# Patient Record
Sex: FEMALE | Race: WHITE | NOT HISPANIC OR LATINO | Employment: OTHER | URBAN - METROPOLITAN AREA
[De-identification: names, ages, dates, MRNs, and addresses within clinical notes are randomized per-mention and may not be internally consistent; named-entity substitution may affect disease eponyms.]

---

## 2017-02-27 ENCOUNTER — TELEPHONE (OUTPATIENT)
Dept: FAMILY MEDICINE | Facility: CLINIC | Age: 52
End: 2017-02-27

## 2017-02-27 PROBLEM — F33.0 MAJOR DEPRESSIVE DISORDER, RECURRENT EPISODE, MILD (H): Status: ACTIVE | Noted: 2017-02-27

## 2017-02-27 NOTE — TELEPHONE ENCOUNTER
Panel Management Review      Patient has the following on her problem list:     Depression / Dysthymia review  PHQ-9 SCORE 1/12/2016 3/15/2016 8/17/2016   Total Score - - -   Total Score 18 9 15      Patient is due for:  DAP      Composite cancer screening  Chart review shows that this patient is due/due soon for the following None  Summary:    Patient is due/failing the following:   DAP    Action needed:   DAP    Type of outreach:    Mailed DAP    Questions for provider review:    None                                                                                                                                    Abigail Valentin MA       Chart routed to Care Team .

## 2017-03-09 NOTE — PROGRESS NOTES
SUBJECTIVE:                                                    Inga Ledesma is a 51 year old female who presents to clinic today for the following health issues:      FMLA FORMS-  Copd flare ups and chf.    Copd has bothered her off and on.  chf : hfpef. Denies profound edema or pnd/orthopnea. No profound RAMIREZ presently. No chest pain/palps.     Problem list and histories reviewed & adjusted, as indicated.  Additional history: as documented    Labs reviewed in EPIC  Patient Active Problem List   Diagnosis     RLS (restless legs syndrome)     GERD (gastroesophageal reflux disease)     Iron deficiency anemia     Hyperlipidemia with target LDL less than 160     Adult BMI 30+     Sacroiliitis (H)     Tobacco abuse     Vitamin D deficiency     Menorrhagia     Advanced directives, counseling/discussion     Alcohol abuse, in remission     Vitamin D deficiency     Edema of both legs     Hypertension goal BP (blood pressure) < 140/90     Chronic bronchitis, unspecified chronic bronchitis type (H)     Health Care Home     Alcohol dependence with withdrawal with complication (H)     Major depressive disorder, recurrent episode, mild (H)     (HFpEF) heart failure with preserved ejection fraction (H)     Social History   Substance Use Topics     Smoking status: Light Tobacco Smoker     Packs/day: 0.50     Years: 34.00     Types: Cigarettes, Dip, chew, snus or snuff     Start date: 11/1/1979     Last attempt to quit: 10/3/2012     Smokeless tobacco: Never Used      Comment: 5 cigarettes daily     Alcohol use No      Comment: quit 5-16     Currently sober.   Reviewed and updated as needed this visit by clinical staff  Meds       Reviewed and updated as needed this visit by Provider           All other systems negative except as outline above.  OBJECTIVE:  Eye exam - right eye normal lid, conjunctiva, cornea, pupil and fundus, left eye normal lid, conjunctiva, cornea, pupil and fundus.  Thyroid not palpable, not enlarged, no  nodules detected.  CHEST:chest clear to IPPA, no tachypnea, retractions or cyanosis and S1, S2 normal, no murmur, no gallop, rate regular.  Her disks are flat. Pupils equal, round, reactive to light. Extraocular movements full. Visual fields full. Face moves symmetrically. Tongue midline. Hearing mildly decreased to finger-rubbing at approximately 6-8 inches. Neck without bruits. CV: S1, S2. Motor strength 5/5. Reflexes were 2/4. Toe signs were downgoing. Normal position sense. Good finger-nose-finger and fine finger movement. Gait: she herrera from a chair without difficulty and has a mildly broad-based gait.  No edema          Inga was seen today for forms.    Diagnoses and all orders for this visit:    RLS (restless legs syndrome)  -     gabapentin (NEURONTIN) 300 MG capsule; 1cap am 1 cap pm and 2 at bedtime    Chronic bronchitis, unspecified chronic bronchitis type (H)    (HFpEF) heart failure with preserved ejection fraction (H)    Major depressive disorder, recurrent episode, mild (H)  -     gabapentin (NEURONTIN) 300 MG capsule; 1cap am 1 cap pm and 2 at bedtime    Hypertension goal BP (blood pressure) < 140/90    Seizure due to alcohol withdrawal, uncomplicated (H)      Letter completed  work on lifestyle modification  Patient to f/u prn

## 2017-03-10 ENCOUNTER — OFFICE VISIT (OUTPATIENT)
Dept: FAMILY MEDICINE | Facility: CLINIC | Age: 52
End: 2017-03-10
Payer: COMMERCIAL

## 2017-03-10 VITALS — HEART RATE: 90 BPM | SYSTOLIC BLOOD PRESSURE: 118 MMHG | DIASTOLIC BLOOD PRESSURE: 80 MMHG

## 2017-03-10 DIAGNOSIS — I10 HYPERTENSION GOAL BP (BLOOD PRESSURE) < 140/90: ICD-10-CM

## 2017-03-10 DIAGNOSIS — J42 CHRONIC BRONCHITIS, UNSPECIFIED CHRONIC BRONCHITIS TYPE (H): ICD-10-CM

## 2017-03-10 DIAGNOSIS — R56.9 SEIZURE DUE TO ALCOHOL WITHDRAWAL, UNCOMPLICATED (H): ICD-10-CM

## 2017-03-10 DIAGNOSIS — G25.81 RLS (RESTLESS LEGS SYNDROME): Primary | ICD-10-CM

## 2017-03-10 DIAGNOSIS — I50.30 (HFPEF) HEART FAILURE WITH PRESERVED EJECTION FRACTION (H): ICD-10-CM

## 2017-03-10 DIAGNOSIS — F10.930 SEIZURE DUE TO ALCOHOL WITHDRAWAL, UNCOMPLICATED (H): ICD-10-CM

## 2017-03-10 DIAGNOSIS — F33.0 MAJOR DEPRESSIVE DISORDER, RECURRENT EPISODE, MILD (H): ICD-10-CM

## 2017-03-10 PROCEDURE — 99214 OFFICE O/P EST MOD 30 MIN: CPT | Performed by: PHYSICIAN ASSISTANT

## 2017-03-10 RX ORDER — GABAPENTIN 300 MG/1
CAPSULE ORAL
Qty: 270 CAPSULE | Refills: 3 | Status: SHIPPED | OUTPATIENT
Start: 2017-03-10 | End: 2017-06-20

## 2017-03-10 ASSESSMENT — ANXIETY QUESTIONNAIRES
2. NOT BEING ABLE TO STOP OR CONTROL WORRYING: MORE THAN HALF THE DAYS
IF YOU CHECKED OFF ANY PROBLEMS ON THIS QUESTIONNAIRE, HOW DIFFICULT HAVE THESE PROBLEMS MADE IT FOR YOU TO DO YOUR WORK, TAKE CARE OF THINGS AT HOME, OR GET ALONG WITH OTHER PEOPLE: VERY DIFFICULT
7. FEELING AFRAID AS IF SOMETHING AWFUL MIGHT HAPPEN: NEARLY EVERY DAY
GAD7 TOTAL SCORE: 17
6. BECOMING EASILY ANNOYED OR IRRITABLE: NEARLY EVERY DAY
1. FEELING NERVOUS, ANXIOUS, OR ON EDGE: NEARLY EVERY DAY
3. WORRYING TOO MUCH ABOUT DIFFERENT THINGS: MORE THAN HALF THE DAYS
5. BEING SO RESTLESS THAT IT IS HARD TO SIT STILL: MORE THAN HALF THE DAYS

## 2017-03-10 ASSESSMENT — PATIENT HEALTH QUESTIONNAIRE - PHQ9: 5. POOR APPETITE OR OVEREATING: MORE THAN HALF THE DAYS

## 2017-03-10 NOTE — LETTER
[facility]  [address] [facility] [address] [place] [address]  Phone: [phone]    March 10, 2017        Inga Ledesma  [address] [place] [address]          To whom it may concern:    RE: Inga Ledesma    Patient was seen and treated today at our clinic.  Inga had a one time only seizure in March of 2016 due to acute alcohol withdrawal. She has been seizure free since that time with out the use of anti-seizure medication. She has also been sober since May of 2016. At this time I am clearing her to drive, but am requesting an annual assessment for the next 4 years.     Please contact me for questions or concerns.      Sincerely,        Min Toledo PA-C

## 2017-03-10 NOTE — MR AVS SNAPSHOT
After Visit Summary   3/10/2017    Inga Ledesma    MRN: [id number]           Patient Information     Date Of Birth          1965        Visit Information        Provider Department      3/10/2017 2:40 PM Min Toledo PA-C [facility]        Today's Diagnoses     RLS (restless legs syndrome)    -  1    Chronic bronchitis, unspecified chronic bronchitis type (H)        (HFpEF) heart failure with preserved ejection fraction (H)        Major depressive disorder, recurrent episode, mild (H)        Hypertension goal BP (blood pressure) < 140/90        Seizure due to alcohol withdrawal, uncomplicated (H)           Follow-ups after your visit        Who to contact     Normal or non-critical lab and imaging results will be communicated to you by LawDeckhart, letter or phone within 4 business days after the clinic has received the results. If you do not hear from us within 7 days, please contact the clinic through LawDeckhart or phone. If you have a critical or abnormal lab result, we will notify you by phone as soon as possible.  Submit refill requests through TrendMD or call your pharmacy and they will forward the refill request to us. Please allow 3 business days for your refill to be completed.          If you need to speak with a  for additional information , please call: [phone]             Additional Information About Your Visit        LawDeckharCollabNet Information     TrendMD gives you secure access to your electronic health record. If you see a primary care provider, you can also send messages to your care team and make appointments. If you have questions, please call your primary care clinic.  If you do not have a primary care provider, please call [phone] and they will assist you.        Care EveryWhere ID     This is your Care EveryWhere ID. This could be used by other organizations to access your [place] medical records  [id number]        Your Vitals Were      Pulse Last Period                90 05/23/2010           Blood Pressure from Last 3 Encounters:   03/10/17 118/80   10/06/16 (!) 197/124   09/09/16 136/84    Weight from Last 3 Encounters:   10/06/16 176 lb 9.6 oz (80.1 kg)   09/07/16 174 lb (78.9 kg)   08/17/16 175 lb (79.4 kg)              Today, you had the following     No orders found for display         Today's Medication Changes          These changes are accurate as of: 3/10/17  3:35 PM.  If you have any questions, ask your nurse or doctor.               Stop taking these medicines if you haven't already. Please contact your care team if you have questions.     azithromycin 250 MG tablet   Commonly known as:  ZITHROMAX           predniSONE 20 MG tablet   Commonly known as:  DELTASONE                Where to get your medicines      These medications were sent to [facility] [address] - [address] [facility]  [address] [facility] [place] [address]     Phone:  [phone]     gabapentin 300 MG capsule                Primary Care Provider Office Phone # Fax #    Nacho Guzman [phone]       [facility] [address] [facility] [address]        Goals        General    I will use the walker at all times or a cane in tight spots to prevent further falls. (pt-stated)     Notes - Note created  12/9/2015  8:48 AM by Sonny Jacobs RN    As of today's date 12/9/2015 goal is met at 0 - 25%.   Goal Status:  Active          Thank you!     Thank you for choosing [facility]  for your care. Our goal is always to provide you with excellent care. Hearing back from our patients is one way we can continue to improve our services. Please take a few minutes to complete the written survey that you may receive in the mail after your visit with us. Thank you!             Your Updated Medication List - Protect others around you: Learn how to safely use, store and throw away your medicines at  www.disposemymeds.org.          This list is accurate as of: 3/10/17  3:35 PM.  Always use your most recent med list.                   Brand Name Dispense Instructions for use    albuterol 108 (90 BASE) MCG/ACT Inhaler    VENTOLIN HFA    3 Inhaler    Inhale  into the lungs. INHALE 2 PUFFS FOUR TIMES DAILY AS NEEDED       AMLODIPINE BESYLATE PO      Take 10 mg by mouth daily       aspirin 81 MG EC tablet     100 tablet    Take 1 tablet (81 mg) by mouth daily       calcium carbonate 500 MG tablet    OS-JENNIFER 500 mg [place]. Ca     Take 500 mg by mouth 2 times daily       cetirizine 10 MG tablet    zyrTEC    90 tablet    Take 1 tablet (10 mg) by mouth every evening       FLUoxetine 40 MG capsule    PROzac    90 capsule    Take 1 capsule (40 mg) by mouth daily       fluticasone 50 MCG/ACT spray    FLONASE    3 g    Spray 1-2 sprays into both nostrils daily       fluticasone-salmeterol 500-50 MCG/DOSE diskus inhaler    ADVAIR DISKUS    3 Inhaler    Inhale 1 puff into the lungs every 12 hours       furosemide 20 MG tablet    LASIX    90 tablet    Take 1 tablet (20 mg) by mouth daily       gabapentin 300 MG capsule    NEURONTIN    270 capsule    1cap am 1 cap pm and 2 at bedtime       HYDROcodone-acetaminophen 5-325 MG per tablet    NORCO    90 tablet    1 tablet at bedtime as needed for cough and sleep.       ipratropium - albuterol 0.5 mg/2.5 mg/3 mL 0.5-2.5 (3) MG/3ML neb solution    DUONEB    60 vial    Take 1 vial (3 mLs) by nebulization every 6 hours as needed for shortness of breath / dyspnea       LABETALOL HCL PO      Take 100 mg by mouth 2 times daily       losartan 100 MG tablet    COZAAR    90 tablet    Take 1 tablet (100 mg) by mouth daily       methocarbamol 750 MG tablet    ROBAXIN    180 tablet    1 tablet 3 times a day for muscle relaxation       montelukast 10 MG tablet    SINGULAIR    90 tablet    Take 1 tablet (10 mg) by mouth At Bedtime       naltrexone 50 MG tablet    DEPADE;REVIA     Take 50 mg by mouth  daily       nicotine 10 MG Inhaler    NICOTROL    180 Box    Use 1-2 daily as needed for smoking cessation       nystatin cream    MYCOSTATIN    30 g    Apply topically 3 times daily       omeprazole 20 MG CR capsule    priLOSEC    180 capsule    Take 1 capsule (20 mg) by mouth 2 times daily       order for DME     1 each    Equipment being ordered: Nebulizer       potassium chloride SA 10 MEQ CR tablet    K-DUR/KLOR-CON M    180 tablet    Take 1 tablet (10 mEq) by mouth 2 times daily       rOPINIRole 1 MG tablet    REQUIP    270 tablet    Take 1 tablet (1 mg) by mouth 5 times daily       tiotropium 18 MCG capsule    SPIRIVA HANDIHALER    90 capsule    Inhale contents of one capsule daily.       triamcinolone 0.1 % ointment    KENALOG    30 g    Apply sparingly to affected area three times daily.       venlafaxine 150 MG 24 hr capsule    EFFEXOR-XR    60 capsule    Take 2 capsules daily.

## 2017-03-11 ASSESSMENT — ANXIETY QUESTIONNAIRES: GAD7 TOTAL SCORE: 17

## 2017-03-11 ASSESSMENT — PATIENT HEALTH QUESTIONNAIRE - PHQ9: SUM OF ALL RESPONSES TO PHQ QUESTIONS 1-9: 17

## 2017-04-28 ENCOUNTER — OFFICE VISIT (OUTPATIENT)
Dept: FAMILY MEDICINE | Facility: CLINIC | Age: 52
End: 2017-04-28
Payer: COMMERCIAL

## 2017-04-28 ENCOUNTER — RADIANT APPOINTMENT (OUTPATIENT)
Dept: GENERAL RADIOLOGY | Facility: CLINIC | Age: 52
End: 2017-04-28
Attending: PHYSICIAN ASSISTANT
Payer: COMMERCIAL

## 2017-04-28 ENCOUNTER — TELEPHONE (OUTPATIENT)
Dept: FAMILY MEDICINE | Facility: CLINIC | Age: 52
End: 2017-04-28

## 2017-04-28 VITALS
BODY MASS INDEX: 29.26 KG/M2 | DIASTOLIC BLOOD PRESSURE: 80 MMHG | HEIGHT: 62 IN | WEIGHT: 159 LBS | OXYGEN SATURATION: 96 % | SYSTOLIC BLOOD PRESSURE: 112 MMHG | HEART RATE: 115 BPM | RESPIRATION RATE: 20 BRPM

## 2017-04-28 DIAGNOSIS — J42 CHRONIC BRONCHITIS, UNSPECIFIED CHRONIC BRONCHITIS TYPE (H): Primary | ICD-10-CM

## 2017-04-28 DIAGNOSIS — F10.930 SEIZURE DUE TO ALCOHOL WITHDRAWAL, UNCOMPLICATED (H): ICD-10-CM

## 2017-04-28 DIAGNOSIS — R19.7 DIARRHEA, UNSPECIFIED TYPE: ICD-10-CM

## 2017-04-28 DIAGNOSIS — E87.6 HYPOKALEMIA: ICD-10-CM

## 2017-04-28 DIAGNOSIS — R56.9 SEIZURE DUE TO ALCOHOL WITHDRAWAL, UNCOMPLICATED (H): ICD-10-CM

## 2017-04-28 DIAGNOSIS — R06.09 DYSPNEA ON EXERTION: ICD-10-CM

## 2017-04-28 DIAGNOSIS — I50.30 (HFPEF) HEART FAILURE WITH PRESERVED EJECTION FRACTION (H): ICD-10-CM

## 2017-04-28 LAB
ALBUMIN SERPL-MCNC: 3.9 G/DL (ref 3.4–5)
ALP SERPL-CCNC: 155 U/L (ref 40–150)
ALT SERPL W P-5'-P-CCNC: 44 U/L (ref 0–50)
ANION GAP SERPL CALCULATED.3IONS-SCNC: 15 MMOL/L (ref 3–14)
AST SERPL W P-5'-P-CCNC: 65 U/L (ref 0–45)
BILIRUB SERPL-MCNC: 1.5 MG/DL (ref 0.2–1.3)
BUN SERPL-MCNC: 8 MG/DL (ref 7–30)
C DIFF TOX B STL QL: NORMAL
CALCIUM SERPL-MCNC: 8.4 MG/DL (ref 8.5–10.1)
CHLORIDE SERPL-SCNC: 89 MMOL/L (ref 94–109)
CO2 SERPL-SCNC: 27 MMOL/L (ref 20–32)
CREAT SERPL-MCNC: 1.15 MG/DL (ref 0.52–1.04)
D DIMER PPP FEU-MCNC: NORMAL UG/ML FEU (ref 0–0.5)
ERYTHROCYTE [DISTWIDTH] IN BLOOD BY AUTOMATED COUNT: 13.3 % (ref 10–15)
FEF 25/75: NORMAL
FEV-1: NORMAL
FEV1/FVC: NORMAL
FVC: NORMAL
GFR SERPL CREATININE-BSD FRML MDRD: 50 ML/MIN/1.7M2
GLUCOSE SERPL-MCNC: 117 MG/DL (ref 70–99)
HCT VFR BLD AUTO: 41.7 % (ref 35–47)
HGB BLD-MCNC: 15 G/DL (ref 11.7–15.7)
MCH RBC QN AUTO: 32.9 PG (ref 26.5–33)
MCHC RBC AUTO-ENTMCNC: 36 G/DL (ref 31.5–36.5)
MCV RBC AUTO: 91 FL (ref 78–100)
NT-PROBNP SERPL-MCNC: 842 PG/ML (ref 0–125)
PLATELET # BLD AUTO: 238 10E9/L (ref 150–450)
POTASSIUM SERPL-SCNC: 2.7 MMOL/L (ref 3.4–5.3)
PROT SERPL-MCNC: 7.8 G/DL (ref 6.8–8.8)
RBC # BLD AUTO: 4.56 10E12/L (ref 3.8–5.2)
SODIUM SERPL-SCNC: 131 MMOL/L (ref 133–144)
SPECIMEN SOURCE: NORMAL
WBC # BLD AUTO: 13 10E9/L (ref 4–11)

## 2017-04-28 PROCEDURE — 85027 COMPLETE CBC AUTOMATED: CPT | Performed by: PHYSICIAN ASSISTANT

## 2017-04-28 PROCEDURE — 80053 COMPREHEN METABOLIC PANEL: CPT | Performed by: PHYSICIAN ASSISTANT

## 2017-04-28 PROCEDURE — 99214 OFFICE O/P EST MOD 30 MIN: CPT | Mod: 25 | Performed by: PHYSICIAN ASSISTANT

## 2017-04-28 PROCEDURE — 71020 XR CHEST 2 VW: CPT

## 2017-04-28 PROCEDURE — 83880 ASSAY OF NATRIURETIC PEPTIDE: CPT | Performed by: PHYSICIAN ASSISTANT

## 2017-04-28 PROCEDURE — 87493 C DIFF AMPLIFIED PROBE: CPT | Performed by: PHYSICIAN ASSISTANT

## 2017-04-28 PROCEDURE — 94010 BREATHING CAPACITY TEST: CPT | Performed by: PHYSICIAN ASSISTANT

## 2017-04-28 PROCEDURE — 36415 COLL VENOUS BLD VENIPUNCTURE: CPT | Performed by: PHYSICIAN ASSISTANT

## 2017-04-28 PROCEDURE — 85379 FIBRIN DEGRADATION QUANT: CPT | Performed by: PHYSICIAN ASSISTANT

## 2017-04-28 RX ORDER — ALBUTEROL SULFATE 90 UG/1
AEROSOL, METERED RESPIRATORY (INHALATION)
Qty: 3 INHALER | Refills: 5 | Status: SHIPPED | OUTPATIENT
Start: 2017-04-28 | End: 2017-06-20

## 2017-04-28 RX ORDER — IPRATROPIUM BROMIDE AND ALBUTEROL SULFATE 2.5; .5 MG/3ML; MG/3ML
3 SOLUTION RESPIRATORY (INHALATION) EVERY 6 HOURS PRN
Qty: 60 VIAL | Refills: 12 | Status: SHIPPED | OUTPATIENT
Start: 2017-04-28 | End: 2017-10-06

## 2017-04-28 NOTE — MR AVS SNAPSHOT
After Visit Summary   4/28/2017    Inga Ledesma    MRN: [id number]           Patient Information     Date Of Birth          1965        Visit Information        Provider Department      4/28/2017 1:20 PM Min Toledo PA-C [facility]        Today's Diagnoses     Chronic bronchitis, unspecified chronic bronchitis type (H)    -  1    Seizure due to alcohol withdrawal, uncomplicated (H)        Diarrhea, unspecified type        Alcoholism /alcohol abuse (H)        Dyspnea on exertion        (HFpEF) heart failure with preserved ejection fraction (H)           Follow-ups after your visit        Future tests that were ordered for you today     Open Future Orders        Priority Expected Expires Ordered    Echocardiogram Complete Routine  4/28/2018 4/28/2017            Who to contact     Normal or non-critical lab and imaging results will be communicated to you by PhaseRxt, letter or phone within 4 business days after the clinic has received the results. If you do not hear from us within 7 days, please contact the clinic through PhaseRxt or phone. If you have a critical or abnormal lab result, we will notify you by phone as soon as possible.  Submit refill requests through Buysight or call your pharmacy and they will forward the refill request to us. Please allow 3 business days for your refill to be completed.          If you need to speak with a  for additional information , please call: [phone]             Additional Information About Your Visit        Buysight Information     Buysight gives you secure access to your electronic health record. If you see a primary care provider, you can also send messages to your care team and make appointments. If you have questions, please call your primary care clinic.  If you do not have a primary care provider, please call [phone] and they will assist you.        Care EveryWhere ID     This is your Care EveryWhere ID.  "This could be used by other organizations to access your [place] medical records  [id number]        Your Vitals Were     Pulse Respirations Height Last Period Pulse Oximetry BMI (Body Mass Index)    115 20 5' 1.5\" (1.562 m) 05/23/2010 96% 29.56 kg/m2       Blood Pressure from Last 3 Encounters:   04/28/17 112/80   03/10/17 118/80   10/06/16 (!) 197/124    Weight from Last 3 Encounters:   04/28/17 159 lb (72.1 kg)   10/06/16 176 lb 9.6 oz (80.1 kg)   09/07/16 174 lb (78.9 kg)              We Performed the Following     BNP-N terminal pro     CBC with platelets     Clostridium difficile Toxin B PCR     Comprehensive metabolic panel (BMP + Alb, Alk Phos, ALT, AST, Total. Bili, TP)     COPD ACTION PLAN     D dimer quantitative     XR Chest 2 Views          Where to get your medicines      These medications were sent to [facility] [address] - [address] [facility]  [address] [facility] [place] [address]     Phone:  [phone]     albuterol 108 (90 BASE) MCG/ACT Inhaler    ipratropium - albuterol 0.5 mg/2.5 mg/3 mL 0.5-2.5 (3) MG/3ML neb solution          Primary Care Provider Office Phone # Fax #    Nacho Guzman [phone]       [facility] [address] [facility] [address]        Goals        General    I will use the walker at all times or a cane in tight spots to prevent further falls. (pt-stated)     Notes - Note created  12/9/2015  8:48 AM by Sonny Jacobs RN    As of today's date 12/9/2015 goal is met at 0 - 25%.   Goal Status:  Active          Thank you!     Thank you for choosing [facility]  for your care. Our goal is always to provide you with excellent care. Hearing back from our patients is one way we can continue to improve our services. Please take a few minutes to complete the written survey that you may receive in the mail after your visit with us. Thank you!             Your Updated Medication List - Protect others " around you: Learn how to safely use, store and throw away your medicines at www.disposemymeds.org.          This list is accurate as of: 4/28/17  2:28 PM.  Always use your most recent med list.                   Brand Name Dispense Instructions for use    albuterol 108 (90 BASE) MCG/ACT Inhaler    VENTOLIN HFA    3 Inhaler    Inhale  into the lungs. INHALE 2 PUFFS FOUR TIMES DAILY AS NEEDED       AMLODIPINE BESYLATE PO      Take 10 mg by mouth daily       aspirin 81 MG EC tablet     100 tablet    Take 1 tablet (81 mg) by mouth daily       calcium carbonate 500 MG tablet    OS-JENNIFER 500 mg [place]. Ca     Take 500 mg by mouth 2 times daily       cetirizine 10 MG tablet    zyrTEC    90 tablet    Take 1 tablet (10 mg) by mouth every evening       FLUoxetine 40 MG capsule    PROzac    90 capsule    Take 1 capsule (40 mg) by mouth daily       fluticasone 50 MCG/ACT spray    FLONASE    3 g    Spray 1-2 sprays into both nostrils daily       fluticasone-salmeterol 500-50 MCG/DOSE diskus inhaler    ADVAIR DISKUS    3 Inhaler    Inhale 1 puff into the lungs every 12 hours       furosemide 20 MG tablet    LASIX    90 tablet    Take 1 tablet (20 mg) by mouth daily       gabapentin 300 MG capsule    NEURONTIN    270 capsule    1cap am 1 cap pm and 2 at bedtime       HYDROcodone-acetaminophen 5-325 MG per tablet    NORCO    90 tablet    1 tablet at bedtime as needed for cough and sleep.       ipratropium - albuterol 0.5 mg/2.5 mg/3 mL 0.5-2.5 (3) MG/3ML neb solution    DUONEB    60 vial    Take 1 vial (3 mLs) by nebulization every 6 hours as needed for shortness of breath / dyspnea       LABETALOL HCL PO      Take 100 mg by mouth 2 times daily       losartan 100 MG tablet    COZAAR    90 tablet    Take 1 tablet (100 mg) by mouth daily       methocarbamol 750 MG tablet    ROBAXIN    180 tablet    1 tablet 3 times a day for muscle relaxation       montelukast 10 MG tablet    SINGULAIR    90 tablet    Take 1 tablet (10 mg) by mouth At  Bedtime       naltrexone 50 MG tablet    DEPADE;REVIA     Take 50 mg by mouth daily       nicotine 10 MG Inhaler    NICOTROL    180 Box    Use 1-2 daily as needed for smoking cessation       nystatin cream    MYCOSTATIN    30 g    Apply topically 3 times daily       omeprazole 20 MG CR capsule    priLOSEC    180 capsule    Take 1 capsule (20 mg) by mouth 2 times daily       order for DME     1 each    Equipment being ordered: Nebulizer       potassium chloride SA 10 MEQ CR tablet    K-DUR/KLOR-CON M    180 tablet    Take 1 tablet (10 mEq) by mouth 2 times daily       rOPINIRole 1 MG tablet    REQUIP    270 tablet    Take 1 tablet (1 mg) by mouth 5 times daily       tiotropium 18 MCG capsule    SPIRIVA HANDIHALER    90 capsule    Inhale contents of one capsule daily.       triamcinolone 0.1 % ointment    KENALOG    30 g    Apply sparingly to affected area three times daily.       venlafaxine 150 MG 24 hr capsule    EFFEXOR-XR    60 capsule    Take 2 capsules daily.

## 2017-04-28 NOTE — LETTER
My COPD Action Plan   Name: Inga Ledesma    YOB: 1965   Date: 4/28/2017    My doctor: Min Toledo PA-C   My clinic: [address]  [place] [id number] [phone]  My Controller Medicine: Tiotropium (Spiriva)    Dose:      My Rescue Medicine: Albuterol nebulizer solution    Dose:      My Flare Up Medicine: Prednisone   Dose:   My COPD Severity: { :632180}     Use of Oxygen: Oxygen Not Prescribed         GREEN ZONE       Doing well today      Usual level of activity and exercise    Usual amount of cough and mucus    No shortness of breath    Usual level of health (thinking clearly, sleeping well, feel like eating) Actions:      Take daily medicines    Use oxygen as prescribed    Follow regular exercise and diet plan    Avoid cigarette smoke and other irritants that harm the lungs           YELLOW ZONE          Having a bad day or flare up      Short of breath more than usual    A lot more sputum (mucus) than usual    Sputum looks yellow, green, tan, brown or bloody    More coughing or wheezing    Fever or chills    Less energy; trouble completing activities    Trouble thinking or focusing    Using quick relief inhaler or nebulizer more often    Poor sleep; symptoms wake me up    Do not feel like eating Actions:      Get plenty of rest    Take daily medicines    Use quick relief inhaler every 24 hours    If you use oxygen, call you doctor to see if you should adjust your oxygen    Do breathing exercises or other things to help you relax    Let a loved one, friend or neighbor know you are feeling worse    Call your care team if you have 2 or more symptoms.  Start taking steroids or antibiotics if directed by your care team           RED ZONE       Need medical care now      Severe shortness of breath (feel you can't breathe)    Fever, chills    Not enough breath to do any activity    Trouble coughing up mucus, walking or talking    Blood in  mucus    Frequent coughing   Rescue medicines are not working    Not able to sleep because of breathing    Feel confused or drowsy    Chest pain    Actions:      Call your health care team.  If you cannot reach your care team, call 911 or go to the emergency room.        Electronically signed by: Dodie Maurcie, April 28, 2017  Annual Reminders:  Meet with Care Team, Flu Shot every Fall and Pneumonia Shot at least once  Pharmacy:    [place], [place] [facility] [address] [facility] [place], [place] - [address]  [facility] [place] [facility] [address] [facility]  WRITTEN PRESCRIPTION REQUESTED

## 2017-04-28 NOTE — PROGRESS NOTES
SUBJECTIVE:                                                    Inga Ledesma is a 51 year old female who presents to clinic today for the following health issues:      COPD Follow-Up    Symptoms are currently: patient is wanting paperwork to return to work. States stable    Current fatigue or dyspnea with ambulation: stable     Shortness of breath: stable    Increased or change in Cough/Sputum: No    Fever(s): No    Any ER/UC or hospital admissions since your last visit? No     History   Smoking Status     Light Tobacco Smoker     Packs/day: 0.50     Years: 34.00     Types: Cigarettes, Dip, chew, snus or snuff     Start date: 11/1/1979     Last attempt to quit: 10/3/2012   Smokeless Tobacco     Never Used     Comment: 5 cigarettes daily     No results found for: FEV1, [id number]       Amount of exercise or physical activity: None    Problems taking medications regularly: No    Medication side effects: none    Diet: regular (no restrictions)      Noting sob related to copd. No chest pain. No leg swelling.   Some congestion and coughing.  Recheck of alcoholism and prior sz due to withdrawal. Drank for a week two weeks ago. Hasn't drank since. No seizures. Blood pressure elevated, may be related.  Some diarrhea . Green in color. Was on antibiotics last mos.     Problem list and histories reviewed & adjusted, as indicated.  Additional history: as documented        Reviewed and updated as needed this visit by clinical staff  Tobacco  Allergies  Meds  Problems  Med Hx  Surg Hx  Fam Hx  Soc Hx        Reviewed and updated as needed this visit by Provider  Tobacco  Allergies  Meds  Problems  Med Hx  Surg Hx  Fam Hx  Soc Hx          All other systems negative except as outline above  OBJECTIVE:  Eye exam - right eye normal lid, conjunctiva, cornea, pupil and fundus, left eye normal lid, conjunctiva, cornea, pupil and fundus.  ENT exam reveals - bilateral TM fluid noted, neck without nodes, throat normal  without erythema or exudate, sinuses nontender, post nasal drip noted and nasal mucosa congested.  CHEST:chest clear to IPPA, no tachypnea, retractions or cyanosis and S1, S2 normal, no murmur, no gallop, rate regular.  The abdomen is soft without tenderness, guarding, mass, rebound or organomegaly. Bowel sounds are normal. No CVA tenderness or inguinal adenopathy noted.  No edema    Inga was seen today for copd.    Diagnoses and all orders for this visit:    Chronic bronchitis, unspecified chronic bronchitis type (H)  -     ipratropium - albuterol 0.5 mg/2.5 mg/3 mL (DUONEB) 0.5-2.5 (3) MG/3ML neb solution; Take 1 vial (3 mLs) by nebulization every 6 hours as needed for shortness of breath / dyspnea  -     albuterol (VENTOLIN HFA) 108 (90 BASE) MCG/ACT Inhaler; Inhale  into the lungs. INHALE 2 PUFFS FOUR TIMES DAILY AS NEEDED  -     COPD ACTION PLAN    Seizure due to alcohol withdrawal, uncomplicated (H)    Diarrhea, unspecified type  -     Clostridium difficile Toxin B PCR; Future  -     Clostridium difficile Toxin B PCR    Alcoholism /alcohol abuse (H)  -     Comprehensive metabolic panel (BMP + Alb, Alk Phos, ALT, AST, Total. Bili, TP)  -     CBC with platelets    Dyspnea on exertion  -     CBC with platelets  -     XR Chest 2 Views  -     BNP-N terminal pro  -     D dimer quantitative    (HFpEF) heart failure with preserved ejection fraction (H)  -     Echocardiogram Complete; Future      Restart spiriva. Recheck prn.  Advised supportive and symptomatic treatment.  Follow up with Provider - if condition persists or worsens.

## 2017-04-29 RX ORDER — POTASSIUM CHLORIDE 1500 MG/1
20 TABLET, EXTENDED RELEASE ORAL 2 TIMES DAILY
Qty: 180 TABLET | Refills: 3 | Status: ON HOLD | OUTPATIENT
Start: 2017-04-29 | End: 2017-11-01

## 2017-05-01 ENCOUNTER — TELEPHONE (OUTPATIENT)
Dept: FAMILY MEDICINE | Facility: CLINIC | Age: 52
End: 2017-05-01

## 2017-05-01 NOTE — TELEPHONE ENCOUNTER
Patient states that Min Toledo did not put a return date on her work ability form. Please call.    Thank you.

## 2017-05-03 ENCOUNTER — TELEPHONE (OUTPATIENT)
Dept: FAMILY MEDICINE | Facility: CLINIC | Age: 52
End: 2017-05-03

## 2017-05-03 NOTE — TELEPHONE ENCOUNTER
Disability forms from insurance received, patient hasn't returned to work yet-she was admitted to [facility] 05/03/17 due to alcoholism. Form hasn't been signed by patient in order to release information and has been mailed to her home with a request to complete and mail to clinic for completion.

## 2017-05-03 NOTE — LETTER
[facility] [place]  [address] [place] [address]  [phone]                                                                                                           Inga Ledesma  [address]  [place] [address]    May 3, 2017          Dear Inga Ledesma,        We have received disability forms from [facility],please complete the employee portion and return to Min Toledo so that he may finish them.    Thank you          Sincerely,    Louise STUART     [facility]

## 2017-05-09 ENCOUNTER — OFFICE VISIT (OUTPATIENT)
Dept: FAMILY MEDICINE | Facility: CLINIC | Age: 52
End: 2017-05-09
Payer: COMMERCIAL

## 2017-05-09 VITALS
RESPIRATION RATE: 18 BRPM | SYSTOLIC BLOOD PRESSURE: 128 MMHG | OXYGEN SATURATION: 96 % | HEART RATE: 106 BPM | WEIGHT: 167 LBS | DIASTOLIC BLOOD PRESSURE: 82 MMHG | BODY MASS INDEX: 31.04 KG/M2

## 2017-05-09 DIAGNOSIS — K70.10 ALCOHOLIC HEPATITIS WITHOUT ASCITES (H): ICD-10-CM

## 2017-05-09 DIAGNOSIS — E87.6 HYPOKALEMIA: Primary | ICD-10-CM

## 2017-05-09 DIAGNOSIS — K85.20 ALCOHOL-INDUCED ACUTE PANCREATITIS, UNSPECIFIED COMPLICATION STATUS: ICD-10-CM

## 2017-05-09 LAB
ALBUMIN SERPL-MCNC: 3.4 G/DL (ref 3.4–5)
ALP SERPL-CCNC: 150 U/L (ref 40–150)
ALT SERPL W P-5'-P-CCNC: 45 U/L (ref 0–50)
AMYLASE SERPL-CCNC: 91 U/L (ref 30–110)
ANION GAP SERPL CALCULATED.3IONS-SCNC: 10 MMOL/L (ref 3–14)
AST SERPL W P-5'-P-CCNC: 51 U/L (ref 0–45)
BILIRUB SERPL-MCNC: 0.3 MG/DL (ref 0.2–1.3)
BUN SERPL-MCNC: 14 MG/DL (ref 7–30)
CALCIUM SERPL-MCNC: 9.9 MG/DL (ref 8.5–10.1)
CHLORIDE SERPL-SCNC: 94 MMOL/L (ref 94–109)
CO2 SERPL-SCNC: 29 MMOL/L (ref 20–32)
CREAT SERPL-MCNC: 0.9 MG/DL (ref 0.52–1.04)
GFR SERPL CREATININE-BSD FRML MDRD: 66 ML/MIN/1.7M2
GLUCOSE SERPL-MCNC: 114 MG/DL (ref 70–99)
LIPASE SERPL-CCNC: 853 U/L (ref 73–393)
POTASSIUM SERPL-SCNC: 3.8 MMOL/L (ref 3.4–5.3)
PROT SERPL-MCNC: 7.5 G/DL (ref 6.8–8.8)
SODIUM SERPL-SCNC: 133 MMOL/L (ref 133–144)

## 2017-05-09 PROCEDURE — 99214 OFFICE O/P EST MOD 30 MIN: CPT | Performed by: PHYSICIAN ASSISTANT

## 2017-05-09 PROCEDURE — 36415 COLL VENOUS BLD VENIPUNCTURE: CPT | Performed by: PHYSICIAN ASSISTANT

## 2017-05-09 PROCEDURE — 82150 ASSAY OF AMYLASE: CPT | Performed by: PHYSICIAN ASSISTANT

## 2017-05-09 PROCEDURE — 83690 ASSAY OF LIPASE: CPT | Performed by: PHYSICIAN ASSISTANT

## 2017-05-09 PROCEDURE — 80053 COMPREHEN METABOLIC PANEL: CPT | Performed by: PHYSICIAN ASSISTANT

## 2017-05-09 NOTE — PROGRESS NOTES
SUBJECTIVE:                                                    Inga Ledesma is a 51 year old female who presents to clinic today for the following health issues:      ED/UC Followup:    Facility:  [facility]  Date of visit: 05/03/17  Reason for visit: alcohol withdrawal  Current Status: needing short term disability forms filled out today       No chest pain/sob. Mild constipation. abd pain otherwise has improved. No fevers. Sober currently x 9 days.    Problem list and histories reviewed & adjusted, as indicated.  Additional history: as documented        Reviewed and updated as needed this visit by clinical staff       Reviewed and updated as needed this visit by Provider         Patient Active Problem List   Diagnosis     RLS (restless legs syndrome)     GERD (gastroesophageal reflux disease)     Iron deficiency anemia     Hyperlipidemia with target LDL less than 160     Adult BMI 30+     Sacroiliitis (H)     Tobacco abuse     Vitamin D deficiency     Menorrhagia     Advanced directives, counseling/discussion     Vitamin D deficiency     Edema of both legs     Hypertension goal BP (blood pressure) < 140/90     Chronic bronchitis, unspecified chronic bronchitis type (H)     Health Care Home     Alcohol dependence with withdrawal with complication (H)     Major depressive disorder, recurrent episode, mild (H)     (HFpEF) heart failure with preserved ejection fraction (H)     Seizure due to alcohol withdrawal, uncomplicated (H)     Social History   Substance Use Topics     Smoking status: Light Tobacco Smoker     Packs/day: 0.50     Years: 34.00     Types: Cigarettes, Dip, chew, snus or snuff     Start date: 11/1/1979     Last attempt to quit: 10/3/2012     Smokeless tobacco: Never Used      Comment: 5 cigarettes daily     Alcohol use No      Comment: quit 5-16     All other systems negative except as outline above  OBJECTIVE:  Eye exam - right eye normal lid, conjunctiva, cornea, pupil and fundus, left eye normal  lid, conjunctiva, cornea, pupil and fundus.  Thyroid not palpable, not enlarged, no nodules detected.  CHEST:chest clear to IPPA, no tachypnea, retractions or cyanosis and S1, S2 normal, no murmur, no gallop, rate regular.  ABDOMEN: mild tenderness in the epigastric area, without rebound, guarding, mass or organomegaly. Abdomen is soft and bowel sounds are normal.    Inga was seen today for hospital f/u.    Diagnoses and all orders for this visit:    Hypokalemia  -     Comprehensive metabolic panel    Alcoholic hepatitis without ascites  -     Comprehensive metabolic panel    Alcohol-induced acute pancreatitis, unspecified complication status  -     Comprehensive metabolic panel  -     Lipase  -     Amylase      work on lifestyle modification

## 2017-05-09 NOTE — MR AVS SNAPSHOT
After Visit Summary   5/9/2017    Inga Ledesma    MRN: [id number]           Patient Information     Date Of Birth          1965        Visit Information        Provider Department      5/9/2017 4:00 PM Min Toledo [facility]-C [facility]        Today's Diagnoses     Hypokalemia    -  1    Alcoholic hepatitis without ascites        Alcohol-induced acute pancreatitis, unspecified complication status           Follow-ups after your visit        Who to contact     Normal or non-critical lab and imaging results will be communicated to you by [employer], letter or phone within 4 business days after the clinic has received the results. If you do not hear from us within 7 days, please contact the clinic through [employer] or phone. If you have a critical or abnormal lab result, we will notify you by phone as soon as possible.  Submit refill requests through [employer] or call your pharmacy and they will forward the refill request to us. Please allow 3 business days for your refill to be completed.          If you need to speak with a  for additional information , please call: [phone]             Additional Information About Your Visit        [employer] Information     [employer] gives you secure access to your electronic health record. If you see a primary care provider, you can also send messages to your care team and make appointments. If you have questions, please call your primary care clinic.  If you do not have a primary care provider, please call [phone] and they will assist you.        Care EveryWhere ID     This is your Care EveryWhere ID. This could be used by other organizations to access your [facility] medical records  [id number]        Your Vitals Were     Pulse Respirations Last Period Pulse Oximetry BMI (Body Mass Index)       106 18 05/23/2010 96% 31.04 kg/m2        Blood Pressure from Last 3 Encounters:   05/09/17 128/82   04/28/17 112/80   03/10/17 118/80     Weight from Last 3 Encounters:   05/09/17 167 lb (75.8 kg)   04/28/17 159 lb (72.1 kg)   10/06/16 176 lb 9.6 oz (80.1 kg)              We Performed the Following     Amylase     Comprehensive metabolic panel     Lipase        Primary Care Provider Office Phone # Fax #    Min Toledo PA-C [phone]       [facility] [address] [facility] [address]        Goals        General    I will use the walker at all times or a cane in tight spots to prevent further falls. (pt-stated)     Notes - Note created  12/9/2015  8:48 AM by Sonny Jacobs RN    As of today's date 12/9/2015 goal is met at 0 - 25%.   Goal Status:  Active          Thank you!     Thank you for choosing [facility]  for your care. Our goal is always to provide you with excellent care. Hearing back from our patients is one way we can continue to improve our services. Please take a few minutes to complete the written survey that you may receive in the mail after your visit with us. Thank you!             Your Updated Medication List - Protect others around you: Learn how to safely use, store and throw away your medicines at www.disposemymeds.org.          This list is accurate as of: 5/9/17  4:43 PM.  Always use your most recent med list.                   Brand Name Dispense Instructions for use    albuterol 108 (90 BASE) MCG/ACT Inhaler    VENTOLIN HFA    3 Inhaler    Inhale  into the lungs. INHALE 2 PUFFS FOUR TIMES DAILY AS NEEDED       AMLODIPINE BESYLATE PO      Take 10 mg by mouth daily       aspirin 81 MG EC tablet     100 tablet    Take 1 tablet (81 mg) by mouth daily       calcium carbonate 500 MG tablet    OS-JENNIFER 500 mg [place]. Ca     Take 500 mg by mouth 2 times daily       cetirizine 10 MG tablet    zyrTEC    90 tablet    Take 1 tablet (10 mg) by mouth every evening       FLUoxetine 40 MG capsule    PROzac    90 capsule    Take 1 capsule (40 mg) by mouth daily       fluticasone 50 MCG/ACT spray     FLONASE    3 g    Spray 1-2 sprays into both nostrils daily       fluticasone-salmeterol 500-50 MCG/DOSE diskus inhaler    ADVAIR DISKUS    3 Inhaler    Inhale 1 puff into the lungs every 12 hours       furosemide 20 MG tablet    LASIX    90 tablet    Take 1 tablet (20 mg) by mouth daily       gabapentin 300 MG capsule    NEURONTIN    270 capsule    1cap am 1 cap pm and 2 at bedtime       HYDROcodone-acetaminophen 5-325 MG per tablet    NORCO    90 tablet    1 tablet at bedtime as needed for cough and sleep.       ipratropium - albuterol 0.5 mg/2.5 mg/3 mL 0.5-2.5 (3) MG/3ML neb solution    DUONEB    60 vial    Take 1 vial (3 mLs) by nebulization every 6 hours as needed for shortness of breath / dyspnea       LABETALOL HCL PO      Take 100 mg by mouth 2 times daily       losartan 100 MG tablet    COZAAR    90 tablet    Take 1 tablet (100 mg) by mouth daily       methocarbamol 750 MG tablet    ROBAXIN    180 tablet    1 tablet 3 times a day for muscle relaxation       montelukast 10 MG tablet    SINGULAIR    90 tablet    Take 1 tablet (10 mg) by mouth At Bedtime       naltrexone 50 MG tablet    DEPADE;REVIA     Take 50 mg by mouth daily       nicotine 10 MG Inhaler    NICOTROL    180 Box    Use 1-2 daily as needed for smoking cessation       nystatin cream    MYCOSTATIN    30 g    Apply topically 3 times daily       omeprazole 20 MG CR capsule    priLOSEC    180 capsule    Take 1 capsule (20 mg) by mouth 2 times daily       order for DME     1 each    Equipment being ordered: Nebulizer       * potassium chloride SA 10 MEQ CR tablet    K-DUR/KLOR-CON M    180 tablet    Take 1 tablet (10 mEq) by mouth 2 times daily       * potassium chloride SA 20 MEQ CR tablet    potassium chloride    180 tablet    Take 1 tablet (20 mEq) by mouth 2 times daily       rOPINIRole 1 MG tablet    REQUIP    270 tablet    Take 1 tablet (1 mg) by mouth 5 times daily       tiotropium 18 MCG capsule    SPIRIVA HANDIHALER    90 capsule    Inhale  contents of one capsule daily.       triamcinolone 0.1 % ointment    KENALOG    30 g    Apply sparingly to affected area three times daily.       venlafaxine 150 MG 24 hr capsule    EFFEXOR-XR    60 capsule    Take 2 capsules daily.       * Notice:  This list has 2 medication(s) that are the same as other medications prescribed for you. Read the directions carefully, and ask your doctor or other care provider to review them with you.

## 2017-05-17 ENCOUNTER — CARE COORDINATION (OUTPATIENT)
Dept: CARE COORDINATION | Facility: CLINIC | Age: 52
End: 2017-05-17

## 2017-05-17 NOTE — LETTER
"    Medical Emergency:  911  [facility]:  Primary Care: [phone]      Specialty Care: [phone]  Medical/Specialty Appointment Line: [phone] ([phone])  24 hour Nurse Line:    [phone]  Crisis line: [phone]  Care Coordination:                    Feel free to contact your Care Coordinator team for further assistance. Below you will find resource numbers that you might find helpful.     {Transportation:264907::\"TRANSPORTATION:\"}  {[place] Product Transportaion Services:543108::\"[place] Product Transportaion Services:\"}  {Home Medical Suppliers:869248::\"HOME MEDICAL SUPPLY:\"}  {Community Resource lines:194409::\"COMMUNITY RESOURCE LINE:\"}  {County Phone Numbers:398659::\"COUNTY PHONE NUMBER(S):\"}  {Chemical Dependency programs:767193::\"CHEMICAL DEPENDENCY PROGRAMS:\"}  {Private Pay Home Care:231865::\"PRIVATE PAY HOME CARE:\"}  {HOME CARE ([facility]):226207::\"HOME CARE ([facility])\"}  {HOSPICE SERVICES ([facility]):736008::\"HOSPICE SERVICES ([facility]):\"}  {REHAB FACILITIES ([facility]):598739}  {Mental Health/Counselin::\"MENTAL HEALTH/COUNSELING:\"}  {NEUROPSYCHOLOGICAL TESTIN::\"NEUROPSYCHOLOGICAL TESTING:\"}  {Low to No cost Clinics:170175::\"LOW TO NO COST CLINICS:\"}  {LONG-TERM Care Consultations:941417::\"LONG-TERM CARE CONSULTATIONS:\"}  {No Insurance Resources:166900::\"INSURANCE RESOURCES:\"}  {Prescription Assistance Programs:315896::\"PRESCRIPTION ASSISTANCE PROGRAMS:\"}  {HOUSING PLACEMENT ASSISSTANCE:584592::\"Housing Placement Assistance:\"}  {Housing Needs:663613::\"Resources for housing:\"}  {Food Shelves:774631::\"FOOD SHELVES:\"}  {Free Meals:722929::\"FREE MEALS:  Local churches, Call to check on dates and times.\"}  {Food/Meal Delivery Services:064893}  {Resources for new mothers:616036::\"RESOURCES FOR NEW MOTHERS:\"}  {PARENTING SUPPORT:359947::\"PARENTING SUPPORT:\"}  {Grandparents Raising Grandchildren:898095::\"Grandparents Raising Grandchildren:\"}  {Adult  Autism " "Screening Agencies:329982::\"Adult Autism/Autism Spectrum Screening or other DD  Agencies:\"}  {EMPLOYMENT RESOURCES:442203::\"EMPLOYMENT RESOURCES:\"}  {Domestic Abuse Resources:344416::\"Domestic Abuse Resources:\"}  {FORECLOSURE HELP:746904::\"FORECLOSURE HELP:\"}    "

## 2017-05-17 NOTE — LETTER
[facility]  [address] [place] [address]  [phone]      May 17, 2017      Inga Ledesma  [address] [place] [place] [address]        Dear Inga,    I am a SW Care Coordinator, working with the care team at your clinic including your primary care provider,Min Toledo.  I have been trying to reach you to introduce you to [facility] Care Coordination Program. The Care Coordinator is a nurse or  who understands the health care system. The goal of Care Coordination is to help you manage your health and improve access to the [facility] system in the most efficient manner.      The registered nurse (RN) assists you in meeting your health care goals by providing education, coordinating services, and strengthening the communication among your providers. The  (SW) is available to assist in financial, behavioral, psychosocial, and chemical dependency and counseling/psychiatric resources.     Please feel free to contact me at [phone]. I look forward to your call and partnering with you to achieve your optimal state of wellness.  We at [facility] are focused on providing you with the highest-quality healthcare experience possible and that all starts with you.       Sincerely,         EVERETTE Vargas  Care Coordination  [facility] [place]  [phone]  [email or web address]

## 2017-05-17 NOTE — PROGRESS NOTES
Clinic Care Coordination Contact  [facility]/Voicemail    Referral Source: Non-[facility] Service  Clinical Data: Care Coordinator Outreach. Request by Clinical Care Product Navigator to contact Patient to assess current treatment resources, if presently in treatment. Outreach attempted x 1.  Left message on voicemail with call back information and requested return call.  Plan: Care Coordinator will mail out care coordination introduction letter with care coordinator contact information and explanation of care coordination services. Care Coordinator will try to reach patient again in 3-5 business days.    EVERETTE Vargas  Care Coordination  [facility] [place]  [phone]  [email or web address]

## 2017-05-30 ENCOUNTER — OFFICE VISIT (OUTPATIENT)
Dept: FAMILY MEDICINE | Facility: CLINIC | Age: 52
End: 2017-05-30
Payer: COMMERCIAL

## 2017-05-30 VITALS
OXYGEN SATURATION: 98 % | SYSTOLIC BLOOD PRESSURE: 138 MMHG | BODY MASS INDEX: 29.37 KG/M2 | TEMPERATURE: 98.3 F | HEART RATE: 64 BPM | WEIGHT: 158 LBS | DIASTOLIC BLOOD PRESSURE: 98 MMHG

## 2017-05-30 DIAGNOSIS — I10 HYPERTENSION GOAL BP (BLOOD PRESSURE) < 140/90: ICD-10-CM

## 2017-05-30 DIAGNOSIS — F33.0 MAJOR DEPRESSIVE DISORDER, RECURRENT EPISODE, MILD (H): ICD-10-CM

## 2017-05-30 DIAGNOSIS — F10.239 ALCOHOL DEPENDENCE WITH WITHDRAWAL WITH COMPLICATION (H): Primary | ICD-10-CM

## 2017-05-30 DIAGNOSIS — F51.04 PSYCHOPHYSIOLOGICAL INSOMNIA: ICD-10-CM

## 2017-05-30 PROBLEM — R56.9 SEIZURE DUE TO ALCOHOL WITHDRAWAL, UNCOMPLICATED (H): Status: RESOLVED | Noted: 2017-03-10 | Resolved: 2017-05-30

## 2017-05-30 PROBLEM — F10.930 SEIZURE DUE TO ALCOHOL WITHDRAWAL, UNCOMPLICATED (H): Status: RESOLVED | Noted: 2017-03-10 | Resolved: 2017-05-30

## 2017-05-30 PROCEDURE — 99214 OFFICE O/P EST MOD 30 MIN: CPT | Performed by: PHYSICIAN ASSISTANT

## 2017-05-30 RX ORDER — LORAZEPAM 0.5 MG/1
.5-1 TABLET ORAL EVERY 8 HOURS PRN
Qty: 20 TABLET | Refills: 0 | Status: SHIPPED | OUTPATIENT
Start: 2017-05-30 | End: 2017-06-20

## 2017-05-30 NOTE — MR AVS SNAPSHOT
After Visit Summary   5/30/2017    Igna Ledesma    MRN: [id number]           Patient Information     Date Of Birth          1965        Visit Information        Provider Department      5/30/2017 9:00 AM Min Toledo PA-C [facility]        Today's Diagnoses     Alcohol dependence with withdrawal with complication (H)    -  1    Hypertension goal BP (blood pressure) < 140/90        Major depressive disorder, recurrent episode, mild (H)        Psychophysiological insomnia           Follow-ups after your visit        Additional Services     CARE COORDINATION REFERRAL       Services are provided by a Care Coordinator for people with complex needs such as: medical, social, or financial troubles.  The Care Coordinator works with the patient and their Primary Care Provider to determine health goals, obtain resources, achieve outcomes, and develop care plans that help coordinate the patient's care.     Reason for Referral: Chemical Dependence: Patient interested in treatment options and Patient needing continued support    Provide additional details for Care Coordination to best meet the patient's current needs: treatment    Clinical Staff have discussed the Care Coordination Referral with the patient and/or caregiver: yes            MENTAL HEALTH REFERRAL       Your provider has referred you to: [facility]: [facility] - [facility]ing (Individual/Couples/Family) - [facility] [phone]   [email or web address]   *Patient will be contacted by [facility] scheduling partner[facility] to schedule an appointment.  Patients may also call [facility] to schedule.    All scheduling is subject to the client's specific insurance plan & benefits, provider/location availability, and provider clinical specialities.  Please arrive 15 minutes early for your first appointment and bring your completed  paperwork.    Please be aware that coverage of these services is subject to the terms and limitations of your health insurance plan.  Call member services at your health plan with any benefit or coverage questions.                  Who to contact     Normal or non-critical lab and imaging results will be communicated to you by [employer], letter or phone within 4 business days after the clinic has received the results. If you do not hear from us within 7 days, please contact the clinic through [employer] or phone. If you have a critical or abnormal lab result, we will notify you by phone as soon as possible.  Submit refill requests through [employer] or call your pharmacy and they will forward the refill request to us. Please allow 3 business days for your refill to be completed.          If you need to speak with a  for additional information , please call: [phone]             Additional Information About Your Visit        [employer] Information     [employer] gives you secure access to your electronic health record. If you see a primary care provider, you can also send messages to your care team and make appointments. If you have questions, please call your primary care clinic.  If you do not have a primary care provider, please call [phone] and they will assist you.        Care EveryWhere ID     This is your Care EveryWhere ID. This could be used by other organizations to access your [facility] medical records  [id number]        Your Vitals Were     Pulse Temperature Last Period Pulse Oximetry BMI (Body Mass Index)       64 98.3  F (36.8  C) (Tympanic) 05/23/2010 98% 29.37 kg/m2        Blood Pressure from Last 3 Encounters:   05/30/17 (!) 138/98   05/09/17 128/82   04/28/17 112/80    Weight from Last 3 Encounters:   05/30/17 158 lb (71.7 kg)   05/09/17 167 lb (75.8 kg)   04/28/17 159 lb (72.1 kg)              We Performed the Following     CARE COORDINATION REFERRAL     MENTAL HEALTH REFERRAL           Today's Medication Changes          These changes are accurate as of: 5/30/17  9:20 AM.  If you have any questions, ask your nurse or doctor.               Start taking these medicines.        Dose/Directions    LORazepam 0.5 MG tablet   Commonly known as:  ATIVAN   Used for:  Psychophysiological insomnia, Alcohol dependence with withdrawal with complication (H)   Started by:  Min Toledo PA-C        Dose:  0.5-1 mg   Take 1-2 tablets (0.5-1 mg) by mouth every 8 hours as needed for anxiety   Quantity:  20 tablet   Refills:  0            Where to get your medicines      Some of these will need a paper prescription and others can be bought over the counter.  Ask your nurse if you have questions.     Bring a paper prescription for each of these medications     LORazepam 0.5 MG tablet                Primary Care Provider Office Phone # Fax #    Min Toledo PA-C [phone]       [facility] [address] [facility] [address]        Goals        General    I will use the walker at all times or a cane in tight spots to prevent further falls. (pt-stated)     Notes - Note created  12/9/2015  8:48 AM by Sonny Jacobs RN    As of today's date 12/9/2015 goal is met at 0 - 25%.   Goal Status:  Active          Thank you!     Thank you for choosing [facility]  for your care. Our goal is always to provide you with excellent care. Hearing back from our patients is one way we can continue to improve our services. Please take a few minutes to complete the written survey that you may receive in the mail after your visit with us. Thank you!             Your Updated Medication List - Protect others around you: Learn how to safely use, store and throw away your medicines at www.disposemymeds.org.          This list is accurate as of: 5/30/17  9:20 AM.  Always use your most recent med list.                   Brand Name Dispense Instructions for use    albuterol 108 (90 BASE) MCG/ACT  Inhaler    VENTOLIN HFA    3 Inhaler    Inhale  into the lungs. INHALE 2 PUFFS FOUR TIMES DAILY AS NEEDED       AMLODIPINE BESYLATE PO      Take 10 mg by mouth daily       aspirin 81 MG EC tablet     100 tablet    Take 1 tablet (81 mg) by mouth daily       calcium carbonate 500 MG tablet    OS-JENNIFER 500 mg [place]. Ca     Take 500 mg by mouth 2 times daily       cetirizine 10 MG tablet    zyrTEC    90 tablet    Take 1 tablet (10 mg) by mouth every evening       FLUoxetine 40 MG capsule    PROzac    90 capsule    Take 1 capsule (40 mg) by mouth daily       fluticasone 50 MCG/ACT spray    FLONASE    3 g    Spray 1-2 sprays into both nostrils daily       fluticasone-salmeterol 500-50 MCG/DOSE diskus inhaler    ADVAIR DISKUS    3 Inhaler    Inhale 1 puff into the lungs every 12 hours       furosemide 20 MG tablet    LASIX    90 tablet    Take 1 tablet (20 mg) by mouth daily       gabapentin 300 MG capsule    NEURONTIN    270 capsule    1cap am 1 cap pm and 2 at bedtime       HYDROcodone-acetaminophen 5-325 MG per tablet    NORCO    90 tablet    1 tablet at bedtime as needed for cough and sleep.       ipratropium - albuterol 0.5 mg/2.5 mg/3 mL 0.5-2.5 (3) MG/3ML neb solution    DUONEB    60 vial    Take 1 vial (3 mLs) by nebulization every 6 hours as needed for shortness of breath / dyspnea       LABETALOL HCL PO      Take 100 mg by mouth 2 times daily       LORazepam 0.5 MG tablet    ATIVAN    20 tablet    Take 1-2 tablets (0.5-1 mg) by mouth every 8 hours as needed for anxiety       losartan 100 MG tablet    COZAAR    90 tablet    Take 1 tablet (100 mg) by mouth daily       montelukast 10 MG tablet    SINGULAIR    90 tablet    Take 1 tablet (10 mg) by mouth At Bedtime       naltrexone 50 MG tablet    DEPADE;REVIA     Take 50 mg by mouth daily       nicotine 10 MG Inhaler    NICOTROL    180 Box    Use 1-2 daily as needed for smoking cessation       nystatin cream    MYCOSTATIN    30 g    Apply topically 3 times daily        omeprazole 20 MG CR capsule    priLOSEC    180 capsule    Take 1 capsule (20 mg) by mouth 2 times daily       order for DME     1 each    Equipment being ordered: Nebulizer       potassium chloride SA 20 MEQ CR tablet    potassium chloride    180 tablet    Take 1 tablet (20 mEq) by mouth 2 times daily       rOPINIRole 1 MG tablet    REQUIP    270 tablet    Take 1 tablet (1 mg) by mouth 5 times daily       tiotropium 18 MCG capsule    SPIRIVA HANDIHALER    90 capsule    Inhale contents of one capsule daily.       triamcinolone 0.1 % ointment    KENALOG    30 g    Apply sparingly to affected area three times daily.

## 2017-05-30 NOTE — LETTER
[facility] [place]  [address] [place] [address]  Phone: [phone]    May 30, 2017        Inga Ledesma  [address] [place] [address]          To whom it may concern:    RE: Inga Ledesma    Patient was seen and treated today at our clinic. Please excuse her from work on 5/22/17 - 5/26/17 due to a flare up of an underlying medical condition.     Please contact me for questions or concerns.      Sincerely,        Min Toledo PA-C

## 2017-05-30 NOTE — NURSING NOTE
"Chief Complaint   Patient presents with     Forms       Initial BP (!) 194/124  Pulse 64  Temp 98.3  F (36.8  C) (Tympanic)  Wt 158 lb (71.7 kg)  LMP 05/23/2010  SpO2 98%  BMI 29.37 kg/m2 Estimated body mass index is 29.37 kg/(m^2) as calculated from the following:    Height as of 4/28/17: 5' 1.5\" (1.562 m).    Weight as of this encounter: 158 lb (71.7 kg).  Medication Reconciliation: complete       Marcy No CMA      "

## 2017-05-30 NOTE — LETTER
[facility] [place]  [address] [place] [address]  Phone: [phone]    May 30, 2017        Inga Ledesma  [address]  [place] [address]          To whom it may concern:    RE: Inga Ledesma    Patient was seen and treated today at our clinic. She may return to work on 5/31/17.    Please contact me for questions or concerns.      Sincerely,        Min Toledo PA-C

## 2017-05-30 NOTE — PROGRESS NOTES
SUBJECTIVE:                                                    Inga Ledesma is a 51 year old female who presents to clinic today for the following health issues:      Patient is here today to review [place] paper work to return to work.     Relapsed last weekend again. Found a bottle while cleaning last week, also drinking mouthwash. Discussed insomnia and use of prozac. Hasn't taken effexor for a while. i do not want her restarted on this , especially due to her being on prozac.   Some diarrhea. No chest pain/sob/palps. No edema.    Marcy No CMA          Problem list and histories reviewed & adjusted, as indicated.  Additional history: as documented    Patient Active Problem List   Diagnosis     RLS (restless legs syndrome)     GERD (gastroesophageal reflux disease)     Iron deficiency anemia     Hyperlipidemia with target LDL less than 160     Adult BMI 30+     Sacroiliitis (H)     Tobacco abuse     Vitamin D deficiency     Menorrhagia     Advanced directives, counseling/discussion     Vitamin D deficiency     Edema of both legs     Hypertension goal BP (blood pressure) < 140/90     Chronic bronchitis, unspecified chronic bronchitis type (H)     Health Care Home     Alcohol dependence with withdrawal with complication (H)     Major depressive disorder, recurrent episode, mild (H)     (HFpEF) heart failure with preserved ejection fraction (H)     Psychophysiological insomnia     Past Surgical History:   Procedure Laterality Date     ABDOMEN SURGERY       COLONOSCOPY       EYE SURGERY       HERNIA REPAIR, INGUINAL RT/LT  2007     HYSTERECTOMY TOTAL ABDOMINAL  7/28/10    Bilateral salpingectomy.  ovaries conserved.     LASER TX, CERVICAL  1987     LYMPH NODE BIOPSY  2007    inguinal     LYSIS OF LABIAL LESION(S)  1985, 1987       Social History   Substance Use Topics     Smoking status: Light Tobacco Smoker     Packs/day: 0.50     Years: 34.00     Types: Cigarettes, Dip, chew, snus or snuff     Start date:  11/1/1979     Last attempt to quit: 10/3/2012     Smokeless tobacco: Never Used      Comment: 5 cigarettes daily     Alcohol use No      Comment: quit 5-16     Family History   Problem Relation Age of Onset     Breast Cancer Paternal Aunt      CEREBROVASCULAR DISEASE Father      Hypertension Father      Other Cancer Other      Depression/Anxiety Mother          BP Readings from Last 3 Encounters:   05/30/17 (!) 138/98   05/09/17 128/82   04/28/17 112/80    Wt Readings from Last 3 Encounters:   05/30/17 158 lb (71.7 kg)   05/09/17 167 lb (75.8 kg)   04/28/17 159 lb (72.1 kg)                    Reviewed and updated as needed this visit by clinical staff  Tobacco  Allergies  Meds       Reviewed and updated as needed this visit by Provider         All other systems negative except as outline above  OBJECTIVE:    Eye exam - right eye normal lid, conjunctiva, cornea, pupil and fundus, left eye normal lid, conjunctiva, cornea, pupil and fundus. Mild scleral icterus.  Thyroid not palpable, not enlarged, no nodules detected.  CHEST:chest clear to IPPA, no tachypnea, retractions or cyanosis and S1, S2 normal, no murmur, no gallop, rate regular.  ABDOMEN: mild tenderness in the RUQ area, without rebound, guarding, mass. Mild hepatomegaly. Abdomen is soft and bowel sounds are normal.    Inga was seen today for forms.    Diagnoses and all orders for this visit:    Alcohol dependence with withdrawal with complication (H)  -     CARE COORDINATION REFERRAL  -     LORazepam (ATIVAN) 0.5 MG tablet; Take 1-2 tablets (0.5-1 mg) by mouth every 8 hours as needed for anxiety    Hypertension goal BP (blood pressure) < 140/90    Major depressive disorder, recurrent episode, mild (H)  -     MENTAL HEALTH REFERRAL    Psychophysiological insomnia  -     LORazepam (ATIVAN) 0.5 MG tablet; Take 1-2 tablets (0.5-1 mg) by mouth every 8 hours as needed for anxiety  -     MENTAL HEALTH REFERRAL      work on lifestyle modification  Advised to  not consume alcohol or mix ativan with alcohol.   Recheck in 1 week. Blood pressure elevated due to alcohol use. When she's sober it runs within normal range. Patient refuses lab work to assess liver and pancreatic function today.

## 2017-05-31 NOTE — PROGRESS NOTES
Clinic Care Coordination Contact  [facility]/Voicemail    Referral Source: PCP  Clinical Data: Care Coordinator Outreach  Outreach attempted x 2.  Left message on voicemail with call back information and requested return call.  Plan: Care Coordinator mailed out care coordination introduction letter with resources from [employer] for CD. Care Coordinator will try to reach patient again in 3-5 business days.    EVERETTE Vargas  Care Coordination  [place]  [phone]  [email or web address]

## 2017-06-09 ENCOUNTER — OFFICE VISIT (OUTPATIENT)
Dept: FAMILY MEDICINE | Facility: CLINIC | Age: 52
End: 2017-06-09
Payer: COMMERCIAL

## 2017-06-09 VITALS
WEIGHT: 165 LBS | RESPIRATION RATE: 18 BRPM | BODY MASS INDEX: 30.67 KG/M2 | OXYGEN SATURATION: 96 % | SYSTOLIC BLOOD PRESSURE: 139 MMHG | DIASTOLIC BLOOD PRESSURE: 82 MMHG | HEART RATE: 93 BPM

## 2017-06-09 DIAGNOSIS — L20.89 OTHER ATOPIC DERMATITIS: ICD-10-CM

## 2017-06-09 DIAGNOSIS — K70.10 ALCOHOLIC HEPATITIS WITHOUT ASCITES (H): ICD-10-CM

## 2017-06-09 DIAGNOSIS — F10.239 ALCOHOL DEPENDENCE WITH WITHDRAWAL WITH COMPLICATION (H): Primary | ICD-10-CM

## 2017-06-09 DIAGNOSIS — F33.0 MAJOR DEPRESSIVE DISORDER, RECURRENT EPISODE, MILD (H): ICD-10-CM

## 2017-06-09 LAB
ALBUMIN SERPL-MCNC: 3.6 G/DL (ref 3.4–5)
ALP SERPL-CCNC: 122 U/L (ref 40–150)
ALT SERPL W P-5'-P-CCNC: 56 U/L (ref 0–50)
ANION GAP SERPL CALCULATED.3IONS-SCNC: 10 MMOL/L (ref 3–14)
AST SERPL W P-5'-P-CCNC: 47 U/L (ref 0–45)
BILIRUB SERPL-MCNC: 0.2 MG/DL (ref 0.2–1.3)
BUN SERPL-MCNC: 12 MG/DL (ref 7–30)
CALCIUM SERPL-MCNC: 9.2 MG/DL (ref 8.5–10.1)
CHLORIDE SERPL-SCNC: 95 MMOL/L (ref 94–109)
CO2 SERPL-SCNC: 29 MMOL/L (ref 20–32)
CREAT SERPL-MCNC: 0.58 MG/DL (ref 0.52–1.04)
GFR SERPL CREATININE-BSD FRML MDRD: ABNORMAL ML/MIN/1.7M2
GLUCOSE SERPL-MCNC: 110 MG/DL (ref 70–99)
LIPASE SERPL-CCNC: 851 U/L (ref 73–393)
POTASSIUM SERPL-SCNC: 3.3 MMOL/L (ref 3.4–5.3)
PROT SERPL-MCNC: 7.6 G/DL (ref 6.8–8.8)
SODIUM SERPL-SCNC: 134 MMOL/L (ref 133–144)

## 2017-06-09 PROCEDURE — 80053 COMPREHEN METABOLIC PANEL: CPT | Performed by: PHYSICIAN ASSISTANT

## 2017-06-09 PROCEDURE — 36415 COLL VENOUS BLD VENIPUNCTURE: CPT | Performed by: PHYSICIAN ASSISTANT

## 2017-06-09 PROCEDURE — 99214 OFFICE O/P EST MOD 30 MIN: CPT | Performed by: PHYSICIAN ASSISTANT

## 2017-06-09 PROCEDURE — 83690 ASSAY OF LIPASE: CPT | Performed by: PHYSICIAN ASSISTANT

## 2017-06-09 RX ORDER — TRIAMCINOLONE ACETONIDE 1 MG/G
CREAM TOPICAL
Qty: 80 G | Refills: 0 | Status: SHIPPED | OUTPATIENT
Start: 2017-06-09 | End: 2017-08-22

## 2017-06-09 RX ORDER — FLUOXETINE 40 MG/1
80 CAPSULE ORAL DAILY
Qty: 90 CAPSULE | Refills: 3 | Status: SHIPPED | OUTPATIENT
Start: 2017-06-09 | End: 2017-06-20

## 2017-06-09 NOTE — MR AVS SNAPSHOT
After Visit Summary   6/9/2017    Inga Ledesma    MRN: [id number]           Patient Information     Date Of Birth          1965        Visit Information        Provider Department      6/9/2017 4:00 PM Min Toledo PA-C [facility]        Ryan's Diagnoses     Alcohol dependence with withdrawal with complication (H)    -  1    Alcoholic hepatitis without ascites        Other atopic dermatitis        Major depressive disorder, recurrent episode, mild (H)           Follow-ups after your visit        Who to contact     Normal or non-critical lab and imaging results will be communicated to you by [employer], letter or phone within 4 business days after the clinic has received the results. If you do not hear from us within 7 days, please contact the clinic through [employer] or phone. If you have a critical or abnormal lab result, we will notify you by phone as soon as possible.  Submit refill requests through [employer] or call your pharmacy and they will forward the refill request to us. Please allow 3 business days for your refill to be completed.          If you need to speak with a  for additional information , please call: [phone]             Additional Information About Your Visit        MyChart Information     [employer] gives you secure access to your electronic health record. If you see a primary care provider, you can also send messages to your care team and make appointments. If you have questions, please call your primary care clinic.  If you do not have a primary care provider, please call [phone] and they will assist you.        Care EveryWhere ID     This is your Care EveryWhere ID. This could be used by other organizations to access your [place] medical records  [id number]        Your Vitals Were     Pulse Respirations Last Period Pulse Oximetry BMI (Body Mass Index)       93 18 05/23/2010 96% 30.67 kg/m2        Blood Pressure from Last 3  Encounters:   06/09/17 139/82   05/30/17 (!) 138/98   05/09/17 128/82    Weight from Last 3 Encounters:   06/09/17 165 lb (74.8 kg)   05/30/17 158 lb (71.7 kg)   05/09/17 167 lb (75.8 kg)              We Performed the Following     Comprehensive metabolic panel     Lipase          Today's Medication Changes          These changes are accurate as of: 6/9/17  4:43 PM.  If you have any questions, ask your nurse or doctor.               Start taking these medicines.        Dose/Directions    triamcinolone 0.1 % cream   Commonly known as:  KENALOG   Used for:  Other atopic dermatitis   Started by:  Min Toledo PA-C        Apply sparingly to affected area three times daily as needed   Quantity:  80 g   Refills:  0         These medicines have changed or have updated prescriptions.        Dose/Directions    FLUoxetine 40 MG capsule   Commonly known as:  PROzac   This may have changed:  how much to take   Used for:  Major depressive disorder, recurrent episode, mild (H)   Changed by:  Min Toledo PA-C        Dose:  80 mg   Take 2 capsules (80 mg) by mouth daily   Quantity:  90 capsule   Refills:  3         Stop taking these medicines if you haven't already. Please contact your care team if you have questions.     naltrexone 50 MG tablet   Commonly known as:  DEPADE;REVIA   Stopped by:  Min Toledo PA-C                Where to get your medicines      These medications were sent to [facility] [address] [address] [address] [address]     Phone:  [phone]     FLUoxetine 40 MG capsule    triamcinolone 0.1 % cream                Primary Care Provider Office Phone # Fax #    Min Toledo PA-C [phone]       [address]  [place] [address]        Goals        General    I will use the walker at all times or a cane in tight spots to prevent further falls. (pt-stated)     Notes - Note created  12/9/2015  8:48  AM by Sonny Jacobs RN    As of today's date 12/9/2015 goal is met at 0 - 25%.   Goal Status:  Active          Thank you!     Thank you for choosing [facility]  for your care. Our goal is always to provide you with excellent care. Hearing back from our patients is one way we can continue to improve our services. Please take a few minutes to complete the written survey that you may receive in the mail after your visit with us. Thank you!             Your Updated Medication List - Protect others around you: Learn how to safely use, store and throw away your medicines at www.disposemymeds.org.          This list is accurate as of: 6/9/17  4:43 PM.  Always use your most recent med list.                   Brand Name Dispense Instructions for use    albuterol 108 (90 BASE) MCG/ACT Inhaler    VENTOLIN HFA    3 Inhaler    Inhale  into the lungs. INHALE 2 PUFFS FOUR TIMES DAILY AS NEEDED       AMLODIPINE BESYLATE PO      Take 10 mg by mouth daily       aspirin 81 MG EC tablet     100 tablet    Take 1 tablet (81 mg) by mouth daily       calcium carbonate 1250 MG tablet    OS-JENNIFER 500 mg [place]. Ca     Take 500 mg by mouth 2 times daily       cetirizine 10 MG tablet    zyrTEC    90 tablet    Take 1 tablet (10 mg) by mouth every evening       FLUoxetine 40 MG capsule    PROzac    90 capsule    Take 2 capsules (80 mg) by mouth daily       fluticasone 50 MCG/ACT spray    FLONASE    3 g    Spray 1-2 sprays into both nostrils daily       fluticasone-salmeterol 500-50 MCG/DOSE diskus inhaler    ADVAIR DISKUS    3 Inhaler    Inhale 1 puff into the lungs every 12 hours       furosemide 20 MG tablet    LASIX    90 tablet    Take 1 tablet (20 mg) by mouth daily       gabapentin 300 MG capsule    NEURONTIN    270 capsule    1cap am 1 cap pm and 2 at bedtime       HYDROcodone-acetaminophen 5-325 MG per tablet    NORCO    90 tablet    1 tablet at bedtime as needed for cough and sleep.       ipratropium - albuterol 0.5 mg/2.5  mg/3 mL 0.5-2.5 (3) MG/3ML neb solution    DUONEB    60 vial    Take 1 vial (3 mLs) by nebulization every 6 hours as needed for shortness of breath / dyspnea       LABETALOL HCL PO      Take 100 mg by mouth 2 times daily       LORazepam 0.5 MG tablet    ATIVAN    20 tablet    Take 1-2 tablets (0.5-1 mg) by mouth every 8 hours as needed for anxiety       losartan 100 MG tablet    COZAAR    90 tablet    Take 1 tablet (100 mg) by mouth daily       montelukast 10 MG tablet    SINGULAIR    90 tablet    Take 1 tablet (10 mg) by mouth At Bedtime       nicotine 10 MG Inhaler    NICOTROL    180 Box    Use 1-2 daily as needed for smoking cessation       nystatin cream    MYCOSTATIN    30 g    Apply topically 3 times daily       omeprazole 20 MG CR capsule    priLOSEC    180 capsule    Take 1 capsule (20 mg) by mouth 2 times daily       order for DME     1 each    Equipment being ordered: Nebulizer       potassium chloride SA 20 MEQ CR tablet    potassium chloride    180 tablet    Take 1 tablet (20 mEq) by mouth 2 times daily       rOPINIRole 1 MG tablet    REQUIP    270 tablet    Take 1 tablet (1 mg) by mouth 5 times daily       tiotropium 18 MCG capsule    SPIRIVA HANDIHALER    90 capsule    Inhale contents of one capsule daily.       triamcinolone 0.1 % cream    KENALOG    80 g    Apply sparingly to affected area three times daily as needed

## 2017-06-09 NOTE — PROGRESS NOTES
SUBJECTIVE:                                                    Inga Ledesma is a 51 year old female who presents to clinic today for the following health issues:      Alcohol problem-discuss medication change      Sober now x 10 days. Overall feeling better. Going to [employer] and counseling.   Problem list and histories reviewed & adjusted, as indicated.  Additional history: as documented        Reviewed and updated as needed this visit by clinical staff  Tobacco  Allergies  Meds       Reviewed and updated as needed this visit by Provider         All other systems negative except as outline above  OBJECTIVE:    Eye exam - right eye normal lid, conjunctiva, cornea, pupil and fundus, left eye normal lid, conjunctiva, cornea, pupil and fundus.  Thyroid not palpable, not enlarged, no nodules detected.  CHEST:chest clear to IPPA, no tachypnea, retractions or cyanosis and S1, S2 normal, no murmur, no gallop, rate regular.  The abdomen is soft without tenderness, guarding, mass, rebound or organomegaly. Bowel sounds are normal. No CVA tenderness or inguinal adenopathy noted.    Inga was seen today for alcohol problem.    Diagnoses and all orders for this visit:    Alcohol dependence with withdrawal with complication (H)  -     Comprehensive metabolic panel  -     Lipase    Alcoholic hepatitis without ascites  -     Comprehensive metabolic panel  -     Lipase      work on lifestyle modification  Recheck in 1 mos.

## 2017-06-20 ENCOUNTER — TELEPHONE (OUTPATIENT)
Dept: FAMILY MEDICINE | Facility: CLINIC | Age: 52
End: 2017-06-20

## 2017-06-20 ENCOUNTER — OFFICE VISIT (OUTPATIENT)
Dept: FAMILY MEDICINE | Facility: CLINIC | Age: 52
End: 2017-06-20
Payer: COMMERCIAL

## 2017-06-20 VITALS
OXYGEN SATURATION: 94 % | HEART RATE: 83 BPM | WEIGHT: 160 LBS | RESPIRATION RATE: 18 BRPM | TEMPERATURE: 98.3 F | BODY MASS INDEX: 29.74 KG/M2 | SYSTOLIC BLOOD PRESSURE: 128 MMHG | DIASTOLIC BLOOD PRESSURE: 79 MMHG

## 2017-06-20 DIAGNOSIS — J42 CHRONIC BRONCHITIS, UNSPECIFIED CHRONIC BRONCHITIS TYPE (H): ICD-10-CM

## 2017-06-20 DIAGNOSIS — I10 HYPERTENSION GOAL BP (BLOOD PRESSURE) < 140/90: ICD-10-CM

## 2017-06-20 DIAGNOSIS — L20.9 ATOPIC DERMATITIS, UNSPECIFIED TYPE: ICD-10-CM

## 2017-06-20 DIAGNOSIS — G25.81 RLS (RESTLESS LEGS SYNDROME): ICD-10-CM

## 2017-06-20 DIAGNOSIS — F10.239 ALCOHOL DEPENDENCE WITH WITHDRAWAL WITH COMPLICATION (H): ICD-10-CM

## 2017-06-20 DIAGNOSIS — I10 ESSENTIAL HYPERTENSION WITH GOAL BLOOD PRESSURE LESS THAN 140/90: ICD-10-CM

## 2017-06-20 DIAGNOSIS — K21.9 GASTROESOPHAGEAL REFLUX DISEASE WITHOUT ESOPHAGITIS: ICD-10-CM

## 2017-06-20 DIAGNOSIS — R10.13 ABDOMINAL PAIN, EPIGASTRIC: Primary | ICD-10-CM

## 2017-06-20 DIAGNOSIS — K86.0 CHRONIC ALCOHOLIC PANCREATITIS (H): ICD-10-CM

## 2017-06-20 DIAGNOSIS — J31.0 CHRONIC RHINITIS: ICD-10-CM

## 2017-06-20 DIAGNOSIS — R07.89 CHEST WALL PAIN: ICD-10-CM

## 2017-06-20 DIAGNOSIS — F51.04 PSYCHOPHYSIOLOGICAL INSOMNIA: ICD-10-CM

## 2017-06-20 DIAGNOSIS — F33.0 MAJOR DEPRESSIVE DISORDER, RECURRENT EPISODE, MILD (H): ICD-10-CM

## 2017-06-20 DIAGNOSIS — J44.1 COPD EXACERBATION (H): ICD-10-CM

## 2017-06-20 PROCEDURE — 80053 COMPREHEN METABOLIC PANEL: CPT | Performed by: PHYSICIAN ASSISTANT

## 2017-06-20 PROCEDURE — 83690 ASSAY OF LIPASE: CPT | Performed by: PHYSICIAN ASSISTANT

## 2017-06-20 PROCEDURE — 99214 OFFICE O/P EST MOD 30 MIN: CPT | Performed by: PHYSICIAN ASSISTANT

## 2017-06-20 PROCEDURE — 36415 COLL VENOUS BLD VENIPUNCTURE: CPT | Performed by: PHYSICIAN ASSISTANT

## 2017-06-20 PROCEDURE — 82150 ASSAY OF AMYLASE: CPT | Performed by: PHYSICIAN ASSISTANT

## 2017-06-20 RX ORDER — FLUOXETINE 40 MG/1
80 CAPSULE ORAL DAILY
Qty: 90 CAPSULE | Refills: 3 | Status: ON HOLD | OUTPATIENT
Start: 2017-06-20 | End: 2017-10-08

## 2017-06-20 RX ORDER — LOSARTAN POTASSIUM 100 MG/1
100 TABLET ORAL DAILY
Qty: 90 TABLET | Refills: 3 | Status: SHIPPED | OUTPATIENT
Start: 2017-06-20 | End: 2017-09-25

## 2017-06-20 RX ORDER — ALBUTEROL SULFATE 90 UG/1
AEROSOL, METERED RESPIRATORY (INHALATION)
Qty: 3 INHALER | Refills: 5 | Status: ON HOLD | OUTPATIENT
Start: 2017-06-20 | End: 2017-11-01

## 2017-06-20 RX ORDER — FLUTICASONE PROPIONATE 50 MCG
1-2 SPRAY, SUSPENSION (ML) NASAL DAILY
Qty: 3 G | Refills: 3 | Status: ON HOLD | OUTPATIENT
Start: 2017-06-20 | End: 2017-11-01

## 2017-06-20 RX ORDER — FUROSEMIDE 20 MG
20 TABLET ORAL DAILY
Qty: 90 TABLET | Refills: 3 | Status: SHIPPED | OUTPATIENT
Start: 2017-06-20 | End: 2017-07-25

## 2017-06-20 RX ORDER — LORAZEPAM 0.5 MG/1
.5-1 TABLET ORAL EVERY 8 HOURS PRN
Qty: 20 TABLET | Refills: 0 | Status: SHIPPED | OUTPATIENT
Start: 2017-06-20 | End: 2017-07-25

## 2017-06-20 RX ORDER — ROPINIROLE 1 MG/1
1 TABLET, FILM COATED ORAL
Qty: 270 TABLET | Refills: 6 | Status: ON HOLD | OUTPATIENT
Start: 2017-06-20 | End: 2017-10-08

## 2017-06-20 RX ORDER — TIOTROPIUM BROMIDE 18 UG/1
CAPSULE ORAL; RESPIRATORY (INHALATION)
Qty: 90 CAPSULE | Refills: 3 | Status: ON HOLD | OUTPATIENT
Start: 2017-06-20 | End: 2017-11-01

## 2017-06-20 RX ORDER — GABAPENTIN 300 MG/1
CAPSULE ORAL
Qty: 270 CAPSULE | Refills: 3 | Status: SHIPPED | OUTPATIENT
Start: 2017-06-20 | End: 2017-10-06

## 2017-06-20 NOTE — PROGRESS NOTES
SUBJECTIVE:                                                    Inga Ledesma is a 51 year old female who presents to clinic today for the following health issues:      Alcohol Problem-refill medication today    Am diarrhea. Some constipation.  Some abd pain   No fever. No jaundice.   Sober x 21 days.   History of pancreatitis  Mild weight gain. No profound edema. No ascites.   Fatigue, insomnia also an issue.   Problem list and histories reviewed & adjusted, as indicated.  Additional history: as documented        Reviewed and updated as needed this visit by clinical staff  Tobacco  Allergies  Meds  Med Hx  Surg Hx  Fam Hx  Soc Hx      Reviewed and updated as needed this visit by Provider  Tobacco  Med Hx  Surg Hx  Fam Hx  Soc Hx        All other systems negative except as outline above  OBJECTIVE:    Eye exam - right eye normal lid, conjunctiva, cornea, pupil and fundus, left eye normal lid, conjunctiva, cornea, pupil and fundus.  Thyroid not palpable, not enlarged, no nodules detected.  CHEST:chest clear to IPPA, no tachypnea, retractions or cyanosis and S1, S2 normal, no murmur, no gallop, rate regular.  The abdomen is soft without tenderness, guarding, mass, rebound or organomegaly. Bowel sounds are normal. No CVA tenderness or inguinal adenopathy noted.    Trace of edema  Patchy maculopapular rash on left shin. Did not respond to triamcinolone cream.     Inga was seen today for alcohol problem.    Diagnoses and all orders for this visit:    Abdominal pain, epigastric  -     Comprehensive metabolic panel  -     Lipase  -     Amylase  -     GASTROENTEROLOGY ADULT REF CONSULT ONLY    Major depressive disorder, recurrent episode, mild (H)  -     FLUoxetine (PROZAC) 40 MG capsule; Take 2 capsules (80 mg) by mouth daily  -     gabapentin (NEURONTIN) 300 MG capsule; 1cap am 1 cap pm and 2 at bedtime  -     fluticasone (FLONASE) 50 MCG/ACT spray; Spray 1-2 sprays into both nostrils  daily    Psychophysiological insomnia  -     LORazepam (ATIVAN) 0.5 MG tablet; Take 1-2 tablets (0.5-1 mg) by mouth every 8 hours as needed for anxiety    Alcohol dependence with withdrawal with complication (H)  -     LORazepam (ATIVAN) 0.5 MG tablet; Take 1-2 tablets (0.5-1 mg) by mouth every 8 hours as needed for anxiety    Chronic bronchitis, unspecified chronic bronchitis type (H)  -     albuterol (VENTOLIN HFA) 108 (90 BASE) MCG/ACT Inhaler; Inhale  into the lungs. INHALE 2 PUFFS FOUR TIMES DAILY AS NEEDED  -     fluticasone-salmeterol (ADVAIR DISKUS) 500-50 MCG/DOSE diskus inhaler; Inhale 1 puff into the lungs every 12 hours  -     tiotropium (SPIRIVA HANDIHALER) 18 MCG capsule; Inhale contents of one capsule daily.    RLS (restless legs syndrome)  -     gabapentin (NEURONTIN) 300 MG capsule; 1cap am 1 cap pm and 2 at bedtime  -     rOPINIRole (REQUIP) 1 MG tablet; Take 1 tablet (1 mg) by mouth 5 times daily    COPD exacerbation (H)    Chest wall pain    Gastroesophageal reflux disease without esophagitis  -     omeprazole (PRILOSEC) 20 MG CR capsule; Take 1 capsule (20 mg) by mouth 2 times daily    Essential hypertension with goal blood pressure less than 140/90  -     furosemide (LASIX) 20 MG tablet; Take 1 tablet (20 mg) by mouth daily    Hypertension goal BP (blood pressure) < 140/90  -     losartan (COZAAR) 100 MG tablet; Take 1 tablet (100 mg) by mouth daily    Chronic rhinitis    Chronic alcoholic pancreatitis (H)  -     Comprehensive metabolic panel  -     Lipase  -     Amylase  -     GASTROENTEROLOGY ADULT REF CONSULT ONLY    Atopic dermatitis, unspecified type  -     DERMATOLOGY REFERRAL      work on lifestyle modification

## 2017-06-20 NOTE — TELEPHONE ENCOUNTER
Prior Authorization    Drug: Omeprazole 20mg (ndc: [address]-10)  Ins: [employer]  Id#: [id number]  Phone#: [phone]  Group: N/A    [employer] [address]  [facility]  Phone :[phone]  Fax: [phone]

## 2017-06-20 NOTE — MR AVS SNAPSHOT
After Visit Summary   6/20/2017    Inga Ledesma    MRN: [id number]           Patient Information     Date Of Birth          1965        Visit Information        Provider Department      6/20/2017 4:00 PM Min Toledo PA-C [facility]        Today's Diagnoses     Abdominal pain, epigastric    -  1    Major depressive disorder, recurrent episode, mild (H)        Psychophysiological insomnia        Alcohol dependence with withdrawal with complication (H)        Chronic bronchitis, unspecified chronic bronchitis type (H)        RLS (restless legs syndrome)        COPD exacerbation (H)        Chest wall pain        Gastroesophageal reflux disease without esophagitis        Essential hypertension with goal blood pressure less than 140/90        Hypertension goal BP (blood pressure) < 140/90        Chronic rhinitis        Chronic alcoholic pancreatitis (H)        Atopic dermatitis, unspecified type           Follow-ups after your visit        Additional Services     DERMATOLOGY REFERRAL       Your provider has referred you to: [facility] [phone]   [email or web address]    Please be aware that coverage of these services is subject to the terms and limitations of your health insurance plan.  Call member services at your health plan with any benefit or coverage questions.      Please bring the following with you to your appointment:    (1) Any X-Rays, CTs or MRIs which have been performed.  Contact the facility where they were done to arrange for  prior to your scheduled appointment.    (2) List of current medications  (3) This referral request   (4) Any documents/labs given to you for this referral            GASTROENTEROLOGY ADULT REF CONSULT ONLY       Preferred Location: [facility] [phone]      Please be aware that coverage of these services is subject to the terms and limitations of your health insurance plan.  Call  member services at your health plan with any benefit or coverage questions.  Any procedures must be performed at a [facility] facility OR coordinated by your clinic's referral office.    Please bring the following with you to your appointment:    (1) Any X-Rays, CTs or MRIs which have been performed.  Contact the facility where they were done to arrange for  prior to your scheduled appointment.    (2) List of current medications   (3) This referral request   (4) Any documents/labs given to you for this referral                  Who to contact     Normal or non-critical lab and imaging results will be communicated to you by [employer], letter or phone within 4 business days after the clinic has received the results. If you do not hear from us within 7 days, please contact the clinic through [employer] or phone. If you have a critical or abnormal lab result, we will notify you by phone as soon as possible.  Submit refill requests through [employer] or call your pharmacy and they will forward the refill request to us. Please allow 3 business days for your refill to be completed.          If you need to speak with a  for additional information , please call: [phone]             Additional Information About Your Visit        [employer] Information     [employer] gives you secure access to your electronic health record. If you see a primary care provider, you can also send messages to your care team and make appointments. If you have questions, please call your primary care clinic.  If you do not have a primary care provider, please call [phone] and they will assist you.        Care EveryWhere ID     This is your Care EveryWhere ID. This could be used by other organizations to access your [facility] medical records  [id number]        Your Vitals Were     Pulse Temperature Respirations Last Period Pulse Oximetry BMI (Body Mass Index)    83 98.3  F (36.8  C) (Tympanic) 18 05/23/2010 94% 29.74 kg/m2        Blood Pressure from Last 3 Encounters:   06/20/17 128/79   06/09/17 139/82   05/30/17 (!) 138/98    Weight from Last 3 Encounters:   06/20/17 160 lb (72.6 kg)   06/09/17 165 lb (74.8 kg)   05/30/17 158 lb (71.7 kg)              We Performed the Following     Amylase     Comprehensive metabolic panel     DERMATOLOGY REFERRAL     GASTROENTEROLOGY ADULT REF CONSULT ONLY     Lipase          Where to get your medicines      These medications were sent to [facility]  [facility] - [address] [facility]  [address] [facility] [place] [address]     Phone:  [phone]     albuterol 108 (90 BASE) MCG/ACT Inhaler    FLUoxetine 40 MG capsule    fluticasone 50 MCG/ACT spray    fluticasone-salmeterol 500-50 MCG/DOSE diskus inhaler    furosemide 20 MG tablet    gabapentin 300 MG capsule    losartan 100 MG tablet    omeprazole 20 MG CR capsule    rOPINIRole 1 MG tablet    tiotropium 18 MCG capsule         Some of these will need a paper prescription and others can be bought over the counter.  Ask your nurse if you have questions.     Bring a paper prescription for each of these medications     LORazepam 0.5 MG tablet          Primary Care Provider Office Phone # Fax #    Min Toledo PA-C [phone]       [facility] [address]  [place] [address]        Goals        General    I will use the walker at all times or a cane in tight spots to prevent further falls. (pt-stated)     Notes - Note created  12/9/2015  8:48 AM by Sonny Jacobs RN    As of today's date 12/9/2015 goal is met at 0 - 25%.   Goal Status:  Active          Thank you!     Thank you for choosing [facility]  for your care. Our goal is always to provide you with excellent care. Hearing back from our patients is one way we can continue to improve our services. Please take a few minutes to complete the written survey that you may receive in the mail after your visit with us. Thank you!             Your  Updated Medication List - Protect others around you: Learn how to safely use, store and throw away your medicines at www.disposemymeds.org.          This list is accurate as of: 6/20/17  4:38 PM.  Always use your most recent med list.                   Brand Name Dispense Instructions for use    albuterol 108 (90 BASE) MCG/ACT Inhaler    VENTOLIN HFA    3 Inhaler    Inhale  into the lungs. INHALE 2 PUFFS FOUR TIMES DAILY AS NEEDED       AMLODIPINE BESYLATE PO      Take 10 mg by mouth daily       aspirin 81 MG EC tablet     100 tablet    Take 1 tablet (81 mg) by mouth daily       calcium carbonate 1250 MG tablet    OS-JENNIFER 500 mg [place]. Ca     Take 500 mg by mouth 2 times daily       cetirizine 10 MG tablet    zyrTEC    90 tablet    Take 1 tablet (10 mg) by mouth every evening       FLUoxetine 40 MG capsule    PROzac    90 capsule    Take 2 capsules (80 mg) by mouth daily       fluticasone 50 MCG/ACT spray    FLONASE    3 g    Spray 1-2 sprays into both nostrils daily       fluticasone-salmeterol 500-50 MCG/DOSE diskus inhaler    ADVAIR DISKUS    3 Inhaler    Inhale 1 puff into the lungs every 12 hours       furosemide 20 MG tablet    LASIX    90 tablet    Take 1 tablet (20 mg) by mouth daily       gabapentin 300 MG capsule    NEURONTIN    270 capsule    1cap am 1 cap pm and 2 at bedtime       HYDROcodone-acetaminophen 5-325 MG per tablet    NORCO    90 tablet    1 tablet at bedtime as needed for cough and sleep.       ipratropium - albuterol 0.5 mg/2.5 mg/3 mL 0.5-2.5 (3) MG/3ML neb solution    DUONEB    60 vial    Take 1 vial (3 mLs) by nebulization every 6 hours as needed for shortness of breath / dyspnea       LABETALOL HCL PO      Take 100 mg by mouth 2 times daily       LORazepam 0.5 MG tablet    ATIVAN    20 tablet    Take 1-2 tablets (0.5-1 mg) by mouth every 8 hours as needed for anxiety       losartan 100 MG tablet    COZAAR    90 tablet    Take 1 tablet (100 mg) by mouth daily       montelukast 10 MG tablet     SINGULAIR    90 tablet    Take 1 tablet (10 mg) by mouth At Bedtime       nicotine 10 MG Inhaler    NICOTROL    180 Box    Use 1-2 daily as needed for smoking cessation       nystatin cream    MYCOSTATIN    30 g    Apply topically 3 times daily       omeprazole 20 MG CR capsule    priLOSEC    180 capsule    Take 1 capsule (20 mg) by mouth 2 times daily       order for DME     1 each    Equipment being ordered: Nebulizer       potassium chloride SA 20 MEQ CR tablet    potassium chloride    180 tablet    Take 1 tablet (20 mEq) by mouth 2 times daily       rOPINIRole 1 MG tablet    REQUIP    270 tablet    Take 1 tablet (1 mg) by mouth 5 times daily       tiotropium 18 MCG capsule    SPIRIVA HANDIHALER    90 capsule    Inhale contents of one capsule daily.       triamcinolone 0.1 % cream    KENALOG    80 g    Apply sparingly to affected area three times daily as needed

## 2017-06-21 LAB
ALBUMIN SERPL-MCNC: 3.7 G/DL (ref 3.4–5)
ALP SERPL-CCNC: 135 U/L (ref 40–150)
ALT SERPL W P-5'-P-CCNC: 28 U/L (ref 0–50)
AMYLASE SERPL-CCNC: 61 U/L (ref 30–110)
ANION GAP SERPL CALCULATED.3IONS-SCNC: 13 MMOL/L (ref 3–14)
AST SERPL W P-5'-P-CCNC: 22 U/L (ref 0–45)
BILIRUB SERPL-MCNC: 0.3 MG/DL (ref 0.2–1.3)
BUN SERPL-MCNC: 16 MG/DL (ref 7–30)
CALCIUM SERPL-MCNC: 9.1 MG/DL (ref 8.5–10.1)
CHLORIDE SERPL-SCNC: 96 MMOL/L (ref 94–109)
CO2 SERPL-SCNC: 26 MMOL/L (ref 20–32)
CREAT SERPL-MCNC: 0.64 MG/DL (ref 0.52–1.04)
GFR SERPL CREATININE-BSD FRML MDRD: NORMAL ML/MIN/1.7M2
GLUCOSE SERPL-MCNC: 96 MG/DL (ref 70–99)
LIPASE SERPL-CCNC: 486 U/L (ref 73–393)
POTASSIUM SERPL-SCNC: 3.8 MMOL/L (ref 3.4–5.3)
PROT SERPL-MCNC: 7.6 G/DL (ref 6.8–8.8)
SODIUM SERPL-SCNC: 135 MMOL/L (ref 133–144)

## 2017-06-27 NOTE — TELEPHONE ENCOUNTER
PA for Omeprazole 20 mg approved from 06/27/17 to 06/27/18. Pharmacy informed,approval sent to scanning.

## 2017-07-01 ENCOUNTER — TRANSFERRED RECORDS (OUTPATIENT)
Dept: HEALTH INFORMATION MANAGEMENT | Facility: CLINIC | Age: 52
End: 2017-07-01

## 2017-07-05 ENCOUNTER — RADIANT APPOINTMENT (OUTPATIENT)
Dept: GENERAL RADIOLOGY | Facility: CLINIC | Age: 52
End: 2017-07-05
Attending: FAMILY MEDICINE
Payer: COMMERCIAL

## 2017-07-05 ENCOUNTER — TELEPHONE (OUTPATIENT)
Dept: CARE COORDINATION | Facility: CLINIC | Age: 52
End: 2017-07-05

## 2017-07-05 ENCOUNTER — OFFICE VISIT (OUTPATIENT)
Dept: FAMILY MEDICINE | Facility: CLINIC | Age: 52
End: 2017-07-05
Payer: COMMERCIAL

## 2017-07-05 ENCOUNTER — CARE COORDINATION (OUTPATIENT)
Dept: CARE COORDINATION | Facility: CLINIC | Age: 52
End: 2017-07-05

## 2017-07-05 VITALS
WEIGHT: 160 LBS | BODY MASS INDEX: 29.44 KG/M2 | TEMPERATURE: 99.1 F | OXYGEN SATURATION: 96 % | DIASTOLIC BLOOD PRESSURE: 78 MMHG | HEART RATE: 100 BPM | HEIGHT: 62 IN | SYSTOLIC BLOOD PRESSURE: 136 MMHG

## 2017-07-05 DIAGNOSIS — R07.81 RIB PAIN ON RIGHT SIDE: Primary | ICD-10-CM

## 2017-07-05 DIAGNOSIS — M54.6 LEFT-SIDED THORACIC BACK PAIN, UNSPECIFIED CHRONICITY: ICD-10-CM

## 2017-07-05 DIAGNOSIS — S22.31XA CLOSED FRACTURE OF ONE RIB OF RIGHT SIDE, INITIAL ENCOUNTER: ICD-10-CM

## 2017-07-05 DIAGNOSIS — F10.10 ALCOHOL ABUSE: Primary | ICD-10-CM
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
## 2017-07-05 PROCEDURE — 71101 X-RAY EXAM UNILAT RIBS/CHEST: CPT | Mod: RT

## 2017-07-05 PROCEDURE — 72070 X-RAY EXAM THORAC SPINE 2VWS: CPT

## 2017-07-05 PROCEDURE — 99214 OFFICE O/P EST MOD 30 MIN: CPT | Performed by: FAMILY MEDICINE

## 2017-07-05 RX ORDER — HYDROCODONE BITARTRATE AND ACETAMINOPHEN 5; 325 MG/1; MG/1
1 TABLET ORAL EVERY 8 HOURS PRN
Qty: 30 TABLET | Refills: 0 | Status: SHIPPED | OUTPATIENT
Start: 2017-07-05 | End: 2017-07-25

## 2017-07-05 NOTE — TELEPHONE ENCOUNTER
FRANCIS d from [facility] on 7/3/2017 to home  Primary Problem: Alcohol abuse  LOS: 1.8

## 2017-07-05 NOTE — PATIENT INSTRUCTIONS
Rib Fracture (Broken Rib)  Your ribs are curved bones in your chest. They help protect your lungs and expand and contract when you breathe. Children's ribs bend easily and can often withstand a blow or fall. But adult ribs are more likely to break (fracture) under stress. Even coughing or a hard sneeze can fracture a rib.    When to go to the Emergency Room (ER)  Although they can be painful, most rib fractures aren't serious. But they often make it hard to cough or breathe deeply. Get medical care right away if you have:    Trouble breathing.    Nausea, vomiting, or stomach pain with a sore or bruised rib.    Pain that worsens over time.    An injury to the chest or stomach.  What to expect in the ER  Here is what will happen in the ER:     A healthcare provider will ask about your injury and examine you carefully.    An X-ray of your chest will likely be taken to show any major damage to ribs and lungs. However, ribs can undergo small breaks that do not show up on X-rays, even though they still hurt.    You may be given medicine to ease your discomfort.    Rarely, rib fractures can cause a lung to collapse or lead to bleeding in the chest. In these cases, a tube will be inserted into the chest to reinflate the lung or drain the blood.  Follow-up  You are likely to heal in 6 to 8 weeks. Most rib fractures heal on their own with no lasting effects. Call your healthcare provider right away if you notice any of these symptoms:    Increased chest pain    Shortness of breath    Fever    Coughing up blood  Date Last Reviewed: 9/26/2015 2000-2017 [employer]. [address] [place] [address], [place] [address]. All rights reserved. This information is not intended as a substitute for professional medical care. Always follow your healthcare professional's instructions.

## 2017-07-05 NOTE — LETTER
July 5, 2017      Inga Ledesma  [address] [id number]              TO WHOM IT MAY CONCERN      Patient was seen in the clinic today.   Please excuse from work due to illness for the rest of the week.    Return to work: 7/10/2017    If you have any additional questions or concerns, please do not hesitate to contact me.       Sincerely,        Sherrill Barragan M.D    [facility]

## 2017-07-05 NOTE — MR AVS SNAPSHOT
After Visit Summary   7/5/2017    Inga Ledesma    MRN: [id number]           Patient Information     Date Of Birth          1965        Visit Information        Provider Department      7/5/2017 3:00 PM Sherrill Barragan MD [facility]        Today's Diagnoses     Rib pain on right side    -  1    Left-sided thoracic back pain, unspecified chronicity        Fall, initial encounter        Closed fracture of one rib of right side, initial encounter          Care Instructions      Rib Fracture (Broken Rib)  Your ribs are curved bones in your chest. They help protect your lungs and expand and contract when you breathe. Children's ribs bend easily and can often withstand a blow or fall. But adult ribs are more likely to break (fracture) under stress. Even coughing or a hard sneeze can fracture a rib.    When to go to the Emergency Room (ER)  Although they can be painful, most rib fractures aren't serious. But they often make it hard to cough or breathe deeply. Get medical care right away if you have:    Trouble breathing.    Nausea, vomiting, or stomach pain with a sore or bruised rib.    Pain that worsens over time.    An injury to the chest or stomach.  What to expect in the ER  Here is what will happen in the ER:     A healthcare provider will ask about your injury and examine you carefully.    An X-ray of your chest will likely be taken to show any major damage to ribs and lungs. However, ribs can undergo small breaks that do not show up on X-rays, even though they still hurt.    You may be given medicine to ease your discomfort.    Rarely, rib fractures can cause a lung to collapse or lead to bleeding in the chest. In these cases, a tube will be inserted into the chest to reinflate the lung or drain the blood.  Follow-up  You are likely to heal in 6 to 8 weeks. Most rib fractures heal on their own with no lasting effects. Call your healthcare provider right away if you notice any  "of these symptoms:    Increased chest pain    Shortness of breath    Fever    Coughing up blood  Date Last Reviewed: 9/26/2015 2000-2017 [employer]. [address] [place] [address], [place] [address]. All rights reserved. This information is not intended as a substitute for professional medical care. Always follow your healthcare professional's instructions.                Follow-ups after your visit        Follow-up notes from your care team     Return in about 2 weeks (around 7/19/2017), or if symptoms worsen or fail to improve.      Who to contact     Normal or non-critical lab and imaging results will be communicated to you by [employer], letter or phone within 4 business days after the clinic has received the results. If you do not hear from us within 7 days, please contact the clinic through [employer] or phone. If you have a critical or abnormal lab result, we will notify you by phone as soon as possible.  Submit refill requests through [facility] or call your pharmacy and they will forward the refill request to us. Please allow 3 business days for your refill to be completed.          If you need to speak with a  for additional information , please call: [phone]             Additional Information About Your Visit        [facility] Information     [facility] gives you secure access to your electronic health record. If you see a primary care provider, you can also send messages to your care team and make appointments. If you have questions, please call your primary care clinic.  If you do not have a primary care provider, please call [phone] and they will assist you.        Care EveryWhere ID     This is your Care EveryWhere ID. This could be used by other organizations to access your [facility] medical records  [id number]        Your Vitals Were     Pulse Temperature Height Last Period Pulse Oximetry Breastfeeding?    100 99.1  F (37.3  C) (Tympanic) 5' 1.5\" (1.562 m) 05/23/2010 96% No    " BMI (Body Mass Index)                   29.74 kg/m2            Blood Pressure from Last 3 Encounters:   07/05/17 136/78   06/20/17 128/79   06/09/17 139/82    Weight from Last 3 Encounters:   07/05/17 160 lb (72.6 kg)   06/20/17 160 lb (72.6 kg)   06/09/17 165 lb (74.8 kg)              We Performed the Following     XR Ribs & Chest Right G/E 3 Views     XR Thoracic Spine 2 Views          Today's Medication Changes          These changes are accurate as of: 7/5/17  4:24 PM.  If you have any questions, ask your nurse or doctor.               These medicines have changed or have updated prescriptions.        Dose/Directions    * HYDROcodone-acetaminophen 5-325 MG per tablet   Commonly known as:  NORCO   This may have changed:  Another medication with the same name was added. Make sure you understand how and when to take each.   Used for:  COPD exacerbation (H), Chest wall pain   Changed by:  Nacho Guzman MD        1 tablet at bedtime as needed for cough and sleep.   Quantity:  90 tablet   Refills:  0       * HYDROcodone-acetaminophen 5-325 MG per tablet   Commonly known as:  NORCO   This may have changed:  You were already taking a medication with the same name, and this prescription was added. Make sure you understand how and when to take each.   Used for:  Rib pain on right side, Closed fracture of one rib of right side, initial encounter   Changed by:  Sherrill Barragan MD        Dose:  1 tablet   Take 1 tablet by mouth every 8 hours as needed for pain maximum 3 tablet(s) per day   Quantity:  30 tablet   Refills:  0       * Notice:  This list has 2 medication(s) that are the same as other medications prescribed for you. Read the directions carefully, and ask your doctor or other care provider to review them with you.         Where to get your medicines      Some of these will need a paper prescription and others can be bought over the counter.  Ask your nurse if you have questions.     Bring a paper  prescription for each of these medications     HYDROcodone-acetaminophen 5-325 MG per tablet                Primary Care Provider Office Phone # Fax #    Min Toledo PA-C [phone]       [facility] [address] [facility] [address]        Goals        General    I will use the walker at all times or a cane in tight spots to prevent further falls. (pt-stated)     Notes - Note created  12/9/2015  8:48 AM by Sonny Jacobs RN    As of today's date 12/9/2015 goal is met at 0 - 25%.   Goal Status:  Active          Equal Access to Services     [facility]: Hadii aad ku hadasho Soomaali, waaxda luqadaha, qaybta kaalmada adeegyada, waxsudheer idiin hayaan adeeg kharavincenzo abdi . So [facility] [phone].    ATENCIÓN: Si habla español, tiene a cook disposición servicios gratuitos de asistencia lingüística. [facility] [phone].    We comply with applicable federal civil rights laws and [place] laws. We do not discriminate on the basis of race, color, national origin, age, disability sex, sexual orientation or gender identity.            Thank you!     Thank you for choosing [facility]  for your care. Our goal is always to provide you with excellent care. Hearing back from our patients is one way we can continue to improve our services. Please take a few minutes to complete the written survey that you may receive in the mail after your visit with us. Thank you!             Your Updated Medication List - Protect others around you: Learn how to safely use, store and throw away your medicines at www.disposemymeds.org.          This list is accurate as of: 7/5/17  4:24 PM.  Always use your most recent med list.                   Brand Name Dispense Instructions for use Diagnosis    albuterol 108 (90 BASE) MCG/ACT Inhaler    VENTOLIN HFA    3 Inhaler    Inhale  into the lungs. INHALE 2 PUFFS FOUR TIMES DAILY AS NEEDED    Chronic bronchitis, unspecified chronic bronchitis type (H)        AMLODIPINE BESYLATE PO      Take 10 mg by mouth daily        aspirin 81 MG EC tablet     100 tablet    Take 1 tablet (81 mg) by mouth daily    Hypertension goal BP (blood pressure) < 130/80, COPD (chronic obstructive pulmonary disease) (H)       calcium carbonate 1250 MG tablet    OS-JENNIFER 500 mg [place]. [place]     Take 500 mg by mouth 2 times daily        cetirizine 10 MG tablet    zyrTEC    90 tablet    Take 1 tablet (10 mg) by mouth every evening    Atopic dermatitis, Seasonal allergic rhinitis       FLUoxetine 40 MG capsule    PROzac    90 capsule    Take 2 capsules (80 mg) by mouth daily    Major depressive disorder, recurrent episode, mild (H)       fluticasone 50 MCG/ACT spray    FLONASE    3 g    Spray 1-2 sprays into both nostrils daily    Major depressive disorder, recurrent episode, mild (H)       fluticasone-salmeterol 500-50 MCG/DOSE diskus inhaler    ADVAIR DISKUS    3 Inhaler    Inhale 1 puff into the lungs every 12 hours    Chronic bronchitis, unspecified chronic bronchitis type (H)       furosemide 20 MG tablet    LASIX    90 tablet    Take 1 tablet (20 mg) by mouth daily    Essential hypertension with goal blood pressure less than 140/90       gabapentin 300 MG capsule    NEURONTIN    270 capsule    1cap am 1 cap pm and 2 at bedtime    RLS (restless legs syndrome), Major depressive disorder, recurrent episode, mild (H)       * HYDROcodone-acetaminophen 5-325 MG per tablet    NORCO    90 tablet    1 tablet at bedtime as needed for cough and sleep.    COPD exacerbation (H), Chest wall pain       * HYDROcodone-acetaminophen 5-325 MG per tablet    NORCO    30 tablet    Take 1 tablet by mouth every 8 hours as needed for pain maximum 3 tablet(s) per day    Rib pain on right side, Closed fracture of one rib of right side, initial encounter       ipratropium - albuterol 0.5 mg/2.5 mg/3 mL 0.5-2.5 (3) MG/3ML neb solution    DUONEB    60 vial    Take 1 vial (3 mLs) by nebulization every 6 hours as needed for  shortness of breath / dyspnea    Chronic bronchitis, unspecified chronic bronchitis type (H)       LABETALOL HCL PO      Take 100 mg by mouth 2 times daily        LORazepam 0.5 MG tablet    ATIVAN    20 tablet    Take 1-2 tablets (0.5-1 mg) by mouth every 8 hours as needed for anxiety    Psychophysiological insomnia, Alcohol dependence with withdrawal with complication (H)       losartan 100 MG tablet    COZAAR    90 tablet    Take 1 tablet (100 mg) by mouth daily    Hypertension goal BP (blood pressure) < 140/90       montelukast 10 MG tablet    SINGULAIR    90 tablet    Take 1 tablet (10 mg) by mouth At Bedtime    Tobacco abuse, Chronic bronchitis, unspecified chronic bronchitis type (H)       nicotine 10 MG Inhaler    NICOTROL    180 Box    Use 1-2 daily as needed for smoking cessation    Tobacco abuse       nystatin cream    MYCOSTATIN    30 g    Apply topically 3 times daily    Tinea corporis       omeprazole 20 MG CR capsule    priLOSEC    180 capsule    Take 1 capsule (20 mg) by mouth 2 times daily    Gastroesophageal reflux disease without esophagitis       order for DME     1 each    Equipment being ordered: Nebulizer    Chronic bronchitis, unspecified chronic bronchitis type (H)       potassium chloride SA 20 MEQ CR tablet    potassium chloride    180 tablet    Take 1 tablet (20 mEq) by mouth 2 times daily    Hypokalemia       rOPINIRole 1 MG tablet    REQUIP    270 tablet    Take 1 tablet (1 mg) by mouth 5 times daily    RLS (restless legs syndrome)       tiotropium 18 MCG capsule    SPIRIVA HANDIHALER    90 capsule    Inhale contents of one capsule daily.    Chronic bronchitis, unspecified chronic bronchitis type (H)       triamcinolone 0.1 % cream    KENALOG    80 g    Apply sparingly to affected area three times daily as needed    Other atopic dermatitis       * Notice:  This list has 2 medication(s) that are the same as other medications prescribed for you. Read the directions carefully, and ask your  doctor or other care provider to review them with you.

## 2017-07-05 NOTE — PROGRESS NOTES
SUBJECTIVE:                                                    Inga Ledesma is a 51 year old female who presents to clinic today for the following health issues:      Back Pain       Duration: fall happened last night        Specific cause: fall through chair onto desk     Description:   Location of pain: mid back- left side.  Also injured right flank (rib cage region)  Character of pain: constant sharp   Pain radiation:none  New numbness or weakness in legs, not attributed to pain:  no     Intensity: Currently 10/10    History:   Pain interferes with job: YES- Will need a note for today's visit.   History of back problems: no prior back problems  Any previous MRI or X-rays: None  Sees a specialist for back pain:  No  Therapies tried without relief: none    Alleviating factors:   Improved by: nothin      Precipitating factors:  Worsened by: everything, any movement, breathing hurts right flank, coughing.     Functional and Psychosocial Screen (Fabiola STarT Back):      Most recent score:    FABIOLA START BACK TOTAL SCORE 7/5/2017   Total Score (all 9) 4             Accompanying Signs & Symptoms:  Risk of Fracture:  Recent history of trauma or blunt force  Risk of Cauda Equina:  None  Risk of Infection:  None  Risk of Cancer:  None  Risk of Ankylosing Spondylitis:  Onset at age <35, male, AND morning back stiffness. no         Reports that she was recently admitted for alcohol dependence with withdrawal. States that she has not had a drink since 7/1/17  Denies drinking alcohol last night.     Problem list and histories reviewed & adjusted, as indicated.  Additional history: as documented    Patient Active Problem List   Diagnosis     RLS (restless legs syndrome)     GERD (gastroesophageal reflux disease)     Iron deficiency anemia     Hyperlipidemia with target LDL less than 160     Adult BMI 30+     Sacroiliitis (H)     Tobacco abuse     Vitamin D deficiency     Menorrhagia     Advanced directives,  counseling/discussion     Vitamin D deficiency     Edema of both legs     Hypertension goal BP (blood pressure) < 140/90     Chronic bronchitis, unspecified chronic bronchitis type (H)     Health Care Home     Alcohol dependence with withdrawal with complication (H)     Major depressive disorder, recurrent episode, mild (H)     (HFpEF) heart failure with preserved ejection fraction (H)     Psychophysiological insomnia     Past Surgical History:   Procedure Laterality Date     ABDOMEN SURGERY       COLONOSCOPY       EYE SURGERY       HERNIA REPAIR, INGUINAL RT/LT  2007     HYSTERECTOMY TOTAL ABDOMINAL  7/28/10    Bilateral salpingectomy.  ovaries conserved.     LASER TX, CERVICAL  1987     LYMPH NODE BIOPSY  2007    inguinal     LYSIS OF LABIAL LESION(S)  1985, 1987       Social History   Substance Use Topics     Smoking status: Light Tobacco Smoker     Packs/day: 0.50     Years: 34.00     Types: Cigarettes, Dip, chew, snus or snuff     Start date: 11/1/1979     Last attempt to quit: 10/3/2012     Smokeless tobacco: Never Used      Comment: 5 cigarettes daily     Alcohol use No      Comment: quit 5-16     Family History   Problem Relation Age of Onset     Breast Cancer Paternal Aunt      CEREBROVASCULAR DISEASE Father      Hypertension Father      Other Cancer Other      Depression/Anxiety Mother          Current Outpatient Prescriptions   Medication Sig Dispense Refill     HYDROcodone-acetaminophen (NORCO) 5-325 MG per tablet Take 1 tablet by mouth every 8 hours as needed for pain maximum 3 tablet(s) per day 30 tablet 0     FLUoxetine (PROZAC) 40 MG capsule Take 2 capsules (80 mg) by mouth daily 90 capsule 3     LORazepam (ATIVAN) 0.5 MG tablet Take 1-2 tablets (0.5-1 mg) by mouth every 8 hours as needed for anxiety 20 tablet 0     albuterol (VENTOLIN HFA) 108 (90 BASE) MCG/ACT Inhaler Inhale  into the lungs. INHALE 2 PUFFS FOUR TIMES DAILY AS NEEDED 3 Inhaler 5     gabapentin (NEURONTIN) 300 MG capsule 1cap am 1  cap pm and 2 at bedtime 270 capsule 3     omeprazole (PRILOSEC) 20 MG CR capsule Take 1 capsule (20 mg) by mouth 2 times daily 180 capsule 3     fluticasone-salmeterol (ADVAIR DISKUS) 500-50 MCG/DOSE diskus inhaler Inhale 1 puff into the lungs every 12 hours 3 Inhaler 11     tiotropium (SPIRIVA HANDIHALER) 18 MCG capsule Inhale contents of one capsule daily. 90 capsule 3     furosemide (LASIX) 20 MG tablet Take 1 tablet (20 mg) by mouth daily 90 tablet 3     fluticasone (FLONASE) 50 MCG/ACT spray Spray 1-2 sprays into both nostrils daily 3 g 3     rOPINIRole (REQUIP) 1 MG tablet Take 1 tablet (1 mg) by mouth 5 times daily 270 tablet 6     losartan (COZAAR) 100 MG tablet Take 1 tablet (100 mg) by mouth daily 90 tablet 3     triamcinolone (KENALOG) 0.1 % cream Apply sparingly to affected area three times daily as needed 80 g 0     potassium chloride SA (POTASSIUM CHLORIDE) 20 MEQ CR tablet Take 1 tablet (20 mEq) by mouth 2 times daily 180 tablet 3     ipratropium - albuterol 0.5 mg/2.5 mg/3 mL (DUONEB) 0.5-2.5 (3) MG/3ML neb solution Take 1 vial (3 mLs) by nebulization every 6 hours as needed for shortness of breath / dyspnea 60 vial 12     HYDROcodone-acetaminophen (NORCO) 5-325 MG per tablet 1 tablet at bedtime as needed for cough and sleep. 90 tablet 0     nystatin (MYCOSTATIN) cream Apply topically 3 times daily 30 g 1     montelukast (SINGULAIR) 10 MG tablet Take 1 tablet (10 mg) by mouth At Bedtime 90 tablet 3     nicotine (NICOTROL) 10 MG inhaler Use 1-2 daily as needed for smoking cessation 180 Box 12     order for DME Equipment being ordered: Nebulizer 1 each 0     calcium carbonate (OS-JENNIFER 500 MG [place]. [place]) 500 MG tablet Take 500 mg by mouth 2 times daily       LABETALOL HCL PO Take 100 mg by mouth 2 times daily       AMLODIPINE BESYLATE PO Take 10 mg by mouth daily       cetirizine (ZYRTEC) 10 MG tablet Take 1 tablet (10 mg) by mouth every evening 90 tablet 1     aspirin 81 MG EC tablet Take 1 tablet (81 mg)  "by mouth daily 100 tablet 3     Allergies   Allergen Reactions     Codeine Nausea     Reglan [Metoclopramide Hcl] Other (See Comments)     Body tenses up       Reviewed and updated as needed this visit by clinical staff  Tobacco  Allergies       Reviewed and updated as needed this visit by Provider         ROS:  Constitutional, HEENT, cardiovascular, pulmonary, gi and gu systems are negative, except as otherwise noted.    OBJECTIVE:     /78  Pulse 100  Temp 99.1  F (37.3  C) (Tympanic)  Ht 5' 1.5\" (1.562 m)  Wt 160 lb (72.6 kg)  LMP 05/23/2010  SpO2 96%  Breastfeeding? No  BMI 29.74 kg/m2  Body mass index is 29.74 kg/(m^2).   GENERAL: healthy, alert and no distress  RESP: lungs clear to auscultation - no rales, rhonchi or wheezes. Tenderness over the right lower rib area. No skin breakdown.  CV: regular rate and rhythm, normal S1 S2, no S3 or S4, no murmur, click or rub, no peripheral edema and peripheral pulses strong  Comprehensive back pain exam:  Tenderness of left thoracic spinal area with bruising. No active bleeding or open laceration., Pain limits the following motions: twisting, flexion., Lower extremity strength functional and equal on both sides, Lower extremity reflexes within normal limits bilaterally, Lower extremity sensation normal and equal on both sides and unable to perform due to discomfort.    Diagnostic Test Results:  Reviewed and discussed with patient prior to discharge.  Results for orders placed or performed in visit on 07/05/17   XR Ribs & Chest Right G/E 3 Views    Narrative    XR RIBS & CHEST RT 3VW 7/5/2017 3:51 PM    COMPARISON: 4/28/2017    HISTORY: Pleurodynia, fall.      Impression    IMPRESSION: Cardiac silhouette and pulmonary vasculature are within  normal limits. No focal airspace disease, pleural effusion or  pneumothorax. Moderately displaced right sixth rib fracture. No other  definite rib fractures are seen.    SARIKA DORANTES   XR Thoracic Spine 2 Views    " Narrative    XR THORACIC SPINE 2 VW 7/5/2017 3:52 PM    COMPARISON: None.    HISTORY: Fall, thoracic spine pain.      Impression    IMPRESSION: Mild convex right curvature in the mid to lower thoracic  spine. Vertebral heights are preserved without evidence of fracture.  No significant listhesis at any level.    SARIKA DOARNTES           ASSESSMENT/PLAN:     Inga was seen today for back pain.    Diagnoses and all orders for this visit:    Rib pain on right side  -     XR Ribs & Chest Right G/E 3 Views  -     HYDROcodone-acetaminophen (NORCO) 5-325 MG per tablet; Take 1 tablet by mouth every 8 hours as needed for pain maximum 3 tablet(s) per day    Left-sided thoracic back pain, unspecified chronicity  -     XR Thoracic Spine 2 Views    Fall, initial encounter  -     XR Ribs & Chest Right G/E 3 Views  -     XR Thoracic Spine 2 Views    Closed fracture of one rib of right side, initial encounter  -     HYDROcodone-acetaminophen (NORCO) 5-325 MG per tablet; Take 1 tablet by mouth every 8 hours as needed for pain maximum 3 tablet(s) per day      Instructions given to report to the nearest ER if:     Trouble breathing.    Nausea, vomiting, or stomach pain with a sore or bruised rib.    Pain that worsens over time.    Work note given. See Epic for details.      Follow up if symptoms fail to improve or worsen.      The patient was in agreement with the plan today and had no questions or concerns prior to leaving the clinic.        Sherrill Barragan MD  [facility]

## 2017-07-05 NOTE — NURSING NOTE
"Chief Complaint   Patient presents with     Back Pain       Initial /90  Pulse 100  Temp 99.1  F (37.3  C) (Tympanic)  Ht 5' 1.5\" (1.562 m)  Wt 160 lb (72.6 kg)  LMP 05/23/2010  SpO2 96%  Breastfeeding? No  BMI 29.74 kg/m2 Estimated body mass index is 29.74 kg/(m^2) as calculated from the following:    Height as of this encounter: 5' 1.5\" (1.562 m).    Weight as of this encounter: 160 lb (72.6 kg).  Medication Reconciliation: complete     Abigail Valentin MA      "

## 2017-07-05 NOTE — PROGRESS NOTES
Clinic Care Coordination Contact  Social Work Face to Face    SW met with pt during clinic visit with provider. Pt stated she had blacked out while sitting on her deck and fell through it. Pt reports she was taken to the hospital but was d/c in severe pain.    Pt showed SW bruising that covered both her back and her abdomen. Pt presented as out of breath and in extreme pain.    SW observed pt medical needs were a priority during the visit and explained pt could follow up after she is stable. Pt did mention she is still working but would need a note from provider.      SW will schedule follow up in 2 weeks.    EVERETTE Vargas  Care Coordination  [address] [place]  [phone]  [email or web address]

## 2017-07-06 ENCOUNTER — TELEPHONE (OUTPATIENT)
Dept: FAMILY MEDICINE | Facility: CLINIC | Age: 52
End: 2017-07-06

## 2017-07-06 NOTE — TELEPHONE ENCOUNTER
Patient states her work never received the note she requested with why she was off and when she can return to work. Her work fax is [phone] and that should be sent to Kay. Please advise.    Rohini shetty.

## 2017-07-06 NOTE — TELEPHONE ENCOUNTER
Fax number that patient gave at office visit was incorrect. Re faxed letter to correct fax number. Left message on patients voicemail this has been done

## 2017-07-10 ENCOUNTER — TRANSFERRED RECORDS (OUTPATIENT)
Dept: HEALTH INFORMATION MANAGEMENT | Facility: CLINIC | Age: 52
End: 2017-07-10

## 2017-07-24 NOTE — PROGRESS NOTES
Clinic Care Coordination Contact  [facility]/Voicemail    Referral Source: PCP  Clinical Data: Care Coordinator Outreach  Outreach attempted x 1.  Left message on voicemail with call back information and requested return call.  Plan: Care Coordinator mailed out care coordination introduction letter on 5/17/17. Care Coordinator will try to reach patient again in 3-5 business days.    EVERETTE Vargas  Care Coordination  [address] [place]  [phone]  [email or web address]

## 2017-07-25 ENCOUNTER — OFFICE VISIT (OUTPATIENT)
Dept: FAMILY MEDICINE | Facility: CLINIC | Age: 52
End: 2017-07-25
Payer: COMMERCIAL

## 2017-07-25 VITALS
BODY MASS INDEX: 30.67 KG/M2 | WEIGHT: 165 LBS | RESPIRATION RATE: 18 BRPM | DIASTOLIC BLOOD PRESSURE: 98 MMHG | OXYGEN SATURATION: 95 % | TEMPERATURE: 98.5 F | HEART RATE: 115 BPM | SYSTOLIC BLOOD PRESSURE: 160 MMHG

## 2017-07-25 DIAGNOSIS — S22.31XD CLOSED FRACTURE OF ONE RIB OF RIGHT SIDE WITH ROUTINE HEALING, SUBSEQUENT ENCOUNTER: Primary | ICD-10-CM

## 2017-07-25 DIAGNOSIS — F10.239 ALCOHOL DEPENDENCE WITH WITHDRAWAL WITH COMPLICATION (H): ICD-10-CM

## 2017-07-25 DIAGNOSIS — F51.04 PSYCHOPHYSIOLOGICAL INSOMNIA: ICD-10-CM

## 2017-07-25 DIAGNOSIS — I10 ESSENTIAL HYPERTENSION WITH GOAL BLOOD PRESSURE LESS THAN 140/90: ICD-10-CM

## 2017-07-25 PROCEDURE — 99214 OFFICE O/P EST MOD 30 MIN: CPT | Performed by: PHYSICIAN ASSISTANT

## 2017-07-25 RX ORDER — LORAZEPAM 0.5 MG/1
.5-1 TABLET ORAL EVERY 8 HOURS PRN
Qty: 40 TABLET | Refills: 0 | Status: SHIPPED | OUTPATIENT
Start: 2017-07-25 | End: 2017-10-06

## 2017-07-25 RX ORDER — FUROSEMIDE 20 MG
20 TABLET ORAL DAILY
Qty: 90 TABLET | Refills: 3 | Status: ON HOLD | OUTPATIENT
Start: 2017-07-25 | End: 2017-11-01

## 2017-07-25 NOTE — PROGRESS NOTES
SUBJECTIVE:                                                    Inga Ledesma is a 51 year old female who presents to clinic today for the following health issues:          Hospital Follow-up Visit:    [facility]Nursing [facility]  Date of Admission: 07/10/  Date of Discharge: 07/12/  Reason(s) for Admission: alcohol withdrawal/rib fracture            Problems taking medications regularly:  None       Medication changes since discharge: None       Problems adhering to non-medication therapy:  None    Summary of hospitalization:  See outside records, reviewed and scanned  Diagnostic Tests/Treatments reviewed.  Follow up needed: none  Other Healthcare Providers Involved in Patient s Care:         None  Update since discharge: improved.     Post Discharge Medication Reconciliation: discharge medications reconciled and changed, per note/orders (see AVS).  Plan of care communicated with patient     Coding guidelines for this visit:  Type of Medical   Decision Making Face-to-Face Visit       within 7 Days of discharge Face-to-Face Visit        within 14 days of discharge   Moderate Complexity [id number]   High Complexity [id number]            Patient admits to being sober as of 7/12. Uncertain as to why her blood pressure is elevated again. Historically it has been elevated when she's been drinking and drops nicely into the normal range (will meds) when she's been sober.     Rib pain improving. No sob. No blood in stools. No n/v/d. No jaundice. No bruising or bleeding.   Problem list and histories reviewed & adjusted, as indicated.  Additional history: as documented        Reviewed and updated as needed this visit by clinical staff  Tobacco       Reviewed and updated as needed this visit by Provider         All other systems negative except as outline above  OBJECTIVE:  CHEST:chest clear to IPPA, no tachypnea, retractions or cyanosis and S1, S2 normal, no murmur, no gallop, rate regular.  Eye exam  - right eye normal lid, conjunctiva, cornea, pupil and fundus, left eye normal lid, conjunctiva, cornea, pupil and fundus.  Thyroid not palpable, not enlarged, no nodules detected.  Right anterolateral chest wall pain (mild)    Inga was seen today for hospital f/u.    Diagnoses and all orders for this visit:    Closed fracture of one rib of right side with routine healing, subsequent encounter    Alcohol dependence with withdrawal with complication (H)  -     LORazepam (ATIVAN) 0.5 MG tablet; Take 1-2 tablets (0.5-1 mg) by mouth every 8 hours as needed for anxiety    Psychophysiological insomnia  -     LORazepam (ATIVAN) 0.5 MG tablet; Take 1-2 tablets (0.5-1 mg) by mouth every 8 hours as needed for anxiety      work on lifestyle modification  Patient to work on maintenance of sobriety.   Recheck in 2 weeks for recheck of blood pressure.

## 2017-07-25 NOTE — MR AVS SNAPSHOT
After Visit Summary   7/25/2017    Inga Ledesma    MRN: [id number]           Patient Information     Date Of Birth          1965        Visit Information        Provider Department      7/25/2017 4:00 PM Min Toledo PA-C [facility] Diagnoses     Closed fracture of one rib of right side with routine healing, subsequent encounter    -  1    Alcohol dependence with withdrawal with complication (H)        Psychophysiological insomnia           Follow-ups after your visit        Who to contact     Normal or non-critical lab and imaging results will be communicated to you by [employer], letter or phone within 4 business days after the clinic has received the results. If you do not hear from us within 7 days, please contact the clinic through [employer] or phone. If you have a critical or abnormal lab result, we will notify you by phone as soon as possible.  Submit refill requests through [employer] or call your pharmacy and they will forward the refill request to us. Please allow 3 business days for your refill to be completed.          If you need to speak with a  for additional information , please call: [phone]             Additional Information About Your Visit        [employer] Information     [employer] gives you secure access to your electronic health record. If you see a primary care provider, you can also send messages to your care team and make appointments. If you have questions, please call your primary care clinic.  If you do not have a primary care provider, please call [phone] and they will assist you.        Care EveryWhere ID     This is your Care EveryWhere ID. This could be used by other organizations to access your [place] medical records  [id number]        Your Vitals Were     Pulse Temperature Respirations Last Period Pulse Oximetry BMI (Body Mass Index)    115 98.5  F (36.9  C) (Tympanic) 18 05/23/2010 95% 30.67 kg/m2        Blood Pressure from Last 3 Encounters:   07/25/17 (!) 160/98   07/05/17 136/78   06/20/17 128/79    Weight from Last 3 Encounters:   07/25/17 165 lb (74.8 kg)   07/05/17 160 lb (72.6 kg)   06/20/17 160 lb (72.6 kg)              Today, you had the following     No orders found for display         Today's Medication Changes          These changes are accurate as of: 7/25/17  4:39 PM.  If you have any questions, ask your nurse or doctor.               Stop taking these medicines if you haven't already. Please contact your care team if you have questions.     HYDROcodone-acetaminophen 5-325 MG per tablet   Commonly known as:  NORCO   Stopped by:  Min Toledo PA-C                Where to get your medicines      Some of these will need a paper prescription and others can be bought over the counter.  Ask your nurse if you have questions.     Bring a paper prescription for each of these medications     LORazepam 0.5 MG tablet                Primary Care Provider Office Phone # Fax #    Min Toledo PA-C [phone]       [facility] [place] [address] [facility] [address]        Goals        General    I will use the walker at all times or a cane in tight spots to prevent further falls. (pt-stated)     Notes - Note created  12/9/2015  8:48 AM by Sonny Jacobs RN    As of today's date 12/9/2015 goal is met at 0 - 25%.   Goal Status:  Active          Equal Access to Services     [facility] AH: Hadii aad ku hadasho Soomaali, waaxda luqadaha, qaybta kaalmada adeegyada, waxay idiin hayaan adeeg kharash la'aan . So [facility] [phone].    ATENCIÓN: Si habla español, tiene a cook disposición servicios gratuitos de asistencia lingüística. Llame al [phone].    We comply with applicable federal civil rights laws and [place] laws. We do not discriminate on the basis of race, color, national origin, age, disability sex, sexual orientation or gender identity.            Thank you!     Thank you for  choosing [facility]  for your care. Our goal is always to provide you with excellent care. Hearing back from our patients is one way we can continue to improve our services. Please take a few minutes to complete the written survey that you may receive in the mail after your visit with us. Thank you!             Your Updated Medication List - Protect others around you: Learn how to safely use, store and throw away your medicines at www.disposemymeds.org.          This list is accurate as of: 7/25/17  4:39 PM.  Always use your most recent med list.                   Brand Name Dispense Instructions for use Diagnosis    albuterol 108 (90 BASE) MCG/ACT Inhaler    VENTOLIN HFA    3 Inhaler    Inhale  into the lungs. INHALE 2 PUFFS FOUR TIMES DAILY AS NEEDED    Chronic bronchitis, unspecified chronic bronchitis type (H)       AMLODIPINE BESYLATE PO      Take 10 mg by mouth daily        aspirin 81 MG EC tablet     100 tablet    Take 1 tablet (81 mg) by mouth daily    Hypertension goal BP (blood pressure) < 130/80, COPD (chronic obstructive pulmonary disease) (H)       calcium carbonate 1250 MG tablet    OS-JENNIFER 500 mg [place]. Ca     Take 500 mg by mouth 2 times daily        cetirizine 10 MG tablet    zyrTEC    90 tablet    Take 1 tablet (10 mg) by mouth every evening    Atopic dermatitis, Seasonal allergic rhinitis       FLUoxetine 40 MG capsule    PROzac    90 capsule    Take 2 capsules (80 mg) by mouth daily    Major depressive disorder, recurrent episode, mild (H)       fluticasone 50 MCG/ACT spray    FLONASE    3 g    Spray 1-2 sprays into both nostrils daily    Major depressive disorder, recurrent episode, mild (H)       fluticasone-salmeterol 500-50 MCG/DOSE diskus inhaler    ADVAIR DISKUS    3 Inhaler    Inhale 1 puff into the lungs every 12 hours    Chronic bronchitis, unspecified chronic bronchitis type (H)       furosemide 20 MG tablet    LASIX    90 tablet    Take 1 tablet (20 mg) by mouth daily     Essential hypertension with goal blood pressure less than 140/90       gabapentin 300 MG capsule    NEURONTIN    270 capsule    1cap am 1 cap pm and 2 at bedtime    RLS (restless legs syndrome), Major depressive disorder, recurrent episode, mild (H)       ipratropium - albuterol 0.5 mg/2.5 mg/3 mL 0.5-2.5 (3) MG/3ML neb solution    DUONEB    60 vial    Take 1 vial (3 mLs) by nebulization every 6 hours as needed for shortness of breath / dyspnea    Chronic bronchitis, unspecified chronic bronchitis type (H)       LABETALOL HCL PO      Take 100 mg by mouth 2 times daily        LORazepam 0.5 MG tablet    ATIVAN    40 tablet    Take 1-2 tablets (0.5-1 mg) by mouth every 8 hours as needed for anxiety    Psychophysiological insomnia, Alcohol dependence with withdrawal with complication (H)       losartan 100 MG tablet    COZAAR    90 tablet    Take 1 tablet (100 mg) by mouth daily    Hypertension goal BP (blood pressure) < 140/90       montelukast 10 MG tablet    SINGULAIR    90 tablet    Take 1 tablet (10 mg) by mouth At Bedtime    Tobacco abuse, Chronic bronchitis, unspecified chronic bronchitis type (H)       nicotine 10 MG Inhaler    NICOTROL    180 Box    Use 1-2 daily as needed for smoking cessation    Tobacco abuse       nystatin cream    MYCOSTATIN    30 g    Apply topically 3 times daily    Tinea corporis       omeprazole 20 MG CR capsule    priLOSEC    180 capsule    Take 1 capsule (20 mg) by mouth 2 times daily    Gastroesophageal reflux disease without esophagitis       order for DME     1 each    Equipment being ordered: Nebulizer    Chronic bronchitis, unspecified chronic bronchitis type (H)       potassium chloride SA 20 MEQ CR tablet    potassium chloride    180 tablet    Take 1 tablet (20 mEq) by mouth 2 times daily    Hypokalemia       rOPINIRole 1 MG tablet    REQUIP    270 tablet    Take 1 tablet (1 mg) by mouth 5 times daily    RLS (restless legs syndrome)       tiotropium 18 MCG capsule    SPIRIVA  HANDIHALER    90 capsule    Inhale contents of one capsule daily.    Chronic bronchitis, unspecified chronic bronchitis type (H)       triamcinolone 0.1 % cream    KENALOG    80 g    Apply sparingly to affected area three times daily as needed    Other atopic dermatitis

## 2017-07-26 ENCOUNTER — TELEPHONE (OUTPATIENT)
Dept: FAMILY MEDICINE | Facility: CLINIC | Age: 52
End: 2017-07-26

## 2017-08-16 ENCOUNTER — TRANSFERRED RECORDS (OUTPATIENT)
Dept: HEALTH INFORMATION MANAGEMENT | Facility: CLINIC | Age: 52
End: 2017-08-16

## 2017-08-21 ENCOUNTER — TELEPHONE (OUTPATIENT)
Dept: CARE COORDINATION | Facility: CLINIC | Age: 52
End: 2017-08-21

## 2017-08-21 DIAGNOSIS — F10.930 UNCOMPLICATED ALCOHOL WITHDRAWAL (H): Primary | ICD-10-CM

## 2017-08-21 NOTE — TELEPHONE ENCOUNTER
DC'd from [facility] on 8/18 to Home self care   Primary Problem: Alcohol withdrawal, uncomplicated  LOS: 1.8

## 2017-08-22 DIAGNOSIS — L20.89 OTHER ATOPIC DERMATITIS: ICD-10-CM

## 2017-08-22 NOTE — TELEPHONE ENCOUNTER
TRIAMCINOLONE      Last Written Prescription Date: 6-9-17  Last Fill Quantity: 80,  # refills: 0   Last Office Visit with G, [facility] or [facility] prescribing provider: 8-15-17

## 2017-08-23 RX ORDER — TRIAMCINOLONE ACETONIDE 1 MG/G
CREAM TOPICAL
Qty: 80 G | Refills: 0 | Status: SHIPPED | OUTPATIENT
Start: 2017-08-23 | End: 2017-09-25

## 2017-08-23 NOTE — PROGRESS NOTES
Clinic Care Coordination Contact  [facility]/Voicemail    Referral Source: PCP  Clinical Data: Care Coordinator Outreach  Outreach attempted x 1.  Left message on voicemail with call back information and requested return call.  Plan: Care Coordinator mailed out care coordination introduction letter on 5-17-17. Care Coordinator will try to reach patient again in 3-5 business days.  Cuco Crane RN  Clinic Care Coordinator  [phone] or [phone]

## 2017-08-24 ENCOUNTER — CARE COORDINATION (OUTPATIENT)
Dept: CARE COORDINATION | Facility: CLINIC | Age: 52
End: 2017-08-24

## 2017-08-24 NOTE — PROGRESS NOTES
Clinic Care Coordination Contact  Social Work Outreach    SW contacted pt following staff message from product navigator. Pt had been d/c from hospital stay 8/16 - 8/18 for alcohol withdrawal. Pt has had     Pt reported no current concerns. Pt stated she needed to schedule appointment with PCP for follow up after d/c. Pt reviewed with SW how CC could support her in finding CD resources.    Pt admitted she would like to discuss options during next appointment in clinic. SW will monitor pt chart for upcoming appointment scheduling.    EVERETTE Vargas  Care Coordination  [facility]  [phone]  [email or web address]

## 2017-09-07 ENCOUNTER — TELEPHONE (OUTPATIENT)
Dept: FAMILY MEDICINE | Facility: CLINIC | Age: 52
End: 2017-09-07

## 2017-09-07 NOTE — TELEPHONE ENCOUNTER
Reason for Call:  Other note    Detailed comments: patient is requesting a note to return to work tomorrow Maikol Ledesma will pick this up this when ready    Phone Number Patient can be reached at: Home number on file [phone] (home)    Best Time:     Can we leave a detailed message on this number? YES    Call taken on 9/7/2017 at 8:32 AM by Stormy Chanel

## 2017-09-07 NOTE — TELEPHONE ENCOUNTER
No note will be written until patient is seen per Min. Per patient , she spoke with her HR and she shouldn't need a note if she returns to work Friday 09/08/17 as she will have only be gone 3 days.

## 2017-09-07 NOTE — TELEPHONE ENCOUNTER
BERONICA Guevara I have left 2 messages for patient to return call regarding disability forms, no return call (see other encounter). You had requested patient to make an appointment.

## 2017-09-07 NOTE — TELEPHONE ENCOUNTER
Patient was told she should be seen before Min will write out a return to work note. She wants to talk to someone about this anyway. Ok to leave a message.

## 2017-09-11 ENCOUNTER — OFFICE VISIT (OUTPATIENT)
Dept: FAMILY MEDICINE | Facility: CLINIC | Age: 52
End: 2017-09-11
Payer: COMMERCIAL

## 2017-09-11 VITALS
DIASTOLIC BLOOD PRESSURE: 85 MMHG | HEIGHT: 62 IN | HEART RATE: 112 BPM | WEIGHT: 161 LBS | RESPIRATION RATE: 18 BRPM | BODY MASS INDEX: 29.63 KG/M2 | TEMPERATURE: 98 F | SYSTOLIC BLOOD PRESSURE: 139 MMHG | OXYGEN SATURATION: 92 %

## 2017-09-11 DIAGNOSIS — I10 HYPERTENSION GOAL BP (BLOOD PRESSURE) < 140/90: ICD-10-CM

## 2017-09-11 DIAGNOSIS — J42 CHRONIC BRONCHITIS, UNSPECIFIED CHRONIC BRONCHITIS TYPE (H): ICD-10-CM

## 2017-09-11 DIAGNOSIS — F10.239 ALCOHOL DEPENDENCE WITH WITHDRAWAL WITH COMPLICATION (H): Primary | ICD-10-CM

## 2017-09-11 PROCEDURE — 99214 OFFICE O/P EST MOD 30 MIN: CPT | Performed by: PHYSICIAN ASSISTANT

## 2017-09-11 NOTE — MR AVS SNAPSHOT
"              After Visit Summary   9/11/2017    Inga Ledesma    MRN: [id number]           Patient Information     Date Of Birth          1965        Visit Information        Provider Department      9/11/2017 4:40 PM Min Toledo PA-C [facility]        Today's Diagnoses     Alcohol dependence with withdrawal with complication (H)    -  1    Chronic bronchitis, unspecified chronic bronchitis type (H)        Hypertension goal BP (blood pressure) < 140/90           Follow-ups after your visit        Who to contact     Normal or non-critical lab and imaging results will be communicated to you by [employer], letter or phone within 4 business days after the clinic has received the results. If you do not hear from us within 7 days, please contact the clinic through [employer] or phone. If you have a critical or abnormal lab result, we will notify you by phone as soon as possible.  Submit refill requests through [employer] or call your pharmacy and they will forward the refill request to us. Please allow 3 business days for your refill to be completed.          If you need to speak with a  for additional information , please call: [phone]             Additional Information About Your Visit        MyChart Information     [employer] gives you secure access to your electronic health record. If you see a primary care provider, you can also send messages to your care team and make appointments. If you have questions, please call your primary care clinic.  If you do not have a primary care provider, please call [phone] and they will assist you.        Care EveryWhere ID     This is your Care EveryWhere ID. This could be used by other organizations to access your [place] medical records  [id number]        Your Vitals Were     Pulse Temperature Respirations Height Last Period Pulse Oximetry    112 98  F (36.7  C) (Tympanic) 18 5' 1.5\" (1.562 m) 05/23/2010 92%    BMI (Body Mass Index) "                   29.93 kg/m2            Blood Pressure from Last 3 Encounters:   09/11/17 139/85   07/25/17 (!) 160/98   07/05/17 136/78    Weight from Last 3 Encounters:   09/11/17 161 lb (73 kg)   07/25/17 165 lb (74.8 kg)   07/05/17 160 lb (72.6 kg)              Today, you had the following     No orders found for display       Primary Care Provider Office Phone # Fax #    Min Toledo PA-C [phone]       [address] [facility] [address]        Goals        General    I will use the walker at all times or a cane in tight spots to prevent further falls. (pt-stated)     Notes - Note created  12/9/2015  8:48 AM by Sonny Jacobs RN    As of today's date 12/9/2015 goal is met at 0 - 25%.   Goal Status:  Active          Equal Access to Services     [facility] AH: Hadii aad ku hadasho Soomaali, waaxda luqadaha, qaybta kaalmada adeegyada, kelsey abdi . So [facility] [phone].    ATENCIÓN: Si habla español, tiene a cook disposición servicios gratuitos de asistencia lingüística. Llame al [phone].    We comply with applicable federal civil rights laws and [place] laws. We do not discriminate on the basis of race, color, national origin, age, disability sex, sexual orientation or gender identity.            Thank you!     Thank you for choosing [facility]  for your care. Our goal is always to provide you with excellent care. Hearing back from our patients is one way we can continue to improve our services. Please take a few minutes to complete the written survey that you may receive in the mail after your visit with us. Thank you!             Your Updated Medication List - Protect others around you: Learn how to safely use, store and throw away your medicines at www.disposemymeds.org.          This list is accurate as of: 9/11/17  5:20 PM.  Always use your most recent med list.                   Brand Name Dispense Instructions for use Diagnosis     albuterol 108 (90 BASE) MCG/ACT Inhaler    VENTOLIN HFA    3 Inhaler    Inhale  into the lungs. INHALE 2 PUFFS FOUR TIMES DAILY AS NEEDED    Chronic bronchitis, unspecified chronic bronchitis type (H)       AMLODIPINE BESYLATE PO      Take 10 mg by mouth daily        aspirin 81 MG EC tablet     100 tablet    Take 1 tablet (81 mg) by mouth daily    Hypertension goal BP (blood pressure) < 130/80, COPD (chronic obstructive pulmonary disease) (H)       calcium carbonate 1250 MG tablet    OS-JENNIFER 500 mg [place]. Ca     Take 500 mg by mouth 2 times daily        cetirizine 10 MG tablet    zyrTEC    90 tablet    Take 1 tablet (10 mg) by mouth every evening    Atopic dermatitis, Seasonal allergic rhinitis       FLUoxetine 40 MG capsule    PROzac    90 capsule    Take 2 capsules (80 mg) by mouth daily    Major depressive disorder, recurrent episode, mild (H)       fluticasone 50 MCG/ACT spray    FLONASE    3 g    Spray 1-2 sprays into both nostrils daily    Major depressive disorder, recurrent episode, mild (H)       fluticasone-salmeterol 500-50 MCG/DOSE diskus inhaler    ADVAIR DISKUS    3 Inhaler    Inhale 1 puff into the lungs every 12 hours    Chronic bronchitis, unspecified chronic bronchitis type (H)       furosemide 20 MG tablet    LASIX    90 tablet    Take 1 tablet (20 mg) by mouth daily    Essential hypertension with goal blood pressure less than 140/90       gabapentin 300 MG capsule    NEURONTIN    270 capsule    1cap am 1 cap pm and 2 at bedtime    RLS (restless legs syndrome), Major depressive disorder, recurrent episode, mild (H)       ipratropium - albuterol 0.5 mg/2.5 mg/3 mL 0.5-2.5 (3) MG/3ML neb solution    DUONEB    60 vial    Take 1 vial (3 mLs) by nebulization every 6 hours as needed for shortness of breath / dyspnea    Chronic bronchitis, unspecified chronic bronchitis type (H)       LABETALOL HCL PO      Take 100 mg by mouth 2 times daily        LORazepam 0.5 MG tablet    ATIVAN    40 tablet    Take 1-2  tablets (0.5-1 mg) by mouth every 8 hours as needed for anxiety    Psychophysiological insomnia, Alcohol dependence with withdrawal with complication (H)       losartan 100 MG tablet    COZAAR    90 tablet    Take 1 tablet (100 mg) by mouth daily    Hypertension goal BP (blood pressure) < 140/90       montelukast 10 MG tablet    SINGULAIR    90 tablet    Take 1 tablet (10 mg) by mouth At Bedtime    Tobacco abuse, Chronic bronchitis, unspecified chronic bronchitis type (H)       nicotine 10 MG Inhaler    NICOTROL    180 Box    Use 1-2 daily as needed for smoking cessation    Tobacco abuse       nystatin cream    MYCOSTATIN    30 g    Apply topically 3 times daily    Tinea corporis       omeprazole 20 MG CR capsule    priLOSEC    180 capsule    Take 1 capsule (20 mg) by mouth 2 times daily    Gastroesophageal reflux disease without esophagitis       order for DME     1 each    Equipment being ordered: Nebulizer    Chronic bronchitis, unspecified chronic bronchitis type (H)       potassium chloride SA 20 MEQ CR tablet    potassium chloride    180 tablet    Take 1 tablet (20 mEq) by mouth 2 times daily    Hypokalemia       rOPINIRole 1 MG tablet    REQUIP    270 tablet    Take 1 tablet (1 mg) by mouth 5 times daily    RLS (restless legs syndrome)       tiotropium 18 MCG capsule    SPIRIVA HANDIHALER    90 capsule    Inhale contents of one capsule daily.    Chronic bronchitis, unspecified chronic bronchitis type (H)       triamcinolone 0.1 % cream    KENALOG    80 g    APPLY SPARINGLY TO AFFECTED AREA(S) THREE TIMES A DAY AS NEEDED    Other atopic dermatitis

## 2017-09-11 NOTE — PROGRESS NOTES
SUBJECTIVE:   Inga Ledesma is a 52 year old female who presents to clinic today for the following health issues:      Alcohol Problem-discuss disability paperwork today  Attending [employer] twice a week. She is starting with a new counselor next week.   Sober now for 2 weeks.   Hospitalized august 16-18.   Problem list and histories reviewed & adjusted, as indicated.  Additional history: as documented  Recheck of her blood pressure. Looks good today.   Recheck of copd. Has been waxing and waning.    Reviewed and updated as needed this visit by clinical staff  Tobacco  Allergies  Meds  Problems  Med Hx  Surg Hx  Fam Hx  Soc Hx        Reviewed and updated as needed this visit by Provider  Tobacco  Allergies  Meds  Problems  Med Hx  Surg Hx  Fam Hx  Soc Hx          All other systems negative except as outline above  OBJECTIVE:  Eye exam - right eye normal lid, conjunctiva, cornea, pupil and fundus, left eye normal lid, conjunctiva, cornea, pupil and fundus.  Thyroid not palpable, not enlarged, no nodules detected.CHEST:no tachypnea, retractions or cyanosis, air entry reduced both lower lobes and S1, S2 normal, no murmur, no gallop, rate regular.  The abdomen is soft without tenderness, guarding, mass, rebound or organomegaly. Bowel sounds are normal. No CVA tenderness or inguinal adenopathy noted.  No edema  Inga was seen today for alcohol problem.    Diagnoses and all orders for this visit:    Alcohol dependence with withdrawal with complication (H)    Chronic bronchitis, unspecified chronic bronchitis type (H)    Hypertension goal BP (blood pressure) < 140/90      Paperwork to be completed.   Patient to f/u in 6-8 wks.

## 2017-09-25 ENCOUNTER — OFFICE VISIT (OUTPATIENT)
Dept: FAMILY MEDICINE | Facility: CLINIC | Age: 52
End: 2017-09-25
Payer: COMMERCIAL

## 2017-09-25 VITALS
TEMPERATURE: 97.5 F | WEIGHT: 154.6 LBS | BODY MASS INDEX: 28.45 KG/M2 | SYSTOLIC BLOOD PRESSURE: 105 MMHG | HEIGHT: 62 IN | DIASTOLIC BLOOD PRESSURE: 72 MMHG | HEART RATE: 120 BPM

## 2017-09-25 DIAGNOSIS — Z12.31 ENCOUNTER FOR SCREENING MAMMOGRAM FOR BREAST CANCER: Primary | ICD-10-CM

## 2017-09-25 DIAGNOSIS — I10 HYPERTENSION GOAL BP (BLOOD PRESSURE) < 140/90: ICD-10-CM

## 2017-09-25 DIAGNOSIS — Z12.11 SCREEN FOR COLON CANCER: ICD-10-CM

## 2017-09-25 DIAGNOSIS — L20.89 OTHER ATOPIC DERMATITIS: ICD-10-CM

## 2017-09-25 LAB
ALBUMIN SERPL-MCNC: 3.7 G/DL (ref 3.4–5)
ALP SERPL-CCNC: 158 U/L (ref 40–150)
ALT SERPL W P-5'-P-CCNC: 32 U/L (ref 0–50)
ANION GAP SERPL CALCULATED.3IONS-SCNC: 10 MMOL/L (ref 3–14)
AST SERPL W P-5'-P-CCNC: 36 U/L (ref 0–45)
BILIRUB SERPL-MCNC: 0.7 MG/DL (ref 0.2–1.3)
BUN SERPL-MCNC: 21 MG/DL (ref 7–30)
CALCIUM SERPL-MCNC: 9.1 MG/DL (ref 8.5–10.1)
CHLORIDE SERPL-SCNC: 90 MMOL/L (ref 94–109)
CO2 SERPL-SCNC: 28 MMOL/L (ref 20–32)
CREAT SERPL-MCNC: 1.08 MG/DL (ref 0.52–1.04)
ERYTHROCYTE [DISTWIDTH] IN BLOOD BY AUTOMATED COUNT: 12.5 % (ref 10–15)
GFR SERPL CREATININE-BSD FRML MDRD: 53 ML/MIN/1.7M2
GLUCOSE SERPL-MCNC: 100 MG/DL (ref 70–99)
HCT VFR BLD AUTO: 38.9 % (ref 35–47)
HGB BLD-MCNC: 13.5 G/DL (ref 11.7–15.7)
MCH RBC QN AUTO: 32.8 PG (ref 26.5–33)
MCHC RBC AUTO-ENTMCNC: 34.7 G/DL (ref 31.5–36.5)
MCV RBC AUTO: 94 FL (ref 78–100)
PLATELET # BLD AUTO: 174 10E9/L (ref 150–450)
POTASSIUM SERPL-SCNC: 3.3 MMOL/L (ref 3.4–5.3)
PROT SERPL-MCNC: 7.5 G/DL (ref 6.8–8.8)
RBC # BLD AUTO: 4.12 10E12/L (ref 3.8–5.2)
SODIUM SERPL-SCNC: 128 MMOL/L (ref 133–144)
WBC # BLD AUTO: 10.6 10E9/L (ref 4–11)

## 2017-09-25 PROCEDURE — 85027 COMPLETE CBC AUTOMATED: CPT | Performed by: PHYSICIAN ASSISTANT

## 2017-09-25 PROCEDURE — 36415 COLL VENOUS BLD VENIPUNCTURE: CPT | Performed by: PHYSICIAN ASSISTANT

## 2017-09-25 PROCEDURE — 99214 OFFICE O/P EST MOD 30 MIN: CPT | Performed by: PHYSICIAN ASSISTANT

## 2017-09-25 PROCEDURE — 80053 COMPREHEN METABOLIC PANEL: CPT | Performed by: PHYSICIAN ASSISTANT

## 2017-09-25 RX ORDER — AMLODIPINE BESYLATE 2.5 MG/1
10 TABLET ORAL DAILY
Qty: 30 TABLET | Status: CANCELLED | OUTPATIENT
Start: 2017-09-25

## 2017-09-25 RX ORDER — LOSARTAN POTASSIUM 100 MG/1
50 TABLET ORAL DAILY
Qty: 90 TABLET | Refills: 3 | Status: ON HOLD | OUTPATIENT
Start: 2017-09-25 | End: 2017-11-01

## 2017-09-25 RX ORDER — TRIAMCINOLONE ACETONIDE 1 MG/G
CREAM TOPICAL
Qty: 80 G | Refills: 0 | Status: ON HOLD | OUTPATIENT
Start: 2017-09-25 | End: 2017-11-01

## 2017-09-25 ASSESSMENT — ANXIETY QUESTIONNAIRES
3. WORRYING TOO MUCH ABOUT DIFFERENT THINGS: NEARLY EVERY DAY
2. NOT BEING ABLE TO STOP OR CONTROL WORRYING: NEARLY EVERY DAY
1. FEELING NERVOUS, ANXIOUS, OR ON EDGE: NEARLY EVERY DAY
7. FEELING AFRAID AS IF SOMETHING AWFUL MIGHT HAPPEN: MORE THAN HALF THE DAYS
6. BECOMING EASILY ANNOYED OR IRRITABLE: SEVERAL DAYS
GAD7 TOTAL SCORE: 13
5. BEING SO RESTLESS THAT IT IS HARD TO SIT STILL: SEVERAL DAYS

## 2017-09-25 ASSESSMENT — PATIENT HEALTH QUESTIONNAIRE - PHQ9
5. POOR APPETITE OR OVEREATING: NOT AT ALL
SUM OF ALL RESPONSES TO PHQ QUESTIONS 1-9: 20

## 2017-09-25 NOTE — MR AVS SNAPSHOT
After Visit Summary   9/25/2017    Inga Ledesma    MRN: [id number]           Patient Information     Date Of Birth          1965        Visit Information        Provider Department      9/25/2017 10:00 AM Min Toledo PA-C [facility]        Today's Diagnoses     Encounter for screening mammogram for breast cancer    -  1    Other atopic dermatitis        Screen for colon cancer        Hypertension goal BP (blood pressure) < 140/90        Alcoholism /alcohol abuse (H)           Follow-ups after your visit        Future tests that were ordered for you today     Open Future Orders        Priority Expected Expires Ordered    MA Screening Digital Bilateral Routine  9/26/2018 9/25/2017    Fecal colorectal cancer screen (FIT) Routine 10/16/2017 12/18/2017 9/25/2017            Who to contact     Normal or non-critical lab and imaging results will be communicated to you by Relevvanthart, letter or phone within 4 business days after the clinic has received the results. If you do not hear from us within 7 days, please contact the clinic through Relevvanthart or phone. If you have a critical or abnormal lab result, we will notify you by phone as soon as possible.  Submit refill requests through Babybe or call your pharmacy and they will forward the refill request to us. Please allow 3 business days for your refill to be completed.          If you need to speak with a  for additional information , please call: [phone]             Additional Information About Your Visit        RelevvantharMint Information     Babybe gives you secure access to your electronic health record. If you see a primary care provider, you can also send messages to your care team and make appointments. If you have questions, please call your primary care clinic.  If you do not have a primary care provider, please call [phone] and they will assist you.        Care EveryWhere ID     This is your Care  "EveryWhere ID. This could be used by other organizations to access your [facility] medical records  [id number]        Your Vitals Were     Pulse Temperature Height Last Period BMI (Body Mass Index)       120 97.5  F (36.4  C) 5' 1.5\" (1.562 m) 05/23/2010 28.74 kg/m2        Blood Pressure from Last 3 Encounters:   09/25/17 105/72   09/11/17 139/85   07/25/17 (!) 160/98    Weight from Last 3 Encounters:   09/25/17 154 lb 9.6 oz (70.1 kg)   09/11/17 161 lb (73 kg)   07/25/17 165 lb (74.8 kg)              We Performed the Following     CBC with platelets     Comprehensive metabolic panel          Today's Medication Changes          These changes are accurate as of: 9/25/17 10:42 AM.  If you have any questions, ask your nurse or doctor.               These medicines have changed or have updated prescriptions.        Dose/Directions    losartan 100 MG tablet   Commonly known as:  COZAAR   This may have changed:  how much to take   Used for:  Hypertension goal BP (blood pressure) < 140/90   Changed by:  Min Toledo PA-C        Dose:  50 mg   Take 0.5 tablets (50 mg) by mouth daily   Quantity:  90 tablet   Refills:  3       triamcinolone 0.1 % cream   Commonly known as:  KENALOG   This may have changed:  See the new instructions.   Used for:  Other atopic dermatitis   Changed by:  Min Toledo PA-C        APPLY SPARINGLY TO AFFECTED AREA(S) THREE TIMES A DAY AS NEEDED   Quantity:  80 g   Refills:  0         Stop taking these medicines if you haven't already. Please contact your care team if you have questions.     AMLODIPINE BESYLATE PO   Stopped by:  Min Toledo PA-C                Where to get your medicines      These medications were sent to [facility] [address] [facility] [address] [facility]  [address] [facility] [address]     Phone:  [phone]     losartan 100 MG tablet    triamcinolone 0.1 % cream                Primary Care Provider Office Phone # Fax #    Min Dumont " BENI Toledo [phone]       [address] [facility] [address]        Goals        General    I will use the walker at all times or a cane in tight spots to prevent further falls. (pt-stated)     Notes - Note created  12/9/2015  8:48 AM by Sonny Jacobs RN    As of today's date 12/9/2015 goal is met at 0 - 25%.   Goal Status:  Active          Equal Access to Services     [facility]: Hadii aad ku hadasho Soomaali, waaxda luqadaha, qaybta kaalmada adeegyada, waxay idiin hayaan adeeg kharash la'aan . So [facility] [phone].    ATENCIÓN: Si habla español, tiene a cook disposición servicios gratuitos de asistencia lingüística. [facility] [phone].    We comply with applicable federal civil rights laws and [place] laws. We do not discriminate on the basis of race, color, national origin, age, disability sex, sexual orientation or gender identity.            Thank you!     Thank you for choosing [facility]  for your care. Our goal is always to provide you with excellent care. Hearing back from our patients is one way we can continue to improve our services. Please take a few minutes to complete the written survey that you may receive in the mail after your visit with us. Thank you!             Your Updated Medication List - Protect others around you: Learn how to safely use, store and throw away your medicines at www.disposemymeds.org.          This list is accurate as of: 9/25/17 10:42 AM.  Always use your most recent med list.                   Brand Name Dispense Instructions for use Diagnosis    albuterol 108 (90 BASE) MCG/ACT Inhaler    VENTOLIN HFA    3 Inhaler    Inhale  into the lungs. INHALE 2 PUFFS FOUR TIMES DAILY AS NEEDED    Chronic bronchitis, unspecified chronic bronchitis type (H)       aspirin 81 MG EC tablet     100 tablet    Take 1 tablet (81 mg) by mouth daily    Hypertension goal BP (blood pressure) < 130/80, COPD (chronic obstructive pulmonary disease) (H)        calcium carbonate 1250 MG tablet    OS-JENNIFER 500 mg [place]. Ca     Take 500 mg by mouth 2 times daily        cetirizine 10 MG tablet    zyrTEC    90 tablet    Take 1 tablet (10 mg) by mouth every evening    Atopic dermatitis, Seasonal allergic rhinitis       FLUoxetine 40 MG capsule    PROzac    90 capsule    Take 2 capsules (80 mg) by mouth daily    Major depressive disorder, recurrent episode, mild (H)       fluticasone 50 MCG/ACT spray    FLONASE    3 g    Spray 1-2 sprays into both nostrils daily    Major depressive disorder, recurrent episode, mild (H)       fluticasone-salmeterol 500-50 MCG/DOSE diskus inhaler    ADVAIR DISKUS    3 Inhaler    Inhale 1 puff into the lungs every 12 hours    Chronic bronchitis, unspecified chronic bronchitis type (H)       furosemide 20 MG tablet    LASIX    90 tablet    Take 1 tablet (20 mg) by mouth daily    Essential hypertension with goal blood pressure less than 140/90       gabapentin 300 MG capsule    NEURONTIN    270 capsule    1cap am 1 cap pm and 2 at bedtime    RLS (restless legs syndrome), Major depressive disorder, recurrent episode, mild (H)       ipratropium - albuterol 0.5 mg/2.5 mg/3 mL 0.5-2.5 (3) MG/3ML neb solution    DUONEB    60 vial    Take 1 vial (3 mLs) by nebulization every 6 hours as needed for shortness of breath / dyspnea    Chronic bronchitis, unspecified chronic bronchitis type (H)       LABETALOL HCL PO      Take 100 mg by mouth 2 times daily        LORazepam 0.5 MG tablet    ATIVAN    40 tablet    Take 1-2 tablets (0.5-1 mg) by mouth every 8 hours as needed for anxiety    Psychophysiological insomnia, Alcohol dependence with withdrawal with complication (H)       losartan 100 MG tablet    COZAAR    90 tablet    Take 0.5 tablets (50 mg) by mouth daily    Hypertension goal BP (blood pressure) < 140/90       montelukast 10 MG tablet    SINGULAIR    90 tablet    Take 1 tablet (10 mg) by mouth At Bedtime    Tobacco abuse, Chronic bronchitis, unspecified  chronic bronchitis type (H)       nicotine 10 MG Inhaler    NICOTROL    180 Box    Use 1-2 daily as needed for smoking cessation    Tobacco abuse       nystatin cream    MYCOSTATIN    30 g    Apply topically 3 times daily    Tinea corporis       omeprazole 20 MG CR capsule    priLOSEC    180 capsule    Take 1 capsule (20 mg) by mouth 2 times daily    Gastroesophageal reflux disease without esophagitis       order for DME     1 each    Equipment being ordered: Nebulizer    Chronic bronchitis, unspecified chronic bronchitis type (H)       potassium chloride SA 20 MEQ CR tablet    KLOR-CON    180 tablet    Take 1 tablet (20 mEq) by mouth 2 times daily    Hypokalemia       rOPINIRole 1 MG tablet    REQUIP    270 tablet    Take 1 tablet (1 mg) by mouth 5 times daily    RLS (restless legs syndrome)       tiotropium 18 MCG capsule    SPIRIVA HANDIHALER    90 capsule    Inhale contents of one capsule daily.    Chronic bronchitis, unspecified chronic bronchitis type (H)       triamcinolone 0.1 % cream    KENALOG    80 g    APPLY SPARINGLY TO AFFECTED AREA(S) THREE TIMES A DAY AS NEEDED    Other atopic dermatitis

## 2017-09-25 NOTE — PROGRESS NOTES
SUBJECTIVE:   Inga Ledesma is a 52 year old female who presents to clinic today for the following health issues:      Chief Complaint   Patient presents with     Patient Request for Note/Letter     return to work note     Forms     for insurance     Recheck Medication     Discuss Amlodipine, singulair, zyrtec     Fell of the wagon again and had been drinking again. Sober x 4 days. Some tremors although improving. Was vomiting , but that has resolved. Blood pressure low. No blood in stools. Will d/c her amlodipine   Still going to outpt treatment and counseling through [facility].     Problem list and histories reviewed & adjusted, as indicated.  Additional history: as documented    Patient Active Problem List   Diagnosis     RLS (restless legs syndrome)     GERD (gastroesophageal reflux disease)     Iron deficiency anemia     Hyperlipidemia with target LDL less than 160     Adult BMI 30+     Sacroiliitis (H)     Tobacco abuse     Vitamin D deficiency     Menorrhagia     Advanced directives, counseling/discussion     Vitamin D deficiency     Edema of both legs     Hypertension goal BP (blood pressure) < 140/90     Chronic bronchitis, unspecified chronic bronchitis type (H)     Health Care Home     Alcohol dependence with withdrawal with complication (H)     Major depressive disorder, recurrent episode, mild (H)     (HFpEF) heart failure with preserved ejection fraction (H)     Psychophysiological insomnia     Past Surgical History:   Procedure Laterality Date     ABDOMEN SURGERY       COLONOSCOPY       EYE SURGERY       HERNIA REPAIR, INGUINAL RT/LT  2007     HYSTERECTOMY TOTAL ABDOMINAL  7/28/10    Bilateral salpingectomy.  ovaries conserved.     LASER TX, CERVICAL  1987     LYMPH NODE BIOPSY  2007    inguinal     LYSIS OF LABIAL LESION(S)  1985, 1987       Social History   Substance Use Topics     Smoking status: Light Tobacco Smoker     Packs/day: 0.50     Years: 34.00     Types: Cigarettes     Start date:  11/1/1979     Last attempt to quit: 10/3/2012     Smokeless tobacco: Never Used      Comment: 5 cigarettes daily     Alcohol use No      Comment: quit 5-16     Family History   Problem Relation Age of Onset     Depression/Anxiety Mother      Asthma Mother      CEREBROVASCULAR DISEASE Father      Hypertension Father      Other Cancer Father      Breast Cancer Paternal Aunt      Other Cancer Other      Depression Other      Anxiety Disorder Other      MENTAL ILLNESS Other      Substance Abuse Other      Asthma Other      Obesity Sister      Obesity Son          Recent Labs   Lab Test  06/20/17   1645  06/09/17   1702  05/09/17   1646   09/29/15   1507  10/15/14   1609   05/10/10   1420   02/19/10   0828   A1C   --    --    --    --    --   5.6   --    --    --    --    LDL   --    --    --    --    --    --    --   129   --   135*   HDL   --    --    --    --    --    --    --   39*   --   28*   TRIG   --    --    --    --    --    --    --   151*   --   150   ALT  28  56*  45   < >  27   --    --    --    --    --    CR  0.64  0.58  0.90   < >  0.72  0.61   < >   --    --    --    GFRESTIMATED  >90  Non  GFR Calc    >90  Non  GFR Calc    66   < >  86  >90  Non  GFR Calc     < >   --    --    --    GFRESTBLACK  >90   GFR Calc    >90   GFR Calc    80   < >  >90   GFR Calc    >90   GFR Calc     < >   --    --    --    POTASSIUM  3.8  3.3*  3.8   < >  4.3  4.3   < >   --    --    --    TSH   --    --    --    --   0.89  1.21   --    --    < >  2.35    < > = values in this interval not displayed.      BP Readings from Last 3 Encounters:   09/25/17 105/72   09/11/17 139/85   07/25/17 (!) 160/98    Wt Readings from Last 3 Encounters:   09/25/17 154 lb 9.6 oz (70.1 kg)   09/11/17 161 lb (73 kg)   07/25/17 165 lb (74.8 kg)                          Reviewed and updated as needed this visit by clinical staffTobamandeep   Allergies  Meds  Med Hx  Surg Hx  Fam Hx  Soc Hx      Reviewed and updated as needed this visit by Provider         All other systems negative except as outline above  OBJECTIVE:  Eye exam - right eye normal lid, conjunctiva, cornea, pupil and fundus, left eye normal lid, conjunctiva, cornea, pupil and fundus.  Thyroid not palpable, not enlarged, no nodules detected.  CHEST:chest clear to IPPA, no tachypnea, retractions or cyanosis and S1, S2 normal, no murmur, no gallop, rate regular.  No edema  Her disks are flat. Pupils equal, round, reactive to light. Extraocular movements full. Visual fields full. Face moves symmetrically. Tongue midline. Hearing mildly decreased to finger-rubbing at approximately 6-8 inches. Neck without bruits. CV: S1, S2. Motor strength 5/5. Reflexes were 2/4. Toe signs were downgoing. Normal position sense. Good finger-nose-finger and fine finger movement. Gait: she herrera from a chair without difficulty and has a mildly broad-based gait.  Inga was seen today for patient request for note/letter, forms and recheck medication.    Diagnoses and all orders for this visit:    Encounter for screening mammogram for breast cancer  -     MA Screening Digital Bilateral; Future    Other atopic dermatitis  -     triamcinolone (KENALOG) 0.1 % cream; APPLY SPARINGLY TO AFFECTED AREA(S) THREE TIMES A DAY AS NEEDED    Screen for colon cancer  -     Fecal colorectal cancer screen (FIT); Future    Hypertension goal BP (blood pressure) < 140/90  -     Comprehensive metabolic panel  -     losartan (COZAAR) 100 MG tablet; Take 0.5 tablets (50 mg) by mouth daily    Alcoholism /alcohol abuse (H)  -     CBC with platelets  -     Comprehensive metabolic panel    Other orders  -     Cancel: amLODIPine (NORVASC) 2.5 MG tablet; Take 4 tablets (10 mg) by mouth daily      She must remain sober and continue with her treatment.   F/u in 2 wks.

## 2017-09-25 NOTE — LETTER
[facility] [place]  [address] [facility] [address] [facility] [address] [place] [address]  Phone: [phone]    September 25, 2017        Inga Ledesma  [address]  [place] [address]          To whom it may concern:    RE: Inga Ledesma    Patient was seen and treated today at our clinic. She may return to work on 9/25/17.    Please contact me for questions or concerns.      Sincerely,        Min Toledo PA-C

## 2017-09-26 ASSESSMENT — ANXIETY QUESTIONNAIRES: GAD7 TOTAL SCORE: 13

## 2017-10-06 ENCOUNTER — HOSPITAL ENCOUNTER (INPATIENT)
Facility: CLINIC | Age: 52
LOS: 2 days | Discharge: HOME | DRG: 897 | End: 2017-10-08
Attending: EMERGENCY MEDICINE | Admitting: PSYCHIATRY & NEUROLOGY
Payer: COMMERCIAL

## 2017-10-06 DIAGNOSIS — F10.939 ALCOHOL WITHDRAWAL, WITH UNSPECIFIED COMPLICATION (H): ICD-10-CM

## 2017-10-06 DIAGNOSIS — F33.0 MAJOR DEPRESSIVE DISORDER, RECURRENT EPISODE, MILD (H): ICD-10-CM

## 2017-10-06 DIAGNOSIS — G25.81 RLS (RESTLESS LEGS SYNDROME): ICD-10-CM

## 2017-10-06 DIAGNOSIS — E87.6 HYPOKALEMIA: ICD-10-CM

## 2017-10-06 DIAGNOSIS — E87.6 HYPOPOTASSEMIA: ICD-10-CM

## 2017-10-06 DIAGNOSIS — T49.6X5A: ICD-10-CM

## 2017-10-06 DIAGNOSIS — F10.10 ALCOHOL ABUSE: ICD-10-CM

## 2017-10-06 LAB
ALBUMIN SERPL-MCNC: 3.7 G/DL (ref 3.4–5)
ALP SERPL-CCNC: 151 U/L (ref 40–150)
ALT SERPL W P-5'-P-CCNC: 43 U/L (ref 0–50)
AMPHETAMINES UR QL SCN: NEGATIVE
ANION GAP SERPL CALCULATED.3IONS-SCNC: 13 MMOL/L (ref 3–14)
AST SERPL W P-5'-P-CCNC: 47 U/L (ref 0–45)
BARBITURATES UR QL: NEGATIVE
BASOPHILS # BLD AUTO: 0.1 10E9/L (ref 0–0.2)
BASOPHILS NFR BLD AUTO: 0.4 %
BENZODIAZ UR QL: POSITIVE
BILIRUB SERPL-MCNC: 0.6 MG/DL (ref 0.2–1.3)
BUN SERPL-MCNC: 11 MG/DL (ref 7–30)
CALCIUM SERPL-MCNC: 9.2 MG/DL (ref 8.5–10.1)
CANNABINOIDS UR QL SCN: NEGATIVE
CHLORIDE SERPL-SCNC: 93 MMOL/L (ref 94–109)
CO2 SERPL-SCNC: 24 MMOL/L (ref 20–32)
COCAINE UR QL: NEGATIVE
CREAT SERPL-MCNC: 0.61 MG/DL (ref 0.52–1.04)
DIFFERENTIAL METHOD BLD: ABNORMAL
EOSINOPHIL # BLD AUTO: 0.1 10E9/L (ref 0–0.7)
EOSINOPHIL NFR BLD AUTO: 0.4 %
ERYTHROCYTE [DISTWIDTH] IN BLOOD BY AUTOMATED COUNT: 13.4 % (ref 10–15)
ETHANOL UR QL SCN: NEGATIVE
GFR SERPL CREATININE-BSD FRML MDRD: >90 ML/MIN/1.7M2
GLUCOSE SERPL-MCNC: 99 MG/DL (ref 70–99)
HCT VFR BLD AUTO: 38.8 % (ref 35–47)
HGB BLD-MCNC: 13.7 G/DL (ref 11.7–15.7)
IMM GRANULOCYTES # BLD: 0 10E9/L (ref 0–0.4)
IMM GRANULOCYTES NFR BLD: 0.3 %
LIPASE SERPL-CCNC: 361 U/L (ref 73–393)
LYMPHOCYTES # BLD AUTO: 1.4 10E9/L (ref 0.8–5.3)
LYMPHOCYTES NFR BLD AUTO: 10 %
MCH RBC QN AUTO: 32.4 PG (ref 26.5–33)
MCHC RBC AUTO-ENTMCNC: 35.3 G/DL (ref 31.5–36.5)
MCV RBC AUTO: 92 FL (ref 78–100)
MONOCYTES # BLD AUTO: 0.6 10E9/L (ref 0–1.3)
MONOCYTES NFR BLD AUTO: 4.6 %
NEUTROPHILS # BLD AUTO: 11.6 10E9/L (ref 1.6–8.3)
NEUTROPHILS NFR BLD AUTO: 84.3 %
NRBC # BLD AUTO: 0 10*3/UL
NRBC BLD AUTO-RTO: 0 /100
OPIATES UR QL SCN: NEGATIVE
PLATELET # BLD AUTO: 470 10E9/L (ref 150–450)
POTASSIUM SERPL-SCNC: 3.2 MMOL/L (ref 3.4–5.3)
PROT SERPL-MCNC: 7.7 G/DL (ref 6.8–8.8)
RBC # BLD AUTO: 4.23 10E12/L (ref 3.8–5.2)
SODIUM SERPL-SCNC: 130 MMOL/L (ref 133–144)
WBC # BLD AUTO: 13.8 10E9/L (ref 4–11)

## 2017-10-06 PROCEDURE — 12800012 ZZH R&B CD MH INTERMEDIATE ADULT

## 2017-10-06 PROCEDURE — 80307 DRUG TEST PRSMV CHEM ANLYZR: CPT | Performed by: FAMILY MEDICINE

## 2017-10-06 PROCEDURE — 96361 HYDRATE IV INFUSION ADD-ON: CPT | Performed by: EMERGENCY MEDICINE

## 2017-10-06 PROCEDURE — 80053 COMPREHEN METABOLIC PANEL: CPT | Performed by: EMERGENCY MEDICINE

## 2017-10-06 PROCEDURE — 99285 EMERGENCY DEPT VISIT HI MDM: CPT | Mod: 25 | Performed by: EMERGENCY MEDICINE

## 2017-10-06 PROCEDURE — 99284 EMERGENCY DEPT VISIT MOD MDM: CPT | Mod: Z6 | Performed by: EMERGENCY MEDICINE

## 2017-10-06 PROCEDURE — 25000132 ZZH RX MED GY IP 250 OP 250 PS 637: Performed by: PSYCHIATRY & NEUROLOGY

## 2017-10-06 PROCEDURE — HZ2ZZZZ DETOXIFICATION SERVICES FOR SUBSTANCE ABUSE TREATMENT: ICD-10-PCS | Performed by: PSYCHIATRY & NEUROLOGY

## 2017-10-06 PROCEDURE — 83690 ASSAY OF LIPASE: CPT | Performed by: EMERGENCY MEDICINE

## 2017-10-06 PROCEDURE — 25000132 ZZH RX MED GY IP 250 OP 250 PS 637: Performed by: EMERGENCY MEDICINE

## 2017-10-06 PROCEDURE — 85025 COMPLETE CBC W/AUTO DIFF WBC: CPT | Performed by: EMERGENCY MEDICINE

## 2017-10-06 PROCEDURE — 82977 ASSAY OF GGT: CPT | Performed by: EMERGENCY MEDICINE

## 2017-10-06 PROCEDURE — 25000128 H RX IP 250 OP 636: Performed by: EMERGENCY MEDICINE

## 2017-10-06 PROCEDURE — 96365 THER/PROPH/DIAG IV INF INIT: CPT | Performed by: EMERGENCY MEDICINE

## 2017-10-06 PROCEDURE — 80320 DRUG SCREEN QUANTALCOHOLS: CPT | Performed by: FAMILY MEDICINE

## 2017-10-06 RX ORDER — LABETALOL 100 MG/1
100 TABLET, FILM COATED ORAL 2 TIMES DAILY
Status: DISCONTINUED | OUTPATIENT
Start: 2017-10-06 | End: 2017-10-08 | Stop reason: HOSPADM

## 2017-10-06 RX ORDER — ACETAMINOPHEN 500 MG
1000 TABLET ORAL EVERY 6 HOURS PRN
Status: ON HOLD | COMMUNITY
End: 2017-11-01

## 2017-10-06 RX ORDER — TIOTROPIUM BROMIDE 18 UG/1
18 CAPSULE ORAL; RESPIRATORY (INHALATION) DAILY
Status: DISCONTINUED | OUTPATIENT
Start: 2017-10-07 | End: 2017-10-08 | Stop reason: HOSPADM

## 2017-10-06 RX ORDER — LANOLIN ALCOHOL/MO/W.PET/CERES
100 CREAM (GRAM) TOPICAL DAILY
Status: DISCONTINUED | OUTPATIENT
Start: 2017-10-07 | End: 2017-10-08 | Stop reason: HOSPADM

## 2017-10-06 RX ORDER — MULTIPLE VITAMINS W/ MINERALS TAB 9MG-400MCG
1 TAB ORAL DAILY
Status: DISCONTINUED | OUTPATIENT
Start: 2017-10-07 | End: 2017-10-08 | Stop reason: HOSPADM

## 2017-10-06 RX ORDER — ACETAMINOPHEN 325 MG/1
650 TABLET ORAL EVERY 4 HOURS PRN
Status: DISCONTINUED | OUTPATIENT
Start: 2017-10-06 | End: 2017-10-08 | Stop reason: HOSPADM

## 2017-10-06 RX ORDER — LABETALOL 100 MG/1
100 TABLET, FILM COATED ORAL 2 TIMES DAILY
Status: ON HOLD | COMMUNITY
End: 2017-11-01

## 2017-10-06 RX ORDER — LANOLIN ALCOHOL/MO/W.PET/CERES
100 CREAM (GRAM) TOPICAL ONCE
Status: COMPLETED | OUTPATIENT
Start: 2017-10-06 | End: 2017-10-06

## 2017-10-06 RX ORDER — TRAZODONE HYDROCHLORIDE 50 MG/1
50 TABLET, FILM COATED ORAL
Status: DISCONTINUED | OUTPATIENT
Start: 2017-10-06 | End: 2017-10-08 | Stop reason: HOSPADM

## 2017-10-06 RX ORDER — FUROSEMIDE 20 MG
20 TABLET ORAL DAILY
Status: DISCONTINUED | OUTPATIENT
Start: 2017-10-07 | End: 2017-10-08 | Stop reason: HOSPADM

## 2017-10-06 RX ORDER — POTASSIUM CL/LIDO/0.9 % NACL 10MEQ/0.1L
10 INTRAVENOUS SOLUTION, PIGGYBACK (ML) INTRAVENOUS CONTINUOUS
Status: DISCONTINUED | OUTPATIENT
Start: 2017-10-06 | End: 2017-10-06

## 2017-10-06 RX ORDER — TRIAMCINOLONE ACETONIDE 1 MG/G
CREAM TOPICAL 2 TIMES DAILY
Status: DISCONTINUED | OUTPATIENT
Start: 2017-10-06 | End: 2017-10-08 | Stop reason: HOSPADM

## 2017-10-06 RX ORDER — POTASSIUM CHLORIDE 1500 MG/1
40 TABLET, EXTENDED RELEASE ORAL ONCE
Status: COMPLETED | OUTPATIENT
Start: 2017-10-06 | End: 2017-10-06

## 2017-10-06 RX ORDER — CALCIUM CARBONATE 500(1250)
1250 TABLET ORAL 2 TIMES DAILY
Status: DISCONTINUED | OUTPATIENT
Start: 2017-10-06 | End: 2017-10-08 | Stop reason: HOSPADM

## 2017-10-06 RX ORDER — ALUMINA, MAGNESIA, AND SIMETHICONE 2400; 2400; 240 MG/30ML; MG/30ML; MG/30ML
30 SUSPENSION ORAL EVERY 4 HOURS PRN
Status: DISCONTINUED | OUTPATIENT
Start: 2017-10-06 | End: 2017-10-08 | Stop reason: HOSPADM

## 2017-10-06 RX ORDER — HYDROXYZINE HYDROCHLORIDE 25 MG/1
25-50 TABLET, FILM COATED ORAL EVERY 4 HOURS PRN
Status: DISCONTINUED | OUTPATIENT
Start: 2017-10-06 | End: 2017-10-08 | Stop reason: HOSPADM

## 2017-10-06 RX ORDER — NYSTATIN 100000 U/G
CREAM TOPICAL 3 TIMES DAILY PRN
Status: ON HOLD | COMMUNITY
End: 2017-11-01

## 2017-10-06 RX ORDER — IBUPROFEN 200 MG
600 TABLET ORAL EVERY 4 HOURS PRN
Status: ON HOLD | COMMUNITY
End: 2017-11-01

## 2017-10-06 RX ORDER — BISACODYL 10 MG
10 SUPPOSITORY, RECTAL RECTAL DAILY PRN
Status: DISCONTINUED | OUTPATIENT
Start: 2017-10-06 | End: 2017-10-08 | Stop reason: HOSPADM

## 2017-10-06 RX ORDER — DIAZEPAM 5 MG
5-20 TABLET ORAL EVERY 30 MIN PRN
Status: DISCONTINUED | OUTPATIENT
Start: 2017-10-06 | End: 2017-10-08

## 2017-10-06 RX ORDER — NICOTINE 21 MG/24HR
1 PATCH, TRANSDERMAL 24 HOURS TRANSDERMAL DAILY
Status: DISCONTINUED | OUTPATIENT
Start: 2017-10-07 | End: 2017-10-08 | Stop reason: HOSPADM

## 2017-10-06 RX ORDER — GABAPENTIN 300 MG/1
CAPSULE ORAL
Status: ON HOLD | COMMUNITY
End: 2017-11-01

## 2017-10-06 RX ORDER — ALBUTEROL SULFATE 90 UG/1
2 AEROSOL, METERED RESPIRATORY (INHALATION) EVERY 6 HOURS PRN
Status: DISCONTINUED | OUTPATIENT
Start: 2017-10-06 | End: 2017-10-08 | Stop reason: HOSPADM

## 2017-10-06 RX ORDER — POTASSIUM CHLORIDE 7.45 MG/ML
10 INJECTION INTRAVENOUS CONTINUOUS
Status: DISCONTINUED | OUTPATIENT
Start: 2017-10-06 | End: 2017-10-06

## 2017-10-06 RX ORDER — FOLIC ACID 1 MG/1
1 TABLET ORAL DAILY
Status: DISCONTINUED | OUTPATIENT
Start: 2017-10-07 | End: 2017-10-08 | Stop reason: HOSPADM

## 2017-10-06 RX ORDER — GABAPENTIN 300 MG/1
300 CAPSULE ORAL 2 TIMES DAILY
Status: DISCONTINUED | OUTPATIENT
Start: 2017-10-07 | End: 2017-10-08 | Stop reason: HOSPADM

## 2017-10-06 RX ORDER — LOSARTAN POTASSIUM 50 MG/1
100 TABLET ORAL DAILY
Status: DISCONTINUED | OUTPATIENT
Start: 2017-10-06 | End: 2017-10-08 | Stop reason: HOSPADM

## 2017-10-06 RX ORDER — LORAZEPAM 1 MG/1
1-4 TABLET ORAL EVERY 30 MIN PRN
Status: DISCONTINUED | OUTPATIENT
Start: 2017-10-06 | End: 2017-10-06

## 2017-10-06 RX ORDER — GABAPENTIN 300 MG/1
600 CAPSULE ORAL AT BEDTIME
Status: DISCONTINUED | OUTPATIENT
Start: 2017-10-06 | End: 2017-10-08 | Stop reason: HOSPADM

## 2017-10-06 RX ORDER — FOLIC ACID 1 MG/1
1 TABLET ORAL ONCE
Status: COMPLETED | OUTPATIENT
Start: 2017-10-06 | End: 2017-10-06

## 2017-10-06 RX ORDER — ASPIRIN 81 MG/1
81 TABLET ORAL DAILY
Status: DISCONTINUED | OUTPATIENT
Start: 2017-10-07 | End: 2017-10-08 | Stop reason: HOSPADM

## 2017-10-06 RX ORDER — ROPINIROLE 0.25 MG/1
1 TABLET, FILM COATED ORAL 3 TIMES DAILY
Status: DISCONTINUED | OUTPATIENT
Start: 2017-10-06 | End: 2017-10-08 | Stop reason: HOSPADM

## 2017-10-06 RX ORDER — MULTIPLE VITAMINS W/ MINERALS TAB 9MG-400MCG
1 TAB ORAL ONCE
Status: COMPLETED | OUTPATIENT
Start: 2017-10-06 | End: 2017-10-06

## 2017-10-06 RX ORDER — FLUTICASONE PROPIONATE 50 MCG
1-2 SPRAY, SUSPENSION (ML) NASAL DAILY
Status: DISCONTINUED | OUTPATIENT
Start: 2017-10-07 | End: 2017-10-08 | Stop reason: HOSPADM

## 2017-10-06 RX ORDER — POTASSIUM CHLORIDE 750 MG/1
20 TABLET, EXTENDED RELEASE ORAL 2 TIMES DAILY
Status: DISCONTINUED | OUTPATIENT
Start: 2017-10-06 | End: 2017-10-08 | Stop reason: HOSPADM

## 2017-10-06 RX ADMIN — ROPINIROLE HYDROCHLORIDE 1 MG: 0.25 TABLET, COATED ORAL at 20:55

## 2017-10-06 RX ADMIN — SODIUM CHLORIDE 1000 ML: 9 INJECTION, SOLUTION INTRAVENOUS at 11:51

## 2017-10-06 RX ADMIN — LABETALOL HYDROCHLORIDE 100 MG: 100 TABLET, FILM COATED ORAL at 21:01

## 2017-10-06 RX ADMIN — CALCIUM 1250 MG: 500 TABLET ORAL at 20:55

## 2017-10-06 RX ADMIN — MULTIPLE VITAMINS W/ MINERALS TAB 1 TABLET: TAB at 11:49

## 2017-10-06 RX ADMIN — FLUOXETINE 40 MG: 20 CAPSULE ORAL at 20:55

## 2017-10-06 RX ADMIN — GABAPENTIN 600 MG: 300 CAPSULE ORAL at 21:03

## 2017-10-06 RX ADMIN — DIAZEPAM 10 MG: 5 TABLET ORAL at 20:56

## 2017-10-06 RX ADMIN — POTASSIUM CHLORIDE 40 MEQ: 20 TABLET, EXTENDED RELEASE ORAL at 14:14

## 2017-10-06 RX ADMIN — FOLIC ACID 1 MG: 1 TABLET ORAL at 11:49

## 2017-10-06 RX ADMIN — OMEPRAZOLE 20 MG: 20 CAPSULE, DELAYED RELEASE ORAL at 20:55

## 2017-10-06 RX ADMIN — LOSARTAN POTASSIUM 100 MG: 50 TABLET, FILM COATED ORAL at 20:55

## 2017-10-06 RX ADMIN — LORAZEPAM 2 MG: 1 TABLET ORAL at 14:42

## 2017-10-06 RX ADMIN — LORAZEPAM 2 MG: 1 TABLET ORAL at 11:49

## 2017-10-06 RX ADMIN — Medication 10 MEQ: at 14:15

## 2017-10-06 RX ADMIN — LORAZEPAM 2 MG: 1 TABLET ORAL at 16:14

## 2017-10-06 RX ADMIN — POTASSIUM CHLORIDE 20 MEQ: 750 TABLET, FILM COATED, EXTENDED RELEASE ORAL at 20:55

## 2017-10-06 RX ADMIN — Medication 100 MG: at 11:49

## 2017-10-06 ASSESSMENT — ENCOUNTER SYMPTOMS
HEADACHES: 1
TREMORS: 1

## 2017-10-06 ASSESSMENT — ACTIVITIES OF DAILY LIVING (ADL)
DRESS: STREET CLOTHES;SCRUBS (BEHAVIORAL HEALTH);INDEPENDENT
GROOMING: INDEPENDENT
LAUNDRY: WITH SUPERVISION
ORAL_HYGIENE: INDEPENDENT

## 2017-10-06 NOTE — PHARMACY-ADMISSION MEDICATION HISTORY
Admission medication history interview status for the 10/6/2017 admission is complete. See Epic admission navigator for allergy information, pharmacy, prior to admission medications and immunization status.     Medication history interview sources:  Inga (patient)    Changes made to PTA medication list (reason)  Added: Magnesium PO - Take 1 capsule by mouth once daily. Unknown form and strength.   Potassium PO - Take 1 capsule by mouth once daily. Unknown form and strength.   Ibuprofen 200 mg tablet - Take 3 tablets by mouth as needed for neck and back pain.   Acetaminophen 500 mg tablet - Take 2 tablets by mouth as needed for neck and back pain.   Icy Hot patches - Apply as needed for neck and back pain.   Alaway 0.025% eye drops - Place 2 drops in each eye as needed for dry eyes.  Deleted: Ipratropium-albuterol 0.5 mg/2.5 mg/3 mL neb solution - Patient reports never felt she needed to use. Was prescribed PRN.      Montelukast 10 mg tablet - Patient stopped taking, PCP aware.  Changed: Fluoxetine 40 mg capsule - Take 1 capsule (not 2 capsules) by mouth twice daily (not once daily).       Losartan 100 mg tablet - Take 1 tablet (not 0.5 tablet) by mouth daily. PCP changed to 0.5 tablets recently, but patient increased back to former 100 mg (1 tablet) dose because she saw her blood pressure was increasing again.       Ropinirole 1 mg tablet - Take 1 tablet by mouth 3 times daily (not 5 times daily).    Additional medication history information (including reliability of information, actions taken by pharmacist):    Inga was pleasant to speak with, however she had trouble staying awake during the interview. She was a good historian of her medication use - recognizing medications by name and listing off her use without prompting. She does seem to have issues with adherence as she does not like to take many of her medications when she drinks - she doesn't like to mix her meds with alcohol.    Of note:  -  Inga is taking a prescribed potassium supplement as well as an over-the-counter potassium supplement. Unknown if there is a potential for hyperkalemia or if the patient's K levels are stable.  - The Spiriva inhaler often makes Inga's tongue swell - she says she is okay if she brushes her tongue after using the inhaler, but this effect prevents her from taking the inhaler as prescribed.    Allergies and immunization status confirmed with patient. Patient reports she is allergic to eggs and will be receiving the non-egg flu shot from work.    Home pharmacy: [facility] outpatient pharmacy.    Prior to Admission medications    Medication Sig Last Dose Taking? Auth Provider   labetalol (NORMODYNE) 100 MG tablet Take 100 mg by mouth 2 times daily 10/5/2017 at AM Yes Unknown, Entered By History   nystatin (MYCOSTATIN) cream Apply topically 3 times daily as needed (rash on leg) Past Week at Unknown time Yes Unknown, Entered By History   MAGNESIUM PO Take 1 capsule by mouth daily Form and strength unknown. 10/5/2017 at AM Yes Unknown, Entered By History   POTASSIUM PO Take 1 capsule by mouth daily Form and strength unknown. 10/5/2017 at AM Yes Unknown, Entered By History   ibuprofen (ADVIL/MOTRIN) 200 MG tablet Take 600 mg by mouth every 4 hours as needed for mild pain (neck and back pain) Past Week at Unknown time Yes Unknown, Entered By History   acetaminophen (TYLENOL) 500 MG tablet Take 1,000 mg by mouth every 6 hours as needed for pain (neck and back pain) Past Week at Unknown time Yes Unknown, Entered By History   menthol (ICY HOT) 5 % PTCH Apply 1 patch topically every 8 hours as needed for moderate pain (neck and back pain) Past Week at Unknown time Yes Unknown, Entered By History   ketotifen (ALAWAY) 0.025 % SOLN ophthalmic solution Place 2 drops into both eyes as needed for dry eyes Past Week at Unknown time Yes Unknown, Entered By History   gabapentin (NEURONTIN) 300 MG capsule Take 1 capsule by  mouth in the morning, 1 capsule in the afternoon, and 2 capsules at bedtime 10/5/2017 at AM Yes Unknown, Entered By History   triamcinolone (KENALOG) 0.1 % cream APPLY SPARINGLY TO AFFECTED AREA(S) THREE TIMES A DAY AS NEEDED 10/6/2017 at AM Yes Min Toledo PA-C   losartan (COZAAR) 100 MG tablet Take 0.5 tablets (50 mg) by mouth daily  Patient taking differently: Take 100 mg by mouth daily  10/5/2017 at AM Yes Min Toledo PA-C   furosemide (LASIX) 20 MG tablet Take 1 tablet (20 mg) by mouth daily Past Week at Unknown time Yes Min oTledo PA-C   FLUoxetine (PROZAC) 40 MG capsule Take 2 capsules (80 mg) by mouth daily  Patient taking differently: Take 40 mg by mouth 2 times daily  10/5/2017 at AM Yes Min Toledo PA-C   albuterol (VENTOLIN HFA) 108 (90 BASE) MCG/ACT Inhaler Inhale  into the lungs. INHALE 2 PUFFS FOUR TIMES DAILY AS NEEDED Past Week at Unknown time Yes Min Toledo PA-C   omeprazole (PRILOSEC) 20 MG CR capsule Take 1 capsule (20 mg) by mouth 2 times daily 10/5/2017 at AM Yes Min Toledo PA-C   fluticasone-salmeterol (ADVAIR DISKUS) 500-50 MCG/DOSE diskus inhaler Inhale 1 puff into the lungs every 12 hours Past Week at Unknown time Yes Min Toledo PA-C   tiotropium (SPIRIVA HANDIHALER) 18 MCG capsule Inhale contents of one capsule daily. Past Month at Unknown time Yes Min Toledo PA-C   fluticasone (FLONASE) 50 MCG/ACT spray Spray 1-2 sprays into both nostrils daily Past Week at Unknown time Yes Min Toledo PA-C   rOPINIRole (REQUIP) 1 MG tablet Take 1 tablet (1 mg) by mouth 5 times daily  Patient taking differently: Take 1 mg by mouth 3 times daily  10/5/2017 at AM Yes Min Toledo PA-C   potassium chloride SA (POTASSIUM CHLORIDE) 20 MEQ CR tablet Take 1 tablet (20 mEq) by mouth 2 times daily 10/5/2017 at AM Yes Min Toledo PA-C   nicotine (NICOTROL) 10 MG inhaler Use 1-2 daily as needed for smoking cessation 10/5/2017 at AM Yes  Nacho Guzman MD   calcium carbonate (OS-JENNIFER 500 MG [place]. CA) 500 MG tablet Take 500 mg by mouth 2 times daily 10/5/2017 at AM Yes Reported, Patient   aspirin 81 MG EC tablet Take 1 tablet (81 mg) by mouth daily 10/5/2017 at AM Yes Nacho Guzman MD   order for DME Equipment being ordered: Nebulizer   Nacho Guzman MD       Medication history completed by: Raquel Hall, 2nd Year Student Pharmacist

## 2017-10-06 NOTE — IP AVS SNAPSHOT
[facility]    [address] [facility] [address]    Phone:  [phone]                                       After Visit Summary   10/6/2017    Inga Ledesma    MRN: [id number]           After Visit Summary Signature Page     I have received my discharge instructions, and my questions have been answered. I have discussed any challenges I see with this plan with the nurse or doctor.    ..........................................................................................................................................  Patient/Patient Representative Signature      ..........................................................................................................................................  Patient Representative Print Name and Relationship to Patient    ..................................................               ................................................  Date                                            Time    ..........................................................................................................................................  Reviewed by Signature/Title    ...................................................              ..............................................  Date                                                            Time

## 2017-10-06 NOTE — ED PROVIDER NOTES
"  History     Chief Complaint   Patient presents with     Alcohol Problem     Drinking combination of vodka and mouthwash all week.  Breathalized 0.00  Pt states she is in withdrawl.     HPI  Inga Ledesma is a 52 year old female with a history of GERD, COPD, HTN, CVA (2012), CHF, heart disease, tobacco abuse, alcoholism, and major depression who presents to the Emergency Department for evaluation of an alcohol problem. She reports that she has been drinking mostly mouthwash for the past week and drank some vodka 2 days ago. She has been drinking mouthwash because she wants to hide her drinking from her son. Patient's last drink was yesterday evening and early this morning. She notes that she is experiencing withdrawal symptoms such as a headache and shaking. She notes a history of a withdrawal seizure about 1.5 years ago. She was last in treatment about 1.5 years ago and has been drinking intermittently since. She denies abdominal pain, but notes nausea. Patient is hot and diaphoretic.     Social: Patient admits to cigarette use, but denies other drug use. She works for [employer], but states, \"I do not actually have a job.\" later she states she works for [employer],but watches television all day.    Past Medical History:   Diagnosis Date     Alcohol abuse, in remission      Cancer of labia majora (H) 1987     Cervical dysplasia 1987     Congestive heart failure (H) 5/16/16     COPD (chronic obstructive pulmonary disease) (H) 1/24/2011     CVA (cerebral infarction) 1/2012     Dependent edema      Depression, major      Depressive disorder 1966     GERD (gastroesophageal reflux disease)      Heart disease 5/16/16     Hyperlipidemia LDL goal < 160      Hypertension      Iron deficiency anemia      RLS (restless legs syndrome)      Sacroiliitis (H)     steroid injections ineffective, chronic low back pain     Tobacco abuse      Uncomplicated asthma      Vitamin D deficiencies        Past Surgical History:   Procedure " Laterality Date     ABDOMEN SURGERY       COLONOSCOPY       EYE SURGERY       HERNIA REPAIR, INGUINAL RT/LT  2007     HYSTERECTOMY TOTAL ABDOMINAL  7/28/10    Bilateral salpingectomy.  ovaries conserved.     LASER TX, CERVICAL  1987     LYMPH NODE BIOPSY  2007    inguinal     LYSIS OF LABIAL LESION(S)  1985, 1987       Family History   Problem Relation Age of Onset     Depression/Anxiety Mother      Asthma Mother      CEREBROVASCULAR DISEASE Father      Hypertension Father      Other Cancer Father      Breast Cancer Paternal Aunt      Other Cancer Other      Depression Other      Anxiety Disorder Other      MENTAL ILLNESS Other      Substance Abuse Other      Asthma Other      Obesity Sister      Obesity Son        Social History   Substance Use Topics     Smoking status: Current Every Day Smoker     Packs/day: 0.50     Years: 34.00     Types: Cigarettes     Start date: 11/1/1979     Last attempt to quit: 10/3/2012     Smokeless tobacco: Never Used      Comment: 5 cigarettes daily     Alcohol use Yes      Comment: Drinking mouthwash.       Current Facility-Administered Medications   Medication     LORazepam (ATIVAN) tablet 1-4 mg     potassium chloride 10 mEq in 100 mL intermittent infusion with 10 mg lidocaine     Current Outpatient Prescriptions   Medication     losartan (COZAAR) 100 MG tablet     furosemide (LASIX) 20 MG tablet     FLUoxetine (PROZAC) 40 MG capsule     albuterol (VENTOLIN HFA) 108 (90 BASE) MCG/ACT Inhaler     gabapentin (NEURONTIN) 300 MG capsule     omeprazole (PRILOSEC) 20 MG CR capsule     fluticasone-salmeterol (ADVAIR DISKUS) 500-50 MCG/DOSE diskus inhaler     tiotropium (SPIRIVA HANDIHALER) 18 MCG capsule     fluticasone (FLONASE) 50 MCG/ACT spray     rOPINIRole (REQUIP) 1 MG tablet     potassium chloride SA (POTASSIUM CHLORIDE) 20 MEQ CR tablet     ipratropium - albuterol 0.5 mg/2.5 mg/3 mL (DUONEB) 0.5-2.5 (3) MG/3ML neb solution     nicotine (NICOTROL) 10 MG inhaler     calcium  carbonate (OS-JENNIFER 500 MG [place]. [place]) 500 MG tablet     LABETALOL HCL PO     aspirin 81 MG EC tablet     triamcinolone (KENALOG) 0.1 % cream     nystatin (MYCOSTATIN) cream     montelukast (SINGULAIR) 10 MG tablet     order for DME        Allergies   Allergen Reactions     Codeine Nausea     Reglan [Metoclopramide Hcl] Other (See Comments)     Body tenses up         I have reviewed the Medications, Allergies, Past Medical and Surgical History, and Social History in the Epic system.    Review of Systems   Neurological: Positive for tremors and headaches.       Physical Exam   BP: (!) 146/102  Pulse: 113  Temp: 98.4  F (36.9  C)  Resp: 18  Weight: 69.4 kg (153 lb)  SpO2: 96 %  Physical Exam  Physical Exam   Constitutional:   well nourished, well developed, flushed and diaphoretic  HENT:   Head: Normocephalic and atraumatic.   Eyes: Conjunctivae are normal. Pupils are equal, round, and reactive to light.   , pharynx has no erythema or exudate, mucous membranes are dry  Neck:   no adenopathy, no bony tenderness  Cardiovascular: tachycardia without murmurs or gallops  Pulmonary/Chest: Clear to auscultation bilaterally, with no wheezes or retractions. No respiratory distress.  GI: Soft with good bowel sounds.  Non-tender, non-distended, with no guarding, no rebound, no peritoneal signs.   Back:  No bony or CVA tenderness   Musculoskeletal:  no edema or clubbing   Skin: Skin is warm and diaphoretic. No rash noted.   Neurological: alert and oriented to person, place, and time. Nonfocal exam, GCS is 15, the patient is tremulous and diaphoretic  Psychiatric:  normal mood and affect.   ED Course   11:21 AM  The patient was seen and examined by Dee Sandoval MD in Room 8.     ED Course     Procedures             Critical Care time:  none           Results for orders placed or performed during the hospital encounter of 10/06/17 (from the past 24 hour(s))   Drug abuse screen 6 urine (tox)   Result Value Ref Range    Amphetamine Qual  Urine Negative NEG^Negative    Barbiturates Qual Urine Negative NEG^Negative    Benzodiazepine Qual Urine Positive (A) NEG^Negative    Cannabinoids Qual Urine Negative NEG^Negative    Cocaine Qual Urine Negative NEG^Negative    Ethanol Qual Urine Negative NEG^Negative    Opiates Qualitative Urine Negative NEG^Negative   CBC with platelets differential   Result Value Ref Range    WBC 13.8 (H) 4.0 - 11.0 10e9/L    RBC Count 4.23 3.8 - 5.2 10e12/L    Hemoglobin 13.7 11.7 - 15.7 g/dL    Hematocrit 38.8 35.0 - 47.0 %    MCV 92 78 - 100 fl    MCH 32.4 26.5 - 33.0 pg    MCHC 35.3 31.5 - 36.5 g/dL    RDW 13.4 10.0 - 15.0 %    Platelet Count 470 (H) 150 - 450 10e9/L    Diff Method Automated Method     % Neutrophils 84.3 %    % Lymphocytes 10.0 %    % Monocytes 4.6 %    % Eosinophils 0.4 %    % Basophils 0.4 %    % Immature Granulocytes 0.3 %    Nucleated RBCs 0 0 /100    Absolute Neutrophil 11.6 (H) 1.6 - 8.3 10e9/L    Absolute Lymphocytes 1.4 0.8 - 5.3 10e9/L    Absolute Monocytes 0.6 0.0 - 1.3 10e9/L    Absolute Eosinophils 0.1 0.0 - 0.7 10e9/L    Absolute Basophils 0.1 0.0 - 0.2 10e9/L    Abs Immature Granulocytes 0.0 0 - 0.4 10e9/L    Absolute Nucleated RBC 0.0    Comprehensive metabolic panel   Result Value Ref Range    Sodium 130 (L) 133 - 144 mmol/L    Potassium 3.2 (L) 3.4 - 5.3 mmol/L    Chloride 93 (L) 94 - 109 mmol/L    Carbon Dioxide 24 20 - 32 mmol/L    Anion Gap 13 3 - 14 mmol/L    Glucose 99 70 - 99 mg/dL    Urea Nitrogen 11 7 - 30 mg/dL    Creatinine 0.61 0.52 - 1.04 mg/dL    GFR Estimate >90 >60 mL/min/1.7m2    GFR Estimate If Black >90 >60 mL/min/1.7m2    Calcium 9.2 8.5 - 10.1 mg/dL    Bilirubin Total 0.6 0.2 - 1.3 mg/dL    Albumin 3.7 3.4 - 5.0 g/dL    Protein Total 7.7 6.8 - 8.8 g/dL    Alkaline Phosphatase 151 (H) 40 - 150 U/L    ALT 43 0 - 50 U/L    AST 47 (H) 0 - 45 U/L   Lipase   Result Value Ref Range    Lipase 361 73 - 393 U/L      Labs Ordered and Resulted from Time of ED Arrival Up to the Time of  Departure from the ED   DRUG ABUSE SCREEN 6 CHEM DEP URINE ([facility]) - Abnormal; Notable for the following:        Result Value    Benzodiazepine Qual Urine Positive (*)     All other components within normal limits   CBC WITH PLATELETS DIFFERENTIAL - Abnormal; Notable for the following:     WBC 13.8 (*)     Platelet Count 470 (*)     Absolute Neutrophil 11.6 (*)     All other components within normal limits   COMPREHENSIVE METABOLIC PANEL - Abnormal; Notable for the following:     Sodium 130 (*)     Potassium 3.2 (*)     Chloride 93 (*)     Alkaline Phosphatase 151 (*)     AST 47 (*)     All other components within normal limits   LIPASE   MSSA SCORE AND VS            Assessments & Plan (with Medical Decision Making)       I have reviewed the nursing notes.  Emergency Department course:  The patient was seen and examined at 1126 am.  The patient has breathalyzer was 0.0 here.  She is in clear alcohol withdrawal, with tremulousness and tachycardia, and flushing.  I treated her with a normal saline bolus IV as well as thiamine, folate acid, and multivitamins by mouth.  I placed her on the MSSA protocol for acute alcohol withdrawal and she received Ativan IV for withdrawal. Later, I treated her with potassium po and IV.  Laboratory studies show leukocytosis, with a WBC of 13.8. She has thrombocytosis, with a platelet count of 470,000.   Patient is hyponatremic, with a sodium of 130, and hypokalemic with potassium of 3.2. Alkphos is elevated at 151.  AST is slightly high at 47.  Lipase is within normal limits at 361.    The patient is a 52-year-old alcoholic who drinks vodka and mouthwash, here with acute alcohol withdrawal.  She will be admitted here at [facility] for detox. She was admitted to station 3A under the care of Dr. Sandhu.   I have reviewed the findings, diagnosis, plan and need for follow up with the patient.    New Prescriptions    No medications on file       Final diagnoses:   Alcohol  withdrawal, with unspecified complication (H)   Hypokalemia   Adverse effect of mouthwash   Alcohol abuse   I, Susie Celis, am serving as a trained medical scribe to document services personally performed by Dee Sandoval MD, based on the provider's statements to me.      I, Dee Sandoval MD, was physically present and have reviewed and verified the accuracy of this note documented by Susie Celis.       10/6/2017   [facility], [place], EMERGENCY DEPARTMENT  This note was created in part by the use of Dragon voice recognition dictation system. Inadvertent grammatical errors and typographical errors may still exist.  MD Jaime Frances Alda L, MD  10/06/17 0692

## 2017-10-06 NOTE — ED NOTES
Bed: ED08  Expected date: 10/6/17  Expected time:   Means of arrival:   Comments:  [id number] F  Detox

## 2017-10-07 LAB
ANION GAP SERPL CALCULATED.3IONS-SCNC: 8 MMOL/L (ref 3–14)
BUN SERPL-MCNC: 9 MG/DL (ref 7–30)
CALCIUM SERPL-MCNC: 8.1 MG/DL (ref 8.5–10.1)
CHLORIDE SERPL-SCNC: 102 MMOL/L (ref 94–109)
CO2 SERPL-SCNC: 26 MMOL/L (ref 20–32)
CREAT SERPL-MCNC: 0.57 MG/DL (ref 0.52–1.04)
ERYTHROCYTE [DISTWIDTH] IN BLOOD BY AUTOMATED COUNT: 13.6 % (ref 10–15)
GFR SERPL CREATININE-BSD FRML MDRD: >90 ML/MIN/1.7M2
GGT SERPL-CCNC: 196 U/L (ref 0–40)
GLUCOSE SERPL-MCNC: 94 MG/DL (ref 70–99)
HCT VFR BLD AUTO: 34.6 % (ref 35–47)
HGB BLD-MCNC: 12 G/DL (ref 11.7–15.7)
MCH RBC QN AUTO: 32.5 PG (ref 26.5–33)
MCHC RBC AUTO-ENTMCNC: 34.7 G/DL (ref 31.5–36.5)
MCV RBC AUTO: 94 FL (ref 78–100)
PLATELET # BLD AUTO: 284 10E9/L (ref 150–450)
POTASSIUM SERPL-SCNC: 3.5 MMOL/L (ref 3.4–5.3)
RBC # BLD AUTO: 3.69 10E12/L (ref 3.8–5.2)
SODIUM SERPL-SCNC: 136 MMOL/L (ref 133–144)
WBC # BLD AUTO: 8 10E9/L (ref 4–11)

## 2017-10-07 PROCEDURE — 80048 BASIC METABOLIC PNL TOTAL CA: CPT | Performed by: PHYSICIAN ASSISTANT

## 2017-10-07 PROCEDURE — 99222 1ST HOSP IP/OBS MODERATE 55: CPT | Mod: AI | Performed by: PSYCHIATRY & NEUROLOGY

## 2017-10-07 PROCEDURE — 85027 COMPLETE CBC AUTOMATED: CPT | Performed by: PHYSICIAN ASSISTANT

## 2017-10-07 PROCEDURE — 99232 SBSQ HOSP IP/OBS MODERATE 35: CPT | Performed by: PHYSICIAN ASSISTANT

## 2017-10-07 PROCEDURE — 25000132 ZZH RX MED GY IP 250 OP 250 PS 637: Performed by: PSYCHIATRY & NEUROLOGY

## 2017-10-07 PROCEDURE — 25000132 ZZH RX MED GY IP 250 OP 250 PS 637: Performed by: PHYSICIAN ASSISTANT

## 2017-10-07 PROCEDURE — 12800012 ZZH R&B CD MH INTERMEDIATE ADULT

## 2017-10-07 PROCEDURE — 36415 COLL VENOUS BLD VENIPUNCTURE: CPT | Performed by: PHYSICIAN ASSISTANT

## 2017-10-07 RX ORDER — HYDRALAZINE HYDROCHLORIDE 10 MG/1
10 TABLET, FILM COATED ORAL EVERY 6 HOURS PRN
Status: DISCONTINUED | OUTPATIENT
Start: 2017-10-07 | End: 2017-10-08 | Stop reason: HOSPADM

## 2017-10-07 RX ADMIN — FLUOXETINE 40 MG: 20 CAPSULE ORAL at 21:03

## 2017-10-07 RX ADMIN — ALBUTEROL SULFATE 2 PUFF: 90 AEROSOL, METERED RESPIRATORY (INHALATION) at 18:17

## 2017-10-07 RX ADMIN — CALCIUM 1250 MG: 500 TABLET ORAL at 21:03

## 2017-10-07 RX ADMIN — FUROSEMIDE 20 MG: 20 TABLET ORAL at 08:47

## 2017-10-07 RX ADMIN — FOLIC ACID 1 MG: 1 TABLET ORAL at 08:47

## 2017-10-07 RX ADMIN — NICOTINE 1 PATCH: 21 PATCH, EXTENDED RELEASE TRANSDERMAL at 08:52

## 2017-10-07 RX ADMIN — GABAPENTIN 300 MG: 300 CAPSULE ORAL at 08:48

## 2017-10-07 RX ADMIN — DIAZEPAM 10 MG: 5 TABLET ORAL at 00:10

## 2017-10-07 RX ADMIN — FLUTICASONE PROPIONATE 2 SPRAY: 50 SPRAY, METERED NASAL at 08:51

## 2017-10-07 RX ADMIN — POTASSIUM CHLORIDE 20 MEQ: 750 TABLET, FILM COATED, EXTENDED RELEASE ORAL at 08:47

## 2017-10-07 RX ADMIN — GABAPENTIN 600 MG: 300 CAPSULE ORAL at 21:04

## 2017-10-07 RX ADMIN — OMEPRAZOLE 20 MG: 20 CAPSULE, DELAYED RELEASE ORAL at 21:03

## 2017-10-07 RX ADMIN — ROPINIROLE HYDROCHLORIDE 1 MG: 0.25 TABLET, COATED ORAL at 13:28

## 2017-10-07 RX ADMIN — CALCIUM 1250 MG: 500 TABLET ORAL at 08:47

## 2017-10-07 RX ADMIN — LABETALOL HYDROCHLORIDE 100 MG: 100 TABLET, FILM COATED ORAL at 08:47

## 2017-10-07 RX ADMIN — POTASSIUM CHLORIDE 20 MEQ: 750 TABLET, FILM COATED, EXTENDED RELEASE ORAL at 21:24

## 2017-10-07 RX ADMIN — ACETAMINOPHEN 650 MG: 325 TABLET, FILM COATED ORAL at 13:28

## 2017-10-07 RX ADMIN — TRIAMCINOLONE ACETONIDE: 1 CREAM TOPICAL at 08:49

## 2017-10-07 RX ADMIN — Medication 100 MG: at 08:48

## 2017-10-07 RX ADMIN — MULTIPLE VITAMINS W/ MINERALS TAB 1 TABLET: TAB at 08:47

## 2017-10-07 RX ADMIN — ASPIRIN 81 MG: 81 TABLET, COATED ORAL at 08:47

## 2017-10-07 RX ADMIN — HYDROXYZINE HYDROCHLORIDE 50 MG: 25 TABLET ORAL at 00:10

## 2017-10-07 RX ADMIN — MENTHOL 1 PATCH: 205.5 PATCH TOPICAL at 22:09

## 2017-10-07 RX ADMIN — ROPINIROLE HYDROCHLORIDE 1 MG: 0.25 TABLET, COATED ORAL at 08:47

## 2017-10-07 RX ADMIN — LABETALOL HYDROCHLORIDE 100 MG: 100 TABLET, FILM COATED ORAL at 21:04

## 2017-10-07 RX ADMIN — FLUOXETINE 40 MG: 20 CAPSULE ORAL at 08:47

## 2017-10-07 RX ADMIN — FLUTICASONE FUROATE AND VILANTEROL TRIFENATATE 1 PUFF: 200; 25 POWDER RESPIRATORY (INHALATION) at 08:52

## 2017-10-07 RX ADMIN — TRIAMCINOLONE ACETONIDE: 1 CREAM TOPICAL at 20:57

## 2017-10-07 RX ADMIN — OMEPRAZOLE 20 MG: 20 CAPSULE, DELAYED RELEASE ORAL at 08:47

## 2017-10-07 RX ADMIN — GABAPENTIN 300 MG: 300 CAPSULE ORAL at 13:28

## 2017-10-07 RX ADMIN — TIOTROPIUM BROMIDE 18 MCG: 18 CAPSULE ORAL; RESPIRATORY (INHALATION) at 08:49

## 2017-10-07 RX ADMIN — ROPINIROLE HYDROCHLORIDE 1 MG: 0.25 TABLET, COATED ORAL at 21:05

## 2017-10-07 RX ADMIN — LOSARTAN POTASSIUM 100 MG: 50 TABLET, FILM COATED ORAL at 08:47

## 2017-10-07 ASSESSMENT — ACTIVITIES OF DAILY LIVING (ADL)
ORAL_HYGIENE: INDEPENDENT
DRESS: INDEPENDENT
GROOMING: INDEPENDENT

## 2017-10-07 NOTE — PROGRESS NOTES
10/06/17 1911   Patient Belongings   Did you bring any home meds/supplements to the hospital?  No   Patient Belongings other (see comments)   Disposition of Belongings see note   Belongings Search Yes   Clothing Search Yes   Second Staff Radha WORLEY     Locked drawer: cell phone    Security envelope [id number]: [place], [place] debit ([id number]), [place]    Storage bin: shoes, notebook (w/ metal binding), hat, 2 dollars, keys, bag of markers, notebook (w/ string bookmark), paperwork, tote bag, cell phone charge cord, fan (w/ cord), pens, q-tips  A             Admission:  I am responsible for any personal items that are not sent to the safe or pharmacy.  [facility] is not responsible for loss, theft or damage of any property in my possession.  Signature:  _________________________________ Date: _______  Time: _____                                              Staff Signature:  ____________________________ Date: ________  Time: _____      2nd Staff person, if patient is unable/unwilling to sign:    Signature: ________________________________ Date: ________  Time: _____   Discharge:  [facility] has returned all of my personal belongings:  Signature: _________________________________ Date: ________  Time: _____                                          Staff Signature:  ____________________________ Date: ________  Time: _____

## 2017-10-07 NOTE — PROGRESS NOTES
"CLINICAL NUTRITION SERVICES - ASSESSMENT NOTE     Nutrition Prescription    RECOMMENDATIONS FOR MDs/PROVIDERS TO ORDER:  None    Malnutrition Status:    Pt does not meet criteria    Recommendations already ordered by Registered Dietitian (RD):  Ensure Plus with meals    Future/Additional Recommendations:  Monitor po intake and weight trends.     REASON FOR ASSESSMENT  Inga Ledesma is a/an 52 year old female assessed by the dietitian for Admission Nutrition Risk Screen for reduced oral intake over the last month    NUTRITION HISTORY  Patient is on a regular diet at home. She reports she does not eat much when she is drinking, thus she has not been eating much recently. She has been using Ensure at home to make sure she is getting some calories.    CURRENT NUTRITION ORDERS  Diet: Regular  Intake/Tolerance: Pt states her po intake is improving. She reports she is eating about 50% of meals.    LABS  Labs reviewed    MEDICATIONS  Medications reviewed  Multivitamin/ minerals  Thiamine  Folic acid  Calcium     ANTHROPOMETRICS  Height: 154.9 cm (5' 1\")  Most Recent Weight: 69.4 kg (153 lb)    IBW: 47.4 kg  BMI: Overweight BMI 25-29.9  Weight History: Pt reports weight fluctuates between 153 and 165 lbs due to drinking. Most recently, pt has lost weight (4% loss x 3 months).   Wt Readings from Last 10 Encounters:   10/06/17 69.4 kg (153 lb)   09/25/17 70.1 kg (154 lb 9.6 oz)   09/11/17 73 kg (161 lb)   07/25/17 74.8 kg (165 lb)   07/05/17 72.6 kg (160 lb)   06/20/17 72.6 kg (160 lb)   06/09/17 74.8 kg (165 lb)   05/30/17 71.7 kg (158 lb)   05/09/17 75.8 kg (167 lb)   04/28/17 72.1 kg (159 lb)       Dosing Weight: 53 kg (adj for >120% IBW)    ASSESSED NUTRITION NEEDS  Estimated Energy Needs: 0970-2746 kcals/day (25 - 30 kcals/kg)  Justification: Maintenance  Estimated Protein Needs: 53-64 grams protein/day (1 - 1.2 grams of pro/kg)  Justification: Repletion  Estimated Fluid Needs: 1 mL/kcal or per MD goals "   Justification: Maintenance    PHYSICAL FINDINGS  See malnutrition section below.     MALNUTRITION  % Intake: < 75% for > 7 days (non-severe)  % Weight Loss: Weight loss does not meet criteria  Subcutaneous Fat Loss: None observed  Muscle Loss: None observed  Fluid Accumulation/Edema: None noted  Malnutrition Diagnosis: Patient does not meet two of the above criteria necessary for diagnosing malnutrition    NUTRITION DIAGNOSIS  Predicted inadequate nutrient intake (energy/protein) related to variable appetite as evidenced by anticipated intake less than estimated needs.      INTERVENTIONS  Implementation  Nutrition Education: reviewed the importance of good nutrition   Medical food supplement therapy: Ensure Plus with meals    Goals  Patient to consume % of nutritionally adequate meal trays TID, or the equivalent with supplements/snacks.     Monitoring/Evaluation  Progress toward goals will be monitored and evaluated per protocol.      Anabell Mcghee RD

## 2017-10-07 NOTE — CONSULTS
"  Internal Medicine Initial Visit    Inga Ledesma MRN# [id number]   Age: 52 year old YOB: 1965   Date of Admission: 10/6/2017     Reason for consult: HTN, COPD, CHF, Hyponatremia       Requesting physician Dr. Ion Coronado      SUBJECTIVE   HPI:   Inga Ledesma is a 52 year old female with history of COPD, HTN, CHF, CVA (1/2012), iron deficiency anemia, GERD, depression, and alcohol abuse admitted to station 3A for alcohol withdrawal. Medicine was consulted to co-manage patient's COPD, CHF, and HTN.    Patient presented to the ED 10/6 voluntarily seeking withdrawal from alcohol. Reports drinking ~1 bottle of vodka or mouthwash daily over the past week. Has a history of 1 withdrawal seizure on 3/2016. No withdrawal hallucinations.    Currently, patient is generally feeling well. Does not endorse withdrawal symptoms. Has mild swelling in her right leg which is baseline for her. Eager to \"clean up\" quickly so she can visit her father who is actively dying of lung cancer. Denies fevers, chills, headache, chest pain, SOB, abdominal pain, nausea, vomiting, dysuria, urinary frequency, diarrhea, constipation, or increased peripheral edema.      Past Medical History:     Past Medical History:   Diagnosis Date     Alcohol abuse, in remission      Cancer of labia majora (H) 1987     Cervical dysplasia 1987     Congestive heart failure (H) 5/16/16     COPD (chronic obstructive pulmonary disease) (H) 1/24/2011     CVA (cerebral infarction) 1/2012     Dependent edema      Depression, major      Depressive disorder 1965     GERD (gastroesophageal reflux disease)      Hyperlipidemia LDL goal < 160      Hypertension      Iron deficiency anemia      RLS (restless legs syndrome)      Sacroiliitis (H)     steroid injections ineffective, chronic low back pain     Tobacco abuse      Uncomplicated asthma      Vitamin D deficiencies       Reviewed & updated in Epic.     Past Surgical History:      Past Surgical " History:   Procedure Laterality Date     COLONOSCOPY       EYE SURGERY       HERNIA REPAIR, INGUINAL RT/LT  2007     HYSTERECTOMY  2010     HYSTERECTOMY TOTAL ABDOMINAL  7/28/10    Bilateral salpingectomy.  ovaries conserved.     LASER TX, CERVICAL  1987     LYMPH NODE BIOPSY  2007    inguinal     LYSIS OF LABIAL LESION(S)  1985, 1987      Reviewed & updated in Epic.     Medications prior to admission:     Prior to Admission Medications   Prescriptions Last Dose Informant Patient Reported? Taking?   FLUoxetine (PROZAC) 40 MG capsule 10/5/2017 at AM  No Yes   Sig: Take 2 capsules (80 mg) by mouth daily   Patient taking differently: Take 40 mg by mouth 2 times daily    MAGNESIUM PO 10/5/2017 at AM  Yes Yes   Sig: Take 1 capsule by mouth daily Form and strength unknown.   POTASSIUM PO 10/5/2017 at AM  Yes Yes   Sig: Take 1 capsule by mouth daily Form and strength unknown.   acetaminophen (TYLENOL) 500 MG tablet Past Week at Unknown time  Yes Yes   Sig: Take 1,000 mg by mouth every 6 hours as needed for pain (neck and back pain)   albuterol (VENTOLIN HFA) 108 (90 BASE) MCG/ACT Inhaler Past Week at Unknown time  No Yes   Sig: Inhale  into the lungs. INHALE 2 PUFFS FOUR TIMES DAILY AS NEEDED   aspirin 81 MG EC tablet 10/5/2017 at AM  No Yes   Sig: Take 1 tablet (81 mg) by mouth daily   calcium carbonate (OS-JENNIFER 500 MG [place]) 500 MG tablet 10/5/2017 at AM  Yes Yes   Sig: Take 500 mg by mouth 2 times daily   fluticasone (FLONASE) 50 MCG/ACT spray Past Week at Unknown time  No Yes   Sig: Spray 1-2 sprays into both nostrils daily   fluticasone-salmeterol (ADVAIR DISKUS) 500-50 MCG/DOSE diskus inhaler Past Week at Unknown time  No Yes   Sig: Inhale 1 puff into the lungs every 12 hours   furosemide (LASIX) 20 MG tablet Past Week at Unknown time  No Yes   Sig: Take 1 tablet (20 mg) by mouth daily   gabapentin (NEURONTIN) 300 MG capsule 10/5/2017 at AM  Yes Yes   Sig: Take 1 capsule by mouth in the morning, 1 capsule in the  afternoon, and 2 capsules at bedtime   ibuprofen (ADVIL/MOTRIN) 200 MG tablet Past Week at Unknown time  Yes Yes   Sig: Take 600 mg by mouth every 4 hours as needed for mild pain (neck and back pain)   ketotifen (ALAWAY) 0.025 % SOLN ophthalmic solution Past Week at Unknown time  Yes Yes   Sig: Place 2 drops into both eyes as needed for dry eyes   labetalol (NORMODYNE) 100 MG tablet 10/5/2017 at AM  Yes Yes   Sig: Take 100 mg by mouth 2 times daily   losartan (COZAAR) 100 MG tablet 10/5/2017 at AM  No Yes   Sig: Take 0.5 tablets (50 mg) by mouth daily   Patient taking differently: Take 100 mg by mouth daily    menthol (ICY HOT) 5 % PTCH Past Week at Unknown time  Yes Yes   Sig: Apply 1 patch topically every 8 hours as needed for moderate pain (neck and back pain)   nicotine (NICOTROL) 10 MG inhaler 10/5/2017 at AM  No Yes   Sig: Use 1-2 daily as needed for smoking cessation   nystatin (MYCOSTATIN) cream Past Week at Unknown time  Yes Yes   Sig: Apply topically 3 times daily as needed (rash on leg)   omeprazole (PRILOSEC) 20 MG CR capsule 10/5/2017 at AM  No Yes   Sig: Take 1 capsule (20 mg) by mouth 2 times daily   order for DME   No No   Sig: Equipment being ordered: Nebulizer   potassium chloride SA (POTASSIUM CHLORIDE) 20 MEQ CR tablet 10/5/2017 at AM  No Yes   Sig: Take 1 tablet (20 mEq) by mouth 2 times daily   rOPINIRole (REQUIP) 1 MG tablet 10/5/2017 at AM  No Yes   Sig: Take 1 tablet (1 mg) by mouth 5 times daily   Patient taking differently: Take 1 mg by mouth 3 times daily    tiotropium (SPIRIVA HANDIHALER) 18 MCG capsule Past Month at Unknown time  No Yes   Sig: Inhale contents of one capsule daily.   triamcinolone (KENALOG) 0.1 % cream 10/6/2017 at AM  No Yes   Sig: APPLY SPARINGLY TO AFFECTED AREA(S) THREE TIMES A DAY AS NEEDED      Facility-Administered Medications: None         Allergies:     Allergies   Allergen Reactions     Codeine Nausea     Reglan [Metoclopramide Hcl] Other (See Comments)      "Body tenses up         Social History:   Tobacco Use: Smokes 1/2 ppd  Alcohol Use: Reports drinking ~1 bottle of vodka or mouthwash daily over the past week  Illicit Drug Use: Denies  STI Testing: Declines at this time     Family History:     Family History   Problem Relation Age of Onset     Depression/Anxiety Mother      Asthma Mother      CEREBROVASCULAR DISEASE Father      Hypertension Father      Lung Cancer Father      Breast Cancer Paternal Aunt      Other Cancer Other      Depression Other      Anxiety Disorder Other      MENTAL ILLNESS Other      Substance Abuse Other      Asthma Other      Obesity Sister      Obesity Son         Reviewed & updated in Epic.     Review of Systems:   Ten point review of systems negative except as stated above in History of Present Illness.    OBJECTIVE   Physical Exam:   Blood pressure (!) 180/121, pulse 87, temperature 97.2  F (36.2  C), temperature source Oral, resp. rate 16, height 1.549 m (5' 1\"), weight 69.4 kg (153 lb), last menstrual period 05/23/2010, SpO2 96 %, not currently breastfeeding.  General: Well-appearing  female sitting upright in bed, NAD.  HEENT: NC/AT. Anicteric sclera. Mucous membranes moist.  Neck: Supple  Cardiovascular: RRR. S1/S2. No murmurs appreciated.  Lungs: Normal respiratory effort on RA. Lungs CTAB without wheezes, rales, or rhonchi.  Abdomen: Soft, non-tender, and nondistended with normoactive bowel sounds.  Extremities: Mild RLE edema. Extremities warm and well perfused.  Skin: No rashes, jaundice, or lesions on exposed areas of skin.  Neurologic: A&O x 3. Answers questions appropriately. Moves all extremities symmetrically.     Laboratory Data:   CMP    Recent Labs  Lab 10/07/17  0800 10/06/17  1143    130*   POTASSIUM 3.5 3.2*   CHLORIDE 102 93*   CO2 26 24   ANIONGAP 8 13   GLC 94 99   BUN 9 11   CR 0.57 0.61   GFRESTIMATED >90 >90   GFRESTBLACK >90 >90   JENNIFER 8.1* 9.2   PROTTOTAL  --  7.7   ALBUMIN  --  3.7   BILITOTAL  -- "  0.6   ALKPHOS  --  151*   AST  --  47*   ALT  --  43       CBC    Recent Labs  Lab 10/07/17  0800 10/06/17  1143   WBC 8.0 13.8*   RBC 3.69* 4.23   HGB 12.0 13.7   HCT 34.6* 38.8   MCV 94 92   MCH 32.5 32.4   MCHC 34.7 35.3   RDW 13.6 13.4    470*        Assessment and Plan/Recommendations:   Inga Ledesma is a 52 year old female with history of COPD, HTN, CHF, CVA (1/2012), iron deficiency anemia, GERD, depression, and alcohol abuse admitted to station 3A for alcohol withdrawal. Medicine was consulted to co-manage patient's COPD, CHF, and HTN.    Acute Alcohol Withdrawal, Depression. Reports drinking ~1 liter of vodka/mouthwash daily x 1 week. History of one withdrawal seizure 3/2016. U tox in ED + for benzodiazepines. On MSSA protocol.  - Management per Psychiatry  - Seizure precautions    CHF. Echo 5/2016 technically difficult although noted normal LVEF of ~55%; normal RV longitudinal function; left atrial dilatation. Maintained on lasix and labetalol PTA. Mild RLE edema per baseline, appears well compensated. No SOB, chest pain.  - Continue lasix 20 mg QD  - Continue labetalol 100 mg BID    HTN. BP's somewhat volatile since admission, ranging 130-180's/'s. Likely in setting of alcohol use/withdrawal. Per PCP note 5/2017, patient's BP's are well-controlled when patient is sober. Managed on labetalol and losartan PTA, recently had amlodipine discontinued due to soft BP's. Current /121.  - Continue labetalol per above  - Continue losartan 100 mg QD  - PRN hydralazine for SBP >180, DBP >110  - Consider restarting amlodipine if BP's persistently elevated    COPD. PFT's 4/2017 with FVC 93% pred, FEV1 86% pred, FEV1/FVC 93%. Maintained on spiriva and advair PTA. Lungs CTAB on exam, denies SOB.  - Continue spiriva QD, advair BID  - Albuterol PRN    Hypokalemia. Potassium mildly low at 3.2 on admission. On lasix and potassium supplement PTA. Likely hypokalemia 2/2 diuretics, poor PO intake.  Received supplementation with subsequent potassium improved at 3.5.  - Continue potassium 20 mEq BID    Hyponatremia. Sodium low at 130 on admission. Possible component from diuretics, poor oral intake. Improved to 136 today.   - Continue to encourage PO intake    Leukocytosis. WBC elevated at 13.8 upon admission. Patient denies fevers or chills; remains afebrile, no focal s/s of infection. Suspect component of hemoconcentration, has resolved at 8.0 today after receiving IVF. No indication for further evaluation.    Hx of CVA. Patient denies previous history of stroke. No imaging in chart to confirm. Maintained on baby ASA PTA.  - Continue ASA 81 mg QD    Medicine will continue to follow for BP management. Please page the Internal Medicine job code pager for any intercurrent medical issues which arise. Thank you for the opportunity to be a part of this patient's care.    Lamar Borden PA-C  Hospitalist Service  [phone]
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
## 2017-10-07 NOTE — H&P
"INPATIENT PSYCHIATRIC HISTORY AND PHYSICAL       DATE OF SERVICE:  10/07/2017.      CHIEF COMPLAINT:  \"I'm here for alcohol use.\"      HISTORY OF PRESENT ILLNESS:  Inga Ledesma is a 52-year-old white female with a history of alcohol use disorder and depression who has been drinking up to half a pint of hard liquor daily as well as mouthwash to hide this use from her adult son who she lives with.  The patient says she has been a little shaky with the withdrawal so far and does have a history of withdrawal seizure a little over a year ago.  Her mood is fine and denies problems with sleep or appetite.  Denies suicidal or homicidal ideation.  Denies auditory or visual hallucinations.      PAST PSYCHIATRIC HISTORY:  The patient does have a history of self-injurious behavior and started following with a new therapist.      CURRENT PSYCHIATRIC MEDICATIONS:   1.  Prozac 40 mg b.i.d.   2.  Gabapentin 300 mg b.i.d. and 600 mg at bedtime.      ALLERGIES:  Codeine and Reglan.      PAST MEDICAL HISTORY:  Significant for hypertension, GERD, restless legs syndrome, COPD, CVA in 2012 and congestive heart failure.      REVIEW OF SYSTEMS:  A 10-point systems reviewed and all were negative except those mentioned in the HPI.      SUBSTANCE HISTORY:  The patient's substance of choice is alcohol, has been through CD treatment x3, does have a history of a withdrawal seizure.  Smokes half to a pack a day.  Denies illicit drug use.      FAMILY PSYCHIATRIC HISTORY:  The patient's mother had depression.  Son with ADHD and autism.      SOCIAL HISTORY:  Lives with her adult son.  Records indicate that patient might work for [employer].      PHYSICAL EXAMINATION:  Please see the physical exam from the medical team.   CURRENT VITAL SIGNS:  Blood pressure 180/121, pulse 87, respirations 16, temperature 97.2.      MENTAL STATUS EXAMINATION:  JAUN:  The patient is a 52-year-old white female, cooperative, pleasant, good eye contact.  Speech:  Regular " rate, rhythm, volume and tone.  Mood is fine.  Affect is fairly full.  Thought process is goal directed.  Thought content:  Denies suicidal or homicidal ideation.  Denies auditory or visual hallucinations.  No loosening of associations noted.  Sensorium is clear.  Cognition:  Oriented x3.  Recent and remote memory appear to be grossly intact.  Attention and concentration:  No deficits noted.  Language:  No deficits noted.  Fund of knowledge:  Appropriate.  Muscle strength and tone:  No deficits noted.  Gait and station:  No deficits noted.  Insight is fair, judgment is fair.      DIAGNOSES:   Axis I:  Alcohol use disorder, severe dependence.   Major depressive disorder, recurrent.   Axis II:  Deferred at this time.   Axis III:  See past medical history.      PLAN:   1.  The patient was voluntarily admitted to station 3A, encouraged to engage in groups and with staff on unit.   2.  The patient will be detoxified with MSSA protocol on Valium.   3.  Will continue the patient's Prozac and gabapentin.   4.  The patient will be followed by Dr. Sandhu starting on Monday and she voiced understanding of this.         HANNAH GAMBINO MD             D: 10/07/2017 09:21   T: 10/07/2017 09:45   MT: [email or web address]      Name:     TEJAS IRELAND   MRN:      [id number]        Account:      [id number]   :      1965           Admitted:     [id number]      Document: [id number]

## 2017-10-07 NOTE — PROGRESS NOTES
"SPIRITUAL HEALTH SERVICES   Spiritual Assessment Progress Note (Behavioral Health/CD Focus)  [facility] ([facility] 3AW    REFERRAL SOURCE: Consult place for patient asking for emotional support, on call visit.    On this visit, I met with Inga in one of the consultation offices.    EXPERIENCE OF ILLNESS/HOSPITALIZATION:  Inga spoke about challenges throughout her life with feeling unworthy and unwanted, and more presently of her father's illness and her concerns to find some closure as he is near death.     MEANING-MAKING:  I shared with Inga about \"the 4 things that matter most\" that she may be able to bring into a conversation with her dad, we explored past experiences she has had with family members near death and how she may have the internal strength to face this moment with her dad.    SPIRITUALITY/VALUES/Faith:  Bahai    COPING/SPIRITUAL PRACTICES: Inga talked about journaling, prayer, scripture, attending Zoroastrianism as helpful for her.    SUPPORT SYSTEMS: Inga shared of a [facility] group, her Zoroastrianism, and how her mom valued her in ways that others didn't.    PLAN: I will inform the unit  of care provided.                                                                                                                                               Louise Camarena MDiv  Chaplain Resident  Pager [phone]      "

## 2017-10-07 NOTE — PROGRESS NOTES
Writer met with pt to discuss aftercare plans. Pt reports that she plans to return to [facility] and [facility]. States she also sees a therapist and has an appointment scheduled for Tuesday 10/10.  States she might be interested in OP tx but only if near her home in [place]. Pt stated that she hopes to be d/c tomorrow, Vivek 10/8. Writer informed pt she will need to speak with physician about this request. Pt was instructed to complete her paperwork for Rule 25 assessment and turn into front nursing station if she decides she is interested in OP tx. Pt acknowledged this request.

## 2017-10-07 NOTE — PLAN OF CARE
Problem: Substance Withdrawal  Goal: Substance Withdrawal  Problem: General Plan of Care (Inpatient Behavioral)  Goal: Individualization/ Patient Specific Goal (IP Behavioral)  The patient and/or their representative their patient-specific goals related to the plan of care.  Patient specific goals include:  1. Patient will be evaluated by a physician and labs will be evaluated.  2. Patient will be seen by a  for evaluation and discharge planning.  3. Patient will complete assessment paperwork  4. Patient will consume 75 % of meal to meet estimated energy needs.  5. Detoxification from alcohol using valium.  6. Patient will be provided with alcohol relapse prevention information.Signs and symptoms of listed problems will be absent or manageable.   Outcome: Improving  S:  Pt stated that she needs to discharge tomorrow.  She can only get a ride tomorrow.  She requested to speak with her doctor.  She was informed that Dr. Sandhu would not be in until Monday.  She asked if the doctor who saw her this morning could discharge her.  Writer informed her that it was a possibility and that writer would put on tape tonight that she would like to speak with Dr. Coronado (whom she saw this morning) tomorrow morning.    B:  Pt with extensive medical history seeking detox only.  Please see Epic for extended list of pt diagnoses.  A:  Pt with very few symptoms of alcohol withdrawal AEB MSSA score of 5.  R:  Continue to monitor and medicate as ordered and indicated

## 2017-10-08 VITALS
TEMPERATURE: 97 F | RESPIRATION RATE: 16 BRPM | BODY MASS INDEX: 28.89 KG/M2 | DIASTOLIC BLOOD PRESSURE: 98 MMHG | HEIGHT: 61 IN | SYSTOLIC BLOOD PRESSURE: 142 MMHG | WEIGHT: 153 LBS | HEART RATE: 86 BPM | OXYGEN SATURATION: 96 %

## 2017-10-08 PROCEDURE — 25000132 ZZH RX MED GY IP 250 OP 250 PS 637: Performed by: PHYSICIAN ASSISTANT

## 2017-10-08 PROCEDURE — 25000132 ZZH RX MED GY IP 250 OP 250 PS 637: Performed by: PSYCHIATRY & NEUROLOGY

## 2017-10-08 PROCEDURE — 99207 ZZC NO CHARGE VISIT/PATIENT NOT SEEN: CPT | Performed by: PSYCHIATRY & NEUROLOGY

## 2017-10-08 RX ORDER — ROPINIROLE 1 MG/1
1 TABLET, FILM COATED ORAL 3 TIMES DAILY
Status: ON HOLD | COMMUNITY
Start: 2017-10-08 | End: 2017-11-01

## 2017-10-08 RX ORDER — FLUOXETINE 40 MG/1
40 CAPSULE ORAL 2 TIMES DAILY
Status: ON HOLD | COMMUNITY
Start: 2017-10-08 | End: 2017-11-01

## 2017-10-08 RX ADMIN — GABAPENTIN 300 MG: 300 CAPSULE ORAL at 08:39

## 2017-10-08 RX ADMIN — NICOTINE 1 PATCH: 21 PATCH, EXTENDED RELEASE TRANSDERMAL at 08:46

## 2017-10-08 RX ADMIN — FUROSEMIDE 20 MG: 20 TABLET ORAL at 08:39

## 2017-10-08 RX ADMIN — FLUOXETINE 40 MG: 20 CAPSULE ORAL at 08:39

## 2017-10-08 RX ADMIN — FLUTICASONE FUROATE AND VILANTEROL TRIFENATATE 1 PUFF: 200; 25 POWDER RESPIRATORY (INHALATION) at 08:44

## 2017-10-08 RX ADMIN — LABETALOL HYDROCHLORIDE 100 MG: 100 TABLET, FILM COATED ORAL at 08:39

## 2017-10-08 RX ADMIN — ACETAMINOPHEN 650 MG: 325 TABLET, FILM COATED ORAL at 10:22

## 2017-10-08 RX ADMIN — CALCIUM 1250 MG: 500 TABLET ORAL at 08:39

## 2017-10-08 RX ADMIN — TRAZODONE HYDROCHLORIDE 50 MG: 50 TABLET ORAL at 01:41

## 2017-10-08 RX ADMIN — ROPINIROLE HYDROCHLORIDE 1 MG: 0.25 TABLET, COATED ORAL at 14:30

## 2017-10-08 RX ADMIN — GABAPENTIN 300 MG: 300 CAPSULE ORAL at 14:14

## 2017-10-08 RX ADMIN — LOSARTAN POTASSIUM 100 MG: 50 TABLET, FILM COATED ORAL at 08:39

## 2017-10-08 RX ADMIN — FOLIC ACID 1 MG: 1 TABLET ORAL at 08:39

## 2017-10-08 RX ADMIN — POTASSIUM CHLORIDE 20 MEQ: 750 TABLET, FILM COATED, EXTENDED RELEASE ORAL at 08:39

## 2017-10-08 RX ADMIN — ASPIRIN 81 MG: 81 TABLET, COATED ORAL at 08:39

## 2017-10-08 RX ADMIN — TRIAMCINOLONE ACETONIDE: 1 CREAM TOPICAL at 08:47

## 2017-10-08 RX ADMIN — Medication 100 MG: at 08:39

## 2017-10-08 RX ADMIN — FLUTICASONE PROPIONATE 2 SPRAY: 50 SPRAY, METERED NASAL at 08:44

## 2017-10-08 RX ADMIN — TRAZODONE HYDROCHLORIDE 50 MG: 50 TABLET ORAL at 00:04

## 2017-10-08 RX ADMIN — MULTIPLE VITAMINS W/ MINERALS TAB 1 TABLET: TAB at 08:39

## 2017-10-08 RX ADMIN — TIOTROPIUM BROMIDE 18 MCG: 18 CAPSULE ORAL; RESPIRATORY (INHALATION) at 08:43

## 2017-10-08 RX ADMIN — ROPINIROLE HYDROCHLORIDE 1 MG: 0.25 TABLET, COATED ORAL at 08:39

## 2017-10-08 RX ADMIN — OMEPRAZOLE 20 MG: 20 CAPSULE, DELAYED RELEASE ORAL at 08:39

## 2017-10-08 RX ADMIN — HYDROXYZINE HYDROCHLORIDE 50 MG: 25 TABLET ORAL at 00:04

## 2017-10-08 NOTE — PROGRESS NOTES
S:  Cody's friend Greer arrived to  Cody at 17:30.  She was allowed to come on to the unit and wait while she was signed out.  Radha MOON gave all of Cody's belongings to her and her medications that had been retrieved from the Security office.  she signed for everything.  She was escorted off the floor at 17:45.

## 2017-10-08 NOTE — DISCHARGE INSTRUCTIONS
Behavioral Discharge Planning and Instructions  THANK YOU FOR CHOOSING THE [facility]  3 A WEST    Summary: You were admitted to [facility] on 10/6/17 for detoxification from alcohol. You are being discharged to home with plan to follow up with [facility].    Recommendation: Obtain Rule 25 assessment for outpatient treatment, attend [facility], and work with a sponsor in the program.     Main Diagnosis: Alcohol Dependence    Major Treatments, Procedures and Findings: You were detoxed from alcohol.  You were given a copy of your lab results which were reviewed with you. Please bring your lab results with you to your primary follow up appointment.     Symptoms to Report: If  you experience feeling more anxiety, confusion, losing more sleep, mood declining or thoughts of suicide, notify your treatment team or notify your primary physician. IF ANY OF THE SYMPTOMS YOU ARE EXPERIENCING ARE A MEDICAL EMERGENCY CALL 911 IMMEDIATELY.    Lifestyle Adjustment: Adjust your lifestyle to get enough sleep, relaxation, exercise and  good nutrition. Continue to develop healthy coping skills to  decrease stress and promote a sober living environment. No use of alcohol, illegal drugs or addictive medications other than what is currently prescribed. AA, NA, and  Sponsor are excellent resources for support.    Primary Provider:  Primary Care Provider Office Phone # Fax #      Min Toledo PA-C [phone]   Pt will schedule follow up appointment within 2 weeks.    Rule 25 Assessment Referral:  You will need a rule 25 to attend treatment. You can go to [facility] for that assessment.   They have walk in assessments available M-F from 7am to 2:15 pm  [address] [place] [address]  Phone: [phone]    Support Group:    AA/NA and Sponsor contact/support    Resources:   Crisis Intervention: 223.739.2138 or 715-047-6060 (TTY: 397.113.6712).  Call anytime for  help.  Suicide Awareness Voices of Education (SAVE) (www.save.org): 757-093-FNPC (0803)  National Suicide Prevention Line (www.mentalhealthmn.org): 723-843-SNCK (1839)  National [place] on Mental Illness (www.[place].dakotah.org): 371.124.7238 or 142-091-2836.  Alcoholics Anonymous (www.alcoholics-anonymous.org): Or local information can be found 24 hours a day at:  AA Intergroup service office in [place] (http://www.aastpaul.org/) 504.230.3264  AA Intergroup service office in [facility]: 761.683.8444. (http://www.a[place].org/)  Narcotics Anonymous (www.nam[place].org)1-894.902.8046   [facility] Behavioral Intake [phone]    [facility] ([facility])  [facility] connects people seeking recovery to resources that help foster and sustain long-term recovery.  Whether you are seeking resources for treatment, transportation, housing, job training, education, health care or other pathways to recovery, [facility] is a great place to start.  [phone].  [facility].org    General Medication Instructions:   See your medication sheet(s) for instructions.   Take all medicines as directed.  Make no changes unless your doctor suggests them.   Go to all your doctor visits.  Be sure to have all your required lab tests. This way, your medicines can be refilled on time.  Do not use any drugs not prescribed by your provider.  AA/NA and Sponsors are excellent resources for support  Avoid alcohol.    Please Note:  If you have any questions at anytime after you are discharged please call the [facility] detox unit 3AW unit at [phone].    [facility], Behavioral Intake [phone]    Please take this discharge folder with you to all your follow up appointments, it contains your lab results, diagnosis, medication list and discharge recommendations.      THANK YOU FOR CHOOSING THE [facility]

## 2017-10-08 NOTE — PROGRESS NOTES
"Brief Medicine Note:    IM following up on BPs. Initially elevated to 180s/120s with acute withdrawal, now improved to 140s/90s. Per consult note, \"PCP note 5/2017, patient's BPs are well-controlled when patient is sober. Managed on labetalol and losartan PTA, recently had amlodipine discontinued due to soft BPs.\"   - Given significant improvement and PCP note, will hold off on resuming Norvasc  - Medicine will sign off, please contact if BP spikes >175/>110 or is consistently >150s/>90s once detox completed    Contact with future questions or concerns.    Tessa Portillo PA-C  Internal Medicine [facility]  [phone]    "

## 2017-10-08 NOTE — DISCHARGE SUMMARY
Psychiatric Discharge Summary    Inga Ledesma MRN# [id number]   Age: 52 year old YOB: 1965     Date of Admission:  10/6/2017  Date of Discharge:  10/8/2017  6:13 PM  Admitting Physician:  Ion Coronado MD  Discharge Physician:  Ion Coronado MD          Event Leading to Hospitalization:   HISTORY OF PRESENT ILLNESS:  Inga Ledesma is a 52-year-old white female with a history of alcohol use disorder and depression who has been drinking up to half a pint of hard liquor daily as well as mouthwash to hide this use from her adult son who she lives with.  The patient says she has been a little shaky with the withdrawal so far and does have a history of withdrawal seizure a little over a year ago.  Her mood is fine and denies problems with sleep or appetite.  Denies suicidal or homicidal ideation.  Denies auditory or visual hallucinations.        See Admission note by Ion Coronado MD for additional details.          DIagnoses:     Axis I:  Alcohol use disorder, severe dependence.   Major depressive disorder, recurrent.   Axis II:  Deferred at this time.   Axis III:  See past medical history.          Labs:          Lab Results   Component Value Date     10/07/2017    Lab Results   Component Value Date    CHLORIDE 102 10/07/2017    Lab Results   Component Value Date    BUN 9 10/07/2017      Lab Results   Component Value Date    POTASSIUM 3.5 10/07/2017    Lab Results   Component Value Date    CO2 26 10/07/2017    Lab Results   Component Value Date    CR 0.57 10/07/2017        Lab Results   Component Value Date    WBC 8.0 10/07/2017    HGB 12.0 10/07/2017    HCT 34.6 (L) 10/07/2017    MCV 94 10/07/2017     10/07/2017     Lab Results   Component Value Date    AST 47 (H) 10/06/2017    ALT 43 10/06/2017     (H) 10/06/2017    ALKPHOS 151 (H) 10/06/2017    BILITOTAL 0.6 10/06/2017     Lab Results   Component Value Date    TSH 0.89 09/29/2015              Consults:    Consultation during this admission received from internal medicine:    Inga Ledesma is a 52 year old female with history of COPD, HTN, CHF, CVA (1/2012), iron deficiency anemia, GERD, depression, and alcohol abuse admitted to station 3A for alcohol withdrawal. Medicine was consulted to co-manage patient's COPD, CHF, and HTN.     Acute Alcohol Withdrawal, Depression. Reports drinking ~1 liter of vodka/mouthwash daily x 1 week. History of one withdrawal seizure 3/2016. U tox in ED + for benzodiazepines. On MSSA protocol.  - Management per Psychiatry  - Seizure precautions     CHF. Echo 5/2016 technically difficult although noted normal LVEF of ~55%; normal RV longitudinal function; left atrial dilatation. Maintained on lasix and labetalol PTA. Mild RLE edema per baseline, appears well compensated. No SOB, chest pain.  - Continue lasix 20 mg QD  - Continue labetalol 100 mg BID     HTN. BP's somewhat volatile since admission, ranging 130-180's/'s. Likely in setting of alcohol use/withdrawal. Per PCP note 5/2017, patient's BP's are well-controlled when patient is sober. Managed on labetalol and losartan PTA, recently had amlodipine discontinued due to soft BP's. Current /121.  - Continue labetalol per above  - Continue losartan 100 mg QD  - PRN hydralazine for SBP >180, DBP >110  - Consider restarting amlodipine if BP's persistently elevated     COPD. PFT's 4/2017 with FVC 93% pred, FEV1 86% pred, FEV1/FVC 93%. Maintained on spiriva and advair PTA. Lungs CTAB on exam, denies SOB.  - Continue spiriva QD, advair BID  - Albuterol PRN     Hypokalemia. Potassium mildly low at 3.2 on admission. On lasix and potassium supplement PTA. Likely hypokalemia 2/2 diuretics, poor PO intake. Received supplementation with subsequent potassium improved at 3.5.  - Continue potassium 20 mEq BID     Hyponatremia. Sodium low at 130 on admission. Possible component from diuretics, poor oral intake. Improved to 136 today.   -  Continue to encourage PO intake     Leukocytosis. WBC elevated at 13.8 upon admission. Patient denies fevers or chills; remains afebrile, no focal s/s of infection. Suspect component of hemoconcentration, has resolved at 8.0 today after receiving IVF. No indication for further evaluation.     Hx of CVA. Patient denies previous history of stroke. No imaging in chart to confirm. Maintained on baby ASA PTA.  - Continue ASA 81 mg QD         Hospital Course:   Inga Ledesma was admitted to Station 3A with attending Ion Coronado MD as a voluntary patient. The patient was placed under status 15 (15 minute checks) to ensure patient safety.   MSSA protocol was initiated due to the patient's history of alcohol abuse and concern for withdrawal symptoms.  CBC, BMP and utox obtained.    All outpatient medications were continued    Inga Ledesma did participate in groups and was visible in the milieu.     The patient's symptoms of withdrawal improved.     Inga Ledesma was released to home. At the time of discharge Inga Ledesma was determined to not be a danger to herself or others.          Discharge Medications:     Current Discharge Medication List      CONTINUE these medications which have CHANGED    Details   FLUoxetine (PROZAC) 40 MG capsule Take 1 capsule (40 mg) by mouth 2 times daily    Associated Diagnoses: Major depressive disorder, recurrent episode, mild (H)      rOPINIRole (REQUIP) 1 MG tablet Take 1 tablet (1 mg) by mouth 3 times daily    Associated Diagnoses: RLS (restless legs syndrome)         CONTINUE these medications which have NOT CHANGED    Details   labetalol (NORMODYNE) 100 MG tablet Take 100 mg by mouth 2 times daily      nystatin (MYCOSTATIN) cream Apply topically 3 times daily as needed (rash on leg)      MAGNESIUM PO Take 1 capsule by mouth daily Form and strength unknown.      ibuprofen (ADVIL/MOTRIN) 200 MG tablet Take 600 mg by mouth every 4 hours as needed for mild pain (neck  and back pain)      acetaminophen (TYLENOL) 500 MG tablet Take 1,000 mg by mouth every 6 hours as needed for pain (neck and back pain)      menthol (ICY HOT) 5 % PTCH Apply 1 patch topically every 8 hours as needed for moderate pain (neck and back pain)      ketotifen (ALAWAY) 0.025 % SOLN ophthalmic solution Place 2 drops into both eyes as needed for dry eyes      gabapentin (NEURONTIN) 300 MG capsule Take 1 capsule by mouth in the morning, 1 capsule in the afternoon, and 2 capsules at bedtime      triamcinolone (KENALOG) 0.1 % cream APPLY SPARINGLY TO AFFECTED AREA(S) THREE TIMES A DAY AS NEEDED  Qty: 80 g, Refills: 0    Associated Diagnoses: Other atopic dermatitis      losartan (COZAAR) 100 MG tablet Take 0.5 tablets (50 mg) by mouth daily  Qty: 90 tablet, Refills: 3    Associated Diagnoses: Hypertension goal BP (blood pressure) < 140/90      furosemide (LASIX) 20 MG tablet Take 1 tablet (20 mg) by mouth daily  Qty: 90 tablet, Refills: 3    Associated Diagnoses: Essential hypertension with goal blood pressure less than 140/90      albuterol (VENTOLIN HFA) 108 (90 BASE) MCG/ACT Inhaler Inhale  into the lungs. INHALE 2 PUFFS FOUR TIMES DAILY AS NEEDED  Qty: 3 Inhaler, Refills: 5    Associated Diagnoses: Chronic bronchitis, unspecified chronic bronchitis type (H)      omeprazole (PRILOSEC) 20 MG CR capsule Take 1 capsule (20 mg) by mouth 2 times daily  Qty: 180 capsule, Refills: 3    Associated Diagnoses: Gastroesophageal reflux disease without esophagitis      fluticasone-salmeterol (ADVAIR DISKUS) 500-50 MCG/DOSE diskus inhaler Inhale 1 puff into the lungs every 12 hours  Qty: 3 Inhaler, Refills: 11    Associated Diagnoses: Chronic bronchitis, unspecified chronic bronchitis type (H)      tiotropium (SPIRIVA HANDIHALER) 18 MCG capsule Inhale contents of one capsule daily.  Qty: 90 capsule, Refills: 3    Associated Diagnoses: Chronic bronchitis, unspecified chronic bronchitis type (H)      fluticasone (FLONASE) 50  MCG/ACT spray Spray 1-2 sprays into both nostrils daily  Qty: 3 g, Refills: 3    Associated Diagnoses: Major depressive disorder, recurrent episode, mild (H)      potassium chloride SA (POTASSIUM CHLORIDE) 20 MEQ CR tablet Take 1 tablet (20 mEq) by mouth 2 times daily  Qty: 180 tablet, Refills: 3    Associated Diagnoses: Hypokalemia      nicotine (NICOTROL) 10 MG inhaler Use 1-2 daily as needed for smoking cessation  Qty: 180 Box, Refills: 12    Associated Diagnoses: Tobacco abuse      calcium carbonate (OS-JENNIFER 500 MG [place]. [place]) 500 MG tablet Take 500 mg by mouth 2 times daily      aspirin 81 MG EC tablet Take 1 tablet (81 mg) by mouth daily  Qty: 100 tablet, Refills: 3    Associated Diagnoses: Hypertension goal BP (blood pressure) < 130/80; COPD (chronic obstructive pulmonary disease) (H)      order for DME Equipment being ordered: Nebulizer  Qty: 1 each, Refills: 0    Associated Diagnoses: Chronic bronchitis, unspecified chronic bronchitis type (H)         STOP taking these medications       POTASSIUM PO Comments:   Reason for Stopping:                    Psychiatric Examination:     The patient was not seen on the day of discharge.            Discharge Plan:   Summary: You were admitted to [facility] on 10/6/17 for detoxification from alcohol. You are being discharged to home with plan to follow up with [facility].     Recommendation: Obtain Rule 25 assessment for outpatient treatment, attend [facility], and work with a sponsor in the program.      Main Diagnosis: Alcohol Dependence     Major Treatments, Procedures and Findings: You were detoxed from alcohol.  You were given a copy of your lab results which were reviewed with you. Please bring your lab results with you to your primary follow up appointment.      Symptoms to Report: If  you experience feeling more anxiety, confusion, losing more sleep, mood declining or thoughts of suicide, notify your treatment team or notify your primary  physician. IF ANY OF THE SYMPTOMS YOU ARE EXPERIENCING ARE A MEDICAL EMERGENCY CALL 911 IMMEDIATELY.     Lifestyle Adjustment: Adjust your lifestyle to get enough sleep, relaxation, exercise and  good nutrition. Continue to develop healthy coping skills to  decrease stress and promote a sober living environment. No use of alcohol, illegal drugs or addictive medications other than what is currently prescribed. AA, NA, and  Sponsor are excellent resources for support.     Primary Provider:  Primary Care Provider Office Phone # Fax #        Min Toledo PA-C [phone]   Pt will schedule follow up appointment within 2 weeks.     Rule 25 Assessment Referral:  You will need a rule 25 to attend treatment. You can go to [facility] for that assessment.   They have walk in assessments available M-F from 7am to 2:15 pm  [address] [place] [address]  Phone: [phone]     Support Group:    AA/CONNOR and Sponsor contact/support     Resources:   Crisis Intervention: 555.889.5729 or 478-686-0109 (TTY: 245.743.9085).  Call anytime for help.  Suicide Awareness Voices of Education (SAVE) (www.save.org): 298-137-OHRI (8425)  National Suicide Prevention Line (www.mentalhealthmn.org): 278-857-JJEN (7030)  National [place] on Mental Illness (www.mn.dakotah.org): 315.511.8603 or 431-442-2504.  Alcoholics Anonymous (www.alcoholics-anonymous.org): Or local information can be found 24 hours a day at:  AA Intergroup service office in [place] (http://www.aastpaul.org/) 217.651.6029  AA Intergroup service office in [facility]: 387.396.3762. (http://www.a[place].org/)  Narcotics Anonymous (www.nam[place].org)1-393.967.4650   [facility] Behavioral Intake [phone]      Attestation:  The patient was seen and evaluated by me. I spent less than 30 minutes on discharge day activities. Ion Coronado MD

## 2017-10-11 ENCOUNTER — TELEPHONE (OUTPATIENT)
Dept: FAMILY MEDICINE | Facility: CLINIC | Age: 52
End: 2017-10-11

## 2017-10-13 NOTE — PROGRESS NOTES
Clinic Care Coordination Contact  [facility]/Voicemail    Referral Source: PCP  Clinical Data: Care Coordinator Outreach  Outreach attempted x 1.  Left message on voicemail with call back information and requested return call. SW reviewed pt chart and found that pt had been d/c from [facility] detox 10/8. Pt does not currently have a follow up appointment scheduled with PCP but support with recovery recommendations will be offered. Pt insurance will be restricting to PCP Min Toledo and [facility].  Plan: Care Coordinator will continue to monitor pt chart for office visits with PCP. Care Coordinator will try to reach patient again in 3-5 business days.    Santiago Jones [facility]  Care Coordination  [place]  [phone]  [email or web address]

## 2017-10-18 ENCOUNTER — TRANSFERRED RECORDS (OUTPATIENT)
Dept: HEALTH INFORMATION MANAGEMENT | Facility: CLINIC | Age: 52
End: 2017-10-18

## 2017-10-27 ENCOUNTER — HOSPITAL ENCOUNTER (INPATIENT)
Facility: CLINIC | Age: 52
LOS: 5 days | Discharge: PSYCHIATRIC OR SUBSTANCE USE FACILITY | DRG: 897 | End: 2017-11-02
Attending: PSYCHIATRY & NEUROLOGY | Admitting: INTERNAL MEDICINE
Payer: COMMERCIAL

## 2017-10-27 DIAGNOSIS — I10 HYPERTENSION GOAL BP (BLOOD PRESSURE) < 140/90: ICD-10-CM

## 2017-10-27 DIAGNOSIS — J42 CHRONIC BRONCHITIS, UNSPECIFIED CHRONIC BRONCHITIS TYPE (H): ICD-10-CM

## 2017-10-27 DIAGNOSIS — K21.9 GASTROESOPHAGEAL REFLUX DISEASE WITHOUT ESOPHAGITIS: Primary | ICD-10-CM

## 2017-10-27 DIAGNOSIS — M46.1 SACROILIITIS (H): ICD-10-CM

## 2017-10-27 DIAGNOSIS — F10.239 ALCOHOL DEPENDENCE WITH WITHDRAWAL WITH COMPLICATION (H): ICD-10-CM

## 2017-10-27 DIAGNOSIS — G25.81 RLS (RESTLESS LEGS SYNDROME): ICD-10-CM

## 2017-10-27 DIAGNOSIS — F10.20 ALCOHOL DEPENDENCE, CONTINUOUS DRINKING BEHAVIOR (H): ICD-10-CM

## 2017-10-27 DIAGNOSIS — F10.220 ALCOHOL DEPENDENCE WITH UNCOMPLICATED INTOXICATION (H): ICD-10-CM

## 2017-10-27 DIAGNOSIS — F10.230 ALCOHOL DEPENDENCE WITH UNCOMPLICATED WITHDRAWAL (H): ICD-10-CM

## 2017-10-27 DIAGNOSIS — F33.0 MAJOR DEPRESSIVE DISORDER, RECURRENT EPISODE, MILD (H): ICD-10-CM

## 2017-10-27 DIAGNOSIS — M79.10 MUSCLE SORENESS: ICD-10-CM

## 2017-10-27 DIAGNOSIS — L20.89 OTHER ATOPIC DERMATITIS: ICD-10-CM

## 2017-10-27 DIAGNOSIS — I10 ESSENTIAL HYPERTENSION WITH GOAL BLOOD PRESSURE LESS THAN 140/90: ICD-10-CM

## 2017-10-27 DIAGNOSIS — Z72.0 TOBACCO ABUSE: ICD-10-CM

## 2017-10-27 LAB
ALBUMIN SERPL-MCNC: 3.6 G/DL (ref 3.4–5)
ALCOHOL BREATH TEST: 0.25 (ref 0–0.01)
ALP SERPL-CCNC: 143 U/L (ref 40–150)
ALT SERPL W P-5'-P-CCNC: 42 U/L (ref 0–50)
ANION GAP SERPL CALCULATED.3IONS-SCNC: 18 MMOL/L (ref 3–14)
AST SERPL W P-5'-P-CCNC: ABNORMAL U/L (ref 0–45)
BASOPHILS # BLD AUTO: 0.1 10E9/L (ref 0–0.2)
BASOPHILS NFR BLD AUTO: 1 %
BILIRUB SERPL-MCNC: 0.4 MG/DL (ref 0.2–1.3)
BUN SERPL-MCNC: 15 MG/DL (ref 7–30)
CALCIUM SERPL-MCNC: 8.2 MG/DL (ref 8.5–10.1)
CHLORIDE SERPL-SCNC: 94 MMOL/L (ref 94–109)
CO2 SERPL-SCNC: 23 MMOL/L (ref 20–32)
CREAT SERPL-MCNC: 0.52 MG/DL (ref 0.52–1.04)
DIFFERENTIAL METHOD BLD: NORMAL
EOSINOPHIL # BLD AUTO: 0 10E9/L (ref 0–0.7)
EOSINOPHIL NFR BLD AUTO: 0.3 %
ERYTHROCYTE [DISTWIDTH] IN BLOOD BY AUTOMATED COUNT: 14.1 % (ref 10–15)
GFR SERPL CREATININE-BSD FRML MDRD: >90 ML/MIN/1.7M2
GLUCOSE SERPL-MCNC: 115 MG/DL (ref 70–99)
HCT VFR BLD AUTO: 41.2 % (ref 35–47)
HGB BLD-MCNC: 14 G/DL (ref 11.7–15.7)
IMM GRANULOCYTES # BLD: 0 10E9/L (ref 0–0.4)
IMM GRANULOCYTES NFR BLD: 0.2 %
LYMPHOCYTES # BLD AUTO: 1.3 10E9/L (ref 0.8–5.3)
LYMPHOCYTES NFR BLD AUTO: 21.8 %
MCH RBC QN AUTO: 31.5 PG (ref 26.5–33)
MCHC RBC AUTO-ENTMCNC: 34 G/DL (ref 31.5–36.5)
MCV RBC AUTO: 93 FL (ref 78–100)
MONOCYTES # BLD AUTO: 0.2 10E9/L (ref 0–1.3)
MONOCYTES NFR BLD AUTO: 2.8 %
NEUTROPHILS # BLD AUTO: 4.5 10E9/L (ref 1.6–8.3)
NEUTROPHILS NFR BLD AUTO: 73.9 %
NRBC # BLD AUTO: 0 10*3/UL
NRBC BLD AUTO-RTO: 0 /100
PLATELET # BLD AUTO: 383 10E9/L (ref 150–450)
POTASSIUM SERPL-SCNC: 3.7 MMOL/L (ref 3.4–5.3)
PROT SERPL-MCNC: 7.7 G/DL (ref 6.8–8.8)
RBC # BLD AUTO: 4.45 10E12/L (ref 3.8–5.2)
SODIUM SERPL-SCNC: 135 MMOL/L (ref 133–144)
TROPONIN I SERPL-MCNC: <0.015 UG/L (ref 0–0.04)
WBC # BLD AUTO: 6.1 10E9/L (ref 4–11)

## 2017-10-27 PROCEDURE — 96374 THER/PROPH/DIAG INJ IV PUSH: CPT | Performed by: PSYCHIATRY & NEUROLOGY

## 2017-10-27 PROCEDURE — 25000132 ZZH RX MED GY IP 250 OP 250 PS 637: Performed by: PSYCHIATRY & NEUROLOGY

## 2017-10-27 PROCEDURE — 84484 ASSAY OF TROPONIN QUANT: CPT | Performed by: PSYCHIATRY & NEUROLOGY

## 2017-10-27 PROCEDURE — 99285 EMERGENCY DEPT VISIT HI MDM: CPT | Mod: 25 | Performed by: PSYCHIATRY & NEUROLOGY

## 2017-10-27 PROCEDURE — 80053 COMPREHEN METABOLIC PANEL: CPT | Performed by: PSYCHIATRY & NEUROLOGY

## 2017-10-27 PROCEDURE — 25000128 H RX IP 250 OP 636: Performed by: FAMILY MEDICINE

## 2017-10-27 PROCEDURE — 85025 COMPLETE CBC W/AUTO DIFF WBC: CPT | Performed by: PSYCHIATRY & NEUROLOGY

## 2017-10-27 PROCEDURE — 99285 EMERGENCY DEPT VISIT HI MDM: CPT | Mod: Z6 | Performed by: PSYCHIATRY & NEUROLOGY

## 2017-10-27 PROCEDURE — 82075 ASSAY OF BREATH ETHANOL: CPT | Performed by: PSYCHIATRY & NEUROLOGY

## 2017-10-27 PROCEDURE — 93005 ELECTROCARDIOGRAM TRACING: CPT | Performed by: PSYCHIATRY & NEUROLOGY

## 2017-10-27 PROCEDURE — 25000125 ZZHC RX 250: Performed by: PSYCHIATRY & NEUROLOGY

## 2017-10-27 RX ORDER — LABETALOL 100 MG/1
100 TABLET, FILM COATED ORAL EVERY 12 HOURS SCHEDULED
Status: DISCONTINUED | OUTPATIENT
Start: 2017-10-27 | End: 2017-10-28

## 2017-10-27 RX ORDER — LORAZEPAM 2 MG/ML
1 INJECTION INTRAMUSCULAR ONCE
Status: COMPLETED | OUTPATIENT
Start: 2017-10-27 | End: 2017-10-27

## 2017-10-27 RX ORDER — DIAZEPAM 5 MG
10 TABLET ORAL ONCE
Status: COMPLETED | OUTPATIENT
Start: 2017-10-27 | End: 2017-10-27

## 2017-10-27 RX ORDER — ONDANSETRON 4 MG/1
4 TABLET, ORALLY DISINTEGRATING ORAL ONCE
Status: COMPLETED | OUTPATIENT
Start: 2017-10-27 | End: 2017-10-27

## 2017-10-27 RX ORDER — GABAPENTIN 300 MG/1
600 CAPSULE ORAL 3 TIMES DAILY
Status: DISCONTINUED | OUTPATIENT
Start: 2017-10-27 | End: 2017-10-28

## 2017-10-27 RX ADMIN — DIAZEPAM 10 MG: 5 TABLET ORAL at 22:57

## 2017-10-27 RX ADMIN — GABAPENTIN 600 MG: 300 CAPSULE ORAL at 22:39

## 2017-10-27 RX ADMIN — LORAZEPAM 1 MG: 2 INJECTION INTRAMUSCULAR; INTRAVENOUS at 23:29

## 2017-10-27 RX ADMIN — LABETALOL HYDROCHLORIDE 100 MG: 100 TABLET, FILM COATED ORAL at 22:39

## 2017-10-27 RX ADMIN — ONDANSETRON 4 MG: 4 TABLET, ORALLY DISINTEGRATING ORAL at 22:22

## 2017-10-27 ASSESSMENT — ENCOUNTER SYMPTOMS
RESPIRATORY NEGATIVE: 1
HEMATOLOGIC/LYMPHATIC NEGATIVE: 1
ACTIVITY CHANGE: 1
SLEEP DISTURBANCE: 1
NAUSEA: 1
APPETITE CHANGE: 1
DECREASED CONCENTRATION: 1
ENDOCRINE NEGATIVE: 1
MUSCULOSKELETAL NEGATIVE: 1
DYSPHORIC MOOD: 1
NEUROLOGICAL NEGATIVE: 1
EYES NEGATIVE: 1
CARDIOVASCULAR NEGATIVE: 1
HALLUCINATIONS: 0
HYPERACTIVE: 0

## 2017-10-27 NOTE — LETTER
Transition Communication Hand-off for Care Transitions to Next Level of Care Provider    Name: Inga Ledesma  MRN #: [id number]  Primary Care Provider: Min Toledo     Primary Clinic: [address] [place] [address]     Reason for Hospitalization:  Alcohol dependence, continuous drinking behavior (H) [F10.20]  Admit Date/Time: 10/27/2017  7:37 PM  Discharge Date: 11/2/17  Payor Source: [facility] / [facility] / Product Type: HMO /     Reason for Communication Hand-off Referral: Post-hospital handoff    Discharge Plan: Patient has made arrangements to go to [facility] for CD treatment       Concern for non-adherence with plan of care: No     Discharge Needs Assessment:  Needs       Most Recent Value    Equipment Currently Used at Home -- [Pt uses 4WW for mobility outside of home]    Transportation Available car        Follow-up plan:  No future appointments.    Any outstanding tests or procedures:              Key Recommendations:      Meenakshi Farias    AVS/Discharge Summary is the source of truth; this is a helpful guide for improved communication of patient story

## 2017-10-27 NOTE — IP AVS SNAPSHOT
[facility] [address]    [address]    [place] [address]    Phone:  [phone]                                       After Visit Summary   10/27/2017    Inga Ledesma    MRN: [id number]           After Visit Summary Signature Page     I have received my discharge instructions, and my questions have been answered. I have discussed any challenges I see with this plan with the nurse or doctor.    ..........................................................................................................................................  Patient/Patient Representative Signature      ..........................................................................................................................................  Patient Representative Print Name and Relationship to Patient    ..................................................               ................................................  Date                                            Time    ..........................................................................................................................................  Reviewed by Signature/Title    ...................................................              ..............................................  Date                                                            Time

## 2017-10-27 NOTE — IP AVS SNAPSHOT
MRN:[id number]                      After Visit Summary   10/27/2017    Inga Ledesma    MRN: [id number]           Thank you!     Thank you for choosing [facility] for your care. Our goal is always to provide you with excellent care. Hearing back from our patients is one way we can continue to improve our services. Please take a few minutes to complete the written survey that you may receive in the mail after you visit with us. Thank you!        Patient Information     Date Of Birth          1965        About your hospital stay     You were admitted on:  October 28, 2017 You last received care in the:  [facility]    You were discharged on:  November 2, 2017        Reason for your hospital stay       Admitted for Alcohol withdrawal                  Who to Call     For medical emergencies, please call 911.  For non-urgent questions about your medical care, please call your primary care provider or clinic, [phone]          Attending Provider     Provider Specialty    Zack Hines MD Psychiatry    [facility] Emergency Medicine    [facility] MD [facility] Internal Medicine    BobbyRandolph MD Internal Medicine       Primary Care Provider Office Phone # Fax #    Min Toledo PA-C [phone]      After Care Instructions     Activity       Your activity upon discharge: activity as tolerated            Diet       Regular Diet Adult            Monitor and record       Watch for fever, chills, nausea, vomiting, chest pain, shortness of breath, abdominal pain  If these symptoms occurs, please call your primary care or come to ED                  Follow-up Appointments     Adult [facility] Follow-up and recommended labs and tests       Follow up with primary care provider, Min Toledo, within 7-14 days for hospital follow- up.  The following labs/tests are recommended: CBC, BMP.      Appointments on [facility] and/or [facility] (with [facility] or  "[facility] provider or service). Call [phone] if you haven't heard regarding these appointments within 7 days of discharge.                  Pending Results     No orders found from 10/25/2017 to 10/28/2017.            Statement of Approval     Ordered          11/02/17 0834  I have reviewed and agree with all the recommendations and orders detailed in this document.  EFFECTIVE NOW     Approved and electronically signed by:  Cr Dillard MD             Admission Information     Date & Time Provider Department Dept. Phone    10/27/2017 Randolph Bobby MD [facility] Unit [facility] [phone]      Your Vitals Were     Blood Pressure Pulse Temperature Respirations Height Weight    132/86 82 97.6  F (36.4  C) (Oral) 18 1.549 m (5' 1\") 69.6 kg (153 lb 8 oz)    Last Period Pulse Oximetry BMI (Body Mass Index)             05/23/2010 94% 29 kg/m2         MyChart Information     [email or web address] gives you secure access to your electronic health record. If you see a primary care provider, you can also send messages to your care team and make appointments. If you have questions, please call your primary care clinic.  If you do not have a primary care provider, please call [phone] and they will assist you.        Care EveryWhere ID     This is your Care EveryWhere ID. This could be used by other organizations to access your [place] medical records  [id number]        Equal Access to Services     ИРИНА KERNS : Hadii chang hermosilloo Socayla, waaxda luqadaha, qaybta kaalmada dajuan, kelsey george. So [facility] [phone].    ATENCIÓN: Si habla español, tiene a cook disposición servicios gratuitos de asistencia lingüística. Llame al [phone].    We comply with applicable federal civil rights laws and [place] laws. We do not discriminate on the basis of race, color, national origin, age, disability, sex, sexual orientation, or gender identity.               Review of your medicines      START taking     "    Dose / Directions    alum & mag hydroxide-simethicone 400-400-40 MG/5ML Susp suspension   Commonly known as:  MYLANTA ES/MAALOX  ES   Used for:  Gastroesophageal reflux disease without esophagitis        Dose:  15-30 mL   Take 15-30 mLs by mouth every 4 hours as needed for indigestion   Quantity:  355 mL   Refills:  0       folic acid 1 MG tablet   Commonly known as:  FOLVITE   Used for:  Alcohol dependence with uncomplicated withdrawal (H)        Dose:  1 mg   Take 1 tablet (1 mg) by mouth daily   Quantity:  30 tablet   Refills:  0       hydrOXYzine 50 MG tablet   Commonly known as:  ATARAX   Used for:  Sacroiliitis (H)        Dose:  50 mg   Take 1 tablet (50 mg) by mouth every 6 hours as needed for anxiety or other (adjuvant pain)   Quantity:  30 tablet   Refills:  1       ipratropium - albuterol 0.5 mg/2.5 mg/3 mL 0.5-2.5 (3) MG/3ML neb solution   Commonly known as:  DUONEB   Used for:  Chronic bronchitis, unspecified chronic bronchitis type (H)        Dose:  3 mL   Take 1 vial (3 mLs) by nebulization 4 times daily as needed for wheezing   Quantity:  360 mL   Refills:  1       magnesium oxide 400 MG tablet   Commonly known as:  MAG-OX   Used for:  Alcohol dependence with withdrawal with complication (H)        Dose:  400 mg   Take 1 tablet (400 mg) by mouth 2 times daily   Quantity:  60 tablet   Refills:  1       multivitamin, therapeutic with minerals Tabs tablet   Used for:  Alcohol dependence with uncomplicated withdrawal (H)        Dose:  1 tablet   Take 1 tablet by mouth daily   Quantity:  30 each   Refills:  1       nicotine 14 MG/24HR 24 hr patch   Commonly known as:  NICODERM CQ   Used for:  Tobacco abuse   Replaces:  nicotine 10 MG Inhaler        Dose:  1 patch   Place 1 patch onto the skin daily   Quantity:  30 patch   Refills:  1       traZODone 50 MG tablet   Commonly known as:  DESYREL   Used for:  Major depressive disorder, recurrent episode, mild (H)        Dose:  50 mg   Take 1 tablet (50 mg)  by mouth At Bedtime   Quantity:  30 tablet   Refills:  1         CONTINUE these medicines which may have CHANGED, or have new prescriptions. If we are uncertain of the size of tablets/capsules you have at home, strength may be listed as something that might have changed.        Dose / Directions    calcium carbonate 1250 MG tablet   Commonly known as:  OS-JENNIFER 500 mg [place]. [place]   This may have changed:  how much to take   Used for:  Alcohol dependence with withdrawal with complication (H)        Dose:  1250 mg   Take 1 tablet (1,250 mg) by mouth 2 times daily   Quantity:  90 tablet   Refills:  0       gabapentin 300 MG capsule   Commonly known as:  NEURONTIN   This may have changed:  additional instructions   Used for:  Major depressive disorder, recurrent episode, mild (H), Sacroiliitis (H)        Take 1 capsule by mouth in the morning, 1 capsule in the afternoon, and 2 capsules at bedtime Take 1 capsule by mouth in the morning, 1 capsule in the afternoon, and 2 capsules at bedtime   Quantity:  90 capsule   Refills:  1       losartan 100 MG tablet   Commonly known as:  COZAAR   This may have changed:  how much to take   Used for:  Hypertension goal BP (blood pressure) < 140/90        Dose:  50 mg   Take 0.5 tablets (50 mg) by mouth daily   Quantity:  30 tablet   Refills:  1       menthol 5 % Ptch   Commonly known as:  ICY HOT   This may have changed:  reasons to take this   Used for:  Muscle soreness        Dose:  1 patch   Apply 1 patch topically every 8 hours as needed for muscle soreness   Quantity:  30 patch   Refills:  3         CONTINUE these medicines which have NOT CHANGED        Dose / Directions    albuterol 108 (90 BASE) MCG/ACT Inhaler   Commonly known as:  VENTOLIN HFA   Used for:  Chronic bronchitis, unspecified chronic bronchitis type (H)        Inhale  into the lungs. INHALE 2 PUFFS FOUR TIMES DAILY AS NEEDED   Quantity:  3 Inhaler   Refills:  0       aspirin 81 MG EC tablet   Used for:  Hypertension  goal BP (blood pressure) < 140/90        Dose:  81 mg   Take 1 tablet (81 mg) by mouth daily   Quantity:  30 tablet   Refills:  1       FLUoxetine 40 MG capsule   Commonly known as:  PROzac   Used for:  Major depressive disorder, recurrent episode, mild (H)        Dose:  40 mg   Take 1 capsule (40 mg) by mouth 2 times daily   Quantity:  60 capsule   Refills:  0       fluticasone 50 MCG/ACT spray   Commonly known as:  FLONASE   Used for:  Major depressive disorder, recurrent episode, mild (H)        Dose:  1-2 spray   Spray 1-2 sprays into both nostrils daily   Quantity:  3 g   Refills:  1       fluticasone-salmeterol 500-50 MCG/DOSE diskus inhaler   Commonly known as:  ADVAIR DISKUS   Used for:  Chronic bronchitis, unspecified chronic bronchitis type (H)        Dose:  1 puff   Inhale 1 puff into the lungs every 12 hours   Quantity:  2 Inhaler   Refills:  1       furosemide 20 MG tablet   Commonly known as:  LASIX   Used for:  Essential hypertension with goal blood pressure less than 140/90        Dose:  20 mg   Take 1 tablet (20 mg) by mouth daily   Quantity:  30 tablet   Refills:  0       labetalol 100 MG tablet   Commonly known as:  NORMODYNE   Used for:  Hypertension goal BP (blood pressure) < 140/90        Dose:  100 mg   Take 1 tablet (100 mg) by mouth 2 times daily   Quantity:  60 tablet   Refills:  0       omeprazole 20 MG CR capsule   Commonly known as:  priLOSEC   Used for:  Gastroesophageal reflux disease without esophagitis        Dose:  20 mg   Take 1 capsule (20 mg) by mouth 2 times daily   Quantity:  60 capsule   Refills:  1       order for DME   Used for:  Chronic bronchitis, unspecified chronic bronchitis type (H)        Equipment being ordered: Nebulizer   Quantity:  1 each   Refills:  0       rOPINIRole 1 MG tablet   Commonly known as:  REQUIP   Used for:  RLS (restless legs syndrome)        Dose:  1 mg   Take 1 tablet (1 mg) by mouth 3 times daily   Quantity:  90 tablet   Refills:  1        tiotropium 18 MCG capsule   Commonly known as:  SPIRIVA HANDIHALER   Used for:  Chronic bronchitis, unspecified chronic bronchitis type (H)        Inhale contents of one capsule daily.   Quantity:  30 capsule   Refills:  1       triamcinolone 0.1 % cream   Commonly known as:  KENALOG   Used for:  Other atopic dermatitis        APPLY SPARINGLY TO AFFECTED AREA(S) THREE TIMES A DAY AS NEEDED   Quantity:  80 g   Refills:  0         STOP taking     acetaminophen 500 MG tablet   Commonly known as:  TYLENOL           ALAWAY 0.025 % Soln ophthalmic solution   Generic drug:  ketotifen           ibuprofen 200 MG tablet   Commonly known as:  ADVIL/MOTRIN           MAGNESIUM PO           nicotine 10 MG Inhaler   Commonly known as:  NICOTROL   Replaced by:  nicotine 14 MG/24HR 24 hr patch           nystatin cream   Commonly known as:  MYCOSTATIN           potassium chloride SA 20 MEQ CR tablet   Commonly known as:  KLOR-CON                Where to get your medicines      These medications were sent to [facility] [address]  [address] [address]     Phone:  [phone]     albuterol 108 (90 BASE) MCG/ACT Inhaler    alum & mag hydroxide-simethicone 400-400-40 MG/5ML Susp suspension    aspirin 81 MG EC tablet    calcium carbonate 1250 MG tablet    FLUoxetine 40 MG capsule    fluticasone 50 MCG/ACT spray    fluticasone-salmeterol 500-50 MCG/DOSE diskus inhaler    folic acid 1 MG tablet    furosemide 20 MG tablet    hydrOXYzine 50 MG tablet    ipratropium - albuterol 0.5 mg/2.5 mg/3 mL 0.5-2.5 (3) MG/3ML neb solution    labetalol 100 MG tablet    losartan 100 MG tablet    magnesium oxide 400 MG tablet    menthol 5 % Ptch    multivitamin, therapeutic with minerals Tabs tablet    nicotine 14 MG/24HR 24 hr patch    omeprazole 20 MG CR capsule    rOPINIRole 1 MG tablet    tiotropium 18 MCG capsule    traZODone 50 MG tablet    triamcinolone 0.1 % cream         Some of  these will need a paper prescription and others can be bought over the counter. Ask your nurse if you have questions.     Bring a paper prescription for each of these medications     gabapentin 300 MG capsule                Protect others around you: Learn how to safely use, store and throw away your medicines at www.disposemymeds.org.             Medication List: This is a list of all your medications and when to take them. Check marks below indicate your daily home schedule. Keep this list as a reference.      Medications           Morning Afternoon Evening Bedtime As Needed    albuterol 108 (90 BASE) MCG/ACT Inhaler   Commonly known as:  VENTOLIN HFA   Inhale  into the lungs. INHALE 2 PUFFS FOUR TIMES DAILY AS NEEDED   Last time this was given:  2 puffs on 10/30/2017  2:38 PM                                alum & mag hydroxide-simethicone 400-400-40 MG/5ML Susp suspension   Commonly known as:  MYLANTA ES/MAALOX  ES   Take 15-30 mLs by mouth every 4 hours as needed for indigestion   Last time this was given:  15 mLs on 10/29/2017  8:31 PM                                aspirin 81 MG EC tablet   Take 1 tablet (81 mg) by mouth daily   Last time this was given:  81 mg on 11/2/2017  8:20 AM                                calcium carbonate 1250 MG tablet   Commonly known as:  OS-JENNIFER 500 mg [place]. [place]   Take 1 tablet (1,250 mg) by mouth 2 times daily   Last time this was given:  1,250 mg on 11/2/2017  8:21 AM                                FLUoxetine 40 MG capsule   Commonly known as:  PROzac   Take 1 capsule (40 mg) by mouth 2 times daily   Last time this was given:  40 mg on 11/2/2017  8:20 AM                                fluticasone 50 MCG/ACT spray   Commonly known as:  FLONASE   Spray 1-2 sprays into both nostrils daily   Last time this was given:  2 sprays on 11/2/2017  8:22 AM                                fluticasone-salmeterol 500-50 MCG/DOSE diskus inhaler   Commonly known as:  ADVAIR DISKUS   Inhale 1 puff  into the lungs every 12 hours                                folic acid 1 MG tablet   Commonly known as:  FOLVITE   Take 1 tablet (1 mg) by mouth daily   Last time this was given:  1 mg on 11/2/2017  8:20 AM                                furosemide 20 MG tablet   Commonly known as:  LASIX   Take 1 tablet (20 mg) by mouth daily   Last time this was given:  20 mg on 11/2/2017  8:20 AM                                gabapentin 300 MG capsule   Commonly known as:  NEURONTIN   Take 1 capsule by mouth in the morning, 1 capsule in the afternoon, and 2 capsules at bedtime Take 1 capsule by mouth in the morning, 1 capsule in the afternoon, and 2 capsules at bedtime   Last time this was given:  300 mg on 11/2/2017  8:21 AM                                hydrOXYzine 50 MG tablet   Commonly known as:  ATARAX   Take 1 tablet (50 mg) by mouth every 6 hours as needed for anxiety or other (adjuvant pain)   Last time this was given:  25 mg on 11/1/2017  9:22 PM                                ipratropium - albuterol 0.5 mg/2.5 mg/3 mL 0.5-2.5 (3) MG/3ML neb solution   Commonly known as:  DUONEB   Take 1 vial (3 mLs) by nebulization 4 times daily as needed for wheezing   Last time this was given:  3 mLs on 10/30/2017 12:42 AM                                labetalol 100 MG tablet   Commonly known as:  NORMODYNE   Take 1 tablet (100 mg) by mouth 2 times daily   Last time this was given:  100 mg on 11/2/2017  8:20 AM                                losartan 100 MG tablet   Commonly known as:  COZAAR   Take 0.5 tablets (50 mg) by mouth daily   Last time this was given:  50 mg on 11/2/2017  8:20 AM                                magnesium oxide 400 MG tablet   Commonly known as:  MAG-OX   Take 1 tablet (400 mg) by mouth 2 times daily   Last time this was given:  400 mg on 11/2/2017  8:20 AM                                menthol 5 % Ptch   Commonly known as:  ICY HOT   Apply 1 patch topically every 8 hours as needed for muscle soreness    Last time this was given:  1 patch on 11/1/2017  8:08 AM                                multivitamin, therapeutic with minerals Tabs tablet   Take 1 tablet by mouth daily   Last time this was given:  1 tablet on 11/2/2017  8:20 AM                                nicotine 14 MG/24HR 24 hr patch   Commonly known as:  NICODERM CQ   Place 1 patch onto the skin daily   Last time this was given:  1 patch on 11/2/2017  8:18 AM                                omeprazole 20 MG CR capsule   Commonly known as:  priLOSEC   Take 1 capsule (20 mg) by mouth 2 times daily   Last time this was given:  20 mg on 11/2/2017  8:38 AM                                order for DME   Equipment being ordered: Nebulizer                                rOPINIRole 1 MG tablet   Commonly known as:  REQUIP   Take 1 tablet (1 mg) by mouth 3 times daily   Last time this was given:  1 mg on 11/2/2017  8:39 AM                                tiotropium 18 MCG capsule   Commonly known as:  SPIRIVA HANDIHALER   Inhale contents of one capsule daily.   Last time this was given:  18 mcg on 11/1/2017  8:08 AM                                traZODone 50 MG tablet   Commonly known as:  DESYREL   Take 1 tablet (50 mg) by mouth At Bedtime   Last time this was given:  50 mg on 11/1/2017  9:21 PM                                triamcinolone 0.1 % cream   Commonly known as:  KENALOG   APPLY SPARINGLY TO AFFECTED AREA(S) THREE TIMES A DAY AS NEEDED

## 2017-10-27 NOTE — LETTER
Transition Communication Hand-off for Care Transitions to Next Level of Care Provider    Name: Inga Ledesma  MRN #: [id number]  Primary Care Provider: Min Toledo     Primary Clinic: [address] [place] [address]     Reason for Hospitalization:  Alcohol dependence, continuous drinking behavior (H) [F10.20]  Admit Date/Time: 10/27/2017  7:37 PM  Discharge Date: 11/2/17  Payor Source: Payor: [employer] / [facility] / Product Type: HMO /            Reason for Communication Hand-off Referral: Other Discharge Plan    Discharge Plan:  [facility] .  Will admit there upon DC from hospital 11/2/17     Concern for non-adherence with plan of care:   Y/N : pt is highly motivated for C D Treatment, but also has increased anxiety and may be at risk for relapse on alcohol  Discharge Needs Assessment:  Needs       Most Recent Value    Equipment Currently Used at Home -- [Pt uses 4WW for mobility outside of home]    Transportation Available car            Any outstanding tests or procedures:              GINA Huber  8A/10A [facility]  [address] [address] [phone]  [email or web address]    AVS/Discharge Summary is the source of truth; this is a helpful guide for improved communication of patient story

## 2017-10-28 PROBLEM — F10.939 ALCOHOL WITHDRAWAL (H): Status: ACTIVE | Noted: 2017-10-28

## 2017-10-28 LAB
ALBUMIN SERPL-MCNC: 3.3 G/DL (ref 3.4–5)
ALP SERPL-CCNC: 131 U/L (ref 40–150)
ALT SERPL W P-5'-P-CCNC: 37 U/L (ref 0–50)
AMPHETAMINES UR QL SCN: NEGATIVE
ANION GAP SERPL CALCULATED.3IONS-SCNC: 11 MMOL/L (ref 3–14)
AST SERPL W P-5'-P-CCNC: 60 U/L (ref 0–45)
BARBITURATES UR QL: NEGATIVE
BENZODIAZ UR QL: POSITIVE
BILIRUB SERPL-MCNC: 0.9 MG/DL (ref 0.2–1.3)
BUN SERPL-MCNC: 19 MG/DL (ref 7–30)
CALCIUM SERPL-MCNC: 7.8 MG/DL (ref 8.5–10.1)
CANNABINOIDS UR QL SCN: NEGATIVE
CHLORIDE SERPL-SCNC: 92 MMOL/L (ref 94–109)
CO2 SERPL-SCNC: 30 MMOL/L (ref 20–32)
COCAINE UR QL: NEGATIVE
CREAT SERPL-MCNC: 0.66 MG/DL (ref 0.52–1.04)
ETHANOL UR QL SCN: NEGATIVE
GFR SERPL CREATININE-BSD FRML MDRD: >90 ML/MIN/1.7M2
GLUCOSE SERPL-MCNC: 111 MG/DL (ref 70–99)
HCG UR QL: NEGATIVE
INTERPRETATION ECG - MUSE: NORMAL
MAGNESIUM SERPL-MCNC: 1 MG/DL (ref 1.6–2.3)
MAGNESIUM SERPL-MCNC: 2.1 MG/DL (ref 1.6–2.3)
OPIATES UR QL SCN: NEGATIVE
POTASSIUM SERPL-SCNC: 3.3 MMOL/L (ref 3.4–5.3)
POTASSIUM SERPL-SCNC: 3.9 MMOL/L (ref 3.4–5.3)
PROT SERPL-MCNC: 6.6 G/DL (ref 6.8–8.8)
SODIUM SERPL-SCNC: 133 MMOL/L (ref 133–144)

## 2017-10-28 PROCEDURE — 36415 COLL VENOUS BLD VENIPUNCTURE: CPT | Performed by: INTERNAL MEDICINE

## 2017-10-28 PROCEDURE — 25000132 ZZH RX MED GY IP 250 OP 250 PS 637: Performed by: INTERNAL MEDICINE

## 2017-10-28 PROCEDURE — 99207 ZZC MOONLIGHTING INDICATOR: CPT | Performed by: INTERNAL MEDICINE

## 2017-10-28 PROCEDURE — 96361 HYDRATE IV INFUSION ADD-ON: CPT | Performed by: PSYCHIATRY & NEUROLOGY

## 2017-10-28 PROCEDURE — 80053 COMPREHEN METABOLIC PANEL: CPT | Performed by: INTERNAL MEDICINE

## 2017-10-28 PROCEDURE — 80307 DRUG TEST PRSMV CHEM ANLYZR: CPT | Performed by: FAMILY MEDICINE

## 2017-10-28 PROCEDURE — 83735 ASSAY OF MAGNESIUM: CPT | Performed by: INTERNAL MEDICINE

## 2017-10-28 PROCEDURE — 25000128 H RX IP 250 OP 636: Performed by: PSYCHIATRY & NEUROLOGY

## 2017-10-28 PROCEDURE — 25000132 ZZH RX MED GY IP 250 OP 250 PS 637: Performed by: FAMILY MEDICINE

## 2017-10-28 PROCEDURE — 25000128 H RX IP 250 OP 636

## 2017-10-28 PROCEDURE — 12000001 ZZH R&B MED SURG/OB UMMC

## 2017-10-28 PROCEDURE — HZ2ZZZZ DETOXIFICATION SERVICES FOR SUBSTANCE ABUSE TREATMENT: ICD-10-PCS | Performed by: PSYCHIATRY & NEUROLOGY

## 2017-10-28 PROCEDURE — 81025 URINE PREGNANCY TEST: CPT | Performed by: FAMILY MEDICINE

## 2017-10-28 PROCEDURE — 25000128 H RX IP 250 OP 636: Performed by: INTERNAL MEDICINE

## 2017-10-28 PROCEDURE — 80320 DRUG SCREEN QUANTALCOHOLS: CPT | Performed by: FAMILY MEDICINE

## 2017-10-28 PROCEDURE — 84132 ASSAY OF SERUM POTASSIUM: CPT | Performed by: INTERNAL MEDICINE

## 2017-10-28 PROCEDURE — 99222 1ST HOSP IP/OBS MODERATE 55: CPT | Mod: AI | Performed by: INTERNAL MEDICINE

## 2017-10-28 PROCEDURE — 25000125 ZZHC RX 250: Performed by: INTERNAL MEDICINE

## 2017-10-28 RX ORDER — ONDANSETRON 2 MG/ML
4 INJECTION INTRAMUSCULAR; INTRAVENOUS EVERY 6 HOURS PRN
Status: DISCONTINUED | OUTPATIENT
Start: 2017-10-28 | End: 2017-11-02 | Stop reason: HOSPADM

## 2017-10-28 RX ORDER — GABAPENTIN 300 MG/1
300 CAPSULE ORAL 2 TIMES DAILY
Status: DISCONTINUED | OUTPATIENT
Start: 2017-10-28 | End: 2017-11-02 | Stop reason: HOSPADM

## 2017-10-28 RX ORDER — LABETALOL 100 MG/1
100 TABLET, FILM COATED ORAL 2 TIMES DAILY
Status: DISCONTINUED | OUTPATIENT
Start: 2017-10-28 | End: 2017-11-02 | Stop reason: HOSPADM

## 2017-10-28 RX ORDER — SODIUM CHLORIDE 9 MG/ML
INJECTION, SOLUTION INTRAVENOUS CONTINUOUS
Status: DISCONTINUED | OUTPATIENT
Start: 2017-10-28 | End: 2017-10-30

## 2017-10-28 RX ORDER — POTASSIUM CHLORIDE 750 MG/1
20-40 TABLET, EXTENDED RELEASE ORAL
Status: DISCONTINUED | OUTPATIENT
Start: 2017-10-28 | End: 2017-11-02 | Stop reason: HOSPADM

## 2017-10-28 RX ORDER — ALBUTEROL SULFATE 0.83 MG/ML
2.5 SOLUTION RESPIRATORY (INHALATION)
Status: DISCONTINUED | OUTPATIENT
Start: 2017-10-28 | End: 2017-11-02 | Stop reason: HOSPADM

## 2017-10-28 RX ORDER — ASPIRIN 81 MG/1
81 TABLET ORAL DAILY
Status: DISCONTINUED | OUTPATIENT
Start: 2017-10-28 | End: 2017-11-02 | Stop reason: HOSPADM

## 2017-10-28 RX ORDER — POTASSIUM CHLORIDE 7.45 MG/ML
10 INJECTION INTRAVENOUS
Status: DISCONTINUED | OUTPATIENT
Start: 2017-10-28 | End: 2017-11-02 | Stop reason: HOSPADM

## 2017-10-28 RX ORDER — ALBUTEROL SULFATE 90 UG/1
2 AEROSOL, METERED RESPIRATORY (INHALATION) EVERY 6 HOURS PRN
Status: DISCONTINUED | OUTPATIENT
Start: 2017-10-28 | End: 2017-11-02 | Stop reason: HOSPADM

## 2017-10-28 RX ORDER — POTASSIUM CHLORIDE 29.8 MG/ML
20 INJECTION INTRAVENOUS
Status: DISCONTINUED | OUTPATIENT
Start: 2017-10-28 | End: 2017-10-28 | Stop reason: RX

## 2017-10-28 RX ORDER — SODIUM CHLORIDE 9 MG/ML
INJECTION, SOLUTION INTRAVENOUS
Status: COMPLETED
Start: 2017-10-28 | End: 2017-10-28

## 2017-10-28 RX ORDER — POTASSIUM CL/LIDO/0.9 % NACL 10MEQ/0.1L
10 INTRAVENOUS SOLUTION, PIGGYBACK (ML) INTRAVENOUS
Status: DISCONTINUED | OUTPATIENT
Start: 2017-10-28 | End: 2017-11-02 | Stop reason: HOSPADM

## 2017-10-28 RX ORDER — NALOXONE HYDROCHLORIDE 0.4 MG/ML
.1-.4 INJECTION, SOLUTION INTRAMUSCULAR; INTRAVENOUS; SUBCUTANEOUS
Status: DISCONTINUED | OUTPATIENT
Start: 2017-10-28 | End: 2017-11-02 | Stop reason: HOSPADM

## 2017-10-28 RX ORDER — POTASSIUM CHLORIDE 1.5 G/1.58G
20-40 POWDER, FOR SOLUTION ORAL
Status: DISCONTINUED | OUTPATIENT
Start: 2017-10-28 | End: 2017-11-02 | Stop reason: HOSPADM

## 2017-10-28 RX ORDER — ROPINIROLE 1 MG/1
1 TABLET, FILM COATED ORAL 3 TIMES DAILY
Status: DISCONTINUED | OUTPATIENT
Start: 2017-10-28 | End: 2017-11-02 | Stop reason: HOSPADM

## 2017-10-28 RX ORDER — IPRATROPIUM BROMIDE AND ALBUTEROL SULFATE 2.5; .5 MG/3ML; MG/3ML
3 SOLUTION RESPIRATORY (INHALATION) 4 TIMES DAILY PRN
Status: DISCONTINUED | OUTPATIENT
Start: 2017-10-28 | End: 2017-11-02 | Stop reason: HOSPADM

## 2017-10-28 RX ORDER — DIAZEPAM 5 MG
5-20 TABLET ORAL EVERY 30 MIN PRN
Status: DISCONTINUED | OUTPATIENT
Start: 2017-10-28 | End: 2017-10-30

## 2017-10-28 RX ORDER — CALCIUM CARBONATE 500(1250)
1250 TABLET ORAL 2 TIMES DAILY
Status: DISCONTINUED | OUTPATIENT
Start: 2017-10-28 | End: 2017-11-02 | Stop reason: HOSPADM

## 2017-10-28 RX ORDER — LANOLIN ALCOHOL/MO/W.PET/CERES
100 CREAM (GRAM) TOPICAL DAILY
Status: COMPLETED | OUTPATIENT
Start: 2017-10-28 | End: 2017-10-30

## 2017-10-28 RX ORDER — MAGNESIUM SULFATE HEPTAHYDRATE 40 MG/ML
4 INJECTION, SOLUTION INTRAVENOUS EVERY 4 HOURS PRN
Status: DISCONTINUED | OUTPATIENT
Start: 2017-10-28 | End: 2017-10-30

## 2017-10-28 RX ORDER — FOLIC ACID 1 MG/1
1 TABLET ORAL DAILY
Status: DISCONTINUED | OUTPATIENT
Start: 2017-10-28 | End: 2017-11-02 | Stop reason: HOSPADM

## 2017-10-28 RX ORDER — GABAPENTIN 300 MG/1
600 CAPSULE ORAL AT BEDTIME
Status: DISCONTINUED | OUTPATIENT
Start: 2017-10-28 | End: 2017-11-02 | Stop reason: HOSPADM

## 2017-10-28 RX ORDER — IBUPROFEN 800 MG/1
800 TABLET, FILM COATED ORAL ONCE
Status: COMPLETED | OUTPATIENT
Start: 2017-10-28 | End: 2017-10-28

## 2017-10-28 RX ORDER — ACETAMINOPHEN 325 MG/1
650 TABLET ORAL EVERY 4 HOURS PRN
Status: DISCONTINUED | OUTPATIENT
Start: 2017-10-28 | End: 2017-11-02 | Stop reason: HOSPADM

## 2017-10-28 RX ORDER — GABAPENTIN 300 MG/1
300 CAPSULE ORAL 2 TIMES DAILY
Status: DISCONTINUED | OUTPATIENT
Start: 2017-10-28 | End: 2017-10-28

## 2017-10-28 RX ORDER — MULTIPLE VITAMINS W/ MINERALS TAB 9MG-400MCG
1 TAB ORAL DAILY
Status: DISCONTINUED | OUTPATIENT
Start: 2017-10-28 | End: 2017-11-02 | Stop reason: HOSPADM

## 2017-10-28 RX ORDER — ONDANSETRON 4 MG/1
4 TABLET, ORALLY DISINTEGRATING ORAL EVERY 6 HOURS PRN
Status: DISCONTINUED | OUTPATIENT
Start: 2017-10-28 | End: 2017-11-02 | Stop reason: HOSPADM

## 2017-10-28 RX ORDER — FLUTICASONE PROPIONATE 50 MCG
1-2 SPRAY, SUSPENSION (ML) NASAL DAILY
Status: DISCONTINUED | OUTPATIENT
Start: 2017-10-28 | End: 2017-11-02 | Stop reason: HOSPADM

## 2017-10-28 RX ORDER — ALUMINA, MAGNESIA, AND SIMETHICONE 2400; 2400; 240 MG/30ML; MG/30ML; MG/30ML
15-30 SUSPENSION ORAL EVERY 4 HOURS PRN
Status: DISCONTINUED | OUTPATIENT
Start: 2017-10-28 | End: 2017-11-02 | Stop reason: HOSPADM

## 2017-10-28 RX ORDER — TIOTROPIUM BROMIDE 18 UG/1
18 CAPSULE ORAL; RESPIRATORY (INHALATION) DAILY
Status: DISCONTINUED | OUTPATIENT
Start: 2017-10-28 | End: 2017-11-02 | Stop reason: HOSPADM

## 2017-10-28 RX ORDER — DIAZEPAM 10 MG/2ML
5 INJECTION, SOLUTION INTRAMUSCULAR; INTRAVENOUS ONCE
Status: COMPLETED | OUTPATIENT
Start: 2017-10-28 | End: 2017-10-28

## 2017-10-28 RX ADMIN — Medication 10 MEQ: at 13:06

## 2017-10-28 RX ADMIN — Medication 10 MEQ: at 16:40

## 2017-10-28 RX ADMIN — IBUPROFEN 800 MG: 800 TABLET ORAL at 01:37

## 2017-10-28 RX ADMIN — SODIUM CHLORIDE: 0.9 INJECTION, SOLUTION INTRAVENOUS at 15:53

## 2017-10-28 RX ADMIN — POTASSIUM CHLORIDE 20 MEQ: 750 TABLET, FILM COATED, EXTENDED RELEASE ORAL at 22:42

## 2017-10-28 RX ADMIN — GABAPENTIN 300 MG: 300 CAPSULE ORAL at 09:23

## 2017-10-28 RX ADMIN — ONDANSETRON 4 MG: 2 INJECTION INTRAMUSCULAR; INTRAVENOUS at 14:17

## 2017-10-28 RX ADMIN — ROPINIROLE HYDROCHLORIDE 1 MG: 1 TABLET, FILM COATED ORAL at 14:17

## 2017-10-28 RX ADMIN — SODIUM CHLORIDE: 0.9 INJECTION, SOLUTION INTRAVENOUS at 15:52

## 2017-10-28 RX ADMIN — SODIUM CHLORIDE: 9 INJECTION, SOLUTION INTRAVENOUS at 18:08

## 2017-10-28 RX ADMIN — SODIUM CHLORIDE: 9 INJECTION, SOLUTION INTRAVENOUS at 03:59

## 2017-10-28 RX ADMIN — FLUOXETINE 40 MG: 20 CAPSULE ORAL at 19:48

## 2017-10-28 RX ADMIN — Medication 10 MEQ: at 14:31

## 2017-10-28 RX ADMIN — ASPIRIN 81 MG: 81 TABLET, COATED ORAL at 09:22

## 2017-10-28 RX ADMIN — FOLIC ACID 1 MG: 1 TABLET ORAL at 09:22

## 2017-10-28 RX ADMIN — MULTIPLE VITAMINS W/ MINERALS TAB 1 TABLET: TAB at 09:22

## 2017-10-28 RX ADMIN — LABETALOL HYDROCHLORIDE 100 MG: 100 TABLET, FILM COATED ORAL at 19:48

## 2017-10-28 RX ADMIN — SODIUM CHLORIDE 1000 ML: 9 INJECTION, SOLUTION INTRAVENOUS at 00:04

## 2017-10-28 RX ADMIN — Medication 10 MEQ: at 18:08

## 2017-10-28 RX ADMIN — CALCIUM 1250 MG: 500 TABLET ORAL at 09:25

## 2017-10-28 RX ADMIN — TIOTROPIUM BROMIDE 18 MCG: 18 CAPSULE ORAL; RESPIRATORY (INHALATION) at 09:22

## 2017-10-28 RX ADMIN — DIAZEPAM 5 MG: 5 TABLET ORAL at 16:49

## 2017-10-28 RX ADMIN — DIAZEPAM 10 MG: 5 TABLET ORAL at 09:21

## 2017-10-28 RX ADMIN — OMEPRAZOLE 20 MG: 20 CAPSULE, DELAYED RELEASE ORAL at 09:23

## 2017-10-28 RX ADMIN — FLUTICASONE PROPIONATE 2 SPRAY: 50 SPRAY, METERED NASAL at 09:25

## 2017-10-28 RX ADMIN — ROPINIROLE HYDROCHLORIDE 1 MG: 1 TABLET, FILM COATED ORAL at 09:26

## 2017-10-28 RX ADMIN — PROCHLORPERAZINE EDISYLATE 10 MG: 5 INJECTION INTRAMUSCULAR; INTRAVENOUS at 11:50

## 2017-10-28 RX ADMIN — Medication 100 MG: at 09:22

## 2017-10-28 RX ADMIN — ONDANSETRON 4 MG: 2 INJECTION INTRAMUSCULAR; INTRAVENOUS at 08:32

## 2017-10-28 RX ADMIN — ROPINIROLE HYDROCHLORIDE 1 MG: 1 TABLET, FILM COATED ORAL at 19:48

## 2017-10-28 RX ADMIN — MAGNESIUM SULFATE IN WATER 4 G: 40 INJECTION, SOLUTION INTRAVENOUS at 14:17

## 2017-10-28 RX ADMIN — FLUTICASONE FUROATE AND VILANTEROL TRIFENATATE 1 PUFF: 200; 25 POWDER RESPIRATORY (INHALATION) at 09:26

## 2017-10-28 RX ADMIN — FOLIC ACID: 5 INJECTION, SOLUTION INTRAMUSCULAR; INTRAVENOUS; SUBCUTANEOUS at 09:27

## 2017-10-28 RX ADMIN — GABAPENTIN 600 MG: 300 CAPSULE ORAL at 22:42

## 2017-10-28 RX ADMIN — LABETALOL HYDROCHLORIDE 100 MG: 100 TABLET, FILM COATED ORAL at 09:25

## 2017-10-28 RX ADMIN — DIAZEPAM 10 MG: 5 TABLET ORAL at 06:37

## 2017-10-28 RX ADMIN — DIAZEPAM 5 MG: 5 TABLET ORAL at 19:59

## 2017-10-28 RX ADMIN — DIAZEPAM 10 MG: 5 TABLET ORAL at 04:09

## 2017-10-28 RX ADMIN — CALCIUM 1250 MG: 500 TABLET ORAL at 22:42

## 2017-10-28 RX ADMIN — GABAPENTIN 300 MG: 300 CAPSULE ORAL at 14:17

## 2017-10-28 RX ADMIN — DIAZEPAM 5 MG: 5 INJECTION, SOLUTION INTRAMUSCULAR; INTRAVENOUS at 12:35

## 2017-10-28 RX ADMIN — OMEPRAZOLE 20 MG: 20 CAPSULE, DELAYED RELEASE ORAL at 16:40

## 2017-10-28 RX ADMIN — FLUOXETINE 40 MG: 20 CAPSULE ORAL at 09:21

## 2017-10-28 RX ADMIN — ACETAMINOPHEN 650 MG: 325 TABLET, FILM COATED ORAL at 06:37

## 2017-10-28 RX ADMIN — DIAZEPAM 5 MG: 5 TABLET ORAL at 03:30

## 2017-10-28 ASSESSMENT — ACTIVITIES OF DAILY LIVING (ADL)
ADLS_ACUITY_SCORE: 10
COGNITION: 0 - NO COGNITION ISSUES REPORTED
FALL_HISTORY_WITHIN_LAST_SIX_MONTHS: YES
RETIRED_COMMUNICATION: 0-->UNDERSTANDS/COMMUNICATES WITHOUT DIFFICULTY
TOILETING: 0-->INDEPENDENT
ADLS_ACUITY_SCORE: 10
AMBULATION: 0-->INDEPENDENT
TRANSFERRING: 0-->INDEPENDENT
DRESS: 0-->INDEPENDENT
BATHING: 0-->INDEPENDENT
RETIRED_EATING: 0-->INDEPENDENT
SWALLOWING: 0-->SWALLOWS FOODS/LIQUIDS WITHOUT DIFFICULTY
NUMBER_OF_TIMES_PATIENT_HAS_FALLEN_WITHIN_LAST_SIX_MONTHS: 1

## 2017-10-28 NOTE — ED NOTES
Patient arrives to [facility]. Psych Associate explains process, gives patient urine cup and questionnaire. Patient told about meeting with Mental Health  and Psychiatrist. Patient told about 2-5 hour time frame for complete evaluation.

## 2017-10-28 NOTE — ED NOTES
Bed: HW01  Expected date: 10/27/17  Expected time: 7:19 PM  Means of arrival: Ambulance  Comments:  tejas 206--52 female depression/etoh

## 2017-10-28 NOTE — ED NOTES
"While giving pt medications and starting IV pt stated that she had chest pain \"a little while ago\". IV started in (L) hand and blood drawn and sent. Pt will be transferred to the main ED for monitoring and EKG  "

## 2017-10-28 NOTE — ED PROVIDER NOTES
History     Chief Complaint   Patient presents with     Alcohol Intoxication     pt's son called to have his Mother get help from alcohol intoxication and depression     Depression     pt's Father just recently passed away this Sat. Pt denies S.I     HPI  Inga Ledesma is a 52 year old female who is here via EMS. Patient reports calling EMS herself as she was upset that her son and family is telling her she needs to get into treatment. Patient feels that her family hates her. She admits to drinking to help with sleep. She also battles depression and has a multitude of medical problems. She is on many meds but has not taken them since she has been on a weeklong alcohol binge. She has been drinking 1 pint of vodka daily. She last drank last night, but still breathalized 0.25 on arrival. She is interested in detox. She feels she needs to get into treatment and would like to [facility]. Patient denies history of withdrawal seizure. She was hospitalized 2 weeks ago. She presently denies using other drugs. She denies feeling suicidal. She reports making a promise to her mother she would never attempt suicide. Mother  10 years ago. Patient works for [employer] in the [employer]. She has not been to work past week. There is no thought disorder, nor psychosis nor hallucinations.    PERSONAL MEDICAL HISTORY  Past Medical History:   Diagnosis Date     Alcohol abuse, in remission      Cancer of labia majora (H)      Cervical dysplasia      Congestive heart failure (H) 16     COPD (chronic obstructive pulmonary disease) (H) 2011     CVA (cerebral infarction) 2012     Dependent edema      Depression, major      Depressive disorder      GERD (gastroesophageal reflux disease)      Hyperlipidemia LDL goal < 160      Hypertension      Iron deficiency anemia      RLS (restless legs syndrome)      Sacroiliitis (H)     steroid injections ineffective, chronic low back pain     Tobacco  abuse      Uncomplicated asthma      Vitamin D deficiencies      PAST SURGICAL HISTORY  Past Surgical History:   Procedure Laterality Date     COLONOSCOPY       EYE SURGERY       HERNIA REPAIR, INGUINAL RT/LT  2007     HYSTERECTOMY  2010     HYSTERECTOMY TOTAL ABDOMINAL  7/28/10    Bilateral salpingectomy.  ovaries conserved.     LASER TX, CERVICAL  1987     LYMPH NODE BIOPSY  2007    inguinal     LYSIS OF LABIAL LESION(S)  1985, 1987     FAMILY HISTORY  Family History   Problem Relation Age of Onset     Depression/Anxiety Mother      Asthma Mother      CEREBROVASCULAR DISEASE Father      Hypertension Father      Lung Cancer Father      Breast Cancer Paternal Aunt      Other Cancer Other      Depression Other      Anxiety Disorder Other      MENTAL ILLNESS Other      Substance Abuse Other      Asthma Other      Obesity Sister      Obesity Son      SOCIAL HISTORY  Social History   Substance Use Topics     Smoking status: Current Every Day Smoker     Packs/day: 0.50     Years: 34.00     Types: Cigarettes     Start date: 11/1/1979     Last attempt to quit: 10/3/2012     Smokeless tobacco: Never Used      Comment: 5 cigarettes daily     Alcohol use Yes      Comment: Drinking mouthwash.     MEDICATIONS  Current Facility-Administered Medications   Medication     gabapentin (NEURONTIN) capsule 600 mg     labetalol (NORMODYNE) tablet 100 mg     0.9% sodium chloride BOLUS     diazepam (VALIUM) tablet 10 mg     Current Outpatient Prescriptions   Medication     losartan (COZAAR) 100 MG tablet     furosemide (LASIX) 20 MG tablet     omeprazole (PRILOSEC) 20 MG CR capsule     aspirin 81 MG EC tablet     FLUoxetine (PROZAC) 40 MG capsule     rOPINIRole (REQUIP) 1 MG tablet     labetalol (NORMODYNE) 100 MG tablet     nystatin (MYCOSTATIN) cream     MAGNESIUM PO     ibuprofen (ADVIL/MOTRIN) 200 MG tablet     acetaminophen (TYLENOL) 500 MG tablet     menthol (ICY HOT) 5 % PTCH     ketotifen (ALAWAY) 0.025 % SOLN ophthalmic solution      gabapentin (NEURONTIN) 300 MG capsule     triamcinolone (KENALOG) 0.1 % cream     albuterol (VENTOLIN HFA) 108 (90 BASE) MCG/ACT Inhaler     fluticasone-salmeterol (ADVAIR DISKUS) 500-50 MCG/DOSE diskus inhaler     tiotropium (SPIRIVA HANDIHALER) 18 MCG capsule     fluticasone (FLONASE) 50 MCG/ACT spray     potassium chloride SA (POTASSIUM CHLORIDE) 20 MEQ CR tablet     nicotine (NICOTROL) 10 MG inhaler     order for DME     calcium carbonate (OS-JENNIFER 500 MG [place]. [place]) 500 MG tablet     ALLERGIES  Allergies   Allergen Reactions     Codeine Nausea     Reglan [Metoclopramide Hcl] Other (See Comments)     Body tenses up         I have reviewed the Medications, Allergies, Past Medical and Surgical History, and Social History in the Epic system.    Review of Systems   Constitutional: Positive for activity change and appetite change.   HENT: Negative.    Eyes: Negative.    Respiratory: Negative.    Cardiovascular: Negative.    Gastrointestinal: Positive for nausea.   Endocrine: Negative.    Genitourinary: Negative.    Musculoskeletal: Negative.    Skin: Negative.    Neurological: Negative.    Hematological: Negative.    Psychiatric/Behavioral: Positive for decreased concentration, dysphoric mood and sleep disturbance. Negative for hallucinations and suicidal ideas. The patient is not hyperactive.    All other systems reviewed and are negative.      Physical Exam   BP: (!) 167/94  Heart Rate: 111  Temp: 98.2  F (36.8  C)  Resp: 16  SpO2: 94 %      Physical Exam   Constitutional: She appears well-developed and well-nourished.   HENT:   Head: Normocephalic.   Eyes: Pupils are equal, round, and reactive to light.   Neck: Normal range of motion.   Cardiovascular: Normal rate.    Pulmonary/Chest: Effort normal.   Abdominal: Soft.   Musculoskeletal: Normal range of motion.   Neurological: She is alert.   Skin: Skin is warm.   Psychiatric: Her speech is normal and behavior is normal. Judgment and thought content normal. She is  not agitated, not aggressive, not hyperactive, not actively hallucinating and not combative. Thought content is not paranoid and not delusional. Cognition and memory are normal. She exhibits a depressed mood. She expresses no homicidal and no suicidal ideation.   Nursing note and vitals reviewed.      ED Course     ED Course     Procedures      Labs Ordered and Resulted from Time of ED Arrival Up to the Time of Departure from the ED   ALCOHOL BREATH TEST POCT - Abnormal; Notable for the following:        Result Value    Alcohol Breath Test 0.251 (*)     All other components within normal limits   DRUG ABUSE SCREEN 6 CHEM DEP URINE ([facility])   HCG QUALITATIVE URINE   CBC WITH PLATELETS DIFFERENTIAL   COMPREHENSIVE METABOLIC PANEL   TROPONIN I            Assessments & Plan (with Medical Decision Making)   Patient with acute alcohol intoxication who is nauseous and starting to withdraw. There is no available detox bed. She is not interested in county detox. She is given a dose of Zofran and some of her prescribed meds, notably gabapentin and labetalol. I was also going to hydrate her with a liter of saline but there is no available equipment here in [facility]. She will be sent back to the ED. Patient then began complaining of chest pain. A troponin was drawn. She is sent over to the ED for further medical stabilization.    I have reviewed the nursing notes.    I have reviewed the findings, diagnosis, plan and need for follow up with the patient.    New Prescriptions    No medications on file       Final diagnoses:   Alcohol dependence, continuous drinking behavior (H)       10/27/2017   [facility], [place], EMERGENCY DEPARTMENT     Zack Hines MD  10/27/17 1415

## 2017-10-28 NOTE — ED NOTES
Bed: ED17  Expected date: 10/27/17  Expected time: 11:00 PM  Means of arrival:   Comments:  bec pt

## 2017-10-28 NOTE — ED NOTES
Patient with alcohol dependence now presenting with withdrawal symptoms including elevated blood pressure shaking tremor she's responding to IV Ativan and oral Valium but will need further monitoring and close monitoring and treatment for her alcohol withdrawal.  If patient is relatively stable in the morning she would likely be transferred to station 3A for further detox.     Paul Montero MD  10/28/17 0043

## 2017-10-28 NOTE — H&P
History and Physical     Inga Ledesma MRN# [id number]   YOB: 1965 Age: 52 year old      Date of Admission:  10/27/2017      CC: Seeking alcohol detox.      HPI: Obtained from the patient, chart review.ED provider.     Inga Ledesma is a 52 y.o. female with a personal history of depression, alcohol abuse with withdrawal, COPD, hypertension, who presents to the emergency department via EMS intoxicated, seeking detox from alcohol. Says she has been drinking vodka 1pint daily.  She last drank last night,  breathalized 0.25 on arrival in ED. She is interested in detox. Does not want inpatient CD treatment. Now anxious, shakes, h/o withdrawal seizure once. ~1.5 yrs Denies using any other drugs. Smoker. COPD, uses inhalers, nebs at home. Chronic cough. Mild L lower medial ribs pain. No fever or chills. Nausea+. No vomiting. No bleeding. No fall. Denies SI.   No cp or sob.   No other concern.   No detox bed available tonight and so admitting to medical floor.     Past Medical History:  Past Medical History:   Diagnosis Date     Alcohol abuse, in remission      Cancer of labia majora (H) 1987     Cervical dysplasia 1987     Congestive heart failure (H) 5/16/16     COPD (chronic obstructive pulmonary disease) (H) 1/24/2011     CVA (cerebral infarction) 1/2012     Dependent edema      Depression, major      Depressive disorder 1966     GERD (gastroesophageal reflux disease)      Hyperlipidemia LDL goal < 160      Hypertension      Iron deficiency anemia      RLS (restless legs syndrome)      Sacroiliitis (H)     steroid injections ineffective, chronic low back pain     Tobacco abuse      Uncomplicated asthma      Vitamin D deficiencies        Past Surgical History:  Past Surgical History:   Procedure Laterality Date     COLONOSCOPY       EYE SURGERY       HERNIA REPAIR, INGUINAL RT/LT  2007     HYSTERECTOMY  2010     HYSTERECTOMY TOTAL ABDOMINAL  7/28/10    Bilateral salpingectomy.  ovaries conserved.      LASER TX, CERVICAL  1987     LYMPH NODE BIOPSY  2007    inguinal     LYSIS OF LABIAL LESION(S)  1985, 1987       Allergies:     Allergies   Allergen Reactions     Codeine Nausea     Reglan [Metoclopramide Hcl] Other (See Comments)     Body tenses up       Medications:    No current facility-administered medications on file prior to encounter.   Current Outpatient Prescriptions on File Prior to Encounter:  losartan (COZAAR) 100 MG tablet Take 0.5 tablets (50 mg) by mouth daily (Patient taking differently: Take 100 mg by mouth daily )   furosemide (LASIX) 20 MG tablet Take 1 tablet (20 mg) by mouth daily   omeprazole (PRILOSEC) 20 MG CR capsule Take 1 capsule (20 mg) by mouth 2 times daily   aspirin 81 MG EC tablet Take 1 tablet (81 mg) by mouth daily   FLUoxetine (PROZAC) 40 MG capsule Take 1 capsule (40 mg) by mouth 2 times daily   rOPINIRole (REQUIP) 1 MG tablet Take 1 tablet (1 mg) by mouth 3 times daily   labetalol (NORMODYNE) 100 MG tablet Take 100 mg by mouth 2 times daily   nystatin (MYCOSTATIN) cream Apply topically 3 times daily as needed (rash on leg)   MAGNESIUM PO Take 1 capsule by mouth daily Form and strength unknown.   ibuprofen (ADVIL/MOTRIN) 200 MG tablet Take 600 mg by mouth every 4 hours as needed for mild pain (neck and back pain)   acetaminophen (TYLENOL) 500 MG tablet Take 1,000 mg by mouth every 6 hours as needed for pain (neck and back pain)   menthol (ICY HOT) 5 % PTCH Apply 1 patch topically every 8 hours as needed for moderate pain (neck and back pain)   ketotifen (ALAWAY) 0.025 % SOLN ophthalmic solution Place 2 drops into both eyes as needed for dry eyes   gabapentin (NEURONTIN) 300 MG capsule Take 1 capsule by mouth in the morning, 1 capsule in the afternoon, and 2 capsules at bedtime   triamcinolone (KENALOG) 0.1 % cream APPLY SPARINGLY TO AFFECTED AREA(S) THREE TIMES A DAY AS NEEDED   albuterol (VENTOLIN HFA) 108 (90 BASE) MCG/ACT Inhaler Inhale  into the lungs. INHALE 2 PUFFS FOUR  TIMES DAILY AS NEEDED   fluticasone-salmeterol (ADVAIR DISKUS) 500-50 MCG/DOSE diskus inhaler Inhale 1 puff into the lungs every 12 hours   tiotropium (SPIRIVA HANDIHALER) 18 MCG capsule Inhale contents of one capsule daily.   fluticasone (FLONASE) 50 MCG/ACT spray Spray 1-2 sprays into both nostrils daily   potassium chloride SA (POTASSIUM CHLORIDE) 20 MEQ CR tablet Take 1 tablet (20 mEq) by mouth 2 times daily   nicotine (NICOTROL) 10 MG inhaler Use 1-2 daily as needed for smoking cessation   order for DME Equipment being ordered: Nebulizer   calcium carbonate (OS-JENNIFER 500 MG [place]. [place]) 500 MG tablet Take 500 mg by mouth 2 times daily       Social History:  Social History     Social History     Marital status:      Spouse name: N/A     Number of children: 1     Years of education: 13     Occupational History      [employer]     Social History Main Topics     Smoking status: Current Every Day Smoker     Packs/day: 0.50     Years: 34.00     Types: Cigarettes     Start date: 11/1/1979     Last attempt to quit: 10/3/2012     Smokeless tobacco: Never Used      Comment: 5 cigarettes daily     Alcohol use Yes      Comment: Drinking mouthwash.     Drug use: No     Sexual activity: Yes     Partners: Male     Birth control/ protection: None     Other Topics Concern     Parent/Sibling W/ Cabg, Mi Or Angioplasty Before 65f 55m? No     Social History Narrative       Family History:   Family History   Problem Relation Age of Onset     Depression/Anxiety Mother      Asthma Mother      CEREBROVASCULAR DISEASE Father      Hypertension Father      Lung Cancer Father      Breast Cancer Paternal Aunt      Other Cancer Other      Depression Other      Anxiety Disorder Other      MENTAL ILLNESS Other      Substance Abuse Other      Asthma Other      Obesity Sister      Obesity Son          ROS:  The remainder of the complete ROS was negative unless noted in the HPI.      Exam:    /61 (BP Location: Left arm)  Pulse  "101  Temp 98.2  F (36.8  C) (Oral)  Resp 18  Ht 1.549 m (5' 1\")  Wt 69.6 kg (153 lb 8 oz)  LMP 2010  SpO2 95%  BMI 29 kg/m2    Temp (24hrs), Av.2  F (36.8  C), Min:98.2  F (36.8  C), Max:98.2  F (36.8  C)      Wt Readings from Last 5 Encounters:   10/28/17 69.6 kg (153 lb 8 oz)   10/06/17 69.4 kg (153 lb)   17 70.1 kg (154 lb 9.6 oz)   17 73 kg (161 lb)   17 74.8 kg (165 lb)       General: Alert, interactive, anxious, shakes+  HEENT: AT/NC, sclera anicteric, PERRL, moist MM   Neck: Supple, no JVD or lymphadenopathy  Resp/chest: clear to auscultation bilaterally, no crackles or wheezes  Cardiac/Heart: s1s2 regular rate and rhythm, no murmur  GI/Abdomen: Soft, nontender, nondistended. +BS.  No rebound or guarding.  MSK/Extremities: distally warm and well perfused.   Skin: Warm and dry, no jaundice or rash on exposed areas.   Neuro: Alert & oriented x 3, Cns 2-12 intact, bl hand tremors+   Psychiatry: stable mood. Pleasant.       Labs:    BMP    Recent Labs  Lab 10/27/17  2241      POTASSIUM 3.7   CHLORIDE 94   JENNIFER 8.2*   CO2 23   BUN 15   CR 0.52   *     CBC    Recent Labs  Lab 10/27/17  2241   WBC 6.1   RBC 4.45   HGB 14.0   HCT 41.2   MCV 93   MCH 31.5   MCHC 34.0   RDW 14.1        INRNo lab results found in last 7 days.  LFTs    Recent Labs  Lab 10/27/17  2241   ALKPHOS 143   AST Unsatisfactory specimen - hemolyzed   ALT 42   BILITOTAL 0.4   PROTTOTAL 7.7   ALBUMIN 3.6      PANCNo lab results found in last 7 days.    Imaging:  No results found for this or any previous visit (from the past 24 hour(s)).    Lab Results   Component Value Date    TROPI <0.015 10/27/2017    TROPI  2016     <0.015  The 99th percentile for upper reference range is 0.045 ug/L.  Troponin values in   the range of 0.045 - 0.120 ug/L may be associated with risks of adverse   clinical events.       EKG: reviewed. NSR. No acute st t changes.     Assessment/ Plan:    Inga Ledesma is " a 52 year old female with history of COPD, HTN, CHF, CVA (1/2012), iron deficiency anemia, GERD, depression, and alcohol abuse admitted alcohol withdrawal.       Alcohol dependency and withdrawal: ABT: 0.251  MSSA alcohol withdrawal with Diazepam.   - MVI, thiamine, folic acid daily.   - Seizure and fall precautions.  - Sw and CD consult.     CVS:     HTN, ?HFpEF  Echo 5/2016 technically difficult although noted normal LVEF of ~55%; normal RV longitudinal function; left atrial dilatation.   - Maintained on lasix and labetalol PTA , losartan.   Non compliant.   Asymptomatic. No cp or sob, chest pain.     - hold losartan, lasix  - resume labetalol with holding parameter.  - monitor vitals.         COPD. Tobacco abuse (active smoker):    PFT's 4/2017 with FVC 93% pred, FEV1 86% pred, FEV1/FVC 93%.   Maintained on spiriva and advair PTA. duonebs prn  Not on home oxygen  Chronic cough  No sob at current.     - Continue spiriva QD, advair BID  - Continue flonase  - albuterol. duonebs prn  - IS. Oxygen prn  - pulse ox.     ?Hx of CVA. Patient denies previous history of stroke. No imaging in chart to confirm. Maintained on baby ASA PTA.  - Continue ASA 81 mg QD     Depression:   - PTA fluoxetine.     GERD: PPI    FEN: adat to regular. Ivf. Fu lytes.   Prophylaxis: mechanical dvt ppx.   PIV   Full code.       Disposition: Disposition Plan   Expected discharge in 2-3 days to prior living arrangement once detoxed vs Inpatient CD treatment if patient agrees     Entered: Eron Vaz 10/28/2017, 8:19 AM         D/w RN   hospitalist team to follow.     Eron Vaz MD  House Physician  Pager: [phone]    10/28/2017

## 2017-10-28 NOTE — PLAN OF CARE
Problem: Patient Care Overview  Goal: Plan of Care/Patient Progress Review  Outcome: No Change  Patient A & O x 3. Neuros and CMS intact except patient is having tremors. VSS except HR tachy low 100's off and on and afebrile, SpO2 95% on room air (see flowsheet), MD aware. LS clear, BS + x 4, patient passing flatus, patient had a BM yesterday per patient. Patient urinating good amounts of clear yellow urine. Patient has had some left upper chest pain this shift, which is improving per pateint. Patient is on continuous telemetry monitoring and has been in NS with episodes of tachycardia and has a prolonged QT. Left/Right PIV's infusing. Patient on a regular diet and not tolerating it very well. Patient has had nausea/vomiting x 3 this shift. IV Zofran and Compazine given as ordered this shift. Patient is on the MSSA withdrawal protocol and last scored a 15. Valium given as ordered. Patient is on seizure precaution, no seizures noted this shift. BG was 111 at 0827. Patient up ad nancy with assist of 1 to the bathroom. K+ 3.3, Mg 1.0, AST 60 this AM (MD aware). Banana bag is infusing, K+ and Mg replacement protocol started as ordered. Patient able to make needs known. Call light within reach. Will continue to monitor patient.

## 2017-10-29 LAB
ALBUMIN SERPL-MCNC: 2.8 G/DL (ref 3.4–5)
ALP SERPL-CCNC: 115 U/L (ref 40–150)
ALT SERPL W P-5'-P-CCNC: 30 U/L (ref 0–50)
ANION GAP SERPL CALCULATED.3IONS-SCNC: 8 MMOL/L (ref 3–14)
AST SERPL W P-5'-P-CCNC: 41 U/L (ref 0–45)
BILIRUB SERPL-MCNC: 0.7 MG/DL (ref 0.2–1.3)
BUN SERPL-MCNC: 7 MG/DL (ref 7–30)
CALCIUM SERPL-MCNC: 7.7 MG/DL (ref 8.5–10.1)
CHLORIDE SERPL-SCNC: 104 MMOL/L (ref 94–109)
CO2 SERPL-SCNC: 27 MMOL/L (ref 20–32)
CREAT SERPL-MCNC: 0.53 MG/DL (ref 0.52–1.04)
ERYTHROCYTE [DISTWIDTH] IN BLOOD BY AUTOMATED COUNT: 14.7 % (ref 10–15)
GFR SERPL CREATININE-BSD FRML MDRD: >90 ML/MIN/1.7M2
GLUCOSE SERPL-MCNC: 95 MG/DL (ref 70–99)
HCT VFR BLD AUTO: 33.7 % (ref 35–47)
HGB BLD-MCNC: 11.1 G/DL (ref 11.7–15.7)
LIPASE SERPL-CCNC: 697 U/L (ref 73–393)
MAGNESIUM SERPL-MCNC: 1.8 MG/DL (ref 1.6–2.3)
MCH RBC QN AUTO: 31.3 PG (ref 26.5–33)
MCHC RBC AUTO-ENTMCNC: 32.9 G/DL (ref 31.5–36.5)
MCV RBC AUTO: 95 FL (ref 78–100)
PLATELET # BLD AUTO: 255 10E9/L (ref 150–450)
POTASSIUM SERPL-SCNC: 3.7 MMOL/L (ref 3.4–5.3)
PROT SERPL-MCNC: 5.5 G/DL (ref 6.8–8.8)
RBC # BLD AUTO: 3.55 10E12/L (ref 3.8–5.2)
SODIUM SERPL-SCNC: 139 MMOL/L (ref 133–144)
TROPONIN I SERPL-MCNC: <0.015 UG/L (ref 0–0.04)
WBC # BLD AUTO: 4.9 10E9/L (ref 4–11)

## 2017-10-29 PROCEDURE — 25000125 ZZHC RX 250: Performed by: HOSPITALIST

## 2017-10-29 PROCEDURE — 99232 SBSQ HOSP IP/OBS MODERATE 35: CPT | Performed by: INTERNAL MEDICINE

## 2017-10-29 PROCEDURE — 25000132 ZZH RX MED GY IP 250 OP 250 PS 637: Performed by: HOSPITALIST

## 2017-10-29 PROCEDURE — 93010 ELECTROCARDIOGRAM REPORT: CPT | Performed by: INTERNAL MEDICINE

## 2017-10-29 PROCEDURE — 85027 COMPLETE CBC AUTOMATED: CPT | Performed by: INTERNAL MEDICINE

## 2017-10-29 PROCEDURE — 80053 COMPREHEN METABOLIC PANEL: CPT | Performed by: INTERNAL MEDICINE

## 2017-10-29 PROCEDURE — 83735 ASSAY OF MAGNESIUM: CPT | Performed by: INTERNAL MEDICINE

## 2017-10-29 PROCEDURE — 25000132 ZZH RX MED GY IP 250 OP 250 PS 637: Performed by: INTERNAL MEDICINE

## 2017-10-29 PROCEDURE — 84484 ASSAY OF TROPONIN QUANT: CPT | Performed by: HOSPITALIST

## 2017-10-29 PROCEDURE — 36415 COLL VENOUS BLD VENIPUNCTURE: CPT | Performed by: INTERNAL MEDICINE

## 2017-10-29 PROCEDURE — 25000128 H RX IP 250 OP 636: Performed by: INTERNAL MEDICINE

## 2017-10-29 PROCEDURE — 83690 ASSAY OF LIPASE: CPT | Performed by: HOSPITALIST

## 2017-10-29 PROCEDURE — 25000125 ZZHC RX 250: Performed by: INTERNAL MEDICINE

## 2017-10-29 PROCEDURE — 93005 ELECTROCARDIOGRAM TRACING: CPT

## 2017-10-29 PROCEDURE — 12000001 ZZH R&B MED SURG/OB UMMC

## 2017-10-29 RX ORDER — NITROGLYCERIN 0.4 MG/1
0.4 TABLET SUBLINGUAL EVERY 5 MIN PRN
Status: DISCONTINUED | OUTPATIENT
Start: 2017-10-29 | End: 2017-11-02 | Stop reason: HOSPADM

## 2017-10-29 RX ORDER — NICOTINE 21 MG/24HR
1 PATCH, TRANSDERMAL 24 HOURS TRANSDERMAL DAILY
Status: DISCONTINUED | OUTPATIENT
Start: 2017-10-29 | End: 2017-11-02 | Stop reason: HOSPADM

## 2017-10-29 RX ORDER — LABETALOL HYDROCHLORIDE 5 MG/ML
10 INJECTION, SOLUTION INTRAVENOUS EVERY 6 HOURS PRN
Status: DISCONTINUED | OUTPATIENT
Start: 2017-10-29 | End: 2017-11-02 | Stop reason: HOSPADM

## 2017-10-29 RX ORDER — HYDRALAZINE HYDROCHLORIDE 20 MG/ML
10 INJECTION INTRAMUSCULAR; INTRAVENOUS EVERY 4 HOURS PRN
Status: DISCONTINUED | OUTPATIENT
Start: 2017-10-29 | End: 2017-10-30

## 2017-10-29 RX ORDER — TRAZODONE HYDROCHLORIDE 50 MG/1
50 TABLET, FILM COATED ORAL AT BEDTIME
Status: DISCONTINUED | OUTPATIENT
Start: 2017-10-29 | End: 2017-11-02 | Stop reason: HOSPADM

## 2017-10-29 RX ORDER — LOSARTAN POTASSIUM 50 MG/1
50 TABLET ORAL DAILY
Status: DISCONTINUED | OUTPATIENT
Start: 2017-10-29 | End: 2017-11-02 | Stop reason: HOSPADM

## 2017-10-29 RX ADMIN — OMEPRAZOLE 20 MG: 20 CAPSULE, DELAYED RELEASE ORAL at 16:43

## 2017-10-29 RX ADMIN — ALBUTEROL SULFATE 2 PUFF: 90 AEROSOL, METERED RESPIRATORY (INHALATION) at 09:38

## 2017-10-29 RX ADMIN — MULTIPLE VITAMINS W/ MINERALS TAB 1 TABLET: TAB at 09:16

## 2017-10-29 RX ADMIN — GABAPENTIN 600 MG: 300 CAPSULE ORAL at 21:44

## 2017-10-29 RX ADMIN — ROPINIROLE HYDROCHLORIDE 1 MG: 1 TABLET, FILM COATED ORAL at 13:42

## 2017-10-29 RX ADMIN — FLUOXETINE 40 MG: 20 CAPSULE ORAL at 09:15

## 2017-10-29 RX ADMIN — MENTHOL 1 PATCH: 205.5 PATCH TOPICAL at 12:04

## 2017-10-29 RX ADMIN — NICOTINE 1 PATCH: 14 PATCH, EXTENDED RELEASE TRANSDERMAL at 16:43

## 2017-10-29 RX ADMIN — CALCIUM 1250 MG: 500 TABLET ORAL at 09:16

## 2017-10-29 RX ADMIN — ROPINIROLE HYDROCHLORIDE 1 MG: 1 TABLET, FILM COATED ORAL at 09:15

## 2017-10-29 RX ADMIN — GABAPENTIN 300 MG: 300 CAPSULE ORAL at 09:15

## 2017-10-29 RX ADMIN — FLUOXETINE 40 MG: 20 CAPSULE ORAL at 20:18

## 2017-10-29 RX ADMIN — LIDOCAINE HYDROCHLORIDE 30 ML: 20 SOLUTION ORAL; TOPICAL at 20:31

## 2017-10-29 RX ADMIN — Medication 2 G: at 09:13

## 2017-10-29 RX ADMIN — ROPINIROLE HYDROCHLORIDE 1 MG: 1 TABLET, FILM COATED ORAL at 20:18

## 2017-10-29 RX ADMIN — GABAPENTIN 300 MG: 300 CAPSULE ORAL at 13:42

## 2017-10-29 RX ADMIN — SODIUM CHLORIDE: 9 INJECTION, SOLUTION INTRAVENOUS at 03:34

## 2017-10-29 RX ADMIN — DIAZEPAM 5 MG: 5 TABLET ORAL at 00:57

## 2017-10-29 RX ADMIN — LOSARTAN POTASSIUM 50 MG: 50 TABLET, FILM COATED ORAL at 09:14

## 2017-10-29 RX ADMIN — FLUTICASONE FUROATE AND VILANTEROL TRIFENATATE 1 PUFF: 200; 25 POWDER RESPIRATORY (INHALATION) at 09:14

## 2017-10-29 RX ADMIN — FLUTICASONE PROPIONATE 1 SPRAY: 50 SPRAY, METERED NASAL at 09:16

## 2017-10-29 RX ADMIN — ACETAMINOPHEN 650 MG: 325 TABLET, FILM COATED ORAL at 13:42

## 2017-10-29 RX ADMIN — OMEPRAZOLE 20 MG: 20 CAPSULE, DELAYED RELEASE ORAL at 09:15

## 2017-10-29 RX ADMIN — LABETALOL HYDROCHLORIDE 100 MG: 100 TABLET, FILM COATED ORAL at 20:18

## 2017-10-29 RX ADMIN — ACETAMINOPHEN 650 MG: 325 TABLET, FILM COATED ORAL at 21:44

## 2017-10-29 RX ADMIN — CALCIUM 1250 MG: 500 TABLET ORAL at 20:18

## 2017-10-29 RX ADMIN — TRAZODONE HYDROCHLORIDE 50 MG: 50 TABLET ORAL at 21:44

## 2017-10-29 RX ADMIN — LABETALOL HYDROCHLORIDE 100 MG: 100 TABLET, FILM COATED ORAL at 09:16

## 2017-10-29 RX ADMIN — FOLIC ACID 1 MG: 1 TABLET ORAL at 09:15

## 2017-10-29 RX ADMIN — DIAZEPAM 5 MG: 5 TABLET ORAL at 07:54

## 2017-10-29 RX ADMIN — ASPIRIN 81 MG: 81 TABLET, COATED ORAL at 09:16

## 2017-10-29 RX ADMIN — Medication 100 MG: at 09:15

## 2017-10-29 RX ADMIN — POTASSIUM CHLORIDE 20 MEQ: 750 TABLET, FILM COATED, EXTENDED RELEASE ORAL at 09:13

## 2017-10-29 ASSESSMENT — ACTIVITIES OF DAILY LIVING (ADL)
ADLS_ACUITY_SCORE: 10

## 2017-10-29 NOTE — PLAN OF CARE
Problem: Patient Care Overview  Goal: Plan of Care/Patient Progress Review  Outcome: No Change  A&Ox4, VSS, on tele- NSR, LS clear, BS active- passing gas. Fair appetite, tolerating a regular diet. No Nausea/vomiting this evening. MSSA scores have been 9 and 9, 10mg Valium total administered this shift. CMS/Neuoros intact, Pt continues to be tremulous- but has improved. Up with SBA, steady on her feet. Seizure pads in place. Magnesium and Potassium replaced, PIV infusing NS @100mL/hr.  Call light within reach, pt is able to make needs known, continue with POC.    Potassium recheck- 3.9:  20 meq tablets of Potassium administered.   Magnesium recheck 2.1

## 2017-10-29 NOTE — PLAN OF CARE
Problem: Alcohol Withdrawal Acute, Risk/Actual (Adult)  Intervention: Minimize/Manage Withdrawal Symptoms     Pt alert and oriented. VSS. Afebrile. CMS/neuros intact.  Denies nausea, vomiting. Still endorses pain under left breast/chest radiating to her back. Acknowledged orders for PRN icy hot patch.  LS clear, equal bilaterally. BS active and audible in all quadrants. Passing flatus.  MSSA scores 9, 7, and 8 respectively. Pt still tremulous. Withdrawal symptoms managed with 5mg of PRN valium.   PIV infusing normal saline @  100mL/hr. Tolerating regular diet. Ambulates with standby assist of 1.  Able to make needs known. Continue POC.     BP trending higher, BP @07:45 = 175/101. MD aware.

## 2017-10-29 NOTE — PROGRESS NOTES
[facility], [place]   Internal Medicine Daily Note          Assessment and Plan:   Inga Ledesma is a 52 year old female with history of COPD, HTN, CHF, CVA (1/2012), iron deficiency anemia, GERD, depression, and alcohol abuse admitted alcohol withdrawal.       Alcohol dependency and withdrawal: ABT: 0.251  MSSA alcohol withdrawal with Diazepam.   - MVI, thiamine, folic acid daily.   - Seizure and fall precautions, telemetry   - Sw and CD consult.   -Improvement in symptoms in the last 24 hrs   - Correct electrolyte abnormalities     CVS:      HTN, ?HFpEF  Echo 5/2016 technically difficult although noted normal LVEF of ~55%; normal RV longitudinal function; left atrial dilatation.   Maintained on lasix and labetalol PTA , losartan.   Non compliant. Given increased BP, resumed losartan on 10/29   Hold lasix  Monitor vitals.         COPD. Tobacco abuse (active smoker):    PFT's 4/2017 with FVC 93% pred, FEV1 86% pred, FEV1/FVC 93%.   Maintained on spiriva and advair PTA. duonebs prn  - Continue spiriva QD, advair BID  - Continue flonase  - albuterol. duonebs prn  - IS. Oxygen prn  - pulse ox.      ?Hx of CVA.   Patient denies previous history of stroke. No imaging in chart to confirm. Maintained on baby ASA PTA.  Continue ASA 81 mg QD      Depression:   PTA fluoxetine.      GERD: PPI     Consulting teams: None   Code status: Full  DVT Prophylaxis: PCD's   Gastric prophylaxis: None   Diet: Regular adult   Disposition:  Likely on 10/30 or 10/31      Patient seen and examined with RN         Interval History:   Progress notes in the last 24 hrs by MDT team has been reviewed.  Inga feels well this morning   No further nausea   Has ordered breakfast  Still has left rib cage pain  No significant withdrawal symptoms          Review of Systems:   A comprehensive review of systems was performed and found to be negative except: Those that are outlined in interval history               "Medications:   I have reviewed this patient's current medications which are outlined in the \"current medication\" section of EPIC             Physical Exam:   Vitals were reviewed  Temp: 96.4  F (35.8  C) Temp src: Oral BP: 149/55 Pulse: 85 Heart Rate: 68 Resp: 18 SpO2: 96 % O2 Device: None (Room air)      Constitutional:   awake, alert, cooperative, no apparent distress, and appears stated age     Lungs:   No increased work of breathing, good air exchange, clear to auscultation bilaterally, no crackles or wheezing     Cardiovascular:   Normal apical impulse, regular rate and rhythm, normal S1 and S2, no S3 or S4, and no murmur noted     Abdomen:   No scars, normal bowel sounds, soft, non-distended, non-tender, no masses palpated, no hepatosplenomegally     Musculoskeletal:   There is no redness, warmth, or swelling of the joints.  Full range of motion noted.  Motor strength is 5 out of 5 all extremities bilaterally.  Tone is normal.     Neurologic:   Awake, alert, oriented to name, place and time.  Cranial nerves II-XII are grossly intact.                Data:     Most recent labs have been evaluated and relevant labs are outlined below :BMP    Recent Labs  Lab 10/29/17  0554 10/28/17  2139 10/28/17  0827 10/27/17  2241     --  133 135   POTASSIUM 3.7 3.9 3.3* 3.7   CHLORIDE 104  --  92* 94   JENNIFER 7.7*  --  7.8* 8.2*   CO2 27  --  30 23   BUN 7  --  19 15   CR 0.53  --  0.66 0.52   GLC 95  --  111* 115*     CBC    Recent Labs  Lab 10/29/17  0554 10/27/17  2241   WBC 4.9 6.1   RBC 3.55* 4.45   HGB 11.1* 14.0   HCT 33.7* 41.2   MCV 95 93   MCH 31.3 31.5   MCHC 32.9 34.0   RDW 14.7 14.1    383     INRNo lab results found in last 7 days.  LFTs    Recent Labs  Lab 10/29/17  0554 10/28/17  0827 10/27/17  2241   ALKPHOS 115 131 143   AST 41 60* Unsatisfactory specimen - hemolyzed   ALT 30 37 42   BILITOTAL 0.7 0.9 0.4   PROTTOTAL 5.5* 6.6* 7.7   ALBUMIN 2.8* 3.3* 3.6      PANCNo lab results found in last 7 " days.    Dr KAI Li MD  Hospitalist ( Internal medicine)  Pager: [phone]

## 2017-10-29 NOTE — PLAN OF CARE
Problem: Patient Care Overview  Goal: Plan of Care/Patient Progress Review  Outcome: Improving  Patient A & O x 3. Patent tearful today due to feeling like she has no family support. Neuros and CMS intact except patient is having tremors. VSS and afebrile, SpO2 97% on room air (see flowsheet), MD aware. LS clear, BS + x 4, patient passing flatus, patient had a BM this shift per patient. Patient urinating good amounts of clear yellow urine. Patient has had some left upper chest pain this shift, which is improving per patient. Patient is on continuous telemetry monitoring and has been in NSR with a prolonged QT. OK to discontinue telemetry per Medicine MD. Right PIV saline locked. Patient on a regular diet and not tolerating it well, no nausea/vomiting this shift. Patient ate 75% of meals. Patient is on the MSSA withdrawal protocol and last scored a 4. Patient is on seizure precautions/pads are on the bed. No seizures noted this shift. BG was 95 at 0554. Patient up ad nancy independently in room. K+ 3.7, Mg 1.8 this AM (MD aware). K+ and Mg replacement protocol given as ordered, Mg and K+ re-draws ordered to be checked tomorrow in the AM. Patient able to make needs known. Call light within reach. Will continue with POC.

## 2017-10-30 ENCOUNTER — APPOINTMENT (OUTPATIENT)
Dept: PHYSICAL THERAPY | Facility: CLINIC | Age: 52
DRG: 897 | End: 2017-10-30
Attending: INTERNAL MEDICINE
Payer: COMMERCIAL

## 2017-10-30 LAB
INTERPRETATION ECG - MUSE: NORMAL
LIPASE SERPL-CCNC: 577 U/L (ref 73–393)
MAGNESIUM SERPL-MCNC: 1.7 MG/DL (ref 1.6–2.3)
POTASSIUM SERPL-SCNC: 4 MMOL/L (ref 3.4–5.3)
TROPONIN I SERPL-MCNC: <0.015 UG/L (ref 0–0.04)

## 2017-10-30 PROCEDURE — 12000001 ZZH R&B MED SURG/OB UMMC

## 2017-10-30 PROCEDURE — 25000128 H RX IP 250 OP 636: Performed by: INTERNAL MEDICINE

## 2017-10-30 PROCEDURE — 94640 AIRWAY INHALATION TREATMENT: CPT

## 2017-10-30 PROCEDURE — 99232 SBSQ HOSP IP/OBS MODERATE 35: CPT | Performed by: INTERNAL MEDICINE

## 2017-10-30 PROCEDURE — 97110 THERAPEUTIC EXERCISES: CPT | Mod: GP | Performed by: PHYSICAL THERAPIST

## 2017-10-30 PROCEDURE — 84132 ASSAY OF SERUM POTASSIUM: CPT | Performed by: INTERNAL MEDICINE

## 2017-10-30 PROCEDURE — 36415 COLL VENOUS BLD VENIPUNCTURE: CPT | Performed by: INTERNAL MEDICINE

## 2017-10-30 PROCEDURE — 90682 RIV4 VACC RECOMBINANT DNA IM: CPT | Performed by: INTERNAL MEDICINE

## 2017-10-30 PROCEDURE — 40000193 ZZH STATISTIC PT WARD VISIT: Performed by: PHYSICAL THERAPIST

## 2017-10-30 PROCEDURE — 25000125 ZZHC RX 250: Performed by: INTERNAL MEDICINE

## 2017-10-30 PROCEDURE — 83735 ASSAY OF MAGNESIUM: CPT | Performed by: INTERNAL MEDICINE

## 2017-10-30 PROCEDURE — 25000132 ZZH RX MED GY IP 250 OP 250 PS 637: Performed by: INTERNAL MEDICINE

## 2017-10-30 PROCEDURE — 97161 PT EVAL LOW COMPLEX 20 MIN: CPT | Mod: GP | Performed by: PHYSICAL THERAPIST

## 2017-10-30 PROCEDURE — 84484 ASSAY OF TROPONIN QUANT: CPT | Performed by: INTERNAL MEDICINE

## 2017-10-30 PROCEDURE — 83690 ASSAY OF LIPASE: CPT | Performed by: INTERNAL MEDICINE

## 2017-10-30 PROCEDURE — 40000275 ZZH STATISTIC RCP TIME EA 10 MIN

## 2017-10-30 PROCEDURE — 25000132 ZZH RX MED GY IP 250 OP 250 PS 637: Performed by: HOSPITALIST

## 2017-10-30 RX ORDER — MAGNESIUM OXIDE 400 MG/1
400 TABLET ORAL 2 TIMES DAILY
Status: DISCONTINUED | OUTPATIENT
Start: 2017-10-30 | End: 2017-11-02 | Stop reason: HOSPADM

## 2017-10-30 RX ORDER — HYDROXYZINE HYDROCHLORIDE 50 MG/1
50 TABLET, FILM COATED ORAL EVERY 6 HOURS PRN
Status: DISCONTINUED | OUTPATIENT
Start: 2017-10-30 | End: 2017-11-02 | Stop reason: HOSPADM

## 2017-10-30 RX ORDER — FUROSEMIDE 20 MG
20 TABLET ORAL DAILY
Status: DISCONTINUED | OUTPATIENT
Start: 2017-10-30 | End: 2017-11-02 | Stop reason: HOSPADM

## 2017-10-30 RX ORDER — HYDROXYZINE HYDROCHLORIDE 25 MG/1
25 TABLET, FILM COATED ORAL EVERY 6 HOURS PRN
Status: DISCONTINUED | OUTPATIENT
Start: 2017-10-30 | End: 2017-11-02 | Stop reason: HOSPADM

## 2017-10-30 RX ORDER — TRAZODONE HYDROCHLORIDE 50 MG/1
50 TABLET, FILM COATED ORAL ONCE
Status: COMPLETED | OUTPATIENT
Start: 2017-10-30 | End: 2017-10-30

## 2017-10-30 RX ADMIN — OMEPRAZOLE 20 MG: 20 CAPSULE, DELAYED RELEASE ORAL at 05:57

## 2017-10-30 RX ADMIN — OMEPRAZOLE 20 MG: 20 CAPSULE, DELAYED RELEASE ORAL at 16:04

## 2017-10-30 RX ADMIN — TRAZODONE HYDROCHLORIDE 50 MG: 50 TABLET ORAL at 01:06

## 2017-10-30 RX ADMIN — MAGNESIUM OXIDE TAB 400 MG (241.3 MG ELEMENTAL MG) 400 MG: 400 (241.3 MG) TAB at 08:02

## 2017-10-30 RX ADMIN — CALCIUM 1250 MG: 500 TABLET ORAL at 21:50

## 2017-10-30 RX ADMIN — ASPIRIN 81 MG: 81 TABLET, COATED ORAL at 07:54

## 2017-10-30 RX ADMIN — ROPINIROLE HYDROCHLORIDE 1 MG: 1 TABLET, FILM COATED ORAL at 07:54

## 2017-10-30 RX ADMIN — TRAZODONE HYDROCHLORIDE 50 MG: 50 TABLET ORAL at 21:52

## 2017-10-30 RX ADMIN — LOSARTAN POTASSIUM 50 MG: 50 TABLET, FILM COATED ORAL at 07:54

## 2017-10-30 RX ADMIN — Medication 2 G: at 06:59

## 2017-10-30 RX ADMIN — LABETALOL HYDROCHLORIDE 100 MG: 100 TABLET, FILM COATED ORAL at 21:50

## 2017-10-30 RX ADMIN — Medication 100 MG: at 07:54

## 2017-10-30 RX ADMIN — ACETAMINOPHEN 650 MG: 325 TABLET, FILM COATED ORAL at 08:02

## 2017-10-30 RX ADMIN — IPRATROPIUM BROMIDE AND ALBUTEROL SULFATE 3 ML: .5; 3 SOLUTION RESPIRATORY (INHALATION) at 00:42

## 2017-10-30 RX ADMIN — FLUTICASONE FUROATE AND VILANTEROL TRIFENATATE 1 PUFF: 200; 25 POWDER RESPIRATORY (INHALATION) at 08:04

## 2017-10-30 RX ADMIN — MULTIPLE VITAMINS W/ MINERALS TAB 1 TABLET: TAB at 07:54

## 2017-10-30 RX ADMIN — TIOTROPIUM BROMIDE 18 MCG: 18 CAPSULE ORAL; RESPIRATORY (INHALATION) at 08:02

## 2017-10-30 RX ADMIN — ALBUTEROL SULFATE 2 PUFF: 90 AEROSOL, METERED RESPIRATORY (INHALATION) at 09:46

## 2017-10-30 RX ADMIN — HYDROXYZINE HYDROCHLORIDE 25 MG: 25 TABLET ORAL at 10:37

## 2017-10-30 RX ADMIN — FOLIC ACID 1 MG: 1 TABLET ORAL at 07:55

## 2017-10-30 RX ADMIN — POTASSIUM CHLORIDE 20 MEQ: 750 TABLET, FILM COATED, EXTENDED RELEASE ORAL at 07:55

## 2017-10-30 RX ADMIN — FUROSEMIDE 20 MG: 20 TABLET ORAL at 10:23

## 2017-10-30 RX ADMIN — FLUTICASONE PROPIONATE 2 SPRAY: 50 SPRAY, METERED NASAL at 08:04

## 2017-10-30 RX ADMIN — FLUOXETINE 40 MG: 20 CAPSULE ORAL at 21:52

## 2017-10-30 RX ADMIN — NICOTINE 1 PATCH: 14 PATCH, EXTENDED RELEASE TRANSDERMAL at 08:04

## 2017-10-30 RX ADMIN — INFLUENZA VACCINE 0.5 ML: 45; 45; 45; 45 INJECTION, SOLUTION INTRAMUSCULAR at 11:17

## 2017-10-30 RX ADMIN — GABAPENTIN 600 MG: 300 CAPSULE ORAL at 21:51

## 2017-10-30 RX ADMIN — ROPINIROLE HYDROCHLORIDE 1 MG: 1 TABLET, FILM COATED ORAL at 14:35

## 2017-10-30 RX ADMIN — HYDROXYZINE HYDROCHLORIDE 50 MG: 50 TABLET, FILM COATED ORAL at 16:05

## 2017-10-30 RX ADMIN — LABETALOL HYDROCHLORIDE 100 MG: 100 TABLET, FILM COATED ORAL at 07:55

## 2017-10-30 RX ADMIN — MAGNESIUM OXIDE TAB 400 MG (241.3 MG ELEMENTAL MG) 400 MG: 400 (241.3 MG) TAB at 21:51

## 2017-10-30 RX ADMIN — ALBUTEROL SULFATE 2 PUFF: 90 AEROSOL, METERED RESPIRATORY (INHALATION) at 14:38

## 2017-10-30 RX ADMIN — FLUOXETINE 40 MG: 20 CAPSULE ORAL at 07:55

## 2017-10-30 RX ADMIN — HYDROXYZINE HYDROCHLORIDE 50 MG: 50 TABLET, FILM COATED ORAL at 22:06

## 2017-10-30 RX ADMIN — CALCIUM 1250 MG: 500 TABLET ORAL at 07:54

## 2017-10-30 RX ADMIN — ROPINIROLE HYDROCHLORIDE 1 MG: 1 TABLET, FILM COATED ORAL at 21:51

## 2017-10-30 RX ADMIN — GABAPENTIN 300 MG: 300 CAPSULE ORAL at 07:54

## 2017-10-30 RX ADMIN — GABAPENTIN 300 MG: 300 CAPSULE ORAL at 14:35

## 2017-10-30 ASSESSMENT — ACTIVITIES OF DAILY LIVING (ADL)
ADLS_ACUITY_SCORE: 10

## 2017-10-30 NOTE — PLAN OF CARE
Problem: Patient Care Overview  Goal: Plan of Care/Patient Progress Review  Outcome: No Change  Pt is alert and oriented. No complain of pain. PIV saline locked. Used the bathroom once. Took Trazodone for sleep. MSSA score of 4. Independent in the room. Will continue to follow POC.

## 2017-10-30 NOTE — PLAN OF CARE
Problem: Patient Care Overview  Goal: Plan of Care/Patient Progress Review  Outcome: No Change  Pt C/O of sudden increased chest pain that radiates around the left side of her chest under her breast bone. Pt states pain is increasing and moving up the side of her head and into her ears. /92, 02 sats 97% on RA, HR 81. CMS/Neuros intact- Moonlighter notified, EKG and GI cocktail ordered.    2031: GI cocktail and labetalol administered  2043: Pt states Chest pain has improved since GI cocktail. Continues to C/O of Left sided headache and ringing in the Left ear.  /89, O2 sats 96%.    Lipase elevated- 697, Troponin <0.015    A&Ox4, MSSA scores 4 and 6, BS active- tolerating a regular diet, LS clear. Pt continues to C/O of ringing in her left ear, and pain in the Left side of her head. Tylenol given for headache. Pt is up ambulating the hallway independently with a walker, pt states she normally walks with a walker at home. Pt was very tearful at the beginning of the shift, upset that he family doesn't want to speak with her. Pt was able to finally get a hold of her son and felt better. Willing to go to inpatient treatment. Pt is able to make needs known, call light within reach, continue with POC.

## 2017-10-30 NOTE — PROGRESS NOTES
"Cross cover for left sided chest pain.       Pt ate supper and then went for a walk. She felt dizzy while coming back and started having pain in the epigastric lower chest area as well as the left side of her chest wall. It goes to her neck and then head. She reports it goes to her back. She reports it hurts to take a deep breath. Feels more sob. No cough. No fever.     Exam: Vital signs:  Temp: 97.8  F (36.6  C) Temp src: Oral BP: (!) 172/94 Pulse: 77 Heart Rate: 80 Resp: 22 SpO2: 96 % O2 Device: None (Room air)   Height: 154.9 cm (5' 1\") Weight: 69.6 kg (153 lb 8 oz)  Estimated body mass index is 29 kg/(m^2) as calculated from the following:    Height as of this encounter: 1.549 m (5' 1\").    Weight as of this encounter: 69.6 kg (153 lb 8 oz).    BP running high  S1S2 normal, no tachy, no murmur  Left chest wall tenderness on exam  Xiphoid process tenderness noted too  Epigastric and left upper quadrant pain noted on exam  No thigh or calf pain.     AP:     1. Epigastric Lower chest, left sided chest pain going to her neck and head: This is not fitting in to anything. She does have significant tenderness of exam of Epigastric region, xiphoid process. Also reports tenderness on palpation of left side of her chest wall too. On holding left chest wall with examiners hand and taking a deep breath, she hurts less.     We tried GI cocktail and it worked. So this could be uncontrolled GERD for drinking. Continue prn maalpox and has BID Prilosec.   EKG looks fine. Check trops  Less likely PE. No hypoxic or tachy, no calf or thigh pain    2. Uncontrolled HT: Given night time labetalol early. Add prn hydralazine for SBP more than 160. Plan is to restart lasix tomorrow per nursing. This could be withdrawal causing high BPs. Can go up on losartan dose too.     "

## 2017-10-30 NOTE — PROGRESS NOTES
[facility], [place]   Internal Medicine Daily Note          Assessment and Plan:   Inga Ledesma is a 52 year old female with history of COPD, HTN, CHF, CVA (1/2012), iron deficiency anemia, GERD, depression, and alcohol abuse admitted alcohol withdrawal.       Alcohol dependency and withdrawal: ABT: 0.251  MSSA alcohol withdrawal with Diazepam. She is S/P detox at this time and hence MSSA stopped on 10/30  - MVI, thiamine, folic acid daily.   - Seizure and fall precautions, Discontinue telemetry   - SW and CD consult.    - Correct electrolyte abnormalities     CVS:      HTN, ?HFpEF  Echo 5/2016 technically difficult although noted normal LVEF of ~55%; normal RV longitudinal function; left atrial dilatation.   Maintained on lasix and labetalol PTA , losartan.   Non compliant. Given increased BP, resumed losartan on 10/29   Resume lasix on 10/30   Monitor vitals.         COPD. Tobacco abuse (active smoker):    PFT's 4/2017 with FVC 93% pred, FEV1 86% pred, FEV1/FVC 93%.   Maintained on spiriva and advair PTA. duonebs prn  - Continue spiriva QD, advair BID  - Continue flonase  - albuterol. duonebs prn  - IS. Oxygen prn  - pulse ox.      ?Hx of CVA.   Patient denies previous history of stroke. No imaging in chart to confirm. Maintained on baby ASA PTA.  Continue ASA 81 mg QD      Depression:   PTA fluoxetine.      GERD: PPI     Chest pain/epigastric pain:  Musculoskeletal in nature. Patient prefers icy hot patch    Elevated lipase:  Consistent with alcohol use. Decreasing trend  Denies abdominal pain  Advance diet as tolerated           Consulting teams: None   Code status: Full  DVT Prophylaxis: PCD's   Gastric prophylaxis: None   Diet: Regular adult   Disposition:  Likely on 10/30 or 10/31      Patient seen and examined with RN         Interval History:   Progress notes in the last 24 hrs by MDT team has been reviewed.  Inga feels well this morning   No further nausea   Has  "ordered breakfast  Still has left rib cage pain  No significant withdrawal symptoms          Review of Systems:   A comprehensive review of systems was performed and found to be negative except: Those that are outlined in interval history              Medications:   I have reviewed this patient's current medications which are outlined in the \"current medication\" section of EPIC             Physical Exam:   Vitals were reviewed  Temp: 95.6  F (35.3  C) Temp src: Oral BP: 164/81 Pulse: 77 Heart Rate: 72 Resp: 18 SpO2: 97 % O2 Device: None (Room air)      Constitutional:   awake, alert, cooperative, no apparent distress, and appears stated age     Lungs:   No increased work of breathing, good air exchange, clear to auscultation bilaterally, no crackles or wheezing     Cardiovascular:   Normal apical impulse, regular rate and rhythm, normal S1 and S2, no S3 or S4, and no murmur noted     Abdomen:   No scars, normal bowel sounds, soft, non-distended, non-tender, no masses palpated, no hepatosplenomegally     Musculoskeletal:   There is no redness, warmth, or swelling of the joints.  Full range of motion noted.  Motor strength is 5 out of 5 all extremities bilaterally.  Tone is normal.     Neurologic:   Awake, alert, oriented to name, place and time.  Cranial nerves II-XII are grossly intact.                Data:     Most recent labs have been evaluated and relevant labs are outlined below :BMP    Recent Labs  Lab 10/30/17  0540 10/29/17  0554 10/28/17  2139 10/28/17  0827 10/27/17  2241   NA  --  139  --  133 135   POTASSIUM 4.0 3.7 3.9 3.3* 3.7   CHLORIDE  --  104  --  92* 94   JENNIFER  --  7.7*  --  7.8* 8.2*   CO2  --  27  --  30 23   BUN  --  7  --  19 15   CR  --  0.53  --  0.66 0.52   GLC  --  95  --  111* 115*     CBC    Recent Labs  Lab 10/29/17  0554 10/27/17  2241   WBC 4.9 6.1   RBC 3.55* 4.45   HGB 11.1* 14.0   HCT 33.7* 41.2   MCV 95 93   MCH 31.3 31.5   MCHC 32.9 34.0   RDW 14.7 14.1    383     INRNo lab " results found in last 7 days.  LFTs    Recent Labs  Lab 10/29/17  0554 10/28/17  0827 10/27/17  2241   ALKPHOS 115 131 143   AST 41 60* Unsatisfactory specimen - hemolyzed   ALT 30 37 42   BILITOTAL 0.7 0.9 0.4   PROTTOTAL 5.5* 6.6* 7.7   ALBUMIN 2.8* 3.3* 3.6      PANC    Recent Labs  Lab 10/30/17  0540 10/29/17  2120   LIPASE 577* 697*       Dr KAI Li MD  Hospitalist ( Internal medicine)  Pager: [phone]

## 2017-10-30 NOTE — PROGRESS NOTES
"SPIRITUAL HEALTH SERVICES  SPIRITUAL ASSESSMENT Progress Note  [facility] [place] [place] [facility]     PRIMARY FOCUS:     Emotional/spiritual/Congregation distress    Support for coping    REFERRAL SOURCE: Patient requested a bible from on-call .     ILLNESS CIRCUMSTANCES:   Reviewed documentation. Reflective conversation shared with patient which integrated elements of illness and family narratives.     Context of Serious Illness/Symptom(s) - Patient states that she thought she would be going home but then decided that she may need inpatient treatment. Plans to stay for a evaluation tomorrow.     Resources for Support - \"I have friends but I haven't told them about my drinking and my sister says that I need more than outpatient treatment.\"    DISTRESS:     Emotional/Spiritual/Existential Distress - Patient reports that she was sober for 10 years. Her father  recently and she return to drinking. Patient tearful when talking about her fathers death.     Taoism Distress - Patient states that she reads spiritual books but left them all at home and wishes she had grabbed a bag of books before she left in the ambulance.     Social/Cultural/Economic Distress - Patient is very worried that she may lose her job over all of this. She has some difficulty with paying her mortgage.     SPIRITUAL/Gnosticism COPING:     Synagogue/Susie - Religion     Spiritual Practice(s) - Spiritual reading. Gave the patient a bible at her request.  Prayer     Emotional/Relational/Existential Connections - Patient states that she has very little support from her family.  Her parents are both gone. I encouraged her to reach out to friends.     GOALS OF CARE:    Goals of Care - She is hoping to be admitted as an impatient for chem dep treatment.     Meaning/Sense-Making - Not discussed.     PLAN:  I let her know that I would notify the unit  and that she could also request a visit.     Ernestina Maria  Staff   Pager [phone]  "

## 2017-10-30 NOTE — CONSULTS
SW consult completed. Please refer to SW assessment on 10/30/17.    EVERETTE Miller  [address]   Phone: [phone]  Pager: [phone]

## 2017-10-30 NOTE — PROGRESS NOTES
"Social Work: Assessment with Discharge Plan    Patient Name:  Inga Ledesma  :  1965  Age:  52 year old  MRN:  [id number]  Risk/Complexity Score:   0  Completed assessment with:  Patient     Presenting Information   Reason for Referral:  Discharge plan and Substance abuse concerns  Date of Intake:  2017  Referral Source:  Chart Review  Decision Maker:  Patient   Alternate Decision Maker:  [facility] Directive:  Did not discuss at this time. Patient wanted to focus on discussing treatment   Living Situation:  House-Patient lives in a trailer with her son who is disabled. Patient reports that her son is 25 years old and has ADHD, a learning disability and is cognitively delayed. Patient reported that her son is independent for most ADL's and IDL's other than using a stove or cook top. Patient reported that her son is aware of her ETOH abuse and wants patient to seek treatment.   Previous Functional Status:  Independent  Patient and family understanding of hospitalization:  Patient is seeking detox and inpatient CD treatment. Patient reports that her son is aware of why she is in the hospital and wants patient to enter treatment. Patient reported that she binge drinks to the point of \"blacking out.\" Patient reported that she doesn't remember blacking out but states that her son will tell patient about the black outs.   Cultural/Language/Spiritual Considerations:  [employer]   Adjustment to Illness:  Patient is aware that she has alcohol abuse and is seeking help being admitted to [facility] or finding outpatient treatment if she financially is unable to pay for inpatient treatment.     Physical Health  Reason for Admission:    1. Alcohol dependence, continuous drinking behavior (H)    2. Alcohol dependence with uncomplicated intoxication (H)    3. Alcohol dependence with uncomplicated withdrawal (H)      Services Needed/Recommended: Pt seeking inpatient treatment. CD " "consult placed, awaiting recommendation.     Mental Health/Chemical Dependency  Diagnosis:  Patient has diagnosis of Major depressive disorder, CD, alcohol dependence w/ withdrawal and complication. Patient reports that she is a \"cutter\" but has not cut herself for months. Patient also stated \"I have been suicidal since I was 15 year old.\" When SW asked pt if she was currently suicidal patient stated \"no.\" Patient reported that she made a promise to her mother that she would not commit suicide and plans to keep that promise. Patient stated \" I have only been suicidal once when I was 15 years old but ended up not attempting.\" Patient did state she would rather go to Psych then CD when asked why patient could not provide an answer and did not state that she had a plan to hurt herself or anyone else.   Support/Services in Place:  Patient has worked with a therapist in the past but is not currently working with a therapist. Pt interested in working with a therapist out of [facility] who was recommended to pt.  Pt reports that her PCP prescribes her medication and that she does not see a Psychiatrist. Patient reports that she has been to treatment 1.5 years ago and also in 2004 at [facility]. Pt disclosed that following treatment in 2004 patient was sober for 10 years.   Services Needed/Recommended:  Patient is seeking inpatient treatment but is aware of the costs of [facility]; pt would have to pay $2,856 for room and board and $2,780 for out of pocket totaling $5,636. Patient would have to pay a down payment of $1,879 and then would have monthly payments of $208.77. Patient is aware the monthly cost does not include current hospital admission.     Support System  Significant relationship at present time:  Patients son  Family of origin is available for support:  Patient reports that she has a difficult relationship with her 5 siblings but that at times they provide some support.   Other support " available:  Some friends. Recovery support group that patient has attended in the past.   Gaps in support system:  Patient mentioned several times that she and her sister Kay do not get along. Pt was very emotional as she spoke about her sister who she reported she does not get along with anymore. Pt shared many stories of how her sister continues to cause stress to patient. Pt states that most of her family does not speak to her d/t her hx. With alcohol abuse. Patient recently lost her father and father in law. Patient was very emotionally as she spoke about the deaths that happened a couple of weeks ago. Patient was close with her father who appeared to provide patient with support. Patient discussed her mother passing in 2007 and losing a uncle in the past years as well.   Patient is caregiver to:  Child:  25 year old son  -Patients brother is currently helping provide support and assistance with pt's son.     Provider Information   Primary Care Physician:  Min [facility]edo   [phone]   Clinic:  [id number] / [place] [address]      :  Per chart review SW at [facility] has attempted to reach out to patient for support. Patient reported that she has missed appointments with her or doesn't follow back up. Patient states that she never has actually met this SW.     Financial   Income Source:  Patient works for [employer] in the [employer] office.  Patient has been getting FMLA, as patient reports being in and out of the hospital d/t alcohol abuse. Patient reports that her son gets SSDI and helps out with household costs.   Financial Concerns:  PAtient reports that paying rent has been hard and that there have been times her utilities have been shut off before. Pt states she has attempted to apply for assistance through the [facility] but does not qualify for assistance d/t job.   Insurance:    Payor/Plan Subscriber Name Rel Member # Group #   PREFERREDONE - GABY* TEJAS IRELAND   "[id number]       [id number]       Discharge Plan   Patient and family discharge goal:  Patient would like to admit to inpatient treatment at [facility]. Patient is also open to outpatient treatment if she cannot afford cost at [facility] for Room and Board.   Provided education on discharge plan:  YES  Patient agreeable to discharge plan:  YES. Dependent on if patient is able to borrow money from her sister to help pay for down payment cost at [facility].   A list of Medicare Certified Facilities was provided to the patient and/or family to encourage patient choice. Patient's choices for facility are:  Pt requested referral be sent to [facility].   Barriers to discharge:  Insurance coverage/costs, Awaiting CD consult     Discharge Recommendations   Anticipated Disposition:  Inpatient CD treatment  Transportation Needs: Not discussed     Additional comments   Patient reported that she was feeling \"highly anxious\" d/t not knowing if she can go to [facility] at discharge. Patient reported that she can only be in treatment for two weeks because she only has 16.5 hours of FMLA left and financally will not be able to work. Patient reported that she drinks 1 pint of vodka per day, to the point where she blacks out. Patient has continued to work but reported that she more recently has been not showing up to work d/t her drinking. Patient reported that she is scared that she will lose her job. Throughout our conversation patient would perk up when talking about work, stating how much others relied on her and felt highly towards pt's skill set. Pt stated that she is often asked questions by her manager and has provided training to others several times. Pt was clearly proud of the work that she does. 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 Patients main concern is attending treatment " and returning to work. When SW discussed cost of treatment with pt, pt reported that she may reach out to sister to help with down payment cost. Patient otherwise would like to find outpatient treatment close to home. Patient did call her insurance who reported that patient needs a Physician to call ([phone]) for a prior authorization that would state that pt needs inpatient treatment in order for coverage to be approved. Psych has now been consulted. SW also contacted Wu from CD to review referral for CD treatment.     SW attempted to meet with pt to discuss other inpatient treatment options such as [facility] or [facility] to check on insurance coverage. Patient was on the phone at this time. Primary SW will follow up with patient on the above.     EVERETTE Miller  [address]   Phone: [phone]  Pager: [phone]

## 2017-10-30 NOTE — PLAN OF CARE
Problem: Patient Care Overview  Goal: Plan of Care/Patient Progress Review  Outcome: Improving  Pt A/O x4, VSS, LS clear, BS active. Pt is up independently in room with walker. Tolerating regular diet with no n/v. C/O pain this AM and given PRN tylenol at 0800, no complaints of pain the rest of the day. Pt had some anxiety, MD notified and Atarax given. Pt stated that today is the anniversary of a grandparents death, her best friends son's death and that this past week she lost her father-in-law and her father. Emotional support provided. Flu shot given. PIV saline locked, dressing changed. Able to make needs known, call light in reach. Continue to monitor.

## 2017-10-30 NOTE — PROVIDER NOTIFICATION
Updated Moonlighter regarding pt widely awake and have difficulty sleeping back and want more Trazodone. MD ordered one time Trazodone.

## 2017-10-30 NOTE — PLAN OF CARE
Problem: Patient Care Overview  Goal: Plan of Care/Patient Progress Review  Discharge Planner PT   Patient plan for discharge: Home   Current status: Pt is IND with bed mobility and STS. Pt amb mod I with FWW in hallways and IND within room, using no AD. Pt with decreased standing tolerance, limited by LE fatigue and LBP.   Barriers to return to prior living situation: None  Recommendations for discharge: Home with OP PT   Rationale for recommendations: For progression of endurance, LBP management and higher level balance training.       Entered by: Nadiya Aguila 10/30/2017 6:39 PM

## 2017-10-31 ENCOUNTER — APPOINTMENT (OUTPATIENT)
Dept: PHYSICAL THERAPY | Facility: CLINIC | Age: 52
DRG: 897 | End: 2017-10-31
Payer: COMMERCIAL

## 2017-10-31 LAB
ALBUMIN SERPL-MCNC: 2.7 G/DL (ref 3.4–5)
ALP SERPL-CCNC: 114 U/L (ref 40–150)
ALT SERPL W P-5'-P-CCNC: 38 U/L (ref 0–50)
ANION GAP SERPL CALCULATED.3IONS-SCNC: 6 MMOL/L (ref 3–14)
AST SERPL W P-5'-P-CCNC: 45 U/L (ref 0–45)
BILIRUB SERPL-MCNC: 0.4 MG/DL (ref 0.2–1.3)
BUN SERPL-MCNC: 6 MG/DL (ref 7–30)
CALCIUM SERPL-MCNC: 8.5 MG/DL (ref 8.5–10.1)
CHLORIDE SERPL-SCNC: 103 MMOL/L (ref 94–109)
CO2 SERPL-SCNC: 27 MMOL/L (ref 20–32)
CREAT SERPL-MCNC: 0.58 MG/DL (ref 0.52–1.04)
GFR SERPL CREATININE-BSD FRML MDRD: >90 ML/MIN/1.7M2
GLUCOSE SERPL-MCNC: 107 MG/DL (ref 70–99)
POTASSIUM SERPL-SCNC: 4 MMOL/L (ref 3.4–5.3)
PROT SERPL-MCNC: 6 G/DL (ref 6.8–8.8)
SODIUM SERPL-SCNC: 136 MMOL/L (ref 133–144)

## 2017-10-31 PROCEDURE — 12000001 ZZH R&B MED SURG/OB UMMC

## 2017-10-31 PROCEDURE — 99232 SBSQ HOSP IP/OBS MODERATE 35: CPT | Performed by: INTERNAL MEDICINE

## 2017-10-31 PROCEDURE — 25000132 ZZH RX MED GY IP 250 OP 250 PS 637: Performed by: HOSPITALIST

## 2017-10-31 PROCEDURE — 25000132 ZZH RX MED GY IP 250 OP 250 PS 637: Performed by: INTERNAL MEDICINE

## 2017-10-31 PROCEDURE — 40000193 ZZH STATISTIC PT WARD VISIT

## 2017-10-31 PROCEDURE — 36416 COLLJ CAPILLARY BLOOD SPEC: CPT | Performed by: INTERNAL MEDICINE

## 2017-10-31 PROCEDURE — 40000007 ZZH STATISTIC ADULT CD FACE TO FACE-NO CHRG

## 2017-10-31 PROCEDURE — 97110 THERAPEUTIC EXERCISES: CPT | Mod: GP

## 2017-10-31 PROCEDURE — 99221 1ST HOSP IP/OBS SF/LOW 40: CPT | Performed by: PSYCHIATRY & NEUROLOGY

## 2017-10-31 PROCEDURE — 80053 COMPREHEN METABOLIC PANEL: CPT | Performed by: INTERNAL MEDICINE

## 2017-10-31 RX ORDER — TRAZODONE HYDROCHLORIDE 50 MG/1
50 TABLET, FILM COATED ORAL ONCE
Status: COMPLETED | OUTPATIENT
Start: 2017-10-31 | End: 2017-10-31

## 2017-10-31 RX ADMIN — HYDROXYZINE HYDROCHLORIDE 50 MG: 50 TABLET, FILM COATED ORAL at 21:49

## 2017-10-31 RX ADMIN — FLUTICASONE FUROATE AND VILANTEROL TRIFENATATE 1 PUFF: 200; 25 POWDER RESPIRATORY (INHALATION) at 08:39

## 2017-10-31 RX ADMIN — FLUTICASONE PROPIONATE 1 SPRAY: 50 SPRAY, METERED NASAL at 08:39

## 2017-10-31 RX ADMIN — TRAZODONE HYDROCHLORIDE 50 MG: 50 TABLET ORAL at 01:27

## 2017-10-31 RX ADMIN — ROPINIROLE HYDROCHLORIDE 1 MG: 1 TABLET, FILM COATED ORAL at 21:09

## 2017-10-31 RX ADMIN — MULTIPLE VITAMINS W/ MINERALS TAB 1 TABLET: TAB at 08:35

## 2017-10-31 RX ADMIN — LABETALOL HYDROCHLORIDE 100 MG: 100 TABLET, FILM COATED ORAL at 21:09

## 2017-10-31 RX ADMIN — LOSARTAN POTASSIUM 50 MG: 50 TABLET, FILM COATED ORAL at 08:36

## 2017-10-31 RX ADMIN — FOLIC ACID 1 MG: 1 TABLET ORAL at 08:35

## 2017-10-31 RX ADMIN — TIOTROPIUM BROMIDE 18 MCG: 18 CAPSULE ORAL; RESPIRATORY (INHALATION) at 08:00

## 2017-10-31 RX ADMIN — ROPINIROLE HYDROCHLORIDE 1 MG: 1 TABLET, FILM COATED ORAL at 08:36

## 2017-10-31 RX ADMIN — LABETALOL HYDROCHLORIDE 100 MG: 100 TABLET, FILM COATED ORAL at 08:36

## 2017-10-31 RX ADMIN — TRAZODONE HYDROCHLORIDE 50 MG: 50 TABLET ORAL at 21:49

## 2017-10-31 RX ADMIN — ACETAMINOPHEN 650 MG: 325 TABLET, FILM COATED ORAL at 17:51

## 2017-10-31 RX ADMIN — FLUOXETINE 40 MG: 20 CAPSULE ORAL at 21:08

## 2017-10-31 RX ADMIN — MENTHOL 1 PATCH: 205.5 PATCH TOPICAL at 21:49

## 2017-10-31 RX ADMIN — NICOTINE 1 PATCH: 14 PATCH, EXTENDED RELEASE TRANSDERMAL at 08:40

## 2017-10-31 RX ADMIN — POTASSIUM CHLORIDE 20 MEQ: 750 TABLET, FILM COATED, EXTENDED RELEASE ORAL at 21:07

## 2017-10-31 RX ADMIN — CALCIUM 1250 MG: 500 TABLET ORAL at 08:35

## 2017-10-31 RX ADMIN — FLUOXETINE 40 MG: 20 CAPSULE ORAL at 08:34

## 2017-10-31 RX ADMIN — TRAZODONE HYDROCHLORIDE 50 MG: 50 TABLET ORAL at 21:07

## 2017-10-31 RX ADMIN — GABAPENTIN 300 MG: 300 CAPSULE ORAL at 08:34

## 2017-10-31 RX ADMIN — GABAPENTIN 300 MG: 300 CAPSULE ORAL at 13:34

## 2017-10-31 RX ADMIN — ROPINIROLE HYDROCHLORIDE 1 MG: 1 TABLET, FILM COATED ORAL at 13:34

## 2017-10-31 RX ADMIN — MAGNESIUM OXIDE TAB 400 MG (241.3 MG ELEMENTAL MG) 400 MG: 400 (241.3 MG) TAB at 21:09

## 2017-10-31 RX ADMIN — ASPIRIN 81 MG: 81 TABLET, COATED ORAL at 08:36

## 2017-10-31 RX ADMIN — CALCIUM 1250 MG: 500 TABLET ORAL at 21:08

## 2017-10-31 RX ADMIN — OMEPRAZOLE 20 MG: 20 CAPSULE, DELAYED RELEASE ORAL at 17:51

## 2017-10-31 RX ADMIN — HYDROXYZINE HYDROCHLORIDE 50 MG: 50 TABLET, FILM COATED ORAL at 13:34

## 2017-10-31 RX ADMIN — GABAPENTIN 600 MG: 300 CAPSULE ORAL at 21:08

## 2017-10-31 RX ADMIN — FUROSEMIDE 20 MG: 20 TABLET ORAL at 08:35

## 2017-10-31 RX ADMIN — MAGNESIUM OXIDE TAB 400 MG (241.3 MG ELEMENTAL MG) 400 MG: 400 (241.3 MG) TAB at 08:35

## 2017-10-31 ASSESSMENT — ACTIVITIES OF DAILY LIVING (ADL)
ADLS_ACUITY_SCORE: 11

## 2017-10-31 NOTE — PROGRESS NOTES
" 10/30/17 1400   Quick Adds   Type of Visit Initial PT Evaluation   Living Environment   Lives With child(seth), adult   Living Arrangements (trailer home)   Home Accessibility stairs to enter home   Number of Stairs to Enter Home 3   Stair Railings at Home (1 HR)   Transportation Available car   Living Environment Comment Pt lives with adult son in trailer home with all needs on same level.    Self-Care   Usual Activity Tolerance moderate   Current Activity Tolerance moderate   Regular Exercise no   Equipment Currently Used at Home (Pt uses 4WW for mobility outside of home)   Activity/Exercise/Self-Care Comment Pt reports she is IND with all mobility at baseline, uses 4WW for community mobility only.    Functional Level Prior   Ambulation 1-->assistive equipment   Transferring 0-->independent   Toileting 0-->independent   Bathing 0-->independent   Dressing 0-->independent   Eating 0-->independent   Communication 0-->understands/communicates without difficulty   Swallowing 0-->swallows foods/liquids without difficulty   Cognition 0 - no cognition issues reported   Fall history within last six months yes   Number of times patient has fallen within last six months 1   Which of the above functional risks had a recent onset or change? none   Prior Functional Level Comment Pt states that she may have had 1 fall related to alcohol use, reported to her by son. Otherwise, pt denies instability within home. Uses 4WW for endurance/added stability in community.   General Information   Onset of Illness/Injury or Date of Surgery - Date 10/29/17   Referring Physician Dawood Mora MD   Patient/Family Goals Statement None stated   Pertinent History of Current Problem (include personal factors and/or comorbidities that impact the POC) Per chart review, pt is a \"52 year old female with history of COPD, HTN, CHF, CVA (1/2012), iron deficiency anemia, GERD, depression, and alcohol abuse admitted alcohol withdrawal.\" "   Precautions/Limitations no known precautions/limitations   Heart Disease Risk Factors Overweight;Lack of physical activity   General Observations Pt sitting at EOB upon arrival.   General Info Comments Activity orders: Up with A   Cognitive Status Examination   Orientation orientation to person, place and time   Level of Consciousness alert   Follows Commands and Answers Questions 100% of the time   Personal Safety and Judgment intact   Memory intact   Pain Assessment   Patient Currently in Pain Yes, see Vital Sign flowsheet   Integumentary/Edema   Integumentary/Edema no deficits were identifed   Posture    Posture Forward head position;Protracted shoulders   Range of Motion (ROM)   ROM Comment BLE and UE AROM WFL per functional mobility assessment   Strength   Strength Comments BLE strenght is functional per functional mobility assessment   Bed Mobility   Bed Mobility Comments IND   Transfer Skills   Transfer Comments IND   Gait   Gait Comments Pt amb IND within room and mod I in halls with 4WW. Pt moves at slower bridger, decreased endurance.   Balance   Balance Comments Good. Pt amb IND with no AD, higher level balance impairments identified with slight instability given gait speed changes or head turns though no overt LOB.   Sensory Examination   Sensory Perception Comments Pt denies numbness/tingling at UE/LE   General Therapy Interventions   Planned Therapy Interventions balance training;neuromuscular re-education;home program guidelines;progressive activity/exercise   Clinical Impression   Criteria for Skilled Therapeutic Intervention yes, treatment indicated   PT Diagnosis Decreased activity tolerance   Influenced by the following impairments Low back pain, decreased endurance, high level balance impairments   Functional limitations due to impairments Community mobility   Clinical Presentation Stable/Uncomplicated   Clinical Presentation Rationale Pt mobilizing well, pain and co-morbidities (COPD, CHF)  "limiting endurance with mobility.   Clinical Decision Making (Complexity) Low complexity   Therapy Frequency` 5 times/week   Predicted Duration of Therapy Intervention (days/wks) 1 week   Anticipated Discharge Disposition Home with Outpatient Therapy   Risk & Benefits of therapy have been explained Yes   Patient, Family & other staff in agreement with plan of care Yes   [facility] TM \"6 Clicks\"   2016, Trustees of [facility], under license to [employer].  All rights reserved.   6 Clicks Short Forms Basic Mobility Inpatient Short Form   [facility]  \"6 Clicks\" V.2 Basic Mobility Inpatient Short Form   1. Turning from your back to your side while in a flat bed without using bedrails? 4 - None   2. Moving from lying on your back to sitting on the side of a flat bed without using bedrails? 4 - None   3. Moving to and from a bed to a chair (including a wheelchair)? 4 - None   4. Standing up from a chair using your arms (e.g., wheelchair, or bedside chair)? 4 - None   5. To walk in hospital room? 4 - None   6. Climbing 3-5 steps with a railing? 4 - None   Basic Mobility Raw Score (Score out of 24.Lower scores equate to lower levels of function) 24   Total Evaluation Time   Total Evaluation Time (Minutes) 5     "

## 2017-10-31 NOTE — PROGRESS NOTES
"[facility], [place]   Internal Medicine Daily Note           Interval History/Events     Hand off taken from Dr. Li  Overnight events reviewed  Had some nausea last night  No vomiting. No abdominal pain  No fever, chills.        Review of Systems        4 point ROS including Respiratory, CV, GI and , other than that noted above is negative      Medications   I have reviewed current medications  in the \"current medication\" section of Epic.  Relevant changes include:     Physical Exam   General:       Vital signs:    Blood pressure 152/81, pulse 77, temperature 96.6  F (35.9  C), temperature source Oral, resp. rate 20, height 1.549 m (5' 1\"), weight 69.6 kg (153 lb 8 oz), last menstrual period 05/23/2010, SpO2 99 %, not currently breastfeeding.  Estimated body mass index is 29 kg/(m^2) as calculated from the following:    Height as of this encounter: 1.549 m (5' 1\").    Weight as of this encounter: 69.6 kg (153 lb 8 oz).    No intake or output data in the 24 hours ending 10/31/17 1509   HEENT: No icterus, no pallor  Cardiovascular: S1, S2 normal  Respiratory:  B/L CTA  GI/Abdomen: Soft, NT, BS+  Neurology: Alert, awake, and oriented. No tremors.   Extremities: No pretibial edema.   Skin:      Laboratory and Imaging Studies     I have reviewed  laboratory and imaging studies in the Epic. Pertinent findings are as below:    BMP  Recent Labs  Lab 10/31/17  0606 10/30/17  0540 10/29/17  0554 10/28/17  2139 10/28/17  0827 10/27/17  2241     --  139  --  133 135   POTASSIUM 4.0 4.0 3.7 3.9 3.3* 3.7   CHLORIDE 103  --  104  --  92* 94   JENNIFER 8.5  --  7.7*  --  7.8* 8.2*   CO2 27  --  27  --  30 23   BUN 6*  --  7  --  19 15   CR 0.58  --  0.53  --  0.66 0.52   *  --  95  --  111* 115*     CBC  Recent Labs  Lab 10/29/17  0554 10/27/17  2241   WBC 4.9 6.1   RBC 3.55* 4.45   HGB 11.1* 14.0   HCT 33.7* 41.2   MCV 95 93   MCH 31.3 31.5   MCHC 32.9 34.0   RDW 14.7 14.1    383 "     INRNo lab results found in last 7 days.  LFTs  Recent Labs  Lab 10/31/17  0606 10/29/17  0554 10/28/17  0827 10/27/17  2241   ALKPHOS 114 115 131 143   AST 45 41 60* Unsatisfactory specimen - hemolyzed   ALT 38 30 37 42   BILITOTAL 0.4 0.7 0.9 0.4   PROTTOTAL 6.0* 5.5* 6.6* 7.7   ALBUMIN 2.7* 2.8* 3.3* 3.6      PANC  Recent Labs  Lab 10/30/17  0540 10/29/17  2120   LIPASE 577* 697*           Impression/Plan      Inga Ledesma is a 52 year old female with history of COPD, HTN, CHF, CVA (1/2012), iron deficiency anemia, GERD, depression, and alcohol abuse admitted alcohol withdrawal.       Alcohol dependency and withdrawal:   She was treated with Diazepam as per [facility] protocol. She was given Thiamine, MVI, and Folic acid. She was placed on seizure, and fall precaution. Basic metabolic panels, and complete blood counts were serially monitored.  She has completed treatment for alcohol withdrawal syndrome with Diazepam on 10/30.   Social work was consulted.  Psychiatry consulted for recommendation on treatment plan for alcohol dependency   - Continue MVI, thiamine, folic acid daily.   - Seizure and fall precautions, Discontinue telemetry   - Psychiatry consult     CVS:    HTN, ?HFpEF  Echo 5/2016 technically difficult although noted normal LVEF of ~55%; normal RV longitudinal function; left atrial dilatation. She was maintained on Furosemide, Losartan, and Labetalol. Initially they were held, but started gradually.   - Continue Furosemide  - Continue Losartan  - Continue Labetalol  - Daily weight        # COPD. Tobacco abuse (active smoker):    PFT's 4/2017 with FVC 93% pred, FEV1 86% pred, FEV1/FVC 93%.   PTA on Advair, and Tiotropium daily, and Duonebs prn  - Continue spiriva QD, advair BID  - Continue flonase  - albuterol. duonebs prn  - IS. Oxygen prn  - pulse ox.       # ?Hx of CVA: PTA on Aspirin 81   Patient denies previous history of stroke. No imaging in chart to confirm. On   - Continue ASA 81 mg  QD      # Depression: PTA fluoxetine.   - Continue       # GERD: PPI      # Chest pain/epigastric pain:  Musculoskeletal in nature. Patient prefers icy hot patch     # Elevated lipase:  Consistent with alcohol use. Decreasing trend  Denies abdominal pain  Advance diet as tolerated             Consulting teams: None   Code status: Full  DVT Prophylaxis: PCD's   Gastric prophylaxis: None   Diet: Regular adult   Disposition: Awaiting Psych consult, social work finding treatment place                      Cr Dillard MD  Hospitalist ( Internal medicine)  Pager: [phone]

## 2017-10-31 NOTE — CONSULTS
PSYCHIATRIC CONSULTATION       REASON FOR CONSULTATION:  Alcohol abuse, wants to go to [facility] and needs prior authorization.      REQUESTING PHYSICIAN:  Dr. Li.      IDENTIFYING INFORMATION:  Inga Ledesma is a 52-year-old  female.  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  She is , has 1 child.      CHIEF COMPLAINT:  Alcohol.      HISTORY OF PRESENT ILLNESS:  The patient came to the emergency room wanting detox.  The patient was in a detox unit on 10/06 to 10/08 on 3A.  Her plan at that time was to go to get a Rule 25 and to attend [facility], but she lost her father and relapsed and she has been drinking.  She has now been drinking 1 pint of alcohol.  She called EMS because she was upset her family wanted her to get treatment.  She has numerous stressors.  She is not taking her medications.  Her father passed away.      The patient began to drink alcohol at the age of 20.  At 31 it was a problem.  She went to treatment in 2004 at the insistence of her family and was sober until 2013.  In 2013 she went to her friend's house.  This is a childhood friend.  The friend and her   were drinking and so she relapsed and she has been struggling to stay sober.  She was in treatment in the last year, 2016, but was not able to stay sober.  She has tolerance to alcohol, blackouts.  She states that she has a history of seizures in contrast to the history that she provided to the emergency room.  She does have a history of withdrawal seizures.  She has tolerance, blackouts.  She is drinking 1 pint.  She spent more time, more amount, progressive loss of control, tried to quit unsuccessfully.  She has used despite negative consequences, falls, family.  She does not use any drugs.  She smokes half a pack a day, does not bernal.      She says that she has depression going back to her teenage years.  She says that even as a teenager she remembers isolating, crying a lot, does not want to  "do anything.  When she gets depressed, energy, motivation, interest and sleep suffer.  She takes Prozac for it.  She does not have any suicidal ideation, does not have any auditory or visual hallucinations.  She does not have any alise.  She describes that she is an anxious person.  It impacts her concentration.  She becomes irritable and tired.  She does have problems with sleep because of her anxiety.      PAST PSYCHIATRIC HISTORY:  Never psychiatrically hospitalized.  Was in 2 treatment programs, one in 2004, one in 2016.      PAST MEDICAL HISTORY:  Please review the detailed physical examination and review of systems done on this patient by Dr. Hines in 10/2017.  The patient has a lot of medical problems.  She has hypertension, congestive heart failure, COPD, hypokalemia, hyponatremia, leukocytosis, history of stroke.    Blood pressure (!) 143/92, pulse 82, temperature 98.2  F (36.8  C), temperature source Oral, resp. rate 20, height 1.549 m (5' 1\"), weight 69.6 kg (153 lb 8 oz), last menstrual period 05/23/2010, SpO2 96 %, not currently breastfeeding.    FAMILY HISTORY:  Mother had depression.  Mother passed away.  Father had alcohol issues.  Father passed away.  Son has ADHD and autism.      SOCIAL HISTORY:  Good childhood.  She had depression going back to a childhood age.  She has 12+1 years of education.  She lives with her adult son.      MENTAL STATUS EXAMINATION:  The patient is a 52-year-old female who appears her stated age.  She has adequate grooming, adequate hygiene.  Mood is depressed.  Affect is congruent.  Speech is spontaneous, normal rate.  Linear thought process, no loosening of associations.  Does not have any suicidal ideation, does not have auditory or visual hallucinations.  Alert, oriented x3.  Recent and remote memory, language, fund of knowledge are all adequate.  No loose associations.      DIAGNOSES:   Axis I:  Alcohol use disorder, severe.            Major depression, recurrent " without psychotic features.      RECOMMENDATIONS:   1.  Medical stabilization as per Internal Medicine.   2.   Complete the Detox her using the [facility] protocol on Valium because of history of seizures.  The patient can be restarted back on her medications of Prozac 40 mg twice a day and gabapentin.   3.  The patient wants to do an inpatient level of care, given the history that she has that she was in detox twice and has been falling down.  She is not able to stay sober.  She would benefit from inpatient treatment.  If patient needs to go to inpatient, she can be authorized to go to inpatient treatment.  These recommendations were given to Dr. Dillard.      Thank you for this interesting consult.         YOSEPH JOSEPH MD             D: 10/31/2017 12:18   T: 10/31/2017 12:37   MT: AYO      Name:     TEJAS IRELAND   MRN:      [id number]        Account:       [id number]   :      1965           Consult Date:  10/31/2017      Document: [id number]

## 2017-10-31 NOTE — PROGRESS NOTES
Social Work Services Progress Note    Hospital Day: 5    Collaborated with:  Pt, bedside RN Yudith, 10A IDT, Dr Sandhu    Data:  DC plan    Intervention:  Pt states she cannot afford to pay for room and board costs through [employer]. She is eager and motivated to get into CD treatment. She expresses fear of DC of hospital without a bed in a CD treatment program. She has not had a CD Eval or Rule 25 within past 30 days.    Writer and pt discussed AA meetings, alternative living situation while waiting for CD treatment (pt states there is none).    Writer provided education on how to admit to a CD treatment program; including needing bed available and needing to have insurance approval. Pt cannot afford [facility], even with reduced fees.    She is calling [facility] with hopes they will be able to accept her.     Writer provided support, but pt is experiencing increased anxiety about DC from hospital. She requested writer leave    Assessment:  pt is motivated to seek CD treatment, fearful that DC and return home would mean she would relapse. She expresses understanding of process and potential barriers toward DC to a CD treatment program today.    Plan:    Anticipated Disposition:  home vs. CD treatment    Barriers to d/c plan:  Lack of Rule 25 assessment, Lack of bed available in CD treatment,     Follow Up:  SW to remain available per request/referral. Pt does verbalize having resources and support through [facility].

## 2017-10-31 NOTE — PLAN OF CARE
Problem: Patient Care Overview  Goal: Plan of Care/Patient Progress Review  Outcome: No Change  A&Ox4, VSS, LS clear, BS active- tolerating a regular diet. Passing flatus. Pt is up independently with walker. Went outside this evening in wheelchair with friend to smoke- nicotine patch removed. Pt tearful at times, but was happy to speak with the  today and have a friend visit. Atarax given 2x for anxiety. PIV SL. Pt is able to make needs known, continue with POC.

## 2017-10-31 NOTE — PLAN OF CARE
Problem: PT General Care Plan  Goal: PT target date for goal attainment  PT: patient on track for d/c to rehab facility from a PT standpoint. She displayed improvement with increase aerobic tolerance and balance drills.      Discharge Planner PT   Patient plan for discharge: treatment facility  Current status: independent with gait, transfers and bed mobility  Barriers to return to prior living situation: none from PT standpoint  Recommendations for discharge: OP PT to increase aerobic tolerance  Rationale for recommendations:  Progress endurance for management of COPD and independence in community.        Entered by: Latisha Sinha 10/31/2017 6:30 PM

## 2017-10-31 NOTE — PROGRESS NOTES
D) I reviewed patient's EMR last night, and talked with her briefly by tel. She said she could only come to [facility] for 2 weeks, and still wasn't sure if she could afford it. I gave her Treasure 's number in the CD billing office [phone] so she could get and exact dollar amount for LP. I told her that LP was a 4 week program, and she would need to at least commit to 3 weeks. We did discuss [facility] in [place] as they include the Room and Board costs in their program billing charges. However, she said she had been there and couldn't go back as they have stairs and she can't do stairs. I did leave vm on Mahi's tel this AM highlighting my conversation with Cody from last night. A) prior to transferring to [facility], we would need to get a nurse to nurse review, pt would need to commit to 4 weeks of [facility], pt would need to make payment arrangements with our business office. P) I did leave a message for his cm Mahi and will wait to hear what patient decides. Wu Armenta [facility], [facility]

## 2017-10-31 NOTE — PLAN OF CARE
Problem: Patient Care Overview  Goal: Plan of Care/Patient Progress Review  Outcome: No Change  Patient A/Ox4. VSS. Denies CP, SOB, dizziness/LH. LSCTA. +fl/BS. Up independently with walker. Tolerating regular diet . IS encouraged. IV SL.Denies pain. Vistaril 50 mg po given for anxiety. Patient has demonstrated ability to call appropriately. Patient is resting with call light within reach. Will continue to monitor.
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
## 2017-11-01 ENCOUNTER — APPOINTMENT (OUTPATIENT)
Dept: PHYSICAL THERAPY | Facility: CLINIC | Age: 52
DRG: 897 | End: 2017-11-01
Payer: COMMERCIAL

## 2017-11-01 LAB — POTASSIUM SERPL-SCNC: 4 MMOL/L (ref 3.4–5.3)

## 2017-11-01 PROCEDURE — 97530 THERAPEUTIC ACTIVITIES: CPT | Mod: GP

## 2017-11-01 PROCEDURE — 12000001 ZZH R&B MED SURG/OB UMMC

## 2017-11-01 PROCEDURE — 99232 SBSQ HOSP IP/OBS MODERATE 35: CPT | Performed by: INTERNAL MEDICINE

## 2017-11-01 PROCEDURE — 36415 COLL VENOUS BLD VENIPUNCTURE: CPT | Performed by: INTERNAL MEDICINE

## 2017-11-01 PROCEDURE — 25000132 ZZH RX MED GY IP 250 OP 250 PS 637: Performed by: INTERNAL MEDICINE

## 2017-11-01 PROCEDURE — 84132 ASSAY OF SERUM POTASSIUM: CPT | Performed by: INTERNAL MEDICINE

## 2017-11-01 PROCEDURE — 25000132 ZZH RX MED GY IP 250 OP 250 PS 637: Performed by: HOSPITALIST

## 2017-11-01 PROCEDURE — 40000193 ZZH STATISTIC PT WARD VISIT

## 2017-11-01 RX ORDER — FLUOXETINE 40 MG/1
40 CAPSULE ORAL 2 TIMES DAILY
Qty: 60 CAPSULE | Refills: 0 | Status: SHIPPED | OUTPATIENT
Start: 2017-11-01 | End: 2018-01-08

## 2017-11-01 RX ORDER — MULTIPLE VITAMINS W/ MINERALS TAB 9MG-400MCG
1 TAB ORAL DAILY
Qty: 30 EACH | Refills: 1 | Status: SHIPPED | OUTPATIENT
Start: 2017-11-01 | End: 2018-04-29

## 2017-11-01 RX ORDER — FUROSEMIDE 20 MG
20 TABLET ORAL DAILY
Qty: 30 TABLET | Refills: 0 | Status: SHIPPED | OUTPATIENT
Start: 2017-11-01 | End: 2018-01-08

## 2017-11-01 RX ORDER — HYDROXYZINE HYDROCHLORIDE 50 MG/1
50 TABLET, FILM COATED ORAL EVERY 6 HOURS PRN
Qty: 30 TABLET | Refills: 1 | Status: SHIPPED | OUTPATIENT
Start: 2017-11-01 | End: 2018-04-29

## 2017-11-01 RX ORDER — ALUMINA, MAGNESIA, AND SIMETHICONE 2400; 2400; 240 MG/30ML; MG/30ML; MG/30ML
15-30 SUSPENSION ORAL EVERY 4 HOURS PRN
Qty: 355 ML | Refills: 0 | Status: SHIPPED | OUTPATIENT
Start: 2017-11-01 | End: 2018-01-08

## 2017-11-01 RX ORDER — FOLIC ACID 1 MG/1
1 TABLET ORAL DAILY
Qty: 30 TABLET | Refills: 0 | Status: SHIPPED | OUTPATIENT
Start: 2017-11-01 | End: 2018-01-08

## 2017-11-01 RX ORDER — LABETALOL 100 MG/1
100 TABLET, FILM COATED ORAL 2 TIMES DAILY
Qty: 60 TABLET | Refills: 0 | Status: SHIPPED | OUTPATIENT
Start: 2017-11-01 | End: 2018-01-08

## 2017-11-01 RX ORDER — AMOXICILLIN 250 MG
1 CAPSULE ORAL 2 TIMES DAILY PRN
Status: DISCONTINUED | OUTPATIENT
Start: 2017-11-01 | End: 2017-11-02 | Stop reason: HOSPADM

## 2017-11-01 RX ORDER — ALBUTEROL SULFATE 90 UG/1
AEROSOL, METERED RESPIRATORY (INHALATION)
Qty: 3 INHALER | Refills: 0 | Status: SHIPPED | OUTPATIENT
Start: 2017-11-01 | End: 2018-02-23

## 2017-11-01 RX ORDER — LOSARTAN POTASSIUM 100 MG/1
50 TABLET ORAL DAILY
Qty: 30 TABLET | Refills: 1 | Status: SHIPPED | OUTPATIENT
Start: 2017-11-01 | End: 2018-02-23

## 2017-11-01 RX ORDER — TRAZODONE HYDROCHLORIDE 50 MG/1
50 TABLET, FILM COATED ORAL AT BEDTIME
Qty: 30 TABLET | Refills: 1 | Status: SHIPPED | OUTPATIENT
Start: 2017-11-01 | End: 2018-02-23

## 2017-11-01 RX ORDER — FLUTICASONE PROPIONATE 50 MCG
1-2 SPRAY, SUSPENSION (ML) NASAL DAILY
Qty: 3 G | Refills: 1 | Status: SHIPPED | OUTPATIENT
Start: 2017-11-01 | End: 2018-04-05

## 2017-11-01 RX ORDER — NICOTINE 21 MG/24HR
1 PATCH, TRANSDERMAL 24 HOURS TRANSDERMAL DAILY
Qty: 30 PATCH | Refills: 1 | Status: SHIPPED | OUTPATIENT
Start: 2017-11-01 | End: 2018-04-16

## 2017-11-01 RX ORDER — TRIAMCINOLONE ACETONIDE 1 MG/G
CREAM TOPICAL
Qty: 80 G | Refills: 0 | Status: SHIPPED | OUTPATIENT
Start: 2017-11-01 | End: 2018-01-08

## 2017-11-01 RX ORDER — CALCIUM CARBONATE 500(1250)
1250 TABLET ORAL 2 TIMES DAILY
Qty: 90 TABLET | Refills: 0 | Status: SHIPPED | OUTPATIENT
Start: 2017-11-01 | End: 2018-01-08

## 2017-11-01 RX ORDER — POLYETHYLENE GLYCOL 3350 17 G/17G
17 POWDER, FOR SOLUTION ORAL 2 TIMES DAILY PRN
Status: DISCONTINUED | OUTPATIENT
Start: 2017-11-01 | End: 2017-11-02 | Stop reason: HOSPADM

## 2017-11-01 RX ORDER — GABAPENTIN 300 MG/1
CAPSULE ORAL
Qty: 90 CAPSULE | Refills: 1 | Status: SHIPPED | OUTPATIENT
Start: 2017-11-01 | End: 2018-02-23

## 2017-11-01 RX ORDER — IPRATROPIUM BROMIDE AND ALBUTEROL SULFATE 2.5; .5 MG/3ML; MG/3ML
3 SOLUTION RESPIRATORY (INHALATION) 4 TIMES DAILY PRN
Qty: 360 ML | Refills: 1 | Status: ON HOLD | OUTPATIENT
Start: 2017-11-01 | End: 2018-07-09

## 2017-11-01 RX ORDER — MAGNESIUM OXIDE 400 MG/1
400 TABLET ORAL 2 TIMES DAILY
Qty: 60 TABLET | Refills: 1 | Status: SHIPPED | OUTPATIENT
Start: 2017-11-01 | End: 2018-01-08

## 2017-11-01 RX ORDER — TIOTROPIUM BROMIDE 18 UG/1
CAPSULE ORAL; RESPIRATORY (INHALATION)
Qty: 30 CAPSULE | Refills: 1 | Status: SHIPPED | OUTPATIENT
Start: 2017-11-01 | End: 2018-02-23

## 2017-11-01 RX ORDER — ROPINIROLE 1 MG/1
1 TABLET, FILM COATED ORAL 3 TIMES DAILY
Qty: 90 TABLET | Refills: 1 | Status: SHIPPED | OUTPATIENT
Start: 2017-11-01 | End: 2018-02-23

## 2017-11-01 RX ADMIN — OMEPRAZOLE 20 MG: 20 CAPSULE, DELAYED RELEASE ORAL at 16:25

## 2017-11-01 RX ADMIN — HYDROXYZINE HYDROCHLORIDE 50 MG: 50 TABLET, FILM COATED ORAL at 14:52

## 2017-11-01 RX ADMIN — FUROSEMIDE 20 MG: 20 TABLET ORAL at 08:10

## 2017-11-01 RX ADMIN — HYDROXYZINE HYDROCHLORIDE 25 MG: 25 TABLET ORAL at 21:22

## 2017-11-01 RX ADMIN — NICOTINE 1 PATCH: 14 PATCH, EXTENDED RELEASE TRANSDERMAL at 08:10

## 2017-11-01 RX ADMIN — MENTHOL 1 PATCH: 205.5 PATCH TOPICAL at 08:08

## 2017-11-01 RX ADMIN — MAGNESIUM OXIDE TAB 400 MG (241.3 MG ELEMENTAL MG) 400 MG: 400 (241.3 MG) TAB at 21:22

## 2017-11-01 RX ADMIN — FOLIC ACID 1 MG: 1 TABLET ORAL at 08:09

## 2017-11-01 RX ADMIN — CALCIUM 1250 MG: 500 TABLET ORAL at 21:21

## 2017-11-01 RX ADMIN — FLUOXETINE 40 MG: 20 CAPSULE ORAL at 21:22

## 2017-11-01 RX ADMIN — POLYETHYLENE GLYCOL 3350 17 G: 17 POWDER, FOR SOLUTION ORAL at 13:10

## 2017-11-01 RX ADMIN — LABETALOL HYDROCHLORIDE 100 MG: 100 TABLET, FILM COATED ORAL at 21:21

## 2017-11-01 RX ADMIN — MULTIPLE VITAMINS W/ MINERALS TAB 1 TABLET: TAB at 08:09

## 2017-11-01 RX ADMIN — OMEPRAZOLE 20 MG: 20 CAPSULE, DELAYED RELEASE ORAL at 08:16

## 2017-11-01 RX ADMIN — TRAZODONE HYDROCHLORIDE 50 MG: 50 TABLET ORAL at 21:21

## 2017-11-01 RX ADMIN — ROPINIROLE HYDROCHLORIDE 1 MG: 1 TABLET, FILM COATED ORAL at 08:09

## 2017-11-01 RX ADMIN — FLUTICASONE FUROATE AND VILANTEROL TRIFENATATE 1 PUFF: 200; 25 POWDER RESPIRATORY (INHALATION) at 08:08

## 2017-11-01 RX ADMIN — CALCIUM 1250 MG: 500 TABLET ORAL at 08:09

## 2017-11-01 RX ADMIN — FLUTICASONE PROPIONATE 2 SPRAY: 50 SPRAY, METERED NASAL at 08:07

## 2017-11-01 RX ADMIN — LABETALOL HYDROCHLORIDE 100 MG: 100 TABLET, FILM COATED ORAL at 08:16

## 2017-11-01 RX ADMIN — GABAPENTIN 600 MG: 300 CAPSULE ORAL at 21:22

## 2017-11-01 RX ADMIN — LOSARTAN POTASSIUM 50 MG: 50 TABLET, FILM COATED ORAL at 08:16

## 2017-11-01 RX ADMIN — ROPINIROLE HYDROCHLORIDE 1 MG: 1 TABLET, FILM COATED ORAL at 21:22

## 2017-11-01 RX ADMIN — ROPINIROLE HYDROCHLORIDE 1 MG: 1 TABLET, FILM COATED ORAL at 13:10

## 2017-11-01 RX ADMIN — MAGNESIUM OXIDE TAB 400 MG (241.3 MG ELEMENTAL MG) 400 MG: 400 (241.3 MG) TAB at 08:10

## 2017-11-01 RX ADMIN — GABAPENTIN 300 MG: 300 CAPSULE ORAL at 08:09

## 2017-11-01 RX ADMIN — TIOTROPIUM BROMIDE 18 MCG: 18 CAPSULE ORAL; RESPIRATORY (INHALATION) at 08:08

## 2017-11-01 RX ADMIN — ASPIRIN 81 MG: 81 TABLET, COATED ORAL at 08:09

## 2017-11-01 RX ADMIN — FLUOXETINE 40 MG: 20 CAPSULE ORAL at 08:09

## 2017-11-01 RX ADMIN — GABAPENTIN 300 MG: 300 CAPSULE ORAL at 13:10

## 2017-11-01 RX ADMIN — SENNOSIDES AND DOCUSATE SODIUM 1 TABLET: 8.6; 5 TABLET ORAL at 13:10

## 2017-11-01 ASSESSMENT — ACTIVITIES OF DAILY LIVING (ADL)
ADLS_ACUITY_SCORE: 12
ADLS_ACUITY_SCORE: 11

## 2017-11-01 NOTE — PLAN OF CARE
Problem: Patient Care Overview  Goal: Plan of Care/Patient Progress Review  Discharge Planner PT   Patient plan for discharge: Treatment center  Current status: Indep with all mobility; independently ambulates 450 feet during this session, no assistive device. Performs 12 stairs with 1 HR indep. PT to complete orders, pt has met all goals.   Barriers to return to prior living situation: none from functional mobility standpoint  Recommendations for discharge: Home vs Treatment center  Rationale for recommendations: Indep       Entered by: Cintia Melvin 11/01/2017 12:11 PM         Physical Therapy Discharge Summary     Reason for therapy discharge:    All goals and outcomes met, no further needs identified.     Progress towards therapy goal(s). See goals on Care Plan in Epic electronic health record for goal details.  Goals met     Therapy recommendation(s):    Continue home exercise program.

## 2017-11-01 NOTE — PLAN OF CARE
Problem: Patient Care Overview  Goal: Plan of Care/Patient Progress Review  Outcome: No Change  Pt resting in bed. Independent to the bathroom with walker. No acute issues or requests overnight. No report of pain or discomfort. Cont to assess.

## 2017-11-01 NOTE — CONSULTS
D) I met with patient and completed her rule 25 which is now in her EMR. I did contact Jan at [facility] [phone], they manage all the CD services for [place] employees having [employer]. He will call Ed Luanclarencejosh [phone] back on Wednesday with a names of residential programs which her plan will cover. We are looking for a plan that will cover R & B charges as she can't afford LP. Ed can then coordinate with Mahi as to which programs are covered and fax the rule 25 to those programs. I will fax a copy of the rule 25 to [facility] so they have it on file. A) pt appears very motivated for tx, and scared to go home for fear that she will drink. It is hoped that a program will emerge that she can afford and that will have openings soon. P) After Ed gets a call from Jan on Wed he can coordinate with Mahi and patient for tx referral. STEPHANIE Mcdonald         Electronically signed by Wu Armenta LADC at 10/31/2017  8:13 PM

## 2017-11-01 NOTE — PLAN OF CARE
Problem: Patient Care Overview  Goal: Plan of Care/Patient Progress Review  Outcome: Improving  Pt alert and oriented x4.  Denies pain other than headache. Resolved with PRN tylenol.  VSS.  LS clear.  No CP/SOB.  BS+.  Last BM 10/29 per pt report.  Voiding spontaneously w/out difficulty.  Tolerating reg diet no n/v.  Cms & neuros +. Pt occasionally weepy and anxious.  PRN atarax admin and trazodone at HS.  K of 4.0 replaced and redraw ordered for the AM.  Pt hopes to find placement in a treatment program tomorrow before discharge.  Pt is independent with walker.  Call light is within reach and pt is able to make needs known.

## 2017-11-01 NOTE — PLAN OF CARE
Problem: Patient Care Overview  Goal: Plan of Care/Patient Progress Review  Outcome: Improving  Focus: Physio  D: Doing well today. Hoping to get into [facility] for treatment. Up with independent with walker.  Reports feeling constipated but no other medical issues. P: Continue current plan of care until discharged. Miralex ordered.     14:30: Pt. Feeling anxious about up in the air discharge plans.   I: Vistaril given.

## 2017-11-01 NOTE — PROGRESS NOTES
"[facility], [place]   Internal Medicine Daily Note           Interval History/Events     Overnight events reviewed  Reports doing well.   Denies any nausea, vomiting, fever, chills  Tolerating diet well  No fever, chills, lightheadedness or dizziness.   Reports making many phone calls for help  Met with psychiatry and  yesterday  No BM for 3 days, wants laxatives.        Review of Systems        4 point ROS including Respiratory, CV, GI and , other than that noted above is negative      Medications   I have reviewed current medications  in the \"current medication\" section of Epic.  Relevant changes include:     Physical Exam   General:       Vital signs:    Blood pressure (!) 143/92, pulse 82, temperature 98.2  F (36.8  C), temperature source Oral, resp. rate 20, height 1.549 m (5' 1\"), weight 69.6 kg (153 lb 8 oz), last menstrual period 05/23/2010, SpO2 96 %, not currently breastfeeding.  Estimated body mass index is 29 kg/(m^2) as calculated from the following:    Height as of this encounter: 1.549 m (5' 1\").    Weight as of this encounter: 69.6 kg (153 lb 8 oz).    No intake or output data in the 24 hours ending 10/31/17 1509   HEENT: No icterus, no pallor  Cardiovascular: S1, S2 normal  Respiratory:  B/L CTA  GI/Abdomen: Soft, NT, BS+  Neurology: Alert, awake, and oriented. No tremors.   Extremities: No pretibial edema.   Skin:      Laboratory and Imaging Studies     I have reviewed  laboratory and imaging studies in the Epic. Pertinent findings are as below:    BMP  Recent Labs  Lab 11/01/17  0532 10/31/17  0606 10/30/17  0540 10/29/17  0554  10/28/17  0827 10/27/17  2241   NA  --  136  --  139  --  133 135   POTASSIUM 4.0 4.0 4.0 3.7  < > 3.3* 3.7   CHLORIDE  --  103  --  104  --  92* 94   JENNIFER  --  8.5  --  7.7*  --  7.8* 8.2*   CO2  --  27  --  27  --  30 23   BUN  --  6*  --  7  --  19 15   CR  --  0.58  --  0.53  --  0.66 0.52   GLC  --  107*  --  95  --  111* " 115*   < > = values in this interval not displayed.  CBC    Recent Labs  Lab 10/29/17  0554 10/27/17  2241   WBC 4.9 6.1   RBC 3.55* 4.45   HGB 11.1* 14.0   HCT 33.7* 41.2   MCV 95 93   MCH 31.3 31.5   MCHC 32.9 34.0   RDW 14.7 14.1    383     INRNo lab results found in last 7 days.  LFTs    Recent Labs  Lab 10/31/17  0606 10/29/17  0554 10/28/17  0827 10/27/17  2241   ALKPHOS 114 115 131 143   AST 45 41 60* Unsatisfactory specimen - hemolyzed   ALT 38 30 37 42   BILITOTAL 0.4 0.7 0.9 0.4   PROTTOTAL 6.0* 5.5* 6.6* 7.7   ALBUMIN 2.7* 2.8* 3.3* 3.6      PANC    Recent Labs  Lab 10/30/17  0540 10/29/17  2120   LIPASE 577* 697*           Impression/Plan      Inga Ledesma is a 52 year old female with history of COPD, HTN, CHF, CVA (1/2012), iron deficiency anemia, GERD, depression, and alcohol abuse admitted alcohol withdrawal.       Alcohol dependency and withdrawal:   She was treated with Diazepam as per [facility] protocol. She was given Thiamine, MVI, and Folic acid. She was placed on seizure, and fall precaution. Basic metabolic panels, and complete blood counts were serially monitored.  She has completed treatment for alcohol withdrawal syndrome with Diazepam on 10/30.   [facility] with [facility] dependency treatment.  Discussed with Psychiatry on 10/31:  advised that she would benefit from inpatient treatment plan.  - Completed treatment of alcohol withdrawal syndrome  - Continue MVI, thiamine, folic acid daily.   - Seizure and fall precautions  - Social work on board for Alcohol dependency treatment plan.      # CVS:  HTN, ?HFpEF  Echo 5/2016 technically difficult although noted normal LVEF of ~55%; normal RV longitudinal function; left atrial dilatation. She was maintained on Furosemide, Losartan, and Labetalol. Initially they were held, but started gradually.   - Continue Furosemide  - Continue Losartan  - Continue Labetalol  - Daily weight        # COPD. Tobacco  abuse (active smoker):    PFT's 4/2017 with FVC 93% pred, FEV1 86% pred, FEV1/FVC 93%.   PTA on Advair, and Tiotropium daily, and Duonebs prn  - Continue Tiotropium QD, Advair BID  - Continue Fluticasone  - Albuterol. Duonebs prn  - IS. Oxygen prn  - pulse ox.       # ?Hx of CVA: PTA on Aspirin 81   Patient denies previous history of stroke. No imaging in chart to confirm. On   - Continue ASA 81 mg QD      # Depression: PTA Fluoxetine.   - Continue       # GERD: PPI      # Chest pain/epigastric pain:  Musculoskeletal in nature. Patient prefers icy hot patch     # Elevated lipase:  Consistent with alcohol use. Decreasing trend  Denies abdominal pain  Advance diet as tolerated             Consulting teams: None   Code status: Full  DVT Prophylaxis: PCD's   Gastric prophylaxis: None   Diet: Regular adult   Disposition: Awaiting long term treatment plan                     Cr Dillard MD  Hospitalist ( Internal medicine)  Pager: [phone]

## 2017-11-01 NOTE — PROGRESS NOTES
D) I met with patient and completed her rule 25 which is now in her EMR. I did contact Jan at [facility] [phone], they manage all the CD services for [employer] employees having [employer]. He will call Ed Luanclarencejosh [phone] back on Wednesday with a names of residential programs which her plan will cover. We are looking for a plan that will cover R & B charges as she can't afford LP. Ed can then coordinate with Mahi as to which programs are covered and fax the rule 25 to those programs. I will fax a copy of the rule 25 to [facility] so they have it on file. A) pt appears very motivated for tx, and scared to go home for fear that she will drink. It is hoped that a program will emerge that she can afford and that will have openings soon. P) After Ed gets a call from Jan on Wed he can coordinate with Mahi and patient for tx referral. Wu BOYKIN, NIKOLAI

## 2017-11-01 NOTE — PROGRESS NOTES
"Social Work Services Progress Note    Hospital Day: 6  Collaborated with:  Pt, Conchita Charles at [facility], Ed K, LADC, 10A charge RN Louise    Data:  DC plan    Intervention:  Writer met w/pt and discussed DC plan. She is hoping to enter CD treatment through [facility], she believes she has met all out of pocket costs and will not have additional costs. Per Ed, pt's behavioral health benefits are managed by [facility] and Tier one programs are : [place] (4-6 week wait list), [facility] (20 woman wait list), [facility] [facility], and [facility].     Writer informed pt of this; she insists she has no out of pocket costs for the rest of 2017 as she has met all of her out of pocket costs.  Conchita ([facility]) states that she will need to contact and obtain insurance benefits before she can say that pt has or does not have additional out of pocket costs. Conchita states that provided pt has been medically cleared for admission and [facility] has obtained insurance auth; the earliest they could admit pt to [facility] would be Thursday, 11/2/17.        Assessment:  pt remains motivated for CD treatment, actively involved in seeking treatment.    Plan:    Anticipated Disposition:  home vs. CD treatment    Barriers to d/c plan:  CD Eval/assessment    Follow Up:  SW con't to follow      Addendum:  Writer met w/pt per pt's request. She expresses fear, increased anxiety and weepiness with idea of leaving hospital. She con't to state that her family \"Are all angry at me and refuse to take me back.\"  She expresses inability to return to her own home, but does not provide reason for this. Writer and pt discussed fear of relapse as being a fear regardless of pt's location (hospital, home, cd treatment) because sobriety is ultimately her responsibility.  Writer explored possibility of going to [facility] or [facility] for short term, explained that max stay in these facilities is 5 days. Pt con't to express fear of not getting " directly in to CD treatment from hospital. Writer provided validation and support.    Writer updated 10A nursing staff.    Await to hear response from [facility]-pt needs to have her assessment from [facility] staff approve her admission. [employer] ([employer] [facility]) needs to provide authorization for pt seeking CD treatment at [facility].     Writer received voicemail from [facility].  Some insurance companies will not pay for CD treatment at [facility] w/o pt presenting with a need for medical detox prior to CD treatment. Additionally, as pt is not an IDU, [facility] wait list is about 3 weeks.

## 2017-11-02 ENCOUNTER — TRANSFERRED RECORDS (OUTPATIENT)
Dept: HEALTH INFORMATION MANAGEMENT | Facility: CLINIC | Age: 52
End: 2017-11-02

## 2017-11-02 VITALS
OXYGEN SATURATION: 94 % | TEMPERATURE: 97.6 F | SYSTOLIC BLOOD PRESSURE: 132 MMHG | HEIGHT: 61 IN | BODY MASS INDEX: 28.98 KG/M2 | DIASTOLIC BLOOD PRESSURE: 86 MMHG | RESPIRATION RATE: 18 BRPM | HEART RATE: 82 BPM | WEIGHT: 153.5 LBS

## 2017-11-02 LAB — POTASSIUM SERPL-SCNC: 4.4 MMOL/L (ref 3.4–5.3)

## 2017-11-02 PROCEDURE — 36415 COLL VENOUS BLD VENIPUNCTURE: CPT | Performed by: INTERNAL MEDICINE

## 2017-11-02 PROCEDURE — 99239 HOSP IP/OBS DSCHRG MGMT >30: CPT | Performed by: INTERNAL MEDICINE

## 2017-11-02 PROCEDURE — 25000132 ZZH RX MED GY IP 250 OP 250 PS 637: Performed by: INTERNAL MEDICINE

## 2017-11-02 PROCEDURE — 84132 ASSAY OF SERUM POTASSIUM: CPT | Performed by: INTERNAL MEDICINE

## 2017-11-02 PROCEDURE — 25000132 ZZH RX MED GY IP 250 OP 250 PS 637: Performed by: HOSPITALIST

## 2017-11-02 RX ADMIN — GABAPENTIN 300 MG: 300 CAPSULE ORAL at 08:21

## 2017-11-02 RX ADMIN — CALCIUM 1250 MG: 500 TABLET ORAL at 08:21

## 2017-11-02 RX ADMIN — LOSARTAN POTASSIUM 50 MG: 50 TABLET, FILM COATED ORAL at 08:20

## 2017-11-02 RX ADMIN — MAGNESIUM OXIDE TAB 400 MG (241.3 MG ELEMENTAL MG) 400 MG: 400 (241.3 MG) TAB at 08:20

## 2017-11-02 RX ADMIN — FUROSEMIDE 20 MG: 20 TABLET ORAL at 08:20

## 2017-11-02 RX ADMIN — ROPINIROLE HYDROCHLORIDE 1 MG: 1 TABLET, FILM COATED ORAL at 08:39

## 2017-11-02 RX ADMIN — FOLIC ACID 1 MG: 1 TABLET ORAL at 08:20

## 2017-11-02 RX ADMIN — NICOTINE 1 PATCH: 14 PATCH, EXTENDED RELEASE TRANSDERMAL at 08:18

## 2017-11-02 RX ADMIN — MULTIPLE VITAMINS W/ MINERALS TAB 1 TABLET: TAB at 08:20

## 2017-11-02 RX ADMIN — FLUTICASONE PROPIONATE 2 SPRAY: 50 SPRAY, METERED NASAL at 08:22

## 2017-11-02 RX ADMIN — FLUTICASONE FUROATE AND VILANTEROL TRIFENATATE 1 PUFF: 200; 25 POWDER RESPIRATORY (INHALATION) at 08:25

## 2017-11-02 RX ADMIN — LABETALOL HYDROCHLORIDE 100 MG: 100 TABLET, FILM COATED ORAL at 08:20

## 2017-11-02 RX ADMIN — FLUOXETINE 40 MG: 20 CAPSULE ORAL at 08:20

## 2017-11-02 RX ADMIN — OMEPRAZOLE 20 MG: 20 CAPSULE, DELAYED RELEASE ORAL at 08:38

## 2017-11-02 RX ADMIN — ASPIRIN 81 MG: 81 TABLET, COATED ORAL at 08:20

## 2017-11-02 ASSESSMENT — ACTIVITIES OF DAILY LIVING (ADL)
ADLS_ACUITY_SCORE: 12

## 2017-11-02 NOTE — PLAN OF CARE
Problem: Patient Care Overview  Goal: Plan of Care/Patient Progress Review  Outcome: Improving  Pt A/O x4, VSS, LS clear, BS active. Denies pain, SOB, n/v, chest pain. Gave 25 mg Atarax @ 2120 for anxiety. Pt is anxious about leaving tomorrow and having enough time to get home and pack before heading to [place] for treatment. Up independently in room and in hallway with walker. Tolerating regular diet. D/C tomorrow to [place]. Continue to monitor.

## 2017-11-02 NOTE — DISCHARGE SUMMARY
Discharge Summary    Inga Ledesma MRN# [id number]   YOB: 1965 Age: 52 year old     Date of Admission:  10/27/2017  Date of Discharge:  11/2/2017 11:00 AM  Admitting Physician:  Eron Vaz MD  Discharge Physician:  Randolph Bobby MD   Discharging Service:  Internal Medicine     Primary Provider: Min Toledo          Discharge Diagnosis:     Alcohol intoxication  Alcohol withdrawal syndrome  Alcohol dependence  HTN  HFpEF  GERD                Brief History of Illness:     Inga Ledesma is a 52 year old female who was admitted for alcohol intoxication and seeking detox    For more details, please refer to the admission H&P on 10/27/2017             Hospital Course:       Inga Ledesma is a 52 year old female with history of COPD, HTN, CHF, CVA (1/2012), iron deficiency anemia, GERD, depression, and alcohol abuse admitted alcohol withdrawal.       # Alcohol dependency and withdrawal:   She was treated with Diazepam as per [facility] protocol. She was given Thiamine, MVI, and Folic acid. She was placed on seizure, and fall precaution. Basic metabolic panels, and complete blood counts were serially monitored.  She  completed treatment for alcohol withdrawal syndrome with Diazepam on 10/30.   Social work, and Pschiatry  was consulted for help with chemical dependency treatment.  Discussed with Psychiatry on 10/31. Psychiatry advised that she would benefit from inpatient treatment plan. Patient would be discharged to [facility] for further treatment of Alcohol dependency.    - Continue MVI, thiamine, folic acid daily.   - Continue treatment at [facility]      # CVS:  HTN, ?HFpEF  Echo 5/2016 technically difficult although noted normal LVEF of ~55%; normal RV longitudinal function; left atrial dilatation. She was maintained on Furosemide, Losartan, and Labetalol. Initially they were held, but started gradually.   - Continue Furosemide  - Continue Losartan  - Continue Labetalol           #  COPD. Tobacco abuse (active smoker):    PFT's 4/2017 with FVC 93% pred, FEV1 86% pred, FEV1/FVC 93%.   PTA on Advair, and Tiotropium daily, and Duonebs prn  - Continue Tiotropium QD, Advair BID  - Continue Fluticasone  - Albuterol. Duonebs prn  - IS. Oxygen prn  - pulse ox.       # ?Hx of CVA: PTA on Aspirin 81   Patient denies previous history of stroke. No imaging in chart to confirm. On   - Continue ASA 81 mg QD      # Depression: PTA Fluoxetine.   - Continue       # GERD: PPI       # Chest pain/epigastric pain: Though to be due to Musculoskeletal etiology. Relived and improved with Menthol patch  - Menthol patch every 8 hrs PRN       # Elevated lipase:Denies abdominal pain. Diet was advanced and tolerated well  Level 697, not 3 X ULN for diagnosis of pancreatitis.          Code status: Full  DVT Prophylaxis: PCD's   Gastric prophylaxis: None   Diet: Regular adult   Disposition: Awaiting long term treatment plan                                          Discharge Medications:     Current Discharge Medication List      START taking these medications    Details   alum & mag hydroxide-simethicone (MYLANTA ES/MAALOX  ES) 400-400-40 MG/5ML SUSP suspension Take 15-30 mLs by mouth every 4 hours as needed for indigestion  Qty: 355 mL, Refills: 0    Associated Diagnoses: Gastroesophageal reflux disease without esophagitis      folic acid (FOLVITE) 1 MG tablet Take 1 tablet (1 mg) by mouth daily  Qty: 30 tablet, Refills: 0    Associated Diagnoses: Alcohol dependence with uncomplicated withdrawal (H)      hydrOXYzine (ATARAX) 50 MG tablet Take 1 tablet (50 mg) by mouth every 6 hours as needed for anxiety or other (adjuvant pain)  Qty: 30 tablet, Refills: 1    Associated Diagnoses: Sacroiliitis (H)      ipratropium - albuterol 0.5 mg/2.5 mg/3 mL (DUONEB) 0.5-2.5 (3) MG/3ML neb solution Take 1 vial (3 mLs) by nebulization 4 times daily as needed for wheezing  Qty: 360 mL, Refills: 1    Associated Diagnoses: Chronic bronchitis,  unspecified chronic bronchitis type (H)      magnesium oxide (MAG-OX) 400 MG tablet Take 1 tablet (400 mg) by mouth 2 times daily  Qty: 60 tablet, Refills: 1    Associated Diagnoses: Alcohol dependence with withdrawal with complication (H)      multivitamin, therapeutic with minerals (THERA-VIT-M) TABS tablet Take 1 tablet by mouth daily  Qty: 30 each, Refills: 1    Associated Diagnoses: Alcohol dependence with uncomplicated withdrawal (H)      nicotine (NICODERM CQ) 14 MG/24HR 24 hr patch Place 1 patch onto the skin daily  Qty: 30 patch, Refills: 1    Associated Diagnoses: Tobacco abuse      traZODone (DESYREL) 50 MG tablet Take 1 tablet (50 mg) by mouth At Bedtime  Qty: 30 tablet, Refills: 1    Associated Diagnoses: Major depressive disorder, recurrent episode, mild (H)         CONTINUE these medications which have CHANGED    Details   menthol (ICY HOT) 5 % PTCH Apply 1 patch topically every 8 hours as needed for muscle soreness  Qty: 30 patch, Refills: 3    Associated Diagnoses: Muscle soreness      albuterol (VENTOLIN HFA) 108 (90 BASE) MCG/ACT Inhaler Inhale  into the lungs. INHALE 2 PUFFS FOUR TIMES DAILY AS NEEDED  Qty: 3 Inhaler, Refills: 0    Associated Diagnoses: Chronic bronchitis, unspecified chronic bronchitis type (H)      aspirin EC 81 MG EC tablet Take 1 tablet (81 mg) by mouth daily  Qty: 30 tablet, Refills: 1    Associated Diagnoses: Hypertension goal BP (blood pressure) < 140/90      calcium carbonate (OS-JENNIFER 500 MG [place]. [place]) 1250 MG tablet Take 1 tablet (1,250 mg) by mouth 2 times daily  Qty: 90 tablet, Refills: 0    Associated Diagnoses: Alcohol dependence with withdrawal with complication (H)      FLUoxetine (PROZAC) 40 MG capsule Take 1 capsule (40 mg) by mouth 2 times daily  Qty: 60 capsule, Refills: 0    Associated Diagnoses: Major depressive disorder, recurrent episode, mild (H)      fluticasone (FLONASE) 50 MCG/ACT spray Spray 1-2 sprays into both nostrils daily  Qty: 3 g, Refills: 1     Associated Diagnoses: Major depressive disorder, recurrent episode, mild (H)      fluticasone-salmeterol (ADVAIR DISKUS) 500-50 MCG/DOSE diskus inhaler Inhale 1 puff into the lungs every 12 hours  Qty: 2 Inhaler, Refills: 1    Associated Diagnoses: Chronic bronchitis, unspecified chronic bronchitis type (H)      furosemide (LASIX) 20 MG tablet Take 1 tablet (20 mg) by mouth daily  Qty: 30 tablet, Refills: 0    Associated Diagnoses: Essential hypertension with goal blood pressure less than 140/90      gabapentin (NEURONTIN) 300 MG capsule Take 1 capsule by mouth in the morning, 1 capsule in the afternoon, and 2 capsules at bedtime Take 1 capsule by mouth in the morning, 1 capsule in the afternoon, and 2 capsules at bedtime  Qty: 90 capsule, Refills: 1    Associated Diagnoses: Major depressive disorder, recurrent episode, mild (H); Sacroiliitis (H)      labetalol (NORMODYNE) 100 MG tablet Take 1 tablet (100 mg) by mouth 2 times daily  Qty: 60 tablet, Refills: 0    Associated Diagnoses: Hypertension goal BP (blood pressure) < 140/90      losartan (COZAAR) 100 MG tablet Take 0.5 tablets (50 mg) by mouth daily  Qty: 30 tablet, Refills: 1    Associated Diagnoses: Hypertension goal BP (blood pressure) < 140/90      omeprazole (PRILOSEC) 20 MG CR capsule Take 1 capsule (20 mg) by mouth 2 times daily  Qty: 60 capsule, Refills: 1    Associated Diagnoses: Gastroesophageal reflux disease without esophagitis      rOPINIRole (REQUIP) 1 MG tablet Take 1 tablet (1 mg) by mouth 3 times daily  Qty: 90 tablet, Refills: 1    Associated Diagnoses: RLS (restless legs syndrome)      tiotropium (SPIRIVA HANDIHALER) 18 MCG capsule Inhale contents of one capsule daily.  Qty: 30 capsule, Refills: 1    Associated Diagnoses: Chronic bronchitis, unspecified chronic bronchitis type (H)      triamcinolone (KENALOG) 0.1 % cream APPLY SPARINGLY TO AFFECTED AREA(S) THREE TIMES A DAY AS NEEDED  Qty: 80 g, Refills: 0    Associated Diagnoses: Other  "atopic dermatitis         CONTINUE these medications which have NOT CHANGED    Details   order for DME Equipment being ordered: Nebulizer  Qty: 1 each, Refills: 0    Associated Diagnoses: Chronic bronchitis, unspecified chronic bronchitis type (H)         STOP taking these medications       nystatin (MYCOSTATIN) cream Comments:   Reason for Stopping:         MAGNESIUM PO Comments:   Reason for Stopping:         ibuprofen (ADVIL/MOTRIN) 200 MG tablet Comments:   Reason for Stopping:         acetaminophen (TYLENOL) 500 MG tablet Comments:   Reason for Stopping:         ketotifen (ALAWAY) 0.025 % SOLN ophthalmic solution Comments:   Reason for Stopping:         potassium chloride SA (POTASSIUM CHLORIDE) 20 MEQ CR tablet Comments:   Reason for Stopping:         nicotine (NICOTROL) 10 MG inhaler Comments:   Reason for Stopping:                   Procedures/Consultations:   No procedures performed during this admission             Final Day of Progress before Discharge:     Reports doing well  Eager to go for treatment  Denies any acute concern  No fever, chills, nausea, vomiting, chest pain, shortness of breath  Tolerating diet well      Physical Exam:  Blood pressure 132/86, pulse 82, temperature 97.6  F (36.4  C), temperature source Oral, resp. rate 18, height 1.549 m (5' 1\"), weight 69.6 kg (153 lb 8 oz), last menstrual period 05/23/2010, SpO2 94 %, not currently breastfeeding.      HEENT: No icterus, no pallor  Cardiovascular: S1, S2 normal  Respiratory:  B/L CTA  GI/Abdomen: Soft, NT, BS+  Neurology: Alert, awake, and oriented. No tremors.   Extremities: No pretibial edema.   Skin:     Data:  All laboratory data reviewed             Condition on Discharge:   Discharge condition: Stable   Code status on discharge: Full Code                Discharge Disposition:   Discharged to home               Pending Results:   Unresulted Labs Ordered in the Past 30 Days of this Admission     No orders found from 8/28/2017 to " 10/28/2017.                  Discharge Instructions and Follow-Up:     Discharge Procedure Orders  Reason for your hospital stay   Order Comments: Admitted for Alcohol withdrawal     Adult [facility] Follow-up and recommended labs and tests   Order Comments: Follow up with primary care provider, Min Toledo, within 7-14 days for hospital follow- up.  The following labs/tests are recommended: CBC, BMP.      Appointments on [place] and/or [facility] (with [facility] or [facility] provider or service). Call [phone] if you haven't heard regarding these appointments within 7 days of discharge.     Activity   Order Comments: Your activity upon discharge: activity as tolerated   Order Specific Question Answer Comments   Is discharge order? Yes      Monitor and record   Order Comments: Watch for fever, chills, nausea, vomiting, chest pain, shortness of breath, abdominal pain  If these symptoms occurs, please call your primary care or come to ED     Full Code     Diet   Order Comments: Regular Diet Adult   Order Specific Question Answer Comments   Is discharge order? Yes             Attestation:  Cr Dillard.    Time spent on patient: 45 minutes total including face to face and coordinating care time reviewing current illness, any medication changes, and the care plan for today.

## 2017-11-02 NOTE — PLAN OF CARE
Problem: Patient Care Overview  Goal: Plan of Care/Patient Progress Review  Outcome: Adequate for Discharge Date Met:  11/02/17  Pt up ad nancy. C/o generalized weakness and uses w/c for distance. Gait steady. Pt scheduled for placement at [facility] for treatment at 6 pm 11/2/17. Discharge instructions and home medications reviewed with patient. Patient states she understands instructions. Pt given filled home medications. Pt states she has no questions regarding discharge information. Pt scheduled ride with sister. Pt discharge home with all belongings accompany by transportation via w/c.

## 2017-11-02 NOTE — PROGRESS NOTES
Social Work Services Discharge Note      Patient Name:  Inga Ledesma     Anticipated Discharge Date:  11/2/17    Discharge Disposition:   [facility]         Pre-Admission Screening (PAS) online form has been completed.  The Level of Care (LOC) is:  Determined  Confirmation Code is:  Not needed, pt is going to Chem Dep Treatment facility  Patient/caregiver informed of referral to [facility] Line for Pre-Admission Screening for skilled nursing facility (SNF) placement and to expect a phone call post discharge from SNF.     Additional Services/Equipment Arranged:  None noted     Patient / Family response to discharge plan:  agreeable     Persons notified of above discharge plan:  Pt, pt's bedside RN, 10A Charge RN Louise, Dr Dillard    Staff Discharge Instructions:  Please fax discharge orders and signed hard scripts for any controlled substances.  Please print a packet and send with patient.     CTS Handoff completed:  YES    Medicare Notice of Rights provided to the patient/family:  Pt does not have Medicare Insurance this hospitalization

## 2017-11-02 NOTE — PLAN OF CARE
Problem: Patient Care Overview  Goal: Plan of Care/Patient Progress Review  Outcome: No Change  Pt rested in bed. Independent to the bathroom. No report of pain or anxiety overnight cont to assess.

## 2017-11-06 NOTE — PROGRESS NOTES
Clinic Care Coordination Contact  [facility]/Voicemail    Referral Source: PCP  Clinical Data: Care Coordinator Outreach  Outreach attempted x 2.  Left message on voicemail with call back information and requested return call. SW reviewed pt chart following recent hospital d/c. Pt was initiall admitted for detox and was transferred to [facility] before attending [facility] program. Pt was initially reluctant at d/c as to treatment plans. SW confirmed with IP SW that pt was able to develop plan to prioritize treatment and have insurance authorization for programming. SW will continue to follow pt for support and address needs with ongoing care.  Plan: Care Coordinator will continue to monitor pt chart for office visits. Care Coordinator will try to reach patient again in 3-5 weeks, if no contact before then.    EVERETTE Vargas  Care Coordination  [place] [place]  [phone]  [email or web address]

## 2017-11-16 ENCOUNTER — APPOINTMENT (OUTPATIENT)
Dept: GENERAL RADIOLOGY | Facility: CLINIC | Age: 52
End: 2017-11-16
Attending: EMERGENCY MEDICINE
Payer: COMMERCIAL

## 2017-11-16 ENCOUNTER — HOSPITAL ENCOUNTER (EMERGENCY)
Facility: CLINIC | Age: 52
Discharge: HOME | End: 2017-11-16
Attending: EMERGENCY MEDICINE | Admitting: EMERGENCY MEDICINE
Payer: COMMERCIAL

## 2017-11-16 VITALS
TEMPERATURE: 98.6 F | DIASTOLIC BLOOD PRESSURE: 95 MMHG | RESPIRATION RATE: 15 BRPM | SYSTOLIC BLOOD PRESSURE: 156 MMHG | HEIGHT: 62 IN | OXYGEN SATURATION: 96 % | HEART RATE: 69 BPM | BODY MASS INDEX: 31.65 KG/M2 | WEIGHT: 172 LBS

## 2017-11-16 DIAGNOSIS — R07.9 ACUTE CHEST PAIN: ICD-10-CM

## 2017-11-16 DIAGNOSIS — J20.9 ACUTE BRONCHITIS, UNSPECIFIED ORGANISM: ICD-10-CM

## 2017-11-16 DIAGNOSIS — J02.9 ACUTE PHARYNGITIS, UNSPECIFIED ETIOLOGY: ICD-10-CM

## 2017-11-16 LAB
ALBUMIN SERPL-MCNC: 3.1 G/DL (ref 3.4–5)
ALP SERPL-CCNC: 94 U/L (ref 40–150)
ALT SERPL W P-5'-P-CCNC: 32 U/L (ref 0–50)
ANION GAP SERPL CALCULATED.3IONS-SCNC: 6 MMOL/L (ref 3–14)
AST SERPL W P-5'-P-CCNC: 19 U/L (ref 0–45)
BASOPHILS # BLD AUTO: 0.1 10E9/L (ref 0–0.2)
BASOPHILS NFR BLD AUTO: 0.5 %
BILIRUB SERPL-MCNC: 0.2 MG/DL (ref 0.2–1.3)
BUN SERPL-MCNC: 11 MG/DL (ref 7–30)
CALCIUM SERPL-MCNC: 8.6 MG/DL (ref 8.5–10.1)
CHLORIDE SERPL-SCNC: 104 MMOL/L (ref 94–109)
CO2 SERPL-SCNC: 26 MMOL/L (ref 20–32)
CREAT SERPL-MCNC: 0.6 MG/DL (ref 0.52–1.04)
D DIMER PPP FEU-MCNC: 0.3 UG/ML FEU (ref 0–0.5)
DEPRECATED S PYO AG THROAT QL EIA: NORMAL
DIFFERENTIAL METHOD BLD: ABNORMAL
EOSINOPHIL # BLD AUTO: 0.3 10E9/L (ref 0–0.7)
EOSINOPHIL NFR BLD AUTO: 2.2 %
ERYTHROCYTE [DISTWIDTH] IN BLOOD BY AUTOMATED COUNT: 14.2 % (ref 10–15)
GFR SERPL CREATININE-BSD FRML MDRD: >90 ML/MIN/1.7M2
GLUCOSE SERPL-MCNC: 94 MG/DL (ref 70–99)
HCT VFR BLD AUTO: 32.5 % (ref 35–47)
HGB BLD-MCNC: 10.7 G/DL (ref 11.7–15.7)
IMM GRANULOCYTES # BLD: 0.1 10E9/L (ref 0–0.4)
IMM GRANULOCYTES NFR BLD: 0.8 %
LYMPHOCYTES # BLD AUTO: 1.6 10E9/L (ref 0.8–5.3)
LYMPHOCYTES NFR BLD AUTO: 13.5 %
MCH RBC QN AUTO: 31.5 PG (ref 26.5–33)
MCHC RBC AUTO-ENTMCNC: 32.9 G/DL (ref 31.5–36.5)
MCV RBC AUTO: 96 FL (ref 78–100)
MONOCYTES # BLD AUTO: 0.5 10E9/L (ref 0–1.3)
MONOCYTES NFR BLD AUTO: 4.6 %
NEUTROPHILS # BLD AUTO: 9.1 10E9/L (ref 1.6–8.3)
NEUTROPHILS NFR BLD AUTO: 78.4 %
PLATELET # BLD AUTO: 329 10E9/L (ref 150–450)
POTASSIUM SERPL-SCNC: 3.7 MMOL/L (ref 3.4–5.3)
PROT SERPL-MCNC: 6.6 G/DL (ref 6.8–8.8)
RBC # BLD AUTO: 3.4 10E12/L (ref 3.8–5.2)
SODIUM SERPL-SCNC: 136 MMOL/L (ref 133–144)
SPECIMEN SOURCE: NORMAL
TROPONIN I SERPL-MCNC: <0.015 UG/L (ref 0–0.04)
WBC # BLD AUTO: 11.6 10E9/L (ref 4–11)

## 2017-11-16 PROCEDURE — 71020 XR CHEST 2 VW: CPT

## 2017-11-16 PROCEDURE — 99284 EMERGENCY DEPT VISIT MOD MDM: CPT | Mod: 25 | Performed by: EMERGENCY MEDICINE

## 2017-11-16 PROCEDURE — 87081 CULTURE SCREEN ONLY: CPT | Performed by: EMERGENCY MEDICINE

## 2017-11-16 PROCEDURE — 93010 ELECTROCARDIOGRAM REPORT: CPT | Mod: Z6 | Performed by: EMERGENCY MEDICINE

## 2017-11-16 PROCEDURE — 93005 ELECTROCARDIOGRAM TRACING: CPT | Performed by: EMERGENCY MEDICINE

## 2017-11-16 PROCEDURE — 85379 FIBRIN DEGRADATION QUANT: CPT | Performed by: EMERGENCY MEDICINE

## 2017-11-16 PROCEDURE — 87880 STREP A ASSAY W/OPTIC: CPT | Performed by: EMERGENCY MEDICINE

## 2017-11-16 PROCEDURE — 80053 COMPREHEN METABOLIC PANEL: CPT | Performed by: EMERGENCY MEDICINE

## 2017-11-16 PROCEDURE — 84484 ASSAY OF TROPONIN QUANT: CPT | Performed by: EMERGENCY MEDICINE

## 2017-11-16 PROCEDURE — 85025 COMPLETE CBC W/AUTO DIFF WBC: CPT | Performed by: EMERGENCY MEDICINE

## 2017-11-16 RX ORDER — AZITHROMYCIN 250 MG/1
TABLET, FILM COATED ORAL
Qty: 6 TABLET | Refills: 0 | Status: SHIPPED | OUTPATIENT
Start: 2017-11-16 | End: 2017-11-16 | Stop reason: ALTCHOICE

## 2017-11-16 NOTE — DISCHARGE INSTRUCTIONS
What Is Acute Bronchitis?  Acute bronchitis is when the airways in your lungs (bronchial tubes) become red and swollen (inflamed). It is usually caused by a viral infection. But it can also occur because of a bacteria or allergen. Symptoms include a cough that produces yellow or greenish mucus and can last for days or sometimes weeks.  Inside healthy lungs    Air travels in and out of the lungs through the airways. The linings of these airways produce sticky mucus. This mucus traps particles that enter the lungs. Tiny structures called cilia then sweep the particles out of the airways.     Healthy airway: Airways are normally open. Air moves in and out easily.      Healthy cilia: Tiny, hairlike cilia sweep mucus and particles up and out of the airways.   Lungs with bronchitis  Bronchitis often occurs with a cold or the flu virus. The airways become inflamed (red and swollen). There is a deep hacking cough from the extra mucus. Other symptoms may include:    Wheezing    Chest discomfort    Shortness of breath    Mild fever  A second infection, this time due to bacteria, may then occur. And airways irritated by allergens or smoke are more likely to get infected.        Inflamed airway: Inflammation and extra mucus narrow the airway, causing shortness of breath.      Impaired cilia: Extra mucus impairs cilia, causing congestion and wheezing. Smoking makes the problem worse.   Making a diagnosis  A physical exam, health history, and certain tests help your healthcare provider make the diagnosis.  Health history  Your healthcare provider will ask you about your symptoms.  The exam  Your provider listens to your chest for signs of congestion. He or she may also check your ears, nose, and throat.  Possible tests    A sputum test for bacteria. This requires a sample of mucus from your lungs.    A nasal or throat swab. This tests to see if you have a bacterial infection.    A chest X-ray. This is done if your healthcare  provider thinks you have pneumonia.    Tests to check for an underlying condition. Other tests may be done to check for things such as allergies, asthma, or COPD (chronic obstructive pulmonary disease). You may need to see a specialist for more lung function testing.  Treating a cough  The main treatment for bronchitis is easing symptoms. Avoiding smoke, allergens, and other things that trigger coughing can often help. If the infection is bacterial, you may be given antibiotics. During the illness, it's important to get plenty of sleep. To ease symptoms:    Don t smoke. Also avoid secondhand smoke.    Use a humidifier. Or try breathing in steam from a hot shower. This may help loosen mucus.    Drink a lot of water and juice. They can soothe the throat and may help thin mucus.    Sit up or use extra pillows when in bed. This helps to lessen coughing and congestion.    Ask your provider about using medicine. Ask about using cough medicine, pain and fever medicine, or a decongestant.  Antibiotics  Most cases of bronchitis are caused by cold or flu viruses. They don t need antibiotics to treat them, even if your mucus is thick and green or yellow. Antibiotics don t treat viral illness and antibiotics have not been shown to have any benefit in cases of acute bronchitis. Taking antibiotics when they are not needed increases your risk of getting an infection later that is antibiotic-resistant. Antibiotics can also cause severe cases of diarrhea that require other antibiotics to treat.  It is important that you accept your healthcare provider's opinion to not use antibiotics. Your provider will prescribe antibiotics if the infection is caused by bacteria. If they are prescribed:    Take all of the medicine. Take the medicine until it is used up, even if symptoms have improved. If you don t, the bronchitis may come back.    Take the medicines as directed. For instance, some medicines should be taken with food.    Ask about  side effects. Ask your provider or pharmacist what side effects are common, and what to do about them.  Follow-up care  You should see your provider again in 2 to 3 weeks. By this time, symptoms should have improved. An infection that lasts longer may mean you have a more serious problem.  Prevention    Avoid tobacco smoke. If you smoke, quit. Stay away from smoky places. Ask friends and family not to smoke around you, or in your home or car.    Get checked for allergies.    Ask your provider about getting a yearly flu shot. Also ask about pneumococcal or pneumonia shots.    Wash your hands often. This helps reduce the chance of picking up viruses that cause colds and flu.  Call your healthcare provider if:    Symptoms worsen, or you have new symptoms    Breathing problems worsen or  become severe    Symptoms don t get better within a week, or within 3 days of taking antibiotics   Date Last Reviewed: 2/1/2017 2000-2017 [employer]. [address] [place] [address]. All rights reserved. This information is not intended as a substitute for professional medical care. Always follow your healthcare professional's instructions.          Acute Bronchitis  Your healthcare provider has told you that you have acute bronchitis. Bronchitis is infection or inflammation of the bronchial tubes (airways in the lungs). Normally, air moves easily in and out of the airways. Bronchitis narrows the airways, making it harder for air to flow in and out of the lungs. This causes symptoms such as shortness of breath, coughing up yellow or green mucus, and wheezing. Bronchitis can be acute or chronic. Acute means the condition comes on quickly and goes away in a short time, usually within 3 to 10 days. Chronic means a condition lasts a long time and often comes back.    What causes acute bronchitis?  Acute bronchitis almost always starts as a viral respiratory infection, such as a cold or the flu. Certain factors  make it more likely for a cold or flu to turn into bronchitis. These include being very young, being elderly, having a heart or lung problem, or having a weak immune system. Cigarette smoking also makes bronchitis more likely.  When bronchitis develops, the airways become swollen. The airways may also become infected with bacteria. This is known as a secondary infection.  Diagnosing acute bronchitis  Your healthcare provider will examine you and ask about your symptoms and health history. You may also have a sputum culture to test the fluid in your lungs. Chest X-rays may be done to look for infection in the lungs.  Treating acute bronchitis  Bronchitis usually clears up as the cold or flu goes away. You can help feel better faster by doing the following:    Take medicine as directed. You may be told to take ibuprofen or other over-the-counter medicines. These help relieve inflammation in your bronchial tubes. Your healthcare provider may prescribe an inhaler to help open up the bronchial tubes. Most of the time, acute bronchitis is caused by a viral infection. Antibiotics are usually not prescribed for viral infections.    Drink plenty of fluids, such as water, juice, or warm soup. Fluids loosen mucus so that you can cough it up. This helps you breathe more easily. Fluids also prevent dehydration.    Make sure you get plenty of rest.    Do not smoke. Do not allow anyone else to smoke in your home.  Recovery and follow-up  Follow up with your doctor as you are told. You will likely feel better in a week or two. But a dry cough can linger beyond that time. Let your doctor know if you still have symptoms (other than a dry cough) after 2 weeks, or if you re prone to getting bronchial infections. Take steps to protect yourself from future infections. These steps include stopping smoking and avoiding tobacco smoke, washing your hands often, and getting a yearly flu shot.  When to call your healthcare provider  Call the  healthcare provider if you have any of the following:    Fever of 100.4 F (38.0 C) or higher, or as advised    Symptoms that get worse, or new symptoms    Trouble breathing    Symptoms that don t start to improve within a week, or within 3 days of taking antibiotics   Date Last Reviewed: 12/1/2016 2000-2017 [employer]. [address] [place] [address]. All rights reserved. This information is not intended as a substitute for professional medical care. Always follow your healthcare professional's instructions.         *CHEST PAIN, UNCERTAIN CAUSE    Based on your exam today, the exact cause of your chest pain is not certain. Your condition does not seem serious at this time, and your pain does not appear to be coming from your heart. However, sometimes the signs of a serious problem take more time to appear. Therefore, watch for the warning signs listed below.  HOME CARE:  1. Rest today and avoid strenuous activity.  2. Take any prescribed medicine as directed.  FOLLOW UP with your doctor in 1-3 days.   GET PROMPT MEDICAL ATTENTION if any of the following occur:    A change in the type of pain: if it feels different, becomes more severe, lasts longer, or begins to spread into your shoulder, arm, neck, jaw or back    Shortness of breath or increased pain with breathing    Weakness, dizziness, or fainting    Cough with blood or dark colored sputum (phlegm)    Fever over 101  F (38.3  C)    Swelling, pain or redness in one leg    4553-9373 [employer]. [address] [place] [address]. All rights reserved. This information is not intended as a substitute for professional medical care. Always follow your healthcare professional's instructions.  This information has been modified by your health care provider with permission from the publisher.      *CHEST PAIN, NONCARDIAC    Based on your visit today, the exact cause of your chest pain is not certain. Your condition does  not seem serious and your pain does not appear to be coming from your heart. However, sometimes the signs of a serious problem take more time to appear. Therefore, please watch for the warning signs listed below.  HOME CARE:  3. Rest today and avoid strenuous activity.  4. Take any prescribed medicine as directed.  FOLLOW UP with your doctor in 1-3 days.   GET PROMPT MEDICAL ATTENTION if any of the following occur:    A change in the type of pain: if it feels different, becomes more severe, lasts longer, or begins to spread into your shoulder, arm, neck, jaw or back    Shortness of breath or increased pain with breathing    Cough with blood or dark colored sputum (phlegm)    Weakness, dizziness, or fainting    Fever over 101  F (38.3  C)    Swelling, pain or redness in one leg    4921-5983 [employer]. [address] [place], [place] [id number]. All rights reserved. This information is not intended as a substitute for professional medical care. Always follow your healthcare professional's instructions.  This information has been modified by your health care provider with permission from the publisher.

## 2017-11-16 NOTE — ED NOTES
Pt presents to ED for chest pain that started yesterday left side, sharp shooting under left rib pain. Pt given 2 nitro enroute. Pt states she has a headache and a cough and recent weight gain.

## 2017-11-16 NOTE — ED NOTES
Report given to Conchita HEMPHILL at [facility]     Spoke to Pancho JOSE and gave report.   [facility] will arrange for transportation

## 2017-11-16 NOTE — ED PROVIDER NOTES
History     Chief Complaint   Patient presents with     Chest Pain     CP rad into L arm. coming in from [facility] by EMS, hx of CHF, COPD, HTN, seizures, ETOH withdrawl is complete.     HPI  Inga Ledesma is a 52 year old female who has had > 12 hrs of constant left-sided chest pressure which radiated to neck and left shoulder this am. Pain began insidiously and gradually worsened and is characterized as a pressure-like discomfort which is constant and unrelieved by OTC analgesics given at the [facility] treatment center where she has been a resident for the past 2 weeks.  She states she has had some more symptoms intermittently for quite some time, not clearly diagnosed but without adverse event.  She's never had syncope.  Since arrival to the ED by EMS the chest pain has spontaneously resolved. Elevated BP to 161/100 at [facility]. Developed a headache and thought she was going to have another seizure. Hx of Etoh w/d sz in the past.  Yesterday developed pharyngitis and a cough productive of yellow sputum.  No hemoptysis, leg pain or leg swelling.  No fever or chills.  No abdominal pain.  No back or flank pain.  She smokes.    No other acute complaints or concerns.    Problem List:    Patient Active Problem List    Diagnosis Date Noted     Tobacco abuse 05/10/2010     Priority: High     Menorrhagia 05/10/2010     Priority: High     Iron deficiency anemia 02/19/2010     Priority: High     menorrhagia       Alcohol withdrawal (H) 10/28/2017     Priority: Medium     Chemical dependency (H) 10/06/2017     Priority: Medium     Psychophysiological insomnia 05/30/2017     Priority: Medium     (HFpEF) heart failure with preserved ejection fraction (H) 03/10/2017     Priority: Medium     Major depressive disorder, recurrent episode, mild (H) 02/27/2017     Priority: Medium     Alcohol dependence with withdrawal with complication (H) 03/15/2016     Priority: Medium     Withdrawal seizure not requiring seizure medication-  Bliss March 2016.       Health Care Home 12/09/2015     Priority: Medium       Status:  Closed   Care Coordinator:  YAMILE Altman, RN, PHN  [facility] Care Coordinator  [phone]    See Letters for H Care Plan             Chronic bronchitis, unspecified chronic bronchitis type (H) 11/24/2015     Priority: Medium     Hypertension goal BP (blood pressure) < 140/90 09/29/2015     Priority: Medium     Edema of both legs 02/10/2015     Priority: Medium     Has had pelvic node dissection.       Vitamin D deficiency 10/16/2014     Priority: Medium     Problem list name updated by automated process. Provider to review       Advanced directives, counseling/discussion 05/17/2012     Priority: Medium     Discussed advance care planning with patient; information given to patient to review. 5/17/2012          RLS (restless legs syndrome)      Priority: Medium     GERD (gastroesophageal reflux disease)      Priority: Medium     Adult BMI 30+ 05/10/2010     Priority: Low     Sacroiliitis (H)      Priority: Low     steroid injections, physical therapy ineffective       Vitamin D deficiency      Priority: Low     (Problem list name updated by automated process. Provider to review and confirm.)       Hyperlipidemia with target LDL less than 160      Priority: Low     LDL      129   5/10/2010   Diagnosis updated by automated process. Provider to review and confirm.          Past Medical History:    Past Medical History:   Diagnosis Date     Alcohol abuse, in remission      Cancer of labia majora (H) 1987     Cervical dysplasia 1987     Congestive heart failure (H) 5/16/16     COPD (chronic obstructive pulmonary disease) (H) 1/24/2011     CVA (cerebral infarction) 1/2012     Dependent edema      Depression, major      Depressive disorder 1966     GERD (gastroesophageal reflux disease)      Hyperlipidemia LDL goal < 160      Hypertension      Iron deficiency anemia      RLS (restless legs syndrome)      Sacroiliitis (H)       Tobacco abuse      Uncomplicated asthma      Vitamin D deficiencies        Past Surgical History:    Past Surgical History:   Procedure Laterality Date     COLONOSCOPY       EYE SURGERY       HERNIA REPAIR, INGUINAL RT/LT  2007     HYSTERECTOMY  2010     HYSTERECTOMY TOTAL ABDOMINAL  7/28/10    Bilateral salpingectomy.  ovaries conserved.     LASER TX, CERVICAL  1987     LYMPH NODE BIOPSY  2007    inguinal     LYSIS OF LABIAL LESION(S)  1985, 1987       Family History:    Family History   Problem Relation Age of Onset     Depression/Anxiety Mother      Asthma Mother      CEREBROVASCULAR DISEASE Father      Hypertension Father      Lung Cancer Father      Breast Cancer Paternal Aunt      Other Cancer Other      Depression Other      Anxiety Disorder Other      MENTAL ILLNESS Other      Substance Abuse Other      Asthma Other      Obesity Sister      Obesity Son        Social History:  Marital Status:   [4]  Social History   Substance Use Topics     Smoking status: Current Every Day Smoker     Packs/day: 0.50     Years: 34.00     Types: Cigarettes     Start date: 11/1/1979     Last attempt to quit: 10/3/2012     Smokeless tobacco: Never Used      Comment: 5 cigarettes daily     Alcohol use Yes      Comment: Drinking mouthwash.        Medications:      amoxicillin-clavulanate (AUGMENTIN) 875-125 MG per tablet   menthol (ICY HOT) 5 % PTCH   albuterol (VENTOLIN HFA) 108 (90 BASE) MCG/ACT Inhaler   alum & mag hydroxide-simethicone (MYLANTA ES/MAALOX  ES) 400-400-40 MG/5ML SUSP suspension   aspirin EC 81 MG EC tablet   calcium carbonate (OS-JENNIFER 500 MG [place]. [place]) 1250 MG tablet   FLUoxetine (PROZAC) 40 MG capsule   fluticasone (FLONASE) 50 MCG/ACT spray   fluticasone-salmeterol (ADVAIR DISKUS) 500-50 MCG/DOSE diskus inhaler   folic acid (FOLVITE) 1 MG tablet   furosemide (LASIX) 20 MG tablet   gabapentin (NEURONTIN) 300 MG capsule   hydrOXYzine (ATARAX) 50 MG tablet   ipratropium - albuterol 0.5 mg/2.5 mg/3 mL  "(DUONEB) 0.5-2.5 (3) MG/3ML neb solution   labetalol (NORMODYNE) 100 MG tablet   losartan (COZAAR) 100 MG tablet   magnesium oxide (MAG-OX) 400 MG tablet   multivitamin, therapeutic with minerals (THERA-VIT-M) TABS tablet   nicotine (NICODERM CQ) 14 MG/24HR 24 hr patch   omeprazole (PRILOSEC) 20 MG CR capsule   rOPINIRole (REQUIP) 1 MG tablet   tiotropium (SPIRIVA HANDIHALER) 18 MCG capsule   traZODone (DESYREL) 50 MG tablet   triamcinolone (KENALOG) 0.1 % cream   order for DME       Review of Systems  As mentioned above in the history present illness.  All other systems were reviewed and are negative.    Physical Exam   BP: 137/89  Pulse: 69  Heart Rate: 69  Temp: 98.6  F (37  C)  Resp: 20  Height: 157.5 cm (5' 2\")  Weight: 78 kg (172 lb)  SpO2: 96 %      Physical Exam   Constitutional: She is oriented to person, place, and time. She appears well-developed and well-nourished. No distress.   HENT:   Head: Normocephalic and atraumatic.   Mouth/Throat: Oropharynx is clear and moist.   Eyes: Conjunctivae and EOM are normal. No scleral icterus.   Neck: Normal range of motion. Neck supple. No JVD present. No tracheal deviation present. No thyromegaly present.   Cardiovascular: Normal rate, regular rhythm, normal heart sounds and intact distal pulses.  Exam reveals no gallop and no friction rub.    No murmur heard.  Pulmonary/Chest: Effort normal and breath sounds normal. Stridor present. No respiratory distress. She has no wheezes. She has no rales. She exhibits no tenderness.   Abdominal: Soft. Bowel sounds are normal. She exhibits no distension. There is no tenderness.   Musculoskeletal: Normal range of motion. She exhibits no edema or tenderness.   Neurological: She is alert and oriented to person, place, and time.   Skin: Skin is warm and dry. No rash noted. She is not diaphoretic. No erythema. No pallor.   Psychiatric: Her behavior is normal.   Flat affect.   Nursing note and vitals reviewed.      ED Course     ED " Course     Procedures               EKG Interpretation:      Interpreted by Cj Pedro MD  Time reviewed: upon completion  Symptoms at time of EKG: chest pain  Rhythm: normal sinus   Rate: normal  Axis: normal  Ectopy: none  Conduction: normal  ST Segments/ T Waves: No ST-T wave changes  Q Waves: none  Comparison to prior: Unchanged from 10/29/17  Clinical Impression: normal EKG    I independently reviewed the X-rays: Agree with the Radiologist's interpretation.       Results for orders placed or performed during the hospital encounter of 11/16/17   XR Chest 2 Views    Narrative    XR CHEST 2 VW 11/16/2017 2:00 PM    COMPARISON: 7/5/2017    HISTORY: Chest pain.      Impression    IMPRESSION: Cardiac silhouette and pulmonary vasculature are within  normal limits. No focal airspace disease, pleural effusion or  pneumothorax.    SARIKA DORANTES MD   CBC with platelets differential   Result Value Ref Range    WBC 11.6 (H) 4.0 - 11.0 10e9/L    RBC Count 3.40 (L) 3.8 - 5.2 10e12/L    Hemoglobin 10.7 (L) 11.7 - 15.7 g/dL    Hematocrit 32.5 (L) 35.0 - 47.0 %    MCV 96 78 - 100 fl    MCH 31.5 26.5 - 33.0 pg    MCHC 32.9 31.5 - 36.5 g/dL    RDW 14.2 10.0 - 15.0 %    Platelet Count 329 150 - 450 10e9/L    Diff Method Automated Method     % Neutrophils 78.4 %    % Lymphocytes 13.5 %    % Monocytes 4.6 %    % Eosinophils 2.2 %    % Basophils 0.5 %    % Immature Granulocytes 0.8 %    Absolute Neutrophil 9.1 (H) 1.6 - 8.3 10e9/L    Absolute Lymphocytes 1.6 0.8 - 5.3 10e9/L    Absolute Monocytes 0.5 0.0 - 1.3 10e9/L    Absolute Eosinophils 0.3 0.0 - 0.7 10e9/L    Absolute Basophils 0.1 0.0 - 0.2 10e9/L    Abs Immature Granulocytes 0.1 0 - 0.4 10e9/L   Comprehensive metabolic panel   Result Value Ref Range    Sodium 136 133 - 144 mmol/L    Potassium 3.7 3.4 - 5.3 mmol/L    Chloride 104 94 - 109 mmol/L    Carbon Dioxide 26 20 - 32 mmol/L    Anion Gap 6 3 - 14 mmol/L    Glucose 94 70 - 99 mg/dL    Urea Nitrogen 11 7 - 30 mg/dL     Creatinine 0.60 0.52 - 1.04 mg/dL    GFR Estimate >90 >60 mL/min/1.7m2    GFR Estimate If Black >90 >60 mL/min/1.7m2    Calcium 8.6 8.5 - 10.1 mg/dL    Bilirubin Total 0.2 0.2 - 1.3 mg/dL    Albumin 3.1 (L) 3.4 - 5.0 g/dL    Protein Total 6.6 (L) 6.8 - 8.8 g/dL    Alkaline Phosphatase 94 40 - 150 U/L    ALT 32 0 - 50 U/L    AST 19 0 - 45 U/L   Troponin I   Result Value Ref Range    Troponin I ES <0.015 0.000 - 0.045 ug/L   D dimer quantitative   Result Value Ref Range    D Dimer 0.3 0.0 - 0.50 ug/ml FEU   Rapid Strep Screen   Result Value Ref Range    Specimen Description Throat     Rapid Strep A Screen       NEGATIVE: No Group A streptococcal antigen detected by immunoassay, await culture report.       Assessments & Plan (with Medical Decision Making)   52-year-old female with CD, COPD, nicotinic dependence, CHF with approximately 24 hours of left-sided chest pain, productive cough and sore throat.  Chest pain appears noncardiac and doubt DVT/PE or acute CHF exacerbation.  EKG and laboratory evaluation were unremarkable, including troponin and d-dimer.  A strep test negative, throat culture pending.  Chest x-ray was negative.  Did not fill his current indication for further imaging evaluation.  Chest pain spontaneously resolved in the ED prior to discharge.  She appears to have benign noncardiac chest pain, ?  musculoskeletal pain or pleurodynia/pleurisy.  She appears stable and appropriate for discharge back to the [facility] CD treatment center with instructions for supportive care.  She can try Augmentin (she declined azithromycin and states it doesn't work for her) given her complicated past medical history with COPD and nicotinic dependence, and current living situation in a health care facility, but suspect this is a viral illness.  No current evidence of pneumonia.  No acute respiratory distress or hypoxia, or evidence of COPD exacerbation. Patient was provided instructions for supportive care and will return  as needed for worsened condition or worsening symptoms, or new problems or concerns.      I have reviewed the nursing notes.    I have reviewed the findings, diagnosis, plan and need for follow up with the patient.    New Prescriptions    AMOXICILLIN-CLAVULANATE (AUGMENTIN) 875-125 MG PER TABLET    Take 1 tablet by mouth 2 times daily for 10 days       Final diagnoses:   Acute chest pain   Acute pharyngitis, unspecified etiology   Acute bronchitis, unspecified organism       11/16/2017   [facility] EMERGENCY DEPARTMENT        Cj Pedro MD  11/16/17 0535

## 2017-11-16 NOTE — ED AVS SNAPSHOT
[facility] Emergency Department    [address] [facility] [address]    Phone:  [phone]    Fax:  [phone]                                       Inga Ledesma   MRN: [id number]    Department:  [facility] Emergency Department   Date of Visit:  11/16/2017           Patient Information     Date Of Birth          1965        Your diagnoses for this visit were:     Acute chest pain     Acute pharyngitis, unspecified etiology     Acute bronchitis, unspecified organism        You were seen by Cj Pedro MD.      Follow-up Information     Follow up with [facility] Emergency Department.    Specialty:  EMERGENCY MEDICINE    Why:  If symptoms worsen, or for new problems or concerns.    Contact information:    [address] [facility] [address] [phone]    Additional information:    The medical center is located at   [address] [facility] ([address]   [facility] [address] in [place], four miles north   of [place]).        Discharge Instructions           What Is Acute Bronchitis?  Acute bronchitis is when the airways in your lungs (bronchial tubes) become red and swollen (inflamed). It is usually caused by a viral infection. But it can also occur because of a bacteria or allergen. Symptoms include a cough that produces yellow or greenish mucus and can last for days or sometimes weeks.  Inside healthy lungs    Air travels in and out of the lungs through the airways. The linings of these airways produce sticky mucus. This mucus traps particles that enter the lungs. Tiny structures called cilia then sweep the particles out of the airways.     Healthy airway: Airways are normally open. Air moves in and out easily.      Healthy cilia: Tiny, hairlike cilia sweep mucus and particles up and out of the airways.   Lungs with bronchitis  Bronchitis often occurs with a cold or the flu virus. The airways become inflamed (red and swollen). There is a deep hacking cough from the  extra mucus. Other symptoms may include:    Wheezing    Chest discomfort    Shortness of breath    Mild fever  A second infection, this time due to bacteria, may then occur. And airways irritated by allergens or smoke are more likely to get infected.        Inflamed airway: Inflammation and extra mucus narrow the airway, causing shortness of breath.      Impaired cilia: Extra mucus impairs cilia, causing congestion and wheezing. Smoking makes the problem worse.   Making a diagnosis  A physical exam, health history, and certain tests help your healthcare provider make the diagnosis.  Health history  Your healthcare provider will ask you about your symptoms.  The exam  Your provider listens to your chest for signs of congestion. He or she may also check your ears, nose, and throat.  Possible tests    A sputum test for bacteria. This requires a sample of mucus from your lungs.    A nasal or throat swab. This tests to see if you have a bacterial infection.    A chest X-ray. This is done if your healthcare provider thinks you have pneumonia.    Tests to check for an underlying condition. Other tests may be done to check for things such as allergies, asthma, or COPD (chronic obstructive pulmonary disease). You may need to see a specialist for more lung function testing.  Treating a cough  The main treatment for bronchitis is easing symptoms. Avoiding smoke, allergens, and other things that trigger coughing can often help. If the infection is bacterial, you may be given antibiotics. During the illness, it's important to get plenty of sleep. To ease symptoms:    Don t smoke. Also avoid secondhand smoke.    Use a humidifier. Or try breathing in steam from a hot shower. This may help loosen mucus.    Drink a lot of water and juice. They can soothe the throat and may help thin mucus.    Sit up or use extra pillows when in bed. This helps to lessen coughing and congestion.    Ask your provider about using medicine. Ask about  using cough medicine, pain and fever medicine, or a decongestant.  Antibiotics  Most cases of bronchitis are caused by cold or flu viruses. They don t need antibiotics to treat them, even if your mucus is thick and green or yellow. Antibiotics don t treat viral illness and antibiotics have not been shown to have any benefit in cases of acute bronchitis. Taking antibiotics when they are not needed increases your risk of getting an infection later that is antibiotic-resistant. Antibiotics can also cause severe cases of diarrhea that require other antibiotics to treat.  It is important that you accept your healthcare provider's opinion to not use antibiotics. Your provider will prescribe antibiotics if the infection is caused by bacteria. If they are prescribed:    Take all of the medicine. Take the medicine until it is used up, even if symptoms have improved. If you don t, the bronchitis may come back.    Take the medicines as directed. For instance, some medicines should be taken with food.    Ask about side effects. Ask your provider or pharmacist what side effects are common, and what to do about them.  Follow-up care  You should see your provider again in 2 to 3 weeks. By this time, symptoms should have improved. An infection that lasts longer may mean you have a more serious problem.  Prevention    Avoid tobacco smoke. If you smoke, quit. Stay away from smoky places. Ask friends and family not to smoke around you, or in your home or car.    Get checked for allergies.    Ask your provider about getting a yearly flu shot. Also ask about pneumococcal or pneumonia shots.    Wash your hands often. This helps reduce the chance of picking up viruses that cause colds and flu.  Call your healthcare provider if:    Symptoms worsen, or you have new symptoms    Breathing problems worsen or  become severe    Symptoms don t get better within a week, or within 3 days of taking antibiotics   Date Last Reviewed: 2/1/2017     0943-6968 The [employer]. [address] [place] [address], [place] [address]. All rights reserved. This information is not intended as a substitute for professional medical care. Always follow your healthcare professional's instructions.          Acute Bronchitis  Your healthcare provider has told you that you have acute bronchitis. Bronchitis is infection or inflammation of the bronchial tubes (airways in the lungs). Normally, air moves easily in and out of the airways. Bronchitis narrows the airways, making it harder for air to flow in and out of the lungs. This causes symptoms such as shortness of breath, coughing up yellow or green mucus, and wheezing. Bronchitis can be acute or chronic. Acute means the condition comes on quickly and goes away in a short time, usually within 3 to 10 days. Chronic means a condition lasts a long time and often comes back.    What causes acute bronchitis?  Acute bronchitis almost always starts as a viral respiratory infection, such as a cold or the flu. Certain factors make it more likely for a cold or flu to turn into bronchitis. These include being very young, being elderly, having a heart or lung problem, or having a weak immune system. Cigarette smoking also makes bronchitis more likely.  When bronchitis develops, the airways become swollen. The airways may also become infected with bacteria. This is known as a secondary infection.  Diagnosing acute bronchitis  Your healthcare provider will examine you and ask about your symptoms and health history. You may also have a sputum culture to test the fluid in your lungs. Chest X-rays may be done to look for infection in the lungs.  Treating acute bronchitis  Bronchitis usually clears up as the cold or flu goes away. You can help feel better faster by doing the following:    Take medicine as directed. You may be told to take ibuprofen or other over-the-counter medicines. These help relieve inflammation in your bronchial tubes. Your  healthcare provider may prescribe an inhaler to help open up the bronchial tubes. Most of the time, acute bronchitis is caused by a viral infection. Antibiotics are usually not prescribed for viral infections.    Drink plenty of fluids, such as water, juice, or warm soup. Fluids loosen mucus so that you can cough it up. This helps you breathe more easily. Fluids also prevent dehydration.    Make sure you get plenty of rest.    Do not smoke. Do not allow anyone else to smoke in your home.  Recovery and follow-up  Follow up with your doctor as you are told. You will likely feel better in a week or two. But a dry cough can linger beyond that time. Let your doctor know if you still have symptoms (other than a dry cough) after 2 weeks, or if you re prone to getting bronchial infections. Take steps to protect yourself from future infections. These steps include stopping smoking and avoiding tobacco smoke, washing your hands often, and getting a yearly flu shot.  When to call your healthcare provider  Call the healthcare provider if you have any of the following:    Fever of 100.4 F (38.0 C) or higher, or as advised    Symptoms that get worse, or new symptoms    Trouble breathing    Symptoms that don t start to improve within a week, or within 3 days of taking antibiotics   Date Last Reviewed: 12/1/2016 2000-2017 [employer]. [address] [place] [address], [place] [address]. All rights reserved. This information is not intended as a substitute for professional medical care. Always follow your healthcare professional's instructions.         *CHEST PAIN, UNCERTAIN CAUSE    Based on your exam today, the exact cause of your chest pain is not certain. Your condition does not seem serious at this time, and your pain does not appear to be coming from your heart. However, sometimes the signs of a serious problem take more time to appear. Therefore, watch for the warning signs listed below.  HOME CARE:  1. Rest today and  avoid strenuous activity.  2. Take any prescribed medicine as directed.  FOLLOW UP with your doctor in 1-3 days.   GET PROMPT MEDICAL ATTENTION if any of the following occur:    A change in the type of pain: if it feels different, becomes more severe, lasts longer, or begins to spread into your shoulder, arm, neck, jaw or back    Shortness of breath or increased pain with breathing    Weakness, dizziness, or fainting    Cough with blood or dark colored sputum (phlegm)    Fever over 101  F (38.3  C)    Swelling, pain or redness in one leg    9782-9464 [employer]. [address] [place] [address]. All rights reserved. This information is not intended as a substitute for professional medical care. Always follow your healthcare professional's instructions.  This information has been modified by your health care provider with permission from the publisher.      *CHEST PAIN, NONCARDIAC    Based on your visit today, the exact cause of your chest pain is not certain. Your condition does not seem serious and your pain does not appear to be coming from your heart. However, sometimes the signs of a serious problem take more time to appear. Therefore, please watch for the warning signs listed below.  HOME CARE:  3. Rest today and avoid strenuous activity.  4. Take any prescribed medicine as directed.  FOLLOW UP with your doctor in 1-3 days.   GET PROMPT MEDICAL ATTENTION if any of the following occur:    A change in the type of pain: if it feels different, becomes more severe, lasts longer, or begins to spread into your shoulder, arm, neck, jaw or back    Shortness of breath or increased pain with breathing    Cough with blood or dark colored sputum (phlegm)    Weakness, dizziness, or fainting    Fever over 101  F (38.3  C)    Swelling, pain or redness in one leg    8681-7842 [employer]. [address] [place] [address]. All rights reserved. This information is not intended as a  substitute for professional medical care. Always follow your healthcare professional's instructions.  This information has been modified by your health care provider with permission from the publisher.      Discharge References/Attachments     PHARYNGITIS, REPORT PENDING (ENGLISH)    SORE THROATS, SELF-CARE FOR (ENGLISH)      24 Hour Appointment Hotline       To make an appointment at any [facility] clinic, call [phone] ([phone]). If you don't have a family doctor or clinic, we will help you find one. [facility] clinics are conveniently located to serve the needs of you and your family.             Review of your medicines      START taking        Dose / Directions Last dose taken    amoxicillin-clavulanate 875-125 MG per tablet   Commonly known as:  AUGMENTIN   Dose:  1 tablet   Quantity:  20 tablet        Take 1 tablet by mouth 2 times daily for 10 days   Refills:  0          Our records show that you are taking the medicines listed below. If these are incorrect, please call your family doctor or clinic.        Dose / Directions Last dose taken    albuterol 108 (90 BASE) MCG/ACT Inhaler   Commonly known as:  VENTOLIN HFA   Quantity:  3 Inhaler        Inhale  into the lungs. INHALE 2 PUFFS FOUR TIMES DAILY AS NEEDED   Refills:  0        alum & mag hydroxide-simethicone 400-400-40 MG/5ML Susp suspension   Commonly known as:  MYLANTA ES/MAALOX  ES   Dose:  15-30 mL   Quantity:  355 mL        Take 15-30 mLs by mouth every 4 hours as needed for indigestion   Refills:  0        aspirin 81 MG EC tablet   Dose:  81 mg   Quantity:  30 tablet        Take 1 tablet (81 mg) by mouth daily   Refills:  1        calcium carbonate 1250 MG tablet   Commonly known as:  OS-JENNIFER 500 mg [place]. Ca   Dose:  1250 mg   Quantity:  90 tablet        Take 1 tablet (1,250 mg) by mouth 2 times daily   Refills:  0        FLUoxetine 40 MG capsule   Commonly known as:  PROzac   Dose:  40 mg   Quantity:  60 capsule        Take 1 capsule (40  mg) by mouth 2 times daily   Refills:  0        fluticasone 50 MCG/ACT spray   Commonly known as:  FLONASE   Dose:  1-2 spray   Quantity:  3 g        Spray 1-2 sprays into both nostrils daily   Refills:  1        fluticasone-salmeterol 500-50 MCG/DOSE diskus inhaler   Commonly known as:  ADVAIR DISKUS   Dose:  1 puff   Quantity:  2 Inhaler        Inhale 1 puff into the lungs every 12 hours   Refills:  1        folic acid 1 MG tablet   Commonly known as:  FOLVITE   Dose:  1 mg   Quantity:  30 tablet        Take 1 tablet (1 mg) by mouth daily   Refills:  0        furosemide 20 MG tablet   Commonly known as:  LASIX   Dose:  20 mg   Quantity:  30 tablet        Take 1 tablet (20 mg) by mouth daily   Refills:  0        gabapentin 300 MG capsule   Commonly known as:  NEURONTIN   Quantity:  90 capsule        Take 1 capsule by mouth in the morning, 1 capsule in the afternoon, and 2 capsules at bedtime Take 1 capsule by mouth in the morning, 1 capsule in the afternoon, and 2 capsules at bedtime   Refills:  1        hydrOXYzine 50 MG tablet   Commonly known as:  ATARAX   Dose:  50 mg   Quantity:  30 tablet        Take 1 tablet (50 mg) by mouth every 6 hours as needed for anxiety or other (adjuvant pain)   Refills:  1        ipratropium - albuterol 0.5 mg/2.5 mg/3 mL 0.5-2.5 (3) MG/3ML neb solution   Commonly known as:  DUONEB   Dose:  3 mL   Quantity:  360 mL        Take 1 vial (3 mLs) by nebulization 4 times daily as needed for wheezing   Refills:  1        labetalol 100 MG tablet   Commonly known as:  NORMODYNE   Dose:  100 mg   Quantity:  60 tablet        Take 1 tablet (100 mg) by mouth 2 times daily   Refills:  0        losartan 100 MG tablet   Commonly known as:  COZAAR   Dose:  50 mg   Quantity:  30 tablet        Take 0.5 tablets (50 mg) by mouth daily   Refills:  1        magnesium oxide 400 MG tablet   Commonly known as:  MAG-OX   Dose:  400 mg   Quantity:  60 tablet        Take 1 tablet (400 mg) by mouth 2 times daily    Refills:  1        menthol 5 % Ptch   Commonly known as:  ICY HOT   Dose:  1 patch   Quantity:  30 patch        Apply 1 patch topically every 8 hours as needed for muscle soreness   Refills:  3        multivitamin, therapeutic with minerals Tabs tablet   Dose:  1 tablet   Quantity:  30 each        Take 1 tablet by mouth daily   Refills:  1        nicotine 14 MG/24HR 24 hr patch   Commonly known as:  NICODERM CQ   Dose:  1 patch   Quantity:  30 patch        Place 1 patch onto the skin daily   Refills:  1        omeprazole 20 MG CR capsule   Commonly known as:  priLOSEC   Dose:  20 mg   Quantity:  60 capsule        Take 1 capsule (20 mg) by mouth 2 times daily   Refills:  1        order for DME   Quantity:  1 each        Equipment being ordered: Nebulizer   Refills:  0        rOPINIRole 1 MG tablet   Commonly known as:  REQUIP   Dose:  1 mg   Quantity:  90 tablet        Take 1 tablet (1 mg) by mouth 3 times daily   Refills:  1        tiotropium 18 MCG capsule   Commonly known as:  SPIRIVA HANDIHALER   Quantity:  30 capsule        Inhale contents of one capsule daily.   Refills:  1        traZODone 50 MG tablet   Commonly known as:  DESYREL   Dose:  50 mg   Quantity:  30 tablet        Take 1 tablet (50 mg) by mouth At Bedtime   Refills:  1        triamcinolone 0.1 % cream   Commonly known as:  KENALOG   Quantity:  80 g        APPLY SPARINGLY TO AFFECTED AREA(S) THREE TIMES A DAY AS NEEDED   Refills:  0                Prescriptions were sent or printed at these locations (1 Prescription)                   Other Prescriptions                Printed at Department/Unit printer (1 of 1)         amoxicillin-clavulanate (AUGMENTIN) 875-125 MG per tablet                Procedures and tests performed during your visit     Beta strep group A culture    CBC with platelets differential    Comprehensive metabolic panel    D dimer quantitative    EKG 12 lead    Rapid Strep Screen    Troponin I    XR Chest 2 Views      Orders  Needing Specimen Collection     None      Pending Results     Date and Time Order Name Status Description    11/16/2017 1400 Beta strep group A culture In process             Pending Culture Results     Date and Time Order Name Status Description    11/16/2017 1400 Beta strep group A culture In process             Pending Results Instructions     If you had any lab results that were not finalized at the time of your Discharge, you can call the ED Lab Result RN at [phone]. You will be contacted by this team for any positive Lab results or changes in treatment. The nurses are available 7 days a week from 10A to 6:30P.  You can leave a message 24 hours per day and they will return your call.        Test Results From Your Hospital Stay        11/16/2017 12:29 PM      Component Results     Component Value Ref Range & Units Status    WBC 11.6 (H) 4.0 - 11.0 10e9/L Final    RBC Count 3.40 (L) 3.8 - 5.2 10e12/L Final    Hemoglobin 10.7 (L) 11.7 - 15.7 g/dL Final    Hematocrit 32.5 (L) 35.0 - 47.0 % Final    MCV 96 78 - 100 fl Final    MCH 31.5 26.5 - 33.0 pg Final    MCHC 32.9 31.5 - 36.5 g/dL Final    RDW 14.2 10.0 - 15.0 % Final    Platelet Count 329 150 - 450 10e9/L Final    Diff Method Automated Method  Final    % Neutrophils 78.4 % Final    % Lymphocytes 13.5 % Final    % Monocytes 4.6 % Final    % Eosinophils 2.2 % Final    % Basophils 0.5 % Final    % Immature Granulocytes 0.8 % Final    Absolute Neutrophil 9.1 (H) 1.6 - 8.3 10e9/L Final    Absolute Lymphocytes 1.6 0.8 - 5.3 10e9/L Final    Absolute Monocytes 0.5 0.0 - 1.3 10e9/L Final    Absolute Eosinophils 0.3 0.0 - 0.7 10e9/L Final    Absolute Basophils 0.1 0.0 - 0.2 10e9/L Final    Abs Immature Granulocytes 0.1 0 - 0.4 10e9/L Final         11/16/2017  1:18 PM      Component Results     Component Value Ref Range & Units Status    Sodium 136 133 - 144 mmol/L Final    Potassium 3.7 3.4 - 5.3 mmol/L Final    Chloride 104 94 - 109 mmol/L Final    Carbon Dioxide  26 20 - 32 mmol/L Final    Anion Gap 6 3 - 14 mmol/L Final    Glucose 94 70 - 99 mg/dL Final    Urea Nitrogen 11 7 - 30 mg/dL Final    Creatinine 0.60 0.52 - 1.04 mg/dL Final    GFR Estimate >90 >60 mL/min/1.7m2 Final    Non  GFR Calc    GFR Estimate If Black >90 >60 mL/min/1.7m2 Final    African American GFR Calc    Calcium 8.6 8.5 - 10.1 mg/dL Final    Bilirubin Total 0.2 0.2 - 1.3 mg/dL Final    Albumin 3.1 (L) 3.4 - 5.0 g/dL Final    Protein Total 6.6 (L) 6.8 - 8.8 g/dL Final    Alkaline Phosphatase 94 40 - 150 U/L Final    ALT 32 0 - 50 U/L Final    AST 19 0 - 45 U/L Final         11/16/2017  1:18 PM      Component Results     Component Value Ref Range & Units Status    Troponin I ES <0.015 0.000 - 0.045 ug/L Final    The 99th percentile for upper reference range is 0.045 ug/L.  Troponin values   in the range of 0.045 - 0.120 ug/L may be associated with risks of adverse   clinical events.           11/16/2017  1:39 PM      Component Results     Component Value Ref Range & Units Status    D Dimer 0.3 0.0 - 0.50 ug/ml FEU Final    This D-dimer assay is intended for use in conjunction with a clinical pretest   probability assessment model to exclude pulmonary embolism (PE) and deep   venous thrombosis (DVT) in outpatients suspected of PE or DVT. The cut-off   value is 0.5 ug/mL FEU.           11/16/2017  2:20 PM      Narrative     XR CHEST 2 VW 11/16/2017 2:00 PM    COMPARISON: 7/5/2017    HISTORY: Chest pain.        Impression     IMPRESSION: Cardiac silhouette and pulmonary vasculature are within  normal limits. No focal airspace disease, pleural effusion or  pneumothorax.    SARIKA DORANTES MD         11/16/2017  2:29 PM      Component Results     Component    Specimen Description    Throat    Rapid Strep A Screen    NEGATIVE: No Group A streptococcal antigen detected by immunoassay, await culture report.         11/16/2017  2:31 PM                Thank you for choosing [facility]       Thank you  for choosing [facility] for your care. Our goal is always to provide you with excellent care. Hearing back from our patients is one way we can continue to improve our services. Please take a few minutes to complete the written survey that you may receive in the mail after you visit with us. Thank you!        [facility] Information     [employer] gives you secure access to your electronic health record. If you see a primary care provider, you can also send messages to your care team and make appointments. If you have questions, please call your primary care clinic.  If you do not have a primary care provider, please call [phone] and they will assist you.        Care EveryWhere ID     This is your Care EveryWhere ID. This could be used by other organizations to access your [facility] medical records  [id number]        Equal Access to Services     ИРИНА KERNS : Susan Mata, jenelle urbina, edmar graff, kelsey george. So [facility] [phone].    ATENCIÓN: Si habla español, tiene a cook disposición servicios gratuitos de asistencia lingüística. Llame al [phone].    We comply with applicable federal civil rights laws and [place] laws. We do not discriminate on the basis of race, color, national origin, age, disability, sex, sexual orientation, or gender identity.            After Visit Summary       This is your record. Keep this with you and show to your community pharmacist(s) and doctor(s) at your next visit.

## 2017-11-16 NOTE — ED AVS SNAPSHOT
[facility] Emergency Department    [address] [address]    Phone:  [phone]    Fax:  [phone]                                       Inga Ledesma   MRN: [id number]    Department:  [facility] Emergency Department   Date of Visit:  11/16/2017           After Visit Summary Signature Page     I have received my discharge instructions, and my questions have been answered. I have discussed any challenges I see with this plan with the nurse or doctor.    ..........................................................................................................................................  Patient/Patient Representative Signature      ..........................................................................................................................................  Patient Representative Print Name and Relationship to Patient    ..................................................               ................................................  Date                                            Time    ..........................................................................................................................................  Reviewed by Signature/Title    ...................................................              ..............................................  Date                                                            Time

## 2017-11-18 LAB
BACTERIA SPEC CULT: NORMAL
SPECIMEN SOURCE: NORMAL

## 2017-12-10 ENCOUNTER — TRANSFERRED RECORDS (OUTPATIENT)
Dept: HEALTH INFORMATION MANAGEMENT | Facility: CLINIC | Age: 52
End: 2017-12-10

## 2017-12-15 ENCOUNTER — OFFICE VISIT (OUTPATIENT)
Dept: FAMILY MEDICINE | Facility: CLINIC | Age: 52
End: 2017-12-15
Payer: COMMERCIAL

## 2017-12-15 VITALS
BODY MASS INDEX: 31.83 KG/M2 | OXYGEN SATURATION: 97 % | DIASTOLIC BLOOD PRESSURE: 88 MMHG | RESPIRATION RATE: 18 BRPM | SYSTOLIC BLOOD PRESSURE: 138 MMHG | HEIGHT: 62 IN | HEART RATE: 100 BPM | WEIGHT: 173 LBS

## 2017-12-15 DIAGNOSIS — F10.10 ALCOHOL ABUSE: Primary | ICD-10-CM

## 2017-12-15 PROCEDURE — 99214 OFFICE O/P EST MOD 30 MIN: CPT | Performed by: PHYSICIAN ASSISTANT

## 2017-12-15 RX ORDER — ACAMPROSATE CALCIUM 333 MG/1
666 TABLET, DELAYED RELEASE ORAL 3 TIMES DAILY
Qty: 180 TABLET | Refills: 2 | Status: SHIPPED | OUTPATIENT
Start: 2017-12-15 | End: 2018-04-29

## 2017-12-15 NOTE — PROGRESS NOTES
SUBJECTIVE:   Inga Ledesma is a 52 year old female who presents to clinic today for the following health issues:          Hospital Follow-up Visit:    Hospital/Nursing Home/IP Rehab Facility: [place]  Date of Admission: 12/10/17  Date of Discharge: 12/12/17  Reason(s) for Admission: alcohol withdrawal     attended [facility] from 11/2 - 12/2. Sober for 1 mos . Now drinking daily again. Hasn;t attended aftercare.         Problems taking medications regularly:  None       Medication changes since discharge: None       Problems adhering to non-medication therapy:  None    Summary of hospitalization:  CareEverywhere information obtained and reviewed  Diagnostic Tests/Treatments reviewed.  Follow up needed: primary  Other Healthcare Providers Involved in Patient s Care:         --  Update since discharge: no change     Post Discharge Medication Reconciliation: discharge medications reconciled, continue medications without change.  Plan of care communicated with patient     Coding guidelines for this visit:  Type of Medical   Decision Making Face-to-Face Visit       within 7 Days of discharge Face-to-Face Visit        within 14 days of discharge   Moderate Complexity [id number]   High Complexity 50012 17673                  Problem list and histories reviewed & adjusted, as indicated.  Additional history: as documented        Reviewed and updated as needed this visit by clinical staff  Tobacco  Meds       Reviewed and updated as needed this visit by Provider         All other systems negative except as outline above  OBJECTIVE:    Eye exam - right eye normal lid, conjunctiva, cornea, pupil and fundus, left eye normal lid, conjunctiva, cornea, pupil and fundus.  Thyroid not palpable, not enlarged, no nodules detected.  CHEST:chest clear to IPPA, no tachypnea, retractions or cyanosis and S1, S2 normal, no murmur, no gallop, rate regular.  The abdomen is soft without tenderness, guarding, mass, rebound or organomegaly.  Bowel sounds are normal. No CVA tenderness or inguinal adenopathy noted.    Inga was seen today for hospital f/u.    Diagnoses and all orders for this visit:    Alcohol abuse  -     acamprosate (CAMPRAL) 333 MG EC tablet; Take 2 tablets (666 mg) by mouth 3 times daily      Patient to stop etoh use. Once off of alcohol for 48 hrs, she may initiate use of campral.  She will start attending [facility].

## 2017-12-15 NOTE — LETTER
[facility] ([facility]3] [place]  [address] [place] [address]  Phone: [phone]    December 15, 2017        Inga Ledesma  [address]  [place] [address]          To whom it may concern:    RE: Inga Ledesma    Patient was seen and treated today at our clinic. She may return to work on 12/18/17.    Please contact me for questions or concerns.      Sincerely,        Min Toledo PA-C

## 2017-12-15 NOTE — MR AVS SNAPSHOT
"              After Visit Summary   12/15/2017    Inga Ledesma    MRN: [id number]           Patient Information     Date Of Birth          1965        Visit Information        Provider Department      12/15/2017 4:20 PM Min Toledo PA-C [facility]        Today's Diagnoses     Alcohol abuse    -  1       Follow-ups after your visit        Who to contact     Normal or non-critical lab and imaging results will be communicated to you by Loterityhart, letter or phone within 4 business days after the clinic has received the results. If you do not hear from us within 7 days, please contact the clinic through Loterityhart or phone. If you have a critical or abnormal lab result, we will notify you by phone as soon as possible.  Submit refill requests through Pikum or call your pharmacy and they will forward the refill request to us. Please allow 3 business days for your refill to be completed.          If you need to speak with a  for additional information , please call: [phone]             Additional Information About Your Visit        [employer] Information     Pikum gives you secure access to your electronic health record. If you see a primary care provider, you can also send messages to your care team and make appointments. If you have questions, please call your primary care clinic.  If you do not have a primary care provider, please call [phone] and they will assist you.        Care EveryWhere ID     This is your Care EveryWhere ID. This could be used by other organizations to access your [facility] medical records  [id number]        Your Vitals Were     Pulse Respirations Height Last Period Pulse Oximetry BMI (Body Mass Index)    100 18 5' 2\" (1.575 m) 05/23/2010 97% 31.64 kg/m2       Blood Pressure from Last 3 Encounters:   12/15/17 138/88   11/16/17 (!) 156/95   11/02/17 132/86    Weight from Last 3 Encounters:   12/15/17 173 lb (78.5 kg)   11/16/17 172 lb (78 kg) "   10/28/17 153 lb 8 oz (69.6 kg)              Today, you had the following     No orders found for display         Today's Medication Changes          These changes are accurate as of: 12/15/17  4:58 PM.  If you have any questions, ask your nurse or doctor.               Start taking these medicines.        Dose/Directions    acamprosate 333 MG EC tablet   Commonly known as:  CAMPRAL   Used for:  Alcohol abuse   Started by:  Min Toledo PA-C        Dose:  666 mg   Take 2 tablets (666 mg) by mouth 3 times daily   Quantity:  180 tablet   Refills:  2            Where to get your medicines      These medications were sent to [facility] [address] - [address]  [address] [address]     Phone:  [phone]     acamprosate 333 MG EC tablet                Primary Care Provider Office Phone # Fax #    Min Toledo PA-C [phone] [address]  [place] [address]        Goals        General    I will use the walker at all times or a cane in tight spots to prevent further falls. (pt-stated)     Notes - Note created  12/9/2015  8:48 AM by Sonny Jacobs RN    As of today's date 12/9/2015 goal is met at 0 - 25%.   Goal Status:  Active          Equal Access to Services     [facility] AH: Hadii aad ku hadasho Soomaali, waaxda luqadaha, qaybta kaalmada adeegyada, waxay mayte hayhenrry abdi . So [facility] [phone].    ATENCIÓN: Si habla español, tiene a cook disposición servicios gratuitos de asistencia lingüística. Llame al [phone].    We comply with applicable federal civil rights laws and [place] laws. We do not discriminate on the basis of race, color, national origin, age, disability, sex, sexual orientation, or gender identity.            Thank you!     Thank you for choosing [facility] ([facility]  for your care. Our goal is always to provide you with excellent care. Hearing back from our patients is one way we can  continue to improve our services. Please take a few minutes to complete the written survey that you may receive in the mail after your visit with us. Thank you!             Your Updated Medication List - Protect others around you: Learn how to safely use, store and throw away your medicines at www.disposemymeds.org.          This list is accurate as of: 12/15/17  4:58 PM.  Always use your most recent med list.                   Brand Name Dispense Instructions for use Diagnosis    acamprosate 333 MG EC tablet    CAMPRAL    180 tablet    Take 2 tablets (666 mg) by mouth 3 times daily    Alcohol abuse       albuterol 108 (90 BASE) MCG/ACT Inhaler    VENTOLIN HFA    3 Inhaler    Inhale  into the lungs. INHALE 2 PUFFS FOUR TIMES DAILY AS NEEDED    Chronic bronchitis, unspecified chronic bronchitis type (H)       alum & mag hydroxide-simethicone 400-400-40 MG/5ML Susp suspension    MYLANTA ES/MAALOX  ES    355 mL    Take 15-30 mLs by mouth every 4 hours as needed for indigestion    Gastroesophageal reflux disease without esophagitis       aspirin 81 MG EC tablet     30 tablet    Take 1 tablet (81 mg) by mouth daily    Hypertension goal BP (blood pressure) < 140/90       calcium carbonate 1250 MG tablet    OS-JENNIFER 500 mg [place]. Ca    90 tablet    Take 1 tablet (1,250 mg) by mouth 2 times daily    Alcohol dependence with withdrawal with complication (H)       FLUoxetine 40 MG capsule    PROzac    60 capsule    Take 1 capsule (40 mg) by mouth 2 times daily    Major depressive disorder, recurrent episode, mild (H)       fluticasone 50 MCG/ACT spray    FLONASE    3 g    Spray 1-2 sprays into both nostrils daily    Major depressive disorder, recurrent episode, mild (H)       fluticasone-salmeterol 500-50 MCG/DOSE diskus inhaler    ADVAIR DISKUS    2 Inhaler    Inhale 1 puff into the lungs every 12 hours    Chronic bronchitis, unspecified chronic bronchitis type (H)       folic acid 1 MG tablet    FOLVITE    30 tablet    Take 1  tablet (1 mg) by mouth daily    Alcohol dependence with uncomplicated withdrawal (H)       furosemide 20 MG tablet    LASIX    30 tablet    Take 1 tablet (20 mg) by mouth daily    Essential hypertension with goal blood pressure less than 140/90       gabapentin 300 MG capsule    NEURONTIN    90 capsule    Take 1 capsule by mouth in the morning, 1 capsule in the afternoon, and 2 capsules at bedtime Take 1 capsule by mouth in the morning, 1 capsule in the afternoon, and 2 capsules at bedtime    Major depressive disorder, recurrent episode, mild (H), Sacroiliitis (H)       hydrOXYzine 50 MG tablet    ATARAX    30 tablet    Take 1 tablet (50 mg) by mouth every 6 hours as needed for anxiety or other (adjuvant pain)    Sacroiliitis (H)       ipratropium - albuterol 0.5 mg/2.5 mg/3 mL 0.5-2.5 (3) MG/3ML neb solution    DUONEB    360 mL    Take 1 vial (3 mLs) by nebulization 4 times daily as needed for wheezing    Chronic bronchitis, unspecified chronic bronchitis type (H)       labetalol 100 MG tablet    NORMODYNE    60 tablet    Take 1 tablet (100 mg) by mouth 2 times daily    Hypertension goal BP (blood pressure) < 140/90       losartan 100 MG tablet    COZAAR    30 tablet    Take 0.5 tablets (50 mg) by mouth daily    Hypertension goal BP (blood pressure) < 140/90       magnesium oxide 400 MG tablet    MAG-OX    60 tablet    Take 1 tablet (400 mg) by mouth 2 times daily    Alcohol dependence with withdrawal with complication (H)       menthol 5 % Ptch    ICY HOT    30 patch    Apply 1 patch topically every 8 hours as needed for muscle soreness    Muscle soreness       multivitamin, therapeutic with minerals Tabs tablet     30 each    Take 1 tablet by mouth daily    Alcohol dependence with uncomplicated withdrawal (H)       nicotine 14 MG/24HR 24 hr patch    NICODERM CQ    30 patch    Place 1 patch onto the skin daily    Tobacco abuse       omeprazole 20 MG CR capsule    priLOSEC    60 capsule    Take 1 capsule (20 mg) by  mouth 2 times daily    Gastroesophageal reflux disease without esophagitis       order for DME     1 each    Equipment being ordered: Nebulizer    Chronic bronchitis, unspecified chronic bronchitis type (H)       rOPINIRole 1 MG tablet    REQUIP    90 tablet    Take 1 tablet (1 mg) by mouth 3 times daily    RLS (restless legs syndrome)       tiotropium 18 MCG capsule    SPIRIVA HANDIHALER    30 capsule    Inhale contents of one capsule daily.    Chronic bronchitis, unspecified chronic bronchitis type (H)       traZODone 50 MG tablet    DESYREL    30 tablet    Take 1 tablet (50 mg) by mouth At Bedtime    Major depressive disorder, recurrent episode, mild (H)       triamcinolone 0.1 % cream    KENALOG    80 g    APPLY SPARINGLY TO AFFECTED AREA(S) THREE TIMES A DAY AS NEEDED    Other atopic dermatitis

## 2017-12-21 ENCOUNTER — TRANSFERRED RECORDS (OUTPATIENT)
Dept: HEALTH INFORMATION MANAGEMENT | Facility: CLINIC | Age: 52
End: 2017-12-21

## 2018-01-03 ENCOUNTER — TRANSFERRED RECORDS (OUTPATIENT)
Dept: HEALTH INFORMATION MANAGEMENT | Facility: CLINIC | Age: 53
End: 2018-01-03

## 2018-01-03 LAB
ALT SERPL-CCNC: 30 IU/L (ref 8–45)
AST SERPL-CCNC: 33 IU/L (ref 2–40)

## 2018-01-04 ENCOUNTER — TELEPHONE (OUTPATIENT)
Dept: FAMILY MEDICINE | Facility: CLINIC | Age: 53
End: 2018-01-04

## 2018-01-04 DIAGNOSIS — F10.239 ALCOHOL DEPENDENCE WITH WITHDRAWAL WITH COMPLICATION (H): Primary | ICD-10-CM

## 2018-01-04 DIAGNOSIS — F19.20 CHEMICAL DEPENDENCY (H): ICD-10-CM

## 2018-01-04 LAB
CREAT SERPL-MCNC: 0.73 MG/DL (ref 0.57–1.11)
GFR SERPL CREATININE-BSD FRML MDRD: >60 ML/MIN/1.73M2
GLUCOSE SERPL-MCNC: 119 MG/DL (ref 65–100)
POTASSIUM SERPL-SCNC: 3.5 MMOL/L (ref 3.5–5)

## 2018-01-04 NOTE — TELEPHONE ENCOUNTER
Patient needs a referral for [facility] in [facility] ,starting on 01/09/18. She will start with day treatment, then follow up with evening sessions so that she may return to work. Order pended. Please return to med/sec pool so order may be sent to [facility].

## 2018-01-05 NOTE — TELEPHONE ENCOUNTER
No insurance referral needed from our department. They might be looking for an order from the provider.     Thank you,   Conchita Smith   Referral Department  [phone]

## 2018-01-08 DIAGNOSIS — F10.230 ALCOHOL DEPENDENCE WITH UNCOMPLICATED WITHDRAWAL (H): ICD-10-CM

## 2018-01-08 DIAGNOSIS — I10 ESSENTIAL HYPERTENSION WITH GOAL BLOOD PRESSURE LESS THAN 140/90: ICD-10-CM

## 2018-01-08 DIAGNOSIS — F33.0 MAJOR DEPRESSIVE DISORDER, RECURRENT EPISODE, MILD (H): ICD-10-CM

## 2018-01-08 DIAGNOSIS — K21.9 GASTROESOPHAGEAL REFLUX DISEASE WITHOUT ESOPHAGITIS: ICD-10-CM

## 2018-01-08 DIAGNOSIS — F10.239 ALCOHOL DEPENDENCE WITH WITHDRAWAL WITH COMPLICATION (H): ICD-10-CM

## 2018-01-08 DIAGNOSIS — L20.89 OTHER ATOPIC DERMATITIS: ICD-10-CM

## 2018-01-08 DIAGNOSIS — I10 HYPERTENSION GOAL BP (BLOOD PRESSURE) < 140/90: ICD-10-CM

## 2018-01-08 NOTE — TELEPHONE ENCOUNTER
Call back from [facility], they do not require anything from us for outpatient treatment program, left message for patient to return call.

## 2018-01-10 NOTE — TELEPHONE ENCOUNTER
PHQ-9 SCORE 8/17/2016 3/10/2017 9/25/2017   Total Score - - -   Total Score 15 17 20     BP Readings from Last 3 Encounters:   12/15/17 138/88   11/16/17 (!) 156/95   11/02/17 132/86       Routing refill request to provider for review/approval because:  Needs provider approval.  Script pended for approval

## 2018-01-11 ENCOUNTER — RADIANT APPOINTMENT (OUTPATIENT)
Dept: GENERAL RADIOLOGY | Facility: CLINIC | Age: 53
End: 2018-01-11
Attending: PHYSICIAN ASSISTANT

## 2018-01-11 ENCOUNTER — OFFICE VISIT (OUTPATIENT)
Dept: FAMILY MEDICINE | Facility: CLINIC | Age: 53
End: 2018-01-11

## 2018-01-11 VITALS
OXYGEN SATURATION: 95 % | SYSTOLIC BLOOD PRESSURE: 140 MMHG | BODY MASS INDEX: 31.1 KG/M2 | RESPIRATION RATE: 18 BRPM | DIASTOLIC BLOOD PRESSURE: 86 MMHG | HEIGHT: 62 IN | HEART RATE: 80 BPM | WEIGHT: 169 LBS

## 2018-01-11 DIAGNOSIS — M54.2 CERVICALGIA: ICD-10-CM

## 2018-01-11 DIAGNOSIS — M54.2 CERVICALGIA: Primary | ICD-10-CM

## 2018-01-11 PROCEDURE — 99213 OFFICE O/P EST LOW 20 MIN: CPT | Performed by: PHYSICIAN ASSISTANT

## 2018-01-11 PROCEDURE — 72040 X-RAY EXAM NECK SPINE 2-3 VW: CPT | Mod: FY

## 2018-01-11 RX ORDER — METHYLPREDNISOLONE 4 MG
TABLET, DOSE PACK ORAL
Qty: 21 TABLET | Refills: 0 | Status: SHIPPED | OUTPATIENT
Start: 2018-01-11 | End: 2018-04-05

## 2018-01-11 RX ORDER — FLUOXETINE 40 MG/1
40 CAPSULE ORAL 2 TIMES DAILY
Qty: 60 CAPSULE | Refills: 0 | Status: SHIPPED | OUTPATIENT
Start: 2018-01-11 | End: 2018-02-23

## 2018-01-11 RX ORDER — METHOCARBAMOL 500 MG/1
1000 TABLET, FILM COATED ORAL 3 TIMES DAILY PRN
Qty: 30 TABLET | Refills: 1 | Status: SHIPPED | OUTPATIENT
Start: 2018-01-11 | End: 2018-04-29

## 2018-01-11 RX ORDER — FOLIC ACID 1 MG/1
1 TABLET ORAL DAILY
Qty: 30 TABLET | Refills: 0 | Status: SHIPPED | OUTPATIENT
Start: 2018-01-11 | End: 2018-04-29

## 2018-01-11 RX ORDER — MAGNESIUM OXIDE 400 MG/1
400 TABLET ORAL 2 TIMES DAILY
Qty: 60 TABLET | Refills: 1 | Status: SHIPPED | OUTPATIENT
Start: 2018-01-11 | End: 2018-02-23

## 2018-01-11 RX ORDER — LABETALOL 100 MG/1
100 TABLET, FILM COATED ORAL 2 TIMES DAILY
Qty: 60 TABLET | Refills: 0 | Status: SHIPPED | OUTPATIENT
Start: 2018-01-11 | End: 2018-02-23

## 2018-01-11 RX ORDER — TRIAMCINOLONE ACETONIDE 1 MG/G
CREAM TOPICAL
Qty: 80 G | Refills: 0 | Status: SHIPPED | OUTPATIENT
Start: 2018-01-11 | End: 2018-04-05

## 2018-01-11 RX ORDER — FUROSEMIDE 20 MG
20 TABLET ORAL DAILY
Qty: 30 TABLET | Refills: 0 | Status: SHIPPED | OUTPATIENT
Start: 2018-01-11 | End: 2018-02-23

## 2018-01-11 RX ORDER — CALCIUM CARBONATE 500(1250)
1250 TABLET ORAL 2 TIMES DAILY
Qty: 180 TABLET | Refills: 0 | Status: SHIPPED | OUTPATIENT
Start: 2018-01-11 | End: 2018-02-23

## 2018-01-11 RX ORDER — ALUMINA, MAGNESIA, AND SIMETHICONE 2400; 2400; 240 MG/30ML; MG/30ML; MG/30ML
15-30 SUSPENSION ORAL EVERY 4 HOURS PRN
Qty: 355 ML | Refills: 0 | Status: SHIPPED | OUTPATIENT
Start: 2018-01-11 | End: 2018-04-05

## 2018-01-11 NOTE — PROGRESS NOTES
SUBJECTIVE:   Inga Ledesma is a 52 year old female who presents to clinic today for the following health issues:      Joint Pain    Onset: yesterday    Description:   Location: right shoulder  Character: Sharp    Intensity: severe    Progression of Symptoms: same    Accompanying Signs & Symptoms:  Other symptoms: none    History:   Previous similar pain: no       Precipitating factors:   Trauma or overuse: no     Alleviating factors:  Improved by: nothing    Therapies Tried and outcome: none        Right arm pain. Some neck pain. No paresthesias      Problem list and histories reviewed & adjusted, as indicated.  Additional history: as documented        Reviewed and updated as needed this visit by clinical staffTobacco  Allergies  Meds       Reviewed and updated as needed this visit by Provider         All other systems negative except as outline above  OBJECTIVE:  Shoulder exam - both sides normal; full range of motion, no pain on motion, no tenderness or deformity noted.  Neck : rom limited. Mild positive spurlings test.   Right sided trigger points . Very tender involving her upper trapezius muscle   Inga was seen today for shoulder pain.    Diagnoses and all orders for this visit:    Cervicalgia  -     XR Cervical Spine 2/3 Views; Future  -     methylPREDNISolone (MEDROL DOSEPAK) 4 MG tablet; Follow package instructions  -     methocarbamol (ROBAXIN) 500 MG tablet; Take 2 tablets (1,000 mg) by mouth 3 times daily as needed for muscle spasms  -     MR Cervical Spine w/o Contrast; Future    Other orders  -     Cancel: XR Shoulder Right 2 Views; Future      Advised supportive and symptomatic treatment.  Follow up with Provider - if condition persists or worsens.

## 2018-01-11 NOTE — MR AVS SNAPSHOT
"              After Visit Summary   1/11/2018    Inga Ledesma    MRN: [id number]           Patient Information     Date Of Birth          1965        Visit Information        Provider Department      1/11/2018 4:00 PM Min Toledo PA-C [facility]        Ryan's Diagnoses     Cervicalgia    -  1       Follow-ups after your visit        Future tests that were ordered for you today     Open Future Orders        Priority Expected Expires Ordered    MR Cervical Spine w/o Contrast Routine  1/11/2019 1/11/2018            Who to contact     Normal or non-critical lab and imaging results will be communicated to you by [employer], letter or phone within 4 business days after the clinic has received the results. If you do not hear from us within 7 days, please contact the clinic through [employer] or phone. If you have a critical or abnormal lab result, we will notify you by phone as soon as possible.  Submit refill requests through [employer] or call your pharmacy and they will forward the refill request to us. Please allow 3 business days for your refill to be completed.          If you need to speak with a  for additional information , please call: [phone]             Additional Information About Your Visit        [employer] Information     [employer] gives you secure access to your electronic health record. If you see a primary care provider, you can also send messages to your care team and make appointments. If you have questions, please call your primary care clinic.  If you do not have a primary care provider, please call [phone] and they will assist you.        Care EveryWhere ID     This is your Care EveryWhere ID. This could be used by other organizations to access your [place] medical records  [id number]        Your Vitals Were     Pulse Respirations Height Last Period Pulse Oximetry BMI (Body Mass Index)    80 18 5' 2\" (1.575 m) 05/23/2010 95% 30.91 kg/m2       Blood " Pressure from Last 3 Encounters:   01/11/18 140/86   12/15/17 138/88   11/16/17 (!) 156/95    Weight from Last 3 Encounters:   01/11/18 169 lb (76.7 kg)   12/15/17 173 lb (78.5 kg)   11/16/17 172 lb (78 kg)                 Today's Medication Changes          These changes are accurate as of: 1/11/18  4:55 PM.  If you have any questions, ask your nurse or doctor.               Start taking these medicines.        Dose/Directions    methocarbamol 500 MG tablet   Commonly known as:  ROBAXIN   Used for:  Cervicalgia   Started by:  Min Toledo PA-C        Dose:  1000 mg   Take 2 tablets (1,000 mg) by mouth 3 times daily as needed for muscle spasms   Quantity:  30 tablet   Refills:  1       methylPREDNISolone 4 MG tablet   Commonly known as:  MEDROL DOSEPAK   Used for:  Cervicalgia   Started by:  Min Toledo PA-C        Follow package instructions   Quantity:  21 tablet   Refills:  0            Where to get your medicines      These medications were sent to [facility] [address] - [address] [facility]  [address] [facility] [address]     Phone:  [phone]     methocarbamol 500 MG tablet    methylPREDNISolone 4 MG tablet                Primary Care Provider Office Phone # Fax #    Min Toledo PA-C [phone]       [address]  [place] [address]        Goals        General    I will use the walker at all times or a cane in tight spots to prevent further falls. (pt-stated)     Notes - Note created  12/9/2015  8:48 AM by Sonny Jacobs RN    As of today's date 12/9/2015 goal is met at 0 - 25%.   Goal Status:  Active          Equal Access to Services     [facility] AH: Susan Mata, waleesada luyessenia, qawill kaalmada dajuan, kelsey george. So [facility] [phone].    ATENCIÓN: Si habla español, tiene a cook disposición servicios gratuitos de asistencia lingüística. Llame al [phone].    We comply with  applicable federal civil rights laws and [place] laws. We do not discriminate on the basis of race, color, national origin, age, disability, sex, sexual orientation, or gender identity.            Thank you!     Thank you for choosing [facility]  for your care. Our goal is always to provide you with excellent care. Hearing back from our patients is one way we can continue to improve our services. Please take a few minutes to complete the written survey that you may receive in the mail after your visit with us. Thank you!             Your Updated Medication List - Protect others around you: Learn how to safely use, store and throw away your medicines at www.disposemymeds.org.          This list is accurate as of: 1/11/18  4:55 PM.  Always use your most recent med list.                   Brand Name Dispense Instructions for use Diagnosis    acamprosate 333 MG EC tablet    CAMPRAL    180 tablet    Take 2 tablets (666 mg) by mouth 3 times daily    Alcohol abuse       albuterol 108 (90 BASE) MCG/ACT Inhaler    VENTOLIN HFA    3 Inhaler    Inhale  into the lungs. INHALE 2 PUFFS FOUR TIMES DAILY AS NEEDED    Chronic bronchitis, unspecified chronic bronchitis type (H)       alum & mag hydroxide-simethicone 400-400-40 MG/5ML Susp suspension    MYLANTA ES/MAALOX  ES    355 mL    Take 15-30 mLs by mouth every 4 hours as needed for indigestion    Gastroesophageal reflux disease without esophagitis       aspirin 81 MG EC tablet     30 tablet    Take 1 tablet (81 mg) by mouth daily    Hypertension goal BP (blood pressure) < 140/90       calcium carbonate 1250 MG tablet    OS-JENNIFER 500 mg [place]. [place]    180 tablet    Take 1 tablet (1,250 mg) by mouth 2 times daily    Alcohol dependence with withdrawal with complication (H)       FLUoxetine 40 MG capsule    PROzac    60 capsule    Take 1 capsule (40 mg) by mouth 2 times daily    Major depressive disorder, recurrent episode, mild (H)       fluticasone 50 MCG/ACT spray     FLONASE    3 g    Spray 1-2 sprays into both nostrils daily    Major depressive disorder, recurrent episode, mild (H)       fluticasone-salmeterol 500-50 MCG/DOSE diskus inhaler    ADVAIR DISKUS    2 Inhaler    Inhale 1 puff into the lungs every 12 hours    Chronic bronchitis, unspecified chronic bronchitis type (H)       folic acid 1 MG tablet    FOLVITE    30 tablet    Take 1 tablet (1 mg) by mouth daily    Alcohol dependence with uncomplicated withdrawal (H)       furosemide 20 MG tablet    LASIX    30 tablet    Take 1 tablet (20 mg) by mouth daily    Essential hypertension with goal blood pressure less than 140/90       gabapentin 300 MG capsule    NEURONTIN    90 capsule    Take 1 capsule by mouth in the morning, 1 capsule in the afternoon, and 2 capsules at bedtime Take 1 capsule by mouth in the morning, 1 capsule in the afternoon, and 2 capsules at bedtime    Major depressive disorder, recurrent episode, mild (H), Sacroiliitis (H)       hydrOXYzine 50 MG tablet    ATARAX    30 tablet    Take 1 tablet (50 mg) by mouth every 6 hours as needed for anxiety or other (adjuvant pain)    Sacroiliitis (H)       ipratropium - albuterol 0.5 mg/2.5 mg/3 mL 0.5-2.5 (3) MG/3ML neb solution    DUONEB    360 mL    Take 1 vial (3 mLs) by nebulization 4 times daily as needed for wheezing    Chronic bronchitis, unspecified chronic bronchitis type (H)       labetalol 100 MG tablet    NORMODYNE    60 tablet    Take 1 tablet (100 mg) by mouth 2 times daily    Hypertension goal BP (blood pressure) < 140/90       losartan 100 MG tablet    COZAAR    30 tablet    Take 0.5 tablets (50 mg) by mouth daily    Hypertension goal BP (blood pressure) < 140/90       magnesium oxide 400 MG tablet    MAG-OX    60 tablet    Take 1 tablet (400 mg) by mouth 2 times daily    Alcohol dependence with withdrawal with complication (H)       menthol 5 % Ptch    ICY HOT    30 patch    Apply 1 patch topically every 8 hours as needed for muscle soreness     Muscle soreness       methocarbamol 500 MG tablet    ROBAXIN    30 tablet    Take 2 tablets (1,000 mg) by mouth 3 times daily as needed for muscle spasms    Cervicalgia       methylPREDNISolone 4 MG tablet    MEDROL DOSEPAK    21 tablet    Follow package instructions    Cervicalgia       multivitamin, therapeutic with minerals Tabs tablet     30 each    Take 1 tablet by mouth daily    Alcohol dependence with uncomplicated withdrawal (H)       nicotine 14 MG/24HR 24 hr patch    NICODERM CQ    30 patch    Place 1 patch onto the skin daily    Tobacco abuse       omeprazole 20 MG CR capsule    priLOSEC    60 capsule    Take 1 capsule (20 mg) by mouth 2 times daily    Gastroesophageal reflux disease without esophagitis       order for DME     1 each    Equipment being ordered: Nebulizer    Chronic bronchitis, unspecified chronic bronchitis type (H)       rOPINIRole 1 MG tablet    REQUIP    90 tablet    Take 1 tablet (1 mg) by mouth 3 times daily    RLS (restless legs syndrome)       tiotropium 18 MCG capsule    SPIRIVA HANDIHALER    30 capsule    Inhale contents of one capsule daily.    Chronic bronchitis, unspecified chronic bronchitis type (H)       traZODone 50 MG tablet    DESYREL    30 tablet    Take 1 tablet (50 mg) by mouth At Bedtime    Major depressive disorder, recurrent episode, mild (H)       triamcinolone 0.1 % cream    KENALOG    80 g    APPLY SPARINGLY TO AFFECTED AREA(S) THREE TIMES A DAY AS NEEDED    Other atopic dermatitis

## 2018-02-04 ENCOUNTER — TRANSFERRED RECORDS (OUTPATIENT)
Dept: HEALTH INFORMATION MANAGEMENT | Facility: CLINIC | Age: 53
End: 2018-02-04

## 2018-02-05 ENCOUNTER — CARE COORDINATION (OUTPATIENT)
Dept: CARE COORDINATION | Facility: CLINIC | Age: 53
End: 2018-02-05

## 2018-02-05 NOTE — PROGRESS NOTES
Clinic Care Coordination Contact 2/5/18  Care Team Conversations-SW    Chart review performed as previous [facility] was working with this pt. Due to the number of months from last SW encounter, this writer will not be following at this time. Please re-refer should needs arise.     Hanna Oneil, hospitals  Care Coordinator Social Work    [facility] [place] [facility] [place]  [phone]  2/5/2018 10:14 AM

## 2018-02-07 ENCOUNTER — CARE COORDINATION (OUTPATIENT)
Dept: CARE COORDINATION | Facility: CLINIC | Age: 53
End: 2018-02-07

## 2018-02-07 NOTE — PROGRESS NOTES
Clinic Care Coordination Contact 2/7/18  Care Team Conversations-SW    Pt is a [facility] pt that utilizes many hospital and clinic systems. She has a restriction in place and can only use/see the following:    Primary Care: Dr. Min Toledo  Pharmacy: [facility]  Urgent Care: [facility] [place]  Emergency Room/IP hospitalization: [facility] [place]    [facility]  is Leonor: [phone] and her fax is [phone]. Please notify OSIRIS Ramos should you have questions/concerns [phone].    This writer will follow this pt to try and support her to utilize the health care system effectively. Phone call was placed to the pt today.  was left introducing myself and role. [facility] will continue to reach out to her. I did contact the [facility] to connect with them about the restriction and will find out if and how I can share the restriction document with them. Phone call made to JOSE Galvez as well as the Privacy office. Leonor with [employer] states the pt's insurance will no longer pay for services outside of her restricted area, so this should assist the pt in utilizing the services that have been assigned for her to use.     [facility] will continue to try and reach out to the pt.    EVERETTE Ramos  Care Coordinator Social Work    [facility] [place] [facility]  [phone]  2/7/2018 9:56 AM

## 2018-02-09 ENCOUNTER — CARE COORDINATION (OUTPATIENT)
Dept: CARE COORDINATION | Facility: CLINIC | Age: 53
End: 2018-02-09

## 2018-02-09 NOTE — PROGRESS NOTES
Clinic Care Coordination Contact--Social Work   [facility]    Clinical Data: Care Coordinator Outreach.  Clinic SW had contacted [facility] ED to discuss Patient's potential restricted status with them.  Porsha, [facility], returned message on 2/7/2018 at 5:48 pm stating Patient is not restricted to [place], her primary clinic and hospital is [place] and in network.  Patient's co-pays will increase when out of network (i.e.: [facility]).  Porsha will update financial department and is asking if BARBARA will update Patient.     Outreach attempted x 2.  Each the phone rings and then there is a busy signal.  Cannot leave a message.      Plan: This SW will request clinic SW call Patient on her return next week.  Will route to clinic EVERETTE Roberson, MSW   [facility]   [phone]  2/9/2018 11:33 AM

## 2018-02-20 ENCOUNTER — TELEPHONE (OUTPATIENT)
Dept: FAMILY MEDICINE | Facility: CLINIC | Age: 53
End: 2018-02-20

## 2018-02-20 ENCOUNTER — OFFICE VISIT (OUTPATIENT)
Dept: FAMILY MEDICINE | Facility: CLINIC | Age: 53
End: 2018-02-20
Payer: COMMERCIAL

## 2018-02-20 VITALS
WEIGHT: 157.38 LBS | BODY MASS INDEX: 29.71 KG/M2 | HEIGHT: 61 IN | SYSTOLIC BLOOD PRESSURE: 163 MMHG | HEART RATE: 123 BPM | DIASTOLIC BLOOD PRESSURE: 90 MMHG | TEMPERATURE: 98.9 F | OXYGEN SATURATION: 96 %

## 2018-02-20 DIAGNOSIS — J44.1 COPD EXACERBATION (H): Primary | ICD-10-CM

## 2018-02-20 DIAGNOSIS — F10.11 HISTORY OF ETOH ABUSE: ICD-10-CM

## 2018-02-20 DIAGNOSIS — Z12.11 SPECIAL SCREENING FOR MALIGNANT NEOPLASMS, COLON: ICD-10-CM

## 2018-02-20 DIAGNOSIS — Z12.31 ENCOUNTER FOR SCREENING MAMMOGRAM FOR BREAST CANCER: ICD-10-CM

## 2018-02-20 DIAGNOSIS — R05.9 COUGH: ICD-10-CM

## 2018-02-20 PROCEDURE — 99214 OFFICE O/P EST MOD 30 MIN: CPT | Performed by: NURSE PRACTITIONER

## 2018-02-20 RX ORDER — AZITHROMYCIN 250 MG/1
TABLET, FILM COATED ORAL
Qty: 6 TABLET | Refills: 0 | Status: SHIPPED | OUTPATIENT
Start: 2018-02-20 | End: 2018-04-05

## 2018-02-20 RX ORDER — BENZONATATE 200 MG/1
200 CAPSULE ORAL 3 TIMES DAILY PRN
Qty: 21 CAPSULE | Refills: 0 | Status: SHIPPED | OUTPATIENT
Start: 2018-02-20 | End: 2018-04-05

## 2018-02-20 RX ORDER — PREDNISONE 20 MG/1
40 TABLET ORAL DAILY
Qty: 10 TABLET | Refills: 0 | Status: SHIPPED | OUTPATIENT
Start: 2018-02-20 | End: 2018-02-25

## 2018-02-20 NOTE — PROGRESS NOTES
SUBJECTIVE:   Inga Ledesma is a 52 year old female who presents to clinic today for the following health issues:      ENT Symptoms             Symptoms: cc Present Absent Comment   Fever/Chills   x    Fatigue   x    Muscle Aches   x    Eye Irritation   x    Sneezing   x    Nasal Trell/Drg  x     Sinus Pressure/Pain  x     Loss of smell   x    Dental pain   x    Sore Throat   x    Swollen Glands   x    Ear Pain/Fullness   x    Cough  x  Hx COPD, hasn't been sleeping as she can't lay down, if walking can't breathe- SOB   Wheeze   x    Chest Pain   x    Shortness of breath  x     Rash   x    Other   x Wants work note to return to work.      Symptom duration:  1-2 weeks   Symptom severity:  moderate   Treatments tried:  none   Contacts:  none       Other concerns: Pt has been going to treatment for drinking and has started drinking again. Fell and hit head X 3 days, pt can not remember exact day of fall due to alcohol intake    Problem list and histories reviewed & adjusted, as indicated.  Additional history: as documented    Patient Active Problem List   Diagnosis     RLS (restless legs syndrome)     GERD (gastroesophageal reflux disease)     Iron deficiency anemia     Hyperlipidemia with target LDL less than 160     Adult BMI 30+     Sacroiliitis (H)     Tobacco abuse     Vitamin D deficiency     Menorrhagia     Advanced directives, counseling/discussion     Vitamin D deficiency     Edema of both legs     Hypertension goal BP (blood pressure) < 140/90     Chronic bronchitis, unspecified chronic bronchitis type (H)     Health Care Home     Alcohol dependence with withdrawal with complication (H)     Major depressive disorder, recurrent episode, mild (H)     (HFpEF) heart failure with preserved ejection fraction (H)     Psychophysiological insomnia     Chemical dependency (H)     Alcohol withdrawal (H)     Past Surgical History:   Procedure Laterality Date     COLONOSCOPY       EYE SURGERY       HERNIA REPAIR,  INGUINAL RT/LT  2007     HYSTERECTOMY  2010     HYSTERECTOMY TOTAL ABDOMINAL  7/28/10    Bilateral salpingectomy.  ovaries conserved.     LASER TX, CERVICAL  1987     LYMPH NODE BIOPSY  2007    inguinal     LYSIS OF LABIAL LESION(S)  1985, 1987       Social History   Substance Use Topics     Smoking status: Current Every Day Smoker     Packs/day: 0.50     Years: 34.00     Types: Cigarettes     Start date: 11/1/1979     Last attempt to quit: 10/3/2012     Smokeless tobacco: Never Used      Comment: 5 cigarettes daily     Alcohol use Yes      Comment: Drinking mouthwash.     Family History   Problem Relation Age of Onset     Depression/Anxiety Mother      Asthma Mother      CEREBROVASCULAR DISEASE Father      Hypertension Father      Lung Cancer Father      Breast Cancer Paternal Aunt      Other Cancer Other      Depression Other      Anxiety Disorder Other      MENTAL ILLNESS Other      Substance Abuse Other      Asthma Other      Obesity Sister      Obesity Son          Current Outpatient Prescriptions   Medication Sig Dispense Refill     predniSONE (DELTASONE) 20 MG tablet Take 2 tablets (40 mg) by mouth daily for 5 days 10 tablet 0     azithromycin (ZITHROMAX) 250 MG tablet Two tablets first day, then one tablet daily for four days. 6 tablet 0     benzonatate (TESSALON) 200 MG capsule Take 1 capsule (200 mg) by mouth 3 times daily as needed for cough (Keep out of reach of children) 21 capsule 0     triamcinolone (KENALOG) 0.1 % cream APPLY SPARINGLY TO AFFECTED AREA(S) THREE TIMES A DAY AS NEEDED 80 g 0     folic acid (FOLVITE) 1 MG tablet Take 1 tablet (1 mg) by mouth daily 30 tablet 0     labetalol (NORMODYNE) 100 MG tablet Take 1 tablet (100 mg) by mouth 2 times daily 60 tablet 0     magnesium oxide (MAG-OX) 400 MG tablet Take 1 tablet (400 mg) by mouth 2 times daily 60 tablet 1     alum & mag hydroxide-simethicone (MYLANTA ES/MAALOX  ES) 400-400-40 MG/5ML SUSP suspension Take 15-30 mLs by mouth every 4 hours  as needed for indigestion 355 mL 0     furosemide (LASIX) 20 MG tablet Take 1 tablet (20 mg) by mouth daily 30 tablet 0     FLUoxetine (PROZAC) 40 MG capsule Take 1 capsule (40 mg) by mouth 2 times daily 60 capsule 0     aspirin 81 MG EC tablet Take 1 tablet (81 mg) by mouth daily 30 tablet 1     calcium carbonate (OS-JENNIFER 500 MG [employer] [place]) 1250 MG tablet Take 1 tablet (1,250 mg) by mouth 2 times daily 180 tablet 0     methylPREDNISolone (MEDROL DOSEPAK) 4 MG tablet Follow package instructions 21 tablet 0     methocarbamol (ROBAXIN) 500 MG tablet Take 2 tablets (1,000 mg) by mouth 3 times daily as needed for muscle spasms 30 tablet 1     acamprosate (CAMPRAL) 333 MG EC tablet Take 2 tablets (666 mg) by mouth 3 times daily 180 tablet 2     menthol (ICY HOT) 5 % PTCH Apply 1 patch topically every 8 hours as needed for muscle soreness 30 patch 3     albuterol (VENTOLIN HFA) 108 (90 BASE) MCG/ACT Inhaler Inhale  into the lungs. INHALE 2 PUFFS FOUR TIMES DAILY AS NEEDED 3 Inhaler 0     fluticasone (FLONASE) 50 MCG/ACT spray Spray 1-2 sprays into both nostrils daily 3 g 1     fluticasone-salmeterol (ADVAIR DISKUS) 500-50 MCG/DOSE diskus inhaler Inhale 1 puff into the lungs every 12 hours 2 Inhaler 1     gabapentin (NEURONTIN) 300 MG capsule Take 1 capsule by mouth in the morning, 1 capsule in the afternoon, and 2 capsules at bedtime Take 1 capsule by mouth in the morning, 1 capsule in the afternoon, and 2 capsules at bedtime 90 capsule 1     hydrOXYzine (ATARAX) 50 MG tablet Take 1 tablet (50 mg) by mouth every 6 hours as needed for anxiety or other (adjuvant pain) 30 tablet 1     ipratropium - albuterol 0.5 mg/2.5 mg/3 mL (DUONEB) 0.5-2.5 (3) MG/3ML neb solution Take 1 vial (3 mLs) by nebulization 4 times daily as needed for wheezing 360 mL 1     losartan (COZAAR) 100 MG tablet Take 0.5 tablets (50 mg) by mouth daily 30 tablet 1     multivitamin, therapeutic with minerals (THERA-VIT-M) TABS tablet Take 1 tablet by mouth  "daily 30 each 1     nicotine (NICODERM CQ) 14 MG/24HR 24 hr patch Place 1 patch onto the skin daily 30 patch 1     omeprazole (PRILOSEC) 20 MG CR capsule Take 1 capsule (20 mg) by mouth 2 times daily 60 capsule 1     rOPINIRole (REQUIP) 1 MG tablet Take 1 tablet (1 mg) by mouth 3 times daily 90 tablet 1     tiotropium (SPIRIVA HANDIHALER) 18 MCG capsule Inhale contents of one capsule daily. 30 capsule 1     traZODone (DESYREL) 50 MG tablet Take 1 tablet (50 mg) by mouth At Bedtime 30 tablet 1     order for DME Equipment being ordered: Nebulizer 1 each 0     Allergies   Allergen Reactions     Codeine Nausea     Reglan [Metoclopramide Hcl] Other (See Comments)     Body tenses up     BP Readings from Last 3 Encounters:   02/20/18 163/90   01/11/18 140/86   12/15/17 138/88    Wt Readings from Last 3 Encounters:   02/20/18 157 lb 6 oz (71.4 kg)   01/11/18 169 lb (76.7 kg)   12/15/17 173 lb (78.5 kg)                  Labs reviewed in EPIC    Reviewed and updated as needed this visit by clinical staff  Tobacco  Allergies  Meds  Med Hx  Surg Hx  Fam Hx  Soc Hx      Reviewed and updated as needed this visit by Provider         ROS:  Constitutional, HEENT, cardiovascular, pulmonary, GI, , musculoskeletal, neuro, skin, endocrine and psych systems are negative, except as otherwise noted.    OBJECTIVE:     /90  Pulse 123  Temp 98.9  F (37.2  C) (Oral)  Ht 5' 1\" (1.549 m)  Wt 157 lb 6 oz (71.4 kg)  LMP 06/02/2010  SpO2 96%  BMI 29.74 kg/m2  Body mass index is 29.74 kg/(m^2).  GENERAL: healthy, alert and no distress POSITIVE appears ill, not sure if well enough to return to work at this time.  Wrote note that she was seen, and if feeling better may return to work.   EYES: Eyes grossly normal to inspection, PERRL and conjunctivae and sclerae normal  HENT: ear canals and TM's normal, nose and mouth without ulcers or lesions  NECK: no adenopathy, no asymmetry, masses, or scars and thyroid normal to " palpation  RESP: POSITIVE for exp wheeze on Left, diminished LS on LLL, SOB observed, harsh productive cough observed.  CV: regular rate and rhythm, normal S1 S2, no S3 or S4, no murmur, click or rub, no peripheral edema and peripheral pulses strong  MS: no gross musculoskeletal defects noted, no edema  SKIN: no suspicious lesions or rashes POSITIVE for red face  PSYCH: mentation appears normal, affect normal/bright    Diagnostic Test Results:  See orders    ASSESSMENT/PLAN:     Pt also reports that she had started drinking again last week, Hx of alcohol addiction. Needs FMLA forms completed by PCP Min.         ICD-10-CM    1. COPD exacerbation (H) J44.1 predniSONE (DELTASONE) 20 MG tablet     azithromycin (ZITHROMAX) 250 MG tablet   2. Encounter for screening mammogram for breast cancer Z12.31 MA Diagnostic Digital Bilateral   3. Special screening for malignant neoplasms, colon Z12.11 Fecal colorectal cancer screen (FIT)   4. Cough R05 benzonatate (TESSALON) 200 MG capsule   5. History of ETOH abuse Z87.898     starting drinking again last week.        See Patient Instructions: If your symptoms worsen, be re-seen.     Mahi Jack, CINDY  [facility]

## 2018-02-20 NOTE — MR AVS SNAPSHOT
After Visit Summary   2/20/2018    Inga Ledesma    MRN: [id number]           Patient Information     Date Of Birth          1965        Visit Information        Provider Department      2/20/2018 9:20 AM Mahi Jack NP [facility]        Today's Diagnoses     Encounter for screening mammogram for breast cancer    -  1    Special screening for malignant neoplasms, colon        COPD exacerbation (H)        Cough          Care Instructions      Treatment for COPD    Your healthcare provider will prescribe the best treatments for your COPD.  Treatment  Recommendations include the following:    Medicines. Some medicines help relieve symptoms when you have them. Others are taken daily to control inflammation in the lungs. Always take your medicines as prescribed. Learn the names of your medicines, as well as how and when to use them.    Oxygen therapy. Oxygen may be prescribed if tests show that your blood contains too little oxygen.    Smoking. If you smoke, quit. Smoking is the main cause of COPD. Quitting will help you be able to better manage your COPD. Ask your healthcare provider about ways to help you quit smoking.    Avoiding infections. Infections, like a cold or the flu, can cause your symptoms to worsen. Try to stay away from people who are sick. Wash your hands often. And, ask your healthcare provider about vaccines for the flu and pneumonia.  Coping with shortness of breath  Coping tips include the following:    Exercise. Try to be as active as possible. This will improve energy levels and strengthen your muscles, so you can do more.    Breathing techniques. Ask your healthcare provider or nurse to show you how to do pursed-lip breathing.    Balance rest and activity. Each day, try to balance rest periods with activity. For example, you might start the day with getting dressed and eating breakfast, then relax and read the paper. After that, take a brief walk. And then  sit with your feet up for a while.    Pulmonary rehabilitation. Ask your provider, or call your local hospital to find out about pulmonary rehab programs. The programs help with managing your disease, breathing techniques, exercise, support and counseling.    Healthy eating. Eating a healthy, balanced diet and making an effort to maintain your ideal weight are important to staying as healthy as possible. Make sure you have a lot of fruit and vegetables every day, as well as balanced portions of whole grains, lean meats and fish, and low-fat dairy products.  Date Last Reviewed: 5/1/2016 2000-2017 [employer]. [address] [place] [address]. All rights reserved. This information is not intended as a substitute for professional medical care. Always follow your healthcare professional's instructions.                Follow-ups after your visit        Follow-up notes from your care team     Return if symptoms worsen or fail to improve.      Future tests that were ordered for you today     Open Future Orders        Priority Expected Expires Ordered    MA Diagnostic Digital Bilateral Routine  2/20/2019 2/20/2018    Fecal colorectal cancer screen (FIT) Routine 3/13/2018 5/15/2018 2/20/2018            Who to contact     Normal or non-critical lab and imaging results will be communicated to you by MyChart, letter or phone within 4 business days after the clinic has received the results. If you do not hear from us within 7 days, please contact the clinic through [employer] or phone. If you have a critical or abnormal lab result, we will notify you by phone as soon as possible.  Submit refill requests through [employer] or call your pharmacy and they will forward the refill request to us. Please allow 3 business days for your refill to be completed.          If you need to speak with a  for additional information , please call: [phone]             Additional Information About Your  "Visit        [facility] Information     [employer] gives you secure access to your electronic health record. If you see a primary care provider, you can also send messages to your care team and make appointments. If you have questions, please call your primary care clinic.  If you do not have a primary care provider, please call [phone] and they will assist you.        Care EveryWhere ID     This is your Care EveryWhere ID. This could be used by other organizations to access your [facility] medical records  [id number]        Your Vitals Were     Pulse Temperature Height Last Period Pulse Oximetry BMI (Body Mass Index)    123 98.9  F (37.2  C) (Oral) 5' 1\" (1.549 m) 06/02/2010 96% 29.74 kg/m2       Blood Pressure from Last 3 Encounters:   02/20/18 163/90   01/11/18 140/86   12/15/17 138/88    Weight from Last 3 Encounters:   02/20/18 157 lb 6 oz (71.4 kg)   01/11/18 169 lb (76.7 kg)   12/15/17 173 lb (78.5 kg)                 Today's Medication Changes          These changes are accurate as of 2/20/18  9:36 AM.  If you have any questions, ask your nurse or doctor.               Start taking these medicines.        Dose/Directions    azithromycin 250 MG tablet   Commonly known as:  ZITHROMAX   Used for:  COPD exacerbation (H)   Started by:  Mahi Jack NP        Two tablets first day, then one tablet daily for four days.   Quantity:  6 tablet   Refills:  0       benzonatate 200 MG capsule   Commonly known as:  TESSALON   Used for:  Cough   Started by:  Mahi Jack NP        Dose:  200 mg   Take 1 capsule (200 mg) by mouth 3 times daily as needed for cough (Keep out of reach of children)   Quantity:  21 capsule   Refills:  0       predniSONE 20 MG tablet   Commonly known as:  DELTASONE   Used for:  COPD exacerbation (H)   Started by:  Mahi Jack NP        Dose:  40 mg   Take 2 tablets (40 mg) by mouth daily for 5 days   Quantity:  10 tablet   Refills:  0            Where to get your medicines      These " medications were sent to [facility] [place], [place] - [address] [facility]  [address] [facility] [address]     Phone:  [phone]     azithromycin 250 MG tablet    benzonatate 200 MG capsule    predniSONE 20 MG tablet                Primary Care Provider Office Phone # Fax #    Min Julia Toledo PA-C [phone] [address]  [place] [address]        Goals        General    I will use the walker at all times or a cane in tight spots to prevent further falls. (pt-stated)     Notes - Note created  12/9/2015  8:48 AM by Sonny Jacobs RN    As of today's date 12/9/2015 goal is met at 0 - 25%.   Goal Status:  Active          Equal Access to Services     ИРИНА KERNS : Hadii chang morris hadasho Soomaali, waaxda luqadaha, qaybta kaalmada adeegyada, kelsey hu hayhenrry abdi . So [facility] [phone].    ATENCIÓN: Si habla español, tiene a cook disposición servicios gratuitos de asistencia lingüística. Llame al [phone].    We comply with applicable federal civil rights laws and [place] laws. We do not discriminate on the basis of race, color, national origin, age, disability, sex, sexual orientation, or gender identity.            Thank you!     Thank you for choosing [facility]  for your care. Our goal is always to provide you with excellent care. Hearing back from our patients is one way we can continue to improve our services. Please take a few minutes to complete the written survey that you may receive in the mail after your visit with us. Thank you!             Your Updated Medication List - Protect others around you: Learn how to safely use, store and throw away your medicines at www.disposemymeds.org.          This list is accurate as of 2/20/18  9:36 AM.  Always use your most recent med list.                   Brand Name Dispense Instructions for use Diagnosis    acamprosate 333 MG EC tablet    CAMPRAL    180 tablet    Take 2 tablets (223  mg) by mouth 3 times daily    Alcohol abuse       albuterol 108 (90 BASE) MCG/ACT Inhaler    VENTOLIN HFA    3 Inhaler    Inhale  into the lungs. INHALE 2 PUFFS FOUR TIMES DAILY AS NEEDED    Chronic bronchitis, unspecified chronic bronchitis type (H)       alum & mag hydroxide-simethicone 400-400-40 MG/5ML Susp suspension    MYLANTA ES/MAALOX  ES    355 mL    Take 15-30 mLs by mouth every 4 hours as needed for indigestion    Gastroesophageal reflux disease without esophagitis       aspirin 81 MG EC tablet     30 tablet    Take 1 tablet (81 mg) by mouth daily    Hypertension goal BP (blood pressure) < 140/90       azithromycin 250 MG tablet    ZITHROMAX    6 tablet    Two tablets first day, then one tablet daily for four days.    COPD exacerbation (H)       benzonatate 200 MG capsule    TESSALON    21 capsule    Take 1 capsule (200 mg) by mouth 3 times daily as needed for cough (Keep out of reach of children)    Cough       calcium carbonate 1250 MG tablet    OS-JENNIFER 500 mg [place]. [place]    180 tablet    Take 1 tablet (1,250 mg) by mouth 2 times daily    Alcohol dependence with withdrawal with complication (H)       FLUoxetine 40 MG capsule    PROzac    60 capsule    Take 1 capsule (40 mg) by mouth 2 times daily    Major depressive disorder, recurrent episode, mild (H)       fluticasone 50 MCG/ACT spray    FLONASE    3 g    Spray 1-2 sprays into both nostrils daily    Major depressive disorder, recurrent episode, mild (H)       fluticasone-salmeterol 500-50 MCG/DOSE diskus inhaler    ADVAIR DISKUS    2 Inhaler    Inhale 1 puff into the lungs every 12 hours    Chronic bronchitis, unspecified chronic bronchitis type (H)       folic acid 1 MG tablet    FOLVITE    30 tablet    Take 1 tablet (1 mg) by mouth daily    Alcohol dependence with uncomplicated withdrawal (H)       furosemide 20 MG tablet    LASIX    30 tablet    Take 1 tablet (20 mg) by mouth daily    Essential hypertension with goal blood pressure less than 140/90        gabapentin 300 MG capsule    NEURONTIN    90 capsule    Take 1 capsule by mouth in the morning, 1 capsule in the afternoon, and 2 capsules at bedtime Take 1 capsule by mouth in the morning, 1 capsule in the afternoon, and 2 capsules at bedtime    Major depressive disorder, recurrent episode, mild (H), Sacroiliitis (H)       hydrOXYzine 50 MG tablet    ATARAX    30 tablet    Take 1 tablet (50 mg) by mouth every 6 hours as needed for anxiety or other (adjuvant pain)    Sacroiliitis (H)       ipratropium - albuterol 0.5 mg/2.5 mg/3 mL 0.5-2.5 (3) MG/3ML neb solution    DUONEB    360 mL    Take 1 vial (3 mLs) by nebulization 4 times daily as needed for wheezing    Chronic bronchitis, unspecified chronic bronchitis type (H)       labetalol 100 MG tablet    NORMODYNE    60 tablet    Take 1 tablet (100 mg) by mouth 2 times daily    Hypertension goal BP (blood pressure) < 140/90       losartan 100 MG tablet    COZAAR    30 tablet    Take 0.5 tablets (50 mg) by mouth daily    Hypertension goal BP (blood pressure) < 140/90       magnesium oxide 400 MG tablet    MAG-OX    60 tablet    Take 1 tablet (400 mg) by mouth 2 times daily    Alcohol dependence with withdrawal with complication (H)       menthol 5 % Ptch    ICY HOT    30 patch    Apply 1 patch topically every 8 hours as needed for muscle soreness    Muscle soreness       methocarbamol 500 MG tablet    ROBAXIN    30 tablet    Take 2 tablets (1,000 mg) by mouth 3 times daily as needed for muscle spasms    Cervicalgia       methylPREDNISolone 4 MG tablet    MEDROL DOSEPAK    21 tablet    Follow package instructions    Cervicalgia       multivitamin, therapeutic with minerals Tabs tablet     30 each    Take 1 tablet by mouth daily    Alcohol dependence with uncomplicated withdrawal (H)       nicotine 14 MG/24HR 24 hr patch    NICODERM CQ    30 patch    Place 1 patch onto the skin daily    Tobacco abuse       omeprazole 20 MG CR capsule    priLOSEC    60 capsule    Take 1  capsule (20 mg) by mouth 2 times daily    Gastroesophageal reflux disease without esophagitis       order for DME     1 each    Equipment being ordered: Nebulizer    Chronic bronchitis, unspecified chronic bronchitis type (H)       predniSONE 20 MG tablet    DELTASONE    10 tablet    Take 2 tablets (40 mg) by mouth daily for 5 days    COPD exacerbation (H)       rOPINIRole 1 MG tablet    REQUIP    90 tablet    Take 1 tablet (1 mg) by mouth 3 times daily    RLS (restless legs syndrome)       tiotropium 18 MCG capsule    SPIRIVA HANDIHALER    30 capsule    Inhale contents of one capsule daily.    Chronic bronchitis, unspecified chronic bronchitis type (H)       traZODone 50 MG tablet    DESYREL    30 tablet    Take 1 tablet (50 mg) by mouth At Bedtime    Major depressive disorder, recurrent episode, mild (H)       triamcinolone 0.1 % cream    KENALOG    80 g    APPLY SPARINGLY TO AFFECTED AREA(S) THREE TIMES A DAY AS NEEDED    Other atopic dermatitis

## 2018-02-20 NOTE — TELEPHONE ENCOUNTER
Patient is calling stated that her work place sent over FMLA forms and wanted to know if provider has sent for to work place. Patient is unable to work if forms are not sent.   Please call to advise  Thank you

## 2018-02-20 NOTE — PATIENT INSTRUCTIONS
Treatment for COPD    Your healthcare provider will prescribe the best treatments for your COPD.  Treatment  Recommendations include the following:    Medicines. Some medicines help relieve symptoms when you have them. Others are taken daily to control inflammation in the lungs. Always take your medicines as prescribed. Learn the names of your medicines, as well as how and when to use them.    Oxygen therapy. Oxygen may be prescribed if tests show that your blood contains too little oxygen.    Smoking. If you smoke, quit. Smoking is the main cause of COPD. Quitting will help you be able to better manage your COPD. Ask your healthcare provider about ways to help you quit smoking.    Avoiding infections. Infections, like a cold or the flu, can cause your symptoms to worsen. Try to stay away from people who are sick. Wash your hands often. And, ask your healthcare provider about vaccines for the flu and pneumonia.  Coping with shortness of breath  Coping tips include the following:    Exercise. Try to be as active as possible. This will improve energy levels and strengthen your muscles, so you can do more.    Breathing techniques. Ask your healthcare provider or nurse to show you how to do pursed-lip breathing.    Balance rest and activity. Each day, try to balance rest periods with activity. For example, you might start the day with getting dressed and eating breakfast, then relax and read the paper. After that, take a brief walk. And then sit with your feet up for a while.    Pulmonary rehabilitation. Ask your provider, or call your local hospital to find out about pulmonary rehab programs. The programs help with managing your disease, breathing techniques, exercise, support and counseling.    Healthy eating. Eating a healthy, balanced diet and making an effort to maintain your ideal weight are important to staying as healthy as possible. Make sure you have a lot of fruit and vegetables every day, as well as  balanced portions of whole grains, lean meats and fish, and low-fat dairy products.  Date Last Reviewed: 5/1/2016 2000-2017 [employer]. [address] [place] [address], [place] [address]. All rights reserved. This information is not intended as a substitute for professional medical care. Always follow your healthcare professional's instructions.

## 2018-02-20 NOTE — LETTER
[facility] [place]  [address] [facility] [place] [address]  [phone]          February 20, 2018    RE:  Inga CASTELLANOS Baltazar                                                                                                                                                       [address] [facility] [place] [address]            To whom it may concern:    Inga Ledesma is under my professional care, seen in clinic 2/20/18, please allow her to return to work at this time if she is feeling better.     Sincerely,        Mahi Jack RN, CNP

## 2018-02-20 NOTE — NURSING NOTE
"Chief Complaint   Patient presents with     Breathing Problem       Initial /90  Pulse 123  Temp 98.9  F (37.2  C) (Oral)  Ht 5' 1\" (1.549 m)  Wt 157 lb 6 oz (71.4 kg)  LMP 06/02/2010  SpO2 96%  BMI 29.74 kg/m2 Estimated body mass index is 29.74 kg/(m^2) as calculated from the following:    Height as of this encounter: 5' 1\" (1.549 m).    Weight as of this encounter: 157 lb 6 oz (71.4 kg).  Medication Reconciliation: complete   Emmanuel Cotton MA      "

## 2018-02-21 ENCOUNTER — CARE COORDINATION (OUTPATIENT)
Dept: CARE COORDINATION | Facility: CLINIC | Age: 53
End: 2018-02-21

## 2018-02-21 NOTE — PROGRESS NOTES
Clinic Care Coordination Contact 2/21/18  [email or web address]-    Referral Source: PCP  Clinical Data: Care Coordinator Outreach  Outreach attempted x 1.  Left message on [employer] with call back information and requested return call.  Plan: Care Coordinator will try to reach patient again in 3-5 business days. I will also send a VisionGatehart message.    UPDATE: Pt returned this writer's phone call. I updated her with her restriction information and explained who her contact people are now. I also sent her a [employer] message highlighting who her providers are. I encouraged the pt to reach out to me in the future should she have any questions/concerns.    Hanna Oneil, [facility]  Care Coordinator Social Work    [facility] [place]  [phone]  2/21/2018 9:48 AM

## 2018-02-23 ENCOUNTER — RADIANT APPOINTMENT (OUTPATIENT)
Dept: GENERAL RADIOLOGY | Facility: CLINIC | Age: 53
End: 2018-02-23
Attending: PHYSICIAN ASSISTANT
Payer: COMMERCIAL

## 2018-02-23 ENCOUNTER — OFFICE VISIT (OUTPATIENT)
Dept: FAMILY MEDICINE | Facility: CLINIC | Age: 53
End: 2018-02-23
Payer: COMMERCIAL

## 2018-02-23 DIAGNOSIS — R51.9 NONINTRACTABLE EPISODIC HEADACHE, UNSPECIFIED HEADACHE TYPE: ICD-10-CM

## 2018-02-23 DIAGNOSIS — G25.81 RLS (RESTLESS LEGS SYNDROME): ICD-10-CM

## 2018-02-23 DIAGNOSIS — E87.6 HYPOKALEMIA: ICD-10-CM

## 2018-02-23 DIAGNOSIS — F33.0 MAJOR DEPRESSIVE DISORDER, RECURRENT EPISODE, MILD (H): ICD-10-CM

## 2018-02-23 DIAGNOSIS — M46.1 SACROILIITIS (H): ICD-10-CM

## 2018-02-23 DIAGNOSIS — I10 HYPERTENSION GOAL BP (BLOOD PRESSURE) < 140/90: ICD-10-CM

## 2018-02-23 DIAGNOSIS — I10 ESSENTIAL HYPERTENSION WITH GOAL BLOOD PRESSURE LESS THAN 140/90: ICD-10-CM

## 2018-02-23 DIAGNOSIS — R05.9 COUGH: Primary | ICD-10-CM

## 2018-02-23 DIAGNOSIS — J42 CHRONIC BRONCHITIS, UNSPECIFIED CHRONIC BRONCHITIS TYPE (H): ICD-10-CM

## 2018-02-23 DIAGNOSIS — F10.239 ALCOHOL DEPENDENCE WITH WITHDRAWAL WITH COMPLICATION (H): ICD-10-CM

## 2018-02-23 LAB
ALBUMIN SERPL-MCNC: 3.5 G/DL (ref 3.4–5)
ALP SERPL-CCNC: 117 U/L (ref 40–150)
ALT SERPL W P-5'-P-CCNC: 76 U/L (ref 0–50)
ANION GAP SERPL CALCULATED.3IONS-SCNC: 10 MMOL/L (ref 3–14)
AST SERPL W P-5'-P-CCNC: 65 U/L (ref 0–45)
BILIRUB SERPL-MCNC: 0.3 MG/DL (ref 0.2–1.3)
BUN SERPL-MCNC: 18 MG/DL (ref 7–30)
CALCIUM SERPL-MCNC: 8.5 MG/DL (ref 8.5–10.1)
CHLORIDE SERPL-SCNC: 100 MMOL/L (ref 94–109)
CO2 SERPL-SCNC: 28 MMOL/L (ref 20–32)
CREAT SERPL-MCNC: 0.66 MG/DL (ref 0.52–1.04)
ERYTHROCYTE [DISTWIDTH] IN BLOOD BY AUTOMATED COUNT: 16.3 % (ref 10–15)
GFR SERPL CREATININE-BSD FRML MDRD: >90 ML/MIN/1.7M2
GLUCOSE SERPL-MCNC: 116 MG/DL (ref 70–99)
HCT VFR BLD AUTO: 35.4 % (ref 35–47)
HGB BLD-MCNC: 11.9 G/DL (ref 11.7–15.7)
MCH RBC QN AUTO: 30.3 PG (ref 26.5–33)
MCHC RBC AUTO-ENTMCNC: 33.6 G/DL (ref 31.5–36.5)
MCV RBC AUTO: 90 FL (ref 78–100)
PLATELET # BLD AUTO: 130 10E9/L (ref 150–450)
POTASSIUM SERPL-SCNC: 2.9 MMOL/L (ref 3.4–5.3)
PROT SERPL-MCNC: 6.6 G/DL (ref 6.8–8.8)
RBC # BLD AUTO: 3.93 10E12/L (ref 3.8–5.2)
SODIUM SERPL-SCNC: 138 MMOL/L (ref 133–144)
WBC # BLD AUTO: 6.3 10E9/L (ref 4–11)

## 2018-02-23 PROCEDURE — 99214 OFFICE O/P EST MOD 30 MIN: CPT | Performed by: PHYSICIAN ASSISTANT

## 2018-02-23 PROCEDURE — 71046 X-RAY EXAM CHEST 2 VIEWS: CPT | Mod: FY

## 2018-02-23 PROCEDURE — 80053 COMPREHEN METABOLIC PANEL: CPT | Performed by: PHYSICIAN ASSISTANT

## 2018-02-23 PROCEDURE — 36415 COLL VENOUS BLD VENIPUNCTURE: CPT | Performed by: PHYSICIAN ASSISTANT

## 2018-02-23 PROCEDURE — 85027 COMPLETE CBC AUTOMATED: CPT | Performed by: PHYSICIAN ASSISTANT

## 2018-02-23 RX ORDER — GABAPENTIN 300 MG/1
CAPSULE ORAL
Qty: 90 CAPSULE | Refills: 1 | Status: SHIPPED | OUTPATIENT
Start: 2018-02-23 | End: 2018-04-29

## 2018-02-23 RX ORDER — MAGNESIUM OXIDE 400 MG/1
400 TABLET ORAL 2 TIMES DAILY
Qty: 60 TABLET | Refills: 0 | Status: SHIPPED | OUTPATIENT
Start: 2018-02-23 | End: 2018-04-20

## 2018-02-23 RX ORDER — ROPINIROLE 1 MG/1
1 TABLET, FILM COATED ORAL 3 TIMES DAILY
Qty: 90 TABLET | Refills: 1 | Status: SHIPPED | OUTPATIENT
Start: 2018-02-23 | End: 2018-04-29

## 2018-02-23 RX ORDER — FUROSEMIDE 20 MG
20 TABLET ORAL DAILY
Qty: 90 TABLET | Refills: 0 | Status: SHIPPED | OUTPATIENT
Start: 2018-02-23 | End: 2018-04-12

## 2018-02-23 RX ORDER — LABETALOL 100 MG/1
100 TABLET, FILM COATED ORAL 2 TIMES DAILY
Qty: 180 TABLET | Refills: 0 | Status: ON HOLD | OUTPATIENT
Start: 2018-02-23 | End: 2018-04-09

## 2018-02-23 RX ORDER — TRAZODONE HYDROCHLORIDE 50 MG/1
50 TABLET, FILM COATED ORAL AT BEDTIME
Qty: 30 TABLET | Refills: 1 | Status: SHIPPED | OUTPATIENT
Start: 2018-02-23 | End: 2018-04-29

## 2018-02-23 RX ORDER — TIOTROPIUM BROMIDE 18 UG/1
CAPSULE ORAL; RESPIRATORY (INHALATION)
Qty: 30 CAPSULE | Refills: 1 | Status: SHIPPED | OUTPATIENT
Start: 2018-02-23 | End: 2018-04-29

## 2018-02-23 RX ORDER — FLUOXETINE 40 MG/1
40 CAPSULE ORAL 2 TIMES DAILY
Qty: 60 CAPSULE | Refills: 0 | Status: SHIPPED | OUTPATIENT
Start: 2018-02-23 | End: 2018-04-20

## 2018-02-23 RX ORDER — ALBUTEROL SULFATE 90 UG/1
AEROSOL, METERED RESPIRATORY (INHALATION)
Qty: 3 INHALER | Refills: 0 | Status: SHIPPED | OUTPATIENT
Start: 2018-02-23 | End: 2018-04-20

## 2018-02-23 RX ORDER — CALCIUM CARBONATE 500(1250)
1250 TABLET ORAL 2 TIMES DAILY
Qty: 180 TABLET | Refills: 0 | Status: SHIPPED | OUTPATIENT
Start: 2018-02-23 | End: 2018-04-29

## 2018-02-23 RX ORDER — LOSARTAN POTASSIUM 100 MG/1
50 TABLET ORAL DAILY
Qty: 30 TABLET | Refills: 1 | Status: SHIPPED | OUTPATIENT
Start: 2018-02-23 | End: 2018-04-29
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
## 2018-02-23 NOTE — MR AVS SNAPSHOT
After Visit Summary   2/23/2018    Inga Ledesma    MRN: [id number]           Patient Information     Date Of Birth          1965        Visit Information        Provider Department      2/23/2018 4:00 PM Min Toledo PA-C [facility]        Today's Diagnoses     Cough    -  1    Hypertension goal BP (blood pressure) < 140/90        Alcohol dependence with withdrawal with complication (H)        Essential hypertension with goal blood pressure less than 140/90        Major depressive disorder, recurrent episode, mild (H)        Chronic bronchitis, unspecified chronic bronchitis type (H)        Sacroiliitis (H)        RLS (restless legs syndrome)        Nonintractable episodic headache, unspecified headache type           Follow-ups after your visit        Additional Services     ADDICTION MEDICINE REFERRAL       The Addiction Medicine Service is prepared to provide consultation for and, if necessary, ongoing care for patients with the disease of addiction, ages 16 and up.   Dr Leal  For acute detox, please send your patient to the ER or contact [facility] at [phone].     Common problems that may warrant referral to the Addiction Medicine Service are:  Alcohol use disorder - diagnosis, treatment referral, acute and protracted withdrawal management, and ongoing medication assisted treatment including Antabuse and Naltrexone.  Opioid Use Disorder - medication assisted treatment including Buprenorphine (Suboxone) or extended release Naltrexone (Vivitrol)  Benzodiazepine dependence - extended outpatient detoxification  Many other issues pertaining to addiction, relapse, and recovery    Please contact our scheduling staff at [phone] with any questions.    Referrals to the Addiction Medicine Service assume that the referring provider has discussed the referral with the patient.  Referral will be reviewed and if appropriate, the patient will be contacted  to schedule appointment.  If not appropriate, the provider will be contacted with further information.    Please answer the following questions so we can better service your patient:    Drug of choice: alcohol  Do they have a [place] PCP:  Yes     Please bring the following to your appointment:  >>   List of current medications   >>   Any relevant history                  Your next 10 appointments already scheduled     Feb 26, 2018  4:30 PM CST   CT HEAD W/O CONTRAST with BECT1   [facility] ([facility]    [address]  [place] [id number] [phone]           Please bring any scans or X-rays taken at other hospitals, if similar tests were done. Also bring a list of your medicines, including vitamins, minerals and over-the-counter drugs. It is safest to leave personal items at home.  Be sure to tell your doctor:   If you have any allergies.   If there s any chance you are pregnant.   If you are breastfeeding.  You do not need to do anything special to prepare for this exam.  Please wear loose clothing, such as a sweat suit or jogging clothes. Avoid snaps, zippers and other metal. We may ask you to undress and put on a hospital gown.              Future tests that were ordered for you today     Open Future Orders        Priority Expected Expires Ordered    CT Head w/o Contrast Routine  2/23/2019 2/23/2018            Who to contact     Normal or non-critical lab and imaging results will be communicated to you by MyChart, letter or phone within 4 business days after the clinic has received the results. If you do not hear from us within 7 days, please contact the clinic through MyChart or phone. If you have a critical or abnormal lab result, we will notify you by phone as soon as possible.  Submit refill requests through [employer] or call your pharmacy and they will forward the refill request to us. Please allow 3 business days for your refill to be completed.          If you  need to speak with a  for additional information , please call: [phone]             Additional Information About Your Visit        [employer] Information     [employer] gives you secure access to your electronic health record. If you see a primary care provider, you can also send messages to your care team and make appointments. If you have questions, please call your primary care clinic.  If you do not have a primary care provider, please call [phone] and they will assist you.        Care EveryWhere ID     This is your Care EveryWhere ID. This could be used by other organizations to access your [place] medical records  [id number]        Your Vitals Were     Last Period                   06/02/2010            Blood Pressure from Last 3 Encounters:   02/20/18 163/90   01/11/18 140/86   12/15/17 138/88    Weight from Last 3 Encounters:   02/20/18 157 lb 6 oz (71.4 kg)   01/11/18 169 lb (76.7 kg)   12/15/17 173 lb (78.5 kg)              We Performed the Following     ADDICTION MEDICINE REFERRAL     CBC with platelets     Comprehensive metabolic panel     XR Chest 2 Views          Where to get your medicines      These medications were sent to [facility] [address] - [address] [address]  [address] [address] [place] [address]     Phone:  [phone]     albuterol 108 (90 BASE) MCG/ACT Inhaler    calcium carbonate 1250 MG tablet    FLUoxetine 40 MG capsule    fluticasone-salmeterol 500-50 MCG/DOSE diskus inhaler    furosemide 20 MG tablet    labetalol 100 MG tablet    losartan 100 MG tablet    magnesium oxide 400 MG tablet    rOPINIRole 1 MG tablet    tiotropium 18 MCG capsule    traZODone 50 MG tablet         Some of these will need a paper prescription and others can be bought over the counter.  Ask your nurse if you have questions.     Bring a paper prescription for each of these medications     gabapentin 300 MG capsule          Primary Care Provider Office Phone # Fax #     Min Toledo PA-C [phone]       [address] [facility] [address]        Goals        General    I will use the walker at all times or a cane in tight spots to prevent further falls. (pt-stated)     Notes - Note created  12/9/2015  8:48 AM by Sonny Jacobs RN    As of today's date 12/9/2015 goal is met at 0 - 25%.   Goal Status:  Active          Equal Access to Services     [facility]: Hadii aad ku hadasho Soomaali, waaxda luqadaha, qaybta kaalmada adeegyada, waxay idiin hayaan adeeg kharash la'aan . So [facility] [phone].    ATENCIÓN: Si habla español, tiene a cook disposición servicios gratuitos de asistencia lingüística. Llame al [phone].    We comply with applicable federal civil rights laws and [place] laws. We do not discriminate on the basis of race, color, national origin, age, disability, sex, sexual orientation, or gender identity.            Thank you!     Thank you for choosing [facility]  for your care. Our goal is always to provide you with excellent care. Hearing back from our patients is one way we can continue to improve our services. Please take a few minutes to complete the written survey that you may receive in the mail after your visit with us. Thank you!             Your Updated Medication List - Protect others around you: Learn how to safely use, store and throw away your medicines at www.disposemymeds.org.          This list is accurate as of 2/23/18  5:23 PM.  Always use your most recent med list.                   Brand Name Dispense Instructions for use Diagnosis    acamprosate 333 MG EC tablet    CAMPRAL    180 tablet    Take 2 tablets (666 mg) by mouth 3 times daily    Alcohol abuse       albuterol 108 (90 BASE) MCG/ACT Inhaler    VENTOLIN HFA    3 Inhaler    Inhale  into the lungs. INHALE 2 PUFFS FOUR TIMES DAILY AS NEEDED    Chronic bronchitis, unspecified chronic bronchitis type (H)       alum & mag hydroxide-simethicone 400-400-40  MG/5ML Susp suspension    MYLANTA ES/MAALOX  ES    355 mL    Take 15-30 mLs by mouth every 4 hours as needed for indigestion    Gastroesophageal reflux disease without esophagitis       aspirin 81 MG EC tablet     30 tablet    Take 1 tablet (81 mg) by mouth daily    Hypertension goal BP (blood pressure) < 140/90       azithromycin 250 MG tablet    ZITHROMAX    6 tablet    Two tablets first day, then one tablet daily for four days.    COPD exacerbation (H)       benzonatate 200 MG capsule    TESSALON    21 capsule    Take 1 capsule (200 mg) by mouth 3 times daily as needed for cough (Keep out of reach of children)    Cough       calcium carbonate 1250 MG tablet    OS-JENNIFER 500 mg [place]. Ca    180 tablet    Take 1 tablet (1,250 mg) by mouth 2 times daily    Alcohol dependence with withdrawal with complication (H)       FLUoxetine 40 MG capsule    PROzac    60 capsule    Take 1 capsule (40 mg) by mouth 2 times daily    Major depressive disorder, recurrent episode, mild (H)       fluticasone 50 MCG/ACT spray    FLONASE    3 g    Spray 1-2 sprays into both nostrils daily    Major depressive disorder, recurrent episode, mild (H)       fluticasone-salmeterol 500-50 MCG/DOSE diskus inhaler    ADVAIR DISKUS    2 Inhaler    Inhale 1 puff into the lungs every 12 hours    Chronic bronchitis, unspecified chronic bronchitis type (H)       folic acid 1 MG tablet    FOLVITE    30 tablet    Take 1 tablet (1 mg) by mouth daily    Alcohol dependence with uncomplicated withdrawal (H)       furosemide 20 MG tablet    LASIX    90 tablet    Take 1 tablet (20 mg) by mouth daily    Essential hypertension with goal blood pressure less than 140/90       gabapentin 300 MG capsule    NEURONTIN    90 capsule    Take 1 capsule by mouth in the morning, 1 capsule in the afternoon, and 2 capsules at bedtime Take 1 capsule by mouth in the morning, 1 capsule in the afternoon, and 2 capsules at bedtime    Major depressive disorder, recurrent episode, mild  (H), Sacroiliitis (H)       hydrOXYzine 50 MG tablet    ATARAX    30 tablet    Take 1 tablet (50 mg) by mouth every 6 hours as needed for anxiety or other (adjuvant pain)    Sacroiliitis (H)       ipratropium - albuterol 0.5 mg/2.5 mg/3 mL 0.5-2.5 (3) MG/3ML neb solution    DUONEB    360 mL    Take 1 vial (3 mLs) by nebulization 4 times daily as needed for wheezing    Chronic bronchitis, unspecified chronic bronchitis type (H)       labetalol 100 MG tablet    NORMODYNE    180 tablet    Take 1 tablet (100 mg) by mouth 2 times daily    Hypertension goal BP (blood pressure) < 140/90       losartan 100 MG tablet    COZAAR    30 tablet    Take 0.5 tablets (50 mg) by mouth daily    Hypertension goal BP (blood pressure) < 140/90       magnesium oxide 400 MG tablet    MAG-OX    60 tablet    Take 1 tablet (400 mg) by mouth 2 times daily    Alcohol dependence with withdrawal with complication (H)       menthol 5 % Ptch    ICY HOT    30 patch    Apply 1 patch topically every 8 hours as needed for muscle soreness    Muscle soreness       methocarbamol 500 MG tablet    ROBAXIN    30 tablet    Take 2 tablets (1,000 mg) by mouth 3 times daily as needed for muscle spasms    Cervicalgia       methylPREDNISolone 4 MG tablet    MEDROL DOSEPAK    21 tablet    Follow package instructions    Cervicalgia       multivitamin, therapeutic with minerals Tabs tablet     30 each    Take 1 tablet by mouth daily    Alcohol dependence with uncomplicated withdrawal (H)       nicotine 14 MG/24HR 24 hr patch    NICODERM CQ    30 patch    Place 1 patch onto the skin daily    Tobacco abuse       omeprazole 20 MG CR capsule    priLOSEC    60 capsule    Take 1 capsule (20 mg) by mouth 2 times daily    Gastroesophageal reflux disease without esophagitis       order for DME     1 each    Equipment being ordered: Nebulizer    Chronic bronchitis, unspecified chronic bronchitis type (H)       predniSONE 20 MG tablet    DELTASONE    10 tablet    Take 2 tablets  (40 mg) by mouth daily for 5 days    COPD exacerbation (H)       rOPINIRole 1 MG tablet    REQUIP    90 tablet    Take 1 tablet (1 mg) by mouth 3 times daily    RLS (restless legs syndrome)       tiotropium 18 MCG capsule    SPIRIVA HANDIHALER    30 capsule    Inhale contents of one capsule daily.    Chronic bronchitis, unspecified chronic bronchitis type (H)       traZODone 50 MG tablet    DESYREL    30 tablet    Take 1 tablet (50 mg) by mouth At Bedtime    Major depressive disorder, recurrent episode, mild (H)       triamcinolone 0.1 % cream    KENALOG    80 g    APPLY SPARINGLY TO AFFECTED AREA(S) THREE TIMES A DAY AS NEEDED    Other atopic dermatitis

## 2018-02-26 ENCOUNTER — RADIANT APPOINTMENT (OUTPATIENT)
Dept: CT IMAGING | Facility: CLINIC | Age: 53
End: 2018-02-26
Attending: PHYSICIAN ASSISTANT
Payer: COMMERCIAL

## 2018-02-26 ENCOUNTER — TELEPHONE (OUTPATIENT)
Dept: ADDICTION MEDICINE | Facility: CLINIC | Age: 53
End: 2018-02-26

## 2018-02-26 DIAGNOSIS — R51.9 NONINTRACTABLE EPISODIC HEADACHE, UNSPECIFIED HEADACHE TYPE: ICD-10-CM

## 2018-02-26 PROCEDURE — 70450 CT HEAD/BRAIN W/O DYE: CPT | Mod: TC

## 2018-02-26 NOTE — TELEPHONE ENCOUNTER
Writer attempted to reach pt; went straight to voicemail. LVM requesting a call back for an appt. Two more attempts will be made.     Verenice Diehl

## 2018-03-01 NOTE — TELEPHONE ENCOUNTER
Second attempt to reach pt; no answer. LVM requesting a call back for an appt. A final attempt will be made.     Verenice Diehl

## 2018-03-05 NOTE — TELEPHONE ENCOUNTER
Third and final attempt to reach pt; no answer. LVM requesting a call back for an appt.   Routing encounter to referring provider. Please inform pt that we tried to reach her to get her scheduled with an addiction medicine provider. Please have pt call us back if she is interested in being seen at [facility]. [phone]. Writer is closing this encounter, no further action needed.     Thank you,  Verenice Diehl     [facility]

## 2018-03-14 ENCOUNTER — CARE COORDINATION (OUTPATIENT)
Dept: CARE COORDINATION | Facility: CLINIC | Age: 53
End: 2018-03-14

## 2018-03-14 RX ORDER — POTASSIUM CHLORIDE 1500 MG/1
20 TABLET, EXTENDED RELEASE ORAL 2 TIMES DAILY
Qty: 180 TABLET | Refills: 1 | Status: SHIPPED | OUTPATIENT
Start: 2018-03-14 | End: 2018-04-12

## 2018-03-14 NOTE — PROGRESS NOTES
Clinic Care Coordination Contact 3/14/18  Care Team Conversations-SW      Chart review performed. [facility] will no longer follow this pt as I have provided her with my contact information for her to call if she has any questions. If needs arise, please re-refer this pt to care coordination.    Hanna Oneil, [facility]  Care Coordinator Social Work    [facility] [place]  [phone]  3/14/2018 8:23 AM

## 2018-03-30 ENCOUNTER — OFFICE VISIT (OUTPATIENT)
Dept: FAMILY MEDICINE | Facility: CLINIC | Age: 53
End: 2018-03-30
Payer: COMMERCIAL

## 2018-03-30 ENCOUNTER — TELEPHONE (OUTPATIENT)
Dept: FAMILY MEDICINE | Facility: CLINIC | Age: 53
End: 2018-03-30

## 2018-03-30 VITALS
TEMPERATURE: 99.5 F | OXYGEN SATURATION: 95 % | HEART RATE: 127 BPM | DIASTOLIC BLOOD PRESSURE: 106 MMHG | BODY MASS INDEX: 30.08 KG/M2 | SYSTOLIC BLOOD PRESSURE: 210 MMHG | WEIGHT: 159.2 LBS

## 2018-03-30 DIAGNOSIS — R06.02 SOB (SHORTNESS OF BREATH): Primary | ICD-10-CM

## 2018-03-30 DIAGNOSIS — I16.0 HYPERTENSIVE URGENCY, MALIGNANT: ICD-10-CM

## 2018-03-30 DIAGNOSIS — F10.21 ALCOHOL INTOXICATION IN RELAPSED ALCOHOLIC (H): ICD-10-CM

## 2018-03-30 PROCEDURE — 99214 OFFICE O/P EST MOD 30 MIN: CPT | Performed by: FAMILY MEDICINE

## 2018-03-30 NOTE — MR AVS SNAPSHOT
After Visit Summary   3/30/2018    Inga Ledesma    MRN: [id number]           Patient Information     Date Of Birth          1965        Visit Information        Provider Department      3/30/2018 1:00 PM Sherrill Barragan MD [facility]        Today's Diagnoses     SOB (shortness of breath)    -  1    Alcohol intoxication in relapsed alcoholic (H)        Hypertensive urgency, malignant          Care Instructions      Discharge Instructions for Malignant Hypertension  Malignant hypertension is a medical emergency. It means you have dangerously high blood pressure (a top number usually higher than 180 or a bottom number higher than 120). This elevated blood pressure could result in damage to your heart, kidneys, brain, eyes, blood vessels, and other organs. Here s what you can do to help manage this condition.  Taking your blood pressure    Learn to take your own blood pressure.    Keep a record of your blood pressure results. Ask your doctor which readings mean that you need medical attention.    Have your blood pressure checked by your healthcare provider regularly.    If you have a blood pressure measure at home that is higher than 180/110 wait 2 minutes and take it again. If the second reading shows either number at or above the first reading, OR if you have any symptoms shown at the end of this page, seek immediate emergency medical treatment.  Taking medicines    Take your blood pressure medicine exactly as your doctor directed.     Learn the possible side effects of any prescribed medicines.    Tell your doctor about any medicine you are taking. Some medicines can cause malignant hypertension.    Avoid medicines that contain heart stimulants, including over-the-counter drugs. Check for warnings about high blood pressure on the label.    Check with your doctor before taking a decongestant. Some can worsen high blood pressure.  Lifestyle changes    Limit your activity until  your blood pressure is controlled.    Cut back on salt.    Limit canned, dried, packaged, and fast foods.    Don t add salt to your food at the table.    Season foods with herbs instead of salt when you cook.    Request foods at restaurants with no added salt.    Maintain a healthy weight. Get help to lose any extra pounds.    Begin an exercise program. Ask your doctor how to get started. You can benefit from simple activities like walking, gardening, swimming, or dancing.    Don t drink more than 1 alcoholic drink a day for women and 2 a day for men.    Limit drinks that contain caffeine (coffee, black or green tea, cola) to 2 per day.    Never take stimulants such as amphetamines or cocaine; these drugs can be deadly for someone with hypertension.    Control your stress. Learn stress-management techniques.  Follow-up care    Make a follow-up appointment with your doctor regularly.    Visit your doctor for blood pressure checks, dietary advice, and medicine adjustment.  Call 911  Call 911 if you have any of these:    Chest pain or shortness of breath    Seizure (with no history of seizure disorder)  When to seek medical care  Call your doctor right away if you have any of the following:    Moderate to severe headache    Weakness in the muscles of your face, arms, or legs    Trouble speaking    Extreme drowsiness or confusion    Restlessness, anxiety    Fainting or dizziness    Pulsating or rushing sound in your ears    Unexplained nosebleed    Weakness, tingling, or numbness of your face, arms, or legs    Change in vision (including blurred vision)    Unusual tiredness    Nausea or vomiting    Decreased urine output    Blood pressure reading measured at home that is higher than 180/110   Date Last Reviewed: 4/27/2016 2000-2017 [employer]. [address] [place] [address], [place] [address]. All rights reserved. This information is not intended as a substitute for professional medical care. Always follow  your healthcare professional's instructions.                Follow-ups after your visit        Follow-up notes from your care team     Return for Go to the ER for further evaluation.      Who to contact     Normal or non-critical lab and imaging results will be communicated to you by [employer], letter or phone within 4 business days after the clinic has received the results. If you do not hear from us within 7 days, please contact the clinic through [employer] or phone. If you have a critical or abnormal lab result, we will notify you by phone as soon as possible.  Submit refill requests through [employer] or call your pharmacy and they will forward the refill request to us. Please allow 3 business days for your refill to be completed.          If you need to speak with a  for additional information , please call: [phone]             Additional Information About Your Visit        [employer] Information     [employer] gives you secure access to your electronic health record. If you see a primary care provider, you can also send messages to your care team and make appointments. If you have questions, please call your primary care clinic.  If you do not have a primary care provider, please call [phone] and they will assist you.        Care EveryWhere ID     This is your Care EveryWhere ID. This could be used by other organizations to access your [facility] medical records  [id number]        Your Vitals Were     Pulse Temperature Last Period Pulse Oximetry BMI (Body Mass Index)       127 99.5  F (37.5  C) (Tympanic) 06/02/2010 95% 30.08 kg/m2        Blood Pressure from Last 3 Encounters:   03/30/18 (!) 210/106   02/20/18 163/90   01/11/18 140/86    Weight from Last 3 Encounters:   03/30/18 159 lb 3.2 oz (72.2 kg)   02/20/18 157 lb 6 oz (71.4 kg)   01/11/18 169 lb (76.7 kg)              Today, you had the following     No orders found for display       Primary Care Provider Office Phone # Fax #    Min Dumont  BENI [place] [phone]       [id number] [facility] [id number]        Goals        General    I will use the walker at all times or a cane in tight spots to prevent further falls. (pt-stated)     Notes - Note created  12/9/2015  8:48 AM by Sonny Jacobs RN    As of today's date 12/9/2015 goal is met at 0 - 25%.   Goal Status:  Active          Equal Access to Services     [facility]: Hadii aad ku hadasho Soomaali, waaxda luqadaha, qaybta kaalmada adeegyada, waxay idiin hayaan adeeg kharash la'aan . So [facility] [phone].    ATENCIÓN: Si habla español, tiene a cook disposición servicios gratuitos de asistencia lingüística. [facility] [phone].    We comply with applicable federal civil rights laws and [place] laws. We do not discriminate on the basis of race, color, national origin, age, disability, sex, sexual orientation, or gender identity.            Thank you!     Thank you for choosing [facility]  for your care. Our goal is always to provide you with excellent care. Hearing back from our patients is one way we can continue to improve our services. Please take a few minutes to complete the written survey that you may receive in the mail after your visit with us. Thank you!             Your Updated Medication List - Protect others around you: Learn how to safely use, store and throw away your medicines at www.disposemymeds.org.          This list is accurate as of 3/30/18  1:20 PM.  Always use your most recent med list.                   Brand Name Dispense Instructions for use Diagnosis    acamprosate 333 MG EC tablet    CAMPRAL    180 tablet    Take 2 tablets (666 mg) by mouth 3 times daily    Alcohol abuse       albuterol 108 (90 BASE) MCG/ACT Inhaler    VENTOLIN HFA    3 Inhaler    Inhale  into the lungs. INHALE 2 PUFFS FOUR TIMES DAILY AS NEEDED    Chronic bronchitis, unspecified chronic bronchitis type (H)       alum & mag hydroxide-simethicone 400-400-40 MG/5ML Susp  suspension    MYLANTA ES/MAALOX  ES    355 mL    Take 15-30 mLs by mouth every 4 hours as needed for indigestion    Gastroesophageal reflux disease without esophagitis       aspirin 81 MG EC tablet     30 tablet    Take 1 tablet (81 mg) by mouth daily    Hypertension goal BP (blood pressure) < 140/90       azithromycin 250 MG tablet    ZITHROMAX    6 tablet    Two tablets first day, then one tablet daily for four days.    COPD exacerbation (H)       benzonatate 200 MG capsule    TESSALON    21 capsule    Take 1 capsule (200 mg) by mouth 3 times daily as needed for cough (Keep out of reach of children)    Cough       calcium carbonate 1250 MG tablet    OS-JENNIFER 500 mg [place]. [place]    180 tablet    Take 1 tablet (1,250 mg) by mouth 2 times daily    Alcohol dependence with withdrawal with complication (H)       FLUoxetine 40 MG capsule    PROzac    60 capsule    Take 1 capsule (40 mg) by mouth 2 times daily    Major depressive disorder, recurrent episode, mild (H)       fluticasone 50 MCG/ACT spray    FLONASE    3 g    Spray 1-2 sprays into both nostrils daily    Major depressive disorder, recurrent episode, mild (H)       fluticasone-salmeterol 500-50 MCG/DOSE diskus inhaler    ADVAIR DISKUS    2 Inhaler    Inhale 1 puff into the lungs every 12 hours    Chronic bronchitis, unspecified chronic bronchitis type (H)       folic acid 1 MG tablet    FOLVITE    30 tablet    Take 1 tablet (1 mg) by mouth daily    Alcohol dependence with uncomplicated withdrawal (H)       furosemide 20 MG tablet    LASIX    90 tablet    Take 1 tablet (20 mg) by mouth daily    Essential hypertension with goal blood pressure less than 140/90       gabapentin 300 MG capsule    NEURONTIN    90 capsule    Take 1 capsule by mouth in the morning, 1 capsule in the afternoon, and 2 capsules at bedtime Take 1 capsule by mouth in the morning, 1 capsule in the afternoon, and 2 capsules at bedtime    Major depressive disorder, recurrent episode, mild (H),  Sacroiliitis (H)       hydrOXYzine 50 MG tablet    ATARAX    30 tablet    Take 1 tablet (50 mg) by mouth every 6 hours as needed for anxiety or other (adjuvant pain)    Sacroiliitis (H)       ipratropium - albuterol 0.5 mg/2.5 mg/3 mL 0.5-2.5 (3) MG/3ML neb solution    DUONEB    360 mL    Take 1 vial (3 mLs) by nebulization 4 times daily as needed for wheezing    Chronic bronchitis, unspecified chronic bronchitis type (H)       labetalol 100 MG tablet    NORMODYNE    180 tablet    Take 1 tablet (100 mg) by mouth 2 times daily    Hypertension goal BP (blood pressure) < 140/90       losartan 100 MG tablet    COZAAR    30 tablet    Take 0.5 tablets (50 mg) by mouth daily    Hypertension goal BP (blood pressure) < 140/90       magnesium oxide 400 MG tablet    MAG-OX    60 tablet    Take 1 tablet (400 mg) by mouth 2 times daily    Alcohol dependence with withdrawal with complication (H)       menthol 5 % Ptch    ICY HOT    30 patch    Apply 1 patch topically every 8 hours as needed for muscle soreness    Muscle soreness       methocarbamol 500 MG tablet    ROBAXIN    30 tablet    Take 2 tablets (1,000 mg) by mouth 3 times daily as needed for muscle spasms    Cervicalgia       methylPREDNISolone 4 MG tablet    MEDROL DOSEPAK    21 tablet    Follow package instructions    Cervicalgia       multivitamin, therapeutic with minerals Tabs tablet     30 each    Take 1 tablet by mouth daily    Alcohol dependence with uncomplicated withdrawal (H)       nicotine 14 MG/24HR 24 hr patch    NICODERM CQ    30 patch    Place 1 patch onto the skin daily    Tobacco abuse       omeprazole 20 MG CR capsule    priLOSEC    60 capsule    Take 1 capsule (20 mg) by mouth 2 times daily    Gastroesophageal reflux disease without esophagitis       order for DME     1 each    Equipment being ordered: Nebulizer    Chronic bronchitis, unspecified chronic bronchitis type (H)       potassium chloride SA 20 MEQ CR tablet    KLOR-CON    180 tablet    Take  1 tablet (20 mEq) by mouth 2 times daily    Hypokalemia       rOPINIRole 1 MG tablet    REQUIP    90 tablet    Take 1 tablet (1 mg) by mouth 3 times daily    RLS (restless legs syndrome)       tiotropium 18 MCG capsule    SPIRIVA HANDIHALER    30 capsule    Inhale contents of one capsule daily.    Chronic bronchitis, unspecified chronic bronchitis type (H)       traZODone 50 MG tablet    DESYREL    30 tablet    Take 1 tablet (50 mg) by mouth At Bedtime    Major depressive disorder, recurrent episode, mild (H)       triamcinolone 0.1 % cream    KENALOG    80 g    APPLY SPARINGLY TO AFFECTED AREA(S) THREE TIMES A DAY AS NEEDED    Other atopic dermatitis

## 2018-03-30 NOTE — NURSING NOTE
"Chief Complaint   Patient presents with     Shortness of Breath     having withdrawls of alcohol        Initial BP (!) 210/106  Pulse 127  Temp 99.5  F (37.5  C) (Tympanic)  Wt 159 lb 3.2 oz (72.2 kg)  LMP 06/02/2010  SpO2 95%  BMI 30.08 kg/m2 Estimated body mass index is 30.08 kg/(m^2) as calculated from the following:    Height as of 2/20/18: 5' 1\" (1.549 m).    Weight as of this encounter: 159 lb 3.2 oz (72.2 kg).  Medication Reconciliation: complete       Marcy No CMA      "

## 2018-03-30 NOTE — PROGRESS NOTES
SUBJECTIVE:   Inga Ledesma is a 52 year old female who presents to clinic today for the following health issues:      Patient is here today because she is having SOB and would like a note to return back to work next week on Monday.     Patient is known alcoholic.   States that she completed Inpatient Rehab treatment last year in Nov 2017- has been receiving outpatient treatment 4 x/week.   Reports that she relapsed last night into the early hours of the morning and drank a whole bottle of vodka.   Has not been taking her medications.   Blood pressure is severely high today-210/106.   Reports feeling short of breath. Denies having any chest pain.     Reports that she has also had previous inpatient alcohol rehab treatment in 2016 and relapsed.     Desperately requesting a return to work note for next week.   Concerned that she might lose her job.     Problem list and histories reviewed & adjusted, as indicated.  Additional history: as documented    Patient Active Problem List   Diagnosis     RLS (restless legs syndrome)     GERD (gastroesophageal reflux disease)     Iron deficiency anemia     Hyperlipidemia with target LDL less than 160     Adult BMI 30+     Sacroiliitis (H)     Tobacco abuse     Vitamin D deficiency     Menorrhagia     Advanced directives, counseling/discussion     Vitamin D deficiency     Edema of both legs     Hypertension goal BP (blood pressure) < 140/90     Chronic bronchitis, unspecified chronic bronchitis type (H)     Health Care Home     Alcohol dependence with withdrawal with complication (H)     Major depressive disorder, recurrent episode, mild (H)     (HFpEF) heart failure with preserved ejection fraction (H)     Psychophysiological insomnia     Chemical dependency (H)     Alcohol withdrawal (H)     Past Surgical History:   Procedure Laterality Date     COLONOSCOPY       EYE SURGERY       HERNIA REPAIR, INGUINAL RT/LT  2007     HYSTERECTOMY  2010     HYSTERECTOMY TOTAL ABDOMINAL   7/28/10    Bilateral salpingectomy.  ovaries conserved.     LASER TX, CERVICAL  1987     LYMPH NODE BIOPSY  2007    inguinal     LYSIS OF LABIAL LESION(S)  1985, 1987       Social History   Substance Use Topics     Smoking status: Current Every Day Smoker     Packs/day: 0.50     Years: 34.00     Types: Cigarettes     Start date: 11/1/1979     Last attempt to quit: 10/3/2012     Smokeless tobacco: Never Used      Comment: 5 cigarettes daily     Alcohol use Yes      Comment: Drinking mouthwash.     Family History   Problem Relation Age of Onset     Depression/Anxiety Mother      Asthma Mother      CEREBROVASCULAR DISEASE Father      Hypertension Father      Lung Cancer Father      Breast Cancer Paternal Aunt      Other Cancer Other      Depression Other      Anxiety Disorder Other      MENTAL ILLNESS Other      Substance Abuse Other      Asthma Other      Obesity Sister      Obesity Son          Current Outpatient Prescriptions   Medication Sig Dispense Refill     albuterol (VENTOLIN HFA) 108 (90 BASE) MCG/ACT Inhaler Inhale  into the lungs. INHALE 2 PUFFS FOUR TIMES DAILY AS NEEDED 3 Inhaler 0     fluticasone-salmeterol (ADVAIR DISKUS) 500-50 MCG/DOSE diskus inhaler Inhale 1 puff into the lungs every 12 hours 2 Inhaler 1     losartan (COZAAR) 100 MG tablet Take 0.5 tablets (50 mg) by mouth daily 30 tablet 1     aspirin 81 MG EC tablet Take 1 tablet (81 mg) by mouth daily 30 tablet 1     fluticasone (FLONASE) 50 MCG/ACT spray Spray 1-2 sprays into both nostrils daily 3 g 1     ipratropium - albuterol 0.5 mg/2.5 mg/3 mL (DUONEB) 0.5-2.5 (3) MG/3ML neb solution Take 1 vial (3 mLs) by nebulization 4 times daily as needed for wheezing 360 mL 1     potassium chloride SA (KLOR-CON) 20 MEQ CR tablet Take 1 tablet (20 mEq) by mouth 2 times daily (Patient not taking: Reported on 3/30/2018) 180 tablet 1     labetalol (NORMODYNE) 100 MG tablet Take 1 tablet (100 mg) by mouth 2 times daily (Patient not taking: Reported on  3/30/2018) 180 tablet 0     magnesium oxide (MAG-OX) 400 MG tablet Take 1 tablet (400 mg) by mouth 2 times daily (Patient not taking: Reported on 3/30/2018) 60 tablet 0     furosemide (LASIX) 20 MG tablet Take 1 tablet (20 mg) by mouth daily (Patient not taking: Reported on 3/30/2018) 90 tablet 0     FLUoxetine (PROZAC) 40 MG capsule Take 1 capsule (40 mg) by mouth 2 times daily (Patient not taking: Reported on 3/30/2018) 60 capsule 0     calcium carbonate (OS-JENNIFER 500 MG [place]. [place]) 1250 MG tablet Take 1 tablet (1,250 mg) by mouth 2 times daily (Patient not taking: Reported on 3/30/2018) 180 tablet 0     gabapentin (NEURONTIN) 300 MG capsule Take 1 capsule by mouth in the morning, 1 capsule in the afternoon, and 2 capsules at bedtime Take 1 capsule by mouth in the morning, 1 capsule in the afternoon, and 2 capsules at bedtime (Patient not taking: Reported on 3/30/2018) 90 capsule 1     rOPINIRole (REQUIP) 1 MG tablet Take 1 tablet (1 mg) by mouth 3 times daily (Patient not taking: Reported on 3/30/2018) 90 tablet 1     tiotropium (SPIRIVA HANDIHALER) 18 MCG capsule Inhale contents of one capsule daily. (Patient not taking: Reported on 3/30/2018) 30 capsule 1     traZODone (DESYREL) 50 MG tablet Take 1 tablet (50 mg) by mouth At Bedtime (Patient not taking: Reported on 3/30/2018) 30 tablet 1     azithromycin (ZITHROMAX) 250 MG tablet Two tablets first day, then one tablet daily for four days. (Patient not taking: Reported on 3/30/2018) 6 tablet 0     benzonatate (TESSALON) 200 MG capsule Take 1 capsule (200 mg) by mouth 3 times daily as needed for cough (Keep out of reach of children) (Patient not taking: Reported on 3/30/2018) 21 capsule 0     triamcinolone (KENALOG) 0.1 % cream APPLY SPARINGLY TO AFFECTED AREA(S) THREE TIMES A DAY AS NEEDED (Patient not taking: Reported on 3/30/2018) 80 g 0     folic acid (FOLVITE) 1 MG tablet Take 1 tablet (1 mg) by mouth daily (Patient not taking: Reported on 3/30/2018) 30 tablet 0      alum & mag hydroxide-simethicone (MYLANTA ES/MAALOX  ES) 400-400-40 MG/5ML SUSP suspension Take 15-30 mLs by mouth every 4 hours as needed for indigestion (Patient not taking: Reported on 3/30/2018) 355 mL 0     methylPREDNISolone (MEDROL DOSEPAK) 4 MG tablet Follow package instructions (Patient not taking: Reported on 3/30/2018) 21 tablet 0     methocarbamol (ROBAXIN) 500 MG tablet Take 2 tablets (1,000 mg) by mouth 3 times daily as needed for muscle spasms (Patient not taking: Reported on 3/30/2018) 30 tablet 1     acamprosate (CAMPRAL) 333 MG EC tablet Take 2 tablets (666 mg) by mouth 3 times daily (Patient not taking: Reported on 3/30/2018) 180 tablet 2     menthol (ICY HOT) 5 % PTCH Apply 1 patch topically every 8 hours as needed for muscle soreness (Patient not taking: Reported on 3/30/2018) 30 patch 3     hydrOXYzine (ATARAX) 50 MG tablet Take 1 tablet (50 mg) by mouth every 6 hours as needed for anxiety or other (adjuvant pain) (Patient not taking: Reported on 3/30/2018) 30 tablet 1     multivitamin, therapeutic with minerals (THERA-VIT-M) TABS tablet Take 1 tablet by mouth daily (Patient not taking: Reported on 3/30/2018) 30 each 1     nicotine (NICODERM CQ) 14 MG/24HR 24 hr patch Place 1 patch onto the skin daily (Patient not taking: Reported on 3/30/2018) 30 patch 1     omeprazole (PRILOSEC) 20 MG CR capsule Take 1 capsule (20 mg) by mouth 2 times daily (Patient not taking: Reported on 3/30/2018) 60 capsule 1     order for DME Equipment being ordered: Nebulizer (Patient not taking: Reported on 3/30/2018) 1 each 0     Allergies   Allergen Reactions     Codeine Nausea     Reglan [Metoclopramide Hcl] Other (See Comments)     Body tenses up       Reviewed and updated as needed this visit by clinical staff  Tobacco  Allergies  Meds       Reviewed and updated as needed this visit by Provider         ROS:  Constitutional, HEENT, cardiovascular, pulmonary, gi and gu systems are negative, except as  otherwise noted.    OBJECTIVE:     BP (!) 210/106  Pulse 127  Temp 99.5  F (37.5  C) (Tympanic)  Wt 159 lb 3.2 oz (72.2 kg)  LMP 06/02/2010  SpO2 95%  BMI 30.08 kg/m2  Body mass index is 30.08 kg/(m^2).  GENERAL: healthy, alert and no distress. Alcoholic breath.  PSYCH: Flat affect. mentation appears normal, appears anxious and angry.   Declined further Resp/cardio exam    Diagnostic Test Results:  none     ASSESSMENT/PLAN:     Inga was seen today for shortness of breath.    Diagnoses and all orders for this visit:    SOB (shortness of breath)    Hypertensive urgency, malignant    Alcohol intoxication in relapsed alcoholic (H)  Last drink early this morning  At risk for withdrawals.   Previous Inpatient rehab treatment with multiple failures/relapses.   Currently going for outpatient treatment 4x/week      Recommended to go to the ER for further evaluation.   No work note clearance given.   Patient stormed out of the office, angry and upset that she did not get the work note clearing her to return to work next week.      Sherrill Barragan MD  [facility]

## 2018-03-30 NOTE — PATIENT INSTRUCTIONS
Discharge Instructions for Malignant Hypertension  Malignant hypertension is a medical emergency. It means you have dangerously high blood pressure (a top number usually higher than 180 or a bottom number higher than 120). This elevated blood pressure could result in damage to your heart, kidneys, brain, eyes, blood vessels, and other organs. Here s what you can do to help manage this condition.  Taking your blood pressure    Learn to take your own blood pressure.    Keep a record of your blood pressure results. Ask your doctor which readings mean that you need medical attention.    Have your blood pressure checked by your healthcare provider regularly.    If you have a blood pressure measure at home that is higher than 180/110 wait 2 minutes and take it again. If the second reading shows either number at or above the first reading, OR if you have any symptoms shown at the end of this page, seek immediate emergency medical treatment.  Taking medicines    Take your blood pressure medicine exactly as your doctor directed.     Learn the possible side effects of any prescribed medicines.    Tell your doctor about any medicine you are taking. Some medicines can cause malignant hypertension.    Avoid medicines that contain heart stimulants, including over-the-counter drugs. Check for warnings about high blood pressure on the label.    Check with your doctor before taking a decongestant. Some can worsen high blood pressure.  Lifestyle changes    Limit your activity until your blood pressure is controlled.    Cut back on salt.    Limit canned, dried, packaged, and fast foods.    Don t add salt to your food at the table.    Season foods with herbs instead of salt when you cook.    Request foods at restaurants with no added salt.    Maintain a healthy weight. Get help to lose any extra pounds.    Begin an exercise program. Ask your doctor how to get started. You can benefit from simple activities like walking, gardening,  swimming, or dancing.    Don t drink more than 1 alcoholic drink a day for women and 2 a day for men.    Limit drinks that contain caffeine (coffee, black or green tea, cola) to 2 per day.    Never take stimulants such as amphetamines or cocaine; these drugs can be deadly for someone with hypertension.    Control your stress. Learn stress-management techniques.  Follow-up care    Make a follow-up appointment with your doctor regularly.    Visit your doctor for blood pressure checks, dietary advice, and medicine adjustment.  Call 911  Call 911 if you have any of these:    Chest pain or shortness of breath    Seizure (with no history of seizure disorder)  When to seek medical care  Call your doctor right away if you have any of the following:    Moderate to severe headache    Weakness in the muscles of your face, arms, or legs    Trouble speaking    Extreme drowsiness or confusion    Restlessness, anxiety    Fainting or dizziness    Pulsating or rushing sound in your ears    Unexplained nosebleed    Weakness, tingling, or numbness of your face, arms, or legs    Change in vision (including blurred vision)    Unusual tiredness    Nausea or vomiting    Decreased urine output    Blood pressure reading measured at home that is higher than 180/110   Date Last Reviewed: 4/27/2016 2000-2017 [employer]. [address] [place] [address], [place] [address]. All rights reserved. This information is not intended as a substitute for professional medical care. Always follow your healthcare professional's instructions.

## 2018-04-05 ENCOUNTER — APPOINTMENT (OUTPATIENT)
Dept: GENERAL RADIOLOGY | Facility: CLINIC | Age: 53
DRG: 897 | End: 2018-04-05
Attending: FAMILY MEDICINE

## 2018-04-05 ENCOUNTER — HOSPITAL ENCOUNTER (INPATIENT)
Facility: CLINIC | Age: 53
LOS: 4 days | Discharge: HOME | DRG: 897 | End: 2018-04-09
Attending: FAMILY MEDICINE | Admitting: PSYCHIATRY & NEUROLOGY

## 2018-04-05 DIAGNOSIS — F10.239 ALCOHOL DEPENDENCE WITH WITHDRAWAL WITH COMPLICATION (H): ICD-10-CM

## 2018-04-05 DIAGNOSIS — R07.9 CHEST PAIN, UNSPECIFIED TYPE: ICD-10-CM

## 2018-04-05 DIAGNOSIS — I10 HYPERTENSION GOAL BP (BLOOD PRESSURE) < 140/90: ICD-10-CM

## 2018-04-05 PROBLEM — F10.10 ALCOHOL ABUSE: Status: ACTIVE | Noted: 2018-04-05

## 2018-04-05 LAB
ALBUMIN SERPL-MCNC: 3.4 G/DL (ref 3.4–5)
ALP SERPL-CCNC: 109 U/L (ref 40–150)
ALT SERPL W P-5'-P-CCNC: 22 U/L (ref 0–50)
AMPHETAMINES UR QL SCN: NEGATIVE
ANION GAP SERPL CALCULATED.3IONS-SCNC: 14 MMOL/L (ref 3–14)
APTT PPP: 25 SEC (ref 22–37)
AST SERPL W P-5'-P-CCNC: 24 U/L (ref 0–45)
BARBITURATES UR QL: NEGATIVE
BASOPHILS # BLD AUTO: 0 10E9/L (ref 0–0.2)
BASOPHILS NFR BLD AUTO: 0.4 %
BENZODIAZ UR QL: NEGATIVE
BILIRUB SERPL-MCNC: 1.3 MG/DL (ref 0.2–1.3)
BUN SERPL-MCNC: 9 MG/DL (ref 7–30)
CALCIUM SERPL-MCNC: 8.2 MG/DL (ref 8.5–10.1)
CANNABINOIDS UR QL SCN: NEGATIVE
CHLORIDE SERPL-SCNC: 95 MMOL/L (ref 94–109)
CO2 SERPL-SCNC: 26 MMOL/L (ref 20–32)
COCAINE UR QL: NEGATIVE
CREAT SERPL-MCNC: 0.75 MG/DL (ref 0.52–1.04)
DIFFERENTIAL METHOD BLD: ABNORMAL
EOSINOPHIL # BLD AUTO: 0 10E9/L (ref 0–0.7)
EOSINOPHIL NFR BLD AUTO: 0.4 %
ERYTHROCYTE [DISTWIDTH] IN BLOOD BY AUTOMATED COUNT: 15.4 % (ref 10–15)
ETHANOL SERPL-MCNC: <0.01 G/DL
ETHANOL UR QL SCN: NEGATIVE
GFR SERPL CREATININE-BSD FRML MDRD: 81 ML/MIN/1.7M2
GLUCOSE SERPL-MCNC: 96 MG/DL (ref 70–99)
HCT VFR BLD AUTO: 37.1 % (ref 35–47)
HGB BLD-MCNC: 12.7 G/DL (ref 11.7–15.7)
IMM GRANULOCYTES # BLD: 0 10E9/L (ref 0–0.4)
IMM GRANULOCYTES NFR BLD: 0.2 %
INR PPP: 1.02 (ref 0.86–1.14)
LIPASE SERPL-CCNC: 152 U/L (ref 73–393)
LYMPHOCYTES # BLD AUTO: 0.9 10E9/L (ref 0.8–5.3)
LYMPHOCYTES NFR BLD AUTO: 10.4 %
MAGNESIUM SERPL-MCNC: 1.1 MG/DL (ref 1.6–2.3)
MCH RBC QN AUTO: 30.2 PG (ref 26.5–33)
MCHC RBC AUTO-ENTMCNC: 34.2 G/DL (ref 31.5–36.5)
MCV RBC AUTO: 88 FL (ref 78–100)
MONOCYTES # BLD AUTO: 0.5 10E9/L (ref 0–1.3)
MONOCYTES NFR BLD AUTO: 5.6 %
NEUTROPHILS # BLD AUTO: 7 10E9/L (ref 1.6–8.3)
NEUTROPHILS NFR BLD AUTO: 83 %
NRBC # BLD AUTO: 0 10*3/UL
NRBC BLD AUTO-RTO: 0 /100
OPIATES UR QL SCN: NEGATIVE
PLATELET # BLD AUTO: 187 10E9/L (ref 150–450)
POTASSIUM SERPL-SCNC: 3.2 MMOL/L (ref 3.4–5.3)
PROT SERPL-MCNC: 7 G/DL (ref 6.8–8.8)
RBC # BLD AUTO: 4.21 10E12/L (ref 3.8–5.2)
SODIUM SERPL-SCNC: 135 MMOL/L (ref 133–144)
TROPONIN I SERPL-MCNC: <0.015 UG/L (ref 0–0.04)
WBC # BLD AUTO: 8.5 10E9/L (ref 4–11)

## 2018-04-05 PROCEDURE — 25000125 ZZHC RX 250: Performed by: FAMILY MEDICINE

## 2018-04-05 PROCEDURE — 85730 THROMBOPLASTIN TIME PARTIAL: CPT | Performed by: FAMILY MEDICINE

## 2018-04-05 PROCEDURE — 80053 COMPREHEN METABOLIC PANEL: CPT | Performed by: FAMILY MEDICINE

## 2018-04-05 PROCEDURE — 80307 DRUG TEST PRSMV CHEM ANLYZR: CPT | Performed by: FAMILY MEDICINE

## 2018-04-05 PROCEDURE — 85610 PROTHROMBIN TIME: CPT | Performed by: FAMILY MEDICINE

## 2018-04-05 PROCEDURE — 96374 THER/PROPH/DIAG INJ IV PUSH: CPT | Performed by: FAMILY MEDICINE

## 2018-04-05 PROCEDURE — 96361 HYDRATE IV INFUSION ADD-ON: CPT | Performed by: FAMILY MEDICINE

## 2018-04-05 PROCEDURE — HZ2ZZZZ DETOXIFICATION SERVICES FOR SUBSTANCE ABUSE TREATMENT: ICD-10-PCS | Performed by: PSYCHIATRY & NEUROLOGY

## 2018-04-05 PROCEDURE — 83735 ASSAY OF MAGNESIUM: CPT | Performed by: FAMILY MEDICINE

## 2018-04-05 PROCEDURE — 25000128 H RX IP 250 OP 636: Performed by: FAMILY MEDICINE

## 2018-04-05 PROCEDURE — 93010 ELECTROCARDIOGRAM REPORT: CPT | Mod: Z6 | Performed by: FAMILY MEDICINE

## 2018-04-05 PROCEDURE — 25000132 ZZH RX MED GY IP 250 OP 250 PS 637: Performed by: PSYCHIATRY & NEUROLOGY

## 2018-04-05 PROCEDURE — 80320 DRUG SCREEN QUANTALCOHOLS: CPT | Performed by: FAMILY MEDICINE

## 2018-04-05 PROCEDURE — 71045 X-RAY EXAM CHEST 1 VIEW: CPT

## 2018-04-05 PROCEDURE — 25000132 ZZH RX MED GY IP 250 OP 250 PS 637: Performed by: FAMILY MEDICINE

## 2018-04-05 PROCEDURE — 83690 ASSAY OF LIPASE: CPT | Performed by: FAMILY MEDICINE

## 2018-04-05 PROCEDURE — 25000132 ZZH RX MED GY IP 250 OP 250 PS 637: Performed by: NURSE PRACTITIONER

## 2018-04-05 PROCEDURE — 84484 ASSAY OF TROPONIN QUANT: CPT | Performed by: FAMILY MEDICINE

## 2018-04-05 PROCEDURE — 85025 COMPLETE CBC W/AUTO DIFF WBC: CPT | Performed by: FAMILY MEDICINE

## 2018-04-05 PROCEDURE — 99285 EMERGENCY DEPT VISIT HI MDM: CPT | Mod: 25 | Performed by: FAMILY MEDICINE

## 2018-04-05 PROCEDURE — 99291 CRITICAL CARE FIRST HOUR: CPT | Mod: 25 | Performed by: FAMILY MEDICINE

## 2018-04-05 PROCEDURE — 93005 ELECTROCARDIOGRAM TRACING: CPT | Performed by: FAMILY MEDICINE

## 2018-04-05 PROCEDURE — 12800012 ZZH R&B CD MH INTERMEDIATE ADULT

## 2018-04-05 RX ORDER — OLANZAPINE 10 MG/1
10 TABLET ORAL
Status: DISCONTINUED | OUTPATIENT
Start: 2018-04-05 | End: 2018-04-06

## 2018-04-05 RX ORDER — SODIUM CHLORIDE 9 MG/ML
1000 INJECTION, SOLUTION INTRAVENOUS CONTINUOUS
Status: DISCONTINUED | OUTPATIENT
Start: 2018-04-05 | End: 2018-04-05

## 2018-04-05 RX ORDER — ALUMINA, MAGNESIA, AND SIMETHICONE 2400; 2400; 240 MG/30ML; MG/30ML; MG/30ML
30 SUSPENSION ORAL EVERY 4 HOURS PRN
Status: DISCONTINUED | OUTPATIENT
Start: 2018-04-05 | End: 2018-04-09 | Stop reason: HOSPADM

## 2018-04-05 RX ORDER — POTASSIUM CHLORIDE 1500 MG/1
20 TABLET, EXTENDED RELEASE ORAL 2 TIMES DAILY
Status: DISCONTINUED | OUTPATIENT
Start: 2018-04-05 | End: 2018-04-09 | Stop reason: HOSPADM

## 2018-04-05 RX ORDER — FUROSEMIDE 20 MG
20 TABLET ORAL DAILY
Status: DISCONTINUED | OUTPATIENT
Start: 2018-04-06 | End: 2018-04-09 | Stop reason: HOSPADM

## 2018-04-05 RX ORDER — ACETAMINOPHEN 325 MG/1
650 TABLET ORAL EVERY 4 HOURS PRN
Status: DISCONTINUED | OUTPATIENT
Start: 2018-04-05 | End: 2018-04-09 | Stop reason: HOSPADM

## 2018-04-05 RX ORDER — LOSARTAN POTASSIUM 50 MG/1
50 TABLET ORAL DAILY
Status: DISCONTINUED | OUTPATIENT
Start: 2018-04-06 | End: 2018-04-09 | Stop reason: HOSPADM

## 2018-04-05 RX ORDER — CALCIUM CARBONATE 500(1250)
1250 TABLET ORAL 2 TIMES DAILY
Status: DISCONTINUED | OUTPATIENT
Start: 2018-04-05 | End: 2018-04-05

## 2018-04-05 RX ORDER — HYDROXYZINE HYDROCHLORIDE 50 MG/1
50 TABLET, FILM COATED ORAL EVERY 6 HOURS PRN
Status: DISCONTINUED | OUTPATIENT
Start: 2018-04-05 | End: 2018-04-09 | Stop reason: HOSPADM

## 2018-04-05 RX ORDER — MULTIPLE VITAMINS W/ MINERALS TAB 9MG-400MCG
1 TAB ORAL DAILY
Status: DISCONTINUED | OUTPATIENT
Start: 2018-04-06 | End: 2018-04-09 | Stop reason: HOSPADM

## 2018-04-05 RX ORDER — MAGNESIUM OXIDE 400 MG/1
400 TABLET ORAL ONCE
Status: COMPLETED | OUTPATIENT
Start: 2018-04-05 | End: 2018-04-05

## 2018-04-05 RX ORDER — ASPIRIN 81 MG/1
81 TABLET ORAL DAILY
Status: DISCONTINUED | OUTPATIENT
Start: 2018-04-06 | End: 2018-04-09 | Stop reason: HOSPADM

## 2018-04-05 RX ORDER — FOLIC ACID 1 MG/1
1 TABLET ORAL DAILY
Status: DISCONTINUED | OUTPATIENT
Start: 2018-04-06 | End: 2018-04-09 | Stop reason: HOSPADM

## 2018-04-05 RX ORDER — CLONIDINE HYDROCHLORIDE 0.1 MG/1
0.1 TABLET ORAL ONCE
Status: COMPLETED | OUTPATIENT
Start: 2018-04-05 | End: 2018-04-05

## 2018-04-05 RX ORDER — GABAPENTIN 300 MG/1
600 CAPSULE ORAL AT BEDTIME
Status: DISCONTINUED | OUTPATIENT
Start: 2018-04-05 | End: 2018-04-09 | Stop reason: HOSPADM

## 2018-04-05 RX ORDER — DIAZEPAM 5 MG
5 TABLET ORAL ONCE
Status: DISCONTINUED | OUTPATIENT
Start: 2018-04-05 | End: 2018-04-05

## 2018-04-05 RX ORDER — ATENOLOL 50 MG/1
50 TABLET ORAL ONCE
Status: COMPLETED | OUTPATIENT
Start: 2018-04-05 | End: 2018-04-05

## 2018-04-05 RX ORDER — METOPROLOL TARTRATE 1 MG/ML
5 INJECTION, SOLUTION INTRAVENOUS ONCE
Status: DISCONTINUED | OUTPATIENT
Start: 2018-04-05 | End: 2018-04-05

## 2018-04-05 RX ORDER — LANOLIN ALCOHOL/MO/W.PET/CERES
100 CREAM (GRAM) TOPICAL ONCE
Status: COMPLETED | OUTPATIENT
Start: 2018-04-05 | End: 2018-04-05

## 2018-04-05 RX ORDER — CLONIDINE HYDROCHLORIDE 0.1 MG/1
0.1 TABLET ORAL EVERY 6 HOURS PRN
Status: DISCONTINUED | OUTPATIENT
Start: 2018-04-05 | End: 2018-04-09 | Stop reason: HOSPADM

## 2018-04-05 RX ORDER — BISACODYL 10 MG
10 SUPPOSITORY, RECTAL RECTAL DAILY PRN
Status: DISCONTINUED | OUTPATIENT
Start: 2018-04-05 | End: 2018-04-09 | Stop reason: HOSPADM

## 2018-04-05 RX ORDER — MULTIPLE VITAMINS W/ MINERALS TAB 9MG-400MCG
1 TAB ORAL ONCE
Status: COMPLETED | OUTPATIENT
Start: 2018-04-05 | End: 2018-04-05

## 2018-04-05 RX ORDER — ALBUTEROL SULFATE 90 UG/1
2 AEROSOL, METERED RESPIRATORY (INHALATION) EVERY 6 HOURS PRN
Status: DISCONTINUED | OUTPATIENT
Start: 2018-04-05 | End: 2018-04-09 | Stop reason: HOSPADM

## 2018-04-05 RX ORDER — OLANZAPINE 10 MG/2ML
10 INJECTION, POWDER, FOR SOLUTION INTRAMUSCULAR
Status: DISCONTINUED | OUTPATIENT
Start: 2018-04-05 | End: 2018-04-06

## 2018-04-05 RX ORDER — IPRATROPIUM BROMIDE AND ALBUTEROL SULFATE 2.5; .5 MG/3ML; MG/3ML
3 SOLUTION RESPIRATORY (INHALATION) 4 TIMES DAILY PRN
Status: DISCONTINUED | OUTPATIENT
Start: 2018-04-05 | End: 2018-04-09 | Stop reason: HOSPADM

## 2018-04-05 RX ORDER — ROPINIROLE 1 MG/1
1 TABLET, FILM COATED ORAL 3 TIMES DAILY
Status: DISCONTINUED | OUTPATIENT
Start: 2018-04-06 | End: 2018-04-09 | Stop reason: HOSPADM

## 2018-04-05 RX ORDER — GABAPENTIN 300 MG/1
300 CAPSULE ORAL 2 TIMES DAILY
Status: DISCONTINUED | OUTPATIENT
Start: 2018-04-06 | End: 2018-04-09 | Stop reason: HOSPADM

## 2018-04-05 RX ORDER — LORAZEPAM 2 MG/ML
1 INJECTION INTRAMUSCULAR ONCE
Status: COMPLETED | OUTPATIENT
Start: 2018-04-05 | End: 2018-04-05

## 2018-04-05 RX ORDER — TRAZODONE HYDROCHLORIDE 50 MG/1
50 TABLET, FILM COATED ORAL AT BEDTIME
Status: DISCONTINUED | OUTPATIENT
Start: 2018-04-05 | End: 2018-04-09 | Stop reason: HOSPADM

## 2018-04-05 RX ORDER — METHOCARBAMOL 500 MG/1
1000 TABLET, FILM COATED ORAL 3 TIMES DAILY PRN
Status: DISCONTINUED | OUTPATIENT
Start: 2018-04-05 | End: 2018-04-09 | Stop reason: HOSPADM

## 2018-04-05 RX ORDER — DIAZEPAM 5 MG
5-20 TABLET ORAL EVERY 30 MIN PRN
Status: DISCONTINUED | OUTPATIENT
Start: 2018-04-05 | End: 2018-04-09

## 2018-04-05 RX ORDER — MAGNESIUM OXIDE 400 MG/1
400 TABLET ORAL 2 TIMES DAILY
Status: DISCONTINUED | OUTPATIENT
Start: 2018-04-05 | End: 2018-04-09 | Stop reason: HOSPADM

## 2018-04-05 RX ORDER — LABETALOL 100 MG/1
100 TABLET, FILM COATED ORAL 2 TIMES DAILY
Status: DISCONTINUED | OUTPATIENT
Start: 2018-04-05 | End: 2018-04-09

## 2018-04-05 RX ORDER — DIAZEPAM 10 MG
10 TABLET ORAL ONCE
Status: COMPLETED | OUTPATIENT
Start: 2018-04-05 | End: 2018-04-05

## 2018-04-05 RX ORDER — POTASSIUM CHLORIDE 1.5 G/1.58G
20 POWDER, FOR SOLUTION ORAL ONCE
Status: COMPLETED | OUTPATIENT
Start: 2018-04-05 | End: 2018-04-05

## 2018-04-05 RX ADMIN — DIAZEPAM 10 MG: 5 TABLET ORAL at 22:50

## 2018-04-05 RX ADMIN — ATENOLOL 50 MG: 50 TABLET ORAL at 17:33

## 2018-04-05 RX ADMIN — CLONIDINE HYDROCHLORIDE 0.1 MG: 0.1 TABLET ORAL at 19:27

## 2018-04-05 RX ADMIN — ALBUTEROL SULFATE 2 PUFF: 90 AEROSOL, METERED RESPIRATORY (INHALATION) at 22:49

## 2018-04-05 RX ADMIN — POTASSIUM CHLORIDE 20 MEQ: 1.5 POWDER, FOR SOLUTION ORAL at 18:07

## 2018-04-05 RX ADMIN — GABAPENTIN 600 MG: 300 CAPSULE ORAL at 23:57

## 2018-04-05 RX ADMIN — Medication 100 MG: at 18:08

## 2018-04-05 RX ADMIN — MULTIPLE VITAMINS W/ MINERALS TAB 1 TABLET: TAB at 18:08

## 2018-04-05 RX ADMIN — MAGNESIUM OXIDE TAB 400 MG (241.3 MG ELEMENTAL MG) 400 MG: 400 (241.3 MG) TAB at 18:08

## 2018-04-05 RX ADMIN — DIAZEPAM 10 MG: 10 TABLET ORAL at 18:08

## 2018-04-05 RX ADMIN — Medication 1500 MG: at 23:57

## 2018-04-05 RX ADMIN — OMEPRAZOLE 20 MG: 20 CAPSULE, DELAYED RELEASE ORAL at 22:49

## 2018-04-05 RX ADMIN — LORAZEPAM 1 MG: 2 INJECTION INTRAMUSCULAR; INTRAVENOUS at 17:13

## 2018-04-05 RX ADMIN — CLONIDINE HYDROCHLORIDE 0.1 MG: 0.1 TABLET ORAL at 22:49

## 2018-04-05 RX ADMIN — SODIUM CHLORIDE 1000 ML: 9 INJECTION, SOLUTION INTRAVENOUS at 17:13

## 2018-04-05 ASSESSMENT — ACTIVITIES OF DAILY LIVING (ADL)
GROOMING: INDEPENDENT
ORAL_HYGIENE: INDEPENDENT
DRESS: INDEPENDENT

## 2018-04-05 NOTE — IP AVS SNAPSHOT
[facility]    [address] [facility] [address]    Phone:  [phone]                                       After Visit Summary   4/5/2018    Inga Ledesma    MRN: [id number]           After Visit Summary Signature Page     I have received my discharge instructions, and my questions have been answered. I have discussed any challenges I see with this plan with the nurse or doctor.    ..........................................................................................................................................  Patient/Patient Representative Signature      ..........................................................................................................................................  Patient Representative Print Name and Relationship to Patient    ..................................................               ................................................  Date                                            Time    ..........................................................................................................................................  Reviewed by Signature/Title    ...................................................              ..............................................  Date                                                            Time

## 2018-04-05 NOTE — IP AVS SNAPSHOT
MRN:[id number]                      After Visit Summary   4/5/2018    Inga Ledesma    MRN: [id number]           Thank you!     Thank you for choosing [facility] for your care. Our goal is always to provide you with excellent care.        Patient Information     Date Of Birth          1965        About your hospital stay     You were admitted on:  April 5, 2018 You last received care in the:  [facility]    You were discharged on:  April 9, 2018       Who to Call     For medical emergencies, please call 911.  For non-urgent questions about your medical care, please call your primary care provider or clinic, [phone]          Attending Provider     Provider Specialty    Jonn Yost MD [facility], Ion Kelly MD Psychiatry       Primary Care Provider Office Phone # Fax #    Min Toledo PA-C [phone]      After Care Instructions     Monitor and record       blood pressure and pulse daily, keep a log of these numbers and report levels above 160/100, levels below 90/60 and heart rate of <55 to your primary care.                  Your next 10 appointments already scheduled     Apr 12, 2018 11:40 AM CDT   Office Visit with Min Toledo PA-C   [facility]    [address] [facility] [address] [address] [phone]           Bring a current list of meds and any records pertaining to this visit. For Physicals, please bring immunization records and any forms needing to be filled out. Please arrive 10 minutes early to complete paperwork.              Further instructions from your care team       Behavioral Discharge Planning and Instructions  THANK YOU FOR CHOOSING THE [address]  [phone]    Summary: You were admitted to [facility] on 4/5/18 for detoxification from alcohol.  A medical exam was performed that included lab work. You have met with a case  "manager and opted to pursue treatment at [facility].  Your assessment was faxed to them and you should follow up with them for an intake appointment. [facility] can be reached at [phone].  Please take care and make your recovery a daily priority, Inga!     Recommendation: Residential treatment, support group participation with a female sponsor. The treatment team also recommends that you work with an individual therapist.  To find a therapist visit the website:  [email or web address] click on \"Find a therapist\"  This site allows you to search according to insurance as well as problems and is a great starting point for learning about therapists near you.    Main Diagnoses:  Per Dr. Sandhu;  1.  Alcohol use disorder, severe.   2.  Major depressive disorder, recurrent, without psychotic features.     Major Treatments, Procedures and Findings:  You were treated for alcohol detoxification using valium administered based on the [facility] protocol. You completed a chemical dependency assessment. You had labs drawn and those results were reviewed with you. Please take a copy of your lab work with you to your next primary care physician appointment.    Symptoms to Report:  If you experience more anxiety, confusion, sleeplessness, deep sadness or thoughts of suicide, notify your treatment team or notify your primary care physician. IF ANY OF THE SYMPTOMS YOU ARE EXPERIENCING ARE A MEDICAL EMERGENCY CALL 911 IMMEDIATELY.     Lifestyle Adjustment: Adjust your lifestyle to get enough sleep, relaxation, exercise and good nutrition. Continue to develop healthy coping skills to decrease stress and promote a sober living environment. Do not use mood altering substances including alcohol, illegal drugs or addictive medications other than what is currently prescribed.     Follow-up Appointment:   Apr 12, 2018 11:40 AM CDT   Office Visit with Min Toledo PA-C   [facility]    " [id number] [facility]  [place] [id number] [phone]      Resources:   AA/NA and Sponsors are excellent resources for support and you can find one at any support group meeting.   SMART Recovery - self management for addiction recovery:  www.smartrecovery.org  http://www.aastpaul.org/?topic=8  http://[place].org/meetings/  http://www.nam[place].org/index.php/meeting-list-pdf  Pathways ~ A Health Crisis Resource & Support Center:  316.735.7438.  http://www.harmreduction.org  [facility] [phone]  Support Group:  AA/NA and Sponsor/support  Crisis Intervention: 319.504.9293 or 294-227-5151 (TTY: 536.806.1555).  Call anytime for help.  National [place] on Mental Illness (www.[place].org): 883.876.9869 or 418-553-3668.  Alcoholics Anonymous (www.alcoholics-anonymous.org): Check your phone book for your local chapter.  Suicide Awareness Voices of Education (SAVE) (www.save.org): 170-501-JNEA (7685)  National Suicide Prevention Line (www.mentalhealthmn.org): 292-693-JLYL (9957)  Mental Health Consumer/Survivor Network of [place] (www.mhcsn.net): 566.945.2745 or 706-865-5425  Mental Health Association of [place] (www.mentalhealth.org): 517.983.2406 or 403-780-2372   Substance Abuse and Mental Health Services (www.samhsa.gov)    [facility] ([facility])  [facility] connects people seeking recovery to resources that help foster and sustain long-term recovery.  Whether you are seeking resources for treatment, transportation, housing, job training, education, health care or other pathways to recovery, [facility] is a great place to start.  [phone].  www.[facility].org    General Medication Instructions:   See your medication sheet(s) for instructions.   Take all medicines as directed.  Make no changes unless your doctor suggests them.   Go to all your doctor visits.  Be sure to have all your required lab tests. This way, your medicines can be refilled on time.  Do not use any forms of  "alcohol.    Please Note:  If you have any questions at anytime after you are discharged please call the [facility] [place] detox unit 3AW unit at [phone].  [facility], Behavioral Intake [phone]  Please take this discharge folder with you to all your follow up appointments, it contains your lab results, diagnosis, medication list and discharge recommendations.      THANK YOU FOR CHOOSING THE [facility]       Pending Results     No orders found from 4/3/2018 to 4/6/2018.            Statement of Approval     Ordered          04/09/18 1128  I have reviewed and agree with all the recommendations and orders detailed in this document.  EFFECTIVE NOW     Approved and electronically signed by:  Ion Coronado MD             Admission Information     Date & Time Provider Department Dept. Phone    4/5/2018 Ion Coronado MD [facility] [phone]      Your Vitals Were     Blood Pressure Pulse Temperature Respirations Height Weight    170/88 75 97.1  F (36.2  C) (Oral) 16 1.549 m (5' 1\") 72.6 kg (160 lb)    Last Period Pulse Oximetry BMI (Body Mass Index)             06/02/2010 94% 30.23 kg/m2         [employer] Information     [employer] gives you secure access to your electronic health record. If you see a primary care provider, you can also send messages to your care team and make appointments. If you have questions, please call your primary care clinic.  If you do not have a primary care provider, please call [phone] and they will assist you.        Care EveryWhere ID     This is your Care EveryWhere ID. This could be used by other organizations to access your [place] medical records  [id number]        Equal Access to Services     ИРИНА KERNS : Susan Mata, jenelle urbina, kelsey schwartz. So [facility] [phone].    ATENCIÓN: Si " suleiman chavira, tiene a cook disposición servicios gratuitos de asistencia lingüística. Kan sanchez [phone].    We comply with applicable federal civil rights laws and [place] laws. We do not discriminate on the basis of race, color, national origin, age, disability, sex, sexual orientation, or gender identity.               Review of your medicines      CONTINUE these medicines which may have CHANGED, or have new prescriptions. If we are uncertain of the size of tablets/capsules you have at home, strength may be listed as something that might have changed.        Dose / Directions    labetalol 100 MG tablet   Commonly known as:  NORMODYNE   This may have changed:  how much to take   Used for:  Hypertension goal BP (blood pressure) < 140/90        Dose:  200 mg   Take 2 tablets (200 mg) by mouth 2 times daily   Quantity:  180 tablet   Refills:  0         CONTINUE these medicines which have NOT CHANGED        Dose / Directions    acamprosate 333 MG EC tablet   Commonly known as:  CAMPRAL   Used for:  Alcohol abuse        Dose:  666 mg   Take 2 tablets (666 mg) by mouth 3 times daily   Quantity:  180 tablet   Refills:  2       albuterol 108 (90 BASE) MCG/ACT Inhaler   Commonly known as:  VENTOLIN HFA   Used for:  Chronic bronchitis, unspecified chronic bronchitis type (H)        Inhale  into the lungs. INHALE 2 PUFFS FOUR TIMES DAILY AS NEEDED   Quantity:  3 Inhaler   Refills:  0       aspirin 81 MG EC tablet   Used for:  Hypertension goal BP (blood pressure) < 140/90        Dose:  81 mg   Take 1 tablet (81 mg) by mouth daily   Quantity:  30 tablet   Refills:  1       calcium carbonate 1250 MG tablet   Commonly known as:  OS-JENNIFER 500 mg [place]. Ca   Used for:  Alcohol dependence with withdrawal with complication (H)        Dose:  1250 mg   Take 1 tablet (1,250 mg) by mouth 2 times daily   Quantity:  180 tablet   Refills:  0       FLUoxetine 40 MG capsule   Commonly known as:  PROzac   Used for:  Major depressive  disorder, recurrent episode, mild (H)        Dose:  40 mg   Take 1 capsule (40 mg) by mouth 2 times daily   Quantity:  60 capsule   Refills:  0       fluticasone-salmeterol 500-50 MCG/DOSE diskus inhaler   Commonly known as:  ADVAIR DISKUS   Used for:  Chronic bronchitis, unspecified chronic bronchitis type (H)        Dose:  1 puff   Inhale 1 puff into the lungs every 12 hours   Quantity:  2 Inhaler   Refills:  1       folic acid 1 MG tablet   Commonly known as:  FOLVITE   Used for:  Alcohol dependence with uncomplicated withdrawal (H)        Dose:  1 mg   Take 1 tablet (1 mg) by mouth daily   Quantity:  30 tablet   Refills:  0       furosemide 20 MG tablet   Commonly known as:  LASIX   Used for:  Essential hypertension with goal blood pressure less than 140/90        Dose:  20 mg   Take 1 tablet (20 mg) by mouth daily   Quantity:  90 tablet   Refills:  0       gabapentin 300 MG capsule   Commonly known as:  NEURONTIN   Used for:  Major depressive disorder, recurrent episode, mild (H), Sacroiliitis (H)        Take 1 capsule by mouth in the morning, 1 capsule in the afternoon, and 2 capsules at bedtime Take 1 capsule by mouth in the morning, 1 capsule in the afternoon, and 2 capsules at bedtime   Quantity:  90 capsule   Refills:  1       hydrOXYzine 50 MG tablet   Commonly known as:  ATARAX   Used for:  Sacroiliitis (H)        Dose:  50 mg   Take 1 tablet (50 mg) by mouth every 6 hours as needed for anxiety or other (adjuvant pain)   Quantity:  30 tablet   Refills:  1       ipratropium - albuterol 0.5 mg/2.5 mg/3 mL 0.5-2.5 (3) MG/3ML neb solution   Commonly known as:  DUONEB   Used for:  Chronic bronchitis, unspecified chronic bronchitis type (H)        Dose:  3 mL   Take 1 vial (3 mLs) by nebulization 4 times daily as needed for wheezing   Quantity:  360 mL   Refills:  1       losartan 100 MG tablet   Commonly known as:  COZAAR   Used for:  Hypertension goal BP (blood pressure) < 140/90        Dose:  50 mg   Take  0.5 tablets (50 mg) by mouth daily   Quantity:  30 tablet   Refills:  1       magnesium oxide 400 MG tablet   Commonly known as:  MAG-OX   Used for:  Alcohol dependence with withdrawal with complication (H)        Dose:  400 mg   Take 1 tablet (400 mg) by mouth 2 times daily   Quantity:  60 tablet   Refills:  0       menthol 5 % Ptch   Commonly known as:  ICY HOT   Used for:  Muscle soreness        Dose:  1 patch   Apply 1 patch topically every 8 hours as needed for muscle soreness   Quantity:  30 patch   Refills:  3       methocarbamol 500 MG tablet   Commonly known as:  ROBAXIN   Used for:  Cervicalgia        Dose:  1000 mg   Take 2 tablets (1,000 mg) by mouth 3 times daily as needed for muscle spasms   Quantity:  30 tablet   Refills:  1       multivitamin, therapeutic with minerals Tabs tablet   Used for:  Alcohol dependence with uncomplicated withdrawal (H)        Dose:  1 tablet   Take 1 tablet by mouth daily   Quantity:  30 each   Refills:  1       nicotine 14 MG/24HR 24 hr patch   Commonly known as:  NICODERM CQ   Used for:  Tobacco abuse        Dose:  1 patch   Place 1 patch onto the skin daily   Quantity:  30 patch   Refills:  1       omeprazole 20 MG CR capsule   Commonly known as:  priLOSEC   Used for:  Gastroesophageal reflux disease without esophagitis        Dose:  20 mg   Take 1 capsule (20 mg) by mouth 2 times daily   Quantity:  60 capsule   Refills:  1       order for DME   Used for:  Chronic bronchitis, unspecified chronic bronchitis type (H)        Equipment being ordered: Nebulizer   Quantity:  1 each   Refills:  0       potassium chloride SA 20 MEQ CR tablet   Commonly known as:  KLOR-CON   Used for:  Hypokalemia        Dose:  20 mEq   Take 1 tablet (20 mEq) by mouth 2 times daily   Quantity:  180 tablet   Refills:  1       rOPINIRole 1 MG tablet   Commonly known as:  REQUIP   Used for:  RLS (restless legs syndrome)        Dose:  1 mg   Take 1 tablet (1 mg) by mouth 3 times daily   Quantity:  90  tablet   Refills:  1       tiotropium 18 MCG capsule   Commonly known as:  SPIRIVA HANDIHALER   Used for:  Chronic bronchitis, unspecified chronic bronchitis type (H)        Inhale contents of one capsule daily.   Quantity:  30 capsule   Refills:  1       traZODone 50 MG tablet   Commonly known as:  DESYREL   Used for:  Major depressive disorder, recurrent episode, mild (H)        Dose:  50 mg   Take 1 tablet (50 mg) by mouth At Bedtime   Quantity:  30 tablet   Refills:  1            Where to get your medicines      These medications were sent to [facility] [address] - [address] [facility] [place]  [address] [facility] [place] [address]     Phone:  [phone]     labetalol 100 MG tablet                Protect others around you: Learn how to safely use, store and throw away your medicines at www.disposemymeds.org.             Medication List: This is a list of all your medications and when to take them. Check marks below indicate your daily home schedule. Keep this list as a reference.      Medications           Morning Afternoon Evening Bedtime As Needed    acamprosate 333 MG EC tablet   Commonly known as:  CAMPRAL   Take 2 tablets (666 mg) by mouth 3 times daily                                albuterol 108 (90 BASE) MCG/ACT Inhaler   Commonly known as:  VENTOLIN HFA   Inhale  into the lungs. INHALE 2 PUFFS FOUR TIMES DAILY AS NEEDED   Last time this was given:  2 puffs on 4/7/2018  8:31 PM                                aspirin 81 MG EC tablet   Take 1 tablet (81 mg) by mouth daily   Last time this was given:  81 mg on 4/9/2018  7:50 AM                                calcium carbonate 1250 MG tablet   Commonly known as:  OS-JENNIFER 500 mg [place]. Ca   Take 1 tablet (1,250 mg) by mouth 2 times daily                                FLUoxetine 40 MG capsule   Commonly known as:  PROzac   Take 1 capsule (40 mg) by mouth 2 times daily   Last time this was given:  40 mg on 4/9/2018  7:50 AM                                 fluticasone-salmeterol 500-50 MCG/DOSE diskus inhaler   Commonly known as:  ADVAIR DISKUS   Inhale 1 puff into the lungs every 12 hours                                folic acid 1 MG tablet   Commonly known as:  FOLVITE   Take 1 tablet (1 mg) by mouth daily   Last time this was given:  1 mg on 4/9/2018  7:51 AM                                furosemide 20 MG tablet   Commonly known as:  LASIX   Take 1 tablet (20 mg) by mouth daily   Last time this was given:  20 mg on 4/9/2018  7:51 AM                                gabapentin 300 MG capsule   Commonly known as:  NEURONTIN   Take 1 capsule by mouth in the morning, 1 capsule in the afternoon, and 2 capsules at bedtime Take 1 capsule by mouth in the morning, 1 capsule in the afternoon, and 2 capsules at bedtime   Last time this was given:  300 mg on 4/9/2018  2:09 PM                                hydrOXYzine 50 MG tablet   Commonly known as:  ATARAX   Take 1 tablet (50 mg) by mouth every 6 hours as needed for anxiety or other (adjuvant pain)   Last time this was given:  50 mg on 4/8/2018 12:53 PM                                ipratropium - albuterol 0.5 mg/2.5 mg/3 mL 0.5-2.5 (3) MG/3ML neb solution   Commonly known as:  DUONEB   Take 1 vial (3 mLs) by nebulization 4 times daily as needed for wheezing                                labetalol 100 MG tablet   Commonly known as:  NORMODYNE   Take 2 tablets (200 mg) by mouth 2 times daily   Last time this was given:  100 mg on 4/9/2018  7:50 AM                                losartan 100 MG tablet   Commonly known as:  COZAAR   Take 0.5 tablets (50 mg) by mouth daily   Last time this was given:  50 mg on 4/9/2018  7:51 AM                                magnesium oxide 400 MG tablet   Commonly known as:  MAG-OX   Take 1 tablet (400 mg) by mouth 2 times daily   Last time this was given:  400 mg on 4/9/2018  7:50 AM                                menthol 5 % Ptch   Commonly known as:  ICY HOT   Apply 1 patch  topically every 8 hours as needed for muscle soreness   Last time this was given:  1 patch on 4/8/2018  4:27 PM                                methocarbamol 500 MG tablet   Commonly known as:  ROBAXIN   Take 2 tablets (1,000 mg) by mouth 3 times daily as needed for muscle spasms                                multivitamin, therapeutic with minerals Tabs tablet   Take 1 tablet by mouth daily   Last time this was given:  1 tablet on 4/9/2018  7:51 AM                                nicotine 14 MG/24HR 24 hr patch   Commonly known as:  NICODERM CQ   Place 1 patch onto the skin daily   Last time this was given:  1 patch on 4/8/2018  8:36 AM                                omeprazole 20 MG CR capsule   Commonly known as:  priLOSEC   Take 1 capsule (20 mg) by mouth 2 times daily   Last time this was given:  20 mg on 4/9/2018  7:50 AM                                order for DME   Equipment being ordered: Nebulizer                                potassium chloride SA 20 MEQ CR tablet   Commonly known as:  KLOR-CON   Take 1 tablet (20 mEq) by mouth 2 times daily   Last time this was given:  20 mEq on 4/9/2018  8:38 AM                                rOPINIRole 1 MG tablet   Commonly known as:  REQUIP   Take 1 tablet (1 mg) by mouth 3 times daily   Last time this was given:  1 mg on 4/9/2018  2:09 PM                                tiotropium 18 MCG capsule   Commonly known as:  SPIRIVA HANDIHALER   Inhale contents of one capsule daily.                                traZODone 50 MG tablet   Commonly known as:  DESYREL   Take 1 tablet (50 mg) by mouth At Bedtime   Last time this was given:  50 mg on 4/8/2018  8:06 PM

## 2018-04-05 NOTE — ED NOTES
"Patient reported having sob/chest pain. She reported that she has been drink for the last couple of days, last drink yesterday. Patient reported that, possible withdrawal for alcohol. Alert and oriented, BP (!) 200/106  Temp 99.6  F (37.6  C) (Oral)  Resp 20  Ht 1.549 m (5' 1\")  Wt 72.6 kg (160 lb)  LMP 06/02/2010  SpO2 99%  BMI 30.23 kg/m2    "

## 2018-04-05 NOTE — IP AVS SNAPSHOT
" [facility]: [phone]                                              INTERAGENCY TRANSFER FORM - LAB / IMAGING / EKG / EMG RESULTS   2018                    Hospital Admission Date: 2018  TEJAS IRELAND   : 1965  Sex: Female        Attending Provider: Ion Coronado MD     Allergies:  Codeine, Reglan [Metoclopramide Hcl]    Infection:  None   Service:  CHEMICAL DEP    Ht:  1.549 m (5' 1\")   Wt:  72.6 kg (160 lb)   Admission Wt:  72.6 kg (160 lb)    BMI:  30.23 kg/m 2   BSA:  1.77 m 2            Patient PCP Information     Provider PCP Type    Min Toledo PA-C General         Lab Results - 3 Days      Vitamin B12 [[id number]]  Resulted: 18 1010, Result status: Final result    Ordering provider: Anabell Everett APRN CNP  18 0030 Resulting lab: [facility]    Specimen Information    Type Source Collected On   Blood  18 0728          Components       Value Reference Range Flag Lab   Vitamin B12 414 193 - 986 pg/mL  51            TSH with free T4 reflex and/or T3 as indicated [[id number]]  Resulted: 18 0837, Result status: Final result    Ordering provider: Anabell Everett APRN CNP  18 0030 Resulting lab: [facility]    Specimen Information    Type Source Collected On   Blood  18 0728          Components       Value Reference Range Flag Lab   TSH 1.31 0.40 - 4.00 mU/L  13            Comprehensive metabolic panel [[id number]] (Abnormal)  Resulted: 18 0837, Result status: Final result    Ordering provider: Anabell Everett APRN CNP  18 0030 Resulting lab: [facility]    Specimen Information    Type Source Collected On   Blood  18 0728          Components       Value Reference Range Flag Lab   Sodium 139 133 - 144 mmol/L  13   Potassium 3.4 3.4 - 5.3 mmol/L  13   Chloride 100 94 - 109 mmol/L  13 "   Carbon Dioxide 29 20 - 32 mmol/L  13   Anion Gap 10 3 - 14 mmol/L  13   Glucose 93 70 - 99 mg/dL  13   Urea Nitrogen 9 7 - 30 mg/dL  13   Creatinine 0.74 0.52 - 1.04 mg/dL  13   GFR Estimate 82 >60 mL/min/1.7m2  13   Comment:  Non  GFR Calc   GFR Estimate If Black >90 >60 mL/min/1.7m2  13   Comment:  African American GFR Calc   Calcium 8.2 8.5 - 10.1 mg/dL L 13   Bilirubin Total 1.3 0.2 - 1.3 mg/dL  13   Albumin 3.2 3.4 - 5.0 g/dL L 13   Protein Total 6.6 6.8 - 8.8 g/dL L 13   Alkaline Phosphatase 99 40 - 150 U/L  13   ALT 19 0 - 50 U/L  13   AST 21 0 - 45 U/L  13            GGT [[id number]] (Abnormal)  Resulted: 04/06/18 0837, Result status: Final result    Ordering provider: Anabell Everett APRN CNP  04/06/18 0030 Resulting lab: [facility]    Specimen Information    Type Source Collected On   Blood  04/06/18 0728          Components       Value Reference Range Flag Lab   GGT 50 0 - 40 U/L H 13            HCG quantitative pregnancy [[id number]]  Resulted: 04/06/18 0837, Result status: Final result    Ordering provider: Anabell Everett APRN CNP  04/06/18 0728 Resulting lab: [facility]    Specimen Information    Type Source Collected On     04/06/18 0728          Components       Value Reference Range Flag Lab   HCG Quantitative Serum 2 0 - 5 IU/L  13            Lipid panel [[id number]] (Abnormal)  Resulted: 04/06/18 0837, Result status: Final result    Ordering provider: Anabell Everett APRN CNP  04/06/18 0030 Resulting lab: [facility]    Specimen Information    Type Source Collected On   Blood  04/06/18 0728          Components       Value Reference Range Flag Lab   Cholesterol 252 <200 mg/dL H 13   Comment:  Desirable:       <200 mg/dl   Triglycerides 111 <150 mg/dL  13   HDL Cholesterol 105 >49 mg/dL  13   LDL Cholesterol Calculated 125 <100 mg/dL H 13   Comment:         Above  desirable:  100-129 mg/dl  Borderline High:  130-159 mg/dL  High:             160-189 mg/dL  Very high:       >189 mg/dl     Non HDL Cholesterol 147 <130 mg/dL H 13   Comment:         Above Desirable:  130-159 mg/dl  Borderline high:  160-189 mg/dl  High:             190-219 mg/dl  Very high:       >219 mg/dl              Testing Performed By     Lab - Abbreviation Name Director Address Valid Date Range    13 - Unknown [facility] Unknown [address] [facility] [id number] 01/15/15 0916 - Present    51 - Unknown [facility] Unknown [address] [facility] [address] 12/31/14 1010 - Present            Unresulted Labs     None      Encounter-Level Documents:     There are no encounter-level documents.      Order-Level Documents:     There are no order-level documents.

## 2018-04-05 NOTE — ED PROVIDER NOTES
"  History   No chief complaint on file.    HPI  Inga Ledesma is a 52 year old female who has a history of alcoholism and alcohol dependence, COPD and hypertension.  She states she believes she is in alcohol withdrawal.  She relapsed drinking about 2-1/2 months ago and has been drinking about a liter of vodka per day.  Her last drink was around 18 hours ago.  His morning she started feeling shaky, nauseated and felt as if her heart was racing.  She also felt somewhat short of breath and as the day has gone on developed some left lower chest pain.  She has no history of DTs but did have a remote history of alcohol withdrawal seizure.  She denies any street drug use.  She has a chronic cough, no runny nose sore throat or fever.  Denies acute psychiatric complaints.    I have reviewed the Medications, Allergies, Past Medical and Surgical History, and Social History in the Epic system.    Review of Systems  All other systems were reviewed and are negative    Physical Exam   BP: (!) 200/106  Heart Rate: 106  Temp: 99.6  F (37.6  C)  Resp: 20  Height: 154.9 cm (5' 1\")  Weight: 72.6 kg (160 lb)  SpO2: 99 %      Physical Exam   Constitutional: She is oriented to person, place, and time. She appears well-developed and well-nourished.   HENT:   Head: Normocephalic and atraumatic.   Mouth/Throat: Oropharynx is clear and moist.   Eyes: EOM are normal. Pupils are equal, round, and reactive to light.   Neck: Normal range of motion. Neck supple. No JVD present. No tracheal deviation present. No thyromegaly present.   Cardiovascular: Normal rate, regular rhythm, normal heart sounds and intact distal pulses.  Exam reveals no gallop and no friction rub.    No murmur heard.  Pulmonary/Chest: Effort normal and breath sounds normal. She exhibits tenderness.       Abdominal: Soft. Bowel sounds are normal. She exhibits no distension and no mass. There is tenderness in the epigastric area and left lower quadrant. There is no rigidity, " no rebound, no guarding and no CVA tenderness.   Musculoskeletal: She exhibits no edema or tenderness.   Neurological: She is alert and oriented to person, place, and time. No cranial nerve deficit. Coordination normal.   Skin: Skin is warm and dry. No rash noted.   Psychiatric: She has a normal mood and affect. Her behavior is normal.   Nursing note and vitals reviewed.      ED Course     ED Course     Procedures             EKG Interpretation:      Interpreted by Jonn Yost  Time reviewed: 1713  Symptoms at time of EKG: alcohol withdrawal. Chest pain. Palpitations   Rhythm: normal sinus   Rate: normal  Axis: normal  Ectopy: none  Conduction: normal  ST Segments/ T Waves: No ST-T wave changes  Q Waves: none  Comparison to prior: Unchanged    Clinical Impression: normal EKG          Critical Care time:  was 30 minutes for this patient excluding procedures.             Labs Ordered and Resulted from Time of ED Arrival Up to the Time of Departure from the ED   CBC WITH PLATELETS DIFFERENTIAL - Abnormal; Notable for the following:        Result Value    RDW 15.4 (*)     All other components within normal limits   COMPREHENSIVE METABOLIC PANEL - Abnormal; Notable for the following:     Potassium 3.2 (*)     Calcium 8.2 (*)     All other components within normal limits   MAGNESIUM - Abnormal; Notable for the following:     Magnesium 1.1 (*)     All other components within normal limits   TROPONIN I   INR   PARTIAL THROMBOPLASTIN TIME   LIPASE   ALCOHOL ETHYL   DRUG ABUSE SCREEN 6 CHEM DEP URINE ([facility])   PULSE OXIMETRY NURSING   CARDIAC CONTINUOUS MONITORING   PERIPHERAL IV CATHETER   PATIENT CARE ORDER            Assessments & Plan (with Medical Decision Making)   Patient with a history of hypertension COPD and alcohol dependence with a history of symptomatic alcohol withdrawal and withdrawal seizures.  She presents 18 hours after her last drink of alcohol complaining of tremor, nausea, racing heart, shortness of  breath, and chest pain.  Initial differential diagnosis included a life-threatening cause of chest pain such as a ACS, PE, dissection, pneumonia, pneumothorax, pericarditis ,Boerhaave tear, alcohol withdrawal syndrome, anxiety, and other life-threatening as well as nonlife threatening causes of acute chest pain.  On arrival she was hypertensive, tachycardic, tremulous and agitated.  I immediately assessed the patient, examined her and ordered diagnostic studies.  We initiated treatment for alcohol withdrawal as this seemed to be the most likely etiology of her symptoms.  Her EKG, troponin, and chest x-ray are negative.  She has no risk factors for pulmonary embolism and I have little to no clinical suspicion of this is the etiology of her symptoms.  We did treat her hypertension with benzodiazepines and beta-blockers with significant resolution.  Blood pressure subsequently rebounded and so she was given another dose of Valium for alcohol withdrawal.  Upon review of her chart she does have chronic difficult to control hypertension, and so to some extent I think this is baseline.  As her tremor and nausea improved her chest symptoms resolved completely.  At this time I do not think she needs further medical evaluation for those symptoms but clearly has an alcohol withdrawal syndrome which is significant and which will require assistance with detox services.  The patient voluntarily consents to detox admission.  She does appear medically stable for detox admission.  Total critical care time includes extensive time spent at the bedside initially, multiple trips back to the bedside for reassessment, review of chart, review of diagnostic studies, discussion with mental health intake, total critical care time 30 minutes.  I have reviewed the nursing notes.    I have reviewed the findings, diagnosis, plan and need for follow up with the patient.    New Prescriptions    No medications on file       Final diagnoses:    Alcohol dependence with withdrawal with complication (H)       4/5/2018   [facility], [place], EMERGENCY DEPARTMENT     Jonn Yost MD  04/05/18 1920       Jonn Yost MD  04/05/18 8728

## 2018-04-06 LAB
ALBUMIN SERPL-MCNC: 3.2 G/DL (ref 3.4–5)
ALP SERPL-CCNC: 99 U/L (ref 40–150)
ALT SERPL W P-5'-P-CCNC: 19 U/L (ref 0–50)
ANION GAP SERPL CALCULATED.3IONS-SCNC: 10 MMOL/L (ref 3–14)
AST SERPL W P-5'-P-CCNC: 21 U/L (ref 0–45)
B-HCG SERPL-ACNC: 2 IU/L (ref 0–5)
BILIRUB SERPL-MCNC: 1.3 MG/DL (ref 0.2–1.3)
BUN SERPL-MCNC: 9 MG/DL (ref 7–30)
CALCIUM SERPL-MCNC: 8.2 MG/DL (ref 8.5–10.1)
CHLORIDE SERPL-SCNC: 100 MMOL/L (ref 94–109)
CHOLEST SERPL-MCNC: 252 MG/DL
CO2 SERPL-SCNC: 29 MMOL/L (ref 20–32)
CREAT SERPL-MCNC: 0.74 MG/DL (ref 0.52–1.04)
GFR SERPL CREATININE-BSD FRML MDRD: 82 ML/MIN/1.7M2
GGT SERPL-CCNC: 50 U/L (ref 0–40)
GLUCOSE SERPL-MCNC: 93 MG/DL (ref 70–99)
HDLC SERPL-MCNC: 105 MG/DL
INTERPRETATION ECG - MUSE: NORMAL
LDLC SERPL CALC-MCNC: 125 MG/DL
NONHDLC SERPL-MCNC: 147 MG/DL
POTASSIUM SERPL-SCNC: 3.4 MMOL/L (ref 3.4–5.3)
PROT SERPL-MCNC: 6.6 G/DL (ref 6.8–8.8)
SODIUM SERPL-SCNC: 139 MMOL/L (ref 133–144)
TRIGL SERPL-MCNC: 111 MG/DL
TSH SERPL DL<=0.005 MIU/L-ACNC: 1.31 MU/L (ref 0.4–4)
VIT B12 SERPL-MCNC: 414 PG/ML (ref 193–986)

## 2018-04-06 PROCEDURE — 84443 ASSAY THYROID STIM HORMONE: CPT | Performed by: NURSE PRACTITIONER

## 2018-04-06 PROCEDURE — 36415 COLL VENOUS BLD VENIPUNCTURE: CPT | Performed by: NURSE PRACTITIONER

## 2018-04-06 PROCEDURE — 99221 1ST HOSP IP/OBS SF/LOW 40: CPT | Mod: AI | Performed by: PSYCHIATRY & NEUROLOGY

## 2018-04-06 PROCEDURE — 99232 SBSQ HOSP IP/OBS MODERATE 35: CPT | Performed by: NURSE PRACTITIONER

## 2018-04-06 PROCEDURE — 84702 CHORIONIC GONADOTROPIN TEST: CPT | Performed by: NURSE PRACTITIONER

## 2018-04-06 PROCEDURE — 82977 ASSAY OF GGT: CPT | Performed by: NURSE PRACTITIONER

## 2018-04-06 PROCEDURE — 99207 ZZC DOWN CODE DUE TO SUBSEQUENT EXAM: CPT | Performed by: PSYCHIATRY & NEUROLOGY

## 2018-04-06 PROCEDURE — 80053 COMPREHEN METABOLIC PANEL: CPT | Performed by: NURSE PRACTITIONER

## 2018-04-06 PROCEDURE — 99207 ZZC CONSULT E&M CHANGED TO SUBSEQUENT LEVEL: CPT | Performed by: NURSE PRACTITIONER

## 2018-04-06 PROCEDURE — 82607 VITAMIN B-12: CPT | Performed by: NURSE PRACTITIONER

## 2018-04-06 PROCEDURE — 12800012 ZZH R&B CD MH INTERMEDIATE ADULT

## 2018-04-06 PROCEDURE — 80061 LIPID PANEL: CPT | Performed by: NURSE PRACTITIONER

## 2018-04-06 PROCEDURE — 25000132 ZZH RX MED GY IP 250 OP 250 PS 637: Performed by: NURSE PRACTITIONER

## 2018-04-06 PROCEDURE — 84702 CHORIONIC GONADOTROPIN TEST: CPT | Performed by: PSYCHIATRY & NEUROLOGY

## 2018-04-06 PROCEDURE — 25000132 ZZH RX MED GY IP 250 OP 250 PS 637: Performed by: PSYCHIATRY & NEUROLOGY

## 2018-04-06 RX ADMIN — OMEPRAZOLE 20 MG: 20 CAPSULE, DELAYED RELEASE ORAL at 16:08

## 2018-04-06 RX ADMIN — MULTIPLE VITAMINS W/ MINERALS TAB 1 TABLET: TAB at 09:01

## 2018-04-06 RX ADMIN — FUROSEMIDE 20 MG: 20 TABLET ORAL at 09:01

## 2018-04-06 RX ADMIN — LOSARTAN POTASSIUM 50 MG: 50 TABLET ORAL at 09:01

## 2018-04-06 RX ADMIN — GABAPENTIN 600 MG: 300 CAPSULE ORAL at 20:25

## 2018-04-06 RX ADMIN — ROPINIROLE HYDROCHLORIDE 1 MG: 1 TABLET, FILM COATED ORAL at 14:23

## 2018-04-06 RX ADMIN — POTASSIUM CHLORIDE 20 MEQ: 20 TABLET, EXTENDED RELEASE ORAL at 20:26

## 2018-04-06 RX ADMIN — OMEPRAZOLE 20 MG: 20 CAPSULE, DELAYED RELEASE ORAL at 09:01

## 2018-04-06 RX ADMIN — Medication 1500 MG: at 09:01

## 2018-04-06 RX ADMIN — ROPINIROLE HYDROCHLORIDE 1 MG: 1 TABLET, FILM COATED ORAL at 09:02

## 2018-04-06 RX ADMIN — GABAPENTIN 300 MG: 300 CAPSULE ORAL at 14:23

## 2018-04-06 RX ADMIN — GABAPENTIN 300 MG: 300 CAPSULE ORAL at 09:01

## 2018-04-06 RX ADMIN — LABETALOL HYDROCHLORIDE 100 MG: 100 TABLET, FILM COATED ORAL at 20:26

## 2018-04-06 RX ADMIN — POTASSIUM CHLORIDE 20 MEQ: 20 TABLET, EXTENDED RELEASE ORAL at 09:05

## 2018-04-06 RX ADMIN — FLUOXETINE 40 MG: 20 CAPSULE ORAL at 09:01

## 2018-04-06 RX ADMIN — UMECLIDINIUM 1 PUFF: 62.5 AEROSOL, POWDER ORAL at 09:04

## 2018-04-06 RX ADMIN — Medication 1500 MG: at 20:25

## 2018-04-06 RX ADMIN — ASPIRIN 81 MG: 81 TABLET, COATED ORAL at 09:01

## 2018-04-06 RX ADMIN — MAGNESIUM OXIDE TAB 400 MG (241.3 MG ELEMENTAL MG) 400 MG: 400 (241.3 MG) TAB at 20:26

## 2018-04-06 RX ADMIN — ROPINIROLE HYDROCHLORIDE 1 MG: 1 TABLET, FILM COATED ORAL at 20:26

## 2018-04-06 RX ADMIN — NICOTINE POLACRILEX 4 MG: 2 GUM, CHEWING ORAL at 10:52

## 2018-04-06 RX ADMIN — FOLIC ACID 1 MG: 1 TABLET ORAL at 09:01

## 2018-04-06 RX ADMIN — LABETALOL HYDROCHLORIDE 100 MG: 100 TABLET, FILM COATED ORAL at 09:02

## 2018-04-06 RX ADMIN — TRAZODONE HYDROCHLORIDE 50 MG: 50 TABLET ORAL at 20:27

## 2018-04-06 RX ADMIN — HYDROXYZINE HYDROCHLORIDE 50 MG: 50 TABLET, FILM COATED ORAL at 16:24

## 2018-04-06 RX ADMIN — DIAZEPAM 10 MG: 5 TABLET ORAL at 09:01

## 2018-04-06 RX ADMIN — CLONIDINE HYDROCHLORIDE 0.1 MG: 0.1 TABLET ORAL at 16:24

## 2018-04-06 RX ADMIN — FLUTICASONE FUROATE AND VILANTEROL TRIFENATATE 1 PUFF: 200; 25 POWDER RESPIRATORY (INHALATION) at 09:05

## 2018-04-06 RX ADMIN — NICOTINE POLACRILEX 4 MG: 2 GUM, CHEWING ORAL at 20:31

## 2018-04-06 RX ADMIN — MAGNESIUM OXIDE TAB 400 MG (241.3 MG ELEMENTAL MG) 400 MG: 400 (241.3 MG) TAB at 09:01

## 2018-04-06 ASSESSMENT — ACTIVITIES OF DAILY LIVING (ADL)
DRESS: SCRUBS (BEHAVIORAL HEALTH)
GROOMING: INDEPENDENT
GROOMING: INDEPENDENT
LAUNDRY: WITH SUPERVISION
ORAL_HYGIENE: INDEPENDENT

## 2018-04-06 NOTE — PLAN OF CARE
"Problem: Substance Withdrawal  Goal: Substance Withdrawal  Problem: General Plan of Care (Inpatient Behavioral)  Goal: Individualization/ Patient Specific Goal (IP Behavioral)  The patient and/or their representative their patient-specific goals related to the plan of care.  Patient specific goals include:  1.  Patient will be evaluated by a physician and labs will be evaluated.  2.  Patient will be seen by a  for evaluation and discharge planning.  3.  Patient will complete assessment paperwork  4.  Patient will consume 75 % of meal to meet estimated energy needs.  5.  Detoxification from alcohol using valium.  6.  Patient will be provided with alcohol relapse prevention information.  Signs and symptoms of listed problems will be absent or manageable.   GAETANO Ledesma is a 52 year old year old female seeking detox from alcohol.      S = Situation:     New admission     B  = Background:     Pt explains that she lives at home and her adult son lives with her. Pt reports being sober for almost 10 years before relapsing recently. Pt explains that she had a friend that was murdered on . Pt explains \"it was a murder suicide, so the person who murdered her completed suicide after doing it.\" Pt's Father  in 2017. Pt states she has a friend who is \"actively dying at the  from cancer all over his body.\" Pt states that she was participating in an outpatient program at [facility]. Pt explains that she has been \"kicked out of that program.\" Pt has been drinking approximately 1 L of vodka/day. Pt reports seizure secondary to withdrawal in 2016. Pt has PMH of COPD, hypertension and depression. Pt explains that she would like to go to residential Tx upon DC from this unit.    A  =  Assessment:     Pt presents with full range and sad affect. Mood is calm, depressed and anxious. Activity is isolative and withdrawn. Pt's gait is slow. Pt explains she has severe " "trouble falling asleep without drinking. Pt denies any adverse effects to medications and none seen by staff.    Pt reports L sided chest pain, numbness in her R fingers and a mild headache. Pt explained that the numbness in her R fingers arose after IV access was attempted in her R arm. Pt had reported tingling in her L hand upon admission to unit. Pt denies tingling in her L hand at this time. Pt reports SOB. Pt explains that her headache and SOB have lessoned while on this unit (see ED notes, flowsheet and MAR for tracking of Sx and Tx).     Pt denies SI/SIB/HI. Pt denies AH and VH.     Pt's 2125 MSSA: 8    Pt's BP seen to be elevated throughout day (see flowsheet and MAR for tracking of BP and Tx).   Pt has been receiving BP checks via forearm from monitor source. Pt explains that she \"will have a panic attack if the BP is taken over the bicep. It gets too tight.\" Pt explained that a jagruti BP measurement would work better for her in regards to causing less anxiety than a monitor source; however, Pt refused jagruti BP check upon admission.     Pt seen to have lab abnormalities (see chart review for lab values and MAR for Tx).     Utox negative for all substances.    BP (!) 190/106  Pulse 77  Temp 98.7  F (37.1  C) (Oral)  Resp 20  Ht 1.549 m (5' 1\")  Wt 72.6 kg (160 lb)  LMP 06/02/2010  SpO2 94%  BMI 30.23 kg/m2    R =   Request or Recommendation:     Tx team to assess. CM to meet with Pt. Continue to monitor BP and withdrawal process. Pt requesting a walker. Pt explains she uses a walker while ambulating at home. Tx to assess need for walker.       "

## 2018-04-06 NOTE — ED NOTES
"ED to Behavioral Floor Handoff    SITUATION  Inga Ledesma is a 52 year old female who speaks English and lives in a home with family members The patient arrived in the ED by private car from home with a complaint of No chief complaint on file.  .The patient's current symptoms started/worsened 2 week(s) ago and during this time the symptoms have increased.   In the ED, pt was diagnosed with   Final diagnoses:   Alcohol dependence with withdrawal with complication (H)        Initial vitals were: BP: (!) 200/106  Heart Rate: 106  Temp: 99.6  F (37.6  C)  Resp: 20  Height: 154.9 cm (5' 1\")  Weight: 72.6 kg (160 lb)  SpO2: 99 %   --------  Is the patient diabetic? No   If yes, last blood glucose? --     If yes, was this treated in the ED? --  --------  Is the patient inebriated (ETOH) Yes or Impaired on other substances? No  MSSA done? Yes  Last MSSA score: --    Were withdrawal symptoms treated? Yes  Does the patient have a seizure history? Yes. If yes, date of most recent seizure--03/2016  --------  Is the patient patient experiencing suicidal ideation? denies current or recent suicidal ideation     Homicidal ideation? denies current or recent homicidal ideation or behaviors.    Self-injurious behavior/urges? denies current or recent self injurious behavior or ideation.  ------  Was pt aggressive in the ED No  Was a code called No  Is the pt now cooperative? No  -------  Meds given in ED:   Medications   0.9% sodium chloride BOLUS (1,000 mLs Intravenous New Bag 4/5/18 1713)     Followed by   sodium chloride 0.9% infusion (1,000 mLs Intravenous Not Given 4/5/18 1923)   metoprolol (LOPRESSOR) injection 5 mg (5 mg Intravenous Not Given 4/5/18 1713)   LORazepam (ATIVAN) injection 1 mg (1 mg Intravenous Given 4/5/18 1713)   atenolol (TENORMIN) tablet 50 mg (50 mg Oral Given 4/5/18 1733)   diazepam (VALIUM) tablet 10 mg (10 mg Oral Given 4/5/18 1808)   potassium chloride (KLOR-CON) Packet 20 mEq (20 mEq Oral Given 4/5/18 " 1807)   magnesium oxide (MAG-OX) tablet 400 mg (400 mg Oral Given 4/5/18 1808)   multivitamin, therapeutic with minerals (THERA-VIT-M) tablet 1 tablet (1 tablet Oral Given 4/5/18 1808)   thiamine tablet 100 mg (100 mg Oral Given 4/5/18 1808)   cloNIDine (CATAPRES) tablet 0.1 mg (0.1 mg Oral Given 4/5/18 1927)      Family present during ED course? No  Family currently present? No    BACKGROUND  Does the patient have a cognitive impairment or developmental disability? No  Allergies:   Allergies   Allergen Reactions     Codeine Nausea     Reglan [Metoclopramide Hcl] Other (See Comments)     Body tenses up   .   Social demographics are   Social History     Social History     Marital status:      Spouse name: N/A     Number of children: 1     Years of education: 13     Occupational History      [employer]     Social History Main Topics     Smoking status: Current Every Day Smoker     Packs/day: 0.50     Years: 34.00     Types: Cigarettes     Start date: 11/1/1979     Last attempt to quit: 10/3/2012     Smokeless tobacco: Never Used      Comment: 5 cigarettes daily     Alcohol use Yes      Comment: vodka     Drug use: No     Sexual activity: Not Currently     Other Topics Concern     Parent/Sibling W/ Cabg, Mi Or Angioplasty Before 65f 55m? No     Social History Narrative        ASSESSMENT  Labs results   Labs Ordered and Resulted from Time of ED Arrival Up to the Time of Departure from the ED   CBC WITH PLATELETS DIFFERENTIAL - Abnormal; Notable for the following:        Result Value    RDW 15.4 (*)     All other components within normal limits   COMPREHENSIVE METABOLIC PANEL - Abnormal; Notable for the following:     Potassium 3.2 (*)     Calcium 8.2 (*)     All other components within normal limits   MAGNESIUM - Abnormal; Notable for the following:     Magnesium 1.1 (*)     All other components within normal limits   TROPONIN I   INR   PARTIAL THROMBOPLASTIN TIME   LIPASE   ALCOHOL ETHYL   DRUG ABUSE  "SCREEN 6 CHEM DEP URINE ([facility])   PULSE OXIMETRY NURSING   CARDIAC CONTINUOUS MONITORING   PERIPHERAL IV CATHETER   PATIENT CARE ORDER      Imaging Studies:   Recent Results (from the past 24 hour(s))   XR Chest Port 1 View    Narrative    CHEST ONE VIEW PORTABLE   4/5/2018 6:03 PM     HISTORY: Chest pain.     COMPARISON: 2/23/2018.     FINDINGS: Cardiomediastinal silhouette within normal limits. No focal  airspace opacities. No pleural effusion. No pneumothorax identified.  The bones are unremarkable.       Impression    IMPRESSION: No acute cardiopulmonary abnormalities.    RHIANNON MONTOYA MD      Most recent vital signs BP (!) 176/98  Temp 99.5  F (37.5  C) (Oral)  Resp 20  Ht 1.549 m (5' 1\")  Wt 72.6 kg (160 lb)  LMP 06/02/2010  SpO2 94%  BMI 30.23 kg/m2   Abnormal labs/tests/findings requiring intervention:---   Pain control: pt had none  Nausea control: pt had none    RECOMMENDATION  Are any infection precautions needed (MRSA, VRE, etc.)? No If yes, what infection? --  ---  Does the patient have mobility issues? independently. If yes, what device does the pt use? ---  ---  Is patient on 72 hour hold or commitment? No If on 72 hour hold, have hold and rights been given to patient? N/A  Are admitting orders written if after 10 p.m. ?N/A  Tasks needing to be completed:---     Mable Gtz   [facility] [id number]   [phone]   [phone]  "

## 2018-04-06 NOTE — PROGRESS NOTES
04/05/18 2110   Patient Belongings   Did you bring any home meds/supplements to the hospital?  Yes   Disposition of meds  Sent to security/pharmacy per site process   Patient Belongings other (see comments)   Belongings Search Yes   Clothing Search Yes   Second Staff Abdias   General Info Comment See Notes     BIN: Jacket, Tennis shoes, hat, PJ with string, dairy w/metal, earphone, watch cigarettes, chew, lighter  Locked Drawer: Phone, , watch,  Security Env # [id number]- $2.00 cash, visa card, work Id card, [place], [place]  License  Medication env #[id number]- Inhailer delivered to nurse   A               Admission:  I am responsible for any personal items that are not sent to the safe or pharmacy.  [facility] is not responsible for loss, theft or damage of any property in my possession.    Signature:  _________________________________ Date: _______  Time: _____                                              Staff Signature:  ____________________________ Date: ________  Time: _____      2nd Staff person, if patient is unable/unwilling to sign:    Signature: ________________________________ Date: ________  Time: _____     Discharge:  [facility] has returned all of my personal belongings:    Signature: _________________________________ Date: ________  Time: _____                                          Staff Signature:  ____________________________ Date: ________  Time: _____

## 2018-04-06 NOTE — H&P
Admitted:     04/05/2018      IDENTIFYING INFORMATION:  The patient is a 52-year-old  female.  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  She is , has 1 child.      CHIEF COMPLAINT:  Relapse.      HISTORY OF PRESENT ILLNESS:  Alcohol is her drug of choice.  The patient came to the emergency room.  She has relapsed since January.  Her family is involved and she came here to get help.  Alcohol is her drug of choice.  Started drinking alcohol at the age of 20, by 31 it was a problem.  She has tolerance withdrawal.  She has history of withdrawal seizures, progressive loss of control, tried to quit unsuccessfully, used despite having negative consequences, money problems.  She does not use any drugs.  She smokes half a pack a day.  She does have depression that goes back to teenage years when she gets depressed.  She remembers even as a teenager, crying, isolating, not wanting to do anything.  She takes Prozac for it.  When she gets depressed, her energy, motivation, interest, sleeps suffers.  She does not have any suicidal or homicidal ideation, denied auditory hallucinations.  She does have a history of anxiety.  She says that when she is an anxious person it impacts her concentration.  She becomes irritable and tired.      PAST PSYCHIATRIC HISTORY:  Never psychiatrically hospitalized.  Was in 2 treatments, one in 2004, and 2016.  Last year was in treatment in [facility], was not sober for a long time after that.      FAMILY HISTORY:  Mother has depression.  Mother passed away.  Father has alcohol issues.  Son has ADHD.       SOCIAL HISTORY:  Good childhood.  She has depression going back to childhood.  She has 12+1 years of education.  She works for [employer].      PAST MEDICAL HISTORY:  The patient's vitals are as below.   VITAL SIGNS:  Temperature of 98.6, pulse of 81, heart rate of 75, respiratory rate of 16, blood pressure 150/97.      REVIEW OF SYSTEMS:  Ten-point systems was  reviewed and is negative.      MENTAL STATUS EXAMINATION:  The patient is a 52-year-old  female who appears her stated age, has adequate grooming, adequate hygiene.  She maintains good eye contact.  She is cooperative.  She has no psychomotor abnormalities.  No gait problems.  Mood is euthymic.  Affect is congruent.  Speech is spontaneous, normal rate.  Does not have any suicidal or homicidal ideation, denied auditory hallucinations.  Alert and oriented x 3.  Recent and remote memory, language, fund of knowledge are all adequate.  No loose associations.      DIAGNOSES:   1.  Alcohol use disorder, severe.   2.  Major depressive disorder, recurrent, without psychotic features.      PLAN:  The patient will be detoxed off alcohol using MSSA protocol and Valium.  The patient will be seen by Case Management and Internal Medicine.  The patient will continue Prozac 40 mg for depression.  The patient at this time is ambivalent what she wants to do in terms of treatment.  Her care to be transferred to Dr. Coronado as this provider will be on CME leave.  Case endorsed to Dr. Coronado.         YOSEPH JOSEPH MD             D: 2018   T: 2018   MT: MEI      Name:     TEJAS IRELAND   MRN:      [id number]        Account:      [id number]   :      1965        Admitted:     2018                   Document: [id number]

## 2018-04-06 NOTE — PLAN OF CARE
"Problem: Substance Withdrawal  Goal: Substance Withdrawal  Problem: General Plan of Care (Inpatient Behavioral)  Goal: Individualization/ Patient Specific Goal (IP Behavioral)  The patient and/or their representative their patient-specific goals related to the plan of care.  Patient specific goals include:  1.  Patient will be evaluated by a physician and labs will be evaluated.  2.  Patient will be seen by a  for evaluation and discharge planning.  3.  Patient will complete assessment paperwork  4.  Patient will consume 75 % of meal to meet estimated energy needs.  5.  Detoxification from alcohol using valium.  6.  Patient will be provided with alcohol relapse prevention information.  Signs and symptoms of listed problems will be absent or manageable.     Pt reports using a walker at home but she does appear quite stable on her feet here. Given walker to use prn while here. She states has been relapsing since December 2017 and that she was hospitalized October 2017 and subsequent to that hospitalization she went to treatment at [facility]. She does report that she was in a form of programming at [facility] that she states she can not return to again until she has completed another treatment. Denies any suicidal ideation plans or intent. When asked if she was depressed states \"I'm always depressd I'm on 80 mg of prozac a day\" does state feeling like that is helping her. Also endorses anxiety. MSSA score today is an 8, pt is in very mild alcohol withdrawal, 10 mg po valium administered.    BP has been high today 185/105 pt reports being off her medications while drinking and reports has been drinking since December. Administered as scheduled furosemide 20mg, labetolol 100mg and cozaar 50 mg. BP recheck 157/99.    Vitals:    04/06/18 0025 04/06/18 0455 04/06/18 0847 04/06/18 0954   BP: 148/76 (!) 185/105 (!) 185/105 (!) 157/99   Pulse: 62 67 72 81   Resp: 16 16 16 16   Temp: 97.9  F (36.6  C) 97.5  F (36.4  C) " 98.6  F (37  C)    TempSrc: Oral Oral Oral    SpO2:       Weight:       Height:

## 2018-04-06 NOTE — PROVIDER NOTIFICATION
On call medical doctor called at 5am regarding pts elevated /105. MSSA=5. Pt denied withdrawal symptoms. Doctor informed staff not to do anything since pt is still going through alcohol withdrawal.

## 2018-04-06 NOTE — PROGRESS NOTES
Behavioral Team Discussion: (4/6/2018)    Continued Stay Criteria/Rationale: Patient admitted for alcohol Use Disorder.  Plan: The following services will be provided to the patient; psychiatric assessment, medication management, therapeutic milieu, individual and group support, and skills groups.   Participants: 3A Provider: Dr. Wandy Sandhu MD; 3A RN's: Peter Guerin, RN; 3A CM's: Aubrie Chadwick .  Summary/Recommendation: Providers will assess today for treatment recommendations, discharge planning, and aftercare plans. CM will meet with pt for discharge planning.   Medical/Physical: Deferred (see medical notes).  Precautions:   Behavioral Orders   Procedures     Code 1 - Restrict to Unit     Routine Programming     As clinically indicated     Seizure precautions     Status 15     Every 15 minutes.     Withdrawal precautions     Rationale for change in precautions or plan: N/A  Progress: No Change.

## 2018-04-06 NOTE — PROGRESS NOTES
Met with Pt to initiate discharge planning.  Pt is interested in [facility].  Provided Pt with information on program.  Pt also interested in Sober Housing. provided with [facility] list.

## 2018-04-06 NOTE — CONSULTS
Internal Medicine Initial Visit    Inga Ledesma MRN# [id number]   Age: 52 year old YOB: 1965   Date of Admission: 4/5/2018     Reason for consult: H/o HTN, COPD, GERD, EtOH withdrawal seizures       Requesting physician SARITA Everett NP       SUBJECTIVE   HPI:   Inga Ledesma is a 52 year old female admitted to inpatient chemical dependency, station 3A, through the Emergency Department where she presented via private car for detoxification from alcohol .  We were asked by the admitting psychiatry team, SARITA Everett NP to see the patient for management of medical co-morbidities.     Details regarding psychiatric and substance abuse history and circumstances leading up to present hospitalization as per Psychiatry.  Past mental health diagnosis of depression and alcohol abuse.  She is currently drinking 1 liter of vodka per day, last drink ~4/4 and presented to ED with shaking, nausea and palpitations.  No history of DTs but does have a h/o withdrawal seizures. Medical history includes depression, alcohol abuse with withdrawal and withdrawal seizure (2016), COPD, HTN, HLD, GERD, DARSHAN, RLS, CHF, cancer of labia majora (in remission), RLE lymphedema following lymph node dissection.      Currently, the patient in complaining of alcohol withdrawal with associated symptoms of shaking, nausea, headache and abdominal discomfort with diarrhea.  Has chronic shortness of breath, no worse today than at baseline.  She denies fever, chills, chest pain.  Does not take her medications when she has been drinking.  Notes some swelling in RLE which is chronic from her lymph node dissection in the past.        Treatment in November and restarted in January , drinking since 2013.    Past Medical History:     Past Medical History:   Diagnosis Date     Alcohol abuse, in remission      Cancer of labia majora (H) 1987     Cervical dysplasia 1987     Congestive heart failure (H) 5/16/16     COPD (chronic obstructive  pulmonary disease) (H) 1/24/2011     CVA (cerebral infarction) 1/2012     Dependent edema      Depression, major      Depressive disorder 1966     GERD (gastroesophageal reflux disease)      Hyperlipidemia LDL goal < 160      Hypertension      Iron deficiency anemia      RLS (restless legs syndrome)      Sacroiliitis (H)     steroid injections ineffective, chronic low back pain     Tobacco abuse      Uncomplicated asthma      Vitamin D deficiencies       Reviewed & updated in [facility].     Past Surgical History:      Past Surgical History:   Procedure Laterality Date     COLONOSCOPY       EYE SURGERY       HERNIA REPAIR, INGUINAL RT/LT  2007     HYSTERECTOMY  2010     HYSTERECTOMY TOTAL ABDOMINAL  7/28/10    Bilateral salpingectomy.  ovaries conserved.     LASER TX, CERVICAL  1987     LYMPH NODE BIOPSY  2007    inguinal     LYSIS OF LABIAL LESION(S)  1985, 1987      Reviewed & updated in Epic.     Medications prior to admission:     Prior to Admission Medications   Prescriptions Last Dose Informant Patient Reported? Taking?   FLUoxetine (PROZAC) 40 MG capsule 4/5/2018 at AM  No Yes   Sig: Take 1 capsule (40 mg) by mouth 2 times daily   acamprosate (CAMPRAL) 333 MG EC tablet Past Week at Unknown time  No Yes   Sig: Take 2 tablets (666 mg) by mouth 3 times daily   albuterol (VENTOLIN HFA) 108 (90 BASE) MCG/ACT Inhaler 4/5/2018 at AM  No Yes   Sig: Inhale  into the lungs. INHALE 2 PUFFS FOUR TIMES DAILY AS NEEDED   aspirin 81 MG EC tablet 4/5/2018 at AM  No Yes   Sig: Take 1 tablet (81 mg) by mouth daily   calcium carbonate (OS-JENNIFER 500 MG [place]. [place]) 1250 MG tablet 4/4/2018 at Unknown time  No Yes   Sig: Take 1 tablet (1,250 mg) by mouth 2 times daily   fluticasone-salmeterol (ADVAIR DISKUS) 500-50 MCG/DOSE diskus inhaler 4/5/2018 at Unknown time  No Yes   Sig: Inhale 1 puff into the lungs every 12 hours   folic acid (FOLVITE) 1 MG tablet 4/4/2018 at Unknown time  No Yes   Sig: Take 1 tablet (1 mg) by mouth daily    furosemide (LASIX) 20 MG tablet 4/4/2018 at Unknown time  No Yes   Sig: Take 1 tablet (20 mg) by mouth daily   gabapentin (NEURONTIN) 300 MG capsule 4/5/2018 at Unknown time  No Yes   Sig: Take 1 capsule by mouth in the morning, 1 capsule in the afternoon, and 2 capsules at bedtime Take 1 capsule by mouth in the morning, 1 capsule in the afternoon, and 2 capsules at bedtime   hydrOXYzine (ATARAX) 50 MG tablet Past Week at Unknown time  No Yes   Sig: Take 1 tablet (50 mg) by mouth every 6 hours as needed for anxiety or other (adjuvant pain)   ipratropium - albuterol 0.5 mg/2.5 mg/3 mL (DUONEB) 0.5-2.5 (3) MG/3ML neb solution 4/5/2018 at Unknown time  No Yes   Sig: Take 1 vial (3 mLs) by nebulization 4 times daily as needed for wheezing   labetalol (NORMODYNE) 100 MG tablet 4/4/2018 at Unknown time  No Yes   Sig: Take 1 tablet (100 mg) by mouth 2 times daily   losartan (COZAAR) 100 MG tablet 4/5/2018 at Unknown time  No Yes   Sig: Take 0.5 tablets (50 mg) by mouth daily   magnesium oxide (MAG-OX) 400 MG tablet 4/5/2018 at Unknown time  No Yes   Sig: Take 1 tablet (400 mg) by mouth 2 times daily   menthol (ICY HOT) 5 % PTCH Past Month at Unknown time  No Yes   Sig: Apply 1 patch topically every 8 hours as needed for muscle soreness   methocarbamol (ROBAXIN) 500 MG tablet 4/5/2018 at Unknown time  No Yes   Sig: Take 2 tablets (1,000 mg) by mouth 3 times daily as needed for muscle spasms   multivitamin, therapeutic with minerals (THERA-VIT-M) TABS tablet Past Week at Unknown time  No Yes   Sig: Take 1 tablet by mouth daily   nicotine (NICODERM CQ) 14 MG/24HR 24 hr patch More than a month at Unknown time  No No   Sig: Place 1 patch onto the skin daily   omeprazole (PRILOSEC) 20 MG CR capsule 4/5/2018 at Unknown time  No Yes   Sig: Take 1 capsule (20 mg) by mouth 2 times daily   order for DME   No No   Sig: Equipment being ordered: Nebulizer   potassium chloride SA (KLOR-CON) 20 MEQ CR tablet 4/4/2018 at Unknown time  No  "Yes   Sig: Take 1 tablet (20 mEq) by mouth 2 times daily   rOPINIRole (REQUIP) 1 MG tablet 4/5/2018 at Unknown time  No Yes   Sig: Take 1 tablet (1 mg) by mouth 3 times daily   tiotropium (SPIRIVA HANDIHALER) 18 MCG capsule Past Month at Unknown time  No Yes   Sig: Inhale contents of one capsule daily.   traZODone (DESYREL) 50 MG tablet Past Month at Unknown time  No Yes   Sig: Take 1 tablet (50 mg) by mouth At Bedtime      Facility-Administered Medications: None         Allergies:     Allergies   Allergen Reactions     Codeine Nausea     Reglan [Metoclopramide Hcl] Other (See Comments)     Body tenses up         Social History:   Tobacco Use: 0.5PPD x 34 years   Alcohol Use: 1 liter of vodka per day   Illicit Drug Use: Denies   STI Testing: Offered, declined      Family History:     Family History   Problem Relation Age of Onset     Depression/Anxiety Mother      Asthma Mother      CEREBROVASCULAR DISEASE Father      Hypertension Father      Lung Cancer Father      Breast Cancer Paternal Aunt      Other Cancer Other      Depression Other      Anxiety Disorder Other      MENTAL ILLNESS Other      Substance Abuse Other      Asthma Other      Obesity Sister      Obesity Son         Reviewed & updated in Epic.     Review of Systems:   Ten point review of systems negative except as stated above in History of Present Illness.    OBJECTIVE   Physical Exam:   Blood pressure (!) 151/98, pulse 81, temperature 98.5  F (36.9  C), temperature source Oral, resp. rate 16, height 1.549 m (5' 1\"), weight 72.6 kg (160 lb), last menstrual period 06/02/2010, SpO2 94 %, not currently breastfeeding.  General: Alert, awake, NAD. Flushed. Tremulous.  HEENT: NC/AT. Anicteric sclera. Eyes symmetric and free of discharge bilaterally. Mucous membranes moist.  Neck: Supple  Cardiovascular: RRR, S1S2, no murmurs appreciated.  Lungs: Normal respiratory effort on RA. Lungs CTAB without wheezes, rales or rhonchi.  Diminished breath sounds at " bases.   Abdomen: Obese, soft, non-tender and nondistended with normoactive bowel sounds.  Extremities: Warm & well perfused. 1+ pitting edema RLE.   Skin: No rashes, jaundice, or lesions.  Neurologic: A&O X 3. Answers questions appropriately. Moves all extremities symmetrically.     Laboratory Data:   CMP  Recent Labs  Lab 04/06/18  0728 04/05/18  1703    135   POTASSIUM 3.4 3.2*   CHLORIDE 100 95   CO2 29 26   ANIONGAP 10 14   GLC 93 96   BUN 9 9   CR 0.74 0.75   GFRESTIMATED 82 81   GFRESTBLACK >90 >90   JENNIFER 8.2* 8.2*   MAG  --  1.1*   PROTTOTAL 6.6* 7.0   ALBUMIN 3.2* 3.4   BILITOTAL 1.3 1.3   ALKPHOS 99 109   AST 21 24   ALT 19 22       CBC  Recent Labs  Lab 04/05/18  1703   WBC 8.5   RBC 4.21   HGB 12.7   HCT 37.1   MCV 88   MCH 30.2   MCHC 34.2   RDW 15.4*          TSH  Recent Labs  Lab 04/06/18  0728   TSH 1.31          Assessment and Plan/Recommendations:   Inga Ledesma is a 52 year old female with a medical history of depression, alcohol abuse with withdrawal and withdrawal seizure (2016), COPD, HTN, HLD, GERD, DARSHAN, RLS, CHF, cancer of labia majora (in remission), RLE lymphedema following lymph node dissection who is admitted to station 3A seeking detoxification from alcohol.  Psychiatry has consulted internal medicine for her multiple medical co-morbidities.     Alcohol Abuse & Withdrawal; H/p EToH Withdrawal Seizures - Long standing history of EtOH abuse with numerous previous admission for alcohol withdrawal including prior alcohol withdrawal seizure in 2016.  Most recent admission was at [facility] 1/2018.  She is currently drinking about 1 liter of vodka per day.  She is noncompliant with her medications when she is drinking.  Presents for detox from EtOH.   - Management per primary psychiatry team   - Continue MVI, thiamine, folic acid, mag ox and calcium   - Encourage oral hydration     MDD - Care per primary psychiatry team.  Managed on PTA Prozac 40 mg daily and Trazodone 50 mg QHS.      Uncontrolled Hypertension - With known medication non-compliance 2/2 heavy EtOH use.  Managed on PTA Labetalol 100 mg BID, Losartan 50 mg daily, Lasix 20 mg daily.  Current BPs elevated in setting of acute withdrawal from EtOH: 185/105 but improved to 151/98 after home medications were resumed.   - Treat withdrawal  - Continue PTA medications as ordered, clonidine has been ordered by Psychiatry, this should help   - IM will follow BPs throughout weekend  - PRN Hydralazine ordered  - Follow up with PCP within a week of discharge     Diastolic CHF - Diagnosed 5/2016 at [facility] when she was admitted for a presumed COPD exacerbation.  Most recent TTE in [place] was a technically difficult study but notes and estimated LVEF 65-70%, no significant valve disease, no pericardial effusion.  Unclear what EDW is as patient noncompliant. Per review of chart, appears as though weight has been relatively stable ~72 kgs.   - Continue Lasix 20 mg PO daily with daily K repletion   - Follow weights daily at home, ensure patient calls her PCP for a weight gain of 3-5 pounds over 2-3 days   - Follow up with PCP on discharge     Hypokalemia; Hypocalcemia - Likely 2/2 dehydration from excessive alcohol use.  Also on lasix as OP.   - Continue oral KCl and Mag-Ox    History of COPD with chronic SOB - Symptoms currently stable, on RA.  Continues to smoke tobacco daily which is likely exacerbation shortness of breath.  Managed on PTA Albuterol PRN, Advair 1 puff bID, Duonebs QID PRN and Spiriva.   - Continue home inhalers (some have been auto subbed by PharmD), no changes to regimen  - Follow up with PCP  - Please page me if patient has increased SOB or hypoxia     GERD - Symptoms stable on PTA PPI.     History of Iron Deficiency Anemia - Hgb stable 12.7.  Not on supplementation.   - Follow up with PCP for routine CBC as OP     RLS - Managed on PTA Requip and Gabapentin with occasional use of Menthol patch and Robaxin. No current  symptoms.   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      RLE Lymphedema - 2/2 lymph node dissection.  Continue lasix as above, may benefit from referal to lymphedema clinic from PCP.       Currently, this patient is medically stable, we will continue to follow her blood pressure over the weekend.  Please page the Internal Medicine job code pager for any intercurrent medical issues which arise. Thank you for the opportunity to be a part of this patient's care.    Kianna Estrada, [facility]  Hospitalist Service  Pager [phone]

## 2018-04-06 NOTE — PHARMACY-ADMISSION MEDICATION HISTORY
"Admission Medication History status for the 4/5/2018 admission is complete.  See EPIC admission navigator for Prior to Admission medications.    Medication history sources:  Patient, [facility] (patient fill all meds with [facility])     Medication history source reliability: Good    Medication adherence:  Moderate    Changes made to PTA medication list (reason)  Added: None  Deleted:   Alum & mag hydroxide-simethicone 400-400-40 mg/5 mL suspension- take 15-30 mLs by mouth every 4 hours as needed for indigestion  Azithromycin 250 mg tablet- take two tablets on first day, then one tablet daily for four days  Benzonatate 200 mg capsule- take 1 capsule by mouth 3 times daily as needed for cough  Fluticasone 50 mcg/ACT spray- spray 1-2 sprays into both nostrils daily  Methylprednisolone (Medrol Dosepak) 4 mg tablet- follow package instructions  Triamcinolone 0.1% cream- apply sparingly to affected areas three times a day as needed  Changed: None    Additional medication history information (including reliability of information, actions taken by pharmacist):   -Doses verified per [facility]. Patient states she gets all of her medications from [facility] in [place].  -Azithromycin is an old prescription from February. Methylprednisolone is an old prescription from about a month ago.   -Patient does not use benzonatate. States she \"doesn't like it.\"  -Patient states she never uses fluticasone nasal spray.   -Patient states that she does not use nicotine patches regularly at home, but she will need it when admitted.   -Patient does not take trazodone often at home because it makes her too drowsy for work the next day. She says she will probably want to take it when admitted here since she won't have to get up for work.   -Patient states that she uses a pill box at home to keep all of her medications organized.     Time spent in this activity: 45 minutes    Medication history completed by: Shell Maguire, [facility] Pharmacy Intern "     Prior to Admission medications    Medication Sig Last Dose Taking? Auth Provider   potassium chloride SA (KLOR-CON) 20 MEQ CR tablet Take 1 tablet (20 mEq) by mouth 2 times daily 4/4/2018 at Unknown time Yes Min Toledo PA-C   labetalol (NORMODYNE) 100 MG tablet Take 1 tablet (100 mg) by mouth 2 times daily 4/4/2018 at Unknown time Yes Min Toledo PA-C   magnesium oxide (MAG-OX) 400 MG tablet Take 1 tablet (400 mg) by mouth 2 times daily 4/5/2018 at Unknown time Yes Min Toledo PA-C   furosemide (LASIX) 20 MG tablet Take 1 tablet (20 mg) by mouth daily 4/4/2018 at Unknown time Yes Min Toledo PA-C   FLUoxetine (PROZAC) 40 MG capsule Take 1 capsule (40 mg) by mouth 2 times daily 4/5/2018 at AM Yes Min Toledo PA-C   calcium carbonate (OS-JENNIFER 500 MG [place]. [place]) 1250 MG tablet Take 1 tablet (1,250 mg) by mouth 2 times daily 4/4/2018 at Unknown time Yes Min Toledo PA-C   albuterol (VENTOLIN HFA) 108 (90 BASE) MCG/ACT Inhaler Inhale  into the lungs. INHALE 2 PUFFS FOUR TIMES DAILY AS NEEDED 4/5/2018 at AM Yes Min Toledo PA-C   fluticasone-salmeterol (ADVAIR DISKUS) 500-50 MCG/DOSE diskus inhaler Inhale 1 puff into the lungs every 12 hours 4/5/2018 at Unknown time Yes Min Toledo PA-C   gabapentin (NEURONTIN) 300 MG capsule Take 1 capsule by mouth in the morning, 1 capsule in the afternoon, and 2 capsules at bedtime Take 1 capsule by mouth in the morning, 1 capsule in the afternoon, and 2 capsules at bedtime 4/5/2018 at Unknown time Yes [employer] Toledo PA-C   losartan (COZAAR) 100 MG tablet Take 0.5 tablets (50 mg) by mouth daily 4/5/2018 at Unknown time Yes Min Toledo PA-C   rOPINIRole (REQUIP) 1 MG tablet Take 1 tablet (1 mg) by mouth 3 times daily 4/5/2018 at Unknown time Yes Min Toledo PA-C   tiotropium (SPIRIVA HANDIHALER) 18 MCG capsule Inhale contents of one capsule daily. Past Month at Unknown time Yes Min Toledo PA-C    traZODone (DESYREL) 50 MG tablet Take 1 tablet (50 mg) by mouth At Bedtime Past Month at Unknown time Yes Min Toledo PA-C   folic acid (FOLVITE) 1 MG tablet Take 1 tablet (1 mg) by mouth daily 4/4/2018 at Unknown time Yes Min Toledo PA-C   aspirin 81 MG EC tablet Take 1 tablet (81 mg) by mouth daily 4/5/2018 at AM Yes Min Toledo PA-C   methocarbamol (ROBAXIN) 500 MG tablet Take 2 tablets (1,000 mg) by mouth 3 times daily as needed for muscle spasms 4/5/2018 at Unknown time Yes Min Toledo PA-C   acamprosate (CAMPRAL) 333 MG EC tablet Take 2 tablets (666 mg) by mouth 3 times daily Past Week at Unknown time Yes Min Toledo PA-C   menthol (ICY HOT) 5 % PTCH Apply 1 patch topically every 8 hours as needed for muscle soreness Past Month at Unknown time Yes Cr Dillard MD   hydrOXYzine (ATARAX) 50 MG tablet Take 1 tablet (50 mg) by mouth every 6 hours as needed for anxiety or other (adjuvant pain) Past Week at Unknown time Yes Cr Dillard MD   ipratropium - albuterol 0.5 mg/2.5 mg/3 mL (DUONEB) 0.5-2.5 (3) MG/3ML neb solution Take 1 vial (3 mLs) by nebulization 4 times daily as needed for wheezing 4/5/2018 at Unknown time Yes Cr Dillard MD   multivitamin, therapeutic with minerals (THERA-VIT-M) TABS tablet Take 1 tablet by mouth daily Past Week at Unknown time Yes Cr Dillard MD   omeprazole (PRILOSEC) 20 MG CR capsule Take 1 capsule (20 mg) by mouth 2 times daily 4/5/2018 at Unknown time Yes Cr Dillard MD   nicotine (NICODERM CQ) 14 MG/24HR 24 hr patch Place 1 patch onto the skin daily More than a month at Unknown time  Cr Dillard MD   order for DME Equipment being ordered: Nebulizer   Nacho Guzman MD

## 2018-04-06 NOTE — PROGRESS NOTES
2/7/18 Pt is a restricted recipient from [facility] and can only use/see the following:  Primary Care: Dr. Min Toledo-[facility]  Pharmacy: [facility]  Urgent Care: [facility]  Emergency Room/IP hospitalization: [facility]  [facility]  is Leonor: [phone] and her fax is [phone]. Please notify Hanna Oneil WAQAR should you have questions/concerns [phone].

## 2018-04-07 PROCEDURE — 25000132 ZZH RX MED GY IP 250 OP 250 PS 637: Performed by: NURSE PRACTITIONER

## 2018-04-07 PROCEDURE — 25000132 ZZH RX MED GY IP 250 OP 250 PS 637: Performed by: PSYCHIATRY & NEUROLOGY

## 2018-04-07 PROCEDURE — 12800012 ZZH R&B CD MH INTERMEDIATE ADULT

## 2018-04-07 PROCEDURE — 40000893 ZZH STATISTIC PT IP EVAL DEFER: Performed by: PHYSICAL THERAPIST

## 2018-04-07 RX ORDER — IBUPROFEN 600 MG/1
600 TABLET, FILM COATED ORAL EVERY 6 HOURS PRN
Status: DISCONTINUED | OUTPATIENT
Start: 2018-04-07 | End: 2018-04-09 | Stop reason: HOSPADM

## 2018-04-07 RX ORDER — NICOTINE 21 MG/24HR
1 PATCH, TRANSDERMAL 24 HOURS TRANSDERMAL DAILY
Status: DISCONTINUED | OUTPATIENT
Start: 2018-04-07 | End: 2018-04-08

## 2018-04-07 RX ADMIN — MULTIPLE VITAMINS W/ MINERALS TAB 1 TABLET: TAB at 08:37

## 2018-04-07 RX ADMIN — MENTHOL 1 PATCH: 205.5 PATCH TOPICAL at 09:34

## 2018-04-07 RX ADMIN — Medication 1500 MG: at 08:37

## 2018-04-07 RX ADMIN — MAGNESIUM OXIDE TAB 400 MG (241.3 MG ELEMENTAL MG) 400 MG: 400 (241.3 MG) TAB at 08:37

## 2018-04-07 RX ADMIN — LABETALOL HYDROCHLORIDE 100 MG: 100 TABLET, FILM COATED ORAL at 20:31

## 2018-04-07 RX ADMIN — MAGNESIUM OXIDE TAB 400 MG (241.3 MG ELEMENTAL MG) 400 MG: 400 (241.3 MG) TAB at 20:32

## 2018-04-07 RX ADMIN — ALBUTEROL SULFATE 2 PUFF: 90 AEROSOL, METERED RESPIRATORY (INHALATION) at 20:31

## 2018-04-07 RX ADMIN — NICOTINE POLACRILEX 4 MG: 2 GUM, CHEWING ORAL at 09:36

## 2018-04-07 RX ADMIN — OMEPRAZOLE 20 MG: 20 CAPSULE, DELAYED RELEASE ORAL at 08:37

## 2018-04-07 RX ADMIN — POTASSIUM CHLORIDE 20 MEQ: 20 TABLET, EXTENDED RELEASE ORAL at 20:31

## 2018-04-07 RX ADMIN — GABAPENTIN 300 MG: 300 CAPSULE ORAL at 08:37

## 2018-04-07 RX ADMIN — MENTHOL 1 PATCH: 205.5 PATCH TOPICAL at 17:28

## 2018-04-07 RX ADMIN — GABAPENTIN 300 MG: 300 CAPSULE ORAL at 14:25

## 2018-04-07 RX ADMIN — FLUOXETINE 40 MG: 20 CAPSULE ORAL at 08:37

## 2018-04-07 RX ADMIN — ROPINIROLE HYDROCHLORIDE 1 MG: 1 TABLET, FILM COATED ORAL at 14:25

## 2018-04-07 RX ADMIN — POTASSIUM CHLORIDE 20 MEQ: 20 TABLET, EXTENDED RELEASE ORAL at 08:38

## 2018-04-07 RX ADMIN — UMECLIDINIUM 1 PUFF: 62.5 AEROSOL, POWDER ORAL at 08:37

## 2018-04-07 RX ADMIN — FUROSEMIDE 20 MG: 20 TABLET ORAL at 08:37

## 2018-04-07 RX ADMIN — FOLIC ACID 1 MG: 1 TABLET ORAL at 08:37

## 2018-04-07 RX ADMIN — ROPINIROLE HYDROCHLORIDE 1 MG: 1 TABLET, FILM COATED ORAL at 20:32

## 2018-04-07 RX ADMIN — DIAZEPAM 10 MG: 5 TABLET ORAL at 04:23

## 2018-04-07 RX ADMIN — ACETAMINOPHEN 650 MG: 325 TABLET ORAL at 08:37

## 2018-04-07 RX ADMIN — LOSARTAN POTASSIUM 50 MG: 50 TABLET ORAL at 08:37

## 2018-04-07 RX ADMIN — IBUPROFEN 600 MG: 600 TABLET ORAL at 20:32

## 2018-04-07 RX ADMIN — FLUTICASONE FUROATE AND VILANTEROL TRIFENATATE 1 PUFF: 200; 25 POWDER RESPIRATORY (INHALATION) at 08:37

## 2018-04-07 RX ADMIN — LABETALOL HYDROCHLORIDE 100 MG: 100 TABLET, FILM COATED ORAL at 08:37

## 2018-04-07 RX ADMIN — NICOTINE 1 PATCH: 14 PATCH, EXTENDED RELEASE TRANSDERMAL at 11:11

## 2018-04-07 RX ADMIN — ASPIRIN 81 MG: 81 TABLET, COATED ORAL at 08:37

## 2018-04-07 RX ADMIN — OMEPRAZOLE 20 MG: 20 CAPSULE, DELAYED RELEASE ORAL at 16:32

## 2018-04-07 RX ADMIN — GABAPENTIN 600 MG: 300 CAPSULE ORAL at 20:31

## 2018-04-07 RX ADMIN — TRAZODONE HYDROCHLORIDE 50 MG: 50 TABLET ORAL at 20:32

## 2018-04-07 RX ADMIN — ROPINIROLE HYDROCHLORIDE 1 MG: 1 TABLET, FILM COATED ORAL at 08:37

## 2018-04-07 RX ADMIN — Medication 1500 MG: at 20:31

## 2018-04-07 ASSESSMENT — ACTIVITIES OF DAILY LIVING (ADL)
LAUNDRY: WITH SUPERVISION
DRESS: SCRUBS (BEHAVIORAL HEALTH)
ORAL_HYGIENE: INDEPENDENT
GROOMING: INDEPENDENT
GROOMING: INDEPENDENT

## 2018-04-07 NOTE — PROGRESS NOTES
Brief Medicine Note     Internal Medicine following patient for hypertension.  She is non-compliant with her medications at baseline.  Her blood p[ressures are much improved this morning 140s/90s with initiation of her home regimen.  Please see my full consult note date 4/6 for more information.     Currently, this patient is medically stable and medicine will sign off. Please page the Internal Medicine job code pager for any intercurrent medical issues which arise. Thank you for the opportunity to be a part of this patient's care.    Kianna Estrada, [facility]  Hospitalist Service  Pager [phone]

## 2018-04-07 NOTE — PROGRESS NOTES
Pt reports that she is unable to do a long-term program such as [facility] and is interested in referral for 30 day inpatient [facility] treatment program. Pt was instructed to complete her paperwork for Rule 25 assessment. Pt acknowledged she will do this and turn into front nursing desk when complete. Pt is hopeful for [id number] but is aware that if she want's to d/c soon this may not be a possibility.   CM continues on 4/8 with Rule 25 and referrals.

## 2018-04-07 NOTE — PLAN OF CARE
Problem: Patient Care Overview  Goal: Plan of Care/Patient Progress Review  PT orders received for PT evaluation and treatment: pt using a walker.  Pt reported using a walker at baseline due to back pain.  Pt demonstrated safe and independent gait with and without FWW.  Pt walker to high and adjusted to appropriate height.  Pt reported only falling when intoxicated otherwise has not had any other falls.  Pt does not need a formal PT evaluation.  Pt in agreement with no having any PT needs at this time, PT orders completed.

## 2018-04-08 PROCEDURE — 25000132 ZZH RX MED GY IP 250 OP 250 PS 637: Performed by: NURSE PRACTITIONER

## 2018-04-08 PROCEDURE — 12800012 ZZH R&B CD MH INTERMEDIATE ADULT

## 2018-04-08 PROCEDURE — 25000132 ZZH RX MED GY IP 250 OP 250 PS 637: Performed by: PSYCHIATRY & NEUROLOGY

## 2018-04-08 PROCEDURE — H0001 ALCOHOL AND/OR DRUG ASSESS: HCPCS

## 2018-04-08 RX ADMIN — LOSARTAN POTASSIUM 50 MG: 50 TABLET ORAL at 08:37

## 2018-04-08 RX ADMIN — LABETALOL HYDROCHLORIDE 100 MG: 100 TABLET, FILM COATED ORAL at 20:05

## 2018-04-08 RX ADMIN — ROPINIROLE HYDROCHLORIDE 1 MG: 1 TABLET, FILM COATED ORAL at 13:40

## 2018-04-08 RX ADMIN — LABETALOL HYDROCHLORIDE 100 MG: 100 TABLET, FILM COATED ORAL at 08:37

## 2018-04-08 RX ADMIN — FUROSEMIDE 20 MG: 20 TABLET ORAL at 08:37

## 2018-04-08 RX ADMIN — GABAPENTIN 300 MG: 300 CAPSULE ORAL at 08:37

## 2018-04-08 RX ADMIN — MULTIPLE VITAMINS W/ MINERALS TAB 1 TABLET: TAB at 08:37

## 2018-04-08 RX ADMIN — POTASSIUM CHLORIDE 20 MEQ: 20 TABLET, EXTENDED RELEASE ORAL at 08:41

## 2018-04-08 RX ADMIN — POTASSIUM CHLORIDE 20 MEQ: 20 TABLET, EXTENDED RELEASE ORAL at 20:05

## 2018-04-08 RX ADMIN — NICOTINE 1 PATCH: 7 PATCH TRANSDERMAL at 14:38

## 2018-04-08 RX ADMIN — NICOTINE POLACRILEX 2 MG: 2 GUM, CHEWING ORAL at 16:27

## 2018-04-08 RX ADMIN — MAGNESIUM OXIDE TAB 400 MG (241.3 MG ELEMENTAL MG) 400 MG: 400 (241.3 MG) TAB at 20:05

## 2018-04-08 RX ADMIN — FLUOXETINE 40 MG: 20 CAPSULE ORAL at 08:37

## 2018-04-08 RX ADMIN — Medication 1500 MG: at 20:05

## 2018-04-08 RX ADMIN — ASPIRIN 81 MG: 81 TABLET, COATED ORAL at 08:37

## 2018-04-08 RX ADMIN — FLUTICASONE FUROATE AND VILANTEROL TRIFENATATE 1 PUFF: 200; 25 POWDER RESPIRATORY (INHALATION) at 08:44

## 2018-04-08 RX ADMIN — HYDROXYZINE HYDROCHLORIDE 50 MG: 50 TABLET, FILM COATED ORAL at 12:53

## 2018-04-08 RX ADMIN — NICOTINE 1 PATCH: 14 PATCH, EXTENDED RELEASE TRANSDERMAL at 08:36

## 2018-04-08 RX ADMIN — FOLIC ACID 1 MG: 1 TABLET ORAL at 08:37

## 2018-04-08 RX ADMIN — CLONIDINE HYDROCHLORIDE 0.1 MG: 0.1 TABLET ORAL at 16:27

## 2018-04-08 RX ADMIN — UMECLIDINIUM 1 PUFF: 62.5 AEROSOL, POWDER ORAL at 08:44

## 2018-04-08 RX ADMIN — ROPINIROLE HYDROCHLORIDE 1 MG: 1 TABLET, FILM COATED ORAL at 08:37

## 2018-04-08 RX ADMIN — TRAZODONE HYDROCHLORIDE 50 MG: 50 TABLET ORAL at 20:06

## 2018-04-08 RX ADMIN — GABAPENTIN 300 MG: 300 CAPSULE ORAL at 13:40

## 2018-04-08 RX ADMIN — NICOTINE POLACRILEX 2 MG: 2 GUM, CHEWING ORAL at 14:38

## 2018-04-08 RX ADMIN — NICOTINE POLACRILEX 2 MG: 2 GUM, CHEWING ORAL at 18:26

## 2018-04-08 RX ADMIN — ROPINIROLE HYDROCHLORIDE 1 MG: 1 TABLET, FILM COATED ORAL at 20:05

## 2018-04-08 RX ADMIN — Medication 1500 MG: at 08:37

## 2018-04-08 RX ADMIN — GABAPENTIN 600 MG: 300 CAPSULE ORAL at 20:05

## 2018-04-08 RX ADMIN — OMEPRAZOLE 20 MG: 20 CAPSULE, DELAYED RELEASE ORAL at 08:37

## 2018-04-08 RX ADMIN — MENTHOL 1 PATCH: 205.5 PATCH TOPICAL at 16:27

## 2018-04-08 RX ADMIN — IBUPROFEN 600 MG: 600 TABLET ORAL at 14:33

## 2018-04-08 RX ADMIN — MAGNESIUM OXIDE TAB 400 MG (241.3 MG ELEMENTAL MG) 400 MG: 400 (241.3 MG) TAB at 08:37

## 2018-04-08 RX ADMIN — OMEPRAZOLE 20 MG: 20 CAPSULE, DELAYED RELEASE ORAL at 16:27

## 2018-04-08 RX ADMIN — MENTHOL 1 PATCH: 205.5 PATCH TOPICAL at 08:41

## 2018-04-08 ASSESSMENT — ACTIVITIES OF DAILY LIVING (ADL)
DRESS: SCRUBS (BEHAVIORAL HEALTH)
ORAL_HYGIENE: INDEPENDENT
GROOMING: INDEPENDENT
GROOMING: INDEPENDENT;SHOWER
LAUNDRY: WITH SUPERVISION

## 2018-04-08 NOTE — PROGRESS NOTES
Pt has been hypertensive today, this morning she was 189/102. Internal medicine updated. Pt was given her scheduled morning medications and a recheck was done and BP was 134/90. No prn clonidine was given. She has been trending back upward with her BP at lunch being 160/93. If pt breaches parameters then give prn clonidine. Would recommend continuing to check her vitals despite being out of monitoring status in the event her hypertension remains uncontrolled can give prn clonidine.        Vitals:    04/08/18 0432 04/08/18 0810 04/08/18 0935 04/08/18 1131   BP: (!) 143/94 (!) 189/92 134/90 (!) 160/93   BP Location:  Left arm Other (Comment)    Pulse: 82 75 86 80   Resp: 18 16 16 16   Temp: 99  F (37.2  C) 98.1  F (36.7  C) 97.7  F (36.5  C) 99.2  F (37.3  C)   TempSrc: Tympanic Oral     SpO2:       Weight:       Height:

## 2018-04-08 NOTE — PLAN OF CARE
Problem: Substance Withdrawal  Goal: Substance Withdrawal  Problem: General Plan of Care (Inpatient Behavioral)  Goal: Individualization/ Patient Specific Goal (IP Behavioral)  The patient and/or their representative their patient-specific goals related to the plan of care.  Patient specific goals include:  1.  Patient will be evaluated by a physician and labs will be evaluated.  2.  Patient will be seen by a  for evaluation and discharge planning.  3.  Patient will complete assessment paperwork  4.  Patient will consume 75 % of meal to meet estimated energy needs.  5.  Detoxification from alcohol using valium.  6.  Patient will be provided with alcohol relapse prevention information.  Signs and symptoms of listed problems will be absent or manageable.   Outcome: Completed Date Met: 04/08/18    Patient has not required any valium for alcohol detox since 04/07/18 at 0423. All MSSA scores since that time have been less than 8. Pt is now removed from alcohol detox monitoring status.

## 2018-04-08 NOTE — PROGRESS NOTES
Writer met with pt, completed Rule 25 assessment and faxed referral to [facility] 30 day program at [phone].   CM to follow-up on 4/9.

## 2018-04-08 NOTE — PROGRESS NOTES
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 sobriety? (DSM)     Pt reports that she has done treatment in the past, done 12 step meetings. Reports longest period of sobriety was 10 years. Reports she relapsed after visiting her best friend from childhood after not seeing her for 10 years and she began drinking again with her friend.     2. Have you experienced cravings? If yes, ask follow up questions to determine if the person recognizes triggers and if the person has had any success in dealing with them.     Pt reports cravings. States triggers include: thinking about the weekends and free time. Reports to deal with triggers she will \"call people and get together with sober people.\"    3. Have you been treated for alcohol/other drug abuse/dependence?     Yes.  3B. Number of times(lifetime) (over what period) 3.  3C. Number of times completed treatment (lifetime) 3.  3D. During the past three years have you participated in outpatient and/or residential?  Yes.  3E. When and where? [facility].   3F. What was helpful? What was not? Pt reports \"It was all helpful but I " "didn't do what was needed to stay sober\".    4. Support group participation: Have you/do you attend support group meetings to reduce/stop your alcohol/drug use? How recently? What was your experience? Are you willing to restart? If the person has not participated, is he or she willing?     Pt reports she has participated in 12 step programs and prefers [facility]. Reports most recent meeting was 2 weeks ago and she plans to return.    5. What would assist you in staying sober/straight?     \"Family, sober network, treatment and using the tools I have\"    Dimension V Ratings   Relapse/Continued Use/Continued problem potential - The placing authority must use the criteria in Dimension V to determine a client s relapse, continued use, and continued problem potential.   RISK DESCRIPTIONS - Severity ratin No awareness of the negative impact of mental health problems or substance abuse. No coping skills to arrest mental health or addiction illnesses, or prevent relapse.    REASONS SEVERITY WAS ASSIGNED - (What information did the person provide that indicates his or her understanding of relapse issues? What about the person s experience indicates how prone he or she is to relapse? What coping skills does the person have that decrease relapse potential?)      Patient reports having been involved in 3 past treatments (completed 3), 2 past detox admissions, and active past 12-Step support group participation. Pt reports having  sober time (10 years) and has tried to quit udrinking in the past but relapsed. Pt lacks insight into her personal relapse process along with early warning signs and triggers. Pt lacks impulse control, sober coping skills and long-term sober maintenance skills. Pt lacks insight into the effects her use has had on her physical and mental health. Pt is at a high risk for relapse/continued use.       DIMENSION VI - Recovery Environment   1. Are you employed/attending school? Tell me about " "that.     Pt reports she is employed and works 5 days/week 6:30 am to 3:00 pm.    2A. Describe a typical day; evening for you. Work, school, social, leisure, volunteer, spiritual practices. Include time spent obtaining, using, recovering from drugs or alcohol. (DSM)     \"wake up, drink and sleep, drink and sleep. Sunday's I try to make a decent dinner for my son.\"    2B. How often do you spend more time than you planned using or use more than you planned? (DSM)     \"All the time\"    3. How important is using to your social connections? Do many of your family or friends use?     Pt reports it is not important, \"none of my family and friends use.\"    4A. Are you currently in a significant relationship?     No    4C. Sexual Orientation:     Heterosexual    5A. Who do you live with?      Pt lives with herself and her son in [place].     5B. Tell me about their alcohol/drug use and mental health issues.     Son has special needs.    5C. Are you concerned for your safety there? No    5D. Are you concerned about the safety of anyone else who lives with you? No    6A. Do you have children who live with you?     Yes.  (Ask follow-up questions to determine the person s relationship and responsibility, both legal and care giving; and what arrangements are made for supervision for the children when the person is not available.) 26 year old son with special needs.    6B. Do you have children who do not live with you?     No    7A. Who supports you in making changes in your alcohol or drug use? What are they willing to do to support you? Who is upset or angry about you making changes in your alcohol or drug use? How big a problem is this for you?      \"My son, brother, and sister are supportive and will do what I need. They all want me to be healthy but my sister is also angry at me for my drinking.\"    7B. This table is provided to record information about the person s relationships and available support It is not necessary to " "ask each item; only to get a comprehensive picture of their support system.  How often can you count on the following people when you need someone?   Partner / Spouse N/A   Parent(s)/Aunt(s)/Uncle(s)/Grandparents N/A   Sibling(s)/Cousin(s) Usually supportive   Child(seth) Always supportive   Other relative(s) N/A   Friend(s)/neighbor(s) Usually supportive   Child(seth) s father(s)/mother(s) N/A   Support group member(s) Always supportive   Community of nick members Always supportive   /counselor/therapist/healer N/A   Other (specify) N/A     8A. What is your current living situation?     Pt lives in [place] with her son.    8B. What is your long term plan for where you will be living?     Stay in the same place.    8C. Tell me about your living environment/neighborhood? Ask enough follow up questions to determine safety, criminal activity, availability of alcohol and drugs, supportive or antagonistic to the person making changes.      \"It does scare me a little because that is where I drank but I also have had sober time in that environment.\"    9. Criminal justice history: Gather current/recent history and any significant history related to substance use--Arrests? Convictions? Circumstances? Alcohol or drug involvement? Sentences? Still on probation or parole? Expectations of the court? Current court order? Any sex offenses - lifetime? What level? (DSM)    None    10. What obstacles exist to participating in treatment? (Time off work, childcare, funding, transportation, pending correction time, living situation)     Time off work.     Dimension VI Ratings   Recovery environment - The placing authority must use the criteria in Dimension VI to determine a client s recovery environment.   RISK DESCRIPTIONS - Severity ratin Client is engaged in structured, meaningful activity, but peers, family, significant other, and living environment are unsupportive, or there is criminal justice involvement by the client " or among the client s peers, significant others, or in the client s living environment.    REASONS SEVERITY WAS ASSIGNED - (What support does the person have for making changes? What structure/stability does the person have in his or her daily life that will increase the likelihood that changes can be sustained? What problems exist in the person s environment that will jeopardize getting/staying clean and sober?)     Pt reports she is employed full time, lives at home in [place] and has a 26 year old son with special needs who lives with her. Reports that when drinking she has a hard time with work. Reports family is supportive but relationships are strained due to use. Pt reports she attends [facility] meetings and has some sober networks. Pt denies any history of legal concerns. Pt would benefit in recovery environment with plan to return home after treatment.          Client Choice/Exceptions   Would you like services specific to language, age, gender, culture, Confucianism preference, race, ethnicity, sexual orientation or disability?  No    What particular treatment choices and options would you like to have? Residential/Inpatient treatment [facility] based    Do you have a preference for a particular treatment program? [facility].    Criteria for Diagnosis     Criteria for Diagnosis  DSM-5 Criteria for Substance Use Disorder  Instructions: Determine whether the client currently meets the criteria for Substance Use Disorder using the diagnostic criteria in the DSM-V pp.481-587. Current means during the most recent 12 months outside a facility that controls access to substances    Category of Substance Severity (ICD-10 Code / DSM 5 Code)     Alcohol Use Disorder Severe  (10.20) (303.90)   Cannabis Use Disorder NA   Hallucinogen Use Disorder NA   Inhalant Use Disorder NA   Opioid Use Disorder NA   Sedative, Hypnotic, or Anxiolytic Use Disorder NA   Stimulant Related Disorder NA   Tobacco Use  Disorder Severe   (F17.200) (305.1)    Other (or unknown) Substance Use Disorder NA       Collateral Contact Summary   Number of contacts made: 2    Contact with referring person:  Yes, [facility] chart review.    If court related records were reviewed, summarize here: NA    Information from collateral contacts supported/largely agreed with information from the client and associated risk ratings.      Rule 25 Assessment Summary and Plan   's Recommendation    1)  Complete a residential based or similar treatment program   2)  Abstain from all mood-altering chemicals unless prescribed by a licensed provider.   3)  Attend weekly 12-step support group meetings.     4)  Actively work with a female sponsor or  through [facility] ([phone]).   5)  Follow all the recommendations of your treatment/medical providers.  6)  Patient may benefit from obtaining a full mental health evaluation.  7)  Patient could benefit from 1:1 psychotherapy          Collateral Contacts     Name:    Dr. Wandy Sandhu MD   Relationship:    [facility]   Phone Number:    [phone] Releases:         HISTORY OF PRESENT ILLNESS:  Alcohol is her drug of choice.  The patient came to the emergency room.  She has relapsed since January.  Her family is involved and she came here to get help.  Alcohol is her drug of choice.  Started drinking alcohol at the age of 20, by 31 it was a problem.  She has tolerance withdrawal.  She has history of withdrawal seizures, progressive loss of control, tried to quit unsuccessfully, used despite having negative consequences, money problems.  She does not use any drugs.  She smokes half a pack a day.  She does have depression that goes back to teenage years when she gets depressed.  She remembers even as a teenager, crying, isolating, not wanting to do anything.  She takes Prozac for it.  When she gets depressed, her energy, motivation, interest, sleeps suffers.  She  does not have any suicidal or homicidal ideation, denied auditory hallucinations.  She does have a history of anxiety.  She says that when she is an anxious person it impacts her concentration.  She becomes irritable and tired.       Collateral Contacts     Name:    Dr. Jonn Yost MD   Relationship:    [facility] Phys[facility]   Phone Number:    [phone]   Releases:         Inga Ledesma is a 52 year old female who has a history of alcoholism and alcohol dependence, COPD and hypertension.  She states she believes she is in alcohol withdrawal.  She relapsed drinking about 2-1/2 months ago and has been drinking about a liter of vodka per day.  Her last drink was around 18 hours ago.  His morning she started feeling shaky, nauseated and felt as if her heart was racing.  She also felt somewhat short of breath and as the day has gone on developed some left lower chest pain.  She has no history of DTs but did have a remote history of alcohol withdrawal seizure.  She denies any street drug use.  She has a chronic cough, no runny nose sore throat or fever.  Denies acute psychiatric complaints.    ollateral Contacts      A problematic pattern of alcohol/drug use leading to clinically significant impairment or distress, as manifested by at least two of the following, occurring within a 12-month period:    Alcohol/drug is often taken in larger amounts or over a longer period than was intended.  There is a persistent desire or unsuccessful efforts to cut down or control alcohol/drug use  A great deal of time is spent in activities necessary to obtain alcohol, use alcohol, or recover from its effects.  Craving, or a strong desire or urge to use alcohol/drug  Continued alcohol use despite having persistent or recurrent social or interpersonal problems caused or exacerbated by the effects of alcohol/drug.  Recurrent alcohol/drug use in situations in which it is physically hazardous.  Alcohol/drug use is continued despite knowledge  of having a persistent or recurrent physical or psychological problem that is likely to have been caused or exacerbated by alcohol.  Tolerance, as defined by either of the following: A need for markedly increased amounts of alcohol/drug to achieve intoxication or desired effect.  Withdrawal, as manifested by either of the following: The characteristic withdrawal syndrome for alcohol/drug (refer to Criteria A and B of the criteria set for alcohol/drug withdrawal).      Specify if: In early remission:  After full criteria for alcohol/drug use disorder were previously met, none of the criteria for alcohol/drug use disorder have been met for at least 3 months but for less than 12 months (with the exception that Criterion A4,  Craving or a strong desire or urge to use alcohol/drug  may be met).     In sustained remission:   After full criteria for alcohol use disorder were previously met, non of the criteria for alcohol/drug use disorder have been met at any time during a period of 12 months or longer (with the exception that Criterion A4,  Craving or strong desire or urge to use alcohol/drug  may be met).   Specify if:   This additional specifier is used if the individual is in an environment where access to alcohol is restricted.    Mild: Presence of 2-3 symptoms    Moderate: Presence of 4-5 symptoms    Severe: Presence of 6 or more symptoms

## 2018-04-09 VITALS
DIASTOLIC BLOOD PRESSURE: 88 MMHG | HEART RATE: 75 BPM | RESPIRATION RATE: 16 BRPM | TEMPERATURE: 97.1 F | WEIGHT: 160 LBS | BODY MASS INDEX: 30.21 KG/M2 | HEIGHT: 61 IN | OXYGEN SATURATION: 94 % | SYSTOLIC BLOOD PRESSURE: 170 MMHG

## 2018-04-09 PROCEDURE — 25000132 ZZH RX MED GY IP 250 OP 250 PS 637: Performed by: PSYCHIATRY & NEUROLOGY

## 2018-04-09 PROCEDURE — 99238 HOSP IP/OBS DSCHRG MGMT 30/<: CPT | Performed by: PSYCHIATRY & NEUROLOGY

## 2018-04-09 PROCEDURE — 25000132 ZZH RX MED GY IP 250 OP 250 PS 637: Performed by: NURSE PRACTITIONER

## 2018-04-09 RX ORDER — LABETALOL 100 MG/1
200 TABLET, FILM COATED ORAL 2 TIMES DAILY
Status: DISCONTINUED | OUTPATIENT
Start: 2018-04-09 | End: 2018-04-09 | Stop reason: HOSPADM

## 2018-04-09 RX ORDER — LABETALOL 100 MG/1
200 TABLET, FILM COATED ORAL 2 TIMES DAILY
Qty: 180 TABLET | Refills: 0 | Status: SHIPPED | OUTPATIENT
Start: 2018-04-09 | End: 2018-04-12

## 2018-04-09 RX ADMIN — ASPIRIN 81 MG: 81 TABLET, COATED ORAL at 07:50

## 2018-04-09 RX ADMIN — LOSARTAN POTASSIUM 50 MG: 50 TABLET ORAL at 07:51

## 2018-04-09 RX ADMIN — NICOTINE POLACRILEX 2 MG: 2 GUM, CHEWING ORAL at 15:45

## 2018-04-09 RX ADMIN — NICOTINE POLACRILEX 2 MG: 2 GUM, CHEWING ORAL at 13:07

## 2018-04-09 RX ADMIN — POTASSIUM CHLORIDE 20 MEQ: 20 TABLET, EXTENDED RELEASE ORAL at 08:38

## 2018-04-09 RX ADMIN — NICOTINE 1 PATCH: 7 PATCH TRANSDERMAL at 07:48

## 2018-04-09 RX ADMIN — OMEPRAZOLE 20 MG: 20 CAPSULE, DELAYED RELEASE ORAL at 15:45

## 2018-04-09 RX ADMIN — NICOTINE POLACRILEX 2 MG: 2 GUM, CHEWING ORAL at 03:38

## 2018-04-09 RX ADMIN — MULTIPLE VITAMINS W/ MINERALS TAB 1 TABLET: TAB at 07:51

## 2018-04-09 RX ADMIN — OMEPRAZOLE 20 MG: 20 CAPSULE, DELAYED RELEASE ORAL at 07:50

## 2018-04-09 RX ADMIN — UMECLIDINIUM 1 PUFF: 62.5 AEROSOL, POWDER ORAL at 07:53

## 2018-04-09 RX ADMIN — ROPINIROLE HYDROCHLORIDE 1 MG: 1 TABLET, FILM COATED ORAL at 07:50

## 2018-04-09 RX ADMIN — GABAPENTIN 300 MG: 300 CAPSULE ORAL at 14:09

## 2018-04-09 RX ADMIN — FUROSEMIDE 20 MG: 20 TABLET ORAL at 07:51

## 2018-04-09 RX ADMIN — ROPINIROLE HYDROCHLORIDE 1 MG: 1 TABLET, FILM COATED ORAL at 14:09

## 2018-04-09 RX ADMIN — LABETALOL HYDROCHLORIDE 100 MG: 100 TABLET, FILM COATED ORAL at 07:50

## 2018-04-09 RX ADMIN — MAGNESIUM OXIDE TAB 400 MG (241.3 MG ELEMENTAL MG) 400 MG: 400 (241.3 MG) TAB at 07:50

## 2018-04-09 RX ADMIN — Medication 1500 MG: at 07:51

## 2018-04-09 RX ADMIN — FLUTICASONE FUROATE AND VILANTEROL TRIFENATATE 1 PUFF: 200; 25 POWDER RESPIRATORY (INHALATION) at 07:53

## 2018-04-09 RX ADMIN — NICOTINE POLACRILEX 2 MG: 2 GUM, CHEWING ORAL at 07:50

## 2018-04-09 RX ADMIN — GABAPENTIN 300 MG: 300 CAPSULE ORAL at 07:50

## 2018-04-09 RX ADMIN — FLUOXETINE 40 MG: 20 CAPSULE ORAL at 07:50

## 2018-04-09 RX ADMIN — FOLIC ACID 1 MG: 1 TABLET ORAL at 07:51

## 2018-04-09 RX ADMIN — CLONIDINE HYDROCHLORIDE 0.1 MG: 0.1 TABLET ORAL at 10:21

## 2018-04-09 ASSESSMENT — ACTIVITIES OF DAILY LIVING (ADL)
DRESS: INDEPENDENT
ORAL_HYGIENE: INDEPENDENT
LAUNDRY: WITH SUPERVISION
GROOMING: INDEPENDENT

## 2018-04-09 NOTE — DISCHARGE SUMMARY
Psychiatric Discharge Summary    Inga Ledesma MRN# [id number]   Age: 52 year old YOB: 1965     Date of Admission:  4/5/2018  Date of Discharge:  4/9/2018  5:18 PM  Admitting Physician:  Wandy Sandhu MD  Discharge Physician:  Ion Coronado MD          Event Leading to Hospitalization:   HISTORY OF PRESENT ILLNESS:  Alcohol is her drug of choice.  The patient came to the emergency room.  She has relapsed since January.  Her family is involved and she came here to get help.  Alcohol is her drug of choice.  Started drinking alcohol at the age of 20, by 31 it was a problem.  She has tolerance withdrawal.  She has history of withdrawal seizures, progressive loss of control, tried to quit unsuccessfully, used despite having negative consequences, money problems.  She does not use any drugs.  She smokes half a pack a day.  She does have depression that goes back to teenage years when she gets depressed.  She remembers even as a teenager, crying, isolating, not wanting to do anything.  She takes Prozac for it.  When she gets depressed, her energy, motivation, interest, sleeps suffers.  She does not have any suicidal or homicidal ideation, denied auditory hallucinations.  She does have a history of anxiety.  She says that when she is an anxious person it impacts her concentration.  She becomes irritable and tired.        See Admission note for additional details.          DIagnoses:     1.  Alcohol use disorder, severe.   2.  Major depressive disorder, recurrent, without psychotic features.          Labs:          Lab Results   Component Value Date     04/06/2018    Lab Results   Component Value Date    CHLORIDE 100 04/06/2018    Lab Results   Component Value Date    BUN 9 04/06/2018      Lab Results   Component Value Date    POTASSIUM 3.4 04/06/2018    Lab Results   Component Value Date    CO2 29 04/06/2018    Lab Results   Component Value Date    CR 0.74 04/06/2018        Lab Results    Component Value Date    WBC 8.5 04/05/2018    HGB 12.7 04/05/2018    HCT 37.1 04/05/2018    MCV 88 04/05/2018     04/05/2018     Lab Results   Component Value Date    AST 21 04/06/2018    ALT 19 04/06/2018    GGT 50 (H) 04/06/2018    ALKPHOS 99 04/06/2018    BILITOTAL 1.3 04/06/2018     Lab Results   Component Value Date    TSH 1.31 04/06/2018            Consults:   Consultation during this admission received from internal medicine:    Inga Ledesma is a 52 year old female with a medical history of depression, alcohol abuse with withdrawal and withdrawal seizure (2016), COPD, HTN, HLD, GERD, DARSHAN, RLS, CHF, cancer of labia majora (in remission), RLE lymphedema following lymph node dissection who is admitted to station 3A seeking detoxification from alcohol.  Psychiatry has consulted internal medicine for her multiple medical co-morbidities.      Alcohol Abuse & Withdrawal; H/p EToH Withdrawal Seizures - Long standing history of EtOH abuse with numerous previous admission for alcohol withdrawal including prior alcohol withdrawal seizure in 2016.  [facility] recent [facility] at [facility].  She is currently drinking about 1 liter of vodka per day.  She is noncompliant with her medications when she is drinking.  Presents for detox from EtOH.   - Management per primary psychiatry team   - Continue MVI, thiamine, folic acid, mag ox and calcium   - Encourage oral hydration      MDD - Care per primary psychiatry team.  Managed on PTA Prozac 40 mg daily and Trazodone 50 mg QHS.      Uncontrolled Hypertension - With known medication non-compliance 2/2 heavy EtOH use.  Managed on PTA Labetalol 100 mg BID, Losartan 50 mg daily, Lasix 20 mg daily.  Current BPs elevated in setting of acute withdrawal from EtOH: 185/105 but improved to 151/98 after home medications were resumed.   - Treat withdrawal  - Continue PTA medications as ordered, clonidine has been ordered by Psychiatry, this should help   - IM will follow BPs  throughout weekend  - PRN Hydralazine ordered  - Follow up with PCP within a week of discharge      Diastolic CHF - Diagnosed 5/2016 at [facility] when she was admitted for a presumed COPD exacerbation.  Most recent TTE in [place] was a technically difficult study but notes and estimated LVEF 65-70%, no significant valve disease, no pericardial effusion.  Unclear what EDW is as patient noncompliant. Per review of chart, appears as though weight has been relatively stable ~72 kgs.   - Continue Lasix 20 mg PO daily with daily K repletion   - Follow weights daily at home, ensure patient calls her PCP for a weight gain of 3-5 pounds over 2-3 days   - Follow up with PCP on discharge      Hypokalemia; Hypocalcemia - Likely 2/2 dehydration from excessive alcohol use.  Also on lasix as OP.   - Continue oral KCl and Mag-Ox     History of COPD with chronic SOB - Symptoms currently stable, on RA.  Continues to smoke tobacco daily which is likely exacerbation shortness of breath.  Managed on PTA Albuterol PRN, Advair 1 puff bID, Duonebs QID PRN and Spiriva.   - Continue home inhalers (some have been auto subbed by PharmD), no changes to regimen  - Follow up with PCP  - Please page me if patient has increased SOB or hypoxia      GERD - Symptoms stable on PTA PPI.      History of Iron Deficiency Anemia - Hgb stable 12.7.  Not on supplementation.   - Follow up with PCP for routine CBC as OP      RLS - Managed on PTA Requip and Gabapentin with occasional use of Menthol patch and Robaxin. No current symptoms.   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       RLE Lymphedema - 2/2 lymph node dissection.  Continue lasix as above, may benefit from referal to lymphedema clinic from PCP.          Hospital Course:   Inga Ledesma was admitted to Station 3A with attending Wandy Sandhu MD and transferred to Ion Coronado MD as a voluntary patient. The patient was placed  under status 15 (15 minute checks) to ensure patient safety.   MSSA protocol was initiated due to the patient's history of alcohol abuse and concern for withdrawal symptoms.  CBC, BMP and utox obtained.    All outpatient medications were continued.    Inga Ledesma did participate in groups and was visible in the milieu.     The patient's symptoms of withdrawal improved.     # Discharge Pain Plan:   - Patient currently has NO PAIN and is not being prescribed pain medications on discharge.      Inga Ledesma was released to home. At the time of discharge Inga Ledesma was determined to not be a danger to herself or others. At the current time of discharge, the patient does not meet criteria for involuntary hospitalization. On the day of discharge, the patient reports that they do not have suicidal or homicidal ideation and would never hurt themselves or others. Steps taken to minimize risk include: assessing patient s behavior and thought process daily during hospital stay, discharging patient with adequate plan for follow up for mental and physical health and discussing safety plan of returning to the hospital should the patient ever have thoughts of harming themselves or others. Therefore, based on all available evidence including the factors cited above, the patient does not appear to be at imminent risk for self-harm, and is appropriate for outpatient level of care.            Discharge Medications:     Current Discharge Medication List      CONTINUE these medications which have NOT CHANGED    Details   potassium chloride SA (KLOR-CON) 20 MEQ CR tablet Take 1 tablet (20 mEq) by mouth 2 times daily  Qty: 180 tablet, Refills: 1    Associated Diagnoses: Hypokalemia      labetalol (NORMODYNE) 100 MG tablet Take 1 tablet (100 mg) by mouth 2 times daily  Qty: 180 tablet, Refills: 0    Associated Diagnoses: Hypertension goal BP (blood pressure) < 140/90      magnesium oxide (MAG-OX) 400 MG tablet Take 1 tablet  (400 mg) by mouth 2 times daily  Qty: 60 tablet, Refills: 0    Associated Diagnoses: Alcohol dependence with withdrawal with complication (H)      furosemide (LASIX) 20 MG tablet Take 1 tablet (20 mg) by mouth daily  Qty: 90 tablet, Refills: 0    Associated Diagnoses: Essential hypertension with goal blood pressure less than 140/90      FLUoxetine (PROZAC) 40 MG capsule Take 1 capsule (40 mg) by mouth 2 times daily  Qty: 60 capsule, Refills: 0    Associated Diagnoses: Major depressive disorder, recurrent episode, mild (H)      calcium carbonate (OS-JENNIFER 500 MG [place]. [place]) 1250 MG tablet Take 1 tablet (1,250 mg) by mouth 2 times daily  Qty: 180 tablet, Refills: 0    Associated Diagnoses: Alcohol dependence with withdrawal with complication (H)      albuterol (VENTOLIN HFA) 108 (90 BASE) MCG/ACT Inhaler Inhale  into the lungs. INHALE 2 PUFFS FOUR TIMES DAILY AS NEEDED  Qty: 3 Inhaler, Refills: 0    Associated Diagnoses: Chronic bronchitis, unspecified chronic bronchitis type (H)      fluticasone-salmeterol (ADVAIR DISKUS) 500-50 MCG/DOSE diskus inhaler Inhale 1 puff into the lungs every 12 hours  Qty: 2 Inhaler, Refills: 1    Associated Diagnoses: Chronic bronchitis, unspecified chronic bronchitis type (H)      gabapentin (NEURONTIN) 300 MG capsule Take 1 capsule by mouth in the morning, 1 capsule in the afternoon, and 2 capsules at bedtime Take 1 capsule by mouth in the morning, 1 capsule in the afternoon, and 2 capsules at bedtime  Qty: 90 capsule, Refills: 1    Associated Diagnoses: Major depressive disorder, recurrent episode, mild (H); Sacroiliitis (H)      losartan (COZAAR) 100 MG tablet Take 0.5 tablets (50 mg) by mouth daily  Qty: 30 tablet, Refills: 1    Associated Diagnoses: Hypertension goal BP (blood pressure) < 140/90      rOPINIRole (REQUIP) 1 MG tablet Take 1 tablet (1 mg) by mouth 3 times daily  Qty: 90 tablet, Refills: 1    Associated Diagnoses: RLS (restless legs syndrome)      tiotropium (SPIRIVA  HANDIHALER) 18 MCG capsule Inhale contents of one capsule daily.  Qty: 30 capsule, Refills: 1    Associated Diagnoses: Chronic bronchitis, unspecified chronic bronchitis type (H)      traZODone (DESYREL) 50 MG tablet Take 1 tablet (50 mg) by mouth At Bedtime  Qty: 30 tablet, Refills: 1    Associated Diagnoses: Major depressive disorder, recurrent episode, mild (H)      folic acid (FOLVITE) 1 MG tablet Take 1 tablet (1 mg) by mouth daily  Qty: 30 tablet, Refills: 0    Associated Diagnoses: Alcohol dependence with uncomplicated withdrawal (H)      aspirin 81 MG EC tablet Take 1 tablet (81 mg) by mouth daily  Qty: 30 tablet, Refills: 1    Associated Diagnoses: Hypertension goal BP (blood pressure) < 140/90      methocarbamol (ROBAXIN) 500 MG tablet Take 2 tablets (1,000 mg) by mouth 3 times daily as needed for muscle spasms  Qty: 30 tablet, Refills: 1    Associated Diagnoses: Cervicalgia      acamprosate (CAMPRAL) 333 MG EC tablet Take 2 tablets (666 mg) by mouth 3 times daily  Qty: 180 tablet, Refills: 2    Associated Diagnoses: Alcohol abuse      menthol (ICY HOT) 5 % PTCH Apply 1 patch topically every 8 hours as needed for muscle soreness  Qty: 30 patch, Refills: 3    Associated Diagnoses: Muscle soreness      hydrOXYzine (ATARAX) 50 MG tablet Take 1 tablet (50 mg) by mouth every 6 hours as needed for anxiety or other (adjuvant pain)  Qty: 30 tablet, Refills: 1    Associated Diagnoses: Sacroiliitis (H)      ipratropium - albuterol 0.5 mg/2.5 mg/3 mL (DUONEB) 0.5-2.5 (3) MG/3ML neb solution Take 1 vial (3 mLs) by nebulization 4 times daily as needed for wheezing  Qty: 360 mL, Refills: 1    Associated Diagnoses: Chronic bronchitis, unspecified chronic bronchitis type (H)      multivitamin, therapeutic with minerals (THERA-VIT-M) TABS tablet Take 1 tablet by mouth daily  Qty: 30 each, Refills: 1    Associated Diagnoses: Alcohol dependence with uncomplicated withdrawal (H)      omeprazole (PRILOSEC) 20 MG CR capsule  Take 1 capsule (20 mg) by mouth 2 times daily  Qty: 60 capsule, Refills: 1    Associated Diagnoses: Gastroesophageal reflux disease without esophagitis      nicotine (NICODERM CQ) 14 MG/24HR 24 hr patch Place 1 patch onto the skin daily  Qty: 30 patch, Refills: 1    Associated Diagnoses: Tobacco abuse      order for DME Equipment being ordered: Nebulizer  Qty: 1 each, Refills: 0    Associated Diagnoses: Chronic bronchitis, unspecified chronic bronchitis type (H)                  Psychiatric Examination:   Appearance:  awake, alert and adequately groomed  Attitude:  cooperative  Eye Contact:  good  Mood:  good  Affect:  mood congruent  Speech:  clear, coherent  Psychomotor Behavior:  no evidence of tardive dyskinesia, dystonia, or tics  Thought Process:  linear  Associations:  no loose associations  Thought Content:  no evidence of suicidal ideation or homicidal ideation and no evidence of psychotic thought  Insight:  fair  Judgment:  fair  Oriented to:  time, person, and place  Attention Span and Concentration:  intact  Recent and Remote Memory:  fair  Language: Able to read and write  Fund of Knowledge: appropriate  Muscle Strength and Tone: normal  Gait and Station: Normal         Discharge Plan:   Continue medications as above.     Major Treatments, Procedures and Findings:  You were treated for alcohol detoxification using valium administered based on the [facility] protocol. You completed a chemical dependency assessment. You had labs drawn and those results were reviewed with you. Please take a copy of your lab work with you to your next primary care physician appointment.     Symptoms to Report:  If you experience more anxiety, confusion, sleeplessness, deep sadness or thoughts of suicide, notify your treatment team or notify your primary care physician. IF ANY OF THE SYMPTOMS YOU ARE EXPERIENCING ARE A MEDICAL EMERGENCY CALL 911 IMMEDIATELY.      Lifestyle Adjustment: Adjust your lifestyle to get enough sleep,  relaxation, exercise and good nutrition. Continue to develop healthy coping skills to decrease stress and promote a sober living environment. Do not use mood altering substances including alcohol, illegal drugs or addictive medications other than what is currently prescribed.      Follow-up Appointment:   Apr 12, 2018 11:40 AM CDT   Office Visit with Min Toledo PA-C   [facility] ([facility])    [address]  [place] [id number] [phone]       Resources:   AA/NA and Sponsors are excellent resources for support and you can find one at any support group meeting.   SMART Recovery - self management for addiction recovery:  www.smartrecovery.org  http://www.aastpaul.org/?topic=8  http://[place].org/meetings/  http://www.[place].org/index.php/meeting-list-pdf  Pathways ~ A Health Crisis Resource & Support Center:  217.895.9886.  http://www.harmreduction.org  [facility] [phone]  Support Group:  AA/NA and Sponsor/support  Crisis Intervention: 627.350.5242 or 440-101-4538 (TTY: 120.204.5446).  Call anytime for help.  National [place] on Mental Illness (www.[place].d[place].org): 711.390.2871 or 913-988-7420.  Alcoholics Anonymous (www.alcoholics-anonymous.org): Check your phone book for your local chapter.  Suicide Awareness Voices of Education (SAVE) (www.save.org): 801-675-LIOP (7687)  National Suicide Prevention Line (www.mentalhealthmn.org): 548-890-XMZI (8797)  Mental Health Consumer/Survivor Network of [place] (www.mhcsn.net): 701.363.9125 or 560-093-4333  Mental Health Association [place] (www.mentalhealth.org): 104.655.3168 or 970-373-4992   Substance Abuse and Mental Health Services (www.samhsa.gov)      Attestation:  The patient was seen and evaluated by me. I spent less than 30 minutes on discharge day activities. Ion Coronado MD

## 2018-04-09 NOTE — PROGRESS NOTES
Pt denies SI/SIB/HI.  All belongings returned to pt.  Discharge teaching completed by day shift nurse.  Pt upset at time of discharge r/t not being able to take 8pm medications with them (see previous note).  No other issues.  Discharged at 1715.

## 2018-04-09 NOTE — PROGRESS NOTES
Internal medicine Aimee Millard updated about ongoing hypertension. Aimee is reviewing her chart in the event any changes need to be made to her medications.

## 2018-04-09 NOTE — PROGRESS NOTES
Asked to evaluate BP. Reviewed OP clinic notes and BP trend.    Admitted 04/06, Has not been treated for WD since 04/07 (last dose of valium).     #HTN: Goal BP <140/90. PTA maintained on labetalol 100 mg BID, losartan 50 mg qday, lasix 20 mg qday- not compliant. Restarted on admission 04/06. OP /90 02/20/2018, 210/106 on 03/30/2018, 200/106 in ED on 04/05/2018. Normal creat 04/05 and 04/06. BP quite elevated this admission despite OP medication initiation 3 days ago.  -Will increase labetalol to 200 mg BID  -Continue Cozaar and lasix at current dosing  -Patient has BP cuff at home, recommend checking BP daily and reporting levels <90/60 or >160/100 to PCP  -F/U with PCP on 04/12 as scheduled for BP recheck and consideration of further medical management (inOpenBooket message sent to provider to notify of inpatient changes)    SARAI Dhillon

## 2018-04-09 NOTE — PROGRESS NOTES
Pt given copy of their discharge instructions and medication administration instructions. All discharge plans and labs were discussed with patient. Pt reports no questions at this time regarding discharge plans, labs or medications. Pt denies any suicidal ideation, plans or intent at this time. Patient plan is to pursue treatment at [facility] as an outpatient. Patient is awaiting a ride which she reports will be here around 1700.

## 2018-04-09 NOTE — DISCHARGE INSTRUCTIONS
"Behavioral Discharge Planning and Instructions  THANK YOU FOR CHOOSING THE [address]  [phone]    Summary: You were admitted to Station 3A on 4/5/18 for detoxification from alcohol.  A medical exam was performed that included lab work. You have met with a  and opted to pursue treatment at [facility].  Your assessment was faxed to them and you should follow up with them for an intake appointment. [facility] can be reached at [phone].  Please take care and make your recovery a daily priority, Inga!     Recommendation: Residential treatment, support group participation with a female sponsor. The treatment team also recommends that you work with an individual therapist.  To find a therapist visit the website:  [email or web address] click on \"Find a therapist\"  This site allows you to search according to insurance as well as problems and is a great starting point for learning about therapists near you.    Main Diagnoses:  Per Dr. Sandhu;  1.  Alcohol use disorder, severe.   2.  Major depressive disorder, recurrent, without psychotic features.     Major Treatments, Procedures and Findings:  You were treated for alcohol detoxification using valium administered based on the [facility] protocol. You completed a chemical dependency assessment. You had labs drawn and those results were reviewed with you. Please take a copy of your lab work with you to your next primary care physician appointment.    Symptoms to Report:  If you experience more anxiety, confusion, sleeplessness, deep sadness or thoughts of suicide, notify your treatment team or notify your primary care physician. IF ANY OF THE SYMPTOMS YOU ARE EXPERIENCING ARE A MEDICAL EMERGENCY CALL 911 IMMEDIATELY.     Lifestyle Adjustment: Adjust your lifestyle to get enough sleep, relaxation, exercise and good nutrition. Continue to develop healthy coping skills to decrease stress and promote a sober living " environment. Do not use mood altering substances including alcohol, illegal drugs or addictive medications other than what is currently prescribed.     Follow-up Appointment:   Apr 12, 2018 11:40 AM CDT   Office Visit with Min Toledo PA-C   [facility] ([facility]    [address]  [place] [id number] [phone]      Resources:   AA/NA and Sponsors are excellent resources for support and you can find one at any support group meeting.   SMART Recovery - self management for addiction recovery:  www.smartrecovery.org  http://www.aastpaul.org/?topic=8  http://a[place].org/meetings/  http://www.nam[place].org/index.php/meeting-list-pdf  Pathways ~ A Health Crisis Resource & Support Center:  661.527.3175.  http://www.harmreduction.org  [facility] 731-804-8694  Support Group:  AA/NA and Sponsor/support  Crisis Intervention: 109.520.5037 or 741-152-8563 (TTY: 292.673.1243).  Call anytime for help.  National [place] on Mental Illness (www.[place].d[place]ah.org): 688.773.7055 or 005-460-4067.  Alcoholics Anonymous (www.alcoholics-anonymous.org): Check your phone book for your local chapter.  Suicide Awareness Voices of Education (SAVE) (www.save.org): 780-906-JVRD (1668)  National Suicide Prevention Line (www.mentalhealthmn.org): 028-024-ZVKX (0208)  Mental Health Consumer/Survivor Network of [place] (www.mhcsn.net): 851.262.2487 or 975-559-8017  Mental Health Association of [place] (www.mentalhealth.org): 562.400.2540 or 045-064-4136   Substance Abuse and Mental Health Services (www.samhsa.gov)    [place] [facility] ([facility])  [facility] connects people seeking recovery to resources that help foster and sustain long-term recovery.  Whether you are seeking resources for treatment, transportation, housing, job training, education, health care or other pathways to recovery, [facility] is a great place to start.  160.320.5399.  www.[facility].org    General Medication  Instructions:   See your medication sheet(s) for instructions.   Take all medicines as directed.  Make no changes unless your doctor suggests them.   Go to all your doctor visits.  Be sure to have all your required lab tests. This way, your medicines can be refilled on time.  Do not use any forms of alcohol.    Please Note:  If you have any questions at anytime after you are discharged please call the [facility] detox unit 3AW unit at [phone].  [facility], Behavioral Intake [phone]  Please take this discharge folder with you to all your follow up appointments, it contains your lab results, diagnosis, medication list and discharge recommendations.      THANK YOU FOR CHOOSING THE [facility]

## 2018-04-09 NOTE — PROGRESS NOTES
"Pt has been irritable this shift.  Wanted her BP early and was told that it is ordered for 2000.  The pt was angry and said \"I'm out of detox and I'm refusing my blood pressure at 8pm.  I also want my linen changed because that doctor sat on my bed\"  The pt's bed was changed and meds given as scheduled.  Was on the phone this evening.  Did not attend the AA meeting.  "

## 2018-04-09 NOTE — PROGRESS NOTES
Patient is ready for discharge and asked for her medications for this evening.  Order had been obtained to give the medications early.  RN understood that 8pm medications would be taken before discharge.  Patient's understanding was that she would be able to take the medications (out of the Pyxis) with her to take later tonight.  When told this was not possible, became very upset and swore and yelled at staff saying she would just leave without them.

## 2018-04-12 ENCOUNTER — OFFICE VISIT (OUTPATIENT)
Dept: FAMILY MEDICINE | Facility: CLINIC | Age: 53
End: 2018-04-12

## 2018-04-12 VITALS
DIASTOLIC BLOOD PRESSURE: 64 MMHG | HEART RATE: 82 BPM | BODY MASS INDEX: 30.58 KG/M2 | HEIGHT: 61 IN | WEIGHT: 162 LBS | RESPIRATION RATE: 18 BRPM | TEMPERATURE: 98.5 F | SYSTOLIC BLOOD PRESSURE: 96 MMHG | OXYGEN SATURATION: 97 %

## 2018-04-12 DIAGNOSIS — I10 HYPERTENSION GOAL BP (BLOOD PRESSURE) < 140/90: Primary | ICD-10-CM

## 2018-04-12 DIAGNOSIS — F10.10 ALCOHOL ABUSE: ICD-10-CM

## 2018-04-12 DIAGNOSIS — I10 ESSENTIAL HYPERTENSION WITH GOAL BLOOD PRESSURE LESS THAN 140/90: ICD-10-CM

## 2018-04-12 DIAGNOSIS — Z71.6 ENCOUNTER FOR SMOKING CESSATION COUNSELING: ICD-10-CM

## 2018-04-12 PROCEDURE — 99214 OFFICE O/P EST MOD 30 MIN: CPT | Performed by: PHYSICIAN ASSISTANT

## 2018-04-12 RX ORDER — MULTIPLE VITAMINS W/ MINERALS TAB 9MG-400MCG
1 TAB ORAL DAILY
Qty: 100 TABLET | Refills: 3 | Status: SHIPPED | OUTPATIENT
Start: 2018-04-12 | End: 2018-04-16

## 2018-04-12 RX ORDER — LABETALOL 100 MG/1
TABLET, FILM COATED ORAL
Qty: 180 TABLET | Refills: 0 | Status: ON HOLD | OUTPATIENT
Start: 2018-04-12 | End: 2018-05-29

## 2018-04-12 RX ORDER — SPIRONOLACTONE 25 MG/1
25 TABLET ORAL DAILY
Qty: 30 TABLET | Refills: 1 | Status: SHIPPED | OUTPATIENT
Start: 2018-04-12 | End: 2018-04-29

## 2018-04-12 RX ORDER — FUROSEMIDE 20 MG
20 TABLET ORAL DAILY
Qty: 90 TABLET | Refills: 3 | Status: SHIPPED | OUTPATIENT
Start: 2018-04-12 | End: 2018-04-29

## 2018-04-12 NOTE — PROGRESS NOTES
SUBJECTIVE:   Inga Ledesma is a 52 year old female who presents to clinic today for the following health issues:      Alcohol Problem - paperwork for treatment program today please  Blood pressure elevated during hospitalization. Now a little low. Likely related to not drinking. She denies profound dizziness or profound fatigue   [facility]  Out of detox for 1 week.  Sober x 1 week.   Problem list and histories reviewed & adjusted, as indicated.  Additional history: as documented        Reviewed and updated as needed this visit by clinical staff  Tobacco  Allergies  Meds  Problems  Med Hx  Surg Hx  Fam Hx  Soc Hx        Reviewed and updated as needed this visit by Provider  Tobacco  Allergies  Meds  Problems  Med Hx  Surg Hx  Fam Hx  Soc Hx          All other systems negative except as outline above  OBJECTIVE:  Eye exam - right eye normal lid, conjunctiva, cornea, pupil and fundus, left eye normal lid, conjunctiva, cornea, pupil and fundus.  Thyroid not palpable, not enlarged, no nodules detected.  CHEST:chest clear to IPPA, no tachypnea, retractions or cyanosis and S1, S2 normal, no murmur, no gallop, rate regular.  No ascites. No leg edema   Inga was seen today for alcohol problem.    Diagnoses and all orders for this visit:    Hypertension goal BP (blood pressure) < 140/90  -     spironolactone (ALDACTONE) 25 MG tablet; Take 1 tablet (25 mg) by mouth daily  -     labetalol (NORMODYNE) 100 MG tablet; Take one tab in the am and 2 tabs at night    Alcohol abuse  -     spironolactone (ALDACTONE) 25 MG tablet; Take 1 tablet (25 mg) by mouth daily    Essential hypertension with goal blood pressure less than 140/90  -     furosemide (LASIX) 20 MG tablet; Take 1 tablet (20 mg) by mouth daily      Paperwork completed

## 2018-04-12 NOTE — MR AVS SNAPSHOT
"              After Visit Summary   4/12/2018    Inga Ledesma    MRN: [id number]           Patient Information     Date Of Birth          1965        Visit Information        Provider Department      4/12/2018 11:40 AM Min Toledo PA-C [facility]        Today's Diagnoses     Hypertension goal BP (blood pressure) < 140/90    -  1    Alcohol abuse        Essential hypertension with goal blood pressure less than 140/90        Encounter for smoking cessation counseling           Follow-ups after your visit        Who to contact     Normal or non-critical lab and imaging results will be communicated to you by Viagogohart, letter or phone within 4 business days after the clinic has received the results. If you do not hear from us within 7 days, please contact the clinic through [employer] or phone. If you have a critical or abnormal lab result, we will notify you by phone as soon as possible.  Submit refill requests through [employer] or call your pharmacy and they will forward the refill request to us. Please allow 3 business days for your refill to be completed.          If you need to speak with a  for additional information , please call: [phone]             Additional Information About Your Visit        MyChart Information     [employer] gives you secure access to your electronic health record. If you see a primary care provider, you can also send messages to your care team and make appointments. If you have questions, please call your primary care clinic.  If you do not have a primary care provider, please call [phone] and they will assist you.        Care EveryWhere ID     This is your Care EveryWhere ID. This could be used by other organizations to access your [facility] medical records  [id number]        Your Vitals Were     Pulse Temperature Respirations Height Last Period Pulse Oximetry    82 98.5  F (36.9  C) (Tympanic) 18 5' 1\" (1.549 m) 06/02/2010 97%    BMI (Body " Mass Index)                   30.61 kg/m2            Blood Pressure from Last 3 Encounters:   04/12/18 96/64   04/09/18 170/88   03/30/18 (!) 210/106    Weight from Last 3 Encounters:   04/12/18 162 lb (73.5 kg)   04/05/18 160 lb (72.6 kg)   03/30/18 159 lb 3.2 oz (72.2 kg)              Today, you had the following     No orders found for display         Today's Medication Changes          These changes are accurate as of 4/12/18 12:41 PM.  If you have any questions, ask your nurse or doctor.               Start taking these medicines.        Dose/Directions    nicotine polacrilex 2 MG gum   Commonly known as:  EQ NICOTINE POLACRILEX   Used for:  Encounter for smoking cessation counseling   Started by:  Min Toledo PA-C        Dose:  2 mg   Place 1 each (2 mg) inside cheek as needed for smoking cessation   Quantity:  30 tablet   Refills:  3       spironolactone 25 MG tablet   Commonly known as:  ALDACTONE   Used for:  Alcohol abuse, Hypertension goal BP (blood pressure) < 140/90   Started by:  Min Toledo PA-C        Dose:  25 mg   Take 1 tablet (25 mg) by mouth daily   Quantity:  30 tablet   Refills:  1         These medicines have changed or have updated prescriptions.        Dose/Directions    labetalol 100 MG tablet   Commonly known as:  NORMODYNE   This may have changed:    - how much to take  - how to take this  - when to take this  - additional instructions   Used for:  Hypertension goal BP (blood pressure) < 140/90   Changed by:  Min Toledo PA-C        Take one tab in the am and 2 tabs at night   Quantity:  180 tablet   Refills:  0       * multivitamin, therapeutic with minerals Tabs tablet   This may have changed:  Another medication with the same name was added. Make sure you understand how and when to take each.   Used for:  Alcohol dependence with uncomplicated withdrawal (H)   Changed by:  Min Toledo PA-C        Dose:  1 tablet   Take 1 tablet by mouth daily   Quantity:  30  each   Refills:  1       * multivitamin, therapeutic with minerals Tabs tablet   This may have changed:  You were already taking a medication with the same name, and this prescription was added. Make sure you understand how and when to take each.   Used for:  Alcohol abuse   Changed by:  Min Toledo PA-C        Dose:  1 tablet   Take 1 tablet by mouth daily   Quantity:  100 tablet   Refills:  3       * nicotine 14 MG/24HR 24 hr patch   Commonly known as:  NICODERM CQ   This may have changed:  Another medication with the same name was added. Make sure you understand how and when to take each.   Used for:  Tobacco abuse   Changed by:  Min Toledo PA-C        Dose:  1 patch   Place 1 patch onto the skin daily   Quantity:  30 patch   Refills:  1       * nicotine 7 MG/24HR 24 hr patch   Commonly known as:  NICODERM CQ   This may have changed:  You were already taking a medication with the same name, and this prescription was added. Make sure you understand how and when to take each.   Used for:  Encounter for smoking cessation counseling   Changed by:  Min Toledo PA-C        Dose:  1 patch   Place 1 patch onto the skin every 24 hours   Quantity:  30 patch   Refills:  2       * Notice:  This list has 4 medication(s) that are the same as other medications prescribed for you. Read the directions carefully, and ask your doctor or other care provider to review them with you.         Where to get your medicines      These medications were sent to [facility] [address]  [address] [facility]  [address] [address] [facility] [address] [place] [address]     Phone:  [phone]     furosemide 20 MG tablet    labetalol 100 MG tablet    multivitamin, therapeutic with minerals Tabs tablet    nicotine 7 MG/24HR 24 hr patch    nicotine polacrilex 2 MG gum    spironolactone 25 MG tablet                Primary Care Provider Office Phone # Fax #    Min Toledo PA-C [phone] [address] [facility]  [address] [facility] [address]        Goals        General    I will use the walker at all times or a cane in tight spots to prevent further falls. (pt-stated)     Notes - Note created  12/9/2015  8:48 AM by Sonny Jacobs RN    As of today's date 12/9/2015 goal is met at 0 - 25%.   Goal Status:  Active          Equal Access to Services     [facility]: Hadii aad ku hadasho Soomaali, waaxda luqadaha, qaybta kaalmada adeegyada, waxay idiin hayaan adeeg kharash la'aan . So [facility] [phone].    ATENCIÓN: Si habla español, tiene a cook disposición servicios gratuitos de asistencia lingüística. Llame al [phone].    We comply with applicable federal civil rights laws and [place] laws. We do not discriminate on the basis of race, color, national origin, age, disability, sex, sexual orientation, or gender identity.            Thank you!     Thank you for choosing [facility]  for your care. Our goal is always to provide you with excellent care. Hearing back from our patients is one way we can continue to improve our services. Please take a few minutes to complete the written survey that you may receive in the mail after your visit with us. Thank you!             Your Updated Medication List - Protect others around you: Learn how to safely use, store and throw away your medicines at www.disposemymeds.org.          This list is accurate as of 4/12/18 12:41 PM.  Always use your most recent med list.                   Brand Name Dispense Instructions for use Diagnosis    acamprosate 333 MG EC tablet    CAMPRAL    180 tablet    Take 2 tablets (666 mg) by mouth 3 times daily    Alcohol abuse       albuterol 108 (90 Base) MCG/ACT Inhaler    VENTOLIN HFA    3 Inhaler    Inhale  into the lungs. INHALE 2 PUFFS FOUR TIMES DAILY AS NEEDED    Chronic bronchitis, unspecified chronic bronchitis type (H)       aspirin 81 MG EC tablet     30 tablet    Take 1 tablet (81 mg) by mouth daily    Hypertension goal BP (blood  pressure) < 140/90       calcium carbonate 1250 MG tablet    OS-JENNIFER 500 mg [place]. [place]    180 tablet    Take 1 tablet (1,250 mg) by mouth 2 times daily    Alcohol dependence with withdrawal with complication (H)       FLUoxetine 40 MG capsule    PROzac    60 capsule    Take 1 capsule (40 mg) by mouth 2 times daily    Major depressive disorder, recurrent episode, mild (H)       fluticasone-salmeterol 500-50 MCG/DOSE diskus inhaler    ADVAIR DISKUS    2 Inhaler    Inhale 1 puff into the lungs every 12 hours    Chronic bronchitis, unspecified chronic bronchitis type (H)       folic acid 1 MG tablet    FOLVITE    30 tablet    Take 1 tablet (1 mg) by mouth daily    Alcohol dependence with uncomplicated withdrawal (H)       furosemide 20 MG tablet    LASIX    90 tablet    Take 1 tablet (20 mg) by mouth daily    Essential hypertension with goal blood pressure less than 140/90       gabapentin 300 MG capsule    NEURONTIN    90 capsule    Take 1 capsule by mouth in the morning, 1 capsule in the afternoon, and 2 capsules at bedtime Take 1 capsule by mouth in the morning, 1 capsule in the afternoon, and 2 capsules at bedtime    Major depressive disorder, recurrent episode, mild (H), Sacroiliitis (H)       hydrOXYzine 50 MG tablet    ATARAX    30 tablet    Take 1 tablet (50 mg) by mouth every 6 hours as needed for anxiety or other (adjuvant pain)    Sacroiliitis (H)       ipratropium - albuterol 0.5 mg/2.5 mg/3 mL 0.5-2.5 (3) MG/3ML neb solution    DUONEB    360 mL    Take 1 vial (3 mLs) by nebulization 4 times daily as needed for wheezing    Chronic bronchitis, unspecified chronic bronchitis type (H)       labetalol 100 MG tablet    NORMODYNE    180 tablet    Take one tab in the am and 2 tabs at night    Hypertension goal BP (blood pressure) < 140/90       losartan 100 MG tablet    COZAAR    30 tablet    Take 0.5 tablets (50 mg) by mouth daily    Hypertension goal BP (blood pressure) < 140/90       magnesium oxide 400 MG tablet     MAG-OX    60 tablet    Take 1 tablet (400 mg) by mouth 2 times daily    Alcohol dependence with withdrawal with complication (H)       menthol 5 % Ptch    ICY HOT    30 patch    Apply 1 patch topically every 8 hours as needed for muscle soreness    Muscle soreness       methocarbamol 500 MG tablet    ROBAXIN    30 tablet    Take 2 tablets (1,000 mg) by mouth 3 times daily as needed for muscle spasms    Cervicalgia       * multivitamin, therapeutic with minerals Tabs tablet     30 each    Take 1 tablet by mouth daily    Alcohol dependence with uncomplicated withdrawal (H)       * multivitamin, therapeutic with minerals Tabs tablet     100 tablet    Take 1 tablet by mouth daily    Alcohol abuse       * nicotine 14 MG/24HR 24 hr patch    NICODERM CQ    30 patch    Place 1 patch onto the skin daily    Tobacco abuse       * nicotine 7 MG/24HR 24 hr patch    NICODERM CQ    30 patch    Place 1 patch onto the skin every 24 hours    Encounter for smoking cessation counseling       nicotine polacrilex 2 MG gum    EQ NICOTINE POLACRILEX    30 tablet    Place 1 each (2 mg) inside cheek as needed for smoking cessation    Encounter for smoking cessation counseling       omeprazole 20 MG CR capsule    priLOSEC    60 capsule    Take 1 capsule (20 mg) by mouth 2 times daily    Gastroesophageal reflux disease without esophagitis       order for DME     1 each    Equipment being ordered: Nebulizer    Chronic bronchitis, unspecified chronic bronchitis type (H)       rOPINIRole 1 MG tablet    REQUIP    90 tablet    Take 1 tablet (1 mg) by mouth 3 times daily    RLS (restless legs syndrome)       spironolactone 25 MG tablet    ALDACTONE    30 tablet    Take 1 tablet (25 mg) by mouth daily    Alcohol abuse, Hypertension goal BP (blood pressure) < 140/90       tiotropium 18 MCG capsule    SPIRIVA HANDIHALER    30 capsule    Inhale contents of one capsule daily.    Chronic bronchitis, unspecified chronic bronchitis type (H)        traZODone 50 MG tablet    DESYREL    30 tablet    Take 1 tablet (50 mg) by mouth At Bedtime    Major depressive disorder, recurrent episode, mild (H)       * Notice:  This list has 4 medication(s) that are the same as other medications prescribed for you. Read the directions carefully, and ask your doctor or other care provider to review them with you.

## 2018-04-16 ENCOUNTER — HOSPITAL ENCOUNTER (INPATIENT)
Facility: CLINIC | Age: 53
LOS: 4 days | Discharge: ANOTHER HOSPITAL | DRG: 897 | End: 2018-04-20
Attending: EMERGENCY MEDICINE | Admitting: PSYCHIATRY & NEUROLOGY

## 2018-04-16 ENCOUNTER — APPOINTMENT (OUTPATIENT)
Dept: GENERAL RADIOLOGY | Facility: CLINIC | Age: 53
DRG: 897 | End: 2018-04-16
Attending: EMERGENCY MEDICINE

## 2018-04-16 DIAGNOSIS — I10 ESSENTIAL HYPERTENSION: ICD-10-CM

## 2018-04-16 DIAGNOSIS — F10.930 ALCOHOL WITHDRAWAL SYNDROME WITHOUT COMPLICATION (H): ICD-10-CM

## 2018-04-16 DIAGNOSIS — R07.9 CHEST PAIN, UNSPECIFIED TYPE: ICD-10-CM

## 2018-04-16 PROBLEM — F19.20 CHEMICAL DEPENDENCY (H): Status: ACTIVE | Noted: 2017-10-06

## 2018-04-16 LAB
ALBUMIN SERPL-MCNC: 4.1 G/DL (ref 3.4–5)
ALCOHOL BREATH TEST: 0 (ref 0–0.01)
ALP SERPL-CCNC: 101 U/L (ref 40–150)
ALT SERPL W P-5'-P-CCNC: 39 U/L (ref 0–50)
AMPHETAMINES UR QL SCN: NEGATIVE
ANION GAP SERPL CALCULATED.3IONS-SCNC: 11 MMOL/L (ref 3–14)
AST SERPL W P-5'-P-CCNC: 33 U/L (ref 0–45)
BARBITURATES UR QL: NEGATIVE
BASOPHILS # BLD AUTO: 0.1 10E9/L (ref 0–0.2)
BASOPHILS NFR BLD AUTO: 0.9 %
BENZODIAZ UR QL: POSITIVE
BILIRUB SERPL-MCNC: 0.5 MG/DL (ref 0.2–1.3)
BUN SERPL-MCNC: 15 MG/DL (ref 7–30)
CALCIUM SERPL-MCNC: 9.3 MG/DL (ref 8.5–10.1)
CANNABINOIDS UR QL SCN: NEGATIVE
CHLORIDE SERPL-SCNC: 91 MMOL/L (ref 94–109)
CO2 SERPL-SCNC: 28 MMOL/L (ref 20–32)
COCAINE UR QL: NEGATIVE
CREAT SERPL-MCNC: 0.66 MG/DL (ref 0.52–1.04)
D DIMER PPP FEU-MCNC: 0.4 UG/ML FEU (ref 0–0.5)
DIFFERENTIAL METHOD BLD: ABNORMAL
EOSINOPHIL # BLD AUTO: 0 10E9/L (ref 0–0.7)
EOSINOPHIL NFR BLD AUTO: 0.1 %
ERYTHROCYTE [DISTWIDTH] IN BLOOD BY AUTOMATED COUNT: 14.9 % (ref 10–15)
ETHANOL UR QL SCN: NEGATIVE
GFR SERPL CREATININE-BSD FRML MDRD: >90 ML/MIN/1.7M2
GGT SERPL-CCNC: 55 U/L (ref 0–40)
GLUCOSE SERPL-MCNC: 133 MG/DL (ref 70–99)
HCT VFR BLD AUTO: 37.5 % (ref 35–47)
HGB BLD-MCNC: 12.9 G/DL (ref 11.7–15.7)
IMM GRANULOCYTES # BLD: 0 10E9/L (ref 0–0.4)
IMM GRANULOCYTES NFR BLD: 0.1 %
LIPASE SERPL-CCNC: 150 U/L (ref 73–393)
LYMPHOCYTES # BLD AUTO: 0.8 10E9/L (ref 0.8–5.3)
LYMPHOCYTES NFR BLD AUTO: 10.9 %
MAGNESIUM SERPL-MCNC: 0.9 MG/DL (ref 1.6–2.3)
MCH RBC QN AUTO: 29.9 PG (ref 26.5–33)
MCHC RBC AUTO-ENTMCNC: 34.4 G/DL (ref 31.5–36.5)
MCV RBC AUTO: 87 FL (ref 78–100)
MONOCYTES # BLD AUTO: 0.4 10E9/L (ref 0–1.3)
MONOCYTES NFR BLD AUTO: 5.7 %
NEUTROPHILS # BLD AUTO: 6.3 10E9/L (ref 1.6–8.3)
NEUTROPHILS NFR BLD AUTO: 82.3 %
NRBC # BLD AUTO: 0 10*3/UL
NRBC BLD AUTO-RTO: 0 /100
OPIATES UR QL SCN: NEGATIVE
PLATELET # BLD AUTO: 505 10E9/L (ref 150–450)
POTASSIUM SERPL-SCNC: 3.6 MMOL/L (ref 3.4–5.3)
PROT SERPL-MCNC: 8.1 G/DL (ref 6.8–8.8)
RBC # BLD AUTO: 4.32 10E12/L (ref 3.8–5.2)
SODIUM SERPL-SCNC: 130 MMOL/L (ref 133–144)
TROPONIN I SERPL-MCNC: <0.015 UG/L (ref 0–0.04)
TROPONIN I SERPL-MCNC: <0.015 UG/L (ref 0–0.04)
WBC # BLD AUTO: 7.7 10E9/L (ref 4–11)

## 2018-04-16 PROCEDURE — 80320 DRUG SCREEN QUANTALCOHOLS: CPT | Performed by: EMERGENCY MEDICINE

## 2018-04-16 PROCEDURE — 93010 ELECTROCARDIOGRAM REPORT: CPT | Mod: Z6 | Performed by: EMERGENCY MEDICINE

## 2018-04-16 PROCEDURE — 82977 ASSAY OF GGT: CPT | Performed by: EMERGENCY MEDICINE

## 2018-04-16 PROCEDURE — 25000132 ZZH RX MED GY IP 250 OP 250 PS 637: Performed by: EMERGENCY MEDICINE

## 2018-04-16 PROCEDURE — 93005 ELECTROCARDIOGRAM TRACING: CPT | Performed by: EMERGENCY MEDICINE

## 2018-04-16 PROCEDURE — 83735 ASSAY OF MAGNESIUM: CPT | Performed by: EMERGENCY MEDICINE

## 2018-04-16 PROCEDURE — 99285 EMERGENCY DEPT VISIT HI MDM: CPT | Mod: 25 | Performed by: EMERGENCY MEDICINE

## 2018-04-16 PROCEDURE — 96361 HYDRATE IV INFUSION ADD-ON: CPT | Performed by: EMERGENCY MEDICINE

## 2018-04-16 PROCEDURE — 80053 COMPREHEN METABOLIC PANEL: CPT | Performed by: EMERGENCY MEDICINE

## 2018-04-16 PROCEDURE — 80307 DRUG TEST PRSMV CHEM ANLYZR: CPT | Performed by: EMERGENCY MEDICINE

## 2018-04-16 PROCEDURE — 25000132 ZZH RX MED GY IP 250 OP 250 PS 637: Performed by: NURSE PRACTITIONER

## 2018-04-16 PROCEDURE — 96365 THER/PROPH/DIAG IV INF INIT: CPT | Performed by: EMERGENCY MEDICINE

## 2018-04-16 PROCEDURE — 25000128 H RX IP 250 OP 636: Performed by: EMERGENCY MEDICINE

## 2018-04-16 PROCEDURE — 71046 X-RAY EXAM CHEST 2 VIEWS: CPT

## 2018-04-16 PROCEDURE — 25000125 ZZHC RX 250: Performed by: EMERGENCY MEDICINE

## 2018-04-16 PROCEDURE — 93005 ELECTROCARDIOGRAM TRACING: CPT | Mod: 76 | Performed by: EMERGENCY MEDICINE

## 2018-04-16 PROCEDURE — 83690 ASSAY OF LIPASE: CPT | Performed by: EMERGENCY MEDICINE

## 2018-04-16 PROCEDURE — 93010 ELECTROCARDIOGRAM REPORT: CPT | Mod: 76 | Performed by: EMERGENCY MEDICINE

## 2018-04-16 PROCEDURE — 85025 COMPLETE CBC W/AUTO DIFF WBC: CPT | Performed by: EMERGENCY MEDICINE

## 2018-04-16 PROCEDURE — 85379 FIBRIN DEGRADATION QUANT: CPT | Performed by: EMERGENCY MEDICINE

## 2018-04-16 PROCEDURE — 96366 THER/PROPH/DIAG IV INF ADDON: CPT | Performed by: EMERGENCY MEDICINE

## 2018-04-16 PROCEDURE — 84484 ASSAY OF TROPONIN QUANT: CPT | Performed by: EMERGENCY MEDICINE

## 2018-04-16 PROCEDURE — 12800012 ZZH R&B CD MH INTERMEDIATE ADULT

## 2018-04-16 RX ORDER — ALUMINA, MAGNESIA, AND SIMETHICONE 2400; 2400; 240 MG/30ML; MG/30ML; MG/30ML
30 SUSPENSION ORAL EVERY 4 HOURS PRN
Status: DISCONTINUED | OUTPATIENT
Start: 2018-04-16 | End: 2018-04-20 | Stop reason: HOSPADM

## 2018-04-16 RX ORDER — DIAZEPAM 5 MG
5-20 TABLET ORAL EVERY 30 MIN PRN
Status: DISCONTINUED | OUTPATIENT
Start: 2018-04-16 | End: 2018-04-18

## 2018-04-16 RX ORDER — ASPIRIN 81 MG/1
81 TABLET ORAL DAILY
Status: DISCONTINUED | OUTPATIENT
Start: 2018-04-17 | End: 2018-04-20 | Stop reason: HOSPADM

## 2018-04-16 RX ORDER — DIAZEPAM 5 MG
5-20 TABLET ORAL EVERY 30 MIN PRN
Status: DISCONTINUED | OUTPATIENT
Start: 2018-04-16 | End: 2018-04-16

## 2018-04-16 RX ORDER — SODIUM CHLORIDE 9 MG/ML
1000 INJECTION, SOLUTION INTRAVENOUS CONTINUOUS
Status: DISCONTINUED | OUTPATIENT
Start: 2018-04-16 | End: 2018-04-17

## 2018-04-16 RX ORDER — MAGNESIUM OXIDE 400 MG/1
400 TABLET ORAL 2 TIMES DAILY
Status: DISCONTINUED | OUTPATIENT
Start: 2018-04-17 | End: 2018-04-20 | Stop reason: HOSPADM

## 2018-04-16 RX ORDER — MULTIPLE VITAMINS W/ MINERALS TAB 9MG-400MCG
1 TAB ORAL DAILY
Status: DISCONTINUED | OUTPATIENT
Start: 2018-04-17 | End: 2018-04-20 | Stop reason: HOSPADM

## 2018-04-16 RX ORDER — BISACODYL 10 MG
10 SUPPOSITORY, RECTAL RECTAL DAILY PRN
Status: DISCONTINUED | OUTPATIENT
Start: 2018-04-16 | End: 2018-04-20 | Stop reason: HOSPADM

## 2018-04-16 RX ORDER — GABAPENTIN 300 MG/1
600 CAPSULE ORAL AT BEDTIME
Status: DISCONTINUED | OUTPATIENT
Start: 2018-04-16 | End: 2018-04-20 | Stop reason: HOSPADM

## 2018-04-16 RX ORDER — MULTIPLE VITAMINS W/ MINERALS TAB 9MG-400MCG
1 TAB ORAL ONCE
Status: COMPLETED | OUTPATIENT
Start: 2018-04-16 | End: 2018-04-16

## 2018-04-16 RX ORDER — LABETALOL 100 MG/1
100 TABLET, FILM COATED ORAL EVERY MORNING
Status: DISCONTINUED | OUTPATIENT
Start: 2018-04-17 | End: 2018-04-17

## 2018-04-16 RX ORDER — METHOCARBAMOL 500 MG/1
1000 TABLET, FILM COATED ORAL 3 TIMES DAILY PRN
Status: DISCONTINUED | OUTPATIENT
Start: 2018-04-16 | End: 2018-04-20 | Stop reason: HOSPADM

## 2018-04-16 RX ORDER — LANOLIN ALCOHOL/MO/W.PET/CERES
100 CREAM (GRAM) TOPICAL DAILY
Status: COMPLETED | OUTPATIENT
Start: 2018-04-17 | End: 2018-04-19

## 2018-04-16 RX ORDER — FOLIC ACID 1 MG/1
1 TABLET ORAL DAILY
Status: DISCONTINUED | OUTPATIENT
Start: 2018-04-17 | End: 2018-04-20 | Stop reason: HOSPADM

## 2018-04-16 RX ORDER — LABETALOL 100 MG/1
200 TABLET, FILM COATED ORAL EVERY EVENING
Status: DISCONTINUED | OUTPATIENT
Start: 2018-04-16 | End: 2018-04-20 | Stop reason: HOSPADM

## 2018-04-16 RX ORDER — ONDANSETRON 4 MG/1
4 TABLET, ORALLY DISINTEGRATING ORAL EVERY 6 HOURS PRN
Status: DISCONTINUED | OUTPATIENT
Start: 2018-04-16 | End: 2018-04-20 | Stop reason: HOSPADM

## 2018-04-16 RX ORDER — LABETALOL 100 MG/1
100 TABLET, FILM COATED ORAL ONCE
Status: COMPLETED | OUTPATIENT
Start: 2018-04-16 | End: 2018-04-16

## 2018-04-16 RX ORDER — HYDROXYZINE HYDROCHLORIDE 50 MG/1
50 TABLET, FILM COATED ORAL EVERY 6 HOURS PRN
Status: DISCONTINUED | OUTPATIENT
Start: 2018-04-16 | End: 2018-04-20 | Stop reason: HOSPADM

## 2018-04-16 RX ORDER — SPIRONOLACTONE 25 MG/1
25 TABLET ORAL DAILY
Status: DISCONTINUED | OUTPATIENT
Start: 2018-04-17 | End: 2018-04-17

## 2018-04-16 RX ORDER — CALCIUM CARBONATE 500(1250)
1250 TABLET ORAL 2 TIMES DAILY
Status: DISCONTINUED | OUTPATIENT
Start: 2018-04-16 | End: 2018-04-20 | Stop reason: HOSPADM

## 2018-04-16 RX ORDER — ALBUTEROL SULFATE 90 UG/1
2 AEROSOL, METERED RESPIRATORY (INHALATION) EVERY 6 HOURS PRN
Status: DISCONTINUED | OUTPATIENT
Start: 2018-04-16 | End: 2018-04-20 | Stop reason: HOSPADM

## 2018-04-16 RX ORDER — ROPINIROLE 1 MG/1
1 TABLET, FILM COATED ORAL 3 TIMES DAILY
Status: DISCONTINUED | OUTPATIENT
Start: 2018-04-16 | End: 2018-04-20 | Stop reason: HOSPADM

## 2018-04-16 RX ORDER — LOSARTAN POTASSIUM 50 MG/1
50 TABLET ORAL DAILY
Status: DISCONTINUED | OUTPATIENT
Start: 2018-04-16 | End: 2018-04-17

## 2018-04-16 RX ORDER — TRAZODONE HYDROCHLORIDE 50 MG/1
50 TABLET, FILM COATED ORAL
Status: DISCONTINUED | OUTPATIENT
Start: 2018-04-16 | End: 2018-04-20 | Stop reason: HOSPADM

## 2018-04-16 RX ORDER — FUROSEMIDE 20 MG
20 TABLET ORAL DAILY
Status: DISCONTINUED | OUTPATIENT
Start: 2018-04-17 | End: 2018-04-20 | Stop reason: HOSPADM

## 2018-04-16 RX ORDER — ACETAMINOPHEN 325 MG/1
650 TABLET ORAL EVERY 4 HOURS PRN
Status: DISCONTINUED | OUTPATIENT
Start: 2018-04-16 | End: 2018-04-20 | Stop reason: HOSPADM

## 2018-04-16 RX ORDER — GABAPENTIN 300 MG/1
300 CAPSULE ORAL 2 TIMES DAILY
Status: DISCONTINUED | OUTPATIENT
Start: 2018-04-17 | End: 2018-04-20 | Stop reason: HOSPADM

## 2018-04-16 RX ORDER — LANOLIN ALCOHOL/MO/W.PET/CERES
100 CREAM (GRAM) TOPICAL ONCE
Status: COMPLETED | OUTPATIENT
Start: 2018-04-16 | End: 2018-04-16

## 2018-04-16 RX ADMIN — Medication 1250 MG: at 20:38

## 2018-04-16 RX ADMIN — DIAZEPAM 10 MG: 5 TABLET ORAL at 20:38

## 2018-04-16 RX ADMIN — DIAZEPAM 10 MG: 5 TABLET ORAL at 16:57

## 2018-04-16 RX ADMIN — SODIUM CHLORIDE 1000 ML: 9 INJECTION, SOLUTION INTRAVENOUS at 13:49

## 2018-04-16 RX ADMIN — NICOTINE 1 PATCH: 7 PATCH TRANSDERMAL at 20:38

## 2018-04-16 RX ADMIN — DIAZEPAM 10 MG: 5 TABLET ORAL at 23:08

## 2018-04-16 RX ADMIN — LOSARTAN POTASSIUM 50 MG: 50 TABLET ORAL at 22:11

## 2018-04-16 RX ADMIN — LABETALOL HYDROCHLORIDE 100 MG: 100 TABLET, FILM COATED ORAL at 22:16

## 2018-04-16 RX ADMIN — ROPINIROLE HYDROCHLORIDE 1 MG: 1 TABLET, FILM COATED ORAL at 20:38

## 2018-04-16 RX ADMIN — SODIUM CHLORIDE 1000 ML: 9 INJECTION, SOLUTION INTRAVENOUS at 12:20

## 2018-04-16 RX ADMIN — Medication 2 G: at 14:20

## 2018-04-16 RX ADMIN — NICOTINE POLACRILEX 4 MG: 2 GUM, CHEWING ORAL at 20:38

## 2018-04-16 RX ADMIN — OMEPRAZOLE 20 MG: 20 CAPSULE, DELAYED RELEASE ORAL at 20:38

## 2018-04-16 RX ADMIN — Medication 500 MCG: at 12:21

## 2018-04-16 RX ADMIN — Medication 100 MG: at 12:20

## 2018-04-16 RX ADMIN — FLUOXETINE 40 MG: 20 CAPSULE ORAL at 22:12

## 2018-04-16 RX ADMIN — DIAZEPAM 5 MG: 5 TABLET ORAL at 18:31

## 2018-04-16 RX ADMIN — GABAPENTIN 600 MG: 300 CAPSULE ORAL at 22:12

## 2018-04-16 RX ADMIN — MULTIPLE VITAMINS W/ MINERALS TAB 1 TABLET: TAB at 12:20

## 2018-04-16 RX ADMIN — TRAZODONE HYDROCHLORIDE 50 MG: 50 TABLET ORAL at 22:12

## 2018-04-16 RX ADMIN — LABETALOL HCL 100 MG: 100 TABLET, FILM COATED ORAL at 17:44

## 2018-04-16 RX ADMIN — DIAZEPAM 5 MG: 5 TABLET ORAL at 12:20

## 2018-04-16 ASSESSMENT — ENCOUNTER SYMPTOMS
SHORTNESS OF BREATH: 1
NERVOUS/ANXIOUS: 1
ABDOMINAL PAIN: 0
TREMORS: 1
FEVER: 0
NAUSEA: 1
VOMITING: 1
COUGH: 1
DYSURIA: 0
DIARRHEA: 0

## 2018-04-16 ASSESSMENT — ACTIVITIES OF DAILY LIVING (ADL)
GROOMING: INDEPENDENT
DRESS: SCRUBS (BEHAVIORAL HEALTH);INDEPENDENT
LAUNDRY: WITH SUPERVISION
ORAL_HYGIENE: INDEPENDENT

## 2018-04-16 NOTE — IP AVS SNAPSHOT
MRN:[id number]                      After Visit Summary   4/16/2018    Inga Ledesma    MRN: [id number]           Thank you!     Thank you for choosing [facility] for your care. Our goal is always to provide you with excellent care.        Patient Information     Date Of Birth          1965        Designated Caregiver       Most Recent Value    Caregiver    Will someone help with your care after discharge? no      About your hospital stay     You were admitted on:  April 16, 2018 You last received care in the:  [facility]    You were discharged on:  April 20, 2018       Who to Call     For medical emergencies, please call 911.  For non-urgent questions about your medical care, please call your primary care provider or clinic, [phone]          Attending Provider     Provider Specialty    Conchita Marcano MD Emergency Medicine    Ivonne, Ion Kelly MD Psychiatry       Primary Care Provider Office Phone # Fax #    Min Julia Toledo PA-C [phone]      After Care Instructions     Patient care order       Patient needs to have her nebulizer and blood pressure cuff available while in CD treatment.                  Further instructions from your care team       Behavioral Discharge Planning and Instructions  THANK YOU FOR CHOOSING THE [address]    Summary: You were admitted to [address] [place] [address] on treatment and evaluation of alcohol use. You are being discharge today and the treatment teams recommendations are: Enter and complete MICD treatment at [facility].  Your admission has not been scheduled so please respond to all calls from your treatment center. If you don't hear from [facility] by Monday, Please call them yourself.    [facility] Inpatient treatment program  [phone]  [address] [place] [address]    Main Diagnoses: DIAGNOSES: per Dr. Coronado psychiatrist.   AXIS I:   1.  Alcohol use disorder,  severe dependence.   2.  Major depressive disorder, recurrent, moderate.   3.  Nicotine use disorder.   Axis II:  Deferred.   Axis III:  See past medical history.         Major Treatments, Procedures and Findings: Your alcohol withdrawal was treated with valium using the Modified Selective Severity Assessment (MSSA) protocol  .  You were followed by Psychiatry and Medical. You met with Case Management to complete a chemical health assessment and for discharge planning. You were given a copy of your lab results which were reviewed with you. Please bring your lab results with you to your primary follow up appointment. Make your recovery a priority, [place].    Please review handouts provided on hypertension and COPD    Symptoms to Report: If  you experience feeling more anxiety, confusion, losing more sleep, mood declining or thoughts of suicide, notify your treatment team or notify your primary physician. IF ANY OF THE SYMPTOMS YOU ARE EXPERIENCING ARE A MEDICAL EMERGENCY CALL 911 IMMEDIATELY.    Lifestyle Adjustment: Health Action Plan:  1.Create a daily schedule  2. Eat Healthy  3. Plan Enjoyable Sober Activities  4. Use Problem Solving Skills and Deal with Issues as they Arise.   5. Be Physically Active  6. Take your medications as prescribed  7. Get enough restful sleep  8. Practice Relaxation  9. Spend time with Supportive People  10. No use of alcohol, illegal drugs or addictive medications other than what is currently prescribed.   11.AA, NA Sponsor are excellent resources for support  Keeping hands clean is one of the most important steps we can take to avoid getting sick and spreading germs to others.  Please wash your hands frequently.       Medical Provider Follow Up:  Dr. Min Toledo  [facility]  You are aware you should follow up with your primary for medical and medication needs.   Please take this discharge folder with you to all your follow up appointments as it contains your lab results,  diagnosis, medication list and discharge recommendations.       Support Group:  AA/NA and Sponsor contact/support    Resources:  Crisis Intervention: 392.337.1254 or 103-447-6442 (TTY: 775.817.7807).  Call anytime for help.  Suicide Awareness Voices of Education (SAVE) (www.save.org): 477-138-JOKZ (7442)  National Suicide Prevention Line (www.mentalhealthmn.org): 039-573-LISM (4859)  National [place] on Mental Illness (www.mn.[place].org): 571.932.4177 or 020-675-1820.  Alcoholics Anonymous (www.alcoholics-anonymous.org): Or local information can be found 24 hours a day at:   AA Intergroup service office in [place] (http://www.aastpaul.org/) 371.976.7303  AA Intergroup service office in [facility]: [phone]. (http://www.a[place].org/)  Narcotics Anonymous (www.nam[place].org)1-426.980.6491   [facility]al Intake [phone]    Medical Records [phone]      [place] [facility] ([facility])  [facility] connects people seeking recovery to resources that help foster and sustain long-term recovery.  Whether you are seeking resources for treatment, transportation, housing, job training, education, health care or other pathways to recovery, [facility] is a great place to start.  [phone]. Www[facility].org    General Medication Instructions:   See your medication sheet(s) for instructions. Take all medicines as directed.  Make no changes unless your doctor directs them. Go to all your doctor visits. Be sure to have all your required lab tests. This way, your medicines can be refilled in timely fashion. Do not use any drugs not prescribed by your provider. AA/NA and Sponsors are excellent resources for support. Avoid alcohol.    Please Note: If you have any questions at anytime after you are discharged please call the [facility] detox unit 3AW unit at [phone].  [facility], Behavioral Intake [phone]  Please  "take this discharge folder with you to all your follow up appointments, it contains your lab results, diagnosis, medication list and discharge recommendations.      THANK YOU FOR CHOOSING THE [facility]                                 Pending Results     No orders found from 4/14/2018 to 4/17/2018.            Statement of Approval     Ordered          04/20/18 1006  I have reviewed and agree with all the recommendations and orders detailed in this document.  EFFECTIVE NOW     Approved and electronically signed by:  Ion Coronado MD             Admission Information     Date & Time Provider Department Dept. Phone    4/16/2018 Ion Coronado MD [facility] [phone]      Your Vitals Were     Blood Pressure Pulse Temperature Respirations Height Weight    153/98 (BP Location: Left arm) 70 97  F (36.1  C) (Oral) 16 1.549 m (5' 1\") 72.6 kg (160 lb)    Last Period Pulse Oximetry BMI (Body Mass Index)             06/02/2010 100% 30.23 kg/m2         [email or web address] Information     [employer] gives you secure access to your electronic health record. If you see a primary care provider, you can also send messages to your care team and make appointments. If you have questions, please call your primary care clinic.  If you do not have a primary care provider, please call [phone] and they will assist you.        Care EveryWhere ID     This is your Care EveryWhere ID. This could be used by other organizations to access your [facility] medical records  [id number]        Equal Access to Services     ИРИНА KERNS : Hadii chang Mata, waaxda luyessenia, qaybta kaalmakelsey yuen . So [facility] [phone].    ATENCIÓN: Si habla español, tiene a cook disposición servicios gratuitos de asistencia lingüística. Llame al [phone].    We comply with applicable federal civil rights laws and [place] laws. We do not discriminate on the " basis of race, color, national origin, age, disability, sex, sexual orientation, or gender identity.               Review of your medicines      CONTINUE these medicines which have NOT CHANGED        Dose / Directions    acamprosate 333 MG EC tablet   Commonly known as:  CAMPRAL   Used for:  Alcohol abuse        Dose:  666 mg   Take 2 tablets (666 mg) by mouth 3 times daily   Quantity:  180 tablet   Refills:  2       albuterol 108 (90 Base) MCG/ACT Inhaler   Commonly known as:  VENTOLIN HFA   Used for:  Chronic bronchitis, unspecified chronic bronchitis type (H)        Inhale  into the lungs. INHALE 2 PUFFS FOUR TIMES DAILY AS NEEDED   Quantity:  3 Inhaler   Refills:  0       aspirin 81 MG EC tablet   Used for:  Hypertension goal BP (blood pressure) < 140/90        Dose:  81 mg   Take 1 tablet (81 mg) by mouth daily   Quantity:  30 tablet   Refills:  1       calcium carbonate 1250 MG tablet   Commonly known as:  OS-JENNIFER 500 mg [place]. Ca   Used for:  Alcohol dependence with withdrawal with complication (H)        Dose:  1250 mg   Take 1 tablet (1,250 mg) by mouth 2 times daily   Quantity:  180 tablet   Refills:  0       FLUoxetine 40 MG capsule   Commonly known as:  PROzac   Used for:  Major depressive disorder, recurrent episode, mild (H)        Dose:  40 mg   Take 1 capsule (40 mg) by mouth 2 times daily   Quantity:  60 capsule   Refills:  0       fluticasone-salmeterol 500-50 MCG/DOSE diskus inhaler   Commonly known as:  ADVAIR DISKUS   Used for:  Chronic bronchitis, unspecified chronic bronchitis type (H)        Dose:  1 puff   Inhale 1 puff into the lungs every 12 hours   Quantity:  2 Inhaler   Refills:  1       folic acid 1 MG tablet   Commonly known as:  FOLVITE   Used for:  Alcohol dependence with uncomplicated withdrawal (H)        Dose:  1 mg   Take 1 tablet (1 mg) by mouth daily   Quantity:  30 tablet   Refills:  0       furosemide 20 MG tablet   Commonly known as:  LASIX   Used for:  Essential hypertension  with goal blood pressure less than 140/90        Dose:  20 mg   Take 1 tablet (20 mg) by mouth daily   Quantity:  90 tablet   Refills:  3       gabapentin 300 MG capsule   Commonly known as:  NEURONTIN   Used for:  Major depressive disorder, recurrent episode, mild (H), Sacroiliitis (H)        Take 1 capsule by mouth in the morning, 1 capsule in the afternoon, and 2 capsules at bedtime Take 1 capsule by mouth in the morning, 1 capsule in the afternoon, and 2 capsules at bedtime   Quantity:  90 capsule   Refills:  1       hydrOXYzine 50 MG tablet   Commonly known as:  ATARAX   Used for:  Sacroiliitis (H)        Dose:  50 mg   Take 1 tablet (50 mg) by mouth every 6 hours as needed for anxiety or other (adjuvant pain)   Quantity:  30 tablet   Refills:  1       ipratropium - albuterol 0.5 mg/2.5 mg/3 mL 0.5-2.5 (3) MG/3ML neb solution   Commonly known as:  DUONEB   Used for:  Chronic bronchitis, unspecified chronic bronchitis type (H)        Dose:  3 mL   Take 1 vial (3 mLs) by nebulization 4 times daily as needed for wheezing   Quantity:  360 mL   Refills:  1       labetalol 100 MG tablet   Commonly known as:  NORMODYNE   Used for:  Hypertension goal BP (blood pressure) < 140/90        Take one tab in the am and 2 tabs at night   Quantity:  180 tablet   Refills:  0       losartan 100 MG tablet   Commonly known as:  COZAAR   Used for:  Hypertension goal BP (blood pressure) < 140/90        Dose:  50 mg   Take 0.5 tablets (50 mg) by mouth daily   Quantity:  30 tablet   Refills:  1       magnesium oxide 400 MG tablet   Commonly known as:  MAG-OX   Used for:  Alcohol dependence with withdrawal with complication (H)        Dose:  400 mg   Take 1 tablet (400 mg) by mouth 2 times daily   Quantity:  60 tablet   Refills:  0       menthol 5 % Ptch   Commonly known as:  ICY HOT   Used for:  Muscle soreness        Dose:  1 patch   Apply 1 patch topically every 8 hours as needed for muscle soreness   Quantity:  30 patch   Refills:   3       methocarbamol 500 MG tablet   Commonly known as:  ROBAXIN   Used for:  Cervicalgia        Dose:  1000 mg   Take 2 tablets (1,000 mg) by mouth 3 times daily as needed for muscle spasms   Quantity:  30 tablet   Refills:  1       multivitamin, therapeutic with minerals Tabs tablet   Used for:  Alcohol dependence with uncomplicated withdrawal (H)        Dose:  1 tablet   Take 1 tablet by mouth daily   Quantity:  30 each   Refills:  1       omeprazole 20 MG CR capsule   Commonly known as:  priLOSEC   Used for:  Gastroesophageal reflux disease without esophagitis        Dose:  20 mg   Take 1 capsule (20 mg) by mouth 2 times daily   Quantity:  60 capsule   Refills:  1       rOPINIRole 1 MG tablet   Commonly known as:  REQUIP   Used for:  RLS (restless legs syndrome)        Dose:  1 mg   Take 1 tablet (1 mg) by mouth 3 times daily   Quantity:  90 tablet   Refills:  1       spironolactone 25 MG tablet   Commonly known as:  ALDACTONE   Used for:  Alcohol abuse, Hypertension goal BP (blood pressure) < 140/90        Dose:  25 mg   Take 1 tablet (25 mg) by mouth daily   Quantity:  30 tablet   Refills:  1       tiotropium 18 MCG capsule   Commonly known as:  SPIRIVA HANDIHALER   Used for:  Chronic bronchitis, unspecified chronic bronchitis type (H)        Inhale contents of one capsule daily.   Quantity:  30 capsule   Refills:  1       traZODone 50 MG tablet   Commonly known as:  DESYREL   Used for:  Major depressive disorder, recurrent episode, mild (H)        Dose:  50 mg   Take 1 tablet (50 mg) by mouth At Bedtime   Quantity:  30 tablet   Refills:  1                Protect others around you: Learn how to safely use, store and throw away your medicines at www.disposemymeds.org.             Medication List: This is a list of all your medications and when to take them. Check marks below indicate your daily home schedule. Keep this list as a reference.      Medications           Morning Afternoon Evening Bedtime As Needed     acamprosate 333 MG EC tablet   Commonly known as:  CAMPRAL   Take 2 tablets (666 mg) by mouth 3 times daily                                         albuterol 108 (90 Base) MCG/ACT Inhaler   Commonly known as:  VENTOLIN HFA   Inhale  into the lungs. INHALE 2 PUFFS FOUR TIMES DAILY AS NEEDED   Last time this was given:  2 puffs on 4/18/2018  7:22 PM                                   aspirin 81 MG EC tablet   Take 1 tablet (81 mg) by mouth daily   Last time this was given:  81 mg on 4/20/2018  9:12 AM                                   calcium carbonate 1250 MG tablet   Commonly known as:  OS-JENNIFER 500 mg [place]. Ca   Take 1 tablet (1,250 mg) by mouth 2 times daily   Last time this was given:  1,250 mg on 4/20/2018  9:12 AM                                      FLUoxetine 40 MG capsule   Commonly known as:  PROzac   Take 1 capsule (40 mg) by mouth 2 times daily   Last time this was given:  40 mg on 4/20/2018  9:12 AM                                      fluticasone-salmeterol 500-50 MCG/DOSE diskus inhaler   Commonly known as:  ADVAIR DISKUS   Inhale 1 puff into the lungs every 12 hours                                      folic acid 1 MG tablet   Commonly known as:  FOLVITE   Take 1 tablet (1 mg) by mouth daily   Last time this was given:  1 mg on 4/20/2018  9:12 AM                                   furosemide 20 MG tablet   Commonly known as:  LASIX   Take 1 tablet (20 mg) by mouth daily   Last time this was given:  20 mg on 4/20/2018  9:12 AM                                   gabapentin 300 MG capsule   Commonly known as:  NEURONTIN   Take 1 capsule by mouth in the morning, 1 capsule in the afternoon, and 2 capsules at bedtime Take 1 capsule by mouth in the morning, 1 capsule in the afternoon, and 2 capsules at bedtime   Last time this was given:  300 mg on 4/20/2018  9:13 AM                                         hydrOXYzine 50 MG tablet   Commonly known as:  ATARAX   Take 1 tablet (50 mg) by mouth every 6 hours as  needed for anxiety or other (adjuvant pain)   Last time this was given:  50 mg on 4/17/2018  8:24 PM                                   ipratropium - albuterol 0.5 mg/2.5 mg/3 mL 0.5-2.5 (3) MG/3ML neb solution   Commonly known as:  DUONEB   Take 1 vial (3 mLs) by nebulization 4 times daily as needed for wheezing   Last time this was given:  3 mLs on 4/18/2018  8:20 PM                                   labetalol 100 MG tablet   Commonly known as:  NORMODYNE   Take one tab in the am and 2 tabs at night   Last time this was given:  100 mg on 4/20/2018  9:12 AM                                      losartan 100 MG tablet   Commonly known as:  COZAAR   Take 0.5 tablets (50 mg) by mouth daily   Last time this was given:  50 mg on 4/20/2018  9:12 AM                                   magnesium oxide 400 MG tablet   Commonly known as:  MAG-OX   Take 1 tablet (400 mg) by mouth 2 times daily   Last time this was given:  400 mg on 4/20/2018  9:13 AM                                      menthol 5 % Ptch   Commonly known as:  ICY HOT   Apply 1 patch topically every 8 hours as needed for muscle soreness                                   methocarbamol 500 MG tablet   Commonly known as:  ROBAXIN   Take 2 tablets (1,000 mg) by mouth 3 times daily as needed for muscle spasms                                   multivitamin, therapeutic with minerals Tabs tablet   Take 1 tablet by mouth daily   Last time this was given:  1 tablet on 4/20/2018  9:12 AM                                   omeprazole 20 MG CR capsule   Commonly known as:  priLOSEC   Take 1 capsule (20 mg) by mouth 2 times daily   Last time this was given:  20 mg on 4/20/2018  9:12 AM                                      rOPINIRole 1 MG tablet   Commonly known as:  REQUIP   Take 1 tablet (1 mg) by mouth 3 times daily   Last time this was given:  1 mg on 4/20/2018  9:12 AM                                         spironolactone 25 MG tablet   Commonly known as:  ALDACTONE   Take  1 tablet (25 mg) by mouth daily   Last time this was given:  25 mg on 4/20/2018  9:15 AM                                   tiotropium 18 MCG capsule   Commonly known as:  SPIRIVA HANDIHALER   Inhale contents of one capsule daily.                                   traZODone 50 MG tablet   Commonly known as:  DESYREL   Take 1 tablet (50 mg) by mouth At Bedtime   Last time this was given:  50 mg on 4/19/2018  8:55 PM                                             More Information        Discharge Instructions: COPD  You have been diagnosed with chronic obstructive pulmonary disease (COPD). This is a name given to a group of diseases that limit the flow of air in and out of your lungs. This makes it harder to breathe. With COPD, you are also more likely to get lung infections. COPD includes chronic bronchitis and emphysema. COPD is most often caused by heavy, long-term cigarette smoking.  Home care  Quit smoking    If you smoke, quit. It is the best thing you can do for your COPD and your overall health.    Join a stop-smoking program. There are even telephone, text message, and Internet programs to help you quit.    Ask your healthcare provider about medicines or other methods to help you quit.    Ask family members to quit smoking as well.    Don't allow people to smoke in your home, in your car, or when they are around you.  Protect yourself from infection    Wash your hands often. Do your best to keep your hands away from your face. Most germs are spread from your hands to your mouth.    Get a flu shot every year. Also ask your provider about pneumonia vaccines.    Avoid crowds. It's especially important to do this in the winter when more people have colds and flu.    To stay healthy, get enough sleep, exercise regularly, and eat a balanced diet. You should:  ? Get about 8 hours of sleep every night.  ? Try to exercise for at least 30 minutes on most days.  ? Have healthy foods including fruits and vegetables, 100%  whole grains, lean meats and fish, and low-fat dairy products. Try to stay away from foods high in fats and sugar.  Take your medicines  Take your medicines exactly as directed. Don't skip doses.  Manage your stress  Stress can make COPD worse. Use this stress management technique:    Find a quiet place and sit or lie in a comfortable position.    Close your eyes and perform breathing exercises for several minutes. Ask your provider about the best way to breathe.  Pulmonary rehabilitation    Pulmonary rehab can help you feel better. These programs include exercise, breathing techniques, information about COPD, counseling, and help for smokers.    Ask your provider or your local hospital about programs in your area.  When to call your healthcare provider  Call your provider immediately if you have any of the following:    Shortness of breath, wheezing, or coughing    Increased mucus    Yellow, green, bloody, or smelly mucus    Fever or chills    Tightness in your chest that does not go away with rest or medicine    An irregular heartbeat or a feeling that your heart is beating very fast    Swollen ankles   Date Last Reviewed: 5/1/2016 2000-2017 [employer]. [address] [place] [address]. All rights reserved. This information is not intended as a substitute for professional medical care. Always follow your healthcare professional's instructions.                Established High Blood Pressure    High blood pressure (hypertension) is a chronic disease. Often, healthcare providers don t know what causes it. But it can be caused by certain health conditions and medicines.  If you have high blood pressure, you may not have any symptoms. If you do have symptoms, they may include headache, dizziness, changes in your vision, chest pain, and shortness of breath. But even without symptoms, high blood pressure that s not treated raises your risk for heart attack, heart failure, and stroke. High blood  pressure is a serious health risk and shouldn t be ignored.  Blood pressure measurements are given as 2 numbers. Systolic blood pressure is the upper number. This is the pressure when the heart contracts. Diastolic blood pressure is the lower number. This is the pressure when the heart relaxes between beats. You will see your blood pressure readings written together. For example, a person with a systolic pressure of 118 and a diastolic pressure of 78 will have 118/78 written in the medical record.  Blood pressure is categorized as normal, elevated, or stage 1 or stage 2 high blood pressure:    Normal blood pressure is systolic of less than 120 and diastolic of less than 80 (120/80)    Elevated blood pressure is systolic of 120 to 129 and diastolic less than 80    Stage 1 high blood pressure is systolic is 130 to 139 or diastolic between 80 to 89    Stage 2 high blood pressure is when systolic is 140 or higher or the diastolic is 90 or higher  Home care  If you have high blood pressure, follow these home care guidelines to help lower your blood pressure. If you are taking medicines for high blood pressure, these methods may reduce or end your need for medicines in the future.    Start a weight-loss program if you are overweight.    Cut back on how much salt you get in your diet. Here s how to do this:  ? Don t eat foods that have a lot of salt. These include olives, pickles, smoked meats, and salted potato chips.  ? Don t add salt to your food at the table.  ? Use only small amounts of salt when cooking.    Start an exercise program. Talk with your healthcare provider about the type of exercise program that would be best for you. It doesn't have to be hard. Even brisk walking for 20 minutes 3 times a week is a good form of exercise.    Don t take medicines that stimulate the heart. This includes many over-the-counter cold and sinus decongestant pills and sprays, as well as diet pills. Check the warnings about high  blood pressure on the label. Before buying any over-the-counter medicines or supplements, always ask the pharmacist about the product's potential interaction with your high blood pressure and your high blood pressure medicines.    Stimulants such as amphetamine or cocaine could be deadly for someone with high blood pressure. Never take these.    Limit how much caffeine you get in your diet. Switch to caffeine-free products.    Stop smoking. If you are a long-time smoker, this can be hard. Talk to your healthcare provider about medicines and nicotine replacement options to help you. Also, enroll in a stop-smoking program to make it more likely that you will quit for good.    Learn how to handle stress. This is an important part of any program to lower blood pressure. Learn about relaxation methods like meditation, yoga, or biofeedback.    If your provider prescribed medicines, take them exactly as directed. Missing doses may cause your blood pressure get out of control.    If you miss a dose or doses, check with your healthcare provider or pharmacist about what to do.    Consider buying an automatic blood pressure machine to check your blood pressure at home. Ask your provider for a recommendation. You can get one of these at most pharmacies.     The American Heart Association recommends the following guidelines for home blood pressure monitoring:    Don't smoke or drink coffee for 30 minutes before taking your blood pressure.    Go to the bathroom before the test.    Relax for 5 minutes before taking the measurement.    Sit with your back supported (don't sit on a couch or soft chair); keep your feet on the floor uncrossed. Place your arm on a solid flat surface (like a table) with the upper part of the arm at heart level. Place the middle of the cuff directly above the bend of the elbow. Check the monitor's instruction manual for an illustration.    Take multiple readings. When you measure, take 2 to 3 readings one  minute apart and record all of the results.    Take your blood pressure at the same time every day, or as your healthcare provider recommends.    Record the date, time, and blood pressure reading.    Take the record with you to your next medical appointment. If your blood pressure monitor has a built-in memory, simply take the monitor with you to your next appointment.    Call your provider if you have several high readings. Don't be frightened by a single high blood pressure reading, but if you get several high readings, check in with your healthcare provider.    Note: When blood pressure reaches a systolic (top number) of 180 or higher OR diastolic (bottom number) of 110 or higher, seek emergency medical treatment.  Follow-up care  You will need to see your healthcare provider regularly. This is to check your blood pressure and to make changes to your medicines. Make a follow-up appointment as directed. Bring the record of your home blood pressure readings to the appointment.  When to seek medical advice  Call your healthcare provider right away if any of these occur:    Blood pressure reaches a systolic (upper number) of 180 or higher OR a diastolic (bottom number) of 110 or higher    Chest pain or shortness of breath    Severe headache    Throbbing or rushing sound in the ears    Nosebleed    Sudden severe pain in your belly (abdomen)    Extreme drowsiness, confusion, or fainting    Dizziness or spinning sensation (vertigo)    Weakness of an arm or leg or one side of the face    You have problems speaking or seeing   Date Last Reviewed: 12/1/2016 2000-2017 [employer]. [address] [place] [address]. All rights reserved. This information is not intended as a substitute for professional medical care. Always follow your healthcare professional's instructions.                What is High Blood Pressure?  High blood pressure (also called hypertension) is known as the  silent killer.  This  is because most of the time it doesn t cause symptoms. In fact, many people don t know they have it until other problems develop. In most cases, high blood pressure often requires lifelong treatment.  Understanding blood pressure  The circulatory system is made up of the heart and blood vessels that carry blood through the body. Your heart is the pump for this system. With each heartbeat (contraction), the heart sends blood out through large blood vessels called arteries. Blood pressure is a measure of how hard the moving blood pushes against the walls of the arteries.  High blood pressure can harm your health  In a healthy blood vessel, the blood moves smoothly through the vessel and puts normal pressure on the vessel walls.       High blood pressure occurs when blood pushes too hard against artery walls. This causes damage to the artery walls and then the formation of scar tissue as it heals. This makes the arteries stiff and weak. Plaque sticks to the scarred tissue narrowing and hardening the arteries. High blood pressure also causes your heart to work harder to get blood out to the body. High blood pressure raises your risk of heart attack, also known as acute myocardial infarction, or AMI, heart failure, and stroke. It can also lead to kidney disease, and blindness. In general, if you have high blood pressure, keeping your blood pressure below 130/80 mmHg may help prevent these problems. Your healthcare provider may prescribe medicine to help control blood pressure if lifestyle changes are not enough.  It's important to know your blood pressure numbers. Blood pressure measurements are given as 2 numbers. Systolic blood pressure is the upper number. This is the pressure when the heart contracts. Diastolic blood pressure is the lower number. This is the pressure when the heart relaxes between beats.  Blood pressure is categorized as normal, elevated, or stage 1 or stage 2 high blood pressure:    Normal blood  "pressure is systolic of less than 120 and diastolic of less than 80 (120/80)    Elevated blood pressure is systolic of 120 to 129 and diastolic less than 80    Stage 1 high blood pressure is systolic is 130 to 139 or diastolic between 80 to 89    Stage 2 high blood pressure is when systolic is 140 or higher or the diastolic is 90 or higher  High blood pressure is diagnosed when multiple, separate readings show blood pressure above 130/80 mmHg. Talk with your healthcare provider if you have questions or concerns about your blood pressure readings.  Measuring blood pressure  An example of a blood pressure measurement is 120/70 (120 over 70). The top number is the pressure of blood against the artery walls during a heartbeat (systolic). The bottom number is the pressure of blood against artery walls between heartbeats (diastolic). Talk with your healthcare provider to find out what your blood pressure goals should be.   Controlling blood pressure  If your blood pressure is too high, work with your doctor on a plan for lowering it. Below are steps you can take that will help lower your blood pressure.    Choose heart-healthy foods. Eating healthier meals helps you control your blood pressure. Ask your doctor about the DASH eating plan. This plan helps reduce blood pressure by limiting the amount of sodium (salt) you have in your diet. DASH also encourages eating plenty of fruits and vegetables, low-fat or non-fat dairy, whole-grains, and foods high in fiber, and low in fat. This also provides an enhanced amount of potassium which can also help lower blood pressure.    Reduce sodium. Reducing sodium in your diet reduces fluid retention. Fluid retention caused by too much salt increases blood volume and blood pressure. The American Heart Association s (AHA) \"ideal\" sodium intake recommendation is 1,500 milligrams per day.  However, since American's eat so much salt, the AHA says a positive change can occur by cutting back " to even 2,400 milligrams of sodium a day.    Maintain a healthy weight. Being overweight makes you more likely to have high blood pressure. Losing excess weight helps lower blood pressure.    Exercise regularly. Daily exercise helps your heart and blood vessels work better and stay healthier. It can help lower your blood pressure.    Stop smoking. Smoking increases blood pressure and damages blood vessels.    Limit alcohol. Drinking too much alcohol can raise blood pressure. Men should have no more than 2 drinks a day. Women should have no more than 1. (A drink is equal to 1 beer, or a small glass of wine, or a shot of liquor.)    Control stress. Stress makes your heart work harder and beat faster. Controlling stress helps you control your blood pressure.  Facts about high blood pressure    Feeling OK does not mean that blood pressure is under control. Likewise, feeling bad doesn t mean it s out of control. The only way to know for sure is to check your pressure regularly.    Medicine is only one part of controlling high blood pressure. You also need to manage your weight, get regular exercise, and adjust your eating habits.    High blood pressure is usually a lifelong problem. But it can be controlled with healthy lifestyle changes and medicine.    Hypertension is not the same as stress. Although stress may be a factor in high blood pressure, it s only one part of the story.    Blood pressure medicines need to be taken every day. Stopping suddenly may cause a dangerous increase in pressure.     Date Last Reviewed: 4/27/2016 2000-2017 [employer]. [address] [facility], [place] [address]. All rights reserved. This information is not intended as a substitute for professional medical care. Always follow your healthcare professional's instructions.

## 2018-04-16 NOTE — IP AVS SNAPSHOT
[facility]    [address] [place] [id number]    Phone:  [phone]                                       After Visit Summary   4/16/2018    Inga Ledesma    MRN: [id number]           After Visit Summary Signature Page     I have received my discharge instructions, and my questions have been answered. I have discussed any challenges I see with this plan with the nurse or doctor.    ..........................................................................................................................................  Patient/Patient Representative Signature      ..........................................................................................................................................  Patient Representative Print Name and Relationship to Patient    ..................................................               ................................................  Date                                            Time    ..........................................................................................................................................  Reviewed by Signature/Title    ...................................................              ..............................................  Date                                                            Time

## 2018-04-16 NOTE — ED NOTES
ED to Behavioral Floor Handoff    SITUATION  Inga Ledesma is a 52 year old female who speaks English and lives in a home with family members The patient arrived in the ED by ambulance from home with a complaint of Alcohol Problem (seeking detox from alcohol,  ) and Abdominal Pain (upper right abdominal pain radiating into back, cramping pain)  .The patient's current symptoms started/worsened 1 day(s) ago and during this time the symptoms have increased.   In the ED, pt was diagnosed with   Final diagnoses:   Alcohol withdrawal syndrome without complication (H)   Essential hypertension        Initial vitals were: BP: (!) 187/106  Pulse: 89  Temp: 98.1  F (36.7  C)  Resp: 18  SpO2: 99 %   --------  Is the patient diabetic? No   If yes, last blood glucose? --     If yes, was this treated in the ED? --  --------  Is the patient inebriated (ETOH) No or Impaired on other substances? No  MSSA done? Yes  Last MSSA score: --    Were withdrawal symptoms treated? Yes  Does the patient have a seizure history? Yes. If yes, date of most recent seizure--  --------  Is the patient patient experiencing suicidal ideation? denies current or recent suicidal ideation     Homicidal ideation? denies current or recent homicidal ideation or behaviors.    Self-injurious behavior/urges? denies current or recent self injurious behavior or ideation.  ------  Was pt aggressive in the ED No  Was a code called No  Is the pt now cooperative? Yes  -------  Meds given in ED:   Medications   0.9% sodium chloride BOLUS (0 mLs Intravenous Stopped 4/16/18 1348)     Followed by   sodium chloride 0.9% infusion (0 mLs Intravenous Stopped 4/16/18 1756)   folic acid (FOLATE) oral solution 500 mcg (500 mcg Oral Given 4/16/18 1221)   diazepam (VALIUM) tablet 5-20 mg (10 mg Oral Given 4/16/18 1657)   thiamine tablet 100 mg (100 mg Oral Given 4/16/18 1220)   multivitamin, therapeutic with minerals (THERA-VIT-M) tablet 1 tablet (1 tablet Oral Given 4/16/18  1220)   magnesium sulfate 2 g in NS intermittent infusion ([facility] or [facility]) (0 g Intravenous Stopped 4/16/18 1742)   labetalol (NORMODYNE) tablet 100 mg (100 mg Oral Given 4/16/18 1744)      Family present during ED course? No  Family currently present? No    BACKGROUND  Does the patient have a cognitive impairment or developmental disability? No  Allergies:   Allergies   Allergen Reactions     Codeine Nausea     Reglan [Metoclopramide Hcl] Other (See Comments)     Body tenses up   .   Social demographics are   Social History     Social History     Marital status:      Spouse name: N/A     Number of children: 1     Years of education: 13     Occupational History      [employer]     Social History Main Topics     Smoking status: Current Every Day Smoker     Packs/day: 0.50     Years: 34.00     Types: Cigarettes     Start date: 11/1/1979     Last attempt to quit: 10/3/2012     Smokeless tobacco: Never Used      Comment: 5 cigarettes daily     Alcohol use Yes      Comment: 1 pint of vodka per day      Drug use: No     Sexual activity: Not Currently     Other Topics Concern     Parent/Sibling W/ Cabg, Mi Or Angioplasty Before 65f 55m? No     Social History Narrative        ASSESSMENT  Labs results   Labs Ordered and Resulted from Time of ED Arrival Up to the Time of Departure from the ED   CBC WITH PLATELETS DIFFERENTIAL - Abnormal; Notable for the following:        Result Value    Platelet Count 505 (*)     All other components within normal limits   COMPREHENSIVE METABOLIC PANEL - Abnormal; Notable for the following:     Sodium 130 (*)     Chloride 91 (*)     Glucose 133 (*)     All other components within normal limits   MAGNESIUM - Abnormal; Notable for the following:     Magnesium 0.9 (*)     All other components within normal limits   DRUG ABUSE SCREEN 6 CHEM DEP URINE ([facility]) - Abnormal; Notable for the following:     Benzodiazepine Qual Urine Positive (*)     All other components within  normal limits   ALCOHOL BREATH TEST POCT - Normal   LIPASE   TROPONIN I   TROPONIN I   D DIMER QUANTITATIVE   MSSA SCORE AND VS      Imaging Studies:   Recent Results (from the past 24 hour(s))   XR Chest 2 Views    Narrative    XR CHEST 2 VW 4/16/2018 12:34 PM    HISTORY: Bilateral lower chest pain.    COMPARISON: 4/5/2018    FINDINGS: No airspace consolidation, pleural effusion or pneumothorax.  Normal heart size.      Impression    IMPRESSION: No acute cardiopulmonary abnormality.    DELROY COLON MD      Most recent vital signs BP (!) 196/96  Pulse 85  Temp 98.4  F (36.9  C) (Oral)  Resp 16  LMP 06/02/2010  SpO2 97%   Abnormal labs/tests/findings requiring intervention:---   Pain control: good  Nausea control: good    RECOMMENDATION  Are any infection precautions needed (MRSA, VRE, etc.)? No If yes, what infection? --  ---  Does the patient have mobility issues? independently. If yes, what device does the pt use? ---  ---  Is patient on 72 hour hold or commitment? No If on 72 hour hold, have hold and rights been given to patient? N/A  Are admitting orders written if after 10 p.m. ?N/A  Tasks needing to be completed:---     Gio Becker-- [phone] [facility]   [phone] [facility]

## 2018-04-16 NOTE — ED PROVIDER NOTES
History     Chief Complaint   Patient presents with     Alcohol Problem     seeking detox from alcohol,       Abdominal Pain     upper right abdominal pain radiating into back, cramping pain     Patient is a 52 year old female presenting with alcohol problem.   Alcohol Problem   Associated symptoms include shortness of breath. Pertinent negatives include no chest pain and no abdominal pain.     Inga Ledesma is a 52-year-old female with a history of COPD, heart failure with preserved ejection fraction, hypertension with poor compliance, and alcohol abuse with a history of alcohol withdrawal and withdrawal seizures who presents to the Emergency Department seeking detox. She was recently admitted to detox from April 5-April 9. She was noted to be hypertensive during this time and was reinitiated on her home antihypertensives. She was discharged with plans to complete outpatient treatment at [facility] however was told she was unable to go there due to her multiple medications and multiple comorbidities. She reports she began drinking right after being discharged from the hospital and has been drinking approximately 1 pt of alcohol per day. She last drank last night. She does have a history of 1 withdrawal seizure approximately 2 years ago. She currently reports tremulousness and anxiety. She has not been compliant with her antihypertensives but did take her medications this morning. She reports bilateral cramping sensations in her lower chest wall and upper abdomen. She reports that this is common when she experiences withdrawal. She has also been vomiting and retching frequently. She denies any hematemesis. Last bowel movement was this morning and she denies any melena or hematochezia. She has been afebrile. She denies any chest pain. She does have shortness of breath and cough at baseline secondary to COPD but denies any worsening of her symptoms or increased sputum production. She has a history of lymph  node resection in the right groin and has swelling in her right lower extremity at baseline but denies any worsening swelling or change in her weight. She denies any drug use. She denies any thought of self-harm or suicidal ideation.    This part of the document was transcribed by German Marmolejo for Conchita Marcano MD.    Current Facility-Administered Medications   Medication     0.9% sodium chloride BOLUS    Followed by     sodium chloride 0.9% infusion     thiamine tablet 100 mg     folic acid (FOLATE) oral solution 500 mcg     multivitamin, therapeutic with minerals (THERA-VIT-M) tablet 1 tablet     diazepam (VALIUM) tablet 5-20 mg     Current Outpatient Prescriptions   Medication     furosemide (LASIX) 20 MG tablet     labetalol (NORMODYNE) 100 MG tablet     FLUoxetine (PROZAC) 40 MG capsule     calcium carbonate (OS-JENNIFER 500 MG [place]. [place]) 1250 MG tablet     albuterol (VENTOLIN HFA) 108 (90 BASE) MCG/ACT Inhaler     fluticasone-salmeterol (ADVAIR DISKUS) 500-50 MCG/DOSE diskus inhaler     gabapentin (NEURONTIN) 300 MG capsule     losartan (COZAAR) 100 MG tablet     rOPINIRole (REQUIP) 1 MG tablet     tiotropium (SPIRIVA HANDIHALER) 18 MCG capsule     traZODone (DESYREL) 50 MG tablet     folic acid (FOLVITE) 1 MG tablet     aspirin 81 MG EC tablet     acamprosate (CAMPRAL) 333 MG EC tablet     menthol (ICY HOT) 5 % PTCH     ipratropium - albuterol 0.5 mg/2.5 mg/3 mL (DUONEB) 0.5-2.5 (3) MG/3ML neb solution     omeprazole (PRILOSEC) 20 MG CR capsule     spironolactone (ALDACTONE) 25 MG tablet     nicotine (NICODERM CQ) 7 MG/24HR 24 hr patch     multivitamin, therapeutic with minerals (MULTI-VITAMIN) TABS tablet     nicotine polacrilex (EQ NICOTINE POLACRILEX) 2 MG gum     magnesium oxide (MAG-OX) 400 MG tablet     methocarbamol (ROBAXIN) 500 MG tablet     hydrOXYzine (ATARAX) 50 MG tablet     multivitamin, therapeutic with minerals (THERA-VIT-M) TABS tablet     nicotine (NICODERM CQ) 14 MG/24HR 24 hr patch     order for  DME     Past Medical History:   Diagnosis Date     Alcohol abuse, in remission      Alcohol withdrawal seizure (H) 2016     Cancer of labia majora (H) 1987     Cervical dysplasia 1987     Congestive heart failure (H) 5/16/16     COPD (chronic obstructive pulmonary disease) (H) 1/24/2011     CVA (cerebral infarction) 1/2012     Dependent edema      Depression, major      Depressive disorder 1966     GERD (gastroesophageal reflux disease)      Hyperlipidemia LDL goal < 160      Hypertension      Iron deficiency anemia      RLS (restless legs syndrome)      Sacroiliitis (H)     steroid injections ineffective, chronic low back pain     Tobacco abuse      Uncomplicated asthma      Vitamin D deficiencies        Past Surgical History:   Procedure Laterality Date     AS BIOPSY/EXCISION LYMPH NODE OPEN SUPERFICIAL       COLONOSCOPY       EYE SURGERY       HYSTERECTOMY  2010     HYSTERECTOMY TOTAL ABDOMINAL  7/28/10    Bilateral salpingectomy.  ovaries conserved.     LASER TX, CERVICAL  1987     LYMPH NODE BIOPSY  2007    inguinal     LYSIS OF LABIAL LESION(S)  1985, 1987       Family History   Problem Relation Age of Onset     Depression/Anxiety Mother      Asthma Mother      CEREBROVASCULAR DISEASE Father      Hypertension Father      Lung Cancer Father      Breast Cancer Paternal Aunt      Other Cancer Other      Depression Other      Anxiety Disorder Other      MENTAL ILLNESS Other      Substance Abuse Other      Asthma Other      Obesity Sister      Obesity Son        Social History   Substance Use Topics     Smoking status: Current Every Day Smoker     Packs/day: 0.50     Years: 34.00     Types: Cigarettes     Start date: 11/1/1979     Last attempt to quit: 10/3/2012     Smokeless tobacco: Never Used      Comment: 5 cigarettes daily     Alcohol use Yes      Comment: 1 pint of vodka per day      Allergies   Allergen Reactions     Codeine Nausea     Reglan [Metoclopramide Hcl] Other (See Comments)     Body tenses up          I have reviewed the Medications, Allergies, Past Medical and Surgical History, and Social History in the Epic system.    Review of Systems   Constitutional: Negative for fever.   HENT: Negative for congestion.    Respiratory: Positive for cough and shortness of breath.    Cardiovascular: Positive for leg swelling (chronic right leg). Negative for chest pain.   Gastrointestinal: Positive for nausea and vomiting. Negative for abdominal pain and diarrhea.   Genitourinary: Negative for dysuria.   Neurological: Positive for tremors.   Psychiatric/Behavioral: Negative for suicidal ideas. The patient is nervous/anxious.    All other systems reviewed and are negative.      Physical Exam   BP: (!) 187/106  Pulse: 89  Temp: 98.1  F (36.7  C)  Resp: 18  SpO2: 99 %      Physical Exam   General: patient is alert and oriented, tremulous and anxious  Head: atraumatic and normocephalic   EENT: dry mucus membranes without tonsillar erythema or exudates, pupils round and reactive, sclera anicteric  Neck: supple   Cardiovascular: regular rate and rhythm, extremities warm and well perfused, trace right lower extremity edema  Pulmonary: lungs clear to auscultation bilaterally, bilateral lower rib tenderness to palpation  Abdomen: soft, non-tender, non-distended, no CVA TTP  Musculoskeletal: normal range of motion   Neurological: alert and oriented, moving all extremities symmetrically, gait normal   Skin: warm, dry       ED Course     ED Course     Procedures             EKG Interpretation:      Interpreted by Conchita Marcano  Time reviewed: 12:05  Symptoms at time of EKG: chest pain   Rhythm: normal sinus   Rate: normal  Axis: normal  Ectopy: none  Conduction: normal  ST Segments/ T Waves: No ST-T wave changes  Q Waves: none  Comparison to prior: Unchanged    Clinical Impression: normal EKG           EKG Interpretation:      Interpreted by Conchita Marcano  Time reviewed:1600  Symptoms at time of EKG: chest pain    Rhythm: Normal sinus   Rate: Normal  Axis: Normal  Ectopy: None  Conduction: Normal  ST Segments/ T Waves: No ST-T wave changes and No acute ischemic changes  Q Waves: None  Comparison to prior: No old EKG available    Clinical Impression: normal EKG        Critical Care time:  none             Labs Ordered and Resulted from Time of ED Arrival Up to the Time of Departure from the ED   ALCOHOL BREATH TEST POCT - Normal   CBC WITH PLATELETS DIFFERENTIAL   COMPREHENSIVE METABOLIC PANEL   LIPASE   TROPONIN I   MAGNESIUM   MSSA SCORE AND VS            Assessments & Plan (with Medical Decision Making)   Ms. Ledesma is a 52-yea-rold female who presents seeking detox. She does report bilateral cramping sensation in her lower chest wall which is exacerbated with movement. Her abdomen is quite benign to palpation. Lower suspicion for emergent pathology such as Boerhaave syndrome, ACS, PE, aortic dissection, or intraabdominal pathology. Labs were obtained including CBC, CMP, lipase, and troponin, which are notable for a sodium of 130, chloride 91 and magnesium 0.9.  Her initial and repeat troponin are negative as well as EKG without acute ischemic changes. She did go for a chest x-ray which is negative for pneumothorax, pneumonia, rib fracture or widening of the mediastinum.  D-dimer is WNL and low risk for PE or aortic dissection.  She has no evidence of volume overload on clinical exam or chest x-ray. Breath alcohol in the Emergency Department is 0. She was treated symptomatically with IV fluids, oral diazepam and magnesium supplementation in addition to evening labetalol.  A detox bed is available and will admit to detox for further treatment.       This part of the document was transcribed by German Marmolejo for Conchita Marcano MD.    I have reviewed the nursing notes.    I have reviewed the findings, diagnosis, plan and need for follow up with the patient.    New Prescriptions    No medications on file       Final diagnoses:    Alcohol withdrawal syndrome without complication (H)   Essential hypertension       4/16/2018   [facility], [place], EMERGENCY DEPARTMENT     Conchita Marcano MD  04/16/18 4454

## 2018-04-16 NOTE — PHARMACY-ADMISSION MEDICATION HISTORY
"Admission medication history for the April 16, 2018 admission is complete.     Interview sources:  Patient, chart review, care everywhere, [facility] (via [facility])    Reliability of source: Poor - patient needed prompting for names and doses of medications    Medication compliance: Poor - pt reports taking \"a couple blood pressure meds this morning\" otherwise it has been at least 1 week since she took all other medications     Changes made to PTA medication list (reason)  Added: none  Deleted: nicotine patch and gum (not taking per patient)  Changed: none    Additional medication history information:   - pt reports taking furosemide and labetalol this morning, otherwise she has not taken any medications for at least 1 week.     Prior to Admission medications    Medication Sig Last Dose Taking? Auth Provider   furosemide (LASIX) 20 MG tablet Take 1 tablet (20 mg) by mouth daily 4/16/2018 at AM Yes Min Toledo PA-C   labetalol (NORMODYNE) 100 MG tablet Take one tab in the am and 2 tabs at night 4/16/2018 at AM Yes Min Toledo PA-C   FLUoxetine (PROZAC) 40 MG capsule Take 1 capsule (40 mg) by mouth 2 times daily Past Week Yes Min Toledo PA-C   calcium carbonate (OS-JENNIFER 500 MG [place]. CA) 1250 MG tablet Take 1 tablet (1,250 mg) by mouth 2 times daily Past Week Yes Min Toledo PA-C   albuterol (VENTOLIN HFA) 108 (90 BASE) MCG/ACT Inhaler Inhale  into the lungs. INHALE 2 PUFFS FOUR TIMES DAILY AS NEEDED Past Month Yes Min Toledo PA-C   fluticasone-salmeterol (ADVAIR DISKUS) 500-50 MCG/DOSE diskus inhaler Inhale 1 puff into the lungs every 12 hours Past Week Yes Min Toledo PA-C   gabapentin (NEURONTIN) 300 MG capsule Take 1 capsule by mouth in the morning, 1 capsule in the afternoon, and 2 capsules at bedtime Take 1 capsule by mouth in the morning, 1 capsule in the afternoon, and 2 capsules at bedtime Past Week Yes Min Toledo PA-C   losartan (COZAAR) 100 MG " tablet Take 0.5 tablets (50 mg) by mouth daily Past Week Yes Min Toledo PA-C   rOPINIRole (REQUIP) 1 MG tablet Take 1 tablet (1 mg) by mouth 3 times daily Past Week Yes Min Toledo PA-C   tiotropium (SPIRIVA HANDIHALER) 18 MCG capsule Inhale contents of one capsule daily. Past Month Yes Min Toledo PA-C   traZODone (DESYREL) 50 MG tablet Take 1 tablet (50 mg) by mouth At Bedtime Past Month at prn Yes Min Toledo PA-C   folic acid (FOLVITE) 1 MG tablet Take 1 tablet (1 mg) by mouth daily Past Week Yes Min Toledo PA-C   aspirin 81 MG EC tablet Take 1 tablet (81 mg) by mouth daily 4/16/2018 at AM Yes Min Toledo PA-C   acamprosate (CAMPRAL) 333 MG EC tablet Take 2 tablets (666 mg) by mouth 3 times daily Past Week Yes Min Toledo PA-C   menthol (ICY HOT) 5 % PTCH Apply 1 patch topically every 8 hours as needed for muscle soreness Past Month at prn Yes Cr Dillard MD   hydrOXYzine (ATARAX) 50 MG tablet Take 1 tablet (50 mg) by mouth every 6 hours as needed for anxiety or other (adjuvant pain) Past Month Yes Cr Dillard MD   ipratropium - albuterol 0.5 mg/2.5 mg/3 mL (DUONEB) 0.5-2.5 (3) MG/3ML neb solution Take 1 vial (3 mLs) by nebulization 4 times daily as needed for wheezing Past Week Yes Cr Dillard MD   omeprazole (PRILOSEC) 20 MG CR capsule Take 1 capsule (20 mg) by mouth 2 times daily Past Week Yes Cr Dillard MD   spironolactone (ALDACTONE) 25 MG tablet Take 1 tablet (25 mg) by mouth daily Unknown  Min Toledo PA-C   magnesium oxide (MAG-OX) 400 MG tablet Take 1 tablet (400 mg) by mouth 2 times daily not taking  Min Toledo PA-C   methocarbamol (ROBAXIN) 500 MG tablet Take 2 tablets (1,000 mg) by mouth 3 times daily as needed for muscle spasms Unknown  Min Toledo PA-C   multivitamin, therapeutic with minerals (THERA-VIT-M) TABS tablet Take 1 tablet by mouth daily not taking  Cr Dillard MD       Time spent: 30  minutes    Medication history completed by:   Serena Srerano PharmD  [facility]  Available daily from 1-9 PM: phone [phone], pager [phone]

## 2018-04-16 NOTE — ED NOTES
Bed: ED07  Expected date: 4/16/18  Expected time: 11:20 AM  Means of arrival: Ambulance  Comments:  Kyara Ramirez, 53 yo F, alcohol detox

## 2018-04-17 LAB
ANION GAP SERPL CALCULATED.3IONS-SCNC: 12 MMOL/L (ref 3–14)
BUN SERPL-MCNC: 10 MG/DL (ref 7–30)
CALCIUM SERPL-MCNC: 8.8 MG/DL (ref 8.5–10.1)
CHLORIDE SERPL-SCNC: 98 MMOL/L (ref 94–109)
CO2 SERPL-SCNC: 27 MMOL/L (ref 20–32)
CREAT SERPL-MCNC: 0.86 MG/DL (ref 0.52–1.04)
GFR SERPL CREATININE-BSD FRML MDRD: 70 ML/MIN/1.7M2
GLUCOSE SERPL-MCNC: 100 MG/DL (ref 70–99)
MAGNESIUM SERPL-MCNC: 1.9 MG/DL (ref 1.6–2.3)
PLATELET # BLD AUTO: 384 10E9/L (ref 150–450)
POTASSIUM SERPL-SCNC: 3.7 MMOL/L (ref 3.4–5.3)
SODIUM SERPL-SCNC: 137 MMOL/L (ref 133–144)

## 2018-04-17 PROCEDURE — 80048 BASIC METABOLIC PNL TOTAL CA: CPT | Performed by: PHYSICIAN ASSISTANT

## 2018-04-17 PROCEDURE — 25000132 ZZH RX MED GY IP 250 OP 250 PS 637: Performed by: EMERGENCY MEDICINE

## 2018-04-17 PROCEDURE — 36415 COLL VENOUS BLD VENIPUNCTURE: CPT | Performed by: PHYSICIAN ASSISTANT

## 2018-04-17 PROCEDURE — 25000132 ZZH RX MED GY IP 250 OP 250 PS 637: Performed by: NURSE PRACTITIONER

## 2018-04-17 PROCEDURE — 25000132 ZZH RX MED GY IP 250 OP 250 PS 637: Performed by: PHYSICIAN ASSISTANT

## 2018-04-17 PROCEDURE — 25000132 ZZH RX MED GY IP 250 OP 250 PS 637

## 2018-04-17 PROCEDURE — 12800012 ZZH R&B CD MH INTERMEDIATE ADULT

## 2018-04-17 PROCEDURE — 85049 AUTOMATED PLATELET COUNT: CPT | Performed by: PHYSICIAN ASSISTANT

## 2018-04-17 PROCEDURE — HZ2ZZZZ DETOXIFICATION SERVICES FOR SUBSTANCE ABUSE TREATMENT: ICD-10-PCS | Performed by: PSYCHIATRY & NEUROLOGY

## 2018-04-17 PROCEDURE — 83735 ASSAY OF MAGNESIUM: CPT | Performed by: PHYSICIAN ASSISTANT

## 2018-04-17 PROCEDURE — 99222 1ST HOSP IP/OBS MODERATE 55: CPT | Mod: AI | Performed by: PSYCHIATRY & NEUROLOGY

## 2018-04-17 RX ORDER — LABETALOL 100 MG/1
100 TABLET, FILM COATED ORAL EVERY MORNING
Status: DISCONTINUED | OUTPATIENT
Start: 2018-04-17 | End: 2018-04-20 | Stop reason: HOSPADM

## 2018-04-17 RX ORDER — IPRATROPIUM BROMIDE AND ALBUTEROL SULFATE 2.5; .5 MG/3ML; MG/3ML
3 SOLUTION RESPIRATORY (INHALATION) 4 TIMES DAILY PRN
Status: DISCONTINUED | OUTPATIENT
Start: 2018-04-17 | End: 2018-04-20 | Stop reason: HOSPADM

## 2018-04-17 RX ORDER — LOSARTAN POTASSIUM 50 MG/1
50 TABLET ORAL DAILY
Status: DISCONTINUED | OUTPATIENT
Start: 2018-04-17 | End: 2018-04-20 | Stop reason: HOSPADM

## 2018-04-17 RX ORDER — SPIRONOLACTONE 25 MG/1
25 TABLET ORAL DAILY
Status: DISCONTINUED | OUTPATIENT
Start: 2018-04-17 | End: 2018-04-20 | Stop reason: HOSPADM

## 2018-04-17 RX ORDER — TIOTROPIUM BROMIDE 18 UG/1
18 CAPSULE ORAL; RESPIRATORY (INHALATION) DAILY
Status: DISCONTINUED | OUTPATIENT
Start: 2018-04-17 | End: 2018-04-17

## 2018-04-17 RX ADMIN — FUROSEMIDE 20 MG: 20 TABLET ORAL at 08:42

## 2018-04-17 RX ADMIN — NICOTINE POLACRILEX 4 MG: 2 GUM, CHEWING ORAL at 16:21

## 2018-04-17 RX ADMIN — Medication 100 MG: at 08:41

## 2018-04-17 RX ADMIN — ROPINIROLE HYDROCHLORIDE 1 MG: 1 TABLET, FILM COATED ORAL at 14:05

## 2018-04-17 RX ADMIN — NICOTINE POLACRILEX 2 MG: 2 GUM, CHEWING ORAL at 08:59

## 2018-04-17 RX ADMIN — DIAZEPAM 5 MG: 5 TABLET ORAL at 12:42

## 2018-04-17 RX ADMIN — FLUTICASONE FUROATE AND VILANTEROL TRIFENATATE 1 PUFF: 200; 25 POWDER RESPIRATORY (INHALATION) at 08:44

## 2018-04-17 RX ADMIN — LABETALOL HYDROCHLORIDE 200 MG: 100 TABLET, FILM COATED ORAL at 20:24

## 2018-04-17 RX ADMIN — SPIRONOLACTONE 25 MG: 25 TABLET ORAL at 08:59

## 2018-04-17 RX ADMIN — GABAPENTIN 300 MG: 300 CAPSULE ORAL at 12:42

## 2018-04-17 RX ADMIN — TRAZODONE HYDROCHLORIDE 50 MG: 50 TABLET ORAL at 21:38

## 2018-04-17 RX ADMIN — MULTIPLE VITAMINS W/ MINERALS TAB 1 TABLET: TAB at 08:41

## 2018-04-17 RX ADMIN — OMEPRAZOLE 20 MG: 20 CAPSULE, DELAYED RELEASE ORAL at 16:21

## 2018-04-17 RX ADMIN — NICOTINE 1 PATCH: 7 PATCH TRANSDERMAL at 08:43

## 2018-04-17 RX ADMIN — DIAZEPAM 5 MG: 5 TABLET ORAL at 01:51

## 2018-04-17 RX ADMIN — FOLIC ACID 1 MG: 1 TABLET ORAL at 08:41

## 2018-04-17 RX ADMIN — Medication 1250 MG: at 08:42

## 2018-04-17 RX ADMIN — ROPINIROLE HYDROCHLORIDE 1 MG: 1 TABLET, FILM COATED ORAL at 20:24

## 2018-04-17 RX ADMIN — LABETALOL HYDROCHLORIDE 100 MG: 100 TABLET, FILM COATED ORAL at 08:59

## 2018-04-17 RX ADMIN — UMECLIDINIUM 1 PUFF: 62.5 AEROSOL, POWDER ORAL at 12:40

## 2018-04-17 RX ADMIN — ROPINIROLE HYDROCHLORIDE 1 MG: 1 TABLET, FILM COATED ORAL at 08:41

## 2018-04-17 RX ADMIN — OMEPRAZOLE 20 MG: 20 CAPSULE, DELAYED RELEASE ORAL at 08:41

## 2018-04-17 RX ADMIN — ACETAMINOPHEN 650 MG: 325 TABLET ORAL at 08:59

## 2018-04-17 RX ADMIN — NICOTINE POLACRILEX 4 MG: 2 GUM, CHEWING ORAL at 20:29

## 2018-04-17 RX ADMIN — HYDROXYZINE HYDROCHLORIDE 50 MG: 50 TABLET, FILM COATED ORAL at 20:24

## 2018-04-17 RX ADMIN — Medication 1250 MG: at 20:24

## 2018-04-17 RX ADMIN — MAGNESIUM OXIDE TAB 400 MG (241.3 MG ELEMENTAL MG) 400 MG: 400 (241.3 MG) TAB at 20:24

## 2018-04-17 RX ADMIN — FLUOXETINE 40 MG: 20 CAPSULE ORAL at 08:42

## 2018-04-17 RX ADMIN — GABAPENTIN 600 MG: 300 CAPSULE ORAL at 20:25

## 2018-04-17 RX ADMIN — DIAZEPAM 5 MG: 5 TABLET ORAL at 08:43

## 2018-04-17 RX ADMIN — ASPIRIN 81 MG: 81 TABLET, COATED ORAL at 08:41

## 2018-04-17 RX ADMIN — FLUOXETINE 40 MG: 20 CAPSULE ORAL at 20:24

## 2018-04-17 RX ADMIN — NICOTINE POLACRILEX 2 MG: 2 GUM, CHEWING ORAL at 12:42

## 2018-04-17 RX ADMIN — GABAPENTIN 300 MG: 300 CAPSULE ORAL at 08:42

## 2018-04-17 RX ADMIN — LOSARTAN POTASSIUM 50 MG: 50 TABLET ORAL at 08:59

## 2018-04-17 RX ADMIN — MAGNESIUM OXIDE TAB 400 MG (241.3 MG ELEMENTAL MG) 400 MG: 400 (241.3 MG) TAB at 08:42

## 2018-04-17 ASSESSMENT — ACTIVITIES OF DAILY LIVING (ADL)
DRESS: STREET CLOTHES
LAUNDRY: WITH SUPERVISION
ORAL_HYGIENE: INDEPENDENT
GROOMING: INDEPENDENT

## 2018-04-17 NOTE — PLAN OF CARE
Problem: Substance Withdrawal  Goal: Substance Withdrawal  Problem: General Plan of Care (Inpatient Behavioral)  Goal: Individualization/ Patient Specific Goal (IP Behavioral)  The patient and/or their representative their patient-specific goals related to the plan of care.  Patient specific goals include:  1.  Patient will be evaluated by a physician and labs will be evaluated.  2.  Patient will be seen by a  for evaluation and discharge planning.  3.  Patient will complete assessment paperwork  4.  Patient will consume 75 % of meal to meet estimated energy needs.  5.  Detoxification from alcohol using valium.  6.  Patient will be provided with alcohol relapse prevention information.Signs and symptoms of listed problems will be absent or manageable.   Outcome: No Change  Pt being monitored for alcohol withdrawal. Pt medicated x 2 with valium 10 mg. Pt out on unit. Walker provided. Pt reports feeling anxious rating her anxiety level a 8 on a 0-10 severe scale. Pt is on a restriction status for discharge medications. Spiritual care consult ordered. Pt talked of several significant losses including her father last fall. Pt states she is planning to go to [facility] treatment program. Pt was given and provided information on [facility] meetings. Pt requested tylenol x 1 for a HA. See orders per medical for parameters on her BP medications. No respiratory issues this shift. Pt has prn neb tx available. Labs pending. Pt moved to a room with a medical bed so head of bed can be elevated.

## 2018-04-17 NOTE — PROGRESS NOTES
"CLINICAL NUTRITION SERVICES - ASSESSMENT NOTE     Nutrition Prescription    RECOMMENDATIONS FOR MDs/PROVIDERS TO ORDER:  None today    Malnutrition Status:    Patient does not meet two of the above criteria necessary for diagnosing malnutrition    Recommendations already ordered by Registered Dietitian (RD):  Boost Plus qd at Lunch (chocolate)    Future/Additional Recommendations:  Anticipate slight wt loss given trace edema and daily lasix     REASON FOR ASSESSMENT  Inga Ledesma is a/an 52 year old female assessed by the dietitian for Admission Nutrition Risk Screen for reduced oral intake over the last month    NUTRITION HISTORY  - Per MD note, pt has been drinking 1 pt of alcohol since D/C on 4/9.  - Pt reports not eating anything since she D/C'ed on 4/9. She vaguely remembers eating frosted flakes and Gatorade but does not recall when. She denies drinking Boost Plus since her last admission.  - Pt reports a decrease in her PO intakes is very typical when she is drinking. Once she stops drinking, her intakes return to baseline quickly.     CURRENT NUTRITION ORDERS  Diet: Regular  Intake/Tolerance: pt reports eating 100% breakfast and lunch. She saved the cereal and brownie for snacks later.     LABS  Labs reviewed    MEDICATIONS  Medications reviewed  - Multivitamin w/ minerals  - Thiamine  - Folic acid  - Calcium   - Lasix    ANTHROPOMETRICS  Height: 154.9 cm (5' 1\")  Most Recent Weight: 72.6 kg (160 lb)    IBW: 47.7 kg (152% IBW)  BMI: Obesity Grade I BMI 30-34.9  Weight History: wt has been stable ~160 lbs over the last 2 months. Overall, she has gained 7 lbs (5%) over the last 6 months.   Wt Readings from Last 10 Encounters:   04/16/18 72.6 kg (160 lb)   04/12/18 73.5 kg (162 lb)   04/05/18 72.6 kg (160 lb)   03/30/18 72.2 kg (159 lb 3.2 oz)   02/20/18 71.4 kg (157 lb 6 oz)   01/11/18 76.7 kg (169 lb)   12/15/17 78.5 kg (173 lb)   11/16/17 78 kg (172 lb)   10/28/17 69.6 kg (153 lb 8 oz)   10/06/17 69.4 kg " (153 lb)     Dosing Weight: 54 kg - adjusted from admit wt    ASSESSED NUTRITION NEEDS  Estimated Energy Needs: 9508-9344 kcals/day (25 - 30 kcals/kg)  Justification: Maintenance and Obese  Estimated Protein Needs: 43-54 grams protein/day (0.8 - 1 grams of pro/kg)  Justification: Maintenance  Estimated Fluid Needs: 1 mL/kcal  Justification: Per provider pending fluid status    PHYSICAL FINDINGS  See malnutrition section below.    MALNUTRITION  % Intake: </= 50% for >/= 5 days (severe)  % Weight Loss: None noted  Subcutaneous Fat Loss: None observed  Muscle Loss: None observed  Fluid Accumulation/Edema: Trace  Malnutrition Diagnosis: Patient does not meet two of the above criteria necessary for diagnosing malnutrition    NUTRITION DIAGNOSIS  Predicted inadequate energy/protein intake related to variable appetite as evidenced by pt reliant on PO intakes to meet 100% of nutritional needs with potential for variation       INTERVENTIONS  Implementation  Discussed nutrition history and PO since admission. Discussed menu ordering and snacks available on the unit. Pt requested Boost Plus and was agreeable to have one sent with lunch. Encouraged adequate PO of food and fluids.    Goals  Patient to consume % of nutritionally adequate meal trays TID, or the equivalent with supplements/snacks.     Monitoring/Evaluation  Progress toward goals will be monitored and evaluated per protocol.      Almaz Harrison RD, LD  Unit Pager: [phone]

## 2018-04-17 NOTE — PROGRESS NOTES
Met with pt and updated assessment.  Referred to [facility] inpatient program.  Faxed [facility] for auth.  Call placed to [facility] to coordinate admission. Working toward a door to door placement.

## 2018-04-17 NOTE — PROGRESS NOTES
2/7/18 Pt is a restricted recipient from [employer] and can only use/see the following:    Primary Care: Dr. Min Toledo[facility]  Pharmacy: [facility]  Urgent Care: [facility]  Emergency Room/IP hospitalization: [facility]    [employer]  is Leonor: [phone] and her fax is [phone]. Please notify Hanna Oneil WAQAR should you have questions/concerns [phone].

## 2018-04-17 NOTE — PROGRESS NOTES
04/16/18 1854   Patient Belongings   Did you bring any home meds/supplements to the hospital?  No   Patient Belongings none   Disposition of Belongings Other (see comment)   Belongings Search Yes   Clothing Search Yes   Second Staff Radha    General Info Comment see my notes    STORAGE BIN   Shoes w/laces, zip-loc bag has jewelry, keys, and Grizzly. Pajamas pants w/string and winter jacket  [employer] #[id number]   [place] 's license, [employer] Visa card   LOCKED DRAWER  Cellphone and    A             Admission  I am responsible for any personal items that are not sent to the safe or pharmacy.  [facility] is not responsible for loss, theft or damage of any property in my possession.  Signature:  _________________________________ Date: _______  Time: _____                                              Staff Signature:  ____________________________ Date: ________  Time: _____      2nd Staff person, if patient is unable/unwilling to sign:    Signature: ________________________________ Date: ________  Time: _____   Discharge:  [facility] has returned all of my personal belongings:  Signature: _________________________________ Date: ________  Time: _____                                          Staff Signature:  ____________________________ Date: ________  Time: _____

## 2018-04-17 NOTE — PROGRESS NOTES
Met with Pt to initiate discharge planning.  Pt willing to enter a residential treatment program but states no one will take her due to her medications.(Pt tried to get into [facility] and was denied due to her extensive medications).  Referral to inpatient hospital program such as [facility] may be more appropriate.  Pt willing to consider.

## 2018-04-17 NOTE — PLAN OF CARE
Problem: Substance Withdrawal  Goal: Substance Withdrawal  Problem: General Plan of Care (Inpatient Behavioral)  Goal: Individualization/ Patient Specific Goal (IP Behavioral)  The patient and/or their representative their patient-specific goals related to the plan of care.  Patient specific goals include:  1.  Patient will be evaluated by a physician and labs will be evaluated.  2.  Patient will be seen by a  for evaluation and discharge planning.  3.  Patient will complete assessment paperwork  4.  Patient will consume 75 % of meal to meet estimated energy needs.  5.  Detoxification from alcohol using valium.  6.  Patient will be provided with alcohol relapse prevention information.Signs and symptoms of listed problems will be absent or manageable.   Outcome: Declining  S:  Inga  (goes by Cody) is a single 53 yo, who comes to [facility] seeking detox from alcohol.  She states that she has been drinking one half pint of Vodka daily for the last week.  Last drink was yesterday (4/15/18) at 12 noon.  She was just discharged from here on 4/9/18.  She denies any other substance use except cigarettes (1/2 ppd since age 15).  She denies any thoughts of self harm, suicide or thoughts of harming others.  She has recently lost her father, a very close friend (Demond) and an acquaintance from treatment who was murdered by her spouse who then committed suicide.  She receives support from her brother and sister.  She has a 25 yo son (with special some needs) living with her.    B:  She has an extensive PMH, for details please see Epic.  She stated that she used to cut herself, last SIB was 6-8 months ago  A:  Pt in moderate alcohol withdrawal AEB MSSA scores of 13 & 11.  R:  Obtained admission orders.  Encouraged and provided increased fluid intake.  Re-oriented to unit and schedule.  Counseled on smoking cessation.  Re-introduced to IM&R Treatment program.  Administered Calcium carbonate 1250 mg, fluoxetine 40 mg,  labetolol 100 mg (had half of her usual 200 mg in the ED tonight), losartan 50 mg, omeprazole 20 mg, ropinerole 1 mg and valium 10 mg X 2.  Continue to monitor and medicate as ordered and indicated.

## 2018-04-17 NOTE — PROGRESS NOTES
Rule 25 Chemical Health Assessment Update:   Inga Ledesma had a Rule 25 Evaluation with Lily Simon at [facility] on 4/8/18 and a copy of the CD evaluation is available in EPIC. The original Rule 25 paperwork was reviewed and updated on 4/17/18 by STEPHANIE Ambrose.      Pt reports her last use of alcohol was on 4/15/18 @ noon.  Pt was given a breathalyzer upon ER admission and pt's DAYTON was 0.00.    Updated Information:    DIMENSION I - Acute Intoxication /Withdrawal Potential   1. Chemical use most recent 12 months outside a facility and other significant use history (client self-report)              X = Primary Drug Used   Age of First Use Most Recent Pattern of Use and Duration   Need enough information to show pattern (both frequency and amounts) and to show tolerance for each chemical that has a diagnosis   Date of last use and time, if needed   Withdrawal Potential? Requiring special care Method of use  (oral, smoked, snort, IV, etc)   x   Alcohol     9 1/2 pt/day since 4/10/18   4/15/18 @ noon yes oral      Marijuana/  Hashish   N/A           Cocaine/Crack     N/A           Meth/  Amphetamines   N/A           Heroin     N/A           Other Opiates/  Synthetics   N/A           Inhalants     N/A           Benzodiazepines     N/A           Hallucinogens     N/A           Barbiturates/  Sedatives/  Hypnotics N/A           Over-the-Counter Drugs   N/A           Other     N/A        x   Nicotine     14 1/2 ppd since age 15 4/16/18 yes smokes     [facility] PLACEMENT CRITERIA:   DIMENSION 1: Intoxication/Withdrawal: Risk level 1. The patient will be treated for withdrawal and medically stable at discharge.  DIMENSION 2: Biomedical Conditions: Risk level 2.  Pt reports a history of high blood pressure, COPD and congestive heart failure. Reports she has a PCP, last appointment was 2 weeks prior to discuss blood pressure medications. Pt reports that when drinking she does not take medications as prescribed and as  "a result has concerns. Pt has difficulty tolerating and coping with physical problems, neglects seeking care. (Medication list attached)  DIMENSION 3: Emotional/Behavioral: Risk level 2.  Pt reports she was raised by both parents. Reports family history of alcoholism and depression. Pt reports mental health diagnosis of anxiety and depression. Denies history of suicide ideation or self harm. Pt reports recent grief and loss, father passed away in October 2017 and pt reports feeling regret over relationship with her father. Pt reports her best friend was murdered by her friends  and this has been difficult for her as well. Pt would benefit from grief and loss counseling as part of treatment or continued counseling after treatment.  Pt has difficulty with impulse control and lacks coping skills. Pt has difficulty functioning in significant life areas.   DIMENSION 4: Treatment Readiness: Risk level 1. Patient displays verbal compliance and motivation but lacks consistent behaviors and follow-through. Pt has continued to use despite consequences. Pt appears to be in the \"contemplation\" Stage within the Stages of Change Model.   DIMENSION 5: Relapse & Continued Use Potential: Risk level 4. Patient reports having been involved in 3 past treatments (completed 3), 2 past detox admissions, and active past 12-Step support group participation. Pt reports having  sober time (10 years) and has tried to quit drinking in the past but relapsed. Pt lacks insight into her personal relapse process along with early warning signs and triggers. Pt lacks impulse control, sober coping skills and long-term sober maintenance skills. Pt lacks insight into the effects her use has had on her physical and mental health. Pt is at a high risk for relapse/continued use.   DIMENSION 6: Recovery Environment: Risk level 2. Pt reports she is employed full time, lives at home in [place] and has a 26 year old son with special needs who lives with " her. Reports that when drinking she has a hard time with work. Reports family is supportive but relationships are strained due to use. Pt reports she attends Celebrate Recovery meetings and has some sober networks. Pt denies any history of legal concerns. Pt would benefit in recovery environment with plan to return home after treatment.     Counselor Notes: Pt was trying to enter [facility] following last admission.  She was turned down due to her extensive medical history and medications.  Hospital based setting would be preferred for Pt.    STEPHANIE Ambrose

## 2018-04-17 NOTE — H&P
"Admitted:     04/16/2018      INPATIENT PSYCHIATRIC H AND P       CHIEF COMPLAINT:  \"I'm not doing so good.\"      HISTORY OF PRESENT ILLNESS:  The patient is a 52-year-old white female with a history of alcohol use disorder and depression who was recently discharged with a plan to go to [facility].  The patient was denied admission at [facility] due to the number of medications she was on, so she said she relapsed and has been drinking up to a half a pint of vodka daily.  Says she is doing okay with the withdrawal so far.  Mood is depressed.  Denies suicidal or homicidal ideation.  Denies auditory or visual hallucinations.  Patient said she was able to get a little bit of sleep, was able to eat.  Patient has had a lot of losses, her father and 2 close friends, one very recently and is quite tearful when discussing this.      PAST PSYCHIATRIC HISTORY:  The patient does have a history of depression and self-injurious behavior.  Never been psychiatrically hospitalized.      CURRENT PSYCHIATRIC MEDICATIONS:   1.  Prozac 40 mg q. day.   2.  Gabapentin 300 mg b.i.d. and 600 mg at bedtime.      ALLERGIES:  CODEINE AND REGLAN.      PAST MEDICAL HISTORY:  Significant for COPD, CHF, hypertension.      REVIEW OF SYSTEMS:  Ten-point systems are reviewed and all were negative except those mentioned in the HPI.      SUBSTANCE HISTORY:  The patient's substance of choice is alcohol.  Does have a history of a withdrawal seizure 2-1/2 years ago.  Denies illicit drug use.  Smokes half a pack per day.  Has been to chemical dependency treatment twice.      FAMILY PSYCHIATRIC HISTORY:  The patient's mother has depression and anxiety and father has alcohol use disorder.      SOCIAL HISTORY:  The patient lives at home with her 26-year-old special needs son, works for [employer].  Is hopeful for chemical dependency treatment as she was not able to go to [facility].      PHYSICAL EXAMINATION:  Please see the physical exam from the " medical team.      CURRENT VITAL SIGNS:  Blood pressure 135/75, pulse 79, respirations 16, temperature 97.      MENTAL STATUS EXAMINATION:  JAUN:  Patient is a 52-year-old white female, cooperative, pleasant, fair eye contact.  Speech:  Regular rate, rhythm, volume and tone.  Mood is depressed.  Affect is mood congruent.  Thought process is goal directed.  Thought content:  Denies suicidal or homicidal ideation, denies auditory or visual hallucinations.  No loosening of associations noted.  Sensorium is clear.  Cognition:  Oriented x3.  Recent and remote memory fair.  Attention and Concentration:  No deficits noted.  Language:  No deficits noted.  Fund of Knowledge:  Appropriate.  Muscle Strength and Tone:  No deficits noted.  Gait and Station:  No deficits noted.  Insight is fair.  Judgment is fair.      DIAGNOSES:   AXIS I:   1.  Alcohol use disorder, severe dependence.   2.  Major depressive disorder, recurrent, moderate.   3.  Nicotine use disorder.   Axis II:  Deferred.   Axis III:  See past medical history.      PLAN:   1.  The patient was voluntarily admitted to station 3A.  She was encouraged to engage in groups and with staff on unit.   2.  The patient will be detoxified from alcohol using MSSA protocol on Valium.   3.  Okay for the patient to use her walker as well as her compression device.   4.  CTC to meet with the patient to ensure appropriate discharge to chemical dependency treatment.         HANNAH GAMBINO MD             D: 2018   T: 2018   MT: MEI      Name:     TEJAS IRELAND   MRN:      [id number]        Account:      [id number]   :      1965        Admitted:     2018                   Document: [id number]

## 2018-04-17 NOTE — PROGRESS NOTES
Behavioral Team Discussion: (4/17/2018)    Continued Stay Criteria/Rationale: Patient admitted for alcohol Use Disorder.  Plan: The following services will be provided to the patient; psychiatric assessment, medication management, therapeutic milieu, individual and group support, and skills groups.   Participants: 3A Provider: Dr. Ion Coronado MD; 3A RN's: Iona Kumar RN; 3A CM's: Aubrie Chadwick .  Summary/Recommendation: Providers will assess today for treatment recommendations, discharge planning, and aftercare plans. CM will meet with pt for discharge planning.   Medical/Physical: Deferred (see medical notes).  Precautions:   Behavioral Orders   Procedures     Code 1 - Restrict to Unit     Routine Programming     As clinically indicated     Seizure precautions     Status 15     Every 15 minutes.     Withdrawal precautions     Rationale for change in precautions or plan: N/A  Progress: No Change.

## 2018-04-17 NOTE — CONSULTS
Internal Medicine Initial Visit    Inga Ledesma MRN# [id number]   Age: 52 year old YOB: 1965   Date of Admission: 4/16/2018     Reason for consult: Co-Manage Detox       Requesting physician Bianka Addison NP      Circumstances of recent discharge and re-admittance noted with similar admitting diagnosis. Please refer to recent medicine H&P dated 4/6/2018 by Kianna Estrada NP, which was reviewed by this writer and is up to date. Stability of labs and vitals were confirmed through chart review. Please call the on-call KENNETH for any f/u medical concerns if they arise.   Diagnoses:   Alcohol Abuse & Withdrawal; H/o EToH Withdrawal Seizures. Long standing history of EtOH abuse with numerous previous admission for alcohol withdrawal including prior alcohol withdrawal seizure in 2016.   - [facility] protocol  - Continue MVI, thiamine, folic acid, mag ox and calcium   - Encourage PO intake      MDD. Care per primary psychiatry team.       Uncontrolled Hypertension. With known medication non-compliance 2/2 heavy EtOH use. Managed on PTA Labetalol 100 mg in AM and 200mg in PM, Losartan 50 mg daily, Lasix 20 mg daily. BP currently elevated likely d/t medication non-compliance and acute alcohol w/d.     Diastolic CHF. Diagnosed 5/2016 at [facility] when she was admitted for a presumed COPD exacerbation. Most recent TTE in [place] on 12/22/17 was a technically difficult study but notes and estimated LVEF 65-70%, no significant valve disease, no pericardial effusion.  Unclear what EDW is as patient is non-compliant. Per review of chart, appears as though weight has been relatively stable ~72 kg.   - Continue Lasix 20 mg PO daily, aldactone 25mg daily (per last clinic visit)  - Follow weights daily at home, ensure patient calls her PCP for a weight gain of 3-5 pounds over 2-3 days   - Follow up with PCP on discharge      History of COPD. Continues to smoke tobacco daily which is likely worsening her shortness  of breath.  Managed on PTA Albuterol PRN, Advair 1 puff bID, Duonebs QID PRN and Spiriva.   - Continue home inhalers (some have been auto subbed by PharmD), no changes to regimen  - Please page IM if patient has increased SOB or hypoxia      GERD. Symptoms stable on PTA PPI     History of Iron Deficiency Anemia. Hgb stable 12.9.  Not on supplementation.   - Follow up with PCP for routine CBC as OP      RLS. Managed on PTA Requip and Gabapentin with occasional use of Menthol patch and Robaxin. No current symptoms.   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       RLE Lymphedema. 2/2 lymph node dissection.  Continue lasix as above, may benefit from referal to lymphedema clinic from PCP.     Hyponatremia, resolved. Na 130 on admission, now 137 on repeat testing. Likely due to non-compliance with diuretic meds PTA.    Currently, this patient is medically stable and medicine will sign off. Please page the Internal Medicine job code pager for any intercurrent medical issues which arise. Thank you for the opportunity to be a part of this patient's care.    Deangelo West PA-C  Hospitalist Service  [phone]

## 2018-04-18 ENCOUNTER — APPOINTMENT (OUTPATIENT)
Dept: CT IMAGING | Facility: CLINIC | Age: 53
DRG: 897 | End: 2018-04-18
Attending: INTERNAL MEDICINE

## 2018-04-18 LAB
INTERPRETATION ECG - MUSE: NORMAL
INTERPRETATION ECG - MUSE: NORMAL

## 2018-04-18 PROCEDURE — 94640 AIRWAY INHALATION TREATMENT: CPT

## 2018-04-18 PROCEDURE — 12800012 ZZH R&B CD MH INTERMEDIATE ADULT

## 2018-04-18 PROCEDURE — 25000132 ZZH RX MED GY IP 250 OP 250 PS 637: Performed by: NURSE PRACTITIONER

## 2018-04-18 PROCEDURE — 25000132 ZZH RX MED GY IP 250 OP 250 PS 637: Performed by: PHYSICIAN ASSISTANT

## 2018-04-18 PROCEDURE — 99232 SBSQ HOSP IP/OBS MODERATE 35: CPT | Performed by: PSYCHIATRY & NEUROLOGY

## 2018-04-18 PROCEDURE — 70450 CT HEAD/BRAIN W/O DYE: CPT

## 2018-04-18 PROCEDURE — 25000125 ZZHC RX 250: Performed by: PHYSICIAN ASSISTANT

## 2018-04-18 RX ADMIN — LOSARTAN POTASSIUM 50 MG: 50 TABLET ORAL at 09:03

## 2018-04-18 RX ADMIN — ROPINIROLE HYDROCHLORIDE 1 MG: 1 TABLET, FILM COATED ORAL at 09:02

## 2018-04-18 RX ADMIN — IPRATROPIUM BROMIDE AND ALBUTEROL SULFATE 3 ML: .5; 3 SOLUTION RESPIRATORY (INHALATION) at 20:20

## 2018-04-18 RX ADMIN — Medication 1250 MG: at 21:22

## 2018-04-18 RX ADMIN — GABAPENTIN 300 MG: 300 CAPSULE ORAL at 09:03

## 2018-04-18 RX ADMIN — ASPIRIN 81 MG: 81 TABLET, COATED ORAL at 09:02

## 2018-04-18 RX ADMIN — LABETALOL HYDROCHLORIDE 100 MG: 100 TABLET, FILM COATED ORAL at 09:02

## 2018-04-18 RX ADMIN — FLUTICASONE FUROATE AND VILANTEROL TRIFENATATE 1 PUFF: 200; 25 POWDER RESPIRATORY (INHALATION) at 09:04

## 2018-04-18 RX ADMIN — NICOTINE POLACRILEX 4 MG: 2 GUM, CHEWING ORAL at 09:02

## 2018-04-18 RX ADMIN — Medication 100 MG: at 09:02

## 2018-04-18 RX ADMIN — ALUMINUM HYDROXIDE, MAGNESIUM HYDROXIDE, AND DIMETHICONE 30 ML: 400; 400; 40 SUSPENSION ORAL at 20:38

## 2018-04-18 RX ADMIN — GABAPENTIN 600 MG: 300 CAPSULE ORAL at 21:48

## 2018-04-18 RX ADMIN — MAGNESIUM OXIDE TAB 400 MG (241.3 MG ELEMENTAL MG) 400 MG: 400 (241.3 MG) TAB at 20:25

## 2018-04-18 RX ADMIN — FOLIC ACID 1 MG: 1 TABLET ORAL at 09:02

## 2018-04-18 RX ADMIN — OMEPRAZOLE 20 MG: 20 CAPSULE, DELAYED RELEASE ORAL at 16:23

## 2018-04-18 RX ADMIN — SPIRONOLACTONE 25 MG: 25 TABLET ORAL at 09:03

## 2018-04-18 RX ADMIN — NICOTINE 1 PATCH: 7 PATCH TRANSDERMAL at 09:04

## 2018-04-18 RX ADMIN — FLUOXETINE 40 MG: 20 CAPSULE ORAL at 20:25

## 2018-04-18 RX ADMIN — NICOTINE POLACRILEX 4 MG: 2 GUM, CHEWING ORAL at 16:24

## 2018-04-18 RX ADMIN — OMEPRAZOLE 20 MG: 20 CAPSULE, DELAYED RELEASE ORAL at 09:02

## 2018-04-18 RX ADMIN — FLUOXETINE 40 MG: 20 CAPSULE ORAL at 09:02

## 2018-04-18 RX ADMIN — LABETALOL HYDROCHLORIDE 200 MG: 100 TABLET, FILM COATED ORAL at 20:27

## 2018-04-18 RX ADMIN — ALBUTEROL SULFATE 2 PUFF: 90 AEROSOL, METERED RESPIRATORY (INHALATION) at 19:22

## 2018-04-18 RX ADMIN — FUROSEMIDE 20 MG: 20 TABLET ORAL at 09:03

## 2018-04-18 RX ADMIN — MAGNESIUM OXIDE TAB 400 MG (241.3 MG ELEMENTAL MG) 400 MG: 400 (241.3 MG) TAB at 09:03

## 2018-04-18 RX ADMIN — ACETAMINOPHEN 650 MG: 325 TABLET ORAL at 20:38

## 2018-04-18 RX ADMIN — MULTIPLE VITAMINS W/ MINERALS TAB 1 TABLET: TAB at 09:02

## 2018-04-18 RX ADMIN — ROPINIROLE HYDROCHLORIDE 1 MG: 1 TABLET, FILM COATED ORAL at 20:25

## 2018-04-18 RX ADMIN — UMECLIDINIUM 1 PUFF: 62.5 AEROSOL, POWDER ORAL at 09:04

## 2018-04-18 RX ADMIN — Medication 1250 MG: at 09:02

## 2018-04-18 RX ADMIN — ROPINIROLE HYDROCHLORIDE 1 MG: 1 TABLET, FILM COATED ORAL at 14:42

## 2018-04-18 RX ADMIN — GABAPENTIN 300 MG: 300 CAPSULE ORAL at 11:47

## 2018-04-18 ASSESSMENT — ACTIVITIES OF DAILY LIVING (ADL)
GROOMING: INDEPENDENT
LAUNDRY: WITH SUPERVISION
DRESS: SCRUBS (BEHAVIORAL HEALTH)
ORAL_HYGIENE: INDEPENDENT

## 2018-04-18 NOTE — PLAN OF CARE
"Problem: Substance Withdrawal  Goal: Substance Withdrawal  Problem: General Plan of Care (Inpatient Behavioral)  Goal: Individualization/ Patient Specific Goal (IP Behavioral)  The patient and/or their representative their patient-specific goals related to the plan of care.  Patient specific goals include:  1.  Patient will be evaluated by a physician and labs will be evaluated.  2.  Patient will be seen by a  for evaluation and discharge planning.  3.  Patient will complete assessment paperwork  4.  Patient will consume 75 % of meal to meet estimated energy needs.  5.  Detoxification from alcohol using valium.  6.  Patient will be provided with alcohol relapse prevention information.Signs and symptoms of listed problems will be absent or manageable.   Outcome: Improving  No medications for alcohol withdrawal x 24 hours. Pt had received a total of 55 mg of valium since admission.  Pt will be removed from alcohol withdrawal monitoring. Pt out on unit attending unit programming. Pt using walker on unit. Gait steady. Pt requested and was provided a egg crate mattress. Pt using Icy hot patches for back pain. 02 Sat 97% on room air. BP slightly elevated. Discharge plans pending. Pt reports her mood as \"okay.\"      "

## 2018-04-18 NOTE — PROGRESS NOTES
"SPIRITUAL HEALTH SERVICES   SPIRITUAL ASSESSMENT Progress Note   [facility] Unit 3AW      REFERRAL SOURCE: [facility] consult for emotional support      Visited with patient, who goes by \"Cody\", yesterday at 5:45pm. Upon agreeing to visit she stated that she hadn't been aware of the [facility] consult and she wasn't sure she was ready for a  visit. That said, we did have a brief conversation about her grief where I normalized her experience of complex grief and we talked about some of her sources of coping including Bible reading. She asked for a psalm to read and I suggested psalm 139 which she recognized from some Judaism songs she enjoys. She left stating that she was glad she had talked with the , and is aware that we are available for her support as helpful.    PLAN: [facility] remains available to Cody for the duration of her hospitalization.      Khadra Arango MDiv, [facility]   Staff    Pager [phone]   "

## 2018-04-18 NOTE — PROGRESS NOTES
[facility].  Pt approved for admission however uncertain as to admit date.  They will contact us when they have a date to schedule.

## 2018-04-18 NOTE — PROGRESS NOTES
"[facility] [place]   Psychiatric Progress Note        Interim History:   The patient's care was discussed with the treatment team during the daily team meeting and/or staff's chart notes were reviewed.  Staff report patient has a restricted provider. Funding is in place for [facility] treatment.     The patient reports that she is done with withdrawal. Mood is \"okay.\" Did get some sleep. Eating well. Denies SI. Would like to go home and pack before going to [facility] and does have a list of questions for them.          Medications:       aspirin  81 mg Oral Daily     calcium carbonate  1,250 mg Oral BID     FLUoxetine  40 mg Oral BID     fluticasone-vilanterol  1 puff Inhalation Daily     folic acid  1 mg Oral Daily     furosemide  20 mg Oral Daily     gabapentin  300 mg Oral BID     gabapentin  600 mg Oral At Bedtime     labetalol  100 mg Oral QAM     labetalol  200 mg Oral QPM     losartan  50 mg Oral Daily     magnesium oxide  400 mg Oral BID     menthol   Transdermal Q8H     multivitamin, therapeutic with minerals  1 tablet Oral Daily     nicotine  1 patch Transdermal Daily     nicotine   Transdermal Q8H     nicotine   Transdermal Daily     omeprazole  20 mg Oral BID     rOPINIRole  1 mg Oral TID     spironolactone  25 mg Oral Daily     thiamine  100 mg Oral Daily     umeclidinium  1 puff Inhalation Daily          Allergies:     Allergies   Allergen Reactions     Codeine Nausea     Reglan [Metoclopramide Hcl] Other (See Comments)     Body tenses up          Labs:   No results found for this or any previous visit (from the past 24 hour(s)).       Psychiatric Examination:     BP (!) 145/91  Pulse 77  Temp 98.6  F (37  C) (Oral)  Resp 16  Ht 1.549 m (5' 1\")  Wt 72.6 kg (160 lb)  LMP 06/02/2010  SpO2 98%  BMI 30.23 kg/m2  Weight is 160 lbs 0 oz  Body mass index is 30.23 kg/(m^2).  Orthostatic Vitals       Most Recent      Lying Orthostatic /69 04/16 2306    "         Appearance: awake, alert and adequately groomed  Attitude:  cooperative  Eye Contact:  good  Mood:  better  Affect:  mood congruent  Speech:  clear, coherent  Psychomotor Behavior:  no evidence of tardive dyskinesia, dystonia, or tics  Thought Process:  linear  Associations:  no loose associations  Thought Content:  no evidence of suicidal ideation or homicidal ideation and no evidence of psychotic thought  Insight:  fair  Judgement:  fair  Oriented to:  time, person, and place  Attention Span and Concentration:  intact  Recent and Remote Memory:  fair         # Pain Assessment:  Current Pain Score 4/16/2018   Patient currently in pain? yes   Pain score (0-10) -   Pain location Back   Pain descriptors -   - Inga is experiencing pain due to back. Pain management was discussed and the plan was created in a collaborative fashion.  Inga's response to the current recommendations: engaged  - Pharmacologic adjuvants: Acetaminophen and Gabapentin (Neurontin)               Precautions:     Behavioral Orders   Procedures     Code 1 - Restrict to Unit     Routine Programming     As clinically indicated     Seizure precautions     Status 15     Every 15 minutes.     Withdrawal precautions          Diagnoses:     AXIS I:   1.  Alcohol use disorder, severe dependence.   2.  Major depressive disorder, recurrent, moderate.   3.  Nicotine use disorder.   Axis II:  Deferred.   Axis III:  See past medical history.          Plan:     1) Continue MSSA protocol.   2) Continue current medications.   3) Okay for egg crate mattress.   4) Disposition likely to [facility] for CD treatment. Patient to show improvement in terms of withdrawal and mood prior to discharge.

## 2018-04-19 PROCEDURE — 25000132 ZZH RX MED GY IP 250 OP 250 PS 637: Performed by: PHYSICIAN ASSISTANT

## 2018-04-19 PROCEDURE — 25000132 ZZH RX MED GY IP 250 OP 250 PS 637: Performed by: NURSE PRACTITIONER

## 2018-04-19 PROCEDURE — H2035 A/D TX PROGRAM, PER HOUR: HCPCS | Mod: HQ

## 2018-04-19 PROCEDURE — 12800012 ZZH R&B CD MH INTERMEDIATE ADULT

## 2018-04-19 RX ADMIN — LOSARTAN POTASSIUM 50 MG: 50 TABLET ORAL at 08:46

## 2018-04-19 RX ADMIN — MULTIPLE VITAMINS W/ MINERALS TAB 1 TABLET: TAB at 08:46

## 2018-04-19 RX ADMIN — Medication 100 MG: at 08:46

## 2018-04-19 RX ADMIN — GABAPENTIN 300 MG: 300 CAPSULE ORAL at 12:10

## 2018-04-19 RX ADMIN — NICOTINE POLACRILEX 4 MG: 2 GUM, CHEWING ORAL at 16:19

## 2018-04-19 RX ADMIN — UMECLIDINIUM 1 PUFF: 62.5 AEROSOL, POWDER ORAL at 08:48

## 2018-04-19 RX ADMIN — MAGNESIUM OXIDE TAB 400 MG (241.3 MG ELEMENTAL MG) 400 MG: 400 (241.3 MG) TAB at 08:46

## 2018-04-19 RX ADMIN — GABAPENTIN 300 MG: 300 CAPSULE ORAL at 08:46

## 2018-04-19 RX ADMIN — OMEPRAZOLE 20 MG: 20 CAPSULE, DELAYED RELEASE ORAL at 16:21

## 2018-04-19 RX ADMIN — NICOTINE 1 PATCH: 7 PATCH TRANSDERMAL at 08:48

## 2018-04-19 RX ADMIN — SPIRONOLACTONE 25 MG: 25 TABLET ORAL at 08:46

## 2018-04-19 RX ADMIN — ACETAMINOPHEN 650 MG: 325 TABLET ORAL at 10:13

## 2018-04-19 RX ADMIN — MAGNESIUM HYDROXIDE 30 ML: 400 SUSPENSION ORAL at 16:21

## 2018-04-19 RX ADMIN — GABAPENTIN 600 MG: 300 CAPSULE ORAL at 20:55

## 2018-04-19 RX ADMIN — MAGNESIUM OXIDE TAB 400 MG (241.3 MG ELEMENTAL MG) 400 MG: 400 (241.3 MG) TAB at 20:54

## 2018-04-19 RX ADMIN — ROPINIROLE HYDROCHLORIDE 1 MG: 1 TABLET, FILM COATED ORAL at 20:55

## 2018-04-19 RX ADMIN — ASPIRIN 81 MG: 81 TABLET, COATED ORAL at 08:46

## 2018-04-19 RX ADMIN — NICOTINE POLACRILEX 4 MG: 2 GUM, CHEWING ORAL at 20:57

## 2018-04-19 RX ADMIN — OMEPRAZOLE 20 MG: 20 CAPSULE, DELAYED RELEASE ORAL at 08:46

## 2018-04-19 RX ADMIN — FLUOXETINE 40 MG: 20 CAPSULE ORAL at 20:55

## 2018-04-19 RX ADMIN — ROPINIROLE HYDROCHLORIDE 1 MG: 1 TABLET, FILM COATED ORAL at 14:51

## 2018-04-19 RX ADMIN — Medication 1250 MG: at 20:55

## 2018-04-19 RX ADMIN — FOLIC ACID 1 MG: 1 TABLET ORAL at 08:46

## 2018-04-19 RX ADMIN — LABETALOL HYDROCHLORIDE 100 MG: 100 TABLET, FILM COATED ORAL at 08:47

## 2018-04-19 RX ADMIN — NICOTINE POLACRILEX 4 MG: 2 GUM, CHEWING ORAL at 08:50

## 2018-04-19 RX ADMIN — LABETALOL HYDROCHLORIDE 200 MG: 100 TABLET, FILM COATED ORAL at 20:55

## 2018-04-19 RX ADMIN — FUROSEMIDE 20 MG: 20 TABLET ORAL at 08:46

## 2018-04-19 RX ADMIN — ROPINIROLE HYDROCHLORIDE 1 MG: 1 TABLET, FILM COATED ORAL at 08:46

## 2018-04-19 RX ADMIN — Medication 1250 MG: at 08:46

## 2018-04-19 RX ADMIN — FLUOXETINE 40 MG: 20 CAPSULE ORAL at 08:46

## 2018-04-19 RX ADMIN — NICOTINE POLACRILEX 4 MG: 2 GUM, CHEWING ORAL at 12:13

## 2018-04-19 RX ADMIN — TRAZODONE HYDROCHLORIDE 50 MG: 50 TABLET ORAL at 20:55

## 2018-04-19 RX ADMIN — FLUTICASONE FUROATE AND VILANTEROL TRIFENATATE 1 PUFF: 200; 25 POWDER RESPIRATORY (INHALATION) at 08:47

## 2018-04-19 ASSESSMENT — ACTIVITIES OF DAILY LIVING (ADL)
DRESS: SCRUBS (BEHAVIORAL HEALTH)
LAUNDRY: WITH SUPERVISION
DRESS: SCRUBS (BEHAVIORAL HEALTH)
GROOMING: INDEPENDENT
ORAL_HYGIENE: INDEPENDENT
GROOMING: INDEPENDENT
LAUNDRY: WITH SUPERVISION
ORAL_HYGIENE: INDEPENDENT
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
## 2018-04-19 NOTE — PROGRESS NOTES
"This writer responded to the patients call light and walked in the room to find the patient on the floor.  I asked the patient if she was ok and she said yes and that she had \"rolled out of bed.\" I then yelled for another staff so that we could safely assist her back into bed.  Pt was placed back in bed and then said that she thought she hit her head on the plastic night stand. Nurse was notified of the situation. Pt was given an ice pack for her head.   "

## 2018-04-19 NOTE — PROGRESS NOTES
Pt left unit for CT with staff and ticket to ride and returned without incident. Code changed back to Code 1.

## 2018-04-19 NOTE — PROGRESS NOTES
Behavioral Health  Note    Behavioral Health  Spirituality Group Note    UNIT 3AW    Name: Inga Ledesma YOB: 1965   MRN: [id number] Age: 52 year old      Patient attended -led group, which included discussion of spirituality, coping with illness and building resilience.    Patient attended group for 1.0 hrs.    The patient actively participated in group discussion and patient demonstrated an appreciation of topic's application for their personal circumstances. Cody shared her gratitude for a gift card that was allowing her son to buy new shoes, and her hopes for th future. She did not participate in yoga, but reflected on her COPD and how that could be incorporated into mindfulness/body-based practices in a way that was self-compassionate and appropriate.    Topic/Focus of today's spirituality group: Spirituality from Scratch - Spiritual Practices for Recovery  IMR tie-in: Wellness Strategies  Practice Taught: Adaptive/Recovery yoga, Three Good Things      Khadra Arango MDiv, [facility]  Staff   Pager [phone]

## 2018-04-19 NOTE — PROVIDER NOTIFICATION
Pt c/o SOB given albuterol inhaler but did not find it  helpful, R-20, O2 sats 100%. Paged RTand they came and administered Albuterol nebulizer. Pt reporting GI distress and was given Maalox.  Also reporting pain in (L) side of face, (R) side of neck, arm and back, worse with movement and was givenTylenol. She reports that her finger tips are tingling.  No facial droop is noted, bilateral  strength appears normal, speech is nl,  /88 P 73 and regular. Chest xray and EKG  in ED 4/16/18 wnl. Paged moon lighter and patient will be evaluated.

## 2018-04-19 NOTE — PROGRESS NOTES
Brief Medicine Note  April 19, 2018      Notified by RN that patient fell out of bed last night and was seen by moonlighter and had CT scan of head performed. Reviewed chart, it appears around 2230 last night patient rolled out of bed and thought she hit her head on the nightstand. CT head performed at 2323 and showed no acute intracranial pathology (no hemorrhage, no mass effect, no midline shift), mild parenchymal loss. Neuro checks were performed overnight and were unremarkable.     Cont to monitor patients status. Ice packs for pain as needed.    Elenita Swanson PA-C

## 2018-04-19 NOTE — PLAN OF CARE
Problem: Patient Care Overview  Goal: Plan of Care/Patient Progress Review  Outcome: No Change  Pt out on unit attending unit programming. Appetite good. Still reporting left head pain and right side tingling in right finger. Pt requested and was given tylenol. Pt states last night she was reaching for something on her night stand and was to close to the edge of the bed and slipped out of bed. Pt alert and orientated. Pt uses walker off and on. Gait is steady. Pt states she may go stay with a friend prior to going to [facility] for CD treatment. Pt hoping to be discharge tomorrow. States that she does not need medications ordered for discharge. Medical and Dr. Coronado are aware of fall. Pt talked of having 10 years sober.

## 2018-04-20 ENCOUNTER — TELEPHONE (OUTPATIENT)
Dept: FAMILY MEDICINE | Facility: CLINIC | Age: 53
End: 2018-04-20

## 2018-04-20 VITALS
OXYGEN SATURATION: 100 % | WEIGHT: 160 LBS | TEMPERATURE: 97 F | HEIGHT: 61 IN | HEART RATE: 70 BPM | SYSTOLIC BLOOD PRESSURE: 153 MMHG | DIASTOLIC BLOOD PRESSURE: 98 MMHG | RESPIRATION RATE: 16 BRPM | BODY MASS INDEX: 30.21 KG/M2

## 2018-04-20 DIAGNOSIS — F33.0 MAJOR DEPRESSIVE DISORDER, RECURRENT EPISODE, MILD (H): ICD-10-CM

## 2018-04-20 DIAGNOSIS — I10 ESSENTIAL HYPERTENSION WITH GOAL BLOOD PRESSURE LESS THAN 140/90: ICD-10-CM

## 2018-04-20 DIAGNOSIS — J42 CHRONIC BRONCHITIS, UNSPECIFIED CHRONIC BRONCHITIS TYPE (H): ICD-10-CM

## 2018-04-20 DIAGNOSIS — F10.239 ALCOHOL DEPENDENCE WITH WITHDRAWAL WITH COMPLICATION (H): ICD-10-CM

## 2018-04-20 DIAGNOSIS — I10 HYPERTENSION GOAL BP (BLOOD PRESSURE) < 140/90: ICD-10-CM

## 2018-04-20 PROCEDURE — 25000132 ZZH RX MED GY IP 250 OP 250 PS 637: Performed by: PHYSICIAN ASSISTANT

## 2018-04-20 PROCEDURE — 99238 HOSP IP/OBS DSCHRG MGMT 30/<: CPT | Performed by: PSYCHIATRY & NEUROLOGY

## 2018-04-20 PROCEDURE — 25000132 ZZH RX MED GY IP 250 OP 250 PS 637: Performed by: NURSE PRACTITIONER

## 2018-04-20 RX ADMIN — FUROSEMIDE 20 MG: 20 TABLET ORAL at 09:12

## 2018-04-20 RX ADMIN — MULTIPLE VITAMINS W/ MINERALS TAB 1 TABLET: TAB at 09:12

## 2018-04-20 RX ADMIN — NICOTINE 1 PATCH: 7 PATCH TRANSDERMAL at 09:15

## 2018-04-20 RX ADMIN — SPIRONOLACTONE 25 MG: 25 TABLET ORAL at 09:15

## 2018-04-20 RX ADMIN — LOSARTAN POTASSIUM 50 MG: 50 TABLET ORAL at 09:12

## 2018-04-20 RX ADMIN — Medication 1250 MG: at 09:12

## 2018-04-20 RX ADMIN — GABAPENTIN 300 MG: 300 CAPSULE ORAL at 09:13

## 2018-04-20 RX ADMIN — OMEPRAZOLE 20 MG: 20 CAPSULE, DELAYED RELEASE ORAL at 09:12

## 2018-04-20 RX ADMIN — MAGNESIUM OXIDE TAB 400 MG (241.3 MG ELEMENTAL MG) 400 MG: 400 (241.3 MG) TAB at 09:13

## 2018-04-20 RX ADMIN — FLUTICASONE FUROATE AND VILANTEROL TRIFENATATE 1 PUFF: 200; 25 POWDER RESPIRATORY (INHALATION) at 09:14

## 2018-04-20 RX ADMIN — FLUOXETINE 40 MG: 20 CAPSULE ORAL at 09:12

## 2018-04-20 RX ADMIN — UMECLIDINIUM 1 PUFF: 62.5 AEROSOL, POWDER ORAL at 09:14

## 2018-04-20 RX ADMIN — LABETALOL HYDROCHLORIDE 100 MG: 100 TABLET, FILM COATED ORAL at 09:12

## 2018-04-20 RX ADMIN — FOLIC ACID 1 MG: 1 TABLET ORAL at 09:12

## 2018-04-20 RX ADMIN — ASPIRIN 81 MG: 81 TABLET, COATED ORAL at 09:12

## 2018-04-20 RX ADMIN — ROPINIROLE HYDROCHLORIDE 1 MG: 1 TABLET, FILM COATED ORAL at 09:12

## 2018-04-20 RX ADMIN — NICOTINE POLACRILEX 4 MG: 2 GUM, CHEWING ORAL at 09:21

## 2018-04-20 ASSESSMENT — ACTIVITIES OF DAILY LIVING (ADL)
ORAL_HYGIENE: INDEPENDENT
GROOMING: SHOWER;INDEPENDENT
DRESS: SCRUBS (BEHAVIORAL HEALTH)
LAUNDRY: WITH SUPERVISION

## 2018-04-20 NOTE — DISCHARGE INSTRUCTIONS
Behavioral Discharge Planning and Instructions  THANK YOU FOR CHOOSING THE [address]    Summary: You were admitted to [address] [place] [address] on treatment and evaluation of alcohol use. You are being discharge today and the treatment teams recommendations are: Enter and complete MICD treatment at [facility].  Your admission has not been scheduled so please respond to all calls from your treatment center. If you don't hear from [facility] by Monday, Please call them yourself.    [facility] Inpatient treatment program  [phone]  [address] [place] [id number]    Main Diagnoses: DIAGNOSES: per Dr. Coronado psychiatrist.   AXIS I:   1.  Alcohol use disorder, severe dependence.   2.  Major depressive disorder, recurrent, moderate.   3.  Nicotine use disorder.   Axis II:  Deferred.   Axis III:  See past medical history.         Major Treatments, Procedures and Findings: Your alcohol withdrawal was treated with valium using the Modified Selective Severity Assessment (MSSA) protocol  .  You were followed by Psychiatry and Medical. You met with Case Management to complete a chemical health assessment and for discharge planning. You were given a copy of your lab results which were reviewed with you. Please bring your lab results with you to your primary follow up appointment. Make your recovery a priority, Cody.    Please review handouts provided on hypertension and COPD    Symptoms to Report: If  you experience feeling more anxiety, confusion, losing more sleep, mood declining or thoughts of suicide, notify your treatment team or notify your primary physician. IF ANY OF THE SYMPTOMS YOU ARE EXPERIENCING ARE A MEDICAL EMERGENCY CALL 911 IMMEDIATELY.    Lifestyle Adjustment: Health Action Plan:  1.Create a daily schedule  2. Eat Healthy  3. Plan Enjoyable Sober Activities  4. Use Problem Solving Skills and Deal with Issues as they Arise.   5. Be Physically Active  6. Take your medications as  prescribed  7. Get enough restful sleep  8. Practice Relaxation  9. Spend time with Supportive People  10. No use of alcohol, illegal drugs or addictive medications other than what is currently prescribed.   11.AA, NA Sponsor are excellent resources for support  Keeping hands clean is one of the most important steps we can take to avoid getting sick and spreading germs to others.  Please wash your hands frequently.       Medical Provider Follow Up:  Dr. Min Lu[facility]  You are aware you should follow up with your primary for medical and medication needs.   Please take this discharge folder with you to all your follow up appointments as it contains your lab results, diagnosis, medication list and discharge recommendations.       Support Group:  AA/NA and Sponsor contact/support    Resources:  Crisis Intervention: 200.623.5104 or 936-737-9412 (TTY: 606.114.9992).  Call anytime for help.  Suicide Awareness Voices of Education (SAVE) (www.save.org): 606-789-HPIS (7396)  National Suicide Prevention Line (www.mentalhealthmn.org): 629-735-CWHO (6946)  National Magnet on Mental Illness (www.mn.dakotah.org): 858.393.3815 or 778-669-0010.  Alcoholics Anonymous (www.alcoholics-anonymous.org): Or local information can be found 24 hours a day at:   AA Intergroup service office in [place] (http://www.aastpaul.org/) 279.495.6564  AA Intergroup service office in [facility]: 534.528.1576. (http://www.a[place].org/)  Narcotics Anonymous (www.nam[place].org)1-054-249-4552   [facility] Behavioral Intake [phone]    Medical Records [phone]      [place] [facility] ([facility])  [facility] connects people seeking recovery to resources that help foster and sustain long-term recovery.  Whether you are seeking resources for treatment, transportation, housing, job training, education, health care or other pathways to recovery, [facility] is a great place to start.  [phone].  Www.[facility].org    General Medication Instructions:   See your medication sheet(s) for instructions. Take all medicines as directed.  Make no changes unless your doctor directs them. Go to all your doctor visits. Be sure to have all your required lab tests. This way, your medicines can be refilled in timely fashion. Do not use any drugs not prescribed by your provider. AA/NA and Sponsors are excellent resources for support. Avoid alcohol.    Please Note: If you have any questions at anytime after you are discharged please call the [facility], [place] detox unit 3AW unit at [phone].  [facility], Behavioral Intake [phone]  Please take this discharge folder with you to all your follow up appointments, it contains your lab results, diagnosis, medication list and discharge recommendations.      THANK YOU FOR CHOOSING THE [facility]

## 2018-04-20 NOTE — TELEPHONE ENCOUNTER
"Requested Prescriptions   Pending Prescriptions Disp Refills     FLUoxetine (PROZAC) 40 MG capsule [Pharmacy Med Name: FLUOXETINE HCL 40MG CAPS] 60 capsule 0    Last Written Prescription Date:  2/26/18  Last Fill Quantity: 60,  # refills: 0   Last office visit: 4/12/2018 with prescribing provider:  4/12/18 MAXIMILIANO Toledo   Future Office Visit:   Sig: TAKE ONE CAPSULE BY MOUTH TWICE A DAY    SSRIs Protocol Failed    4/20/2018  2:07 PM       Failed - PHQ-9 score less than 5 in past 6 months    Please review last PHQ-9 score.          Failed - No positive pregnancy test in last 12 months       Passed - Patient is age 18 or older       Passed - No active pregnancy on record       Passed - Recent (6 mo) or future (30 days) visit within the authorizing provider's specialty    Patient had office visit in the last 6 months or has a visit in the next 30 days with authorizing provider or within the authorizing provider's specialty.  See \"Patient Info\" tab in inbasket, or \"Choose Columns\" in Meds & Orders section of the refill encounter.            magnesium oxide (MAG-OX) 400 MG tablet [Pharmacy Med Name: MAGNESIUM OXIDE 400MG TABS] 60 tablet 0    Last Written Prescription Date:  2/26/18  Last Fill Quantity: 60,  # refills: 0   Last office visit: 4/12/2018 with prescribing provider:  4/12/18 MAXIMILIANO Toledo   Future Office Visit:   Sig: TAKE ONE TABLET BY MOUTH TWICE A DAY    There is no refill protocol information for this order        furosemide (LASIX) 20 MG tablet [Pharmacy Med Name: FUROSEMIDE 20MG TABS] 90 tablet 0    Last Written Prescription Date:  2/26/18  Last Fill Quantity: 90,  # refills: 0   Last office visit: 4/12/2018 with prescribing provider:  4/12/18 MAXIMILIANO Toledo   Future Office Visit:   Sig: TAKE ONE TABLET BY MOUTH EVERY DAY    Diuretics (Including Combos) Protocol Failed    4/20/2018  2:07 PM       Failed - Blood pressure under 140/90 in past 12 months    BP Readings from Last 3 Encounters:   04/12/18 96/64   03/30/18 (!) " "210/106 02/20/18 163/90                Failed - No positive pregnancy test in past 12 months       Passed - Recent (12 mo) or future (30 days) visit within the authorizing provider's specialty    Patient had office visit in the last 12 months or has a visit in the next 30 days with authorizing provider or within the authorizing provider's specialty.  See \"Patient Info\" tab in inbasket, or \"Choose Columns\" in Meds & Orders section of the refill encounter.           Passed - Patient is age 18 or older       Passed - No active pregancy on record       Passed - Normal serum creatinine on file in past 12 months    Recent Labs   Lab Test  04/17/18   0948   CR  0.86             Passed - Normal serum potassium on file in past 12 months    Recent Labs   Lab Test  04/17/18   0948   POTASSIUM  3.7                   Passed - Normal serum sodium on file in past 12 months    Recent Labs   Lab Test  04/17/18   0948   NA  137              labetalol (NORMODYNE) 100 MG tablet [Pharmacy Med Name: LABETALOL HCL 100MG TABS] 180 tablet 0    Last Written Prescription Date:  2/26/18  Last Fill Quantity: 180,  # refills: 0   Last office visit: 4/12/2018 with prescribing provider:  4/12/18 MAXIMILIANO Toledo   Future Office Visit:   Sig: TAKE ONE TABLET BY MOUTH TWICE A DAY    Beta-Blockers Protocol Failed    4/20/2018  2:07 PM       Failed - Blood pressure under 140/90 in past 12 months    BP Readings from Last 3 Encounters:   04/12/18 96/64   03/30/18 (!) 210/106 02/20/18 163/90                Passed - Patient is age 6 or older       Passed - Recent (12 mo) or future (30 days) visit within the authorizing provider's specialty    Patient had office visit in the last 12 months or has a visit in the next 30 days with authorizing provider or within the authorizing provider's specialty.  See \"Patient Info\" tab in inbasket, or \"Choose Columns\" in Meds & Orders section of the refill encounter.            VENTOLIN  (90 Base) MCG/ACT Inhaler " "[Pharmacy Med Name: VENTOLIN HFA 108MCG/ACT AERS] 54 g 0    Last Written Prescription Date:  2/26/18  Last Fill Quantity: 54 gm,  # refills: 0   Last office visit: 4/12/2018 with prescribing provider:  4/12/18 MAXIMILIANO Toledo   Future Office Visit:   Sig: INHALE TWO PUFFS BY MOUTH FOUR TIMES A DAY AS NEEDED    Asthma Maintenance Inhalers - Anticholinergics Failed    4/20/2018  2:07 PM       Failed - Asthma control assessment score within normal limits in last 6 months    Please review ACT score.          Passed - Patient is age 12 years or older       Passed - Recent (6 mo) or future (30 days) visit within the authorizing provider's specialty    Patient had office visit in the last 6 months or has a visit in the next 30 days with authorizing provider or within the authorizing provider's specialty.  See \"Patient Info\" tab in inbasket, or \"Choose Columns\" in Meds & Orders section of the refill encounter.            "

## 2018-04-20 NOTE — TELEPHONE ENCOUNTER
Patient left message on TC phone. She just got out of hospital and needs records from 2/1/18 to present sent to her disability. Patient requesting call back and will have fax number and claim number at that time

## 2018-04-20 NOTE — PLAN OF CARE
"Problem: Patient Care Overview  Goal: Discharge Needs Assessment  Outcome: Adequate for Discharge Date Met: 04/20/18  Pt's alcohol withdrawal is complete. Pt is being discharged today and plans to stay with her friend until a bed is available at the [facility] treatment program. Pt reports her mood as anxious rating her anxiety level a 6 on a 0-10. Pt stated \"I feel depressed all the time.\". Denies SI. Pt interested in and was provided information on ECT. Pt social with peers on unit. Appetite and sleep with trazodone is good. Respiratory status is stable. Pt has a walker at home to use. Pt also has supply of all of her medications at home. See discharge instructions. Pt reports being motivated for recovery and plans to attend celebrate recovery meetings.       "

## 2018-04-20 NOTE — DISCHARGE SUMMARY
Psychiatric Discharge Summary    Inga Ledesma MRN# [id number]   Age: 52 year old YOB: 1965     Date of Admission:  4/16/2018  Date of Discharge:  4/20/2018 12:49 PM  Admitting Physician:  Ion Coronado MD  Discharge Physician:  Ion Coronado MD          Event Leading to Hospitalization:   HISTORY OF PRESENT ILLNESS:  The patient is a 52-year-old white female with a history of alcohol use disorder and depression who was recently discharged with a plan to go to [facility].  The patient was denied admission at [facility] due to the number of medications she was on, so she said she relapsed and has been drinking up to a half a pint of vodka daily.  Says she is doing okay with the withdrawal so far.  Mood is depressed.  Denies suicidal or homicidal ideation.  Denies auditory or visual hallucinations.  Patient said she was able to get a little bit of sleep, was able to eat.  Patient has had a lot of losses, her father and 2 close friends, one very recently and is quite tearful when discussing this.        See Admission note by Ion Coronado MD for additional details.          DIagnoses:     AXIS I:   1.  Alcohol use disorder, severe dependence.   2.  Major depressive disorder, recurrent, moderate.   3.  Nicotine use disorder.   Axis II:  Deferred.   Axis III:  See past medical history.          Labs:          Lab Results   Component Value Date     04/17/2018    Lab Results   Component Value Date    CHLORIDE 98 04/17/2018    Lab Results   Component Value Date    BUN 10 04/17/2018      Lab Results   Component Value Date    POTASSIUM 3.7 04/17/2018    Lab Results   Component Value Date    CO2 27 04/17/2018    Lab Results   Component Value Date    CR 0.86 04/17/2018        Lab Results   Component Value Date    WBC 7.7 04/16/2018    HGB 12.9 04/16/2018    HCT 37.5 04/16/2018    MCV 87 04/16/2018     04/17/2018     Lab Results   Component Value Date    AST 33  04/16/2018    ALT 39 04/16/2018    GGT 55 (H) 04/16/2018    ALKPHOS 101 04/16/2018    BILITOTAL 0.5 04/16/2018     Lab Results   Component Value Date    TSH 1.31 04/06/2018            Consults:   Consultation during this admission received from internal medicine:    Alcohol Abuse & Withdrawal; H/o EToH Withdrawal Seizures. Long standing history of EtOH abuse with numerous previous admission for alcohol withdrawal including prior alcohol withdrawal seizure in 2016.   - [facility] protocol  - Continue MVI, thiamine, folic acid, mag ox and calcium   - Encourage PO intake       MDD. Care per primary psychiatry team.        Uncontrolled Hypertension. With known medication non-compliance 2/2 heavy EtOH use. Managed on PTA Labetalol 100 mg in AM and 200mg in PM, Losartan 50 mg daily, Lasix 20 mg daily. BP currently elevated likely d/t medication non-compliance and acute alcohol w/d.      Diastolic CHF. Diagnosed 5/2016 at [facility] when she was admitted for a presumed COPD exacerbation. Most recent TTE in [place] on 12/22/17 was a technically difficult study but notes and estimated LVEF 65-70%, no significant valve disease, no pericardial effusion.  Unclear what EDW is as patient is non-compliant. Per review of chart, appears as though weight has been relatively stable ~72 kg.   - Continue Lasix 20 mg PO daily, aldactone 25mg daily (per last clinic visit)  - Follow weights daily at home, ensure patient calls her PCP for a weight gain of 3-5 pounds over 2-3 days   - Follow up with PCP on discharge       History of COPD. Continues to smoke tobacco daily which is likely worsening her shortness of breath.  Managed on PTA Albuterol PRN, Advair 1 puff bID, Duonebs QID PRN and Spiriva.   - Continue home inhalers (some have been auto subbed by PharmD), no changes to regimen  - Please page IM if patient has increased SOB or hypoxia       GERD. Symptoms stable on PTA PPI      History of Iron Deficiency Anemia. Hgb stable 12.9.  Not on  supplementation.   - Follow up with PCP for routine CBC as OP       RLS. Managed on PTA Requip and Gabapentin with occasional use of Menthol patch and Robaxin. No current symptoms.   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        RLE Lymphedema. 2/2 lymph node dissection.  Continue lasix as above, may benefit from referal to lymphedema clinic from PCP.      Hyponatremia, resolved. Na 130 on admission, now 137 on repeat testing. Likely due to non-compliance with diuretic meds PTA.         Hospital Course:   Inga Ledesma was admitted to Station 3[facility] with attending Ion Coronado MD as a voluntary patient. The patient was placed under status 15 (15 minute checks) to ensure patient safety.   MSSA protocol was initiated due to the patient's history of alcohol abuse and concern for withdrawal symptoms.  CBC, BMP and utox obtained.    All outpatient medications were continued. Did discuss ECT and lithium augmentation and the patient will discuss these with her outpatient provider.     Inga Ledesma did participate in groups and was visible in the milieu.     The patient's symptoms of withdrawal and depression improved.     # Discharge Pain Plan:   - During her hospitalization, Inga experienced pain due to chronic issues.  The pain plan for discharge was discussed with Inga and the plan was created in a collaborative fashion.    - Pharmacologic adjuvants:  Acetaminophen and Gabapentin (Neurontin)      Inga Ledesma was released to  treatment. At the time of discharge Inga Ledesma was determined to not be a danger to herself or others. At the current time of discharge, the patient does not meet criteria for involuntary hospitalization. On the day of discharge, the patient reports that they do not have suicidal or homicidal ideation and would never hurt themselves or others. Steps taken to minimize risk include: assessing patient s  behavior and thought process daily during hospital stay, discharging patient with adequate plan for follow up for mental and physical health and discussing safety plan of returning to the hospital should the patient ever have thoughts of harming themselves or others. Therefore, based on all available evidence including the factors cited above, the patient does not appear to be at imminent risk for self-harm, and is appropriate for outpatient level of care.            Discharge Medications:     Current Discharge Medication List      CONTINUE these medications which have NOT CHANGED    Details   acamprosate (CAMPRAL) 333 MG EC tablet Take 2 tablets (666 mg) by mouth 3 times daily  Qty: 180 tablet, Refills: 2    Associated Diagnoses: Alcohol abuse      albuterol (VENTOLIN HFA) 108 (90 BASE) MCG/ACT Inhaler Inhale  into the lungs. INHALE 2 PUFFS FOUR TIMES DAILY AS NEEDED  Qty: 3 Inhaler, Refills: 0    Associated Diagnoses: Chronic bronchitis, unspecified chronic bronchitis type (H)      aspirin 81 MG EC tablet Take 1 tablet (81 mg) by mouth daily  Qty: 30 tablet, Refills: 1    Associated Diagnoses: Hypertension goal BP (blood pressure) < 140/90      calcium carbonate (OS-JENNIFER 500 MG [place]. [place]) 1250 MG tablet Take 1 tablet (1,250 mg) by mouth 2 times daily  Qty: 180 tablet, Refills: 0    Associated Diagnoses: Alcohol dependence with withdrawal with complication (H)      FLUoxetine (PROZAC) 40 MG capsule Take 1 capsule (40 mg) by mouth 2 times daily  Qty: 60 capsule, Refills: 0    Associated Diagnoses: Major depressive disorder, recurrent episode, mild (H)      fluticasone-salmeterol (ADVAIR DISKUS) 500-50 MCG/DOSE diskus inhaler Inhale 1 puff into the lungs every 12 hours  Qty: 2 Inhaler, Refills: 1    Associated Diagnoses: Chronic bronchitis, unspecified chronic bronchitis type (H)      folic acid (FOLVITE) 1 MG tablet Take 1 tablet (1 mg) by mouth daily  Qty: 30 tablet, Refills: 0    Associated Diagnoses: Alcohol  dependence with uncomplicated withdrawal (H)      furosemide (LASIX) 20 MG tablet Take 1 tablet (20 mg) by mouth daily  Qty: 90 tablet, Refills: 3    Associated Diagnoses: Essential hypertension with goal blood pressure less than 140/90      gabapentin (NEURONTIN) 300 MG capsule Take 1 capsule by mouth in the morning, 1 capsule in the afternoon, and 2 capsules at bedtime Take 1 capsule by mouth in the morning, 1 capsule in the afternoon, and 2 capsules at bedtime  Qty: 90 capsule, Refills: 1    Associated Diagnoses: Major depressive disorder, recurrent episode, mild (H); Sacroiliitis (H)      hydrOXYzine (ATARAX) 50 MG tablet Take 1 tablet (50 mg) by mouth every 6 hours as needed for anxiety or other (adjuvant pain)  Qty: 30 tablet, Refills: 1    Associated Diagnoses: Sacroiliitis (H)      ipratropium - albuterol 0.5 mg/2.5 mg/3 mL (DUONEB) 0.5-2.5 (3) MG/3ML neb solution Take 1 vial (3 mLs) by nebulization 4 times daily as needed for wheezing  Qty: 360 mL, Refills: 1    Associated Diagnoses: Chronic bronchitis, unspecified chronic bronchitis type (H)      labetalol (NORMODYNE) 100 MG tablet Take one tab in the am and 2 tabs at night  Qty: 180 tablet, Refills: 0    Associated Diagnoses: Hypertension goal BP (blood pressure) < 140/90      losartan (COZAAR) 100 MG tablet Take 0.5 tablets (50 mg) by mouth daily  Qty: 30 tablet, Refills: 1    Associated Diagnoses: Hypertension goal BP (blood pressure) < 140/90      magnesium oxide (MAG-OX) 400 MG tablet Take 1 tablet (400 mg) by mouth 2 times daily  Qty: 60 tablet, Refills: 0    Associated Diagnoses: Alcohol dependence with withdrawal with complication (H)      menthol (ICY HOT) 5 % PTCH Apply 1 patch topically every 8 hours as needed for muscle soreness  Qty: 30 patch, Refills: 3    Associated Diagnoses: Muscle soreness      methocarbamol (ROBAXIN) 500 MG tablet Take 2 tablets (1,000 mg) by mouth 3 times daily as needed for muscle spasms  Qty: 30 tablet, Refills: 1     Associated Diagnoses: Cervicalgia      multivitamin, therapeutic with minerals (THERA-VIT-M) TABS tablet Take 1 tablet by mouth daily  Qty: 30 each, Refills: 1    Associated Diagnoses: Alcohol dependence with uncomplicated withdrawal (H)      omeprazole (PRILOSEC) 20 MG CR capsule Take 1 capsule (20 mg) by mouth 2 times daily  Qty: 60 capsule, Refills: 1    Associated Diagnoses: Gastroesophageal reflux disease without esophagitis      rOPINIRole (REQUIP) 1 MG tablet Take 1 tablet (1 mg) by mouth 3 times daily  Qty: 90 tablet, Refills: 1    Associated Diagnoses: RLS (restless legs syndrome)      spironolactone (ALDACTONE) 25 MG tablet Take 1 tablet (25 mg) by mouth daily  Qty: 30 tablet, Refills: 1    Associated Diagnoses: Alcohol abuse; Hypertension goal BP (blood pressure) < 140/90      tiotropium (SPIRIVA HANDIHALER) 18 MCG capsule Inhale contents of one capsule daily.  Qty: 30 capsule, Refills: 1    Associated Diagnoses: Chronic bronchitis, unspecified chronic bronchitis type (H)      traZODone (DESYREL) 50 MG tablet Take 1 tablet (50 mg) by mouth At Bedtime  Qty: 30 tablet, Refills: 1    Associated Diagnoses: Major depressive disorder, recurrent episode, mild (H)                  Psychiatric Examination:   Appearance:  awake, alert and adequately groomed  Attitude:  cooperative  Eye Contact:  good  Mood:  depressed and better  Affect:  mood congruent  Speech:  clear, coherent  Psychomotor Behavior:  no evidence of tardive dyskinesia, dystonia, or tics  Thought Process:  linear  Associations:  no loose associations  Thought Content:  no evidence of suicidal ideation or homicidal ideation and no evidence of psychotic thought  Insight:  fair  Judgment:  fair  Oriented to:  time, person, and place  Attention Span and Concentration:  intact  Recent and Remote Memory:  fair  Language: Able to read and write  Fund of Knowledge: appropriate  Muscle Strength and Tone: normal  Gait and Station: Normal         Discharge  Plan:   Continue medications as above.   Consider Li augmentation or ECT if warranted.     Major Treatments, Procedures and Findings: Your alcohol withdrawal was treated with valium using the Modified Selective Severity Assessment (MSSA) protocol  .  You were followed by Psychiatry and Medical. You met with Case Management to complete a chemical health assessment and for discharge planning. You were given a copy of your lab results which were reviewed with you. Please bring your lab results with you to your primary follow up appointment. Make your recovery a priority, Cody.     Please review handouts provided on hypertension and COPD     Symptoms to Report: If  you experience feeling more anxiety, confusion, losing more sleep, mood declining or thoughts of suicide, notify your treatment team or notify your primary physician. IF ANY OF THE SYMPTOMS YOU ARE EXPERIENCING ARE A MEDICAL EMERGENCY CALL 911 IMMEDIATELY.     Lifestyle Adjustment: Health Action Plan:  1.Create a daily schedule  2. Eat Healthy  3. Plan Enjoyable Sober Activities  4. Use Problem Solving Skills and Deal with Issues as they Arise.   5. Be Physically Active  6. Take your medications as prescribed  7. Get enough restful sleep  8. Practice Relaxation  9. Spend time with Supportive People  10. No use of alcohol, illegal drugs or addictive medications other than what is currently prescribed.   11.AA, NA Sponsor are excellent resources for support  Keeping hands clean is one of the most important steps we can take to avoid getting sick and spreading germs to others.  Please wash your hands frequently.         Medical Provider Follow Up:  Dr. Min Luong [facility]  You are aware you should follow up with your primary for medical and medication needs.   Please take this discharge folder with you to all your follow up appointments as it contains your lab results, diagnosis, medication list and discharge recommendations.         Support  Group:  AA/NA and Sponsor contact/support     Resources:  Crisis Intervention: 923.347.5600 or 987-951-2803 (TTY: 150.475.8721).  Call anytime for help.  Suicide Awareness Voices of Education (SAVE) (www.save.org): 525-763-DVEW (3969)  National Suicide Prevention Line (www.mentalhealthmn.org): 615-952-XCQD (4344)  National [place] on Mental Illness (www.mn.[place].org): 133.856.6682 or 055-688-4487.  Alcoholics Anonymous (www.alcoholics-anonymous.org): Or local information can be found 24 hours a day at:   AA Intergroup service office in [place] (http://www.aast[place].org/) 647.195.4961  AA Intergroup service office in [facility]: [phone]. (http://www.a[place].org/)  Narcotics Anonymous (www.nam[place].org)0-803-538-0386   [facility] [phone]     Medical Records [phone]    Attestation:  The patient was seen and evaluated by me. I spent less than 30 minutes on discharge day activities. Ion Coronado MD

## 2018-04-23 NOTE — TELEPHONE ENCOUNTER
PHQ-9 SCORE 8/17/2016 3/10/2017 9/25/2017   Total Score - - -   Total Score 15 17 20     Needs PHQ-9

## 2018-04-26 RX ORDER — MAGNESIUM OXIDE 400 MG/1
TABLET ORAL
Qty: 60 TABLET | Refills: 0 | Status: SHIPPED | OUTPATIENT
Start: 2018-04-26 | End: 2018-04-29

## 2018-04-26 RX ORDER — ALBUTEROL SULFATE 90 UG/1
AEROSOL, METERED RESPIRATORY (INHALATION)
Qty: 54 G | Refills: 0 | Status: SHIPPED | OUTPATIENT
Start: 2018-04-26 | End: 2018-04-29

## 2018-04-26 RX ORDER — FLUOXETINE 40 MG/1
CAPSULE ORAL
Qty: 60 CAPSULE | Refills: 0 | Status: SHIPPED | OUTPATIENT
Start: 2018-04-26 | End: 2018-04-29

## 2018-04-26 RX ORDER — LABETALOL 100 MG/1
TABLET, FILM COATED ORAL
Qty: 180 TABLET | Refills: 0 | Status: SHIPPED | OUTPATIENT
Start: 2018-04-26 | End: 2018-04-29

## 2018-04-26 RX ORDER — FUROSEMIDE 20 MG
TABLET ORAL
Qty: 90 TABLET | Refills: 0 | Status: ON HOLD | OUTPATIENT
Start: 2018-04-26 | End: 2018-05-31

## 2018-04-29 ENCOUNTER — HOSPITAL ENCOUNTER (EMERGENCY)
Facility: CLINIC | Age: 53
Discharge: OTHER INSTITUTION | End: 2018-04-29
Attending: EMERGENCY MEDICINE | Admitting: EMERGENCY MEDICINE
Payer: COMMERCIAL

## 2018-04-29 VITALS
OXYGEN SATURATION: 96 % | HEART RATE: 96 BPM | TEMPERATURE: 97.6 F | DIASTOLIC BLOOD PRESSURE: 56 MMHG | SYSTOLIC BLOOD PRESSURE: 176 MMHG | RESPIRATION RATE: 18 BRPM

## 2018-04-29 DIAGNOSIS — F10.229 ALCOHOL DEPENDENCE WITH INTOXICATION WITH COMPLICATION (H): ICD-10-CM

## 2018-04-29 DIAGNOSIS — K21.9 GASTROESOPHAGEAL REFLUX DISEASE WITHOUT ESOPHAGITIS: ICD-10-CM

## 2018-04-29 DIAGNOSIS — G25.81 RLS (RESTLESS LEGS SYNDROME): ICD-10-CM

## 2018-04-29 DIAGNOSIS — I10 HYPERTENSION GOAL BP (BLOOD PRESSURE) < 140/90: ICD-10-CM

## 2018-04-29 DIAGNOSIS — F33.0 MAJOR DEPRESSIVE DISORDER, RECURRENT EPISODE, MILD (H): ICD-10-CM

## 2018-04-29 DIAGNOSIS — I10 ESSENTIAL HYPERTENSION WITH GOAL BLOOD PRESSURE LESS THAN 140/90: ICD-10-CM

## 2018-04-29 DIAGNOSIS — F10.230 ALCOHOL DEPENDENCE WITH UNCOMPLICATED WITHDRAWAL (H): ICD-10-CM

## 2018-04-29 DIAGNOSIS — F10.239 ALCOHOL DEPENDENCE WITH WITHDRAWAL WITH COMPLICATION (H): ICD-10-CM

## 2018-04-29 DIAGNOSIS — J42 CHRONIC BRONCHITIS, UNSPECIFIED CHRONIC BRONCHITIS TYPE (H): ICD-10-CM

## 2018-04-29 DIAGNOSIS — M46.1 SACROILIITIS (H): ICD-10-CM

## 2018-04-29 DIAGNOSIS — F10.10 ALCOHOL ABUSE: ICD-10-CM

## 2018-04-29 DIAGNOSIS — M54.2 CERVICALGIA: ICD-10-CM

## 2018-04-29 LAB
ALCOHOL BREATH TEST: 0.31 (ref 0–0.01)
ALCOHOL BREATH TEST: 0.31 (ref 0–0.01)
AMPHETAMINES UR QL SCN: NEGATIVE
BARBITURATES UR QL: NEGATIVE
BENZODIAZ UR QL: POSITIVE
CANNABINOIDS UR QL SCN: NEGATIVE
COCAINE UR QL: NEGATIVE
ETHANOL UR QL SCN: POSITIVE
GLUCOSE BLDC GLUCOMTR-MCNC: 148 MG/DL (ref 70–99)
GLUCOSE BLDC GLUCOMTR-MCNC: 77 MG/DL (ref 70–99)
OPIATES UR QL SCN: NEGATIVE

## 2018-04-29 PROCEDURE — 93010 ELECTROCARDIOGRAM REPORT: CPT | Mod: Z6 | Performed by: EMERGENCY MEDICINE

## 2018-04-29 PROCEDURE — 94640 AIRWAY INHALATION TREATMENT: CPT | Performed by: EMERGENCY MEDICINE

## 2018-04-29 PROCEDURE — 82075 ASSAY OF BREATH ETHANOL: CPT | Mod: 91 | Performed by: EMERGENCY MEDICINE

## 2018-04-29 PROCEDURE — 80307 DRUG TEST PRSMV CHEM ANLYZR: CPT | Performed by: FAMILY MEDICINE

## 2018-04-29 PROCEDURE — 25000132 ZZH RX MED GY IP 250 OP 250 PS 637: Performed by: EMERGENCY MEDICINE

## 2018-04-29 PROCEDURE — 99284 EMERGENCY DEPT VISIT MOD MDM: CPT | Mod: 25 | Performed by: EMERGENCY MEDICINE

## 2018-04-29 PROCEDURE — 25000125 ZZHC RX 250: Performed by: EMERGENCY MEDICINE

## 2018-04-29 PROCEDURE — 99285 EMERGENCY DEPT VISIT HI MDM: CPT | Mod: 25 | Performed by: EMERGENCY MEDICINE

## 2018-04-29 PROCEDURE — 82075 ASSAY OF BREATH ETHANOL: CPT | Performed by: EMERGENCY MEDICINE

## 2018-04-29 PROCEDURE — 93005 ELECTROCARDIOGRAM TRACING: CPT | Performed by: EMERGENCY MEDICINE

## 2018-04-29 PROCEDURE — 00000146 ZZHCL STATISTIC GLUCOSE BY METER IP

## 2018-04-29 PROCEDURE — 80320 DRUG SCREEN QUANTALCOHOLS: CPT | Performed by: FAMILY MEDICINE

## 2018-04-29 RX ORDER — SPIRONOLACTONE 25 MG/1
25 TABLET ORAL DAILY
Qty: 3 TABLET | Refills: 0 | Status: ON HOLD | OUTPATIENT
Start: 2018-04-29 | End: 2018-05-29

## 2018-04-29 RX ORDER — DIAZEPAM 10 MG
10 TABLET ORAL ONCE
Status: COMPLETED | OUTPATIENT
Start: 2018-04-29 | End: 2018-04-29

## 2018-04-29 RX ORDER — IPRATROPIUM BROMIDE AND ALBUTEROL SULFATE 2.5; .5 MG/3ML; MG/3ML
3 SOLUTION RESPIRATORY (INHALATION) ONCE
Status: COMPLETED | OUTPATIENT
Start: 2018-04-29 | End: 2018-04-29

## 2018-04-29 RX ORDER — GABAPENTIN 300 MG/1
CAPSULE ORAL
Qty: 12 CAPSULE | Refills: 0 | Status: ON HOLD | OUTPATIENT
Start: 2018-04-29 | End: 2018-07-09

## 2018-04-29 RX ORDER — ACAMPROSATE CALCIUM 333 MG/1
666 TABLET, DELAYED RELEASE ORAL 3 TIMES DAILY
Qty: 18 TABLET | Refills: 0 | Status: SHIPPED | OUTPATIENT
Start: 2018-04-29 | End: 2018-05-02

## 2018-04-29 RX ORDER — LABETALOL 100 MG/1
100 TABLET, FILM COATED ORAL 2 TIMES DAILY
Qty: 6 TABLET | Refills: 0 | Status: SHIPPED | OUTPATIENT
Start: 2018-04-29 | End: 2018-05-02

## 2018-04-29 RX ORDER — ROPINIROLE 1 MG/1
1 TABLET, FILM COATED ORAL 3 TIMES DAILY
Qty: 9 TABLET | Refills: 0 | Status: ON HOLD | OUTPATIENT
Start: 2018-04-29 | End: 2018-05-29

## 2018-04-29 RX ORDER — FLUOXETINE 40 MG/1
40 CAPSULE ORAL DAILY
Qty: 3 CAPSULE | Refills: 0 | Status: SHIPPED | OUTPATIENT
Start: 2018-04-29 | End: 2018-05-03

## 2018-04-29 RX ORDER — FUROSEMIDE 20 MG
20 TABLET ORAL DAILY
Qty: 3 TABLET | Refills: 0 | Status: SHIPPED | OUTPATIENT
Start: 2018-04-29 | End: 2019-01-14

## 2018-04-29 RX ORDER — IBUPROFEN 600 MG/1
600 TABLET, FILM COATED ORAL ONCE
Status: COMPLETED | OUTPATIENT
Start: 2018-04-29 | End: 2018-04-29

## 2018-04-29 RX ORDER — TIOTROPIUM BROMIDE 18 UG/1
CAPSULE ORAL; RESPIRATORY (INHALATION)
Qty: 3 CAPSULE | Refills: 0 | Status: SHIPPED | OUTPATIENT
Start: 2018-04-29 | End: 2018-07-06

## 2018-04-29 RX ORDER — METHOCARBAMOL 500 MG/1
1000 TABLET, FILM COATED ORAL 3 TIMES DAILY PRN
Qty: 18 TABLET | Refills: 1 | Status: SHIPPED | OUTPATIENT
Start: 2018-04-29 | End: 2018-05-02

## 2018-04-29 RX ORDER — CALCIUM CARBONATE 500(1250)
1 TABLET ORAL 2 TIMES DAILY
Qty: 6 TABLET | Refills: 0 | Status: SHIPPED | OUTPATIENT
Start: 2018-04-29 | End: 2018-05-02

## 2018-04-29 RX ORDER — MAGNESIUM OXIDE 400 MG/1
400 TABLET ORAL 2 TIMES DAILY
Qty: 6 TABLET | Refills: 0 | Status: SHIPPED | OUTPATIENT
Start: 2018-04-29 | End: 2018-05-02

## 2018-04-29 RX ORDER — ONDANSETRON 4 MG/1
4 TABLET, ORALLY DISINTEGRATING ORAL ONCE
Status: COMPLETED | OUTPATIENT
Start: 2018-04-29 | End: 2018-04-29

## 2018-04-29 RX ORDER — ALBUTEROL SULFATE 90 UG/1
1-2 AEROSOL, METERED RESPIRATORY (INHALATION) EVERY 4 HOURS PRN
Qty: 1 INHALER | Refills: 0 | Status: ON HOLD | OUTPATIENT
Start: 2018-04-29 | End: 2018-07-09

## 2018-04-29 RX ORDER — TRAZODONE HYDROCHLORIDE 50 MG/1
50 TABLET, FILM COATED ORAL AT BEDTIME
Qty: 3 TABLET | Refills: 0 | Status: ON HOLD | OUTPATIENT
Start: 2018-04-29 | End: 2018-05-29

## 2018-04-29 RX ORDER — LOSARTAN POTASSIUM 100 MG/1
50 TABLET ORAL DAILY
Qty: 2 TABLET | Refills: 0 | Status: ON HOLD | OUTPATIENT
Start: 2018-04-29 | End: 2018-05-29

## 2018-04-29 RX ORDER — FOLIC ACID 1 MG/1
1 TABLET ORAL DAILY
Qty: 3 TABLET | Refills: 0 | Status: ON HOLD | OUTPATIENT
Start: 2018-04-29 | End: 2018-07-09

## 2018-04-29 RX ORDER — HYDROXYZINE HYDROCHLORIDE 50 MG/1
50 TABLET, FILM COATED ORAL EVERY 6 HOURS PRN
Qty: 12 TABLET | Refills: 1 | Status: ON HOLD | OUTPATIENT
Start: 2018-04-29 | End: 2018-07-09

## 2018-04-29 RX ORDER — MULTIPLE VITAMINS W/ MINERALS TAB 9MG-400MCG
1 TAB ORAL DAILY
Qty: 3 EACH | Refills: 0 | Status: ON HOLD | OUTPATIENT
Start: 2018-04-29 | End: 2018-07-09

## 2018-04-29 RX ADMIN — DIAZEPAM 10 MG: 10 TABLET ORAL at 19:17

## 2018-04-29 RX ADMIN — ONDANSETRON 4 MG: 4 TABLET, ORALLY DISINTEGRATING ORAL at 13:00

## 2018-04-29 RX ADMIN — ONDANSETRON 4 MG: 4 TABLET, ORALLY DISINTEGRATING ORAL at 18:10

## 2018-04-29 RX ADMIN — IBUPROFEN 600 MG: 600 TABLET ORAL at 15:46

## 2018-04-29 RX ADMIN — LIDOCAINE HYDROCHLORIDE 30 ML: 20 SOLUTION ORAL; TOPICAL at 18:11

## 2018-04-29 RX ADMIN — IPRATROPIUM BROMIDE AND ALBUTEROL SULFATE 3 ML: .5; 3 SOLUTION RESPIRATORY (INHALATION) at 15:45

## 2018-04-29 RX ADMIN — RANITIDINE HYDROCHLORIDE 150 MG: 150 TABLET, FILM COATED ORAL at 18:10

## 2018-04-29 RX ADMIN — DIAZEPAM 10 MG: 10 TABLET ORAL at 17:04

## 2018-04-29 NOTE — ED PROVIDER NOTES
"    [facility] EMERGENCY DEPARTMENT ([facility])    History     Chief Complaint   Patient presents with     Alcohol Intoxication     drinks daily, last drink just before arrival     Suicidal     made suicidal comments to family so they called EMS to bring pt here     HPI  Inga Ledesma is a 52 year old female with a past medical history that includes alcohol dependence, major depression, heart failure with preserved ejection fraction, hypertension, anxiety, and COPD who presents for evaluation of alcohol dependence and possible suicidal ideation.  Patient states that her son called EMS, as he believed that she was making suicidal comments.  The patient, who is quite tearful during the encounter, states that she would like help with her drinking and major depression.  Patient states that \"she would never kill herself, and she made a promise to her mother that she would never commit suicide\".  She states that her last drink was just before coming here.    I have reviewed the Medications, Allergies, Past Medical and Surgical History, and Social History in the [employer] system.    Past Medical History:   Diagnosis Date     Alcohol abuse, in remission      Alcohol withdrawal seizure (H) 2016     Cancer of labia majora (H) 1987     Cervical dysplasia 1987     Congestive heart failure (H) 5/16/16     COPD (chronic obstructive pulmonary disease) (H) 1/24/2011     CVA (cerebral infarction) 1/2012     Dependent edema      Depression, major      Depressive disorder 1965     GERD (gastroesophageal reflux disease)      Hyperlipidemia LDL goal < 160      Hypertension      Iron deficiency anemia      RLS (restless legs syndrome)      Sacroiliitis (H)     steroid injections ineffective, chronic low back pain     Tobacco abuse      Uncomplicated asthma      Vitamin D deficiencies        Past Surgical History:   Procedure Laterality Date     AS BIOPSY/EXCISION LYMPH NODE OPEN SUPERFICIAL       COLONOSCOPY       EYE SURGERY       " HYSTERECTOMY  2010     HYSTERECTOMY TOTAL ABDOMINAL  7/28/10    Bilateral salpingectomy.  ovaries conserved.     LASER TX, CERVICAL  1987     LYMPH NODE BIOPSY  2007    inguinal     LYSIS OF LABIAL LESION(S)  1985, 1987       Family History   Problem Relation Age of Onset     Depression/Anxiety Mother      Asthma Mother      CEREBROVASCULAR DISEASE Father      Hypertension Father      Lung Cancer Father      Breast Cancer Paternal Aunt      Other Cancer Other      Depression Other      Anxiety Disorder Other      MENTAL ILLNESS Other      Substance Abuse Other      Asthma Other      Obesity Sister      Obesity Son        Social History   Substance Use Topics     Smoking status: Current Every Day Smoker     Packs/day: 0.25     Years: 34.00     Types: Cigarettes     Start date: 11/1/1979     Last attempt to quit: 10/3/2012     Smokeless tobacco: Never Used      Comment: 5 cigarettes daily     Alcohol use Yes      Comment: 1 pint of vodka per day      Review of Systems  A 14-point review of systems was completed and was otherwise negative unless stated above in the HPI.    Physical Exam   BP: 154/84  Pulse: 100  Temp: 96.9  F (36.1  C)  Resp: 16  SpO2: 94 %    Physical Exam    GENERAL: Well-appearing, no acute distress.  HENT:    Head: Normocephalic. Atraumatic.   Ears: External ears normal, hearing grossly intact.   Nose: No external deformities.   Throat/Mouth: Moist oral mucosa. Posterior oropharynx is clear.  EYES: Pupils equal, round, reactive. No conjunctival pallor, sclerae injected. EOMI.  CV: Regular rate, regular rhythm. Warm and well perfused.  PULMONARY: Effort normal. No accessory muscle use. Good air movement. No stridor.  GI: Soft, non-distended, non-tender to palpation.  NEURO: Alert, awake. Oriented x3. No focal sensory loss or muscular weakness.  No tremors or tongue fasciculations.  No facial drooping. + Slurred speech.  MSK:    Neck: Supple.   Extremities: No gross deformity. Coordinated movement of  all extremities.  INTEGUMENTARY: Warm. No diaphoresis. No rashes, jaundice, or ecchymoses.  PSYCH: Tearful, depressed, poor insight, denies SI/HI, denies AH/VH.    ED Course     Procedures        EKG Interpretation:      Interpreted by Marcelino Brothers DO  Time reviewed:18:06   Symptoms at time of EKG: Chest pain   Rhythm: Normal sinus   Rate: Normal  Axis: Normal  Ectopy: None  Conduction: Normal  ST Segments/ T Waves: No ST-T wave changes and No acute ischemic changes  Q Waves: None  Comparison to prior: Unchanged from 4/16/18    Clinical Impression: no acute changes    Critical Care time:  none  Labs Ordered and Resulted from Time of ED Arrival Up to the Time of Departure from the ED   DRUG ABUSE SCREEN 6 CHEM DEP URINE ([facility]) - Abnormal; Notable for the following:        Result Value    Benzodiazepine Qual Urine Positive (*)     Ethanol Qual Urine Positive (*)     All other components within normal limits   GLUCOSE BY METER - Abnormal; Notable for the following:     Glucose 148 (*)     All other components within normal limits   ALCOHOL BREATH TEST POCT - Abnormal; Notable for the following:     Alcohol Breath Test 0.309 (*)     All other components within normal limits   ALCOHOL BREATH TEST POCT - Abnormal; Notable for the following:     Alcohol Breath Test 0.309 (*)     All other components within normal limits   GLUCOSE MONITOR NURSING POCT   GLUCOSE BY METER   GLUCOSE MONITOR NURSING POCT     Assessments & Plan (with Medical Decision Making)   Primary Evaluation:  Patient was seen and examined in the Emergency Department and was noted to be in no acute distress. Airway was patent and protected. Breathing was intact. Hemodynamics were stable.    Patient's history was reviewed in the chart and with the patient.    MDM and Disposition:  Patient presented for evaluation of alcohol intoxication and alcohol dependence.  Patient arrived via EMS after the patient's son called for possible suicidal ideation.   "On arrival, the patient vehemently denied any suicidality or suicidal intent.  She denied any previous history of trying to harm herself in the past.  She was intoxicated to 0.309.    On initial evaluation, the patient was overall well-appearing, however she was tearful and depressed.  Again, she stated that she \"would never commit suicide, and she made a promise with her mother\".  She does admit to a serious problem with alcoholism.  She did not want to be admitted to the detox unit here based on prior experiences.  Further, the unit here was full with no female beds available.    Labs as above.  The patient was initially given ondansetron for nausea/vomiting.  Repeat PAC was obtained and was 0.178 after being here for about 4 hours.  At that time, she did exhibit mild tremors concerning for early/mild alcohol withdrawal.  She was given oral diazepam for the symptoms.  She did develop mild epigastric abdominal pain following some vomiting.  She was reassessed, and had a benign abdominal exam.  She did not appear toxic that would suggest potential esophageal rupture.  Her emesis was nonbloody in character, therefore Jory-Mckeon tear was felt to be unlikely.  ECG was obtained and demonstrated no acute changes concerning for acute ischemic injury.  She was given a GI cocktail and ranitidine, as I was concerned that the patient has underlying alcoholic gastritis.    Given the patient's clinical history, physical exam, and results of our diagnostic work-up, the decision was made to send the patient to [facility] for further treatment of her chemical dependency.  She was prescribed a 3 day supply of all of her home medications.  Transport was arranged for the patient and she was taken to [facility] without any issues.    Patient remained hemodynamically stable throughout her stay in the Emergency Department.    Final Clinical Impression:  Alcohol dependence with uncomplicated mild withdrawal    I " have reviewed the nursing notes.  I have reviewed the findings, diagnosis, plan and need for follow up with the patient.  Marcelino Brothers,   Emergency Medicine  Pager: [phone]    Final diagnoses:   Alcohol abuse   Hypertension goal BP (blood pressure) < 140/90   Alcohol dependence with withdrawal with complication (H)   Major depressive disorder, recurrent episode, mild (H)   Chronic bronchitis, unspecified chronic bronchitis type (H)   Alcohol dependence with uncomplicated withdrawal (H)   Essential hypertension with goal blood pressure less than 140/90   Sacroiliitis (H)   Cervicalgia   Gastroesophageal reflux disease without esophagitis   RLS (restless legs syndrome)       4/29/2018   [facility], [place], EMERGENCY DEPARTMENT     Marcelino Brothers MD  04/29/18 1921

## 2018-04-29 NOTE — ED AVS SNAPSHOT
[facility], [place], Emergency Department    [address]    [facility] [address]    Phone:  [phone]    Fax:  [phone]                                       Inga Ledesma   MRN: [id number]    Department:  [facility], Emergency Department   Date of Visit:  4/29/2018           After Visit Summary Signature Page     I have received my discharge instructions, and my questions have been answered. I have discussed any challenges I see with this plan with the nurse or doctor.    ..........................................................................................................................................  Patient/Patient Representative Signature      ..........................................................................................................................................  Patient Representative Print Name and Relationship to Patient    ..................................................               ................................................  Date                                            Time    ..........................................................................................................................................  Reviewed by Signature/Title    ...................................................              ..............................................  Date                                                            Time

## 2018-04-29 NOTE — ED AVS SNAPSHOT
[facility], Emergency Department    [address]    [place] [address]    Phone:  [phone]    Fax:  [phone]                                       Inga Ledesma   MRN: [id number]    Department:  [facility], Emergency Department   Date of Visit:  4/29/2018           Patient Information     Date Of Birth          1965        Your diagnoses for this visit were:     Alcohol abuse     Hypertension goal BP (blood pressure) < 140/90     Alcohol dependence with withdrawal with complication (H)     Major depressive disorder, recurrent episode, mild (H)     Chronic bronchitis, unspecified chronic bronchitis type (H)     Alcohol dependence with uncomplicated withdrawal (H)     Essential hypertension with goal blood pressure less than 140/90     Sacroiliitis (H)     Cervicalgia     Gastroesophageal reflux disease without esophagitis     RLS (restless legs syndrome)     Alcohol dependence with intoxication with complication (H)        You were seen by Marcelino Brothers MD.      Follow-up Information     Follow up with [facility] Emergency Department.    Specialty:  EMERGENCY MEDICINE    Why:  If symptoms worsen    Contact information:    [address] [facility] [address] [phone]    Additional information:    The [facility] is located in the [facility].  is easily accessible from virtually any point in the [facility] area, via [address]        Discharge Instructions       Please make an appointment to follow up with Your Primary Care Provider as soon as possible.    Alcohol Abuse  Alcoholic drinks are harmful when you have too many of them. There is no set number of drinks that defines too much. Drinking that disrupts your life or your health is called alcohol abuse. Alcohol abuse can hurt your relationships with others. You may lose friends, a spouse, or even your job. You may be abusing alcohol if any of the following are true for  "you:    Duties at home or with  suffer because of drinking.    Duties at work or in school suffer because of drinking.    You have missed work or school because of drinking.    You use alcohol while driving or operating machinery.    You have legal problems such as arrests due to drinking.    You keep drinking even though it causes serious problems in your life.  Health effects  Alcohol abuse causes health problems. Sometimes this can happen after only drinking a  little.\" There is no set number of drinks or amount of alcohol that defines too much. The more you drink at one time, and the more often you drink determine both the short-term and long-term health effects. It affects all parts of your body and your health, including your:    Brain. Alcohol is a central nervous system depressant. It can damage parts of the brain that affect your balance, memory, thinking, and emotions. It can cause memory loss, blackouts, depression, agitation, sleep cycle changes, and seizures. These changes may or may not be reversible.    Heart and vascular system. Alcohol affects multiple areas. It can damage heart muscle causing cardiomyopathy, which is a weakening and stretching of the heart muscle. This can lead to trouble breathing, an irregular heartbeat, atrial fibrillation, leg swelling, and heart failure. Alcohol use makes the blood vessels stiffen causing high blood pressure. All of these problems increase your risk of having heart attacks or strokes.    Liver. Alcohol causes fat to build up in the liver, affecting its normal function. This increases the risk for hepatitis, leading to abdominal pain, appetite loss, jaundice, bleeding problems, liver fibrosis, and cirrhosis. This, in turn, can affect your ability to fight off infections, and can cause diabetes. The liver changes prevent it from removing toxins in your blood that can cause encephalopathy, which may show with confusion, altered level of consciousness, " personality changes, memory loss, seizures, coma, and death.    Pancreas. Alcohol can cause inflammation of the pancreas, or pancreatitis. This can cause abdominal pain, fever, and diabetes.    Immune system. Alcohol weakens your immune system in a number of ways. It suppresses your immune system making it harder to fight infections and colds. It also increases the chance of getting pneumonia and tuberculosis.    Cancer. Alcohol is a risk factor for developing cancer of the mouth, esophagus, pharynx, larynx, liver, and breast.    Sexual function. Alcohol can lead to sexual problems.  Home care  The following guidelines will help you deal with alcohol abuse:    Admit you have a problem with alcohol.    Ask for help from your healthcare provider and trusted family members or close friends.    Get help from people trained in dealing with alcohol abuse. This may be individual counseling or group therapy, or it may be a supervised alcohol treatment program.    Join a self-help group for alcohol abuse such as Alcoholics Anonymous (AA).    Stay away from people who abuse alcohol or tempt you to drink.  Follow-up care  Follow up with your healthcare provider, or as advised. Contact these groups to get help:    Alcoholics Anonymous (AA): Go to www.aa.org or check the phone book for meetings near you.    National Alcohol and Substance Abuse Information Center (NASAIC): 373.777.7678, www.addictioncareNaverus.Tapvalue    National [place] on Alcoholism and Drug Dependence (NCADD): 844-WSL-FKIV (006-839-9059), www.ncadd.org  Call 911  Call 911 if any of these occur:    Trouble breathing or slow irregular breathing    Chest pain    Sudden weakness on one side of your body or sudden trouble speaking    Heavy bleeding or vomiting blood    Very drowsy or trouble awakening    Fainting or loss of consciousness    Rapid heart rate    Seizure  When to seek medical care  Call your healthcare provider right away if any of these  occur:     Confusion    Hallucinations (seeing, hearing, or feeling things that aren t there)    Pain in your upper abdomen that gets worse    Repeated vomiting or black or tarry stools    Severe shakiness  Date Last Reviewed: 6/1/2016 2000-2017 [employer]. [address] [place] [address]. All rights reserved. This information is not intended as a substitute for professional medical care. Always follow your healthcare professional's instructions.        Your next 10 appointments already scheduled     May 01, 2018  1:40 PM CDT   [facility]    [address] [facility] [address] [phone]           Bring a current list of meds and any records pertaining to this visit. For Physicals, please bring immunization records and any forms needing to be filled out. Please arrive 10 minutes early to complete paperwork.              24 Hour Appointment Hotline       To make an appointment at any [facility], call [phone] ([phone]). If you don't have a family doctor or clinic, we will help you find one. [facility] are conveniently located to serve the needs of you and your family.             Review of your medicines      CONTINUE these medicines which may have CHANGED, or have new prescriptions. If we are uncertain of the size of tablets/capsules you have at home, strength may be listed as something that might have changed.        Dose / Directions Last dose taken    albuterol 108 (90 Base) MCG/ACT Inhaler   Commonly known as:  VENTOLIN HFA   Dose:  1-2 puff   What changed:  See the new instructions.   Quantity:  1 Inhaler        Inhale 1-2 puffs into the lungs every 4 hours as needed for shortness of breath / dyspnea or wheezing   Refills:  0        calcium carbonate 500 tablet   Commonly known as:  OS-JENNIFER 500 mg [place]. [place]   Dose:  1 tablet   What changed:  how much to take    Quantity:  6 tablet        Take 1 tablet (500 mg) by mouth 2 times daily for 3 days   Refills:  0        FLUoxetine 40 MG capsule   Commonly known as:  PROzac   Dose:  40 mg   What changed:  See the new instructions.   Quantity:  3 capsule        Take 1 capsule (40 mg) by mouth daily for 3 days   Refills:  0        gabapentin 300 MG capsule   Commonly known as:  NEURONTIN   What changed:  additional instructions   Quantity:  12 capsule        Take 1 capsule by mouth in the morning, 1 capsule in the afternoon, and 2 capsules at bedtime.   Refills:  0        * labetalol 100 MG tablet   Commonly known as:  NORMODYNE   What changed:  Another medication with the same name was changed. Make sure you understand how and when to take each.   Quantity:  180 tablet        Take one tab in the am and 2 tabs at night   Refills:  0        * labetalol 100 MG tablet   Commonly known as:  NORMODYNE   Dose:  100 mg   What changed:  See the new instructions.   Quantity:  6 tablet        Take 1 tablet (100 mg) by mouth 2 times daily for 3 days   Refills:  0        magnesium oxide 400 MG tablet   Commonly known as:  MAG-OX   Dose:  400 mg   What changed:  See the new instructions.   Quantity:  6 tablet        Take 1 tablet (400 mg) by mouth 2 times daily for 3 days   Refills:  0        * Notice:  This list has 2 medication(s) that are the same as other medications prescribed for you. Read the directions carefully, and ask your doctor or other care provider to review them with you.      Our records show that you are taking the medicines listed below. If these are incorrect, please call your family doctor or clinic.        Dose / Directions Last dose taken    acamprosate 333 MG EC tablet   Commonly known as:  CAMPRAL   Dose:  666 mg   Quantity:  18 tablet        Take 2 tablets (666 mg) by mouth 3 times daily for 3 days   Refills:  0        aspirin 81 MG EC tablet   Dose:  81 mg   Quantity:  3 tablet        Take 1 tablet (81 mg) by mouth  daily   Refills:  0        fluticasone-salmeterol 500-50 MCG/DOSE diskus inhaler   Commonly known as:  ADVAIR DISKUS   Dose:  1 puff   Quantity:  1 Inhaler        Inhale 1 puff into the lungs every 12 hours   Refills:  0        folic acid 1 MG tablet   Commonly known as:  FOLVITE   Dose:  1 mg   Quantity:  3 tablet        Take 1 tablet (1 mg) by mouth daily   Refills:  0        * furosemide 20 MG tablet   Commonly known as:  LASIX   Quantity:  90 tablet        TAKE ONE TABLET BY MOUTH EVERY DAY   Refills:  0        * furosemide 20 MG tablet   Commonly known as:  LASIX   Dose:  20 mg   Quantity:  3 tablet        Take 1 tablet (20 mg) by mouth daily for 3 days   Refills:  0        hydrOXYzine 50 MG tablet   Commonly known as:  ATARAX   Dose:  50 mg   Quantity:  12 tablet        Take 1 tablet (50 mg) by mouth every 6 hours as needed for anxiety or other (adjuvant pain)   Refills:  1        ipratropium - albuterol 0.5 mg/2.5 mg/3 mL 0.5-2.5 (3) MG/3ML neb solution   Commonly known as:  DUONEB   Dose:  3 mL   Quantity:  360 mL        Take 1 vial (3 mLs) by nebulization 4 times daily as needed for wheezing   Refills:  1        losartan 100 MG tablet   Commonly known as:  COZAAR   Dose:  50 mg   Quantity:  2 tablet        Take 0.5 tablets (50 mg) by mouth daily for 3 days   Refills:  0        menthol 5 % Ptch   Commonly known as:  ICY HOT   Dose:  1 patch   Quantity:  30 patch        Apply 1 patch topically every 8 hours as needed for muscle soreness   Refills:  3        methocarbamol 500 MG tablet   Commonly known as:  ROBAXIN   Dose:  1000 mg   Quantity:  18 tablet        Take 2 tablets (1,000 mg) by mouth 3 times daily as needed for muscle spasms   Refills:  1        multivitamin, therapeutic with minerals Tabs tablet   Dose:  1 tablet   Quantity:  3 each        Take 1 tablet by mouth daily   Refills:  0        omeprazole 20 MG CR capsule   Commonly known as:  priLOSEC   Dose:  20 mg   Quantity:  6 capsule        Take 1  capsule (20 mg) by mouth 2 times daily for 3 days   Refills:  0        rOPINIRole 1 MG tablet   Commonly known as:  REQUIP   Dose:  1 mg   Quantity:  9 tablet        Take 1 tablet (1 mg) by mouth 3 times daily for 3 days   Refills:  0        spironolactone 25 MG tablet   Commonly known as:  ALDACTONE   Dose:  25 mg   Quantity:  3 tablet        Take 1 tablet (25 mg) by mouth daily for 3 days   Refills:  0        tiotropium 18 MCG capsule   Commonly known as:  SPIRIVA HANDIHALER   Quantity:  3 capsule        Inhale contents of one capsule daily.   Refills:  0        traZODone 50 MG tablet   Commonly known as:  DESYREL   Dose:  50 mg   Quantity:  3 tablet        Take 1 tablet (50 mg) by mouth At Bedtime for 3 days   Refills:  0        * Notice:  This list has 2 medication(s) that are the same as other medications prescribed for you. Read the directions carefully, and ask your doctor or other care provider to review them with you.            Prescriptions were sent or printed at these locations (20 Prescriptions)                   Other Prescriptions                Printed at Department/Unit printer (20 of 20)         acamprosate (CAMPRAL) 333 MG EC tablet               albuterol (VENTOLIN HFA) 108 (90 Base) MCG/ACT Inhaler               aspirin 81 MG EC tablet               calcium carbonate (OS-JENNIFER 500 MG [place]. [place]) 500 tablet               FLUoxetine (PROZAC) 40 MG capsule               fluticasone-salmeterol (ADVAIR DISKUS) 500-50 MCG/DOSE diskus inhaler               folic acid (FOLVITE) 1 MG tablet               furosemide (LASIX) 20 MG tablet               gabapentin (NEURONTIN) 300 MG capsule               hydrOXYzine (ATARAX) 50 MG tablet               labetalol (NORMODYNE) 100 MG tablet               losartan (COZAAR) 100 MG tablet               magnesium oxide (MAG-OX) 400 MG tablet               methocarbamol (ROBAXIN) 500 MG tablet               multivitamin, therapeutic with minerals (THERA-VIT-M) TABS  tablet               omeprazole (PRILOSEC) 20 MG CR capsule               rOPINIRole (REQUIP) 1 MG tablet               spironolactone (ALDACTONE) 25 MG tablet               tiotropium (SPIRIVA HANDIHALER) 18 MCG capsule               traZODone (DESYREL) 50 MG tablet                Procedures and tests performed during your visit     Procedure/Test Number of Times Performed    Alcohol breath test POCT 2    Drug abuse screen 6 urine (chem dep) 1    EKG 12-lead, tracing only 1    Glucose by meter 2    Glucose monitor nursing POCT 2      Orders Needing Specimen Collection     None      Pending Results     No orders found from 4/27/2018 to 4/30/2018.            Pending Culture Results     No orders found from 4/27/2018 to 4/30/2018.            Pending Results Instructions     If you had any lab results that were not finalized at the time of your Discharge, you can call the ED Lab Result RN at [phone]. You will be contacted by this team for any positive Lab results or changes in treatment. The nurses are available 7 days a week from 10A to 6:30P.  You can leave a message 24 hours per day and they will return your call.        Thank you for choosing [facility]       Thank you for choosing [facility] for your care. Our goal is always to provide you with excellent care. Hearing back from our patients is one way we can continue to improve our services. Please take a few minutes to complete the written survey that you may receive in the mail after you visit with us. Thank you!        [email or web address] Information     [facility] gives you secure access to your electronic health record. If you see a primary care provider, you can also send messages to your care team and make appointments. If you have questions, please call your primary care clinic.  If you do not have a primary care provider, please call [phone] and they will assist you.        Care EveryWhere ID     This is your Care EveryWhere ID. This could be used by other  organizations to access your [facility] medical records  [id number]        Equal Access to Services     ИРИНА KERNS : Susan Mata, jenelle urbina, kelsey schwartz. So [facility] [phone].    ATENCIÓN: Si habla español, tiene a cook disposición servicios gratuitos de asistencia lingüística. Llame al [phone].    We comply with applicable federal civil rights laws and [place] laws. We do not discriminate on the basis of race, color, national origin, age, disability, sex, sexual orientation, or gender identity.            After Visit Summary       This is your record. Keep this with you and show to your community pharmacist(s) and doctor(s) at your next visit.

## 2018-04-29 NOTE — ED NOTES
Bed: ED10  Expected date: 4/29/18  Expected time: 11:59 AM  Means of arrival:   Comments:  [id number] 53yo F mh eval/etoh

## 2018-04-29 NOTE — ED NOTES
Pt got up from chair and tripped in room. Pt reported hitting head on floor. Denied losing consciousness. RN assessed and took full set of vitals. Ice given for forehead. MD notified and assessed. No imaging required at this time, per MD. RN reinforced prior fall risk education and how to reduce risk. No observable injuries sustained at this time.

## 2018-04-30 NOTE — DISCHARGE INSTRUCTIONS
"Please make an appointment to follow up with Your Primary Care Provider as soon as possible.    Alcohol Abuse  Alcoholic drinks are harmful when you have too many of them. There is no set number of drinks that defines too much. Drinking that disrupts your life or your health is called alcohol abuse. Alcohol abuse can hurt your relationships with others. You may lose friends, a spouse, or even your job. You may be abusing alcohol if any of the following are true for you:    Duties at home or with  suffer because of drinking.    Duties at work or in school suffer because of drinking.    You have missed work or school because of drinking.    You use alcohol while driving or operating machinery.    You have legal problems such as arrests due to drinking.    You keep drinking even though it causes serious problems in your life.  Health effects  Alcohol abuse causes health problems. Sometimes this can happen after only drinking a  little.\" There is no set number of drinks or amount of alcohol that defines too much. The more you drink at one time, and the more often you drink determine both the short-term and long-term health effects. It affects all parts of your body and your health, including your:    Brain. Alcohol is a central nervous system depressant. It can damage parts of the brain that affect your balance, memory, thinking, and emotions. It can cause memory loss, blackouts, depression, agitation, sleep cycle changes, and seizures. These changes may or may not be reversible.    Heart and vascular system. Alcohol affects multiple areas. It can damage heart muscle causing cardiomyopathy, which is a weakening and stretching of the heart muscle. This can lead to trouble breathing, an irregular heartbeat, atrial fibrillation, leg swelling, and heart failure. Alcohol use makes the blood vessels stiffen causing high blood pressure. All of these problems increase your risk of having heart attacks or " strokes.    Liver. Alcohol causes fat to build up in the liver, affecting its normal function. This increases the risk for hepatitis, leading to abdominal pain, appetite loss, jaundice, bleeding problems, liver fibrosis, and cirrhosis. This, in turn, can affect your ability to fight off infections, and can cause diabetes. The liver changes prevent it from removing toxins in your blood that can cause encephalopathy, which may show with confusion, altered level of consciousness, personality changes, memory loss, seizures, coma, and death.    Pancreas. Alcohol can cause inflammation of the pancreas, or pancreatitis. This can cause abdominal pain, fever, and diabetes.    Immune system. Alcohol weakens your immune system in a number of ways. It suppresses your immune system making it harder to fight infections and colds. It also increases the chance of getting pneumonia and tuberculosis.    Cancer. Alcohol is a risk factor for developing cancer of the mouth, esophagus, pharynx, larynx, liver, and breast.    Sexual function. Alcohol can lead to sexual problems.  Home care  The following guidelines will help you deal with alcohol abuse:    Admit you have a problem with alcohol.    Ask for help from your healthcare provider and trusted family members or close friends.    Get help from people trained in dealing with alcohol abuse. This may be individual counseling or group therapy, or it may be a supervised alcohol treatment program.    Join a self-help group for alcohol abuse such as Alcoholics Anonymous (AA).    Stay away from people who abuse alcohol or tempt you to drink.  Follow-up care  Follow up with your healthcare provider, or as advised. Contact these groups to get help:    Alcoholics Anonymous (AA): Go to www.aa.org or check the phone book for meetings near you.    National Alcohol and Substance Abuse Information Center (NASAIC): 888.116.1208, www.addictioncareoptions.com    National [place] on Alcoholism and Drug  Dependence (NCADD): 016-GTR-PSPS (493-319-5886), www.ncadd.org  Call 911  Call 911 if any of these occur:    Trouble breathing or slow irregular breathing    Chest pain    Sudden weakness on one side of your body or sudden trouble speaking    Heavy bleeding or vomiting blood    Very drowsy or trouble awakening    Fainting or loss of consciousness    Rapid heart rate    Seizure  When to seek medical care  Call your healthcare provider right away if any of these occur:     Confusion    Hallucinations (seeing, hearing, or feeling things that aren t there)    Pain in your upper abdomen that gets worse    Repeated vomiting or black or tarry stools    Severe shakiness  Date Last Reviewed: 6/1/2016 2000-2017 [employer]. [address] [place] [address], [place] [address]. All rights reserved. This information is not intended as a substitute for professional medical care. Always follow your healthcare professional's instructions.

## 2018-05-01 LAB — INTERPRETATION ECG - MUSE: NORMAL

## 2018-05-03 ENCOUNTER — OFFICE VISIT (OUTPATIENT)
Dept: FAMILY MEDICINE | Facility: CLINIC | Age: 53
End: 2018-05-03
Payer: COMMERCIAL

## 2018-05-03 VITALS
DIASTOLIC BLOOD PRESSURE: 72 MMHG | BODY MASS INDEX: 30.21 KG/M2 | TEMPERATURE: 97.8 F | WEIGHT: 160 LBS | SYSTOLIC BLOOD PRESSURE: 116 MMHG | RESPIRATION RATE: 18 BRPM | OXYGEN SATURATION: 99 % | HEIGHT: 61 IN | HEART RATE: 90 BPM

## 2018-05-03 DIAGNOSIS — F33.0 MAJOR DEPRESSIVE DISORDER, RECURRENT EPISODE, MILD (H): ICD-10-CM

## 2018-05-03 DIAGNOSIS — F10.10 ALCOHOL ABUSE: Primary | ICD-10-CM

## 2018-05-03 PROCEDURE — 99214 OFFICE O/P EST MOD 30 MIN: CPT | Performed by: PHYSICIAN ASSISTANT

## 2018-05-03 RX ORDER — DULOXETIN HYDROCHLORIDE 30 MG/1
CAPSULE, DELAYED RELEASE ORAL
Qty: 180 CAPSULE | Refills: 3 | Status: SHIPPED | OUTPATIENT
Start: 2018-05-03 | End: 2018-05-28

## 2018-05-03 NOTE — PATIENT INSTRUCTIONS
Ween off of your prozac:  Take 1 tab daily for 1 week, then take 1 tab every other day for 10 days, then stop.

## 2018-05-03 NOTE — MR AVS SNAPSHOT
After Visit Summary   5/3/2018    Inga Ledesma    MRN: [id number]           Patient Information     Date Of Birth          1965        Visit Information        Provider Department      5/3/2018 2:00 PM Min Toledo PA-C [facility]        Today's Diagnoses     Alcohol abuse    -  1    Alcohol withdrawal (H)        Major depressive disorder, recurrent episode, mild (H)          Care Instructions    Ween off of your prozac:  Take 1 tab daily for 1 week, then take 1 tab every other day for 10 days, then stop.           Follow-ups after your visit        Additional Services     ADDICTION MEDICINE REFERRAL       The Addiction Medicine Service is prepared to provide consultation for and, if necessary, ongoing care for patients with the disease of addiction, ages 16 and up.     For acute detox, please send your patient to the ER or contact [facility] at [phone].     Common problems that may warrant referral to the Addiction Medicine Service are:  Alcohol use disorder - diagnosis, treatment referral, acute and protracted withdrawal management, and ongoing medication assisted treatment including Antabuse and Naltrexone.  Opioid Use Disorder - medication assisted treatment including Buprenorphine (Suboxone) or extended release Naltrexone (Vivitrol)  Benzodiazepine dependence - extended outpatient detoxification  Many other issues pertaining to addiction, relapse, and recovery    Please contact our scheduling staff at [phone] with any questions.    Referrals to the Addiction Medicine Service assume that the referring provider has discussed the referral with the patient.  Referral will be reviewed and if appropriate, the patient will be contacted to schedule appointment.  If not appropriate, the provider will be contacted with further information.    Please answer the following questions so we can better service your patient:    Drug of choice: alcohol  Do  "they have a [facility] PCP:  Yes     Please bring the following to your appointment:  >>   List of current medications   >>   Any relevant history                  Who to contact     Normal or non-critical lab and imaging results will be communicated to you by Cloudacchart, letter or phone within 4 business days after the clinic has received the results. If you do not hear from us within 7 days, please contact the clinic through [employer] or phone. If you have a critical or abnormal lab result, we will notify you by phone as soon as possible.  Submit refill requests through [employer] or call your pharmacy and they will forward the refill request to us. Please allow 3 business days for your refill to be completed.          If you need to speak with a  for additional information , please call: [phone]             Additional Information About Your Visit        Symptify Information     Symptify gives you secure access to your electronic health record. If you see a primary care provider, you can also send messages to your care team and make appointments. If you have questions, please call your primary care clinic.  If you do not have a primary care provider, please call [phone] and they will assist you.        Care EveryWhere ID     This is your Care EveryWhere ID. This could be used by other organizations to access your [facility] medical records  [id number]        Your Vitals Were     Pulse Temperature Respirations Height Last Period Pulse Oximetry    90 97.8  F (36.6  C) (Tympanic) 18 5' 1\" (1.549 m) 06/02/2010 99%    BMI (Body Mass Index)                   30.23 kg/m2            Blood Pressure from Last 3 Encounters:   05/03/18 116/72   04/29/18 176/56   04/20/18 (!) 153/98    Weight from Last 3 Encounters:   05/03/18 160 lb (72.6 kg)   04/16/18 160 lb (72.6 kg)   04/12/18 162 lb (73.5 kg)              We Performed the Following     ADDICTION MEDICINE REFERRAL          Today's Medication Changes        "   These changes are accurate as of 5/3/18  2:55 PM.  If you have any questions, ask your nurse or doctor.               Start taking these medicines.        Dose/Directions    DULoxetine 30 MG EC capsule   Commonly known as:  CYMBALTA   Used for:  Major depressive disorder, recurrent episode, mild (H)   Started by:  Min Toledo PA-C        Take 1 capsule daily for 7 days, then increase to 2 caps daily thereafter.   Quantity:  180 capsule   Refills:  3         Stop taking these medicines if you haven't already. Please contact your care team if you have questions.     FLUoxetine 40 MG capsule   Commonly known as:  PROzac   Stopped by:  Min Toledo PA-C                Where to get your medicines      These medications were sent to [facility]  [place]  [address] [address] [address]     Phone:  [phone]     DULoxetine 30 MG EC capsule                Primary Care Provider Office Phone # Fax #    Min Toledo PA-C [phone]       [address]  [place] [address]        Goals        General    I will use the walker at all times or a cane in tight spots to prevent further falls. (pt-stated)     Notes - Note created  12/9/2015  8:48 AM by Sonny Jacobs RN    As of today's date 12/9/2015 goal is met at 0 - 25%.   Goal Status:  Active          Equal Access to Services     [facility]: Hadii chang morris hadasho Soomaali, waaxda luqadaha, qaybta kaalmada adeagustínyada, kelsey abdi . So [facility] [phone].    ATENCIÓN: Si habla español, tiene a cook disposición servicios gratuitos de asistencia lingüística. Llame al [phone].    We comply with applicable federal civil rights laws and [place] laws. We do not discriminate on the basis of race, color, national origin, age, disability, sex, sexual orientation, or gender identity.            Thank you!     Thank you for choosing [facility]  for your  care. Our goal is always to provide you with excellent care. Hearing back from our patients is one way we can continue to improve our services. Please take a few minutes to complete the written survey that you may receive in the mail after your visit with us. Thank you!             Your Updated Medication List - Protect others around you: Learn how to safely use, store and throw away your medicines at www.disposemymeds.org.          This list is accurate as of 5/3/18  2:55 PM.  Always use your most recent med list.                   Brand Name Dispense Instructions for use Diagnosis    albuterol 108 (90 Base) MCG/ACT Inhaler    VENTOLIN HFA    1 Inhaler    Inhale 1-2 puffs into the lungs every 4 hours as needed for shortness of breath / dyspnea or wheezing    Chronic bronchitis, unspecified chronic bronchitis type (H)       aspirin 81 MG EC tablet     3 tablet    Take 1 tablet (81 mg) by mouth daily    Hypertension goal BP (blood pressure) < 140/90       DULoxetine 30 MG EC capsule    CYMBALTA    180 capsule    Take 1 capsule daily for 7 days, then increase to 2 caps daily thereafter.    Major depressive disorder, recurrent episode, mild (H)       fluticasone-salmeterol 500-50 MCG/DOSE diskus inhaler    ADVAIR DISKUS    1 Inhaler    Inhale 1 puff into the lungs every 12 hours    Chronic bronchitis, unspecified chronic bronchitis type (H)       folic acid 1 MG tablet    FOLVITE    3 tablet    Take 1 tablet (1 mg) by mouth daily    Alcohol dependence with uncomplicated withdrawal (H)       * furosemide 20 MG tablet    LASIX    90 tablet    TAKE ONE TABLET BY MOUTH EVERY DAY    Essential hypertension with goal blood pressure less than 140/90       * furosemide 20 MG tablet    LASIX    3 tablet    Take 1 tablet (20 mg) by mouth daily for 3 days    Essential hypertension with goal blood pressure less than 140/90       gabapentin 300 MG capsule    NEURONTIN    12 capsule    Take 1 capsule by mouth in the morning, 1  capsule in the afternoon, and 2 capsules at bedtime.    Major depressive disorder, recurrent episode, mild (H), Sacroiliitis (H)       hydrOXYzine 50 MG tablet    ATARAX    12 tablet    Take 1 tablet (50 mg) by mouth every 6 hours as needed for anxiety or other (adjuvant pain)    Sacroiliitis (H)       ipratropium - albuterol 0.5 mg/2.5 mg/3 mL 0.5-2.5 (3) MG/3ML neb solution    DUONEB    360 mL    Take 1 vial (3 mLs) by nebulization 4 times daily as needed for wheezing    Chronic bronchitis, unspecified chronic bronchitis type (H)       * labetalol 100 MG tablet    NORMODYNE    180 tablet    Take one tab in the am and 2 tabs at night    Hypertension goal BP (blood pressure) < 140/90       * labetalol 100 MG tablet    NORMODYNE    6 tablet    Take 1 tablet (100 mg) by mouth 2 times daily for 3 days    Hypertension goal BP (blood pressure) < 140/90       losartan 100 MG tablet    COZAAR    2 tablet    Take 0.5 tablets (50 mg) by mouth daily for 3 days    Hypertension goal BP (blood pressure) < 140/90       menthol 5 % Ptch    ICY HOT    30 patch    Apply 1 patch topically every 8 hours as needed for muscle soreness    Muscle soreness       multivitamin, therapeutic with minerals Tabs tablet     3 each    Take 1 tablet by mouth daily    Alcohol dependence with uncomplicated withdrawal (H)       rOPINIRole 1 MG tablet    REQUIP    9 tablet    Take 1 tablet (1 mg) by mouth 3 times daily for 3 days    RLS (restless legs syndrome)       spironolactone 25 MG tablet    ALDACTONE    3 tablet    Take 1 tablet (25 mg) by mouth daily for 3 days    Alcohol abuse, Hypertension goal BP (blood pressure) < 140/90       tiotropium 18 MCG capsule    SPIRIVA HANDIHALER    3 capsule    Inhale contents of one capsule daily.    Chronic bronchitis, unspecified chronic bronchitis type (H)       traZODone 50 MG tablet    DESYREL    3 tablet    Take 1 tablet (50 mg) by mouth At Bedtime for 3 days    Major depressive disorder, recurrent  episode, mild (H)       * Notice:  This list has 4 medication(s) that are the same as other medications prescribed for you. Read the directions carefully, and ask your doctor or other care provider to review them with you.

## 2018-05-03 NOTE — LETTER
[facility] [place]  [address] [address] [place] [address]  Phone: [phone]    May 3, 2018        Inga Ledesma  [address]  [place] [address]          To whom it may concern:    RE: Inga Ledesma    Patient was seen and treated today at our clinic. Inga may return to work on 5/14/18.    Please contact me for questions or concerns.      Sincerely,        Min Toledo PA-C

## 2018-05-03 NOTE — PROGRESS NOTES
SUBJECTIVE:   Inga Ledesma is a 52 year old female who presents to clinic today for the following health issues:          Hospital Follow-up Visit:    Hospital/Nursing Home/IP Rehab Facility: [facility]  Date of Admission: 04/29/18  Date of Discharge: 05/01/18  Reason(s) for Admission: Alcohol Abuse with withdrawal            Problems taking medications regularly:  None       Medication changes since discharge: None       Problems adhering to non-medication therapy:  None    Summary of hospitalization:  [facility] discharge summary reviewed  Diagnostic Tests/Treatments reviewed.  Follow up needed: none  Other Healthcare Providers Involved in Patient s Care:         None  Update since discharge:      Post Discharge Medication Reconciliation: discharge medications reconciled, continue medications without change.  Plan of care communicated with patient     Coding guidelines for this visit:  Type of Medical   Decision Making Face-to-Face Visit       within 7 Days of discharge Face-to-Face Visit        within 14 days of discharge   Moderate Complexity [id number]   High Complexity [id number]              Sober x 4 days.   She denies n/v/d/c. No blood in her stools.  No chest pain/sob/palps. No jaundice.   I really would like her to establish with dr li. Patient is interested.   Depression still a concern.   She doesn't feel that prozac is helping at all.   Problem list and histories reviewed & adjusted, as indicated.  Additional history: as documented    BP Readings from Last 3 Encounters:   05/03/18 116/72   04/29/18 176/56   04/20/18 (!) 153/98    Wt Readings from Last 3 Encounters:   05/03/18 160 lb (72.6 kg)   04/16/18 160 lb (72.6 kg)   04/12/18 162 lb (73.5 kg)                    Reviewed and updated as needed this visit by clinical staff  Tobacco  Allergies  Meds       Reviewed and updated as needed this visit by Provider         All other systems negative except as outline  above  OBJECTIVE:  Eye exam - right eye normal lid, conjunctiva, cornea, pupil and fundus, left eye normal lid, conjunctiva, cornea, pupil and fundus.  CHEST:chest clear to IPPA, no tachypnea, retractions or cyanosis and S1, S2 normal, no murmur, no gallop, rate regular.  Thyroid not palpable, not enlarged, no nodules detected.  The abdomen is soft without tenderness, guarding, mass, rebound or organomegaly. Bowel sounds are normal. No CVA tenderness or inguinal adenopathy noted.    Inga was seen today for hospital f/u.    Diagnoses and all orders for this visit:    Alcohol abuse  -     ADDICTION MEDICINE REFERRAL    Alcohol withdrawal (H)    Major depressive disorder, recurrent episode, mild (H)  -     DULoxetine (CYMBALTA) 30 MG EC capsule; Take 1 capsule daily for 7 days, then increase to 2 caps daily thereafter.      Patient Instructions   Ween off of your prozac:  Take 1 tab daily for 1 week, then take 1 tab every other day for 10 days, then stop.     Recheck in 4-6 wks.

## 2018-05-28 ENCOUNTER — APPOINTMENT (OUTPATIENT)
Dept: GENERAL RADIOLOGY | Facility: CLINIC | Age: 53
DRG: 682 | End: 2018-05-28
Attending: EMERGENCY MEDICINE

## 2018-05-28 ENCOUNTER — HOSPITAL ENCOUNTER (INPATIENT)
Facility: CLINIC | Age: 53
LOS: 3 days | Discharge: HOME HEALTH | DRG: 682 | End: 2018-05-31
Attending: EMERGENCY MEDICINE | Admitting: HOSPITALIST
Payer: COMMERCIAL

## 2018-05-28 DIAGNOSIS — F10.932 ALCOHOL WITHDRAWAL SYNDROME WITH PERCEPTUAL DISTURBANCE (H): ICD-10-CM

## 2018-05-28 DIAGNOSIS — F33.9 RECURRENT MAJOR DEPRESSIVE DISORDER, REMISSION STATUS UNSPECIFIED (H): ICD-10-CM

## 2018-05-28 DIAGNOSIS — I10 ESSENTIAL HYPERTENSION WITH GOAL BLOOD PRESSURE LESS THAN 140/90: ICD-10-CM

## 2018-05-28 DIAGNOSIS — E86.0 DEHYDRATION: ICD-10-CM

## 2018-05-28 DIAGNOSIS — K29.21 ALCOHOLIC GASTRITIS WITH HEMORRHAGE, UNSPECIFIED CHRONICITY: ICD-10-CM

## 2018-05-28 DIAGNOSIS — A04.72 C. DIFFICILE COLITIS: ICD-10-CM

## 2018-05-28 DIAGNOSIS — F10.10 ALCOHOL ABUSE: Primary | ICD-10-CM

## 2018-05-28 DIAGNOSIS — R30.0 DYSURIA: ICD-10-CM

## 2018-05-28 DIAGNOSIS — N17.9 ARF (ACUTE RENAL FAILURE) (H): ICD-10-CM

## 2018-05-28 DIAGNOSIS — I10 HYPERTENSION GOAL BP (BLOOD PRESSURE) < 140/90: ICD-10-CM
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
## 2018-05-28 DIAGNOSIS — G25.81 RLS (RESTLESS LEGS SYNDROME): ICD-10-CM

## 2018-05-28 DIAGNOSIS — I50.30 (HFPEF) HEART FAILURE WITH PRESERVED EJECTION FRACTION (H): ICD-10-CM

## 2018-05-28 LAB
ALBUMIN SERPL-MCNC: 4.1 G/DL (ref 3.4–5)
ALP SERPL-CCNC: 131 U/L (ref 40–150)
ALT SERPL W P-5'-P-CCNC: 45 U/L (ref 0–50)
ANION GAP SERPL CALCULATED.3IONS-SCNC: 15 MMOL/L (ref 3–14)
ANION GAP SERPL CALCULATED.3IONS-SCNC: 17 MMOL/L (ref 3–14)
AST SERPL W P-5'-P-CCNC: 50 U/L (ref 0–45)
BASOPHILS # BLD AUTO: 0 10E9/L (ref 0–0.2)
BASOPHILS NFR BLD AUTO: 0.2 %
BILIRUB SERPL-MCNC: 1.4 MG/DL (ref 0.2–1.3)
BUN SERPL-MCNC: 32 MG/DL (ref 7–30)
BUN SERPL-MCNC: 32 MG/DL (ref 7–30)
CA-I BLD-SCNC: 4.4 MG/DL (ref 4.4–5.2)
CALCIUM SERPL-MCNC: 8.7 MG/DL (ref 8.5–10.1)
CALCIUM SERPL-MCNC: 9.4 MG/DL (ref 8.5–10.1)
CHLORIDE SERPL-SCNC: 89 MMOL/L (ref 94–109)
CHLORIDE SERPL-SCNC: 94 MMOL/L (ref 94–109)
CO2 BLDCOV-SCNC: 23 MMOL/L (ref 21–28)
CO2 SERPL-SCNC: 18 MMOL/L (ref 20–32)
CO2 SERPL-SCNC: 21 MMOL/L (ref 20–32)
CREAT SERPL-MCNC: 1.74 MG/DL (ref 0.52–1.04)
CREAT SERPL-MCNC: 1.88 MG/DL (ref 0.52–1.04)
DIFFERENTIAL METHOD BLD: ABNORMAL
DIFFERENTIAL METHOD BLD: NORMAL
EOSINOPHIL # BLD AUTO: 0 10E9/L (ref 0–0.7)
EOSINOPHIL NFR BLD AUTO: 0.1 %
ERYTHROCYTE [DISTWIDTH] IN BLOOD BY AUTOMATED COUNT: 15.2 % (ref 10–15)
ERYTHROCYTE [DISTWIDTH] IN BLOOD BY AUTOMATED COUNT: NORMAL % (ref 10–15)
GFR SERPL CREATININE-BSD FRML MDRD: 28 ML/MIN/1.7M2
GFR SERPL CREATININE-BSD FRML MDRD: 31 ML/MIN/1.7M2
GLUCOSE BLD-MCNC: 140 MG/DL (ref 70–99)
GLUCOSE SERPL-MCNC: 128 MG/DL (ref 70–99)
GLUCOSE SERPL-MCNC: 98 MG/DL (ref 70–99)
HCT VFR BLD AUTO: 37.8 % (ref 35–47)
HCT VFR BLD AUTO: NORMAL % (ref 35–47)
HCT VFR BLD CALC: 47 %PCV (ref 35–47)
HGB BLD CALC-MCNC: 16 G/DL (ref 11.7–15.7)
HGB BLD-MCNC: 12.9 G/DL (ref 11.7–15.7)
HGB BLD-MCNC: NORMAL G/DL (ref 11.7–15.7)
IMM GRANULOCYTES # BLD: 0 10E9/L (ref 0–0.4)
IMM GRANULOCYTES NFR BLD: 0.3 %
INR PPP: 0.97 (ref 0.86–1.14)
LACTATE BLD-SCNC: 1.7 MMOL/L (ref 0.7–2)
LIPASE SERPL-CCNC: 452 U/L (ref 73–393)
LYMPHOCYTES # BLD AUTO: 0.9 10E9/L (ref 0.8–5.3)
LYMPHOCYTES NFR BLD AUTO: 5.8 %
MCH RBC QN AUTO: 30.5 PG (ref 26.5–33)
MCH RBC QN AUTO: NORMAL PG (ref 26.5–33)
MCHC RBC AUTO-ENTMCNC: 34.1 G/DL (ref 31.5–36.5)
MCHC RBC AUTO-ENTMCNC: NORMAL G/DL (ref 31.5–36.5)
MCV RBC AUTO: 89 FL (ref 78–100)
MCV RBC AUTO: NORMAL FL (ref 78–100)
MONOCYTES # BLD AUTO: 0.6 10E9/L (ref 0–1.3)
MONOCYTES NFR BLD AUTO: 4 %
NEUTROPHILS # BLD AUTO: 13.2 10E9/L (ref 1.6–8.3)
NEUTROPHILS NFR BLD AUTO: 89.6 %
NRBC # BLD AUTO: 0 10*3/UL
NRBC BLD AUTO-RTO: 0 /100
PCO2 BLDV: 31 MM HG (ref 40–50)
PH BLDV: 7.47 PH (ref 7.32–7.43)
PLATELET # BLD AUTO: 151 10E9/L (ref 150–450)
PLATELET # BLD AUTO: NORMAL 10E9/L (ref 150–450)
PO2 BLDV: 25 MM HG (ref 25–47)
POTASSIUM BLD-SCNC: 4.1 MMOL/L (ref 3.4–5.3)
POTASSIUM SERPL-SCNC: 4 MMOL/L (ref 3.4–5.3)
POTASSIUM SERPL-SCNC: 5.9 MMOL/L (ref 3.4–5.3)
PROT SERPL-MCNC: 7.9 G/DL (ref 6.8–8.8)
RBC # BLD AUTO: 4.23 10E12/L (ref 3.8–5.2)
RBC # BLD AUTO: NORMAL 10E12/L (ref 3.8–5.2)
SAO2 % BLDV FROM PO2: 50 %
SODIUM BLD-SCNC: 131 MMOL/L (ref 133–144)
SODIUM SERPL-SCNC: 124 MMOL/L (ref 133–144)
SODIUM SERPL-SCNC: 130 MMOL/L (ref 133–144)
TROPONIN I SERPL-MCNC: <0.015 UG/L (ref 0–0.04)
WBC # BLD AUTO: 14.7 10E9/L (ref 4–11)
WBC # BLD AUTO: NORMAL 10E9/L (ref 4–11)

## 2018-05-28 PROCEDURE — 82330 ASSAY OF CALCIUM: CPT

## 2018-05-28 PROCEDURE — 93005 ELECTROCARDIOGRAM TRACING: CPT | Performed by: EMERGENCY MEDICINE

## 2018-05-28 PROCEDURE — HZ2ZZZZ DETOXIFICATION SERVICES FOR SUBSTANCE ABUSE TREATMENT: ICD-10-PCS | Performed by: PEDIATRICS

## 2018-05-28 PROCEDURE — 83690 ASSAY OF LIPASE: CPT | Performed by: EMERGENCY MEDICINE

## 2018-05-28 PROCEDURE — 36415 COLL VENOUS BLD VENIPUNCTURE: CPT | Performed by: EMERGENCY MEDICINE

## 2018-05-28 PROCEDURE — 40000502 ZZHCL STATISTIC GLUCOSE ED POCT

## 2018-05-28 PROCEDURE — 40000497 ZZHCL STATISTIC SODIUM ED POCT

## 2018-05-28 PROCEDURE — 40000556 ZZH STATISTIC PERIPHERAL IV START W US GUIDANCE

## 2018-05-28 PROCEDURE — 93010 ELECTROCARDIOGRAM REPORT: CPT | Mod: Z6 | Performed by: EMERGENCY MEDICINE

## 2018-05-28 PROCEDURE — 12000006 ZZH R&B IMCU INTERMEDIATE UMMC

## 2018-05-28 PROCEDURE — 71046 X-RAY EXAM CHEST 2 VIEWS: CPT

## 2018-05-28 PROCEDURE — 80048 BASIC METABOLIC PNL TOTAL CA: CPT | Performed by: EMERGENCY MEDICINE

## 2018-05-28 PROCEDURE — 99285 EMERGENCY DEPT VISIT HI MDM: CPT | Mod: 25 | Performed by: EMERGENCY MEDICINE

## 2018-05-28 PROCEDURE — 83605 ASSAY OF LACTIC ACID: CPT

## 2018-05-28 PROCEDURE — 83605 ASSAY OF LACTIC ACID: CPT | Performed by: EMERGENCY MEDICINE

## 2018-05-28 PROCEDURE — 40000498 ZZHCL STATISTIC POTASSIUM ED POCT

## 2018-05-28 PROCEDURE — 84484 ASSAY OF TROPONIN QUANT: CPT | Performed by: EMERGENCY MEDICINE

## 2018-05-28 PROCEDURE — 99207 ZZC APP CREDIT; MD BILLING SHARED VISIT: CPT | Performed by: PHYSICIAN ASSISTANT

## 2018-05-28 PROCEDURE — 25000128 H RX IP 250 OP 636: Performed by: EMERGENCY MEDICINE

## 2018-05-28 PROCEDURE — 85025 COMPLETE CBC W/AUTO DIFF WBC: CPT | Performed by: EMERGENCY MEDICINE

## 2018-05-28 PROCEDURE — 85610 PROTHROMBIN TIME: CPT | Performed by: EMERGENCY MEDICINE

## 2018-05-28 PROCEDURE — 96374 THER/PROPH/DIAG INJ IV PUSH: CPT | Performed by: EMERGENCY MEDICINE

## 2018-05-28 PROCEDURE — 96361 HYDRATE IV INFUSION ADD-ON: CPT | Performed by: EMERGENCY MEDICINE

## 2018-05-28 PROCEDURE — 82803 BLOOD GASES ANY COMBINATION: CPT

## 2018-05-28 PROCEDURE — 40000501 ZZHCL STATISTIC HEMATOCRIT ED POCT

## 2018-05-28 PROCEDURE — 80053 COMPREHEN METABOLIC PANEL: CPT | Performed by: EMERGENCY MEDICINE

## 2018-05-28 PROCEDURE — 99223 1ST HOSP IP/OBS HIGH 75: CPT | Mod: AI | Performed by: HOSPITALIST

## 2018-05-28 RX ORDER — POLYETHYLENE GLYCOL 3350 17 G/17G
17 POWDER, FOR SOLUTION ORAL DAILY PRN
Status: DISCONTINUED | OUTPATIENT
Start: 2018-05-28 | End: 2018-05-31 | Stop reason: HOSPADM

## 2018-05-28 RX ORDER — LABETALOL 100 MG/1
100 TABLET, FILM COATED ORAL DAILY
Status: DISCONTINUED | OUTPATIENT
Start: 2018-05-29 | End: 2018-05-31 | Stop reason: HOSPADM

## 2018-05-28 RX ORDER — ASPIRIN 81 MG/1
81 TABLET ORAL DAILY
Status: DISCONTINUED | OUTPATIENT
Start: 2018-05-29 | End: 2018-05-29

## 2018-05-28 RX ORDER — SODIUM CHLORIDE 9 MG/ML
1000 INJECTION, SOLUTION INTRAVENOUS CONTINUOUS
Status: DISCONTINUED | OUTPATIENT
Start: 2018-05-28 | End: 2018-05-30

## 2018-05-28 RX ORDER — LORAZEPAM 2 MG/ML
1 INJECTION INTRAMUSCULAR ONCE
Status: COMPLETED | OUTPATIENT
Start: 2018-05-28 | End: 2018-05-28

## 2018-05-28 RX ORDER — NICOTINE 21 MG/24HR
1 PATCH, TRANSDERMAL 24 HOURS TRANSDERMAL DAILY
Status: DISCONTINUED | OUTPATIENT
Start: 2018-05-29 | End: 2018-05-31 | Stop reason: HOSPADM

## 2018-05-28 RX ORDER — ONDANSETRON 4 MG/1
4 TABLET, ORALLY DISINTEGRATING ORAL EVERY 6 HOURS PRN
Status: DISCONTINUED | OUTPATIENT
Start: 2018-05-28 | End: 2018-05-28

## 2018-05-28 RX ORDER — LORAZEPAM 1 MG/1
1-4 TABLET ORAL EVERY 30 MIN PRN
Status: DISCONTINUED | OUTPATIENT
Start: 2018-05-28 | End: 2018-05-31 | Stop reason: HOSPADM

## 2018-05-28 RX ORDER — PROCHLORPERAZINE MALEATE 5 MG
5-10 TABLET ORAL EVERY 6 HOURS PRN
Status: DISCONTINUED | OUTPATIENT
Start: 2018-05-28 | End: 2018-05-31 | Stop reason: HOSPADM

## 2018-05-28 RX ORDER — LABETALOL 100 MG/1
200 TABLET, FILM COATED ORAL AT BEDTIME
Status: DISCONTINUED | OUTPATIENT
Start: 2018-05-28 | End: 2018-05-31 | Stop reason: HOSPADM

## 2018-05-28 RX ORDER — GABAPENTIN 600 MG/1
600 TABLET ORAL AT BEDTIME
Status: DISCONTINUED | OUTPATIENT
Start: 2018-05-28 | End: 2018-05-31 | Stop reason: HOSPADM

## 2018-05-28 RX ORDER — HYDROXYZINE HYDROCHLORIDE 25 MG/1
50 TABLET, FILM COATED ORAL EVERY 6 HOURS PRN
Status: DISCONTINUED | OUTPATIENT
Start: 2018-05-28 | End: 2018-05-31 | Stop reason: HOSPADM

## 2018-05-28 RX ORDER — ONDANSETRON 2 MG/ML
4 INJECTION INTRAMUSCULAR; INTRAVENOUS EVERY 6 HOURS PRN
Status: DISCONTINUED | OUTPATIENT
Start: 2018-05-28 | End: 2018-05-28

## 2018-05-28 RX ORDER — NALOXONE HYDROCHLORIDE 0.4 MG/ML
.1-.4 INJECTION, SOLUTION INTRAMUSCULAR; INTRAVENOUS; SUBCUTANEOUS
Status: DISCONTINUED | OUTPATIENT
Start: 2018-05-28 | End: 2018-05-31 | Stop reason: HOSPADM

## 2018-05-28 RX ORDER — GABAPENTIN 300 MG/1
300 CAPSULE ORAL 2 TIMES DAILY
Status: DISCONTINUED | OUTPATIENT
Start: 2018-05-29 | End: 2018-05-31 | Stop reason: HOSPADM

## 2018-05-28 RX ORDER — PROCHLORPERAZINE 25 MG
25 SUPPOSITORY, RECTAL RECTAL EVERY 12 HOURS PRN
Status: DISCONTINUED | OUTPATIENT
Start: 2018-05-28 | End: 2018-05-31 | Stop reason: HOSPADM

## 2018-05-28 RX ORDER — ROPINIROLE 0.5 MG/1
1 TABLET, FILM COATED ORAL 3 TIMES DAILY
Status: DISCONTINUED | OUTPATIENT
Start: 2018-05-29 | End: 2018-05-31 | Stop reason: HOSPADM

## 2018-05-28 RX ORDER — SODIUM CHLORIDE 9 MG/ML
INJECTION, SOLUTION INTRAVENOUS CONTINUOUS
Status: DISCONTINUED | OUTPATIENT
Start: 2018-05-28 | End: 2018-05-30

## 2018-05-28 RX ORDER — ALBUTEROL SULFATE 90 UG/1
1-2 AEROSOL, METERED RESPIRATORY (INHALATION) EVERY 4 HOURS PRN
Status: DISCONTINUED | OUTPATIENT
Start: 2018-05-28 | End: 2018-05-31 | Stop reason: HOSPADM

## 2018-05-28 RX ORDER — IPRATROPIUM BROMIDE AND ALBUTEROL SULFATE 2.5; .5 MG/3ML; MG/3ML
3 SOLUTION RESPIRATORY (INHALATION) 4 TIMES DAILY PRN
Status: DISCONTINUED | OUTPATIENT
Start: 2018-05-28 | End: 2018-05-31 | Stop reason: HOSPADM

## 2018-05-28 RX ORDER — ACETAMINOPHEN 325 MG/1
325 TABLET ORAL EVERY 4 HOURS PRN
Status: DISCONTINUED | OUTPATIENT
Start: 2018-05-28 | End: 2018-05-30

## 2018-05-28 RX ORDER — LANOLIN ALCOHOL/MO/W.PET/CERES
100 CREAM (GRAM) TOPICAL DAILY
Status: DISCONTINUED | OUTPATIENT
Start: 2018-05-29 | End: 2018-05-28

## 2018-05-28 RX ORDER — MULTIPLE VITAMINS W/ MINERALS TAB 9MG-400MCG
1 TAB ORAL DAILY
Status: DISCONTINUED | OUTPATIENT
Start: 2018-05-29 | End: 2018-05-31 | Stop reason: HOSPADM

## 2018-05-28 RX ORDER — FOLIC ACID 1 MG/1
1 TABLET ORAL DAILY
Status: DISCONTINUED | OUTPATIENT
Start: 2018-05-29 | End: 2018-05-31 | Stop reason: HOSPADM

## 2018-05-28 RX ORDER — AMOXICILLIN 250 MG
2 CAPSULE ORAL 2 TIMES DAILY PRN
Status: DISCONTINUED | OUTPATIENT
Start: 2018-05-28 | End: 2018-05-31 | Stop reason: HOSPADM

## 2018-05-28 RX ORDER — AMOXICILLIN 250 MG
1 CAPSULE ORAL 2 TIMES DAILY PRN
Status: DISCONTINUED | OUTPATIENT
Start: 2018-05-28 | End: 2018-05-31 | Stop reason: HOSPADM

## 2018-05-28 RX ADMIN — SODIUM CHLORIDE 1000 ML: 900 INJECTION, SOLUTION INTRAVENOUS at 20:11

## 2018-05-28 RX ADMIN — SODIUM CHLORIDE 1000 ML: 900 INJECTION, SOLUTION INTRAVENOUS at 19:15

## 2018-05-28 RX ADMIN — LORAZEPAM 1 MG: 2 INJECTION INTRAMUSCULAR; INTRAVENOUS at 21:48

## 2018-05-28 NOTE — IP AVS SNAPSHOT
[address] [address]    Phone:  [phone]                                       After Visit Summary   5/28/2018    Inga Ledesma    MRN: [id number]           After Visit Summary Signature Page     I have received my discharge instructions, and my questions have been answered. I have discussed any challenges I see with this plan with the nurse or doctor.    ..........................................................................................................................................  Patient/Patient Representative Signature      ..........................................................................................................................................  Patient Representative Print Name and Relationship to Patient    ..................................................               ................................................  Date                                            Time    ..........................................................................................................................................  Reviewed by Signature/Title    ...................................................              ..............................................  Date                                                            Time

## 2018-05-28 NOTE — ED TRIAGE NOTES
Patient here with pain that started yesterday across her abdomen, back and up to left shoulder. She describes it as taking her breath away.

## 2018-05-28 NOTE — LETTER
Transition Communication Hand-off for Care Transitions to Next Level of Care Provider    Name: Inga Ledesma  : 1965  MRN #: [id number]  Primary Care Provider: Min Toledo     Primary Clinic: [address] [place] [address]     Reason for Hospitalization:  ARF (acute renal failure) (H) [N17.9]  Admit Date/Time: 2018  5:50 PM  Discharge Date: 2018   Payor Source: Payor: P[employer]ONE / Plan: [employer] [place] [employer] / Product Type: HMO /          Reason for Communication Hand-off Referral: Other post hospital admission    Discharge Plan:  Dc home, self care, follow up with out pt CD for ETOH cessation.      Discharge Needs Assessment:  Needs       Most Recent Value    Equipment Currently Used at Home walker, rolling        Follow-up plan:  Future Appointments  Date Time Provider Department Center   2018 5:20 PM Min Toledo PA-C [facility]       Any outstanding tests or procedures:        Referrals     Future Labs/Procedures    Home Care PT Referral for Hospital Discharge     Comments:    PT to eval and treat  _______________________  [facility]  Phone  [phone]  Fax  [phone]  ______________________   Home safety eval, please recommend equipment in home to maintain pt safety and limit home falls.    Your provider has ordered home care - physical therapy. If you have not been contacted within 2 days of your discharge please call the department phone number listed on the top of this document.          Sarina Martinez, RNCC, BSN    [facility]  [address] [place] [address]    [email or web address]  [email or web address]    Office: [phone] Pager: [phone]       AVS/Discharge Summary is the source of truth; this is a helpful guide for improved communication of patient story

## 2018-05-28 NOTE — IP AVS SNAPSHOT
MRN:[id number]                      After Visit Summary   5/28/2018    Inga Ledesma    MRN: [id number]           Thank you!     Thank you for choosing [facility] for your care. Our goal is always to provide you with excellent care. Hearing back from our patients is one way we can continue to improve our services. Please take a few minutes to complete the written survey that you may receive in the mail after you visit with us. Thank you!        Patient Information     Date Of Birth          1965        About your hospital stay     You were admitted on:  May 28, 2018 You last received care in the:  [address] [place] [address]    You were discharged on:  May 31, 2018        Reason for your hospital stay       You were admitted with abdominal pain from a colon infection called c-diff and from a bladder infection. You will need to take the vancomycin four times daily through June 12 for the colon infection. Please complete macrobid antibiotic for the bladder infection through June 5.                  Who to Call     For medical emergencies, please call 911.  For non-urgent questions about your medical care, please call your primary care provider or clinic, [phone]          Attending Provider     Provider Specialty    Cosme Arriaga MD Emergency Medicine    [facility]    Cristian, MD Horacio Internal Medicine    Darryl Allred MD Internal Medicine       Primary Care Provider Office Phone # Fax #    Min Toledo PA-C [phone]       When to contact your care team       Please stop drinking. If you are having cravings, call a support person - attend an AAA group or contact your provider for help.                  After Care Instructions     Diet       Follow this diet upon discharge: regular diet.            Monitor and record       Please monitor the frequency of your stools. If having escalating numbers of loose stools, please call your primary  care provider.                  Follow-up Appointments     Adult [facility] Follow-up and recommended labs and tests       Please follow up with a primary care provider within the next 1 week to review BP control, ensure stable kidney function and potassium levels.     Appointments on [place] and/or [facility] (with [facility] provider or service). Call [phone] if you haven't heard regarding these appointments within 7 days of discharge.                  Your next 10 appointments already scheduled     Jun 04, 2018  5:20 PM CDT   Office Visit with Min Toledo PA-C   [facility]    [address] [address] [phone]           Bring a current list of meds and any records pertaining to this visit. For Physicals, please bring immunization records and any forms needing to be filled out. Please arrive 10 minutes early to complete paperwork.              Additional Services     Home Care PT Referral for Hospital Discharge       PT to eval and treat  _______________________  [facility]  Phone  [phone]  Fax  [phone]  ______________________   Home safety eval, please recommend equipment in home to maintain pt safety and limit home falls.    Your provider has ordered home care - physical therapy. If you have not been contacted within 2 days of your discharge please call the department phone number listed on the top of this document.                  Pending Results     Date and Time Order Name Status Description    5/28/2018 2322 Blood culture Preliminary     5/28/2018 2322 Blood culture Preliminary     5/28/2018 1938 POC US ECHO LIMITED In process             Statement of Approval     Ordered          05/31/18 1140  I have reviewed and agree with all the recommendations and orders detailed in this document.  EFFECTIVE NOW     Approved and electronically signed by:  Khadra Kim APRN CNP             Admission Information      "Date & Time Provider Department Dept. Phone    5/28/2018 Darryl Allred MD [address] [facility] [phone]      Your Vitals Were     Blood Pressure Pulse Temperature Respirations Height Weight    165/86 80 98.1  F (36.7  C) (Oral) 18 1.549 m (5' 1\") 77.8 kg (171 lb 8.3 oz)    Last Period Pulse Oximetry BMI (Body Mass Index)             06/02/2010 97% 32.41 kg/m2         [employer] Information     [employer] gives you secure access to your electronic health record. If you see a primary care provider, you can also send messages to your care team and make appointments. If you have questions, please call your primary care clinic.  If you do not have a primary care provider, please call [phone] and they will assist you.        Care EveryWhere ID     This is your Care EveryWhere ID. This could be used by other organizations to access your [place] medical records  [id number]        Equal Access to Services     ИРИНА KERNS : Susan Mata, wabill urbina, qaybta kaalmamarcos graff, kelsey abdi . So [facility] [phone].    ATENCIÓN: Si habla español, tiene a cook disposición servicios gratuitos de asistencia lingüística. Llame al [phone].    We comply with applicable federal civil rights laws and [place] laws. We do not discriminate on the basis of race, color, national origin, age, disability, sex, sexual orientation, or gender identity.               Review of your medicines      START taking        Dose / Directions    acetaminophen 325 MG tablet   Commonly known as:  TYLENOL        Dose:  650 mg   Take 2 tablets (650 mg) by mouth 3 times daily as needed for mild pain   Quantity:  100 tablet   Refills:  0       losartan 25 MG tablet   Commonly known as:  COZAAR   Used for:  Hypertension goal BP (blood pressure) < 140/90        Dose:  25 mg   Start taking on:  6/1/2018   Take 1 tablet (25 mg) by mouth daily   Quantity:  30 tablet   Refills:  0       nicotine 21 " MG/24HR 24 hr patch   Commonly known as:  NICODERM CQ        Dose:  1 patch   Start taking on:  6/1/2018   Place 1 patch onto the skin daily   Quantity:  30 patch   Refills:  0       nitroFURantoin (macrocrystal-monohydrate) 100 MG capsule   Commonly known as:  MACROBID   Indication:  Urinary Tract Infection   Used for:  Dysuria        Dose:  100 mg   Take 1 capsule (100 mg) by mouth every 12 hours for 6 days   Quantity:  12 capsule   Refills:  0       omeprazole 20 MG CR capsule   Commonly known as:  priLOSEC   Used for:  Alcoholic gastritis with hemorrhage, unspecified chronicity        Dose:  20 mg   Take 1 capsule (20 mg) by mouth 2 times daily (before meals)   Quantity:  60 capsule   Refills:  0       order for DME   Used for:  Alcohol abuse, Fall, initial encounter, Dehydration, Alcohol withdrawal syndrome with perceptual disturbance (H)        Equipment being ordered: Cane () Treatment Diagnosis: Impaired functional mobility   Quantity:  1 each   Refills:  0       spironolactone 25 MG tablet   Commonly known as:  ALDACTONE   Used for:  (HFpEF) heart failure with preserved ejection fraction (H)        Dose:  25 mg   Take 1 tablet (25 mg) by mouth daily   Quantity:  30 tablet   Refills:  0       traMADol 50 MG tablet   Commonly known as:  ULTRAM   Used for:  C. difficile colitis        Dose:  50 mg   Take 1 tablet (50 mg) by mouth every 6 hours as needed for moderate pain   Quantity:  6 tablet   Refills:  0       Vancomycin HCl 25 MG/ML Solr   Used for:  C. difficile colitis        Dose:  125 mg   Take 125 mg by mouth 4 times daily for 50 doses   Quantity:  300 mL   Refills:  0         CONTINUE these medicines which may have CHANGED, or have new prescriptions. If we are uncertain of the size of tablets/capsules you have at home, strength may be listed as something that might have changed.        Dose / Directions    DULoxetine 30 MG EC capsule   Commonly known as:  CYMBALTA   This may have changed:    -  medication strength  - how much to take  - when to take this  - additional instructions   Used for:  Recurrent major depressive disorder, remission status unspecified (H)        Dose:  60 mg   Start taking on:  6/1/2018   Take 2 capsules (60 mg) by mouth daily   Quantity:  60 capsule   Refills:  0       furosemide 20 MG tablet   Commonly known as:  LASIX   This may have changed:  See the new instructions.   Used for:  Essential hypertension with goal blood pressure less than 140/90        Dose:  20 mg   Take 1 tablet (20 mg) by mouth daily   Quantity:  30 tablet   Refills:  0       labetalol 100 MG tablet   Commonly known as:  NORMODYNE   This may have changed:  additional instructions   Used for:  Hypertension goal BP (blood pressure) < 140/90        100mg in am, 200mg in pm.   Quantity:  90 tablet   Refills:  0         CONTINUE these medicines which have NOT CHANGED        Dose / Directions    albuterol 108 (90 Base) MCG/ACT Inhaler   Commonly known as:  VENTOLIN HFA   Used for:  Chronic bronchitis, unspecified chronic bronchitis type (H)        Dose:  1-2 puff   Inhale 1-2 puffs into the lungs every 4 hours as needed for shortness of breath / dyspnea or wheezing   Quantity:  1 Inhaler   Refills:  0       fluticasone-salmeterol 500-50 MCG/DOSE diskus inhaler   Commonly known as:  ADVAIR DISKUS   Used for:  Chronic bronchitis, unspecified chronic bronchitis type (H)        Dose:  1 puff   Inhale 1 puff into the lungs every 12 hours   Quantity:  1 Inhaler   Refills:  0       folic acid 1 MG tablet   Commonly known as:  FOLVITE   Used for:  Alcohol dependence with uncomplicated withdrawal (H)        Dose:  1 mg   Take 1 tablet (1 mg) by mouth daily   Quantity:  3 tablet   Refills:  0       gabapentin 300 MG capsule   Commonly known as:  NEURONTIN   Used for:  Major depressive disorder, recurrent episode, mild (H), Sacroiliitis (H)        Take 1 capsule by mouth in the morning, 1 capsule in the afternoon, and 2  capsules at bedtime.   Quantity:  12 capsule   Refills:  0       hydrOXYzine 50 MG tablet   Commonly known as:  ATARAX   Used for:  Sacroiliitis (H)        Dose:  50 mg   Take 1 tablet (50 mg) by mouth every 6 hours as needed for anxiety or other (adjuvant pain)   Quantity:  12 tablet   Refills:  1       ipratropium - albuterol 0.5 mg/2.5 mg/3 mL 0.5-2.5 (3) MG/3ML neb solution   Commonly known as:  DUONEB   Used for:  Chronic bronchitis, unspecified chronic bronchitis type (H)        Dose:  3 mL   Take 1 vial (3 mLs) by nebulization 4 times daily as needed for wheezing   Quantity:  360 mL   Refills:  1       menthol 5 % Ptch   Commonly known as:  ICY HOT   Used for:  Muscle soreness        Dose:  1 patch   Apply 1 patch topically every 8 hours as needed for muscle soreness   Quantity:  30 patch   Refills:  3       multivitamin, therapeutic with minerals Tabs tablet   Used for:  Alcohol dependence with uncomplicated withdrawal (H)        Dose:  1 tablet   Take 1 tablet by mouth daily   Quantity:  3 each   Refills:  0       rOPINIRole 1 MG tablet   Commonly known as:  REQUIP   Used for:  RLS (restless legs syndrome)        Dose:  1 mg   Take 1 tablet (1 mg) by mouth 3 times daily   Quantity:  90 tablet   Refills:  0       tiotropium 18 MCG capsule   Commonly known as:  SPIRIVA HANDIHALER   Used for:  Chronic bronchitis, unspecified chronic bronchitis type (H)        Inhale contents of one capsule daily.   Quantity:  3 capsule   Refills:  0         STOP taking     aspirin 81 MG EC tablet                Where to get your medicines      These medications were sent to [facility] - [place], [place] - [address]  [address] [address]     Phone:  [phone]     DULoxetine 30 MG EC capsule    furosemide 20 MG tablet    labetalol 100 MG tablet    losartan 25 MG tablet    nitroFURantoin (macrocrystal-monohydrate) 100 MG capsule    omeprazole 20 MG CR capsule    rOPINIRole 1 MG  tablet    spironolactone 25 MG tablet    Vancomycin HCl 25 MG/ML Solr         Some of these will need a paper prescription and others can be bought over the counter. Ask your nurse if you have questions.     Bring a paper prescription for each of these medications     order for DME    traMADol 50 MG tablet                Protect others around you: Learn how to safely use, store and throw away your medicines at www.disposemymeds.org.        ANTIBIOTIC INSTRUCTION     You've Been Prescribed an Antibiotic - Now What?  Your healthcare team thinks that you or your loved one might have an infection. Some infections can be treated with antibiotics, which are powerful, life-saving drugs. Like all medications, antibiotics have side effects and should only be used when necessary. There are some important things you should know about your antibiotic treatment.      Your healthcare team may run tests before you start taking an antibiotic.    Your team may take samples (e.g., from your blood, urine or other areas) to run tests to look for bacteria. These test can be important to determine if you need an antibiotic at all and, if you do, which antibiotic will work best.      Within a few days, your healthcare team might change or even stop your antibiotic.    Your team may start you on an antibiotic while they are working to find out what is making you sick.    Your team might change your antibiotic because test results show that a different antibiotic would be better to treat your infection.    In some cases, once your team has more information, they learn that you do not need an antibiotic at all. They may find out that you don't have an infection, or that the antibiotic you're taking won't work against your infection. For example, an infection caused by a virus can't be treated with antibiotics. Staying on an antibiotic when you don't need it is more likely to be harmful than helpful.      You may experience side effects from  your antibiotic.    Like all medications, antibiotics have side effects. Some of these can be serious.    Let you healthcare team know if you have any known allergies when you are admitted to the hospital.    One significant side effect of nearly all antibiotics is the risk of severe and sometimes deadly diarrhea caused by Clostridium difficile (C. Difficile). This occurs when a person takes antibiotics because some good germs are destroyed. Antibiotic use allows C. diificile to take over, putting patients at high risk for this serious infection.    As a patient or caregiver, it is important to understand your or your loved one's antibiotic treatment. It is especially important for caregivers to speak up when patients can't speak for themselves. Here are some important questions to ask your healthcare team.    What infection is this antibiotic treating and how do you know I have that infection?    What side effects might occur from this antibiotic?    How long will I need to take this antibiotic?    Is it safe to take this antibiotic with other medications or supplements (e.g., vitamins) that I am taking?     Are there any special directions I need to know about taking this antibiotic? For example, should I take it with food?    How will I be monitored to know whether my infection is responding to the antibiotic?    What tests may help to make sure the right antibiotic is prescribed for me?      Information provided by:  www.cdc.gov/getsmart  U.S. Department of Health and Human Services  Centers for disease Control and Prevention  National Center for Emerging and Zoonotic Infectious Diseases  Division of Healthcare Quality Promotion        Information about OPIOIDS     PRESCRIPTION OPIOIDS: WHAT YOU NEED TO KNOW   You have a prescription for an opioid (narcotic) pain medicine. Opioids can cause addiction. If you have a history of chemical dependency of any type, you are at a higher risk of becoming addicted to  opioids. Only take this medicine after all other options have been tried. Take it for as short a time and as few doses as possible.     Do not:    Drive. If you drive while taking these medicines, you could be arrested for driving under the influence (DUI).    Operate heavy machinery    Do any other dangerous activities while taking these medicines.     Drink any alcohol while taking these medicines.      Take with any other medicines that contain acetaminophen. Read all labels carefully. Look for the word  acetaminophen  or  Tylenol.  Ask your pharmacist if you have questions or are unsure.    Store your pills in a secure place, locked if possible. We will not replace any lost or stolen medicine. If you don t finish your medicine, please throw away (dispose) as directed by your pharmacist. The [place] Pollution Control Agency has more information about safe disposal: https://www.pca.Count includes the [facility].mn.us/living-green/managing-unwanted-medications    All opioids tend to cause constipation. Drink plenty of water and eat foods that have a lot of fiber, such as fruits, vegetables, prune juice, apple juice and high-fiber cereal. Take a laxative (Miralax, milk of magnesia, Colace, Senna) if you don t move your bowels at least every other day.              Medication List: This is a list of all your medications and when to take them. Check marks below indicate your daily home schedule. Keep this list as a reference.      Medications           Morning Afternoon Evening Bedtime As Needed    acetaminophen 325 MG tablet   Commonly known as:  TYLENOL   Take 2 tablets (650 mg) by mouth 3 times daily as needed for mild pain   Last time this was given:  650 mg on 5/31/2018  8:13 AM                                albuterol 108 (90 Base) MCG/ACT Inhaler   Commonly known as:  VENTOLIN HFA   Inhale 1-2 puffs into the lungs every 4 hours as needed for shortness of breath / dyspnea or wheezing                                DULoxetine 30 MG EC  capsule   Commonly known as:  CYMBALTA   Take 2 capsules (60 mg) by mouth daily   Start taking on:  6/1/2018   Last time this was given:  30 mg on 5/31/2018  8:13 AM                                fluticasone-salmeterol 500-50 MCG/DOSE diskus inhaler   Commonly known as:  ADVAIR DISKUS   Inhale 1 puff into the lungs every 12 hours                                folic acid 1 MG tablet   Commonly known as:  FOLVITE   Take 1 tablet (1 mg) by mouth daily   Last time this was given:  1 mg on 5/31/2018  8:13 AM                                furosemide 20 MG tablet   Commonly known as:  LASIX   Take 1 tablet (20 mg) by mouth daily                                gabapentin 300 MG capsule   Commonly known as:  NEURONTIN   Take 1 capsule by mouth in the morning, 1 capsule in the afternoon, and 2 capsules at bedtime.   Last time this was given:  300 mg on 5/31/2018  8:13 AM                                hydrOXYzine 50 MG tablet   Commonly known as:  ATARAX   Take 1 tablet (50 mg) by mouth every 6 hours as needed for anxiety or other (adjuvant pain)   Last time this was given:  50 mg on 5/30/2018  7:08 PM                                ipratropium - albuterol 0.5 mg/2.5 mg/3 mL 0.5-2.5 (3) MG/3ML neb solution   Commonly known as:  DUONEB   Take 1 vial (3 mLs) by nebulization 4 times daily as needed for wheezing                                labetalol 100 MG tablet   Commonly known as:  NORMODYNE   100mg in am, 200mg in pm.   Last time this was given:  100 mg on 5/31/2018  8:13 AM                                losartan 25 MG tablet   Commonly known as:  COZAAR   Take 1 tablet (25 mg) by mouth daily   Start taking on:  6/1/2018   Last time this was given:  25 mg on 5/31/2018  9:17 AM                                menthol 5 % Ptch   Commonly known as:  ICY HOT   Apply 1 patch topically every 8 hours as needed for muscle soreness   Last time this was given:  1 patch on 5/30/2018 10:48 PM                                 multivitamin, therapeutic with minerals Tabs tablet   Take 1 tablet by mouth daily   Last time this was given:  1 tablet on 5/31/2018  8:13 AM                                nicotine 21 MG/24HR 24 hr patch   Commonly known as:  NICODERM CQ   Place 1 patch onto the skin daily   Start taking on:  6/1/2018   Last time this was given:  1 patch on 5/31/2018  8:18 AM                                nitroFURantoin (macrocrystal-monohydrate) 100 MG capsule   Commonly known as:  MACROBID   Take 1 capsule (100 mg) by mouth every 12 hours for 6 days   Last time this was given:  100 mg on 5/31/2018  9:17 AM                                omeprazole 20 MG CR capsule   Commonly known as:  priLOSEC   Take 1 capsule (20 mg) by mouth 2 times daily (before meals)   Last time this was given:  20 mg on 5/31/2018  8:13 AM                                order for DME   Equipment being ordered: Cane () Treatment Diagnosis: Impaired functional mobility                                rOPINIRole 1 MG tablet   Commonly known as:  REQUIP   Take 1 tablet (1 mg) by mouth 3 times daily   Last time this was given:  1 mg on 5/31/2018  8:13 AM                                spironolactone 25 MG tablet   Commonly known as:  ALDACTONE   Take 1 tablet (25 mg) by mouth daily                                tiotropium 18 MCG capsule   Commonly known as:  SPIRIVA HANDIHALER   Inhale contents of one capsule daily.                                traMADol 50 MG tablet   Commonly known as:  ULTRAM   Take 1 tablet (50 mg) by mouth every 6 hours as needed for moderate pain   Last time this was given:  50 mg on 5/31/2018  8:13 AM                                Vancomycin HCl 25 MG/ML Solr   Take 125 mg by mouth 4 times daily for 50 doses
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
## 2018-05-29 ENCOUNTER — APPOINTMENT (OUTPATIENT)
Dept: CT IMAGING | Facility: CLINIC | Age: 53
DRG: 682 | End: 2018-05-29
Attending: NURSE PRACTITIONER

## 2018-05-29 ENCOUNTER — APPOINTMENT (OUTPATIENT)
Dept: PHYSICAL THERAPY | Facility: CLINIC | Age: 53
DRG: 682 | End: 2018-05-29
Attending: PHYSICIAN ASSISTANT

## 2018-05-29 LAB
ALBUMIN SERPL-MCNC: 3.4 G/DL (ref 3.4–5)
ALBUMIN UR-MCNC: 30 MG/DL
ALP SERPL-CCNC: 113 U/L (ref 40–150)
ALT SERPL W P-5'-P-CCNC: 36 U/L (ref 0–50)
AMMONIA PLAS-SCNC: 21 UMOL/L (ref 10–50)
AMPHETAMINES UR QL SCN: NEGATIVE
ANION GAP SERPL CALCULATED.3IONS-SCNC: 13 MMOL/L (ref 3–14)
APPEARANCE UR: ABNORMAL
AST SERPL W P-5'-P-CCNC: 35 U/L (ref 0–45)
BACTERIA #/AREA URNS HPF: ABNORMAL /HPF
BARBITURATES UR QL: NEGATIVE
BENZODIAZ UR QL: POSITIVE
BILIRUB SERPL-MCNC: 0.8 MG/DL (ref 0.2–1.3)
BILIRUB UR QL STRIP: ABNORMAL
BUN SERPL-MCNC: 35 MG/DL (ref 7–30)
C COLI+JEJUNI+LARI FUSA STL QL NAA+PROBE: NOT DETECTED
C DIFF TOX B STL QL: POSITIVE
CALCIUM SERPL-MCNC: 8.1 MG/DL (ref 8.5–10.1)
CANNABINOIDS UR QL SCN: NEGATIVE
CHLORIDE SERPL-SCNC: 97 MMOL/L (ref 94–109)
CO2 SERPL-SCNC: 22 MMOL/L (ref 20–32)
COCAINE UR QL: NEGATIVE
COLOR UR AUTO: ABNORMAL
CREAT SERPL-MCNC: 0.97 MG/DL (ref 0.52–1.04)
CREAT SERPL-MCNC: 1.32 MG/DL (ref 0.52–1.04)
CREAT UR-MCNC: 326 MG/DL
CRP SERPL-MCNC: 79 MG/L (ref 0–8)
EC STX1 GENE STL QL NAA+PROBE: NOT DETECTED
EC STX2 GENE STL QL NAA+PROBE: NOT DETECTED
ENTERIC PATHOGEN COMMENT: NORMAL
ERYTHROCYTE [DISTWIDTH] IN BLOOD BY AUTOMATED COUNT: 15.3 % (ref 10–15)
ERYTHROCYTE [SEDIMENTATION RATE] IN BLOOD BY WESTERGREN METHOD: 15 MM/H (ref 0–30)
ETHANOL UR QL SCN: NEGATIVE
FRACT EXCRET NA UR+SERPL-RTO: 0.3 %
GFR SERPL CREATININE-BSD FRML MDRD: 42 ML/MIN/1.7M2
GFR SERPL CREATININE-BSD FRML MDRD: 60 ML/MIN/1.7M2
GLUCOSE SERPL-MCNC: 94 MG/DL (ref 70–99)
GLUCOSE UR STRIP-MCNC: 30 MG/DL
HCT VFR BLD AUTO: 32.1 % (ref 35–47)
HGB BLD-MCNC: 10.9 G/DL (ref 11.7–15.7)
HGB BLD-MCNC: 12.1 G/DL (ref 11.7–15.7)
HGB UR QL STRIP: ABNORMAL
HYALINE CASTS #/AREA URNS LPF: 1 /LPF (ref 0–2)
INTERPRETATION ECG - MUSE: NORMAL
KETONES UR STRIP-MCNC: 40 MG/DL
LEUKOCYTE ESTERASE UR QL STRIP: ABNORMAL
MCH RBC QN AUTO: 30.6 PG (ref 26.5–33)
MCHC RBC AUTO-ENTMCNC: 34 G/DL (ref 31.5–36.5)
MCV RBC AUTO: 90 FL (ref 78–100)
MUCOUS THREADS #/AREA URNS LPF: PRESENT /LPF
NITRATE UR QL: NEGATIVE
NOROV GI+II ORF1-ORF2 JNC STL QL NAA+PR: NOT DETECTED
OPIATES UR QL SCN: NEGATIVE
OSMOLALITY SERPL: 282 MMOL/KG (ref 275–295)
OSMOLALITY UR: 716 MMOL/KG (ref 100–1200)
PH UR STRIP: 6 PH (ref 5–7)
PLATELET # BLD AUTO: 118 10E9/L (ref 150–450)
POTASSIUM SERPL-SCNC: 3.4 MMOL/L (ref 3.4–5.3)
PROT SERPL-MCNC: 6.7 G/DL (ref 6.8–8.8)
RBC # BLD AUTO: 3.56 10E12/L (ref 3.8–5.2)
RBC #/AREA URNS AUTO: 8 /HPF (ref 0–2)
RVA NSP5 STL QL NAA+PROBE: NOT DETECTED
SALMONELLA SP RPOD STL QL NAA+PROBE: NOT DETECTED
SHIGELLA SP+EIEC IPAH STL QL NAA+PROBE: NOT DETECTED
SODIUM SERPL-SCNC: 132 MMOL/L (ref 133–144)
SODIUM SERPL-SCNC: 132 MMOL/L (ref 133–144)
SODIUM UR-SCNC: 111 MMOL/L
SOURCE: ABNORMAL
SP GR UR STRIP: 1.02 (ref 1–1.03)
SPECIMEN SOURCE: ABNORMAL
SQUAMOUS #/AREA URNS AUTO: 19 /HPF (ref 0–1)
TRANS CELLS #/AREA URNS HPF: <1 /HPF (ref 0–1)
UROBILINOGEN UR STRIP-MCNC: 8 MG/DL (ref 0–2)
V CHOL+PARA RFBL+TRKH+TNAA STL QL NAA+PR: NOT DETECTED
WBC # BLD AUTO: 8.5 10E9/L (ref 4–11)
WBC #/AREA URNS AUTO: 6 /HPF (ref 0–5)
Y ENTERO RECN STL QL NAA+PROBE: NOT DETECTED

## 2018-05-29 PROCEDURE — 80320 DRUG SCREEN QUANTALCOHOLS: CPT | Performed by: EMERGENCY MEDICINE

## 2018-05-29 PROCEDURE — 84300 ASSAY OF URINE SODIUM: CPT | Performed by: EMERGENCY MEDICINE

## 2018-05-29 PROCEDURE — 97162 PT EVAL MOD COMPLEX 30 MIN: CPT | Mod: GP | Performed by: REHABILITATION PRACTITIONER

## 2018-05-29 PROCEDURE — 40000193 ZZH STATISTIC PT WARD VISIT: Performed by: REHABILITATION PRACTITIONER

## 2018-05-29 PROCEDURE — 87186 SC STD MICRODIL/AGAR DIL: CPT | Performed by: EMERGENCY MEDICINE

## 2018-05-29 PROCEDURE — 36415 COLL VENOUS BLD VENIPUNCTURE: CPT | Performed by: PHYSICIAN ASSISTANT

## 2018-05-29 PROCEDURE — 87338 HPYLORI STOOL AG IA: CPT | Performed by: NURSE PRACTITIONER

## 2018-05-29 PROCEDURE — 86140 C-REACTIVE PROTEIN: CPT | Performed by: PHYSICIAN ASSISTANT

## 2018-05-29 PROCEDURE — 74176 CT ABD & PELVIS W/O CONTRAST: CPT

## 2018-05-29 PROCEDURE — 85018 HEMOGLOBIN: CPT | Performed by: PHYSICIAN ASSISTANT

## 2018-05-29 PROCEDURE — 83935 ASSAY OF URINE OSMOLALITY: CPT | Performed by: EMERGENCY MEDICINE

## 2018-05-29 PROCEDURE — 83930 ASSAY OF BLOOD OSMOLALITY: CPT | Performed by: PHYSICIAN ASSISTANT

## 2018-05-29 PROCEDURE — 25000132 ZZH RX MED GY IP 250 OP 250 PS 637: Performed by: INTERNAL MEDICINE

## 2018-05-29 PROCEDURE — 82570 ASSAY OF URINE CREATININE: CPT | Performed by: EMERGENCY MEDICINE

## 2018-05-29 PROCEDURE — 84295 ASSAY OF SERUM SODIUM: CPT | Performed by: HOSPITALIST

## 2018-05-29 PROCEDURE — 82140 ASSAY OF AMMONIA: CPT | Performed by: PHYSICIAN ASSISTANT

## 2018-05-29 PROCEDURE — 87040 BLOOD CULTURE FOR BACTERIA: CPT | Performed by: PHYSICIAN ASSISTANT

## 2018-05-29 PROCEDURE — 80053 COMPREHEN METABOLIC PANEL: CPT | Performed by: PHYSICIAN ASSISTANT

## 2018-05-29 PROCEDURE — 97116 GAIT TRAINING THERAPY: CPT | Mod: GP | Performed by: REHABILITATION PRACTITIONER

## 2018-05-29 PROCEDURE — 25000132 ZZH RX MED GY IP 250 OP 250 PS 637: Performed by: PHYSICIAN ASSISTANT

## 2018-05-29 PROCEDURE — 82565 ASSAY OF CREATININE: CPT | Performed by: HOSPITALIST

## 2018-05-29 PROCEDURE — 36415 COLL VENOUS BLD VENIPUNCTURE: CPT | Performed by: HOSPITALIST

## 2018-05-29 PROCEDURE — 25000132 ZZH RX MED GY IP 250 OP 250 PS 637: Performed by: NURSE PRACTITIONER

## 2018-05-29 PROCEDURE — 80307 DRUG TEST PRSMV CHEM ANLYZR: CPT | Performed by: EMERGENCY MEDICINE

## 2018-05-29 PROCEDURE — 85027 COMPLETE CBC AUTOMATED: CPT | Performed by: PHYSICIAN ASSISTANT

## 2018-05-29 PROCEDURE — 99233 SBSQ HOSP IP/OBS HIGH 50: CPT | Performed by: NURSE PRACTITIONER

## 2018-05-29 PROCEDURE — 87506 IADNA-DNA/RNA PROBE TQ 6-11: CPT | Performed by: NURSE PRACTITIONER

## 2018-05-29 PROCEDURE — 25000125 ZZHC RX 250: Performed by: PHYSICIAN ASSISTANT

## 2018-05-29 PROCEDURE — 25000128 H RX IP 250 OP 636: Performed by: PHYSICIAN ASSISTANT

## 2018-05-29 PROCEDURE — 87088 URINE BACTERIA CULTURE: CPT | Performed by: EMERGENCY MEDICINE

## 2018-05-29 PROCEDURE — 87086 URINE CULTURE/COLONY COUNT: CPT | Performed by: EMERGENCY MEDICINE

## 2018-05-29 PROCEDURE — 87493 C DIFF AMPLIFIED PROBE: CPT | Performed by: NURSE PRACTITIONER

## 2018-05-29 PROCEDURE — 85652 RBC SED RATE AUTOMATED: CPT | Performed by: PHYSICIAN ASSISTANT

## 2018-05-29 PROCEDURE — 12000001 ZZH R&B MED SURG/OB UMMC

## 2018-05-29 PROCEDURE — 97530 THERAPEUTIC ACTIVITIES: CPT | Mod: GP | Performed by: REHABILITATION PRACTITIONER

## 2018-05-29 PROCEDURE — 81001 URINALYSIS AUTO W/SCOPE: CPT | Performed by: EMERGENCY MEDICINE

## 2018-05-29 RX ORDER — LIDOCAINE 4 G/G
1-2 PATCH TOPICAL
Status: DISCONTINUED | OUTPATIENT
Start: 2018-05-29 | End: 2018-05-31 | Stop reason: HOSPADM

## 2018-05-29 RX ORDER — VANCOMYCIN HYDROCHLORIDE 125 MG/1
125 CAPSULE ORAL 4 TIMES DAILY
Status: DISCONTINUED | OUTPATIENT
Start: 2018-05-29 | End: 2018-05-31 | Stop reason: HOSPADM

## 2018-05-29 RX ORDER — SUCRALFATE 1 G/1
1 TABLET ORAL
Status: DISCONTINUED | OUTPATIENT
Start: 2018-05-29 | End: 2018-05-31 | Stop reason: HOSPADM

## 2018-05-29 RX ADMIN — VANCOMYCIN HYDROCHLORIDE 125 MG: 125 CAPSULE ORAL at 19:27

## 2018-05-29 RX ADMIN — VANCOMYCIN HYDROCHLORIDE 125 MG: 125 CAPSULE ORAL at 17:45

## 2018-05-29 RX ADMIN — GABAPENTIN 300 MG: 300 CAPSULE ORAL at 10:17

## 2018-05-29 RX ADMIN — ROPINIROLE HYDROCHLORIDE 1 MG: 0.5 TABLET, FILM COATED ORAL at 19:27

## 2018-05-29 RX ADMIN — ROPINIROLE HYDROCHLORIDE 1 MG: 0.5 TABLET, FILM COATED ORAL at 16:56

## 2018-05-29 RX ADMIN — ROPINIROLE HYDROCHLORIDE 1 MG: 0.5 TABLET, FILM COATED ORAL at 10:17

## 2018-05-29 RX ADMIN — LABETALOL HCL 200 MG: 200 TABLET, FILM COATED ORAL at 22:06

## 2018-05-29 RX ADMIN — LABETALOL HCL 100 MG: 100 TABLET, FILM COATED ORAL at 10:20

## 2018-05-29 RX ADMIN — OMEPRAZOLE 20 MG: 20 CAPSULE, DELAYED RELEASE ORAL at 10:17

## 2018-05-29 RX ADMIN — UMECLIDINIUM 1 PUFF: 62.5 AEROSOL, POWDER ORAL at 08:25

## 2018-05-29 RX ADMIN — LIDOCAINE HYDROCHLORIDE 15 ML: 20 SOLUTION ORAL; TOPICAL at 22:06

## 2018-05-29 RX ADMIN — FOLIC ACID 1 MG: 1 TABLET ORAL at 10:16

## 2018-05-29 RX ADMIN — ACETAMINOPHEN 325 MG: 325 TABLET, FILM COATED ORAL at 00:27

## 2018-05-29 RX ADMIN — ACETAMINOPHEN 325 MG: 325 TABLET, FILM COATED ORAL at 20:44

## 2018-05-29 RX ADMIN — ACETAMINOPHEN 325 MG: 325 TABLET, FILM COATED ORAL at 11:41

## 2018-05-29 RX ADMIN — SODIUM CHLORIDE: 9 INJECTION, SOLUTION INTRAVENOUS at 16:59

## 2018-05-29 RX ADMIN — MULTIPLE VITAMINS W/ MINERALS TAB 1 TABLET: TAB at 10:16

## 2018-05-29 RX ADMIN — GABAPENTIN 300 MG: 300 CAPSULE ORAL at 11:41

## 2018-05-29 RX ADMIN — NICOTINE 1 PATCH: 21 PATCH TRANSDERMAL at 08:22

## 2018-05-29 RX ADMIN — SODIUM CHLORIDE: 9 INJECTION, SOLUTION INTRAVENOUS at 07:58

## 2018-05-29 RX ADMIN — GABAPENTIN 600 MG: 600 TABLET, FILM COATED ORAL at 00:27

## 2018-05-29 RX ADMIN — OMEPRAZOLE 20 MG: 20 CAPSULE, DELAYED RELEASE ORAL at 16:57

## 2018-05-29 RX ADMIN — Medication 1 MG: at 22:06

## 2018-05-29 RX ADMIN — HYDROXYZINE HYDROCHLORIDE 50 MG: 25 TABLET ORAL at 18:42

## 2018-05-29 RX ADMIN — FLUTICASONE FUROATE AND VILANTEROL TRIFENATATE 1 PUFF: 200; 25 POWDER RESPIRATORY (INHALATION) at 08:25

## 2018-05-29 RX ADMIN — ASPIRIN 81 MG: 81 TABLET, COATED ORAL at 10:16

## 2018-05-29 RX ADMIN — THIAMINE HYDROCHLORIDE 250 MG: 100 INJECTION, SOLUTION INTRAMUSCULAR; INTRAVENOUS at 08:01

## 2018-05-29 RX ADMIN — GABAPENTIN 600 MG: 600 TABLET, FILM COATED ORAL at 22:06

## 2018-05-29 RX ADMIN — LIDOCAINE 2 PATCH: 560 PATCH PERCUTANEOUS; TOPICAL; TRANSDERMAL at 22:06

## 2018-05-29 RX ADMIN — SUCRALFATE 1 G: 1 TABLET ORAL at 22:06

## 2018-05-29 RX ADMIN — SODIUM CHLORIDE: 9 INJECTION, SOLUTION INTRAVENOUS at 00:28

## 2018-05-29 RX ADMIN — LABETALOL HCL 200 MG: 200 TABLET, FILM COATED ORAL at 03:02

## 2018-05-29 RX ADMIN — SUCRALFATE 1 G: 1 TABLET ORAL at 14:31

## 2018-05-29 ASSESSMENT — PAIN DESCRIPTION - DESCRIPTORS
DESCRIPTORS: ACHING
DESCRIPTORS: SHARP
DESCRIPTORS: ACHING;CRAMPING
DESCRIPTORS: PRESSURE;ACHING
DESCRIPTORS: ACHING;CRAMPING;CONSTANT;SHARP

## 2018-05-29 ASSESSMENT — ACTIVITIES OF DAILY LIVING (ADL)
ADLS_ACUITY_SCORE: 11
ADLS_ACUITY_SCORE: 12
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
## 2018-05-29 NOTE — SIGNIFICANT EVENT
05/29/18 1455   Values Beliefs and Spiritual Care   C: Community: In support of your spiritual health, is there someone we may contact for you? (identify all that apply) (DARLING haddad 5/29)   Visit Information   Visit Made By Priest Singh   Type of Visit Initial   Visited Patient   Interventions   Basic Spiritual Interventions    introduction/orientation to Spiritual Health Services;Assessment of spiritual needs/resources;Reflective conversation;Prayer   Ritual/Sacramental Encounter  Sabianist anointing;Communion   Date anointed (by whom in comment) 05/29/18   Advanced Assessments/Interventions   Presenting Concerns/Issues Spiritual/Voodoo/emotional support   SPIRITUAL HEALTH SERVICES Significant Event  Sabianist Sacrament of ANOINTING  [facility]  I anointed patient per request of patienyt    Father Jan Singh

## 2018-05-29 NOTE — PLAN OF CARE
"Problem: Patient Care Overview  Goal: Plan of Care/Patient Progress Review  Outcome: No Change  /80 (BP Location: Right arm)  Pulse 129  Temp 98.6  F (37  C) (Oral)  Resp 12  Ht 1.549 m (5' 1\")  Wt 71 kg (156 lb 8 oz)  LMP 06/02/2010  SpO2 97%  BMI 29.57 kg/m2    AOx4, MSSA protocol in use, score 3-5, tremors noted.  VSS, SR, on room air, afebrile.  C/O right sided abdominal cramping and pain worse with activity, stool studies completed and patient positive for C diff, started on oral vancomycin.  Voiding independently, BM x 3, up to commode with SBA.  Patient with multiple large bruises over entire body, states she trips over her cat frequently, MD aware.  Will continue with plan of care.      "

## 2018-05-29 NOTE — PROGRESS NOTES
"Social Work: Assessment with Discharge Plan    Patient Name:  Inga Ledesma  :  1965  Age:  52 year old  MRN:  [id number]  Risk/Complexity Score: average    Completed assessment with:  Patient    Presenting Information   Reason for Referral:  Discharge plan, Abuse assessment, Substance abuse concerns and Potential need for community services upon discharge. Pt denies any verbal, emotional, sexual, and physical abuse. Pt states all of her bruises are from falls.  Date of Intake:  May 29, 2018  Referral Source:  Physician  Decision Maker:  Pt is her own decision maker.  Alternate Decision Maker:  Pt's next of kin is her son Maikol Ledesma (P: [phone]). Pt is not  and Maikol is the only child. Pt describes Maikol as \"special needs\" and has learning disabilities and can care for himself but cannot use the stove or oven.   Health Care Directive:  Provided education and Declined completing. SW discussed completing a HCD and that if pt is unable to speak for herself, son Maikol would be next of kin and alternate decision maker. Discussed that with Maikol's learning disabilities, making health care decisions for pt may be difficult. Pt is still okay with Maikol as alternate decision maker.  Living Situation:  Pt lives with her 25 yo son in a trailer home in [place], [place].   Previous Functional Status:  Independent, however, pt reports falling in her home at night d/t it being dark inside and tripping over things.  Patient and family understanding of hospitalization:  Pt states she came in because of stomach pain.  Cultural/Language/Spiritual Considerations:  English speaking; no cultural or spiritual considerations.  Adjustment to Illness:  Pt seemed ambivalent to her situation, answered questions with brief answers and did not make much eye contact.    Physical Health  Reason for Admission:    1. ARF (acute renal failure) (H)      Services Needed/Recommended:  TCU    Mental Health/Chemical Dependency  Diagnosis:  " Depression, anxiety, polysubstance abuse with hx withdrawal seizure in 2016.  Support/Services in Place:  Pt had been going to AA meetings but stopped going because she did not like the meetings. Pt saw an OP counselor but only went once because she did not like the provider. Pt states she had an appointment set up with a different counselor last week but forgot about the appt and did not go.   Services Needed/Recommended:  Pt has been to three CD treatment programs in the past and two detox admissions. Pt's last Chemical Use Evaluation was at  Detox on 18 then updated on 18. Around 18, pt had been approved for admission to [facility] CD treatment, and [facility] contacted pt to be admitted, but pt decided to not go. Pt is declining any IP treatment. Pt states she is going to try [facility], which is a [facility]-centered recovery program with meetings offered once per week at [facility] in [place].     Support System  Significant relationship at present time:  Pt is  and has one son, Maikol Ledesma.  Family of origin is available for support:  Limited support system. Pt's father  2017.  Other support available:  none  Gaps in support system:  none  Patient is caregiver to:  Child:  25 yo son with intellectual disabilities. Pt states there are other friends/family members available to help Maikol if needed while pt is hospitalized.      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  Financial Concerns:  none  Insurance:    Payor/Plan Subscriber Name Rel Member # Group #   PREFERREDONE - PREFER* TEJAS LEDESMA  [id number]       [id number]       Discharge Plan   Patient and family discharge goal:  Home  Provided education on discharge plan:  YES  Patient  agreeable to discharge plan:  YES  A list of Medicare Certified Facilities was provided to the patient and/or family to encourage patient choice. Patient's choices for facility are:  N/A  Will NH provide Skilled rehabilitation or complex medical:  N/A  General information regarding anticipated insurance coverage and possible out of pocket cost was discussed. Patient and patient's family are aware patient may incur the cost of transportation to the facility, pending insurance payment: N/A  Barriers to discharge:  Medical clearance    Discharge Recommendations   Anticipated Disposition:  PT is recommending TCU to progress transfers and gait and decrease fall risk. Pt had been considering TCU until realizing that the TCU campuses are non-smoking. Additionally, pt states her priority is to get back to work. Pt states she needs some adaptive equipment like grab bars and a cane. Pt is agreeable to a home safety eval from home PT. Pt would then likely continue with OP PT. RNCC will check into home care option.  Transportation Needs:  TBD  Name of Transportation Company and Phone:  N/A    MARICRUZ Hansen, [facility] HospitalSW  6B Intermediate Care Unit  Phone: [phone]  Pager: [phone]

## 2018-05-29 NOTE — PHARMACY-ADMISSION MEDICATION HISTORY
Admission medication history interview status for the 5/28/2018 admission is complete. See Epic admission navigator for allergy information, pharmacy, prior to admission medications and immunization status.     Medication history interview sources:  Patient, [facility]    Changes made to PTA medication list (reason)  Added: duloxetine  Deleted: labetalol, (losartan, ropinirole, spironolactone, trazodone --> patient did not recognize...see note below)  Changed: none    Additional medication history information (including reliability of information, actions taken by pharmacist):Patient is a moderate historian, however was unaware of some medications and does not seem like she takes medications consistently and has run out of some of her medications including lasix. Although labetalol was deleted, there is a profiled script at [facility]. The patient also endorses not taking Spiriva daily.    The patient does not recognize taking trazodone, spironolactone, losartan, ropinirole. Per pharmacy records, there was only a 3 day supply of ropinirole dispensed and no other script on hold. There are current scripts for losartan (50 mg QD), spironolactone 25 mg tablets (25 mg QD), and trazodone, but per pharmacy records the patient should be out.     Prior to Admission medications    Medication Sig Last Dose Taking? Auth Provider   albuterol (VENTOLIN HFA) 108 (90 Base) MCG/ACT Inhaler Inhale 1-2 puffs into the lungs every 4 hours as needed for shortness of breath / dyspnea or wheezing 5/28/2018 at 1600 Yes Marcelino Brothers MD   aspirin 81 MG EC tablet Take 1 tablet (81 mg) by mouth daily Past Month at Unknown time Yes Marcelino Brothers MD   DULOXETINE HCL PO Take 30 mg by mouth daily for 7 days (start 5/3), then take 60 mg by mouth daily Past Week at Unknown time Yes Unknown, Entered By History   fluticasone-salmeterol (ADVAIR DISKUS) 500-50 MCG/DOSE diskus inhaler Inhale 1 puff into the lungs every 12  hours 5/28/2018 at Unknown time Yes Marcelino Brothers MD   folic acid (FOLVITE) 1 MG tablet Take 1 tablet (1 mg) by mouth daily 5/28/2018 at Unknown time Yes Marcelino Brothers MD   furosemide (LASIX) 20 MG tablet TAKE ONE TABLET BY MOUTH EVERY DAY Past Month at Unknown time Yes Min Toledo PA-C   gabapentin (NEURONTIN) 300 MG capsule Take 1 capsule by mouth in the morning, 1 capsule in the afternoon, and 2 capsules at bedtime. 5/28/2018 at 0800 Yes Marcelino Brothers MD   hydrOXYzine (ATARAX) 50 MG tablet Take 1 tablet (50 mg) by mouth every 6 hours as needed for anxiety or other (adjuvant pain) 5/28/2018 at 0800 Yes Marcelino Brothers MD   ipratropium - albuterol 0.5 mg/2.5 mg/3 mL (DUONEB) 0.5-2.5 (3) MG/3ML neb solution Take 1 vial (3 mLs) by nebulization 4 times daily as needed for wheezing Past Month at Unknown time Yes Cr Dillard MD   menthol (ICY HOT) 5 % PTCH Apply 1 patch topically every 8 hours as needed for muscle soreness Past Month at Unknown time Yes Cr Dillard MD   multivitamin, therapeutic with minerals (THERA-VIT-M) TABS tablet Take 1 tablet by mouth daily 5/28/2018 at Unknown time Yes Marcelino Brothers MD   tiotropium (SPIRIVA HANDIHALER) 18 MCG capsule Inhale contents of one capsule daily. Past Week at Unknown time Yes Marcelino Brothers MD         Medication history completed by: Carolynn Santos, PharmD Student

## 2018-05-29 NOTE — ED NOTES
[facility], [place]   ED Nurse to Floor Handoff     Inga Ledesma is a 52 year old female who speaks English and lives alone,  in a home  They arrived in the ED by ambulance from home    ED Chief Complaint: Chest Pain    ED Dx;   Final diagnoses:   ARF (acute renal failure) (H)         Needed?: No    Allergies:   Allergies   Allergen Reactions     Codeine Nausea     Influenza Vaccines      Other reaction(s): Other - Describe In Comment Field     Reglan [Metoclopramide Hcl] Other (See Comments)     Body tenses up   .  Past Medical Hx:   Past Medical History:   Diagnosis Date     Alcohol abuse, in remission      Alcohol withdrawal seizure (H) 2016     Cancer of labia majora (H) 1987     Cervical dysplasia 1987     Congestive heart failure (H) 5/16/16     COPD (chronic obstructive pulmonary disease) (H) 1/24/2011     CVA (cerebral infarction) 1/2012     Dependent edema      Depression, major      Depressive disorder 1966     GERD (gastroesophageal reflux disease)      Hyperlipidemia LDL goal < 160      Hypertension      Iron deficiency anemia      RLS (restless legs syndrome)      Sacroiliitis (H)     steroid injections ineffective, chronic low back pain     Tobacco abuse      Uncomplicated asthma      Vitamin D deficiencies       Baseline Mental status: [facility]  Current Mental Status changes: at basesline    Infection present or suspected this encounter: no  Sepsis suspected: No  Isolation type: No active isolations     Activity level - Baseline/Home:  Independent  Activity Level - Current:   Stand with Assist    Bariatric equipment needed?: No    In the ED these meds were given:   Medications   0.9% sodium chloride BOLUS (0 mLs Intravenous Stopped 5/28/18 2057)     Followed by   sodium chloride 0.9% infusion (not administered)   0.9% sodium chloride BOLUS (0 mLs Intravenous Stopped 5/28/18 2000)   LORazepam (ATIVAN) injection 1 mg (1 mg Intravenous Given 5/28/18 2148)        Drips running?  Yes    Home pump  No    Current LDAs  Peripheral IV 05/28/18 Left Foot (Active)   Site Assessment WDL 5/28/2018  6:30 PM   Line Status Infusing 5/28/2018  6:30 PM   Phlebitis Scale 0-->no symptoms 5/28/2018  6:30 PM   Infiltration Scale 0 5/28/2018  6:30 PM   Infiltration Site Treatment Method  None 5/28/2018  6:30 PM   Extravasation? No 5/28/2018  6:30 PM   Dressing Intervention New dressing  5/28/2018  6:30 PM   Number of days:0       Peripheral IV 05/28/18 Left;Lateral Lower forearm (Active)   Number of days:0       Labs results:   Labs Ordered and Resulted from Time of ED Arrival Up to the Time of Departure from the ED   BASIC METABOLIC PANEL - Abnormal; Notable for the following:        Result Value    Sodium 124 (*)     Potassium 5.9 (*)     Chloride 89 (*)     Carbon Dioxide 18 (*)     Anion Gap 17 (*)     Glucose 128 (*)     Urea Nitrogen 32 (*)     Creatinine 1.74 (*)     GFR Estimate 31 (*)     GFR Estimate If Black 37 (*)     All other components within normal limits   CBC WITH PLATELETS DIFFERENTIAL - Abnormal; Notable for the following:     WBC 14.7 (*)     RDW 15.2 (*)     Absolute Neutrophil 13.2 (*)     All other components within normal limits   COMPREHENSIVE METABOLIC PANEL - Abnormal; Notable for the following:     Sodium 130 (*)     Anion Gap 15 (*)     Urea Nitrogen 32 (*)     Creatinine 1.88 (*)     GFR Estimate 28 (*)     GFR Estimate If Black 34 (*)     Bilirubin Total 1.4 (*)     AST 50 (*)     All other components within normal limits   LIPASE - Abnormal; Notable for the following:     Lipase 452 (*)     All other components within normal limits   ISTAT GASES ELEC ICA GLUC ADRIEN POCT - Abnormal; Notable for the following:     Ph Venous 7.47 (*)     PCO2 Venous 31 (*)     Sodium 131 (*)     Glucose 140 (*)     Hemoglobin 16.0 (*)     All other components within normal limits   TROPONIN I   CBC WITH PLATELETS DIFFERENTIAL   LACTIC ACID WHOLE BLOOD   ROUTINE UA WITH  "MICROSCOPIC REFLEX TO CULTURE   DRUG ABUSE SCREEN 6 CHEM DEP URINE ([facility])   PERIPHERAL IV CATHETER   CARDIAC CONTINUOUS MONITORING   NURSING DRAW AND HOLD   ISTAT CG4 GASES LACTATE ADRIEN NURSING POCT   URINE CULTURE AEROBIC BACTERIAL       Imaging Studies:   Recent Results (from the past 24 hour(s))   XR Chest 2 Views    Impression    IMPRESSION: No acute cardiopulmonary process.       Recent vital signs:   /77  Pulse 129  Temp 97.9  F (36.6  C) (Oral)  Resp 15  Ht 1.549 m (5' 1\")  Wt 73.9 kg (163 lb)  LMP 06/02/2010  SpO2 99%  BMI 30.8 kg/m2    Cardiac Rhythm: Normal Sinus  Pt needs tele? Yes  Skin/wound Issues: None    Code Status: Full Code    Pain control: fair    Nausea control: good    Abnormal labs/tests/findings requiring intervention: See Epic for results    Family present during ED course? No   Family Comments/Social Situation comments: NA    Tasks needing completion: None    Mike Chan, RN  asc-- [phone] [place] [facility]  [phone] [facility]      "

## 2018-05-29 NOTE — PROGRESS NOTES
[facility] [place]    Internal Medicine Progress Note - Gold Service      Assessment & Plan   Inga Ledesma is a 52 year old female with history of polysubstance abuse (most recent withdrawal 4/2018) c/b withdrawal seizure 2016, HTN, COPD, GERD, DARSHAN, RLS, HFrEF, labia majora cancer, depression, anxiety, chronic RLE lymphedema s/p node dissection who was admitted 5/28/2018 with LALA, etoh withdrawal, and abdominal pain.     Atypical chest pain, resolved without intervention. Likely psychologic vs. musculoskeletal from unspecified traumatic injury. S/p EKG 5/28 with sinus tach. Trop negative. CXR neg. No mention of cocaine use. Abdominal w/u as below.     Acute on chronic upper abdominal pain. Radiates to back. Pain for months but frequency recently escalating. S/p negative ACS evaluation. Lipase 452. LFTs WNL. Intermittent loose stools described. No melena described. Chronic GERD with poor PPI compliance. No past EGD, colonoscopy history noted.   - Abdominal u/s, CT ordered by admitting team an dpending. If benign, clinical picture most likely gastritis r/t alcohol abuse.    - Follow blood cultures, NGTD  - Encourage alcohol and tobacco cessation.   - PPI BID  - H pylori test pending.   - Enteric stool test, C-diff, Crp, ESR pending.   - Trial carafate QID.    LLQ hematoma, likely 2/2 unspecified traumatic injury  Acute on chronic anemia  Thrombocytopenia  Hgb 16 on admission. Acute decline noted to 10.9. Felt likely dilutional however (platelets with similar decline, IVF support since admission, hematoma site not concerning for continued bleed). Does have h/o iron deficiency anemia. High risk for bone marrow suppression effects from alcohol use as well.   - CBC in am.     Frequent falls, Ecchymosis. Likely r/t alcohol abuse. Adamantly denies any physical or sexual abuse. Verbalizes comfort with living situation.   - Continue to inquire about abuse r/t concern that bruising patterns  inconsistent with story (ecchymosis to groin).   - PTA on aspirin without beneficial indication. Hold r/t frequent fall, alcohol history.      Polysubstance abuse with acute etoh withdrawal. H/o withdrawal seizure 2016. Tox + for alcohol, benzos. Last drink 5/26/18 with 1 week relapse. Drinking 1 L vodka daily.    - Continue MSSA with ativan. No ativan needs.     Depression, anxiety. H&P indicates PTA compliance with gabapentin, atarax. Not recently taking trazodone, duloxetine. EKG with QTc 458.   - Continue PTA gabapentin, Atarax  - Seizure and fall precautions   - Pscyhiatry consult IP if with behavior/mood concerns.   - Folic acid, thiamine ordered.     Waxing/waning AMS. Intermittent bizarre comments (concern for water dripping on her face, stories about old friends) but is alert and oriented x3. No asterixis on exam. No known liver failure. Ammonia negative. LALA as above but not likely uremia. Concern for Wernicke's in the past. Per [facility] d/c summary 1/2018, committment was pursued but the county did not support. Interaction today appropriate.   - Continue high dose Thiamine   - Consider psychiatry consult.   - Infectious/metabolic w/u as above     LALA. BL creatinine 0.75. Elevated to 1.74 on admission. PTA was not taking ACE/ARB/diuretics. Likely prerenal (alcohol abuse, diarrhea) and s/p IVF with improvement.   - UA/UC ordered but not yet obtained.  - Continue IVF. If tolerating diet, will consider d/c in am.     HTN  Documented h/o HFpEF (diagnosed 2016).   Last echo 12/2017 technically difficulty, but EF 65-70%, normal diastolic filling, LV,RV normal. PTA on Labetalol 100/200, losartan 50mg daily, lasix 20mg daily, and Aldactone 25mg daily. Admission weight 71kg. Weight, BP stable.   - Hold diuretics with weight trending.   - Hold losartan r/t LALA  - Continue labetalol.   - Echo pending.      COPD. Subjective chronic SOB symptoms. No acute exacerbation. On RA. PTA on albuterol, Advair, duonebs, Spiriva  -  "Continue PTA meds  - Follow up with PCP for ongoing care     Chronic RLE lymphedema 2/2 lymph node dissection. PTA on lasix and Aldactone as above. Edema at BL. No gross hypervolemia on exam   - Holding diuretics   - Lymphedema consult     Other Medical Conditions:  Labial cancer: In remission, follow up with PCP for ongoing care     # Pain Assessment:  Current Pain Score 5/29/2018   Patient currently in pain? denies   Pain score (0-10) -   Pain location -   Pain descriptors -   Some encounter information is confidential and restricted. Go to Review Flowsheets activity to see all data.   - Inga is experiencing pain to her abdomen which refers to her back. Pain management was discussed and the plan was created in a collaborative fashion.  Inga's response to the current recommendations: engaged  - Please see the plan for pain management as documented above    Diet: Combination Diet Regular Diet Adult  Fluids: IVF  DVT Prophylaxis: Ambulation.   Code Status: Full Code    Disposition Plan   Expected discharge: Potential d/c to home tomorrow if medical w/u negative, not in active withdrawal and no desire for IP alcohol treatment.      Entered: Khadra Kim 05/29/2018, 7:21 AM   Information in the above section will display in the discharge planner report.      The patient's care was discussed with the patient, nursing.     Khadra Kim  Internal Medicine Staff Hospitalist Service  [facility]  Pager: [phone]  Please see sticky note for cross cover information    Interval History     Migratory pain verbalized again today. Worst to upper abdomen but extends into mid back. Per patient, cramping in nature. The pain is worse with any type of movement. States diarrhea \"ongoing\". Is not able to quantify however. No sig nausea.     States alcoholism. Relapsed 1 week ago. States last drink was Saturday. 1L vodka daily. Denies additional drug use.     Denies any CP other than referred pain with deep " "breaths.   Denies any HA, visual changes.   Tolerating some diet.     Data reviewed today: I reviewed all medications, new labs and imaging results over the last 24 hours.     CMP  Recent Labs  Lab 05/29/18  0433 05/28/18 1943 05/28/18  1821 05/28/18  1803   * 130* 131* 124*   POTASSIUM 3.4 4.0 4.1 5.9*   CHLORIDE 97 94  --  89*   CO2 22 21  --  18*   ANIONGAP 13 15*  --  17*   GLC 94 98 140* 128*   BUN 35* 32*  --  32*   CR 1.32* 1.88*  --  1.74*   GFRESTIMATED 42* 28*  --  31*   GFRESTBLACK 51* 34*  --  37*   JENNIFER 8.1* 8.7  --  9.4   PROTTOTAL 6.7* 7.9  --   --    ALBUMIN 3.4 4.1  --   --    BILITOTAL 0.8 1.4*  --   --    ALKPHOS 113 131  --   --    AST 35 50*  --   --    ALT 36 45  --   --      CBC  Recent Labs  Lab 05/29/18 0433 05/29/18  0009 05/28/18 1946 05/28/18 1826   WBC 8.5  --  14.7* Unsatisfactory specimen - clotted   RBC 3.56*  --  4.23 Unsatisfactory specimen - clotted   HGB 10.9* 12.1 12.9 Unsatisfactory specimen - clotted   HCT 32.1*  --  37.8 Unsatisfactory specimen - clotted   MCV 90  --  89 Unsatisfactory specimen - clotted   MCH 30.6  --  30.5 Unsatisfactory specimen - clotted   MCHC 34.0  --  34.1 Unsatisfactory specimen - clotted   RDW 15.3*  --  15.2* Unsatisfactory specimen - clotted   *  --  151 Unsatisfactory specimen - clotted     INR  Recent Labs  Lab 05/28/18 1943   INR 0.97     Physical Exam     /57 (BP Location: Left arm)  Pulse 129  Temp 98.2  F (36.8  C) (Oral)  Resp 16  Ht 1.549 m (5' 1\")  Wt 71 kg (156 lb 8 oz)  LMP 06/02/2010  SpO2 98%  BMI 29.57 kg/m2    Vitals are reviewed.     GENERAL: NAD. Lying comfortably in bed.   HEENT: Anicteric sclera. PERRLa intact. Mucous membranes moist.   CV: RRR. S1, S2. No murmurs appreciated.   RESPIRATORY: Effort normal at rest and with talking activity. Poor deep breathing abilities r/t described referred pain. Lungs clear. On RA.  GI: Abdomen protuberant. No definitive ascites. Abdomen soft. Bowel sounds normal. " Severe guarding, pain with light touch. Most severe to right abdomen both upper and lower.   NEUROLOGICAL:  A&Ox 3. Facial symmetry intact. No confusion. No tremors. No asterixis. CNs 2-12 intact.   MUSC: Right and left knee with localized ecchymosis. Right knee with superficial edema and swelling. No redness or warmth.  EXTREMITIES: Trace bilateral LE edema. Good distal perfusion.   SKIN: No jaundice. Multiple ecchymosis sites. Most prominent on my exam to knees bilaterally. Also with large left sided echymosis. Minimal sized nodule palpated to this site consistent with hematoma. Not likely worsening.

## 2018-05-29 NOTE — PROGRESS NOTES
05/29/18 1446   Quick Adds   Type of Visit Initial PT Evaluation   Living Environment   Lives With child(seth), adult   Living Arrangements (trailer home)   Home Accessibility stairs to enter home   Number of Stairs to Enter Home 5  (rail on R as ascent)   Stair Railings at Home (1 HR)   Living Environment Comment Pt lives with adult son in trailer home with all needs on same level.    Self-Care   Usual Activity Tolerance moderate   Current Activity Tolerance poor   Regular Exercise no   Equipment Currently Used at Home walker, rolling   Activity/Exercise/Self-Care Comment Pt reports she is IND with all mobility at baseline, uses 4WW for community mobility only since 4WW too wide to maneuver in her home   Functional Level Prior   Ambulation 0-->independent   Transferring 0-->independent   Toileting 0-->independent   Bathing 0-->independent   Dressing 0-->independent   Eating 0-->independent   Communication 0-->understands/communicates without difficulty   Swallowing 0-->swallows foods/liquids without difficulty   Cognition 0 - no cognition issues reported   Number of times patient has fallen within last six months 10   Which of the above functional risks had a recent onset or change? ambulation;transferring   Prior Functional Level Comment Pt reports her cat was mad because her son was out of town, thus caused her to fall 4x this past weekend.  Reports additional falls, some related to dizzy, LEs weak when first awaken, admits substance use also plays a role.   General Information   Onset of Illness/Injury or Date of Surgery - Date 05/28/18   Referring Physician Tessa Portillo PA-C   Patient/Family Goals Statement return home, be healthy   Pertinent History of Current Problem (include personal factors and/or comorbidities that impact the POC) 52 year old female with history of polysubstance abuse (most recent withdrawal 4/2018) c/b withdrawal seizure 2016, HTN, COPD, GERD, DARSHAN, RLS, HFrEF, labia majora  cancer, depression, anxiety, chronic RLE lymphedema s/p node dissection who was admitted 5/28/2018 with LALA, etoh withdrawal, and abdominal pain.   Precautions/Limitations fall precautions   Cognitive Status Examination   Orientation orientation to person, place and time   Level of Consciousness alert   Follows Commands and Answers Questions 100% of the time   Personal Safety and Judgment at risk behaviors demonstrated   Memory intact   Pain Assessment   Patient Currently in Pain (not at rest, c/o abdominal crampting with mobility)   Integumentary/Edema   Integumentary/Edema Comments Pt declines compression and edema therapy stating she hasn't worn her night garment or daytime compression stockings in the past 3 years and had minimal to no LE edema.  Today, BLE visually appear same size, no palpable edema either LE with pt h/o RLE edema.   Posture    Posture Forward head position;Protracted shoulders;Kyphosis   Posture Comments mild in sitting   Range of Motion (ROM)   ROM Comment B ankle DF to ~ 5 degrees   Strength   Strength Comments BLE 5/5 except hip flexion 4+/5   Bed Mobility   Bed Mobility Comments sup to sit with HOB flat without rail with extra time and CGAx1   Transfer Skills   Transfer Comments Sit <> stand with CGAx1, pulling up on 4WW   Gait   Gait Comments Pt ambulated 20 ft with 4WW and CGAx1, decreased speed, step length, frequent downward gaze, decreased interaction with environment, c/o SOB though O2 sats remained in high 90s   Balance   Balance Comments CGA for static and dynamic balance   Sensory Examination   Sensory Perception no deficits were identified   Sensory Perception Comments denies LE numbness/tingling and 100% correct with BLE light touch   Muscle Tone   Muscle Tone no deficits were identified   Muscle Tone Comments BLE   General Therapy Interventions   Planned Therapy Interventions balance training;bed mobility training;gait training;neuromuscular  "re-education;strengthening;stretching;transfer training;risk factor education;home program guidelines;progressive activity/exercise   Clinical Impression   Criteria for Skilled Therapeutic Intervention yes, treatment indicated   PT Diagnosis impaired functional mobility   Influenced by the following impairments decreased strength, balance, endurance, posture, safety habits, ventillation,    Functional limitations due to impairments Up to CGA and 4WW for transfers and gait   Clinical Presentation Evolving/Changing   Clinical Presentation Rationale PMH and clinical judgment   Clinical Decision Making (Complexity) Moderate complexity   Therapy Frequency` 5 times/week   Predicted Duration of Therapy Intervention (days/wks) 6/8/18   Anticipated Discharge Disposition Transitional Care Facility   Risk & Benefits of therapy have been explained Yes   Patient, Family & other staff in agreement with plan of care Yes   Clinical Impression Comments refuses home PT, says she needs to get healthy and asking if she can smoke at TCU.   [facility] TM \"6 Clicks\"   2016, Trustees of [facility], under license to [employer].  All rights reserved.   6 Clicks Short Forms Basic Mobility Inpatient Short Form   [facility]  \"6 Clicks\" V.2 Basic Mobility Inpatient Short Form   1. Turning from your back to your side while in a flat bed without using bedrails? 3 - A Little   2. Moving from lying on your back to sitting on the side of a flat bed without using bedrails? 3 - A Little   3. Moving to and from a bed to a chair (including a wheelchair)? 3 - A Little   4. Standing up from a chair using your arms (e.g., wheelchair, or bedside chair)? 3 - A Little   5. To walk in hospital room? 3 - A Little   6. Climbing 3-5 steps with a railing? 3 - A Little   Basic Mobility Raw Score (Score out of 24.Lower scores equate to lower levels of function) 18   Total Evaluation Time   Total Evaluation Time (Minutes) 10     "

## 2018-05-29 NOTE — PLAN OF CARE
Problem: Patient Care Overview  Goal: Plan of Care/Patient Progress Review  Outcome: No Change  Pt is A/Ox4. MSSA scores were 5 and 4 overnight. Pt HR was SR and was on RA all night. BP was stable. Pt had little complaints of pain when she moved throughout entire body. PRN tylenol was given. Pt used commode with no issues and had 1 BM overnight. Will continue to monitor and notify MD of changes.

## 2018-05-29 NOTE — SIGNIFICANT EVENT
I  SPIRITUAL HEALTH SERVICES Significant Event  [employer]  [facility] ([place]) 5C

## 2018-05-29 NOTE — PLAN OF CARE
Problem: Patient Care Overview  Goal: Plan of Care/Patient Progress Review  PT - Evaluation completed and treatment initiated.   Discharge Planner PT   Patient plan for discharge: TCU vs home with outpatient PT, IF it works with her work schedule, possibly start using a cane inside the home.  Current status: CGA for transfers and gait.  Frequently sets 4WW aside, so fatigued, c/o weak legs and fear of falling that had to take impromptu seated rest break in middle of hallway.  Starting to progress with cane, but needs reinforcement, and would benefit from using cane inside her mobile home, though doesn't have one and not sure if she will buy one.  Barriers to return to prior living situation: fatigue, SOB, high fall risk with recent recurrent falls, recurrent substance use.    Recommendations for discharge: TCU to progress transfers and gait, decrease fall risk  Rationale for recommendations: see above         Entered by: Ileana Marroquin 05/29/2018 2:30 PM

## 2018-05-30 ENCOUNTER — TELEPHONE (OUTPATIENT)
Dept: FAMILY MEDICINE | Facility: CLINIC | Age: 53
End: 2018-05-30

## 2018-05-30 LAB
ALBUMIN SERPL-MCNC: 2.9 G/DL (ref 3.4–5)
ALP SERPL-CCNC: 100 U/L (ref 40–150)
ALT SERPL W P-5'-P-CCNC: 27 U/L (ref 0–50)
ANION GAP SERPL CALCULATED.3IONS-SCNC: 11 MMOL/L (ref 3–14)
AST SERPL W P-5'-P-CCNC: 20 U/L (ref 0–45)
BILIRUB SERPL-MCNC: 0.7 MG/DL (ref 0.2–1.3)
BUN SERPL-MCNC: 23 MG/DL (ref 7–30)
CALCIUM SERPL-MCNC: 7.4 MG/DL (ref 8.5–10.1)
CHLORIDE SERPL-SCNC: 103 MMOL/L (ref 94–109)
CO2 BLDCOV-SCNC: 22 MMOL/L (ref 21–28)
CO2 SERPL-SCNC: 21 MMOL/L (ref 20–32)
CREAT SERPL-MCNC: 0.71 MG/DL (ref 0.52–1.04)
ERYTHROCYTE [DISTWIDTH] IN BLOOD BY AUTOMATED COUNT: 15.5 % (ref 10–15)
GFR SERPL CREATININE-BSD FRML MDRD: 87 ML/MIN/1.7M2
GLUCOSE SERPL-MCNC: 102 MG/DL (ref 70–99)
H PYLORI AG STL QL IA: NORMAL
HCT VFR BLD AUTO: 31.5 % (ref 35–47)
HGB BLD-MCNC: 10.2 G/DL (ref 11.7–15.7)
LACTATE BLD-SCNC: 4 MMOL/L (ref 0.7–2.1)
MAGNESIUM SERPL-MCNC: 1.6 MG/DL (ref 1.6–2.3)
MCH RBC QN AUTO: 30.5 PG (ref 26.5–33)
MCHC RBC AUTO-ENTMCNC: 32.4 G/DL (ref 31.5–36.5)
MCV RBC AUTO: 94 FL (ref 78–100)
PCO2 BLDV: 31 MM HG (ref 40–50)
PH BLDV: 7.47 PH (ref 7.32–7.43)
PLATELET # BLD AUTO: 103 10E9/L (ref 150–450)
PO2 BLDV: 26 MM HG (ref 25–47)
POTASSIUM SERPL-SCNC: 3.5 MMOL/L (ref 3.4–5.3)
PROT SERPL-MCNC: 6.6 G/DL (ref 6.8–8.8)
RBC # BLD AUTO: 3.34 10E12/L (ref 3.8–5.2)
SAO2 % BLDV FROM PO2: 54 %
SODIUM SERPL-SCNC: 135 MMOL/L (ref 133–144)
SPECIMEN SOURCE: NORMAL
WBC # BLD AUTO: 7.7 10E9/L (ref 4–11)

## 2018-05-30 PROCEDURE — 25000132 ZZH RX MED GY IP 250 OP 250 PS 637: Performed by: NURSE PRACTITIONER

## 2018-05-30 PROCEDURE — 25000128 H RX IP 250 OP 636: Performed by: NURSE PRACTITIONER

## 2018-05-30 PROCEDURE — 25000132 ZZH RX MED GY IP 250 OP 250 PS 637: Performed by: PHYSICIAN ASSISTANT

## 2018-05-30 PROCEDURE — 12000001 ZZH R&B MED SURG/OB UMMC

## 2018-05-30 PROCEDURE — 25000128 H RX IP 250 OP 636: Performed by: PHYSICIAN ASSISTANT

## 2018-05-30 PROCEDURE — 87591 N.GONORRHOEAE DNA AMP PROB: CPT | Performed by: NURSE PRACTITIONER

## 2018-05-30 PROCEDURE — 99233 SBSQ HOSP IP/OBS HIGH 50: CPT | Performed by: PEDIATRICS

## 2018-05-30 PROCEDURE — 83735 ASSAY OF MAGNESIUM: CPT | Performed by: NURSE PRACTITIONER

## 2018-05-30 PROCEDURE — 99207 ZZC APP CREDIT; MD BILLING SHARED VISIT: CPT | Performed by: NURSE PRACTITIONER

## 2018-05-30 PROCEDURE — 80053 COMPREHEN METABOLIC PANEL: CPT | Performed by: NURSE PRACTITIONER

## 2018-05-30 PROCEDURE — 36415 COLL VENOUS BLD VENIPUNCTURE: CPT | Performed by: NURSE PRACTITIONER

## 2018-05-30 PROCEDURE — 87491 CHLMYD TRACH DNA AMP PROBE: CPT | Performed by: NURSE PRACTITIONER

## 2018-05-30 PROCEDURE — 25000132 ZZH RX MED GY IP 250 OP 250 PS 637: Performed by: INTERNAL MEDICINE

## 2018-05-30 PROCEDURE — 85027 COMPLETE CBC AUTOMATED: CPT | Performed by: NURSE PRACTITIONER

## 2018-05-30 RX ORDER — CEFTRIAXONE 1 G/1
1 INJECTION, POWDER, FOR SOLUTION INTRAMUSCULAR; INTRAVENOUS EVERY 24 HOURS
Status: DISCONTINUED | OUTPATIENT
Start: 2018-05-30 | End: 2018-05-31

## 2018-05-30 RX ORDER — TRAMADOL HYDROCHLORIDE 50 MG/1
50 TABLET ORAL EVERY 6 HOURS PRN
Status: DISCONTINUED | OUTPATIENT
Start: 2018-05-30 | End: 2018-05-31 | Stop reason: HOSPADM

## 2018-05-30 RX ORDER — ACETAMINOPHEN 325 MG/1
650 TABLET ORAL 3 TIMES DAILY
Status: DISCONTINUED | OUTPATIENT
Start: 2018-05-30 | End: 2018-05-31 | Stop reason: HOSPADM

## 2018-05-30 RX ORDER — DULOXETIN HYDROCHLORIDE 30 MG/1
30 CAPSULE, DELAYED RELEASE ORAL DAILY
Status: DISCONTINUED | OUTPATIENT
Start: 2018-05-30 | End: 2018-05-31 | Stop reason: HOSPADM

## 2018-05-30 RX ADMIN — NICOTINE 1 PATCH: 21 PATCH TRANSDERMAL at 08:02

## 2018-05-30 RX ADMIN — FOLIC ACID 1 MG: 1 TABLET ORAL at 07:54

## 2018-05-30 RX ADMIN — MENTHOL 1 PATCH: 205.5 PATCH TOPICAL at 22:48

## 2018-05-30 RX ADMIN — SUCRALFATE 1 G: 1 TABLET ORAL at 16:17

## 2018-05-30 RX ADMIN — LIDOCAINE 1 PATCH: 560 PATCH PERCUTANEOUS; TOPICAL; TRANSDERMAL at 22:51

## 2018-05-30 RX ADMIN — OMEPRAZOLE 20 MG: 20 CAPSULE, DELAYED RELEASE ORAL at 16:17

## 2018-05-30 RX ADMIN — NICOTINE 1 PATCH: 21 PATCH TRANSDERMAL at 20:40

## 2018-05-30 RX ADMIN — VANCOMYCIN HYDROCHLORIDE 125 MG: 125 CAPSULE ORAL at 19:04

## 2018-05-30 RX ADMIN — TRAMADOL HYDROCHLORIDE 50 MG: 50 TABLET, COATED ORAL at 11:52

## 2018-05-30 RX ADMIN — ROPINIROLE HYDROCHLORIDE 1 MG: 0.5 TABLET, FILM COATED ORAL at 16:17

## 2018-05-30 RX ADMIN — GABAPENTIN 300 MG: 300 CAPSULE ORAL at 12:43

## 2018-05-30 RX ADMIN — LABETALOL HCL 100 MG: 100 TABLET, FILM COATED ORAL at 07:54

## 2018-05-30 RX ADMIN — GABAPENTIN 600 MG: 600 TABLET, FILM COATED ORAL at 22:37

## 2018-05-30 RX ADMIN — DULOXETINE HYDROCHLORIDE 30 MG: 30 CAPSULE, DELAYED RELEASE ORAL at 12:43

## 2018-05-30 RX ADMIN — SODIUM CHLORIDE: 9 INJECTION, SOLUTION INTRAVENOUS at 08:06

## 2018-05-30 RX ADMIN — HYDROXYZINE HYDROCHLORIDE 50 MG: 25 TABLET ORAL at 06:20

## 2018-05-30 RX ADMIN — CEFTRIAXONE 1 G: 1 INJECTION, POWDER, FOR SOLUTION INTRAMUSCULAR; INTRAVENOUS at 14:48

## 2018-05-30 RX ADMIN — VANCOMYCIN HYDROCHLORIDE 125 MG: 125 CAPSULE ORAL at 14:48

## 2018-05-30 RX ADMIN — LABETALOL HCL 200 MG: 200 TABLET, FILM COATED ORAL at 22:37

## 2018-05-30 RX ADMIN — Medication 1 MG: at 22:37

## 2018-05-30 RX ADMIN — MULTIPLE VITAMINS W/ MINERALS TAB 1 TABLET: TAB at 07:55

## 2018-05-30 RX ADMIN — ROPINIROLE HYDROCHLORIDE 1 MG: 0.5 TABLET, FILM COATED ORAL at 20:41

## 2018-05-30 RX ADMIN — SUCRALFATE 1 G: 1 TABLET ORAL at 07:54

## 2018-05-30 RX ADMIN — GABAPENTIN 300 MG: 300 CAPSULE ORAL at 07:54

## 2018-05-30 RX ADMIN — ACETAMINOPHEN 650 MG: 325 TABLET, FILM COATED ORAL at 19:04

## 2018-05-30 RX ADMIN — FLUTICASONE FUROATE AND VILANTEROL TRIFENATATE 1 PUFF: 200; 25 POWDER RESPIRATORY (INHALATION) at 07:56

## 2018-05-30 RX ADMIN — HYDROXYZINE HYDROCHLORIDE 50 MG: 25 TABLET ORAL at 19:08

## 2018-05-30 RX ADMIN — ACETAMINOPHEN 650 MG: 325 TABLET, FILM COATED ORAL at 12:43

## 2018-05-30 RX ADMIN — ACETAMINOPHEN 325 MG: 325 TABLET, FILM COATED ORAL at 06:20

## 2018-05-30 RX ADMIN — OMEPRAZOLE 20 MG: 20 CAPSULE, DELAYED RELEASE ORAL at 07:55

## 2018-05-30 RX ADMIN — SUCRALFATE 1 G: 1 TABLET ORAL at 22:37

## 2018-05-30 RX ADMIN — UMECLIDINIUM 1 PUFF: 62.5 AEROSOL, POWDER ORAL at 07:53

## 2018-05-30 RX ADMIN — SODIUM CHLORIDE: 9 INJECTION, SOLUTION INTRAVENOUS at 00:36

## 2018-05-30 RX ADMIN — THIAMINE HYDROCHLORIDE 250 MG: 100 INJECTION, SOLUTION INTRAMUSCULAR; INTRAVENOUS at 07:57

## 2018-05-30 RX ADMIN — ROPINIROLE HYDROCHLORIDE 1 MG: 0.5 TABLET, FILM COATED ORAL at 07:55

## 2018-05-30 RX ADMIN — VANCOMYCIN HYDROCHLORIDE 125 MG: 125 CAPSULE ORAL at 07:54

## 2018-05-30 RX ADMIN — VANCOMYCIN HYDROCHLORIDE 125 MG: 125 CAPSULE ORAL at 22:37

## 2018-05-30 RX ADMIN — SUCRALFATE 1 G: 1 TABLET ORAL at 12:43

## 2018-05-30 ASSESSMENT — PAIN DESCRIPTION - DESCRIPTORS
DESCRIPTORS: RADIATING;ACHING;SHARP
DESCRIPTORS: ACHING;SHARP;RADIATING
DESCRIPTORS: ACHING
DESCRIPTORS: SHARP
DESCRIPTORS: CONSTANT

## 2018-05-30 NOTE — PLAN OF CARE
Problem: Patient Care Overview  Goal: Plan of Care/Patient Progress Review  Outcome: No Change  AVSS. A&O x4, MSSA <8.  No seizure activity.  Refusing seizure pads on bed in spite of counseling by writer to keep them for safety,  UAL in room, but states preference for using commode to void d/t urgency.  UC +, IV rocephin started.  Will send urine for chlamydia and gonorrhea upon next void.  Persistent c/o upper abdominal pain R>L not relieved by addition of tramadol to regimen, team aware.    Continue pain management, send urine.

## 2018-05-30 NOTE — PLAN OF CARE
"Problem: Patient Care Overview  Goal: Plan of Care/Patient Progress Review  Outcome: No Change  Assumed care of patient 1930. AVSS on RA, A+OX4. MSSA score 5, no interventions needed. Reporting increased abdominal pain during the evening with no relief from tylenol and atarax, notified Sloan Ambrosio. Ordered Lidocaine patches and GI cocktail, given without relief. Offered to page team again, pt declined, stating \"there's nothing they can do for me\". Denies nausea, regular diet with moderate appetite. Bruises over body marked without change. Strong, productive cough. UAL, steady on feet. Voiding, continues to have diarrhea. Enteric precautions. Seizure pads in place on one side of the bed, patient declines having them on both sides of the bed. Expalined risks of not having them on, patient verbalizes understanding. Tearful at times due to pain and relationship concerns, emotional support provided.      Continue POC.      "

## 2018-05-30 NOTE — PLAN OF CARE
Problem: Patient Care Overview  Goal: Plan of Care/Patient Progress Review  Outcome: No Change  Assumed care of patient 1930. AVSS on RA, A+OX4. MSSA score 5, no interventions needed. Reporting increased abdominal pain during the shift with no relief from tylenol and atarax, notified Sloan Ambrosio. Ordered Lidocaine patches and GI cocktail, given, will continue to monitor. Denies nausea, regular diet with moderate appetite. Bruises over body marked without change. UAL, steady on feet. Voiding, continues to have diarrhea. Enteric precautions. Seizure pads in place on one side of the bed, patient declines having them on both sides of the bed. Expalined risks of not having them on, patient verbalizes understanding. Tearful at times due to pain and relationship concerns, emotional support provided.     Continue POC.

## 2018-05-30 NOTE — PROGRESS NOTES
"[facility], [place]    Internal Medicine Progress Note - Gold Service      Assessment & Plan     Inga Ledesma is a 52 year old female with history of polysubstance abuse (most recent withdrawal 4/2018) c/b withdrawal seizure 2016, HTN, COPD, GERD, DARSHAN, RLS, HFpEF, labia majora cancer, depression, anxiety, chronic RLE lymphedema s/p node dissection who was admitted 5/28/2018 with LALA, etoh withdrawal, and abdominal pain.     Acute pain. Progressive in nature over \"months\". Most likely multifactorial r/t musculoskeletal r/t recent trauma vs. c-diff colitis (+ c-diff 5/29) vs. UTI (UC with E. Coli growth) vs. gastritis vs. mild pancreatitis r/t alcohol use. S/p negative ACS evaluation. Lipase 452. LFTs WNL. Chronic GERD with poor PPI compliance and alcohol binge. Last EGD \"many years ago\" with h/o bleeding ulcers. No colonoscopy history noted. Frequent loose stooling described. CTAP with diffuse hepatic steatosis, hematoma findings, stable lipid chan adenoma.   - Pain control with Lidoderm patch, menthol patch, tylenol TID, tramadol QID prn. Avoiding NSAIDs r/t concern for active undiagnosed non-bleeding ulcers with past history and recent alcohol abuse with anemia/thrombocytopenia. Avoiding opioids r/t alcohol abuse history.   - No physical evidence to be concerned about post trauamtic compartment syndrome.   - S/p hysterectomy. No sexual activity x 2 years. Chlamydia/GC pending.     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   - Continue carafate QID. Plan for 7 days (through 7/4/18)  - Continue PPI BID  - H Pylori pending  - EGD OP recommended.     C-diff colitis. Started vancomycin 5/29/18 without change in abdominal pain symptoms. Crp 79. S/p 5 loose stools in past 24 hours documented, 3 per patient. Remains afebrile. HDS.   - Continue vancomycin. Plan for 14 days of therapy   - BMP, mag screens daily. Crp in am.     E. Coli UTI.   - Start rocephin daily. Wait " "for culture sensitivities.     LLQ hematoma, likely 2/2 unspecified traumatic injury  Acute on chronic anemia  Hgb 16 on admission. Acute decline noted to 10.9. Felt likely dilutional however (platelets with similar decline, IVF support during time of decline, hematoma site not concerning for continued bleed). Does have h/o iron deficiency anemia. High risk for bone marrow suppression effects from alcohol use as well. Further trend reassuring (10.2)  - CBC in am.   - Continue folic acid.     Thrombocytopenia. Likely r/t bone marrow suppression effects of alcohol. Could be at risk for DIC r/t infection. Not on heparin products. No gross bleeding.      Frequent falls, Ecchymosis. Likely r/t alcohol abuse. Adamantly denies any physical or sexual abuse. Verbalizes comfort with living situation.   - Continue to inquire about abuse r/t concern that bruising patterns inconsistent with story (ecchymosis to groin).   - PTA on aspirin without beneficial indication. Hold r/t frequent fall, alcohol history.       Polysubstance abuse with acute etoh withdrawal. H/o withdrawal seizure 2016. Tox + for alcohol, benzos. Last drink 5/26/18 with 1 week relapse. Drinking 1 L vodka daily.    - Continue MSSA with ativan. No ativan needs.      Depression, anxiety. Per discussion, compliance with gabapentin, atarax, duloxetine. EKG with QTc 458. On admission with \"intermittent bizarre comments (concern for water dripping on her face, stories about old friends) but is alert and oriented x3.\" No asterixis on exam. No known liver failure. Ammonia negative. LALA as above but not likely uremia. Concern for Wernicke's in the past. Per ANW d/c summary 1/2018, committment was pursued but the [facility] did not support. Interaction today appropriate.   - Continue PTA gabapentin, Atarax, duloxetine.   - Seizure and fall precautions   - Pscyhiatry consult IP if with behavior/mood concerns.   - Continue thiamine through 5/31  - Consider psychiatry consult. "   - Infectious/metabolic w/u as above    HTN  Documented h/o HFpEF (diagnosed 2016).   Last echo 12/2017 technically difficulty, but EF 65-70%, normal diastolic filling, LV,RV normal. PTA states she was talking Labetalol 100/200. No diuretics. Admission weight 71kg. Weight, BP stable.   - Hold diuretics with weight trending.   - Hold losartan r/t LALA and stable BP  - Continue labetalol.     LALA, resolved. BL creatinine 0.75. Elevated to 1.74 on admission. PTA was not taking ACE/ARB/diuretics. Likely prerenal (alcohol abuse, diarrhea) and s/p IVF with improvement. UTI as above.       COPD. Subjective chronic SOB symptoms. No acute exacerbation. On RA. PTA on albuterol, Advair, duonebs, Spiriva  - Continue PTA meds  - Follow up with PCP for ongoing care      Chronic RLE lymphedema 2/2 lymph node dissection. PTA on lasix and Aldactone as above. Edema at BL. No gross hypervolemia on exam   - Holding diuretics       Stable or Resolved Medical Conditions:  Atypical chest pain, resolved without intervention. Likely psychologic vs. musculoskeletal from unspecified traumatic injury. S/p EKG 5/28 with sinus tach. Trop negative. CXR neg. No mention of cocaine use. Abdominal w/u as above    Labial cancer: In remission, follow up with PCP for ongoing care      Diet: Combination Diet Regular Diet Adult  Fluids: Po only.   DVT Prophylaxis: Ambulation.   Code Status: Full Code    # Pain Assessment:  Current Pain Score 5/30/2018   Patient currently in pain? yes   Pain score (0-10) -   Pain location Abdomen   Pain descriptors Sharp   Some encounter information is confidential and restricted. Go to Review Flowsheets activity to see all data.   - Inga is experiencing pain due c-diff, UTI, recent abdominal wall trauma. Pain management was discussed and the plan was created in a collaborative fashion.  Inga's response to the current recommendations: engaged  - Please see the plan for pain management as documented  "above    Disposition Plan   Expected discharge: Likely tomorrow if pain improved, tolerating diet, reduction in loose stools.      Entered: Khadra Kim 05/30/2018, 7:44 AM   Information in the above section will display in the discharge planner report.      The patient's care was discussed with the patient, nursing, Dr. Chalino Kim  Internal Medicine Staff Hospitalist Service  [facility]  Pager: [phone]  Please see sticky note for cross cover information    Interval History     Patient feels her pain is not well controlled.   States the right upper and lower quadrants are where she feels the majority of the pain is but he left lower quadrant is also involved. Mentions no changes in pain over past 24 hours. Pain is still significantly impacted by movement. No impact by food/po intake. States her stools are \"all diarrhea\". She feels she had 3 loose stools \"already today\". Mentions no urinary symptoms such as hematuria, burning. Tolerating \"low key\" diet. No emesis, nausea.   Denies CP/SOB other than what she is restricted with from her abdominal pain.   Mentions last sexual activity was 2 years ago. Never uses protection. Not concerned about STIs but states \"lets check in case\".     Data reviewed today: I reviewed all medications, new labs and imaging results over the last 24 hours.    CMP    Recent Labs  Lab 05/30/18  0752 05/29/18  1909 05/29/18  0433 05/28/18  1943 05/28/18  1821 05/28/18  1803    132* 132* 130* 131* 124*   POTASSIUM 3.5  --  3.4 4.0 4.1 5.9*   CHLORIDE 103  --  97 94  --  89*   CO2 21  --  22 21  --  18*   ANIONGAP 11  --  13 15*  --  17*   *  --  94 98 140* 128*   BUN 23  --  35* 32*  --  32*   CR 0.71 0.97 1.32* 1.88*  --  1.74*   GFRESTIMATED 87 60* 42* 28*  --  31*   GFRESTBLACK >90 73 51* 34*  --  37*   JENNIFER 7.4*  --  8.1* 8.7  --  9.4   MAG 1.6  --   --   --   --   --    PROTTOTAL 6.6*  --  6.7* 7.9  --   --    ALBUMIN 2.9*  --  3.4 4.1  --   -- "    BILITOTAL 0.7  --  0.8 1.4*  --   --    ALKPHOS 100  --  113 131  --   --    AST 20  --  35 50*  --   --    ALT 27  --  36 45  --   --      CBC    Recent Labs  Lab 05/30/18  0752 05/29/18  0433 05/29/18  0009 05/28/18 1946 05/28/18  1826   WBC 7.7 8.5  --  14.7* Unsatisfactory specimen - clotted   RBC 3.34* 3.56*  --  4.23 Unsatisfactory specimen - clotted   HGB 10.2* 10.9* 12.1 12.9 Unsatisfactory specimen - clotted   HCT 31.5* 32.1*  --  37.8 Unsatisfactory specimen - clotted   MCV 94 90  --  89 Unsatisfactory specimen - clotted   MCH 30.5 30.6  --  30.5 Unsatisfactory specimen - clotted   MCHC 32.4 34.0  --  34.1 Unsatisfactory specimen - clotted   RDW 15.5* 15.3*  --  15.2* Unsatisfactory specimen - clotted   * 118*  --  151 Unsatisfactory specimen - clotted     INR  Recent Labs  Lab 05/28/18 1943   INR 0.97       Physical Exam   /71; pulse 82; RR 18; Temp 97.1; 98% on RA.     Vitals are reviewed. Stable.     GENERAL: NAD.   HEENT: Anicteric sclera. PERRLa intact. Mucous membranes moist. No thrush. Normal neck movements.   CV: RRR. S1, S2. No murmurs appreciated.   RESPIRATORY: Effort normal at rest and with talking activity. Lungs clear. On Ra.  GI: Abdomen soft. Stable obese distention. Normoactive bowel sounds present in all quadrants. Tenderness sig improved with palpitation. Still focal to right upper and lower quadrant sites. Patient seemed mildly uncomfortable during the visit with frequent shifts and wincing with positional changes.   NEUROLOGICAL: CNs 3-12 intact. No involuntary movements. A&Ox 3. Facial symmetry intact. No confusion.   MUSC:  Joint appearance without acute swelling, warmth, redness. No obvious deformity.   EXTREMITIES: Trace bilateral LE peripheral edema. Good distal perfusion.   SKIN: No jaundice. No rashes or sores to exposed body areas.

## 2018-05-30 NOTE — TELEPHONE ENCOUNTER
home care is calling to request written orders for in home PT, need written referral in order for insurance to cover for patient is on restricted program. FAX: [phone]. Thank you.

## 2018-05-30 NOTE — PLAN OF CARE
Problem: Patient Care Overview  Goal: Plan of Care/Patient Progress Review  PT: pt in with MD at time of PT session. CX PM PT

## 2018-05-30 NOTE — PROGRESS NOTES
SPIRITUAL HEALTH SERVICES  SPIRITUAL ASSESSMENT Progress Note  [facility] ([place]) 7C     REFERRAL SOURCE: Patient request    Inga declines a visit today saying she had a visit form a  yesterday and that is all that she needs at this time. She does wish to continue receiving Orthodoxy Communion.    PLAN: Patient will receive Orthodoxy Communion from our [email or web address].    Nadiya Raymond  Oncology   Pager [phone]

## 2018-05-31 ENCOUNTER — APPOINTMENT (OUTPATIENT)
Dept: PHYSICAL THERAPY | Facility: CLINIC | Age: 53
DRG: 682 | End: 2018-05-31

## 2018-05-31 VITALS
RESPIRATION RATE: 18 BRPM | DIASTOLIC BLOOD PRESSURE: 86 MMHG | SYSTOLIC BLOOD PRESSURE: 165 MMHG | OXYGEN SATURATION: 97 % | HEIGHT: 61 IN | TEMPERATURE: 98.1 F | WEIGHT: 171.52 LBS | BODY MASS INDEX: 32.38 KG/M2 | HEART RATE: 80 BPM

## 2018-05-31 LAB
ALBUMIN SERPL-MCNC: 2.9 G/DL (ref 3.4–5)
ALP SERPL-CCNC: 91 U/L (ref 40–150)
ALT SERPL W P-5'-P-CCNC: 24 U/L (ref 0–50)
ANION GAP SERPL CALCULATED.3IONS-SCNC: 9 MMOL/L (ref 3–14)
AST SERPL W P-5'-P-CCNC: 18 U/L (ref 0–45)
BACTERIA SPEC CULT: ABNORMAL
BACTERIA SPEC CULT: ABNORMAL
BILIRUB SERPL-MCNC: 0.4 MG/DL (ref 0.2–1.3)
BUN SERPL-MCNC: 9 MG/DL (ref 7–30)
C TRACH DNA SPEC QL NAA+PROBE: NEGATIVE
CALCIUM SERPL-MCNC: 8.1 MG/DL (ref 8.5–10.1)
CHLORIDE SERPL-SCNC: 104 MMOL/L (ref 94–109)
CO2 SERPL-SCNC: 24 MMOL/L (ref 20–32)
CREAT SERPL-MCNC: 0.67 MG/DL (ref 0.52–1.04)
CRP SERPL-MCNC: 50 MG/L (ref 0–8)
ERYTHROCYTE [DISTWIDTH] IN BLOOD BY AUTOMATED COUNT: 15.4 % (ref 10–15)
GFR SERPL CREATININE-BSD FRML MDRD: >90 ML/MIN/1.7M2
GLUCOSE SERPL-MCNC: 107 MG/DL (ref 70–99)
HCT VFR BLD AUTO: 29.7 % (ref 35–47)
HGB BLD-MCNC: 10 G/DL (ref 11.7–15.7)
MCH RBC QN AUTO: 30.3 PG (ref 26.5–33)
MCHC RBC AUTO-ENTMCNC: 33.7 G/DL (ref 31.5–36.5)
MCV RBC AUTO: 90 FL (ref 78–100)
N GONORRHOEA DNA SPEC QL NAA+PROBE: NEGATIVE
PLATELET # BLD AUTO: 139 10E9/L (ref 150–450)
POTASSIUM SERPL-SCNC: 3.3 MMOL/L (ref 3.4–5.3)
PROT SERPL-MCNC: 5.8 G/DL (ref 6.8–8.8)
RBC # BLD AUTO: 3.3 10E12/L (ref 3.8–5.2)
SODIUM SERPL-SCNC: 136 MMOL/L (ref 133–144)
SPECIMEN SOURCE: ABNORMAL
SPECIMEN SOURCE: NORMAL
SPECIMEN SOURCE: NORMAL
WBC # BLD AUTO: 5.7 10E9/L (ref 4–11)

## 2018-05-31 PROCEDURE — 25000132 ZZH RX MED GY IP 250 OP 250 PS 637: Performed by: NURSE PRACTITIONER

## 2018-05-31 PROCEDURE — 97530 THERAPEUTIC ACTIVITIES: CPT | Mod: GP

## 2018-05-31 PROCEDURE — 86140 C-REACTIVE PROTEIN: CPT | Performed by: NURSE PRACTITIONER

## 2018-05-31 PROCEDURE — 80053 COMPREHEN METABOLIC PANEL: CPT | Performed by: NURSE PRACTITIONER

## 2018-05-31 PROCEDURE — 97116 GAIT TRAINING THERAPY: CPT | Mod: GP

## 2018-05-31 PROCEDURE — 99239 HOSP IP/OBS DSCHRG MGMT >30: CPT | Performed by: PEDIATRICS

## 2018-05-31 PROCEDURE — 25000132 ZZH RX MED GY IP 250 OP 250 PS 637: Performed by: PHYSICIAN ASSISTANT

## 2018-05-31 PROCEDURE — 36415 COLL VENOUS BLD VENIPUNCTURE: CPT | Performed by: NURSE PRACTITIONER

## 2018-05-31 PROCEDURE — 40000193 ZZH STATISTIC PT WARD VISIT

## 2018-05-31 PROCEDURE — 85027 COMPLETE CBC AUTOMATED: CPT | Performed by: NURSE PRACTITIONER

## 2018-05-31 PROCEDURE — 99207 ZZC APP CREDIT; MD BILLING SHARED VISIT: CPT | Performed by: NURSE PRACTITIONER

## 2018-05-31 PROCEDURE — 25000128 H RX IP 250 OP 636: Performed by: PHYSICIAN ASSISTANT

## 2018-05-31 RX ORDER — ROPINIROLE 1 MG/1
1 TABLET, FILM COATED ORAL 3 TIMES DAILY
Qty: 90 TABLET | Refills: 0 | Status: ON HOLD | OUTPATIENT
Start: 2018-05-31 | End: 2018-07-09

## 2018-05-31 RX ORDER — NICOTINE 21 MG/24HR
1 PATCH, TRANSDERMAL 24 HOURS TRANSDERMAL DAILY
Qty: 30 PATCH | Status: ON HOLD | COMMUNITY
Start: 2018-06-01 | End: 2018-07-09

## 2018-05-31 RX ORDER — POTASSIUM CHLORIDE 750 MG/1
40 TABLET, EXTENDED RELEASE ORAL ONCE
Status: COMPLETED | OUTPATIENT
Start: 2018-05-31 | End: 2018-05-31

## 2018-05-31 RX ORDER — TRAMADOL HYDROCHLORIDE 50 MG/1
50 TABLET ORAL EVERY 6 HOURS PRN
Qty: 6 TABLET | Refills: 0 | Status: SHIPPED | OUTPATIENT
Start: 2018-05-31 | End: 2018-07-06

## 2018-05-31 RX ORDER — SPIRONOLACTONE 25 MG/1
25 TABLET ORAL DAILY
Qty: 30 TABLET | Refills: 0 | Status: ON HOLD | OUTPATIENT
Start: 2018-05-31 | End: 2018-06-21

## 2018-05-31 RX ORDER — NITROFURANTOIN 25; 75 MG/1; MG/1
100 CAPSULE ORAL EVERY 12 HOURS SCHEDULED
Status: DISCONTINUED | OUTPATIENT
Start: 2018-05-31 | End: 2018-05-31 | Stop reason: HOSPADM

## 2018-05-31 RX ORDER — LABETALOL 100 MG/1
TABLET, FILM COATED ORAL
Qty: 90 TABLET | Refills: 0 | Status: ON HOLD | OUTPATIENT
Start: 2018-05-31 | End: 2018-07-09

## 2018-05-31 RX ORDER — DULOXETIN HYDROCHLORIDE 30 MG/1
60 CAPSULE, DELAYED RELEASE ORAL DAILY
Qty: 60 CAPSULE | Refills: 0 | Status: ON HOLD | OUTPATIENT
Start: 2018-06-01 | End: 2018-07-09

## 2018-05-31 RX ORDER — LOSARTAN POTASSIUM 25 MG/1
25 TABLET ORAL DAILY
Qty: 30 TABLET | Refills: 0 | Status: ON HOLD | OUTPATIENT
Start: 2018-06-01 | End: 2018-07-09

## 2018-05-31 RX ORDER — LOSARTAN POTASSIUM 25 MG/1
25 TABLET ORAL DAILY
Status: DISCONTINUED | OUTPATIENT
Start: 2018-05-31 | End: 2018-05-31 | Stop reason: HOSPADM

## 2018-05-31 RX ORDER — NITROFURANTOIN 25; 75 MG/1; MG/1
100 CAPSULE ORAL EVERY 12 HOURS
Qty: 12 CAPSULE | Refills: 0 | Status: SHIPPED | OUTPATIENT
Start: 2018-05-31 | End: 2018-06-06

## 2018-05-31 RX ORDER — FUROSEMIDE 20 MG
20 TABLET ORAL DAILY
Qty: 30 TABLET | Refills: 0 | Status: ON HOLD | OUTPATIENT
Start: 2018-05-31 | End: 2018-06-21

## 2018-05-31 RX ORDER — ACETAMINOPHEN 325 MG/1
325 TABLET ORAL 3 TIMES DAILY PRN
Qty: 100 TABLET | COMMUNITY
Start: 2018-05-31 | End: 2018-07-06

## 2018-05-31 RX ORDER — VANCOMYCIN HYDROCHLORIDE 25 MG/ML
125 KIT ORAL 4 TIMES DAILY
Qty: 300 ML | Refills: 0 | Status: SHIPPED | OUTPATIENT
Start: 2018-05-31 | End: 2018-06-13

## 2018-05-31 RX ADMIN — NICOTINE 1 PATCH: 21 PATCH TRANSDERMAL at 08:18

## 2018-05-31 RX ADMIN — MULTIPLE VITAMINS W/ MINERALS TAB 1 TABLET: TAB at 08:13

## 2018-05-31 RX ADMIN — NITROFURANTOIN (MONOHYDRATE/MACROCRYSTALS) 100 MG: 75; 25 CAPSULE ORAL at 09:17

## 2018-05-31 RX ADMIN — ACETAMINOPHEN 650 MG: 325 TABLET, FILM COATED ORAL at 08:13

## 2018-05-31 RX ADMIN — FOLIC ACID 1 MG: 1 TABLET ORAL at 08:13

## 2018-05-31 RX ADMIN — OMEPRAZOLE 20 MG: 20 CAPSULE, DELAYED RELEASE ORAL at 08:13

## 2018-05-31 RX ADMIN — LOSARTAN POTASSIUM 25 MG: 25 TABLET ORAL at 09:17

## 2018-05-31 RX ADMIN — TRAMADOL HYDROCHLORIDE 50 MG: 50 TABLET, COATED ORAL at 00:37

## 2018-05-31 RX ADMIN — THIAMINE HYDROCHLORIDE 250 MG: 100 INJECTION, SOLUTION INTRAMUSCULAR; INTRAVENOUS at 08:20

## 2018-05-31 RX ADMIN — VANCOMYCIN HYDROCHLORIDE 125 MG: 125 CAPSULE ORAL at 12:41

## 2018-05-31 RX ADMIN — FLUTICASONE FUROATE AND VILANTEROL TRIFENATATE 1 PUFF: 200; 25 POWDER RESPIRATORY (INHALATION) at 08:16

## 2018-05-31 RX ADMIN — LABETALOL HCL 100 MG: 100 TABLET, FILM COATED ORAL at 08:13

## 2018-05-31 RX ADMIN — TRAMADOL HYDROCHLORIDE 50 MG: 50 TABLET, COATED ORAL at 08:13

## 2018-05-31 RX ADMIN — DULOXETINE HYDROCHLORIDE 30 MG: 30 CAPSULE, DELAYED RELEASE ORAL at 08:13

## 2018-05-31 RX ADMIN — SUCRALFATE 1 G: 1 TABLET ORAL at 12:41

## 2018-05-31 RX ADMIN — SUCRALFATE 1 G: 1 TABLET ORAL at 08:13

## 2018-05-31 RX ADMIN — ROPINIROLE HYDROCHLORIDE 1 MG: 0.5 TABLET, FILM COATED ORAL at 08:13

## 2018-05-31 RX ADMIN — VANCOMYCIN HYDROCHLORIDE 125 MG: 125 CAPSULE ORAL at 08:13

## 2018-05-31 RX ADMIN — UMECLIDINIUM 1 PUFF: 62.5 AEROSOL, POWDER ORAL at 08:16

## 2018-05-31 RX ADMIN — GABAPENTIN 300 MG: 300 CAPSULE ORAL at 08:13

## 2018-05-31 RX ADMIN — POTASSIUM CHLORIDE 40 MEQ: 750 TABLET, EXTENDED RELEASE ORAL at 11:06

## 2018-05-31 RX ADMIN — GABAPENTIN 300 MG: 300 CAPSULE ORAL at 12:41

## 2018-05-31 ASSESSMENT — PAIN DESCRIPTION - DESCRIPTORS: DESCRIPTORS: ACHING

## 2018-05-31 NOTE — PLAN OF CARE
Problem: Patient Care Overview  Goal: Plan of Care/Patient Progress Review  Outcome: Adequate for Discharge Date Met: 05/31/18  VSS. Pain managed with tramadol prn and tylenol prn. Tolerating regular diet. Up ad nancy. KIEL score 3. Regular diet. Potassium 3.3. PO replacement given. Voiding not saving. + flatus and - stool. Tolerating po antibiotics. Discharge paperowrk faxed to  Homecare PT Patient discharge medications reviewed and discharge paperwork. No further questions. PIV SL.  Patient discharge to home at 1330.

## 2018-05-31 NOTE — PLAN OF CARE
Problem: Patient Care Overview  Goal: Plan of Care/Patient Progress Review  Outcome: Therapy, progress toward functional goals is gradual    Tramadol 1x for abdominal and back pain, also has lido and icy hot patches on. Able to sleep. UAL in room.  MSSA scored 3, pt alert/oriented, calm, cooperative.  Tolerating regular diet, denies nausea, passing gas, no BM.   Voiding, not saving.   PLAN: Possible discharge today.

## 2018-05-31 NOTE — PLAN OF CARE
Problem: Patient Care Overview  Goal: Plan of Care/Patient Progress Review  Outcome: Therapy, progress towards functional goals is fair  HD3 admitted with LALA, ETOH withdrawal and abd pain. A&Ox4, VSS on room air, Pain is controlled with PO tylenol, Icy hot and lidocaine patches. Nicotine patch on left arm. Showered. Bruising throughout body, L abd bruising extended beyond marking. LSC, BS+Voiding not saving, passing gas, no BM today.  PIV is SL. On a regular diet. Enteric precautions Cdiff + treated with vanco, UTI being treated with rocephin. Urine sample sent for chlamydia and gonorrhea in process. MSSA score 5. Refusing seizure precautions. Possible discharge tomorrow.      Pt would like bedside report.

## 2018-05-31 NOTE — PROGRESS NOTES
[facility], [place]  [address] [address]  [phone]      5/31/2018    Re: Inga CASTELLANOS Ledesma      TO WHOM IT MAY CONCERN:    Inga Ledesma  was seen at our hospital 5/28-5/31. Please excuse her from work these dates. Due to medical reasons, unable to return to work until Monday, June 4, 2018.         Sincerely,         Khadra Kim DNP, CNP  [facility]

## 2018-06-01 ENCOUNTER — CARE COORDINATION (OUTPATIENT)
Dept: CARE COORDINATION | Facility: CLINIC | Age: 53
End: 2018-06-01

## 2018-06-01 ENCOUNTER — TELEPHONE (OUTPATIENT)
Dept: FAMILY MEDICINE | Facility: CLINIC | Age: 53
End: 2018-06-01

## 2018-06-01 DIAGNOSIS — F10.10 ALCOHOL ABUSE: Primary | ICD-10-CM

## 2018-06-01 DIAGNOSIS — N17.9 AKI (ACUTE KIDNEY INJURY) (H): ICD-10-CM

## 2018-06-01 NOTE — TELEPHONE ENCOUNTER
This must be an insurance order for home care referral one copy needs to go to Raquel and one goes to insurance company  Thank you

## 2018-06-01 NOTE — TELEPHONE ENCOUNTER
Min or BE provider, please review pended referral in Epic, add diagnosis and any other additional information. I can then fax to Raquel at  and [employer] [facility].

## 2018-06-01 NOTE — TELEPHONE ENCOUNTER
[facility] for [facility] related issues and acute renal failure, fall risk (?)

## 2018-06-01 NOTE — PROGRESS NOTES
Patient was called two times and no answer so post 24 hr DC follow up calls will be closed out since it is Friday, message was left with contact number for department seen by or following up with

## 2018-06-01 NOTE — TELEPHONE ENCOUNTER
Call to [employer] made, order information provided to them however this order will need to be redone as patient is restricted to certain care/providers and Min needs to sign it.Raquel at  Home Care will fax clinical notes to Karley @ [phone] (phone [phone]) Reference # [id number]

## 2018-06-04 ENCOUNTER — PATIENT OUTREACH (OUTPATIENT)
Dept: CARE COORDINATION | Facility: CLINIC | Age: 53
End: 2018-06-04

## 2018-06-04 LAB
BACTERIA SPEC CULT: NO GROWTH
BACTERIA SPEC CULT: NO GROWTH
Lab: NORMAL
Lab: NORMAL
SPECIMEN SOURCE: NORMAL
SPECIMEN SOURCE: NORMAL

## 2018-06-04 NOTE — LETTER
[facility]  Complex Care Plan  About Me  Patient Name:  Inga Ireland    YOB: 1965  Age:     52 year old   [place] MRN:   [id number] Telephone Information:    Home Phone [phone]   Mobile [phone]       Address:    [address] [place] [address] Email address:  [email or web address]      Emergency Contact(s)  Name Relationship Lgl Grd Work Phone Home Phone Mobile Phone   1. TAI IRELAND Son   [phone]    2. NO SECONDARY C* Other   None            Primary language:  English     needed? No   [place] [facility]:  [phone] op. 1  Other communication barriers:    Preferred Method of Communication:  Mail  Current living arrangement: I live in a private home with family  Mobility Status/ Medical Equipment:      Health Maintenance  Health Maintenance Reviewed:      My Access Plan  Medical Emergency 911   Primary Clinic Line [facility] - [phone]   24 Hour Appointment Line [phone] or  [phone]) (toll-free)   24 Hour Nurse Line [phone] (toll-free)   Preferred Urgent Care [facility], [phone]   Preferred Hospital [facility]  [phone]   Preferred Pharmacy [facility] [address] [place] [address] [facility]     Behavioral Health Crisis Line The National Suicide Prevention Lifeline at 1-682.405.1598 or 911     My Care Team Members    Patient Care Team       Relationship Specialty Notifications Start End    Min Toledo PA-C PCP - General Physician Assistant  5/5/17     Phone: [phone] Fax: [phone]         [address] [place] [address]    [facility] ([facility] ([place])  5/29/18     Fax: [phone]          [address] [place] [address]    Jasiel Crane, RN Lead Care Coordinator Primary Care - CC  6/4/18     Phone: [phone] Fax: [phone]         [address] [place]  [place] [address]            My Care Plans  Self Management and Treatment Plan  Goals and (Comments)  Goals        General    I will use the walker at all times or a cane in tight spots to prevent further falls. (pt-stated)     Notes - Note created  12/9/2015  8:48 AM by Sonny Jacobs, RN    As of today's date 12/9/2015 goal is met at 0 - 25%.   Goal Status:  Active        Medical (pt-stated)     Notes - Note created  6/4/2018 12:28 PM by Jasiel Crane, RN    I will follow up with my PCP as directed ongoing.                My Medical and Care Information  Problem List   Patient Active Problem List   Diagnosis     RLS (restless legs syndrome)     GERD (gastroesophageal reflux disease)     Iron deficiency anemia     Hyperlipidemia with target LDL less than 160     Adult BMI 30+     Sacroiliitis (H)     Tobacco abuse     Vitamin D deficiency     Menorrhagia     Advanced directives, counseling/discussion     Vitamin D deficiency     Edema of both legs     Hypertension goal BP (blood pressure) < 140/90     Chronic bronchitis, unspecified chronic bronchitis type (H)     Health Care Home     Alcohol dependence with withdrawal with complication (H)     Major depressive disorder, recurrent episode, mild (H)     (HFpEF) heart failure with preserved ejection fraction (H)     Psychophysiological insomnia     Chemical dependency (H)     Alcohol withdrawal (H)     Alcohol abuse     LALA (acute kidney injury) (H)      Current Medications and Allergies:  See printed Medication Report.    Care Coordination Start Date: No linked episodes   Frequency of Care Coordination: 2 weeks   Form Last Updated: 06/04/2018

## 2018-06-04 NOTE — PROGRESS NOTES
Clinic Care Coordination Contact    Clinic Care Coordination Contact  OUTREACH    Referral Information:  Referral Source: IP Report         Chief Complaint   Patient presents with     Clinic Care Coordination - [facility]     Care Team        [place] Utilization:      Utilization    Last refreshed: 6/4/2018  7:42 AM:  No Show Count (past year) 4       Last refreshed: 6/4/2018  7:42 AM:  ED visits 2       Last refreshed: 6/4/2018  7:42 AM:  Hospital admissions 2          Current as of: 6/4/2018  7:42 AM             Clinical Concerns:  Current Medical Concerns:  Patient at work at time of call. Patient has follow up appointment today with PCP. Unable to address concerns due to being at work. CC will follow up after appointment if concerns are identified.      Current Behavioral Concerns: none    Education Provided to patient: Encouraged follow up appointment with PCP.        Outreach Frequency: 2 weeks  Future Appointments              Today Min Toledo PA-C [facility] [place]          Plan: 1) Patient will continue to follow treatment plan as directed and follow up with PCP with concerns ongoing.   2) Care Coordination to remain available for future needs. Follow up planned for 2 weeks unless otherwise notified.     Cuco Crane RN  Clinic Care Coordinator  [phone] or [phone]

## 2018-06-04 NOTE — LETTER
[place] CARE COORDINATION  [facility]  [address] [facility] [place] [address]  [phone]    June 4, 2018    Inga Ledesma  [address] [place] [address]      Dear Inag,    I am a clinic care coordinator who works with Min Toledo PA-C at [facility]. I wanted to thank you for spending the time to talk with me.  I wanted to introduce myself and provide you with my contact information so that you can call me with questions or concerns about your health care. Below is a description of clinic care coordination and how I can further assist you.     The clinic care coordinator is a registered nurse and/or  who understand the health care system. The goal of clinic care coordination is to help you manage your health and improve access to the [facility] system in the most efficient manner. The registered nurse can assist you in meeting your health care goals by providing education, coordinating services, and strengthening the communication among your providers. The  can assist you with financial, behavioral, psychosocial, chemical dependency, counseling, and/or psychiatric resources.    Please feel free to contact me at [phone], [phone], with any questions or concerns. We at [facility] are focused on providing you with the highest-quality healthcare experience possible and that all starts with you.     Sincerely,     Cuco Crane RN  Clinic Care Coordinator    Enclosed: I have enclosed a copy of a 24 Hour Access Plan. This has helpful phone numbers for you to call when needed. Please keep this in an easy to access place to use as needed.

## 2018-06-05 ENCOUNTER — TELEPHONE (OUTPATIENT)
Dept: FAMILY MEDICINE | Facility: CLINIC | Age: 53
End: 2018-06-05

## 2018-06-17 ENCOUNTER — TELEPHONE (OUTPATIENT)
Dept: URGENT CARE | Facility: URGENT CARE | Age: 53
End: 2018-06-17

## 2018-06-17 ENCOUNTER — OFFICE VISIT (OUTPATIENT)
Dept: URGENT CARE | Facility: URGENT CARE | Age: 53
End: 2018-06-17
Payer: COMMERCIAL

## 2018-06-17 VITALS
RESPIRATION RATE: 20 BRPM | WEIGHT: 153 LBS | HEART RATE: 90 BPM | DIASTOLIC BLOOD PRESSURE: 64 MMHG | TEMPERATURE: 98.1 F | SYSTOLIC BLOOD PRESSURE: 102 MMHG | OXYGEN SATURATION: 98 % | BODY MASS INDEX: 28.91 KG/M2

## 2018-06-17 DIAGNOSIS — F10.10 ALCOHOL ABUSE: ICD-10-CM

## 2018-06-17 DIAGNOSIS — J42 CHRONIC BRONCHITIS, UNSPECIFIED CHRONIC BRONCHITIS TYPE (H): Primary | ICD-10-CM

## 2018-06-17 DIAGNOSIS — Z86.19 H/O CLOSTRIDIUM DIFFICILE INFECTION: ICD-10-CM

## 2018-06-17 DIAGNOSIS — Z72.0 TOBACCO ABUSE: ICD-10-CM

## 2018-06-17 PROCEDURE — 99214 OFFICE O/P EST MOD 30 MIN: CPT | Performed by: FAMILY MEDICINE

## 2018-06-17 NOTE — TELEPHONE ENCOUNTER
Called pt regarding Dr. Adler wanting to recheck Pt high pulse readings in OV today LM for pt call back on the [phone] phone number. Jefry Capps MA

## 2018-06-17 NOTE — LETTER
June 17, 2018      Inga Ledesma  [address] [facility] [address] [facility] [address] [place] [address]        To Whom It May Concern:        Inga Ledesma was seen in our clinic. She may return to work without restrictions.          Sincerely,              Maicol Adlre MD

## 2018-06-17 NOTE — PROGRESS NOTES
SUBJECTIVE:     SUBJECTIVE:   Inga Ledesma is a 52 year old female who presents to clinic today for the following health issues:    Chief Complaint   Patient presents with     COPD     COPD flare up, was treated for COPD 2 weeks ago     Forms     would also like a work note letter clearing pt to go back to work.     52 year old female presents for a follow up visit.  Patient remained hospitalized from 05/28/2018 till 05/21/2018 with c-diff colitis, alcohol abuse, acute renal failure, thrombocytopenia, COPD.  Patient was started on vancomycin, completed therapy, and was given IV fluids.  Patient is trying to limit alcohol intake, feeling much better now and would like to go back to work. She is smoking about 4 cigarettes/day and works in [employer].      Problem list and histories reviewed & adjusted, as indicated.  Additional history: as documented    Patient Active Problem List   Diagnosis     RLS (restless legs syndrome)     GERD (gastroesophageal reflux disease)     Iron deficiency anemia     Hyperlipidemia with target LDL less than 160     Adult BMI 30+     Sacroiliitis (H)     Tobacco abuse     Vitamin D deficiency     Menorrhagia     Advanced directives, counseling/discussion     Vitamin D deficiency     Edema of both legs     Hypertension goal BP (blood pressure) < 140/90     Chronic bronchitis, unspecified chronic bronchitis type (H)     Health Care Home     Alcohol dependence with withdrawal with complication (H)     Major depressive disorder, recurrent episode, mild (H)     (HFpEF) heart failure with preserved ejection fraction (H)     Psychophysiological insomnia     Chemical dependency (H)     Alcohol withdrawal (H)     Alcohol abuse     LALA (acute kidney injury) (H)     Past Surgical History:   Procedure Laterality Date     AS BIOPSY/EXCISION LYMPH NODE OPEN SUPERFICIAL       COLONOSCOPY       EYE SURGERY       HYSTERECTOMY  2010     HYSTERECTOMY TOTAL ABDOMINAL  7/28/10     Bilateral salpingectomy.  ovaries conserved.     LASER TX, CERVICAL  1987     LYMPH NODE BIOPSY  2007    inguinal     LYSIS OF LABIAL LESION(S)  1985, 1987       Social History   Substance Use Topics     Smoking status: Current Every Day Smoker     Packs/day: 0.25     Years: 34.00     Types: Cigarettes     Start date: 11/1/1979     Last attempt to quit: 10/3/2012     Smokeless tobacco: Never Used      Comment: 5 cigarettes daily     Alcohol use Yes      Comment: 1 pint of vodka per day      Family History   Problem Relation Age of Onset     Depression/Anxiety Mother      Asthma Mother      CEREBROVASCULAR DISEASE Father      Hypertension Father      Lung Cancer Father      Breast Cancer Paternal Aunt      Other Cancer Other      Depression Other      Anxiety Disorder Other      MENTAL ILLNESS Other      Substance Abuse Other      Asthma Other      Obesity Sister      Obesity Son          Current Outpatient Prescriptions   Medication Sig Dispense Refill     acetaminophen (TYLENOL) 325 MG tablet Take 2 tablets (650 mg) by mouth 3 times daily as needed for mild pain 100 tablet      albuterol (VENTOLIN HFA) 108 (90 Base) MCG/ACT Inhaler Inhale 1-2 puffs into the lungs every 4 hours as needed for shortness of breath / dyspnea or wheezing 1 Inhaler 0     DULoxetine (CYMBALTA) 30 MG EC capsule Take 2 capsules (60 mg) by mouth daily 60 capsule 0     fluticasone-salmeterol (ADVAIR DISKUS) 500-50 MCG/DOSE diskus inhaler Inhale 1 puff into the lungs every 12 hours 1 Inhaler 0     folic acid (FOLVITE) 1 MG tablet Take 1 tablet (1 mg) by mouth daily 3 tablet 0     furosemide (LASIX) 20 MG tablet Take 1 tablet (20 mg) by mouth daily 30 tablet 0     gabapentin (NEURONTIN) 300 MG capsule Take 1 capsule by mouth in the morning, 1 capsule in the afternoon, and 2 capsules at bedtime. 12 capsule 0     hydrOXYzine (ATARAX) 50 MG tablet Take 1 tablet (50 mg) by mouth every 6 hours as needed for anxiety or other (adjuvant pain) 12 tablet  1     ipratropium - albuterol 0.5 mg/2.5 mg/3 mL (DUONEB) 0.5-2.5 (3) MG/3ML neb solution Take 1 vial (3 mLs) by nebulization 4 times daily as needed for wheezing 360 mL 1     labetalol (NORMODYNE) 100 MG tablet 100mg in am, 200mg in pm. 90 tablet 0     losartan (COZAAR) 25 MG tablet Take 1 tablet (25 mg) by mouth daily 30 tablet 0     menthol (ICY HOT) 5 % PTCH Apply 1 patch topically every 8 hours as needed for muscle soreness 30 patch 3     multivitamin, therapeutic with minerals (THERA-VIT-M) TABS tablet Take 1 tablet by mouth daily 3 each 0     nicotine (NICODERM CQ) 21 MG/24HR 24 hr patch Place 1 patch onto the skin daily 30 patch      omeprazole (PRILOSEC) 20 MG CR capsule Take 1 capsule (20 mg) by mouth 2 times daily (before meals) 60 capsule 0     order for DME Equipment being ordered: Cane ()  Treatment Diagnosis: Impaired functional mobility 1 each 0     rOPINIRole (REQUIP) 1 MG tablet Take 1 tablet (1 mg) by mouth 3 times daily 90 tablet 0     spironolactone (ALDACTONE) 25 MG tablet Take 1 tablet (25 mg) by mouth daily 30 tablet 0     tiotropium (SPIRIVA HANDIHALER) 18 MCG capsule Inhale contents of one capsule daily. 3 capsule 0     traMADol (ULTRAM) 50 MG tablet Take 1 tablet (50 mg) by mouth every 6 hours as needed for moderate pain 6 tablet 0     Allergies   Allergen Reactions     Codeine Nausea     Influenza Vaccines      Other reaction(s): Other - Describe In Comment Field -- patient is sensitive to eggs     Reglan [Metoclopramide Hcl] Other (See Comments)     Body tenses up     Recent Labs   Lab Test  05/31/18   0758  05/30/18   0752   05/29/18   0433   04/06/18   0728   09/29/15   1507  10/15/14   1609   05/10/10   1420   A1C   --    --    --    --    --    --    --    --   5.6   --    --    LDL   --    --    --    --    --   125*   --    --    --    --   129   HDL   --    --    --    --    --   105   --    --    --    --   39*   TRIG   --    --    --    --    --   111   --    --    --    --    151*   ALT  24  27   --   36   < >  19   < >  27   --    --    --    CR  0.67  0.71   < >  1.32*   < >  0.74   < >  0.72  0.61   < >   --    GFRESTIMATED  >90  87   < >  42*   < >  82   < >  86  >90  Non  GFR Calc     < >   --    GFRESTBLACK  >90  >90   < >  51*   < >  >90   < >  >90   GFR Calc    >90   GFR Calc     < >   --    POTASSIUM  3.3*  3.5   --   3.4   < >  3.4   < >  4.3  4.3   < >   --    TSH   --    --    --    --    --   1.31   --   0.89  1.21   --    --     < > = values in this interval not displayed.      BP Readings from Last 3 Encounters:   06/17/18 97/69   05/31/18 165/86   05/03/18 116/72    Wt Readings from Last 3 Encounters:   06/17/18 153 lb (69.4 kg)   05/30/18 171 lb 8.3 oz (77.8 kg)   05/03/18 160 lb (72.6 kg)                  Labs reviewed in EPIC    Reviewed and updated as needed this visit by clinical staff  Tobacco  Allergies  Meds  Med Hx  Surg Hx  Fam Hx  Soc Hx      Reviewed and updated as needed this visit by Provider         ROS:  Constitutional, HEENT, cardiovascular, pulmonary, GI, , musculoskeletal, neuro, skin, endocrine and psych systems are negative, except as otherwise noted.    OBJECTIVE:     /64  Pulse 90  Temp 98.1  F (36.7  C) (Oral)  Resp 20  Wt 153 lb (69.4 kg)  LMP 06/02/2010  SpO2 98%  Breastfeeding? No  BMI 28.91 kg/m2   Manual pulse rate normal   GENERAL: alert, no distress and over weight  EYES: Eyes grossly normal to inspection, PERRL and conjunctivae and sclerae normal  NECK: no adenopathy, no asymmetry, masses, or scars and thyroid normal to palpation  RESP: lungs clear to auscultation - no rales, rhonchi or wheezes  CV: regular rate and rhythm, normal S1 S2, no S3 or S4, no murmur, click or rub, no peripheral edema and peripheral pulses strong  ABDOMEN: soft, nontender, no hepatosplenomegaly, no masses and bowel sounds normal  MS: no gross musculoskeletal defects noted, no edema  SKIN: no  suspicious lesions or rashes  NEURO: Normal strength and tone, mentation intact and speech normal      ASSESSMENT/PLAN:       ICD-10-CM    1. Chronic bronchitis, unspecified chronic bronchitis type (H) J42    2. Tobacco abuse Z72.0    3. Alcohol abuse F10.10    4. H/O Clostridium difficile infection Z86.19        Patient is feeling better now and would like to go back to work.  Workability letter given.  Stressed on limiting alcohol intake and smoking cessation counseling provided, associated health hazards explained in detail.  Recommended to follow-up with PCP as well. Medications reviewed and no changes made. Patient understood and in agreement with the above plan. All questions answered.       Maicol Adler MD  [facility]

## 2018-06-17 NOTE — MR AVS SNAPSHOT
After Visit Summary   6/17/2018    Inga Ledesma    MRN: [id number]           Patient Information     Date Of Birth          1965        Visit Information        Provider Department      6/17/2018 2:00 PM Maicol Adler MD [facility]        Today's Diagnoses     Chronic bronchitis, unspecified chronic bronchitis type (H)    -  1    Tobacco abuse        Alcohol abuse           Follow-ups after your visit        Who to contact     If you have questions or need follow up information about today's clinic visit or your schedule please contact [facility] directly at [phone].  Normal or non-critical lab and imaging results will be communicated to you by [employer], letter or phone within 4 business days after the clinic has received the results. If you do not hear from us within 7 days, please contact the clinic through [employer] or phone. If you have a critical or abnormal lab result, we will notify you by phone as soon as possible.  Submit refill requests through saambaa or call your pharmacy and they will forward the refill request to us. Please allow 3 business days for your refill to be completed.          Additional Information About Your Visit        MyChart Information     [employer]aa gives you secure access to your electronic health record. If you see a primary care provider, you can also send messages to your care team and make appointments. If you have questions, please call your primary care clinic.  If you do not have a primary care provider, please call [phone] and they will assist you.        Care EveryWhere ID     This is your Care EveryWhere ID. This could be used by other organizations to access your [place] medical records  [id number]        Your Vitals Were     Pulse Temperature Respirations Last Period Pulse Oximetry Breastfeeding?    141 98.1  F (36.7  C) (Oral) 20 06/02/2010 98% No    BMI (Body Mass Index)                   28.91 kg/m2             Blood Pressure from Last 3 Encounters:   06/17/18 102/64   05/31/18 165/86   05/03/18 116/72    Weight from Last 3 Encounters:   06/17/18 153 lb (69.4 kg)   05/30/18 171 lb 8.3 oz (77.8 kg)   05/03/18 160 lb (72.6 kg)              Today, you had the following     No orders found for display       Primary Care Provider Office Phone # Fax #    Min Julia Toledo PA-C [phone]       [address]  [place] [address]        Goals        General    I will use the walker at all times or a cane in tight spots to prevent further falls. (pt-stated)     Notes - Note created  12/9/2015  8:48 AM by Sonny Jacobs RN    As of today's date 12/9/2015 goal is met at 0 - 25%.   Goal Status:  Active        Medical (pt-stated)     Notes - Note created  6/4/2018 12:28 PM by Jasiel Crane, RN    I will follow up with my PCP as directed ongoing.         Equal Access to Services     [facility]: Hadii chang lujan Socayla, waaxda luqadaha, qaybta kaalkelsey rosen . [facility] [phone].    ATENCIÓN: Si habla español, tiene a cook disposición servicios gratuitos de asistencia lingüística. [facility] [phone].    We comply with applicable federal civil rights laws and [place] laws. We do not discriminate on the basis of race, color, national origin, age, disability, sex, sexual orientation, or gender identity.            Thank you!     Thank you for choosing [facility]  for your [facility]. Our goal is always to provide you with excellent care. Hearing back from our patients is one way we can continue to improve our services. Please take a few minutes to complete the written survey that you may receive in the mail after your visit with us. Thank you!             Your Updated Medication List - Protect others around you: Learn how to safely use, store and throw away your medicines at www.disposemymeds.org.          This list is accurate as of 6/17/18  2:38 PM.   Always use your most recent med list.                   Brand Name Dispense Instructions for use Diagnosis    acetaminophen 325 MG tablet    TYLENOL    100 tablet    Take 2 tablets (650 mg) by mouth 3 times daily as needed for mild pain        albuterol 108 (90 Base) MCG/ACT Inhaler    VENTOLIN HFA    1 Inhaler    Inhale 1-2 puffs into the lungs every 4 hours as needed for shortness of breath / dyspnea or wheezing    Chronic bronchitis, unspecified chronic bronchitis type (H)       DULoxetine 30 MG EC capsule    CYMBALTA    60 capsule    Take 2 capsules (60 mg) by mouth daily    Recurrent major depressive disorder, remission status unspecified (H)       fluticasone-salmeterol 500-50 MCG/DOSE diskus inhaler    ADVAIR DISKUS    1 Inhaler    Inhale 1 puff into the lungs every 12 hours    Chronic bronchitis, unspecified chronic bronchitis type (H)       folic acid 1 MG tablet    FOLVITE    3 tablet    Take 1 tablet (1 mg) by mouth daily    Alcohol dependence with uncomplicated withdrawal (H)       furosemide 20 MG tablet    LASIX    30 tablet    Take 1 tablet (20 mg) by mouth daily    Essential hypertension with goal blood pressure less than 140/90       gabapentin 300 MG capsule    NEURONTIN    12 capsule    Take 1 capsule by mouth in the morning, 1 capsule in the afternoon, and 2 capsules at bedtime.    Major depressive disorder, recurrent episode, mild (H), Sacroiliitis (H)       hydrOXYzine 50 MG tablet    ATARAX    12 tablet    Take 1 tablet (50 mg) by mouth every 6 hours as needed for anxiety or other (adjuvant pain)    Sacroiliitis (H)       ipratropium - albuterol 0.5 mg/2.5 mg/3 mL 0.5-2.5 (3) MG/3ML neb solution    DUONEB    360 mL    Take 1 vial (3 mLs) by nebulization 4 times daily as needed for wheezing    Chronic bronchitis, unspecified chronic bronchitis type (H)       labetalol 100 MG tablet    NORMODYNE    90 tablet    100mg in am, 200mg in pm.    Hypertension goal BP (blood pressure) < 140/90        losartan 25 MG tablet    COZAAR    30 tablet    Take 1 tablet (25 mg) by mouth daily    Hypertension goal BP (blood pressure) < 140/90       menthol 5 % Ptch    ICY HOT    30 patch    Apply 1 patch topically every 8 hours as needed for muscle soreness    Muscle soreness       multivitamin, therapeutic with minerals Tabs tablet     3 each    Take 1 tablet by mouth daily    Alcohol dependence with uncomplicated withdrawal (H)       nicotine 21 MG/24HR 24 hr patch    NICODERM CQ    30 patch    Place 1 patch onto the skin daily        omeprazole 20 MG CR capsule    priLOSEC    60 capsule    Take 1 capsule (20 mg) by mouth 2 times daily (before meals)    Alcoholic gastritis with hemorrhage, unspecified chronicity       order for DME     1 each    Equipment being ordered: Cane () Treatment Diagnosis: Impaired functional mobility    Alcohol abuse, Fall, initial encounter, Dehydration, Alcohol withdrawal syndrome with perceptual disturbance (H)       rOPINIRole 1 MG tablet    REQUIP    90 tablet    Take 1 tablet (1 mg) by mouth 3 times daily    RLS (restless legs syndrome)       spironolactone 25 MG tablet    ALDACTONE    30 tablet    Take 1 tablet (25 mg) by mouth daily    (HFpEF) heart failure with preserved ejection fraction (H)       tiotropium 18 MCG capsule    SPIRIVA HANDIHALER    3 capsule    Inhale contents of one capsule daily.    Chronic bronchitis, unspecified chronic bronchitis type (H)       traMADol 50 MG tablet    ULTRAM    6 tablet    Take 1 tablet (50 mg) by mouth every 6 hours as needed for moderate pain    C. difficile colitis

## 2018-06-17 NOTE — NURSING NOTE
"Chief Complaint   Patient presents with     COPD     COPD flare up, SX been worst the past week now        Initial BP 97/69  Pulse 141  Temp 98.1  F (36.7  C) (Oral)  Resp 20  Wt 153 lb (69.4 kg)  LMP 06/02/2010  SpO2 98%  Breastfeeding? No  BMI 28.91 kg/m2 Estimated body mass index is 28.91 kg/(m^2) as calculated from the following:    Height as of 5/28/18: 5' 1\" (1.549 m).    Weight as of this encounter: 153 lb (69.4 kg).  Medication Reconciliation: complete      Jefry Capps MA    "

## 2018-06-18 ENCOUNTER — TELEPHONE (OUTPATIENT)
Dept: FAMILY MEDICINE | Facility: CLINIC | Age: 53
End: 2018-06-18

## 2018-06-18 ENCOUNTER — APPOINTMENT (OUTPATIENT)
Dept: GENERAL RADIOLOGY | Facility: CLINIC | Age: 53
DRG: 683 | End: 2018-06-18
Attending: EMERGENCY MEDICINE
Payer: COMMERCIAL

## 2018-06-18 ENCOUNTER — APPOINTMENT (OUTPATIENT)
Dept: CT IMAGING | Facility: CLINIC | Age: 53
DRG: 683 | End: 2018-06-18
Attending: EMERGENCY MEDICINE
Payer: COMMERCIAL

## 2018-06-18 ENCOUNTER — HOSPITAL ENCOUNTER (INPATIENT)
Facility: CLINIC | Age: 53
LOS: 3 days | Discharge: HOME | DRG: 683 | End: 2018-06-21
Attending: EMERGENCY MEDICINE | Admitting: HOSPITALIST
Payer: COMMERCIAL

## 2018-06-18 DIAGNOSIS — R79.89 ELEVATED PROCALCITONIN: ICD-10-CM

## 2018-06-18 DIAGNOSIS — I10 ESSENTIAL HYPERTENSION WITH GOAL BLOOD PRESSURE LESS THAN 140/90: ICD-10-CM

## 2018-06-18 DIAGNOSIS — E87.1 HYPONATREMIA: ICD-10-CM

## 2018-06-18 DIAGNOSIS — N17.9 AKI (ACUTE KIDNEY INJURY) (H): ICD-10-CM

## 2018-06-18 PROBLEM — A41.9 SEPSIS (H): Status: ACTIVE | Noted: 2018-06-18

## 2018-06-18 LAB
ALBUMIN SERPL-MCNC: 4 G/DL (ref 3.4–5)
ALBUMIN UR-MCNC: 10 MG/DL
ALP SERPL-CCNC: 124 U/L (ref 40–150)
ALT SERPL W P-5'-P-CCNC: 49 U/L (ref 0–50)
ANION GAP SERPL CALCULATED.3IONS-SCNC: 12 MMOL/L (ref 3–14)
ANION GAP SERPL CALCULATED.3IONS-SCNC: 14 MMOL/L (ref 3–14)
APPEARANCE UR: CLEAR
AST SERPL W P-5'-P-CCNC: 36 U/L (ref 0–45)
BACTERIA #/AREA URNS HPF: ABNORMAL /HPF
BASOPHILS # BLD AUTO: 0 10E9/L (ref 0–0.2)
BASOPHILS NFR BLD AUTO: 0.3 %
BILIRUB SERPL-MCNC: 0.5 MG/DL (ref 0.2–1.3)
BILIRUB UR QL STRIP: NEGATIVE
BUN SERPL-MCNC: 33 MG/DL (ref 7–30)
BUN SERPL-MCNC: 35 MG/DL (ref 7–30)
CALCIUM SERPL-MCNC: 7.9 MG/DL (ref 8.5–10.1)
CALCIUM SERPL-MCNC: 9 MG/DL (ref 8.5–10.1)
CHLORIDE SERPL-SCNC: 90 MMOL/L (ref 94–109)
CHLORIDE SERPL-SCNC: 98 MMOL/L (ref 94–109)
CO2 SERPL-SCNC: 19 MMOL/L (ref 20–32)
CO2 SERPL-SCNC: 21 MMOL/L (ref 20–32)
COLOR UR AUTO: ABNORMAL
CREAT SERPL-MCNC: 2.11 MG/DL (ref 0.52–1.04)
CREAT SERPL-MCNC: 2.97 MG/DL (ref 0.52–1.04)
CREAT UR-MCNC: 82 MG/DL
DIFFERENTIAL METHOD BLD: ABNORMAL
EOSINOPHIL # BLD AUTO: 0.2 10E9/L (ref 0–0.7)
EOSINOPHIL NFR BLD AUTO: 2 %
ERYTHROCYTE [DISTWIDTH] IN BLOOD BY AUTOMATED COUNT: 15.2 % (ref 10–15)
ERYTHROCYTE [DISTWIDTH] IN BLOOD BY AUTOMATED COUNT: 15.3 % (ref 10–15)
ETHANOL SERPL-MCNC: <0.01 G/DL
FRACT EXCRET NA UR+SERPL-RTO: 0.3 %
GFR SERPL CREATININE-BSD FRML MDRD: 17 ML/MIN/1.7M2
GFR SERPL CREATININE-BSD FRML MDRD: 25 ML/MIN/1.7M2
GLUCOSE SERPL-MCNC: 105 MG/DL (ref 70–99)
GLUCOSE SERPL-MCNC: 142 MG/DL (ref 70–99)
GLUCOSE UR STRIP-MCNC: NEGATIVE MG/DL
HCT VFR BLD AUTO: 32 % (ref 35–47)
HCT VFR BLD AUTO: 36.1 % (ref 35–47)
HGB BLD-MCNC: 11 G/DL (ref 11.7–15.7)
HGB BLD-MCNC: 12.7 G/DL (ref 11.7–15.7)
HGB UR QL STRIP: ABNORMAL
IMM GRANULOCYTES # BLD: 0 10E9/L (ref 0–0.4)
IMM GRANULOCYTES NFR BLD: 0.2 %
INR PPP: 0.94 (ref 0.86–1.14)
INTERPRETATION ECG - MUSE: NORMAL
KETONES UR STRIP-MCNC: NEGATIVE MG/DL
LACTATE BLD-SCNC: 1.1 MMOL/L (ref 0.7–2)
LACTATE BLD-SCNC: 2.1 MMOL/L (ref 0.7–2)
LEUKOCYTE ESTERASE UR QL STRIP: NEGATIVE
LIPASE SERPL-CCNC: 595 U/L (ref 73–393)
LYMPHOCYTES # BLD AUTO: 1.5 10E9/L (ref 0.8–5.3)
LYMPHOCYTES NFR BLD AUTO: 14.1 %
MAGNESIUM SERPL-MCNC: 1.6 MG/DL (ref 1.6–2.3)
MCH RBC QN AUTO: 31.2 PG (ref 26.5–33)
MCH RBC QN AUTO: 31.3 PG (ref 26.5–33)
MCHC RBC AUTO-ENTMCNC: 34.4 G/DL (ref 31.5–36.5)
MCHC RBC AUTO-ENTMCNC: 35.2 G/DL (ref 31.5–36.5)
MCV RBC AUTO: 89 FL (ref 78–100)
MCV RBC AUTO: 91 FL (ref 78–100)
MONOCYTES # BLD AUTO: 0.4 10E9/L (ref 0–1.3)
MONOCYTES NFR BLD AUTO: 4.2 %
MUCOUS THREADS #/AREA URNS LPF: PRESENT /LPF
NEUTROPHILS # BLD AUTO: 8.4 10E9/L (ref 1.6–8.3)
NEUTROPHILS NFR BLD AUTO: 79.2 %
NITRATE UR QL: NEGATIVE
NRBC # BLD AUTO: 0 10*3/UL
NRBC BLD AUTO-RTO: 0 /100
PH UR STRIP: 5.5 PH (ref 5–7)
PLATELET # BLD AUTO: 133 10E9/L (ref 150–450)
PLATELET # BLD AUTO: 180 10E9/L (ref 150–450)
POTASSIUM SERPL-SCNC: 3.2 MMOL/L (ref 3.4–5.3)
POTASSIUM SERPL-SCNC: 3.3 MMOL/L (ref 3.4–5.3)
PROCALCITONIN SERPL-MCNC: 11.53 NG/ML
PROT SERPL-MCNC: 7.5 G/DL (ref 6.8–8.8)
RBC # BLD AUTO: 3.51 10E12/L (ref 3.8–5.2)
RBC # BLD AUTO: 4.07 10E12/L (ref 3.8–5.2)
RBC #/AREA URNS AUTO: <1 /HPF (ref 0–2)
SODIUM SERPL-SCNC: 124 MMOL/L (ref 133–144)
SODIUM SERPL-SCNC: 129 MMOL/L (ref 133–144)
SODIUM UR-SCNC: 15 MMOL/L
SOURCE: ABNORMAL
SP GR UR STRIP: 1.01 (ref 1–1.03)
SQUAMOUS #/AREA URNS AUTO: 1 /HPF (ref 0–1)
TROPONIN I SERPL-MCNC: <0.015 UG/L (ref 0–0.04)
TSH SERPL DL<=0.005 MIU/L-ACNC: 1.02 MU/L (ref 0.4–4)
UROBILINOGEN UR STRIP-MCNC: NORMAL MG/DL (ref 0–2)
WBC # BLD AUTO: 10.6 10E9/L (ref 4–11)
WBC # BLD AUTO: 8.3 10E9/L (ref 4–11)
WBC #/AREA URNS AUTO: <1 /HPF (ref 0–5)

## 2018-06-18 PROCEDURE — 94640 AIRWAY INHALATION TREATMENT: CPT

## 2018-06-18 PROCEDURE — 40000275 ZZH STATISTIC RCP TIME EA 10 MIN

## 2018-06-18 PROCEDURE — 84484 ASSAY OF TROPONIN QUANT: CPT | Performed by: EMERGENCY MEDICINE

## 2018-06-18 PROCEDURE — 80053 COMPREHEN METABOLIC PANEL: CPT | Performed by: EMERGENCY MEDICINE

## 2018-06-18 PROCEDURE — 93010 ELECTROCARDIOGRAM REPORT: CPT | Mod: Z6 | Performed by: EMERGENCY MEDICINE

## 2018-06-18 PROCEDURE — 80048 BASIC METABOLIC PNL TOTAL CA: CPT | Performed by: NURSE PRACTITIONER

## 2018-06-18 PROCEDURE — 85025 COMPLETE CBC W/AUTO DIFF WBC: CPT | Performed by: EMERGENCY MEDICINE

## 2018-06-18 PROCEDURE — 96367 TX/PROPH/DG ADDL SEQ IV INF: CPT | Performed by: EMERGENCY MEDICINE

## 2018-06-18 PROCEDURE — 80320 DRUG SCREEN QUANTALCOHOLS: CPT | Performed by: EMERGENCY MEDICINE

## 2018-06-18 PROCEDURE — 99285 EMERGENCY DEPT VISIT HI MDM: CPT | Mod: 25 | Performed by: EMERGENCY MEDICINE

## 2018-06-18 PROCEDURE — 84300 ASSAY OF URINE SODIUM: CPT | Performed by: NURSE PRACTITIONER

## 2018-06-18 PROCEDURE — 96361 HYDRATE IV INFUSION ADD-ON: CPT | Performed by: EMERGENCY MEDICINE

## 2018-06-18 PROCEDURE — 83690 ASSAY OF LIPASE: CPT | Performed by: EMERGENCY MEDICINE

## 2018-06-18 PROCEDURE — 93005 ELECTROCARDIOGRAM TRACING: CPT | Performed by: EMERGENCY MEDICINE

## 2018-06-18 PROCEDURE — 84443 ASSAY THYROID STIM HORMONE: CPT | Performed by: EMERGENCY MEDICINE

## 2018-06-18 PROCEDURE — 36415 COLL VENOUS BLD VENIPUNCTURE: CPT | Performed by: NURSE PRACTITIONER

## 2018-06-18 PROCEDURE — 83735 ASSAY OF MAGNESIUM: CPT | Performed by: EMERGENCY MEDICINE

## 2018-06-18 PROCEDURE — 87040 BLOOD CULTURE FOR BACTERIA: CPT | Performed by: EMERGENCY MEDICINE

## 2018-06-18 PROCEDURE — 82570 ASSAY OF URINE CREATININE: CPT | Performed by: NURSE PRACTITIONER

## 2018-06-18 PROCEDURE — 99223 1ST HOSP IP/OBS HIGH 75: CPT | Mod: AI | Performed by: HOSPITALIST

## 2018-06-18 PROCEDURE — 84145 PROCALCITONIN (PCT): CPT | Performed by: EMERGENCY MEDICINE

## 2018-06-18 PROCEDURE — 25000128 H RX IP 250 OP 636: Performed by: NURSE PRACTITIONER

## 2018-06-18 PROCEDURE — 40000141 ZZH STATISTIC PERIPHERAL IV START W/O US GUIDANCE

## 2018-06-18 PROCEDURE — 74176 CT ABD & PELVIS W/O CONTRAST: CPT

## 2018-06-18 PROCEDURE — 99223 1ST HOSP IP/OBS HIGH 75: CPT | Performed by: NURSE PRACTITIONER

## 2018-06-18 PROCEDURE — 25000125 ZZHC RX 250: Performed by: NURSE PRACTITIONER

## 2018-06-18 PROCEDURE — 96365 THER/PROPH/DIAG IV INF INIT: CPT | Performed by: EMERGENCY MEDICINE

## 2018-06-18 PROCEDURE — 83605 ASSAY OF LACTIC ACID: CPT | Performed by: EMERGENCY MEDICINE

## 2018-06-18 PROCEDURE — 85610 PROTHROMBIN TIME: CPT | Performed by: EMERGENCY MEDICINE

## 2018-06-18 PROCEDURE — 12000006 ZZH R&B IMCU INTERMEDIATE UMMC

## 2018-06-18 PROCEDURE — 81001 URINALYSIS AUTO W/SCOPE: CPT | Performed by: EMERGENCY MEDICINE

## 2018-06-18 PROCEDURE — 25000128 H RX IP 250 OP 636: Performed by: EMERGENCY MEDICINE

## 2018-06-18 PROCEDURE — 85027 COMPLETE CBC AUTOMATED: CPT | Performed by: NURSE PRACTITIONER

## 2018-06-18 PROCEDURE — 25000132 ZZH RX MED GY IP 250 OP 250 PS 637: Performed by: NURSE PRACTITIONER

## 2018-06-18 PROCEDURE — 71045 X-RAY EXAM CHEST 1 VIEW: CPT

## 2018-06-18 RX ORDER — HEPARIN SODIUM 5000 [USP'U]/.5ML
5000 INJECTION, SOLUTION INTRAVENOUS; SUBCUTANEOUS EVERY 12 HOURS
Status: DISCONTINUED | OUTPATIENT
Start: 2018-06-18 | End: 2018-06-21 | Stop reason: HOSPADM

## 2018-06-18 RX ORDER — ALBUTEROL SULFATE 0.83 MG/ML
2.5 SOLUTION RESPIRATORY (INHALATION)
Status: DISCONTINUED | OUTPATIENT
Start: 2018-06-18 | End: 2018-06-21

## 2018-06-18 RX ORDER — PIPERACILLIN SODIUM, TAZOBACTAM SODIUM 3; .375 G/15ML; G/15ML
3.38 INJECTION, POWDER, LYOPHILIZED, FOR SOLUTION INTRAVENOUS ONCE
Status: COMPLETED | OUTPATIENT
Start: 2018-06-18 | End: 2018-06-18

## 2018-06-18 RX ORDER — PROCHLORPERAZINE 25 MG
25 SUPPOSITORY, RECTAL RECTAL EVERY 12 HOURS PRN
Status: DISCONTINUED | OUTPATIENT
Start: 2018-06-18 | End: 2018-06-21 | Stop reason: HOSPADM

## 2018-06-18 RX ORDER — FOLIC ACID 1 MG/1
1 TABLET ORAL DAILY
Status: DISCONTINUED | OUTPATIENT
Start: 2018-06-19 | End: 2018-06-21 | Stop reason: HOSPADM

## 2018-06-18 RX ORDER — MULTIPLE VITAMINS W/ MINERALS TAB 9MG-400MCG
1 TAB ORAL DAILY
Status: DISCONTINUED | OUTPATIENT
Start: 2018-06-19 | End: 2018-06-21 | Stop reason: HOSPADM

## 2018-06-18 RX ORDER — NALOXONE HYDROCHLORIDE 0.4 MG/ML
.1-.4 INJECTION, SOLUTION INTRAMUSCULAR; INTRAVENOUS; SUBCUTANEOUS
Status: DISCONTINUED | OUTPATIENT
Start: 2018-06-18 | End: 2018-06-21 | Stop reason: HOSPADM

## 2018-06-18 RX ORDER — ONDANSETRON 2 MG/ML
4 INJECTION INTRAMUSCULAR; INTRAVENOUS EVERY 6 HOURS PRN
Status: DISCONTINUED | OUTPATIENT
Start: 2018-06-18 | End: 2018-06-21 | Stop reason: HOSPADM

## 2018-06-18 RX ORDER — NICOTINE 21 MG/24HR
1 PATCH, TRANSDERMAL 24 HOURS TRANSDERMAL DAILY
Status: DISCONTINUED | OUTPATIENT
Start: 2018-06-19 | End: 2018-06-21 | Stop reason: HOSPADM

## 2018-06-18 RX ORDER — ROPINIROLE 1 MG/1
1 TABLET, FILM COATED ORAL 3 TIMES DAILY
Status: DISCONTINUED | OUTPATIENT
Start: 2018-06-18 | End: 2018-06-21 | Stop reason: HOSPADM

## 2018-06-18 RX ORDER — SODIUM CHLORIDE 9 MG/ML
1000 INJECTION, SOLUTION INTRAVENOUS CONTINUOUS
Status: DISCONTINUED | OUTPATIENT
Start: 2018-06-18 | End: 2018-06-18

## 2018-06-18 RX ORDER — SODIUM CHLORIDE, SODIUM LACTATE, POTASSIUM CHLORIDE, CALCIUM CHLORIDE 600; 310; 30; 20 MG/100ML; MG/100ML; MG/100ML; MG/100ML
INJECTION, SOLUTION INTRAVENOUS CONTINUOUS
Status: DISCONTINUED | OUTPATIENT
Start: 2018-06-18 | End: 2018-06-19

## 2018-06-18 RX ORDER — HYDROXYZINE HYDROCHLORIDE 25 MG/1
50 TABLET, FILM COATED ORAL EVERY 6 HOURS PRN
Status: DISCONTINUED | OUTPATIENT
Start: 2018-06-18 | End: 2018-06-21 | Stop reason: HOSPADM

## 2018-06-18 RX ORDER — ONDANSETRON 4 MG/1
4 TABLET, ORALLY DISINTEGRATING ORAL EVERY 6 HOURS PRN
Status: DISCONTINUED | OUTPATIENT
Start: 2018-06-18 | End: 2018-06-21 | Stop reason: HOSPADM

## 2018-06-18 RX ORDER — ACETAMINOPHEN 325 MG/1
650 TABLET ORAL EVERY 4 HOURS PRN
Status: DISCONTINUED | OUTPATIENT
Start: 2018-06-18 | End: 2018-06-21 | Stop reason: HOSPADM

## 2018-06-18 RX ORDER — IPRATROPIUM BROMIDE AND ALBUTEROL SULFATE 2.5; .5 MG/3ML; MG/3ML
3 SOLUTION RESPIRATORY (INHALATION)
Status: DISCONTINUED | OUTPATIENT
Start: 2018-06-18 | End: 2018-06-19

## 2018-06-18 RX ORDER — PROCHLORPERAZINE MALEATE 5 MG
10 TABLET ORAL EVERY 6 HOURS PRN
Status: DISCONTINUED | OUTPATIENT
Start: 2018-06-18 | End: 2018-06-21 | Stop reason: HOSPADM

## 2018-06-18 RX ORDER — GABAPENTIN 100 MG/1
200 CAPSULE ORAL 2 TIMES DAILY
Status: DISCONTINUED | OUTPATIENT
Start: 2018-06-18 | End: 2018-06-21 | Stop reason: HOSPADM

## 2018-06-18 RX ORDER — LIDOCAINE 40 MG/G
CREAM TOPICAL
Status: DISCONTINUED | OUTPATIENT
Start: 2018-06-18 | End: 2018-06-21 | Stop reason: HOSPADM

## 2018-06-18 RX ORDER — TRAMADOL HYDROCHLORIDE 50 MG/1
50 TABLET ORAL EVERY 6 HOURS PRN
Status: DISCONTINUED | OUTPATIENT
Start: 2018-06-18 | End: 2018-06-21 | Stop reason: HOSPADM

## 2018-06-18 RX ORDER — PIPERACILLIN SODIUM, TAZOBACTAM SODIUM 3; .375 G/15ML; G/15ML
3.38 INJECTION, POWDER, LYOPHILIZED, FOR SOLUTION INTRAVENOUS EVERY 6 HOURS
Status: DISCONTINUED | OUTPATIENT
Start: 2018-06-19 | End: 2018-06-19

## 2018-06-18 RX ADMIN — ROPINIROLE HYDROCHLORIDE 1 MG: 1 TABLET, FILM COATED ORAL at 21:41

## 2018-06-18 RX ADMIN — IPRATROPIUM BROMIDE AND ALBUTEROL SULFATE 3 ML: .5; 3 SOLUTION RESPIRATORY (INHALATION) at 21:08

## 2018-06-18 RX ADMIN — SODIUM CHLORIDE 1000 ML: 900 INJECTION, SOLUTION INTRAVENOUS at 15:50

## 2018-06-18 RX ADMIN — HYDROXYZINE HYDROCHLORIDE 50 MG: 25 TABLET ORAL at 21:35

## 2018-06-18 RX ADMIN — SODIUM CHLORIDE, POTASSIUM CHLORIDE, SODIUM LACTATE AND CALCIUM CHLORIDE: 600; 310; 30; 20 INJECTION, SOLUTION INTRAVENOUS at 21:41

## 2018-06-18 RX ADMIN — HEPARIN SODIUM 5000 UNITS: 5000 INJECTION, SOLUTION INTRAVENOUS; SUBCUTANEOUS at 21:35

## 2018-06-18 RX ADMIN — TRAMADOL HYDROCHLORIDE 50 MG: 50 TABLET, COATED ORAL at 21:34

## 2018-06-18 RX ADMIN — PIPERACILLIN SODIUM AND TAZOBACTAM SODIUM 3.38 G: 3; .375 INJECTION, POWDER, LYOPHILIZED, FOR SOLUTION INTRAVENOUS at 19:11

## 2018-06-18 RX ADMIN — VANCOMYCIN HYDROCHLORIDE 1750 MG: 1 INJECTION, POWDER, LYOPHILIZED, FOR SOLUTION INTRAVENOUS at 19:58

## 2018-06-18 RX ADMIN — GABAPENTIN 200 MG: 100 CAPSULE ORAL at 21:35

## 2018-06-18 RX ADMIN — VANCOMYCIN HYDROCHLORIDE 125 MG: 100 INJECTION, POWDER, LYOPHILIZED, FOR SOLUTION INTRAVENOUS at 21:49

## 2018-06-18 RX ADMIN — SODIUM CHLORIDE 1000 ML: 9 INJECTION, SOLUTION INTRAVENOUS at 17:13

## 2018-06-18 RX ADMIN — Medication 1 MG: at 21:35

## 2018-06-18 ASSESSMENT — ENCOUNTER SYMPTOMS
COLOR CHANGE: 0
EYE REDNESS: 0
VOMITING: 1
DIFFICULTY URINATING: 0
LIGHT-HEADEDNESS: 1
DIZZINESS: 1
NECK STIFFNESS: 0
SHORTNESS OF BREATH: 1
FEVER: 0
COUGH: 1
ABDOMINAL PAIN: 1
HEADACHES: 0
DIARRHEA: 0
CONFUSION: 0
ARTHRALGIAS: 1

## 2018-06-18 NOTE — PHARMACY-VANCOMYCIN DOSING SERVICE
Pharmacy Vancomycin Initial Note  Date of Service 2018  Patient's  1965  52 year old, female, ABW 70 kg    Indication: Sepsis    Current estimated CrCl = Estimated Creatinine Clearance: 20.1 mL/min (based on Cr of 2.97).    Creatinine for last 3 days  2018:  3:31 PM Creatinine 2.97 mg/dL    Recent Vancomycin Level(s) for last 3 days  No results found for requested labs within last 72 hours.      Vancomycin IV Administrations (past 72 hours)      No vancomycin orders with administrations in past 72 hours.                Nephrotoxins and other renal medications (Future)    Start     Dose/Rate Route Frequency Ordered Stop    18 1732  piperacillin-tazobactam (ZOSYN) 3.375 g vial to attach to  mL bag      3.375 g  over 30 Minutes Intravenous ONCE 18 1731            Contrast Orders - past 72 hours     None                Plan:  1.  Start vancomycin  1750 mg IV x1 (25 mg/kg loading dose), followed intermittent dosing based on levels, given current LALA. Expect to resume maintenance dosing of 1500 mg q12h once SCr returns to baseline of ~0.7 mg/L. Will check 24-hr level tomorrow  @1800.  2.  Goal Trough Level: 15-20 mg/L   3.  Pharmacy will check trough levels as appropriate in 1-3 Days.    4. Serum creatinine levels will be ordered daily for the first week of therapy and at least twice weekly for subsequent weeks.    5. [place] method utilized to dose vancomycin therapy: Method 2    Zenon Kat, TamikoD, BCPS

## 2018-06-18 NOTE — IP AVS SNAPSHOT
[address] [address]    Phone:  [phone]                                       After Visit Summary   6/18/2018    Inga Ledesma    MRN: [id number]           After Visit Summary Signature Page     I have received my discharge instructions, and my questions have been answered. I have discussed any challenges I see with this plan with the nurse or doctor.    ..........................................................................................................................................  Patient/Patient Representative Signature      ..........................................................................................................................................  Patient Representative Print Name and Relationship to Patient    ..................................................               ................................................  Date                                            Time    ..........................................................................................................................................  Reviewed by Signature/Title    ...................................................              ..............................................  Date                                                            Time

## 2018-06-18 NOTE — ED PROVIDER NOTES
History     Chief Complaint   Patient presents with     Shortness of Breath     HPI  Inga Ledesma is a 52 year old female with a history of alcohol dependence w/ hx of withdrawal seizures, hypertension, COPD, and diastolic HF w/ preserved EF, who presents to the Emergency Department by ambulance for evaluation of shortness of breath. Patient reports that she was out of work for about a week due to the humidity outside exacerbating her COPD. She was outside running errands yesterday when she had onset of dizziness, light headedness and blurry vision. She subsequently [facility] for a note to allow her to return to work. Patient notes that she awoke this morning for work with shortness of breath which progressed throughout her time at work prompting her arrival. Patient does endorse dull chest cramping with this which is positional as well as dull abdominal cramping. Patient also complains of a nonproductive cough to the point of vomiting a few times yesterday but denies any episodes today. She notes that cough was productive five days ago. She also complains of a bilateral jaw pain with mastication. She denies any fevers or diarrhea. Patient was recently diagnosed with Clostridium difficile 2 week ago and was treated for this. She reports that bowel movements have mostly been normal now and is passing 6 to 7 stools a day which are brown colored. She denies any recent falls and notes that she ambulates with a cane at home. Patient endorses alcohol use daily for the past week which is mostly vodka. She has experienced withdrawal symptoms in the past including tremors, DT and seizures. Her last DT was in 2004 and she was treated afterwards and maintained sobriety for about 2 years. She had withdrawal seizures 2 years ago.     I have reviewed the Medications, Allergies, Past Medical and Surgical History, and Social History in the [facility] system.    PAST MEDICAL HISTORY:   Past Medical History:   Diagnosis  Date     Alcohol abuse, in remission      Alcohol withdrawal seizure (H) 2016     Cancer of labia majora (H) 1987     Cervical dysplasia 1987     Congestive heart failure (H) 5/16/16     COPD (chronic obstructive pulmonary disease) (H) 1/24/2011     CVA (cerebral infarction) 1/2012     Dependent edema      Depression, major      Depressive disorder 1965     GERD (gastroesophageal reflux disease)      Hyperlipidemia LDL goal < 160      Hypertension      Iron deficiency anemia      RLS (restless legs syndrome)      Sacroiliitis (H)     steroid injections ineffective, chronic low back pain     Tobacco abuse      Uncomplicated asthma      Vitamin D deficiencies        PAST SURGICAL HISTORY:   Past Surgical History:   Procedure Laterality Date     AS BIOPSY/EXCISION LYMPH NODE OPEN SUPERFICIAL       COLONOSCOPY       EYE SURGERY       HYSTERECTOMY  2010     HYSTERECTOMY TOTAL ABDOMINAL  7/28/10    Bilateral salpingectomy.  ovaries conserved.     LASER TX, CERVICAL  1987     LYMPH NODE BIOPSY  2007    inguinal     LYSIS OF LABIAL LESION(S)  1985, 1987       FAMILY HISTORY:   Family History   Problem Relation Age of Onset     Depression/Anxiety Mother      Asthma Mother      CEREBROVASCULAR DISEASE Father      Hypertension Father      Lung Cancer Father      Breast Cancer Paternal Aunt      Other Cancer Other      Depression Other      Anxiety Disorder Other      MENTAL ILLNESS Other      Substance Abuse Other      Asthma Other      Obesity Sister      Obesity Son        SOCIAL HISTORY:   Social History   Substance Use Topics     Smoking status: Current Every Day Smoker     Packs/day: 0.25     Years: 34.00     Types: Cigarettes     Start date: 11/1/1979     Last attempt to quit: 10/3/2012     Smokeless tobacco: Never Used      Comment: 5 cigarettes daily     Alcohol use Yes      Comment: 1 pint of vodka per day, last drink 6/17/18     Current Facility-Administered Medications   Medication     acetaminophen (TYLENOL) tablet  650 mg     albuterol neb solution 2.5 mg     fluticasone-vilanterol (BREO ELLIPTA) 200-25 MCG/INH oral inhaler 1 puff     folic acid (FOLVITE) tablet 1 mg     gabapentin (NEURONTIN) capsule 200 mg     heparin sodium PF injection 5,000 Units     hydrOXYzine (ATARAX) tablet 50 mg     ipratropium - albuterol 0.5 mg/2.5 mg/3 mL (DUONEB) neb solution 3 mL     lactated ringers infusion     lidocaine (LMX4) kit     lidocaine 1 % 1 mL     melatonin tablet 1 mg     multivitamin, therapeutic with minerals (THERA-VIT-M) tablet 1 tablet     naloxone (NARCAN) injection 0.1-0.4 mg     nicotine (NICODERM CQ) 14 MG/24HR 24 hr patch 1 patch     nicotine Patch in Place     nicotine patch REMOVAL     omeprazole (priLOSEC) CR capsule 20 mg     ondansetron (ZOFRAN-ODT) ODT tab 4 mg    Or     ondansetron (ZOFRAN) injection 4 mg     piperacillin-tazobactam (ZOSYN) 3.375 g vial to attach to  mL bag     prochlorperazine (COMPAZINE) injection 10 mg    Or     prochlorperazine (COMPAZINE) tablet 10 mg    Or     prochlorperazine (COMPAZINE) Suppository 25 mg     rOPINIRole (REQUIP) tablet 1 mg     sodium chloride (PF) 0.9% PF flush 3 mL     sodium chloride (PF) 0.9% PF flush 3 mL     traMADol (ULTRAM) tablet 50 mg     vancomycin (VANOCIN) solution 125 mg        Allergies   Allergen Reactions     Codeine Nausea     Influenza Vaccines      Other reaction(s): Other - Describe In Comment Field -- patient is sensitive to eggs     Reglan [Metoclopramide Hcl] Other (See Comments)     Body tenses up       Review of Systems   Constitutional: Negative for fever.   HENT: Negative for congestion.    Eyes: Negative for redness.        Positive for blurry vision   Respiratory: Positive for cough (non productive) and shortness of breath.    Cardiovascular: Positive for chest pain (dull cramping).   Gastrointestinal: Positive for abdominal pain (cramping) and vomiting (from coughing ). Negative for diarrhea.   Genitourinary: Negative for difficulty  urinating.   Musculoskeletal: Positive for arthralgias (bilateral jaw pain). Negative for neck stiffness.   Skin: Negative for color change.   Neurological: Positive for dizziness and light-headedness. Negative for headaches.   Psychiatric/Behavioral: Negative for confusion.   All other systems reviewed and are negative.      Physical Exam   BP: (!) 63/33  Heart Rate: 101  Temp: 97.4  F (36.3  C)  Resp: 20  Weight: 70.3 kg (155 lb)  SpO2: 98 %      Physical Exam   Constitutional: She appears well-developed and well-nourished. No distress.   HENT:   Head: Atraumatic.   Mouth/Throat: Oropharynx is clear and moist. No oropharyngeal exudate.   Eyes: Pupils are equal, round, and reactive to light. No scleral icterus.   Cardiovascular: Normal rate, regular rhythm, normal heart sounds and intact distal pulses.    Pulmonary/Chest: Effort normal and breath sounds normal. No respiratory distress.   Abdominal: Soft. Bowel sounds are normal. There is no tenderness.   Musculoskeletal: Normal range of motion. She exhibits no edema or tenderness.   Neurological: She is alert. She has normal strength. Coordination normal.   Skin: Skin is warm and dry. No rash noted. She is not diaphoretic.   Psychiatric: She has a normal mood and affect. Her behavior is normal.   Nursing note and vitals reviewed.      ED Course     ED Course     Procedures             EKG Interpretation:      Interpreted by CHRISTOPHE MILTON MD  Time reviewed:   Symptoms at time of EKG: hypotension   Rhythm: normal sinus   Rate: 83  Axis: normal  Ectopy: none  Conduction: normal  ST Segments/ T Waves: No ST-T wave changes  Q Waves: none  Comparison to prior: Previously sinus tachycardia on 5/28/18    Clinical Impression: normal EKG    Results for orders placed or performed during the hospital encounter of 06/18/18   Chest  XR, 1 view portable    Narrative    XR CHEST PORT 1 VW 6/18/2018 3:54 PM    History: SOA;     Comparison: 5/28/2018    Findings: Single AP view of the  chest. Heart size is within normal  limits. No focal airspace opacities. No pleural effusion or  pneumothorax.      Impression    Impression: No acute airspace disease.    I have personally reviewed the examination and initial interpretation  and I agree with the findings.    YESSY VILLASEÑOR MD   Abd/pelvis CT no contrast - [facility] Protocol    Narrative    Examination:  CT ABDOMEN PELVIS W/O CONTRAST 6/18/2018 6:54 PM     History: Back pain, acute kidney injury and    Comparison: CT of the abdomen and pelvis 5/29/2018    Technique: CT of the abdomen and pelvis were obtained without  contrast. Sagittal and coronal reconstructions created and reviewed.    Findings:   Limited evaluation of the solid organs without contrast. Mild  hypoattenuation of the hepatic parenchyma. The spleen, left adrenal  gland, pancreas, and gallbladder are unremarkable. There is a nodule  of the right adrenal gland with density measuring less than 10  Hounsfield units measuring 2.3 x 2.1 cm, consistent with adrenal  adenoma. No renal calculi, hydronephrosis, or ureteral stones are  noted. The urinary bladder is decompressed, otherwise unremarkable.    The small and large bowel are normal in caliber. The appendix is  visualized and unremarkable. Moderate colonic diverticula without  surrounding inflammatory changes. No abdominal free fluid or free air.  Surgical clips present in the right inguinal canal. No retroperitoneal  or mesenteric lymphadenopathy.    The lung bases are clear.    Stranding over the left iliac crest persists, lasts pronounced than  previous CT. Degenerative changes in the normal lumbar spine.      Impression    Impression:   1. No findings to explain back pain. No hydronephrosis or renal  calculi.  2. Hepatic steatosis.  3. Right adrenal adenoma, stable.  4. Nonspecific inflammatory stranding over the left iliac crest, less  pronounced than previous CT. Finding may represent evolving hematoma  or cellulitis.    I have  personally reviewed the examination and initial interpretation  and I agree with the findings.    YESSY VILLASEÑOR MD   CBC with platelets differential   Result Value Ref Range    WBC 10.6 4.0 - 11.0 10e9/L    RBC Count 4.07 3.8 - 5.2 10e12/L    Hemoglobin 12.7 11.7 - 15.7 g/dL    Hematocrit 36.1 35.0 - 47.0 %    MCV 89 78 - 100 fl    MCH 31.2 26.5 - 33.0 pg    MCHC 35.2 31.5 - 36.5 g/dL    RDW 15.2 (H) 10.0 - 15.0 %    Platelet Count 180 150 - 450 10e9/L    Diff Method Automated Method     % Neutrophils 79.2 %    % Lymphocytes 14.1 %    % Monocytes 4.2 %    % Eosinophils 2.0 %    % Basophils 0.3 %    % Immature Granulocytes 0.2 %    Nucleated RBCs 0 0 /100    Absolute Neutrophil 8.4 (H) 1.6 - 8.3 10e9/L    Absolute Lymphocytes 1.5 0.8 - 5.3 10e9/L    Absolute Monocytes 0.4 0.0 - 1.3 10e9/L    Absolute Eosinophils 0.2 0.0 - 0.7 10e9/L    Absolute Basophils 0.0 0.0 - 0.2 10e9/L    Abs Immature Granulocytes 0.0 0 - 0.4 10e9/L    Absolute Nucleated RBC 0.0    Comprehensive metabolic panel   Result Value Ref Range    Sodium 124 (L) 133 - 144 mmol/L    Potassium 3.2 (L) 3.4 - 5.3 mmol/L    Chloride 90 (L) 94 - 109 mmol/L    Carbon Dioxide 21 20 - 32 mmol/L    Anion Gap 14 3 - 14 mmol/L    Glucose 142 (H) 70 - 99 mg/dL    Urea Nitrogen 35 (H) 7 - 30 mg/dL    Creatinine 2.97 (H) 0.52 - 1.04 mg/dL    GFR Estimate 17 (L) >60 mL/min/1.7m2    GFR Estimate If Black 20 (L) >60 mL/min/1.7m2    Calcium 9.0 8.5 - 10.1 mg/dL    Bilirubin Total 0.5 0.2 - 1.3 mg/dL    Albumin 4.0 3.4 - 5.0 g/dL    Protein Total 7.5 6.8 - 8.8 g/dL    Alkaline Phosphatase 124 40 - 150 U/L    ALT 49 0 - 50 U/L    AST 36 0 - 45 U/L   Lipase   Result Value Ref Range    Lipase 595 (H) 73 - 393 U/L   Lactic acid   Result Value Ref Range    Lactic Acid 2.1 (H) 0.7 - 2.0 mmol/L   Magnesium   Result Value Ref Range    Magnesium 1.6 1.6 - 2.3 mg/dL   TSH with free T4 reflex   Result Value Ref Range    TSH 1.02 0.40 - 4.00 mU/L   Troponin I   Result Value Ref Range     Troponin I ES <0.015 0.000 - 0.045 ug/L   Procalcitonin   Result Value Ref Range    Procalcitonin 11.53 (HH) ng/ml   INR   Result Value Ref Range    INR 0.94 0.86 - 1.14   Alcohol   Result Value Ref Range    Ethanol g/dL <0.01 <0.01 g/dL   UA with Microscopic reflex to Culture   Result Value Ref Range    Color Urine Light Yellow     Appearance Urine Clear     Glucose Urine Negative NEG^Negative mg/dL    Bilirubin Urine Negative NEG^Negative    Ketones Urine Negative NEG^Negative mg/dL    Specific Gravity Urine 1.006 1.003 - 1.035    Blood Urine Trace (A) NEG^Negative    pH Urine 5.5 5.0 - 7.0 pH    Protein Albumin Urine 10 (A) NEG^Negative mg/dL    Urobilinogen mg/dL Normal 0.0 - 2.0 mg/dL    Nitrite Urine Negative NEG^Negative    Leukocyte Esterase Urine Negative NEG^Negative    Source Unspecified Urine     WBC Urine <1 0 - 5 /HPF    RBC Urine <1 0 - 2 /HPF    Bacteria Urine Few (A) NEG^Negative /HPF    Squamous Epithelial /HPF Urine 1 0 - 1 /HPF    Mucous Urine Present (A) NEG^Negative /LPF   Lactic acid   Result Value Ref Range    Lactic Acid 1.1 0.7 - 2.0 mmol/L   Basic metabolic panel   Result Value Ref Range    Sodium 129 (L) 133 - 144 mmol/L    Potassium 3.3 (L) 3.4 - 5.3 mmol/L    Chloride 98 94 - 109 mmol/L    Carbon Dioxide 19 (L) 20 - 32 mmol/L    Anion Gap 12 3 - 14 mmol/L    Glucose 105 (H) 70 - 99 mg/dL    Urea Nitrogen 33 (H) 7 - 30 mg/dL    Creatinine 2.11 (H) 0.52 - 1.04 mg/dL    GFR Estimate 25 (L) >60 mL/min/1.7m2    GFR Estimate If Black 30 (L) >60 mL/min/1.7m2    Calcium 7.9 (L) 8.5 - 10.1 mg/dL   CBC with platelets   Result Value Ref Range    WBC 8.3 4.0 - 11.0 10e9/L    RBC Count 3.51 (L) 3.8 - 5.2 10e12/L    Hemoglobin 11.0 (L) 11.7 - 15.7 g/dL    Hematocrit 32.0 (L) 35.0 - 47.0 %    MCV 91 78 - 100 fl    MCH 31.3 26.5 - 33.0 pg    MCHC 34.4 31.5 - 36.5 g/dL    RDW 15.3 (H) 10.0 - 15.0 %    Platelet Count 133 (L) 150 - 450 10e9/L   Fractional excretion of sodium   Result Value Ref Range     Creatinine Urine 82 mg/dL    Sodium Urine mmol/L 15 mmol/L    %FENA 0.3 %   EKG 12-lead, tracing only   Result Value Ref Range    Interpretation ECG Click View Image link to view waveform and result    Blood culture   Result Value Ref Range    Specimen Description Blood Left Arm     Special Requests Aerobic and anaerobic bottles received     Culture Micro PENDING           Critical Care time:    The patient does not have any SIRS criteria but does have a mildly elevated lactate level and a markedly elevated pro calcitonin level.  As a result, patient treated for sepsis with Zosyn and vancomycin.    3 Hour Severe Sepsis Bundle Completion:  1. Initial Lactic Acid Result:   Recent Labs   Lab Test  06/18/18   1825  06/18/18   1531  05/28/18   1943   LACT  1.1  2.1*  1.7     2. Blood Cultures before Antibiotics: Yes  3. Broad Spectrum Antibiotics Administered: Yes     Anti-infectives (Future)    Start     Dose/Rate Route Frequency Ordered Stop    06/19/18 0200  piperacillin-tazobactam (ZOSYN) 3.375 g vial to attach to  mL bag      3.375 g  over 30 Minutes Intravenous EVERY 6 HOURS 06/18/18 2040      06/18/18 2045  vancomycin (VANOCIN) solution 125 mg      125 mg Oral 4 TIMES DAILY 06/18/18 2040          4. 2000 ml of IV fluids.  Ideal body weight: 47.8 kg (105 lb 6.1 oz)  Adjusted ideal body weight: 56.8 kg (125 lb 3.7 oz)    Severe Sepsis reassessment:  1. Repeat Lactic Acid Level: 1.1  2. MAP>65 after initial IVF bolus, will continue to monitor fluid status and vital signs              Assessments & Plan (with Medical Decision Making)   52 year old female with history of alcohol dependence, recent C. difficile colitis, and recent hospital admission for acute kidney injury to the emergency department with 2 days of lightheadedness.  The patient was found to be hypotensive upon arrival in the emergency department.  She had systolic blood pressure readings of 60s-70s but was awake, alert, and conversant.  IV was  established and IV fluids were given with normalization of her blood pressure.  The patient again has evidence for acute kidney injury with a creatinine that is worse than her previous admission.  On her previous admission, infectious evaluation was negative.  Today, the patient does have a mildly elevated lactate level and a markedly elevated pro calcitonin level.  As a result, cultures have been obtained and patient has been given Zosyn and vancomycin.  Her blood pressures normalized with IV fluids.  She will be admitted to the medicine service on the stepdown unit for further evaluation and management.    I have reviewed the nursing notes.    I have reviewed the findings, diagnosis, plan and need for follow up with the patient.    Current Discharge Medication List          Final diagnoses:   LALA (acute kidney injury) (H)   Elevated procalcitonin   Hyponatremia     I, Micaela Henley, am serving as a trained medical scribe to document services personally performed by Geovani Murray MD, based on the provider's statements to me.   IGeovani MD, was physically present and have reviewed and verified the accuracy of this note documented by Micaela Henley.    6/18/2018   [facility], EMERGENCY DEPARTMENT     Geovani Murray MD  06/19/18 0004

## 2018-06-18 NOTE — LETTER
Transition Communication Hand-off for Care Transitions to Next Level of Care Provider    Name: Inga Ledesma  : 1965  MRN #: [id number]  Primary Care Provider: Min Toledo     Primary Clinic: [address] [place] [address]     Reason for Hospitalization:  Hyponatremia [E87.1]  LALA (acute kidney injury) (H) [N17.9]  Elevated procalcitonin [R79.89]  Admit Date/Time: 2018  3:25 PM  Discharge Date: 2018   Payor Source: Payor: PREF[employer]EDONE / Plan: [employer] [place] [employer] / Product Type: HMO /          Reason for Communication Hand-off Referral: Fragility  Has declined follow up for sobriety at [facility].  Rule 25 done out pt.      Discharge Needs Assessment:  Needs       Most Recent Value    Anticipated Changes Related to Illness none        Follow-up plan:  Future Appointments  Date Time Provider Department Center   2018 4:40 PM Min Toledo PA-C [facility]       Any outstanding tests or procedures:        Referrals     Future Labs/Procedures    Medication Therapy Management Referral     Comments:    MTM referral reason            Patient had a hospital or ED visit in last 6 months and has more than 10   PTA or Discharge medications       This service is designed to help you get the most from your medications.  A specially trained pharmacist will work closely with you and your doctors  to solve any problems related to your medications and to help you get the   best results from taking them.      The Medication Therapy Management staff will call you to schedule an appointment.            Sarina Martinez    AVS/Discharge Summary is the source of truth; this is a helpful guide for improved communication of patient story

## 2018-06-18 NOTE — ED TRIAGE NOTES
Inga presents by ambulance with c/o SOB, dizziness and low blood pressure for last several days. She states she was hospitalized recently and just went back to work today and became weak, dizzy and lightheaded. She is hypotensive in triage and lethargic. Reports a history of COPD, CHF and daily ETOH use, last drink yesterday.

## 2018-06-18 NOTE — TELEPHONE ENCOUNTER
No answer-per Epic, patient is in the ED today.  
Patient wants to be seen for a blood pressure med check today. Since yesterday patient has had low blood pressure. Not sure if she has been taking all her meds.   
Soonest I can work her in is tomorrow.   
Spoke with patient and she would like to see Min today any time after 3:30, due to her BP running low.  She did not take her BP meds today, she is feeling some what dizzy or light headed.  BP yesterday at  97/69, 102/64 and today is 86/55  She was seen for  Chronic bronchitis.  Mira Wright RN    
2.2

## 2018-06-18 NOTE — IP AVS SNAPSHOT
MRN:[id number]                      After Visit Summary   6/18/2018    Inga Ledesma    MRN: [id number]           Thank you!     Thank you for choosing [facility] for your care. Our goal is always to provide you with excellent care. Hearing back from our patients is one way we can continue to improve our services. Please take a few minutes to complete the written survey that you may receive in the mail after you visit with us. Thank you!        Patient Information     Date Of Birth          1965        Designated Caregiver       Most Recent Value    Caregiver    Will someone help with your care after discharge? no      About your hospital stay     You were admitted on:  June 18, 2018 You last received care in the:  [address] [facility] [place]    You were discharged on:  June 21, 2018        Reason for your hospital stay       Admitted for Hypotension from dehydration. Resolved with IVF                  Who to Call     For medical emergencies, please call 911.  For non-urgent questions about your medical care, please call your primary care provider or clinic, [phone]          Attending Provider     Provider Specialty    Geovani Murray MD Emergency Medicine    Stephanie Gomez MD Internal Medicine    Claudine Ramos MD Internal Medicine       Primary Care Provider Office Phone # Fax #    Min Toledo PA-C [phone]      After Care Instructions     Activity       Your activity upon discharge: activity as tolerated            Diet       Follow this diet upon discharge: Orders Placed This Encounter      Combination Diet Regular Diet Adult                  Follow-up Appointments     Adult [facility] Follow-up and recommended labs and tests       Follow up with primary care provider, Min Toledo, within 7 days for hospital follow- up.  The following labs/tests are recommended: BMP.      Appointments on [facility] and/or [facility] (with [facility] or [facility] provider or  "service). Call [phone] if you haven't heard regarding these appointments within 7 days of discharge.            Follow Up and recommended labs and tests       BMP in one weeks time                  Additional Services     Medication Therapy Management Referral       MTM referral reason            Patient had a hospital or ED visit in last 6 months and has more than 10   PTA or Discharge medications       This service is designed to help you get the most from your medications.  A specially trained pharmacist will work closely with you and your doctors  to solve any problems related to your medications and to help you get the   best results from taking them.      The Medication Therapy Management staff will call you to schedule an appointment.                  Future tests that were ordered for you     Basic metabolic panel       BMP in one weeks time                  Pending Results     Date and Time Order Name Status Description    6/18/2018 1543 Blood culture Preliminary     6/18/2018 1543 Blood culture Preliminary             Statement of Approval     Ordered          06/21/18 1000  I have reviewed and agree with all the recommendations and orders detailed in this document.  EFFECTIVE NOW     Approved and electronically signed by:  Claudine Ramos MD             Admission Information     Date & Time Provider Department Dept. Phone    6/18/2018 Claudine Ramos MD [address] [facility] [place] [phone]      Your Vitals Were     Blood Pressure Pulse Temperature Respirations Height Weight    136/88 80 97.8  F (36.6  C) (Oral) 18 1.549 m (5' 1\") 73 kg (161 lb)    Last Period Pulse Oximetry BMI (Body Mass Index)             06/02/2010 96% 30.42 kg/m2         [facility] Information     [facility] gives you secure access to your electronic health record. If you see a primary care provider, you can also send messages to your care team and make appointments. If you have questions, please call your primary " care clinic.  If you do not have a primary care provider, please call [phone] and they will assist you.        Care EveryWhere ID     This is your Care EveryWhere ID. This could be used by other organizations to access your [facility] medical records  [id number]        Equal Access to Services     MONYMICHAEL LUIS F : Hadii aad ku hadburtgodwin Socayla, waaxda luqadaha, qaybta kaalmada jadynjennifermarcos, kelsey williamsonlacyvincenzo george. So [facility] [phone].    ATENCIÓN: Si habla español, tiene a cook disposición servicios gratuitos de asistencia lingüística. Llame al [phone].    We comply with applicable federal civil rights laws and [place] laws. We do not discriminate on the basis of race, color, national origin, age, disability, sex, sexual orientation, or gender identity.               Review of your medicines      CONTINUE these medicines which may have CHANGED, or have new prescriptions. If we are uncertain of the size of tablets/capsules you have at home, strength may be listed as something that might have changed.        Dose / Directions    albuterol 108 (90 Base) MCG/ACT Inhaler   Commonly known as:  VENTOLIN HFA   This may have changed:  additional instructions   Used for:  Chronic bronchitis, unspecified chronic bronchitis type (H)        Dose:  1-2 puff   Inhale 1-2 puffs into the lungs every 4 hours as needed for shortness of breath / dyspnea or wheezing   Quantity:  1 Inhaler   Refills:  0       furosemide 20 MG tablet   Commonly known as:  LASIX   This may have changed:  how much to take   Used for:  Essential hypertension with goal blood pressure less than 140/90        Dose:  10 mg   Take 0.5 tablets (10 mg) by mouth daily   Quantity:  30 tablet   Refills:  0       labetalol 100 MG tablet   Commonly known as:  NORMODYNE   This may have changed:    - how much to take  - how to take this  - when to take this  - additional instructions   Used for:  Hypertension goal BP (blood pressure) < 140/90         100mg in am, 200mg in pm.   Quantity:  90 tablet   Refills:  0       potassium chloride SA 10 MEQ CR tablet   Commonly known as:  KLOR-CON   This may have changed:    - medication strength  - how much to take  - when to take this   Used for:  Essential hypertension with goal blood pressure less than 140/90        Dose:  10 mEq   Take 1 tablet (10 mEq) by mouth daily   Quantity:  30 tablet   Refills:  0       rOPINIRole 1 MG tablet   Commonly known as:  REQUIP   This may have changed:  additional instructions   Used for:  RLS (restless legs syndrome)        Dose:  1 mg   Take 1 tablet (1 mg) by mouth 3 times daily   Quantity:  90 tablet   Refills:  0         CONTINUE these medicines which have NOT CHANGED        Dose / Directions    acetaminophen 325 MG tablet   Commonly known as:  TYLENOL        Dose:  325 mg   Take 325 mg by mouth 3 times daily as needed for mild pain   Quantity:  100 tablet   Refills:  0       DULoxetine 30 MG EC capsule   Commonly known as:  CYMBALTA   Used for:  Recurrent major depressive disorder, remission status unspecified (H)        Dose:  60 mg   Take 2 capsules (60 mg) by mouth daily   Quantity:  60 capsule   Refills:  0       FLUOXETINE HCL PO        Dose:  80 mg   Take 80 mg by mouth daily   Refills:  0       fluticasone-salmeterol 500-50 MCG/DOSE diskus inhaler   Commonly known as:  ADVAIR DISKUS   Used for:  Chronic bronchitis, unspecified chronic bronchitis type (H)        Dose:  1 puff   Inhale 1 puff into the lungs every 12 hours   Quantity:  1 Inhaler   Refills:  0       folic acid 1 MG tablet   Commonly known as:  FOLVITE   Used for:  Alcohol dependence with uncomplicated withdrawal (H)        Dose:  1 mg   Take 1 tablet (1 mg) by mouth daily   Quantity:  3 tablet   Refills:  0       gabapentin 300 MG capsule   Commonly known as:  NEURONTIN   Used for:  Major depressive disorder, recurrent episode, mild (H), Sacroiliitis (H)        Take 1 capsule by mouth in the morning, 1  capsule in the afternoon, and 2 capsules at bedtime.   Quantity:  12 capsule   Refills:  0       hydrOXYzine 50 MG tablet   Commonly known as:  ATARAX   Used for:  Sacroiliitis (H)        Dose:  50 mg   Take 1 tablet (50 mg) by mouth every 6 hours as needed for anxiety or other (adjuvant pain)   Quantity:  12 tablet   Refills:  1       ipratropium - albuterol 0.5 mg/2.5 mg/3 mL 0.5-2.5 (3) MG/3ML neb solution   Commonly known as:  DUONEB   Used for:  Chronic bronchitis, unspecified chronic bronchitis type (H)        Dose:  3 mL   Take 1 vial (3 mLs) by nebulization 4 times daily as needed for wheezing   Quantity:  360 mL   Refills:  1       losartan 25 MG tablet   Commonly known as:  COZAAR   Used for:  Hypertension goal BP (blood pressure) < 140/90        Dose:  25 mg   Take 1 tablet (25 mg) by mouth daily   Quantity:  30 tablet   Refills:  0       MAGNESIUM OXIDE PO        Dose:  400 mg   Take 400 mg by mouth daily   Refills:  0       menthol 5 % Ptch   Commonly known as:  ICY HOT   Used for:  Muscle soreness        Dose:  1 patch   Apply 1 patch topically every 8 hours as needed for muscle soreness   Quantity:  30 patch   Refills:  3       multivitamin, therapeutic with minerals Tabs tablet   Used for:  Alcohol dependence with uncomplicated withdrawal (H)        Dose:  1 tablet   Take 1 tablet by mouth daily   Quantity:  3 each   Refills:  0       nicotine 21 MG/24HR 24 hr patch   Commonly known as:  NICODERM CQ        Dose:  1 patch   Place 1 patch onto the skin daily   Quantity:  30 patch   Refills:  0       nicotine polacrilex 2 MG gum   Commonly known as:  NICORETTE        Dose:  2 mg   Place 2 mg inside cheek every hour as needed for smoking cessation   Refills:  0       omeprazole 20 MG CR capsule   Commonly known as:  priLOSEC   Used for:  Alcoholic gastritis with hemorrhage, unspecified chronicity        Dose:  20 mg   Take 1 capsule (20 mg) by mouth 2 times daily (before meals)   Quantity:  60 capsule    Refills:  0       order for DME   Used for:  Alcohol abuse, Fall, initial encounter, Dehydration, 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   Quantity:  3 capsule   Refills:  0       traMADol 50 MG tablet   Commonly known as:  ULTRAM   Used for:  C. difficile colitis        Dose:  50 mg   Take 1 tablet (50 mg) by mouth every 6 hours as needed for moderate pain   Quantity:  6 tablet   Refills:  0         STOP taking     spironolactone 25 MG tablet   Commonly known as:  ALDACTONE                Where to get your medicines      These medications were sent to [facility] [place], [place] - [address] [address]     Phone:  [phone]     furosemide 20 MG tablet    potassium chloride SA 10 MEQ CR tablet                Protect others around you: Learn how to safely use, store and throw away your medicines at www.disposemymeds.org.             Medication List: This is a list of all your medications and when to take them. Check marks below indicate your daily home schedule. Keep this list as a reference.      Medications           Morning Afternoon Evening Bedtime As Needed    acetaminophen 325 MG tablet   Commonly known as:  TYLENOL   Take 325 mg by mouth 3 times daily as needed for mild pain   Last time this was given:  650 mg on 6/20/2018 10:36 PM                                albuterol 108 (90 Base) MCG/ACT Inhaler   Commonly known as:  VENTOLIN HFA   Inhale 1-2 puffs into the lungs every 4 hours as needed for shortness of breath /  dyspnea or wheezing                                DULoxetine 30 MG EC capsule   Commonly known as:  CYMBALTA   Take 2 capsules (60 mg) by mouth daily   Last time this was given:  60 mg on 6/21/2018  9:17 AM                                FLUOXETINE HCL PO   Take 80 mg by mouth daily   Last time this was given:  80 mg on 6/21/2018  9:20 AM                                fluticasone-salmeterol 500-50 MCG/DOSE diskus inhaler   Commonly known as:  ADVAIR DISKUS   Inhale 1 puff into the lungs every 12 hours                                folic acid 1 MG tablet   Commonly known as:  FOLVITE   Take 1 tablet (1 mg) by mouth daily   Last time this was given:  1 mg on 6/21/2018  9:19 AM                                furosemide 20 MG tablet   Commonly known as:  LASIX   Take 0.5 tablets (10 mg) by mouth daily                                gabapentin 300 MG capsule   Commonly known as:  NEURONTIN   Take 1 capsule by mouth in the morning, 1 capsule in the afternoon, and 2 capsules at bedtime.   Last time this was given:  200 mg on 6/21/2018  9:17 AM                                hydrOXYzine 50 MG tablet   Commonly known as:  ATARAX   Take 1 tablet (50 mg) by mouth every 6 hours as needed for anxiety or other (adjuvant pain)   Last time this was given:  50 mg on 6/20/2018 10:36 PM                                ipratropium - albuterol 0.5 mg/2.5 mg/3 mL 0.5-2.5 (3) MG/3ML neb solution   Commonly known as:  DUONEB   Take 1 vial (3 mLs) by nebulization 4 times daily as needed for wheezing   Last time this was given:  3 mLs on 6/19/2018 12:08 PM                                labetalol 100 MG tablet   Commonly known as:  NORMODYNE   100mg in am, 200mg in pm.   Last time this was given:  200 mg on 6/20/2018  8:30 PM                                losartan 25 MG tablet   Commonly known as:  COZAAR   Take 1 tablet (25 mg) by mouth daily   Last time this was given:  25 mg on 6/21/2018  9:18 AM                                 MAGNESIUM OXIDE PO   Take 400 mg by mouth daily   Last time this was given:  400 mg on 6/21/2018  9:18 AM                                menthol 5 % Ptch   Commonly known as:  ICY HOT   Apply 1 patch topically every 8 hours as needed for muscle soreness                                multivitamin, therapeutic with minerals Tabs tablet   Take 1 tablet by mouth daily   Last time this was given:  1 tablet on 6/21/2018  9:19 AM                                nicotine 21 MG/24HR 24 hr patch   Commonly known as:  NICODERM CQ   Place 1 patch onto the skin daily                                nicotine polacrilex 2 MG gum   Commonly known as:  NICORETTE   Place 2 mg inside cheek every hour as needed for smoking cessation                                omeprazole 20 MG CR capsule   Commonly known as:  priLOSEC   Take 1 capsule (20 mg) by mouth 2 times daily (before meals)   Last time this was given:  20 mg on 6/21/2018  9:18 AM                                order for DME   Equipment being ordered: Cane () Treatment Diagnosis: Impaired functional mobility                                OYSTER SHELL CALCIUM PO   Take 500 mg by mouth daily                                potassium chloride SA 10 MEQ CR tablet   Commonly known as:  KLOR-CON   Take 1 tablet (10 mEq) by mouth daily   Last time this was given:  40 mEq on 6/20/2018  9:24 AM                                rOPINIRole 1 MG tablet   Commonly known as:  REQUIP   Take 1 tablet (1 mg) by mouth 3 times daily   Last time this was given:  1 mg on 6/21/2018  9:18 AM                                tiotropium 18 MCG capsule   Commonly known as:  SPIRIVA HANDIHALER   Inhale contents of one capsule daily.                                traMADol 50 MG tablet   Commonly known as:  ULTRAM   Take 1 tablet (50 mg) by mouth every 6 hours as needed for moderate pain   Last time this was given:  50 mg on 6/18/2018  9:34 PM

## 2018-06-18 NOTE — H&P
[facility], [place]    Internal Medicine History and Physical - Gold Service       Date of Admission:  6/18/2018    Assessment & Plan   Inga Ledesma is a 52 year old female  admitted on 6/18/2018. She has a history of hypertension, CVA, COPD, CHF with preserved EF, alcohol abuse and alcohol withdrawal seizures and is admitted for hypotension and acute kidney injury concerning for sepsis vs Clostridium difficile infection.    1) Sepsis vs Clostridium difficile infection - Presented with hypotension, tachycardia and elevated procalcitonin concerning for sepsis.  Alternatively, she was diagnosed with C diff 2 weeks ago with ongoing loose stools and this may represent recurrent C diff with volume depletion the source of her hypotension.  No infiltrated on CXR .  UA pending, BC pending.  CT abdomen pending.  - Started on Vanco and Zosyn in the ED.  Will treat with oral vanco for suspected ongoing C diff as well as IV zosyn alone going forwards given LALA and no evidence for HCAP or history of IV drug use.  Will order follow up CXR in the am to reassess once she is better hydrated.   - Enteric screen and C diff pending.  Of note, C diff results will only be meaningful if negative as a positive result would be expected given the time course.  - CT abdomen pending  - Please follow UA/UC, BC  - LR @ 125 ccs/hr  - Hold home lasix, labetalol, losartan, spironolactone,     2) Hyponatremia - Sodium 124 today from 136 two weeks ago.  Given ongoing diarrhea suspect hypovolemia may be contributing.  - Already received 2L NS now, will recheck sodium.  If not improved, will check serum osms, urine osm and sodium.    3) LALA - Suspect secondary to septic shock vs hypovolemia.  She has been taking lasix in the setting of C diff infection, limited oral intake and heavy alcohol use.  Interestingly BUN/Cr does not suggest a pre-renal etiology.   - UA, urine sodium, FENa.  Last lasix dose was 12h prior.  -  Hydrating as above  - Hold home lasix  - Adjust home meds: Hold Cymbalta, Decrease gabapentin from 300 / 300 / 600 to 200 mg BID.  - Consider renal US in am.    4) Alcohol abuse - She is drinking a pint of vodka daily and reports a history of alcohol withdrawal seizures.  Last drink 6/14.  She was tremulous on 6/15 but has not had any issues since.  - Will hold off on MSSA protocol  - Already on thiamine at home    5) History of COPD - Reports great difficulty over the past week secondary to humidity and heat.  She has a productive cough.  Currently talking in complete sentences and SpO2 100% on room air.  - Continue home COPD regimen: Advair, albuterol, duonebs.  Hold Spiriva while on duonebs.  - Hesitant to start prednisone in the setting of undetermined infection  - Counseled re: smoking cessation    6) History of diastolic heart failure - 12/22 echo found normal LVEF.  Patient reports 6 pillow orthopnea.  - Holding lasix in setting of LALA    7) Cigarette smoking  - Nicotine patch    # Pain Assessment:  - Inga is experiencing pain due to abdominal cramping. Pain management was discussed and the plan was created in a collaborative fashion.  Inga's response to the current recommendations: engaged  - Tylenol      Diet:  Regular  Fluids: LR @ 125 ccs/hr  DVT Prophylaxis: Heparin SQ  Code Status: Full    Disposition Plan   Expected discharge: 4 - 7 days; recommended to prior living arrangement once BC negative, patient stable.     Entered: José Miguel Granados 06/18/2018, 5:39 PM   Information in the above section will display in the discharge planner report.    The patient's care was discussed with the Attending Physician, Dr. Gomez.    José Miguel Granados NP  Internal Medicine Staff Hospitalist Service  [facility]  Pager: [phone]  Please see sticky note for cross cover information  ______________________________________________________________________    Chief Complaint    Lightheadedness, low blood pressure    History is obtained from the patient    History of Present Illness   Inga Ledesma is a 52 year old female  admitted on 6/18/2018. She has a history of hypertension, CVA, COPD, CHF with preserved EF, alcohol abuse and alcohol withdrawal seizures and is admitted for hypotension and acute kidney injury concerning for sepsis vs Clostridium difficile infection.    The patient was just hospitalized here from 5/28-5/31 with Clostridium difficile colitis and was put on Vanco from 5/29-6/10.    The patient reports that on 6/8 she relapsed into drinking a pint of vodka daily, and over the next week drank only vodka and water before she stopped drinking 6/14.  She had shakes the next day but her symptoms resolved the next day.  She also reports severe shortness of breath due to the heat and humidity over the past week.  Whenever she leaves her air conditioned room she has felt short of breath and quickly winded.  She has also had a productive cough and these symptoms have persisted despite staggering her nebulizers so that she is using them every two hours.    Two days ago she had a near-syncopal episode at home after a coughing fit which is unusual for her.  She also had low blood pressures and was seen at urgent care.  Today she went in to work and had another near-syncopal episode while using her walker.  She has been having headaches, trouble focusing and forgetfullness as well today.  She checked her blood pressure at work and over the course of a few hours it fell to a systolic pressure in the 80s.    She denies any fevers, chills, rigors, chest pain, nausea or vomiting.  She has been having ~6 loose stools daily until today, when she had only one.    Review of Systems   The 10 point Review of Systems is negative other than noted in the HPI or here.    Past Medical History    I have reviewed this patient's medical history and updated it with pertinent information if needed.    Past Medical History:   Diagnosis Date     Alcohol abuse, in remission      Alcohol withdrawal seizure (H) 2016     Cancer of labia majora (H) 1987     Cervical dysplasia 1987     Congestive heart failure (H) 5/16/16     COPD (chronic obstructive pulmonary disease) (H) 1/24/2011     CVA (cerebral infarction) 1/2012     Dependent edema      Depression, major      Depressive disorder 1966     GERD (gastroesophageal reflux disease)      Hyperlipidemia LDL goal < 160      Hypertension      Iron deficiency anemia      RLS (restless legs syndrome)      Sacroiliitis (H)     steroid injections ineffective, chronic low back pain     Tobacco abuse      Uncomplicated asthma      Vitamin D deficiencies       Past Surgical History   I have reviewed this patient's surgical history and updated it with pertinent information if needed.  Past Surgical History:   Procedure Laterality Date     AS BIOPSY/EXCISION LYMPH NODE OPEN SUPERFICIAL       COLONOSCOPY       EYE SURGERY       HYSTERECTOMY  2010     HYSTERECTOMY TOTAL ABDOMINAL  7/28/10    Bilateral salpingectomy.  ovaries conserved.     LASER TX, CERVICAL  1987     LYMPH NODE BIOPSY  2007    inguinal     LYSIS OF LABIAL LESION(S)  1985, 1987      Social History   Social History   Substance Use Topics     Smoking status: Current Every Day Smoker     Packs/day: 0.25     Years: 34.00     Types: Cigarettes     Start date: 11/1/1979     Last attempt to quit: 10/3/2012     Smokeless tobacco: Never Used      Comment: 5 cigarettes daily     Alcohol use Yes      Comment: 1 pint of vodka per day, last drink 6/17/18     Family History   I have reviewed this patient's family history and updated it with pertinent information if needed.   Family History   Problem Relation Age of Onset     Depression/Anxiety Mother      Asthma Mother      CEREBROVASCULAR DISEASE Father      Hypertension Father      Lung Cancer Father      Breast Cancer Paternal Aunt      Other Cancer Other      Depression  Other      Anxiety Disorder Other      MENTAL ILLNESS Other      Substance Abuse Other      Asthma Other      Obesity Sister      Obesity Son        Prior to Admission Medications   Prior to Admission Medications   Prescriptions Last Dose Informant Patient Reported? Taking?   DULoxetine (CYMBALTA) 30 MG EC capsule   No No   Sig: Take 2 capsules (60 mg) by mouth daily   acetaminophen (TYLENOL) 325 MG tablet   Yes No   Sig: Take 2 tablets (650 mg) by mouth 3 times daily as needed for mild pain   albuterol (VENTOLIN HFA) 108 (90 Base) MCG/ACT Inhaler   No No   Sig: Inhale 1-2 puffs into the lungs every 4 hours as needed for shortness of breath / dyspnea or wheezing   fluticasone-salmeterol (ADVAIR DISKUS) 500-50 MCG/DOSE diskus inhaler   No No   Sig: Inhale 1 puff into the lungs every 12 hours   folic acid (FOLVITE) 1 MG tablet   No No   Sig: Take 1 tablet (1 mg) by mouth daily   furosemide (LASIX) 20 MG tablet   No No   Sig: Take 1 tablet (20 mg) by mouth daily   gabapentin (NEURONTIN) 300 MG capsule   No No   Sig: Take 1 capsule by mouth in the morning, 1 capsule in the afternoon, and 2 capsules at bedtime.   hydrOXYzine (ATARAX) 50 MG tablet   No No   Sig: Take 1 tablet (50 mg) by mouth every 6 hours as needed for anxiety or other (adjuvant pain)   ipratropium - albuterol 0.5 mg/2.5 mg/3 mL (DUONEB) 0.5-2.5 (3) MG/3ML neb solution   No No   Sig: Take 1 vial (3 mLs) by nebulization 4 times daily as needed for wheezing   labetalol (NORMODYNE) 100 MG tablet   No No   Simg in am, 200mg in pm.   losartan (COZAAR) 25 MG tablet   No No   Sig: Take 1 tablet (25 mg) by mouth daily   menthol (ICY HOT) 5 % PTCH   No No   Sig: Apply 1 patch topically every 8 hours as needed for muscle soreness   multivitamin, therapeutic with minerals (THERA-VIT-M) TABS tablet   No No   Sig: Take 1 tablet by mouth daily   nicotine (NICODERM CQ) 21 MG/24HR 24 hr patch   Yes No   Sig: Place 1 patch onto the skin daily   omeprazole  (PRILOSEC) 20 MG CR capsule   No No   Sig: Take 1 capsule (20 mg) by mouth 2 times daily (before meals)   order for DME   No No   Sig: Equipment being ordered: Cane ()  Treatment Diagnosis: Impaired functional mobility   rOPINIRole (REQUIP) 1 MG tablet   No No   Sig: Take 1 tablet (1 mg) by mouth 3 times daily   spironolactone (ALDACTONE) 25 MG tablet   No No   Sig: Take 1 tablet (25 mg) by mouth daily   tiotropium (SPIRIVA HANDIHALER) 18 MCG capsule   No No   Sig: Inhale contents of one capsule daily.   traMADol (ULTRAM) 50 MG tablet   No No   Sig: Take 1 tablet (50 mg) by mouth every 6 hours as needed for moderate pain      Facility-Administered Medications: None     Allergies   Allergies   Allergen Reactions     Codeine Nausea     Influenza Vaccines      Other reaction(s): Other - Describe In Comment Field -- patient is sensitive to eggs     Reglan [Metoclopramide Hcl] Other (See Comments)     Body tenses up       Physical Exam   Vital Signs: Temp: 97.4  F (36.3  C) Temp src: Oral BP: 105/64   Heart Rate: 71 Resp: 15 SpO2: 100 % O2 Device: None (Room air)    Weight: 155 lbs 0 oz    Physical Exam   Constitutional:   Well nourished, well developed, resting comfortably   Head: Normocephalic and atraumatic.   Eyes: Conjunctivae are normal. Pupils are equal, round, and reactive to light.  Pharynx has no erythema or exudate, mucous membranes are moist  Neck:   No adenopathy, no bony tenderness  Cardiovascular: Regular rate and rhythm without murmurs or gallops  Pulmonary/Chest: Clear to auscultation bilaterally, with no wheezes or retractions. No respiratory distress.  GI: Soft with good bowel sounds.  Mildly tender in upper quadrants. non-distended, with no guarding, no rebound, no peritoneal signs.   Musculoskeletal:  No edema or clubbing   Skin: Skin is warm and dry. No rash noted.   Neurological: Alert and oriented to person, place, and time. Nonfocal exam  Psychiatric:  Normal mood and affect.      Data    Data reviewed today: I reviewed all medications, new labs and imaging results over the last 24 hours. I personally reviewed her CXR.

## 2018-06-19 ENCOUNTER — APPOINTMENT (OUTPATIENT)
Dept: CARDIOLOGY | Facility: CLINIC | Age: 53
DRG: 683 | End: 2018-06-19
Attending: HOSPITALIST
Payer: COMMERCIAL

## 2018-06-19 ENCOUNTER — APPOINTMENT (OUTPATIENT)
Dept: GENERAL RADIOLOGY | Facility: CLINIC | Age: 53
DRG: 683 | End: 2018-06-19
Attending: NURSE PRACTITIONER
Payer: COMMERCIAL

## 2018-06-19 LAB
ANION GAP SERPL CALCULATED.3IONS-SCNC: 11 MMOL/L (ref 3–14)
BUN SERPL-MCNC: 24 MG/DL (ref 7–30)
C COLI+JEJUNI+LARI FUSA STL QL NAA+PROBE: NOT DETECTED
C DIFF TOX B STL QL: NEGATIVE
CALCIUM SERPL-MCNC: 8 MG/DL (ref 8.5–10.1)
CHLORIDE SERPL-SCNC: 102 MMOL/L (ref 94–109)
CO2 SERPL-SCNC: 22 MMOL/L (ref 20–32)
CORTICOSTER 1H P 250 UG ACTH SERPL-SCNC: 33 UG/DL
CORTIS SERPL-MCNC: 12.8 UG/DL (ref 4–22)
CREAT SERPL-MCNC: 0.97 MG/DL (ref 0.52–1.04)
EC STX1 GENE STL QL NAA+PROBE: NOT DETECTED
EC STX2 GENE STL QL NAA+PROBE: NOT DETECTED
ENTERIC PATHOGEN COMMENT: NORMAL
GFR SERPL CREATININE-BSD FRML MDRD: 60 ML/MIN/1.7M2
GLUCOSE SERPL-MCNC: 111 MG/DL (ref 70–99)
NOROV GI+II ORF1-ORF2 JNC STL QL NAA+PR: NOT DETECTED
POTASSIUM SERPL-SCNC: 3.4 MMOL/L (ref 3.4–5.3)
RVA NSP5 STL QL NAA+PROBE: NOT DETECTED
SALMONELLA SP RPOD STL QL NAA+PROBE: NOT DETECTED
SHIGELLA SP+EIEC IPAH STL QL NAA+PROBE: NOT DETECTED
SODIUM SERPL-SCNC: 135 MMOL/L (ref 133–144)
SPECIMEN SOURCE: NORMAL
V CHOL+PARA RFBL+TRKH+TNAA STL QL NAA+PR: NOT DETECTED
Y ENTERO RECN STL QL NAA+PROBE: NOT DETECTED

## 2018-06-19 PROCEDURE — 40000274 ZZH STATISTIC RCP CONSULT EA 30 MIN

## 2018-06-19 PROCEDURE — 87506 IADNA-DNA/RNA PROBE TQ 6-11: CPT | Performed by: NURSE PRACTITIONER

## 2018-06-19 PROCEDURE — 40000275 ZZH STATISTIC RCP TIME EA 10 MIN

## 2018-06-19 PROCEDURE — 36415 COLL VENOUS BLD VENIPUNCTURE: CPT | Performed by: NURSE PRACTITIONER

## 2018-06-19 PROCEDURE — 25000132 ZZH RX MED GY IP 250 OP 250 PS 637: Performed by: NURSE PRACTITIONER

## 2018-06-19 PROCEDURE — 25000125 ZZHC RX 250: Performed by: NURSE PRACTITIONER

## 2018-06-19 PROCEDURE — 82533 TOTAL CORTISOL: CPT | Performed by: HOSPITALIST

## 2018-06-19 PROCEDURE — 25000128 H RX IP 250 OP 636: Performed by: HOSPITALIST

## 2018-06-19 PROCEDURE — 99232 SBSQ HOSP IP/OBS MODERATE 35: CPT | Performed by: HOSPITALIST

## 2018-06-19 PROCEDURE — 94640 AIRWAY INHALATION TREATMENT: CPT | Mod: 76

## 2018-06-19 PROCEDURE — 25000128 H RX IP 250 OP 636: Performed by: NURSE PRACTITIONER

## 2018-06-19 PROCEDURE — 82024 ASSAY OF ACTH: CPT | Performed by: HOSPITALIST

## 2018-06-19 PROCEDURE — 25000132 ZZH RX MED GY IP 250 OP 250 PS 637: Performed by: HOSPITALIST

## 2018-06-19 PROCEDURE — 12000001 ZZH R&B MED SURG/OB UMMC

## 2018-06-19 PROCEDURE — 25500064 ZZH RX 255 OP 636: Performed by: HOSPITALIST

## 2018-06-19 PROCEDURE — 36415 COLL VENOUS BLD VENIPUNCTURE: CPT | Performed by: HOSPITALIST

## 2018-06-19 PROCEDURE — 71045 X-RAY EXAM CHEST 1 VIEW: CPT

## 2018-06-19 PROCEDURE — 87493 C DIFF AMPLIFIED PROBE: CPT | Performed by: NURSE PRACTITIONER

## 2018-06-19 PROCEDURE — 80048 BASIC METABOLIC PNL TOTAL CA: CPT | Performed by: NURSE PRACTITIONER

## 2018-06-19 PROCEDURE — 93306 TTE W/DOPPLER COMPLETE: CPT | Mod: 26 | Performed by: INTERNAL MEDICINE

## 2018-06-19 PROCEDURE — 94640 AIRWAY INHALATION TREATMENT: CPT

## 2018-06-19 RX ORDER — COSYNTROPIN 0.25 MG/ML
250 INJECTION, POWDER, FOR SOLUTION INTRAMUSCULAR; INTRAVENOUS ONCE
Status: COMPLETED | OUTPATIENT
Start: 2018-06-19 | End: 2018-06-19

## 2018-06-19 RX ORDER — PIPERACILLIN SODIUM, TAZOBACTAM SODIUM 4; .5 G/20ML; G/20ML
4.5 INJECTION, POWDER, LYOPHILIZED, FOR SOLUTION INTRAVENOUS EVERY 6 HOURS
Status: DISCONTINUED | OUTPATIENT
Start: 2018-06-19 | End: 2018-06-19

## 2018-06-19 RX ORDER — LABETALOL 100 MG/1
100 TABLET, FILM COATED ORAL EVERY 12 HOURS SCHEDULED
Status: DISCONTINUED | OUTPATIENT
Start: 2018-06-19 | End: 2018-06-20

## 2018-06-19 RX ORDER — LOPERAMIDE HCL 2 MG
2 CAPSULE ORAL 4 TIMES DAILY PRN
Status: DISCONTINUED | OUTPATIENT
Start: 2018-06-19 | End: 2018-06-21 | Stop reason: HOSPADM

## 2018-06-19 RX ORDER — IPRATROPIUM BROMIDE AND ALBUTEROL SULFATE 2.5; .5 MG/3ML; MG/3ML
3 SOLUTION RESPIRATORY (INHALATION) EVERY 6 HOURS PRN
Status: DISCONTINUED | OUTPATIENT
Start: 2018-06-19 | End: 2018-06-21 | Stop reason: HOSPADM

## 2018-06-19 RX ADMIN — ONDANSETRON 4 MG: 2 INJECTION INTRAMUSCULAR; INTRAVENOUS at 21:18

## 2018-06-19 RX ADMIN — PIPERACILLIN SODIUM AND TAZOBACTAM SODIUM 3.38 G: 3; .375 INJECTION, POWDER, LYOPHILIZED, FOR SOLUTION INTRAVENOUS at 07:48

## 2018-06-19 RX ADMIN — ROPINIROLE HYDROCHLORIDE 1 MG: 1 TABLET, FILM COATED ORAL at 13:48

## 2018-06-19 RX ADMIN — GABAPENTIN 200 MG: 100 CAPSULE ORAL at 20:47

## 2018-06-19 RX ADMIN — COSYNTROPIN 250 MCG: 0.25 INJECTION, POWDER, LYOPHILIZED, FOR SOLUTION INTRAMUSCULAR; INTRAVENOUS at 20:47

## 2018-06-19 RX ADMIN — SODIUM CHLORIDE, POTASSIUM CHLORIDE, SODIUM LACTATE AND CALCIUM CHLORIDE: 600; 310; 30; 20 INJECTION, SOLUTION INTRAVENOUS at 07:47

## 2018-06-19 RX ADMIN — OMEPRAZOLE 20 MG: 20 CAPSULE, DELAYED RELEASE ORAL at 07:48

## 2018-06-19 RX ADMIN — FOLIC ACID 1 MG: 1 TABLET ORAL at 07:48

## 2018-06-19 RX ADMIN — ACETAMINOPHEN 650 MG: 325 TABLET, FILM COATED ORAL at 21:30

## 2018-06-19 RX ADMIN — ROPINIROLE HYDROCHLORIDE 1 MG: 1 TABLET, FILM COATED ORAL at 07:48

## 2018-06-19 RX ADMIN — VANCOMYCIN HYDROCHLORIDE 125 MG: 100 INJECTION, POWDER, LYOPHILIZED, FOR SOLUTION INTRAVENOUS at 11:41

## 2018-06-19 RX ADMIN — LABETALOL HCL 100 MG: 100 TABLET, FILM COATED ORAL at 20:47

## 2018-06-19 RX ADMIN — ALBUTEROL SULFATE 2.5 MG: 2.5 SOLUTION RESPIRATORY (INHALATION) at 21:30

## 2018-06-19 RX ADMIN — NICOTINE 1 PATCH: 14 PATCH, EXTENDED RELEASE TRANSDERMAL at 07:48

## 2018-06-19 RX ADMIN — ACETAMINOPHEN 650 MG: 325 TABLET, FILM COATED ORAL at 17:19

## 2018-06-19 RX ADMIN — ROPINIROLE HYDROCHLORIDE 1 MG: 1 TABLET, FILM COATED ORAL at 20:47

## 2018-06-19 RX ADMIN — MULTIPLE VITAMINS W/ MINERALS TAB 1 TABLET: TAB at 07:48

## 2018-06-19 RX ADMIN — HEPARIN SODIUM 5000 UNITS: 5000 INJECTION, SOLUTION INTRAVENOUS; SUBCUTANEOUS at 08:23

## 2018-06-19 RX ADMIN — HYDROXYZINE HYDROCHLORIDE 50 MG: 25 TABLET ORAL at 21:30

## 2018-06-19 RX ADMIN — FLUTICASONE FUROATE AND VILANTEROL TRIFENATATE 1 PUFF: 200; 25 POWDER RESPIRATORY (INHALATION) at 07:49

## 2018-06-19 RX ADMIN — OMEPRAZOLE 20 MG: 20 CAPSULE, DELAYED RELEASE ORAL at 17:16

## 2018-06-19 RX ADMIN — ACETAMINOPHEN 650 MG: 325 TABLET, FILM COATED ORAL at 08:22

## 2018-06-19 RX ADMIN — VANCOMYCIN HYDROCHLORIDE 125 MG: 100 INJECTION, POWDER, LYOPHILIZED, FOR SOLUTION INTRAVENOUS at 07:47

## 2018-06-19 RX ADMIN — HEPARIN SODIUM 5000 UNITS: 5000 INJECTION, SOLUTION INTRAVENOUS; SUBCUTANEOUS at 20:46

## 2018-06-19 RX ADMIN — HUMAN ALBUMIN MICROSPHERES AND PERFLUTREN 5 ML: 10; .22 INJECTION, SOLUTION INTRAVENOUS at 16:15

## 2018-06-19 RX ADMIN — IPRATROPIUM BROMIDE AND ALBUTEROL SULFATE 3 ML: .5; 3 SOLUTION RESPIRATORY (INHALATION) at 12:08

## 2018-06-19 RX ADMIN — PIPERACILLIN SODIUM AND TAZOBACTAM SODIUM 3.38 G: 3; .375 INJECTION, POWDER, LYOPHILIZED, FOR SOLUTION INTRAVENOUS at 01:51

## 2018-06-19 RX ADMIN — GABAPENTIN 200 MG: 100 CAPSULE ORAL at 07:47

## 2018-06-19 ASSESSMENT — ACTIVITIES OF DAILY LIVING (ADL)
ADLS_ACUITY_SCORE: 13

## 2018-06-19 ASSESSMENT — PAIN DESCRIPTION - DESCRIPTORS
DESCRIPTORS: HEADACHE
DESCRIPTORS: ACHING

## 2018-06-19 NOTE — PHARMACY-ADMISSION MEDICATION HISTORY
Admission medication history interview status for the 6/18/2018 admission is complete. See Epic admission navigator for allergy information, pharmacy, prior to admission medications and immunization status.     Medication history interview sources:  Patient, [facility], Care Everywhere    Changes made to PTA medication list (reason)  Added:   -Oyster Shell Calcium 500 mg (patient said she was taking medication, verified medication and dose with [facility])  -Potassium Chloride 20 mEq ER (patient said that she was taking medication, verified medication and dose with [facility])  -Fluoxetine 80 mg (Patient stated that she was taking this medication for years, see in notes from Care Everywhere but do not see dose in [facility]? Should be out of medication?)  -Nicotine Polacrilex Gum 2 mg (Patient take infrequently but dose verified with [facility])    Changed:   -Acetaminophen 650 mg by mouth three times daily as needed -> 1000 mg by mouth daily as needed (paitent is taking differently than prescribed)  -Labetalol 100 mg every AM and 200 mg every PM->Patient is taking 100 mg every AM  -Requip up to three times daily (Patient taking differently than prescribed and sometimes only takes once daily. Doubled up on dose Saturday night to help her sleep.)      Additional medication history information (including reliability of information, actions taken by pharmacist):  -Patient was a moderate historian  -Not compliant with her Advair, Folic Acid, and Spiriva  -Taking her Acetaminophen, Labetalol, and Requip differently than prescribed  -Did not seem to recognize Spironolactone and was not sure if she was taking it  -Stated that her sister took a bag with her pills so she has not been taking some of her medications (she wasn't sure what they were)  -She mentioned during the interview that she knew she was taking more medications than we talked about but could not recall the medications  -She brought her  "medications in a pill box and was unable to distinguish the different medications. She states that she \"lines up\" her bottles and puts them into her pill box  -She does have Acamprosate calcium 333 mg-Take 2 tablets po TID in her Care Everywhere but I haven't seen it filled recently  -I am not sure how compliant the patient is with all her medications because she cannot recall names or the last time she took some of them  -She also has several recent hospital admissions and I am not sure of some medications were discontinued after her hospital stays?   -It does not seem like there is a complete history in her chart and Care Everywhere    Prior to Admission medications    Medication Sig Last Dose Taking? Auth Provider   acetaminophen (TYLENOL) 325 MG tablet Take 325 mg by mouth 3 times daily as needed for mild pain  6/17/2018 at Unknown time Yes Khadra Kim APRN CNP   albuterol (VENTOLIN HFA) 108 (90 Base) MCG/ACT Inhaler Inhale 1-2 puffs into the lungs every 4 hours as needed for shortness of breath / dyspnea or wheezing  Patient taking differently: Inhale 1-2 puffs into the lungs every 4 hours as needed for shortness of breath / dyspnea or wheezing Patient taking 2 puff po TID PRN 6/18/2018 at AM Yes Marcelino Brothers MD   DULoxetine (CYMBALTA) 30 MG EC capsule Take 2 capsules (60 mg) by mouth daily 6/18/2018 at Unknown time Yes Khadra Kim APRN CNP   furosemide (LASIX) 20 MG tablet Take 1 tablet (20 mg) by mouth daily 6/18/2018 at AM Yes Khadra Kim APRN CNP   gabapentin (NEURONTIN) 300 MG capsule Take 1 capsule by mouth in the morning, 1 capsule in the afternoon, and 2 capsules at bedtime. 6/18/2018 at AM Yes Marcelino Brothers MD   hydrOXYzine (ATARAX) 50 MG tablet Take 1 tablet (50 mg) by mouth every 6 hours as needed for anxiety or other (adjuvant pain) 6/17/2018 at Noon Yes Marcelino Brothers MD   ipratropium - albuterol 0.5 mg/2.5 mg/3 mL (DUONEB) 0.5-2.5 (3) MG/3ML neb solution Take " 1 vial (3 mLs) by nebulization 4 times daily as needed for wheezing Past Week at Unknown time Yes Cr Dillard MD   labetalol (NORMODYNE) 100 MG tablet 100mg in am, 200mg in pm.  Patient taking differently: Take 100 mg by mouth every morning 100mg in am, 200mg in pm. 6/18/2018 at AM Yes Khadra Kim APRN CNP   losartan (COZAAR) 25 MG tablet Take 1 tablet (25 mg) by mouth daily 6/18/2018 at AM Yes Khadra Kim APRN CNP   menthol (ICY HOT) 5 % PTCH Apply 1 patch topically every 8 hours as needed for muscle soreness Past Month at Unknown time Yes Cr Dillard MD   multivitamin, therapeutic with minerals (THERA-VIT-M) TABS tablet Take 1 tablet by mouth daily 6/18/2018 at AM Yes Marcelino Brothers MD   nicotine (NICODERM CQ) 21 MG/24HR 24 hr patch Place 1 patch onto the skin daily Past Month at Unknown time Yes Khadra Kim APRN CNP   omeprazole (PRILOSEC) 20 MG CR capsule Take 1 capsule (20 mg) by mouth 2 times daily (before meals) 6/18/2018 at AM Yes Khadra Kim APRN CNP   rOPINIRole (REQUIP) 1 MG tablet Take 1 tablet (1 mg) by mouth 3 times daily 6/18/2018 at AM Yes Khadra Kim APRN CNP   tiotropium (SPIRIVA HANDIHALER) 18 MCG capsule Inhale contents of one capsule daily. Past Month at Unknown time Yes Marcelino Brothers MD   traMADol (ULTRAM) 50 MG tablet Take 1 tablet (50 mg) by mouth every 6 hours as needed for moderate pain Past Month at Unknown time Yes Khadra Kim APRN CNP   FLUOXETINE HCL PO Take 80 mg by mouth daily Unknown at Unknown time  Unknown, Entered By History   fluticasone-salmeterol (ADVAIR DISKUS) 500-50 MCG/DOSE diskus inhaler Inhale 1 puff into the lungs every 12 hours  Patient not taking: Reported on 6/18/2018 Not Taking at Unknown time  Marcelino Brothers MD   folic acid (FOLVITE) 1 MG tablet Take 1 tablet (1 mg) by mouth daily  Patient not taking: Reported on 6/18/2018 Not Taking at Unknown time  Marcelino Brothers MD   MAGNESIUM OXIDE PO Take 400 mg by  mouth daily Unknown at Unknown time  Unknown, Entered By History   nicotine polacrilex (NICORETTE) 2 MG gum Place 2 mg inside cheek every hour as needed for smoking cessation Unknown at Unknown time  Unknown, Entered By History   order for DME Equipment being ordered: Cane ()  Treatment Diagnosis: Impaired functional mobility   Khadra Kim, APRN CNP   OYSTER SHELL CALCIUM PO Take 500 mg by mouth daily Unknown at Unknown time  Unknown, Entered By History   Potassium Chloride Lacie ER (KLOR-CON M20 PO) Take 1 tablet by mouth 2 times daily Unknown at Unknown time  Unknown, Entered By History   spironolactone (ALDACTONE) 25 MG tablet Take 1 tablet (25 mg) by mouth daily Unknown at Unknown time  Khadra Kim, APRN CNP       Medication history completed by:   Randee Whatley,   Pharmacy Intern, [facility]  06/18/2018

## 2018-06-19 NOTE — ED NOTES
[facility], [place]   ED Nurse to Floor Handoff     Inga Ledesma is a 52 year old female who speaks English and lives alone,  in a home  They arrived in the ED by ambulance from home    ED Chief Complaint: Shortness of Breath    ED Dx;   Final diagnoses:   LALA (acute kidney injury) (H)   Elevated procalcitonin   Hyponatremia         Needed?: No    Allergies:   Allergies   Allergen Reactions     Codeine Nausea     Influenza Vaccines      Other reaction(s): Other - Describe In Comment Field -- patient is sensitive to eggs     Reglan [Metoclopramide Hcl] Other (See Comments)     Body tenses up   .  Past Medical Hx:   Past Medical History:   Diagnosis Date     Alcohol abuse, in remission      Alcohol withdrawal seizure (H) 2016     Cancer of labia majora (H) 1987     Cervical dysplasia 1987     Congestive heart failure (H) 5/16/16     COPD (chronic obstructive pulmonary disease) (H) 1/24/2011     CVA (cerebral infarction) 1/2012     Dependent edema      Depression, major      Depressive disorder 1966     GERD (gastroesophageal reflux disease)      Hyperlipidemia LDL goal < 160      Hypertension      Iron deficiency anemia      RLS (restless legs syndrome)      Sacroiliitis (H)     steroid injections ineffective, chronic low back pain     Tobacco abuse      Uncomplicated asthma      Vitamin D deficiencies       Baseline Mental status: [facility]  Current Mental Status changes: at basesline    Infection present or suspected this encounter: yes enteric  Sepsis suspected: Yes.  Elevated procalcitonin and low BP.  On zosyn and vancomycin.  Isolation type: Enteric     Activity level - Baseline/Home:  ambulates with walker  Activity Level - Current:   Independent    Bariatric equipment needed?: No    In the ED these meds were given:   Medications   sodium chloride 0.9% infusion (not administered)   piperacillin-tazobactam (ZOSYN) 3.375 g vial to attach to  mL bag (not administered)    vancomycin (VANCOCIN) 1,750 mg in sodium chloride 0.9 % 500 mL intermittent infusion (not administered)   vancomycin place byers - receiving intermittent dosing (not administered)   0.9% sodium chloride BOLUS (0 mLs Intravenous Stopped 6/18/18 1650)   0.9% sodium chloride BOLUS (0 mLs Intravenous Stopped 6/18/18 1831)       Drips running?  Yes.  Antibiotics     Home pump  No    Current LDAs  Peripheral IV 06/18/18 Left Upper forearm (Active)   Number of days:0       Labs results:   Labs Ordered and Resulted from Time of ED Arrival Up to the Time of Departure from the ED   CBC WITH PLATELETS DIFFERENTIAL - Abnormal; Notable for the following:        Result Value    RDW 15.2 (*)     Absolute Neutrophil 8.4 (*)     All other components within normal limits   COMPREHENSIVE METABOLIC PANEL - Abnormal; Notable for the following:     Sodium 124 (*)     Potassium 3.2 (*)     Chloride 90 (*)     Glucose 142 (*)     Urea Nitrogen 35 (*)     Creatinine 2.97 (*)     GFR Estimate 17 (*)     GFR Estimate If Black 20 (*)     All other components within normal limits   LIPASE - Abnormal; Notable for the following:     Lipase 595 (*)     All other components within normal limits   LACTIC ACID WHOLE BLOOD - Abnormal; Notable for the following:     Lactic Acid 2.1 (*)     All other components within normal limits   PROCALCITONIN - Abnormal; Notable for the following:     Procalcitonin 11.53 (*)     All other components within normal limits   ROUTINE UA WITH MICROSCOPIC REFLEX TO CULTURE - Abnormal; Notable for the following:     Blood Urine Trace (*)     Protein Albumin Urine 10 (*)     Bacteria Urine Few (*)     Mucous Urine Present (*)     All other components within normal limits   MAGNESIUM   TSH WITH FREE T4 REFLEX   TROPONIN I   INR   ALCOHOL ETHYL   LACTIC ACID WHOLE BLOOD   BASIC METABOLIC PANEL   BLOOD CULTURE   ENTERIC BACTERIA AND VIRUS PANEL BY MICHAEL STOOL   CLOSTRIDIUM DIFFICILE TOXIN B   BLOOD CULTURE       Imaging  Studies:   Recent Results (from the past 24 hour(s))   Chest  XR, 1 view portable    Narrative    XR CHEST PORT 1 VW 6/18/2018 3:54 PM    History: SOA;     Comparison: 5/28/2018    Findings: Single AP view of the chest. Heart size is within normal  limits. No focal airspace opacities. No pleural effusion or  pneumothorax.      Impression    Impression: No acute airspace disease.    I have personally reviewed the examination and initial interpretation  and I agree with the findings.    YESSY VILLASEÑOR MD       Recent vital signs:   /78  Temp 97.4  F (36.3  C) (Oral)  Resp 25  Wt 70.3 kg (155 lb)  LMP 06/02/2010  SpO2 99%  BMI 29.29 kg/m2    Cardiac Rhythm: Normal Sinus  Pt needs tele? Yes  Skin/wound Issues: None    Code Status: Full Code    Pain control: pt had none    Nausea control: pt had none    Abnormal labs/tests/findings requiring intervention: Elevated lactic acid 2.1, improved with ivf to 1.1.    Family present during ED course? No   Family Comments/Social Situation comments: n/a    Tasks needing completion: Needs vancomycin    Nadine Beltrán RN  asc-- [phone] [facility]  [phone] [facility]

## 2018-06-19 NOTE — PROGRESS NOTES
Pt arrived to unit via [facility]ED with RN.  Pt alert and oriented x4, VS stable, NSR/ST, on RA.  Nebs scheduled q4h.  PIV infusing Vanco. Denies pain or nausea. Up with SBA with walker. Belongings at bedside. 2 RN skin assessment performed with Sandra. Pt oriented to room and call light. Will continue to monitor and follow plan of care.

## 2018-06-19 NOTE — PLAN OF CARE
"Problem: Patient Care Overview  Goal: Plan of Care/Patient Progress Review  Outcome: Improving  /79 (BP Location: Left arm)  Temp 98.2  F (36.8  C) (Oral)  Resp 20  Ht 1.549 m (5' 1\")  Wt 73 kg (161 lb)  LMP 06/02/2010  SpO2 97%  BMI 30.42 kg/m2    AOx4, up independently in room.  VSS, SR, on room air, afebrile, received Tylenol x 1 for headache.  On regular diet, good PO intake, voiding independently, BM x 2, stool negative for C diff and enteric panel.  IVF at 125 mL/hr, Cr improved significantly, IV antibiotics discontinued.  CXR completed, echo ordered.  Will continue with plan of care.      "

## 2018-06-19 NOTE — PROGRESS NOTES
[facility], [place]    Hospitalist Progress Note    Date of Service (when I saw the patient): 06/19/2018    Assessment & Plan     Inga Ledesma is a 52 year old female  admitted on 6/18/2018. She has a history of hypertension, CVA, COPD, CHF with preserved EF, alcohol abuse and alcohol withdrawal seizures and is admitted for hypotension and acute kidney injury concerning for sepsis vs Clostridium difficile infection.     1) Dehydration Vs Sepsis - Presented with hypotension, tachycardia and elevated procalcitonin concerning for sepsis. She was diagnosed with C diff 2 weeks ago. She stopped drinking ETOH and over the weekend reported having loose stools very 10 minutes. She is on diuretics too. Hence this may represent volume depletion as well. Her sodium, creatinine, and BP quickly corrected after giving her fluids.  No infiltrated on CXR .  UA negative, BC pending but negative so far.  CT abdomen/ negative.     - Started on Vanco and Zosyn in the ED and empiric oral vanc. Stop all antibiotics given rapid improvement in BP after IVF and rapid normalization of Na and  creat after IVF suggesting majority of the issue was dehydration. There is risk of C-diff recurrence with antibiotics.   - Monitor for infection give high procalcitonin  - C-diff negative. Enteric panel negative.   - CT abdomen negative  - UA/UC, BC negative so far  - Stop IVF at patients request  - Hold home lasix, labetalol, losartan, spironolactone,      2) Hyponatremia  - Sodium 124  On admit now normalized after IVF  - continue IVF     3) LALA - Suspect secondary to hypovolemia.  She has been taking lasix in the setting of C diff infection and heavy alcohol use.  FeNA very low suggestive of pre-renal etiology.   - resolved with IVF  - Hold home lasix  And aldactone  - Adjust home meds: Hold Cymbalta, Decrease gabapentin from 300 / 300 / 600 to 200 mg BID.    4) Alcohol abuse - She is drinking a pint of vodka daily and  reports a history of alcohol withdrawal seizures.  Last drink 6/14.  She was tremulous on 6/15 but has not had any issues since.  - Will hold off on MSSA protocol  - Already on thiamine at home     5) History of COPD - Reports great difficulty over the past week secondary to humidity and heat.  She has a productive cough.  Currently talking in complete sentences and SpO2 100% on room air.  - Continue home COPD regimen: Advair, albuterol, duonebs.  Hold Spiriva while on duonebs.  - Hesitant to start prednisone in the setting of undetermined infection  - Counseled re: smoking cessation     6) History of diastolic heart failure - 12/22 echo found normal LVEF.  Patient reports 6 pillow orthopnea.  - Holding lasix in setting of LALA     7) Cigarette smoking  - Nicotine patch     # Pain Assessment:  - no significant pain right now  - Tylenol        Diet:  Regular  Fluids: LR @ 125 ccs/hr  DVT Prophylaxis: Heparin SQ  Code Status: Full        Disposition Plan     Expected discharge: 1-2 days; recommended to prior living arrangement once BC negative, patient stable.      Claudine Ramos MD    Interval History   Reports no diarrhea. NO abodminal pain. No vomiting. Mild intermittent chronic cough. Ho smoking. No gerd. No fever.     -Data reviewed today: I reviewed all new labs and imaging results over the last 24 hours. I personally reviewed no images or EKG's today.    Physical Exam   Temp: 98.2  F (36.8  C) Temp src: Oral BP: 149/79   Heart Rate: 82 Resp: 20 SpO2: 97 % O2 Device: None (Room air)    Vitals:    06/18/18 1458 06/18/18 2108 06/19/18 0654   Weight: 70.3 kg (155 lb) 70.4 kg (155 lb 3.2 oz) 73 kg (161 lb)     Vital Signs with Ranges  Temp:  [97.4  F (36.3  C)-98.7  F (37.1  C)] 98.2  F (36.8  C)  Heart Rate:  [] 82  Resp:  [10-25] 20  BP: ()/(33-87) 149/79  SpO2:  [97 %-100 %] 97 %  I/O last 3 completed shifts:  In: 1125 [I.V.:1125]  Out: 1100 [Urine:1100]    Constitutional:  Awake, alert,  cooperative, no apparent distress  Respiratory: Clear to auscultation bilaterally, no crackles or wheezing  Cardiovascular: Regular rate and rhythm, normal S1 and S2, and no murmur noted  GI: Normal bowel sounds, soft, non-distended, non-tender  Skin/Integumen: No rashes, no cyanosis, no edema      Medications     lactated ringers 125 mL/hr at 06/19/18 0747       fluticasone-vilanterol  1 puff Inhalation Daily     folic acid  1 mg Oral Daily     gabapentin  200 mg Oral BID     heparin  5,000 Units Subcutaneous Q12H     multivitamin, therapeutic with minerals  1 tablet Oral Daily     nicotine  1 patch Transdermal Daily     nicotine   Transdermal Q8H     nicotine   Transdermal Daily     omeprazole  20 mg Oral BID AC     rOPINIRole  1 mg Oral TID     sodium chloride (PF)  3 mL Intracatheter Q8H       Data     Recent Labs  Lab 06/19/18  0724 06/18/18  2116 06/18/18  1825 06/18/18  1531   WBC  --  8.3  --  10.6   HGB  --  11.0*  --  12.7   MCV  --  91  --  89   PLT  --  133*  --  180   INR  --   --   --  0.94     --  129* 124*   POTASSIUM 3.4  --  3.3* 3.2*   CHLORIDE 102  --  98 90*   CO2 22  --  19* 21   BUN 24  --  33* 35*   CR 0.97  --  2.11* 2.97*   ANIONGAP 11  --  12 14   JENNIFER 8.0*  --  7.9* 9.0   *  --  105* 142*   ALBUMIN  --   --   --  4.0   PROTTOTAL  --   --   --  7.5   BILITOTAL  --   --   --  0.5   ALKPHOS  --   --   --  124   ALT  --   --   --  49   AST  --   --   --  36   LIPASE  --   --   --  595*   TROPI  --   --   --  <0.015       Recent Results (from the past 24 hour(s))   Chest  XR, 1 view portable    Narrative    XR CHEST PORT 1 VW 6/18/2018 3:54 PM    History: SOA;     Comparison: 5/28/2018    Findings: Single AP view of the chest. Heart size is within normal  limits. No focal airspace opacities. No pleural effusion or  pneumothorax.      Impression    Impression: No acute airspace disease.    I have personally reviewed the examination and initial interpretation  and I agree with the  findings.    YESSY VILLASEÑOR MD   Abd/pelvis CT no contrast - Stone Protocol    Narrative    Examination:  CT ABDOMEN PELVIS W/O CONTRAST 6/18/2018 6:54 PM     History: Back pain, acute kidney injury and    Comparison: CT of the abdomen and pelvis 5/29/2018    Technique: CT of the abdomen and pelvis were obtained without  contrast. Sagittal and coronal reconstructions created and reviewed.    Findings:   Limited evaluation of the solid organs without contrast. Mild  hypoattenuation of the hepatic parenchyma. The spleen, left adrenal  gland, pancreas, and gallbladder are unremarkable. There is a nodule  of the right adrenal gland with density measuring less than 10  Hounsfield units measuring 2.3 x 2.1 cm, consistent with adrenal  adenoma. No renal calculi, hydronephrosis, or ureteral stones are  noted. The urinary bladder is decompressed, otherwise unremarkable.    The small and large bowel are normal in caliber. The appendix is  visualized and unremarkable. Moderate colonic diverticula without  surrounding inflammatory changes. No abdominal free fluid or free air.  Surgical clips present in the right inguinal canal. No retroperitoneal  or mesenteric lymphadenopathy.    The lung bases are clear.    Stranding over the left iliac crest persists, lasts pronounced than  previous CT. Degenerative changes in the normal lumbar spine.      Impression    Impression:   1. No findings to explain back pain. No hydronephrosis or renal  calculi.  2. Hepatic steatosis.  3. Right adrenal adenoma, stable.  4. Nonspecific inflammatory stranding over the left iliac crest, less  pronounced than previous CT. Finding may represent evolving hematoma  or cellulitis.    I have personally reviewed the examination and initial interpretation  and I agree with the findings.    YESSY VILLASEÑOR MD   XR Chest 1 View    Narrative    EXAM: XR CHEST 1 VW  6/19/2018 8:18 AM     HISTORY:  Cough, hypotension, eval for PNA;        COMPARISON: Chest  radiograph 6/18/2018    FINDINGS: Single portable PA view of chest. Cardiac silhouette is  within normal limits. No pneumothorax. No pleural effusion. Minimal  right basilar streaky opacities.       Impression    IMPRESSION: Minimal right basilar streaky opacities may represent  atelectasis or aspiration.    I have personally reviewed the examination and initial interpretation  and I agree with the findings.    YESSY VILLASEÑOR MD

## 2018-06-19 NOTE — PROGRESS NOTES
Neuro: A&Ox4.   Cardiac: Afebrile VSS, NSR.   Respiratory: RA   GI/: Voiding spontaneously. No BM this shift. Still need stool sample   Diet/appetite: Tolerating regular diet. Denies nausea   Activity: Up independently with walker to bathroom     Pain: .c/o generalized pain- treated with tramadol   Skin: Intact, no new deficits noted.  Lines: PIV infusing LR at 125ml/hr       Pt has been resting comfortably throughout night, will continue to monitor and follow plan of care.

## 2018-06-19 NOTE — PROGRESS NOTES
06/19/18 0900   Visit Information   Visit Made By Priest Singh   Type of Visit Initial;Follow-up   Visited Patient   Interventions   Basic Spiritual Interventions    introduction/orientation to Spiritual Health Services;Assessment of spiritual needs/resources;Reflective conversation;Prayer   Ritual/Sacramental Encounter  Communion   Advanced Assessments/Interventions   Presenting Concerns/Issues Spiritual/Worship/emotional support     I visited Cody a few weeks ago and she has returned. She describes her loss of strength and her arrival at [facility] via ambulance. She is concerned about sepsis. She has a disabled son who lives with her. He is 26 years old and able to work nearby. Se has not rebounded from her last hospital stay and hopes to return home free of infection and illness. WE share prayer and communion.

## 2018-06-20 LAB
ACTH PLAS-MCNC: 17 PG/ML
ANION GAP SERPL CALCULATED.3IONS-SCNC: 12 MMOL/L (ref 3–14)
BUN SERPL-MCNC: 14 MG/DL (ref 7–30)
CALCIUM SERPL-MCNC: 8.6 MG/DL (ref 8.5–10.1)
CHLORIDE SERPL-SCNC: 104 MMOL/L (ref 94–109)
CO2 SERPL-SCNC: 22 MMOL/L (ref 20–32)
CREAT SERPL-MCNC: 0.63 MG/DL (ref 0.52–1.04)
ERYTHROCYTE [DISTWIDTH] IN BLOOD BY AUTOMATED COUNT: 15.2 % (ref 10–15)
GFR SERPL CREATININE-BSD FRML MDRD: >90 ML/MIN/1.7M2
GLUCOSE SERPL-MCNC: 122 MG/DL (ref 70–99)
HCT VFR BLD AUTO: 29.8 % (ref 35–47)
HGB BLD-MCNC: 10.3 G/DL (ref 11.7–15.7)
MCH RBC QN AUTO: 30.9 PG (ref 26.5–33)
MCHC RBC AUTO-ENTMCNC: 34.6 G/DL (ref 31.5–36.5)
MCV RBC AUTO: 90 FL (ref 78–100)
PLATELET # BLD AUTO: 123 10E9/L (ref 150–450)
POTASSIUM SERPL-SCNC: 3.1 MMOL/L (ref 3.4–5.3)
PROCALCITONIN SERPL-MCNC: 0.5 NG/ML
RBC # BLD AUTO: 3.33 10E12/L (ref 3.8–5.2)
SODIUM SERPL-SCNC: 137 MMOL/L (ref 133–144)
WBC # BLD AUTO: 6 10E9/L (ref 4–11)

## 2018-06-20 PROCEDURE — 40000556 ZZH STATISTIC PERIPHERAL IV START W US GUIDANCE

## 2018-06-20 PROCEDURE — 84145 PROCALCITONIN (PCT): CPT | Performed by: HOSPITALIST

## 2018-06-20 PROCEDURE — 25000132 ZZH RX MED GY IP 250 OP 250 PS 637: Performed by: HOSPITALIST

## 2018-06-20 PROCEDURE — 25000132 ZZH RX MED GY IP 250 OP 250 PS 637: Performed by: NURSE PRACTITIONER

## 2018-06-20 PROCEDURE — 99207 ZZC NON-BILLABLE SERV PER CHARTING: CPT | Performed by: HOSPITALIST

## 2018-06-20 PROCEDURE — 85027 COMPLETE CBC AUTOMATED: CPT | Performed by: HOSPITALIST

## 2018-06-20 PROCEDURE — 25000128 H RX IP 250 OP 636: Performed by: HOSPITALIST

## 2018-06-20 PROCEDURE — 12000001 ZZH R&B MED SURG/OB UMMC

## 2018-06-20 PROCEDURE — 25000128 H RX IP 250 OP 636: Performed by: INTERNAL MEDICINE

## 2018-06-20 PROCEDURE — 25000132 ZZH RX MED GY IP 250 OP 250 PS 637: Performed by: INTERNAL MEDICINE

## 2018-06-20 PROCEDURE — 82565 ASSAY OF CREATININE: CPT | Performed by: NURSE PRACTITIONER

## 2018-06-20 PROCEDURE — 80048 BASIC METABOLIC PNL TOTAL CA: CPT | Performed by: HOSPITALIST

## 2018-06-20 PROCEDURE — 25000128 H RX IP 250 OP 636: Performed by: NURSE PRACTITIONER

## 2018-06-20 PROCEDURE — 36415 COLL VENOUS BLD VENIPUNCTURE: CPT | Performed by: HOSPITALIST

## 2018-06-20 RX ORDER — FLUOXETINE 40 MG/1
80 CAPSULE ORAL DAILY
Status: DISCONTINUED | OUTPATIENT
Start: 2018-06-20 | End: 2018-06-21 | Stop reason: HOSPADM

## 2018-06-20 RX ORDER — POTASSIUM CHLORIDE 750 MG/1
40 TABLET, EXTENDED RELEASE ORAL ONCE
Status: COMPLETED | OUTPATIENT
Start: 2018-06-20 | End: 2018-06-20

## 2018-06-20 RX ORDER — LOSARTAN POTASSIUM 25 MG/1
25 TABLET ORAL DAILY
Status: DISCONTINUED | OUTPATIENT
Start: 2018-06-20 | End: 2018-06-21 | Stop reason: HOSPADM

## 2018-06-20 RX ORDER — DULOXETIN HYDROCHLORIDE 60 MG/1
60 CAPSULE, DELAYED RELEASE ORAL DAILY
Status: DISCONTINUED | OUTPATIENT
Start: 2018-06-20 | End: 2018-06-21 | Stop reason: HOSPADM

## 2018-06-20 RX ORDER — HYDRALAZINE HYDROCHLORIDE 20 MG/ML
10 INJECTION INTRAMUSCULAR; INTRAVENOUS ONCE
Status: COMPLETED | OUTPATIENT
Start: 2018-06-20 | End: 2018-06-20

## 2018-06-20 RX ORDER — MAGNESIUM OXIDE 400 MG/1
400 TABLET ORAL DAILY
Status: DISCONTINUED | OUTPATIENT
Start: 2018-06-20 | End: 2018-06-21 | Stop reason: HOSPADM

## 2018-06-20 RX ORDER — LABETALOL 100 MG/1
100 TABLET, FILM COATED ORAL EVERY MORNING
Status: DISCONTINUED | OUTPATIENT
Start: 2018-06-21 | End: 2018-06-21 | Stop reason: HOSPADM

## 2018-06-20 RX ORDER — HYDRALAZINE HYDROCHLORIDE 20 MG/ML
10 INJECTION INTRAMUSCULAR; INTRAVENOUS EVERY 6 HOURS PRN
Status: DISCONTINUED | OUTPATIENT
Start: 2018-06-20 | End: 2018-06-21 | Stop reason: HOSPADM

## 2018-06-20 RX ORDER — LABETALOL 100 MG/1
200 TABLET, FILM COATED ORAL EVERY EVENING
Status: DISCONTINUED | OUTPATIENT
Start: 2018-06-20 | End: 2018-06-21 | Stop reason: HOSPADM

## 2018-06-20 RX ADMIN — LABETALOL HCL 100 MG: 100 TABLET, FILM COATED ORAL at 08:35

## 2018-06-20 RX ADMIN — OMEPRAZOLE 20 MG: 20 CAPSULE, DELAYED RELEASE ORAL at 16:59

## 2018-06-20 RX ADMIN — FOLIC ACID 1 MG: 1 TABLET ORAL at 08:34

## 2018-06-20 RX ADMIN — HEPARIN SODIUM 5000 UNITS: 5000 INJECTION, SOLUTION INTRAVENOUS; SUBCUTANEOUS at 20:29

## 2018-06-20 RX ADMIN — LOPERAMIDE HYDROCHLORIDE 2 MG: 2 CAPSULE ORAL at 00:07

## 2018-06-20 RX ADMIN — ACETAMINOPHEN 650 MG: 325 TABLET, FILM COATED ORAL at 22:36

## 2018-06-20 RX ADMIN — LABETALOL HCL 200 MG: 100 TABLET, FILM COATED ORAL at 20:30

## 2018-06-20 RX ADMIN — Medication 1 MG: at 22:36

## 2018-06-20 RX ADMIN — HYDROXYZINE HYDROCHLORIDE 50 MG: 25 TABLET ORAL at 09:24

## 2018-06-20 RX ADMIN — OMEPRAZOLE 20 MG: 20 CAPSULE, DELAYED RELEASE ORAL at 08:34

## 2018-06-20 RX ADMIN — ROPINIROLE HYDROCHLORIDE 1 MG: 1 TABLET, FILM COATED ORAL at 16:59

## 2018-06-20 RX ADMIN — NICOTINE 1 PATCH: 14 PATCH, EXTENDED RELEASE TRANSDERMAL at 08:36

## 2018-06-20 RX ADMIN — GABAPENTIN 200 MG: 100 CAPSULE ORAL at 08:34

## 2018-06-20 RX ADMIN — HEPARIN SODIUM 5000 UNITS: 5000 INJECTION, SOLUTION INTRAVENOUS; SUBCUTANEOUS at 08:34

## 2018-06-20 RX ADMIN — MAGNESIUM OXIDE TAB 400 MG (241.3 MG ELEMENTAL MG) 400 MG: 400 (241.3 MG) TAB at 16:58

## 2018-06-20 RX ADMIN — DULOXETINE HYDROCHLORIDE 60 MG: 60 CAPSULE, DELAYED RELEASE ORAL at 16:59

## 2018-06-20 RX ADMIN — GABAPENTIN 200 MG: 100 CAPSULE ORAL at 20:30

## 2018-06-20 RX ADMIN — HYDRALAZINE HYDROCHLORIDE 10 MG: 20 INJECTION INTRAMUSCULAR; INTRAVENOUS at 23:37

## 2018-06-20 RX ADMIN — ACETAMINOPHEN 650 MG: 325 TABLET, FILM COATED ORAL at 03:14

## 2018-06-20 RX ADMIN — LOSARTAN POTASSIUM 25 MG: 25 TABLET, FILM COATED ORAL at 09:24

## 2018-06-20 RX ADMIN — HYDRALAZINE HYDROCHLORIDE 10 MG: 20 INJECTION INTRAMUSCULAR; INTRAVENOUS at 12:49

## 2018-06-20 RX ADMIN — ACETAMINOPHEN 650 MG: 325 TABLET, FILM COATED ORAL at 10:31

## 2018-06-20 RX ADMIN — FLUOXETINE HYDROCHLORIDE 80 MG: 40 CAPSULE ORAL at 16:59

## 2018-06-20 RX ADMIN — ROPINIROLE HYDROCHLORIDE 1 MG: 1 TABLET, FILM COATED ORAL at 08:35

## 2018-06-20 RX ADMIN — POTASSIUM CHLORIDE 40 MEQ: 750 TABLET, EXTENDED RELEASE ORAL at 09:24

## 2018-06-20 RX ADMIN — MULTIPLE VITAMINS W/ MINERALS TAB 1 TABLET: TAB at 08:34

## 2018-06-20 RX ADMIN — FLUTICASONE FUROATE AND VILANTEROL TRIFENATATE 1 PUFF: 200; 25 POWDER RESPIRATORY (INHALATION) at 08:33

## 2018-06-20 RX ADMIN — ROPINIROLE HYDROCHLORIDE 1 MG: 1 TABLET, FILM COATED ORAL at 20:31

## 2018-06-20 RX ADMIN — HYDROXYZINE HYDROCHLORIDE 50 MG: 25 TABLET ORAL at 22:36

## 2018-06-20 RX ADMIN — HYDRALAZINE HYDROCHLORIDE 10 MG: 20 INJECTION INTRAMUSCULAR; INTRAVENOUS at 03:14

## 2018-06-20 ASSESSMENT — ACTIVITIES OF DAILY LIVING (ADL)
ADLS_ACUITY_SCORE: 13

## 2018-06-20 ASSESSMENT — PAIN DESCRIPTION - DESCRIPTORS
DESCRIPTORS: ACHING
DESCRIPTORS: ACHING
DESCRIPTORS: HEADACHE
DESCRIPTORS: HEADACHE

## 2018-06-20 NOTE — PLAN OF CARE
"Problem: Patient Care Overview  Goal: Plan of Care/Patient Progress Review  Outcome: Improving  /90 (BP Location: Left arm)  Pulse 74  Temp 98.4  F (36.9  C) (Oral)  Resp 20  Ht 1.549 m (5' 1\")  Wt 73 kg (161 lb)  LMP 06/02/2010  SpO2 98%  BMI 30.42 kg/m2  Neuro: A&Ox4.  Follows commands. PERRLA.  Cardiac: No tele orders. HR in 80s. SBP 160s-190s. Afebrile. Gave scheduled labetalol and prn hydralazine x1.  Respiratory: Sating in 90s on RA. Diminished lungs sounds. Neb treatment x1 d/t coughing spell. Pt tolerated well.  GI/: Adequate urine output.  Loose BM X4. Gave immodium x1. Nausea d/t coughing spell. Gave zofran x1. Pt tolerated well.  Diet/appetite: Tolerating regular diet.   Activity: Independent  Pain: Chronic back pain.  At acceptable level on current regimen. managed with tylenol.  Skin: No new deficits noted.  LDA's: Rt PIV x1. Lft PIV x1.    Plan: Continue with POC. Notify primary team with changes.      "

## 2018-06-20 NOTE — PLAN OF CARE
"Problem: Patient Care Overview  Goal: Plan of Care/Patient Progress Review  Pt arrived to floor at 12:00 pm direct from 6b, B/P 186/83, Hr 74. Pt was in a rush to go outside, writer explained that pt needs her PRN hydralazine to bring B/p 160or below per parameter, but pt refused stating \" I need to go outside right now\"patient left floor to go outside.Writer texted primary team Claudine Ramos MD,  to let team aware.  Addendum  K was 3.1 this morning, pt had a one time 40 meq potassium tab, recheck is tomorrow.  B/P came down after pt had 10 mg IV hydralazine for a B/P of 170/99(  once came back to floor from outside),B/P came down to 152/99 . B/P 126/75 this afternoon. Pt removed her nicotine patch when pt went outside and refused replacement for now.Good appetite.Up independent in room and out side of room.Pt started her antidepressant meds this afternoon per pt request.Continue with plan of care.  "

## 2018-06-20 NOTE — CONSULTS
[facility] ID SERVICE: NEW CONSULTATION   Inga Ledesma : 1965 Sex: Female   MRN: [id number]  Admission Date: 2018  Consult Requester: Claudine Ramos MD  Date of Service: 2018    REASON FOR CONSULTATION: Hypotension, high procalcitonin, was on vancomycin and piperacillin-tazobactam, recent Clostridium difficile colitis, no clear signs of infection    PROBLEM LIST:  1. Hypotension, resolved; likely dehydration hypovolemia rather than related to infection   2. Recent history of C difficile infection  3. LALA, resolved  4. Alcohol abuse    RECOMMENDATIONS:   1. Agree with stopping antibiotics; continue to monitor clinically off of antibiotics.  2. We have provided reassurance that hypotension at presentation was unrelated to infection.    DISCUSSION:   Inga Ledesma is a 53 y/o female with h/o alcohol abuse and recent Clostridium difficile colitis s/p 10 day course of PO vancomycin who was admitted on 18 for hypotension. Initial concern for infection and started on broad spectrum antibiotics. However, this resolved rapidly with broad-spectrum antibiotics, suggesting hypovolemia rather than infection.  Antibiotics were stopped given recent history of C difficile.  C difficile PCR negative here.  She initially had an elevated procalcitonin though this is difficult to interpret in context of LALA and might be expected in cases of severe dehydration.  Normalized today.  Cr has also normalized with fluids. Infectious w/u thus far includes negative enteric screen, negative Clostridium difficile toxin, negative BC x2, negative CT abdomen. Afebrile since admission; WBC was 10.6 on 18, trending down to 6.0 today. Taken together, this presentation does not appear to be infectious; agree with stopping antibiotics, and she has been stable now off of antibiotics. The patient had a lot of anxiety related to hypotension. We have provided reassurance to her regarding the etiology of hypotension,  which was not related to infection.    ID will sign off at this point given no active infection. However, we are always happy to revisit the case, please call anytime with any questions, concerns or changes in status.    Tanya Rasheed, MS4  Pager: [phone]    Attestation:  This patient has been seen and evaluated by me, Ortiz Espinoza MD.  I have independently examined and discussed this patient with the medical student and agree with the findings and recommendations in this note. I have reviewed the patient's History and today's Medications, Vital Signs, Labs and Imaging.      I have edited this note to reflect my findings.      This is a 51yo woman with a h/o of HTN, CVA, COPD, HFpEF, and alcohol abuse that presented with hypotension. Initially started on broad spectrum antibiotics, but her presentation was thought more consistent with dehydration hypovolemia (recent EtOH, diarrhea) and antibiotics have been stopped. We agree with primary teams assessment and would continue to observe off of antibiotics, especially in contect of recent C difficile, as above. Patient was very anxious about hypotension, and we have provided reassurance.       Ortiz Espinoza MD   ID Red Service  [phone]      HPI:   Inga Ledesma is a 53 y/o female with h/o CVA, COPD, hypertension, HFpEF, and alcohol abuse who was admitted on 6/18/18 for hypotension. She was recently hospitalized from 5/28/18-5/31/18 with Clostridium difficile colitis and was on PO vancomycin from 5/31/18-6/10/18. After leaving the hospital, she relapsed for approximately 1 week and reported drinking 1 pint of vodka per day. She began withdrawing on 6/15/18, felt fine 6/16/18, and started feeling crummy with blurry vision on 6/17/18. She came to the ED on 6/18/18 after self measured low blood pressure readings; reported systolic pressures to 80s. Started vancomycin + piperacillin-tazobactam for sepsis vs Clostridium difficile infection in the ED, narrowed to  PO vancomycin at admission. Infectious w/u thus far includes negative enteric screen, negative Clostridium difficile toxin, negative BC x2, negative CT abdomen. Afebrile since admission; WBC was 10.6 on 6/18/18, trending down to 6.0 today; procalcitonin 11.53; LA 1.1. Antibiotics were stopped yesterday. Reports loose watery stools overnight that improved with loperamide. Today endorses headaches, chronic cough, vomiting after coughing, abdominal pain, and diarrhea. Denies fevers or chills.     ANTI-INFECTIVES:   Current: None  Previous: Piperacillin-tazobactam (6/18/18-6/19/18), IV vancomycin (6/18/18), PO vancomycin (5/39/18-6/10/18, 6/18/18-6/19/18)    Microbiology:  6/19/18  C diff PCR Negative  6/18/18  Blood Cx NGTD    ROS:  A ten point ROS was obtained and was negative with the exception of that which is described in the above.    PMH:   Past Medical History:   Diagnosis Date     Alcohol abuse, in remission      Alcohol withdrawal seizure (H) 2016     Cancer of labia majora (H) 1987     Cervical dysplasia 1987     Congestive heart failure (H) 5/16/16     COPD (chronic obstructive pulmonary disease) (H) 1/24/2011     CVA (cerebral infarction) 1/2012     Dependent edema      Depression, major      Depressive disorder 1966     GERD (gastroesophageal reflux disease)      Hyperlipidemia LDL goal < 160      Hypertension      Iron deficiency anemia      RLS (restless legs syndrome)      Sacroiliitis (H)     steroid injections ineffective, chronic low back pain     Tobacco abuse      Uncomplicated asthma      Vitamin D deficiencies      PSH:  Past Surgical History:   Procedure Laterality Date     AS BIOPSY/EXCISION LYMPH NODE OPEN SUPERFICIAL       COLONOSCOPY       EYE SURGERY       HYSTERECTOMY  2010     HYSTERECTOMY TOTAL ABDOMINAL  7/28/10    Bilateral salpingectomy.  ovaries conserved.     LASER TX, CERVICAL  1987     LYMPH NODE BIOPSY  2007    inguinal     LYSIS OF LABIAL LESION(S)  1985, 1987  "    ALLERGIES:  Codeine (nauea)  Influenza vaccines (sensitive to eggs)  Reglan (body tenses up)    SOCIAL HISTORY:   Social History   Substance Use Topics     Smoking status: Current Every Day Smoker     Packs/day: 0.25     Years: 34.00     Types: Cigarettes     Start date: 11/1/1979     Last attempt to quit: 10/3/2012     Smokeless tobacco: Never Used      Comment: 5 cigarettes daily     Alcohol use Yes      Comment: 1 pint of vodka per day, last drink 6/17/18     History   Sexual Activity     Sexual activity: Not Currently     FAMILY HISTORY:   Family History   Problem Relation Age of Onset     Depression/Anxiety Mother      Asthma Mother      Cerebrovascular Disease Father      Hypertension Father      Lung Cancer Father      Breast Cancer Paternal Aunt      Other Cancer Other      Depression Other      Anxiety Disorder Other      Mental Illness Other      Substance Abuse Other      Asthma Other      Obesity Sister      Obesity Son      EXAMINATION:   VS: /90 (BP Location: Left arm)  Pulse 74  Temp 98.4  F (36.9  C) (Oral)  Resp 20  Ht 1.549 m (5' 1\")  Wt 73 kg (161 lb)  LMP 06/02/2010  SpO2 98%  BMI 30.42 kg/m2  GENERAL: AOx3. NAD.   EYES: Sclerae anicteric without conjunctival injection or stigmata of endocarditis.    ENT: AT/NC. MMM without lesions or exudates.   NECK:  Supple without lymphadenopathy.  HEART: RRR. No MRG.  LUNGS: CTAB. Normal respiratory effort.   ABDOMEN: Bowel sounds present. S/ND. Mild epigastric tenderness. No guarding. No hepatosplenomegaly.  SKIN: No rashes or sores. Lines without surrounding erythema or exudate. No stigmata of endocarditis.  NEURO: Grossly nonfocal. Spontaneously moves all extremities.   PSYCH: Tearful throughout conversation.   LDA: PIV left forearm.     RELEVANT DATA:   TTE (6/19/18): EF 60-65%. LV and RV function normal without wall abnormalities. IVC normal. No pericardial effusion.     BASIC LABS:  The following basic labs were personally " reviewed:  Inflammatory Markers    Recent Labs   Lab Test  05/31/18 0758 05/29/18   0433   SED   --   15   CRP  50.0*  79.0*     Hematology Studies    Recent Labs   Lab Test  06/20/18   0520  06/18/18   2116  06/18/18   1531  05/31/18 0758  05/30/18   0752  05/29/18   0433   05/28/18   1946 04/16/18   1209  04/05/18   1703   11/16/17   1218   10/27/17   2241   WBC  6.0  8.3  10.6  5.7  7.7  8.5   --   14.7*   < >  7.7  8.5   < >  11.6*   < >  6.1   ANEU   --    --   8.4*   --    --    --    --   13.2*   --   6.3  7.0   --   9.1*   --   4.5   AEOS   --    --   0.2   --    --    --    --   0.0   --   0.0  0.0   --   0.3   --   0.0   HGB  10.3*  11.0*  12.7  10.0*  10.2*  10.9*   < >  12.9   < >  12.9  12.7   < >  10.7*   < >  14.0   MCV  90  91  89  90  94  90   --   89   < >  87  88   < >  96   < >  93   PLT  123*  133*  180  139*  103*  118*   --   151   < >  505*  187   < >  329   < >  383    < > = values in this interval not displayed.     Metabolic Studies     Recent Labs   Lab Test  06/20/18   0520  06/19/18 0724  06/18/18   1825  06/18/18   1531 05/31/18 0758   NA  137  135  129*  124*  136   POTASSIUM  3.1*  3.4  3.3*  3.2*  3.3*   CHLORIDE  104  102  98  90*  104   CO2  22  22  19*  21  24   BUN  14  24  33*  35*  9   CR  0.63  0.97  2.11*  2.97*  0.67   GFRESTIMATED  >90  60*  25*  17*  >90     Hepatic Studies    Recent Labs   Lab Test  06/18/18   1531  05/31/18 0758 05/30/18 0752 05/29/18 0433  05/28/18 1943 04/16/18   1209   BILITOTAL  0.5  0.4  0.7  0.8  1.4*  0.5   ALKPHOS  124  91  100  113  131  101   ALBUMIN  4.0  2.9*  2.9*  3.4  4.1  4.1   AST  36  18  20  35  50*  33   ALT  49  24  27  36  45  39     MICROBIOLOGY LABS:  The following microbiology studies were personally reviewed:  Culture Micro   Date Value Ref Range Status   06/18/2018 No growth after 2 days  Preliminary   06/18/2018 No growth after 2 days  Preliminary   05/29/2018 >100,000 colonies/mL  Escherichia coli    (A)  Final   05/29/2018   Final    <10,000 colonies/mL  mixed urogenital nikunj  Susceptibility testing not routinely done     05/29/2018 No growth  Final   05/29/2018 No growth  Final   11/16/2017 No Beta Streptococcus isolated  Final   07/27/2011 No Beta Streptococcus isolated  Final   02/13/2007 Culture negative after 4 weeks  Final   02/13/2007 No growth  Final   02/13/2007 No anaerobes isolated  Final   02/07/2007 No growth  Final   02/07/2007 No anaerobes isolated  Final     Urine Studies    Recent Labs   Lab Test  06/18/18   1836  05/29/18   1150   LEUKEST  Negative  Moderate*   WBCU  <1  6*     IMAGING RESULTS  The following imaging studies were personally reviewed:    CT 6/18/18:  1. No findings to explain back pain. No hydronephrosis or renal calculi.  2. Hepatic steatosis.  3. Right adrenal adenoma, stable.  4. Nonspecific inflammatory stranding over the left iliac crest, less pronounced than previous CT. Finding may represent evolving hematoma or cellulitis.

## 2018-06-20 NOTE — PLAN OF CARE
Problem: Gastrointestinal Condition Comorbidity  Goal: Gastrointestinal Condition  Patient comorbidity will be monitored for signs and symptoms of Gastrointestinal condition.  Problems will be absent, minimized or managed by discharge/transition of care.  Outcome: Improving  Shift durration: [id number] (transfer to  room 19)    LOC: alert, oriented, anxious.  Neuro: grossly intact.  Cards: NSR, HTN, Pt on scheduled labetalol, cozaar and has prn hydral. Not on vasoactive gtts.  Pulm: LS clear/diminished throughout.  Pt on RA.  GI/: adequate UOP.  Watery stools this AM but firmed up to jelly consistency by late morning.  Skin: WNL, sore erythema adelaida area d/t diarrhea.  Mobility: ind.  Vasc access: PIV.    Special/Event:Transfer to .  Pt has nicotine patch on L deltoid.  Continue to monitor and intervene as appropriate as med/surg level of care.

## 2018-06-20 NOTE — PROVIDER NOTIFICATION
Pt's blood pressure is 189/97. Pt is asymptomatic. Gave scheduled labetalol and notified [employer] cover. Will continue to monitor.

## 2018-06-20 NOTE — PROGRESS NOTES
[facility], [place]    Hospitalist Progress Note    Date of Service (when I saw the patient): 06/20/2018    Assessment & Plan     Inga Ledesma is a 52 year old female  admitted on 6/18/2018. She has a history of hypertension, CVA, COPD, CHF with preserved EF, alcohol abuse and alcohol withdrawal seizures and is admitted for hypotension and acute kidney injury concerning for Dehydration Vs sepsis vs Clostridium difficile infection.     1) Dehydration Vs Sepsis - Presented with hypotension, tachycardia and elevated procalcitonin concerning for sepsis. She was diagnosed with C diff 2 weeks ago. She stopped drinking ETOH and over the weekend reported having loose stools very 10 minutes. She is on diuretics too. Hence this may represent volume depletion as well. Her sodium, creatinine, and BP quickly corrected after giving her fluids.  No infiltrated on CXR .  UA negative, BC pending but negative so far.  CT abdomen/ negative.     - Started on Vanco and Zosyn in the ED and empiric oral vanc. Now off all antibiotics given rapid improvement in BP after IVF and rapid normalization of Na and  creat after IVF suggesting majority of the issue was dehydration. There is risk of C-diff recurrence with antibiotics.   - Monitor off Antibiotics for any new infection give high pro-calcitonin on admission. Subsequent procalcitotin has come down  - C-diff negative. Enteric panel negative.   - CT abdomen negative  - UA/UC, BC negative so far  - Stop IVF at patients request  - Hold home lasix,  spironolactone,   - BP now going up, restart home dose of labetalol, losartan. Add PRN hydralazine.       2) Hyponatremia  - Sodium 124  On admit now normalized after IVF  - off IVF     3) LALA - Suspect secondary to hypovolemia.  She has been taking lasix in the setting of C diff infection and heavy alcohol use.  FeNA very low suggestive of pre-renal etiology.   - resolved with IVF  - Hold home lasix  And  aldactone  - Adjust home meds: Hold Cymbalta, Decrease gabapentin from 300 / 300 / 600 to 200 mg BID.    4) Alcohol abuse - She is drinking a pint of vodka daily and reports a history of alcohol withdrawal seizures.  Last drink 6/14.  She was tremulous on 6/15 but has not had any issues since.  - Will hold off on MSSA protocol  - Already on thiamine at home     5) History of COPD - Reports great difficulty over the past week secondary to humidity and heat.  She has a productive cough.  Currently talking in complete sentences and SpO2 100% on room air.  - Continue home COPD regimen: Advair, albuterol, duonebs.  Hold Spiriva while on duonebs.  - Hesitant to start prednisone in the setting of undetermined infection  - Counseled re: smoking cessation     6) History of diastolic heart failure - 12/22 echo found normal LVEF.  Patient reports 6 pillow orthopnea.  - Holding lasix in setting of LALA     7) Cigarette smoking  - Nicotine patch     8) Pain Assessment:  - no significant pain right now  - Tylenol   9) Hypertension: BP going up.  -BP now going up, restart home dose of labetalol, losartan. Add PRN hydralazine.     Diet:  Regular  Fluids: off IVF  DVT Prophylaxis: Heparin SQ  Code Status: Full      Disposition Plan     Expected discharge: tomorrow; recommended to prior living arrangement once BC negative, patient stable.      Claudine Ramos MD    Interval History     Pt refused to see me today and wanted to see some one else. So I did not personally see her or did the exam.     -Data reviewed today: I reviewed all new labs and imaging results over the last 24 hours. I personally reviewed no images or EKG's today.    Physical Exam   Temp: 97.1  F (36.2  C) Temp src: Oral BP: (!) 152/99 Pulse: 80 Heart Rate: 91 Resp: 20 SpO2: 99 % O2 Device: None (Room air)    Vitals:    06/18/18 1458 06/18/18 2108 06/19/18 0654   Weight: 70.3 kg (155 lb) 70.4 kg (155 lb 3.2 oz) 73 kg (161 lb)     Vital Signs with Ranges  Temp:   [97.1  F (36.2  C)-99  F (37.2  C)] 97.1  F (36.2  C)  Pulse:  [72-88] 80  Heart Rate:  [91] 91  Resp:  [20] 20  BP: (152-194)/() 152/99  SpO2:  [94 %-99 %] 99 %  I/O last 3 completed shifts:  In: 1030 [P.O.:1030]  Out: 3900 [Urine:3200; Stool:700]        Medications       DULoxetine  60 mg Oral Daily     FLUoxetine  80 mg Oral Daily     fluticasone-vilanterol  1 puff Inhalation Daily     folic acid  1 mg Oral Daily     gabapentin  200 mg Oral BID     heparin  5,000 Units Subcutaneous Q12H     [START ON 6/21/2018] labetalol  100 mg Oral QAM     labetalol  200 mg Oral QPM     losartan  25 mg Oral Daily     magnesium oxide (MAG-OX) tablet 400 mg  400 mg Oral Daily     multivitamin, therapeutic with minerals  1 tablet Oral Daily     nicotine  1 patch Transdermal Daily     nicotine   Transdermal Q8H     nicotine   Transdermal Daily     omeprazole  20 mg Oral BID AC     rOPINIRole  1 mg Oral TID     sodium chloride (PF)  3 mL Intracatheter Q8H       Data     Recent Labs  Lab 06/20/18  0520 06/19/18  0724 06/18/18  2116 06/18/18  1825 06/18/18  1531   WBC 6.0  --  8.3  --  10.6   HGB 10.3*  --  11.0*  --  12.7   MCV 90  --  91  --  89   *  --  133*  --  180   INR  --   --   --   --  0.94    135  --  129* 124*   POTASSIUM 3.1* 3.4  --  3.3* 3.2*   CHLORIDE 104 102  --  98 90*   CO2 22 22  --  19* 21   BUN 14 24  --  33* 35*   CR 0.63 0.97  --  2.11* 2.97*   ANIONGAP 12 11  --  12 14   JENNIFER 8.6 8.0*  --  7.9* 9.0   * 111*  --  105* 142*   ALBUMIN  --   --   --   --  4.0   PROTTOTAL  --   --   --   --  7.5   BILITOTAL  --   --   --   --  0.5   ALKPHOS  --   --   --   --  124   ALT  --   --   --   --  49   AST  --   --   --   --  36   LIPASE  --   --   --   --  595*   TROPI  --   --   --   --  <0.015       No results found for this or any previous visit (from the past 24 hour(s)).

## 2018-06-20 NOTE — PROVIDER NOTIFICATION
Pt's blood pressure is 194/107. Notified gold cross cover. Hydralazine order placed and given. Will continue to monitor.

## 2018-06-21 ENCOUNTER — CARE COORDINATION (OUTPATIENT)
Dept: CARE COORDINATION | Facility: CLINIC | Age: 53
End: 2018-06-21

## 2018-06-21 VITALS
TEMPERATURE: 97.8 F | OXYGEN SATURATION: 96 % | HEART RATE: 80 BPM | HEIGHT: 61 IN | SYSTOLIC BLOOD PRESSURE: 136 MMHG | WEIGHT: 161 LBS | BODY MASS INDEX: 30.4 KG/M2 | DIASTOLIC BLOOD PRESSURE: 88 MMHG | RESPIRATION RATE: 18 BRPM

## 2018-06-21 LAB
ANION GAP SERPL CALCULATED.3IONS-SCNC: 11 MMOL/L (ref 3–14)
BUN SERPL-MCNC: 12 MG/DL (ref 7–30)
CALCIUM SERPL-MCNC: 8.5 MG/DL (ref 8.5–10.1)
CHLORIDE SERPL-SCNC: 102 MMOL/L (ref 94–109)
CO2 SERPL-SCNC: 22 MMOL/L (ref 20–32)
CREAT SERPL-MCNC: 0.61 MG/DL (ref 0.52–1.04)
ERYTHROCYTE [DISTWIDTH] IN BLOOD BY AUTOMATED COUNT: 15.9 % (ref 10–15)
GFR SERPL CREATININE-BSD FRML MDRD: >90 ML/MIN/1.7M2
GLUCOSE SERPL-MCNC: 103 MG/DL (ref 70–99)
HCT VFR BLD AUTO: 31.2 % (ref 35–47)
HGB BLD-MCNC: 10.3 G/DL (ref 11.7–15.7)
MCH RBC QN AUTO: 30.7 PG (ref 26.5–33)
MCHC RBC AUTO-ENTMCNC: 33 G/DL (ref 31.5–36.5)
MCV RBC AUTO: 93 FL (ref 78–100)
PLATELET # BLD AUTO: 120 10E9/L (ref 150–450)
POTASSIUM SERPL-SCNC: 3.5 MMOL/L (ref 3.4–5.3)
RBC # BLD AUTO: 3.36 10E12/L (ref 3.8–5.2)
SODIUM SERPL-SCNC: 136 MMOL/L (ref 133–144)
WBC # BLD AUTO: 5.7 10E9/L (ref 4–11)

## 2018-06-21 PROCEDURE — 99239 HOSP IP/OBS DSCHRG MGMT >30: CPT | Performed by: HOSPITALIST

## 2018-06-21 PROCEDURE — 80048 BASIC METABOLIC PNL TOTAL CA: CPT | Performed by: HOSPITALIST

## 2018-06-21 PROCEDURE — 25000132 ZZH RX MED GY IP 250 OP 250 PS 637: Performed by: HOSPITALIST

## 2018-06-21 PROCEDURE — 25000132 ZZH RX MED GY IP 250 OP 250 PS 637: Performed by: NURSE PRACTITIONER

## 2018-06-21 PROCEDURE — 85027 COMPLETE CBC AUTOMATED: CPT | Performed by: HOSPITALIST

## 2018-06-21 RX ORDER — POTASSIUM CHLORIDE 750 MG/1
10 TABLET, EXTENDED RELEASE ORAL DAILY
Qty: 30 TABLET | Refills: 0 | Status: ON HOLD | OUTPATIENT
Start: 2018-06-21 | End: 2018-07-09

## 2018-06-21 RX ORDER — FUROSEMIDE 20 MG
10 TABLET ORAL DAILY
Qty: 30 TABLET | Refills: 0 | Status: ON HOLD | OUTPATIENT
Start: 2018-06-21 | End: 2018-07-09

## 2018-06-21 RX ADMIN — OMEPRAZOLE 20 MG: 20 CAPSULE, DELAYED RELEASE ORAL at 09:18

## 2018-06-21 RX ADMIN — MULTIPLE VITAMINS W/ MINERALS TAB 1 TABLET: TAB at 09:19

## 2018-06-21 RX ADMIN — FLUTICASONE FUROATE AND VILANTEROL TRIFENATATE 1 PUFF: 200; 25 POWDER RESPIRATORY (INHALATION) at 09:19

## 2018-06-21 RX ADMIN — ROPINIROLE HYDROCHLORIDE 1 MG: 1 TABLET, FILM COATED ORAL at 09:18

## 2018-06-21 RX ADMIN — LABETALOL HCL 100 MG: 100 TABLET, FILM COATED ORAL at 10:45

## 2018-06-21 RX ADMIN — FOLIC ACID 1 MG: 1 TABLET ORAL at 09:19

## 2018-06-21 RX ADMIN — MAGNESIUM OXIDE TAB 400 MG (241.3 MG ELEMENTAL MG) 400 MG: 400 (241.3 MG) TAB at 09:18

## 2018-06-21 RX ADMIN — FLUOXETINE HYDROCHLORIDE 80 MG: 40 CAPSULE ORAL at 09:20

## 2018-06-21 RX ADMIN — GABAPENTIN 200 MG: 100 CAPSULE ORAL at 09:17

## 2018-06-21 RX ADMIN — LOSARTAN POTASSIUM 25 MG: 25 TABLET, FILM COATED ORAL at 09:18

## 2018-06-21 RX ADMIN — DULOXETINE HYDROCHLORIDE 60 MG: 60 CAPSULE, DELAYED RELEASE ORAL at 09:17

## 2018-06-21 NOTE — PLAN OF CARE
"Problem: Patient Care Overview  Goal: Plan of Care/Patient Progress Review  Outcome: No change.  BP (!) 165/101  Pulse 78  Temp 96.2  F (35.7  C) (Oral)  Resp 16  Ht 1.549 m (5' 1\")  Wt 73 kg (161 lb)  LMP 06/02/2010  SpO2 97%  BMI 30.42 kg/m2  Neuro: A&Ox4, able to make needs known. Follows commands. PERRLA.  Cardiac: No tele orders. HR in 80s. SBP 160s-190s. Afebrile. Gave scheduled labetalol and prn hydralazine x 1.  Respiratory: Sating in 90s on RA. Diminished lungs sounds. Neb treatments PRN.  GI/: Adequate urine output.    Diet/appetite: Tolerating regular diet.   Activity: Independent  Pain: Chronic back pain.  At acceptable level on current regimen. Managed with tylenol.  Skin: No new deficits noted.  LDA's: R PIV x 1. L PIV x1.    Plan: Continue with POC. Notify primary team with changes.      "

## 2018-06-21 NOTE — PROVIDER NOTIFICATION
MD notified that pt refused recheck of blood pressure after PRN hydralazine given for bp of 165/101. MD called to acknowledge message.

## 2018-06-21 NOTE — PROVIDER NOTIFICATION
Primary team paged to request home medications, Prozac, Cymbalta,Neurontin, and Folic Acid to be added to home medications refills- MD called back and suggested pt reach out to primary Md to provide refills.

## 2018-06-21 NOTE — PLAN OF CARE
Problem: Patient Care Overview  Goal: Plan of Care/Patient Progress Review  Outcome: Adequate for Discharge Date Met: 06/21/18  [id number]: VSS on room air, B/P: 136/88, pt tolerating leaving unit to smoke- declined nicotine patch. Pt denies pain/n/v. K: 3.5. Pt discharge orders in place, pt aware. Discharge orders reviewed with pt. PIV removed without incident, cannula intact. Discharge medications will be filled at our pharmacy (Lasix and Potassium/Choloride)- other medications requested to be filled by pt will have to be filled by primary Md. All belongings gathered by pt. Call light within reach, calls appropriately. Will continue to follow POC/monitor.

## 2018-06-22 ENCOUNTER — PATIENT OUTREACH (OUTPATIENT)
Dept: CARE COORDINATION | Facility: CLINIC | Age: 53
End: 2018-06-22

## 2018-06-22 NOTE — PROGRESS NOTES
Clinic Care Coordination Contact    Clinic Care Coordination Contact  OUTREACH    Referral Information:  Referral Source: IP Report    Primary Diagnosis: Genitourinary Disorders    Chief Complaint   Patient presents with     Clinic Care Coordination - Post Hospital     Nurse: d/c from [facility] 6/21/18 LALA        [place] Utilization:   Clinic Utilization  Difficulty keeping appointments:: Yes  Utilization    Last refreshed: 6/22/2018 12:46 PM:  No Show Count (past year) 4       Last refreshed: 6/22/2018 12:46 PM:  ED visits 2       Last refreshed: 6/22/2018 12:46 PM:  Hospital admissions 3          Current as of: 6/22/2018 12:46 PM             Clinical Concerns:  RN WAQAR spoke with patient, she states she still is not doing that well. She states she was discharged too early only because she was sick of being there and pushed the doctors to let her go. She states she is still feeling lightheadedness, BP high 157/104. She denies having any pain. She states she gets some food from the food shelf. She has appointment to see PCP Tuesday 6/26/18. She denies having any other questions or concerns.    Current Medical Concerns:    Patient Active Problem List   Diagnosis     RLS (restless legs syndrome)     GERD (gastroesophageal reflux disease)     Iron deficiency anemia     Hyperlipidemia with target LDL less than 160     Adult BMI 30+     Sacroiliitis (H)     Tobacco abuse     Vitamin D deficiency     Menorrhagia     Advanced directives, counseling/discussion     Vitamin D deficiency     Edema of both legs     Hypertension goal BP (blood pressure) < 140/90     Chronic bronchitis, unspecified chronic bronchitis type (H)     Health Care Home     Alcohol dependence with withdrawal with complication (H)     Major depressive disorder, recurrent episode, mild (H)     (HFpEF) heart failure with preserved ejection fraction (H)     Psychophysiological insomnia     Chemical dependency (H)     Alcohol withdrawal (H)     Alcohol abuse      LALA (acute kidney injury) (H)     Sepsis (H)         Current Behavioral Concerns: Not assessed at this time.    Education Provided to patient: RN CC educated about Care Coordination Services, discharge instructions, medications reviewed, follow up and covering for primary CC.     Pain  Chronic pain (GOAL):: No  Health Maintenance Reviewed: Due/Overdue   Health Maintenance Due   Topic Date Due     HF ACTION PLAN Q3 YR  1965     HIV SCREEN (SYSTEM ASSIGNED)  08/17/1984     COLON CANCER SCREEN (SYSTEM ASSIGNED)  08/17/2016     ADVANCE DIRECTIVE PLANNING Q5 YRS  05/17/2017     MAMMO SCREEN Q2 YR (SYSTEM ASSIGNED)  09/24/2017     DEPRESSION ACTION PLAN Q1 YR  02/27/2018     PHQ-9 Q6 MONTHS  03/25/2018     COPD ACTION PLAN Q1 YR  04/28/2018       Clinical Pathway: None    Medication Management:  Patient is independent in medication management. Pt. verbalized adherence and understanding of medication regimen, side effects, purposes.      Functional Status:  Dependent ADLs:: Ambulation-walker  Bed or wheelchair confined:: No  Mobility Status: Independent w/Device  Fallen 2 or more times in the past year?: No  Any fall with injury in the past year?: No    Living Situation:  Current living arrangement:: I live in a private home with family    Diet/Exercise/Sleep:  Diet:: Regular  Inadequate nutrition (GOAL):: Yes  Food Insecurity: Yes  In the last twelve months: We worried whether food would run out before we had money to buy more. : Sometimes True  In the last twelve months: The food we bought just didn't last and we didn't have money to buy more.: Sometimes True  Tube Feeding: No  Exercise:: Currently not exercising  Inadequate activity/exercise (GOAL):: No  Significant changes in sleep pattern (GOAL): No    Transportation:  Transportation concerns (GOAL):: No  Transportation means:: Family, Friend, Regular car     Psychosocial:  Wants to get back to work ASAP.  Rastafari or spiritual beliefs that impact treatment::  No  Mental health DX:: Yes  Mental health DX how managed:: Medication  Mental health management concern (GOAL):: No  Informal Support system:: Children, Spouse     Financial/Insurance:   Financial/Insurance concerns (GOAL):: Yes  Not assessed at this time.     Resources and Interventions:  Current Resources: n/a  Equipment Currently Used at Home: walker, rolling    Advance Care Plan/Directive  Advanced Care Plans/Directives on file:: No          Goals:   Goals        General    I will use the walker at all times or a cane in tight spots to prevent further falls. (pt-stated)     Notes - Note created  12/9/2015  8:48 AM by Sonny Jacobs, RN    As of today's date 12/9/2015 goal is met at 0 - 25%.   Goal Status:  Active        Medical (pt-stated)     Notes - Note created  6/4/2018 12:28 PM by Jasiel Crane, RN    I will follow up with my PCP as directed ongoing.               Patient/Caregiver understanding: Patient verbalized understanding of her discharge instructions and denies having any concerns.    Outreach Frequency: 2 weeks  Future Appointments              In 4 days Min [place] PA-C [facility] MICHAEL Luong          Plan:   Patient will continue to follow treatment plan as directed and follow up with PCP with concerns ongoing.   Patient to f/u with PCP as scheduled 6/26/18.  RN CC to pass back to Primary RN CC for normal f/u.  No further outreaches planned by this RN CC.    Avril Pineda RN, [facility]  RN Care Coordinator  [facility]  Phone # [phone]  6/22/2018 1:20 PM

## 2018-06-26 ENCOUNTER — TELEPHONE (OUTPATIENT)
Dept: PHARMACY | Facility: OTHER | Age: 53
End: 2018-06-26

## 2018-06-26 NOTE — TELEPHONE ENCOUNTER
MTM referral from: Transitions of Care (recent hospital discharge or ED visit)    MTM referral outreach attempt #2 on June 26, 2018 at 11:32 AM      Outcome: Patient not reachable after several attempts, will route to MTM Pharmacist/Provider as an FYI. Thank you for the referral.    Cristian Paul, MTM Coordinator

## 2018-07-06 ENCOUNTER — HOSPITAL ENCOUNTER (INPATIENT)
Facility: CLINIC | Age: 53
LOS: 7 days | Discharge: HOME | DRG: 897 | End: 2018-07-13
Attending: EMERGENCY MEDICINE | Admitting: PSYCHIATRY & NEUROLOGY
Payer: COMMERCIAL

## 2018-07-06 DIAGNOSIS — F10.230 ALCOHOL DEPENDENCE WITH UNCOMPLICATED WITHDRAWAL (H): ICD-10-CM

## 2018-07-06 DIAGNOSIS — G89.4 CHRONIC PAIN SYNDROME: Primary | ICD-10-CM

## 2018-07-06 DIAGNOSIS — E87.6 HYPOKALEMIA: ICD-10-CM

## 2018-07-06 DIAGNOSIS — F33.0 MAJOR DEPRESSIVE DISORDER, RECURRENT EPISODE, MILD (H): ICD-10-CM

## 2018-07-06 DIAGNOSIS — I10 HYPERTENSION GOAL BP (BLOOD PRESSURE) < 140/90: ICD-10-CM

## 2018-07-06 DIAGNOSIS — G25.81 RLS (RESTLESS LEGS SYNDROME): ICD-10-CM

## 2018-07-06 DIAGNOSIS — Z72.0 TOBACCO ABUSE: ICD-10-CM

## 2018-07-06 DIAGNOSIS — F33.9 RECURRENT MAJOR DEPRESSIVE DISORDER, REMISSION STATUS UNSPECIFIED (H): ICD-10-CM

## 2018-07-06 DIAGNOSIS — F10.29 ALCOHOL DEPENDENCE WITH UNSPECIFIED ALCOHOL-INDUCED DISORDER (H): ICD-10-CM

## 2018-07-06 DIAGNOSIS — M46.1 SACROILIITIS (H): ICD-10-CM

## 2018-07-06 DIAGNOSIS — M79.10 MUSCLE SORENESS: ICD-10-CM

## 2018-07-06 DIAGNOSIS — I10 ESSENTIAL HYPERTENSION WITH GOAL BLOOD PRESSURE LESS THAN 140/90: ICD-10-CM

## 2018-07-06 DIAGNOSIS — K29.21 ALCOHOLIC GASTRITIS WITH HEMORRHAGE, UNSPECIFIED CHRONICITY: ICD-10-CM

## 2018-07-06 DIAGNOSIS — J42 CHRONIC BRONCHITIS, UNSPECIFIED CHRONIC BRONCHITIS TYPE (H): ICD-10-CM

## 2018-07-06 PROBLEM — F10.20 ALCOHOL DEPENDENCY (H): Status: ACTIVE | Noted: 2018-07-06

## 2018-07-06 LAB
ALCOHOL BREATH TEST: 0.04 (ref 0–0.01)
AMPHETAMINES UR QL SCN: NEGATIVE
ANION GAP SERPL CALCULATED.3IONS-SCNC: 15 MMOL/L (ref 3–14)
BARBITURATES UR QL: NEGATIVE
BENZODIAZ UR QL: POSITIVE
BUN SERPL-MCNC: 7 MG/DL (ref 7–30)
CALCIUM SERPL-MCNC: 8.3 MG/DL (ref 8.5–10.1)
CANNABINOIDS UR QL SCN: NEGATIVE
CHLORIDE SERPL-SCNC: 93 MMOL/L (ref 94–109)
CO2 SERPL-SCNC: 26 MMOL/L (ref 20–32)
COCAINE UR QL: NEGATIVE
CREAT SERPL-MCNC: 0.77 MG/DL (ref 0.52–1.04)
ETHANOL UR QL SCN: POSITIVE
GFR SERPL CREATININE-BSD FRML MDRD: 79 ML/MIN/1.7M2
GLUCOSE SERPL-MCNC: 82 MG/DL (ref 70–99)
OPIATES UR QL SCN: NEGATIVE
POTASSIUM SERPL-SCNC: 3.1 MMOL/L (ref 3.4–5.3)
SODIUM SERPL-SCNC: 134 MMOL/L (ref 133–144)

## 2018-07-06 PROCEDURE — 82075 ASSAY OF BREATH ETHANOL: CPT | Performed by: EMERGENCY MEDICINE

## 2018-07-06 PROCEDURE — 80320 DRUG SCREEN QUANTALCOHOLS: CPT | Performed by: FAMILY MEDICINE

## 2018-07-06 PROCEDURE — 80048 BASIC METABOLIC PNL TOTAL CA: CPT | Performed by: EMERGENCY MEDICINE

## 2018-07-06 PROCEDURE — 99285 EMERGENCY DEPT VISIT HI MDM: CPT | Mod: Z6 | Performed by: EMERGENCY MEDICINE

## 2018-07-06 PROCEDURE — 25000128 H RX IP 250 OP 636: Performed by: EMERGENCY MEDICINE

## 2018-07-06 PROCEDURE — HZ2ZZZZ DETOXIFICATION SERVICES FOR SUBSTANCE ABUSE TREATMENT: ICD-10-PCS | Performed by: EMERGENCY MEDICINE

## 2018-07-06 PROCEDURE — 25000132 ZZH RX MED GY IP 250 OP 250 PS 637: Performed by: FAMILY MEDICINE

## 2018-07-06 PROCEDURE — 80307 DRUG TEST PRSMV CHEM ANLYZR: CPT | Performed by: FAMILY MEDICINE

## 2018-07-06 PROCEDURE — 99285 EMERGENCY DEPT VISIT HI MDM: CPT | Mod: 25 | Performed by: EMERGENCY MEDICINE

## 2018-07-06 PROCEDURE — 25000132 ZZH RX MED GY IP 250 OP 250 PS 637: Performed by: EMERGENCY MEDICINE

## 2018-07-06 PROCEDURE — 12800012 ZZH R&B CD MH INTERMEDIATE ADULT

## 2018-07-06 PROCEDURE — 25000132 ZZH RX MED GY IP 250 OP 250 PS 637: Performed by: PSYCHIATRY & NEUROLOGY

## 2018-07-06 RX ORDER — POTASSIUM CHLORIDE 20MEQ/15ML
40 LIQUID (ML) ORAL ONCE
Status: COMPLETED | OUTPATIENT
Start: 2018-07-06 | End: 2018-07-06

## 2018-07-06 RX ORDER — LANOLIN ALCOHOL/MO/W.PET/CERES
100 CREAM (GRAM) TOPICAL ONCE
Status: COMPLETED | OUTPATIENT
Start: 2018-07-06 | End: 2018-07-06

## 2018-07-06 RX ORDER — FUROSEMIDE 20 MG/1
10 TABLET ORAL DAILY
Status: DISCONTINUED | OUTPATIENT
Start: 2018-07-06 | End: 2018-07-13 | Stop reason: HOSPADM

## 2018-07-06 RX ORDER — MAGNESIUM OXIDE 400 MG/1
400 TABLET ORAL DAILY
Status: DISCONTINUED | OUTPATIENT
Start: 2018-07-06 | End: 2018-07-13 | Stop reason: HOSPADM

## 2018-07-06 RX ORDER — ONDANSETRON 2 MG/ML
4 INJECTION INTRAMUSCULAR; INTRAVENOUS EVERY 30 MIN PRN
Status: DISCONTINUED | OUTPATIENT
Start: 2018-07-06 | End: 2018-07-13 | Stop reason: HOSPADM

## 2018-07-06 RX ORDER — HYDROXYZINE HYDROCHLORIDE 25 MG/1
25 TABLET, FILM COATED ORAL EVERY 4 HOURS PRN
Status: DISCONTINUED | OUTPATIENT
Start: 2018-07-06 | End: 2018-07-06

## 2018-07-06 RX ORDER — SODIUM CHLORIDE 9 MG/ML
1000 INJECTION, SOLUTION INTRAVENOUS CONTINUOUS
Status: DISCONTINUED | OUTPATIENT
Start: 2018-07-06 | End: 2018-07-13 | Stop reason: HOSPADM

## 2018-07-06 RX ORDER — ROPINIROLE 1 MG/1
1 TABLET, FILM COATED ORAL 3 TIMES DAILY
Status: DISCONTINUED | OUTPATIENT
Start: 2018-07-06 | End: 2018-07-13 | Stop reason: HOSPADM

## 2018-07-06 RX ORDER — DIAZEPAM 5 MG
5-20 TABLET ORAL EVERY 30 MIN PRN
Status: DISCONTINUED | OUTPATIENT
Start: 2018-07-06 | End: 2018-07-13 | Stop reason: HOSPADM

## 2018-07-06 RX ORDER — DIAZEPAM 5 MG
5 TABLET ORAL ONCE
Status: COMPLETED | OUTPATIENT
Start: 2018-07-06 | End: 2018-07-06

## 2018-07-06 RX ORDER — DULOXETIN HYDROCHLORIDE 60 MG/1
60 CAPSULE, DELAYED RELEASE ORAL DAILY
Status: DISCONTINUED | OUTPATIENT
Start: 2018-07-06 | End: 2018-07-13 | Stop reason: HOSPADM

## 2018-07-06 RX ORDER — ALBUTEROL SULFATE 90 UG/1
1-2 AEROSOL, METERED RESPIRATORY (INHALATION) EVERY 4 HOURS PRN
Status: DISCONTINUED | OUTPATIENT
Start: 2018-07-06 | End: 2018-07-13 | Stop reason: HOSPADM

## 2018-07-06 RX ORDER — ACETAMINOPHEN 325 MG/1
650 TABLET ORAL EVERY 4 HOURS PRN
Status: DISCONTINUED | OUTPATIENT
Start: 2018-07-06 | End: 2018-07-13 | Stop reason: HOSPADM

## 2018-07-06 RX ORDER — FOLIC ACID 1 MG/1
1 TABLET ORAL ONCE
Status: COMPLETED | OUTPATIENT
Start: 2018-07-06 | End: 2018-07-06

## 2018-07-06 RX ORDER — MULTIPLE VITAMINS W/ MINERALS TAB 9MG-400MCG
1 TAB ORAL DAILY
Status: DISCONTINUED | OUTPATIENT
Start: 2018-07-07 | End: 2018-07-13 | Stop reason: HOSPADM

## 2018-07-06 RX ORDER — MULTIVITAMIN,THERAPEUTIC
1 TABLET ORAL ONCE
Status: COMPLETED | OUTPATIENT
Start: 2018-07-06 | End: 2018-07-06

## 2018-07-06 RX ORDER — CALCIUM CARBONATE 500(1250)
500 TABLET ORAL DAILY
Status: DISCONTINUED | OUTPATIENT
Start: 2018-07-06 | End: 2018-07-13 | Stop reason: HOSPADM

## 2018-07-06 RX ORDER — DIAZEPAM 10 MG
10 TABLET ORAL ONCE
Status: COMPLETED | OUTPATIENT
Start: 2018-07-06 | End: 2018-07-06

## 2018-07-06 RX ORDER — MAGNESIUM OXIDE 400 MG/1
800 TABLET ORAL ONCE
Status: COMPLETED | OUTPATIENT
Start: 2018-07-06 | End: 2018-07-06

## 2018-07-06 RX ORDER — LABETALOL 100 MG/1
100 TABLET, FILM COATED ORAL DAILY
Status: DISCONTINUED | OUTPATIENT
Start: 2018-07-07 | End: 2018-07-13 | Stop reason: HOSPADM

## 2018-07-06 RX ORDER — ACETAMINOPHEN 500 MG
1000 TABLET ORAL ONCE
Status: COMPLETED | OUTPATIENT
Start: 2018-07-06 | End: 2018-07-06

## 2018-07-06 RX ORDER — LOSARTAN POTASSIUM 25 MG/1
25 TABLET ORAL DAILY
Status: DISCONTINUED | OUTPATIENT
Start: 2018-07-06 | End: 2018-07-11

## 2018-07-06 RX ORDER — TRAZODONE HYDROCHLORIDE 50 MG/1
50 TABLET, FILM COATED ORAL
Status: DISCONTINUED | OUTPATIENT
Start: 2018-07-06 | End: 2018-07-13 | Stop reason: HOSPADM

## 2018-07-06 RX ORDER — GABAPENTIN 300 MG/1
300 CAPSULE ORAL 2 TIMES DAILY
Status: DISCONTINUED | OUTPATIENT
Start: 2018-07-07 | End: 2018-07-13 | Stop reason: HOSPADM

## 2018-07-06 RX ORDER — POTASSIUM CHLORIDE 750 MG/1
10 TABLET, EXTENDED RELEASE ORAL DAILY
Status: DISCONTINUED | OUTPATIENT
Start: 2018-07-06 | End: 2018-07-13 | Stop reason: HOSPADM

## 2018-07-06 RX ORDER — HYDROXYZINE HYDROCHLORIDE 25 MG/1
25-50 TABLET, FILM COATED ORAL EVERY 6 HOURS PRN
Status: DISCONTINUED | OUTPATIENT
Start: 2018-07-06 | End: 2018-07-13 | Stop reason: HOSPADM

## 2018-07-06 RX ORDER — BISACODYL 10 MG
10 SUPPOSITORY, RECTAL RECTAL DAILY PRN
Status: DISCONTINUED | OUTPATIENT
Start: 2018-07-06 | End: 2018-07-13 | Stop reason: HOSPADM

## 2018-07-06 RX ORDER — GABAPENTIN 600 MG/1
600 TABLET ORAL
Status: DISCONTINUED | OUTPATIENT
Start: 2018-07-06 | End: 2018-07-13 | Stop reason: HOSPADM

## 2018-07-06 RX ORDER — FOLIC ACID 1 MG/1
1 TABLET ORAL DAILY
Status: DISCONTINUED | OUTPATIENT
Start: 2018-07-07 | End: 2018-07-13 | Stop reason: HOSPADM

## 2018-07-06 RX ORDER — IPRATROPIUM BROMIDE AND ALBUTEROL SULFATE 2.5; .5 MG/3ML; MG/3ML
3 SOLUTION RESPIRATORY (INHALATION) 4 TIMES DAILY PRN
Status: DISCONTINUED | OUTPATIENT
Start: 2018-07-06 | End: 2018-07-13 | Stop reason: HOSPADM

## 2018-07-06 RX ORDER — LABETALOL 200 MG/1
200 TABLET, FILM COATED ORAL
Status: DISCONTINUED | OUTPATIENT
Start: 2018-07-06 | End: 2018-07-13 | Stop reason: HOSPADM

## 2018-07-06 RX ORDER — ALUMINA, MAGNESIA, AND SIMETHICONE 2400; 2400; 240 MG/30ML; MG/30ML; MG/30ML
30 SUSPENSION ORAL EVERY 4 HOURS PRN
Status: DISCONTINUED | OUTPATIENT
Start: 2018-07-06 | End: 2018-07-13 | Stop reason: HOSPADM

## 2018-07-06 RX ORDER — ONDANSETRON 4 MG/1
4 TABLET, ORALLY DISINTEGRATING ORAL EVERY 6 HOURS PRN
Status: DISCONTINUED | OUTPATIENT
Start: 2018-07-06 | End: 2018-07-13 | Stop reason: HOSPADM

## 2018-07-06 RX ADMIN — DIAZEPAM 10 MG: 5 TABLET ORAL at 17:24

## 2018-07-06 RX ADMIN — DIAZEPAM 10 MG: 5 TABLET ORAL at 19:36

## 2018-07-06 RX ADMIN — POTASSIUM CHLORIDE 40 MEQ: 40 SOLUTION ORAL at 15:12

## 2018-07-06 RX ADMIN — Medication 100 MG: at 13:45

## 2018-07-06 RX ADMIN — ROPINIROLE HYDROCHLORIDE 1 MG: 1 TABLET, FILM COATED ORAL at 19:36

## 2018-07-06 RX ADMIN — MAGNESIUM OXIDE TAB 400 MG (241.3 MG ELEMENTAL MG) 800 MG: 400 (241.3 MG) TAB at 13:45

## 2018-07-06 RX ADMIN — SODIUM CHLORIDE 1000 ML: 9 INJECTION, SOLUTION INTRAVENOUS at 13:56

## 2018-07-06 RX ADMIN — THERA TABS 1 TABLET: TAB at 13:45

## 2018-07-06 RX ADMIN — GABAPENTIN 600 MG: 600 TABLET, FILM COATED ORAL at 20:08

## 2018-07-06 RX ADMIN — FOLIC ACID 1 MG: 1 TABLET ORAL at 13:45

## 2018-07-06 RX ADMIN — DIAZEPAM 10 MG: 10 TABLET ORAL at 16:17

## 2018-07-06 RX ADMIN — DIAZEPAM 5 MG: 5 TABLET ORAL at 14:00

## 2018-07-06 RX ADMIN — ACETAMINOPHEN 1000 MG: 500 TABLET, FILM COATED ORAL at 15:11

## 2018-07-06 RX ADMIN — LABETALOL HCL 200 MG: 200 TABLET, FILM COATED ORAL at 20:08

## 2018-07-06 ASSESSMENT — ENCOUNTER SYMPTOMS
TREMORS: 1
GASTROINTESTINAL NEGATIVE: 1
CARDIOVASCULAR NEGATIVE: 1
HALLUCINATIONS: 0
CONSTITUTIONAL NEGATIVE: 1
RESPIRATORY NEGATIVE: 1

## 2018-07-06 ASSESSMENT — ACTIVITIES OF DAILY LIVING (ADL)
SWALLOWING: 0 - SWALLOWS FOODS/LIQUIDS WITHOUT DIFFICULTY
AMBULATION: 0 - INDEPENDENT
GROOMING: INDEPENDENT
DRESS: 0 - INDEPENDENT
TOILETING: 0 - INDEPENDENT
BATHING: 0 - INDEPENDENT
TRANSFERRING: 0 - INDEPENDENT
CURRENT_FUNCTIONAL_LEVEL_COMMENT: INDEPENDENT
ORAL_HYGIENE: INDEPENDENT
CHANGE_IN_FUNCTIONAL_STATUS_SINCE_ONSET_OF_CURRENT_ILLNESS/INJURY: NO
EATING: 0 - INDEPENDENT
DRESS: INDEPENDENT
COMMUNICATION: 0 - UNDERSTANDS/COMMUNICATES WITHOUT DIFFICULTY

## 2018-07-06 NOTE — ED NOTES
Bed: ED11  Expected date: 7/6/18  Expected time: 1:09 PM  Means of arrival:   Comments:  633 53yo F etoh

## 2018-07-06 NOTE — PROVIDER NOTIFICATION
New Admission:  Patient here voluntarily. She was last here April 2018. She is currently here seeking detox for alcohol and drinking 1/2 liter liquor/day and last drank last evening. She scored 10 on MSSA/alcohol withdrawal score and was given 10 mg Valium. Seizure pads next to her bed relating to seizure HX with last seizure 2 years ago. She has not been taking her home medications for aprox 2 weeks and her medications will need to be reviewed by MD, she has not been eating properly. She has medical/mental health issues: history of depression, congestive heart failure, COPD, HTN, hyperlipidemia, CVA, tobacco abuse, and alcohol withdrawal with seizures. Denies SI/SIB. Ambulating with walker on the unit. At home she uses a walker and or cane. She is a smoker and has respiratory issues and uses inhalers and nebs. Her facial color is bright red. She needed to have a room close to nurse's desk due to her having ambulation problems and she can't tolerate walking too far down the neely, SOB on exhertion. She is alert, pleasant, cooperative. Able to make her needs known and is appropriate on the unit. Able to ambulate safely using a walker and there is a patient care order for her to use it while here in detox. Notified on-call Dr Mancera and received admission orders.

## 2018-07-06 NOTE — PROGRESS NOTES
07/06/18 1726   Patient Belongings   Did you bring any home meds/supplements to the hospital?  No   Patient Belongings cell phone/electronics;clothing;suitcase;shoes;keys   Disposition of Belongings Kept with patient;Locker;Sent to security per site process   Belongings Search Yes   Clothing Search Yes   Second Staff HUY Mueller and JOBY Tejeda    Locked drawer: Cell phone and .   Storage: black shoes with lace, Navy blue suitcase( 2 tank tops, 2 pajama pants with strings and couple depends) .  Security-[id number]: [employer] employee ID, Visa card -[id number] and [place] drivers license (yellow paper)   A               Admission:  I am responsible for any personal items that are not sent to the safe or pharmacy.  [facility] is not responsible for loss, theft or damage of any property in my possession.    Signature:  _________________________________ Date: _______  Time: _____                                              Staff Signature:  ____________________________ Date: ________  Time: _____      2nd Staff person, if patient is unable/unwilling to sign:    Signature: ________________________________ Date: ________  Time: _____     Discharge:  [facility] has returned all of my personal belongings:    Signature: _________________________________ Date: ________  Time: _____                                          Staff Signature:  ____________________________ Date: ________  Time: _____

## 2018-07-06 NOTE — IP AVS SNAPSHOT
[facility]    [address] [facility] [address]    Phone:  [phone]                                       After Visit Summary   7/6/2018    Inga Ledesma    MRN: [id number]           After Visit Summary Signature Page     I have received my discharge instructions, and my questions have been answered. I have discussed any challenges I see with this plan with the nurse or doctor.    ..........................................................................................................................................  Patient/Patient Representative Signature      ..........................................................................................................................................  Patient Representative Print Name and Relationship to Patient    ..................................................               ................................................  Date                                            Time    ..........................................................................................................................................  Reviewed by Signature/Title    ...................................................              ..............................................  Date                                                            Time

## 2018-07-06 NOTE — PHARMACY-ADMISSION MEDICATION HISTORY
Admission Medication History status for the 7/6/2018 admission is complete.  See EPIC admission navigator for Prior to Admission medications.    Medication history sources:  Patient, Chart Review, [facility], [place]      Medication history source reliability: Moderate    Medication adherence:  Poor. Pt reports not taking any medications for two weeks    Changes made to PTA medication list (reason)  Added: None  Deleted: Tramadol, Spiriva   Changed: Acetaminophen form 325 mg to 500-1000 mg tid prn    Additional medication history information (including reliability of information, actions taken by pharmacist):   -Pt reports taking fluoxetine 80 mg daily. I can't find a current RX for this. Her last fill at a  Pharmacy was on 4/29/18 for fluoxetine 40 mg daily.  -She stopped Spiriva Inhaler due to tongue swelling.   -She doesn't have an active prescription for tramadol. This medication was discontinued from her medication list.   -She last filled gabapentin on 4/29/18.  -Her spirolactone 25 mg daily was held upon her last hospital admission. It looks like this was not restarted.   -It was very hard to get an accurate medication list. Pt is somewhat familiar with her medications. There isn't a d/c summary from 6/21/18 to verify what medications she should have been taking upon discharge.    Time spent in this activity: 60 minutes    Medication history completed by: Lana Green PharmD    Prior to Admission medications    Medication Sig Last Dose Taking? Auth Provider   Acetaminophen (APAP 500 PO) Take 500-1,000 mg by mouth 3 times daily as needed 7/6/2018 at Unknown time Yes Unknown, Entered By History   fluticasone-salmeterol (ADVAIR DISKUS) 500-50 MCG/DOSE diskus inhaler Inhale 1 puff into the lungs every 12 hours 7/6/2018 at Unknown time Yes Marcelino Brothers MD   folic acid (FOLVITE) 1 MG tablet Take 1 tablet (1 mg) by mouth daily  Yes Marcelino Brothers MD   albuterol (VENTOLIN HFA) 108 (90  Base) MCG/ACT Inhaler Inhale 1-2 puffs into the lungs every 4 hours as needed for shortness of breath / dyspnea or wheezing  Patient taking differently: Inhale 1-2 puffs into the lungs every 4 hours as needed for shortness of breath / dyspnea or wheezing Patient taking 2 puff po TID PRN Unknown at Unknown time  Marcelino Brothers MD   DULoxetine (CYMBALTA) 30 MG EC capsule Take 2 capsules (60 mg) by mouth daily Unknown at Unknown time  Khadra Kim APRN CNP   FLUOXETINE HCL PO Take 80 mg by mouth daily Unknown at Unknown time  Unknown, Entered By History   furosemide (LASIX) 20 MG tablet Take 0.5 tablets (10 mg) by mouth daily Unknown at Unknown time  Claudine Ramos MD   gabapentin (NEURONTIN) 300 MG capsule Take 1 capsule by mouth in the morning, 1 capsule in the afternoon, and 2 capsules at bedtime. Unknown at Unknown time  Marcelino Brothers MD   hydrOXYzine (ATARAX) 50 MG tablet Take 1 tablet (50 mg) by mouth every 6 hours as needed for anxiety or other (adjuvant pain) Unknown at Unknown time  Marcelino Brothers MD   ipratropium - albuterol 0.5 mg/2.5 mg/3 mL (DUONEB) 0.5-2.5 (3) MG/3ML neb solution Take 1 vial (3 mLs) by nebulization 4 times daily as needed for wheezing Unknown at Unknown time  Cr Dillard MD   labetalol (NORMODYNE) 100 MG tablet 100mg in am, 200mg in pm. Unknown at Unknown time  Khadra Kim APRN CNP   losartan (COZAAR) 25 MG tablet Take 1 tablet (25 mg) by mouth daily Unknown at Unknown time  Khadra Kim APRN CNP   MAGNESIUM OXIDE PO Take 400 mg by mouth daily Unknown at Unknown time  Unknown, Entered By History   menthol (ICY HOT) 5 % PTCH Apply 1 patch topically every 8 hours as needed for muscle soreness Unknown at Unknown time  Cr Dillard MD   multivitamin, therapeutic with minerals (THERA-VIT-M) TABS tablet Take 1 tablet by mouth daily Unknown at Unknown time  Marcelino Brothers MD   nicotine (NICODERM CQ) 21 MG/24HR 24 hr patch Place 1  patch onto the skin daily Unknown at Unknown time  Khadra Kim APRN CNP   nicotine polacrilex (NICORETTE) 2 MG gum Place 2 mg inside cheek every hour as needed for smoking cessation Unknown at Unknown time  Unknown, Entered By History   omeprazole (PRILOSEC) 20 MG CR capsule Take 1 capsule (20 mg) by mouth 2 times daily (before meals) Unknown at Unknown time  Khadra Kim APRN CNP   order for DME Equipment being ordered: Cane ()  Treatment Diagnosis: Impaired functional mobility Unknown at Unknown time  Khadra Kim APRN CNP   OYSTER SHELL CALCIUM PO Take 500 mg by mouth daily Unknown at Unknown time  Unknown, Entered By History   potassium chloride SA (K-DUR/KLOR-CON M) 10 MEQ CR tablet Take 1 tablet (10 mEq) by mouth daily Unknown at Unknown time  Claudine Ramos MD   rOPINIRole (REQUIP) 1 MG tablet Take 1 tablet (1 mg) by mouth 3 times daily Unknown at Unknown time  Khadra Kim APRN CNP

## 2018-07-06 NOTE — ED PROVIDER NOTES
"  History     Chief Complaint   Patient presents with     Addiction Problem     on a two week salas: last drink yesterday: drinks about a .5 L iter a day: had a seizure 2.5 years ago: seeking detox: has a bed     HPI  Inga Ledesma is a 52 year old female with a history of depression, congestive heart failure, COPD, HTN, hyperlipidemia, CVA, tobacco abuse, and alcohol withdrawal with seizures who presents to the Emergency Department for detox from alcohol.     The patient reports that she has been in detox and treatment in the past, and it is notable on [facility] that the patient has had four ED visits for alcohol intoxication over the last three months. She reports being sober for two weeks prior to drinking again. Her last drink was today. She has been drinking half a liter of alcohol a day over the last couple weeks due to an inability to fall asleep. Alcohol helps her sleep. The patient noted she [facility], she was there 28 days for inpatient treatment and then transitioned to outpatient treatment. She denies street drug use. Denies suicidal ideation, or any psychotic symptoms. She reports stomachache and seizures with alcohol withdrawals. The patient currently has tremors. She states, \"I just need to stop this\".    I have reviewed the Medications, Allergies, Past Medical and Surgical History, and Social History in the [employer] system.  Past Medical History:   Diagnosis Date     Alcohol abuse, in remission      Alcohol withdrawal seizure (H) 2016     Cancer of labia majora (H) 1987     Cervical dysplasia 1987     Congestive heart failure (H) 5/16/16     COPD (chronic obstructive pulmonary disease) (H) 1/24/2011     CVA (cerebral infarction) 1/2012     Dependent edema      Depression, major      Depressive disorder 1966     GERD (gastroesophageal reflux disease)      Hyperlipidemia LDL goal < 160      Hypertension      Iron deficiency anemia      RLS (restless legs syndrome)      " Sacroiliitis (H)     steroid injections ineffective, chronic low back pain     Tobacco abuse      Uncomplicated asthma      Vitamin D deficiencies        Past Surgical History:   Procedure Laterality Date     AS BIOPSY/EXCISION LYMPH NODE OPEN SUPERFICIAL       COLONOSCOPY       EYE SURGERY       HYSTERECTOMY  2010     HYSTERECTOMY TOTAL ABDOMINAL  7/28/10    Bilateral salpingectomy.  ovaries conserved.     LASER TX, CERVICAL  1987     LYMPH NODE BIOPSY  2007    inguinal     LYSIS OF LABIAL LESION(S)  1985, 1987       Family History   Problem Relation Age of Onset     Depression/Anxiety Mother      Asthma Mother      Cerebrovascular Disease Father      Hypertension Father      Lung Cancer Father      Breast Cancer Paternal Aunt      Other Cancer Other      Depression Other      Anxiety Disorder Other      Mental Illness Other      Substance Abuse Other      Asthma Other      Obesity Sister      Obesity Son        Social History   Substance Use Topics     Smoking status: Current Every Day Smoker     Packs/day: 0.25     Years: 34.00     Types: Cigarettes     Start date: 11/1/1979     Last attempt to quit: 10/3/2012     Smokeless tobacco: Never Used      Comment: 5 cigarettes daily     Alcohol use Yes      Comment: 1 pint of vodka per day, last drink 6/17/18       Current Facility-Administered Medications   Medication     0.9% sodium chloride BOLUS    Followed by     sodium chloride 0.9% infusion     ondansetron (ZOFRAN) injection 4 mg     Current Outpatient Prescriptions   Medication     acetaminophen (TYLENOL) 325 MG tablet     albuterol (VENTOLIN HFA) 108 (90 Base) MCG/ACT Inhaler     DULoxetine (CYMBALTA) 30 MG EC capsule     FLUOXETINE HCL PO     fluticasone-salmeterol (ADVAIR DISKUS) 500-50 MCG/DOSE diskus inhaler     folic acid (FOLVITE) 1 MG tablet     furosemide (LASIX) 20 MG tablet     gabapentin (NEURONTIN) 300 MG capsule     hydrOXYzine (ATARAX) 50 MG tablet     ipratropium - albuterol 0.5 mg/2.5 mg/3 mL  (DUONEB) 0.5-2.5 (3) MG/3ML neb solution     labetalol (NORMODYNE) 100 MG tablet     losartan (COZAAR) 25 MG tablet     MAGNESIUM OXIDE PO     menthol (ICY HOT) 5 % PTCH     multivitamin, therapeutic with minerals (THERA-VIT-M) TABS tablet     nicotine (NICODERM CQ) 21 MG/24HR 24 hr patch     nicotine polacrilex (NICORETTE) 2 MG gum     omeprazole (PRILOSEC) 20 MG CR capsule     order for DME     OYSTER SHELL CALCIUM PO     potassium chloride SA (K-DUR/KLOR-CON M) 10 MEQ CR tablet     rOPINIRole (REQUIP) 1 MG tablet     tiotropium (SPIRIVA HANDIHALER) 18 MCG capsule     traMADol (ULTRAM) 50 MG tablet        Allergies   Allergen Reactions     Codeine Nausea     Influenza Vaccines      Other reaction(s): Other - Describe In Comment Field -- patient is sensitive to eggs     Reglan [Metoclopramide Hcl] Other (See Comments)     Body tenses up       Review of Systems   Constitutional: Negative.    Respiratory: Negative.    Cardiovascular: Negative.    Gastrointestinal: Negative.    Neurological: Positive for tremors.   Psychiatric/Behavioral: Negative for hallucinations and suicidal ideas.   All other systems reviewed and are negative.      Physical Exam   BP: (!) 136/91  Pulse: 118  Temp: 99.7  F (37.6  C)  Resp: 16  SpO2: 100 %      Physical Exam   Constitutional: She is oriented to person, place, and time. She appears well-developed and well-nourished. No distress.   HENT:   Head: Normocephalic and atraumatic.   Mouth/Throat: Oropharynx is clear and moist.   Eyes: Conjunctivae and EOM are normal. Pupils are equal, round, and reactive to light.   Neck: Normal range of motion. Neck supple.   Cardiovascular: Normal rate, regular rhythm, normal heart sounds and intact distal pulses.    No murmur heard.  Pulmonary/Chest: Effort normal and breath sounds normal. No respiratory distress. She has no wheezes. She has no rales.   Abdominal: Soft. There is no tenderness. There is no guarding.   Musculoskeletal: Normal range of  motion.        Right lower leg: Normal.        Left lower leg: Normal.   Neurological: She is alert and oriented to person, place, and time.   Skin: Skin is warm and dry.   Psychiatric: She has a normal mood and affect. Her speech is normal and behavior is normal. She expresses no suicidal ideation.   Nursing note and vitals reviewed.      ED Course     ED Course     Procedures        Medications   0.9% sodium chloride BOLUS (1,000 mLs Intravenous New Bag 7/6/18 1356)     Followed by   sodium chloride 0.9% infusion (not administered)   ondansetron (ZOFRAN) injection 4 mg (not administered)   multivitamin, therapeutic (THERA-VIT) tablet 1 tablet (1 tablet Oral Given 7/6/18 1345)   folic acid (FOLVITE) tablet 1 mg (1 mg Oral Given 7/6/18 1345)   thiamine tablet 100 mg (100 mg Oral Given 7/6/18 1345)   magnesium oxide (MAG-OX) tablet 800 mg (800 mg Oral Given 7/6/18 1345)   diazepam (VALIUM) tablet 5 mg (5 mg Oral Given 7/6/18 1400)            Labs Ordered and Resulted from Time of ED Arrival Up to the Time of Departure from the ED   BASIC METABOLIC PANEL - Abnormal; Notable for the following:        Result Value    Potassium 3.1 (*)     Chloride 93 (*)     Anion Gap 15 (*)     Calcium 8.3 (*)     All other components within normal limits   DRUG ABUSE SCREEN 6 CHEM DEP URINE ([facility]) - Abnormal; Notable for the following:     Benzodiazepine Qual Urine Positive (*)     Ethanol Qual Urine Positive (*)     All other components within normal limits   ALCOHOL BREATH TEST POCT - Abnormal; Notable for the following:     Alcohol Breath Test 0.04 (*)     All other components within normal limits            Assessments & Plan (with Medical Decision Making)   This is a 52 year old female whose a chronic alcoholic now on a couple week binge. Last drink was yesterday. She is voluntarily seeking detox. She is medically stable. She has some mild tremulousness now that was treated with oral valium. She was given IV fluids for  tachycardia. She is not suicidal and not psychiatric, she was thrown out of a day treatment program because of drinking. She will go to . She would certainly benefit to go directly into treatment. Review of her labs show mildly low potassium at 3.1 which we will replace orally.     This part of the medical record was transcribed by Lesley Cho, Medical Scribe, from a dictation done by .   I have reviewed the nursing notes.    I have reviewed the findings, diagnosis, plan and need for follow up with the patient.    Current Discharge Medication List          Final diagnoses:   Alcohol dependence with unspecified alcohol-induced disorder (H)     I, Lesley Cho, am serving as a trained medical scribe to document services personally performed by Nacho Kirby MD, based on the provider's statements to me.   INacho MD, was physically present and have reviewed and verified the accuracy of this note documented by Lesley Cho.    7/6/2018   [facility], [place], EMERGENCY DEPARTMENT     Nacho Kirby MD  07/06/18 1500

## 2018-07-06 NOTE — ED NOTES
ED to Behavioral Floor Handoff    SITUATION  Inga Ledesma is a 52 year old female who speaks English and lives in a home alone The patient arrived in the ED by ambulance from home with a complaint of Addiction Problem (on a two week salas: last drink yesterday: drinks about a .5 L iter a day: had a seizure 2.5 years ago: seeking detox: has a bed)  .The patient's current symptoms started/worsened 2 day(s) ago and during this time the symptoms have increased.   In the ED, pt was diagnosed with   Final diagnoses:   Alcohol dependence with unspecified alcohol-induced disorder (H)        Initial vitals were: BP: (!) 136/91  Pulse: 118  Temp: 99.7  F (37.6  C)  Resp: 16  SpO2: 100 %   --------  Is the patient diabetic? No   If yes, last blood glucose? --     If yes, was this treated in the ED? --  --------  Is the patient inebriated (ETOH) No or Impaired on other substances? No  MSSA done? Yes  Last MSSA score: --    Were withdrawal symptoms treated? No  Does the patient have a seizure history? Yes. If yes, date of most recent seizure--  --------  Is the patient patient experiencing suicidal ideation? denies current or recent suicidal ideation     Homicidal ideation? denies current or recent homicidal ideation or behaviors.    Self-injurious behavior/urges? denies current or recent self injurious behavior or ideation.  ------  Was pt aggressive in the ED No  Was a code called No  Is the pt now cooperative? Yes  -------  Meds given in ED:   Medications   0.9% sodium chloride BOLUS (not administered)     Followed by   sodium chloride 0.9% infusion (not administered)   ondansetron (ZOFRAN) injection 4 mg (not administered)   multivitamin, therapeutic (THERA-VIT) tablet 1 tablet (1 tablet Oral Given 7/6/18 1345)   folic acid (FOLVITE) tablet 1 mg (1 mg Oral Given 7/6/18 1345)   thiamine tablet 100 mg (100 mg Oral Given 7/6/18 1345)   magnesium oxide (MAG-OX) tablet 800 mg (800 mg Oral Given 7/6/18 1345)      Family  present during ED course? No  Family currently present? No    BACKGROUND  Does the patient have a cognitive impairment or developmental disability? No  Allergies:   Allergies   Allergen Reactions     Codeine Nausea     Influenza Vaccines      Other reaction(s): Other - Describe In Comment Field -- patient is sensitive to eggs     Reglan [Metoclopramide Hcl] Other (See Comments)     Body tenses up   .   Social demographics are   Social History     Social History     Marital status:      Spouse name: N/A     Number of children: 1     Years of education: 13     Occupational History      [employer]     Social History Main Topics     Smoking status: Current Every Day Smoker     Packs/day: 0.25     Years: 34.00     Types: Cigarettes     Start date: 11/1/1979     Last attempt to quit: 10/3/2012     Smokeless tobacco: Never Used      Comment: 5 cigarettes daily     Alcohol use Yes      Comment: 1 pint of vodka per day, last drink 6/17/18     Drug use: No     Sexual activity: Not Currently     Other Topics Concern     Parent/Sibling W/ Cabg, Mi Or Angioplasty Before 65f 55m? No     Social History Narrative        ASSESSMENT  Labs results   Labs Ordered and Resulted from Time of ED Arrival Up to the Time of Departure from the ED   ALCOHOL BREATH TEST POCT - Abnormal; Notable for the following:        Result Value    Alcohol Breath Test 0.04 (*)     All other components within normal limits   DRUG ABUSE SCREEN 6 CHEM DEP URINE ([facility])   BASIC METABOLIC PANEL      Imaging Studies: No results found for this or any previous visit (from the past 24 hour(s)).   Most recent vital signs BP (!) 136/91  Pulse 118  Temp 99.7  F (37.6  C) (Oral)  Resp 16  LMP 06/02/2010  SpO2 100%   Abnormal labs/tests/findings requiring intervention:---   Pain control: good  Nausea control: good    RECOMMENDATION  Are any infection precautions needed (MRSA, VRE, etc.)? No If yes, what infection? --  ---  Does the patient have  mobility issues? independently. If yes, what device does the pt use? ---  ---  Is patient on 72 hour hold or commitment? No If on 72 hour hold, have hold and rights been given to patient? No  Are admitting orders written if after 10 p.m. ?No  Tasks needing to be completed:---     Darryl Mccann   [facility]-- [phone] [facility]   [phone] [facility]

## 2018-07-06 NOTE — LETTER
July 10, 2018     To Whom It May Concern,     Patient has been hospitalized on detox unit at [facility] from 7/7/18 to current date. We are recommending intensive inpatient chemical dependency treatment upon discharge (may be a 3-6 month long program).     Thank you,     Aimee Vizcarra MD        ------------------------------------------------------

## 2018-07-07 LAB
ALBUMIN SERPL-MCNC: 3.2 G/DL (ref 3.4–5)
ALP SERPL-CCNC: 87 U/L (ref 40–150)
ALT SERPL W P-5'-P-CCNC: 30 U/L (ref 0–50)
ANION GAP SERPL CALCULATED.3IONS-SCNC: 9 MMOL/L (ref 3–14)
AST SERPL W P-5'-P-CCNC: 27 U/L (ref 0–45)
BILIRUB SERPL-MCNC: 1.1 MG/DL (ref 0.2–1.3)
BUN SERPL-MCNC: 11 MG/DL (ref 7–30)
CALCIUM SERPL-MCNC: 7.5 MG/DL (ref 8.5–10.1)
CHLORIDE SERPL-SCNC: 97 MMOL/L (ref 94–109)
CO2 SERPL-SCNC: 29 MMOL/L (ref 20–32)
CREAT SERPL-MCNC: 0.73 MG/DL (ref 0.52–1.04)
ERYTHROCYTE [DISTWIDTH] IN BLOOD BY AUTOMATED COUNT: 15.4 % (ref 10–15)
GFR SERPL CREATININE-BSD FRML MDRD: 83 ML/MIN/1.7M2
GGT SERPL-CCNC: 60 U/L (ref 0–40)
GLUCOSE SERPL-MCNC: 86 MG/DL (ref 70–99)
HCT VFR BLD AUTO: 34.8 % (ref 35–47)
HGB BLD-MCNC: 11.6 G/DL (ref 11.7–15.7)
MCH RBC QN AUTO: 30.5 PG (ref 26.5–33)
MCHC RBC AUTO-ENTMCNC: 33.3 G/DL (ref 31.5–36.5)
MCV RBC AUTO: 92 FL (ref 78–100)
PLATELET # BLD AUTO: 151 10E9/L (ref 150–450)
POTASSIUM SERPL-SCNC: 2.9 MMOL/L (ref 3.4–5.3)
PROT SERPL-MCNC: 6.3 G/DL (ref 6.8–8.8)
RBC # BLD AUTO: 3.8 10E12/L (ref 3.8–5.2)
SODIUM SERPL-SCNC: 135 MMOL/L (ref 133–144)
WBC # BLD AUTO: 5.5 10E9/L (ref 4–11)

## 2018-07-07 PROCEDURE — 25000132 ZZH RX MED GY IP 250 OP 250 PS 637: Performed by: PSYCHIATRY & NEUROLOGY

## 2018-07-07 PROCEDURE — 80053 COMPREHEN METABOLIC PANEL: CPT | Performed by: PSYCHIATRY & NEUROLOGY

## 2018-07-07 PROCEDURE — 25000132 ZZH RX MED GY IP 250 OP 250 PS 637: Performed by: PHYSICIAN ASSISTANT

## 2018-07-07 PROCEDURE — 25000132 ZZH RX MED GY IP 250 OP 250 PS 637

## 2018-07-07 PROCEDURE — 12800012 ZZH R&B CD MH INTERMEDIATE ADULT

## 2018-07-07 PROCEDURE — 99222 1ST HOSP IP/OBS MODERATE 55: CPT | Mod: AI | Performed by: PSYCHIATRY & NEUROLOGY

## 2018-07-07 PROCEDURE — 82977 ASSAY OF GGT: CPT | Performed by: PSYCHIATRY & NEUROLOGY

## 2018-07-07 PROCEDURE — 36415 COLL VENOUS BLD VENIPUNCTURE: CPT | Performed by: PSYCHIATRY & NEUROLOGY

## 2018-07-07 PROCEDURE — 85027 COMPLETE CBC AUTOMATED: CPT | Performed by: PSYCHIATRY & NEUROLOGY

## 2018-07-07 RX ORDER — TIOTROPIUM BROMIDE 18 UG/1
18 CAPSULE ORAL; RESPIRATORY (INHALATION) DAILY
Status: DISCONTINUED | OUTPATIENT
Start: 2018-07-07 | End: 2018-07-07

## 2018-07-07 RX ORDER — POTASSIUM CHLORIDE 750 MG/1
40 TABLET, EXTENDED RELEASE ORAL ONCE
Status: COMPLETED | OUTPATIENT
Start: 2018-07-07 | End: 2018-07-07

## 2018-07-07 RX ORDER — NICOTINE 21 MG/24HR
1 PATCH, TRANSDERMAL 24 HOURS TRANSDERMAL DAILY
Status: DISCONTINUED | OUTPATIENT
Start: 2018-07-07 | End: 2018-07-13 | Stop reason: HOSPADM

## 2018-07-07 RX ADMIN — MULTIPLE VITAMINS W/ MINERALS TAB 1 TABLET: TAB at 08:42

## 2018-07-07 RX ADMIN — POTASSIUM CHLORIDE 40 MEQ: 750 TABLET, FILM COATED, EXTENDED RELEASE ORAL at 09:16

## 2018-07-07 RX ADMIN — MAGNESIUM OXIDE TAB 400 MG (241.3 MG ELEMENTAL MG) 400 MG: 400 (241.3 MG) TAB at 08:42

## 2018-07-07 RX ADMIN — MAGNESIUM HYDROXIDE 30 ML: 400 SUSPENSION ORAL at 11:23

## 2018-07-07 RX ADMIN — FOLIC ACID 1 MG: 1 TABLET ORAL at 08:42

## 2018-07-07 RX ADMIN — NICOTINE 1 PATCH: 14 PATCH, EXTENDED RELEASE TRANSDERMAL at 19:31

## 2018-07-07 RX ADMIN — GABAPENTIN 600 MG: 600 TABLET, FILM COATED ORAL at 20:14

## 2018-07-07 RX ADMIN — POTASSIUM CHLORIDE 10 MEQ: 750 TABLET, EXTENDED RELEASE ORAL at 08:42

## 2018-07-07 RX ADMIN — GABAPENTIN 300 MG: 300 CAPSULE ORAL at 08:42

## 2018-07-07 RX ADMIN — DIAZEPAM 10 MG: 5 TABLET ORAL at 00:30

## 2018-07-07 RX ADMIN — DIAZEPAM 10 MG: 5 TABLET ORAL at 04:20

## 2018-07-07 RX ADMIN — ACETAMINOPHEN 650 MG: 325 TABLET, FILM COATED ORAL at 16:20

## 2018-07-07 RX ADMIN — ROPINIROLE HYDROCHLORIDE 1 MG: 1 TABLET, FILM COATED ORAL at 14:02

## 2018-07-07 RX ADMIN — OMEPRAZOLE 20 MG: 20 CAPSULE, DELAYED RELEASE ORAL at 08:42

## 2018-07-07 RX ADMIN — NICOTINE POLACRILEX 8 MG: 4 GUM, CHEWING ORAL at 11:24

## 2018-07-07 RX ADMIN — OMEPRAZOLE 20 MG: 20 CAPSULE, DELAYED RELEASE ORAL at 19:31

## 2018-07-07 RX ADMIN — GABAPENTIN 300 MG: 300 CAPSULE ORAL at 14:02

## 2018-07-07 RX ADMIN — CALCIUM 500 MG: 500 TABLET ORAL at 08:44

## 2018-07-07 RX ADMIN — LABETALOL HCL 200 MG: 200 TABLET, FILM COATED ORAL at 20:14

## 2018-07-07 RX ADMIN — Medication 10 MG: at 08:42

## 2018-07-07 RX ADMIN — TRAZODONE HYDROCHLORIDE 50 MG: 50 TABLET ORAL at 00:30

## 2018-07-07 RX ADMIN — LABETALOL HCL 100 MG: 100 TABLET, FILM COATED ORAL at 08:42

## 2018-07-07 RX ADMIN — LOSARTAN POTASSIUM 25 MG: 25 TABLET, FILM COATED ORAL at 08:42

## 2018-07-07 RX ADMIN — DIAZEPAM 5 MG: 5 TABLET ORAL at 16:20

## 2018-07-07 RX ADMIN — ACETAMINOPHEN 650 MG: 325 TABLET, FILM COATED ORAL at 04:29

## 2018-07-07 RX ADMIN — ROPINIROLE HYDROCHLORIDE 1 MG: 1 TABLET, FILM COATED ORAL at 20:15

## 2018-07-07 RX ADMIN — FLUTICASONE FUROATE AND VILANTEROL TRIFENATATE 1 PUFF: 200; 25 POWDER RESPIRATORY (INHALATION) at 08:43

## 2018-07-07 RX ADMIN — DULOXETINE HYDROCHLORIDE 60 MG: 60 CAPSULE, DELAYED RELEASE ORAL at 08:42

## 2018-07-07 RX ADMIN — ROPINIROLE HYDROCHLORIDE 1 MG: 1 TABLET, FILM COATED ORAL at 08:42

## 2018-07-07 RX ADMIN — TRAZODONE HYDROCHLORIDE 50 MG: 50 TABLET ORAL at 20:14

## 2018-07-07 ASSESSMENT — ACTIVITIES OF DAILY LIVING (ADL)
ORAL_HYGIENE: INDEPENDENT
DRESS: INDEPENDENT
GROOMING: INDEPENDENT
LAUNDRY: WITH SUPERVISION

## 2018-07-07 NOTE — CONSULTS
[facility] Medicine Note  July 7, 2018    Circumstances of recent discharge and re-admittance noted. Please refer to recent medicine H&P in charting dated 6/18/18 and discharge summary dated , which was reviewed by this writer and is up to date. Please call the on-call PA-C for any f/u medical concerns if they arise.     Diagnoses:    Alcohol abuse, with hx of alcohol withdrawal seizures. Per chart review, here to detox from alcohol. Agree with MSSA, thiamine, folic acid, and seizure precautions.    Hx of COPD. O2 sats stable on RA. Cont home COPD regimen: advair (subbed with breo ellipta), albuterol prn.    Hx of diastolic heart failure. ECHO 6/19/18 with EF 60-65%, no regional WMA. Mild hypokalemia on admission, s/p replacement, all other lytes and Cr remain stable. BPs normotensive today. Cont BB, ARB as below. Cont lasix 10mg qd with daily K scheduled..    Tobacco abuse. Nicorette gum prn.    HTN. BPs normotensive, on softer side last night but improved this morning. Cr stable on admission. Cont home cozaar 25mg qd and labetalol 100mg qam/200mg qpm. Added hold parameters to orders.     GERD. Cont PPI.    RLS. Cont requip.     Hypokalemia. K 3.1 on admission s/p replacement. K 2.9 today. Give 40meq x 1 now. Repeat K tomorrow.    I will follow up on repeat K tomorrow.     Elenita Swanson  Internal Medicine [facility] Hospitalist  [phone]  July 7, 2018

## 2018-07-07 NOTE — PROGRESS NOTES
Pt up and feeling better/pt discussed some programs with spiritual emphasis/this writer pulled pgms up on internet for pt to look over/pt and I will meet again to louisa to continue tx planning.

## 2018-07-07 NOTE — PROGRESS NOTES
The patient is using a walker.  She complained that she wasn't on Spireva and it was discontinued.  She complained of feeling light headed towards th end of the shift.  Her blood pressure was taken and it was 92/60.  The patient was told that we would keep a check on it, but that it was withig normal limits.

## 2018-07-08 LAB — POTASSIUM SERPL-SCNC: 3.6 MMOL/L (ref 3.4–5.3)

## 2018-07-08 PROCEDURE — 12800012 ZZH R&B CD MH INTERMEDIATE ADULT

## 2018-07-08 PROCEDURE — 25000132 ZZH RX MED GY IP 250 OP 250 PS 637: Performed by: PHYSICIAN ASSISTANT

## 2018-07-08 PROCEDURE — 40000275 ZZH STATISTIC RCP TIME EA 10 MIN

## 2018-07-08 PROCEDURE — 25000132 ZZH RX MED GY IP 250 OP 250 PS 637: Performed by: PSYCHIATRY & NEUROLOGY

## 2018-07-08 PROCEDURE — 36415 COLL VENOUS BLD VENIPUNCTURE: CPT | Performed by: PHYSICIAN ASSISTANT

## 2018-07-08 PROCEDURE — 25000125 ZZHC RX 250: Performed by: PSYCHIATRY & NEUROLOGY

## 2018-07-08 PROCEDURE — 94640 AIRWAY INHALATION TREATMENT: CPT

## 2018-07-08 PROCEDURE — 84132 ASSAY OF SERUM POTASSIUM: CPT | Performed by: PHYSICIAN ASSISTANT

## 2018-07-08 RX ADMIN — OMEPRAZOLE 20 MG: 20 CAPSULE, DELAYED RELEASE ORAL at 08:55

## 2018-07-08 RX ADMIN — ACETAMINOPHEN 650 MG: 325 TABLET, FILM COATED ORAL at 11:10

## 2018-07-08 RX ADMIN — GABAPENTIN 600 MG: 600 TABLET, FILM COATED ORAL at 20:12

## 2018-07-08 RX ADMIN — LABETALOL HCL 200 MG: 200 TABLET, FILM COATED ORAL at 20:12

## 2018-07-08 RX ADMIN — ROPINIROLE HYDROCHLORIDE 1 MG: 1 TABLET, FILM COATED ORAL at 14:08

## 2018-07-08 RX ADMIN — NICOTINE 1 PATCH: 14 PATCH, EXTENDED RELEASE TRANSDERMAL at 08:55

## 2018-07-08 RX ADMIN — NICOTINE POLACRILEX 4 MG: 4 GUM, CHEWING ORAL at 12:37

## 2018-07-08 RX ADMIN — IPRATROPIUM BROMIDE AND ALBUTEROL SULFATE 3 ML: .5; 3 SOLUTION RESPIRATORY (INHALATION) at 14:08

## 2018-07-08 RX ADMIN — LABETALOL HCL 100 MG: 100 TABLET, FILM COATED ORAL at 08:54

## 2018-07-08 RX ADMIN — ALBUTEROL SULFATE 2 PUFF: 90 AEROSOL, METERED RESPIRATORY (INHALATION) at 13:35

## 2018-07-08 RX ADMIN — DULOXETINE HYDROCHLORIDE 60 MG: 60 CAPSULE, DELAYED RELEASE ORAL at 08:54

## 2018-07-08 RX ADMIN — Medication 10 MG: at 08:55

## 2018-07-08 RX ADMIN — MULTIPLE VITAMINS W/ MINERALS TAB 1 TABLET: TAB at 08:54

## 2018-07-08 RX ADMIN — ROPINIROLE HYDROCHLORIDE 1 MG: 1 TABLET, FILM COATED ORAL at 08:54

## 2018-07-08 RX ADMIN — TRAZODONE HYDROCHLORIDE 50 MG: 50 TABLET ORAL at 00:07

## 2018-07-08 RX ADMIN — FOLIC ACID 1 MG: 1 TABLET ORAL at 08:54

## 2018-07-08 RX ADMIN — ROPINIROLE HYDROCHLORIDE 1 MG: 1 TABLET, FILM COATED ORAL at 20:12

## 2018-07-08 RX ADMIN — GABAPENTIN 300 MG: 300 CAPSULE ORAL at 14:08

## 2018-07-08 RX ADMIN — MAGNESIUM OXIDE TAB 400 MG (241.3 MG ELEMENTAL MG) 400 MG: 400 (241.3 MG) TAB at 08:55

## 2018-07-08 RX ADMIN — CALCIUM 500 MG: 500 TABLET ORAL at 08:57

## 2018-07-08 RX ADMIN — Medication 25 MG: at 20:13

## 2018-07-08 RX ADMIN — ACETAMINOPHEN 650 MG: 325 TABLET, FILM COATED ORAL at 00:07

## 2018-07-08 RX ADMIN — POTASSIUM CHLORIDE 10 MEQ: 750 TABLET, EXTENDED RELEASE ORAL at 08:55

## 2018-07-08 RX ADMIN — FLUOXETINE 20 MG: 20 CAPSULE ORAL at 08:55

## 2018-07-08 RX ADMIN — FLUTICASONE FUROATE AND VILANTEROL TRIFENATATE 1 PUFF: 200; 25 POWDER RESPIRATORY (INHALATION) at 08:56

## 2018-07-08 RX ADMIN — GABAPENTIN 300 MG: 300 CAPSULE ORAL at 08:54

## 2018-07-08 RX ADMIN — LOSARTAN POTASSIUM 25 MG: 25 TABLET, FILM COATED ORAL at 08:54

## 2018-07-08 RX ADMIN — OMEPRAZOLE 20 MG: 20 CAPSULE, DELAYED RELEASE ORAL at 16:03

## 2018-07-08 ASSESSMENT — ACTIVITIES OF DAILY LIVING (ADL)
ORAL_HYGIENE: INDEPENDENT
GROOMING: INDEPENDENT
DRESS: INDEPENDENT
LAUNDRY: WITH SUPERVISION

## 2018-07-08 NOTE — PROGRESS NOTES
I stopped in to meet with pt. Pt in bed/she is feeling dizzy/reports she fell last night scraped her are and got a bump on her head. Pt states she wants to make calls to programs I pulled for but doesn't feel up to it. Pt asked for information on three more programs which I provided for her. Pt Is waiting on doctor visit and she will relate how she is feeling/fall etc.

## 2018-07-08 NOTE — PROVIDER NOTIFICATION
"Patient reported fall:  Patient states that she fell/scratched her arm and there is an abrasion with bruising to her right arm aprox 2\" x 2\". She denies hitting her head. Her vital signs are WNLs. Paged House Officer and informed him. Ice pack to her arm. Continue to monitor. Call bracelet put on her wrist. House Officer called back and states to continue to monitor and to call him if there is a change in patient condition.   "

## 2018-07-08 NOTE — PROGRESS NOTES
Patient has been out and about on the unit and also rests in bed. She is not using her walker as much and is monitored for a safe and steady gait. Her vital signs are WNLs. Her MSSA/alcohol withdrawal score at 4 PM was 4 and she does not require Valium. She requested to have a male visitor this kvng. She tends to perseverate on her vital signs and I remind her that her VSs are WNLs and that she appears to be doing well. She denies any subsequent concerns regarding her fall last kvng. She has a brighter affect and her skin tone is not as red as when she came in. Full range affect. Will continue to monitor. Her MSSA/alcohol withdrawal score at 8 PM was 6 and she is now out of detox monitoring. No visitor came this kvng, he was a no-show.

## 2018-07-08 NOTE — PROGRESS NOTES
The patient has complained of feeling light headed.  Her BP has been checked several times and is WNL.  The patient has spent much of the shift in bed.  She has been using her call light for assistance.  She has not scored high enough to necessitate using Valium.  She complained about breathing problems and got her Albuterol inhaler and RT was called and is currently giving the patient a neb treatment.

## 2018-07-08 NOTE — PROGRESS NOTES
Brief Medicine Note  July 8, 2018    K returned wnl, 3.6 today.      Medicine will sign off.     Elenita Swanson PA-C

## 2018-07-08 NOTE — H&P
"[facility], [place]   Psychiatric History & Physical  Admission date: 7/6/2018  Inga Ledesma  [id number]  07/07/18    Time: 66 minutes on encounter, >50% of which was spent in counseling and/or coordination of care consisting of: communication and education with the patient, communication with family and or friends if documented in note, lab/image/study evaluation, support staff communication, and other sources pertinent to excellent patient care.            Chief Complaint:   \"I am here to get off of alcohol \"        HPI:   Inga Ledesma with a past medical history of vitamin D deficiency, asthma, tobacco use, hypertension, GERD, restless leg syndrome, iron deficiency, depression, CVA, COPD, cancer of the labia, alcohol use, chronic pain, CHF was admitted 7/6/2018 for alcohol detoxification.  She was found to have low potassium and generally needs supplementation and she complains that her blood pressure sometimes gets low.    Upon meeting with her she reports that she has had trouble with her son getting insurance and he is special needs having troubles with his health which contributed to her drinking excessively.  Is also planning on going to treatment and would need a doctor's note so that she can have leave from work.  She does have sleep problems and has had sleep problems for many years and does snore potentially needs a sleep study evaluation.  She denies any current depression symptoms other than sleep dysfunction is not hopeless or helpless having any thoughts of harming herself or others any energy problems or attention or concentration issues or anhedonia.  In the past she has had cutting behaviors and primarily did that to escape she does have a chronic empty feeling and it sounds as if she has rapid mood changes and splitting behaviors which is concerning for possible borderline personality disorder.  She denies any hallucinations paranoia any generalized anxiety " social anxiety gambling addiction pornography addiction sexual addiction shopping addiction post traumatic stress disorder eating disorder symptoms or previous manic episodes.    Physically she has been coughing and has some dizziness and leg pain and is worried about her blood pressure dropping.    Current medications include duloxetine 60 mg daily, gabapentin 300 twice a day and 600 at night and fluoxetine 80 mg.    She is interested in going to chemical dependency treatment after detoxification.        Past Psychiatric History:     She has had a history of depression but no previous suicide attempts and no inpatient hospitalization.  Previous withdrawal seizures but no traumatic brain injury or electroconvulsive therapy.  Describes going to the [facility] for primary care and previous medications include acamprosate, citalopram, clonidine, duloxetine, fluoxetine, gabapentin, hydroxyzine, lorazepam, naltrexone, quetiapine, venlafaxine and trazodone.  No previous commitments or violence or detention time or skilled nursing time.          Substance Use and History:     Alcohol use started at age 10 last drink on Thursday, cannabis abuse years ago no DUIs or legal charges 3 previous chemical dependency treatments.          Past Medical History:   PAST MEDICAL HISTORY:   Past Medical History:   Diagnosis Date     Alcohol abuse, in remission      Alcohol withdrawal seizure (H) 2016     Cancer of labia majora (H) 1987     Cervical dysplasia 1987     Congestive heart failure (H) 5/16/16     COPD (chronic obstructive pulmonary disease) (H) 1/24/2011     CVA (cerebral infarction) 1/2012     Dependent edema      Depression, major      Depressive disorder 1966     GERD (gastroesophageal reflux disease)      Hyperlipidemia LDL goal < 160      Hypertension      Iron deficiency anemia      RLS (restless legs syndrome)      Sacroiliitis (H)     steroid injections ineffective, chronic low back pain     Tobacco abuse       Uncomplicated asthma      Vitamin D deficiencies        PAST SURGICAL HISTORY:   Past Surgical History:   Procedure Laterality Date     AS BIOPSY/EXCISION LYMPH NODE OPEN SUPERFICIAL       COLONOSCOPY       EYE SURGERY       HYSTERECTOMY  2010     HYSTERECTOMY TOTAL ABDOMINAL  7/28/10    Bilateral salpingectomy.  ovaries conserved.     LASER TX, CERVICAL  1987     LYMPH NODE BIOPSY  2007    inguinal     LYSIS OF LABIAL LESION(S)  1985, 1987             Family History:   FAMILY HISTORY:   Family History   Problem Relation Age of Onset     Depression/Anxiety Mother      Asthma Mother      Cerebrovascular Disease Father      Hypertension Father      Lung Cancer Father      Alcoholism Father      Breast Cancer Paternal Aunt      Other Cancer Other      Depression Other      Anxiety Disorder Other      Mental Illness Other      Substance Abuse Other      Asthma Other      Obesity Sister      Obesity Son      Suicide Paternal Uncle            Social History:   SOCIAL HISTORY:   Social History     Social History     Marital status:      Spouse name: N/A     Number of children: 1     Years of education: 13     Occupational History      "[employer]"     Social History Main Topics     Smoking status: Current Every Day Smoker     Packs/day: 0.25     Years: 34.00     Types: Cigarettes     Start date: 11/1/1979     Last attempt to quit: 10/3/2012     Smokeless tobacco: Never Used      Comment: 5 cigarettes daily     Alcohol use Yes      Comment: 1 pint of vodka per day, last drink 6/17/18     Drug use: No     Sexual activity: Not Currently     Other Topics Concern     Parent/Sibling W/ Cabg, Mi Or Angioplasty Before 65f 55m? No     Social History Narrative    Lives in [place] with her son in a trailer no access to guns or weapons works for the [employer] and enjoys crocheting and watching television.            Physical ROS:   The patient endorsed the above issues. The remainder of 10-point review of  systems was negative except as noted in HPI.         PTA Medications:     Prescriptions Prior to Admission   Medication Sig Dispense Refill Last Dose     Acetaminophen (APAP 500 PO) Take 500-1,000 mg by mouth 3 times daily as needed   7/6/2018 at Unknown time     fluticasone-salmeterol (ADVAIR DISKUS) 500-50 MCG/DOSE diskus inhaler Inhale 1 puff into the lungs every 12 hours 1 Inhaler 0 7/6/2018 at Unknown time     folic acid (FOLVITE) 1 MG tablet Take 1 tablet (1 mg) by mouth daily 3 tablet 0      albuterol (VENTOLIN HFA) 108 (90 Base) MCG/ACT Inhaler Inhale 1-2 puffs into the lungs every 4 hours as needed for shortness of breath / dyspnea or wheezing (Patient taking differently: Inhale 1-2 puffs into the lungs every 4 hours as needed for shortness of breath / dyspnea or wheezing Patient taking 2 puff po TID PRN) 1 Inhaler 0 Unknown at Unknown time     DULoxetine (CYMBALTA) 30 MG EC capsule Take 2 capsules (60 mg) by mouth daily 60 capsule 0 Unknown at Unknown time     FLUOXETINE HCL PO Take 80 mg by mouth daily   Unknown at Unknown time     furosemide (LASIX) 20 MG tablet Take 0.5 tablets (10 mg) by mouth daily 30 tablet 0 Unknown at Unknown time     gabapentin (NEURONTIN) 300 MG capsule Take 1 capsule by mouth in the morning, 1 capsule in the afternoon, and 2 capsules at bedtime. 12 capsule 0 Unknown at Unknown time     hydrOXYzine (ATARAX) 50 MG tablet Take 1 tablet (50 mg) by mouth every 6 hours as needed for anxiety or other (adjuvant pain) 12 tablet 1 Unknown at Unknown time     ipratropium - albuterol 0.5 mg/2.5 mg/3 mL (DUONEB) 0.5-2.5 (3) MG/3ML neb solution Take 1 vial (3 mLs) by nebulization 4 times daily as needed for wheezing 360 mL 1 Unknown at Unknown time     labetalol (NORMODYNE) 100 MG tablet 100mg in am, 200mg in pm. 90 tablet 0 Unknown at Unknown time     losartan (COZAAR) 25 MG tablet Take 1 tablet (25 mg) by mouth daily 30 tablet 0 Unknown at Unknown time     MAGNESIUM OXIDE PO Take 400 mg by  mouth daily   Unknown at Unknown time     menthol (ICY HOT) 5 % PTCH Apply 1 patch topically every 8 hours as needed for muscle soreness 30 patch 3 Unknown at Unknown time     multivitamin, therapeutic with minerals (THERA-VIT-M) TABS tablet Take 1 tablet by mouth daily 3 each 0 Unknown at Unknown time     nicotine (NICODERM CQ) 21 MG/24HR 24 hr patch Place 1 patch onto the skin daily 30 patch  Unknown at Unknown time     nicotine polacrilex (NICORETTE) 2 MG gum Place 2 mg inside cheek every hour as needed for smoking cessation   Unknown at Unknown time     omeprazole (PRILOSEC) 20 MG CR capsule Take 1 capsule (20 mg) by mouth 2 times daily (before meals) 60 capsule 0 Unknown at Unknown time     order for DME Equipment being ordered: Cane ()  Treatment Diagnosis: Impaired functional mobility 1 each 0 Unknown at Unknown time     OYSTER SHELL CALCIUM PO Take 500 mg by mouth daily   Unknown at Unknown time     potassium chloride SA (K-DUR/KLOR-CON M) 10 MEQ CR tablet Take 1 tablet (10 mEq) by mouth daily 30 tablet 0 Unknown at Unknown time     rOPINIRole (REQUIP) 1 MG tablet Take 1 tablet (1 mg) by mouth 3 times daily 90 tablet 0 Unknown at Unknown time          Allergies:     Allergies   Allergen Reactions     Codeine Nausea     Influenza Vaccines      Other reaction(s): Other - Describe In Comment Field -- patient is sensitive to eggs     Reglan [Metoclopramide Hcl] Other (See Comments)     Body tenses up          Labs:     Recent Results (from the past 48 hour(s))   Alcohol breath test POCT    Collection Time: 07/06/18  1:32 PM   Result Value Ref Range    Alcohol Breath Test 0.04 (A) 0.00 - 0.01   Drug abuse screen 6 urine (tox)    Collection Time: 07/06/18  1:37 PM   Result Value Ref Range    Amphetamine Qual Urine Negative NEG^Negative    Barbiturates Qual Urine Negative NEG^Negative    Benzodiazepine Qual Urine Positive (A) NEG^Negative    Cannabinoids Qual Urine Negative NEG^Negative    Cocaine Qual Urine  Negative NEG^Negative    Ethanol Qual Urine Positive (A) NEG^Negative    Opiates Qualitative Urine Negative NEG^Negative   Basic metabolic panel    Collection Time: 07/06/18  1:55 PM   Result Value Ref Range    Sodium 134 133 - 144 mmol/L    Potassium 3.1 (L) 3.4 - 5.3 mmol/L    Chloride 93 (L) 94 - 109 mmol/L    Carbon Dioxide 26 20 - 32 mmol/L    Anion Gap 15 (H) 3 - 14 mmol/L    Glucose 82 70 - 99 mg/dL    Urea Nitrogen 7 7 - 30 mg/dL    Creatinine 0.77 0.52 - 1.04 mg/dL    GFR Estimate 79 >60 mL/min/1.7m2    GFR Estimate If Black >90 >60 mL/min/1.7m2    Calcium 8.3 (L) 8.5 - 10.1 mg/dL   CBC with platelets    Collection Time: 07/07/18  7:53 AM   Result Value Ref Range    WBC 5.5 4.0 - 11.0 10e9/L    RBC Count 3.80 3.8 - 5.2 10e12/L    Hemoglobin 11.6 (L) 11.7 - 15.7 g/dL    Hematocrit 34.8 (L) 35.0 - 47.0 %    MCV 92 78 - 100 fl    MCH 30.5 26.5 - 33.0 pg    MCHC 33.3 31.5 - 36.5 g/dL    RDW 15.4 (H) 10.0 - 15.0 %    Platelet Count 151 150 - 450 10e9/L   GGT    Collection Time: 07/07/18  7:53 AM   Result Value Ref Range    GGT 60 (H) 0 - 40 U/L   Comprehensive metabolic panel    Collection Time: 07/07/18  7:53 AM   Result Value Ref Range    Sodium 135 133 - 144 mmol/L    Potassium 2.9 (L) 3.4 - 5.3 mmol/L    Chloride 97 94 - 109 mmol/L    Carbon Dioxide 29 20 - 32 mmol/L    Anion Gap 9 3 - 14 mmol/L    Glucose 86 70 - 99 mg/dL    Urea Nitrogen 11 7 - 30 mg/dL    Creatinine 0.73 0.52 - 1.04 mg/dL    GFR Estimate 83 >60 mL/min/1.7m2    GFR Estimate If Black >90 >60 mL/min/1.7m2    Calcium 7.5 (L) 8.5 - 10.1 mg/dL    Bilirubin Total 1.1 0.2 - 1.3 mg/dL    Albumin 3.2 (L) 3.4 - 5.0 g/dL    Protein Total 6.3 (L) 6.8 - 8.8 g/dL    Alkaline Phosphatase 87 40 - 150 U/L    ALT 30 0 - 50 U/L    AST 27 0 - 45 U/L          Physical and Psychiatric Examination:     /68  Pulse 98  Temp 98.3  F (36.8  C) (Oral)  Resp 16  LMP 06/02/2010  SpO2 98%  Weight is 0 lbs 0 oz  There is no height or weight on file to  calculate BMI.                                           Last 4 weights:  Wt Readings from Last 4 Encounters:   06/19/18 73 kg (161 lb)   06/17/18 69.4 kg (153 lb)   05/30/18 77.8 kg (171 lb 8.3 oz)   05/03/18 72.6 kg (160 lb)       Physical Exam:  I have reviewed the physical exam as documented by Kofi on 7/6 and agree with findings and assessment and have no additional findings to add at this time.    Mental Status Exam:  Inga is a 52-year-old female that is short with a tattoo on her left arm across wearing hospital scrubs and is overweight.  Her speech is of an appropriate rate and tone and her language is intact.  Her behavior is appropriate and she does not have any abnormal movements.  Her affect is labile.  Her mood she describes as lightheaded.  Her thought content consists of the above without thoughts of harming herself or others or current delusions.  Her thought process is ruminative without looseness of association.  She does not have any abnormal perceptions.  She is alert and aware of her current location and circumstances.  Her attention concentration appears average.  Her cognition and fund of knowledge appear normal.  Her long-term/short-term/remote memory appear intact.  Her insight and judgment are both fair.         Admission Diagnoses:   Alcohol use disorder severe  Tobacco use disorder  History of major depressive disorder  Likely sleep disorder  Rule out borderline personality disorder         Assessment & Plan:     Assessment:  Inga has severe alcohol use disorder as well as a history of major depressive disorder and is on high-dose antidepressants as well as trazodone.  We discussed this combination of medications and she has not been on them recently and we decided to restart the fluoxetine a lower dose of 20 mg.  She does not routinely take her medications when she is drinking alcohol excessively.  She would like to continue her above medications as she does find them  helpful we discussed the risks of potential serotonin syndrome with this combination of medications and high dosages.  We additionally discussed the possible need for a sleep study evaluation in the outpatient setting as she likely has an underlying sleep disorder.    Plan:  Continue voluntary hospitalization and detoxification with transition to chemical dependency treatment  We will need a doctor's note for employment  Needs sleep study             Cosme Mancera  [facility] Psychiatry      The following document has been created with voice recognition software and may contain unintentional word substitutions.        Non clinically relevant CMS requirements:  Clinical Global Impressions  First:     Most recent:       # Pain Assessment:  Current Pain Score 7/7/2018   Patient currently in pain? denies   Pain score (0-10) -   Pain location -   Pain descriptors -       Any incidence of pain both chronic or acute reported will be documented in above documentation though further documentation can also be found in the internal medicine documentation or pain specialist documentation.

## 2018-07-09 PROCEDURE — 25000132 ZZH RX MED GY IP 250 OP 250 PS 637: Performed by: PSYCHIATRY & NEUROLOGY

## 2018-07-09 PROCEDURE — 25000132 ZZH RX MED GY IP 250 OP 250 PS 637: Performed by: PHYSICIAN ASSISTANT

## 2018-07-09 PROCEDURE — 99232 SBSQ HOSP IP/OBS MODERATE 35: CPT | Performed by: PSYCHIATRY & NEUROLOGY

## 2018-07-09 PROCEDURE — 99207 ZZC CDG-MDM COMPONENT: MEETS LOW - DOWN CODED: CPT | Performed by: PSYCHIATRY & NEUROLOGY

## 2018-07-09 PROCEDURE — 12800012 ZZH R&B CD MH INTERMEDIATE ADULT

## 2018-07-09 RX ORDER — ALBUTEROL SULFATE 90 UG/1
1-2 AEROSOL, METERED RESPIRATORY (INHALATION) EVERY 4 HOURS PRN
Qty: 1 INHALER | Refills: 0 | Status: SHIPPED | OUTPATIENT
Start: 2018-07-09 | End: 2018-08-27

## 2018-07-09 RX ORDER — NICOTINE 21 MG/24HR
1 PATCH, TRANSDERMAL 24 HOURS TRANSDERMAL DAILY
Qty: 30 PATCH | Refills: 1 | Status: SHIPPED | OUTPATIENT
Start: 2018-07-09 | End: 2019-01-14

## 2018-07-09 RX ORDER — FUROSEMIDE 20 MG
10 TABLET ORAL DAILY
Qty: 30 TABLET | Refills: 1 | Status: SHIPPED | OUTPATIENT
Start: 2018-07-09 | End: 2018-08-21

## 2018-07-09 RX ORDER — HYDROXYZINE HYDROCHLORIDE 25 MG/1
25 TABLET, FILM COATED ORAL 2 TIMES DAILY PRN
Qty: 60 TABLET | Refills: 1 | Status: SHIPPED | OUTPATIENT
Start: 2018-07-09 | End: 2018-11-28

## 2018-07-09 RX ORDER — FLUOXETINE 40 MG/1
40 CAPSULE ORAL DAILY
Qty: 30 CAPSULE | Refills: 1 | Status: SHIPPED | OUTPATIENT
Start: 2018-07-09 | End: 2018-08-21

## 2018-07-09 RX ORDER — ROPINIROLE 1 MG/1
1 TABLET, FILM COATED ORAL 3 TIMES DAILY
Qty: 90 TABLET | Refills: 1 | Status: SHIPPED | OUTPATIENT
Start: 2018-07-09 | End: 2018-11-28

## 2018-07-09 RX ORDER — CALCIUM CARBONATE 500(1250)
500 TABLET ORAL DAILY
Qty: 30 TABLET | Refills: 1 | Status: SHIPPED | OUTPATIENT
Start: 2018-07-09 | End: 2019-10-30

## 2018-07-09 RX ORDER — MAGNESIUM OXIDE 400 MG/1
400 TABLET ORAL DAILY
Qty: 30 TABLET | Refills: 1 | Status: SHIPPED | OUTPATIENT
Start: 2018-07-09 | End: 2019-01-14

## 2018-07-09 RX ORDER — IPRATROPIUM BROMIDE AND ALBUTEROL SULFATE 2.5; .5 MG/3ML; MG/3ML
3 SOLUTION RESPIRATORY (INHALATION) 4 TIMES DAILY PRN
Qty: 360 ML | Refills: 1 | Status: SHIPPED | OUTPATIENT
Start: 2018-07-09 | End: 2018-11-28

## 2018-07-09 RX ORDER — GABAPENTIN 300 MG/1
CAPSULE ORAL
Qty: 120 CAPSULE | Refills: 1 | Status: SHIPPED | OUTPATIENT
Start: 2018-07-09 | End: 2018-11-28

## 2018-07-09 RX ORDER — TRAZODONE HYDROCHLORIDE 50 MG/1
25 TABLET, FILM COATED ORAL AT BEDTIME
Qty: 15 TABLET | Refills: 1 | Status: SHIPPED | OUTPATIENT
Start: 2018-07-09 | End: 2018-08-21

## 2018-07-09 RX ORDER — MULTIPLE VITAMINS W/ MINERALS TAB 9MG-400MCG
1 TAB ORAL DAILY
Qty: 30 EACH | Refills: 1 | Status: SHIPPED | OUTPATIENT
Start: 2018-07-09 | End: 2019-10-30

## 2018-07-09 RX ORDER — POTASSIUM CHLORIDE 750 MG/1
10 TABLET, EXTENDED RELEASE ORAL DAILY
Qty: 30 TABLET | Refills: 1 | Status: SHIPPED | OUTPATIENT
Start: 2018-07-09 | End: 2019-10-30

## 2018-07-09 RX ORDER — ACETAMINOPHEN 325 MG/1
325 TABLET ORAL EVERY 4 HOURS PRN
Qty: 60 TABLET | Refills: 1 | Status: SHIPPED | OUTPATIENT
Start: 2018-07-09 | End: 2021-01-06

## 2018-07-09 RX ORDER — LABETALOL 100 MG/1
TABLET, FILM COATED ORAL
Qty: 90 TABLET | Refills: 1 | Status: SHIPPED | OUTPATIENT
Start: 2018-07-09 | End: 2018-09-14

## 2018-07-09 RX ORDER — LOSARTAN POTASSIUM 25 MG/1
25 TABLET ORAL DAILY
Qty: 30 TABLET | Refills: 1 | Status: SHIPPED | OUTPATIENT
Start: 2018-07-09 | End: 2018-07-12

## 2018-07-09 RX ORDER — DULOXETIN HYDROCHLORIDE 60 MG/1
60 CAPSULE, DELAYED RELEASE ORAL DAILY
Qty: 30 CAPSULE | Refills: 1 | Status: SHIPPED | OUTPATIENT
Start: 2018-07-09 | End: 2018-11-28

## 2018-07-09 RX ORDER — FOLIC ACID 1 MG/1
1 TABLET ORAL DAILY
Qty: 30 TABLET | Refills: 1 | Status: SHIPPED | OUTPATIENT
Start: 2018-07-09 | End: 2018-11-28

## 2018-07-09 RX ADMIN — MAGNESIUM OXIDE TAB 400 MG (241.3 MG ELEMENTAL MG) 400 MG: 400 (241.3 MG) TAB at 08:32

## 2018-07-09 RX ADMIN — HYDROXYZINE HYDROCHLORIDE 50 MG: 25 TABLET ORAL at 16:56

## 2018-07-09 RX ADMIN — FOLIC ACID 1 MG: 1 TABLET ORAL at 08:30

## 2018-07-09 RX ADMIN — GABAPENTIN 300 MG: 300 CAPSULE ORAL at 14:18

## 2018-07-09 RX ADMIN — LABETALOL HCL 200 MG: 200 TABLET, FILM COATED ORAL at 20:30

## 2018-07-09 RX ADMIN — LOSARTAN POTASSIUM 25 MG: 25 TABLET, FILM COATED ORAL at 08:31

## 2018-07-09 RX ADMIN — FLUTICASONE FUROATE AND VILANTEROL TRIFENATATE 1 PUFF: 200; 25 POWDER RESPIRATORY (INHALATION) at 08:32

## 2018-07-09 RX ADMIN — MENTHOL 1 PATCH: 205.5 PATCH TOPICAL at 17:56

## 2018-07-09 RX ADMIN — LABETALOL HCL 100 MG: 100 TABLET, FILM COATED ORAL at 08:31

## 2018-07-09 RX ADMIN — NICOTINE 1 PATCH: 14 PATCH, EXTENDED RELEASE TRANSDERMAL at 08:31

## 2018-07-09 RX ADMIN — Medication 10 MG: at 08:30

## 2018-07-09 RX ADMIN — FLUOXETINE 20 MG: 20 CAPSULE ORAL at 10:50

## 2018-07-09 RX ADMIN — OMEPRAZOLE 20 MG: 20 CAPSULE, DELAYED RELEASE ORAL at 16:56

## 2018-07-09 RX ADMIN — ROPINIROLE HYDROCHLORIDE 1 MG: 1 TABLET, FILM COATED ORAL at 14:18

## 2018-07-09 RX ADMIN — NICOTINE POLACRILEX 4 MG: 4 GUM, CHEWING ORAL at 12:32

## 2018-07-09 RX ADMIN — ROPINIROLE HYDROCHLORIDE 1 MG: 1 TABLET, FILM COATED ORAL at 08:30

## 2018-07-09 RX ADMIN — Medication 25 MG: at 20:29

## 2018-07-09 RX ADMIN — GABAPENTIN 300 MG: 300 CAPSULE ORAL at 08:31

## 2018-07-09 RX ADMIN — OMEPRAZOLE 20 MG: 20 CAPSULE, DELAYED RELEASE ORAL at 08:30

## 2018-07-09 RX ADMIN — MULTIPLE VITAMINS W/ MINERALS TAB 1 TABLET: TAB at 08:31

## 2018-07-09 RX ADMIN — CALCIUM 500 MG: 500 TABLET ORAL at 08:34

## 2018-07-09 RX ADMIN — NICOTINE POLACRILEX 4 MG: 4 GUM, CHEWING ORAL at 16:56

## 2018-07-09 RX ADMIN — FLUOXETINE 20 MG: 20 CAPSULE ORAL at 08:30

## 2018-07-09 RX ADMIN — POTASSIUM CHLORIDE 10 MEQ: 750 TABLET, EXTENDED RELEASE ORAL at 08:34

## 2018-07-09 RX ADMIN — ALBUTEROL SULFATE 2 PUFF: 90 AEROSOL, METERED RESPIRATORY (INHALATION) at 11:58

## 2018-07-09 RX ADMIN — DULOXETINE HYDROCHLORIDE 60 MG: 60 CAPSULE, DELAYED RELEASE ORAL at 08:31

## 2018-07-09 RX ADMIN — NICOTINE POLACRILEX 4 MG: 4 GUM, CHEWING ORAL at 08:46

## 2018-07-09 RX ADMIN — ROPINIROLE HYDROCHLORIDE 1 MG: 1 TABLET, FILM COATED ORAL at 20:29

## 2018-07-09 RX ADMIN — GABAPENTIN 600 MG: 600 TABLET, FILM COATED ORAL at 20:29

## 2018-07-09 ASSESSMENT — ACTIVITIES OF DAILY LIVING (ADL)
ORAL_HYGIENE: INDEPENDENT
GROOMING: INDEPENDENT
DRESS: INDEPENDENT
LAUNDRY: WITH SUPERVISION

## 2018-07-09 NOTE — PROGRESS NOTES
The patient has been in the milieu.  She has been calling to treatment programs and says that [facility] wants a copy of her rule 15.  Case management was informed of this.  She would like a spiritual program, but is also considering a program in [place] as a friend there could act as support.

## 2018-07-09 NOTE — PLAN OF CARE
Behavioral Team Discussion: (7/9/2018)    Continued Stay Criteria/Rationale: Patient admitted for Chemical Use Issues.  Plan: The following services will be provided to the patient; psychiatric assessment, medication management, therapeutic milieu, individual and group support, and skills groups.   Participants: 3A Provider: Dr. Zackery MD; 3A RN's: Peter Guerin, RN, Refugio Rosenbaum, RN and Stormy Velazquez, RN; 3A CM's: CALEB Headley.  Summary/Recommendation: Providers will assess today for treatment recommendations, discharge planning, and aftercare plans. CM will meet with pt for discharge planning.   Medical/Physical: Deferred (see medical notes).  Precautions:   Behavioral Orders   Procedures     Code 1 - Restrict to Unit     Routine Programming     As clinically indicated     Seizure precautions     Status 15     Every 15 minutes.     Withdrawal precautions     Rationale for change in precautions or plan: N/A  Progress: No Change.

## 2018-07-09 NOTE — PROGRESS NOTES
"[facility], [place]   Psychiatric Progress Note  Hospital Day: 3        Interim History:   The patient's care was discussed with the treatment team during the daily team meeting and/or staff's chart notes were reviewed.  Staff report patient has been present in the milieu.  She has been proactively contacting treatment programs.  Patient was reportedly sad when a male friend stated he would visit last evening but did not.  Last dose of diazepam was on 7/7.    Upon interview, the patient states that she does not feel Prozac 20 mg daily has been helpful enough for her. She states that she had been on this dose in the past with no noted improvement.  She endorses ongoing depressive symptoms, though denies SI.  She endorses chronic SIB urges, though notes her ability to refrain from acting on them for the past several months.  We discussed R/B/A regarding increased dose of Prozac to 40 mg daily.  Discussed signs and symptoms of serotonin syndrome.      Patient said that she really wants a [facility]-based CD program. She said that [facility] will not take her because she has too many medical problems. She said that she would really like to go directly to treatment from here, and \"I need to have a plan before I leave here.\"         Medications:       DULoxetine  60 mg Oral Daily     FLUoxetine  20 mg Oral Daily     fluticasone-vilanterol  1 puff Inhalation Daily     folic acid  1 mg Oral Daily     furosemide  10 mg Oral Daily     gabapentin  300 mg Oral BID     gabapentin  600 mg Oral Daily at 8 pm     labetalol  100 mg Oral Daily     labetalol  200 mg Oral Daily at 8 pm     losartan  25 mg Oral Daily     magnesium oxide (MAG-OX) tablet 400 mg  400 mg Oral Daily     multivitamin, therapeutic with minerals  1 tablet Oral Daily     nicotine  1 patch Transdermal Daily     nicotine   Transdermal Q8H     nicotine   Transdermal Daily     omeprazole  20 mg Oral BID AC     oyster shell calcium  " 500 mg Oral Daily     potassium chloride SA  10 mEq Oral Daily     rOPINIRole  1 mg Oral TID     traZODone  25 mg Oral At Bedtime          Allergies:     Allergies   Allergen Reactions     Codeine Nausea     Influenza Vaccines      Other reaction(s): Other - Describe In Comment Field -- patient is sensitive to eggs     Reglan [Metoclopramide Hcl] Other (See Comments)     Body tenses up          Labs:   No results found for this or any previous visit (from the past 24 hour(s)).       Psychiatric Examination:     BP (!) 136/92  Pulse 90  Temp 97.6  F (36.4  C) (Tympanic)  Resp 16  LMP 06/02/2010  SpO2 98%  Weight is 0 lbs 0 oz  There is no height or weight on file to calculate BMI.                                             Appearance: awake, alert and unkempt  Attitude:  Mostly cooperative  Eye Contact:  good  Mood:  sad   Affect:  appropriate and in normal range and mood congruent  Speech:  clear, coherent  Language: fluent and intact in English  Psychomotor, Gait, Musculoskeletal:  no evidence of tardive dyskinesia, dystonia, or tics.  Patient uses a walker.  Throught Process:  logical, linear and goal oriented  Associations:  no loose associations  Thought Content:  no evidence of suicidal ideation or homicidal ideation and no evidence of psychotic thought  Insight:  fair  Judgement: Intact  Oriented to:  time, person, and place  Attention Span and Concentration:  intact  Recent and Remote Memory:  intact  Fund of Knowledge:  appropriate    Clinical Global Impressions  First:6     Most recent:4     # Pain Assessment:  Current Pain Score 7/7/2018   Patient currently in pain? denies   Pain score (0-10) -   Pain location -   Pain descriptors -   Inga jacinto pain level was assessed and she currently denies pain.               Precautions:     Behavioral Orders   Procedures     Code 1 - Restrict to Unit     Routine Programming     As clinically indicated     Seizure precautions     Status 15     Every 15 minutes.      Withdrawal precautions          Diagnoses:      Alcohol dependence with unspecified alcohol-induced disorder (H)  Depressive disorder, unspecified (rule out MDD)  Borderline personality traits  Hypokalemia         Assessment & Plan:     Assessment and hospital summary:  Patient has completed detoxification from alcohol.     Target psychiatric symptoms and interventions:  Increase Prozac to 40 mg daily and continue other medications without changes    Medical Problems and Treatments:  Patient denies acute medical concerns    Behavioral/Psychological/Social:  Encourage participation in groups    Legal: Voluntary    Disposition: Patient's preference is to discharge directly to [facility] to engage in CD treatment.    Elise Vizcarra MD  [facility] Psychiatry

## 2018-07-09 NOTE — PROGRESS NOTES
Rule 25 Chemical Health Assessment Update:   Inga Ledesma had a Rule 25 Evaluation with Ruba Simon at [facility] on 18, updated on 18 by Aubrie Chadwick and a copy of the CD evaluation will be in the paper chart until being scanned into [facility] after the patient completes the primary portion of CD treatment. The original Rule 25 paperwork was reviewed and updated on 18 by Yaquelin Gamboa LPC. The original Paperwork is being sent with this updated paperwork    Pt reports her last use of alcohol was on 18. Patient was given a UA upon admit and UA was POS for alcohol and benzodiazapine. Pt was given a breathalyzer upon ER admission and pt's [place] was .    Updated Information:  Pt reports no changes in her living situation, or employment since her previous Rule 25.    DIMENSION I - Acute Intoxication /Withdrawal Potential   1. Chemical use most recent 12 months outside a facility and other significant use history (client self-report)              X = Primary Drug Used   Age of First Use Most Recent Pattern of Use and Duration   Need enough information to show pattern (both frequency and amounts) and to show tolerance for each chemical that has a diagnosis   Date of last use and time, if needed   Withdrawal Potential? Requiring special care Method of use  (oral, smoked, snort, IV, etc)      Alcohol     19 1/2 liter/day   19 no drink      Marijuana/  Hashish   N/A           Cocaine/Crack     N/A           Meth/  Amphetamines   N/A           Heroin     N/A           Other Opiates/  Synthetics   N/A           Inhalants     N/A           Benzodiazepines     N/A           Hallucinogens     N/A           Barbiturates/  Sedatives/  Hypnotics N/A           Over-the-Counter Drugs   N/A           Other     N/A           Nicotine     15 Half pack per day sometimes less 19 no oral     ASAM PLACEMENT CRITERIA:   DIMENSION 1: Intoxication/Withdrawal: Risk level  Severity ratin Client can tolerate and  cope with withdrawal discomfort. The client displays mild to moderate intoxication or signs and symptoms interfering with daily functioning but does not immediately endanger self or others. Client poses minimal risk of severe withdrawal.. The patient Pt reports drug of choice is alcohol and last date of use is 19. Pt was admitted to [facility] for symptoms of withdrawal. Withdrawal was managed and treated by [facility] medical staff and should not interfere with her ability to participate in treatment.   DIMENSION 2: Biomedical Conditions: Risk levelSeverity ratin Difficulty tolerating and coping with  physical problems or has other biomedical problems that interfere with recovery and mental health treatment. Neglects or does not seek care for serious biomedical problems . The patient  Pt reports a history of high blood pressure, COPD and congestive heart failure. Reports she has a PCP, last appointment was 2 weeks prior to discuss blood pressure medications. Pt reports that when drinking she does not take medications as prescribed and as a result has concerns. Pt has difficulty tolerating and coping with physical problems, neglects seeking care.  DIMENSION 3: Emotional/Behavioral: Risk level Severity ratin Client has difficulty with impulse control and lacks coping skills. Client has thoughts of suicide or harm to others without means; however, the thoughts may interfere with participation in some treatment activities. Client has difficulty functioning in significant life areas. Client has moderate symptoms of emotional, behavioral, or cognitive problems. Client is able to participate in most treatment activities.. The patient  Pt reports she was raised by both parents. Reports family history of alcoholism and depression. Pt reports mental health diagnosis of anxiety and depression. Denies history of suicide ideation or self harm. Pt reports recent grief and loss, father passed away in  "2017 and pt reports feeling regret over relationship with her father. Pt reports her best friend was murdered by her friends  and this has been difficult for her as well. Pt would benefit from grief and loss counseling as part of treatment or continued counseling after treatment.  Pt has difficulty with impulse control and lacks coping skills. Pt has difficulty functioning in significant life areas.   DIMENSION 4: Treatment Readiness: Risk level 1 Client is motivated with active reinforcement, to explore treatment and strategies for change, but ambivalent about illness or need for change. The patient Patient displays verbal compliance and motivation but lacks consistent behaviors and follow-through. Pt has continued to use despite consequences. Pt appears to be in the \"contemplation\" Stage within the Stages of Change Model.   DIMENSION 5: Relapse & Continued Use Potential: Risk level Severity ratin No awareness of the negative impact of mental health problems or substance abuse. No coping skills to arrest mental health or addiction illnesses, or prevent relapse. . The patient   Patient reports having been involved in 3 past treatments (completed 3), 2 past detox admissions, and active past 12-Step support group participation. Pt reports having  sober time (10 years) and has tried to quit udrinking in the past but relapsed. Pt lacks insight into her personal relapse process along with early warning signs and triggers. Pt lacks impulse control, sober coping skills and long-term sober maintenance skills. Pt lacks insight into the effects her use has had on her physical and mental health. Pt is at a high risk for relapse/continued use.    DIMENSION 6: Recovery Environment: Risk level Severity ratin Client is engaged in structured, meaningful activity, but peers, family, significant other, and living environment are unsupportive, or there is criminal justice involvement by the client or among the " "client s peers, significant others, or in the client s living environment . The patient Pt reports she is employed full time, lives at home in [place] and has a 26 year old son with special needs who lives with her. Reports that when drinking she has a hard time with work. Reports family is supportive but relationships are strained due to use. Pt reports she attends [facility] meetings and has some sober networks. Pt denies any history of legal concerns. Pt would benefit in recovery environment with plan to return home after treatment.    Counselor Notes: pt was not able to follow through with treatment on her last referral.  [facility] would not take her because of her medical problems.  She is having some health problems.  Current H&P is included here.     Patient would like to be admitted to [facility], Addiction Services Program         [facility], [place]   Psychiatric History & Physical  Admission date: 7/6/2018  Inga Ledesma  [id number]  07/07/18     Time: 66 minutes on encounter, >50% of which was spent in counseling and/or coordination of care consisting of: communication and education with the patient, communication with family and or friends if documented in note, lab/image/study evaluation, support staff communication, and other sources pertinent to excellent patient care.               Chief Complaint:   \"I am here to get off of alcohol \"         HPI:   Inga Ledesma with a past medical history of vitamin D deficiency, asthma, tobacco use, hypertension, GERD, restless leg syndrome, iron deficiency, depression, CVA, COPD, cancer of the labia, alcohol use, chronic pain, CHF was admitted 7/6/2018 for alcohol detoxification.  She was found to have low potassium and generally needs supplementation and she complains that her blood pressure sometimes gets low.     Upon meeting with her she reports that she has had trouble with her son getting insurance " and he is special needs having troubles with his health which contributed to her drinking excessively.  Is also planning on going to treatment and would need a doctor's note so that she can have leave from work.  She does have sleep problems and has had sleep problems for many years and does snore potentially needs a sleep study evaluation.  She denies any current depression symptoms other than sleep dysfunction is not hopeless or helpless having any thoughts of harming herself or others any energy problems or attention or concentration issues or anhedonia.  In the past she has had cutting behaviors and primarily did that to escape she does have a chronic empty feeling and it sounds as if she has rapid mood changes and splitting behaviors which is concerning for possible borderline personality disorder.  She denies any hallucinations paranoia any generalized anxiety social anxiety gambling addiction pornography addiction sexual addiction shopping addiction post traumatic stress disorder eating disorder symptoms or previous manic episodes.     Physically she has been coughing and has some dizziness and leg pain and is worried about her blood pressure dropping.     Current medications include duloxetine 60 mg daily, gabapentin 300 twice a day and 600 at night and fluoxetine 80 mg.     She is interested in going to chemical dependency treatment after detoxification.         Past Psychiatric History:      She has had a history of depression but no previous suicide attempts and no inpatient hospitalization.  Previous withdrawal seizures but no traumatic brain injury or electroconvulsive therapy.  Describes going to the [facility] location for primary care and previous medications include acamprosate, citalopram, clonidine, duloxetine, fluoxetine, gabapentin, hydroxyzine, lorazepam, naltrexone, quetiapine, venlafaxine and trazodone.  No previous commitments or violence or long-term time or nursing home time.            Substance  Use and History:      Alcohol use started at age 10 last drink on Thursday, cannabis abuse years ago no DUIs or legal charges 3 previous chemical dependency treatments.            Past Medical History:   PAST MEDICAL HISTORY:    Past Medical History         Past Medical History:   Diagnosis Date     Alcohol abuse, in remission       Alcohol withdrawal seizure (H) 2016     Cancer of labia majora (H) 1987     Cervical dysplasia 1987     Congestive heart failure (H) 5/16/16     COPD (chronic obstructive pulmonary disease) (H) 1/24/2011     CVA (cerebral infarction) 1/2012     Dependent edema       Depression, major       Depressive disorder 1966     GERD (gastroesophageal reflux disease)       Hyperlipidemia LDL goal < 160       Hypertension       Iron deficiency anemia       RLS (restless legs syndrome)       Sacroiliitis (H)       steroid injections ineffective, chronic low back pain     Tobacco abuse       Uncomplicated asthma       Vitamin D deficiencies              PAST SURGICAL HISTORY:    Past Surgical History          Past Surgical History:   Procedure Laterality Date     AS BIOPSY/EXCISION LYMPH NODE OPEN SUPERFICIAL         COLONOSCOPY         EYE SURGERY         HYSTERECTOMY   2010     HYSTERECTOMY TOTAL ABDOMINAL   7/28/10     Bilateral salpingectomy.  ovaries conserved.     LASER TX, CERVICAL   1987     LYMPH NODE BIOPSY   2007     inguinal     LYSIS OF LABIAL LESION(S)   1985, 1987                   Family History:   FAMILY HISTORY:    Family History            Family History   Problem Relation Age of Onset     Depression/Anxiety Mother       Asthma Mother       Cerebrovascular Disease Father       Hypertension Father       Lung Cancer Father       Alcoholism Father       Breast Cancer Paternal Aunt       Other Cancer Other       Depression Other       Anxiety Disorder Other       Mental Illness Other       Substance Abuse Other       Asthma Other       Obesity Sister       Obesity Son       Suicide  Paternal Uncle                  Social History:   SOCIAL HISTORY:   Social History            Social History     Marital status:        Spouse name: N/A     Number of children: 1     Years of education: 13           Occupational History       [employer]               Social History Main Topics     Smoking status: Current Every Day Smoker       Packs/day: 0.25       Years: 34.00       Types: Cigarettes       Start date: 11/1/1979       Last attempt to quit: 10/3/2012     Smokeless tobacco: Never Used         Comment: 5 cigarettes daily     Alcohol use Yes         Comment: 1 pint of vodka per day, last drink 6/17/18     Drug use: No     Sexual activity: Not Currently           Other Topics Concern     Parent/Sibling W/ Cabg, Mi Or Angioplasty Before 65f 55m? No          Social History Narrative     Lives in [place] with her son in a trailer no access to guns or weapons works for the [employer] and enjoys crocheting and watching television.              Physical ROS:   The patient endorsed the above issues. The remainder of 10-point review of systems was negative except as noted in HPI.          PTA Medications:       Prescriptions Prior to Admission            Prescriptions Prior to Admission   Medication Sig Dispense Refill Last Dose     Acetaminophen (APAP 500 PO) Take 500-1,000 mg by mouth 3 times daily as needed     7/6/2018 at Unknown time     fluticasone-salmeterol (ADVAIR DISKUS) 500-50 MCG/DOSE diskus inhaler Inhale 1 puff into the lungs every 12 hours 1 Inhaler 0 7/6/2018 at Unknown time     folic acid (FOLVITE) 1 MG tablet Take 1 tablet (1 mg) by mouth daily 3 tablet 0       albuterol (VENTOLIN HFA) 108 (90 Base) MCG/ACT Inhaler Inhale 1-2 puffs into the lungs every 4 hours as needed for shortness of breath / dyspnea or wheezing (Patient taking differently: Inhale 1-2 puffs into the lungs every 4 hours as needed for shortness of breath / dyspnea or wheezing Patient taking 2 puff  po TID PRN) 1 Inhaler 0 Unknown at Unknown time     DULoxetine (CYMBALTA) 30 MG EC capsule Take 2 capsules (60 mg) by mouth daily 60 capsule 0 Unknown at Unknown time     FLUOXETINE HCL PO Take 80 mg by mouth daily     Unknown at Unknown time     furosemide (LASIX) 20 MG tablet Take 0.5 tablets (10 mg) by mouth daily 30 tablet 0 Unknown at Unknown time     gabapentin (NEURONTIN) 300 MG capsule Take 1 capsule by mouth in the morning, 1 capsule in the afternoon, and 2 capsules at bedtime. 12 capsule 0 Unknown at Unknown time     hydrOXYzine (ATARAX) 50 MG tablet Take 1 tablet (50 mg) by mouth every 6 hours as needed for anxiety or other (adjuvant pain) 12 tablet 1 Unknown at Unknown time     ipratropium - albuterol 0.5 mg/2.5 mg/3 mL (DUONEB) 0.5-2.5 (3) MG/3ML neb solution Take 1 vial (3 mLs) by nebulization 4 times daily as needed for wheezing 360 mL 1 Unknown at Unknown time     labetalol (NORMODYNE) 100 MG tablet 100mg in am, 200mg in pm. 90 tablet 0 Unknown at Unknown time     losartan (COZAAR) 25 MG tablet Take 1 tablet (25 mg) by mouth daily 30 tablet 0 Unknown at Unknown time     MAGNESIUM OXIDE PO Take 400 mg by mouth daily     Unknown at Unknown time     menthol (ICY HOT) 5 % PTCH Apply 1 patch topically every 8 hours as needed for muscle soreness 30 patch 3 Unknown at Unknown time     multivitamin, therapeutic with minerals (THERA-VIT-M) TABS tablet Take 1 tablet by mouth daily 3 each 0 Unknown at Unknown time     nicotine (NICODERM CQ) 21 MG/24HR 24 hr patch Place 1 patch onto the skin daily 30 patch   Unknown at Unknown time     nicotine polacrilex (NICORETTE) 2 MG gum Place 2 mg inside cheek every hour as needed for smoking cessation     Unknown at Unknown time     omeprazole (PRILOSEC) 20 MG CR capsule Take 1 capsule (20 mg) by mouth 2 times daily (before meals) 60 capsule 0 Unknown at Unknown time     order for DME Equipment being ordered: Cane ()  Treatment Diagnosis: Impaired functional mobility  1 each 0 Unknown at Unknown time     OYSTER SHELL CALCIUM PO Take 500 mg by mouth daily     Unknown at Unknown time     potassium chloride SA (K-DUR/KLOR-CON M) 10 MEQ CR tablet Take 1 tablet (10 mEq) by mouth daily 30 tablet 0 Unknown at Unknown time     rOPINIRole (REQUIP) 1 MG tablet Take 1 tablet (1 mg) by mouth 3 times daily 90 tablet 0 Unknown at Unknown time              Allergies:            Allergies   Allergen Reactions     Codeine Nausea     Influenza Vaccines         Other reaction(s): Other - Describe In Comment Field -- patient is sensitive to eggs     Reglan [Metoclopramide Hcl] Other (See Comments)       Body tenses up           Labs:       Recent Results            Recent Results (from the past 48 hour(s))   Alcohol breath test POCT     Collection Time: 07/06/18  1:32 PM   Result Value Ref Range     Alcohol Breath Test 0.04 (A) 0.00 - 0.01   Drug abuse screen 6 urine (tox)     Collection Time: 07/06/18  1:37 PM   Result Value Ref Range     Amphetamine Qual Urine Negative NEG^Negative     Barbiturates Qual Urine Negative NEG^Negative     Benzodiazepine Qual Urine Positive (A) NEG^Negative     Cannabinoids Qual Urine Negative NEG^Negative     Cocaine Qual Urine Negative NEG^Negative     Ethanol Qual Urine Positive (A) NEG^Negative     Opiates Qualitative Urine Negative NEG^Negative   Basic metabolic panel     Collection Time: 07/06/18  1:55 PM   Result Value Ref Range     Sodium 134 133 - 144 mmol/L     Potassium 3.1 (L) 3.4 - 5.3 mmol/L     Chloride 93 (L) 94 - 109 mmol/L     Carbon Dioxide 26 20 - 32 mmol/L     Anion Gap 15 (H) 3 - 14 mmol/L     Glucose 82 70 - 99 mg/dL     Urea Nitrogen 7 7 - 30 mg/dL     Creatinine 0.77 0.52 - 1.04 mg/dL     GFR Estimate 79 >60 mL/min/1.7m2     GFR Estimate If Black >90 >60 mL/min/1.7m2     Calcium 8.3 (L) 8.5 - 10.1 mg/dL   CBC with platelets     Collection Time: 07/07/18  7:53 AM   Result Value Ref Range     WBC 5.5 4.0 - 11.0 10e9/L     RBC Count 3.80 3.8 - 5.2  10e12/L     Hemoglobin 11.6 (L) 11.7 - 15.7 g/dL     Hematocrit 34.8 (L) 35.0 - 47.0 %     MCV 92 78 - 100 fl     MCH 30.5 26.5 - 33.0 pg     MCHC 33.3 31.5 - 36.5 g/dL     RDW 15.4 (H) 10.0 - 15.0 %     Platelet Count 151 150 - 450 10e9/L   GGT     Collection Time: 07/07/18  7:53 AM   Result Value Ref Range     GGT 60 (H) 0 - 40 U/L   Comprehensive metabolic panel     Collection Time: 07/07/18  7:53 AM   Result Value Ref Range     Sodium 135 133 - 144 mmol/L     Potassium 2.9 (L) 3.4 - 5.3 mmol/L     Chloride 97 94 - 109 mmol/L     Carbon Dioxide 29 20 - 32 mmol/L     Anion Gap 9 3 - 14 mmol/L     Glucose 86 70 - 99 mg/dL     Urea Nitrogen 11 7 - 30 mg/dL     Creatinine 0.73 0.52 - 1.04 mg/dL     GFR Estimate 83 >60 mL/min/1.7m2     GFR Estimate If Black >90 >60 mL/min/1.7m2     Calcium 7.5 (L) 8.5 - 10.1 mg/dL     Bilirubin Total 1.1 0.2 - 1.3 mg/dL     Albumin 3.2 (L) 3.4 - 5.0 g/dL     Protein Total 6.3 (L) 6.8 - 8.8 g/dL     Alkaline Phosphatase 87 40 - 150 U/L     ALT 30 0 - 50 U/L     AST 27 0 - 45 U/L              Physical and Psychiatric Examination:      /68  Pulse 98  Temp 98.3  F (36.8  C) (Oral)  Resp 16  LMP 06/02/2010  SpO2 98%  Weight is 0 lbs 0 oz  There is no height or weight on file to calculate BMI.                                Last 4 weights:      Wt Readings from Last 4 Encounters:   06/19/18 73 kg (161 lb)   06/17/18 69.4 kg (153 lb)   05/30/18 77.8 kg (171 lb 8.3 oz)   05/03/18 72.6 kg (160 lb)         Physical Exam:  I have reviewed the physical exam as documented by Kofi on 7/6 and agree with findings and assessment and have no additional findings to add at this time.     Mental Status Exam:  Inga is a 52-year-old female that is short with a tattoo on her left arm across wearing hospital scrubs and is overweight.  Her speech is of an appropriate rate and tone and her language is intact.  Her behavior is appropriate and she does not have any abnormal movements.  Her  affect is labile.  Her mood she describes as lightheaded.  Her thought content consists of the above without thoughts of harming herself or others or current delusions.  Her thought process is ruminative without looseness of association.  She does not have any abnormal perceptions.  She is alert and aware of her current location and circumstances.  Her attention concentration appears average.  Her cognition and fund of knowledge appear normal.  Her long-term/short-term/remote memory appear intact.  Her insight and judgment are both fair.          Admission Diagnoses:   Alcohol use disorder severe  Tobacco use disorder  History of major depressive disorder  Likely sleep disorder  Rule out borderline personality disorder          Assessment & Plan:      Assessment:  Inga has severe alcohol use disorder as well as a history of major depressive disorder and is on high-dose antidepressants as well as trazodone.  We discussed this combination of medications and she has not been on them recently and we decided to restart the fluoxetine a lower dose of 20 mg.  She does not routinely take her medications when she is drinking alcohol excessively.  She would like to continue her above medications as she does find them helpful we discussed the risks of potential serotonin syndrome with this combination of medications and high dosages.  We additionally discussed the possible need for a sleep study evaluation in the outpatient setting as she likely has an underlying sleep disorder.     Plan:  Continue voluntary hospitalization and detoxification with transition to chemical dependency treatment  We will need a doctor's note for employment  Needs sleep study               Cosme Mancera  [facility]        The following document has been created with voice recognition software and may contain unintentional word substitutions.           Non clinically relevant CMS requirements:  Clinical Global  Impressions  First:      Most recent:         # Pain Assessment:  Current Pain Score 7/7/2018   Patient currently in pain? denies   Pain score (0-10) -   Pain location -   Pain descriptors -         Any incidence of pain both chronic or acute reported will be documented in above documentation though further documentation can also be found in the internal medicine documentation or pain specialist documentation.             Routing History       Date/Time From To Method     7/7/2018  8:31 PM Cosme Chinchilla MD Olson, Kyle Leigh, PA-C In Basket                          Yaquelin Gamboa, LPC

## 2018-07-10 PROCEDURE — 25000132 ZZH RX MED GY IP 250 OP 250 PS 637: Performed by: PSYCHIATRY & NEUROLOGY

## 2018-07-10 PROCEDURE — 25000132 ZZH RX MED GY IP 250 OP 250 PS 637: Performed by: PHYSICIAN ASSISTANT

## 2018-07-10 PROCEDURE — 99231 SBSQ HOSP IP/OBS SF/LOW 25: CPT | Performed by: PSYCHIATRY & NEUROLOGY

## 2018-07-10 PROCEDURE — 12800012 ZZH R&B CD MH INTERMEDIATE ADULT

## 2018-07-10 RX ADMIN — MAGNESIUM OXIDE TAB 400 MG (241.3 MG ELEMENTAL MG) 400 MG: 400 (241.3 MG) TAB at 08:34

## 2018-07-10 RX ADMIN — LABETALOL HCL 100 MG: 100 TABLET, FILM COATED ORAL at 08:34

## 2018-07-10 RX ADMIN — ROPINIROLE HYDROCHLORIDE 1 MG: 1 TABLET, FILM COATED ORAL at 08:34

## 2018-07-10 RX ADMIN — DULOXETINE HYDROCHLORIDE 60 MG: 60 CAPSULE, DELAYED RELEASE ORAL at 08:34

## 2018-07-10 RX ADMIN — NICOTINE POLACRILEX 4 MG: 4 GUM, CHEWING ORAL at 16:45

## 2018-07-10 RX ADMIN — Medication 25 MG: at 20:49

## 2018-07-10 RX ADMIN — GABAPENTIN 300 MG: 300 CAPSULE ORAL at 08:34

## 2018-07-10 RX ADMIN — NICOTINE POLACRILEX 4 MG: 4 GUM, CHEWING ORAL at 20:49

## 2018-07-10 RX ADMIN — LOSARTAN POTASSIUM 25 MG: 25 TABLET, FILM COATED ORAL at 08:34

## 2018-07-10 RX ADMIN — FOLIC ACID 1 MG: 1 TABLET ORAL at 08:34

## 2018-07-10 RX ADMIN — ROPINIROLE HYDROCHLORIDE 1 MG: 1 TABLET, FILM COATED ORAL at 20:48

## 2018-07-10 RX ADMIN — ROPINIROLE HYDROCHLORIDE 1 MG: 1 TABLET, FILM COATED ORAL at 14:56

## 2018-07-10 RX ADMIN — ACETAMINOPHEN 650 MG: 325 TABLET, FILM COATED ORAL at 20:48

## 2018-07-10 RX ADMIN — LABETALOL HCL 200 MG: 200 TABLET, FILM COATED ORAL at 20:49

## 2018-07-10 RX ADMIN — CALCIUM 500 MG: 500 TABLET ORAL at 08:44

## 2018-07-10 RX ADMIN — OMEPRAZOLE 20 MG: 20 CAPSULE, DELAYED RELEASE ORAL at 16:45

## 2018-07-10 RX ADMIN — POTASSIUM CHLORIDE 10 MEQ: 750 TABLET, EXTENDED RELEASE ORAL at 08:43

## 2018-07-10 RX ADMIN — NICOTINE POLACRILEX 4 MG: 4 GUM, CHEWING ORAL at 10:59

## 2018-07-10 RX ADMIN — HYDROXYZINE HYDROCHLORIDE 50 MG: 25 TABLET ORAL at 20:48

## 2018-07-10 RX ADMIN — MULTIPLE VITAMINS W/ MINERALS TAB 1 TABLET: TAB at 08:34

## 2018-07-10 RX ADMIN — NICOTINE POLACRILEX 4 MG: 4 GUM, CHEWING ORAL at 18:51

## 2018-07-10 RX ADMIN — ACETAMINOPHEN 650 MG: 325 TABLET, FILM COATED ORAL at 10:59

## 2018-07-10 RX ADMIN — FLUTICASONE FUROATE AND VILANTEROL TRIFENATATE 1 PUFF: 200; 25 POWDER RESPIRATORY (INHALATION) at 08:37

## 2018-07-10 RX ADMIN — NICOTINE 1 PATCH: 14 PATCH, EXTENDED RELEASE TRANSDERMAL at 08:42

## 2018-07-10 RX ADMIN — GABAPENTIN 300 MG: 300 CAPSULE ORAL at 14:56

## 2018-07-10 RX ADMIN — NICOTINE POLACRILEX 4 MG: 4 GUM, CHEWING ORAL at 14:56

## 2018-07-10 RX ADMIN — FLUOXETINE 40 MG: 20 CAPSULE ORAL at 08:34

## 2018-07-10 RX ADMIN — GABAPENTIN 600 MG: 600 TABLET, FILM COATED ORAL at 20:48

## 2018-07-10 RX ADMIN — MENTHOL 1 PATCH: 205.5 PATCH TOPICAL at 10:59

## 2018-07-10 RX ADMIN — Medication 10 MG: at 08:34

## 2018-07-10 RX ADMIN — ALBUTEROL SULFATE 2 PUFF: 90 AEROSOL, METERED RESPIRATORY (INHALATION) at 16:45

## 2018-07-10 RX ADMIN — OMEPRAZOLE 20 MG: 20 CAPSULE, DELAYED RELEASE ORAL at 08:34

## 2018-07-10 RX ADMIN — NICOTINE POLACRILEX 4 MG: 4 GUM, CHEWING ORAL at 08:50

## 2018-07-10 ASSESSMENT — ACTIVITIES OF DAILY LIVING (ADL)
ORAL_HYGIENE: INDEPENDENT
DRESS: INDEPENDENT
GROOMING: INDEPENDENT
LAUNDRY: WITH SUPERVISION

## 2018-07-10 NOTE — PROGRESS NOTES
"[facility], [place]   Psychiatric Progress Note  Hospital Day: 4        Interim History:   The patient's care was discussed with the treatment team during the daily team meeting and/or staff's chart notes were reviewed.  Staff report patient has been present in the milieu.  She has been proactively contacting treatment programs. Last dose of diazepam was on 7/7. Pleasant and social with peers.    Upon interview, the patient denies withdrawal symptoms. She exprsses desire to go directly to inpatient CD treatment from here because \"otherwise I am afraid I will drink again.\" She prefers inpatient program at [facility], or \"somewhere in [place] so my sister wife can visit me.\" She denies side effects to medications. She had no further requests or concerns.          Medications:       DULoxetine  60 mg Oral Daily     FLUoxetine  40 mg Oral Daily     fluticasone-vilanterol  1 puff Inhalation Daily     folic acid  1 mg Oral Daily     furosemide  10 mg Oral Daily     gabapentin  300 mg Oral BID     gabapentin  600 mg Oral Daily at 8 pm     labetalol  100 mg Oral Daily     labetalol  200 mg Oral Daily at 8 pm     losartan  25 mg Oral Daily     magnesium oxide (MAG-OX) tablet 400 mg  400 mg Oral Daily     multivitamin, therapeutic with minerals  1 tablet Oral Daily     nicotine  1 patch Transdermal Daily     nicotine   Transdermal Q8H     nicotine   Transdermal Daily     omeprazole  20 mg Oral BID AC     oyster shell calcium  500 mg Oral Daily     potassium chloride SA  10 mEq Oral Daily     rOPINIRole  1 mg Oral TID     traZODone  25 mg Oral At Bedtime          Allergies:     Allergies   Allergen Reactions     Codeine Nausea     Influenza Vaccines      Other reaction(s): Other - Describe In Comment Field -- patient is sensitive to eggs     Reglan [Metoclopramide Hcl] Other (See Comments)     Body tenses up          Labs:   No results found for this or any previous visit (from the past 24 " hour(s)).       Psychiatric Examination:     BP (!) 141/92  Pulse 87  Temp 97  F (36.1  C) (Oral)  Resp 16  LMP 06/02/2010  SpO2 98%  Weight is 0 lbs 0 oz  There is no height or weight on file to calculate BMI.                                             Appearance: awake, alert and unkempt  Attitude:  Mostly cooperative  Eye Contact:  good  Mood: better  Affect:  appropriate and in normal range and mood congruent  Speech:  clear, coherent  Language: fluent and intact in English  Psychomotor, Gait, Musculoskeletal:  no evidence of tardive dyskinesia, dystonia, or tics.  Patient uses a walker.  Throught Process:  logical, linear and goal oriented  Associations:  no loose associations  Thought Content:  no evidence of suicidal ideation or homicidal ideation and no evidence of psychotic thought  Insight:  fair  Judgement: Intact  Oriented to:  time, person, and place  Attention Span and Concentration:  intact  Recent and Remote Memory:  intact  Fund of Knowledge:  appropriate    Clinical Global Impressions  First:6     Most recent:2     # Pain Assessment:  Current Pain Score 7/10/2018   Patient currently in pain? yes   Pain score (0-10) -   Pain location Back   Pain descriptors -   Inga s pain level was assessed and she currently denies pain.               Precautions:     Behavioral Orders   Procedures     Code 1 - Restrict to Unit     Routine Programming     As clinically indicated     Seizure precautions     Status 15     Every 15 minutes.     Withdrawal precautions          Diagnoses:      Alcohol dependence with unspecified alcohol-induced disorder (H)  Depressive disorder, unspecified (rule out MDD)  Borderline personality traits  Hypokalemia         Assessment & Plan:     Assessment and hospital summary:  Patient has completed detoxification from alcohol.     Target psychiatric symptoms and interventions:  Continue medications without changes.    Medical Problems and Treatments:  Patient denies acute  medical concerns    Behavioral/Psychological/Social:  Encourage participation in groups    Legal: Voluntary    Disposition: Patient's preference is to discharge directly to [facility] to engage in CD treatment.    Elise Vizcarra MD  [facility] Psychiatry

## 2018-07-10 NOTE — PROGRESS NOTES
"SPIRITUAL HEALTH SERVICES  SPIRITUAL ASSESSMENT Progress Note  [facility] ([facility]) 3AFH   ON-CALL VISIT    REFERRAL SOURCE: Hospital  Consult Order     Reviewed documentation. Reflective conversation shared with Cody which integrated elements of illness, family and spiritual narratives.     Cody became tearful as she shared that she \"got some bad news today\" from her son who stated she had been \"begging at the liquor store for a bottle\". She had not recalled the incident but did remember telling her son she would be calling an ambulance for help later that day. Cody stated she has been very depressed particularly since she has been expecting a visit from a friend who has not come.    Cody shared about her recurring addiction and treatment possibilities moving forward.  She has found hope and peace in reading scripture. I brought a New Testament and we read Psalm 139 together.  Cody expressed gratitude for the text saying \"things happen at the exact right time\".      PLAN: Will advise unit  of visit. [facility] will continue to be available to pt/family for emotional/spiritual support.    Bebeto Posada  Chaplain Resident  Pager [phone]    "

## 2018-07-10 NOTE — PROGRESS NOTES
CASE MANAGEMENT NOTE    UPDATE:  Patient's CD assessment and update faxed to [facility] and to [facility] requesting [id number] inpatient treatment transfer. Case management continues.    ORIGINAL:  Voicemail message left this morning for Osiel of [facility] inpatient treatment [phone]. Writer made second attempt to reach Osiel this afternoon but voicemail states out of office, to return on 7/12.      Hoda Castellon) CULLEN Melvin, [facility]  3A Psychotherapist  [phone]

## 2018-07-10 NOTE — PLAN OF CARE
Problem: Patient Care Overview  Goal: Individualization & Mutuality  1) Patient will achieve medical stabilization of acute withdrawal sx.  2) Patient will remain safe and free from injury  3) Patient will demonstrate improvement of ADLs (appetite, hygiene)  4) Verbalize reduction of fear or anxiety to a manageable level.  5) Verbalize knowledge of substance abuse as a disease  6) Verbalize risks and negative effects related to drug ingestion  7) Demonstrate participation in unit programming and attends specific substance use group therapy (i.e AA meetings)  8) Accept referral to substance abuse treatment  9) Express sense of regaining some control of situation/life (possible by verbalizing alternative coping mechanisms as alternatives to substance use in response to stress)    Outcome: Improving  Pt alcohol withdrawal is complete. Pt out on unit attending unit programming. Pt enjoyed visiting with pet therapy dog. Pt using walker. Gait slow but steady. Pt states she has a seated walker at home and will see if someone can bring it in to her. She is reporting dizziness. Ortho BP checked. Okay. Pt requested tylenol for back pain. Using icy hots pain patches. Discharge plan pending.

## 2018-07-11 LAB
ANION GAP SERPL CALCULATED.3IONS-SCNC: 7 MMOL/L (ref 3–14)
BUN SERPL-MCNC: 12 MG/DL (ref 7–30)
CALCIUM SERPL-MCNC: 9.2 MG/DL (ref 8.5–10.1)
CHLORIDE SERPL-SCNC: 99 MMOL/L (ref 94–109)
CO2 SERPL-SCNC: 31 MMOL/L (ref 20–32)
CREAT SERPL-MCNC: 0.81 MG/DL (ref 0.52–1.04)
GFR SERPL CREATININE-BSD FRML MDRD: 74 ML/MIN/1.7M2
GLUCOSE SERPL-MCNC: 94 MG/DL (ref 70–99)
POTASSIUM SERPL-SCNC: 4.5 MMOL/L (ref 3.4–5.3)
SODIUM SERPL-SCNC: 137 MMOL/L (ref 133–144)

## 2018-07-11 PROCEDURE — 25000132 ZZH RX MED GY IP 250 OP 250 PS 637: Performed by: NURSE PRACTITIONER

## 2018-07-11 PROCEDURE — 93010 ELECTROCARDIOGRAM REPORT: CPT | Performed by: INTERNAL MEDICINE

## 2018-07-11 PROCEDURE — 80048 BASIC METABOLIC PNL TOTAL CA: CPT | Performed by: NURSE PRACTITIONER

## 2018-07-11 PROCEDURE — 25000132 ZZH RX MED GY IP 250 OP 250 PS 637: Performed by: PHYSICIAN ASSISTANT

## 2018-07-11 PROCEDURE — 93005 ELECTROCARDIOGRAM TRACING: CPT

## 2018-07-11 PROCEDURE — 12800012 ZZH R&B CD MH INTERMEDIATE ADULT

## 2018-07-11 PROCEDURE — 36415 COLL VENOUS BLD VENIPUNCTURE: CPT | Performed by: NURSE PRACTITIONER

## 2018-07-11 PROCEDURE — 25000132 ZZH RX MED GY IP 250 OP 250 PS 637: Performed by: PSYCHIATRY & NEUROLOGY

## 2018-07-11 RX ORDER — LOSARTAN POTASSIUM 25 MG/1
50 TABLET ORAL DAILY
Status: DISCONTINUED | OUTPATIENT
Start: 2018-07-12 | End: 2018-07-13 | Stop reason: HOSPADM

## 2018-07-11 RX ADMIN — LABETALOL HCL 200 MG: 200 TABLET, FILM COATED ORAL at 20:06

## 2018-07-11 RX ADMIN — NICOTINE POLACRILEX 4 MG: 4 GUM, CHEWING ORAL at 10:34

## 2018-07-11 RX ADMIN — NICOTINE 1 PATCH: 14 PATCH, EXTENDED RELEASE TRANSDERMAL at 08:22

## 2018-07-11 RX ADMIN — Medication 10 MG: at 08:22

## 2018-07-11 RX ADMIN — NICOTINE POLACRILEX 4 MG: 4 GUM, CHEWING ORAL at 18:52

## 2018-07-11 RX ADMIN — Medication 12.5 MG: at 12:30

## 2018-07-11 RX ADMIN — MAGNESIUM OXIDE TAB 400 MG (241.3 MG ELEMENTAL MG) 400 MG: 400 (241.3 MG) TAB at 08:22

## 2018-07-11 RX ADMIN — POTASSIUM CHLORIDE 10 MEQ: 750 TABLET, EXTENDED RELEASE ORAL at 08:22

## 2018-07-11 RX ADMIN — DULOXETINE HYDROCHLORIDE 60 MG: 60 CAPSULE, DELAYED RELEASE ORAL at 08:22

## 2018-07-11 RX ADMIN — MULTIPLE VITAMINS W/ MINERALS TAB 1 TABLET: TAB at 08:22

## 2018-07-11 RX ADMIN — NICOTINE POLACRILEX 4 MG: 4 GUM, CHEWING ORAL at 08:21

## 2018-07-11 RX ADMIN — CALCIUM 500 MG: 500 TABLET ORAL at 08:22

## 2018-07-11 RX ADMIN — GABAPENTIN 600 MG: 600 TABLET, FILM COATED ORAL at 20:06

## 2018-07-11 RX ADMIN — FLUOXETINE 40 MG: 20 CAPSULE ORAL at 08:22

## 2018-07-11 RX ADMIN — ROPINIROLE HYDROCHLORIDE 1 MG: 1 TABLET, FILM COATED ORAL at 14:17

## 2018-07-11 RX ADMIN — OMEPRAZOLE 20 MG: 20 CAPSULE, DELAYED RELEASE ORAL at 16:17

## 2018-07-11 RX ADMIN — ROPINIROLE HYDROCHLORIDE 1 MG: 1 TABLET, FILM COATED ORAL at 08:21

## 2018-07-11 RX ADMIN — LOSARTAN POTASSIUM 25 MG: 25 TABLET, FILM COATED ORAL at 08:21

## 2018-07-11 RX ADMIN — Medication 25 MG: at 20:06

## 2018-07-11 RX ADMIN — FOLIC ACID 1 MG: 1 TABLET ORAL at 08:21

## 2018-07-11 RX ADMIN — ROPINIROLE HYDROCHLORIDE 1 MG: 1 TABLET, FILM COATED ORAL at 20:06

## 2018-07-11 RX ADMIN — ACETAMINOPHEN 650 MG: 325 TABLET, FILM COATED ORAL at 18:52

## 2018-07-11 RX ADMIN — LABETALOL HCL 100 MG: 100 TABLET, FILM COATED ORAL at 08:21

## 2018-07-11 RX ADMIN — OMEPRAZOLE 20 MG: 20 CAPSULE, DELAYED RELEASE ORAL at 08:22

## 2018-07-11 RX ADMIN — GABAPENTIN 300 MG: 300 CAPSULE ORAL at 14:17

## 2018-07-11 RX ADMIN — NICOTINE POLACRILEX 4 MG: 4 GUM, CHEWING ORAL at 16:17

## 2018-07-11 RX ADMIN — FLUTICASONE FUROATE AND VILANTEROL TRIFENATATE 1 PUFF: 200; 25 POWDER RESPIRATORY (INHALATION) at 08:25

## 2018-07-11 RX ADMIN — MENTHOL 1 PATCH: 205.5 PATCH TOPICAL at 16:17

## 2018-07-11 RX ADMIN — GABAPENTIN 300 MG: 300 CAPSULE ORAL at 08:22

## 2018-07-11 RX ADMIN — NICOTINE POLACRILEX 4 MG: 4 GUM, CHEWING ORAL at 14:17

## 2018-07-11 ASSESSMENT — ACTIVITIES OF DAILY LIVING (ADL)
LAUNDRY: UNABLE TO COMPLETE
GROOMING: INDEPENDENT
DRESS: INDEPENDENT
ORAL_HYGIENE: INDEPENDENT

## 2018-07-11 NOTE — PROGRESS NOTES
CASE MANAGEMENT NOTE    UPDATE:  Patient's Rule 25 assessment faxed to [facility], there are current beds available.    ORIGINAL:  Writer awaiting reply from [facility] reports they do not accept patient's [facility] and has a 2 week wait list. Iona Wilder of [facility] states 4 week waiting list for beds. Mauricio of [facility] states there are current openings but patient must be able to ambulate stairs. Patient's Rule 25 assessment was faxed to Natalia requesting placement at [facility] - writer was informed of 2 - 3 week waiting list. Patient states her first choice is [facility], voicemail message left by writer requesting return call. It appears that [facility] is funded by [facility] and private donations and Rule 25 assessments are not accepted. Patient reports having had contact with [facility] [phone] prior to her detox admission and was told there is a 3 week wait. Patient states she may have a sober friend with whom she can stay until her treatment admission however it is not confirmed.    Hoda Castellon) CULLEN Melvin, [facility]  3A Psychotherapist  [phone]

## 2018-07-11 NOTE — PROGRESS NOTES
Inga has had an okay day. Her BP has been high all day starting with 173/101 then 179/95. IM was notified. EKG was ordered. PRN hydralazine was ordered and cozaar dose was changed from 25 to 50mg. Patient given PRN hydralazine at 1230. Rechecked BP at 1310 and BP had gone down to 138/84, P 80. Patient was reporting a mild headache. She says it is still there, but improving. She was flushed and that is improving as well. Will continue to monitor and assess.

## 2018-07-12 LAB — INTERPRETATION ECG - MUSE: NORMAL

## 2018-07-12 PROCEDURE — 25000132 ZZH RX MED GY IP 250 OP 250 PS 637: Performed by: PHYSICIAN ASSISTANT

## 2018-07-12 PROCEDURE — 25000132 ZZH RX MED GY IP 250 OP 250 PS 637: Performed by: NURSE PRACTITIONER

## 2018-07-12 PROCEDURE — 25000132 ZZH RX MED GY IP 250 OP 250 PS 637: Performed by: PSYCHIATRY & NEUROLOGY

## 2018-07-12 PROCEDURE — 12800012 ZZH R&B CD MH INTERMEDIATE ADULT

## 2018-07-12 PROCEDURE — 99207 ZZC CDG-MDM COMPONENT: MEETS MODERATE - UP CODED: CPT | Performed by: PSYCHIATRY & NEUROLOGY

## 2018-07-12 PROCEDURE — 99232 SBSQ HOSP IP/OBS MODERATE 35: CPT | Performed by: PSYCHIATRY & NEUROLOGY

## 2018-07-12 RX ORDER — LOSARTAN POTASSIUM 25 MG/1
25 TABLET ORAL DAILY
Qty: 30 TABLET | Refills: 1 | Status: SHIPPED | OUTPATIENT
Start: 2018-07-12 | End: 2018-07-12

## 2018-07-12 RX ORDER — LOSARTAN POTASSIUM 50 MG/1
25 TABLET ORAL DAILY
Qty: 30 TABLET | Refills: 1 | Status: SHIPPED | OUTPATIENT
Start: 2018-07-12 | End: 2018-07-13

## 2018-07-12 RX ADMIN — POTASSIUM CHLORIDE 10 MEQ: 750 TABLET, EXTENDED RELEASE ORAL at 08:09

## 2018-07-12 RX ADMIN — LABETALOL HCL 200 MG: 200 TABLET, FILM COATED ORAL at 20:00

## 2018-07-12 RX ADMIN — FLUTICASONE FUROATE AND VILANTEROL TRIFENATATE 1 PUFF: 200; 25 POWDER RESPIRATORY (INHALATION) at 08:13

## 2018-07-12 RX ADMIN — LABETALOL HCL 100 MG: 100 TABLET, FILM COATED ORAL at 08:10

## 2018-07-12 RX ADMIN — MULTIPLE VITAMINS W/ MINERALS TAB 1 TABLET: TAB at 08:09

## 2018-07-12 RX ADMIN — FLUOXETINE 40 MG: 20 CAPSULE ORAL at 08:09

## 2018-07-12 RX ADMIN — MAGNESIUM OXIDE TAB 400 MG (241.3 MG ELEMENTAL MG) 400 MG: 400 (241.3 MG) TAB at 08:09

## 2018-07-12 RX ADMIN — NICOTINE POLACRILEX 4 MG: 4 GUM, CHEWING ORAL at 16:29

## 2018-07-12 RX ADMIN — Medication 10 MG: at 08:09

## 2018-07-12 RX ADMIN — DULOXETINE HYDROCHLORIDE 60 MG: 60 CAPSULE, DELAYED RELEASE ORAL at 08:09

## 2018-07-12 RX ADMIN — LOSARTAN POTASSIUM 50 MG: 25 TABLET, FILM COATED ORAL at 08:09

## 2018-07-12 RX ADMIN — CALCIUM 500 MG: 500 TABLET ORAL at 08:10

## 2018-07-12 RX ADMIN — ACETAMINOPHEN 650 MG: 325 TABLET, FILM COATED ORAL at 14:05

## 2018-07-12 RX ADMIN — ROPINIROLE HYDROCHLORIDE 1 MG: 1 TABLET, FILM COATED ORAL at 08:09

## 2018-07-12 RX ADMIN — ROPINIROLE HYDROCHLORIDE 1 MG: 1 TABLET, FILM COATED ORAL at 14:05

## 2018-07-12 RX ADMIN — OMEPRAZOLE 20 MG: 20 CAPSULE, DELAYED RELEASE ORAL at 08:09

## 2018-07-12 RX ADMIN — TRAZODONE HYDROCHLORIDE 50 MG: 50 TABLET ORAL at 20:02

## 2018-07-12 RX ADMIN — GABAPENTIN 600 MG: 600 TABLET, FILM COATED ORAL at 19:59

## 2018-07-12 RX ADMIN — OMEPRAZOLE 20 MG: 20 CAPSULE, DELAYED RELEASE ORAL at 16:29

## 2018-07-12 RX ADMIN — NICOTINE POLACRILEX 4 MG: 4 GUM, CHEWING ORAL at 12:45

## 2018-07-12 RX ADMIN — GABAPENTIN 300 MG: 300 CAPSULE ORAL at 14:05

## 2018-07-12 RX ADMIN — ROPINIROLE HYDROCHLORIDE 1 MG: 1 TABLET, FILM COATED ORAL at 19:59

## 2018-07-12 RX ADMIN — FOLIC ACID 1 MG: 1 TABLET ORAL at 08:09

## 2018-07-12 RX ADMIN — NICOTINE 1 PATCH: 14 PATCH, EXTENDED RELEASE TRANSDERMAL at 08:08

## 2018-07-12 RX ADMIN — ALBUTEROL SULFATE 2 PUFF: 90 AEROSOL, METERED RESPIRATORY (INHALATION) at 08:13

## 2018-07-12 RX ADMIN — GABAPENTIN 300 MG: 300 CAPSULE ORAL at 08:10

## 2018-07-12 ASSESSMENT — ACTIVITIES OF DAILY LIVING (ADL)
ORAL_HYGIENE: INDEPENDENT
LAUNDRY: WITH SUPERVISION
GROOMING: INDEPENDENT
DRESS: INDEPENDENT
ORAL_HYGIENE: INDEPENDENT
DRESS: INDEPENDENT
GROOMING: INDEPENDENT

## 2018-07-12 NOTE — PROGRESS NOTES
"[facility], [place]   Psychiatric Progress Note  Hospital Day: 6        Interim History:   The patient's care was discussed with the treatment team during the daily team meeting and/or staff's chart notes were reviewed.  Staff report patient has been present in the milieu.  She has been proactively contacting treatment programs. Last dose of diazepam was on 7/7. Pleasant and social with peers.  Patient was hypertensive with systolics in the 170s.  IM was notified and EKG was ordered.  As needed hydralazine was also ordered and Cozaar dose was changed from 25-50 mg daily.  [facility] was later declined due to her physical limitations (i.e. inability to walk up and down stairs).    Upon interview, the patient denies withdrawal symptoms. She again expresses desire to go directly to inpatient CD treatment. She denied depressive symptoms, including SI.  She denied any withdrawal symptoms.  She reports that her mood is \"pretty good overall.\"  She added \"I really like it here because I really liked the people I have met.\"  She had no further requests or concerns.  She denied any acute medical concerns.         Medications:       DULoxetine  60 mg Oral Daily     FLUoxetine  40 mg Oral Daily     fluticasone-vilanterol  1 puff Inhalation Daily     folic acid  1 mg Oral Daily     furosemide  10 mg Oral Daily     gabapentin  300 mg Oral BID     gabapentin  600 mg Oral Daily at 8 pm     labetalol  100 mg Oral Daily     labetalol  200 mg Oral Daily at 8 pm     losartan  50 mg Oral Daily     magnesium oxide (MAG-OX) tablet 400 mg  400 mg Oral Daily     multivitamin, therapeutic with minerals  1 tablet Oral Daily     nicotine  1 patch Transdermal Daily     nicotine   Transdermal Q8H     nicotine   Transdermal Daily     omeprazole  20 mg Oral BID AC     oyster shell calcium  500 mg Oral Daily     potassium chloride SA  10 mEq Oral Daily     " rOPINIRole  1 mg Oral TID     traZODone  25 mg Oral At Bedtime          Allergies:     Allergies   Allergen Reactions     Codeine Nausea     Influenza Vaccines      Other reaction(s): Other - Describe In Comment Field -- patient is sensitive to eggs     Reglan [Metoclopramide Hcl] Other (See Comments)     Body tenses up          Labs:   No results found for this or any previous visit (from the past 24 hour(s)).       Psychiatric Examination:     /86  Pulse 80  Temp 98.2  F (36.8  C) (Tympanic)  Resp 16  LMP 06/02/2010  SpO2 98%  Weight is 0 lbs 0 oz  There is no height or weight on file to calculate BMI.                                             Appearance: awake, alert and unkempt  Attitude:  Mostly cooperative  Eye Contact:  good  Mood: better  Affect:  appropriate and in normal range and mood congruent  Speech:  clear, coherent  Language: fluent and intact in English  Psychomotor, Gait, Musculoskeletal:  no evidence of tardive dyskinesia, dystonia, or tics.  Patient uses a walker.  Throught Process:  logical, linear and goal oriented  Associations:  no loose associations  Thought Content:  no evidence of suicidal ideation or homicidal ideation and no evidence of psychotic thought  Insight:  fair  Judgement: Intact  Oriented to:  time, person, and place  Attention Span and Concentration:  intact  Recent and Remote Memory:  intact  Fund of Knowledge:  appropriate    Clinical Global Impressions  First:6     Most recent:2     # Pain Assessment:  Current Pain Score 7/10/2018   Patient currently in pain? yes   Pain score (0-10) -   Pain location Back   Pain descriptors -   Inga s pain level was assessed and she currently denies pain.               Precautions:     Behavioral Orders   Procedures     Code 1 - Restrict to Unit     Routine Programming     As clinically indicated     Seizure precautions     Status 15     Every 15 minutes.     Withdrawal precautions          Diagnoses:      Alcohol  dependence with unspecified alcohol-induced disorder (H)  Depressive disorder, unspecified (rule out MDD)  Borderline personality traits  Hypokalemia         Assessment & Plan:     Assessment and hospital summary:  Patient has completed detoxification from alcohol.     Target psychiatric symptoms and interventions:  Continue medications without changes.    Medical Problems and Treatments:  Patient denies acute medical concerns    Behavioral/Psychological/Social:  Encourage participation in groups    Legal: Voluntary    Disposition: Patient's preference is to discharge directly to inpatient treatment upon discharge, though we have run into several barriers with regards to placement due to patient's physical limitations, long wait lists, and insurance barriers.  Patient may need to be discharged to home with a friend until placement becomes available.    Elise Vizcarra MD  [facility] Psychiatry

## 2018-07-12 NOTE — PROGRESS NOTES
CASE MANAGEMENT NOTE    Rule 25 update completed and faxed to [facility] requesting funding to cover out-of-pocket costs for [facility]. Multiple additional treatment programs were contacted, writer having difficulty securing an open treatment bed in a handi-cap accessible program. [facility] is not returning calls. Patient tearful that treatment programs are denying her admission. Case management continues.    Hoda Castellon) CULLEN Melvin, [facility]  3A Psychotherapist  [phone]

## 2018-07-12 NOTE — PROGRESS NOTES
CASE MANAGEMENT NOTE      SECOND UPDATE:  JOBY Turcios of [facility] [phone] is declining patient's admission due to stamina needed to navigate their spread out campus including stairs, noting treatment day runs from 0730 to 8 PM.    UPDATE:  Patient now agreeing to take [facility]'s transportation directly to the program.  time to be determined when approval of patient's admission is received.    INITIAL:  Writer spoke with Eve of [facility], medical reports faxed as requested. Anticipated admission to [facility] today, pending medical approval. Writer recommended patient use [facility]'s transportation to which she declined stating she needs to go home first to obtain her belongings then will drive straight there.    Hoda Castellon) CULLEN Melvin, [facility]  3A Psychotherapist  [phone]

## 2018-07-12 NOTE — DISCHARGE SUMMARY
[facility]  Discharge Summary    Inga Ledesma MRN# [id number]   YOB: 1965 Age: 52 year old     Date of Admission:  6/18/2018  Date of Discharge: a 6/21/2018  1:21 PM  Admitting Physician:  Stephanie Gomez MD  Discharge Physician:  Claudine Ramos MD  Discharging Service:  Internal Medicine     Primary Provider: Min Toledo              Discharge Diagnosis:     Primary Discharge Diagnosis:      1) Dehydration leading to hypotension   2) Hyponatremia, resolved  3) LALA, Resolved with IVF  4) Alcohol abuse  5) History of COPD  6) History of diastolic heart failure  7) Cigarette smoking  8) Pain Assessment  9) Hypertension    Past Medical History:   Diagnosis Date     Alcohol abuse, in remission      Alcohol withdrawal seizure (H) 2016     Cancer of labia majora (H) 1987     Cervical dysplasia 1987     Congestive heart failure (H) 5/16/16     COPD (chronic obstructive pulmonary disease) (H) 1/24/2011     CVA (cerebral infarction) 1/2012     Dependent edema      Depression, major      Depressive disorder 1966     GERD (gastroesophageal reflux disease)      Hyperlipidemia LDL goal < 160      Hypertension      Iron deficiency anemia      RLS (restless legs syndrome)      Sacroiliitis (H)     steroid injections ineffective, chronic low back pain     Tobacco abuse      Uncomplicated asthma      Vitamin D deficiencies                     Discharge Medications:     Discharge Medication List as of 6/21/2018 10:03 AM      CONTINUE these medications which have CHANGED    Details   furosemide (LASIX) 20 MG tablet Take 0.5 tablets (10 mg) by mouth daily, Disp-30 tablet, R-0, E-Prescribe      potassium chloride SA (K-DUR/KLOR-CON M) 10 MEQ CR tablet Take 1 tablet (10 mEq) by mouth daily, Disp-30 tablet, R-0, E-Prescribe         CONTINUE these medications which have NOT CHANGED    Details   acetaminophen (TYLENOL) 325 MG tablet Take 325 mg by mouth 3 times daily as needed for mild pain  , Disp-100 tablet, Historical      albuterol (VENTOLIN HFA) 108 (90 Base) MCG/ACT Inhaler Inhale 1-2 puffs into the lungs every 4 hours as needed for shortness of breath / dyspnea or wheezing, Disp-1 Inhaler, R-0, Local Print      DULoxetine (CYMBALTA) 30 MG EC capsule Take 2 capsules (60 mg) by mouth daily, Disp-60 capsule, R-0, E-Prescribe      gabapentin (NEURONTIN) 300 MG capsule Take 1 capsule by mouth in the morning, 1 capsule in the afternoon, and 2 capsules at bedtime., Disp-12 capsule, R-0, Local Print      hydrOXYzine (ATARAX) 50 MG tablet Take 1 tablet (50 mg) by mouth every 6 hours as needed for anxiety or other (adjuvant pain), Disp-12 tablet, R-1, Local Print      ipratropium - albuterol 0.5 mg/2.5 mg/3 mL (DUONEB) 0.5-2.5 (3) MG/3ML neb solution Take 1 vial (3 mLs) by nebulization 4 times daily as needed for wheezing, Disp-360 mL, R-1, E-Prescribe      labetalol (NORMODYNE) 100 MG tablet 100mg in am, 200mg in pm., Disp-90 tablet, R-0, E-Prescribe      losartan (COZAAR) 25 MG tablet Take 1 tablet (25 mg) by mouth daily, Disp-30 tablet, R-0, E-Prescribe      menthol (ICY HOT) 5 % PTCH Apply 1 patch topically every 8 hours as needed for muscle soreness, Disp-30 patch, R-3, E-Prescribe      multivitamin, therapeutic with minerals (THERA-VIT-M) TABS tablet Take 1 tablet by mouth daily, Disp-3 each, R-0, Local Print      nicotine (NICODERM CQ) 21 MG/24HR 24 hr patch Place 1 patch onto the skin daily, Disp-30 patch, Historical      omeprazole (PRILOSEC) 20 MG CR capsule Take 1 capsule (20 mg) by mouth 2 times daily (before meals), Disp-60 capsule, R-0, E-Prescribe      rOPINIRole (REQUIP) 1 MG tablet Take 1 tablet (1 mg) by mouth 3 times daily, Disp-90 tablet, R-0, E-Prescribe      tiotropium (SPIRIVA HANDIHALER) 18 MCG capsule Inhale contents of one capsule daily., Disp-3 capsule, R-0, Local Print      traMADol (ULTRAM) 50 MG tablet Take 1 tablet (50 mg) by mouth every 6 hours as needed for moderate pain,  Disp-6 tablet, R-0, Local Print      order for DME Equipment being ordered: Cane ()  Treatment Diagnosis: Impaired functional mobilityDisp-1 each, R-0, Local Print      FLUOXETINE HCL PO Take 80 mg by mouth daily, Historical      fluticasone-salmeterol (ADVAIR DISKUS) 500-50 MCG/DOSE diskus inhaler Inhale 1 puff into the lungs every 12 hours, Disp-1 Inhaler, R-0, Local Print      folic acid (FOLVITE) 1 MG tablet Take 1 tablet (1 mg) by mouth daily, Disp-3 tablet, R-0, Local Print      MAGNESIUM OXIDE PO Take 400 mg by mouth daily, Historical      nicotine polacrilex (NICORETTE) 2 MG gum Place 2 mg inside cheek every hour as needed for smoking cessation, Historical      OYSTER SHELL CALCIUM PO Take 500 mg by mouth daily, Historical         STOP taking these medications       spironolactone (ALDACTONE) 25 MG tablet Comments:   Reason for Stopping:                    Hospital Course:     Inga Ledesma is a 52 year old female  admitted on 6/18/2018. She has a history of hypertension, CVA, COPD, CHF with preserved EF, alcohol abuse and alcohol withdrawal seizures and is admitted for hypotension and acute kidney     1) Dehydration leading to Hypotension - Presented with hypotension, tachycardia and elevated procalcitonin concerning for sepsis. She was diagnosed with C diff 2 weeks ago. She stopped drinking ETOH and over the weekend reported having loose stools very 10 minutes. She is on diuretics too. Hence this likely represents volume depletion. Her sodium, creatinine, and BP quickly corrected after giving her fluids.  No infiltrated on CXR .  UA negative, BC pending but negative so far.  CT abdomen/ negative.      - Started on Vanco and Zosyn in the ED and empiric oral vanc. Now off all antibiotics given rapid improvement in BP after IVF and rapid normalization of Na and  creat after IVF suggesting majority of the issue was dehydration. There is risk of C-diff recurrence with antibiotics.   - Monitored off  Antibiotics for any new infection give high pro-calcitonin on admission. No recurrence noted.  Subsequent procalcitotin has come down  - C-diff negative. Enteric panel negative.   - CT abdomen negative  - UA/UC, BC negative so far    - We had held home lasix,  spironolactone,   - BP now going up,  So restart home dose of labetalol, losartan.       2) Hyponatremia  - Sodium 124  On admit now normalized after IVF  - off IVF      3) LALA - Suspect secondary to hypovolemia.  She has been taking lasix in the setting of C diff infection and heavy alcohol use.  FeNA very low suggestive of pre-renal etiology.   - resolved with IVF  - We held home lasix  And aldactone.   - at DC we are discharging patient on 10 mg lasix, holding aldactone      4) Alcohol abuse - She is drinking a pint of vodka daily and reports a history of alcohol withdrawal seizures.  Last drink 6/14.  She was tremulous on 6/15 but has not had any issues since. Did not withdrawal here.       5) History of COPD - Reports great difficulty over the past week secondary to humidity and heat.  She has a productive cough.  Currently talking in complete sentences and SpO2 100% on room air.  - Continue home COPD regimen: Advair, albuterol, duonebs.  Hold Spiriva while on duonebs.  - Counseled re: smoking cessation      6) History of diastolic heart failure - 12/22 echo found normal LVEF.  Patient reports 6 pillow orthopnea.  - Holding lasix and spironolactone in setting of LALA  --at DC we are discharging patient on 10 mg lasix, holding aldactone       7) Cigarette smoking  - Nicotine patch                  Discharge Disposition:   Discharged to home           Condition on Discharge:   Discharge condition: Stable   Code status on discharge: Full Code           Procedures:   none          Consultations:   Consultation during this admission received from none.               Final Day of Progress before Discharge:       Physical Exam:  Blood pressure 136/88, pulse 80,  "temperature 97.8  F (36.6  C), temperature source Oral, resp. rate 18, height 1.549 m (5' 1\"), weight 73 kg (161 lb), last menstrual period 06/02/2010, SpO2 96 %, not currently breastfeeding.  GENERAL: Alert and oriented x 3. NAD.   HEENT: Anicteric sclera. PERRL. Mucous membranes moist and without lesions.   CV: RRR. S1, S2. No murmurs appreciated.   RESPIRATORY: Effort normal. Lungs CTAB with no wheezing, rales, rhonchi.   GI: Abdomen soft and non distended with normoactive bowel sounds present in all quadrants. No tenderness, rebound, guarding.      Data:  All laboratory data reviewed             Significant Results:     Results for orders placed or performed during the hospital encounter of 06/18/18   Chest  XR, 1 view portable    Narrative    XR CHEST PORT 1 VW 6/18/2018 3:54 PM    History: SOA;     Comparison: 5/28/2018    Findings: Single AP view of the chest. Heart size is within normal  limits. No focal airspace opacities. No pleural effusion or  pneumothorax.      Impression    Impression: No acute airspace disease.    I have personally reviewed the examination and initial interpretation  and I agree with the findings.    YESSY VILLASEÑOR MD   Abd/pelvis CT no contrast - Stone Protocol    Narrative    Examination:  CT ABDOMEN PELVIS W/O CONTRAST 6/18/2018 6:54 PM     History: Back pain, acute kidney injury and    Comparison: CT of the abdomen and pelvis 5/29/2018    Technique: CT of the abdomen and pelvis were obtained without  contrast. Sagittal and coronal reconstructions created and reviewed.    Findings:   Limited evaluation of the solid organs without contrast. Mild  hypoattenuation of the hepatic parenchyma. The spleen, left adrenal  gland, pancreas, and gallbladder are unremarkable. There is a nodule  of the right adrenal gland with density measuring less than 10  Hounsfield units measuring 2.3 x 2.1 cm, consistent with adrenal  adenoma. No renal calculi, hydronephrosis, or ureteral stones are  noted. " The urinary bladder is decompressed, otherwise unremarkable.    The small and large bowel are normal in caliber. The appendix is  visualized and unremarkable. Moderate colonic diverticula without  surrounding inflammatory changes. No abdominal free fluid or free air.  Surgical clips present in the right inguinal canal. No retroperitoneal  or mesenteric lymphadenopathy.    The lung bases are clear.    Stranding over the left iliac crest persists, lasts pronounced than  previous CT. Degenerative changes in the normal lumbar spine.      Impression    Impression:   1. No findings to explain back pain. No hydronephrosis or renal  calculi.  2. Hepatic steatosis.  3. Right adrenal adenoma, stable.  4. Nonspecific inflammatory stranding over the left iliac crest, less  pronounced than previous CT. Finding may represent evolving hematoma  or cellulitis.    I have personally reviewed the examination and initial interpretation  and I agree with the findings.    YESSY VILLASEÑOR MD   XR Chest 1 View    Narrative    EXAM: XR CHEST 1 VW  6/19/2018 8:18 AM     HISTORY:  Cough, hypotension, eval for PNA;        COMPARISON: Chest radiograph 6/18/2018    FINDINGS: Single portable PA view of chest. Cardiac silhouette is  within normal limits. No pneumothorax. No pleural effusion. Minimal  right basilar streaky opacities.       Impression    IMPRESSION: Minimal right basilar streaky opacities may represent  atelectasis or aspiration.    I have personally reviewed the examination and initial interpretation  and I agree with the findings.    YESSY VILLASEÑOR MD   CBC with platelets differential   Result Value Ref Range    WBC 10.6 4.0 - 11.0 10e9/L    RBC Count 4.07 3.8 - 5.2 10e12/L    Hemoglobin 12.7 11.7 - 15.7 g/dL    Hematocrit 36.1 35.0 - 47.0 %    MCV 89 78 - 100 fl    MCH 31.2 26.5 - 33.0 pg    MCHC 35.2 31.5 - 36.5 g/dL    RDW 15.2 (H) 10.0 - 15.0 %    Platelet Count 180 150 - 450 10e9/L    Diff Method Automated Method     %  Neutrophils 79.2 %    % Lymphocytes 14.1 %    % Monocytes 4.2 %    % Eosinophils 2.0 %    % Basophils 0.3 %    % Immature Granulocytes 0.2 %    Nucleated RBCs 0 0 /100    Absolute Neutrophil 8.4 (H) 1.6 - 8.3 10e9/L    Absolute Lymphocytes 1.5 0.8 - 5.3 10e9/L    Absolute Monocytes 0.4 0.0 - 1.3 10e9/L    Absolute Eosinophils 0.2 0.0 - 0.7 10e9/L    Absolute Basophils 0.0 0.0 - 0.2 10e9/L    Abs Immature Granulocytes 0.0 0 - 0.4 10e9/L    Absolute Nucleated RBC 0.0    Comprehensive metabolic panel   Result Value Ref Range    Sodium 124 (L) 133 - 144 mmol/L    Potassium 3.2 (L) 3.4 - 5.3 mmol/L    Chloride 90 (L) 94 - 109 mmol/L    Carbon Dioxide 21 20 - 32 mmol/L    Anion Gap 14 3 - 14 mmol/L    Glucose 142 (H) 70 - 99 mg/dL    Urea Nitrogen 35 (H) 7 - 30 mg/dL    Creatinine 2.97 (H) 0.52 - 1.04 mg/dL    GFR Estimate 17 (L) >60 mL/min/1.7m2    GFR Estimate If Black 20 (L) >60 mL/min/1.7m2    Calcium 9.0 8.5 - 10.1 mg/dL    Bilirubin Total 0.5 0.2 - 1.3 mg/dL    Albumin 4.0 3.4 - 5.0 g/dL    Protein Total 7.5 6.8 - 8.8 g/dL    Alkaline Phosphatase 124 40 - 150 U/L    ALT 49 0 - 50 U/L    AST 36 0 - 45 U/L   Lipase   Result Value Ref Range    Lipase 595 (H) 73 - 393 U/L   Lactic acid   Result Value Ref Range    Lactic Acid 2.1 (H) 0.7 - 2.0 mmol/L   Magnesium   Result Value Ref Range    Magnesium 1.6 1.6 - 2.3 mg/dL   TSH with free T4 reflex   Result Value Ref Range    TSH 1.02 0.40 - 4.00 mU/L   Troponin I   Result Value Ref Range    Troponin I ES <0.015 0.000 - 0.045 ug/L   Procalcitonin   Result Value Ref Range    Procalcitonin 11.53 (HH) ng/ml   INR   Result Value Ref Range    INR 0.94 0.86 - 1.14   Alcohol   Result Value Ref Range    Ethanol g/dL <0.01 <0.01 g/dL   UA with Microscopic reflex to Culture   Result Value Ref Range    Color Urine Light Yellow     Appearance Urine Clear     Glucose Urine Negative NEG^Negative mg/dL    Bilirubin Urine Negative NEG^Negative    Ketones Urine Negative NEG^Negative mg/dL     Specific Gravity Urine 1.006 1.003 - 1.035    Blood Urine Trace (A) NEG^Negative    pH Urine 5.5 5.0 - 7.0 pH    Protein Albumin Urine 10 (A) NEG^Negative mg/dL    Urobilinogen mg/dL Normal 0.0 - 2.0 mg/dL    Nitrite Urine Negative NEG^Negative    Leukocyte Esterase Urine Negative NEG^Negative    Source Unspecified Urine     WBC Urine <1 0 - 5 /HPF    RBC Urine <1 0 - 2 /HPF    Bacteria Urine Few (A) NEG^Negative /HPF    Squamous Epithelial /HPF Urine 1 0 - 1 /HPF    Mucous Urine Present (A) NEG^Negative /LPF   Lactic acid   Result Value Ref Range    Lactic Acid 1.1 0.7 - 2.0 mmol/L   Basic metabolic panel   Result Value Ref Range    Sodium 129 (L) 133 - 144 mmol/L    Potassium 3.3 (L) 3.4 - 5.3 mmol/L    Chloride 98 94 - 109 mmol/L    Carbon Dioxide 19 (L) 20 - 32 mmol/L    Anion Gap 12 3 - 14 mmol/L    Glucose 105 (H) 70 - 99 mg/dL    Urea Nitrogen 33 (H) 7 - 30 mg/dL    Creatinine 2.11 (H) 0.52 - 1.04 mg/dL    GFR Estimate 25 (L) >60 mL/min/1.7m2    GFR Estimate If Black 30 (L) >60 mL/min/1.7m2    Calcium 7.9 (L) 8.5 - 10.1 mg/dL   CBC with platelets   Result Value Ref Range    WBC 8.3 4.0 - 11.0 10e9/L    RBC Count 3.51 (L) 3.8 - 5.2 10e12/L    Hemoglobin 11.0 (L) 11.7 - 15.7 g/dL    Hematocrit 32.0 (L) 35.0 - 47.0 %    MCV 91 78 - 100 fl    MCH 31.3 26.5 - 33.0 pg    MCHC 34.4 31.5 - 36.5 g/dL    RDW 15.3 (H) 10.0 - 15.0 %    Platelet Count 133 (L) 150 - 450 10e9/L   Fractional excretion of sodium   Result Value Ref Range    Creatinine Urine 82 mg/dL    Sodium Urine mmol/L 15 mmol/L    %FENA 0.3 %   Basic metabolic panel   Result Value Ref Range    Sodium 135 133 - 144 mmol/L    Potassium 3.4 3.4 - 5.3 mmol/L    Chloride 102 94 - 109 mmol/L    Carbon Dioxide 22 20 - 32 mmol/L    Anion Gap 11 3 - 14 mmol/L    Glucose 111 (H) 70 - 99 mg/dL    Urea Nitrogen 24 7 - 30 mg/dL    Creatinine 0.97 0.52 - 1.04 mg/dL    GFR Estimate 60 (L) >60 mL/min/1.7m2    GFR Estimate If Black 73 >60 mL/min/1.7m2    Calcium 8.0 (L)  8.5 - 10.1 mg/dL   Adrenal corticotropin   Result Value Ref Range    Adrenal Corticotropin 17 <47 pg/mL   Cortisol   Result Value Ref Range    Cortisol Serum 12.8 4 - 22 ug/dL   Cosyntropin stimulation study post 60   Result Value Ref Range    Cortisol Stimulation Post 60 33.0 >20 ug/dL   CBC with platelets   Result Value Ref Range    WBC 6.0 4.0 - 11.0 10e9/L    RBC Count 3.33 (L) 3.8 - 5.2 10e12/L    Hemoglobin 10.3 (L) 11.7 - 15.7 g/dL    Hematocrit 29.8 (L) 35.0 - 47.0 %    MCV 90 78 - 100 fl    MCH 30.9 26.5 - 33.0 pg    MCHC 34.6 31.5 - 36.5 g/dL    RDW 15.2 (H) 10.0 - 15.0 %    Platelet Count 123 (L) 150 - 450 10e9/L   Basic metabolic panel   Result Value Ref Range    Sodium 137 133 - 144 mmol/L    Potassium 3.1 (L) 3.4 - 5.3 mmol/L    Chloride 104 94 - 109 mmol/L    Carbon Dioxide 22 20 - 32 mmol/L    Anion Gap 12 3 - 14 mmol/L    Glucose 122 (H) 70 - 99 mg/dL    Urea Nitrogen 14 7 - 30 mg/dL    Creatinine 0.63 0.52 - 1.04 mg/dL    GFR Estimate >90 >60 mL/min/1.7m2    GFR Estimate If Black >90 >60 mL/min/1.7m2    Calcium 8.6 8.5 - 10.1 mg/dL   Procalcitonin   Result Value Ref Range    Procalcitonin 0.50 ng/ml   Basic metabolic panel   Result Value Ref Range    Sodium 136 133 - 144 mmol/L    Potassium 3.5 3.4 - 5.3 mmol/L    Chloride 102 94 - 109 mmol/L    Carbon Dioxide 22 20 - 32 mmol/L    Anion Gap 11 3 - 14 mmol/L    Glucose 103 (H) 70 - 99 mg/dL    Urea Nitrogen 12 7 - 30 mg/dL    Creatinine 0.61 0.52 - 1.04 mg/dL    GFR Estimate >90 >60 mL/min/1.7m2    GFR Estimate If Black >90 >60 mL/min/1.7m2    Calcium 8.5 8.5 - 10.1 mg/dL   CBC with platelets   Result Value Ref Range    WBC 5.7 4.0 - 11.0 10e9/L    RBC Count 3.36 (L) 3.8 - 5.2 10e12/L    Hemoglobin 10.3 (L) 11.7 - 15.7 g/dL    Hematocrit 31.2 (L) 35.0 - 47.0 %    MCV 93 78 - 100 fl    MCH 30.7 26.5 - 33.0 pg    MCHC 33.0 31.5 - 36.5 g/dL    RDW 15.9 (H) 10.0 - 15.0 %    Platelet Count 120 (L) 150 - 450 10e9/L   EKG 12-lead, tracing only   Result  Value Ref Range    Interpretation ECG Click View Image link to view waveform and result    Medication History IP Pharmacy Consult    Narrative    Ortiz Espinoza MD     2018  6:14 PM    [facility] SERVICE: NEW CONSULTATION   Inga Ledesma : 1965 Sex: Female   MRN: [id number]  Admission Date: 2018  Consult Requester: Claudine Ramos MD  Date of Service: 2018    REASON FOR CONSULTATION: Hypotension, high procalcitonin, was on   vancomycin and piperacillin-tazobactam, recent Clostridium   difficile colitis, no clear signs of infection    PROBLEM LIST:  1. Hypotension, resolved; likely dehydration hypovolemia rather   than related to infection   2. Recent history of C difficile infection  3. LALA, resolved  4. Alcohol abuse    RECOMMENDATIONS:   1. Agree with stopping antibiotics; continue to monitor   clinically off of antibiotics.  2. We have provided reassurance that hypotension at presentation   was unrelated to infection.    DISCUSSION:   Inga Ledesma is a 51 y/o female with h/o alcohol abuse and   recent Clostridium difficile colitis s/p 10 day course of PO   vancomycin who was admitted on 18 for hypotension. Initial   concern for infection and started on broad spectrum antibiotics.   However, this resolved rapidly with broad-spectrum antibiotics,   suggesting hypovolemia rather than infection.  Antibiotics were   stopped given recent history of C difficile.  C difficile PCR   negative here.  She initially had an elevated procalcitonin   though this is difficult to interpret in context of LALA and might   be expected in cases of severe dehydration.  Normalized today.    Cr has also normalized with fluids. Infectious w/u thus far   includes negative enteric screen, negative Clostridium difficile   toxin, negative BC x2, negative CT abdomen. Afebrile since   admission; WBC was 10.6 on 18, trending down to 6.0 today.   Taken together, this presentation does not appear  to be   infectious; agree with stopping antibiotics, and she has been   stable now off of antibiotics. The patient had a lot of anxiety   related to hypotension. We have provided reassurance to her   regarding the etiology of hypotension, which was not related to   infection.    ID will sign off at this point given no active infection.   However, we are always happy to revisit the case, please call   anytime with any questions, concerns or changes in status.    Tanya Rasheed, MS4  Pager: [phone]    Attestation:  This patient has been seen and evaluated by me, Ortiz Espinoza MD.  I have independently examined and discussed this patient   with the medical student and agree with the findings and   recommendations in this note. I have reviewed the patient's   History and today's Medications, Vital Signs, Labs and Imaging.      I have edited this note to reflect my findings.      This is a 53yo woman with a h/o of HTN, CVA, COPD, HFpEF, and   alcohol abuse that presented with hypotension. Initially started   on broad spectrum antibiotics, but her presentation was thought   more consistent with dehydration hypovolemia (recent EtOH,   diarrhea) and antibiotics have been stopped. We agree with   primary teams assessment and would continue to observe off of   antibiotics, especially in contect of recent C difficile, as   above. Patient was very anxious about hypotension, and we have   provided reassurance.       Ortiz Espinoza MD   [facility]  [phone]      HPI:   Inga Ledesma is a 53 y/o female with h/o CVA, COPD,   hypertension, HFpEF, and alcohol abuse who was admitted on   6/18/18 for hypotension. She was recently hospitalized from   5/28/18-5/31/18 with Clostridium difficile colitis and was on PO   vancomycin from 5/31/18-6/10/18. After leaving the hospital, she   relapsed for approximately 1 week and reported drinking 1 pint of   vodka per day. She began withdrawing on 6/15/18, felt fine   6/16/18, and  started feeling crummy with blurry vision on   6/17/18. She came to the ED on 6/18/18 after self measured low   blood pressure readings; reported systolic pressures to 80s.   Started vancomycin + piperacillin-tazobactam for sepsis vs   Clostridium difficile infection in the ED, narrowed to PO   vancomycin at admission. Infectious w/u thus far includes   negative enteric screen, negative Clostridium difficile toxin,   negative BC x2, negative CT abdomen. Afebrile since admission;   WBC was 10.6 on 6/18/18, trending down to 6.0 today;   procalcitonin 11.53; LA 1.1. Antibiotics were stopped yesterday.   Reports loose watery stools overnight that improved with   loperamide. Today endorses headaches, chronic cough, vomiting   after coughing, abdominal pain, and diarrhea. Denies fevers or   chills.     ANTI-INFECTIVES:   Current: None  Previous: Piperacillin-tazobactam (6/18/18-6/19/18), IV   vancomycin (6/18/18), PO vancomycin (5/39/18-6/10/18,   6/18/18-6/19/18)    Microbiology:  6/19/18  C diff PCR Negative  6/18/18  Blood Cx NGTD    ROS:  A ten point ROS was obtained and was negative with the exception   of that which is described in the above.    PMH:   Past Medical History:   Diagnosis Date     Alcohol abuse, in remission      Alcohol withdrawal seizure (H) 2016     Cancer of labia majora (H) 1987     Cervical dysplasia 1987     Congestive heart failure (H) 5/16/16     COPD (chronic obstructive pulmonary disease) (H) 1/24/2011     CVA (cerebral infarction) 1/2012     Dependent edema      Depression, major      Depressive disorder 1966     GERD (gastroesophageal reflux disease)      Hyperlipidemia LDL goal < 160      Hypertension      Iron deficiency anemia      RLS (restless legs syndrome)      Sacroiliitis (H)     steroid injections ineffective, chronic low back pain     Tobacco abuse      Uncomplicated asthma      Vitamin D deficiencies      PSH:  Past Surgical History:   Procedure Laterality Date     AS  "BIOPSY/EXCISION LYMPH NODE OPEN SUPERFICIAL       COLONOSCOPY       EYE SURGERY       HYSTERECTOMY  2010     HYSTERECTOMY TOTAL ABDOMINAL  7/28/10    Bilateral salpingectomy.  ovaries conserved.     LASER TX, CERVICAL  1987     LYMPH NODE BIOPSY  2007    inguinal     LYSIS OF LABIAL LESION(S)  1985, 1987     ALLERGIES:  Codeine (nauea)  Influenza vaccines (sensitive to eggs)  Reglan (body tenses up)    SOCIAL HISTORY:   Social History   Substance Use Topics     Smoking status: Current Every Day Smoker     Packs/day: 0.25     Years: 34.00     Types: Cigarettes     Start date: 11/1/1979     Last attempt to quit: 10/3/2012     Smokeless tobacco: Never Used      Comment: 5 cigarettes daily     Alcohol use Yes      Comment: 1 pint of vodka per day, last drink 6/17/18     History   Sexual Activity     Sexual activity: Not Currently     FAMILY HISTORY:   Family History   Problem Relation Age of Onset     Depression/Anxiety Mother      Asthma Mother      Cerebrovascular Disease Father      Hypertension Father      Lung Cancer Father      Breast Cancer Paternal Aunt      Other Cancer Other      Depression Other      Anxiety Disorder Other      Mental Illness Other      Substance Abuse Other      Asthma Other      Obesity Sister      Obesity Son      EXAMINATION:   VS: /90 (BP Location: Left arm)  Pulse 74  Temp 98.4  F   (36.9  C) (Oral)  Resp 20  Ht 1.549 m (5' 1\")  Wt 73 kg (161   lb)  LMP 06/02/2010  SpO2 98%  BMI 30.42 kg/m2  GENERAL: AOx3. NAD.   EYES: Sclerae anicteric without conjunctival injection or   stigmata of endocarditis.    ENT: AT/NC. MMM without lesions or exudates.   NECK:  Supple without lymphadenopathy.  HEART: RRR. No MRG.  LUNGS: CTAB. Normal respiratory effort.   ABDOMEN: Bowel sounds present. S/ND. Mild epigastric tenderness.   No guarding. No hepatosplenomegaly.  SKIN: No rashes or sores. Lines without surrounding erythema or   exudate. No stigmata of endocarditis.  NEURO: Grossly " nonfocal. Spontaneously moves all extremities.   PSYCH: Tearful throughout conversation.   LDA: PIV left forearm.     RELEVANT DATA:   TTE (6/19/18): EF 60-65%. LV and RV function normal without wall   abnormalities. IVC normal. No pericardial effusion.     BASIC LABS:  The following basic labs were personally reviewed:  Inflammatory Markers    Recent Labs   Lab Test  05/31/18   0758  05/29/18   0433   SED   --   15   CRP  50.0*  79.0*     Hematology Studies    Recent Labs   Lab Test  06/20/18   0520  06/18/18   2116  06/18/18   1531  05/31/18   0758  05/30/18   0752  05/29/18   0433   05/28/18   1946   04/16/18   1209  04/05/18   1703   11/16/17   1218   10/27/17   2241   WBC  6.0  8.3  10.6  5.7  7.7  8.5   --   14.7*   < >  7.7  8.5     < >  11.6*   < >  6.1   ANEU   --    --   8.4*   --    --    --    --   13.2*   --   6.3    7.0   --   9.1*   --   4.5   AEOS   --    --   0.2   --    --    --    --   0.0   --   0.0    0.0   --   0.3   --   0.0   HGB  10.3*  11.0*  12.7  10.0*  10.2*  10.9*   < >  12.9   < >    12.9  12.7   < >  10.7*   < >  14.0   MCV  90  91  89  90  94  90   --   89   < >  87  88   < >  96   <   >  93   PLT  123*  133*  180  139*  103*  118*   --   151   < >  505*    187   < >  329   < >  383    < > = values in this interval not displayed.     Metabolic Studies     Recent Labs   Lab Test  06/20/18 0520 06/19/18   0724  06/18/18   1825  06/18/18   1531  05/31/18   0758   NA  137  135  129*  124*  136   POTASSIUM  3.1*  3.4  3.3*  3.2*  3.3*   CHLORIDE  104  102  98  90*  104   CO2  22  22  19*  21  24   BUN  14  24  33*  35*  9   CR  0.63  0.97  2.11*  2.97*  0.67   GFRESTIMATED  >90  60*  25*  17*  >90     Hepatic Studies    Recent Labs   Lab Test  06/18/18   1531  05/31/18   0758  05/30/18   0752  05/29/18   0433  05/28/18   1943  04/16/18   1209   BILITOTAL  0.5  0.4  0.7  0.8  1.4*  0.5   ALKPHOS  124  91  100  113  131  101   ALBUMIN  4.0  2.9*  2.9*  3.4  4.1  4.1   AST  36  18  20  35   50*  33   ALT  49  24  27  36  45  39     MICROBIOLOGY LABS:  The following microbiology studies were personally reviewed:  Culture Micro   Date Value Ref Range Status   2018 No growth after 2 days  Preliminary   2018 No growth after 2 days  Preliminary   2018 >100,000 colonies/mL  Escherichia coli   (A)  Final   2018   Final    <10,000 colonies/mL  mixed urogenital nikunj  Susceptibility testing not routinely done     2018 No growth  Final   2018 No growth  Final   2017 No Beta Streptococcus isolated  Final   2011 No Beta Streptococcus isolated  Final   2007 Culture negative after 4 weeks  Final   2007 No growth  Final   2007 No anaerobes isolated  Final   2007 No growth  Final   2007 No anaerobes isolated  Final     Urine Studies    Recent Labs   Lab Test  18   1836  18   1150   LEUKEST  Negative  Moderate*   WBCU  <1  6*     IMAGING RESULTS  The following imaging studies were personally reviewed:    CT 18:  1. No findings to explain back pain. No hydronephrosis or renal   calculi.  2. Hepatic steatosis.  3. Right adrenal adenoma, stable.  4. Nonspecific inflammatory stranding over the left iliac crest,   less pronounced than previous CT. Finding may represent evolving   hematoma or cellulitis.       Infectious Disease General Adult IP Consult: Patient to be seen: Routine within 24 hrs; Call back #: [phone]; hypotension, highprocalcitonin, was on vanc and zosyn, however recent cdiff and no clear signs of infection; Consultant may enter orders:...    Narrative    Ortiz Espinoza MD     2018  6:14 PM    [facility] ID SERVICE: NEW CONSULTATION   Inga Ledesma : 1965 Sex: Female   MRN: [id number]  Admission Date: 2018  Consult Requester: Claudine Ramos MD  Date of Service: 2018    REASON FOR CONSULTATION: Hypotension, high procalcitonin, was on   vancomycin and piperacillin-tazobactam,  recent Clostridium   difficile colitis, no clear signs of infection    PROBLEM LIST:  1. Hypotension, resolved; likely dehydration hypovolemia rather   than related to infection   2. Recent history of C difficile infection  3. LALA, resolved  4. Alcohol abuse    RECOMMENDATIONS:   1. Agree with stopping antibiotics; continue to monitor   clinically off of antibiotics.  2. We have provided reassurance that hypotension at presentation   was unrelated to infection.    DISCUSSION:   Inga Ledesma is a 51 y/o female with h/o alcohol abuse and   recent Clostridium difficile colitis s/p 10 day course of PO   vancomycin who was admitted on 6/18/18 for hypotension. Initial   concern for infection and started on broad spectrum antibiotics.   However, this resolved rapidly with broad-spectrum antibiotics,   suggesting hypovolemia rather than infection.  Antibiotics were   stopped given recent history of C difficile.  C difficile PCR   negative here.  She initially had an elevated procalcitonin   though this is difficult to interpret in context of LALA and might   be expected in cases of severe dehydration.  Normalized today.    Cr has also normalized with fluids. Infectious w/u thus far   includes negative enteric screen, negative Clostridium difficile   toxin, negative BC x2, negative CT abdomen. Afebrile since   admission; WBC was 10.6 on 6/18/18, trending down to 6.0 today.   Taken together, this presentation does not appear to be   infectious; agree with stopping antibiotics, and she has been   stable now off of antibiotics. The patient had a lot of anxiety   related to hypotension. We have provided reassurance to her   regarding the etiology of hypotension, which was not related to   infection.    ID will sign off at this point given no active infection.   However, we are always happy to revisit the case, please call   anytime with any questions, concerns or changes in status.    Tanya Rasheed, MS4  Pager:  [phone]    Attestation:  This patient has been seen and evaluated by me, Ortiz Espinoza MD.  I have independently examined and discussed this patient   with the medical student and agree with the findings and   recommendations in this note. I have reviewed the patient's   History and today's Medications, Vital Signs, Labs and Imaging.      I have edited this note to reflect my findings.      This is a 53yo woman with a h/o of HTN, CVA, COPD, HFpEF, and   alcohol abuse that presented with hypotension. Initially started   on broad spectrum antibiotics, but her presentation was thought   more consistent with dehydration hypovolemia (recent EtOH,   diarrhea) and antibiotics have been stopped. We agree with   primary teams assessment and would continue to observe off of   antibiotics, especially in contect of recent C difficile, as   above. Patient was very anxious about hypotension, and we have   provided reassurance.       Ortiz Espinoza MD   [facility]  [phone]      HPI:   Inga Ledesma is a 51 y/o female with h/o CVA, COPD,   hypertension, HFpEF, and alcohol abuse who was admitted on   6/18/18 for hypotension. She was recently hospitalized from   5/28/18-5/31/18 with Clostridium difficile colitis and was on PO   vancomycin from 5/31/18-6/10/18. After leaving the hospital, she   relapsed for approximately 1 week and reported drinking 1 pint of   vodka per day. She began withdrawing on 6/15/18, felt fine   6/16/18, and started feeling crummy with blurry vision on   6/17/18. She came to the ED on 6/18/18 after self measured low   blood pressure readings; reported systolic pressures to 80s.   Started vancomycin + piperacillin-tazobactam for sepsis vs   Clostridium difficile infection in the ED, narrowed to PO   vancomycin at admission. Infectious w/u thus far includes   negative enteric screen, negative Clostridium difficile toxin,   negative BC x2, negative CT abdomen. Afebrile since admission;   WBC was  10.6 on 6/18/18, trending down to 6.0 today;   procalcitonin 11.53; LA 1.1. Antibiotics were stopped yesterday.   Reports loose watery stools overnight that improved with   loperamide. Today endorses headaches, chronic cough, vomiting   after coughing, abdominal pain, and diarrhea. Denies fevers or   chills.     ANTI-INFECTIVES:   Current: None  Previous: Piperacillin-tazobactam (6/18/18-6/19/18), IV   vancomycin (6/18/18), PO vancomycin (5/39/18-6/10/18,   6/18/18-6/19/18)    Microbiology:  6/19/18  C diff PCR Negative  6/18/18  Blood Cx NGTD    ROS:  A ten point ROS was obtained and was negative with the exception   of that which is described in the above.    PMH:   Past Medical History:   Diagnosis Date     Alcohol abuse, in remission      Alcohol withdrawal seizure (H) 2016     Cancer of labia majora (H) 1987     Cervical dysplasia 1987     Congestive heart failure (H) 5/16/16     COPD (chronic obstructive pulmonary disease) (H) 1/24/2011     CVA (cerebral infarction) 1/2012     Dependent edema      Depression, major      Depressive disorder 1966     GERD (gastroesophageal reflux disease)      Hyperlipidemia LDL goal < 160      Hypertension      Iron deficiency anemia      RLS (restless legs syndrome)      Sacroiliitis (H)     steroid injections ineffective, chronic low back pain     Tobacco abuse      Uncomplicated asthma      Vitamin D deficiencies      PSH:  Past Surgical History:   Procedure Laterality Date     AS BIOPSY/EXCISION LYMPH NODE OPEN SUPERFICIAL       COLONOSCOPY       EYE SURGERY       HYSTERECTOMY  2010     HYSTERECTOMY TOTAL ABDOMINAL  7/28/10    Bilateral salpingectomy.  ovaries conserved.     LASER TX, CERVICAL  1987     LYMPH NODE BIOPSY  2007    inguinal     LYSIS OF LABIAL LESION(S)  1985, 1987     ALLERGIES:  Codeine (nauea)  Influenza vaccines (sensitive to eggs)  Reglan (body tenses up)    SOCIAL HISTORY:   Social History   Substance Use Topics     Smoking status: Current Every Day  "Smoker     Packs/day: 0.25     Years: 34.00     Types: Cigarettes     Start date: 11/1/1979     Last attempt to quit: 10/3/2012     Smokeless tobacco: Never Used      Comment: 5 cigarettes daily     Alcohol use Yes      Comment: 1 pint of vodka per day, last drink 6/17/18     History   Sexual Activity     Sexual activity: Not Currently     FAMILY HISTORY:   Family History   Problem Relation Age of Onset     Depression/Anxiety Mother      Asthma Mother      Cerebrovascular Disease Father      Hypertension Father      Lung Cancer Father      Breast Cancer Paternal Aunt      Other Cancer Other      Depression Other      Anxiety Disorder Other      Mental Illness Other      Substance Abuse Other      Asthma Other      Obesity Sister      Obesity Son      EXAMINATION:   VS: /90 (BP Location: Left arm)  Pulse 74  Temp 98.4  F   (36.9  C) (Oral)  Resp 20  Ht 1.549 m (5' 1\")  Wt 73 kg (161   lb)  LMP 06/02/2010  SpO2 98%  BMI 30.42 kg/m2  GENERAL: AOx3. NAD.   EYES: Sclerae anicteric without conjunctival injection or   stigmata of endocarditis.    ENT: AT/NC. MMM without lesions or exudates.   NECK:  Supple without lymphadenopathy.  HEART: RRR. No MRG.  LUNGS: CTAB. Normal respiratory effort.   ABDOMEN: Bowel sounds present. S/ND. Mild epigastric tenderness.   No guarding. No hepatosplenomegaly.  SKIN: No rashes or sores. Lines without surrounding erythema or   exudate. No stigmata of endocarditis.  NEURO: Grossly nonfocal. Spontaneously moves all extremities.   PSYCH: Tearful throughout conversation.   LDA: PIV left forearm.     RELEVANT DATA:   TTE (6/19/18): EF 60-65%. LV and RV function normal without wall   abnormalities. IVC normal. No pericardial effusion.     BASIC LABS:  The following basic labs were personally reviewed:  Inflammatory Markers    Recent Labs   Lab Test  05/31/18   0758  05/29/18   0433   SED   --   15   CRP  50.0*  79.0*     Hematology Studies    Recent Labs   Lab Test  06/20/18   0520 "  06/18/18 2116  06/18/18   1531  05/31/18   0758  05/30/18   0752 05/29/18   0433   05/28/18   1946   04/16/18   1209  04/05/18   1703   11/16/17   1218   10/27/17   2241   WBC  6.0  8.3  10.6  5.7  7.7  8.5   --   14.7*   < >  7.7  8.5     < >  11.6*   < >  6.1   ANEU   --    --   8.4*   --    --    --    --   13.2*   --   6.3    7.0   --   9.1*   --   4.5   AEOS   --    --   0.2   --    --    --    --   0.0   --   0.0    0.0   --   0.3   --   0.0   HGB  10.3*  11.0*  12.7  10.0*  10.2*  10.9*   < >  12.9   < >    12.9  12.7   < >  10.7*   < >  14.0   MCV  90  91  89  90  94  90   --   89   < >  87  88   < >  96   <   >  93   PLT  123*  133*  180  139*  103*  118*   --   151   < >  505*    187   < >  329   < >  383    < > = values in this interval not displayed.     Metabolic Studies     Recent Labs   Lab Test  06/20/18   0520  06/19/18   0724  06/18/18   1825  06/18/18   1531 05/31/18   0758   NA  137  135  129*  124*  136   POTASSIUM  3.1*  3.4  3.3*  3.2*  3.3*   CHLORIDE  104  102  98  90*  104   CO2  22  22  19*  21  24   BUN  14  24  33*  35*  9   CR  0.63  0.97  2.11*  2.97*  0.67   GFRESTIMATED  >90  60*  25*  17*  >90     Hepatic Studies    Recent Labs   Lab Test  06/18/18   1531  05/31/18 0758  05/30/18 0752 05/29/18   0433  05/28/18   1943  04/16/18   1209   BILITOTAL  0.5  0.4  0.7  0.8  1.4*  0.5   ALKPHOS  124  91  100  113  131  101   ALBUMIN  4.0  2.9*  2.9*  3.4  4.1  4.1   AST  36  18  20  35  50*  33   ALT  49  24  27  36  45  39     MICROBIOLOGY LABS:  The following microbiology studies were personally reviewed:  Culture Micro   Date Value Ref Range Status   06/18/2018 No growth after 2 days  Preliminary   06/18/2018 No growth after 2 days  Preliminary   05/29/2018 >100,000 colonies/mL  Escherichia coli   (A)  Final   05/29/2018   Final    <10,000 colonies/mL  mixed urogenital nikunj  Susceptibility testing not routinely done     05/29/2018 No growth  Final   05/29/2018 No growth  Final    2017 No Beta Streptococcus isolated  Final   2011 No Beta Streptococcus isolated  Final   2007 Culture negative after 4 weeks  Final   2007 No growth  Final   2007 No anaerobes isolated  Final   2007 No growth  Final   2007 No anaerobes isolated  Final     Urine Studies    Recent Labs   Lab Test  18   1836  18   1150   LEUKEST  Negative  Moderate*   WBCU  <1  6*     IMAGING RESULTS  The following imaging studies were personally reviewed:    CT 18:  1. No findings to explain back pain. No hydronephrosis or renal   calculi.  2. Hepatic steatosis.  3. Right adrenal adenoma, stable.  4. Nonspecific inflammatory stranding over the left iliac crest,   less pronounced than previous CT. Finding may represent evolving   hematoma or cellulitis.       ECHO COMPLETE WITH OPTISON    Narrative    [id number]  [id number]  [id number]  [id number]^ASHANTI^AMANDA^LESLIE           [facility],[place]  Echocardiography Laboratory  [address] [place] [id number]     Name: TEJAS IRELAND  MRN: [id number]  : 1965  Study Date: 2018 03:49 PM  Age: 52 yrs  Gender: Female  Patient Location: [facility]  Reason For Study: Hypertension (HTN)  Ordering Physician: AMANDA BURRELL  Performed By: Irma Bardales RDCS     BSA: 1.7 m2  Height: 61 in  Weight: 161 lb  HR: 82  BP: 149/79 mmHg  _____________________________________________________________________________  __        Procedure  Echocardiogram with two-dimensional, color and spectral Doppler performed.  Contrast Optison. Technically difficult study.Extremely poor acoustic windows.  Optison (NDC #7176-3303-52) given intravenously. Patient was given 5 ml  mixture of 3 ml Optison and 6 ml saline. 4 ml wasted.  _____________________________________________________________________________  __        Interpretation Summary  Technically difficult study.Extremely poor acoustic windows.  Global and  regional left ventricular function is normal with an EF of 60-65%.  No regional wall motion abnormalities are seen.  Right ventricular function, chamber size, wall motion, and thickness are  normal.  The inferior vena cava is normal.  No pericardial effusion is present.  _____________________________________________________________________________  __        Left Ventricle  Global and regional left ventricular function is normal with an EF of 60-65%.  Left ventricular wall thickness is normal. Left ventricular size is normal. No  regional wall motion abnormalities are seen.     Right Ventricle  Right ventricular function, chamber size, wall motion, and thickness are  normal.     Atria  Both atria appear normal. The atrial septum is intact as assessed by color  Doppler .     Mitral Valve  The mitral valve is normal.        Aortic Valve  Aortic valve is normal in structure and function.     Tricuspid Valve  The tricuspid valve cannot be assessed.     Pulmonic Valve  The pulmonic valve cannot be assessed.     Vessels  The aorta root cannot be assessed. The pulmonary artery cannot be assessed.  The inferior vena cava is normal.     Pericardium  No pericardial effusion is present.     _____________________________________________________________________________  __  MMode/2D Measurements & Calculations  IVSd: 0.96 cm  LVIDd: 4.5 cm  LVIDs: 2.6 cm  LVPWd: 1.1 cm  FS: 42.2 %     LV mass(C)d: 155.5 grams  LV mass(C)dI: 90.3 grams/m2  asc Aorta Diam: 3.0 cm  LVOT diam: 2.1 cm  LVOT area: 3.5 cm2  LA Volume (BP): 61.1 ml  LA Volume Index (BP): 35.5 ml/m2  RWT: 0.48        Doppler Measurements & Calculations  MV E max pauyl: 71.3 cm/sec  MV A max pauly: 88.8 cm/sec  MV E/A: 0.80  MV dec slope: 362.0 cm/sec2     PA V2 max: 138.0 cm/sec  PA max P.6 mmHg  TR max pauly: 275.0 cm/sec  TR max P.3 mmHg  E/E' av.1  Lateral E/e': 6.0  Medial E/e': 10.2      _____________________________________________________________________________  __           Report approved by: Sylvia Newell 06/19/2018 04:34 PM      Enteric Bacteria and Virus Panel by MICHAEL Stool   Result Value Ref Range    Campylobacter group by MICHAEL Not Detected NDET^Not Detected    Salmonella species by MICHAEL Not Detected NDET^Not Detected    Shigella species by MICHAEL Not Detected NDET^Not Detected    Vibrio group by MICHAEL Not Detected NDET^Not Detected    Rotavirus A by MICHAEL Not Detected NDET^Not Detected    Shiga toxin 1 gene by MICHAEL Not Detected NDET^Not Detected    Shiga toxin 2 gene by MICHAEL Not Detected NDET^Not Detected    Norovirus I and II by MICHAEL Not Detected NDET^Not Detected    Yersinia enterocolitica by MICHAEL Not Detected NDET^Not Detected    Enteric pathogen comment       Testing performed by multiplexed, qualitative PCR using the [employer] Enteric   Pathogens Nucleic Acid Test. Results should not be used as the sole basis for diagnosis,   treatment, or other patient management decisions.     Clostridium difficile toxin B PCR   Result Value Ref Range    Specimen Description Feces     C Diff Toxin B PCR Negative NEG^Negative   Blood culture   Result Value Ref Range    Specimen Description Blood Left Arm     Special Requests Aerobic and anaerobic bottles received     Culture Micro No growth      *Note: Due to a large number of results and/or encounters for the requested time period, some results have not been displayed. A complete set of results can be found in Results Review.      No results found for this or any previous visit (from the past 48 hour(s)).             Pending Results:   Unresulted Labs Ordered in the Past 30 Days of this Admission     No orders found from 4/19/2018 to 6/19/2018.                  Discharge Instructions and Follow-Up:     Discharge Procedure Orders  Basic metabolic panel   Standing Status: Future  Standing Exp. Date: 08/20/18   Order Comments: BMP in one weeks time      Medication Therapy Management Referral   Referral Type: Med Therapy Management     Reason for your hospital stay   Order Comments: Admitted for Hypotension from dehydration. Resolved with IVF     Adult [facility] Follow-up and recommended labs and tests   Order Comments: Follow up with primary care provider, Min Toledo, within 7 days for hospital follow- up.  The following labs/tests are recommended: BMP.      Appointments on [place] and/or [facility] (with [facility] or [facility] provider or service). Call [phone] if you haven't heard regarding these appointments within 7 days of discharge.     Follow Up and recommended labs and tests   Order Comments: BMP in one weeks time     Activity   Order Comments: Your activity upon discharge: activity as tolerated   Order Specific Question Answer Comments   Is discharge order? Yes      Full Code     Diet   Order Comments: Follow this diet upon discharge: Orders Placed This Encounter     Combination Diet Regular Diet Adult   Order Specific Question Answer Comments   Is discharge order? Yes               Claudine Ramos  Internal Medicine Staff Hospitalist  [phone]  July 12, 2018        Time spent on patient: 40 minutes total including face to face and coordinating care time reviewing current illness, any medication changes, and the care plan for today.

## 2018-07-12 NOTE — PROGRESS NOTES
Rule 25 Chemical Health Assessment Update:   Inga Ledesma had a Rule 25 Evaluation with Ruba Simon at [facility] on 4/8/18, updated on 4/18/18 by Aubrie Chadwick and a copy of the CD evaluation will be in the paper chart until being scanned into [employer] after the patient completes the primary portion of CD treatment. The original Rule 25 paperwork was reviewed and updated on 7/9/18 by Yaquelin Gamboa LPC. It was then updated again on 7/12/2018 by Viviane Melvin - (see 7/12/2018 updates just following Dimension [employer]). The original Paperwork is being sent with this updated paperwork    Pt reports her last use of alcohol was on 7/5/18. Patient was given a UA upon admit and UA was POS for alcohol and benzodiazapine. Pt was given a breathalyzer upon ER admission and pt's [place] was .    Updated Information:  Pt reports no changes in her living situation, or employment since her previous Rule 25.    DIMENSION I - Acute Intoxication /Withdrawal Potential   1. Chemical use most recent 12 months outside a facility and other significant use history (client self-report)              X = Primary Drug Used   Age of First Use Most Recent Pattern of Use and Duration   Need enough information to show pattern (both frequency and amounts) and to show tolerance for each chemical that has a diagnosis   Date of last use and time, if needed   Withdrawal Potential? Requiring special care Method of use  (oral, smoked, snort, IV, etc)      Alcohol     19 1/2 liter/day   7/5/18 no drink      Marijuana/  Hashish   N/A           Cocaine/Crack     N/A           Meth/  Amphetamines   N/A           Heroin     N/A           Other Opiates/  Synthetics   N/A           Inhalants     N/A           Benzodiazepines     N/A           Hallucinogens     N/A           Barbiturates/  Sedatives/  Hypnotics N/A           Over-the-Counter Drugs   N/A           Other     N/A           Nicotine     15 Half pack per day sometimes less 7/6/18 no oral     ASAM  PLACEMENT CRITERIA:   DIMENSION 1: Intoxication/Withdrawal: Risk level  Severity ratin Client can tolerate and cope with withdrawal discomfort. The client displays mild to moderate intoxication or signs and symptoms interfering with daily functioning but does not immediately endanger self or others. Client poses minimal risk of severe withdrawal.. The patient Pt reports drug of choice is alcohol and last date of use is 19. Pt was admitted to [facility] for symptoms of withdrawal. Withdrawal was managed and treated by [facility] medical staff and should not interfere with her ability to participate in treatment.   DIMENSION 2: Biomedical Conditions: Risk levelSeverity ratin Difficulty tolerating and coping with  physical problems or has other biomedical problems that interfere with recovery and mental health treatment. Neglects or does not seek care for serious biomedical problems . The patient  Pt reports a history of high blood pressure, COPD and congestive heart failure. Reports she has a PCP, last appointment was 2 weeks prior to discuss blood pressure medications. Pt reports that when drinking she does not take medications as prescribed and as a result has concerns. Pt has difficulty tolerating and coping with physical problems, neglects seeking care.  DIMENSION 3: Emotional/Behavioral: Risk level Severity ratin Client has difficulty with impulse control and lacks coping skills. Client has thoughts of suicide or harm to others without means; however, the thoughts may interfere with participation in some treatment activities. Client has difficulty functioning in significant life areas. Client has moderate symptoms of emotional, behavioral, or cognitive problems. Client is able to participate in most treatment activities.. The patient  Pt reports she was raised by both parents. Reports family history of alcoholism and depression. Pt reports mental health diagnosis of anxiety and  "depression. Denies history of suicide ideation or self harm. Pt reports recent grief and loss, father passed away in 2017 and pt reports feeling regret over relationship with her father. Pt reports her best friend was murdered by her friends  and this has been difficult for her as well. Pt would benefit from grief and loss counseling as part of treatment or continued counseling after treatment.  Pt has difficulty with impulse control and lacks coping skills. Pt has difficulty functioning in significant life areas.   DIMENSION 4: Treatment Readiness: Risk level 1 Client is motivated with active reinforcement, to explore treatment and strategies for change, but ambivalent about illness or need for change. The patient Patient displays verbal compliance and motivation but lacks consistent behaviors and follow-through. Pt has continued to use despite consequences. Pt appears to be in the \"contemplation\" Stage within the Stages of Change Model.   DIMENSION 5: Relapse & Continued Use Potential: Risk level Severity ratin No awareness of the negative impact of mental health problems or substance abuse. No coping skills to arrest mental health or addiction illnesses, or prevent relapse. . The patient   Patient reports having been involved in 3 past treatments (completed 3), 2 past detox admissions, and active past 12-Step support group participation. Pt reports having  sober time (10 years) and has tried to quit udrinking in the past but relapsed. Pt lacks insight into her personal relapse process along with early warning signs and triggers. Pt lacks impulse control, sober coping skills and long-term sober maintenance skills. Pt lacks insight into the effects her use has had on her physical and mental health. Pt is at a high risk for relapse/continued use.    DIMENSION 6: Recovery Environment: Risk level Severity ratin Client is engaged in structured, meaningful activity, but peers, family, significant " other, and living environment are unsupportive, or there is criminal justice involvement by the client or among the client s peers, significant others, or in the client s living environment. The patient Pt reports she is employed full time, lives at home in [place] and has a 26 year old son with special needs who lives with her. Reports that when drinking she has a hard time with work. Reports family is supportive but relationships are strained due to use. Pt reports she attends [facility] meetings and has some sober networks. Pt denies any history of legal concerns. Pt would benefit in recovery environment with plan to return home after treatment.    Counselor Notes: pt was not able to follow through with treatment on her last referral.  [facility] would not take her because of her medical problems.  She is having some health problems.  Current H&P is included here.  Patient would like to be admitted to [facility]  --------------------------------------------------------------------------------------------------------------------------------------------------------------------------------------------------------------------------------------------------------------------------  7/12/2018 Update by Hoda Melvin (Cindy) [facility]:  UPDATED DIMENSION 6:  Severity rating 3.  Patient is unable to maintain sobriety at home. Although technically still employed, she has worked possibly 40 days in 2018. She reports attending [facility] outpatient treatment from December 2017 until 2 months ago, maintained sobriety during the first 1.5 months then drinking throughout the remainder of the program until she was discharged for drinking.   UPDATED COUNSELOR NOTES: Patient requires handicap accessible residential treatment program with an immediate opening that can accommodate her walker. Writer requesting Rule 25 funding to cover out-of-pocket costs not covered by her [facility]  "plan.  --------------------------------------------------------------------------------------------------------------------------------------------------------------------------------------------------------------------------------------------------------------------------       [facility]   Psychiatric History & Physical  Admission date: 7/6/2018  Inga Ledesma  [id number]  07/07[birth year]18     Time: 66 minutes on encounter, >50% of which was spent in counseling and/or coordination of care consisting of: communication and education with the patient, communication with family and or friends if documented in note, lab/image/study evaluation, support staff communication, and other sources pertinent to excellent patient care.               Chief Complaint:   \"I am here to get off of alcohol \"         HPI:   Inga Ledesma with a past medical history of vitamin D deficiency, asthma, tobacco use, hypertension, GERD, restless leg syndrome, iron deficiency, depression, CVA, COPD, cancer of the labia, alcohol use, chronic pain, CHF was admitted 7/6/2018 for alcohol detoxification.  She was found to have low potassium and generally needs supplementation and she complains that her blood pressure sometimes gets low.     Upon meeting with her she reports that she has had trouble with her son getting insurance and he is special needs having troubles with his health which contributed to her drinking excessively.  Is also planning on going to treatment and would need a doctor's note so that she can have leave from work.  She does have sleep problems and has had sleep problems for many years and does snore potentially needs a sleep study evaluation.  She denies any current depression symptoms other than sleep dysfunction is not hopeless or helpless having any thoughts of harming herself or others any energy problems or attention or concentration issues or anhedonia.  In the past she has had " cutting behaviors and primarily did that to escape she does have a chronic empty feeling and it sounds as if she has rapid mood changes and splitting behaviors which is concerning for possible borderline personality disorder.  She denies any hallucinations paranoia any generalized anxiety social anxiety gambling addiction pornography addiction sexual addiction shopping addiction post traumatic stress disorder eating disorder symptoms or previous manic episodes.     Physically she has been coughing and has some dizziness and leg pain and is worried about her blood pressure dropping.     Current medications include duloxetine 60 mg daily, gabapentin 300 twice a day and 600 at night and fluoxetine 80 mg.     She is interested in going to chemical dependency treatment after detoxification.         Past Psychiatric History:      She has had a history of depression but no previous suicide attempts and no inpatient hospitalization.  Previous withdrawal seizures but no traumatic brain injury or electroconvulsive therapy.  Describes going to the [facility] for primary care and previous medications include acamprosate, citalopram, clonidine, duloxetine, fluoxetine, gabapentin, hydroxyzine, lorazepam, naltrexone, quetiapine, venlafaxine and trazodone.  No previous commitments or violence or halfway time or alf time.            Substance Use and History:      Alcohol use started at age 10 last drink on Thursday, cannabis abuse years ago no DUIs or legal charges 3 previous chemical dependency treatments.            Past Medical History:   PAST MEDICAL HISTORY:    Past Medical History         Past Medical History:   Diagnosis Date     Alcohol abuse, in remission       Alcohol withdrawal seizure (H) 2016     Cancer of labia majora (H) 1987     Cervical dysplasia 1987     Congestive heart failure (H) 5/16/16     COPD (chronic obstructive pulmonary disease) (H) 1/24/2011     CVA (cerebral infarction) 1/2012     Dependent  edema       Depression, major       Depressive disorder 1966     GERD (gastroesophageal reflux disease)       Hyperlipidemia LDL goal < 160       Hypertension       Iron deficiency anemia       RLS (restless legs syndrome)       Sacroiliitis (H)       steroid injections ineffective, chronic low back pain     Tobacco abuse       Uncomplicated asthma       Vitamin D deficiencies              PAST SURGICAL HISTORY:    Past Surgical History          Past Surgical History:   Procedure Laterality Date     AS BIOPSY/EXCISION LYMPH NODE OPEN SUPERFICIAL         COLONOSCOPY         EYE SURGERY         HYSTERECTOMY   2010     HYSTERECTOMY TOTAL ABDOMINAL   7/28/10     Bilateral salpingectomy.  ovaries conserved.     LASER TX, CERVICAL   1987     LYMPH NODE BIOPSY   2007     inguinal     LYSIS OF LABIAL LESION(S)   1985, 1987                   Family History:   FAMILY HISTORY:    Family History            Family History   Problem Relation Age of Onset     Depression/Anxiety Mother       Asthma Mother       Cerebrovascular Disease Father       Hypertension Father       Lung Cancer Father       Alcoholism Father       Breast Cancer Paternal Aunt       Other Cancer Other       Depression Other       Anxiety Disorder Other       Mental Illness Other       Substance Abuse Other       Asthma Other       Obesity Sister       Obesity Son       Suicide Paternal Uncle                  Social History:   SOCIAL HISTORY:   Social History            Social History     Marital status:        Spouse name: N/A     Number of children: 1     Years of education: 13           Occupational History       [employer]               Social History Main Topics     Smoking status: Current Every Day Smoker       Packs/day: 0.25       Years: 34.00       Types: Cigarettes       Start date: 11/1/1979       Last attempt to quit: 10/3/2012     Smokeless tobacco: Never Used         Comment: 5 cigarettes daily     Alcohol use Yes         Comment:  1 pint of vodka per day, last drink 6/17/18     Drug use: No     Sexual activity: Not Currently           Other Topics Concern     Parent/Sibling W/ Cabg, Mi Or Angioplasty Before 65f 55m? No          Social History Narrative     Lives in [place] with her son in a trailer no access to guns or weapons works for the [employer] and enjoys crocheting and watching television.              Physical ROS:   The patient endorsed the above issues. The remainder of 10-point review of systems was negative except as noted in HPI.          PTA Medications:       Prescriptions Prior to Admission            Prescriptions Prior to Admission   Medication Sig Dispense Refill Last Dose     Acetaminophen (APAP 500 PO) Take 500-1,000 mg by mouth 3 times daily as needed     7/6/2018 at Unknown time     fluticasone-salmeterol (ADVAIR DISKUS) 500-50 MCG/DOSE diskus inhaler Inhale 1 puff into the lungs every 12 hours 1 Inhaler 0 7/6/2018 at Unknown time     folic acid (FOLVITE) 1 MG tablet Take 1 tablet (1 mg) by mouth daily 3 tablet 0       albuterol (VENTOLIN HFA) 108 (90 Base) MCG/ACT Inhaler Inhale 1-2 puffs into the lungs every 4 hours as needed for shortness of breath / dyspnea or wheezing (Patient taking differently: Inhale 1-2 puffs into the lungs every 4 hours as needed for shortness of breath / dyspnea or wheezing Patient taking 2 puff po TID PRN) 1 Inhaler 0 Unknown at Unknown time     DULoxetine (CYMBALTA) 30 MG EC capsule Take 2 capsules (60 mg) by mouth daily 60 capsule 0 Unknown at Unknown time     FLUOXETINE HCL PO Take 80 mg by mouth daily     Unknown at Unknown time     furosemide (LASIX) 20 MG tablet Take 0.5 tablets (10 mg) by mouth daily 30 tablet 0 Unknown at Unknown time     gabapentin (NEURONTIN) 300 MG capsule Take 1 capsule by mouth in the morning, 1 capsule in the afternoon, and 2 capsules at bedtime. 12 capsule 0 Unknown at Unknown time     hydrOXYzine (ATARAX) 50 MG tablet Take 1 tablet (50 mg) by  mouth every 6 hours as needed for anxiety or other (adjuvant pain) 12 tablet 1 Unknown at Unknown time     ipratropium - albuterol 0.5 mg/2.5 mg/3 mL (DUONEB) 0.5-2.5 (3) MG/3ML neb solution Take 1 vial (3 mLs) by nebulization 4 times daily as needed for wheezing 360 mL 1 Unknown at Unknown time     labetalol (NORMODYNE) 100 MG tablet 100mg in am, 200mg in pm. 90 tablet 0 Unknown at Unknown time     losartan (COZAAR) 25 MG tablet Take 1 tablet (25 mg) by mouth daily 30 tablet 0 Unknown at Unknown time     MAGNESIUM OXIDE PO Take 400 mg by mouth daily     Unknown at Unknown time     menthol (ICY HOT) 5 % PTCH Apply 1 patch topically every 8 hours as needed for muscle soreness 30 patch 3 Unknown at Unknown time     multivitamin, therapeutic with minerals (THERA-VIT-M) TABS tablet Take 1 tablet by mouth daily 3 each 0 Unknown at Unknown time     nicotine (NICODERM CQ) 21 MG/24HR 24 hr patch Place 1 patch onto the skin daily 30 patch   Unknown at Unknown time     nicotine polacrilex (NICORETTE) 2 MG gum Place 2 mg inside cheek every hour as needed for smoking cessation     Unknown at Unknown time     omeprazole (PRILOSEC) 20 MG CR capsule Take 1 capsule (20 mg) by mouth 2 times daily (before meals) 60 capsule 0 Unknown at Unknown time     order for DME Equipment being ordered: Cane ()  Treatment Diagnosis: Impaired functional mobility 1 each 0 Unknown at Unknown time     OYSTER SHELL CALCIUM PO Take 500 mg by mouth daily     Unknown at Unknown time     potassium chloride SA (K-DUR/KLOR-CON M) 10 MEQ CR tablet Take 1 tablet (10 mEq) by mouth daily 30 tablet 0 Unknown at Unknown time     rOPINIRole (REQUIP) 1 MG tablet Take 1 tablet (1 mg) by mouth 3 times daily 90 tablet 0 Unknown at Unknown time              Allergies:            Allergies   Allergen Reactions     Codeine Nausea     Influenza Vaccines         Other reaction(s): Other - Describe In Comment Field -- patient is sensitive to eggs     Reglan  [Metoclopramide Hcl] Other (See Comments)       Body tenses up           Labs:       Recent Results            Recent Results (from the past 48 hour(s))   Alcohol breath test POCT     Collection Time: 07/06/18  1:32 PM   Result Value Ref Range     Alcohol Breath Test 0.04 (A) 0.00 - 0.01   Drug abuse screen 6 urine (tox)     Collection Time: 07/06/18  1:37 PM   Result Value Ref Range     Amphetamine Qual Urine Negative NEG^Negative     Barbiturates Qual Urine Negative NEG^Negative     Benzodiazepine Qual Urine Positive (A) NEG^Negative     Cannabinoids Qual Urine Negative NEG^Negative     Cocaine Qual Urine Negative NEG^Negative     Ethanol Qual Urine Positive (A) NEG^Negative     Opiates Qualitative Urine Negative NEG^Negative   Basic metabolic panel     Collection Time: 07/06/18  1:55 PM   Result Value Ref Range     Sodium 134 133 - 144 mmol/L     Potassium 3.1 (L) 3.4 - 5.3 mmol/L     Chloride 93 (L) 94 - 109 mmol/L     Carbon Dioxide 26 20 - 32 mmol/L     Anion Gap 15 (H) 3 - 14 mmol/L     Glucose 82 70 - 99 mg/dL     Urea Nitrogen 7 7 - 30 mg/dL     Creatinine 0.77 0.52 - 1.04 mg/dL     GFR Estimate 79 >60 mL/min/1.7m2     GFR Estimate If Black >90 >60 mL/min/1.7m2     Calcium 8.3 (L) 8.5 - 10.1 mg/dL   CBC with platelets     Collection Time: 07/07/18  7:53 AM   Result Value Ref Range     WBC 5.5 4.0 - 11.0 10e9/L     RBC Count 3.80 3.8 - 5.2 10e12/L     Hemoglobin 11.6 (L) 11.7 - 15.7 g/dL     Hematocrit 34.8 (L) 35.0 - 47.0 %     MCV 92 78 - 100 fl     MCH 30.5 26.5 - 33.0 pg     MCHC 33.3 31.5 - 36.5 g/dL     RDW 15.4 (H) 10.0 - 15.0 %     Platelet Count 151 150 - 450 10e9/L   GGT     Collection Time: 07/07/18  7:53 AM   Result Value Ref Range     GGT 60 (H) 0 - 40 U/L   Comprehensive metabolic panel     Collection Time: 07/07/18  7:53 AM   Result Value Ref Range     Sodium 135 133 - 144 mmol/L     Potassium 2.9 (L) 3.4 - 5.3 mmol/L     Chloride 97 94 - 109 mmol/L     Carbon Dioxide 29 20 - 32 mmol/L      Anion Gap 9 3 - 14 mmol/L     Glucose 86 70 - 99 mg/dL     Urea Nitrogen 11 7 - 30 mg/dL     Creatinine 0.73 0.52 - 1.04 mg/dL     GFR Estimate 83 >60 mL/min/1.7m2     GFR Estimate If Black >90 >60 mL/min/1.7m2     Calcium 7.5 (L) 8.5 - 10.1 mg/dL     Bilirubin Total 1.1 0.2 - 1.3 mg/dL     Albumin 3.2 (L) 3.4 - 5.0 g/dL     Protein Total 6.3 (L) 6.8 - 8.8 g/dL     Alkaline Phosphatase 87 40 - 150 U/L     ALT 30 0 - 50 U/L     AST 27 0 - 45 U/L              Physical and Psychiatric Examination:      /68  Pulse 98  Temp 98.3  F (36.8  C) (Oral)  Resp 16  LMP 06/02/2010  SpO2 98%  Weight is 0 lbs 0 oz  There is no height or weight on file to calculate BMI.                                Last 4 weights:      Wt Readings from Last 4 Encounters:   06/19/18 73 kg (161 lb)   06/17/18 69.4 kg (153 lb)   05/30/18 77.8 kg (171 lb 8.3 oz)   05/03/18 72.6 kg (160 lb)         Physical Exam:  I have reviewed the physical exam as documented by Kofi on 7/6 and agree with findings and assessment and have no additional findings to add at this time.     Mental Status Exam:  Inga is a 52-year-old female that is short with a tattoo on her left arm across wearing hospital scrubs and is overweight.  Her speech is of an appropriate rate and tone and her language is intact.  Her behavior is appropriate and she does not have any abnormal movements.  Her affect is labile.  Her mood she describes as lightheaded.  Her thought content consists of the above without thoughts of harming herself or others or current delusions.  Her thought process is ruminative without looseness of association.  She does not have any abnormal perceptions.  She is alert and aware of her current location and circumstances.  Her attention concentration appears average.  Her cognition and fund of knowledge appear normal.  Her long-term/short-term/remote memory appear intact.  Her insight and judgment are both fair.          Admission Diagnoses:    Alcohol use disorder severe  Tobacco use disorder  History of major depressive disorder  Likely sleep disorder  Rule out borderline personality disorder          Assessment & Plan:      Assessment:  Inga has severe alcohol use disorder as well as a history of major depressive disorder and is on high-dose antidepressants as well as trazodone.  We discussed this combination of medications and she has not been on them recently and we decided to restart the fluoxetine a lower dose of 20 mg.  She does not routinely take her medications when she is drinking alcohol excessively.  She would like to continue her above medications as she does find them helpful we discussed the risks of potential serotonin syndrome with this combination of medications and high dosages.  We additionally discussed the possible need for a sleep study evaluation in the outpatient setting as she likely has an underlying sleep disorder.     Plan:  Continue voluntary hospitalization and detoxification with transition to chemical dependency treatment  We will need a doctor's note for employment  Needs sleep study               Cosme Chinchilla  [facility]        The following document has been created with voice recognition software and may contain unintentional word substitutions.           Non clinically relevant CMS requirements:  Clinical Global Impressions  First:      Most recent:         # Pain Assessment:  Current Pain Score 7/7/2018   Patient currently in pain? denies   Pain score (0-10) -   Pain location -   Pain descriptors -         Any incidence of pain both chronic or acute reported will be documented in above documentation though further documentation can also be found in the internal medicine documentation or pain specialist documentation.             Routing History       Date/Time From To Method     7/7/2018  8:31 PM Cosme Chinchilla MD Olson, Kyle Leigh, PA-C In Basket                           Viviane Melvin

## 2018-07-13 VITALS
HEART RATE: 83 BPM | TEMPERATURE: 98 F | SYSTOLIC BLOOD PRESSURE: 118 MMHG | RESPIRATION RATE: 16 BRPM | OXYGEN SATURATION: 98 % | DIASTOLIC BLOOD PRESSURE: 77 MMHG

## 2018-07-13 PROCEDURE — 25000132 ZZH RX MED GY IP 250 OP 250 PS 637: Performed by: PHYSICIAN ASSISTANT

## 2018-07-13 PROCEDURE — 25000132 ZZH RX MED GY IP 250 OP 250 PS 637: Performed by: PSYCHIATRY & NEUROLOGY

## 2018-07-13 PROCEDURE — 99238 HOSP IP/OBS DSCHRG MGMT 30/<: CPT | Performed by: PSYCHIATRY & NEUROLOGY

## 2018-07-13 PROCEDURE — 25000132 ZZH RX MED GY IP 250 OP 250 PS 637: Performed by: NURSE PRACTITIONER

## 2018-07-13 RX ORDER — LOSARTAN POTASSIUM 50 MG/1
50 TABLET ORAL DAILY
Qty: 30 TABLET | Refills: 1 | Status: SHIPPED | OUTPATIENT
Start: 2018-07-13 | End: 2018-09-14

## 2018-07-13 RX ADMIN — Medication 10 MG: at 08:26

## 2018-07-13 RX ADMIN — MULTIPLE VITAMINS W/ MINERALS TAB 1 TABLET: TAB at 08:26

## 2018-07-13 RX ADMIN — LOSARTAN POTASSIUM 50 MG: 25 TABLET, FILM COATED ORAL at 08:27

## 2018-07-13 RX ADMIN — FLUTICASONE FUROATE AND VILANTEROL TRIFENATATE 1 PUFF: 200; 25 POWDER RESPIRATORY (INHALATION) at 08:27

## 2018-07-13 RX ADMIN — GABAPENTIN 300 MG: 300 CAPSULE ORAL at 14:23

## 2018-07-13 RX ADMIN — DULOXETINE HYDROCHLORIDE 60 MG: 60 CAPSULE, DELAYED RELEASE ORAL at 08:27

## 2018-07-13 RX ADMIN — ROPINIROLE HYDROCHLORIDE 1 MG: 1 TABLET, FILM COATED ORAL at 08:26

## 2018-07-13 RX ADMIN — NICOTINE POLACRILEX 4 MG: 4 GUM, CHEWING ORAL at 14:23

## 2018-07-13 RX ADMIN — NICOTINE POLACRILEX 4 MG: 4 GUM, CHEWING ORAL at 08:25

## 2018-07-13 RX ADMIN — POTASSIUM CHLORIDE 10 MEQ: 750 TABLET, EXTENDED RELEASE ORAL at 08:27

## 2018-07-13 RX ADMIN — LABETALOL HCL 100 MG: 100 TABLET, FILM COATED ORAL at 08:26

## 2018-07-13 RX ADMIN — ALBUTEROL SULFATE 1 PUFF: 90 AEROSOL, METERED RESPIRATORY (INHALATION) at 08:27

## 2018-07-13 RX ADMIN — FOLIC ACID 1 MG: 1 TABLET ORAL at 08:26

## 2018-07-13 RX ADMIN — CALCIUM 500 MG: 500 TABLET ORAL at 08:30

## 2018-07-13 RX ADMIN — OMEPRAZOLE 20 MG: 20 CAPSULE, DELAYED RELEASE ORAL at 08:26

## 2018-07-13 RX ADMIN — MAGNESIUM OXIDE TAB 400 MG (241.3 MG ELEMENTAL MG) 400 MG: 400 (241.3 MG) TAB at 08:26

## 2018-07-13 RX ADMIN — ROPINIROLE HYDROCHLORIDE 1 MG: 1 TABLET, FILM COATED ORAL at 14:23

## 2018-07-13 RX ADMIN — FLUOXETINE 40 MG: 20 CAPSULE ORAL at 08:27

## 2018-07-13 RX ADMIN — GABAPENTIN 300 MG: 300 CAPSULE ORAL at 08:27

## 2018-07-13 RX ADMIN — NICOTINE 1 PATCH: 14 PATCH, EXTENDED RELEASE TRANSDERMAL at 08:24

## 2018-07-13 ASSESSMENT — ACTIVITIES OF DAILY LIVING (ADL)
ORAL_HYGIENE: INDEPENDENT
DRESS: INDEPENDENT
GROOMING: INDEPENDENT

## 2018-07-13 NOTE — PROGRESS NOTES
CASE MANAGEMENT NOTE      UPDATE:  [facility] [place] Rule 25 declined funding due to patient being over income guidelines - [employer] transfer cancelled, patient states she cannot afford out of pocket costs. Call made to Jan at [facility], authorization received for [facility]. Calls placed to [facility], Destiny [phone], patient approved for same day admission, [facility]  to  patient today between 2:30 - 3 PM.     INITIAL:  Voicemail received from [facility] of [facility] [place] Rule 25 (ph [phone], fx [phone]), missing faxed pages - writer to resend, Smiley needs patient's income verification and proof of address - patient informed. Patient will need new CD assessment to meet [facility] guidelines of original assessment must be 45 days or newer (patient's CD assessment dated 4/2018). Case management continues.    Hoda Castellon) CULLEN Melvin, [facility]  3A Psychotherapist  [phone]

## 2018-07-13 NOTE — DISCHARGE INSTRUCTIONS
Behavioral Discharge Planning and Instructions  THANK YOU FOR CHOOSING THE [facility]  [address]                          [phone]    Summary:   You were admitted to [address] on 7/6/2018 for detoxification from alcohol. You are being discharged on 7/13/2018.  A medical examination was performed that included lab work. You met with a  and opted to begin residential treatment at [facility] on 7/13/2018 - with transportation provided by [facility].   Please take care and make your recovery a daily priority, Cody.     Recommendation: Transfer directly to begin residential treatment as stated above.    Main Diagnosis:    Alcohol use disorder severe  Tobacco use disorder  History of major depressive disorder  Likely sleep disorder  Rule out borderline personality disorder    Major Treatments, Procedures and Findings:  You have withdrawn from alcohol using the appropriate protocols.  You have had blood drawn, and the results have been reviewed with you.  Please take a copy of your lab work with you to your next primary care provider appointment.    Symptoms to Report:  If you experience more anxiety, confusion, sleeplessness, deep sadness or thoughts of suicide, notify your treatment team or notify your primary care physician. IF ANY OF THE SYMPTOMS YOU ARE EXPERIENCING ARE A MEDICAL EMERGENCY CALL 911 IMMEDIATELY.     Treatment Program Provider:  Admission on Friday, July 13, 2018  [place] [address]  Ph: [phone]    Primary Provider:    Dr. Min Toledo  [facility]  August 14, 2018 @ 4:20AM  [phone]    Lifestyle Adjustment & Health Action Plan:  1.   Create a daily schedule  2.   Eat Healthy  3.   Plan Enjoyable Sober Activities  4.   Use Problem Solving Skills and Deal with Issues as they Arise.   5.   Be Physically Active  6.   Take your medications as prescribed  7.   Get enough restful sleep  8.   Practice Relaxation  9.    Spend time with Supportive People  10. No use of alcohol, illegal drugs or addictive medications other than what is currently prescribed.   11. AA, NA Sponsor are excellent resources for support    [facility]    Resources:   National Suicide Prevention Line (www.mentalhealthmn.org): 209-009-CJXI (6459).  Pathways ~ A Health Crisis Resource & Support Center:  804.497.7926.   Support Group:  AA/NA and Sponsor/support.  SMART Recovery - self management for addiction recovery:  www.smartrecovery.org  National [place] on Mental Illness (www.mn.[place].org): 152.535.5490 or 407-251-5956.  Alcoholics Anonymous (www.alcoholics-anonymous.org): Check your phone book for your local chapter.  [facility] Emergency Services:  140.412.9268 or 1-151.562.4007.  Suicide Awareness Voices of Education (SAVE) (www.save.org): 833-666-SAVE (3518).  Mental Health Consumer/Survivor Network of [place] (www.mhcsn.net): 315.258.7935 or 738-807-0908.  Mental Health Association of [place] (www.mentalhealth.org): 865.305.6970 or 914-760-6368.     Substance Abuse and Mental Health Services (www.samhsa.gov).    [facility] ([facility])  [facility] connects people seeking recovery to resources that help foster and sustain long-term recovery.  Whether you are seeking resources for treatment, transportation, housing, job training, education, health care or other pathways to recovery, [facility] is a great place to start.  723.954.1183. www.[facility].org    General Medication Instructions:   See your medication papers for instructions. Take all medicines as directed.  Make no changes unless your doctor suggests them. Go to all your doctor visits.  Be sure to have all your required lab tests. This way, your medicines can be refilled on time. Do not use any drugs not prescribed by your provider.  AA/NA and Sponsors are excellent resources for support. Avoid alcohol at all costs!    THANK YOU FOR CHOOSING THE [facility]  [facility]    The treatment team has appreciated the opportunity to work with you.  We wish you the best in the future. Please bring this discharge folder with you to all follow  up appointments - it contains your lab results, diagnosis, medication list and discharge recommendations.    For follow up questions:  Detox Nursing [phone]  Past medical records [phone]  Readmission [phone]         INSTRUCTIONS FOR USE OF SSRI ANTIDEPRESSANTS      DO:    - call clinic if you, or another provider, makes any medication changes  - call clinic if your use of Ibuprofen (or similar) increases significantly  - call clinic if you experience any symptom listed below      DO NOT:  stop this med abruptly         TRY TO AVOID:  excessive use of ibuprofen or similar pain medication      FOOD: take with or without food       COMMON ADVERSE EFFECTS:  headache, dizziness, nausea, diarrhea, fatigue, sleepiness, sexual problems, weight gain, anxiety, a need to pace or restlessness       CALL CLINIC URGENTLY or EMERGENCY ROOM:  if you have any concerns, especially the following...    - abnormal bleeding  or  easy bruising (susy if increase use of ibuprofen etc)  - significant pacing, restlessness or muscle spasm  - thoughts of death or hurting yourself    - suspect Serotonin Syndrome:   confusion   tremor  severe headache  sweats  fever   funny feeling in muscles  need to pace / restlessness   severe nausea, diarrhea

## 2018-07-13 NOTE — PROGRESS NOTES
Affect somewhat blunted. Did eat okay and asked for nicotine gum x 1.Asked for HS meds early. Is steady on her feet using walker.

## 2018-07-13 NOTE — PROGRESS NOTES
Patient discharged with personal belongings and discharge medications to [facility]. Discharge medication instructions and lab results reviewed. Patient verbalizes understanding. Denies thoughts of self harm.Patient escorted off the unit by Psyche Associate Ion LEBRON

## 2018-07-14 NOTE — DISCHARGE SUMMARY
Psychiatric Discharge Summary    Inga Ledesma MRN# [id number]   Age: 52 year old YOB: 1965     Date of Admission:  7/6/2018  Date of Discharge:  7/13/2018  2:47 PM  Admitting Physician:  Aimee Vizcarra MD  Discharge Physician:  Aimee Vizcarra MD (Contact: [phone])         Event Leading to Hospitalization:   Inga Ledesma with a past medical history of vitamin D deficiency, asthma, tobacco use, hypertension, GERD, restless leg syndrome, iron deficiency, depression, CVA, COPD, cancer of the labia, alcohol use, chronic pain, CHF was admitted 7/6/2018 for alcohol detoxification.  She was found to have low potassium and generally needs supplementation and she complains that her blood pressure sometimes gets low.     Upon meeting with her she reports that she has had trouble with her son getting insurance and he is special needs having troubles with his health which contributed to her drinking excessively.  Is also planning on going to treatment and would need a doctor's note so that she can have leave from work.  She does have sleep problems and has had sleep problems for many years and does snore potentially needs a sleep study evaluation.  She denies any current depression symptoms other than sleep dysfunction is not hopeless or helpless having any thoughts of harming herself or others any energy problems or attention or concentration issues or anhedonia.  In the past she has had cutting behaviors and primarily did that to escape she does have a chronic empty feeling and it sounds as if she has rapid mood changes and splitting behaviors which is concerning for possible borderline personality disorder.  She denies any hallucinations paranoia any generalized anxiety social anxiety gambling addiction pornography addiction sexual addiction shopping addiction post traumatic stress disorder eating disorder symptoms or previous manic episodes.     Physically she has been coughing and has  some dizziness and leg pain and is worried about her blood pressure dropping.     Current medications include duloxetine 60 mg daily, gabapentin 300 twice a day and 600 at night and fluoxetine 80 mg.     She is interested in going to chemical dependency treatment after detoxification.       See Admission note by Cosme Chinchilla MD on 7/7/18 for additional details.          Diagnoses:     Alcohol dependence with unspecified alcohol-induced disorder (H)  Depressive disorder, unspecified (rule out MDD)  Borderline personality traits  Hypokalemia         Labs:     Recent Results (from the past 168 hour(s))   CBC with platelets    Collection Time: 07/07/18  7:53 AM   Result Value Ref Range    WBC 5.5 4.0 - 11.0 10e9/L    RBC Count 3.80 3.8 - 5.2 10e12/L    Hemoglobin 11.6 (L) 11.7 - 15.7 g/dL    Hematocrit 34.8 (L) 35.0 - 47.0 %    MCV 92 78 - 100 fl    MCH 30.5 26.5 - 33.0 pg    MCHC 33.3 31.5 - 36.5 g/dL    RDW 15.4 (H) 10.0 - 15.0 %    Platelet Count 151 150 - 450 10e9/L   GGT    Collection Time: 07/07/18  7:53 AM   Result Value Ref Range    GGT 60 (H) 0 - 40 U/L   Comprehensive metabolic panel    Collection Time: 07/07/18  7:53 AM   Result Value Ref Range    Sodium 135 133 - 144 mmol/L    Potassium 2.9 (L) 3.4 - 5.3 mmol/L    Chloride 97 94 - 109 mmol/L    Carbon Dioxide 29 20 - 32 mmol/L    Anion Gap 9 3 - 14 mmol/L    Glucose 86 70 - 99 mg/dL    Urea Nitrogen 11 7 - 30 mg/dL    Creatinine 0.73 0.52 - 1.04 mg/dL    GFR Estimate 83 >60 mL/min/1.7m2    GFR Estimate If Black >90 >60 mL/min/1.7m2    Calcium 7.5 (L) 8.5 - 10.1 mg/dL    Bilirubin Total 1.1 0.2 - 1.3 mg/dL    Albumin 3.2 (L) 3.4 - 5.0 g/dL    Protein Total 6.3 (L) 6.8 - 8.8 g/dL    Alkaline Phosphatase 87 40 - 150 U/L    ALT 30 0 - 50 U/L    AST 27 0 - 45 U/L   Potassium    Collection Time: 07/08/18  8:09 AM   Result Value Ref Range    Potassium 3.6 3.4 - 5.3 mmol/L   Basic metabolic panel    Collection Time: 07/11/18 11:49 AM   Result Value Ref Range     Sodium 137 133 - 144 mmol/L    Potassium 4.5 3.4 - 5.3 mmol/L    Chloride 99 94 - 109 mmol/L    Carbon Dioxide 31 20 - 32 mmol/L    Anion Gap 7 3 - 14 mmol/L    Glucose 94 70 - 99 mg/dL    Urea Nitrogen 12 7 - 30 mg/dL    Creatinine 0.81 0.52 - 1.04 mg/dL    GFR Estimate 74 >60 mL/min/1.7m2    GFR Estimate If Black 90 >60 mL/min/1.7m2    Calcium 9.2 8.5 - 10.1 mg/dL   EKG 12-lead, tracing only    Collection Time: 07/11/18 11:59 AM   Result Value Ref Range    Interpretation ECG Click View Image link to view waveform and result             Consults:   Consultation during this admission received from internal medicine on 7/7/18:    Circumstances of recent discharge and re-admittance noted. Please refer to recent medicine H&P in charting dated 6/18/18 and discharge summary dated , which was reviewed by this writer and is up to date. Please call the on-call PA-C for any f/u medical concerns if they arise.      Diagnoses:    Alcohol abuse, with hx of alcohol withdrawal seizures. Per chart review, here to detox from alcohol. Agree with MSSA, thiamine, folic acid, and seizure precautions.     Hx of COPD. O2 sats stable on RA. Cont home COPD regimen: advair (subbed with breo ellipta), albuterol prn.     Hx of diastolic heart failure. ECHO 6/19/18 with EF 60-65%, no regional WMA. Mild hypokalemia on admission, s/p replacement, all other lytes and Cr remain stable. BPs normotensive today. Cont BB, ARB as below. Cont lasix 10mg qd with daily K scheduled..     Tobacco abuse. Nicorette gum prn.     HTN. BPs normotensive, on softer side last night but improved this morning. Cr stable on admission. Cont home cozaar 25mg qd and labetalol 100mg qam/200mg qpm. Added hold parameters to orders.      GERD. Cont PPI.     RLS. Cont requip.      Hypokalemia. K 3.1 on admission s/p replacement. K 2.9 today. Give 40meq x 1 now. Repeat K tomorrow.     I will follow up on repeat K tomorrow.      Elenita Swanson  Internal Medicine [facility]  Hospitalist      Brief Medicine Note  July 8, 2018     K returned wnl, 3.6 today.       Medicine will sign off.      Elenita Swanson PA-C       Hospital Course:   Inga Ledemsa was admitted to [facility] detox with attending Aimee Vizcarra MD as a voluntary patient. The patient was placed under status 15 (15 minute checks) to ensure patient safety.     On admission, MSSA protocol was initiated using Valium. Last dose of diazepam was on 7/7. No complications of withdrawal.     Inga has severe alcohol use disorder as well as a history of major depressive disorder and is on high-dose antidepressants as well as trazodone.  We discussed this combination of medications and she has not been on them recently and we decided to restart the fluoxetine a lower dose of 20 mg.  She does not routinely take her medications when she is drinking alcohol excessively.  She would like to continue her above medications as she does find them helpful we discussed the risks of potential serotonin syndrome with this combination of medications and high dosages (PTA doses: Cymbalta 60 mg daily and Prozac 80 mg daily). However, she later requested a dose increase from Prozac 20 to Prozac 40 mg daily due to perceived worsening depressive symptoms and anxiety, though never endorsed SI. After again discussing SS signs and symptoms, Prozac was increased as requested. Symptoms of depression and anxiety improved over the course of hospitalization. We additionally discussed the possible need for a sleep study evaluation in the outpatient setting as she likely has an underlying sleep disorder.    Inga Ledesma did participate in groups and was visible in the milieu.     The patient's symptoms of depression, anxiety,and withdrawal improved.     # Discharge Pain Plan:   - Patient currently has NO PAIN and is not being prescribed pain medications on discharge.    Inga Ledesma was released to inpatient CD treatment at [facility]  treatment. At the time of discharge Inga Ledesma was determined to not be a danger to herself or others.          Discharge Medications:     Discharge Medication List as of 7/13/2018  2:00 PM      START taking these medications    Details   acetaminophen (TYLENOL) 325 MG tablet Take 1 tablet (325 mg) by mouth every 4 hours as needed for mild pain, Disp-60 tablet, R-1, E-Prescribe      traZODone (DESYREL) 50 MG tablet Take 0.5 tablets (25 mg) by mouth At Bedtime, Disp-15 tablet, R-1, E-Prescribe         CONTINUE these medications which have CHANGED    Details   albuterol (VENTOLIN HFA) 108 (90 Base) MCG/ACT Inhaler Inhale 1-2 puffs into the lungs every 4 hours as needed for shortness of breath / dyspnea or wheezing, Disp-1 Inhaler, R-0, E-Prescribe      DULoxetine (CYMBALTA) 60 MG EC capsule Take 1 capsule (60 mg) by mouth daily, Disp-30 capsule, R-1, E-Prescribe      FLUoxetine (PROZAC) 40 MG capsule Take 1 capsule (40 mg) by mouth daily, Disp-30 capsule, R-1, E-Prescribe      fluticasone-salmeterol (ADVAIR DISKUS) 500-50 MCG/DOSE diskus inhaler Inhale 1 puff into the lungs every 12 hours, Disp-1 Inhaler, R-0, E-Prescribe      folic acid (FOLVITE) 1 MG tablet Take 1 tablet (1 mg) by mouth daily, Disp-30 tablet, R-1, E-Prescribe      furosemide (LASIX) 20 MG tablet Take 0.5 tablets (10 mg) by mouth daily, Disp-30 tablet, R-1, E-Prescribe      gabapentin (NEURONTIN) 300 MG capsule Take 1 capsule by mouth in the morning, 1 capsule in the afternoon, and 2 capsules at bedtime., Disp-120 capsule, R-1, E-Prescribe      hydrOXYzine (ATARAX) 25 MG tablet Take 1 tablet (25 mg) by mouth 2 times daily as needed for anxiety or other (adjuvant pain), Disp-60 tablet, R-1, E-Prescribe      ipratropium - albuterol 0.5 mg/2.5 mg/3 mL (DUONEB) 0.5-2.5 (3) MG/3ML neb solution Take 1 vial (3 mLs) by nebulization 4 times daily as needed for wheezing, Disp-360 mL, R-1, E-Prescribe      labetalol (NORMODYNE) 100 MG tablet 100mg in am,  200mg in pm., Disp-90 tablet, R-1, E-Prescribe      losartan (COZAAR) 50 MG tablet Take 1 tablet (50 mg) by mouth daily, Disp-30 tablet, R-1, E-Prescribe      magnesium oxide (MAG-OX) 400 MG tablet Take 1 tablet (400 mg) by mouth daily, Disp-30 tablet, R-1, E-Prescribe      menthol (ICY HOT) 5 % PTCH Apply 1 patch topically every 8 hours as needed for muscle soreness, Disp-30 patch, R-1, E-Prescribe      multivitamin, therapeutic with minerals (THERA-VIT-M) TABS tablet Take 1 tablet by mouth daily, Disp-30 each, R-1, E-Prescribe      nicotine (NICODERM CQ) 21 MG/24HR 24 hr patch Place 1 patch onto the skin daily, Disp-30 patch, R-1, E-Prescribe      nicotine polacrilex (NICORETTE) 2 MG gum Place 1 each (2 mg) inside cheek every hour as needed for smoking cessation, Disp-30 tablet, R-1, E-Prescribe      omeprazole (PRILOSEC) 20 MG CR capsule Take 1 capsule (20 mg) by mouth 2 times daily (before meals), Disp-60 capsule, R-1, E-Prescribe      oyster shell calcium (OS-JENNIFER 500 MG [place]. [place]) 500 MG tablet Take 1 tablet (500 mg) by mouth daily, Disp-30 tablet, R-1, E-Prescribe      potassium chloride SA (K-DUR/KLOR-CON M) 10 MEQ CR tablet Take 1 tablet (10 mEq) by mouth daily, Disp-30 tablet, R-1, E-Prescribe      rOPINIRole (REQUIP) 1 MG tablet Take 1 tablet (1 mg) by mouth 3 times daily, Disp-90 tablet, R-1, E-Prescribe         CONTINUE these medications which have NOT CHANGED    Details   order for DME Equipment being ordered: Cane ()  Treatment Diagnosis: Impaired functional mobilityDisp-1 each, R-0, Local Print      alum & mag hydroxide-simethicone (MYLANTA/MAALOX) 200-200-20 MG/5ML SUSP suspension Take 30 mLs by mouth every 6 hours as needed for indigestion, Historical      guaiFENesin (ROBITUSSIN) 20 mg/mL SOLN solution Take 10 mLs by mouth every 4 hours as needed for cough, Historical      IBUPROFEN PO Take 400 mg by mouth every 6 hours as needed for moderate pain, Historical      loratadine (CLARITIN) 10 MG  "tablet Take 10 mg by mouth daily as needed for allergies, Historical      MELATONIN PO Take 3 mg by mouth nightly as needed, Historical      phenol-menthol (CEPASTAT) 14.5 MG lozenge Place 1 lozenge inside cheek every 2 hours as needed for moderate pain, Historical      senna-docusate (SENOKOT-S;PERICOLACE) 8.6-50 MG per tablet Take 2 tablets by mouth daily as needed for constipation, Historical         STOP taking these medications       Acetaminophen (APAP 500 PO) Comments:   Reason for Stopping:                    Psychiatric Examination:   Appearance:  awake, alert and adequately groomed  Attitude:  cooperative  Eye Contact:  good  Mood:  \"a little nervous but glad to be going to treatment\"  Affect:  appropriate and in normal range and mood congruent  Speech:  clear, coherent  Psychomotor Behavior:  no evidence of tardive dyskinesia, dystonia, or tics  Thought Process:  logical, linear and goal oriented  Associations:  no loose associations  Thought Content:  no evidence of suicidal ideation or homicidal ideation and no evidence of psychotic thought  Insight:  good  Judgment:  intact  Oriented to:  time, person, and place  Attention Span and Concentration:  intact  Recent and Remote Memory:  intact  Language: Able to name objects, Able to repeat phrases and Able to read and write  Fund of Knowledge: appropriate  Muscle Strength and Tone: normal  Gait and Station: Normal         Discharge Plan:   Summary:   You were admitted to [facility] on 7/6/2018 for detoxification from alcohol. You are being discharged on 7/13/2018.  A medical examination was performed that included lab work. You met with a  and opted to begin residential treatment at [facility] on 7/13/2018 - with transportation provided by [facility].   Please take care and make your recovery a daily priority, [place].      Recommendation: Transfer directly to begin residential treatment as stated above.     Main Diagnosis:    Alcohol use disorder " severe  Tobacco use disorder  History of major depressive disorder  Likely sleep disorder  Rule out borderline personality disorder     Major Treatments, Procedures and Findings:  You have withdrawn from alcohol using the appropriate protocols.  You have had blood drawn, and the results have been reviewed with you.  Please take a copy of your lab work with you to your next primary care provider appointment.     Symptoms to Report:  If you experience more anxiety, confusion, sleeplessness, deep sadness or thoughts of suicide, notify your treatment team or notify your primary care physician. IF ANY OF THE SYMPTOMS YOU ARE EXPERIENCING ARE A MEDICAL EMERGENCY CALL 911 IMMEDIATELY.      Treatment Program Provider:  Admission on Friday, July 13, 2018  [place] [address]  Ph: [phone]     Primary Provider:    Dr. Min Toledo  [facility]  August 14, 2018 @ 4:20AM  [phone]     Lifestyle Adjustment & Health Action Plan:  1.   Create a daily schedule  2.   Eat Healthy  3.   Plan Enjoyable Sober Activities  4.   Use Problem Solving Skills and Deal with Issues as they Arise.   5.   Be Physically Active  6.   Take your medications as prescribed  7.   Get enough restful sleep  8.   Practice Relaxation  9.   Spend time with Supportive People  10. No use of alcohol, illegal drugs or addictive medications other than what is currently prescribed.   11. AA, NA Sponsor are excellent resources for support     Disposition:  [facility].     Resources:   National Suicide Prevention Line (www.mentalhealthmn.org): 553-368-NQOO (6157).  Pathways ~ A Health Crisis Resource & Support Center:  820.239.7586.   Support Group:  AA/NA and Sponsor/support.  SMART Recovery - self management for addiction recovery:  www.smartrecovery.org  National [place] on Mental Illness (www.[place].org): 282.903.2849 or 478-339-0246.  Alcoholics Anonymous (www.alcoholics-anonymous.org): Check  your phone book for your local chapter.  [facility]:  716.596.6718 or 1-843.605.1345.  Suicide Awareness Voices of Education (SAVE) (www.save.org): 903-502-SAVE (1090).  Mental Health Consumer/Survivor Network of [place] (www.mhcsn.net): 554.854.6275 or 613-361-9495.  Mental Health Association of [place] (www.mentalhealth.org): 635.353.1788 or 092-961-4106.     Substance Abuse and Mental Health Services (www.samhsa.gov).     [facility] ([facility])  [facility] connects people seeking recovery to resources that help foster and sustain long-term recovery.  Whether you are seeking resources for treatment, transportation, housing, job training, education, health care or other pathways to recovery, [facility] is a great place to start.  698.625.9188. www.[facility].org     General Medication Instructions:   See your medication papers for instructions. Take all medicines as directed.  Make no changes unless your doctor suggests them. Go to all your doctor visits.  Be sure to have all your required lab tests. This way, your medicines can be refilled on time. Do not use any drugs not prescribed by your provider.  AA/NA and Sponsors are excellent resources for support. Avoid alcohol at all costs!     THANK YOU FOR CHOOSING THE [facility]     The treatment team has appreciated the opportunity to work with you.  We wish you the best in the future. Please bring this discharge folder with you to all follow  up appointments - it contains your lab results, diagnosis, medication list and discharge recommendations.     For follow up questions:  Detox Nursing [phone]  Past medical records [phone]  Readmission [phone]            INSTRUCTIONS FOR USE OF SSRI ANTIDEPRESSANTS        DO:    - call clinic if you, or another provider, makes any medication changes  - call clinic if your use of Ibuprofen (or similar) increases significantly  - call clinic if you experience any symptom listed  below        DO NOT:  stop this med abruptly           TRY TO AVOID:  excessive use of ibuprofen or similar pain medication        FOOD: take with or without food         COMMON ADVERSE EFFECTS:  headache, dizziness, nausea, diarrhea, fatigue, sleepiness, sexual problems, weight gain, anxiety, a need to pace or restlessness         CALL CLINIC URGENTLY or EMERGENCY ROOM:  if you have any concerns, especially the following...     - abnormal bleeding  or  easy bruising (susy if increase use of ibuprofen etc)  - significant pacing, restlessness or muscle spasm  - thoughts of death or hurting yourself     - suspect Serotonin Syndrome:   confusion   tremor  severe headache  sweats  fever   funny feeling in muscles  need to pace / restlessness   severe nausea, diarrhea    Attestation:  The patient has been seen and evaluated by me,  Aimee Vizcarra MD

## 2018-07-18 ENCOUNTER — TELEPHONE (OUTPATIENT)
Dept: FAMILY MEDICINE | Facility: CLINIC | Age: 53
End: 2018-07-18

## 2018-07-18 NOTE — TELEPHONE ENCOUNTER
Please comment on form if patient was able to work during these dates:  05/21/18 to 06/01/18 06/11/18 to 06/17/18 06/19/18 to 06/28/18.    Request for OV notes has been sent to copy service, form in Min's in basket.

## 2018-07-19 ENCOUNTER — TELEPHONE (OUTPATIENT)
Dept: FAMILY MEDICINE | Facility: CLINIC | Age: 53
End: 2018-07-19

## 2018-07-19 DIAGNOSIS — Z71.6 ENCOUNTER FOR SMOKING CESSATION COUNSELING: Primary | ICD-10-CM

## 2018-07-19 DIAGNOSIS — Z71.6 ENCOUNTER FOR SMOKING CESSATION COUNSELING: ICD-10-CM

## 2018-07-19 NOTE — TELEPHONE ENCOUNTER
Patient would like at least a 30 day supply of the gum. She is out of town in the hospital in [place] for one month and needs these urgently. She doesn't have access to a phone or computer right now to contact the clinic directly.     She is having someone pick this up from the [facility] and delivering them to her in [place] and needs it by Friday.      Thank You,  Grisel Chadwick, Pharmacy Tech  [facility]

## 2018-07-19 NOTE — TELEPHONE ENCOUNTER
Inga needs a refill on her Nicotrol Inhaler. (Not on med list)  She is out of town in the hospital in [place] for one month and needs these urgently. She doesn't have access to a phone or computer right now to contact the clinic directly.     She is having someone pick this up from the [facility] and delivering them to her in [place] and needs it by Friday.      Thank You,  Grisel Chadwick, Pharmacy Tech  [facility]

## 2018-07-27 ENCOUNTER — TELEPHONE (OUTPATIENT)
Dept: FAMILY MEDICINE | Facility: CLINIC | Age: 53
End: 2018-07-27

## 2018-07-27 NOTE — TELEPHONE ENCOUNTER
Panel Management Review      Patient has the following on her problem list:     Depression / Dysthymia review    Measure:  Needs PHQ-9 score of 4 or less during index window.  Administer PHQ-9 and if score is 5 or more, send encounter to provider for next steps.    5 - 7 month window range: 9/25/17    PHQ-9 SCORE 8/17/2016 3/10/2017 9/25/2017   Total Score - - -   Total Score 15 17 20       If PHQ-9 recheck is 5 or more, route to provider for next steps.    Patient is due for:  PHQ9    Summary:    Patient is due/failing the following:   PHQ9    Action needed:   Patient needs to do PHQ9.    Type of outreach:    Sent Learn with [employer] message.    Questions for provider review:    None                                                                                                                                    Caron Pal MA       Chart routed to Care Team .

## 2018-08-14 ENCOUNTER — OFFICE VISIT (OUTPATIENT)
Dept: FAMILY MEDICINE | Facility: CLINIC | Age: 53
End: 2018-08-14
Payer: COMMERCIAL

## 2018-08-14 VITALS
WEIGHT: 168.2 LBS | SYSTOLIC BLOOD PRESSURE: 146 MMHG | BODY MASS INDEX: 31.78 KG/M2 | HEART RATE: 109 BPM | RESPIRATION RATE: 24 BRPM | DIASTOLIC BLOOD PRESSURE: 86 MMHG | OXYGEN SATURATION: 96 % | TEMPERATURE: 98.2 F

## 2018-08-14 DIAGNOSIS — I10 HYPERTENSION GOAL BP (BLOOD PRESSURE) < 140/90: ICD-10-CM

## 2018-08-14 DIAGNOSIS — F10.239 ALCOHOL DEPENDENCE WITH WITHDRAWAL WITH COMPLICATION (H): Primary | ICD-10-CM

## 2018-08-14 PROCEDURE — 99214 OFFICE O/P EST MOD 30 MIN: CPT | Performed by: PHYSICIAN ASSISTANT

## 2018-08-14 RX ORDER — NALTREXONE HYDROCHLORIDE 50 MG/1
50 TABLET, FILM COATED ORAL DAILY
Qty: 30 TABLET | Refills: 0 | Status: SHIPPED | OUTPATIENT
Start: 2018-08-14 | End: 2018-12-28

## 2018-08-14 NOTE — PROGRESS NOTES
SUBJECTIVE:   Inga Ledesma is a 52 year old female who presents to clinic today for the following health issues:      Forms   Short term disability     PROBLEMS TO ADD ON...  None  -------------------------------------  Drank yesterday for the first time in 35 days.  She was kicked out of CD treatment due to chewing tobacco  No chest pain/sob/palps. No n/v/d/c   Problem list and histories reviewed & adjusted, as indicated.  Additional history: as documented    Patient Active Problem List   Diagnosis     RLS (restless legs syndrome)     GERD (gastroesophageal reflux disease)     Iron deficiency anemia     Hyperlipidemia with target LDL less than 160     Adult BMI 30+     Sacroiliitis (H)     Tobacco abuse     Vitamin D deficiency     Menorrhagia     Advanced directives, counseling/discussion     Vitamin D deficiency     Edema of both legs     Hypertension goal BP (blood pressure) < 140/90     Chronic bronchitis, unspecified chronic bronchitis type (H)     Health Care Home     Alcohol dependence with withdrawal with complication (H)     Major depressive disorder, recurrent episode, mild (H)     (HFpEF) heart failure with preserved ejection fraction (H)     Psychophysiological insomnia     Chemical dependency (H)     Alcohol withdrawal (H)     Alcohol abuse     LALA (acute kidney injury) (H)     Sepsis (H)     Alcohol dependency (H)     Past Surgical History:   Procedure Laterality Date     AS BIOPSY/EXCISION LYMPH NODE OPEN SUPERFICIAL       COLONOSCOPY       EYE SURGERY       HYSTERECTOMY  2010     HYSTERECTOMY TOTAL ABDOMINAL  7/28/10    Bilateral salpingectomy.  ovaries conserved.     LASER TX, CERVICAL  1987     LYMPH NODE BIOPSY  2007    inguinal     LYSIS OF LABIAL LESION(S)  1985, 1987       Social History   Substance Use Topics     Smoking status: Current Every Day Smoker     Packs/day: 0.25     Years: 34.00     Types: Cigarettes     Start date: 11/1/1979     Last attempt to quit: 10/3/2012     Smokeless  tobacco: Never Used      Comment: 5 cigarettes daily     Alcohol use Yes      Comment: 1 pint of vodka per day, last drink 6/17/18     Family History   Problem Relation Age of Onset     Depression/Anxiety Mother      Asthma Mother      Cerebrovascular Disease Father      Hypertension Father      Lung Cancer Father      Alcoholism Father      Breast Cancer Paternal Aunt      Other Cancer Other      Depression Other      Anxiety Disorder Other      Mental Illness Other      Substance Abuse Other      Asthma Other      Obesity Sister      Obesity Son      Suicide Paternal Uncle          Current Outpatient Prescriptions   Medication Sig Dispense Refill     acetaminophen (TYLENOL) 325 MG tablet Take 1 tablet (325 mg) by mouth every 4 hours as needed for mild pain 60 tablet 1     albuterol (VENTOLIN HFA) 108 (90 Base) MCG/ACT Inhaler Inhale 1-2 puffs into the lungs every 4 hours as needed for shortness of breath / dyspnea or wheezing 1 Inhaler 0     DULoxetine (CYMBALTA) 60 MG EC capsule Take 1 capsule (60 mg) by mouth daily 30 capsule 1     FLUoxetine (PROZAC) 40 MG capsule Take 1 capsule (40 mg) by mouth daily 30 capsule 1     fluticasone-salmeterol (ADVAIR DISKUS) 500-50 MCG/DOSE diskus inhaler Inhale 1 puff into the lungs every 12 hours 1 Inhaler 0     folic acid (FOLVITE) 1 MG tablet Take 1 tablet (1 mg) by mouth daily 30 tablet 1     furosemide (LASIX) 20 MG tablet Take 0.5 tablets (10 mg) by mouth daily 30 tablet 1     gabapentin (NEURONTIN) 300 MG capsule Take 1 capsule by mouth in the morning, 1 capsule in the afternoon, and 2 capsules at bedtime. 120 capsule 1     hydrOXYzine (ATARAX) 25 MG tablet Take 1 tablet (25 mg) by mouth 2 times daily as needed for anxiety or other (adjuvant pain) 60 tablet 1     ipratropium - albuterol 0.5 mg/2.5 mg/3 mL (DUONEB) 0.5-2.5 (3) MG/3ML neb solution Take 1 vial (3 mLs) by nebulization 4 times daily as needed for wheezing 360 mL 1     labetalol (NORMODYNE) 100 MG tablet 100mg  in am, 200mg in pm. 90 tablet 1     losartan (COZAAR) 50 MG tablet Take 1 tablet (50 mg) by mouth daily 30 tablet 1     magnesium oxide (MAG-OX) 400 MG tablet Take 1 tablet (400 mg) by mouth daily 30 tablet 1     menthol (ICY HOT) 5 % PTCH Apply 1 patch topically every 8 hours as needed for muscle soreness 30 patch 1     multivitamin, therapeutic with minerals (THERA-VIT-M) TABS tablet Take 1 tablet by mouth daily 30 each 1     nicotine (NICOTROL) 10 MG Inhaler Inhale 6-16 Cartridges into the lungs daily as needed for smoking cessation 180 each 2     nicotine polacrilex (NICORETTE) 2 MG gum Place 1 each (2 mg) inside cheek every hour as needed for smoking cessation 30 tablet 1     omeprazole (PRILOSEC) 20 MG CR capsule Take 1 capsule (20 mg) by mouth 2 times daily (before meals) 60 capsule 1     oyster shell calcium (OS-[place] 500 MG [place]. [place]) 500 MG tablet Take 1 tablet (500 mg) by mouth daily 30 tablet 1     potassium chloride SA (K-DUR/KLOR-CON M) 10 MEQ CR tablet Take 1 tablet (10 mEq) by mouth daily 30 tablet 1     rOPINIRole (REQUIP) 1 MG tablet Take 1 tablet (1 mg) by mouth 3 times daily 90 tablet 1     traZODone (DESYREL) 50 MG tablet Take 0.5 tablets (25 mg) by mouth At Bedtime 15 tablet 1     nicotine (NICODERM CQ) 21 MG/24HR 24 hr patch Place 1 patch onto the skin daily 30 patch 1     nicotine polacrilex (NICORETTE) 2 MG gum PLACE 1 GUM INSIDE CHEEK AS NEEDED FOR SMOKING CESSATION.  MAX OF 24 PIECES PER  each 0     order for DME Equipment being ordered: Cane ()  Treatment Diagnosis: Impaired functional mobility 1 each 0     Allergies   Allergen Reactions     Codeine Nausea     Influenza Vaccines      Other reaction(s): Other - Describe In Comment Field -- patient is sensitive to eggs     Reglan [Metoclopramide Hcl] Other (See Comments)     Body tenses up     Recent Labs   Lab Test  07/11/18   1149  07/08/18   0809  07/07/18   0753   06/18/18   1531  05/31/18   0758   04/06/18   0728   10/15/14    1609   A1C   --    --    --    --    --    --    --    --    --   5.6   LDL   --    --    --    --    --    --    --   125*   --    --    HDL   --    --    --    --    --    --    --   105   --    --    TRIG   --    --    --    --    --    --    --   111   --    --    ALT   --    --   30   --   49  24   < >  19   < >   --    CR  0.81   --   0.73   < >  2.97*  0.67   < >  0.74   < >  0.61   GFRESTIMATED  74   --   83   < >  17*  >90   < >  82   < >  >90  Non  GFR Calc     GFRESTBLACK  90   --   >90   < >  20*  >90   < >  >90   < >  >90   GFR Calc     POTASSIUM  4.5  3.6  2.9*   < >  3.2*  3.3*   < >  3.4   < >  4.3   TSH   --    --    --    --   1.02   --    --   1.31   < >  1.21    < > = values in this interval not displayed.      BP Readings from Last 3 Encounters:   08/14/18 146/86   07/13/18 118/77   06/21/18 136/88    Wt Readings from Last 3 Encounters:   08/14/18 168 lb 3.2 oz (76.3 kg)   06/19/18 161 lb (73 kg)   06/17/18 153 lb (69.4 kg)                  Labs reviewed in EPIC    Reviewed and updated as needed this visit by clinical staff  Tobacco  Allergies  Meds       Reviewed and updated as needed this visit by Provider         All other systems negative except as outline above  OBJECTIVE:  Eye exam - right eye normal lid, conjunctiva, cornea, pupil and fundus, left eye normal lid, conjunctiva, cornea, pupil and fundus.  Thyroid not palpable, not enlarged, no nodules detected.  CHEST:chest clear to IPPA, no tachypnea, retractions or cyanosis and S1, S2 normal, no murmur, no gallop, rate regular.  No edema  The abdomen is soft without tenderness, guarding, mass, rebound or organomegaly. Bowel sounds are normal. No CVA tenderness or inguinal adenopathy noted.  No ascites    Inga was seen today for forms.    Diagnoses and all orders for this visit:    Alcohol dependence with withdrawal with complication (H)  -     ADDICTION MEDICINE REFERRAL  -     naltrexone  (DEPADE;REVIA) 50 MG tablet; Take 1 tablet (50 mg) by mouth daily  -     MENTAL HEALTH REFERRAL  - Adult; Assessments and Testing; Chemical Health Assessment; Other: [facility] [phone]; We will contact you to schedule the appointment or please call with any questions    Hypertension goal BP (blood pressure) < 140/90      Patient will be returned to work as of 8/27/18. She refuses any lab work today. ([phone])

## 2018-08-14 NOTE — MR AVS SNAPSHOT
After Visit Summary   8/14/2018    Inga Ledesma    MRN: [id number]           Patient Information     Date Of Birth          1965        Visit Information        Provider Department      8/14/2018 4:20 PM Min Toledo PA-C [facility]        Today's Diagnoses     Alcohol dependence with withdrawal with complication (H)    -  1    Hypertension goal BP (blood pressure) < 140/90           Follow-ups after your visit        Additional Services     ADDICTION MEDICINE REFERRAL       The Addiction Medicine Service is prepared to provide consultation for and, if necessary, ongoing care for patients with the disease of addiction, ages 16 and up.   Dr Leal  For acute detox, please send your patient to the ER or contact [facility] at [phone].     Common problems that may warrant referral to the Addiction Medicine Service are:  Alcohol use disorder - diagnosis, treatment referral, acute and protracted withdrawal management, and ongoing medication assisted treatment including Antabuse and Naltrexone.  Opioid Use Disorder - medication assisted treatment including Buprenorphine (Suboxone) or extended release Naltrexone (Vivitrol)  Benzodiazepine dependence - extended outpatient detoxification  Many other issues pertaining to addiction, relapse, and recovery    Please contact our scheduling staff at [phone] with any questions.    Referrals to the Addiction Medicine Service assume that the referring provider has discussed the referral with the patient.  Referral will be reviewed and if appropriate, the patient will be contacted to schedule appointment.  If not appropriate, the provider will be contacted with further information.    Please answer the following questions so we can better service your patient:    Drug of choice: alcohol  Do they have a [facility] PCP:  Yes     Please bring the following to your appointment:  >>   List of current medications   >>   Any  relevant history            MENTAL HEALTH REFERRAL  - Adult; Assessments and Testing; Chemical Health Assessment; Other: [facility] [phone]; We will contact you to schedule the appointment or please call with any questions       All scheduling is subject to the client's specific insurance plan & benefits, provider/location availability, and provider clinical specialities.  Please arrive 15 minutes early for your first appointment and bring your completed paperwork.    Please be aware that coverage of these services is subject to the terms and limitations of your health insurance plan.  Call member services at your health plan with any benefit or coverage questions.                            Who to contact     Normal or non-critical lab and imaging results will be communicated to you by [employer], letter or phone within 4 business days after the clinic has received the results. If you do not hear from us within 7 days, please contact the clinic through [employer] or phone. If you have a critical or abnormal lab result, we will notify you by phone as soon as possible.  Submit refill requests through [employer] or call your pharmacy and they will forward the refill request to us. Please allow 3 business days for your refill to be completed.          If you need to speak with a  for additional information , please call: [phone]             Additional Information About Your Visit        [employer] Information     [employer] gives you secure access to your electronic health record. If you see a primary care provider, you can also send messages to your care team and make appointments. If you have questions, please call your primary care clinic.  If you do not have a primary care provider, please call [phone] and they will assist you.        Care EveryWhere ID     This is your Care EveryWhere ID. This could be used by other organizations to access your [facility]  records  [id number]        Your Vitals Were     Pulse Temperature Respirations Last Period Pulse Oximetry BMI (Body Mass Index)    109 98.2  F (36.8  C) (Tympanic) 24 06/02/2010 96% 31.78 kg/m2       Blood Pressure from Last 3 Encounters:   08/14/18 146/86   07/13/18 118/77   06/21/18 136/88    Weight from Last 3 Encounters:   08/14/18 168 lb 3.2 oz (76.3 kg)   06/19/18 161 lb (73 kg)   06/17/18 153 lb (69.4 kg)              We Performed the Following     ADDICTION MEDICINE REFERRAL     MENTAL HEALTH REFERRAL  - Adult; Assessments and Testing; Chemical Health Assessment; Other: [facility] [phone]; We will contact you to schedule the appointment or please call with any questions          Today's Medication Changes          These changes are accurate as of 8/14/18  4:47 PM.  If you have any questions, ask your nurse or doctor.               Start taking these medicines.        Dose/Directions    naltrexone 50 MG tablet   Commonly known as:  DEPADE;REVIA   Used for:  Alcohol dependence with withdrawal with complication (H)   Started by:  Min Toledo PA-C        Dose:  50 mg   Take 1 tablet (50 mg) by mouth daily   Quantity:  30 tablet   Refills:  0            Where to get your medicines      These medications were sent to [facility] [address] [facility] [address] [facility]  [address] [address] [facility] [address] [address]     Phone:  [phone]     naltrexone 50 MG tablet                Primary Care Provider Office Phone # Fax #    Min Toledo PA-C [phone]       [address]  [place] [address]        Goals        General    I will use the walker at all times or a cane in tight spots to prevent further falls. (pt-stated)     Notes - Note created  12/9/2015  8:48 AM by Sonny Jacobs RN    As of today's date 12/9/2015 goal is met at 0 - 25%.   Goal Status:  Active        Medical (pt-stated)     Notes - Note created  6/4/2018 12:28 PM by Rosa Maria  Jasiel RN    I will follow up with my PCP as directed ongoing.         Equal Access to Services     ИРИНА KERNS : Hadii aad ku hadburtgowdin Mata, jenelle urbina, petrawill levinmakelsey yuen. So [facility] [phone].    ATENCIÓN: Si habla español, tiene a cook disposición servicios gratuitos de asistencia lingüística. Llame al [phone].    We comply with applicable federal civil rights laws and [place] laws. We do not discriminate on the basis of race, color, national origin, age, disability, sex, sexual orientation, or gender identity.            Thank you!     Thank you for choosing [facility]  for your care. Our goal is always to provide you with excellent care. Hearing back from our patients is one way we can continue to improve our services. Please take a few minutes to complete the written survey that you may receive in the mail after your visit with us. Thank you!             Your Updated Medication List - Protect others around you: Learn how to safely use, store and throw away your medicines at www.disposemymeds.org.          This list is accurate as of 8/14/18  4:47 PM.  Always use your most recent med list.                   Brand Name Dispense Instructions for use Diagnosis    acetaminophen 325 MG tablet    TYLENOL    60 tablet    Take 1 tablet (325 mg) by mouth every 4 hours as needed for mild pain    Chronic pain syndrome       albuterol 108 (90 Base) MCG/ACT inhaler    VENTOLIN HFA    1 Inhaler    Inhale 1-2 puffs into the lungs every 4 hours as needed for shortness of breath / dyspnea or wheezing    Chronic bronchitis, unspecified chronic bronchitis type (H)       DULoxetine 60 MG EC capsule    CYMBALTA    30 capsule    Take 1 capsule (60 mg) by mouth daily    Recurrent major depressive disorder, remission status unspecified (H)       FLUoxetine 40 MG capsule    PROzac    30 capsule    Take 1 capsule (40 mg) by mouth daily    Major depressive  disorder, recurrent episode, mild (H)       fluticasone-salmeterol 500-50 MCG/DOSE diskus inhaler    ADVAIR DISKUS    1 Inhaler    Inhale 1 puff into the lungs every 12 hours    Chronic bronchitis, unspecified chronic bronchitis type (H)       folic acid 1 MG tablet    FOLVITE    30 tablet    Take 1 tablet (1 mg) by mouth daily    Alcohol dependence with uncomplicated withdrawal (H)       furosemide 20 MG tablet    LASIX    30 tablet    Take 0.5 tablets (10 mg) by mouth daily    Essential hypertension with goal blood pressure less than 140/90       gabapentin 300 MG capsule    NEURONTIN    120 capsule    Take 1 capsule by mouth in the morning, 1 capsule in the afternoon, and 2 capsules at bedtime.    Major depressive disorder, recurrent episode, mild (H), Sacroiliitis (H)       hydrOXYzine 25 MG tablet    ATARAX    60 tablet    Take 1 tablet (25 mg) by mouth 2 times daily as needed for anxiety or other (adjuvant pain)    Sacroiliitis (H)       ipratropium - albuterol 0.5 mg/2.5 mg/3 mL 0.5-2.5 (3) MG/3ML neb solution    DUONEB    360 mL    Take 1 vial (3 mLs) by nebulization 4 times daily as needed for wheezing    Chronic bronchitis, unspecified chronic bronchitis type (H)       labetalol 100 MG tablet    NORMODYNE    90 tablet    100mg in am, 200mg in pm.    Hypertension goal BP (blood pressure) < 140/90       losartan 50 MG tablet    COZAAR    30 tablet    Take 1 tablet (50 mg) by mouth daily    Hypertension goal BP (blood pressure) < 140/90       magnesium oxide 400 MG tablet    MAG-OX    30 tablet    Take 1 tablet (400 mg) by mouth daily    Alcohol dependence with unspecified alcohol-induced disorder (H)       menthol 5 % Ptch    ICY HOT    30 patch    Apply 1 patch topically every 8 hours as needed for muscle soreness    Muscle soreness       multivitamin, therapeutic with minerals Tabs tablet     30 each    Take 1 tablet by mouth daily    Alcohol dependence with uncomplicated withdrawal (H)       naltrexone 50  MG tablet    DEPADE;REVIA    30 tablet    Take 1 tablet (50 mg) by mouth daily    Alcohol dependence with withdrawal with complication (H)       * nicotine 21 MG/24HR 24 hr patch    NICODERM CQ    30 patch    Place 1 patch onto the skin daily    Tobacco abuse       * nicotine 10 MG Inhaler    NICOTROL    180 each    Inhale 6-16 Cartridges into the lungs daily as needed for smoking cessation    Encounter for smoking cessation counseling       * nicotine polacrilex 2 MG gum    NICORETTE    30 tablet    Place 1 each (2 mg) inside cheek every hour as needed for smoking cessation    Tobacco abuse       * nicotine polacrilex 2 MG gum    NICORETTE    100 each    PLACE 1 GUM INSIDE CHEEK AS NEEDED FOR SMOKING CESSATION.  MAX OF 24 PIECES PER DAY    Encounter for smoking cessation counseling       omeprazole 20 MG CR capsule    priLOSEC    60 capsule    Take 1 capsule (20 mg) by mouth 2 times daily (before meals)    Alcoholic gastritis with hemorrhage, unspecified chronicity       order for DME     1 each    Equipment being ordered: Cane () Treatment Diagnosis: Impaired functional mobility    Alcohol abuse, Fall, initial encounter, Dehydration, Alcohol withdrawal syndrome with perceptual disturbance (H)       oyster shell calcium 500 MG tablet    OS-[place] 500 mg [place]. Ca    30 tablet    Take 1 tablet (500 mg) by mouth daily    Alcohol dependence with unspecified alcohol-induced disorder (H)       potassium chloride SA 10 MEQ CR tablet    K-DUR/KLOR-CON M    30 tablet    Take 1 tablet (10 mEq) by mouth daily    Essential hypertension with goal blood pressure less than 140/90       rOPINIRole 1 MG tablet    REQUIP    90 tablet    Take 1 tablet (1 mg) by mouth 3 times daily    RLS (restless legs syndrome)       traZODone 50 MG tablet    DESYREL    15 tablet    Take 0.5 tablets (25 mg) by mouth At Bedtime    Recurrent major depressive disorder, remission status unspecified (H)       * Notice:  This list has 4 medication(s)  that are the same as other medications prescribed for you. Read the directions carefully, and ask your doctor or other care provider to review them with you.

## 2018-08-16 ENCOUNTER — TELEPHONE (OUTPATIENT)
Dept: ADDICTION MEDICINE | Facility: CLINIC | Age: 53
End: 2018-08-16

## 2018-08-16 NOTE — TELEPHONE ENCOUNTER
----- Message from Deangelo Rutherford sent at 8/15/2018  8:32 AM CDT -----  Regarding: Addiction Med Referral   Please review.  Route back to reception pool # [id number].

## 2018-08-16 NOTE — TELEPHONE ENCOUNTER
Writer attempted to reach pt; no answer. LVM requesting a call back for an appt. [place] more attempts will be made.       Deangelo Rutherford

## 2018-08-20 ENCOUNTER — TELEPHONE (OUTPATIENT)
Dept: FAMILY MEDICINE | Facility: CLINIC | Age: 53
End: 2018-08-20

## 2018-08-21 ENCOUNTER — TELEPHONE (OUTPATIENT)
Dept: FAMILY MEDICINE | Facility: CLINIC | Age: 53
End: 2018-08-21
Payer: COMMERCIAL

## 2018-08-21 DIAGNOSIS — I10 ESSENTIAL HYPERTENSION WITH GOAL BLOOD PRESSURE LESS THAN 140/90: ICD-10-CM

## 2018-08-21 DIAGNOSIS — Z71.6 ENCOUNTER FOR SMOKING CESSATION COUNSELING: ICD-10-CM

## 2018-08-21 DIAGNOSIS — F33.0 MAJOR DEPRESSIVE DISORDER, RECURRENT EPISODE, MILD (H): ICD-10-CM

## 2018-08-21 DIAGNOSIS — F33.9 RECURRENT MAJOR DEPRESSIVE DISORDER, REMISSION STATUS UNSPECIFIED (H): ICD-10-CM

## 2018-08-21 RX ORDER — TRAZODONE HYDROCHLORIDE 50 MG/1
50 TABLET, FILM COATED ORAL AT BEDTIME
Qty: 90 TABLET | Refills: 1 | Status: SHIPPED | OUTPATIENT
Start: 2018-08-21 | End: 2019-03-20

## 2018-08-21 RX ORDER — FLUOXETINE 40 MG/1
40 CAPSULE ORAL 2 TIMES DAILY
Qty: 180 CAPSULE | Refills: 1 | Status: SHIPPED | OUTPATIENT
Start: 2018-08-21 | End: 2020-04-20

## 2018-08-21 RX ORDER — FUROSEMIDE 20 MG
20 TABLET ORAL DAILY
Qty: 90 TABLET | Refills: 1 | Status: SHIPPED | OUTPATIENT
Start: 2018-08-21 | End: 2019-10-30

## 2018-08-21 NOTE — TELEPHONE ENCOUNTER
Hi,    The patient called the pharmacy today to request refills and said that the directions on her Furosemide 20 mg and Trazodone 50 mg have changed. She said she met with the doctor last week but I do not see any recent prescriptions for either of these medications on her file. Can you please verify directions and send us new orders if appropriate? Thanks!    Avril Ramirez, [facility]

## 2018-08-21 NOTE — TELEPHONE ENCOUNTER
Second attempt to reach pt; no answer. LVM requesting a call back for an appt. A final attempt will be made.     Deangelo Rutherford

## 2018-08-21 NOTE — TELEPHONE ENCOUNTER
Spoke with pt- current med list as stated in previous note. Pt states her meds were changed in treatment and now that she is discharged from treatment Min is suppose to take over meds. Pt states the dosages were changed by provider in treatment. She states her current dose of FLUoxetine (PROZAC) 40 MG capsule is 1 capsule (40 mg) twice a day, that is an increase from 1 capsule (40 mg) daily . furosemide (LASIX) 20 MG tablet is 1- 20 mg tablet daily, that is an increase from 0.5 tablet (10 mg) daily. traZODone (DESYREL) 50 MG tablet 1 tablet (50 mg) at bedtime, that is an increase from 0.5 tablet (25mg) at bedtime.     Nurse reviewed discharge jimmy from 7/6/18 and note did not state those medication changes. Will send to pcp to review and advise. Meds that are teed up are the current doses, not the pt requested dosages.

## 2018-08-21 NOTE — TELEPHONE ENCOUNTER
Left message on voice mail for patient to call clinic. [phone]  Do not see any medication changes on med list or in provider note. Please ask pt what she think the changes should be.  furosemide (LASIX) 20 MG tablet 30 tablet 1 7/9/2018  No      Sig - Route: Take 0.5 tablets (10 mg) by mouth daily - Oral      traZODone (DESYREL) 50 MG tablet 15 tablet 1 7/9/2018  No     Sig - Route: Take 0.5 tablets (25 mg) by mouth At Bedtime - Oral     FLUoxetine (PROZAC) 40 MG capsule 30 capsule 1 7/9/2018  --      Sig - Route: Take 1 capsule (40 mg) by mouth daily - Oral

## 2018-08-21 NOTE — TELEPHONE ENCOUNTER
"Requested Prescriptions   Pending Prescriptions Disp Refills     nicotine polacrilex (NICORETTE) 2 MG gum [Pharmacy Med Name: NICOTINE POLACRILEX 2MG GUM] 770 each 0    Last Written Prescription Date:  7-20-18  Last Fill Quantity: 770,  # refills: 0   Last office visit: 8/14/2018 with prescribing provider:  8-14-18   Future Office Visit:   Sig: CHEW AND PARK ONE PIECE INSIDE CHEEK AS NEEDED FOR SMOKING CESSATION. MAX OF 24 PIECES PER DAY    Partial Cholinergic Nicotinic Agonist Agents Failed    8/21/2018 10:19 AM       Failed - Blood pressure under 140/90 in past 12 months    BP Readings from Last 3 Encounters:   08/14/18 146/86   06/21/18 136/88   06/17/18 102/64                Passed - Recent (12 mo) or future (30 days) visit within the authorizing provider's specialty    Patient had office visit in the last 12 months or has a visit in the next 30 days with authorizing provider or within the authorizing provider's specialty.  See \"Patient Info\" tab in inbasket, or \"Choose Columns\" in Meds & Orders section of the refill encounter.           Passed - Patient is 18 years of age or older       Passed - Patient is not pregnant       Passed - No positive pregnancy test on file in past 12 months        "

## 2018-08-22 ENCOUNTER — TELEPHONE (OUTPATIENT)
Dept: FAMILY MEDICINE | Facility: CLINIC | Age: 53
End: 2018-08-22

## 2018-08-22 NOTE — TELEPHONE ENCOUNTER
Patient states she needs the form (return to work) also faxed to [employer].  She states Min has the fax number.

## 2018-08-22 NOTE — TELEPHONE ENCOUNTER
Routing refill request to provider for review/approval because:  Labs out of range:  BP    Med pended

## 2018-08-23 ENCOUNTER — TELEPHONE (OUTPATIENT)
Dept: FAMILY MEDICINE | Facility: CLINIC | Age: 53
End: 2018-08-23

## 2018-08-23 ENCOUNTER — PATIENT OUTREACH (OUTPATIENT)
Dept: CARE COORDINATION | Facility: CLINIC | Age: 53
End: 2018-08-23

## 2018-08-23 DIAGNOSIS — J42 CHRONIC BRONCHITIS, UNSPECIFIED CHRONIC BRONCHITIS TYPE (H): ICD-10-CM

## 2018-08-23 NOTE — TELEPHONE ENCOUNTER
Advair Diskus 500-50mcg dose inhaler  Last Written Prescription Date:  07/09/2018  Last Fill Quantity: 1 inhaler,   # refills: 0  Last Office Visit: 03/30/2018  Future Office visit:       Routing refill request to provider for review/approval because:  Drug not on the [facility], [facility] or [facility] refill protocol or controlled substance      Ventolin 108mcg/act inhaler   Last Written Prescription Date:  07/09/18  Last Fill Quantity: 1 inhaler,   # refills: 0  Last Office Visit: 03/30/2018  Future Office visit:       Routing refill request to provider for review/approval because:  Drug not on the [facility], [facility] or [facility] refill protocol or controlled substance    Avril Ramirez CPhT  [facility]

## 2018-08-23 NOTE — TELEPHONE ENCOUNTER
RTW faxed to [employer] @ [phone]. Iona VIERA at [facility] had a copy of the form (her # is [phone].

## 2018-08-23 NOTE — LETTER
Health Care Home - Access Care Plan    About Me  Patient Name:  Inga Ledesma    YOB: 1965  Age:                             53 year old   Karthikeyan MRN:            [id number] Telephone Information:     Home Phone [phone]   Mobile [phone]       Address:    [address]  [place] [address] Email address:  [email or web address]      Emergency Contact(s)  Name Relationship Lgl Grd Work Phone Home Phone Mobile Phone   TAI SANDERSON Son   [phone]              Health Maintenance:   Health Maintenance Due   Topic Date Due     HF ACTION PLAN Q3 YR  1965     HIV SCREEN (SYSTEM ASSIGNED)  08/17/1984     COLON CANCER SCREEN (SYSTEM ASSIGNED)  08/17/2016     ADVANCE DIRECTIVE PLANNING Q5 YRS  05/17/2017     MAMMO SCREEN Q2 YR (SYSTEM ASSIGNED)  09/24/2017     DEPRESSION ACTION PLAN Q1 YR  02/27/2018     PHQ-9 Q6 MONTHS  03/25/2018     COPD ACTION PLAN Q1 YR  04/28/2018     INFLUENZA VACCINE (1) 09/01/2018          My Access Plan  Medical Emergency 911   Questions or concerns during clinic hours Primary Clinic Line, I will call the clinic directly: [facility] [phone]   24 Hour Appointment Line [phone] or  [phone] [facility] [phone]) (toll free)   24 Hour Nurse Line [phone] (toll free)   Questions or concerns outside clinic hours 24 Hour Appointment Line, I will call the after-hours on-call line:   [facility] [phone] or [phone] ([phone]) (toll-free)   Preferred Urgent Care [facility] [phone]   Preferred Hospital [facility]  [phone]   Preferred Pharmacy [facility] [address] [facility] [address] [facility]     Behavioral Health Crisis Line The National Suicide Prevention Lifeline at 1-981.277.9231 or 911     My Care Team Members  Patient Care Team       Relationship Specialty Notifications Start End    Min Toledo PA-C PCP - General Physician Assistant  5/5/17      Phone: [phone] Fax: [phone]         [address] [place] [address]    Jasiel Crane, RN Lead Care Coordinator Primary Care -   6/4/18     Phone: [phone] Fax: [phone]         [address] [place] [address]           My Medical and Care Information  Problem List   Patient Active Problem List   Diagnosis     RLS (restless legs syndrome)     GERD (gastroesophageal reflux disease)     Iron deficiency anemia     Hyperlipidemia with target LDL less than 160     Adult BMI 30+     Sacroiliitis (H)     Tobacco abuse     Vitamin D deficiency     Menorrhagia     Advanced directives, counseling/discussion     Vitamin D deficiency     Edema of both legs     Hypertension goal BP (blood pressure) < 140/90     Chronic bronchitis, unspecified chronic bronchitis type (H)     Health Care Home     Alcohol dependence with withdrawal with complication (H)     Major depressive disorder, recurrent episode, mild (H)     (HFpEF) heart failure with preserved ejection fraction (H)     Psychophysiological insomnia     Chemical dependency (H)     Alcohol withdrawal (H)     Alcohol abuse     LALA (acute kidney injury) (H)     Sepsis (H)     Alcohol dependency (H)     COPD exacerbation (H)      Current Medications and Allergies:  See printed Medication Report

## 2018-08-23 NOTE — TELEPHONE ENCOUNTER
Left message for patient to return call, a number for [facility] where RTW form was faxed? (8/20/18 encounter). Will see if form can be retrieved and then faxed to [employer]-still not in scanning. [employer] fax # [phone].

## 2018-08-23 NOTE — PROGRESS NOTES
Clinic Care Coordination Contact  [facility]/Voicemail    Referral Source: IP Report  Clinical Data: Care Coordinator Outreach  Outreach attempted x 1.  Left message on voicemail with call back information and requested return call. Also reported that RTW letter was faxed.   Plan: Care Coordinator will follow up again.   Cuco Crane RN  Clinic Care Coordinator  [phone] or [phone]

## 2018-08-23 NOTE — LETTER
[place] CARE COORDINATION  [facility]  [address] [facility] [place] [address]  [phone]    September 11, 2018    Inga Ledesma  [address] [place] [address]      Dear Inga,    I am a clinic care coordinator who works with Min Toledo PA-C at [facility]. I recently tried to call and was unable to reach you. I wanted to introduce myself and provide you with my contact information so that you can call me with questions or concerns about your health care. Below is a description of clinic care coordination and how I can further assist you.     The clinic care coordinator is a registered nurse and/or  who understand the health care system. The goal of clinic care coordination is to help you manage your health and improve access to the [facility] system in the most efficient manner. The registered nurse can assist you in meeting your health care goals by providing education, coordinating services, and strengthening the communication among your providers. The  can assist you with financial, behavioral, psychosocial, chemical dependency, counseling, and/or psychiatric resources.    Please feel free to contact me at [phone], [phone], with any questions or concerns. We at [facility] are focused on providing you with the highest-quality healthcare experience possible and that all starts with you.     Sincerely,     Cuco Crane RN  Clinic Care Coordinator    Enclosed: I have enclosed a copy of a 24 Hour Access Plan. This has helpful phone numbers for you to call when needed. Please keep this in an easy to access place to use as needed.

## 2018-08-27 RX ORDER — ALBUTEROL SULFATE 90 UG/1
1-2 AEROSOL, METERED RESPIRATORY (INHALATION) EVERY 4 HOURS PRN
Qty: 1 INHALER | Refills: 0 | Status: SHIPPED | OUTPATIENT
Start: 2018-08-27 | End: 2018-11-23

## 2018-08-27 NOTE — TELEPHONE ENCOUNTER
Third and final attempt to reach pt; no answer. LVM requesting a call back for an appt.   Writer is routing this encounter to referring provider. Please inform pt that we tried to reach her to get an appt scheduled with an addiction medicine provider. Please have pt call us back if she is still interested in being seen at [facility]. [phone]. Writer is closing this encounter, no further action needed.     Thank you,  Deangelo Rutherford      [facility]

## 2018-09-04 ENCOUNTER — APPOINTMENT (OUTPATIENT)
Dept: GENERAL RADIOLOGY | Facility: CLINIC | Age: 53
End: 2018-09-04
Attending: EMERGENCY MEDICINE
Payer: COMMERCIAL

## 2018-09-04 ENCOUNTER — HOSPITAL ENCOUNTER (OUTPATIENT)
Facility: CLINIC | Age: 53
Setting detail: OBSERVATION
Discharge: HOME | End: 2018-09-05
Attending: EMERGENCY MEDICINE | Admitting: EMERGENCY MEDICINE
Payer: COMMERCIAL

## 2018-09-04 DIAGNOSIS — J44.1 COPD EXACERBATION (H): ICD-10-CM

## 2018-09-04 LAB
ANION GAP SERPL CALCULATED.3IONS-SCNC: 10 MMOL/L (ref 3–14)
BASOPHILS # BLD AUTO: 0 10E9/L (ref 0–0.2)
BASOPHILS NFR BLD AUTO: 0.5 %
BUN SERPL-MCNC: 21 MG/DL (ref 7–30)
CALCIUM SERPL-MCNC: 8.2 MG/DL (ref 8.5–10.1)
CHLORIDE SERPL-SCNC: 106 MMOL/L (ref 94–109)
CO2 SERPL-SCNC: 25 MMOL/L (ref 20–32)
CREAT SERPL-MCNC: 0.73 MG/DL (ref 0.52–1.04)
DIFFERENTIAL METHOD BLD: ABNORMAL
EOSINOPHIL # BLD AUTO: 0.1 10E9/L (ref 0–0.7)
EOSINOPHIL NFR BLD AUTO: 2.1 %
ERYTHROCYTE [DISTWIDTH] IN BLOOD BY AUTOMATED COUNT: 15.3 % (ref 10–15)
GFR SERPL CREATININE-BSD FRML MDRD: 83 ML/MIN/1.7M2
GLUCOSE SERPL-MCNC: 89 MG/DL (ref 70–99)
HCT VFR BLD AUTO: 35.3 % (ref 35–47)
HGB BLD-MCNC: 11.3 G/DL (ref 11.7–15.7)
IMM GRANULOCYTES # BLD: 0 10E9/L (ref 0–0.4)
IMM GRANULOCYTES NFR BLD: 0.2 %
INTERPRETATION ECG - MUSE: NORMAL
INTERPRETATION ECG - MUSE: NORMAL
LYMPHOCYTES # BLD AUTO: 0.9 10E9/L (ref 0.8–5.3)
LYMPHOCYTES NFR BLD AUTO: 14.7 %
MCH RBC QN AUTO: 30.1 PG (ref 26.5–33)
MCHC RBC AUTO-ENTMCNC: 32 G/DL (ref 31.5–36.5)
MCV RBC AUTO: 94 FL (ref 78–100)
MONOCYTES # BLD AUTO: 0.3 10E9/L (ref 0–1.3)
MONOCYTES NFR BLD AUTO: 4.5 %
NEUTROPHILS # BLD AUTO: 4.9 10E9/L (ref 1.6–8.3)
NEUTROPHILS NFR BLD AUTO: 78 %
NRBC # BLD AUTO: 0 10*3/UL
NRBC BLD AUTO-RTO: 0 /100
NT-PROBNP SERPL-MCNC: 734 PG/ML (ref 0–900)
PLATELET # BLD AUTO: 366 10E9/L (ref 150–450)
POTASSIUM SERPL-SCNC: 4.2 MMOL/L (ref 3.4–5.3)
RBC # BLD AUTO: 3.76 10E12/L (ref 3.8–5.2)
SODIUM SERPL-SCNC: 141 MMOL/L (ref 133–144)
TROPONIN I SERPL-MCNC: <0.015 UG/L (ref 0–0.04)
TROPONIN I SERPL-MCNC: <0.015 UG/L (ref 0–0.04)
WBC # BLD AUTO: 6.3 10E9/L (ref 4–11)

## 2018-09-04 PROCEDURE — 40000275 ZZH STATISTIC RCP TIME EA 10 MIN: Mod: 76

## 2018-09-04 PROCEDURE — 25000132 ZZH RX MED GY IP 250 OP 250 PS 637: Performed by: EMERGENCY MEDICINE

## 2018-09-04 PROCEDURE — 71046 X-RAY EXAM CHEST 2 VIEWS: CPT

## 2018-09-04 PROCEDURE — 94640 AIRWAY INHALATION TREATMENT: CPT | Performed by: EMERGENCY MEDICINE

## 2018-09-04 PROCEDURE — 40000141 ZZH STATISTIC PERIPHERAL IV START W/O US GUIDANCE

## 2018-09-04 PROCEDURE — 94799 UNLISTED PULMONARY SVC/PX: CPT

## 2018-09-04 PROCEDURE — 96360 HYDRATION IV INFUSION INIT: CPT | Performed by: EMERGENCY MEDICINE

## 2018-09-04 PROCEDURE — 93010 ELECTROCARDIOGRAM REPORT: CPT | Mod: Z6 | Performed by: EMERGENCY MEDICINE

## 2018-09-04 PROCEDURE — 99285 EMERGENCY DEPT VISIT HI MDM: CPT | Mod: 25 | Performed by: EMERGENCY MEDICINE

## 2018-09-04 PROCEDURE — 83880 ASSAY OF NATRIURETIC PEPTIDE: CPT | Performed by: PHYSICIAN ASSISTANT

## 2018-09-04 PROCEDURE — 25000132 ZZH RX MED GY IP 250 OP 250 PS 637: Performed by: PHYSICIAN ASSISTANT

## 2018-09-04 PROCEDURE — 25000125 ZZHC RX 250: Performed by: PHYSICIAN ASSISTANT

## 2018-09-04 PROCEDURE — 94640 AIRWAY INHALATION TREATMENT: CPT | Mod: 76

## 2018-09-04 PROCEDURE — 99219 ZZC INITIAL OBSERVATION CARE,LEVL II: CPT | Mod: Z6 | Performed by: PHYSICIAN ASSISTANT

## 2018-09-04 PROCEDURE — 93005 ELECTROCARDIOGRAM TRACING: CPT | Performed by: EMERGENCY MEDICINE

## 2018-09-04 PROCEDURE — 36416 COLLJ CAPILLARY BLOOD SPEC: CPT | Performed by: PHYSICIAN ASSISTANT

## 2018-09-04 PROCEDURE — 85025 COMPLETE CBC W/AUTO DIFF WBC: CPT | Performed by: EMERGENCY MEDICINE

## 2018-09-04 PROCEDURE — 84484 ASSAY OF TROPONIN QUANT: CPT | Performed by: EMERGENCY MEDICINE

## 2018-09-04 PROCEDURE — 80048 BASIC METABOLIC PNL TOTAL CA: CPT | Performed by: EMERGENCY MEDICINE

## 2018-09-04 PROCEDURE — 25000128 H RX IP 250 OP 636: Performed by: EMERGENCY MEDICINE

## 2018-09-04 PROCEDURE — 84484 ASSAY OF TROPONIN QUANT: CPT | Performed by: PHYSICIAN ASSISTANT

## 2018-09-04 PROCEDURE — 36592 COLLECT BLOOD FROM PICC: CPT | Performed by: PHYSICIAN ASSISTANT

## 2018-09-04 PROCEDURE — 25000125 ZZHC RX 250: Performed by: EMERGENCY MEDICINE

## 2018-09-04 PROCEDURE — G0378 HOSPITAL OBSERVATION PER HR: HCPCS

## 2018-09-04 PROCEDURE — 94640 AIRWAY INHALATION TREATMENT: CPT

## 2018-09-04 RX ORDER — LOSARTAN POTASSIUM 50 MG/1
50 TABLET ORAL ONCE
Status: COMPLETED | OUTPATIENT
Start: 2018-09-04 | End: 2018-09-04

## 2018-09-04 RX ORDER — FUROSEMIDE 20 MG
20 TABLET ORAL DAILY
Status: DISCONTINUED | OUTPATIENT
Start: 2018-09-04 | End: 2018-09-05 | Stop reason: HOSPADM

## 2018-09-04 RX ORDER — NALOXONE HYDROCHLORIDE 0.4 MG/ML
.1-.4 INJECTION, SOLUTION INTRAMUSCULAR; INTRAVENOUS; SUBCUTANEOUS
Status: DISCONTINUED | OUTPATIENT
Start: 2018-09-04 | End: 2018-09-05 | Stop reason: HOSPADM

## 2018-09-04 RX ORDER — LORAZEPAM 1 MG/1
1 TABLET ORAL ONCE
Status: COMPLETED | OUTPATIENT
Start: 2018-09-04 | End: 2018-09-04

## 2018-09-04 RX ORDER — GABAPENTIN 300 MG/1
300 CAPSULE ORAL EVERY MORNING
Status: DISCONTINUED | OUTPATIENT
Start: 2018-09-05 | End: 2018-09-05 | Stop reason: HOSPADM

## 2018-09-04 RX ORDER — ACETAMINOPHEN 325 MG/1
650 TABLET ORAL ONCE
Status: COMPLETED | OUTPATIENT
Start: 2018-09-04 | End: 2018-09-04

## 2018-09-04 RX ORDER — LOSARTAN POTASSIUM 50 MG/1
50 TABLET ORAL DAILY
Status: DISCONTINUED | OUTPATIENT
Start: 2018-09-04 | End: 2018-09-04

## 2018-09-04 RX ORDER — TRAZODONE HYDROCHLORIDE 50 MG/1
50 TABLET, FILM COATED ORAL AT BEDTIME
Status: DISCONTINUED | OUTPATIENT
Start: 2018-09-04 | End: 2018-09-05 | Stop reason: HOSPADM

## 2018-09-04 RX ORDER — SODIUM CHLORIDE 9 MG/ML
1000 INJECTION, SOLUTION INTRAVENOUS CONTINUOUS
Status: DISCONTINUED | OUTPATIENT
Start: 2018-09-04 | End: 2018-09-05 | Stop reason: HOSPADM

## 2018-09-04 RX ORDER — ALBUTEROL SULFATE 0.83 MG/ML
3 SOLUTION RESPIRATORY (INHALATION)
Status: DISCONTINUED | OUTPATIENT
Start: 2018-09-04 | End: 2018-09-05 | Stop reason: HOSPADM

## 2018-09-04 RX ORDER — DULOXETIN HYDROCHLORIDE 60 MG/1
60 CAPSULE, DELAYED RELEASE ORAL DAILY
Status: DISCONTINUED | OUTPATIENT
Start: 2018-09-04 | End: 2018-09-05 | Stop reason: HOSPADM

## 2018-09-04 RX ORDER — IPRATROPIUM BROMIDE AND ALBUTEROL SULFATE 2.5; .5 MG/3ML; MG/3ML
3 SOLUTION RESPIRATORY (INHALATION) ONCE
Status: COMPLETED | OUTPATIENT
Start: 2018-09-04 | End: 2018-09-04

## 2018-09-04 RX ORDER — HYDROXYZINE HYDROCHLORIDE 25 MG/1
25 TABLET, FILM COATED ORAL 2 TIMES DAILY PRN
Status: DISCONTINUED | OUTPATIENT
Start: 2018-09-04 | End: 2018-09-05 | Stop reason: HOSPADM

## 2018-09-04 RX ORDER — ROPINIROLE 1 MG/1
1 TABLET, FILM COATED ORAL 3 TIMES DAILY
Status: DISCONTINUED | OUTPATIENT
Start: 2018-09-04 | End: 2018-09-05 | Stop reason: HOSPADM

## 2018-09-04 RX ORDER — ARFORMOTEROL TARTRATE 15 UG/2ML
15 SOLUTION RESPIRATORY (INHALATION) 2 TIMES DAILY
Status: DISCONTINUED | OUTPATIENT
Start: 2018-09-04 | End: 2018-09-05 | Stop reason: HOSPADM

## 2018-09-04 RX ORDER — GABAPENTIN 300 MG/1
600 CAPSULE ORAL AT BEDTIME
Status: DISCONTINUED | OUTPATIENT
Start: 2018-09-04 | End: 2018-09-05 | Stop reason: HOSPADM

## 2018-09-04 RX ORDER — LABETALOL 100 MG/1
100 TABLET, FILM COATED ORAL EVERY MORNING
Status: DISCONTINUED | OUTPATIENT
Start: 2018-09-05 | End: 2018-09-05 | Stop reason: HOSPADM

## 2018-09-04 RX ORDER — GABAPENTIN 300 MG/1
300 CAPSULE ORAL
Status: DISCONTINUED | OUTPATIENT
Start: 2018-09-05 | End: 2018-09-05 | Stop reason: HOSPADM

## 2018-09-04 RX ORDER — ONDANSETRON 2 MG/ML
4 INJECTION INTRAMUSCULAR; INTRAVENOUS EVERY 6 HOURS PRN
Status: DISCONTINUED | OUTPATIENT
Start: 2018-09-04 | End: 2018-09-05 | Stop reason: HOSPADM

## 2018-09-04 RX ORDER — PREDNISONE 20 MG/1
40 TABLET ORAL DAILY
Status: DISCONTINUED | OUTPATIENT
Start: 2018-09-05 | End: 2018-09-05 | Stop reason: HOSPADM

## 2018-09-04 RX ORDER — MAGNESIUM OXIDE 400 MG/1
400 TABLET ORAL DAILY
Status: DISCONTINUED | OUTPATIENT
Start: 2018-09-04 | End: 2018-09-05 | Stop reason: HOSPADM

## 2018-09-04 RX ORDER — ACETAMINOPHEN 325 MG/1
650 TABLET ORAL EVERY 4 HOURS PRN
Status: DISCONTINUED | OUTPATIENT
Start: 2018-09-04 | End: 2018-09-05 | Stop reason: HOSPADM

## 2018-09-04 RX ORDER — IBUPROFEN 600 MG/1
600 TABLET, FILM COATED ORAL ONCE
Status: COMPLETED | OUTPATIENT
Start: 2018-09-04 | End: 2018-09-04

## 2018-09-04 RX ORDER — BUDESONIDE 0.5 MG/2ML
0.5 INHALANT ORAL 2 TIMES DAILY
Status: DISCONTINUED | OUTPATIENT
Start: 2018-09-04 | End: 2018-09-05 | Stop reason: HOSPADM

## 2018-09-04 RX ORDER — LOSARTAN POTASSIUM 50 MG/1
50 TABLET ORAL DAILY
Status: DISCONTINUED | OUTPATIENT
Start: 2018-09-05 | End: 2018-09-05 | Stop reason: HOSPADM

## 2018-09-04 RX ORDER — NICOTINE 21 MG/24HR
1 PATCH, TRANSDERMAL 24 HOURS TRANSDERMAL DAILY
Status: DISCONTINUED | OUTPATIENT
Start: 2018-09-04 | End: 2018-09-05 | Stop reason: HOSPADM

## 2018-09-04 RX ORDER — ALBUTEROL SULFATE 0.83 MG/ML
2.5 SOLUTION RESPIRATORY (INHALATION) ONCE
Status: COMPLETED | OUTPATIENT
Start: 2018-09-04 | End: 2018-09-04

## 2018-09-04 RX ORDER — ONDANSETRON 4 MG/1
4 TABLET, ORALLY DISINTEGRATING ORAL EVERY 6 HOURS PRN
Status: DISCONTINUED | OUTPATIENT
Start: 2018-09-04 | End: 2018-09-05 | Stop reason: HOSPADM

## 2018-09-04 RX ORDER — POTASSIUM CHLORIDE 750 MG/1
10 TABLET, EXTENDED RELEASE ORAL DAILY
Status: DISCONTINUED | OUTPATIENT
Start: 2018-09-04 | End: 2018-09-05 | Stop reason: HOSPADM

## 2018-09-04 RX ORDER — PREDNISONE 20 MG/1
60 TABLET ORAL ONCE
Status: COMPLETED | OUTPATIENT
Start: 2018-09-04 | End: 2018-09-04

## 2018-09-04 RX ORDER — LABETALOL 200 MG/1
200 TABLET, FILM COATED ORAL EVERY EVENING
Status: DISCONTINUED | OUTPATIENT
Start: 2018-09-04 | End: 2018-09-05 | Stop reason: HOSPADM

## 2018-09-04 RX ORDER — NALTREXONE HYDROCHLORIDE 50 MG/1
50 TABLET, FILM COATED ORAL DAILY
Status: DISCONTINUED | OUTPATIENT
Start: 2018-09-04 | End: 2018-09-05 | Stop reason: HOSPADM

## 2018-09-04 RX ORDER — IPRATROPIUM BROMIDE AND ALBUTEROL SULFATE 2.5; .5 MG/3ML; MG/3ML
3 SOLUTION RESPIRATORY (INHALATION) EVERY 4 HOURS
Status: DISCONTINUED | OUTPATIENT
Start: 2018-09-04 | End: 2018-09-05 | Stop reason: HOSPADM

## 2018-09-04 RX ADMIN — ONDANSETRON 4 MG: 4 TABLET, ORALLY DISINTEGRATING ORAL at 20:54

## 2018-09-04 RX ADMIN — FUROSEMIDE 20 MG: 20 TABLET ORAL at 18:16

## 2018-09-04 RX ADMIN — ROPINIROLE HYDROCHLORIDE 1 MG: 1 TABLET, FILM COATED ORAL at 18:36

## 2018-09-04 RX ADMIN — PREDNISONE 60 MG: 20 TABLET ORAL at 06:22

## 2018-09-04 RX ADMIN — NICOTINE 1 PATCH: 21 PATCH TRANSDERMAL at 18:35

## 2018-09-04 RX ADMIN — IPRATROPIUM BROMIDE AND ALBUTEROL SULFATE 3 ML: .5; 3 SOLUTION RESPIRATORY (INHALATION) at 08:06

## 2018-09-04 RX ADMIN — ACETAMINOPHEN 650 MG: 325 TABLET, FILM COATED ORAL at 06:52

## 2018-09-04 RX ADMIN — IPRATROPIUM BROMIDE AND ALBUTEROL SULFATE 3 ML: .5; 3 SOLUTION RESPIRATORY (INHALATION) at 06:01

## 2018-09-04 RX ADMIN — ARFORMOTEROL TARTRATE 15 MCG: 15 SOLUTION RESPIRATORY (INHALATION) at 21:17

## 2018-09-04 RX ADMIN — IBUPROFEN 600 MG: 600 TABLET ORAL at 08:20

## 2018-09-04 RX ADMIN — DULOXETINE HYDROCHLORIDE 60 MG: 60 CAPSULE, DELAYED RELEASE ORAL at 21:13

## 2018-09-04 RX ADMIN — MAGNESIUM OXIDE TAB 400 MG (241.3 MG ELEMENTAL MG) 400 MG: 400 (241.3 MG) TAB at 18:15

## 2018-09-04 RX ADMIN — LABETALOL HYDROCHLORIDE 200 MG: 200 TABLET, FILM COATED ORAL at 21:13

## 2018-09-04 RX ADMIN — SODIUM CHLORIDE 1000 ML: 9 INJECTION, SOLUTION INTRAVENOUS at 09:20

## 2018-09-04 RX ADMIN — FLUOXETINE 40 MG: 20 CAPSULE ORAL at 21:13

## 2018-09-04 RX ADMIN — IPRATROPIUM BROMIDE AND ALBUTEROL SULFATE 3 ML: .5; 3 SOLUTION RESPIRATORY (INHALATION) at 21:17

## 2018-09-04 RX ADMIN — TRAZODONE HYDROCHLORIDE 50 MG: 50 TABLET ORAL at 21:13

## 2018-09-04 RX ADMIN — IPRATROPIUM BROMIDE AND ALBUTEROL SULFATE 3 ML: .5; 3 SOLUTION RESPIRATORY (INHALATION) at 18:00

## 2018-09-04 RX ADMIN — ACETAMINOPHEN 650 MG: 325 TABLET, FILM COATED ORAL at 22:44

## 2018-09-04 RX ADMIN — GABAPENTIN 600 MG: 300 CAPSULE ORAL at 21:12

## 2018-09-04 RX ADMIN — SODIUM CHLORIDE 1000 ML: 9 INJECTION, SOLUTION INTRAVENOUS at 10:59

## 2018-09-04 RX ADMIN — ALBUTEROL SULFATE 2.5 MG: 2.5 SOLUTION RESPIRATORY (INHALATION) at 07:28

## 2018-09-04 RX ADMIN — OMEPRAZOLE 20 MG: 20 CAPSULE, DELAYED RELEASE ORAL at 18:16

## 2018-09-04 RX ADMIN — SODIUM CHLORIDE 1000 ML: 9 INJECTION, SOLUTION INTRAVENOUS at 17:25

## 2018-09-04 RX ADMIN — POTASSIUM CHLORIDE 10 MEQ: 750 TABLET, EXTENDED RELEASE ORAL at 18:16

## 2018-09-04 RX ADMIN — BUDESONIDE 0.5 MG: 0.5 INHALANT RESPIRATORY (INHALATION) at 21:17

## 2018-09-04 RX ADMIN — NALTREXONE HYDROCHLORIDE 50 MG: 50 TABLET, FILM COATED ORAL at 18:36

## 2018-09-04 RX ADMIN — ACETAMINOPHEN 650 MG: 325 TABLET, FILM COATED ORAL at 18:15

## 2018-09-04 RX ADMIN — LOSARTAN POTASSIUM 50 MG: 50 TABLET ORAL at 08:39

## 2018-09-04 RX ADMIN — LORAZEPAM 1 MG: 1 TABLET ORAL at 09:20

## 2018-09-04 RX ADMIN — HYDROXYZINE HYDROCHLORIDE 25 MG: 25 TABLET ORAL at 23:52

## 2018-09-04 ASSESSMENT — ENCOUNTER SYMPTOMS
ABDOMINAL PAIN: 0
FEVER: 0
SHORTNESS OF BREATH: 1
COUGH: 1

## 2018-09-04 ASSESSMENT — PAIN DESCRIPTION - DESCRIPTORS: DESCRIPTORS: HEADACHE

## 2018-09-04 NOTE — PLAN OF CARE
Problem: Patient Care Overview  Goal: Plan of Care/Patient Progress Review  Outpatient/Observation goals to be met before discharge home:     Observation goals PRIOR TO DISCHARGE     Comments: -diagnostic tests and consults completed and resulted no  -vital signs normal or at patient baseline no bp elevated.  Instructed to recheck after kvng labatelol  -dyspnea improved and O2 sats greater than 88% on room air or prior home oxygen levels no  -returns to baseline functional status  yes  -safe disposition plan has been identified yes  Nurse to notify provider when observation goals have been met and patient is ready for discharge.

## 2018-09-04 NOTE — ED PROVIDER NOTES
Brief progress note: Patient is continuing to have difficulty breathing however slightly improved.  Patient still is wheezing on examination.  She has normal oxygen saturation on room air but has been on 2 L nasal cannula for comfort.  Discussed with the patient, believe this patient may benefit from brief observation, she agrees that she feels worse than usual.  She has had several nebulizers without significant relief.  I do not believe antibiotics will be useful at this time as her cough is chronic and no other infectious symptoms.  Patient will be admitted to observation for further care.     Cooper Rogers MD  09/04/18 4744

## 2018-09-04 NOTE — IP AVS SNAPSHOT
Unit [address] Observation [address] [address]    Phone:  [phone]    Fax:  [phone]                                       After Visit Summary   9/4/2018    Inga Ledesma    MRN: [id number]           After Visit Summary Signature Page     I have received my discharge instructions, and my questions have been answered. I have discussed any challenges I see with this plan with the nurse or doctor.    ..........................................................................................................................................  Patient/Patient Representative Signature      ..........................................................................................................................................  Patient Representative Print Name and Relationship to Patient    ..................................................               ................................................  Date                                            Time    ..........................................................................................................................................  Reviewed by Signature/Title    ...................................................              ..............................................  Date                                                            Time          22EPIC Rev 08/18

## 2018-09-04 NOTE — ED PROVIDER NOTES
History     Chief Complaint   Patient presents with     COPD     cough ex WZ     HPI  Inga Ledesma is a 53 year old female history of COPD who presents with increased shortness of breath over 1 week.  She admits to ongoing smoking.  She states for 1 week she has had slowly progressing increased difficulty breathing.  She does have chronic cough with chronic slight yellow sputum production, this is unchanged.  No fever.  No chest discomfort.  No abdominal pain nausea or vomiting, other than occasional posttussive emesis, which is chronic and unchanged.  No complaint of new lower extremity symptoms.  She states she had a leftover prednisone from long ago, took that on Friday, though did not notice any definite improvement.  She admits it has been several years since she has needed prednisone.  She does have nebs at home.  The patient is an alcoholic, though states she has not had a drink since mid August.    Past Medical History:   Diagnosis Date     Alcohol abuse, in remission      Alcohol withdrawal seizure (H) 2016     Cancer of labia majora (H) 1987     Cervical dysplasia 1987     Congestive heart failure (H) 5/16/16     COPD (chronic obstructive pulmonary disease) (H) 1/24/2011     CVA (cerebral infarction) 1/2012     Dependent edema      Depression, major      Depressive disorder 1966     GERD (gastroesophageal reflux disease)      Hyperlipidemia LDL goal < 160      Hypertension      Iron deficiency anemia      RLS (restless legs syndrome)      Sacroiliitis (H)     steroid injections ineffective, chronic low back pain     Tobacco abuse      Uncomplicated asthma      Vitamin D deficiencies        Past Surgical History:   Procedure Laterality Date     AS BIOPSY/EXCISION LYMPH NODE OPEN SUPERFICIAL       COLONOSCOPY       EYE SURGERY       HYSTERECTOMY  2010     HYSTERECTOMY TOTAL ABDOMINAL  7/28/10    Bilateral salpingectomy.  ovaries conserved.     LASER TX, CERVICAL  1987     LYMPH NODE BIOPSY  2007     "inguinal     LYSIS OF LABIAL LESION(S)  1985, 1987       Family History   Problem Relation Age of Onset     Depression/Anxiety Mother      Asthma Mother      Cerebrovascular Disease Father      Hypertension Father      Lung Cancer Father      Alcoholism Father      Breast Cancer Paternal Aunt      Other Cancer Other      Depression Other      Anxiety Disorder Other      Mental Illness Other      Substance Abuse Other      Asthma Other      Obesity Sister      Obesity Son      Suicide Paternal Uncle        Social History   Substance Use Topics     Smoking status: Current Every Day Smoker     Packs/day: 0.25     Years: 34.00     Types: Cigarettes     Start date: 11/1/1979     Last attempt to quit: 10/3/2012     Smokeless tobacco: Never Used      Comment: 5 cigarettes daily     Alcohol use No      Comment: 1 pint of vodka per day, last drink aug 2018         I have reviewed the Medications, Allergies, Past Medical and Surgical History, and Social History in the Epic system.    Review of Systems   Constitutional: Negative for fever.   Respiratory: Positive for cough and shortness of breath.    Cardiovascular: Negative for chest pain.   Gastrointestinal: Negative for abdominal pain.   All other systems reviewed and are negative.      Physical Exam   BP: 155/90  Pulse: 104  Heart Rate: 100  Temp: 98.4  F (36.9  C)  Resp: 24  Height: 154.9 cm (5' 1\")  SpO2: 91 %      Physical Exam   Constitutional: No distress.   HENT:   Head: Atraumatic.   Mouth/Throat: Oropharynx is clear and moist. No oropharyngeal exudate.   Eyes: Pupils are equal, round, and reactive to light. No scleral icterus.   Cardiovascular: Normal heart sounds and intact distal pulses.    Pulmonary/Chest: No respiratory distress. She has wheezes (Diffuse expiratory wheezes with generally poor airflow.).   Abdominal: Soft. There is no tenderness.   Musculoskeletal: She exhibits no edema or tenderness.   Skin: Skin is warm. No rash noted. She is not diaphoretic. "       ED Course     ED Course     Procedures             Critical Care time:  none             Labs Ordered and Resulted from Time of ED Arrival Up to the Time of Departure from the ED   CBC WITH PLATELETS DIFFERENTIAL - Abnormal; Notable for the following:        Result Value    RBC Count 3.76 (*)     Hemoglobin 11.3 (*)     RDW 15.3 (*)     All other components within normal limits   BASIC METABOLIC PANEL - Abnormal; Notable for the following:     Calcium 8.2 (*)     All other components within normal limits   TROPONIN I            Assessments & Plan (with Medical Decision Making)   The patient was quite tight, with diffuse expiratory wheeze.  Initial O2 sats were on the low side at 91% on room air.  Chest x-ray was done, appears unremarkable to my eye, though official read is pending.  Basic labs are not overly remarkable.  She was given a dose of prednisone for likely COPD exacerbation.  She was given DuoNeb, which we will give additional labs.  She did develop chest pain while here, and we are attempting to get EKG right now I will add on a troponin.  The patient be signed out this time to the oncoming provider change.    Dictation Disclaimer: Some of this Note has been completed with voice-recognition dictation software. Although errors are generally corrected real-time, there is the potential for a rare error to be present in the completed chart.      I have reviewed the nursing notes.    I have reviewed the findings, diagnosis, plan and need for follow up with the patient.    Discharge Medication List as of 9/5/2018 12:16 PM      START taking these medications    Details   predniSONE (DELTASONE) 20 MG tablet Take 2 tablets (40 mg) by mouth daily for 5 days, Disp-10 tablet, R-0, Local Print             Final diagnoses:   COPD exacerbation (H)       9/4/2018   [facility], EMERGENCY DEPARTMENT

## 2018-09-04 NOTE — LETTER
September 5, 2018      Inga CASTELLANOS Ledesma  [address] [place] [address]        To Whom It May Concern:    Inga Ledesma  was in the hospital 9/4-9/5/2018 for illness.  Please excuse her from work.         Sincerely,      Harithamichael Henley PA-C

## 2018-09-04 NOTE — ED NOTES
[facility], [place]   ED Nurse to Floor Handoff     Inga Ledesma is a 53 year old female who speaks English and lives unknown,  in a home  They arrived in the ED by ambulance from home    ED Chief Complaint: COPD (cough ex WZ)    ED Dx;   Final diagnoses:   COPD exacerbation (H)         Needed?: No    Allergies:   Allergies   Allergen Reactions     Codeine Nausea     Influenza Vaccines      Other reaction(s): Other - Describe In Comment Field -- patient is sensitive to eggs     Reglan [Metoclopramide Hcl] Other (See Comments)     Body tenses up   .  Past Medical Hx:   Past Medical History:   Diagnosis Date     Alcohol abuse, in remission      Alcohol withdrawal seizure (H) 2016     Cancer of labia majora (H) 1987     Cervical dysplasia 1987     Congestive heart failure (H) 5/16/16     COPD (chronic obstructive pulmonary disease) (H) 1/24/2011     CVA (cerebral infarction) 1/2012     Dependent edema      Depression, major      Depressive disorder 1966     GERD (gastroesophageal reflux disease)      Hyperlipidemia LDL goal < 160      Hypertension      Iron deficiency anemia      RLS (restless legs syndrome)      Sacroiliitis (H)     steroid injections ineffective, chronic low back pain     Tobacco abuse      Uncomplicated asthma      Vitamin D deficiencies       Baseline Mental status: WDL  Current Mental Status changes: at basesline    Infection present or suspected this encounter: no  Sepsis suspected: No  Isolation type: No active isolations     Activity level - Baseline/Home:  Independent  Activity Level - Current:   Independent    Bariatric equipment needed?: No    In the ED these meds were given:   Medications   0.9% sodium chloride BOLUS (1,000 mLs Intravenous New Bag 9/4/18 0920)     Followed by   sodium chloride 0.9% infusion (not administered)   ipratropium - albuterol 0.5 mg/2.5 mg/3 mL (DUONEB) neb solution 3 mL (3 mLs Nebulization Given 9/4/18 0601)   predniSONE  "(DELTASONE) tablet 60 mg (60 mg Oral Given 9/4/18 0622)   acetaminophen (TYLENOL) tablet 650 mg (650 mg Oral Given 9/4/18 0652)   albuterol neb solution 2.5 mg (2.5 mg Nebulization Given 9/4/18 0728)   ipratropium - albuterol 0.5 mg/2.5 mg/3 mL (DUONEB) neb solution 3 mL (3 mLs Nebulization Given 9/4/18 0806)   losartan (COZAAR) tablet 50 mg (50 mg Oral Given 9/4/18 0839)   ibuprofen (ADVIL/MOTRIN) tablet 600 mg (600 mg Oral Given 9/4/18 0820)   LORazepam (ATIVAN) tablet 1 mg (1 mg Oral Given 9/4/18 0920)       Drips running?  Yes    Home pump  No    Current LDAs  Peripheral IV 09/04/18 Left Lower forearm (Active)   Number of days:0       Labs results:   Labs Ordered and Resulted from Time of ED Arrival Up to the Time of Departure from the ED   CBC WITH PLATELETS DIFFERENTIAL - Abnormal; Notable for the following:        Result Value    RBC Count 3.76 (*)     Hemoglobin 11.3 (*)     RDW 15.3 (*)     All other components within normal limits   BASIC METABOLIC PANEL - Abnormal; Notable for the following:     Calcium 8.2 (*)     All other components within normal limits   TROPONIN I       Imaging Studies:   Recent Results (from the past 24 hour(s))   Chest XR,  PA & LAT    Narrative    Exam: XR CHEST 2 VW, 9/4/2018 6:50 AM    Indication: cough, SOB, COPD;     Comparison: Radiograph of the chest 6/19/2018    Findings:   PA and lateral views of the chest. The heart size is within normal  limits. Pulmonary vasculature is distinct. Mild bibasilar and  perihilar opacities. No pneumothorax or pleural effusion. The upper  abdomen is unremarkable. Mild wedging of midthoracic vertebral bodies.      Impression    Impression: No acute airspace opacity.    I have personally reviewed the examination and initial interpretation  and I agree with the findings.    RANDEE WOODY MD       Recent vital signs:   BP (!) 171/100  Pulse 105  Temp 98.4  F (36.9  C) (Oral)  Resp 9  Ht 1.549 m (5' 1\")  LMP 06/02/2010  SpO2 " 100%    Cardiac Rhythm: Tachycardia  Pt needs tele? No  Skin/wound Issues: None    Code Status: Full Code    Pain control: fair    Nausea control: pt had none    Abnormal labs/tests/findings requiring intervention: none    Family present during ED course? No   Family Comments/Social Situation comments: none    Tasks needing completion: None    Anna Brunner, JOBY  [facility]-- [phone] [facility]

## 2018-09-04 NOTE — IP AVS SNAPSHOT
MRN:[id number]                      After Visit Summary   9/4/2018    Inga Ledesma    MRN: [id number]           Thank you!     Thank you for choosing [facility] for your care. Our goal is always to provide you with excellent care. Hearing back from our patients is one way we can continue to improve our services. Please take a few minutes to complete the written survey that you may receive in the mail after you visit with us. Thank you!        Patient Information     Date Of Birth          1965        Designated Caregiver       Most Recent Value    Caregiver    Will someone help with your care after discharge? no      About your hospital stay     You were admitted on:  September 4, 2018 You last received care in the:  Unit 6D Observation [facility] [place]    You were discharged on:  September 5, 2018        Reason for your hospital stay       COPD exacerbation                  Who to Call     For medical emergencies, please call 911.  For non-urgent questions about your medical care, please call your primary care provider or clinic, [phone]          Attending Provider     Provider Specialty    Mahi Underwood MD Emergency Medicine    Ken, Cooper Aguiar MD Emergency Medicine    Kofi, Nacho Juan MD Emergency Medicine       Primary Care Provider Office Phone # Fax #    Min Toledo PA-C [phone]       When to contact your care team       Return to the ED with fever, chest pain, shortness of breath, worsening productive cough, or concerning symptoms.                  After Care Instructions     Activity       Your activity upon discharge: activity as tolerated            Diet       Follow this diet upon discharge: Regular            Discharge Instructions       You were admitted for COPD exacerbation. Your chest xray was negative for acute findings. Your blood was unremarkable. You were treated with breathing and steroids. Your symptoms have improved  "since admission. You were seen by the chronic pulmonary team and they recommend to continue with current COPD home regiment. Please take your home medications as before. We discussed smoking cessation. You will be discharged with Prednisone prescription for 5 days. Continue with nicotine patches and gums as before. Follow up with primary in 1 week.                  Follow-up Appointments     Follow Up and recommended labs and tests       Follow up with primary in 1 week                  Pending Results     No orders found for last 3 day(s).            Statement of Approval     Ordered          09/05/18 1215  I have reviewed and agree with all the recommendations and orders detailed in this document.  EFFECTIVE NOW     Approved and electronically signed by:  Haritha Henley PA-C             Admission Information     Date & Time Provider Department Dept. Phone    9/4/2018 Nacho Kirby MD Unit 6D Observation [facility] [place] [phone]      Your Vitals Were     Blood Pressure Pulse Temperature Respirations Height Last Period    175/89 (BP Location: Left arm) 98 98.6  F (37  C) (Oral) 16 1.549 m (5' 1\") 06/02/2010    Pulse Oximetry                   98%           MyChart Information     [facility] gives you secure access to your electronic health record. If you see a primary care provider, you can also send messages to your care team and make appointments. If you have questions, please call your primary care clinic.  If you do not have a primary care provider, please call [phone] and they will assist you.        Care EveryWhere ID     This is your Care EveryWhere ID. This could be used by other organizations to access your [facility] medical records  [id number]        Equal Access to Services     MICHEAL KERNS : Susan Mata, wabill urbina, qaybta kaalkelsey rosen. So [facility] [phone].    ATENCIÓN: Si татьяна moody cook " disposición servicios gratuitos de asistencia lingüística. Kan sanchez [phone].    We comply with applicable federal civil rights laws and [place] laws. We do not discriminate on the basis of race, color, national origin, age, disability, sex, sexual orientation, or gender identity.               Review of your medicines      START taking        Dose / Directions    predniSONE 20 MG tablet   Commonly known as:  DELTASONE        Dose:  40 mg   Start taking on:  9/6/2018   Take 2 tablets (40 mg) by mouth daily for 5 days   Quantity:  10 tablet   Refills:  0         CONTINUE these medicines which have NOT CHANGED        Dose / Directions    acetaminophen 325 MG tablet   Commonly known as:  TYLENOL   Used for:  Chronic pain syndrome        Dose:  325 mg   Take 1 tablet (325 mg) by mouth every 4 hours as needed for mild pain   Quantity:  60 tablet   Refills:  1       albuterol 108 (90 Base) MCG/ACT inhaler   Commonly known as:  VENTOLIN HFA   Used for:  Chronic bronchitis, unspecified chronic bronchitis type (H)        Dose:  1-2 puff   Inhale 1-2 puffs into the lungs every 4 hours as needed for shortness of breath / dyspnea or wheezing   Quantity:  1 Inhaler   Refills:  0       calcium carbonate 500 mg {elemental} 500 MG tablet   Commonly known as:  OSCAL;OYSTER SHELL CALCIUM   Used for:  Alcohol dependence with unspecified alcohol-induced disorder (H)        Dose:  500 mg   Take 1 tablet (500 mg) by mouth daily   Quantity:  30 tablet   Refills:  1       DULoxetine 60 MG EC capsule   Commonly known as:  CYMBALTA   Used for:  Recurrent major depressive disorder, remission status unspecified (H)        Dose:  60 mg   Take 1 capsule (60 mg) by mouth daily   Quantity:  30 capsule   Refills:  1       FLUoxetine 40 MG capsule   Commonly known as:  PROzac   Used for:  Major depressive disorder, recurrent episode, mild (H)        Dose:  40 mg   Take 1 capsule (40 mg) by mouth 2 times daily   Quantity:  180 capsule    Refills:  1       fluticasone-salmeterol 500-50 MCG/DOSE diskus inhaler   Commonly known as:  ADVAIR DISKUS   Used for:  Chronic bronchitis, unspecified chronic bronchitis type (H)        Dose:  1 puff   Inhale 1 puff into the lungs every 12 hours   Quantity:  1 Inhaler   Refills:  0       folic acid 1 MG tablet   Commonly known as:  FOLVITE   Used for:  Alcohol dependence with uncomplicated withdrawal (H)        Dose:  1 mg   Take 1 tablet (1 mg) by mouth daily   Quantity:  30 tablet   Refills:  1       furosemide 20 MG tablet   Commonly known as:  LASIX   Used for:  Essential hypertension with goal blood pressure less than 140/90        Dose:  20 mg   Take 1 tablet (20 mg) by mouth daily   Quantity:  90 tablet   Refills:  1       gabapentin 300 MG capsule   Commonly known as:  NEURONTIN   Used for:  Major depressive disorder, recurrent episode, mild (H), Sacroiliitis (H)        Take 1 capsule by mouth in the morning, 1 capsule in the afternoon, and 2 capsules at bedtime.   Quantity:  120 capsule   Refills:  1       hydrOXYzine 25 MG tablet   Commonly known as:  ATARAX   Used for:  Sacroiliitis (H)        Dose:  25 mg   Take 1 tablet (25 mg) by mouth 2 times daily as needed for anxiety or other (adjuvant pain)   Quantity:  60 tablet   Refills:  1       ipratropium - albuterol 0.5 mg/2.5 mg/3 mL 0.5-2.5 (3) MG/3ML neb solution   Commonly known as:  DUONEB   Used for:  Chronic bronchitis, unspecified chronic bronchitis type (H)        Dose:  3 mL   Take 1 vial (3 mLs) by nebulization 4 times daily as needed for wheezing   Quantity:  360 mL   Refills:  1       labetalol 100 MG tablet   Commonly known as:  NORMODYNE   Used for:  Hypertension goal BP (blood pressure) < 140/90        100mg in am, 200mg in pm.   Quantity:  90 tablet   Refills:  1       losartan 50 MG tablet   Commonly known as:  COZAAR   Used for:  Hypertension goal BP (blood pressure) < 140/90        Dose:  50 mg   Take 1 tablet (50 mg) by mouth daily    Quantity:  30 tablet   Refills:  1       magnesium oxide 400 MG tablet   Commonly known as:  MAG-OX   Used for:  Alcohol dependence with unspecified alcohol-induced disorder (H)        Dose:  400 mg   Take 1 tablet (400 mg) by mouth daily   Quantity:  30 tablet   Refills:  1       menthol 5 % Ptch   Commonly known as:  ICY HOT   Used for:  Muscle soreness        Dose:  1 patch   Apply 1 patch topically every 8 hours as needed for muscle soreness   Quantity:  30 patch   Refills:  1       multivitamin, therapeutic with minerals Tabs tablet   Used for:  Alcohol dependence with uncomplicated withdrawal (H)        Dose:  1 tablet   Take 1 tablet by mouth daily   Quantity:  30 each   Refills:  1       naltrexone 50 MG tablet   Commonly known as:  DEPADE;REVIA   Used for:  Alcohol dependence with withdrawal with complication (H)        Dose:  50 mg   Take 1 tablet (50 mg) by mouth daily   Quantity:  30 tablet   Refills:  0       * nicotine 21 MG/24HR 24 hr patch   Commonly known as:  NICODERM CQ   Used for:  Tobacco abuse        Dose:  1 patch   Place 1 patch onto the skin daily   Quantity:  30 patch   Refills:  1       * nicotine 10 MG Inhaler   Commonly known as:  NICOTROL   Used for:  Encounter for smoking cessation counseling        Dose:  6-16 Cartridge   Inhale 6-16 Cartridges into the lungs daily as needed for smoking cessation   Quantity:  180 each   Refills:  2       * nicotine polacrilex 2 MG gum   Commonly known as:  NICORETTE   Used for:  Tobacco abuse        Dose:  2 mg   Place 1 each (2 mg) inside cheek every hour as needed for smoking cessation   Quantity:  30 tablet   Refills:  1       * nicotine polacrilex 2 MG gum   Commonly known as:  NICORETTE   Used for:  Encounter for smoking cessation counseling        CHEW AND PARK ONE PIECE INSIDE CHEEK AS NEEDED FOR SMOKING CESSATION. MAX OF 24 PIECES PER DAY   Quantity:  770 each   Refills:  0       omeprazole 20 MG CR capsule   Commonly known as:  priLOSEC    Used for:  Alcoholic gastritis with hemorrhage, unspecified chronicity        Dose:  20 mg   Take 1 capsule (20 mg) by mouth 2 times daily (before meals)   Quantity:  60 capsule   Refills:  1       order for DME   Used for:  Alcohol abuse, Fall, initial encounter, Dehydration, Alcohol withdrawal syndrome with perceptual disturbance (H)        Equipment being ordered: Cane () Treatment Diagnosis: Impaired functional mobility   Quantity:  1 each   Refills:  0       potassium chloride SA 10 MEQ CR tablet   Commonly known as:  K-DUR/KLOR-CON M   Used for:  Essential hypertension with goal blood pressure less than 140/90        Dose:  10 mEq   Take 1 tablet (10 mEq) by mouth daily   Quantity:  30 tablet   Refills:  1       rOPINIRole 1 MG tablet   Commonly known as:  REQUIP   Used for:  RLS (restless legs syndrome)        Dose:  1 mg   Take 1 tablet (1 mg) by mouth 3 times daily   Quantity:  90 tablet   Refills:  1       traZODone 50 MG tablet   Commonly known as:  DESYREL   Used for:  Recurrent major depressive disorder, remission status unspecified (H)        Dose:  50 mg   Take 1 tablet (50 mg) by mouth At Bedtime   Quantity:  90 tablet   Refills:  1       * Notice:  This list has 4 medication(s) that are the same as other medications prescribed for you. Read the directions carefully, and ask your doctor or other care provider to review them with you.         Where to get your medicines      Some of these will need a paper prescription and others can be bought over the counter. Ask your nurse if you have questions.     Bring a paper prescription for each of these medications     predniSONE 20 MG tablet                Protect others around you: Learn how to safely use, store and throw away your medicines at www.disposemymeds.org.             Medication List: This is a list of all your medications and when to take them. Check marks below indicate your daily home schedule. Keep this list as a reference.       Medications           Morning Afternoon Evening Bedtime As Needed    acetaminophen 325 MG tablet   Commonly known as:  TYLENOL   Take 1 tablet (325 mg) by mouth every 4 hours as needed for mild pain   Last time this was given:  650 mg on 9/4/2018 10:44 PM                                albuterol 108 (90 Base) MCG/ACT inhaler   Commonly known as:  VENTOLIN HFA   Inhale 1-2 puffs into the lungs every 4 hours as needed for shortness of breath / dyspnea or wheezing                                calcium carbonate 500 mg {elemental} 500 MG tablet   Commonly known as:  OSCAL;OYSTER SHELL CALCIUM   Take 1 tablet (500 mg) by mouth daily                                DULoxetine 60 MG EC capsule   Commonly known as:  CYMBALTA   Take 1 capsule (60 mg) by mouth daily   Last time this was given:  60 mg on 9/5/2018  8:19 AM                                FLUoxetine 40 MG capsule   Commonly known as:  PROzac   Take 1 capsule (40 mg) by mouth 2 times daily   Last time this was given:  40 mg on 9/5/2018  8:19 AM                                fluticasone-salmeterol 500-50 MCG/DOSE diskus inhaler   Commonly known as:  ADVAIR DISKUS   Inhale 1 puff into the lungs every 12 hours                                folic acid 1 MG tablet   Commonly known as:  FOLVITE   Take 1 tablet (1 mg) by mouth daily                                furosemide 20 MG tablet   Commonly known as:  LASIX   Take 1 tablet (20 mg) by mouth daily   Last time this was given:  20 mg on 9/5/2018  8:19 AM                                gabapentin 300 MG capsule   Commonly known as:  NEURONTIN   Take 1 capsule by mouth in the morning, 1 capsule in the afternoon, and 2 capsules at bedtime.   Last time this was given:  300 mg on 9/5/2018  8:20 AM                                hydrOXYzine 25 MG tablet   Commonly known as:  ATARAX   Take 1 tablet (25 mg) by mouth 2 times daily as needed for anxiety or other (adjuvant pain)   Last time this was given:  25 mg on 9/5/2018   9:01 AM                                ipratropium - albuterol 0.5 mg/2.5 mg/3 mL 0.5-2.5 (3) MG/3ML neb solution   Commonly known as:  DUONEB   Take 1 vial (3 mLs) by nebulization 4 times daily as needed for wheezing   Last time this was given:  3 mLs on 9/5/2018  9:03 AM                                labetalol 100 MG tablet   Commonly known as:  NORMODYNE   100mg in am, 200mg in pm.   Last time this was given:  100 mg on 9/5/2018  8:19 AM                                losartan 50 MG tablet   Commonly known as:  COZAAR   Take 1 tablet (50 mg) by mouth daily   Last time this was given:  50 mg on 9/5/2018  8:20 AM                                magnesium oxide 400 MG tablet   Commonly known as:  MAG-OX   Take 1 tablet (400 mg) by mouth daily   Last time this was given:  400 mg on 9/5/2018  8:19 AM                                menthol 5 % Ptch   Commonly known as:  ICY HOT   Apply 1 patch topically every 8 hours as needed for muscle soreness                                multivitamin, therapeutic with minerals Tabs tablet   Take 1 tablet by mouth daily                                naltrexone 50 MG tablet   Commonly known as:  DEPADE;REVIA   Take 1 tablet (50 mg) by mouth daily   Last time this was given:  50 mg on 9/5/2018  8:18 AM                                * nicotine 21 MG/24HR 24 hr patch   Commonly known as:  NICODERM CQ   Place 1 patch onto the skin daily   Last time this was given:  1 patch on 9/5/2018  8:20 AM                                * nicotine 10 MG Inhaler   Commonly known as:  NICOTROL   Inhale 6-16 Cartridges into the lungs daily as needed for smoking cessation                                * nicotine polacrilex 2 MG gum   Commonly known as:  NICORETTE   Place 1 each (2 mg) inside cheek every hour as needed for smoking cessation                                * nicotine polacrilex 2 MG gum   Commonly known as:  NICORETTE   CHEW AND PARK ONE PIECE INSIDE CHEEK AS NEEDED FOR SMOKING  CESSATION. MAX OF 24 PIECES PER DAY                                omeprazole 20 MG CR capsule   Commonly known as:  priLOSEC   Take 1 capsule (20 mg) by mouth 2 times daily (before meals)   Last time this was given:  20 mg on 9/5/2018  8:19 AM                                order for DME   Equipment being ordered: Cane () Treatment Diagnosis: Impaired functional mobility                                potassium chloride SA 10 MEQ CR tablet   Commonly known as:  K-DUR/KLOR-CON M   Take 1 tablet (10 mEq) by mouth daily   Last time this was given:  10 mEq on 9/5/2018  8:19 AM                                predniSONE 20 MG tablet   Commonly known as:  DELTASONE   Take 2 tablets (40 mg) by mouth daily for 5 days   Start taking on:  9/6/2018   Last time this was given:  40 mg on 9/5/2018  8:20 AM                                rOPINIRole 1 MG tablet   Commonly known as:  REQUIP   Take 1 tablet (1 mg) by mouth 3 times daily   Last time this was given:  1 mg on 9/5/2018  8:19 AM                                traZODone 50 MG tablet   Commonly known as:  DESYREL   Take 1 tablet (50 mg) by mouth At Bedtime   Last time this was given:  50 mg on 9/4/2018  9:13 PM                                * Notice:  This list has 4 medication(s) that are the same as other medications prescribed for you. Read the directions carefully, and ask your doctor or other care provider to review them with you.

## 2018-09-04 NOTE — H&P
[facility] ED Observation Admission Note    Chief Complaint   Patient presents with     COPD     cough ex WZ       Assessment/Plan:  Inga Ledesma is a 53 year old female history of COPD who presents with increased shortness of breath over 1 week.  She admits to ongoing smoking.  She states for 1 week she has had slowly progressing increased difficulty breathing.    1. COPD exacerbation: presents with increased dyspnea x 1 week. At baseline, has chronic cough with yellowish phlegm and this is unchanged. Current everyday smoker. Pt took some leftover prednisone 3 days ago with no improvement in symptoms. Has been using PTA neb treatment at home as well without noticing changes. Denies fever, chills, nausea, vomiting, chest pain, or abdominal pain.  In the ED, VS: Tem 98.4, Pulse 104, /90, R 24, Initially in the ED SpO2 91% on room air, however after duoneb treatment O2 stats improved to % room air. LAB: Na 141, K 4.2, Cr 0.73, BUN 21. CBC shows normal wbc and mild anemia ( hgb 11.3) in the setting or iron deficiency anemia. Trop x 1 negative. EKG with sinus tachycardia otherwise normal. Chest Xray shows no acute airspace abnormalities.   - Duobneb q4h  - Check peak flows q4h  - Albuterol neb q2h prn  - Pulmicort 0.5 mg BID  - Brovana 15 mcg BID  -  Prednisone 40 mg daily  - Acapella QID daily   - Chronic Pulm consult in am   - Consult on smoking cessation     2. CHF: CHF initial diagnosis 2016 at [facility] but echo 6/2017 technically difficulty,  but Global and regional left ventricular function is normal with an EF of 60-65%. No regional wall motion abnormalities are seen.  -Continue with PTA  Lasix  -Add BNP     3. HTN: Hypertensive in the ED. Pt has not taken PTA antihypertensives and was given in the ED with improvement of BP.   -Continue with PTA Labetalol, Losartan    4. 7) Cigarette smoking  - Nicotine patch  - Consult on smoking cessation     Chronic Medical Conditions:   GERD: Continue PTA PPI   DARSHAN: Hgb   "11.3 on admission, this is at baseline  Labial cancer: In remission, follow up with PCP for ongoing care     HPI:    Per ED not \"Inga Ledesma is a 53 year old female history of COPD who presents with increased shortness of breath over 1 week.  She admits to ongoing smoking.  She states for 1 week she has had slowly progressing increased difficulty breathing.  She does have chronic cough with chronic slight yellow sputum production, this is unchanged.  No fever.  No chest discomfort.  No abdominal pain nausea or vomiting, other than occasional posttussive emesis, which is chronic and unchanged.  No complaint of new lower extremity symptoms.  She states she had a leftover prednisone from long ago, took that on Friday, though did not notice any definite improvement.  She admits it has been several years since she has needed prednisone.  She does have nebs at home.  The patient is an alcoholic, though states she has not had a drink since mid August\".    In the ED, VS: Tem 98.4, Pulse 104, /90, R 24, O2 91% RA (O2 sat improved to 99% RA after neb treatment). LAB: Na 141, K 4.2, Cr 0.73, BUN 21. CBC shows normal wbc and mild anemia ( hgb 11.3) in the setting or iron deficiency anemia. Trop x 1 negative. EKG with sinus tachycardia otherwise normal. Chest Xray shows no acute airspace abnormalities.     On admission to the observation unit the patient was stable.    History:    Past Medical History:   Diagnosis Date     Alcohol abuse, in remission      Alcohol withdrawal seizure (H) 2016     Cancer of labia majora (H) 1987     Cervical dysplasia 1987     Congestive heart failure (H) 5/16/16     COPD (chronic obstructive pulmonary disease) (H) 1/24/2011     CVA (cerebral infarction) 1/2012     Dependent edema      Depression, major      Depressive disorder 1966     GERD (gastroesophageal reflux disease)      Hyperlipidemia LDL goal < 160      Hypertension      Iron deficiency anemia      RLS (restless legs syndrome) "      Sacroiliitis (H)     steroid injections ineffective, chronic low back pain     Tobacco abuse      Uncomplicated asthma      Vitamin D deficiencies        Past Surgical History:   Procedure Laterality Date     AS BIOPSY/EXCISION LYMPH NODE OPEN SUPERFICIAL       COLONOSCOPY       EYE SURGERY       HYSTERECTOMY  2010     HYSTERECTOMY TOTAL ABDOMINAL  7/28/10    Bilateral salpingectomy.  ovaries conserved.     LASER TX, CERVICAL  1987     LYMPH NODE BIOPSY  2007    inguinal     LYSIS OF LABIAL LESION(S)  1985, 1987       Family History   Problem Relation Age of Onset     Depression/Anxiety Mother      Asthma Mother      Cerebrovascular Disease Father      Hypertension Father      Lung Cancer Father      Alcoholism Father      Breast Cancer Paternal Aunt      Other Cancer Other      Depression Other      Anxiety Disorder Other      Mental Illness Other      Substance Abuse Other      Asthma Other      Obesity Sister      Obesity Son      Suicide Paternal Uncle        Social History     Social History     Marital status:      Spouse name: N/A     Number of children: 1     Years of education: 13     Occupational History      [employer]     Social History Main Topics     Smoking status: Current Every Day Smoker     Packs/day: 0.25     Years: 34.00     Types: Cigarettes     Start date: 11/1/1979     Last attempt to quit: 10/3/2012     Smokeless tobacco: Never Used      Comment: 5 cigarettes daily     Alcohol use No      Comment: 1 pint of vodka per day, last drink aug 2018     Drug use: No     Sexual activity: Not Currently     Other Topics Concern     Parent/Sibling W/ Cabg, Mi Or Angioplasty Before 65f 55m? No     Social History Narrative    Lives in [place] with her son in a trailer no access to guns or weapons works for the [employer] office and enjoys crocheting and watching television.         No current facility-administered medications on file prior to encounter.   Current Outpatient  Prescriptions on File Prior to Encounter:  acetaminophen (TYLENOL) 325 MG tablet Take 1 tablet (325 mg) by mouth every 4 hours as needed for mild pain   albuterol (VENTOLIN HFA) 108 (90 Base) MCG/ACT inhaler Inhale 1-2 puffs into the lungs every 4 hours as needed for shortness of breath / dyspnea or wheezing   DULoxetine (CYMBALTA) 60 MG EC capsule Take 1 capsule (60 mg) by mouth daily   FLUoxetine (PROZAC) 40 MG capsule Take 1 capsule (40 mg) by mouth 2 times daily   fluticasone-salmeterol (ADVAIR DISKUS) 500-50 MCG/DOSE diskus inhaler Inhale 1 puff into the lungs every 12 hours   folic acid (FOLVITE) 1 MG tablet Take 1 tablet (1 mg) by mouth daily   furosemide (LASIX) 20 MG tablet Take 1 tablet (20 mg) by mouth daily   gabapentin (NEURONTIN) 300 MG capsule Take 1 capsule by mouth in the morning, 1 capsule in the afternoon, and 2 capsules at bedtime.   hydrOXYzine (ATARAX) 25 MG tablet Take 1 tablet (25 mg) by mouth 2 times daily as needed for anxiety or other (adjuvant pain)   ipratropium - albuterol 0.5 mg/2.5 mg/3 mL (DUONEB) 0.5-2.5 (3) MG/3ML neb solution Take 1 vial (3 mLs) by nebulization 4 times daily as needed for wheezing   labetalol (NORMODYNE) 100 MG tablet 100mg in am, 200mg in pm.   losartan (COZAAR) 50 MG tablet Take 1 tablet (50 mg) by mouth daily   magnesium oxide (MAG-OX) 400 MG tablet Take 1 tablet (400 mg) by mouth daily   menthol (ICY HOT) 5 % PTCH Apply 1 patch topically every 8 hours as needed for muscle soreness   multivitamin, therapeutic with minerals (THERA-VIT-M) TABS tablet Take 1 tablet by mouth daily   naltrexone (DEPADE;REVIA) 50 MG tablet Take 1 tablet (50 mg) by mouth daily   nicotine (NICOTROL) 10 MG Inhaler Inhale 6-16 Cartridges into the lungs daily as needed for smoking cessation   nicotine polacrilex (NICORETTE) 2 MG gum CHEW AND PARK ONE PIECE INSIDE CHEEK AS NEEDED FOR SMOKING CESSATION. MAX OF 24 PIECES PER DAY   nicotine polacrilex (NICORETTE) 2 MG gum Place 1 each (2 mg)  inside cheek every hour as needed for smoking cessation   omeprazole (PRILOSEC) 20 MG CR capsule Take 1 capsule (20 mg) by mouth 2 times daily (before meals)   oyster shell calcium (OS-JENNIFER 500 MG [place]. [place]) 500 MG tablet Take 1 tablet (500 mg) by mouth daily   potassium chloride SA (K-DUR/KLOR-CON M) 10 MEQ CR tablet Take 1 tablet (10 mEq) by mouth daily   rOPINIRole (REQUIP) 1 MG tablet Take 1 tablet (1 mg) by mouth 3 times daily   traZODone (DESYREL) 50 MG tablet Take 1 tablet (50 mg) by mouth At Bedtime   nicotine (NICODERM CQ) 21 MG/24HR 24 hr patch Place 1 patch onto the skin daily   order for DME Equipment being ordered: Cane ()Treatment Diagnosis: Impaired functional mobility       Data:    Results for orders placed or performed during the hospital encounter of 09/04/18   Chest XR,  PA & LAT    Narrative    Exam: XR CHEST 2 VW, 9/4/2018 6:50 AM    Indication: cough, SOB, COPD;     Comparison: Radiograph of the chest 6/19/2018    Findings:   PA and lateral views of the chest. The heart size is within normal  limits. Pulmonary vasculature is distinct. Mild bibasilar and  perihilar opacities. No pneumothorax or pleural effusion. The upper  abdomen is unremarkable. Mild wedging of midthoracic vertebral bodies.      Impression    Impression: No acute airspace opacity.    I have personally reviewed the examination and initial interpretation  and I agree with the findings.    RANDEE WOODY MD   CBC with platelets differential   Result Value Ref Range    WBC 6.3 4.0 - 11.0 10e9/L    RBC Count 3.76 (L) 3.8 - 5.2 10e12/L    Hemoglobin 11.3 (L) 11.7 - 15.7 g/dL    Hematocrit 35.3 35.0 - 47.0 %    MCV 94 78 - 100 fl    MCH 30.1 26.5 - 33.0 pg    MCHC 32.0 31.5 - 36.5 g/dL    RDW 15.3 (H) 10.0 - 15.0 %    Platelet Count 366 150 - 450 10e9/L    Diff Method Automated Method     % Neutrophils 78.0 %    % Lymphocytes 14.7 %    % Monocytes 4.5 %    % Eosinophils 2.1 %    % Basophils 0.5 %    % Immature Granulocytes  0.2 %    Nucleated RBCs 0 0 /100    Absolute Neutrophil 4.9 1.6 - 8.3 10e9/L    Absolute Lymphocytes 0.9 0.8 - 5.3 10e9/L    Absolute Monocytes 0.3 0.0 - 1.3 10e9/L    Absolute Eosinophils 0.1 0.0 - 0.7 10e9/L    Absolute Basophils 0.0 0.0 - 0.2 10e9/L    Abs Immature Granulocytes 0.0 0 - 0.4 10e9/L    Absolute Nucleated RBC 0.0    Basic metabolic panel   Result Value Ref Range    Sodium 141 133 - 144 mmol/L    Potassium 4.2 3.4 - 5.3 mmol/L    Chloride 106 94 - 109 mmol/L    Carbon Dioxide 25 20 - 32 mmol/L    Anion Gap 10 3 - 14 mmol/L    Glucose 89 70 - 99 mg/dL    Urea Nitrogen 21 7 - 30 mg/dL    Creatinine 0.73 0.52 - 1.04 mg/dL    GFR Estimate 83 >60 mL/min/1.7m2    GFR Estimate If Black >90 >60 mL/min/1.7m2    Calcium 8.2 (L) 8.5 - 10.1 mg/dL   Troponin I   Result Value Ref Range    Troponin I ES <0.015 0.000 - 0.045 ug/L   EKG 12-lead, tracing only   Result Value Ref Range    Interpretation ECG Click View Image link to view waveform and result              EKG Interpretation:          ROS:    REVIEW OF SYSTEMS:   General: fatigue   EYES: Negative for vision changes or eye problems   ENT: No problems with ears, nose or throat. No difficulty swallowing.   RESP: Shortness of breath and coughing with yellow phlegm.  CV: No chest pains or palpitations   GI: No nausea, vomiting, heartburn, abdominal pain, diarrhea, constipation or change in bowel habits   : No urinary frequency or dysuria, bladder or kidney problems   MUSCULOSKELETAL: No significant muscle or joint pains   NEUROLOGIC: No headaches, numbness, tingling, weakness, problems with balance or coordination   PSYCHIATRIC: No problems with anxiety, depression or mental health   HEME/IMMUNE/ALLERGY: No history of bleeding or clotting problems or anemia. No allergies or immune system problems   ENDOCRINE: No history of thyroid disease, diabetes or other endocrine disorders   SKIN: No rashes,worrisome lesions or skin problems    PCP: SERGE GRAF  CARDIAC  RISK: HTN, HLD    10 point ROS negative other than the symptoms noted above.      Exam:    Vitals:  B/P: 171/100, T: 98.4, P: 105, R: 9    Physical Exam   Constitutional: Pt is oriented to person, place, and time.Pt appears well-developed and well-nourished.   Head: Normocephalic and atraumatic.   Eyes: Conjunctivae are normal. Pupils are equal, round, and reactive to light.   Neck: Normal range of motion. Neck supple.   Cardiovascular: Normal rate, regular rhythm, normal heart sounds and intact distal pulses.    Pulmonary/Chest: Effort normal and breath sounds normal. No respiratory distress. Has diffuse wheezes. Pt has no rales  Abdominal: Soft. Bowel sounds are normal. Pt exhibits no distension and no mass. No tenderness. Pt has no rebound and no guarding.   Musculoskeletal: Normal range of motion. Pt exhibits no edema.   Neurological: Pt is alert and oriented to person, place, and time. Normal reflexes.   Skin: Skin is warm and dry. No rash noted.   Psychiatric: Pt has a normal mood and affect. Behavior is normal. Judgment and thought content normal.     Consults: Chronic Pulm   FEN: Regular  DVT prophylaxis: Early ambulation  Code Status: Full  Disposition: Stable vital signs. Patient return to baseline.  All labs/images reviewed    Signed:  Haritha Henley PA-C  September 4, 2018 at 10:23 AM

## 2018-09-04 NOTE — PROGRESS NOTES
6:26 PM The patient was seen and examined by me and the case was discussed with the KENNETH. We are in agreement with the assessment and plan.   Past Medical History:   Diagnosis Date     Alcohol abuse, in remission      Alcohol withdrawal seizure (H) 2016     Cancer of labia majora (H) 1987     Cervical dysplasia 1987     Congestive heart failure (H) 5/16/16     COPD (chronic obstructive pulmonary disease) (H) 1/24/2011     CVA (cerebral infarction) 1/2012     Dependent edema      Depression, major      Depressive disorder 1966     GERD (gastroesophageal reflux disease)      Hyperlipidemia LDL goal < 160      Hypertension      Iron deficiency anemia      RLS (restless legs syndrome)      Sacroiliitis (H)     steroid injections ineffective, chronic low back pain     Tobacco abuse      Uncomplicated asthma      Vitamin D deficiencies      Social History     Social History     Marital status:      Spouse name: N/A     Number of children: 1     Years of education: 13     Occupational History      [employer]     Social History Main Topics     Smoking status: Current Every Day Smoker     Packs/day: 0.25     Years: 34.00     Types: Cigarettes     Start date: 11/1/1979     Last attempt to quit: 10/3/2012     Smokeless tobacco: Never Used      Comment: 5 cigarettes daily     Alcohol use No      Comment: 1 pint of vodka per day, last drink aug 2018     Drug use: No     Sexual activity: Not Currently     Other Topics Concern     Parent/Sibling W/ Cabg, Mi Or Angioplasty Before 65f 55m? No     Social History Narrative    Lives in [place] with her son in a trailer no access to guns or weapons works for the [employer] office and enjoys crocheting and watching television.     History of present illness this is a 53-year-old female who has been a smoker for 34 years diagnosis COPD.  She presents from the [facility] complaining of cough and shortness of breath and wheezing.  Her COPD is managed by a primary  care provider.  She has not seen a pulmonologist.  She had 1 tablet of prednisone at home that she used without success and then was seen on the [facility].  She has tools to assist in discontinuing smoking.  She does do home nebs our last steroids were about a year ago.    Physical exam:  General: Awake alert no distress  Cardiac: Regular rate and rhythm  Respiratory: No distress she does have expiratory wheezes but is moving air well.    Assessment and plan: 53-year-old female with COPD exacerbation, normal chest x-ray.  Continue nebs and oral steroids 40 mg a day with discharge home with 5 days and I told her my recommendation that she follow-up with a pulmonologist.  I think she should discharge tomorrow.

## 2018-09-05 ENCOUNTER — TELEPHONE (OUTPATIENT)
Dept: FAMILY MEDICINE | Facility: CLINIC | Age: 53
End: 2018-09-05

## 2018-09-05 VITALS
SYSTOLIC BLOOD PRESSURE: 175 MMHG | HEART RATE: 98 BPM | OXYGEN SATURATION: 98 % | DIASTOLIC BLOOD PRESSURE: 89 MMHG | RESPIRATION RATE: 16 BRPM | HEIGHT: 61 IN | TEMPERATURE: 98.6 F

## 2018-09-05 LAB
ANION GAP SERPL CALCULATED.3IONS-SCNC: 7 MMOL/L (ref 3–14)
ANION GAP SERPL CALCULATED.3IONS-SCNC: NORMAL MMOL/L (ref 6–17)
BUN SERPL-MCNC: 16 MG/DL (ref 7–30)
BUN SERPL-MCNC: NORMAL MG/DL (ref 7–30)
CALCIUM SERPL-MCNC: 8.5 MG/DL (ref 8.5–10.1)
CALCIUM SERPL-MCNC: NORMAL MG/DL (ref 8.5–10.1)
CHLORIDE SERPL-SCNC: 104 MMOL/L (ref 94–109)
CHLORIDE SERPL-SCNC: NORMAL MMOL/L (ref 94–109)
CO2 SERPL-SCNC: 25 MMOL/L (ref 20–32)
CO2 SERPL-SCNC: NORMAL MMOL/L (ref 20–32)
CREAT SERPL-MCNC: 0.66 MG/DL (ref 0.52–1.04)
CREAT SERPL-MCNC: NORMAL MG/DL (ref 0.52–1.04)
ERYTHROCYTE [DISTWIDTH] IN BLOOD BY AUTOMATED COUNT: 15.3 % (ref 10–15)
ERYTHROCYTE [DISTWIDTH] IN BLOOD BY AUTOMATED COUNT: NORMAL % (ref 10–15)
GFR SERPL CREATININE-BSD FRML MDRD: >90 ML/MIN/1.7M2
GFR SERPL CREATININE-BSD FRML MDRD: NORMAL ML/MIN/1.7M2
GLUCOSE SERPL-MCNC: 94 MG/DL (ref 70–99)
GLUCOSE SERPL-MCNC: NORMAL MG/DL (ref 70–99)
HCT VFR BLD AUTO: 32.5 % (ref 35–47)
HCT VFR BLD AUTO: NORMAL % (ref 35–47)
HGB BLD-MCNC: 10.5 G/DL (ref 11.7–15.7)
HGB BLD-MCNC: NORMAL G/DL (ref 11.7–15.7)
MCH RBC QN AUTO: 30.3 PG (ref 26.5–33)
MCH RBC QN AUTO: NORMAL PG (ref 26.5–33)
MCHC RBC AUTO-ENTMCNC: 32.3 G/DL (ref 31.5–36.5)
MCHC RBC AUTO-ENTMCNC: NORMAL G/DL (ref 31.5–36.5)
MCV RBC AUTO: 94 FL (ref 78–100)
MCV RBC AUTO: NORMAL FL (ref 78–100)
PLATELET # BLD AUTO: 353 10E9/L (ref 150–450)
PLATELET # BLD AUTO: NORMAL 10E9/L (ref 150–450)
POTASSIUM SERPL-SCNC: 3.7 MMOL/L (ref 3.4–5.3)
POTASSIUM SERPL-SCNC: NORMAL MMOL/L (ref 3.4–5.3)
RBC # BLD AUTO: 3.46 10E12/L (ref 3.8–5.2)
RBC # BLD AUTO: NORMAL 10E12/L (ref 3.8–5.2)
SODIUM SERPL-SCNC: 137 MMOL/L (ref 133–144)
SODIUM SERPL-SCNC: NORMAL MMOL/L (ref 133–144)
WBC # BLD AUTO: 5.7 10E9/L (ref 4–11)
WBC # BLD AUTO: NORMAL 10E9/L (ref 4–11)

## 2018-09-05 PROCEDURE — G0378 HOSPITAL OBSERVATION PER HR: HCPCS

## 2018-09-05 PROCEDURE — 94799 UNLISTED PULMONARY SVC/PX: CPT

## 2018-09-05 PROCEDURE — G0463 HOSPITAL OUTPT CLINIC VISIT: HCPCS

## 2018-09-05 PROCEDURE — 85027 COMPLETE CBC AUTOMATED: CPT | Performed by: PHYSICIAN ASSISTANT

## 2018-09-05 PROCEDURE — 36415 COLL VENOUS BLD VENIPUNCTURE: CPT | Performed by: PHYSICIAN ASSISTANT

## 2018-09-05 PROCEDURE — 40000275 ZZH STATISTIC RCP TIME EA 10 MIN: Mod: 76

## 2018-09-05 PROCEDURE — 94640 AIRWAY INHALATION TREATMENT: CPT

## 2018-09-05 PROCEDURE — 40000962 ZZH STATISTIC CHRONIC DISEASE SPECIALIST RT CONSULT

## 2018-09-05 PROCEDURE — 80048 BASIC METABOLIC PNL TOTAL CA: CPT | Performed by: PHYSICIAN ASSISTANT

## 2018-09-05 PROCEDURE — 25000125 ZZHC RX 250: Performed by: PHYSICIAN ASSISTANT

## 2018-09-05 PROCEDURE — 25000132 ZZH RX MED GY IP 250 OP 250 PS 637: Performed by: PHYSICIAN ASSISTANT

## 2018-09-05 PROCEDURE — 94640 AIRWAY INHALATION TREATMENT: CPT | Mod: 76

## 2018-09-05 PROCEDURE — 99217 ZZC OBSERVATION CARE DISCHARGE: CPT | Mod: Z6 | Performed by: PHYSICIAN ASSISTANT

## 2018-09-05 RX ORDER — PREDNISONE 20 MG/1
40 TABLET ORAL DAILY
Qty: 10 TABLET | Refills: 0 | Status: SHIPPED | OUTPATIENT
Start: 2018-09-06 | End: 2018-09-11

## 2018-09-05 RX ADMIN — IPRATROPIUM BROMIDE AND ALBUTEROL SULFATE 3 ML: .5; 3 SOLUTION RESPIRATORY (INHALATION) at 09:03

## 2018-09-05 RX ADMIN — NICOTINE POLACRILEX 2 MG: 2 GUM, CHEWING ORAL at 12:23

## 2018-09-05 RX ADMIN — GABAPENTIN 300 MG: 300 CAPSULE ORAL at 12:22

## 2018-09-05 RX ADMIN — LOSARTAN POTASSIUM 50 MG: 50 TABLET ORAL at 08:20

## 2018-09-05 RX ADMIN — HYDROXYZINE HYDROCHLORIDE 25 MG: 25 TABLET ORAL at 09:01

## 2018-09-05 RX ADMIN — IPRATROPIUM BROMIDE AND ALBUTEROL SULFATE 3 ML: .5; 3 SOLUTION RESPIRATORY (INHALATION) at 00:03

## 2018-09-05 RX ADMIN — OMEPRAZOLE 20 MG: 20 CAPSULE, DELAYED RELEASE ORAL at 08:19

## 2018-09-05 RX ADMIN — BUDESONIDE 0.5 MG: 0.5 INHALANT RESPIRATORY (INHALATION) at 09:03

## 2018-09-05 RX ADMIN — DULOXETINE HYDROCHLORIDE 60 MG: 60 CAPSULE, DELAYED RELEASE ORAL at 08:19

## 2018-09-05 RX ADMIN — FLUOXETINE 40 MG: 20 CAPSULE ORAL at 08:19

## 2018-09-05 RX ADMIN — GABAPENTIN 300 MG: 300 CAPSULE ORAL at 08:20

## 2018-09-05 RX ADMIN — LABETALOL HCL 100 MG: 100 TABLET, FILM COATED ORAL at 08:19

## 2018-09-05 RX ADMIN — ARFORMOTEROL TARTRATE 15 MCG: 15 SOLUTION RESPIRATORY (INHALATION) at 09:09

## 2018-09-05 RX ADMIN — ALBUTEROL SULFATE 2.5 MG: 2.5 SOLUTION RESPIRATORY (INHALATION) at 01:17

## 2018-09-05 RX ADMIN — POTASSIUM CHLORIDE 10 MEQ: 750 TABLET, EXTENDED RELEASE ORAL at 08:19

## 2018-09-05 RX ADMIN — NICOTINE 1 PATCH: 21 PATCH TRANSDERMAL at 08:20

## 2018-09-05 RX ADMIN — ROPINIROLE HYDROCHLORIDE 1 MG: 1 TABLET, FILM COATED ORAL at 08:19

## 2018-09-05 RX ADMIN — MAGNESIUM OXIDE TAB 400 MG (241.3 MG ELEMENTAL MG) 400 MG: 400 (241.3 MG) TAB at 08:19

## 2018-09-05 RX ADMIN — PREDNISONE 40 MG: 20 TABLET ORAL at 08:20

## 2018-09-05 RX ADMIN — IPRATROPIUM BROMIDE AND ALBUTEROL SULFATE 3 ML: .5; 3 SOLUTION RESPIRATORY (INHALATION) at 03:39

## 2018-09-05 RX ADMIN — NALTREXONE HYDROCHLORIDE 50 MG: 50 TABLET, FILM COATED ORAL at 08:18

## 2018-09-05 RX ADMIN — FUROSEMIDE 20 MG: 20 TABLET ORAL at 08:19

## 2018-09-05 NOTE — PLAN OF CARE
Problem: Patient Care Overview  Goal: Plan of Care/Patient Progress Review  Outcome: Improving  Assumed care of pt from [id number]. VSS, HTN but HS Labetalol given. A&Ox4. Apical pulse regular. Lungs have expiratory wheezing, dyspnea on exertion. PRN Albuterol neb given. Bowel sounds active in all quadrants. Voiding spontaneously with adequate output. MIVF running at 125cc/hr. Pain control managed with Atarax. UAL. Continue with POC, pt likely to discharge home today after last IV Abx.

## 2018-09-05 NOTE — PROGRESS NOTES
Observation goals PRIOR TO DISCHARGE     Comments:  -diagnostic tests and consults completed and resulted-no  -vital signs normal or at patient baseline no bp elevated-yes  -dyspnea improved and O2 sats greater than 88% on room air or prior home oxygen levels-no  -returns to baseline functional status-yes  -safe disposition plan has been identified-yes  Nurse to notify provider when observation goals have been met and patient is ready for discharge.

## 2018-09-05 NOTE — PROGRESS NOTES
COPD Initial Interview and Consult    2018    Patient: Inga Ledesma      :  1965                    MRN:[id number]      Reason for Consult:  Patient with  COPD requiring education    History of Present Illness: Inga Ledesma is a 53 year old female history of COPD who presents with increased shortness of breath over 1 week.  She admits to ongoing smoking.  She states for 1 week she has had slowly progressing increased difficulty breathing.    Last PFT on 17 with FEV1 of 86% and FEV1/FVC of 93%. Normal spirometry. Patient states she was diagnosed with COPD in . She says her PCP manages her COPD just fine and she is not interested in establishing care with a pulmonologist. When asked if she thought her respiratory symptoms were stable and well controlled she said she believed they were.     Patient states she is always SOB and uses her albuterol inhaler 3-4X daily as well as her Duoneb 3-4 X daily. Current smoker of 4-6 cigarettes a day. Also uses 21 mg nicotine patch and 2mg nicorette gum. Asked about her thoughts and feelings about quitting she stated that she has no interest in quitting and that she does not see herself quitting ever. Counseled patient on the rationale of not using the nicotine patch if she doesn't want to quit.     Patient says that she sometimes wakes up SOB and she has been told that she snores. She and her PCP have addressed the possibility of a sleep medicine referral.     Home respiratory medications include:  -Albuterol (Proair/Ventolin/Proventolin)  Tiotropium (Spiriva)  Serevent/Fluticasone (Advair)  -Albuterol/Ipratropium (Duoneb, Combivent)    Assessment:  Patient with audible expiratory wheezing with ambulation, sats 96% on RA, RR, 20, /89      Recommendations:    -Use Aerobika Oscillating PEP Device at least twice daily for 5-10 min/QID with Nebs to open smaller airways, improve mucus clearance, to decrease cough frequency, and to improve exercise  tolerance    -Use Aerochamber with MDI's for better drug deposition.    -Patient needs continued reinforcement and continued education on inhaler use and breath recovery techniques    Patient currently not open to suggestions or recommendations to establish care with pulmonology, pulmonary rehab, or sleep medicine referral. If patient were to require home Oxygen, patient states that she would not use it.   Current respiratory status does not seem well controlled or stable.       Will follow up with phone call 48 hours after discharge.     I spent 20 minutes with the patient.      Nadiya Toledo, RRT, CTTS  Chronic Pulmonary Disease Specialist  [phone]

## 2018-09-05 NOTE — PLAN OF CARE
Problem: Patient Care Overview  Goal: Discharge Needs Assessment  Outcome: No Change  -diagnostic tests and consults completed and resulted-yes  -vital signs normal or at patient baseline no bp elevated-yes  -dyspnea improved and O2 sats greater than 88% on room air or prior home oxygen levels-no  -returns to baseline functional status-yes  -safe disposition plan has been identified-yes  Nurse to notify provider when observation goals have been met and patient is ready for discharge.

## 2018-09-05 NOTE — PLAN OF CARE
Problem: Patient Care Overview  Goal: Discharge Needs Assessment  -diagnostic tests and consults completed and resulted-yes  -vital signs normal or at patient baseline no bp elevated-yes  -dyspnea improved and O2 sats greater than 88% on room air or prior home oxygen levels-no  -returns to baseline functional status-yes  -safe disposition plan has been identified-yes  Nurse to notify provider when observation goals have been met and patient is ready for discharge.

## 2018-09-05 NOTE — DISCHARGE SUMMARY
Discharge Summary    Inga Ledesma MRN# [id number]   YOB: 1965 Age: 53 year old     Date of Admission:  9/4/2018  Date of Discharge:  9/5/2018  Admitting Physician:  Nacho Kirby MD  Discharge Physician:  KRISTI YANEZ  Discharging Service:  Emergency Department Observation Unit     Primary Provider: Min Toledo          Discharge Diagnosis:   COPD exacerbation              Discharge Disposition:   Discharged to home           Condition on Discharge:   Discharge condition: Stable   Code status on discharge: Full Code           Procedures:   No procedures performed during this admission          Discharge Medications:     Current Discharge Medication List      CONTINUE these medications which have NOT CHANGED    Details   acetaminophen (TYLENOL) 325 MG tablet Take 1 tablet (325 mg) by mouth every 4 hours as needed for mild pain  Qty: 60 tablet, Refills: 1    Associated Diagnoses: Chronic pain syndrome      albuterol (VENTOLIN HFA) 108 (90 Base) MCG/ACT inhaler Inhale 1-2 puffs into the lungs every 4 hours as needed for shortness of breath / dyspnea or wheezing  Qty: 1 Inhaler, Refills: 0    Associated Diagnoses: Chronic bronchitis, unspecified chronic bronchitis type (H)      DULoxetine (CYMBALTA) 60 MG EC capsule Take 1 capsule (60 mg) by mouth daily  Qty: 30 capsule, Refills: 1    Associated Diagnoses: Recurrent major depressive disorder, remission status unspecified (H)      FLUoxetine (PROZAC) 40 MG capsule Take 1 capsule (40 mg) by mouth 2 times daily  Qty: 180 capsule, Refills: 1    Associated Diagnoses: Major depressive disorder, recurrent episode, mild (H)      fluticasone-salmeterol (ADVAIR DISKUS) 500-50 MCG/DOSE diskus inhaler Inhale 1 puff into the lungs every 12 hours  Qty: 1 Inhaler, Refills: 0    Associated Diagnoses: Chronic bronchitis, unspecified chronic bronchitis type (H)      folic acid (FOLVITE) 1 MG tablet Take 1 tablet (1 mg) by mouth daily  Qty: 30 tablet,  Refills: 1    Associated Diagnoses: Alcohol dependence with uncomplicated withdrawal (H)      furosemide (LASIX) 20 MG tablet Take 1 tablet (20 mg) by mouth daily  Qty: 90 tablet, Refills: 1    Associated Diagnoses: Essential hypertension with goal blood pressure less than 140/90      gabapentin (NEURONTIN) 300 MG capsule Take 1 capsule by mouth in the morning, 1 capsule in the afternoon, and 2 capsules at bedtime.  Qty: 120 capsule, Refills: 1    Associated Diagnoses: Major depressive disorder, recurrent episode, mild (H); Sacroiliitis (H)      hydrOXYzine (ATARAX) 25 MG tablet Take 1 tablet (25 mg) by mouth 2 times daily as needed for anxiety or other (adjuvant pain)  Qty: 60 tablet, Refills: 1    Associated Diagnoses: Sacroiliitis (H)      ipratropium - albuterol 0.5 mg/2.5 mg/3 mL (DUONEB) 0.5-2.5 (3) MG/3ML neb solution Take 1 vial (3 mLs) by nebulization 4 times daily as needed for wheezing  Qty: 360 mL, Refills: 1    Associated Diagnoses: Chronic bronchitis, unspecified chronic bronchitis type (H)      labetalol (NORMODYNE) 100 MG tablet 100mg in am, 200mg in pm.  Qty: 90 tablet, Refills: 1    Associated Diagnoses: Hypertension goal BP (blood pressure) < 140/90      losartan (COZAAR) 50 MG tablet Take 1 tablet (50 mg) by mouth daily  Qty: 30 tablet, Refills: 1    Associated Diagnoses: Hypertension goal BP (blood pressure) < 140/90      magnesium oxide (MAG-OX) 400 MG tablet Take 1 tablet (400 mg) by mouth daily  Qty: 30 tablet, Refills: 1    Associated Diagnoses: Alcohol dependence with unspecified alcohol-induced disorder (H)      menthol (ICY HOT) 5 % PTCH Apply 1 patch topically every 8 hours as needed for muscle soreness  Qty: 30 patch, Refills: 1    Associated Diagnoses: Muscle soreness      multivitamin, therapeutic with minerals (THERA-VIT-M) TABS tablet Take 1 tablet by mouth daily  Qty: 30 each, Refills: 1    Associated Diagnoses: Alcohol dependence with uncomplicated withdrawal (H)      naltrexone  (DEPADE;REVIA) 50 MG tablet Take 1 tablet (50 mg) by mouth daily  Qty: 30 tablet, Refills: 0    Associated Diagnoses: Alcohol dependence with withdrawal with complication (H)      nicotine (NICOTROL) 10 MG Inhaler Inhale 6-16 Cartridges into the lungs daily as needed for smoking cessation  Qty: 180 each, Refills: 2    Associated Diagnoses: Encounter for smoking cessation counseling      !! nicotine polacrilex (NICORETTE) 2 MG gum CHEW AND PARK ONE PIECE INSIDE CHEEK AS NEEDED FOR SMOKING CESSATION. MAX OF 24 PIECES PER DAY  Qty: 770 each, Refills: 0    Associated Diagnoses: Encounter for smoking cessation counseling      !! nicotine polacrilex (NICORETTE) 2 MG gum Place 1 each (2 mg) inside cheek every hour as needed for smoking cessation  Qty: 30 tablet, Refills: 1    Associated Diagnoses: Tobacco abuse      omeprazole (PRILOSEC) 20 MG CR capsule Take 1 capsule (20 mg) by mouth 2 times daily (before meals)  Qty: 60 capsule, Refills: 1    Associated Diagnoses: Alcoholic gastritis with hemorrhage, unspecified chronicity      oyster shell calcium (OS-JENNIFER 500 MG [place]. [place]) 500 MG tablet Take 1 tablet (500 mg) by mouth daily  Qty: 30 tablet, Refills: 1    Associated Diagnoses: Alcohol dependence with unspecified alcohol-induced disorder (H)      potassium chloride SA (K-DUR/KLOR-CON M) 10 MEQ CR tablet Take 1 tablet (10 mEq) by mouth daily  Qty: 30 tablet, Refills: 1    Associated Diagnoses: Essential hypertension with goal blood pressure less than 140/90      rOPINIRole (REQUIP) 1 MG tablet Take 1 tablet (1 mg) by mouth 3 times daily  Qty: 90 tablet, Refills: 1    Associated Diagnoses: RLS (restless legs syndrome)      traZODone (DESYREL) 50 MG tablet Take 1 tablet (50 mg) by mouth At Bedtime  Qty: 90 tablet, Refills: 1    Associated Diagnoses: Recurrent major depressive disorder, remission status unspecified (H)      nicotine (NICODERM CQ) 21 MG/24HR 24 hr patch Place 1 patch onto the skin daily  Qty: 30 patch,  Refills: 1    Associated Diagnoses: Tobacco abuse      order for DME Equipment being ordered: Cane ()  Treatment Diagnosis: Impaired functional mobility  Qty: 1 each, Refills: 0    Associated Diagnoses: Alcohol abuse; Fall, initial encounter; Dehydration; Alcohol withdrawal syndrome with perceptual disturbance (H)       !! - Potential duplicate medications found. Please discuss with provider.                Consultations:   Consultation during this admission received from Chronic Pulm             Brief History of Illness:   Inga Ledesma is a 53 year old female history of COPD who presents with increased shortness of breath over 1 week.  She admits to ongoing smoking.  She states for 1 week she has had slowly progressing increased difficulty breathing.       Hospital Course:   1. COPD exacerbation: This is patient who presents in the ED with increased dyspnea x 1 week and was subsequently admitted for nebulization and steroid treatment.  At baseline, has chronic cough with yellowish phlegm and this is unchanged. She is a current everyday smoker. In the ED, VS: Tem 98.4, Pulse 104, /90, R 24, Initially in the ED SpO2 91% on room air, however after duoneb treatment O2 stats improved to % room air. LAB: Na 141, K 4.2, Cr 0.73, BUN 21. CBC shows normal wbc and mild anemia ( hgb 11.3) in the setting or iron deficiency anemia. Trop x 1 negative. EKG with sinus tachycardia otherwise normal. Chest Xray shows no acute airspace abnormalities. She has been treated with Albuterol, Duobneb, Pulmicort, Brovana, and Prednisone 40 mg overnight. Patient feels much better this morning. O2 saturating remaining % room air. She is afebrile. Repeat CBC and BMP are unremarkable.  Denies fever, chills, nausea, vomiting, chest pain, or abdominal pain. Patient was seen by the chronic pulmonary disease specialist. They offered smoking cessation counseling, sleep study to assess nocturnal hypoxia, follow up  "appointment will pulmonologist, however patient declined all of these recommendations. States that her COPD is optimally managed by PCP and will continue to follow him. She is discharged with Prednisone burst for 5 days. Advised to continue with home medications as before and follow up with PCP.         2. CHF: CHF initial diagnosis 2016 at [facility] but echo 6/2017 technically difficulty,  but Global and regional left ventricular function is normal with an EF of 60-65%. No regional wall motion abnormalities are seen.  -Continue with PTA  Lasix     3. HTN: Stable. Hypertensive in the ED. Patient had not taken PTA antihypertensives and was given in the ED with improvement of BP.   -Continue with PTA Labetalol, Losartan     4. 7) Cigarette smoking  - Nicotine patch  - Consult on smoking cessation      Chronic Medical Conditions:   GERD: Continue PTA PPI   DARSHAN: Hgb  11.3 on admission, this is at baseline  Labial cancer: In remission, follow up with PCP for ongoing care                Final Day of Progress before Discharge:       Physical Exam:  Blood pressure 175/89, pulse 98, temperature 98.6  F (37  C), temperature source Oral, resp. rate 16, height 1.549 m (5' 1\"), last menstrual period 06/02/2010, SpO2 98 %, not currently breastfeeding.    EXAM:  Physical Exam   Constitutional: Pt is oriented to person, place, and time.Pt appears well-developed and well-nourished.   Head: Normocephalic and atraumatic.   Eyes: Conjunctivae are normal. Pupils are equal, round, and reactive to light.   Neck: Normal range of motion. Neck supple.   Cardiovascular: Normal rate, regular rhythm, normal heart sounds and intact distal pulses.    Pulmonary/Chest: Effort normal and breath sounds normal. No respiratory distress. Pt has no wheezes. Pt has no rales  Abdominal: Soft. Bowel sounds are normal. Pt exhibits no distension and no mass. No tenderness. Pt has no rebound and no guarding.   Musculoskeletal: Normal range of motion. Pt exhibits no " edema.   Neurological: Pt is alert and oriented to person, place, and time. Normal reflexes.   Skin: Skin is warm and dry. No rash noted.   Psychiatric: Pt has a normal mood and affect. Behavior is normal. Judgment and thought content normal.             Data:  All laboratory data reviewed             Significant Results:   None  Results for orders placed or performed during the hospital encounter of 09/04/18   Chest XR,  PA & LAT    Narrative    Exam: XR CHEST 2 VW, 9/4/2018 6:50 AM    Indication: cough, SOB, COPD;     Comparison: Radiograph of the chest 6/19/2018    Findings:   PA and lateral views of the chest. The heart size is within normal  limits. Pulmonary vasculature is distinct. Mild bibasilar and  perihilar opacities. No pneumothorax or pleural effusion. The upper  abdomen is unremarkable. Mild wedging of midthoracic vertebral bodies.      Impression    Impression: No acute airspace opacity.    I have personally reviewed the examination and initial interpretation  and I agree with the findings.    RANDEE WOODY MD   CBC with platelets differential   Result Value Ref Range    WBC 6.3 4.0 - 11.0 10e9/L    RBC Count 3.76 (L) 3.8 - 5.2 10e12/L    Hemoglobin 11.3 (L) 11.7 - 15.7 g/dL    Hematocrit 35.3 35.0 - 47.0 %    MCV 94 78 - 100 fl    MCH 30.1 26.5 - 33.0 pg    MCHC 32.0 31.5 - 36.5 g/dL    RDW 15.3 (H) 10.0 - 15.0 %    Platelet Count 366 150 - 450 10e9/L    Diff Method Automated Method     % Neutrophils 78.0 %    % Lymphocytes 14.7 %    % Monocytes 4.5 %    % Eosinophils 2.1 %    % Basophils 0.5 %    % Immature Granulocytes 0.2 %    Nucleated RBCs 0 0 /100    Absolute Neutrophil 4.9 1.6 - 8.3 10e9/L    Absolute Lymphocytes 0.9 0.8 - 5.3 10e9/L    Absolute Monocytes 0.3 0.0 - 1.3 10e9/L    Absolute Eosinophils 0.1 0.0 - 0.7 10e9/L    Absolute Basophils 0.0 0.0 - 0.2 10e9/L    Abs Immature Granulocytes 0.0 0 - 0.4 10e9/L    Absolute Nucleated RBC 0.0    Basic metabolic panel   Result Value Ref Range     Sodium 141 133 - 144 mmol/L    Potassium 4.2 3.4 - 5.3 mmol/L    Chloride 106 94 - 109 mmol/L    Carbon Dioxide 25 20 - 32 mmol/L    Anion Gap 10 3 - 14 mmol/L    Glucose 89 70 - 99 mg/dL    Urea Nitrogen 21 7 - 30 mg/dL    Creatinine 0.73 0.52 - 1.04 mg/dL    GFR Estimate 83 >60 mL/min/1.7m2    GFR Estimate If Black >90 >60 mL/min/1.7m2    Calcium 8.2 (L) 8.5 - 10.1 mg/dL   Troponin I   Result Value Ref Range    Troponin I ES <0.015 0.000 - 0.045 ug/L   NT proBNP inpatient and ED   Result Value Ref Range    N-Terminal Pro BNP Inpatient 734 0 - 900 pg/mL   Troponin I   Result Value Ref Range    Troponin I ES <0.015 0.000 - 0.045 ug/L   Basic metabolic panel   Result Value Ref Range    Sodium Canceled, Test credited 133 - 144 mmol/L    Potassium Canceled, Test credited 3.4 - 5.3 mmol/L    Chloride Canceled, Test credited 94 - 109 mmol/L    Carbon Dioxide Canceled, Test credited 20 - 32 mmol/L    Anion Gap Canceled, Test credited 6 - 17 mmol/L    Glucose Canceled, Test credited 70 - 99 mg/dL    Urea Nitrogen Canceled, Test credited 7 - 30 mg/dL    Creatinine Canceled, Test credited 0.52 - 1.04 mg/dL    GFR Estimate Canceled, Test credited >60 mL/min/1.7m2    GFR Estimate If Black Canceled, Test credited >60 mL/min/1.7m2    Calcium Canceled, Test credited 8.5 - 10.1 mg/dL   CBC with platelets   Result Value Ref Range    WBC Canceled, Test credited 4.0 - 11.0 10e9/L    RBC Count Canceled, Test credited 3.8 - 5.2 10e12/L    Hemoglobin Canceled, Test credited 11.7 - 15.7 g/dL    Hematocrit Canceled, Test credited 35.0 - 47.0 %    MCV Canceled, Test credited 78 - 100 fl    MCH Canceled, Test credited 26.5 - 33.0 pg    MCHC Canceled, Test credited 31.5 - 36.5 g/dL    RDW Canceled, Test credited 10.0 - 15.0 %    Platelet Count Canceled, Test credited 150 - 450 10e9/L   Basic metabolic panel   Result Value Ref Range    Sodium 137 133 - 144 mmol/L    Potassium 3.7 3.4 - 5.3 mmol/L    Chloride 104 94 - 109 mmol/L    Carbon  Dioxide 25 20 - 32 mmol/L    Anion Gap 7 3 - 14 mmol/L    Glucose 94 70 - 99 mg/dL    Urea Nitrogen 16 7 - 30 mg/dL    Creatinine 0.66 0.52 - 1.04 mg/dL    GFR Estimate >90 >60 mL/min/1.7m2    GFR Estimate If Black >90 >60 mL/min/1.7m2    Calcium 8.5 8.5 - 10.1 mg/dL   CBC with platelets   Result Value Ref Range    WBC 5.7 4.0 - 11.0 10e9/L    RBC Count 3.46 (L) 3.8 - 5.2 10e12/L    Hemoglobin 10.5 (L) 11.7 - 15.7 g/dL    Hematocrit 32.5 (L) 35.0 - 47.0 %    MCV 94 78 - 100 fl    MCH 30.3 26.5 - 33.0 pg    MCHC 32.3 31.5 - 36.5 g/dL    RDW 15.3 (H) 10.0 - 15.0 %    Platelet Count 353 150 - 450 10e9/L   EKG 12-lead, tracing only   Result Value Ref Range    Interpretation ECG Click View Image link to view waveform and result    EKG 12 lead   Result Value Ref Range    Interpretation ECG Click View Image link to view waveform and result       Recent Results (from the past 48 hour(s))   Chest XR,  PA & LAT    Narrative    Exam: XR CHEST 2 VW, 9/4/2018 6:50 AM    Indication: cough, SOB, COPD;     Comparison: Radiograph of the chest 6/19/2018    Findings:   PA and lateral views of the chest. The heart size is within normal  limits. Pulmonary vasculature is distinct. Mild bibasilar and  perihilar opacities. No pneumothorax or pleural effusion. The upper  abdomen is unremarkable. Mild wedging of midthoracic vertebral bodies.      Impression    Impression: No acute airspace opacity.    I have personally reviewed the examination and initial interpretation  and I agree with the findings.    RANDEE WOODY MD                Pending Results:   Unresulted Labs Ordered in the Past 30 Days of this Admission     No orders found for last 61 day(s).                  Discharge Instructions and Follow-Up:   No discharge procedures on file.       Attestation:  Haritha Henley PA-C

## 2018-09-11 NOTE — PROGRESS NOTES
Clinic Care Coordination Contact  [facility]/Voicemail    Referral Source: IP Report  Clinical Data: Care Coordinator Outreach  Outreach attempted x 2.  Left message on voicemail with call back information and requested return call.  Plan: Care Coordinator will mail out care coordination introduction letter with care coordinator contact information and explanation of care coordination services. Care Coordination to remain available for future needs. Follow up planned for next month unless otherwise notified.     Cuco Crane RN  Clinic Care Coordinator  [phone] or [phone]        .

## 2018-09-13 ENCOUNTER — TELEPHONE (OUTPATIENT)
Dept: FAMILY MEDICINE | Facility: CLINIC | Age: 53
End: 2018-09-13

## 2018-09-13 NOTE — TELEPHONE ENCOUNTER
Spoke with pt and she said she was in the hospital last week and has been out of work since. Pt would like a note to return to work tomorrow. Instructed pt that the usual protocol is to make an appt so the provider can assess following an ER visit and pt stated she can not come in for an appointment and needs to go back to work tomorrow or she will get fired.  Will send to pcp to review and advise.  Work fax is [phone]

## 2018-09-14 ENCOUNTER — OFFICE VISIT (OUTPATIENT)
Dept: FAMILY MEDICINE | Facility: CLINIC | Age: 53
End: 2018-09-14
Payer: COMMERCIAL

## 2018-09-14 VITALS
HEIGHT: 61 IN | RESPIRATION RATE: 20 BRPM | WEIGHT: 165 LBS | SYSTOLIC BLOOD PRESSURE: 168 MMHG | BODY MASS INDEX: 31.15 KG/M2 | TEMPERATURE: 98.5 F | OXYGEN SATURATION: 98 % | DIASTOLIC BLOOD PRESSURE: 104 MMHG | HEART RATE: 100 BPM

## 2018-09-14 DIAGNOSIS — F10.239 ALCOHOL DEPENDENCE WITH WITHDRAWAL WITH COMPLICATION (H): Primary | ICD-10-CM

## 2018-09-14 DIAGNOSIS — I10 HYPERTENSION GOAL BP (BLOOD PRESSURE) < 140/90: ICD-10-CM

## 2018-09-14 PROCEDURE — 99214 OFFICE O/P EST MOD 30 MIN: CPT | Performed by: PHYSICIAN ASSISTANT

## 2018-09-14 RX ORDER — LOSARTAN POTASSIUM 100 MG/1
100 TABLET ORAL DAILY
Qty: 90 TABLET | Refills: 1 | Status: SHIPPED | OUTPATIENT
Start: 2018-09-14 | End: 2019-10-30

## 2018-09-14 RX ORDER — LABETALOL 100 MG/1
200 TABLET, FILM COATED ORAL 2 TIMES DAILY
Qty: 360 TABLET | Refills: 1 | Status: SHIPPED | OUTPATIENT
Start: 2018-09-14 | End: 2020-04-20

## 2018-09-14 RX ORDER — CHLORDIAZEPOXIDE HYDROCHLORIDE 5 MG/1
5 CAPSULE, GELATIN COATED ORAL 3 TIMES DAILY PRN
Qty: 30 CAPSULE | Refills: 1 | Status: SHIPPED | OUTPATIENT
Start: 2018-09-14 | End: 2019-01-14

## 2018-09-14 NOTE — PROGRESS NOTES
SUBJECTIVE:   Inga Ledesma is a 53 year old female who presents to clinic today for the following health issues:      ED/UC Followup:    Facility:  [facility]  Date of visit: 09/04/18  Reason for visit: COPD  Current Status: no improvement       Withdrawal symptoms for the past couple of days. Did not take her blood pressure meds this am. Hasn't taken meds all week until yesterday. Once her withdrawal symptoms resolve she may restart her naltrexone.  No diarrhea or blood in her stools.     Problem list and histories reviewed & adjusted, as indicated.  Additional history: as documented    BP Readings from Last 3 Encounters:   09/14/18 (!) 168/104   09/05/18 175/89   08/14/18 146/86    Wt Readings from Last 3 Encounters:   09/14/18 165 lb (74.8 kg)   08/14/18 168 lb 3.2 oz (76.3 kg)   06/19/18 161 lb (73 kg)                    Reviewed and updated as needed this visit by clinical staff  Tobacco  Allergies  Meds       Reviewed and updated as needed this visit by Provider         All other systems negative except as outline above  OBJECTIVE:  Eye exam - right eye normal lid, conjunctiva, cornea, pupil and fundus, left eye normal lid, conjunctiva, cornea, pupil and fundus.  Thyroid not palpable, not enlarged, no nodules detected.  CHEST:chest clear to IPPA, no tachypnea, retractions or cyanosis and S1, S2 normal, no murmur, no gallop, rate regular.  No ascites. No edema  Inga was seen today for hospital f/u.    Diagnoses and all orders for this visit:    Alcohol dependence with withdrawal with complication (H)  -     chlordiazePOXIDE (LIBRIUM) 5 MG capsule; Take 1 capsule (5 mg) by mouth 3 times daily as needed for anxiety or withdrawal    Hypertension goal BP (blood pressure) < 140/90  -     labetalol (NORMODYNE) 100 MG tablet; Take 2 tablets (200 mg) by mouth 2 times daily  -     losartan (COZAAR) 100 MG tablet; Take 1 tablet (100 mg) by mouth daily      work on lifestyle modification  Patient urged to remain  sober.   Recheck in 10 days

## 2018-09-14 NOTE — LETTER
[facility] [place]  [address] [place] [address]  Phone: [phone]    September 14, 2018        Inga Ledesma  [address]  [place] [address]          To whom it may concern:    RE: Inga Ledesma    Patient was seen and treated today at our clinic. Inga may return to work on 9/17/18.    Please contact me for questions or concerns.      Sincerely,        Min Toledo PA-C

## 2018-09-14 NOTE — MR AVS SNAPSHOT
After Visit Summary   9/14/2018    Inga Ledesma    MRN: [id number]           Patient Information     Date Of Birth          1965        Visit Information        Provider Department      9/14/2018 10:40 AM Min Toledo PA-C [facility]        Today's Diagnoses     Alcohol dependence with withdrawal with complication (H)    -  1    Hypertension goal BP (blood pressure) < 140/90           Follow-ups after your visit        Your next 10 appointments already scheduled     Sep 24, 2018  2:00 PM CDT   (Arrive by 1:30 PM)   New Visit with Jaclyn Crenshaw [facility] ([facility])    [address] [place] [id number]   [phone]            Oct 17, 2018  4:00 PM CDT   Return Visit with Jaclyn Crenshaw [facility] ([facility])    [address] [place] [id number]   [phone]              Who to contact     Normal or non-critical lab and imaging results will be communicated to you by [email or web address], letter or phone within 4 business days after the clinic has received the results. If you do not hear from us within 7 days, please contact the clinic through [employer] or phone. If you have a critical or abnormal lab result, we will notify you by phone as soon as possible.  Submit refill requests through Paradine or call your pharmacy and they will forward the refill request to us. Please allow 3 business days for your refill to be completed.          If you need to speak with a  for additional information , please call: [phone]             Additional Information About Your Visit        Paradine Information     Paradine gives you secure access to your electronic health record. If you see a primary care provider, you can also send messages to your care team and make appointments. If you have questions, please call your primary care clinic.  If you do not have a primary care provider,  "please call [phone] and they will assist you.        Care EveryWhere ID     This is your Care EveryWhere ID. This could be used by other organizations to access your [place] medical records  [id number]        Your Vitals Were     Pulse Temperature Respirations Height Last Period Pulse Oximetry    100 98.5  F (36.9  C) (Tympanic) 20 5' 1\" (1.549 m) 06/02/2010 98%    BMI (Body Mass Index)                   31.18 kg/m2            Blood Pressure from Last 3 Encounters:   09/14/18 (!) 168/104   09/05/18 175/89   08/14/18 146/86    Weight from Last 3 Encounters:   09/14/18 165 lb (74.8 kg)   08/14/18 168 lb 3.2 oz (76.3 kg)   06/19/18 161 lb (73 kg)              Today, you had the following     No orders found for display         Today's Medication Changes          These changes are accurate as of 9/14/18 11:57 AM.  If you have any questions, ask your nurse or doctor.               Start taking these medicines.        Dose/Directions    chlordiazePOXIDE 5 MG capsule   Commonly known as:  LIBRIUM   Used for:  Alcohol dependence with withdrawal with complication (H)   Started by:  Min Toledo PA-C        Dose:  5 mg   Take 1 capsule (5 mg) by mouth 3 times daily as needed for anxiety or withdrawal   Quantity:  30 capsule   Refills:  1         These medicines have changed or have updated prescriptions.        Dose/Directions    labetalol 100 MG tablet   Commonly known as:  NORMODYNE   This may have changed:    - how much to take  - how to take this  - when to take this  - additional instructions   Used for:  Hypertension goal BP (blood pressure) < 140/90   Changed by:  Min Toledo PA-C        Dose:  200 mg   Take 2 tablets (200 mg) by mouth 2 times daily   Quantity:  360 tablet   Refills:  1       losartan 100 MG tablet   Commonly known as:  COZAAR   This may have changed:    - medication strength  - how much to take   Used for:  Hypertension goal BP (blood pressure) < 140/90   Changed by:  Min Toledo" BENI Dumont        Dose:  100 mg   Take 1 tablet (100 mg) by mouth daily   Quantity:  90 tablet   Refills:  1            Where to get your medicines      These medications were sent to [facility] [address] - [address] [facility]  [address] [facility] [address]     Phone:  [phone]     labetalol 100 MG tablet    losartan 100 MG tablet         Some of these will need a paper prescription and others can be bought over the counter.  Ask your nurse if you have questions.     Bring a paper prescription for each of these medications     chlordiazePOXIDE 5 MG capsule                Primary Care Provider Office Phone # Fax #    Min Julia Toledo PA-C [phone]       [address]  [place] [address]        Goals        General    I will use the walker at all times or a cane in tight spots to prevent further falls. (pt-stated)     Notes - Note created  12/9/2015  8:48 AM by Sonny Jacobs RN    As of today's date 12/9/2015 goal is met at 0 - 25%.   Goal Status:  Active        Medical (pt-stated)     Notes - Note created  6/4/2018 12:28 PM by Jasiel Crane RN    I will follow up with my PCP as directed ongoing.         Equal Access to Services     ИРИНА KERNS AH: Hadii chang ku hadasho Soomaali, waaxda luqadaha, qaybta kaalmada adeegyada, waxay mayte hayhenrry abdi . [facility] [phone].    ATENCIÓN: Si habla español, tiene a cook disposición servicios gratuitos de asistencia lingüística. Llame al [phone].    We comply with applicable federal civil rights laws and [place] laws. We do not discriminate on the basis of race, color, national origin, age, disability, sex, sexual orientation, or gender identity.            Thank you!     Thank you for choosing [facility]  for your care. Our goal is always to provide you with excellent care. Hearing back from our patients is one way we can continue to improve our services. Please take a few minutes  to complete the written survey that you may receive in the mail after your visit with us. Thank you!             Your Updated Medication List - Protect others around you: Learn how to safely use, store and throw away your medicines at www.disposemymeds.org.          This list is accurate as of 9/14/18 11:57 AM.  Always use your most recent med list.                   Brand Name Dispense Instructions for use Diagnosis    acetaminophen 325 MG tablet    TYLENOL    60 tablet    Take 1 tablet (325 mg) by mouth every 4 hours as needed for mild pain    Chronic pain syndrome       albuterol 108 (90 Base) MCG/ACT inhaler    VENTOLIN HFA    1 Inhaler    Inhale 1-2 puffs into the lungs every 4 hours as needed for shortness of breath / dyspnea or wheezing    Chronic bronchitis, unspecified chronic bronchitis type (H)       calcium carbonate 500 mg {elemental} 500 MG tablet    OSCAL;OYSTER SHELL CALCIUM    30 tablet    Take 1 tablet (500 mg) by mouth daily    Alcohol dependence with unspecified alcohol-induced disorder (H)       chlordiazePOXIDE 5 MG capsule    LIBRIUM    30 capsule    Take 1 capsule (5 mg) by mouth 3 times daily as needed for anxiety or withdrawal    Alcohol dependence with withdrawal with complication (H)       DULoxetine 60 MG EC capsule    CYMBALTA    30 capsule    Take 1 capsule (60 mg) by mouth daily    Recurrent major depressive disorder, remission status unspecified (H)       FLUoxetine 40 MG capsule    PROzac    180 capsule    Take 1 capsule (40 mg) by mouth 2 times daily    Major depressive disorder, recurrent episode, mild (H)       fluticasone-salmeterol 500-50 MCG/DOSE diskus inhaler    ADVAIR DISKUS    1 Inhaler    Inhale 1 puff into the lungs every 12 hours    Chronic bronchitis, unspecified chronic bronchitis type (H)       folic acid 1 MG tablet    FOLVITE    30 tablet    Take 1 tablet (1 mg) by mouth daily    Alcohol dependence with uncomplicated withdrawal (H)       furosemide 20 MG tablet     LASIX    90 tablet    Take 1 tablet (20 mg) by mouth daily    Essential hypertension with goal blood pressure less than 140/90       gabapentin 300 MG capsule    NEURONTIN    120 capsule    Take 1 capsule by mouth in the morning, 1 capsule in the afternoon, and 2 capsules at bedtime.    Major depressive disorder, recurrent episode, mild (H), Sacroiliitis (H)       hydrOXYzine 25 MG tablet    ATARAX    60 tablet    Take 1 tablet (25 mg) by mouth 2 times daily as needed for anxiety or other (adjuvant pain)    Sacroiliitis (H)       ipratropium - albuterol 0.5 mg/2.5 mg/3 mL 0.5-2.5 (3) MG/3ML neb solution    DUONEB    360 mL    Take 1 vial (3 mLs) by nebulization 4 times daily as needed for wheezing    Chronic bronchitis, unspecified chronic bronchitis type (H)       labetalol 100 MG tablet    NORMODYNE    360 tablet    Take 2 tablets (200 mg) by mouth 2 times daily    Hypertension goal BP (blood pressure) < 140/90       losartan 100 MG tablet    COZAAR    90 tablet    Take 1 tablet (100 mg) by mouth daily    Hypertension goal BP (blood pressure) < 140/90       magnesium oxide 400 MG tablet    MAG-OX    30 tablet    Take 1 tablet (400 mg) by mouth daily    Alcohol dependence with unspecified alcohol-induced disorder (H)       menthol 5 % Ptch    ICY HOT    30 patch    Apply 1 patch topically every 8 hours as needed for muscle soreness    Muscle soreness       multivitamin, therapeutic with minerals Tabs tablet     30 each    Take 1 tablet by mouth daily    Alcohol dependence with uncomplicated withdrawal (H)       naltrexone 50 MG tablet    DEPADE;REVIA    30 tablet    Take 1 tablet (50 mg) by mouth daily    Alcohol dependence with withdrawal with complication (H)       * nicotine 21 MG/24HR 24 hr patch    NICODERM CQ    30 patch    Place 1 patch onto the skin daily    Tobacco abuse       * nicotine 10 MG Inhaler    NICOTROL    180 each    Inhale 6-16 Cartridges into the lungs daily as needed for smoking cessation     Encounter for smoking cessation counseling       * nicotine polacrilex 2 MG gum    NICORETTE    30 tablet    Place 1 each (2 mg) inside cheek every hour as needed for smoking cessation    Tobacco abuse       * nicotine polacrilex 2 MG gum    NICORETTE    770 each    CHEW AND PARK ONE PIECE INSIDE CHEEK AS NEEDED FOR SMOKING CESSATION. MAX OF 24 PIECES PER DAY    Encounter for smoking cessation counseling       omeprazole 20 MG CR capsule    priLOSEC    60 capsule    Take 1 capsule (20 mg) by mouth 2 times daily (before meals)    Alcoholic gastritis with hemorrhage, unspecified chronicity       order for DME     1 each    Equipment being ordered: Cane () Treatment Diagnosis: Impaired functional mobility    Alcohol abuse, Fall, initial encounter, Dehydration, Alcohol withdrawal syndrome with perceptual disturbance (H)       potassium chloride SA 10 MEQ CR tablet    K-DUR/KLOR-CON M    30 tablet    Take 1 tablet (10 mEq) by mouth daily    Essential hypertension with goal blood pressure less than 140/90       rOPINIRole 1 MG tablet    REQUIP    90 tablet    Take 1 tablet (1 mg) by mouth 3 times daily    RLS (restless legs syndrome)       traZODone 50 MG tablet    DESYREL    90 tablet    Take 1 tablet (50 mg) by mouth At Bedtime    Recurrent major depressive disorder, remission status unspecified (H)       * Notice:  This list has 4 medication(s) that are the same as other medications prescribed for you. Read the directions carefully, and ask your doctor or other care provider to review them with you.

## 2018-10-11 ENCOUNTER — PATIENT OUTREACH (OUTPATIENT)
Dept: CARE COORDINATION | Facility: CLINIC | Age: 53
End: 2018-10-11

## 2018-10-11 NOTE — PROGRESS NOTES
Clinic Care Coordination Contact  [facility]/Voicemail    Referral Source: IP Report  Clinical Data: Care Coordinator Outreach  Outreach attempted x 1.  Left message on voicemail with call back information and requested return call.  Plan: Care Coordinator mailed out care coordination introduction letter on 9-11. Care Coordinator will try to reach patient again in 1-2 business days.  Cuco Crane RN  Clinic Care Coordinator  [phone] or [phone]

## 2018-10-16 NOTE — PROGRESS NOTES
Clinic Care Coordination Contact  [facility]/Voicemail    Referral Source: IP Report  Clinical Data: Care Coordinator Outreach  Outreach attempted x 2.  Left message on voicemail with call back information and requested return call.  Plan: Care Coordinator mailed out care coordination introduction letter on 9-11. Care Coordinator will do no further outreaches at this time.  Cuco Crane RN  Clinic Care Coordinator  [phone] or [phone]

## 2018-10-26 ENCOUNTER — TELEPHONE (OUTPATIENT)
Dept: FAMILY MEDICINE | Facility: CLINIC | Age: 53
End: 2018-10-26

## 2018-10-26 NOTE — LETTER
October 26, 2018      Inga Ledesma  [address]  [place] [address]        To Whom It May Concern:      Inga Ledesma was seen in our clinic and has been off work due to illness. She may return to work without restrictions.      Sincerely,        Min Toledo PA-C

## 2018-10-26 NOTE — TELEPHONE ENCOUNTER
Patient calling is currently at work and work is requesting a note stating ok to return to work today. Has been off for her COPD. Has an hour to get this faxed in would like note faxed to [phone] states attach a privacy page and fax attention Inga Ledesma.

## 2018-11-16 ENCOUNTER — TRANSFERRED RECORDS (OUTPATIENT)
Dept: HEALTH INFORMATION MANAGEMENT | Facility: CLINIC | Age: 53
End: 2018-11-16

## 2018-11-23 DIAGNOSIS — Z71.6 ENCOUNTER FOR SMOKING CESSATION COUNSELING: ICD-10-CM

## 2018-11-23 DIAGNOSIS — J42 CHRONIC BRONCHITIS, UNSPECIFIED CHRONIC BRONCHITIS TYPE (H): ICD-10-CM

## 2018-11-23 NOTE — TELEPHONE ENCOUNTER
"Requested Prescriptions   Pending Prescriptions Disp Refills     VENTOLIN  (90 Base) MCG/ACT inhaler [Pharmacy Med Name: VENTOLIN HFA 108MCG/ACT AERS] 18 g 0    Last Written Prescription Date:  08/28/18  Last Fill Quantity: 18,  # refills: 0   Last office visit: 9/14/2018 with prescribing provider:  SARITA Toledo   Future Office Visit:     Sig: INHALE ONE TO TWO PUFFS BY MOUTH EVERY 4 HOURS AS NEEDED FOR SHORTNESS OF BREATH OR WHEEZING    Asthma Maintenance Inhalers - Anticholinergics Failed    11/23/2018  4:49 PM       Failed - Asthma control assessment score within normal limits in last 6 months    Please review ACT score.          Passed - Patient is age 12 years or older       Passed - Recent (6 mo) or future (30 days) visit within the authorizing provider's specialty    Patient had office visit in the last 6 months or has a visit in the next 30 days with authorizing provider or within the authorizing provider's specialty.  See \"Patient Info\" tab in inbasket, or \"Choose Columns\" in Meds & Orders section of the refill encounter.            SPIRIVA HANDIHALER 18 MCG inhaled capsule [Pharmacy Med Name: SPIRIVA HANDIHALER 18MCG CAPS] 30 capsule 1    Last Written Prescription Date:  08/21/18  Last Fill Quantity: 30,  # refills: 1   Last office visit: 9/14/2018 with prescribing provider:  SARITA Toledo   Future Office Visit:     Sig: USING THE HANDIHALER, INHALE THE CONTENTS OF ONE CAPSULE BY MOUTH DAILY    Asthma Maintenance Inhalers - Anticholinergics Failed    11/23/2018  4:49 PM       Failed - Asthma control assessment score within normal limits in last 6 months    Please review ACT score.          Passed - Patient is age 12 years or older       Passed - Recent (6 mo) or future (30 days) visit within the authorizing provider's specialty    Patient had office visit in the last 6 months or has a visit in the next 30 days with authorizing provider or within the authorizing provider's specialty.  See \"Patient Info\" tab " "in inbasket, or \"Choose Columns\" in Meds & Orders section of the refill encounter.            ADVAIR DISKUS 500-50 MCG/DOSE inhaler [Pharmacy Med Name: ADVAIR DISKUS 500-50MCG/DOSE AEPB]  0    Last Written Prescription Date:  08/28/18  Last Fill Quantity: 60,  # refills: 0   Last office visit: 9/14/2018 with prescribing provider:  SARITA Toledo   Future Office Visit:     Sig: INHALE ONE PUFF BY MOUTH EVERY 12 HOURS    Inhaled Steroids Protocol Failed    11/23/2018  4:49 PM       Failed - Asthma control assessment score within normal limits in last 6 months    Please review ACT score.          Passed - Patient is age 12 or older       Passed - Recent (6 mo) or future (30 days) visit within the authorizing provider's specialty    Patient had office visit in the last 6 months or has a visit in the next 30 days with authorizing provider or within the authorizing provider's specialty.  See \"Patient Info\" tab in inYoubei Gamesket, or \"Choose Columns\" in Meds & Orders section of the refill encounter.            nicotine polacrilex (NICORETTE) 2 MG gum [Pharmacy Med Name: NICOTINE POLACRILEX 2MG GUM] 770 each 0    Last Written Prescription Date:  08/24/18  Last Fill Quantity: 770,  # refills: 0   Last office visit: 9/14/2018 with prescribing provider:  SARITA Toledo   Future Office Visit:     Sig: CHEW AND PARK ONE PIECE INSIDE CHEEK AS NEEDED FOR SMOKING CESSATION. MAX OF 24 PIECES PER DAY    Partial Cholinergic Nicotinic Agonist Agents Failed    11/23/2018  4:49 PM       Failed - Blood pressure under 140/90 in past 12 months    BP Readings from Last 3 Encounters:   09/14/18 (!) 168/104   09/05/18 175/89   08/14/18 146/86                Passed - Recent (12 mo) or future (30 days) visit within the authorizing provider's specialty    Patient had office visit in the last 12 months or has a visit in the next 30 days with authorizing provider or within the authorizing provider's specialty.  See \"Patient Info\" tab in inbasket, or \"Choose Columns\" " in Meds & Orders section of the refill encounter.             Passed - Patient is 18 years of age or older       Passed - Patient is not pregnant       Passed - No positive pregnancy test on file in past 12 months

## 2018-11-26 NOTE — TELEPHONE ENCOUNTER
Routing refill request to provider for review/approval because:  Meds are listed as for use for chronic bronchitis. No ACT's documented.    Noting patient admitted to hospital on 11/16 and is non-complaint with follow up.    Routing to PCP to advise.    Cintia Locke, RN, BSN

## 2018-11-27 RX ORDER — TIOTROPIUM BROMIDE 18 UG/1
CAPSULE ORAL; RESPIRATORY (INHALATION)
Qty: 30 CAPSULE | Refills: 0 | Status: SHIPPED | OUTPATIENT
Start: 2018-11-27 | End: 2018-12-28

## 2018-11-27 RX ORDER — ALBUTEROL SULFATE 90 UG/1
AEROSOL, METERED RESPIRATORY (INHALATION)
Qty: 18 G | Refills: 0 | Status: SHIPPED | OUTPATIENT
Start: 2018-11-27 | End: 2018-12-28

## 2018-11-28 DIAGNOSIS — M46.1 SACROILIITIS (H): ICD-10-CM

## 2018-11-28 DIAGNOSIS — G25.81 RLS (RESTLESS LEGS SYNDROME): ICD-10-CM

## 2018-11-28 DIAGNOSIS — J42 CHRONIC BRONCHITIS, UNSPECIFIED CHRONIC BRONCHITIS TYPE (H): ICD-10-CM

## 2018-11-28 DIAGNOSIS — F10.230 ALCOHOL DEPENDENCE WITH UNCOMPLICATED WITHDRAWAL (H): ICD-10-CM

## 2018-11-28 DIAGNOSIS — F33.9 RECURRENT MAJOR DEPRESSIVE DISORDER, REMISSION STATUS UNSPECIFIED (H): ICD-10-CM

## 2018-11-28 DIAGNOSIS — K29.21 ALCOHOLIC GASTRITIS WITH HEMORRHAGE, UNSPECIFIED CHRONICITY: ICD-10-CM

## 2018-11-28 DIAGNOSIS — F33.0 MAJOR DEPRESSIVE DISORDER, RECURRENT EPISODE, MILD (H): ICD-10-CM

## 2018-11-28 NOTE — TELEPHONE ENCOUNTER
Ropinirole  Last Written Prescription Date:  07/09/18  Last Fill Quantity: 90,   # refills: 1  Last Office Visit: 09/14/18  Future Office visit:        Duloxetine  Last Written Prescription Date:  07/09/18  Last Fill Quantity: 30,   # refills: 1  Last Office Visit: 09/14/18  Future Office visit:       Folic Acid  Last Written Prescription Date:  07/09/18  Last Fill Quantity: 30,   # refills: 1  Last Office Visit: 09/14/18  Future Office visit:        Gabapentin  Last Written Prescription Date:  07/09/18  Last Fill Quantity: 120,   # refills: 1  Last Office Visit: 09/14/18  Future Office visit:       Hydroxyzine  Last Written Prescription Date:  07/09/18  Last Fill Quantity: 60,   # refills: 1  Last Office Visit: 09/14/18  Future Office visit:       Omeprazole  Last Written Prescription Date:  07/09/18  Last Fill Quantity: 60,   # refills: 1  Last Office Visit: 09/14/18  Future Office visit:       Ipratropium   Last Written Prescription Date:  07/09/18  Last Fill Quantity: 360ml,   # refills: 1  Last Office Visit: 09/14/18  Future Office visit:         Patients insurance ends on 11/30/18 so she needs them as soon as possible.    Thank You,  Grisel Chadwick, Pharmacy Tech  [facility]

## 2018-11-29 RX ORDER — FOLIC ACID 1 MG/1
1 TABLET ORAL DAILY
Qty: 30 TABLET | Refills: 1 | Status: SHIPPED | OUTPATIENT
Start: 2018-11-29 | End: 2019-02-12

## 2018-11-29 RX ORDER — HYDROXYZINE HYDROCHLORIDE 25 MG/1
25 TABLET, FILM COATED ORAL 2 TIMES DAILY PRN
Qty: 60 TABLET | Refills: 1 | Status: SHIPPED | OUTPATIENT
Start: 2018-11-29 | End: 2019-02-12

## 2018-11-29 RX ORDER — IPRATROPIUM BROMIDE AND ALBUTEROL SULFATE 2.5; .5 MG/3ML; MG/3ML
3 SOLUTION RESPIRATORY (INHALATION) 4 TIMES DAILY PRN
Qty: 360 ML | Refills: 1 | Status: SHIPPED | OUTPATIENT
Start: 2018-11-29 | End: 2019-02-12

## 2018-11-29 NOTE — TELEPHONE ENCOUNTER
Routing refill request to provider for review/approval because: REQUIP AND CYMBALTA   Labs out of range:  Blood pressures not <140/90, PHQ9 greater than 4 and no ACT score.  PHQ-9 SCORE 8/17/2016 3/10/2017 9/25/2017   PHQ-9 Total Score - - -   PHQ-9 Total Score 15 17 20     Routing refill request to provider for review/approval because:  Drug not on the [facility] refill protocol GABAPENTIN    Prescription approved per [facility] Refill Protocol: Folic Acid, Hydroxyzine, Omeprazole, Ipratropium.    Cintia Locke, RN, BSN

## 2018-11-30 RX ORDER — GABAPENTIN 300 MG/1
CAPSULE ORAL
Qty: 120 CAPSULE | Refills: 1 | Status: SHIPPED | OUTPATIENT
Start: 2018-11-30 | End: 2019-02-12

## 2018-11-30 RX ORDER — DULOXETIN HYDROCHLORIDE 60 MG/1
60 CAPSULE, DELAYED RELEASE ORAL DAILY
Qty: 30 CAPSULE | Refills: 1 | Status: SHIPPED | OUTPATIENT
Start: 2018-11-30 | End: 2019-02-12

## 2018-11-30 RX ORDER — ROPINIROLE 1 MG/1
1 TABLET, FILM COATED ORAL 3 TIMES DAILY
Qty: 90 TABLET | Refills: 1 | Status: SHIPPED | OUTPATIENT
Start: 2018-11-30 | End: 2019-10-30

## 2018-12-28 ENCOUNTER — TELEPHONE (OUTPATIENT)
Dept: FAMILY MEDICINE | Facility: CLINIC | Age: 53
End: 2018-12-28

## 2018-12-28 DIAGNOSIS — G89.29 CHRONIC BILATERAL LOW BACK PAIN WITHOUT SCIATICA: Primary | ICD-10-CM

## 2018-12-28 DIAGNOSIS — F10.239 ALCOHOL DEPENDENCE WITH WITHDRAWAL WITH COMPLICATION (H): ICD-10-CM

## 2018-12-28 DIAGNOSIS — M54.50 CHRONIC BILATERAL LOW BACK PAIN WITHOUT SCIATICA: Primary | ICD-10-CM

## 2018-12-28 DIAGNOSIS — J42 CHRONIC BRONCHITIS, UNSPECIFIED CHRONIC BRONCHITIS TYPE (H): ICD-10-CM

## 2018-12-28 NOTE — TELEPHONE ENCOUNTER
"Requested Prescriptions   Pending Prescriptions Disp Refills     naltrexone (DEPADE/REVIA) 50 MG tablet [Pharmacy Med Name: NALTREXONE HCL 50MG TABS] 30 tablet 0    Last Written Prescription Date:  11/23/18  Last Fill Quantity: 30,  # refills: 0   Last office visit: 9/14/2018 with prescribing provider:  MAXIMILIANO Toledo   Future Office Visit:   Sig: TAKE ONE TABLET BY MOUTH ONCE DAILY    There is no refill protocol information for this order        ibuprofen (ADVIL/MOTRIN) 800 MG tablet [Pharmacy Med Name: IBUPROFEN 800MG TABS] 42 tablet 0    Last Written Prescription Date:  11/23/18  Last Fill Quantity: 42,  # refills: 0   Last office visit: 9/14/2018 with prescribing provider:  MAXIMILIANO Toledo   Future Office Visit:   Sig: TAKE ONE TABLET BY MOUTH THREE TIMES A DAY WITH MEALS FOR 14 DAYS    NSAID Medications Failed - 12/28/2018  2:49 PM       Failed - Blood pressure under 140/90 in past 12 months    BP Readings from Last 3 Encounters:   09/14/18 (!) 168/104   09/05/18 175/89   08/14/18 146/86                Passed - Normal ALT on file in past 12 months    Recent Labs   Lab Test 07/07/18  0753   ALT 30            Passed - Normal AST on file in past 12 months    Recent Labs   Lab Test 07/07/18  0753   AST 27            Passed - Recent (12 mo) or future (30 days) visit within the authorizing provider's specialty    Patient had office visit in the last 12 months or has a visit in the next 30 days with authorizing provider or within the authorizing provider's specialty.  See \"Patient Info\" tab in inbasket, or \"Choose Columns\" in Meds & Orders section of the refill encounter.             Passed - Patient is age 6-64 years       Passed - Normal CBC on file in past 12 months    Recent Labs   Lab Test 09/05/18  0737   WBC Canceled, Test credited  5.7   RBC Canceled, Test credited  3.46*   HGB Canceled, Test credited  10.5*   HCT Canceled, Test credited  32.5*   PLT Canceled, Test credited  353                Passed - No active " pregnancy on record       Passed - Normal serum creatinine on file in past 12 months    Recent Labs   Lab Test 09/05/18  0737  06/22/16  1914   CR Canceled, Test credited  0.66   < >  --    CREAT  --   --  1.2*    < > = values in this interval not displayed.            Passed - No positive pregnancy test in past 12 months

## 2018-12-28 NOTE — TELEPHONE ENCOUNTER
"Requested Prescriptions   Pending Prescriptions Disp Refills     VENTOLIN  (90 Base) MCG/ACT inhaler [Pharmacy Med Name: VENTOLIN HFA 108MCG/ACT AERS] 18 g 0    Last Written Prescription Date:  11/27/18  Last Fill Quantity: 18 g,  # refills: 0   Last office visit: 9/14/2018 with prescribing provider:  MAXIMILIANO Toledo   Future Office Visit:   Sig: INHALE 1 TO 2 PUFFS INTO THE LUNGS EVERY 4 HOURS AS NEEDED FOR SHORTNESS OF BREATH,  WHEEZING.    Asthma Maintenance Inhalers - Anticholinergics Failed - 12/28/2018  1:36 PM       Failed - Asthma control assessment score within normal limits in last 6 months    Please review ACT score.          Passed - Patient is age 12 years or older       Passed - Recent (6 mo) or future (30 days) visit within the authorizing provider's specialty    Patient had office visit in the last 6 months or has a visit in the next 30 days with authorizing provider or within the authorizing provider's specialty.  See \"Patient Info\" tab in inbasket, or \"Choose Columns\" in Meds & Orders section of the refill encounter.            SPIRIVA HANDIHALER 18 MCG inhaled capsule [Pharmacy Med Name: SPIRIVA HANDIHALER 18MCG CAPS] 30 capsule 0    Last Written Prescription Date:  11/27/18  Last Fill Quantity: 30,  # refills: 0   Last office visit: 9/14/2018 with prescribing provider:  MAXIMILIANO Toledo   Future Office Visit:   Sig: USING THE HANDIHALER, INHALE THE CONTENTS OF ONE CAPSULE BY MOUTH DAILY    Asthma Maintenance Inhalers - Anticholinergics Failed - 12/28/2018  1:36 PM       Failed - Asthma control assessment score within normal limits in last 6 months    Please review ACT score.          Passed - Patient is age 12 years or older       Passed - Recent (6 mo) or future (30 days) visit within the authorizing provider's specialty    Patient had office visit in the last 6 months or has a visit in the next 30 days with authorizing provider or within the authorizing provider's specialty.  See \"Patient Info\" tab in " "inbasket, or \"Choose Columns\" in Meds & Orders section of the refill encounter.            "

## 2018-12-28 NOTE — TELEPHONE ENCOUNTER
Patient is requesting public assistance and is asking Min to complete a totally disability form. Not seen since 09/14/2018. Appointment needed? (form at TC desk)

## 2018-12-28 NOTE — TELEPHONE ENCOUNTER
Routing refill request to provider for review/approval because:  Drug not on the [facility] refill protocol   Labs out of range:  Blood pressures not <140/90    Last OV with Min: 9/14/18  Was in hospital for ETOH dependence/withdrawal 11/16    Cintia Locke, RN, BSN

## 2018-12-28 NOTE — TELEPHONE ENCOUNTER
Routing refill request to provider for review/approval because:  Patient needs to be seen because:  Overdue for follow up

## 2018-12-28 NOTE — TELEPHONE ENCOUNTER
Pt dropped off form to be completed   SERGE GRAF  Thanks              Informed out of clinic a few days

## 2018-12-29 RX ORDER — TIOTROPIUM BROMIDE 18 UG/1
CAPSULE ORAL; RESPIRATORY (INHALATION)
Qty: 30 CAPSULE | Refills: 0 | Status: SHIPPED | OUTPATIENT
Start: 2018-12-29 | End: 2019-02-12

## 2018-12-29 RX ORDER — ALBUTEROL SULFATE 90 UG/1
AEROSOL, METERED RESPIRATORY (INHALATION)
Qty: 18 G | Refills: 0 | Status: SHIPPED | OUTPATIENT
Start: 2018-12-29 | End: 2019-02-12

## 2018-12-29 RX ORDER — NALTREXONE HYDROCHLORIDE 50 MG/1
TABLET, FILM COATED ORAL
Qty: 30 TABLET | Refills: 0 | Status: SHIPPED | OUTPATIENT
Start: 2018-12-29 | End: 2019-02-12

## 2018-12-29 RX ORDER — IBUPROFEN 800 MG/1
TABLET, FILM COATED ORAL
Qty: 42 TABLET | Refills: 0 | Status: SHIPPED | OUTPATIENT
Start: 2018-12-29 | End: 2019-02-12

## 2019-01-08 NOTE — TELEPHONE ENCOUNTER
Appt with Min on 01/14/19 to complete forms, held in Min's in basket. Route message to Min on 01/13/19.

## 2019-01-14 ENCOUNTER — OFFICE VISIT (OUTPATIENT)
Dept: FAMILY MEDICINE | Facility: CLINIC | Age: 54
End: 2019-01-14
Payer: MEDICAID

## 2019-01-14 ENCOUNTER — ANCILLARY PROCEDURE (OUTPATIENT)
Dept: GENERAL RADIOLOGY | Facility: CLINIC | Age: 54
End: 2019-01-14
Payer: MEDICAID

## 2019-01-14 ENCOUNTER — PATIENT OUTREACH (OUTPATIENT)
Dept: CARE COORDINATION | Facility: CLINIC | Age: 54
End: 2019-01-14

## 2019-01-14 VITALS
RESPIRATION RATE: 16 BRPM | WEIGHT: 160 LBS | OXYGEN SATURATION: 98 % | BODY MASS INDEX: 30.21 KG/M2 | TEMPERATURE: 98.4 F | HEIGHT: 61 IN | SYSTOLIC BLOOD PRESSURE: 151 MMHG | HEART RATE: 78 BPM | DIASTOLIC BLOOD PRESSURE: 83 MMHG

## 2019-01-14 DIAGNOSIS — G89.29 CHRONIC BILATERAL LOW BACK PAIN WITHOUT SCIATICA: ICD-10-CM

## 2019-01-14 DIAGNOSIS — M54.50 CHRONIC BILATERAL LOW BACK PAIN WITHOUT SCIATICA: ICD-10-CM

## 2019-01-14 DIAGNOSIS — Z12.31 ENCOUNTER FOR SCREENING MAMMOGRAM FOR BREAST CANCER: ICD-10-CM

## 2019-01-14 DIAGNOSIS — I50.30 (HFPEF) HEART FAILURE WITH PRESERVED EJECTION FRACTION (H): ICD-10-CM

## 2019-01-14 DIAGNOSIS — M54.2 CERVICALGIA: ICD-10-CM

## 2019-01-14 DIAGNOSIS — I10 HYPERTENSION GOAL BP (BLOOD PRESSURE) < 140/90: ICD-10-CM

## 2019-01-14 DIAGNOSIS — Z12.11 SPECIAL SCREENING FOR MALIGNANT NEOPLASMS, COLON: ICD-10-CM

## 2019-01-14 DIAGNOSIS — Z11.3 SCREEN FOR STD (SEXUALLY TRANSMITTED DISEASE): Primary | ICD-10-CM

## 2019-01-14 DIAGNOSIS — J44.1 COPD EXACERBATION (H): ICD-10-CM

## 2019-01-14 DIAGNOSIS — F10.29 ALCOHOL DEPENDENCE WITH UNSPECIFIED ALCOHOL-INDUCED DISORDER (H): ICD-10-CM

## 2019-01-14 LAB
ALBUMIN SERPL-MCNC: 3.7 G/DL (ref 3.4–5)
ALP SERPL-CCNC: 115 U/L (ref 40–150)
ALT SERPL W P-5'-P-CCNC: 15 U/L (ref 0–50)
ANION GAP SERPL CALCULATED.3IONS-SCNC: 8 MMOL/L (ref 3–14)
AST SERPL W P-5'-P-CCNC: 11 U/L (ref 0–45)
BILIRUB SERPL-MCNC: 0.2 MG/DL (ref 0.2–1.3)
BUN SERPL-MCNC: 32 MG/DL (ref 7–30)
CALCIUM SERPL-MCNC: 8.9 MG/DL (ref 8.5–10.1)
CHLORIDE SERPL-SCNC: 98 MMOL/L (ref 94–109)
CO2 SERPL-SCNC: 26 MMOL/L (ref 20–32)
CREAT SERPL-MCNC: 1.22 MG/DL (ref 0.52–1.04)
GFR SERPL CREATININE-BSD FRML MDRD: 51 ML/MIN/{1.73_M2}
GLUCOSE SERPL-MCNC: 99 MG/DL (ref 70–99)
POTASSIUM SERPL-SCNC: 4.6 MMOL/L (ref 3.4–5.3)
PROT SERPL-MCNC: 7.6 G/DL (ref 6.8–8.8)
SODIUM SERPL-SCNC: 132 MMOL/L (ref 133–144)

## 2019-01-14 PROCEDURE — 36415 COLL VENOUS BLD VENIPUNCTURE: CPT | Performed by: PHYSICIAN ASSISTANT

## 2019-01-14 PROCEDURE — 72040 X-RAY EXAM NECK SPINE 2-3 VW: CPT

## 2019-01-14 PROCEDURE — 87389 HIV-1 AG W/HIV-1&-2 AB AG IA: CPT | Performed by: PHYSICIAN ASSISTANT

## 2019-01-14 PROCEDURE — 80053 COMPREHEN METABOLIC PANEL: CPT | Performed by: PHYSICIAN ASSISTANT

## 2019-01-14 PROCEDURE — 72100 X-RAY EXAM L-S SPINE 2/3 VWS: CPT

## 2019-01-14 PROCEDURE — 99214 OFFICE O/P EST MOD 30 MIN: CPT | Performed by: PHYSICIAN ASSISTANT

## 2019-01-14 RX ORDER — FELODIPINE 5 MG/1
5 TABLET, EXTENDED RELEASE ORAL DAILY
Qty: 30 TABLET | Refills: 1 | Status: SHIPPED | OUTPATIENT
Start: 2019-01-14 | End: 2019-10-15

## 2019-01-14 RX ORDER — MAGNESIUM OXIDE 400 MG/1
400 TABLET ORAL DAILY
Qty: 90 TABLET | Refills: 1 | Status: SHIPPED | OUTPATIENT
Start: 2019-01-14 | End: 2019-10-30

## 2019-01-14 ASSESSMENT — ACTIVITIES OF DAILY LIVING (ADL): DEPENDENT_IADLS:: INDEPENDENT

## 2019-01-14 ASSESSMENT — MIFFLIN-ST. JEOR: SCORE: 1273.14

## 2019-01-14 NOTE — LETTER
[facility] CARE COORDINATION  [facility]  [address] [place], [place] [address]  [phone]    January 14, 2019    Inga Ledesma  [address] [place] [address]      Dear Inga,    I am a clinic care coordinator who works with Min Toledo PA-C at [facility]. I wanted to thank you for spending the time to talk with me.  I wanted to introduce myself and provide you with my contact information so that you can call me with questions or concerns about your health care. Below is a description of clinic care coordination and how I can further assist you.     The clinic care coordinator is a registered nurse and/or  who understand the health care system. The goal of clinic care coordination is to help you manage your health and improve access to the [facility] system in the most efficient manner. The registered nurse can assist you in meeting your health care goals by providing education, coordinating services, and strengthening the communication among your providers. The  can assist you with financial, behavioral, psychosocial, chemical dependency, counseling, and/or psychiatric resources.    Please feel free to contact me at [phone], [phone], with any questions or concerns. We at [facility] are focused on providing you with the highest-quality healthcare experience possible and that all starts with you.     Sincerely,     Cuco Crane RN  Clinic Care Coordinator    Enclosed: I have enclosed a copy of a 24 Hour Access Plan. This has helpful phone numbers for you to call when needed. Please keep this in an easy to access place to use as needed.        Below are some resources you might find helpful.  TRANSPORTATION:  Transit Link     [phone] ([phone]  [place] Non Emergency Transportation     [phone],  [phone]  [facility]      [phone]  [facility]     [phone]  [facility]inic [facility] ([facility])     [phone]  [place] Product Transportaion Services:  [place]: [place]: [phone]  [facility] [place]:    [phone]    COMMUNITY RESOURCE LINE:  [facility] 2-1-1     [address] from [facility] [place] line  [phone]    [facility] PHONE NUMBER(S):  [facility]     [phone]

## 2019-01-14 NOTE — PROGRESS NOTES
Clinic Care Coordination Contact    Clinic Care Coordination Contact  OUTREACH    Referral Information:  Referral Source: PCP    Primary Diagnosis: Genitourinary Disorders    Chief Complaint   Patient presents with     Clinic Care Coordination - Face To Face     Care Team        [place] Utilization:   Clinic Utilization  Difficulty keeping appointments:: No  Compliance Concerns: No  No-Show Concerns: No  No PCP office visit in Past Year: No  Utilization    Last refreshed: 1/8/2019 10:39 PM:  Hospital Admissions 3           Last refreshed: 1/8/2019 10:39 PM:  ED Visits 1           Last refreshed: 1/8/2019 10:39 PM:  No Show Count (past year) 6              Current as of: 1/8/2019 10:39 PM              Clinical Concerns:  Current Medical Concerns:  Patient still in clinic after PCP visit. Care Coordinator was able to generate letter with resources and hand to Patient. Patients ride came just as letter was explained.     Current Behavioral Concerns: none    Education Provided to patient: Encouraged follow up appointment with PCP.     Functional Status:  Dependent ADLs:: Ambulation-walker  Dependent IADLs:: Independent  Bed or wheelchair confined:: No  Mobility Status: Independent w/Device  Fallen 2 or more times in the past year?: No  Any fall with injury in the past year?: No    Living Situation:  Current living arrangement:: I live in a private home with family  Type of residence:: Private home - stairs    Diet/Exercise/Sleep:  Diet:: Regular  Inadequate nutrition (GOAL):: No  Food Insecurity: No  Tube Feeding: No  Exercise:: Currently not exercising    Transportation:  Transportation concerns (GOAL):: Yes  Transportation means:: Friend, Regular car, Public transportation     Psychosocial:  Hoahaoism or spiritual beliefs that impact treatment:: No  Mental health DX:: No  Mental health management concern (GOAL):: No  Informal Support system:: Children, Family, Friends     Financial/Insurance:   Financial/Insurance  concerns (GOAL):: Yes       Resources and Interventions:  Current Resources:    ;   Community Resources: Transportation Services  Supplies used at home:: None  Equipment Currently Used at Home: walker, rolling         Referrals Placed: None            Plan: 1) Patient will continue to follow treatment plan as directed and follow up with PCP with concerns ongoing.   2) Care Coordination to remain available for future needs. Follow up planned for 1 week unless otherwise notified.     Cuco Crane RN  Clinic Care Coordinator  [phone] or [phone]

## 2019-01-14 NOTE — PROGRESS NOTES
SUBJECTIVE:   Inga Ledesma is a 53 year old female who presents to clinic today for the following health issues:        Alcohol Problem     Sober x 60 days    Issues with insomnia    Recheck of blood pressure    Chronic neck and low back pain . No radicular symptoms.       Problem list and histories reviewed & adjusted, as indicated.  Additional history: as documented    BP Readings from Last 3 Encounters:   01/14/19 151/83   09/14/18 (!) 168/104   09/05/18 175/89    Wt Readings from Last 3 Encounters:   01/14/19 72.6 kg (160 lb)   09/14/18 74.8 kg (165 lb)   08/14/18 76.3 kg (168 lb 3.2 oz)                    Reviewed and updated as needed this visit by clinical staff  Tobacco  Allergies  Meds       Reviewed and updated as needed this visit by Provider         All other systems negative except as outline above  OBJECTIVE:  Eye exam - right eye normal lid, conjunctiva, cornea, pupil and fundus, left eye normal lid, conjunctiva, cornea, pupil and fundus.  Thyroid not palpable, not enlarged, no nodules detected.  CHEST:chest clear to IPPA, no tachypnea, retractions or cyanosis and S1, S2 normal, no murmur, no gallop, rate regular.   BACK: Lumbosacral spine area reveals local tenderness.  Painful and reduced LS ROM noted. Straight leg raise is negative .  DTR's, motor strength and sensation normal, including heel and toe gait.  Perifpheral pulses are palpable.  Hipes and knees have full range of motion without pain.  No abdominal tenderness, mass or organomegaly.  Neck rom: stiff, but not limited. Negative spurlings test.  UE and LE strength and rom normal.     Inga was seen today for alcohol problem.    Diagnoses and all orders for this visit:    Screen for STD (sexually transmitted disease)  -     HIV Antigen Antibody Combo    Alcohol dependence with unspecified alcohol-induced disorder (H)  -     magnesium oxide (MAG-OX) 400 MG tablet; Take 1 tablet (400 mg) by mouth daily  -     Comprehensive  metabolic panel    (HFpEF) heart failure with preserved ejection fraction (H)  -     HEART FAILURE ACTION PLAN    COPD exacerbation (H)  -     COPD ACTION PLAN    Encounter for screening mammogram for breast cancer  -     *MA Screening Digital Bilateral; Future    Special screening for malignant neoplasms, colon  -     GASTROENTEROLOGY ADULT REF PROCEDURE ONLY [facility] [phone]; No Provider Preference    Hypertension goal BP (blood pressure) < 140/90  -     felodipine ER (PLENDIL) 5 MG 24 hr tablet; Take 1 tablet (5 mg) by mouth daily  -     Comprehensive metabolic panel    Cervicalgia  -     order for DME; Equipment being ordered: TENS  -     XR Cervical Spine 2/3 Views; Future  -     CARE COORDINATION REFERRAL  -     [facility] PT, HAND, AND CHIROPRACTIC REFERRAL; Future    Chronic bilateral low back pain without sciatica  -     order for DME; Equipment being ordered: TENS  -     XR Lumbar Spine 2/3 Views; Future  -     CARE COORDINATION REFERRAL  -     [facility] PT, HAND, AND CHIROPRACTIC REFERRAL; Future      work on lifestyle modification  Recheck in 1mos

## 2019-01-14 NOTE — LETTER
Health Care Home - Access Care Plan    About Me  Patient Name:  Inga Ledesma    YOB: 1965  Age:                             53 year old   Karthikeyan MRN:            [id number] Telephone Information:   Home Phone [phone]   Mobile [phone]       Address:    [address]  [place] [address] Email address:  [email or web address]      Emergency Contact(s)  Name Relationship Lgl Grd Work Phone Home Phone Mobile Phone   TAI SANDERSON Son   [phone]              Health Maintenance:    Health Maintenance Due   Topic Date Due     HF ACTION PLAN Q3 YR  1965     HIV SCREEN (SYSTEM ASSIGNED)  08/17/1984     COLON CANCER SCREEN (SYSTEM ASSIGNED)  08/17/2016     ZOSTER IMMUNIZATION (1 of 2) 08/17/2016     ADVANCE DIRECTIVE PLANNING Q5 YRS  05/17/2017     MAMMO SCREEN Q2 YR (SYSTEM ASSIGNED)  09/24/2017     PHQ-9 Q6 MONTHS  03/25/2018     COPD ACTION PLAN Q1 YR  04/28/2018     INFLUENZA VACCINE (1) 09/01/2018       My Access Plan  Medical Emergency 911   Questions or concerns during clinic hours Primary Clinic Line, I will call the clinic directly: [facility] [phone]   24 Hour Appointment Line [phone] or  [phone]) (toll free)   24 Hour Nurse Line [phone] (toll free)   Questions or concerns outside clinic hours 24 Hour Appointment Line, I will call the after-hours on-call line:   [facility] [phone] or [phone]) (toll-free)   Preferred Urgent Care [facility] [phone]   Preferred Hospital [facility]  [phone]   Preferred Pharmacy [facility] [address] [address]     Behavioral Health Crisis Line The National Suicide Prevention Lifeline at 1-283.408.7135 or 911     My Care Team Members  Patient Care Team       Relationship Specialty Notifications Start End    Min Toledo PA-C PCP - General Physician Assistant  5/5/17      Phone: [phone] Fax: [phone]         [address] [place] [address]    Min Toledo PA-C PCP - Assigned PCP   3/19/17     Phone: [phone] Fax: [phone]         [address] [place] [address]           My Medical and Care Information  Problem List   Patient Active Problem List   Diagnosis     RLS (restless legs syndrome)     GERD (gastroesophageal reflux disease)     Iron deficiency anemia     Hyperlipidemia with target LDL less than 160     Adult BMI 30+     Sacroiliitis (H)     Tobacco abuse     Vitamin D deficiency     Menorrhagia     Advanced directives, counseling/discussion     Vitamin D deficiency     Edema of both legs     Hypertension goal BP (blood pressure) < 140/90     Chronic bronchitis, unspecified chronic bronchitis type (H)     Health Care Home     Alcohol dependence with withdrawal with complication (H)     Major depressive disorder, recurrent episode, mild (H)     (HFpEF) heart failure with preserved ejection fraction (H)     Psychophysiological insomnia     Chemical dependency (H)     Alcohol withdrawal (H)     Alcohol abuse     LALA (acute kidney injury) (H)     Sepsis (H)     Alcohol dependency (H)     COPD exacerbation (H)      Current Medications and Allergies:  See printed Medication Report

## 2019-01-15 LAB — HIV 1+2 AB+HIV1 P24 AG SERPL QL IA: NONREACTIVE

## 2019-01-20 ENCOUNTER — TRANSFERRED RECORDS (OUTPATIENT)
Dept: HEALTH INFORMATION MANAGEMENT | Facility: CLINIC | Age: 54
End: 2019-01-20

## 2019-01-21 ENCOUNTER — THERAPY VISIT (OUTPATIENT)
Dept: PHYSICAL THERAPY | Facility: CLINIC | Age: 54
End: 2019-01-21
Payer: MEDICAID

## 2019-01-21 DIAGNOSIS — M54.50 CHRONIC BILATERAL LOW BACK PAIN WITHOUT SCIATICA: ICD-10-CM

## 2019-01-21 DIAGNOSIS — G89.29 CHRONIC BILATERAL LOW BACK PAIN WITHOUT SCIATICA: ICD-10-CM

## 2019-01-21 DIAGNOSIS — M54.50 BILATERAL LOW BACK PAIN WITHOUT SCIATICA: ICD-10-CM

## 2019-01-21 DIAGNOSIS — M54.2 CERVICALGIA: ICD-10-CM

## 2019-01-21 PROCEDURE — 97110 THERAPEUTIC EXERCISES: CPT | Mod: GP | Performed by: PHYSICAL THERAPIST

## 2019-01-21 PROCEDURE — 97161 PT EVAL LOW COMPLEX 20 MIN: CPT | Mod: GP | Performed by: PHYSICAL THERAPIST

## 2019-01-21 NOTE — PROGRESS NOTES
[facility] Initial Evaluation  Subjective:  HPI  Oswestry Score: 46 %                 Objective:          Inga Ledesma is a 53 year old female with a cervical spine condition.  Condition occurred with:  Insidious onset.  Condition occurred: at home.  This is a new and chronic condition  1-14-19 pt saw MD for chronic neck and back pain..    Patient reports pain:  Cervical left side and cervical right side.  Radiates to:  Upper arm right.  Pain is described as aching and is constant and reported as 8/10.  Associated symptoms:  Numbness, tingling, loss of motion/stiffness and loss of strength. Pain is worse in the P.M..  Symptoms are exacerbated by certain positions, rotating head, lying down, looking up or down and lifting and relieved by rest.  Since onset symptoms are unchanged.        General health as reported by patient is poor.  Pertinent medical history includes:  Asthma, cancer, chemical dependency, depression, emphysema, heart problems, high blood pressure, mental illness, migraines/headaches, numbness/tingling, overweight, seizures and smoking.  Medical allergies: yes (christina).  Other surgeries include:  Cancer surgery and heart surgery (1985,1987, 4-16-16).  Current medications:  Anti-depressants, cardiac medication, high blood pressure medication, muscle relaxants, pain medication and sleep medication (anxiety).  Current occupation is None  .  Patient is currently not working due to present treatment problem.  Primary job tasks include:  Lifting, prolonged sitting and prolonged standing.    Barriers include:  Transportation.    Red flags:  Chest pain.        System    Physical Exam    General     ROS    Assessment/Plan:    Patient is a 53 year old female with cervical and lumbar complaints.    Patient has the following significant findings with corresponding treatment plan.                Diagnosis 1:  Neck and back pain  Pain -  hot/cold therapy, US, electric stimulation,  mechanical traction, manual therapy, self management, education, directional preference exercise and home program  Decreased ROM/flexibility - manual therapy, therapeutic exercise, therapeutic activity and home program  Decreased joint mobility - manual therapy, therapeutic exercise, therapeutic activity and home program  Decreased strength - therapeutic exercise, therapeutic activities and home program  Impaired posture - neuro re-education, therapeutic activities and home program    Therapy Evaluation Codes:   1) History comprised of:   Personal factors that impact the plan of care:      Anxiety, Coping style, Overall behavior pattern and Past/current experiences.    Comorbidity factors that impact the plan of care are:      Asthma, Cancer, Chest pain, Depression, Emphysema, Heart problems, High blood pressure, Mental illness, Migraines/headaches, Overweight, Seizures, Smoking and Weakness.     Medications impacting care: Anti-depressant.  2) Examination of Body Systems comprised of:   Body structures and functions that impact the plan of care:      Cervical spine and Lumbar spine.   Activity limitations that impact the plan of care are:      Sleeping and Laying down.  3) Clinical presentation characteristics are:   Stable/Uncomplicated.  4) Decision-Making    Low complexity using standardized patient assessment instrument and/or measureable assessment of functional outcome.  Cumulative Therapy Evaluation is: Low complexity.    Previous and current functional limitations:  (See Goal Flow Sheet for this information)    Short term and Long term goals: (See Goal Flow Sheet for this information)     Communication ability:  Patient appears to be able to clearly communicate and understand verbal and written communication and follow directions correctly.  Treatment Explanation - The following has been discussed with the patient:   RX ordered/plan of care  Anticipated outcomes  Possible risks and side effects  This  patient would benefit from PT intervention to resume normal activities.   Rehab potential is good.    Frequency:  1 X week, once daily  Duration:  for 12 weeks  Discharge Plan:  Achieve all LTG.  Independent in home treatment program.  Reach maximal therapeutic benefit.    Please refer to the daily flowsheet for treatment today, total treatment time and time spent performing 1:1 timed codes.

## 2019-01-21 NOTE — PROGRESS NOTES
[place] [facility] Initial Evaluation  Subjective:    Inga Ledesma is a 53 year old female with a cervical spine condition.  Condition occurred with:  Insidious onset.  Condition occurred: at home.  This is a new and chronic condition  1-14-19 pt saw MD for chronic neck and back pain..    Patient reports pain:  Cervical left side and cervical right side.  Radiates to:  Upper arm right.  Pain is described as aching and is constant and reported as 8/10.  Associated symptoms:  Numbness, tingling, loss of motion/stiffness and loss of strength. Pain is worse in the P.M..  Symptoms are exacerbated by certain positions, rotating head, lying down, looking up or down and lifting and relieved by rest.  Since onset symptoms are unchanged.        General health as reported by patient is poor.  Pertinent medical history includes:  Asthma, cancer, chemical dependency, depression, emphysema, heart problems, high blood pressure, mental illness, migraines/headaches, numbness/tingling, overweight, seizures and smoking.  Medical allergies: yes (christina).  Other surgeries include:  Cancer surgery and heart surgery (1985,1987, 4-16-16).  Current medications:  Anti-depressants, cardiac medication, high blood pressure medication, muscle relaxants, pain medication and sleep medication (anxiety).  Current occupation is None  .  Patient is currently not working due to present treatment problem.  Primary job tasks include:  Lifting, prolonged sitting and prolonged standing.    Barriers include:  Transportation.    Red flags:  Chest pain.                        Objective:    CERVICAL:    Posture: slouched sitting and standing posture with rounded shoulder and forward head, using 4WW for mobility    Neurological:    Motor Deficit:  Myotomes L R   C4 (shoulder elevation) 4/5 4/5   C5 (shoulder abduction) 4/5 4/5   C6 (elbow flexion) 4/5 4/5   C7 (elbow extension) 4/5 4/5   C8 (thumb extension)     T1 (finger add/abd)       "Strength (lb)       Sensory Deficit, Reflexes, Dural Signs: normal light touch sensation, reported paresthesias along C5-6dermatome    AROM: (Major, Moderate, Minimal or Nil loss)  Movement Loss Ronaldo Mod Min Nil Pain   Protrusion        Flexion   x  2inches to chest + on R \"creak\"   Retraction        Extension  x   30degs + on R   Left Rotation x    33degs no pain   Right Rotation x    30degs + on L \"creak\"   Left Side Bending  x   24degs + on R   Right Side bending  x   26degs + on R     Repeated movement testing: some improvement with retraction      Static Tests: spurlings' negative, ligament testing negative, sharps pursor, shear, kick test  Other Tests: C2-7 sideglides limited bilaterally, B upper trap and levator scap soft tissue tightness, 1st rib tenderness to palpate R>L with decreased sup/inferior mobility R>L.          System    Physical Exam    General     ROS    Assessment/Plan:    Initial Physical Therapy Evaluation        SUBJECTIVE  Subjective: see iE           Changes in function: (IE)    ;   ,     {Pt failed to return:965797}    OBJECTIVE  Objective: see IE      ASSESSMENT/PLAN  Updated problem list and treatment plan: {DIAGNOSIS/PLAN REHAB:874284}  STG/LTGs have been met or progress has been made towards goals:  {Goals met/progress made:396853::\"Yes (See Goal flow sheet completed today.)\"}  Assessment of Progress: {Assessment of progress:252070}  Self Management Plans:  {Self Management Plans:137588}  {Appropriateness of Rx:907060}  Inga continues to require the following intervention to meet STG and LTG's: {INTERVENTION REHAB:733760}  {Pt failed to return plan:423838}    Recommendations:  {RECOMMENDATIONS REHAB:761639}    Please refer to the daily flowsheet for treatment today, total treatment time and time spent performing 1:1 timed codes.      "

## 2019-01-25 ENCOUNTER — TELEPHONE (OUTPATIENT)
Dept: FAMILY MEDICINE | Facility: CLINIC | Age: 54
End: 2019-01-25

## 2019-01-25 NOTE — TELEPHONE ENCOUNTER
Patient brought in form to be filled out by Min Toledo so her electricity wont be shut off. She will pick it up when finished

## 2019-02-12 DIAGNOSIS — F10.230 ALCOHOL DEPENDENCE WITH UNCOMPLICATED WITHDRAWAL (H): ICD-10-CM

## 2019-02-12 DIAGNOSIS — J42 CHRONIC BRONCHITIS, UNSPECIFIED CHRONIC BRONCHITIS TYPE (H): ICD-10-CM

## 2019-02-12 DIAGNOSIS — M54.50 CHRONIC BILATERAL LOW BACK PAIN WITHOUT SCIATICA: ICD-10-CM

## 2019-02-12 DIAGNOSIS — F10.239 ALCOHOL DEPENDENCE WITH WITHDRAWAL WITH COMPLICATION (H): ICD-10-CM

## 2019-02-12 DIAGNOSIS — M46.1 SACROILIITIS (H): ICD-10-CM

## 2019-02-12 DIAGNOSIS — Z71.6 ENCOUNTER FOR SMOKING CESSATION COUNSELING: ICD-10-CM

## 2019-02-12 DIAGNOSIS — F33.9 RECURRENT MAJOR DEPRESSIVE DISORDER, REMISSION STATUS UNSPECIFIED (H): ICD-10-CM

## 2019-02-12 DIAGNOSIS — G89.29 CHRONIC BILATERAL LOW BACK PAIN WITHOUT SCIATICA: ICD-10-CM

## 2019-02-12 DIAGNOSIS — F33.0 MAJOR DEPRESSIVE DISORDER, RECURRENT EPISODE, MILD (H): ICD-10-CM

## 2019-02-13 RX ORDER — HYDROXYZINE HYDROCHLORIDE 25 MG/1
TABLET, FILM COATED ORAL
Qty: 60 TABLET | Refills: 1 | Status: SHIPPED | OUTPATIENT
Start: 2019-02-13 | End: 2021-01-07

## 2019-02-13 RX ORDER — FOLIC ACID 1 MG/1
TABLET ORAL
Qty: 30 TABLET | Refills: 1 | Status: SHIPPED | OUTPATIENT
Start: 2019-02-13 | End: 2019-10-30

## 2019-02-13 RX ORDER — TRAZODONE HYDROCHLORIDE 50 MG/1
TABLET, FILM COATED ORAL
Qty: 30 TABLET | Refills: 1 | Status: SHIPPED | OUTPATIENT
Start: 2019-02-13 | End: 2019-10-30

## 2019-02-13 NOTE — TELEPHONE ENCOUNTER
Prescription approved per [facility] Refill Protocol.  Routing refill request to provider for review/approval because:  Not on [facility] refill Protocol  Labs out of range:  Blood Pressure, ACT, PHQ-9     PHQ-9 SCORE 8/17/2016 3/10/2017 9/25/2017   PHQ-9 Total Score - - -   PHQ-9 Total Score 15 17 20     No flowsheet data found.  BP Readings from Last 3 Encounters:   01/14/19 151/83   09/14/18 (!) 168/104   09/05/18 175/89     Labs not current:  Creatinine   Creatinine   Date Value Ref Range Status   01/14/2019 1.22 (H) 0.52 - 1.04 mg/dL Final     Kati Gibbs, RN, BSN, PHN

## 2019-02-13 NOTE — TELEPHONE ENCOUNTER
"Requested Prescriptions   Pending Prescriptions Disp Refills     hydrOXYzine (ATARAX) 25 MG tablet [Pharmacy Med Name: hydrOXYzine HCL 25MG TAB] 60 tablet 1    Last Written Prescription Date:  12/28/18  Last Fill Quantity: 60,  # refills: 1   Last office visit: 1/14/2019 with prescribing provider:  SARITA Toledo Future Office Visit:     Sig: TAKE ONE TABLET BY MOUTH TWICE A DAY AS NEEDED FOR ANXIETY OR PAIN    Antihistamines Protocol Passed - 2/12/2019  6:32 PM       Passed - Recent (12 mo) or future (30 days) visit within the authorizing provider's specialty    Patient had office visit in the last 12 months or has a visit in the next 30 days with authorizing provider or within the authorizing provider's specialty.  See \"Patient Info\" tab in inbasket, or \"Choose Columns\" in Meds & Orders section of the refill encounter.             Passed - Patient is age 3 or older    Apply age and/or weight-based dosing for peds patients age 3 and older.    Forward request to provider for patients under the age of 3.         Passed - Medication is active on med list        DULoxetine (CYMBALTA) 60 MG capsule [Pharmacy Med Name: DULOXETINE HCL 60MG CPEP] 30 capsule 1    Last Written Prescription Date:  12/28/18  Last Fill Quantity: 30,  # refills: 1   Last office visit: 1/14/2019 with prescribing provider:  Devora  Future Office Visit:     Sig: TAKE ONE CAPSULE BY MOUTH EVERY DAY    Serotonin-Norepinephrine Reuptake Inhibitors  Failed - 2/12/2019  6:32 PM       Failed - Blood pressure under 140/90 in past 12 months    BP Readings from Last 3 Encounters:   01/14/19 151/83   09/14/18 (!) 168/104   09/05/18 175/89                Failed - PHQ-9 score of less than 5 in past 6 months    Please review last PHQ-9 score.          Passed - Medication is active on med list       Passed - Patient is age 18 or older       Passed - No active pregnancy on record       Passed - No positive pregnancy test in past 12 months       Passed - Recent (6 mo) " "or future (30 days) visit within the authorizing provider's specialty    Patient had office visit in the last 6 months or has a visit in the next 30 days with authorizing provider or within the authorizing provider's specialty.  See \"Patient Info\" tab in inbasket, or \"Choose Columns\" in Meds & Orders section of the refill encounter.            naltrexone (DEPADE/REVIA) 50 MG tablet [Pharmacy Med Name: NALTREXONE HCL 50MG TABS] 30 tablet 0    Last Written Prescription Date:  12/31/18  Last Fill Quantity: 30,  # refills: 0   Last office visit: 1/14/2019 with prescribing provider:  SARITA Toledo Future Office Visit:     Sig: TAKE ONE TABLET BY MOUTH ONCE DAILY    There is no refill protocol information for this order        SPIRIVA HANDIHALER 18 MCG inhaled capsule [Pharmacy Med Name: SPIRIVA HANDIHALER 18MCG CAPS] 30 capsule 0    Last Written Prescription Date:  12/31/18  Last Fill Quantity: 30,  # refills: 0   Last office visit: 1/14/2019 with prescribing provider:  SARITA Toledo Future Office Visit:     Sig: USING THE HANDIHALER, INHALE THE CONTENTS OF ONE CAPSULE BY MOUTH DAILY    Asthma Maintenance Inhalers - Anticholinergics Failed - 2/12/2019  6:32 PM       Failed - Asthma control assessment score within normal limits in last 6 months    Please review ACT score.          Passed - Patient is age 12 years or older       Passed - Medication is active on med list       Passed - Recent (6 mo) or future (30 days) visit within the authorizing provider's specialty    Patient had office visit in the last 6 months or has a visit in the next 30 days with authorizing provider or within the authorizing provider's specialty.  See \"Patient Info\" tab in inbasket, or \"Choose Columns\" in Meds & Orders section of the refill encounter.            ibuprofen (ADVIL/MOTRIN) 800 MG tablet [Pharmacy Med Name: IBUPROFEN 800MG TABS] 42 tablet 0    Last Written Prescription Date:  12/31/18  Last Fill Quantity: 42,  # refills: 0   Last office visit: " "1/14/2019 with prescribing provider:  SARITA Toledo Future Office Visit:     Sig: TAKE ONE TABLET BY MOUTH THREE TIMES A DAY WITH MEALS FOR 14 DAYS    NSAID Medications Failed - 2/12/2019  6:32 PM       Failed - Blood pressure under 140/90 in past 12 months    BP Readings from Last 3 Encounters:   01/14/19 151/83   09/14/18 (!) 168/104   09/05/18 175/89                Failed - Normal serum creatinine on file in past 12 months    Recent Labs   Lab Test 01/14/19  1338  06/22/16  1914   CR 1.22*   < >  --    CREAT  --   --  1.2*    < > = values in this interval not displayed.            Passed - Normal ALT on file in past 12 months    Recent Labs   Lab Test 01/14/19  1338   ALT 15            Passed - Normal AST on file in past 12 months    Recent Labs   Lab Test 01/14/19  1338   AST 11            Passed - Recent (12 mo) or future (30 days) visit within the authorizing provider's specialty    Patient had office visit in the last 12 months or has a visit in the next 30 days with authorizing provider or within the authorizing provider's specialty.  See \"Patient Info\" tab in inbasket, or \"Choose Columns\" in Meds & Orders section of the refill encounter.             Passed - Patient is age 6-64 years       Passed - Normal CBC on file in past 12 months    Recent Labs   Lab Test 09/05/18  0737   WBC Canceled, Test credited  5.7   RBC Canceled, Test credited  3.46*   HGB Canceled, Test credited  10.5*   HCT Canceled, Test credited  32.5*   PLT Canceled, Test credited  353                Passed - Medication is active on med list       Passed - No active pregnancy on record       Passed - No positive pregnancy test in past 12 months        VENTOLIN  (90 Base) MCG/ACT inhaler [Pharmacy Med Name: VENTOLIN HFA 108MCG/ACT AERS] 18 g 0    Last Written Prescription Date:  12/31/18  Last Fill Quantity: 18,  # refills: 0   Last office visit: 1/14/2019 with prescribing provider:  SARITA Toledo Future Office Visit:     Sig: INHALE1 TO 2 " "PUFFS INTO THE LUNGS EVERY 4 HOURS AS NEEDED FOR SHORTNESS OF BREATH, DIFFICULTY BREATHING OR WHEEZING.    Asthma Maintenance Inhalers - Anticholinergics Failed - 2/12/2019  6:32 PM       Failed - Asthma control assessment score within normal limits in last 6 months    Please review ACT score.          Passed - Patient is age 12 years or older       Passed - Medication is active on med list       Passed - Recent (6 mo) or future (30 days) visit within the authorizing provider's specialty    Patient had office visit in the last 6 months or has a visit in the next 30 days with authorizing provider or within the authorizing provider's specialty.  See \"Patient Info\" tab in inbasket, or \"Choose Columns\" in Meds & Orders section of the refill encounter.            ipratropium - albuterol 0.5 mg/2.5 mg/3 mL (DUONEB) 0.5-2.5 (3) MG/3ML neb solution [Pharmacy Med Name: IPRATROPIUM-ALBUTEROL 0.5-2.5 SOLN] 360 mL 1    Last Written Prescription Date:  12/28/18  Last Fill Quantity: 360,  # refills:  1  Last office visit: 1/14/2019 with prescribing provider:  SARITA Toledo Future Office Visit:     Sig: INHALE ONE VIAL BY NEBULIZATION FOUR TIMES DAILY AS NEEDED FOR WHEEZING    Short-Acting Beta Agonist Inhalers Protocol  Failed - 2/12/2019  6:32 PM       Failed - Asthma control assessment score within normal limits in last 6 months    Please review ACT score.          Passed - Patient is age 12 or older       Passed - Medication is active on med list       Passed - Recent (6 mo) or future (30 days) visit within the authorizing provider's specialty    Patient had office visit in the last 6 months or has a visit in the next 30 days with authorizing provider or within the authorizing provider's specialty.  See \"Patient Info\" tab in inbasket, or \"Choose Columns\" in Meds & Orders section of the refill encounter.            traZODone (DESYREL) 50 MG tablet [Pharmacy Med Name: TRAZODONE HCL 50MG TABS] 30 tablet 1    Last Written Prescription " "Date:  12/28/18  Last Fill Quantity: 30,  # refills: 1   Last office visit: 1/14/2019 with prescribing provider:  SARITA Toledo Future Office Visit:     Sig: TAKE ONE TABLET BY MOUTH EVERY NIGHT AT BEDTIME    Serotonin Modulators Passed - 2/12/2019  6:32 PM       Passed - Recent (12 mo) or future (30 days) visit within the authorizing provider's specialty    Patient had office visit in the last 12 months or has a visit in the next 30 days with authorizing provider or within the authorizing provider's specialty.  See \"Patient Info\" tab in inbasket, or \"Choose Columns\" in Meds & Orders section of the refill encounter.             Passed - Medication is active on med list       Passed - Patient is age 18 or older       Passed - No active pregnancy on record       Passed - No positive pregnancy test in past 12 months        folic acid (FOLVITE) 1 MG tablet [Pharmacy Med Name: FOLIC ACID 1MG TABS] 30 tablet 1    Last Written Prescription Date:  12/28/18  Last Fill Quantity: 30,  # refills: 1   Last office visit: 1/14/2019 with prescribing provider:  SARITA Toledo Future Office Visit:     Sig: TAKE ONE TABLET BY MOUTH EVERY DAY    Vitamin Supplements (Adult) Protocol Passed - 2/12/2019  6:32 PM       Passed - High dose Vitamin D not ordered       Passed - Recent (12 mo) or future (30 days) visit within the authorizing provider's specialty    Patient had office visit in the last 12 months or has a visit in the next 30 days with authorizing provider or within the authorizing provider's specialty.  See \"Patient Info\" tab in inbasket, or \"Choose Columns\" in Meds & Orders section of the refill encounter.             Passed - Medication is active on med list        gabapentin (NEURONTIN) 300 MG capsule [Pharmacy Med Name: GABAPENTIN 300MG CAPS] 120 capsule 1    Last Written Prescription Date:  12/28/18  Last Fill Quantity: 120,  # refills: 1   Last office visit: 1/14/2019 with prescribing provider:  SARITA Toledo Future Office Visit:     Sig: " "TAKE ONE CAPSULE BY MOUTH EVERY MORNING AND TAKE ONE CAPSULE BY MOUTH EVERY AFTERNOON AND TAKE TWO CAPSULES BY MOUTH EVERY NIGHT AT BEDTIME    There is no refill protocol information for this order        NICOTROL 10 MG inhaler [Pharmacy Med Name: NICOTROL 10MG INHA] 168 each 2    Last Written Prescription Date:  11/23/18  Last Fill Quantity: 168,  # refills: 2   Last office visit: 1/14/2019 with prescribing provider:  SARITA Toledo Future Office Visit:     Sig: INHALE 6 TO  16 CARTRIDGES INTO THE LUNGS DAILY AS NEEDED FOR SMOKING CESSATION    Partial Cholinergic Nicotinic Agonist Agents Failed - 2/12/2019  6:32 PM       Failed - Blood pressure under 140/90 in past 12 months    BP Readings from Last 3 Encounters:   01/14/19 151/83   09/14/18 (!) 168/104   09/05/18 175/89                Passed - Recent (12 mo) or future (30 days) visit within the authorizing provider's specialty    Patient had office visit in the last 12 months or has a visit in the next 30 days with authorizing provider or within the authorizing provider's specialty.  See \"Patient Info\" tab in inbasket, or \"Choose Columns\" in Meds & Orders section of the refill encounter.             Passed - Medication is active on med list       Passed - Patient is 18 years of age or older       Passed - Patient is not pregnant       Passed - No positive pregnancy test on file in past 12 months        \  "

## 2019-02-15 RX ORDER — ALBUTEROL SULFATE 90 UG/1
AEROSOL, METERED RESPIRATORY (INHALATION)
Qty: 18 G | Refills: 0 | Status: SHIPPED | OUTPATIENT
Start: 2019-02-15 | End: 2019-07-12

## 2019-02-15 RX ORDER — IBUPROFEN 800 MG/1
TABLET, FILM COATED ORAL
Qty: 42 TABLET | Refills: 0 | Status: SHIPPED | OUTPATIENT
Start: 2019-02-15 | End: 2019-10-30

## 2019-02-15 RX ORDER — NICOTINE 4 MG/1
INHALANT RESPIRATORY (INHALATION)
Qty: 168 EACH | Refills: 2 | Status: SHIPPED | OUTPATIENT
Start: 2019-02-15 | End: 2020-08-04

## 2019-02-15 RX ORDER — IPRATROPIUM BROMIDE AND ALBUTEROL SULFATE 2.5; .5 MG/3ML; MG/3ML
SOLUTION RESPIRATORY (INHALATION)
Qty: 360 ML | Refills: 1 | Status: SHIPPED | OUTPATIENT
Start: 2019-02-15 | End: 2019-10-30

## 2019-02-15 RX ORDER — NALTREXONE HYDROCHLORIDE 50 MG/1
TABLET, FILM COATED ORAL
Qty: 30 TABLET | Refills: 0 | Status: SHIPPED | OUTPATIENT
Start: 2019-02-15 | End: 2019-11-19

## 2019-02-15 RX ORDER — DULOXETIN HYDROCHLORIDE 60 MG/1
CAPSULE, DELAYED RELEASE ORAL
Qty: 30 CAPSULE | Refills: 1 | Status: SHIPPED | OUTPATIENT
Start: 2019-02-15 | End: 2019-10-30

## 2019-02-15 RX ORDER — TIOTROPIUM BROMIDE 18 UG/1
CAPSULE ORAL; RESPIRATORY (INHALATION)
Qty: 30 CAPSULE | Refills: 0 | Status: SHIPPED | OUTPATIENT
Start: 2019-02-15 | End: 2019-10-23

## 2019-02-15 RX ORDER — GABAPENTIN 300 MG/1
CAPSULE ORAL
Qty: 120 CAPSULE | Refills: 1 | Status: SHIPPED | OUTPATIENT
Start: 2019-02-15 | End: 2019-11-20

## 2019-03-13 ENCOUNTER — TRANSFERRED RECORDS (OUTPATIENT)
Dept: HEALTH INFORMATION MANAGEMENT | Facility: CLINIC | Age: 54
End: 2019-03-13

## 2019-03-13 LAB
ALT SERPL-CCNC: 20 IU/L (ref 8–45)
AST SERPL-CCNC: 27 IU/L (ref 2–40)
CREAT SERPL-MCNC: 0.84 MG/DL (ref 0.57–1.11)
GFR SERPL CREATININE-BSD FRML MDRD: >60 ML/MIN/1.73M2
GLUCOSE SERPL-MCNC: 174 MG/DL (ref 65–100)
POTASSIUM SERPL-SCNC: 4 MMOL/L (ref 3.5–5)

## 2019-03-19 ENCOUNTER — MEDICAL CORRESPONDENCE (OUTPATIENT)
Dept: HEALTH INFORMATION MANAGEMENT | Facility: CLINIC | Age: 54
End: 2019-03-19

## 2019-03-20 DIAGNOSIS — F33.9 RECURRENT MAJOR DEPRESSIVE DISORDER, REMISSION STATUS UNSPECIFIED (H): ICD-10-CM

## 2019-03-20 NOTE — TELEPHONE ENCOUNTER
Last OV with Min: 1/14/19 does not mention a change in Trazodone dosing.    Noting that patient has been in ER x 1 and admitted to hospital x 1 since that office visit.    Last OV 1/14/19 stated 1 month F/U.    Please advise.    Cintia Locke, RN, BSN

## 2019-03-20 NOTE — TELEPHONE ENCOUNTER
Miss Ledesma called [facility] stating she needs a refill on her Trazodone and that it's been increased to 100mg. She only has the 50mg on file in the pharmacy and in her epic med list. If Trazodone 100mg (once daily) is her current dose please send a new script to the pharmacy.    Thank You,  Grisel Chadwick, Pharmacy Tech  [facility]

## 2019-03-21 NOTE — TELEPHONE ENCOUNTER
Patient has called several times in regards to this refill.    Sending high priority to provider.    Cintia Locke, RN, BSN

## 2019-03-21 NOTE — TELEPHONE ENCOUNTER
Patient thinks she missed a call from clinic. Calling about med refill. Ok to leave a detailed message.

## 2019-03-21 NOTE — TELEPHONE ENCOUNTER
Patient calling about Trazadone refill-wants to be sure it is 100 mg and not 50. She wants Min to know that even at 100 mg, it is not really enough.

## 2019-03-22 RX ORDER — TRAZODONE HYDROCHLORIDE 50 MG/1
50-100 TABLET, FILM COATED ORAL AT BEDTIME
Qty: 60 TABLET | Refills: 0 | Status: SHIPPED | OUTPATIENT
Start: 2019-03-22 | End: 2019-10-30

## 2019-03-25 ENCOUNTER — TRANSFERRED RECORDS (OUTPATIENT)
Dept: HEALTH INFORMATION MANAGEMENT | Facility: CLINIC | Age: 54
End: 2019-03-25

## 2019-04-11 PROBLEM — M54.2 CERVICALGIA: Status: RESOLVED | Noted: 2019-01-21 | Resolved: 2019-04-11

## 2019-04-11 PROBLEM — M54.50 BILATERAL LOW BACK PAIN WITHOUT SCIATICA: Status: RESOLVED | Noted: 2019-01-21 | Resolved: 2019-04-11

## 2019-04-11 NOTE — PROGRESS NOTES
Pt was seen for initial evaluation and treatment of neck pain and did not return to clinic for further followup appointments. Current status is unknown and plan to discharge PT services.

## 2019-06-11 ENCOUNTER — TRANSFERRED RECORDS (OUTPATIENT)
Dept: HEALTH INFORMATION MANAGEMENT | Facility: CLINIC | Age: 54
End: 2019-06-11

## 2019-06-14 ENCOUNTER — TRANSFERRED RECORDS (OUTPATIENT)
Dept: HEALTH INFORMATION MANAGEMENT | Facility: CLINIC | Age: 54
End: 2019-06-14

## 2019-06-25 ENCOUNTER — TRANSFERRED RECORDS (OUTPATIENT)
Dept: HEALTH INFORMATION MANAGEMENT | Facility: CLINIC | Age: 54
End: 2019-06-25

## 2019-07-12 DIAGNOSIS — J42 CHRONIC BRONCHITIS, UNSPECIFIED CHRONIC BRONCHITIS TYPE (H): ICD-10-CM

## 2019-07-12 RX ORDER — ALBUTEROL SULFATE 90 UG/1
AEROSOL, METERED RESPIRATORY (INHALATION)
Qty: 18 G | Refills: 0 | Status: SHIPPED | OUTPATIENT
Start: 2019-07-12 | End: 2019-10-23

## 2019-07-12 NOTE — TELEPHONE ENCOUNTER
"Requested Prescriptions   Pending Prescriptions Disp Refills     albuterol (PROAIR HFA/PROVENTIL HFA/VENTOLIN HFA) 108 (90 Base) MCG/ACT inhaler [Pharmacy Med Name: ALBUTEROL SULFATE  AERS] 18 g 0     Sig: INHALE ONE TO TWO PUFFS BY MOUTH EVERY 4 HOURS AS NEEDED FOR SHORTNESS OF BREATH, DIFFICULTY BREATHING OR WHEEZING.  Last Written Prescription Date:  2/15/19  Last Fill Quantity: 18 g,  # refills: 0   Last office visit: 1/14/2019 with prescribing provider:  MAXIMILIANO Toledo  Future Office Visit:         Asthma Maintenance Inhalers - Anticholinergics Failed - 7/12/2019  9:51 AM        Failed - Asthma control assessment score within normal limits in last 6 months     Please review ACT score.           Passed - Patient is age 12 years or older        Passed - Medication is active on med list        Passed - Recent (6 mo) or future (30 days) visit within the authorizing provider's specialty     Patient had office visit in the last 6 months or has a visit in the next 30 days with authorizing provider or within the authorizing provider's specialty.  See \"Patient Info\" tab in inbasket, or \"Choose Columns\" in Meds & Orders section of the refill encounter.            "

## 2019-07-12 NOTE — TELEPHONE ENCOUNTER
Medication is being filled for 1 time refill only due to:  Patient needs to be seen because OVERDUE FOR RECHECK.   Please CALL to schedule-pt not using my chart.  Divina Hernandez RN

## 2019-07-18 ENCOUNTER — TRANSFERRED RECORDS (OUTPATIENT)
Dept: HEALTH INFORMATION MANAGEMENT | Facility: CLINIC | Age: 54
End: 2019-07-18

## 2019-07-18 LAB
ALT SERPL-CCNC: 35 IU/L (ref 8–45)
AST SERPL-CCNC: 26 IU/L (ref 2–40)
HBA1C MFR BLD: 5 % (ref 0–5.7)

## 2019-07-20 LAB
CREAT SERPL-MCNC: 0.7 MG/DL (ref 0.57–1.11)
GFR SERPL CREATININE-BSD FRML MDRD: >60 ML/MIN/1.73M2
GLUCOSE SERPL-MCNC: 92 MG/DL (ref 65–100)

## 2019-07-21 LAB — POTASSIUM SERPL-SCNC: 4.6 MMOL/L (ref 3.5–5)

## 2019-07-22 ENCOUNTER — PATIENT OUTREACH (OUTPATIENT)
Dept: CARE COORDINATION | Facility: CLINIC | Age: 54
End: 2019-07-22

## 2019-07-22 DIAGNOSIS — Z76.89 HEALTH CARE HOME: Primary | ICD-10-CM

## 2019-07-22 NOTE — LETTER
[facility]oph[facility] CARE COORDINATION  [facility]  [address] [place] [address]  [phone]    July 22, 2019    Inga Ledesma  [address] [place] [address]      Dear Inga,    I am a clinic care coordinator who works with Min Toledo PA-C at [facility]. I recently tried to call and was unable to reach you. I wanted to introduce myself and provide you with my contact information so that you can call me with questions or concerns about your health care. Below is a description of clinic care coordination and how I can further assist you.     The clinic care coordinator is a registered nurse and/or  who understand the health care system. The goal of clinic care coordination is to help you manage your health and improve access to the [facility] system in the most efficient manner. The registered nurse can assist you in meeting your health care goals by providing education, coordinating services, and strengthening the communication among your providers. The  can assist you with financial, behavioral, psychosocial, chemical dependency, counseling, and/or psychiatric resources.    Please feel free to contact me at [phone], with any questions or concerns. We at [facility] are focused on providing you with the highest-quality healthcare experience possible and that all starts with you.     Sincerely,     Cuco Crane RN  Clinic Care Coordinator    Enclosed: I have enclosed a copy of a 24 Hour Access Plan. This has helpful phone numbers for you to call when needed. Please keep this in an easy to access place to use as needed.

## 2019-07-22 NOTE — LETTER
Health Care Home - Access Care Plan    About Me:    Patient Name:  Inga Ledesma    YOB: 1965  Age:                      53 year old   Karthikeyan MRN:     [id number] Telephone Information:   Home Phone [phone]   Mobile [phone]       Address:  [address] [place] [address] Email address:  [email or web address]      Emergency Contact(s)   Name Relationship Lgl Grd Work Phone Home Phone Mobile Phone   TAI SANDERSON Son   [phone]              Health Maintenance:    Health Maintenance Due   Topic Date Due     PREVENTIVE CARE VISIT  1965     COLONOSCOPY  08/17/1976     ZOSTER IMMUNIZATION (1 of 2) 08/17/2016     MAMMO SCREENING  09/24/2017     PHQ-9  03/25/2018     LIPID  04/06/2019     BMP  07/14/2019         My Access Plan  Medical Emergency 911   Questions or concerns during clinic hours Primary Clinic Line, I will call the clinic directly: [facility]  [phone]   24 Hour Appointment Line [phone] or  [phone]) (toll free)   24 Hour Nurse Line [phone] (toll free)   Questions or concerns outside clinic hours 24 Hour Appointment Line, I will call the after-hours on-call line:   [facility] [phone] or [phone]) (toll-free)   Preferred Urgent Care [facility] [phone]   Preferred Hospital [facility]  [phone]   Preferred Pharmacy [facility] [address] [address] [facility]     Behavioral Health Crisis Line The National Suicide Prevention Lifeline at 1-207.413.9619 or 911                     My Care Team Members  Patient Care Team       Relationship Specialty Notifications Start End    Min Toledo PA-C PCP - General Physician Assistant  5/5/17     Phone: [phone] Fax: [phone]         [address] [place] [address]    Min Toledo PA-C Assigned PCP   3/19/17     Phone: [phone] Fax: [phone]         [address] [place] [address]    Jasiel Crane, RN Clinic Care Coordinator Primary Care -   7/22/19     Phone: [phone] Fax: [phone] [address] [place] [address]           My Medical and Care Information  Problem List   Patient Active Problem List   Diagnosis     RLS (restless legs syndrome)     GERD (gastroesophageal reflux disease)     Iron deficiency anemia     Hyperlipidemia with target LDL less than 160     Adult BMI 30+     Sacroiliitis (H)     Tobacco abuse     Vitamin D deficiency     Menorrhagia     Advanced directives, counseling/discussion     Vitamin D deficiency     Edema of both legs     Hypertension goal BP (blood pressure) < 140/90     Chronic bronchitis, unspecified chronic bronchitis type (H)     Health Care Home     Alcohol dependence with withdrawal with complication (H)     Major depressive disorder, recurrent episode, mild (H)     (HFpEF) heart failure with preserved ejection fraction (H)     Psychophysiological insomnia     Chemical dependency (H)     Alcohol withdrawal (H)     Alcohol abuse     LALA (acute kidney injury) (H)     Sepsis (H)     Alcohol dependency (H)     COPD exacerbation (H)      Current Medications and Allergies:  See printed Medication Report

## 2019-07-22 NOTE — PROGRESS NOTES
Clinic Care Coordination Contact  [facility]/Voicemail    Referral Source: IP Report  Clinical Data: Care Coordinator Outreach  Outreach attempted x 1.  Left message on voicemail with call back information and requested return call.  Plan: Care Coordinator will mail out care coordination introduction letter with care coordinator contact information and explanation of care coordination services. Care Coordinator will try to reach patient again in 1-2 business days.  Cuco Crane RN  Primary Care Clinic RN Care Coordinator  [facility]   [phone]

## 2019-07-25 NOTE — PROGRESS NOTES
Clinic Care Coordination Contact  [facility]/Voicemail    Referral Source: IP Report  Clinical Data: Care Coordinator Outreach  Outreach attempted x 2.  Left message on voicemail with call back information and requested return call.  Plan: Care Coordinator mailed out care coordination introduction letter on 7/22/19. Care Coordinator will do no further outreaches at this time.  Cuco Crane RN  Primary Care Clinic RN Care Coordinator  [facility]   [phone]

## 2019-07-28 ENCOUNTER — TRANSFERRED RECORDS (OUTPATIENT)
Dept: HEALTH INFORMATION MANAGEMENT | Facility: CLINIC | Age: 54
End: 2019-07-28

## 2019-07-29 LAB
ALT SERPL-CCNC: 26 IU/L (ref 8–45)
AST SERPL-CCNC: 35 IU/L (ref 2–40)

## 2019-07-30 LAB
CREAT SERPL-MCNC: 0.83 MG/DL (ref 0.57–1.11)
GFR SERPL CREATININE-BSD FRML MDRD: >60 ML/MIN/1.73M2
GLUCOSE SERPL-MCNC: 103 MG/DL (ref 65–100)
POTASSIUM SERPL-SCNC: 4.2 MMOL/L (ref 3.5–5)

## 2019-08-12 ENCOUNTER — TRANSFERRED RECORDS (OUTPATIENT)
Dept: HEALTH INFORMATION MANAGEMENT | Facility: CLINIC | Age: 54
End: 2019-08-12

## 2019-08-12 LAB
CREAT SERPL-MCNC: 0.79 MG/DL (ref 0.57–1.11)
GFR SERPL CREATININE-BSD FRML MDRD: >60 ML/MIN/1.73M2
GLUCOSE SERPL-MCNC: 97 MG/DL (ref 65–100)

## 2019-08-15 LAB — POTASSIUM SERPL-SCNC: 3.8 MMOL/L (ref 3.5–5)

## 2019-08-22 ENCOUNTER — TRANSFERRED RECORDS (OUTPATIENT)
Dept: HEALTH INFORMATION MANAGEMENT | Facility: CLINIC | Age: 54
End: 2019-08-22

## 2019-08-22 LAB
ALT SERPL-CCNC: 26 IU/L (ref 8–45)
AST SERPL-CCNC: 30 IU/L (ref 2–40)
CHOLEST SERPL-MCNC: 336 MG/DL (ref 100–199)
CREAT SERPL-MCNC: 0.72 MG/DL (ref 0.57–1.11)
GFR SERPL CREATININE-BSD FRML MDRD: >60 ML/MIN/1.73M2
GLUCOSE SERPL-MCNC: 70 MG/DL (ref 65–100)
HDLC SERPL-MCNC: 143 MG/DL
INR PPP: 0.9
LDLC SERPL CALC-MCNC: 175 MG/DL
NONHDLC SERPL-MCNC: 193 MG/DL
POTASSIUM SERPL-SCNC: 3.8 MMOL/L (ref 3.5–5)
TRIGL SERPL-MCNC: 90 MG/DL

## 2019-08-29 ENCOUNTER — TRANSFERRED RECORDS (OUTPATIENT)
Dept: HEALTH INFORMATION MANAGEMENT | Facility: CLINIC | Age: 54
End: 2019-08-29

## 2019-10-04 ENCOUNTER — HEALTH MAINTENANCE LETTER (OUTPATIENT)
Age: 54
End: 2019-10-04

## 2019-10-04 ENCOUNTER — TRANSFERRED RECORDS (OUTPATIENT)
Dept: HEALTH INFORMATION MANAGEMENT | Facility: CLINIC | Age: 54
End: 2019-10-04

## 2019-10-06 LAB
CREAT SERPL-MCNC: 0.73 MG/DL (ref 0.57–1.11)
GFR SERPL CREATININE-BSD FRML MDRD: >60 ML/MIN/1.73M2
GLUCOSE SERPL-MCNC: 108 MG/DL (ref 65–100)
POTASSIUM SERPL-SCNC: 3.9 MMOL/L (ref 3.5–5)

## 2019-10-15 ENCOUNTER — OFFICE VISIT (OUTPATIENT)
Dept: FAMILY MEDICINE | Facility: CLINIC | Age: 54
End: 2019-10-15
Payer: COMMERCIAL

## 2019-10-15 ENCOUNTER — TELEPHONE (OUTPATIENT)
Dept: FAMILY MEDICINE | Facility: CLINIC | Age: 54
End: 2019-10-15

## 2019-10-15 VITALS
HEART RATE: 71 BPM | BODY MASS INDEX: 31.48 KG/M2 | WEIGHT: 166.6 LBS | RESPIRATION RATE: 16 BRPM | DIASTOLIC BLOOD PRESSURE: 60 MMHG | TEMPERATURE: 97.9 F | OXYGEN SATURATION: 99 % | SYSTOLIC BLOOD PRESSURE: 106 MMHG

## 2019-10-15 DIAGNOSIS — I95.89 HYPOTENSION, IATROGENIC: Primary | ICD-10-CM

## 2019-10-15 DIAGNOSIS — F10.21 ALCOHOL DEPENDENCE IN EARLY FULL REMISSION (H): ICD-10-CM

## 2019-10-15 DIAGNOSIS — I10 HYPERTENSION GOAL BP (BLOOD PRESSURE) < 140/90: ICD-10-CM

## 2019-10-15 DIAGNOSIS — Z12.31 ENCOUNTER FOR SCREENING MAMMOGRAM FOR BREAST CANCER: ICD-10-CM

## 2019-10-15 LAB
ANION GAP SERPL CALCULATED.3IONS-SCNC: 8 MMOL/L (ref 3–14)
BUN SERPL-MCNC: 23 MG/DL (ref 7–30)
CALCIUM SERPL-MCNC: 9.5 MG/DL (ref 8.5–10.1)
CHLORIDE SERPL-SCNC: 106 MMOL/L (ref 94–109)
CO2 SERPL-SCNC: 23 MMOL/L (ref 20–32)
CREAT SERPL-MCNC: 0.71 MG/DL (ref 0.52–1.04)
ERYTHROCYTE [DISTWIDTH] IN BLOOD BY AUTOMATED COUNT: 13.8 % (ref 10–15)
GFR SERPL CREATININE-BSD FRML MDRD: >90 ML/MIN/{1.73_M2}
GLUCOSE SERPL-MCNC: 101 MG/DL (ref 70–99)
HCT VFR BLD AUTO: 35.4 % (ref 35–47)
HGB BLD-MCNC: 11.6 G/DL (ref 11.7–15.7)
MCH RBC QN AUTO: 31.4 PG (ref 26.5–33)
MCHC RBC AUTO-ENTMCNC: 32.8 G/DL (ref 31.5–36.5)
MCV RBC AUTO: 96 FL (ref 78–100)
PLATELET # BLD AUTO: 348 10E9/L (ref 150–450)
POTASSIUM SERPL-SCNC: 4.5 MMOL/L (ref 3.4–5.3)
RBC # BLD AUTO: 3.7 10E12/L (ref 3.8–5.2)
SODIUM SERPL-SCNC: 137 MMOL/L (ref 133–144)
WBC # BLD AUTO: 6 10E9/L (ref 4–11)

## 2019-10-15 PROCEDURE — 99214 OFFICE O/P EST MOD 30 MIN: CPT | Performed by: FAMILY MEDICINE

## 2019-10-15 PROCEDURE — 85027 COMPLETE CBC AUTOMATED: CPT | Performed by: FAMILY MEDICINE

## 2019-10-15 PROCEDURE — 36415 COLL VENOUS BLD VENIPUNCTURE: CPT | Performed by: FAMILY MEDICINE

## 2019-10-15 PROCEDURE — 80048 BASIC METABOLIC PNL TOTAL CA: CPT | Performed by: FAMILY MEDICINE

## 2019-10-15 RX ORDER — FELODIPINE 2.5 MG/1
2.5 TABLET, EXTENDED RELEASE ORAL DAILY
Qty: 30 TABLET | Refills: 1 | Status: SHIPPED | OUTPATIENT
Start: 2019-10-15 | End: 2019-10-30

## 2019-10-15 NOTE — TELEPHONE ENCOUNTER
Patient reports she was seen yesterday by her Psychiatrist.  She manages Medications for sleep, antidepressants, borderline disorder.  Low blood pressure yesterday at office visit, 96/63.  Currently blood pressure 137/83.  Has not yet taken blood pressure medication this morning.  Patient reports asymptomatic at this time.  Traditionally runs a higher blood pressure.  Patient reports when she has low blood pressure will feel dizzy, lightheaded, however that did not occur yesterday.  Patient reports, she had mountain dew, energy drink, ice coffee, gatorade and a little water, so was hydrated.   Psychiatrist wanted her seen today to potentially adjust blood pressure medications.  Appointment is scheduled for today.     Would you like patient to hold some of her Lasix medication?   Please advise     BP Readings from Last 6 Encounters:   01/14/19 151/83   09/14/18 (!) 168/104   09/05/18 175/89   08/14/18 146/86   06/21/18 136/88   06/17/18 102/64     Current Outpatient Medications   Medication     acetaminophen (TYLENOL) 325 MG tablet     ADVAIR DISKUS 500-50 MCG/DOSE inhaler     albuterol (PROAIR HFA/PROVENTIL HFA/VENTOLIN HFA) 108 (90 Base) MCG/ACT inhaler     DULoxetine (CYMBALTA) 60 MG capsule     felodipine ER (PLENDIL) 5 MG 24 hr tablet     FLUoxetine (PROZAC) 40 MG capsule     folic acid (FOLVITE) 1 MG tablet     furosemide (LASIX) 20 MG tablet     gabapentin (NEURONTIN) 300 MG capsule     hydrOXYzine (ATARAX) 25 MG tablet     ibuprofen (ADVIL/MOTRIN) 800 MG tablet     ipratropium - albuterol 0.5 mg/2.5 mg/3 mL (DUONEB) 0.5-2.5 (3) MG/3ML neb solution     labetalol (NORMODYNE) 100 MG tablet     losartan (COZAAR) 100 MG tablet     magnesium oxide (MAG-OX) 400 MG tablet     menthol (ICY HOT) 5 % PTCH     multivitamin, therapeutic with minerals (THERA-VIT-M) TABS tablet     naltrexone (DEPADE/REVIA) 50 MG tablet     NICOTROL 10 MG inhaler     omeprazole (PRILOSEC) 20 MG DR capsule     order for [facility]     oyster shell  calcium (OS-JENNIFER 500 MG [place]) 500 MG tablet     potassium chloride SA (K-DUR/KLOR-CON M) 10 MEQ CR tablet     rOPINIRole (REQUIP) 1 MG tablet     SPIRIVA HANDIHALER 18 MCG inhaled capsule     traZODone (DESYREL) 50 MG tablet     traZODone (DESYREL) 50 MG tablet     No current facility-administered medications for this visit.      Next 5 appointments (look out 90 days)    Oct 15, 2019  2:20 PM CDT  SHORT with Mahi Jack NP  [facility] [address]  [place] [address]  [phone]        Alda Cunningham RN

## 2019-10-15 NOTE — TELEPHONE ENCOUNTER
Would recommend she continue monitoring her blood pressure . Ok to wait and see me next week. If low pressures continue to be associated with symptoms (dizziness), have her start cutting her losartan in half.

## 2019-10-15 NOTE — TELEPHONE ENCOUNTER
Noted.   Please have her cut back Felodipine in half to 5 mg and monitor BP at home (goal BP < 140/90)

## 2019-10-15 NOTE — TELEPHONE ENCOUNTER
Patient seen by Dr Barragan today.  Will send to provider to advise.  See message from patient       She is sure she is taking felodipine 5 mg although her recent hospital records revealed that she is on felodipine 10 mg daily.  Patient does not have a list or actual bottles of her current medications with her for her office visit today.

## 2019-10-15 NOTE — PROGRESS NOTES
Subjective     Inga Ledesma is a 54 year old female who presents to clinic today for the following health issues:    HPI     Hypotension  Was seen by mental health yesterday through [facility] and BP was showing 96/63.    Hx of hypertension, currently on BP medications and diuretic therapy as well. Stopped drinking alcohol 11 days ago  Is taking her current BP medications as advised.    BP Readings from Last 3 Encounters:   10/15/19 106/60   01/14/19 151/83   09/14/18 (!) 168/104     She is sure she is taking felodipine 5 mg although her recent hospital records revealed that she is on felodipine 10 mg daily.  Patient does not have a list or actual bottles of her current medications with her for her office visit today.       Patient has had multiple hospital admissions in the recent past without any hospital follow-up visits and medication reconciliation with her PCP.  Her last office visit with Min Toledo was on 1/14/2019, has had multiple hospital admissions since then.      Patient states that she has struggled with alcoholism since 2004 was for sober for about 10 years until end of 2013 when she relapsed and has been struggling since then.    States that recently completed inpatient treatment and is currently attending outpatient rehab treatment 4 times a week and has a sponsor that she communicates with on a daily basis.    Feels like she has made remarkable progress and her last alcoholic beverage was on 10/4/2019 and hopes to remain sober going forward and get a job.  States that she lost her job back in November 2018.      HEALTH CARE MAINTENANCE: overdue for a Physical and screening tests to include mammogram, colonoscopy etc.     Patient Active Problem List   Diagnosis     RLS (restless legs syndrome)     GERD (gastroesophageal reflux disease)     Iron deficiency anemia     Hyperlipidemia with target LDL less than 160     Adult BMI 30+     Sacroiliitis (H)     Tobacco abuse     Vitamin D deficiency      Menorrhagia     Advanced directives, counseling/discussion     Vitamin D deficiency     Edema of both legs     Hypertension goal BP (blood pressure) < 140/90     Chronic bronchitis, unspecified chronic bronchitis type (H)     Health Care Home     Alcohol dependence with withdrawal with complication (H)     Major depressive disorder, recurrent episode, mild (H)     (HFpEF) heart failure with preserved ejection fraction (H)     Psychophysiological insomnia     Chemical dependency (H)     Alcohol withdrawal (H)     Alcohol abuse     LALA (acute kidney injury) (H)     Sepsis (H)     Alcohol dependency (H)     COPD exacerbation (H)     Past Surgical History:   Procedure Laterality Date     AS BIOPSY/EXCISION LYMPH NODE OPEN SUPERFICIAL       COLONOSCOPY       EYE SURGERY       HYSTERECTOMY       HYSTERECTOMY TOTAL ABDOMINAL  7/28/10    Bilateral salpingectomy.  ovaries conserved.     LASER TX, CERVICAL       LYMPH NODE BIOPSY      inguinal     LYSIS OF LABIAL LESION(S)  ,        Social History     Tobacco Use     Smoking status: Current Every Day Smoker     Packs/day: 0.25     Years: 34.00     Pack years: 8.50     Types: Cigarettes     Start date: 1979     Last attempt to quit: 10/3/2012     Years since quittin.0     Smokeless tobacco: Never Used     Tobacco comment: 5 cigarettes daily   Substance Use Topics     Alcohol use: No     Comment: 1 pint of vodka per day, last drink aug 2018     Family History   Problem Relation Age of Onset     Depression/Anxiety Mother      Asthma Mother      Cerebrovascular Disease Father      Hypertension Father      Lung Cancer Father      Alcoholism Father      Breast Cancer Paternal Aunt      Other Cancer Other      Depression Other      Anxiety Disorder Other      Mental Illness Other      Substance Abuse Other      Asthma Other      Obesity Sister      Obesity Son      Suicide Paternal Uncle          Current Outpatient Medications   Medication Sig Dispense  Refill     acetaminophen (TYLENOL) 325 MG tablet Take 1 tablet (325 mg) by mouth every 4 hours as needed for mild pain 60 tablet 1     ADVAIR DISKUS 500-50 MCG/DOSE inhaler INHALE ONE PUFF BY MOUTH EVERY 12 HOURS 1 Inhaler 11     albuterol (PROAIR HFA/PROVENTIL HFA/VENTOLIN HFA) 108 (90 Base) MCG/ACT inhaler INHALE ONE TO TWO PUFFS BY MOUTH EVERY 4 HOURS AS NEEDED FOR SHORTNESS OF BREATH, DIFFICULTY BREATHING OR WHEEZING. 18 g 0     DULoxetine (CYMBALTA) 60 MG capsule TAKE ONE CAPSULE BY MOUTH EVERY DAY 30 capsule 1     felodipine ER (PLENDIL) 2.5 MG 24 hr tablet Take 1 tablet (2.5 mg) by mouth daily 30 tablet 1     FLUoxetine (PROZAC) 40 MG capsule Take 1 capsule (40 mg) by mouth 2 times daily 180 capsule 1     folic acid (FOLVITE) 1 MG tablet TAKE ONE TABLET BY MOUTH EVERY DAY 30 tablet 1     furosemide (LASIX) 20 MG tablet Take 1 tablet (20 mg) by mouth daily 90 tablet 1     gabapentin (NEURONTIN) 300 MG capsule TAKE ONE CAPSULE BY MOUTH EVERY MORNING AND TAKE ONE CAPSULE BY MOUTH EVERY AFTERNOON AND TAKE TWO CAPSULES BY MOUTH EVERY NIGHT AT BEDTIME 120 capsule 1     hydrOXYzine (ATARAX) 25 MG tablet TAKE ONE TABLET BY MOUTH TWICE A DAY AS NEEDED FOR ANXIETY OR PAIN 60 tablet 1     ibuprofen (ADVIL/MOTRIN) 800 MG tablet TAKE ONE TABLET BY MOUTH THREE TIMES A DAY WITH MEALS FOR 14 DAYS 42 tablet 0     ipratropium - albuterol 0.5 mg/2.5 mg/3 mL (DUONEB) 0.5-2.5 (3) MG/3ML neb solution INHALE ONE VIAL BY NEBULIZATION FOUR TIMES DAILY AS NEEDED FOR WHEEZING 360 mL 1     labetalol (NORMODYNE) 100 MG tablet Take 2 tablets (200 mg) by mouth 2 times daily 360 tablet 1     losartan (COZAAR) 100 MG tablet Take 1 tablet (100 mg) by mouth daily 90 tablet 1     magnesium oxide (MAG-OX) 400 MG tablet Take 1 tablet (400 mg) by mouth daily 90 tablet 1     menthol (ICY HOT) 5 % PTCH Apply 1 patch topically every 8 hours as needed for muscle soreness 30 patch 1     multivitamin, therapeutic with minerals (THERA-VIT-M) TABS tablet  Take 1 tablet by mouth daily 30 each 1     naltrexone (DEPADE/REVIA) 50 MG tablet TAKE ONE TABLET BY MOUTH ONCE DAILY 30 tablet 0     NICOTROL 10 MG inhaler INHALE 6 TO  16 CARTRIDGES INTO THE LUNGS DAILY AS NEEDED FOR SMOKING CESSATION 168 each 2     omeprazole (PRILOSEC) 20 MG DR capsule Take 1 capsule (20 mg) by mouth 2 times daily (before meals) 60 capsule 1     order for DME Equipment being ordered: TENS 1 Device 0     oyster shell calcium (OS-JENNIFER 500 MG [place]. [place]) 500 MG tablet Take 1 tablet (500 mg) by mouth daily 30 tablet 1     potassium chloride SA (K-DUR/KLOR-CON M) 10 MEQ CR tablet Take 1 tablet (10 mEq) by mouth daily 30 tablet 1     rOPINIRole (REQUIP) 1 MG tablet Take 1 tablet (1 mg) by mouth 3 times daily 90 tablet 1     SPIRIVA HANDIHALER 18 MCG inhaled capsule USING THE HANDIHALER, INHALE THE CONTENTS OF ONE CAPSULE BY MOUTH DAILY 30 capsule 0     traZODone (DESYREL) 50 MG tablet Take 1-2 tablets ( mg) by mouth At Bedtime 60 tablet 0     traZODone (DESYREL) 50 MG tablet TAKE ONE TABLET BY MOUTH EVERY NIGHT AT BEDTIME 30 tablet 1     Allergies   Allergen Reactions     Codeine Nausea     Influenza Vaccines      Other reaction(s): Other - Describe In Comment Field -- patient is sensitive to eggs     Reglan [Metoclopramide Hcl] Other (See Comments)     Body tenses up       Reviewed and updated as needed this visit by Provider  Tobacco         Review of Systems   ROS COMP: Constitutional, HEENT, cardiovascular, pulmonary, gi and gu systems are negative, except as otherwise noted.      Objective    /60   Pulse 71   Temp 97.9  F (36.6  C) (Tympanic)   Resp 16   Wt 75.6 kg (166 lb 9.6 oz)   LMP 06/02/2010   SpO2 99%   Breastfeeding? No   BMI 31.48 kg/m    Body mass index is 31.48 kg/m .  Physical Exam   GENERAL: healthy, alert and no distress  RESP: lungs clear to auscultation - no rales, rhonchi or wheezes  CV: regular rate and rhythm, normal S1 S2, no S3 or S4, no murmur, click or  rub, no peripheral edema and peripheral pulses strong  PSYCH: mentation appears normal, affect normal/bright, judgement and insight intact and appearance well groomed    Diagnostic Test Results:  Labs drawn and in process.          Assessment & Plan     Inga was seen today for hypotension.    Diagnoses and all orders for this visit:    Hypotension, iatrogenic with a history of Hypertension goal BP (blood pressure) < 140/90  -     BASIC METABOLIC PANEL  -     Decrease: -  felodipine ER (PLENDIL) 2.5 MG 24 hr tablet; Take 1 tablet (2.5 mg) by mouth daily  -     Continue home BP check weekly    Alcohol dependence in early remission (H)  -     Reports that her last alcoholic beverage was on 10/4/2019; congratulated patient and encouraged her to remain sober.  -     CBC with Platelets    -     Continue outpatient rehab therapy 4 times a week    Encounter for screening mammogram for breast cancer  -     MA SCREENING DIGITAL BILAT - Future  (s+30); Future        Return in about 1 month (around 11/15/2019) for Follow up, Physical Exam.     Note:  Patient instructed to bring in her pill bottles for accurate medication reconciliation at her next office visit. Patient verbalized understanding.        Sherrill Barragan MD  [facility]

## 2019-10-16 NOTE — TELEPHONE ENCOUNTER
Left message on voice mail for patient to call clinic. [phone]    Nasrin Leroy RN BSN

## 2019-10-17 NOTE — TELEPHONE ENCOUNTER
"Called patient, however unable to leave a message because her Mail box is full.     She has an active \"My Chart\" however has not accessed it to view any messages.     Nasrin Leroy RN BSN    "

## 2019-10-18 NOTE — TELEPHONE ENCOUNTER
Spoke with patient.   Providers note read as written.   Patient verbalized understanding.   No further questions at this time.       Kati Edward, RN, BSN, PHN

## 2019-10-23 DIAGNOSIS — J42 CHRONIC BRONCHITIS, UNSPECIFIED CHRONIC BRONCHITIS TYPE (H): ICD-10-CM

## 2019-10-24 ENCOUNTER — TELEPHONE (OUTPATIENT)
Dept: FAMILY MEDICINE | Facility: CLINIC | Age: 54
End: 2019-10-24
Payer: COMMERCIAL

## 2019-10-24 NOTE — TELEPHONE ENCOUNTER
"Requested Prescriptions   Pending Prescriptions Disp Refills     SPIRIVA HANDIHALER 18 MCG inhaled capsule [Pharmacy Med Name: SPIRIVA HANDIHALER 18MCG CAPS] 30 capsule 0     Sig: INHALE ONE CAPSULE BY MOUTH ONCE DAILY  Last Written Prescription Date:  2/15/19  Last Fill Quantity: 30,  # refills: 0   Last office visit: 10/15/2019 with prescribing provider:  Yung Reeder Office Visit:   Next 5 appointments (look out 90 days)    Oct 30, 2019 10:20 AM CDT  PHYSICAL with Min Toledo PA-C  [facility] [address] [facility] [address]  [phone]              Asthma Maintenance Inhalers - Anticholinergics Failed - 10/23/2019  6:19 PM        Failed - Asthma control assessment score within normal limits in last 6 months     Please review ACT score.           Passed - Patient is age 12 years or older        Passed - Medication is active on med list        Passed - Recent (6 mo) or future (30 days) visit within the authorizing provider's specialty     Patient had office visit in the last 6 months or has a visit in the next 30 days with authorizing provider or within the authorizing provider's specialty.  See \"Patient Info\" tab in inbasket, or \"Choose Columns\" in Meds & Orders section of the refill encounter.            VENTOLIN  (90 Base) MCG/ACT inhaler [Pharmacy Med Name: VENTOLIN HFA 108MCG/ACT AERS] 18 g 0     Sig: INHALE ONE TO TWO PUFFS BY MOUTH EVERY 4 HOURS AS NEEDED FOR SHORTNESS OF BREATH, DIFFICULTY BREATHING OR WHEEZING.  Last Written Prescription Date:  7/15/19  Last Fill Quantity: 18 g,  # refills: 0   Last office visit: 10/15/2019 with prescribing provider:  Yung Reeder Office Visit:   Next 5 appointments (look out 90 days)    Oct 30, 2019 10:20 AM CDT  PHYSICAL with Min Toledo PA-C  [facility] [address] [facility] [address]  [phone]              Asthma Maintenance " "Inhalers - Anticholinergics Failed - 10/23/2019  6:19 PM        Failed - Asthma control assessment score within normal limits in last 6 months     Please review ACT score.           Passed - Patient is age 12 years or older        Passed - Medication is active on med list        Passed - Recent (6 mo) or future (30 days) visit within the authorizing provider's specialty     Patient had office visit in the last 6 months or has a visit in the next 30 days with authorizing provider or within the authorizing provider's specialty.  See \"Patient Info\" tab in inbasket, or \"Choose Columns\" in Meds & Orders section of the refill encounter.            "

## 2019-10-24 NOTE — TELEPHONE ENCOUNTER
Prior authorization required for : Advair Diskus 500-50 mcg/dose inhaler  [facility]  Patient Id: [id number]  Bin Number: [id number]  Pcn: [id number]  Help Desk Number: [phone]    Please fax over an alternative medication to the pharmacy or if you are going to pursue a prior authorization please contact the pharmacy and patient when approved. Thanks!    Avril Ramirez CPhT  [facility]

## 2019-10-24 NOTE — TELEPHONE ENCOUNTER
Prior Authorization Retail Medication Request    Medication/Dose: Advair Diskus 500-50 mcg/dose inhaler   ICD code (if different than what is on RX):  J42  Previously Tried and Failed:    Rationale:     Insurance Name: [employer]  Insurance ID:  [id number]      Pharmacy Information (if different than what is on RX)  Name:  [facility]  Phone:  [phone]

## 2019-10-25 NOTE — TELEPHONE ENCOUNTER
Central Prior Authorization Team   Phone: [phone]      PA Initiation    Medication: Advair Diskus 500-50 mcg/dose inhaler   Insurance Company: [employer] - Phone [phone] Fax [phone]  Pharmacy Filling the Rx: [facility] - [address] [facility] [place]  Filling Pharmacy Phone: [phone]  Filling Pharmacy Fax:    Start Date: 10/25/2019

## 2019-10-25 NOTE — TELEPHONE ENCOUNTER
Routing refill request to provider for review/approval because:  Labs not current:  ACT  meds ordered for chronic bronchitis.   Pt due for follow up in November, pt scheduled with pcp on 10/30/19  Meds pended with reminder

## 2019-10-26 RX ORDER — ALBUTEROL SULFATE 90 UG/1
AEROSOL, METERED RESPIRATORY (INHALATION)
Qty: 18 G | Refills: 0 | Status: SHIPPED | OUTPATIENT
Start: 2019-10-26 | End: 2019-10-30

## 2019-10-26 RX ORDER — TIOTROPIUM BROMIDE 18 UG/1
CAPSULE ORAL; RESPIRATORY (INHALATION)
Qty: 30 CAPSULE | Refills: 0 | Status: SHIPPED | OUTPATIENT
Start: 2019-10-26 | End: 2019-10-30

## 2019-10-28 NOTE — PROGRESS NOTES
uoi   SUBJECTIVE:   CC: Inga Ledesma is an 54 year old woman who presents for preventive health visit.     Healthy Habits:    Do you get at least three servings of calcium containing foods daily (dairy, green leafy vegetables, etc.)? no, taking calcium and/or vitamin D supplement: yes -     Amount of exercise or daily activities, outside of work: walking    Problems taking medications regularly No    Medication side effects: No    Have you had an eye exam in the past two years? no    Do you see a dentist twice per year? no    Do you have sleep apnea, excessive snoring or daytime drowsiness?snoring    Labs done while at the hospital  Some lightheadedness. With some hypotensive blood pressure readings at home.       Needs liver function test and drug urine screen per psych.    Today's PHQ-2 Score:   PHQ-2 (  [employer]) 2017   Q1: Little interest or pleasure in doing things 2 -   Q2: Feeling down, depressed or hopeless 3 -   PHQ-2 Score 5 -   Q1: Little interest or pleasure in doing things - Several days   Q2: Feeling down, depressed or hopeless - Several days   PHQ-2 Score - 2       Abuse: Current or Past(Physical, Sexual or Emotional)- No  Do you feel safe in your environment? Yes    Social History     Tobacco Use     Smoking status: Current Every Day Smoker     Packs/day: 0.25     Years: 34.00     Pack years: 8.50     Types: Cigarettes     Start date: 1979     Last attempt to quit: 10/3/2012     Years since quittin.0     Smokeless tobacco: Never Used     Tobacco comment: 5 cigarettes daily   Substance Use Topics     Alcohol use: No     Comment: 1 pint of vodka per day, last drink aug 2018     If you drink alcohol do you typically have >3 drinks per day or >7 drinks per week? No                     Reviewed orders with patient.  Reviewed health maintenance and updated orders accordingly - Yes  Lab work is in process  Labs reviewed in EPIC  BP Readings from Last 3 Encounters:   10/30/19  93/54   10/15/19 106/60   19 151/83    Wt Readings from Last 3 Encounters:   10/30/19 76.4 kg (168 lb 6.4 oz)   10/15/19 75.6 kg (166 lb 9.6 oz)   19 72.6 kg (160 lb)                  Patient Active Problem List   Diagnosis     RLS (restless legs syndrome)     GERD (gastroesophageal reflux disease)     Iron deficiency anemia     Hyperlipidemia with target LDL less than 160     Adult BMI 30+     Sacroiliitis (H)     Tobacco abuse     Vitamin D deficiency     Menorrhagia     Advanced directives, counseling/discussion     Vitamin D deficiency     Edema of both legs     Hypertension goal BP (blood pressure) < 140/90     Chronic bronchitis, unspecified chronic bronchitis type (H)     Health Care Home     Alcohol dependence with withdrawal with complication (H)     Major depressive disorder, recurrent episode, mild (H)     (HFpEF) heart failure with preserved ejection fraction (H)     Psychophysiological insomnia     Chemical dependency (H)     Alcohol withdrawal (H)     Alcohol abuse     LALA (acute kidney injury) (H)     Sepsis (H)     Alcohol dependency (H)     COPD exacerbation (H)     Past Surgical History:   Procedure Laterality Date     AS BIOPSY/EXCISION LYMPH NODE OPEN SUPERFICIAL       COLONOSCOPY       EYE SURGERY       HYSTERECTOMY  2010     HYSTERECTOMY TOTAL ABDOMINAL  7/28/10    Bilateral salpingectomy.  ovaries conserved.     LASER TX, CERVICAL       LYMPH NODE BIOPSY      inguinal     LYSIS OF LABIAL LESION(S)  ,        Social History     Tobacco Use     Smoking status: Current Every Day Smoker     Packs/day: 0.25     Years: 34.00     Pack years: 8.50     Types: Cigarettes     Start date: 1979     Last attempt to quit: 10/3/2012     Years since quittin.0     Smokeless tobacco: Never Used     Tobacco comment: 5 cigarettes daily   Substance Use Topics     Alcohol use: No     Comment: 1 pint of vodka per day, last drink aug 2018     Family History   Problem Relation Age of  Onset     Depression/Anxiety Mother      Asthma Mother      Cerebrovascular Disease Father      Hypertension Father      Lung Cancer Father      Alcoholism Father      Breast Cancer Paternal Aunt      Other Cancer Other      Depression Other      Anxiety Disorder Other      Mental Illness Other      Substance Abuse Other      Asthma Other      Obesity Sister      Obesity Son      Suicide Paternal Uncle          Current Outpatient Medications   Medication Sig Dispense Refill     acetaminophen (TYLENOL) 325 MG tablet Take 1 tablet (325 mg) by mouth every 4 hours as needed for mild pain 60 tablet 1     albuterol (VENTOLIN HFA) 108 (90 Base) MCG/ACT inhaler INHALE ONE TO TWO PUFFS BY MOUTH EVERY 4 HOURS AS NEEDED FOR SHORTNESS OF BREATH, DIFFICULTY BREATHING OR WHEEZING. 18 g 3     ARIPiprazole (ABILIFY) 5 MG tablet Take 15 mg by mouth daily       aspirin 81 MG EC tablet Take 1 tablet (81 mg) by mouth daily 90 tablet 3     atorvastatin (LIPITOR) 10 MG tablet Take 1 tablet (10 mg) by mouth At Bedtime 90 tablet 3     calcium carbonate 500 mg, elemental, (OSCAL;OYSTER SHELL CALCIUM) 500 MG tablet Take 1 tablet (500 mg) by mouth daily 30 tablet 1     FLUoxetine (PROZAC) 40 MG capsule Take 1 capsule (40 mg) by mouth 2 times daily (Patient taking differently: Take 40 mg by mouth daily ) 180 capsule 1     fluticasone (FLONASE) 50 MCG/ACT nasal spray Spray 1 spray into both nostrils daily 16 g 3     fluticasone-salmeterol (ADVAIR DISKUS) 500-50 MCG/DOSE inhaler INHALE ONE PUFF BY MOUTH EVERY 12 HOURS 1 Inhaler 11     folic acid (FOLVITE) 1 MG tablet Take 1 tablet (1,000 mcg) by mouth daily 90 tablet 3     furosemide (LASIX) 20 MG tablet Take 1 tablet (20 mg) by mouth daily 90 tablet 1     gabapentin (NEURONTIN) 300 MG capsule TAKE ONE CAPSULE BY MOUTH EVERY MORNING AND TAKE ONE CAPSULE BY MOUTH EVERY AFTERNOON AND TAKE TWO CAPSULES BY MOUTH EVERY NIGHT AT BEDTIME 120 capsule 1     hydrOXYzine (ATARAX) 25 MG tablet TAKE ONE  TABLET BY MOUTH TWICE A DAY AS NEEDED FOR ANXIETY OR PAIN 60 tablet 1     ibuprofen (ADVIL/MOTRIN) 800 MG tablet TAKE ONE TABLET BY MOUTH THREE TIMES A DAY WITH MEALS FOR 14 DAYS 42 tablet 1     ipratropium - albuterol 0.5 mg/2.5 mg/3 mL (DUONEB) 0.5-2.5 (3) MG/3ML neb solution INHALE ONE VIAL BY NEBULIZATION FOUR TIMES DAILY AS NEEDED FOR WHEEZING 360 mL 1     labetalol (NORMODYNE) 100 MG tablet Take 2 tablets (200 mg) by mouth 2 times daily (Patient taking differently: Take 100 mg by mouth 2 times daily ) 360 tablet 1     losartan (COZAAR) 100 MG tablet Take 1 tablet (100 mg) by mouth daily 90 tablet 1     magnesium oxide (MAG-OX) 400 MG tablet Take 1 tablet (400 mg) by mouth daily 90 tablet 1     menthol (ICY HOT) 5 % PTCH Apply 1 patch topically every 8 hours as needed for muscle soreness 30 patch 1     multivitamin w/minerals (THERA-VIT-M) tablet Take 1 tablet by mouth daily 30 each 1     naltrexone (DEPADE/REVIA) 50 MG tablet TAKE ONE TABLET BY MOUTH ONCE DAILY 30 tablet 0     NICOTROL 10 MG inhaler INHALE 6 TO  16 CARTRIDGES INTO THE LUNGS DAILY AS NEEDED FOR SMOKING CESSATION 168 each 2     omeprazole (PRILOSEC) 20 MG DR capsule Take 1 capsule (20 mg) by mouth 2 times daily (before meals) 60 capsule 1     order for DME Equipment being ordered: TENS 1 Device 0     potassium chloride ER (K-DUR/KLOR-CON M) 10 MEQ CR tablet Take 1 tablet (10 mEq) by mouth daily 30 tablet 1     QUEtiapine (SEROQUEL) 100 MG tablet Take 150 mg by mouth At Bedtime       rOPINIRole (REQUIP) 1 MG tablet Take 1 tablet (1 mg) by mouth 3 times daily 90 tablet 1     tiotropium (SPIRIVA HANDIHALER) 18 MCG inhaled capsule INHALE ONE CAPSULE BY MOUTH ONCE DAILY 30 capsule 5     vitamin B1 (THIAMINE) 50 MG tablet Take 2 tablets (100 mg) by mouth daily 180 tablet 1     Vitamin D, Cholecalciferol, 25 MCG (1000 UT) TABS Take 2 tablets (2,000 Units) by mouth daily 180 tablet 1     Allergies   Allergen Reactions     Codeine Nausea     Influenza  Vaccines      Other reaction(s): Other - Describe In Comment Field -- patient is sensitive to eggs     Reglan [Metoclopramide Hcl] Other (See Comments)     Body tenses up     Recent Labs   Lab Test 10/15/19  1551 10/06/19 08/22/19  07/29/19  07/18/19  06/18/18  1531  04/06/18  0728  10/15/14  1609   A1C  --   --   --   --   --   --  5.0  --   --   --   --   --  5.6   LDL  --   --  175*  --   --   --   --   --   --   --  125*  --   --    HDL  --   --  143  --   --   --   --   --   --   --  105  --   --    TRIG  --   --  90  --   --   --   --   --   --   --  111  --   --    ALT  --   --  26  --  26  --  35   < > 49   < > 19   < >  --    CR 0.71 0.73 0.72   < >  --    < >  --    < > 2.97*   < > 0.74   < > 0.61   GFRESTIMATED >90 >60 >60   < >  --    < >  --    < > 17*   < > 82   < > >90  Non  GFR Calc     GFRESTBLACK >90 >60 >60   < >  --    < >  --    < > 20*   < > >90   < > >90  African American GFR Calc     POTASSIUM 4.5 3.9 3.8   < >  --    < >  --    < > 3.2*   < > 3.4   < > 4.3   TSH  --   --   --   --   --   --   --   --  1.02  --  1.31   < > 1.21    < > = values in this interval not displayed.        Mammo discussed, not appropriate for or declined by this patient.    Pertinent mammograms are reviewed under the imaging tab.       Reviewed and updated as needed this visit by clinical staff  Tobacco  Allergies  Meds         Reviewed and updated as needed this visit by Provider        Past Medical History:   Diagnosis Date     Alcohol abuse, in remission      Alcohol withdrawal seizure (H) 2016     Cancer of labia majora (H) 1987     Cervical dysplasia 1987     Congestive heart failure (H) 5/16/16     COPD (chronic obstructive pulmonary disease) (H) 1/24/2011     CVA (cerebral infarction) 1/2012     Dependent edema      Depression, major      Depressive disorder 1966     GERD (gastroesophageal reflux disease)      Hyperlipidemia LDL goal < 160      Hypertension      Iron deficiency anemia       RLS (restless legs syndrome)      Sacroiliitis (H)     steroid injections ineffective, chronic low back pain     Tobacco abuse      Uncomplicated asthma      Vitamin D deficiencies       Past Surgical History:   Procedure Laterality Date     AS BIOPSY/EXCISION LYMPH NODE OPEN SUPERFICIAL       COLONOSCOPY       EYE SURGERY       HYSTERECTOMY  2010     HYSTERECTOMY TOTAL ABDOMINAL  7/28/10    Bilateral salpingectomy.  ovaries conserved.     LASER TX, CERVICAL  1987     LYMPH NODE BIOPSY  2007    inguinal     LYSIS OF LABIAL LESION(S)  1985, 1987       ROS:  CONSTITUTIONAL: NEGATIVE for fever, chills, change in weight  INTEGUMENTARY/SKIN: NEGATIVE for worrisome rashes, moles or lesions  EYES: NEGATIVE for vision changes or irritation  ENT: NEGATIVE for ear, mouth and throat problems  RESP: NEGATIVE for significant cough or SOB  BREAST: NEGATIVE for masses, tenderness or discharge  CV: NEGATIVE for chest pain, palpitations or peripheral edema  GI: NEGATIVE for nausea, abdominal pain, heartburn, or change in bowel habits  : NEGATIVE for unusual urinary or vaginal symptoms. No vaginal bleeding.  MUSCULOSKELETAL: NEGATIVE for significant arthralgias or myalgia  NEURO: NEGATIVE for weakness, dizziness or paresthesias  PSYCHIATRIC: NEGATIVE for changes in mood or affect     OBJECTIVE:   BP 93/54   Pulse 74   Resp 20   Wt 76.4 kg (168 lb 6.4 oz)   LMP 06/02/2010   SpO2 98%   BMI 31.82 kg/m    EXAM:  GENERAL APPEARANCE: healthy, alert and no distress  EYES: Eyes grossly normal to inspection, PERRL and conjunctivae and sclerae normal  HENT: ear canals and TM's normal, nose and mouth without ulcers or lesions, oropharynx clear and oral mucous membranes moist  NECK: no adenopathy, no asymmetry, masses, or scars and thyroid normal to palpation  RESP: lungs clear to auscultation - no rales, rhonchi or wheezes  BREAST: normal without masses, tenderness or nipple discharge and no palpable axillary masses or adenopathy  CV:  regular rate and rhythm, normal S1 S2, no S3 or S4, no murmur, click or rub, no peripheral edema and peripheral pulses strong  ABDOMEN: soft, nontender, no hepatosplenomegaly, no masses and bowel sounds normal  MS: no musculoskeletal defects are noted and gait is age appropriate without ataxia  SKIN: no suspicious lesions or rashes  NEURO: Normal strength and tone, sensory exam grossly normal, mentation intact and speech normal  PSYCH: mentation appears normal and affect normal/bright        ASSESSMENT/PLAN:       ICD-10-CM    1. Routine general medical examination at a health care facility Z00.00 aspirin 81 MG EC tablet   2. Chronic bronchitis, unspecified chronic bronchitis type (H) J42 fluticasone-salmeterol (ADVAIR DISKUS) 500-50 MCG/DOSE inhaler     tiotropium (SPIRIVA HANDIHALER) 18 MCG inhaled capsule     albuterol (VENTOLIN HFA) 108 (90 Base) MCG/ACT inhaler     ipratropium - albuterol 0.5 mg/2.5 mg/3 mL (DUONEB) 0.5-2.5 (3) MG/3ML neb solution   3. Alcohol dependence with uncomplicated withdrawal (H) F10.230 folic acid (FOLVITE) 1 MG tablet     multivitamin w/minerals (THERA-VIT-M) tablet   4. Essential hypertension with goal blood pressure less than 140/90 I10 furosemide (LASIX) 20 MG tablet     potassium chloride ER (K-DUR/KLOR-CON M) 10 MEQ CR tablet   5. Chronic bilateral low back pain without sciatica M54.5 ibuprofen (ADVIL/MOTRIN) 800 MG tablet    G89.29 order for DME   6. Hypertension goal BP (blood pressure) < 140/90 I10 losartan (COZAAR) 100 MG tablet     Comprehensive metabolic panel     CBC with platelets differential   7. Alcohol dependence with unspecified alcohol-induced disorder (H) F10.29 magnesium oxide (MAG-OX) 400 MG tablet     calcium carbonate 500 mg, elemental, (OSCAL;OYSTER SHELL CALCIUM) 500 MG tablet     vitamin B1 (THIAMINE) 50 MG tablet   8. Muscle soreness M79.10 menthol (ICY HOT) 5 % PTCH   9. Alcoholic gastritis with hemorrhage, unspecified chronicity K29.21 omeprazole  "(PRILOSEC) 20 MG DR capsule   10. RLS (restless legs syndrome) G25.81 rOPINIRole (REQUIP) 1 MG tablet   11. Cervicalgia M54.2 order for DME   12. Vitamin D deficiency E55.9 Vitamin D, Cholecalciferol, 25 MCG (1000 UT) TABS   13. Hyperlipidemia LDL goal <130 E78.5 atorvastatin (LIPITOR) 10 MG tablet   14. Chronic rhinitis J31.0 fluticasone (FLONASE) 50 MCG/ACT nasal spray   15. Encounter for screening mammogram for breast cancer Z12.31 *[place] Screening Digital Bilateral   16. Special screening for malignant neoplasms, colon Z12.11 GASTROENTEROLOGY ADULT REF PROCEDURE ONLY [facility] [phone]; No Provider Preference     Discontinue felodipine. Will continue to monitor blood pressures at home.  work on lifestyle modification  Recheck in 3 mos   COUNSELING:   Reviewed preventive health counseling, as reflected in patient instructions       Regular exercise       Healthy diet/nutrition    Estimated body mass index is 31.82 kg/m  as calculated from the following:    Height as of 1/14/19: 1.549 m (5' 1\").    Weight as of this encounter: 76.4 kg (168 lb 6.4 oz).    Weight management plan: Discussed healthy diet and exercise guidelines     reports that she has been smoking cigarettes. She started smoking about 40 years ago. She has a 8.50 pack-year smoking history. She has never used smokeless tobacco.  Tobacco Cessation Action Plan: Information offered: Patient not interested at this time    Counseling Resources:  ATP IV Guidelines  Pooled Cohorts Equation Calculator  Breast Cancer Risk Calculator  FRAX Risk Assessment  ICSI Preventive Guidelines  Dietary Guidelines for Americans, 2010  USDA's MyPlate  ASA Prophylaxis  Lung CA Screening    Min Toledo PA-C  [facility] MICHAEL  "

## 2019-10-28 NOTE — TELEPHONE ENCOUNTER
PRIOR AUTHORIZATION DENIED    Medication: Advair Diskus 500-50 mcg/dose inhaler     Denial Date: 10/28/2019    Denial Rational:      Appeal Information:    If you would like to appeal, please supply P/A team with a letter of medical necessity with clinical reason.

## 2019-10-30 ENCOUNTER — OFFICE VISIT (OUTPATIENT)
Dept: FAMILY MEDICINE | Facility: CLINIC | Age: 54
End: 2019-10-30
Payer: COMMERCIAL

## 2019-10-30 VITALS
BODY MASS INDEX: 31.82 KG/M2 | RESPIRATION RATE: 20 BRPM | WEIGHT: 168.4 LBS | SYSTOLIC BLOOD PRESSURE: 93 MMHG | DIASTOLIC BLOOD PRESSURE: 54 MMHG | OXYGEN SATURATION: 98 % | HEART RATE: 74 BPM

## 2019-10-30 DIAGNOSIS — M79.10 MUSCLE SORENESS: ICD-10-CM

## 2019-10-30 DIAGNOSIS — G89.29 CHRONIC BILATERAL LOW BACK PAIN WITHOUT SCIATICA: ICD-10-CM

## 2019-10-30 DIAGNOSIS — E55.9 VITAMIN D DEFICIENCY: ICD-10-CM

## 2019-10-30 DIAGNOSIS — I10 ESSENTIAL HYPERTENSION WITH GOAL BLOOD PRESSURE LESS THAN 140/90: ICD-10-CM

## 2019-10-30 DIAGNOSIS — F10.230 ALCOHOL DEPENDENCE WITH UNCOMPLICATED WITHDRAWAL (H): ICD-10-CM

## 2019-10-30 DIAGNOSIS — E78.5 HYPERLIPIDEMIA LDL GOAL <130: ICD-10-CM

## 2019-10-30 DIAGNOSIS — I10 HYPERTENSION GOAL BP (BLOOD PRESSURE) < 140/90: ICD-10-CM

## 2019-10-30 DIAGNOSIS — M54.2 CERVICALGIA: ICD-10-CM

## 2019-10-30 DIAGNOSIS — J31.0 CHRONIC RHINITIS: ICD-10-CM

## 2019-10-30 DIAGNOSIS — Z12.11 SPECIAL SCREENING FOR MALIGNANT NEOPLASMS, COLON: ICD-10-CM

## 2019-10-30 DIAGNOSIS — G25.81 RLS (RESTLESS LEGS SYNDROME): ICD-10-CM

## 2019-10-30 DIAGNOSIS — Z12.31 ENCOUNTER FOR SCREENING MAMMOGRAM FOR BREAST CANCER: ICD-10-CM

## 2019-10-30 DIAGNOSIS — J42 CHRONIC BRONCHITIS, UNSPECIFIED CHRONIC BRONCHITIS TYPE (H): ICD-10-CM

## 2019-10-30 DIAGNOSIS — K29.21 ALCOHOLIC GASTRITIS WITH HEMORRHAGE, UNSPECIFIED CHRONICITY: ICD-10-CM

## 2019-10-30 DIAGNOSIS — M54.50 CHRONIC BILATERAL LOW BACK PAIN WITHOUT SCIATICA: ICD-10-CM

## 2019-10-30 DIAGNOSIS — Z00.00 ROUTINE GENERAL MEDICAL EXAMINATION AT A HEALTH CARE FACILITY: Primary | ICD-10-CM

## 2019-10-30 DIAGNOSIS — F10.29 ALCOHOL DEPENDENCE WITH UNSPECIFIED ALCOHOL-INDUCED DISORDER (H): ICD-10-CM

## 2019-10-30 LAB
ALBUMIN SERPL-MCNC: 3.7 G/DL (ref 3.4–5)
ALP SERPL-CCNC: 118 U/L (ref 40–150)
ALT SERPL W P-5'-P-CCNC: 23 U/L (ref 0–50)
AMPHETAMINES UR QL: NOT DETECTED NG/ML
ANION GAP SERPL CALCULATED.3IONS-SCNC: 9 MMOL/L (ref 3–14)
AST SERPL W P-5'-P-CCNC: 16 U/L (ref 0–45)
BARBITURATES UR QL SCN: NOT DETECTED NG/ML
BASOPHILS # BLD AUTO: 0 10E9/L (ref 0–0.2)
BASOPHILS NFR BLD AUTO: 0.3 %
BENZODIAZ UR QL SCN: ABNORMAL NG/ML
BILIRUB SERPL-MCNC: 0.4 MG/DL (ref 0.2–1.3)
BUN SERPL-MCNC: 16 MG/DL (ref 7–30)
BUPRENORPHINE UR QL: NOT DETECTED NG/ML
CALCIUM SERPL-MCNC: 9.2 MG/DL (ref 8.5–10.1)
CANNABINOIDS UR QL: NOT DETECTED NG/ML
CHLORIDE SERPL-SCNC: 104 MMOL/L (ref 94–109)
CO2 SERPL-SCNC: 24 MMOL/L (ref 20–32)
COCAINE UR QL SCN: NOT DETECTED NG/ML
CREAT SERPL-MCNC: 0.67 MG/DL (ref 0.52–1.04)
D-METHAMPHET UR QL: NOT DETECTED NG/ML
DIFFERENTIAL METHOD BLD: NORMAL
EOSINOPHIL # BLD AUTO: 0.1 10E9/L (ref 0–0.7)
EOSINOPHIL NFR BLD AUTO: 1.1 %
ERYTHROCYTE [DISTWIDTH] IN BLOOD BY AUTOMATED COUNT: 13 % (ref 10–15)
GFR SERPL CREATININE-BSD FRML MDRD: >90 ML/MIN/{1.73_M2}
GLUCOSE SERPL-MCNC: 100 MG/DL (ref 70–99)
HCT VFR BLD AUTO: 36.2 % (ref 35–47)
HGB BLD-MCNC: 12 G/DL (ref 11.7–15.7)
LYMPHOCYTES # BLD AUTO: 1.5 10E9/L (ref 0.8–5.3)
LYMPHOCYTES NFR BLD AUTO: 19.7 %
MCH RBC QN AUTO: 30.8 PG (ref 26.5–33)
MCHC RBC AUTO-ENTMCNC: 33.1 G/DL (ref 31.5–36.5)
MCV RBC AUTO: 93 FL (ref 78–100)
METHADONE UR QL SCN: NOT DETECTED NG/ML
MONOCYTES # BLD AUTO: 0.4 10E9/L (ref 0–1.3)
MONOCYTES NFR BLD AUTO: 5.4 %
NEUTROPHILS # BLD AUTO: 5.6 10E9/L (ref 1.6–8.3)
NEUTROPHILS NFR BLD AUTO: 73.5 %
OPIATES UR QL SCN: NOT DETECTED NG/ML
OXYCODONE UR QL SCN: NOT DETECTED NG/ML
PCP UR QL SCN: NOT DETECTED NG/ML
PLATELET # BLD AUTO: 254 10E9/L (ref 150–450)
POTASSIUM SERPL-SCNC: 4.4 MMOL/L (ref 3.4–5.3)
PROPOXYPH UR QL: NOT DETECTED NG/ML
PROT SERPL-MCNC: 7.2 G/DL (ref 6.8–8.8)
RBC # BLD AUTO: 3.89 10E12/L (ref 3.8–5.2)
SODIUM SERPL-SCNC: 137 MMOL/L (ref 133–144)
TRICYCLICS UR QL SCN: ABNORMAL NG/ML
WBC # BLD AUTO: 7.6 10E9/L (ref 4–11)

## 2019-10-30 PROCEDURE — 80306 DRUG TEST PRSMV INSTRMNT: CPT | Performed by: PHYSICIAN ASSISTANT

## 2019-10-30 PROCEDURE — 99213 OFFICE O/P EST LOW 20 MIN: CPT | Mod: 25 | Performed by: PHYSICIAN ASSISTANT

## 2019-10-30 PROCEDURE — 90682 RIV4 VACC RECOMBINANT DNA IM: CPT | Performed by: PHYSICIAN ASSISTANT

## 2019-10-30 PROCEDURE — 80053 COMPREHEN METABOLIC PANEL: CPT | Performed by: PHYSICIAN ASSISTANT

## 2019-10-30 PROCEDURE — 99396 PREV VISIT EST AGE 40-64: CPT | Mod: 25 | Performed by: PHYSICIAN ASSISTANT

## 2019-10-30 PROCEDURE — 85025 COMPLETE CBC W/AUTO DIFF WBC: CPT | Performed by: PHYSICIAN ASSISTANT

## 2019-10-30 PROCEDURE — 36415 COLL VENOUS BLD VENIPUNCTURE: CPT | Performed by: PHYSICIAN ASSISTANT

## 2019-10-30 PROCEDURE — 90471 IMMUNIZATION ADMIN: CPT | Performed by: PHYSICIAN ASSISTANT

## 2019-10-30 RX ORDER — MULTIVIT-MIN/IRON/FOLIC ACID/K 18-600-40
1 CAPSULE ORAL DAILY
COMMUNITY
End: 2019-10-30

## 2019-10-30 RX ORDER — FELODIPINE 10 MG/1
5 TABLET, EXTENDED RELEASE ORAL DAILY
COMMUNITY
Start: 2019-09-20 | End: 2019-10-30 | Stop reason: DRUGHIGH

## 2019-10-30 RX ORDER — MAGNESIUM OXIDE 400 MG/1
400 TABLET ORAL DAILY
Qty: 90 TABLET | Refills: 1 | Status: SHIPPED | OUTPATIENT
Start: 2019-10-30 | End: 2021-01-08

## 2019-10-30 RX ORDER — ARIPIPRAZOLE 5 MG/1
15 TABLET ORAL DAILY
COMMUNITY
Start: 2019-10-28 | End: 2019-11-19

## 2019-10-30 RX ORDER — CALCIUM CARBONATE 500(1250)
500 TABLET ORAL DAILY
Qty: 30 TABLET | Refills: 1 | Status: SHIPPED | OUTPATIENT
Start: 2019-10-30 | End: 2021-01-07

## 2019-10-30 RX ORDER — MULTIVIT-MIN/IRON/FOLIC ACID/K 18-600-40
2 CAPSULE ORAL DAILY
Qty: 180 TABLET | Refills: 1 | Status: SHIPPED | OUTPATIENT
Start: 2019-10-30 | End: 2021-01-08

## 2019-10-30 RX ORDER — IPRATROPIUM BROMIDE AND ALBUTEROL SULFATE 2.5; .5 MG/3ML; MG/3ML
SOLUTION RESPIRATORY (INHALATION)
Qty: 360 ML | Refills: 1 | Status: SHIPPED | OUTPATIENT
Start: 2019-10-30 | End: 2021-04-16

## 2019-10-30 RX ORDER — TIOTROPIUM BROMIDE 18 UG/1
CAPSULE ORAL; RESPIRATORY (INHALATION)
Qty: 30 CAPSULE | Refills: 5 | Status: SHIPPED | OUTPATIENT
Start: 2019-10-30 | End: 2020-08-04

## 2019-10-30 RX ORDER — ASPIRIN 81 MG/1
81 TABLET ORAL DAILY
COMMUNITY
End: 2019-10-30

## 2019-10-30 RX ORDER — FLUTICASONE PROPIONATE 50 MCG
1 SPRAY, SUSPENSION (ML) NASAL DAILY
Qty: 16 G | Refills: 3 | Status: SHIPPED | OUTPATIENT
Start: 2019-10-30 | End: 2020-08-04

## 2019-10-30 RX ORDER — POTASSIUM CHLORIDE 750 MG/1
10 TABLET, EXTENDED RELEASE ORAL DAILY
Qty: 30 TABLET | Refills: 1 | Status: SHIPPED | OUTPATIENT
Start: 2019-10-30 | End: 2021-01-07

## 2019-10-30 RX ORDER — ATORVASTATIN CALCIUM 10 MG/1
10 TABLET, FILM COATED ORAL AT BEDTIME
Qty: 90 TABLET | Refills: 3 | Status: SHIPPED | OUTPATIENT
Start: 2019-10-30 | End: 2020-04-20

## 2019-10-30 RX ORDER — THIAMINE HCL 50 MG
100 TABLET ORAL DAILY
COMMUNITY
End: 2019-10-30

## 2019-10-30 RX ORDER — MULTIPLE VITAMINS W/ MINERALS TAB 9MG-400MCG
1 TAB ORAL DAILY
Qty: 30 EACH | Refills: 1 | Status: SHIPPED | OUTPATIENT
Start: 2019-10-30 | End: 2021-01-08

## 2019-10-30 RX ORDER — IBUPROFEN 800 MG/1
TABLET, FILM COATED ORAL
Qty: 42 TABLET | Refills: 1 | Status: SHIPPED | OUTPATIENT
Start: 2019-10-30 | End: 2020-08-04

## 2019-10-30 RX ORDER — ROPINIROLE 1 MG/1
1 TABLET, FILM COATED ORAL 3 TIMES DAILY
Qty: 90 TABLET | Refills: 1 | Status: SHIPPED | OUTPATIENT
Start: 2019-10-30 | End: 2020-04-20

## 2019-10-30 RX ORDER — ASPIRIN 81 MG/1
81 TABLET ORAL DAILY
Qty: 90 TABLET | Refills: 3 | Status: SHIPPED | OUTPATIENT
Start: 2019-10-30 | End: 2021-01-06

## 2019-10-30 RX ORDER — FOLIC ACID 1 MG/1
1000 TABLET ORAL DAILY
Qty: 90 TABLET | Refills: 3 | Status: SHIPPED | OUTPATIENT
Start: 2019-10-30 | End: 2021-01-08

## 2019-10-30 RX ORDER — FUROSEMIDE 20 MG
20 TABLET ORAL DAILY
Qty: 90 TABLET | Refills: 1 | Status: SHIPPED | OUTPATIENT
Start: 2019-10-30 | End: 2021-01-06

## 2019-10-30 RX ORDER — ATORVASTATIN CALCIUM 10 MG/1
10 TABLET, FILM COATED ORAL AT BEDTIME
COMMUNITY
End: 2019-10-30

## 2019-10-30 RX ORDER — LOSARTAN POTASSIUM 100 MG/1
100 TABLET ORAL DAILY
Qty: 90 TABLET | Refills: 1 | Status: SHIPPED | OUTPATIENT
Start: 2019-10-30 | End: 2020-04-20

## 2019-10-30 RX ORDER — ALBUTEROL SULFATE 90 UG/1
AEROSOL, METERED RESPIRATORY (INHALATION)
Qty: 18 G | Refills: 3 | Status: SHIPPED | OUTPATIENT
Start: 2019-10-30 | End: 2021-01-08

## 2019-10-30 RX ORDER — QUETIAPINE FUMARATE 100 MG/1
150 TABLET, FILM COATED ORAL AT BEDTIME
COMMUNITY
Start: 2019-10-28 | End: 2019-11-12

## 2019-10-30 RX ORDER — THIAMINE HCL 50 MG
100 TABLET ORAL DAILY
Qty: 180 TABLET | Refills: 1 | Status: SHIPPED | OUTPATIENT
Start: 2019-10-30 | End: 2021-01-07

## 2019-10-30 NOTE — TELEPHONE ENCOUNTER
The generic Fluticasone-Salmeterol 500-50MCG is what the pharmacy was running. I resubmitted the PA as Advair Diskus 500-50MCG which was also denied.  I called insurance and they stated the preferred alternative is fluticasone propionate. If you would like to appeal please supply P/A team with a letter of medical necessity with clinical reason. Thank you.

## 2019-10-31 ENCOUNTER — TELEPHONE (OUTPATIENT)
Dept: FAMILY MEDICINE | Facility: CLINIC | Age: 54
End: 2019-10-31

## 2019-10-31 NOTE — TELEPHONE ENCOUNTER
Prior authorization required for : Advair Diskus 500-50mcg/dose inhaler  Insurance plan: [employer]   Patient Id: [id number]  Bin Number: [id number]  Pcn: [place]  Help Desk Number: [phone]    * also patient wanted a refill on icy hot patches which are no longer covered by her insurance. Not sure if you want to send a different prescription for this one too.   Please fax over an alternative medication to the pharmacy or if you are going to pursue a prior authorization please contact the pharmacy and patient when approved. Thanks!    Avril Ramirez [facility]

## 2019-11-01 NOTE — TELEPHONE ENCOUNTER
Per PA response, will try Symbicort or Dullera however patient has no insurance at this time. Left message for patient to return call.

## 2019-11-01 NOTE — TELEPHONE ENCOUNTER
Have patient contact her insurance and find out alternatives for the advair (I.e. symbicort, dulera, breo ellipta,etc). No replacement for icy hot patches. She'll have to pay for these out of pocket.

## 2019-11-04 NOTE — TELEPHONE ENCOUNTER
Patient called back and stated that she does not have current MA.  Please call to advise.  Thank you

## 2019-11-06 ENCOUNTER — ANCILLARY PROCEDURE (OUTPATIENT)
Dept: MAMMOGRAPHY | Facility: CLINIC | Age: 54
End: 2019-11-06
Attending: PHYSICIAN ASSISTANT
Payer: MEDICAID

## 2019-11-06 DIAGNOSIS — Z12.31 ENCOUNTER FOR SCREENING MAMMOGRAM FOR BREAST CANCER: ICD-10-CM

## 2019-11-06 PROCEDURE — 77067 SCR MAMMO BI INCL CAD: CPT | Mod: TC

## 2019-11-12 ENCOUNTER — TELEPHONE (OUTPATIENT)
Dept: FAMILY MEDICINE | Facility: CLINIC | Age: 54
End: 2019-11-12

## 2019-11-12 DIAGNOSIS — G25.81 RLS (RESTLESS LEGS SYNDROME): Primary | ICD-10-CM

## 2019-11-12 RX ORDER — QUETIAPINE FUMARATE 100 MG/1
150 TABLET, FILM COATED ORAL AT BEDTIME
Qty: 45 TABLET | Refills: 0 | Status: SHIPPED | OUTPATIENT
Start: 2019-11-12 | End: 2020-04-20

## 2019-11-12 NOTE — TELEPHONE ENCOUNTER
Enid at the [facility] pharmacy states that this issue has been resolved. Patient just picked up the prescription which was of concern.     Nasrin Leroy RN BSN

## 2019-11-12 NOTE — TELEPHONE ENCOUNTER
Below is copy of OV with Psychiatry 11/4/19.  Routing refill request to provider for review/approval because:  Medication is reported/historical on med list.  Pended for approval  Mira Wright RN      2. Mental Health Diagnosis  Diagnosis  Depressive Disorder, Unspecified   Anxiety Disorder, unspecified per history  Borderline Personality Disorder  Plan  -Continue Abilify to 15mg daily, for symptoms of affective instability, with plan to titrate up as tolerated and indicated for mood  -Increase Prozac to 60 mg daily for anxiety and mood   -Continue Gabapentin 300 mg BID and 600 mg at Bedtime, consider decrease and taper as patient indicates she has been on this medication for some time without known benefit  -Continue quetiapine to 150 mg at Bedtime for sleep with adjunct benefit to mood  -Continue hydroxyzine PRN 50 mg for breakthrough anxiety- not refilled today as patient reports she has this at home.

## 2019-11-12 NOTE — TELEPHONE ENCOUNTER
Patient states the pharmacy needs Min to prescribe the medications that Dr. Colon send over.  Her insurance is restricted.  Patient does not know the names of the medication. Thank You.    Caller informed calls received after 3 pm may not be returned until following day.

## 2019-11-12 NOTE — TELEPHONE ENCOUNTER
Hi,   The patient is out of her Quetiapine 100 mg. We received a new prescription from prescriber Avril Chan but the patient is restricted to Dr. Toledo by her insurance. Can you please verify and send a new order for this medication if appropriate? You may want to contact the patient to discuss as I am not sure if she needed refills on any of her other medications. Thank you!    Avril Ramirez [facility] [place]  Phone: [phone]  Fax: [phone]

## 2019-11-18 ENCOUNTER — TELEPHONE (OUTPATIENT)
Dept: FAMILY MEDICINE | Facility: CLINIC | Age: 54
End: 2019-11-18

## 2019-11-18 NOTE — TELEPHONE ENCOUNTER
Patient has medications prescribed from her psych provider Avril Chan [facility] is to send over a list of requested medications. Due to restricted [place] insurance-these must come from Min Toledo.

## 2019-11-18 NOTE — TELEPHONE ENCOUNTER
Patient came to the clinic and would like the hightlighted prescriptions to be refilled. Please call when finished

## 2019-11-18 NOTE — TELEPHONE ENCOUNTER
Will wait for pharmacy to send requested medications for Min Toledo PA-C to refill.     Nasrin Leroy RN BSN

## 2019-11-19 DIAGNOSIS — F10.230 ALCOHOL DEPENDENCE WITH UNCOMPLICATED WITHDRAWAL (H): ICD-10-CM

## 2019-11-19 DIAGNOSIS — F10.239 ALCOHOL DEPENDENCE WITH WITHDRAWAL WITH COMPLICATION (H): ICD-10-CM

## 2019-11-19 DIAGNOSIS — J42 CHRONIC BRONCHITIS, UNSPECIFIED CHRONIC BRONCHITIS TYPE (H): ICD-10-CM

## 2019-11-19 DIAGNOSIS — M46.1 SACROILIITIS (H): ICD-10-CM

## 2019-11-19 DIAGNOSIS — E55.9 VITAMIN D DEFICIENCY: ICD-10-CM

## 2019-11-19 DIAGNOSIS — F33.0 MAJOR DEPRESSIVE DISORDER, RECURRENT EPISODE, MILD (H): ICD-10-CM

## 2019-11-19 RX ORDER — MULTIVIT-MIN/IRON/FOLIC ACID/K 18-600-40
2 CAPSULE ORAL DAILY
Qty: 180 TABLET | Refills: 1 | OUTPATIENT
Start: 2019-11-19

## 2019-11-19 RX ORDER — FOLIC ACID 1 MG/1
1000 TABLET ORAL DAILY
Qty: 90 TABLET | Refills: 3 | OUTPATIENT
Start: 2019-11-19

## 2019-11-19 NOTE — TELEPHONE ENCOUNTER
Hi,    This patient is restricted to Dr. Toledo. Dr. Ivon Chan sent over prescriptions for her on 11/04 but we couldn't fill them because of the restriction. She needs the following medications: Aripiprazole 5mg tablet, Folic Acid 1 mg tablet, Vitamin D 25mcg tablet, Gabapentin 300 mg capsule, Naltrexone 50 mg tablet. The patient also needs Fluoxetine 20 mg capsule. The last prescription we received from Dr. Chan was for the 20 mg capsule with directions take three capsules by mouth every morning for 30 days. Please verify and send new orders. Thank you!    Avril Ramirez, [facility]

## 2019-11-20 ENCOUNTER — TELEPHONE (OUTPATIENT)
Dept: FAMILY MEDICINE | Facility: CLINIC | Age: 54
End: 2019-11-20

## 2019-11-20 RX ORDER — NALTREXONE HYDROCHLORIDE 50 MG/1
50 TABLET, FILM COATED ORAL DAILY
Qty: 30 TABLET | Refills: 0 | Status: SHIPPED | OUTPATIENT
Start: 2019-11-20 | End: 2020-04-20

## 2019-11-20 RX ORDER — ARIPIPRAZOLE 5 MG/1
15 TABLET ORAL DAILY
Qty: 90 TABLET | Refills: 0 | Status: SHIPPED | OUTPATIENT
Start: 2019-11-20 | End: 2020-04-20

## 2019-11-20 RX ORDER — GABAPENTIN 300 MG/1
CAPSULE ORAL
Qty: 120 CAPSULE | Refills: 1 | Status: SHIPPED | OUTPATIENT
Start: 2019-11-20 | End: 2020-04-20

## 2019-11-20 NOTE — TELEPHONE ENCOUNTER
Folic acid sent on 10/30/19 was sent by Min Toledo.   Vitamin D sent on 10/30/19 was sent by Min Toledo.    Please advise on remaining T'd up medications.     Kati Edward, RN, BSN, PHN

## 2019-11-20 NOTE — TELEPHONE ENCOUNTER
Pharmacy is asking for clarification on Fluoxetine:   fluoxetine prescription was sent over as 1 capsule 3 times daily, but she was previously on 3 capsules once a day.  Can you please verify directions.

## 2019-11-21 NOTE — TELEPHONE ENCOUNTER
"Chel Copeland,    We got a new script for Fluoxetine 20mg capsules, and we wanted to clarify the directions. Her previous directions from psychologist Avril Chan stated \"3 capsules by mouth every morning\" and the script we received from you today was \"1 capsule by mouth three times daily\" both doses are equivalent but we wanted to make sure you didn't mean for her to take them all in the morning.     You can send us a new script for call the pharmacy @ [phone] and verbal change it with the pharmacist.    Thank You,  Grisel Chadwick, Pharmacy Tech  Ag/ [facility]        "

## 2019-12-10 ENCOUNTER — TELEPHONE (OUTPATIENT)
Dept: FAMILY MEDICINE | Facility: CLINIC | Age: 54
End: 2019-12-10

## 2019-12-10 DIAGNOSIS — J20.9 ACUTE BRONCHITIS, UNSPECIFIED ORGANISM: Primary | ICD-10-CM

## 2019-12-10 NOTE — TELEPHONE ENCOUNTER
Patient thinks they have bronchitis and would like an appt to see Min today. Please advise. Ok to leave a detailed message.  Pablo Light offered and Shelly offered.

## 2019-12-10 NOTE — TELEPHONE ENCOUNTER
Spoke with patient and she stated she could not get her car started yesterday.  Patient stated that she believes she has bronchitis. Symptoms started Saturday.  Symptoms include wheezing, SOB, coughing up yellowish sputum, no fever.  Please advise.

## 2019-12-10 NOTE — TELEPHONE ENCOUNTER
Left message on voice mail for patient to call clinic. [phone]  (no showed yesterday 12/9/19 appt).  Mira Wright RN

## 2019-12-11 NOTE — TELEPHONE ENCOUNTER
Patient returned call, told too late for a work in today. She is asking if Min would consider sending in a Rx for Prednisone as that works for her. SOB when walking around, she is fine while sitting down though.

## 2019-12-12 ENCOUNTER — ALLIED HEALTH/NURSE VISIT (OUTPATIENT)
Dept: FAMILY MEDICINE | Facility: CLINIC | Age: 54
End: 2019-12-12
Payer: COMMERCIAL

## 2019-12-12 VITALS — SYSTOLIC BLOOD PRESSURE: 115 MMHG | DIASTOLIC BLOOD PRESSURE: 72 MMHG

## 2019-12-12 DIAGNOSIS — I10 HYPERTENSION GOAL BP (BLOOD PRESSURE) < 140/90: Primary | ICD-10-CM

## 2019-12-12 PROCEDURE — 99207 ZZC NO CHARGE NURSE ONLY: CPT | Performed by: PHYSICIAN ASSISTANT

## 2019-12-12 RX ORDER — PREDNISONE 20 MG/1
20 TABLET ORAL 2 TIMES DAILY
Qty: 10 TABLET | Refills: 0 | Status: SHIPPED | OUTPATIENT
Start: 2019-12-12 | End: 2019-12-17

## 2019-12-12 NOTE — TELEPHONE ENCOUNTER
An rx for prednisone was routed to our pharmacy. If no improvement noted , have her see me next week.

## 2019-12-12 NOTE — PROGRESS NOTES
Inga Ledesma was evaluated at [facility] on December 12, 2019 at which time her blood pressure was:    BP Readings from Last 3 Encounters:   12/12/19 115/72   10/30/19 93/54   10/15/19 106/60     Pulse Readings from Last 3 Encounters:   10/30/19 74   10/15/19 71   01/14/19 78       Reviewed lifestyle modifications for blood pressure control and reduction: including making healthy food choices, managing weight, getting regular exercise, smoking cessation, reducing alcohol consumption, monitoring blood pressure regularly.     Symptoms: None    BP Goal:< 140/90 mmHg    BP Assessment:  BP at goal    Potential Reasons for BP too high: NA - Not applicable    BP Follow-Up Plan: Recheck BP in 6 months at pharmacy    Recommendation to Provider: Continue Current Therapy - Patient is having fluctuating blood pressures and is following up with Min on Monday    Note completed by: Raul MorrisonD - Float Pharmacist, on behalf of [facility]

## 2020-04-20 ENCOUNTER — VIRTUAL VISIT (OUTPATIENT)
Dept: FAMILY MEDICINE | Facility: CLINIC | Age: 55
End: 2020-04-20
Payer: COMMERCIAL

## 2020-04-20 DIAGNOSIS — I10 HYPERTENSION GOAL BP (BLOOD PRESSURE) < 140/90: ICD-10-CM

## 2020-04-20 DIAGNOSIS — M46.1 SACROILIITIS (H): ICD-10-CM

## 2020-04-20 DIAGNOSIS — G25.81 RLS (RESTLESS LEGS SYNDROME): ICD-10-CM

## 2020-04-20 DIAGNOSIS — L30.4 INTERTRIGO: ICD-10-CM

## 2020-04-20 DIAGNOSIS — F51.04 PSYCHOPHYSIOLOGICAL INSOMNIA: Primary | ICD-10-CM

## 2020-04-20 DIAGNOSIS — E78.5 HYPERLIPIDEMIA LDL GOAL <130: ICD-10-CM

## 2020-04-20 DIAGNOSIS — G44.219 EPISODIC TENSION-TYPE HEADACHE, NOT INTRACTABLE: ICD-10-CM

## 2020-04-20 DIAGNOSIS — F33.0 MAJOR DEPRESSIVE DISORDER, RECURRENT EPISODE, MILD (H): ICD-10-CM

## 2020-04-20 DIAGNOSIS — J42 CHRONIC BRONCHITIS, UNSPECIFIED CHRONIC BRONCHITIS TYPE (H): ICD-10-CM

## 2020-04-20 DIAGNOSIS — F10.239 ALCOHOL DEPENDENCE WITH WITHDRAWAL WITH COMPLICATION (H): ICD-10-CM

## 2020-04-20 PROCEDURE — 99214 OFFICE O/P EST MOD 30 MIN: CPT | Mod: 95 | Performed by: PHYSICIAN ASSISTANT

## 2020-04-20 RX ORDER — CLOTRIMAZOLE AND BETAMETHASONE DIPROPIONATE 10; .64 MG/G; MG/G
CREAM TOPICAL 2 TIMES DAILY
Qty: 45 G | Refills: 1 | Status: SHIPPED | OUTPATIENT
Start: 2020-04-20 | End: 2020-08-04

## 2020-04-20 RX ORDER — AZITHROMYCIN 250 MG/1
TABLET, FILM COATED ORAL
Qty: 6 TABLET | Refills: 0 | Status: SHIPPED | OUTPATIENT
Start: 2020-04-20 | End: 2020-08-04

## 2020-04-20 RX ORDER — PREDNISONE 20 MG/1
20 TABLET ORAL 2 TIMES DAILY
Qty: 10 TABLET | Refills: 0 | Status: SHIPPED | OUTPATIENT
Start: 2020-04-20 | End: 2020-08-04

## 2020-04-20 RX ORDER — ROPINIROLE 1 MG/1
1 TABLET, FILM COATED ORAL 3 TIMES DAILY
Qty: 90 TABLET | Refills: 1 | Status: SHIPPED | OUTPATIENT
Start: 2020-04-20 | End: 2021-01-06

## 2020-04-20 RX ORDER — FELODIPINE 10 MG/1
10 TABLET, EXTENDED RELEASE ORAL DAILY
Qty: 90 TABLET | Refills: 0 | Status: SHIPPED | OUTPATIENT
Start: 2020-04-20 | End: 2020-08-04

## 2020-04-20 RX ORDER — LOSARTAN POTASSIUM 100 MG/1
100 TABLET ORAL DAILY
Qty: 90 TABLET | Refills: 1 | Status: SHIPPED | OUTPATIENT
Start: 2020-04-20 | End: 2021-01-06

## 2020-04-20 RX ORDER — GABAPENTIN 300 MG/1
CAPSULE ORAL
Qty: 120 CAPSULE | Refills: 1 | Status: SHIPPED | OUTPATIENT
Start: 2020-04-20 | End: 2021-01-06

## 2020-04-20 RX ORDER — LABETALOL 100 MG/1
100 TABLET, FILM COATED ORAL 2 TIMES DAILY
Qty: 180 TABLET | Refills: 0 | Status: SHIPPED | OUTPATIENT
Start: 2020-04-20 | End: 2020-08-04

## 2020-04-20 RX ORDER — ATORVASTATIN CALCIUM 10 MG/1
10 TABLET, FILM COATED ORAL AT BEDTIME
Qty: 90 TABLET | Refills: 3 | Status: SHIPPED | OUTPATIENT
Start: 2020-04-20 | End: 2021-01-06

## 2020-04-20 RX ORDER — FLUOXETINE 40 MG/1
40 CAPSULE ORAL 2 TIMES DAILY
Qty: 180 CAPSULE | Refills: 1 | Status: SHIPPED | OUTPATIENT
Start: 2020-04-20 | End: 2021-01-06

## 2020-04-20 NOTE — PROGRESS NOTES
"Inga Ledesma is a 54 year old female who is being evaluated via a billable telephone visit.      The patient has been notified of following:     \"This telephone visit will be conducted via a call between you and your physician/provider. We have found that certain health care needs can be provided without the need for a physical exam.  This service lets us provide the care you need with a short phone conversation.  If a prescription is necessary we can send it directly to your pharmacy.  If lab work is needed we can place an order for that and you can then stop by our lab to have the test done at a later time.    Telephone visits are billed at different rates depending on your insurance coverage. During this emergency period, for some insurers they may be billed the same as an in-person visit.  Please reach out to your insurance provider with any questions.    Patient has given verbal consent for Telephone visit?  Yes    How would you like to obtain your AVS? Marcia Mchugh     Inga Ledesma is a 54 year old female who presents to clinic today for the following health issues:    Hypertension Follow-up      Do you check your blood pressure regularly outside of the clinic? Yes     Are you following a low salt diet? Yes    Are your blood pressures ever more than 140 on the top number (systolic) OR more   than 90 on the bottom number (diastolic), for example 140/90? Yes  Home blood pressure numbers have been running with in goal range.   No chest pain /palpitations.     Denies etoh use. Has been sober now for 7 mos   Asthma Follow-Up    Was ACT completed today?  Yes  No flowsheet data found.    How many days per week do you miss taking your asthma controller medication?  0    Please describe any recent triggers for your asthma: walking and turning over in bed    Have you had any Emergency Room Visits, Urgent Care Visits, or Hospital Admissions since your last office visit?  No      How many servings of " fruits and vegetables do you eat daily?  0-1    On average, how many sweetened beverages do you drink each day (Examples: soda, juice, sweet tea, etc.  Do NOT count diet or artificially sweetened beverages)?   2    How many days per week do you exercise enough to make your heart beat faster? 3 or less    How many minutes a day do you exercise enough to make your heart beat faster? 9 or less  How many days per week do you miss taking your medication? 2    What makes it hard for you to take your medications?  remembering to take  She is still smoking. 5-6 cigs per day.    Uses albuterol bid.  Headache  Onset: 2 weeks    Description:   Location: all over and it changes   Character: throbbing pain, sharp pain  Frequency:  Comes and goes, Tylenol helps for about an hour  Duration:  All day    Intensity: severe    Progression of Symptoms:  same    Accompanying Signs & Symptoms:  Stiff neck: YES  Neck or upper back pain: YES  Fever: no   Sinus pressure: YES  Nausea or vomiting: YES  Dizziness: YES  Numbness: no   Weakness: YES  Visual changes: YES    History:   Head trauma: no   Family history of migraines: YES  Previous tests for headaches: no   Neurologist evaluations: no   Able to do daily activities: no   Wake with a headaches: YES  Do headaches wake you up: no   Daily pain medication use: YES- Tylenol  Work/school stressors/changes: no     Precipitating factors:   Does light make it worse: YES  Does sound make it worse: no     Alleviating factors:  Does sleep help: no - can't sleep  Variable.  Prior ct scans from 2018 showed no masses or aneurysms .  Some mild nasal congestion.   Insomnia.  Some neck and shoulder soreness/tightness.   Daily headaches. Taking a lot tylenol.     Therapies Tried and outcome: Tylenol    Rash - under left arm  - itching, redness and burning  - 4 days        Patient Active Problem List   Diagnosis     RLS (restless legs syndrome)     GERD (gastroesophageal reflux disease)     Iron  deficiency anemia     Hyperlipidemia with target LDL less than 160     Adult BMI 30+     Sacroiliitis (H)     Tobacco abuse     Vitamin D deficiency     Menorrhagia     Advanced directives, counseling/discussion     Vitamin D deficiency     Edema of both legs     Hypertension goal BP (blood pressure) < 140/90     Chronic bronchitis, unspecified chronic bronchitis type (H)     Health Care Home     Alcohol dependence with withdrawal with complication (H)     Major depressive disorder, recurrent episode, mild (H)     (HFpEF) heart failure with preserved ejection fraction (H)     Psychophysiological insomnia     Chemical dependency (H)     Alcohol withdrawal (H)     Alcohol abuse     LALA (acute kidney injury) (H)     Sepsis (H)     Alcohol dependency (H)     COPD exacerbation (H)     Past Surgical History:   Procedure Laterality Date     AS BIOPSY/EXCISION LYMPH NODE OPEN SUPERFICIAL       COLONOSCOPY       EYE SURGERY       HYSTERECTOMY       HYSTERECTOMY TOTAL ABDOMINAL  7/28/10    Bilateral salpingectomy.  ovaries conserved.     LASER TX, CERVICAL  1987     LYMPH NODE BIOPSY      inguinal     LYSIS OF LABIAL LESION(S)  ,        Social History     Tobacco Use     Smoking status: Current Every Day Smoker     Packs/day: 0.25     Years: 34.00     Pack years: 8.50     Types: Cigarettes     Start date: 1979     Last attempt to quit: 10/3/2012     Years since quittin.5     Smokeless tobacco: Never Used     Tobacco comment: 5 cigarettes daily   Substance Use Topics     Alcohol use: No     Comment: 1 pint of vodka per day, last drink aug 2018     Family History   Problem Relation Age of Onset     Depression/Anxiety Mother      Asthma Mother      Cerebrovascular Disease Father      Hypertension Father      Lung Cancer Father      Alcoholism Father      Breast Cancer Paternal Aunt      Other Cancer Other      Depression Other      Anxiety Disorder Other      Mental Illness Other      Substance Abuse  Other      Asthma Other      Obesity Sister      Obesity Son      Suicide Paternal Uncle          Current Outpatient Medications   Medication Sig Dispense Refill     acetaminophen (TYLENOL) 325 MG tablet Take 1 tablet (325 mg) by mouth every 4 hours as needed for mild pain 60 tablet 1     albuterol (VENTOLIN HFA) 108 (90 Base) MCG/ACT inhaler INHALE ONE TO TWO PUFFS BY MOUTH EVERY 4 HOURS AS NEEDED FOR SHORTNESS OF BREATH, DIFFICULTY BREATHING OR WHEEZING. 18 g 3     amitriptyline (ELAVIL) 25 MG tablet Take 1 tablet (25 mg) by mouth At Bedtime 90 tablet 1     aspirin 81 MG EC tablet Take 1 tablet (81 mg) by mouth daily 90 tablet 3     atorvastatin (LIPITOR) 10 MG tablet Take 1 tablet (10 mg) by mouth At Bedtime 90 tablet 3     azithromycin (ZITHROMAX) 250 MG tablet Two tablets first day, then one tablet daily for four days. 6 tablet 0     calcium carbonate 500 mg, elemental, (OSCAL;OYSTER SHELL CALCIUM) 500 MG tablet Take 1 tablet (500 mg) by mouth daily 30 tablet 1     clotrimazole-betamethasone (LOTRISONE) 1-0.05 % external cream Apply topically 2 times daily 45 g 1     felodipine ER (PLENDIL) 10 MG 24 hr tablet Take 1 tablet (10 mg) by mouth daily 90 tablet 0     FLUoxetine (PROZAC) 20 MG capsule Take 1 capsule (20 mg) by mouth 3 times daily 90 capsule 0     FLUoxetine (PROZAC) 40 MG capsule Take 1 capsule (40 mg) by mouth 2 times daily 180 capsule 1     fluticasone (FLONASE) 50 MCG/ACT nasal spray Spray 1 spray into both nostrils daily 16 g 3     fluticasone-salmeterol (ADVAIR DISKUS) 500-50 MCG/DOSE inhaler INHALE ONE PUFF BY MOUTH EVERY 12 HOURS 1 Inhaler 11     folic acid (FOLVITE) 1 MG tablet Take 1 tablet (1,000 mcg) by mouth daily 90 tablet 3     furosemide (LASIX) 20 MG tablet Take 1 tablet (20 mg) by mouth daily 90 tablet 1     gabapentin (NEURONTIN) 300 MG capsule TAKE ONE CAPSULE BY MOUTH EVERY MORNING AND TAKE ONE CAPSULE BY MOUTH EVERY AFTERNOON AND TAKE TWO CAPSULES BY MOUTH EVERY NIGHT AT BEDTIME  120 capsule 1     ipratropium - albuterol 0.5 mg/2.5 mg/3 mL (DUONEB) 0.5-2.5 (3) MG/3ML neb solution INHALE ONE VIAL BY NEBULIZATION FOUR TIMES DAILY AS NEEDED FOR WHEEZING 360 mL 1     labetalol (NORMODYNE) 100 MG tablet Take 1 tablet (100 mg) by mouth 2 times daily 180 tablet 0     losartan (COZAAR) 100 MG tablet Take 1 tablet (100 mg) by mouth daily 90 tablet 1     magnesium oxide (MAG-OX) 400 MG tablet Take 1 tablet (400 mg) by mouth daily 90 tablet 1     multivitamin w/minerals (THERA-VIT-M) tablet Take 1 tablet by mouth daily 30 each 1     omeprazole (PRILOSEC) 20 MG DR capsule Take 1 capsule (20 mg) by mouth 2 times daily (before meals) 60 capsule 1     potassium chloride ER (K-DUR/KLOR-CON M) 10 MEQ CR tablet Take 1 tablet (10 mEq) by mouth daily 30 tablet 1     predniSONE (DELTASONE) 20 MG tablet Take 1 tablet (20 mg) by mouth 2 times daily for 5 days 10 tablet 0     rOPINIRole (REQUIP) 1 MG tablet Take 1 tablet (1 mg) by mouth 3 times daily 90 tablet 1     vitamin B1 (THIAMINE) 50 MG tablet Take 2 tablets (100 mg) by mouth daily 180 tablet 1     Vitamin D, Cholecalciferol, 25 MCG (1000 UT) TABS Take 2 tablets (2,000 Units) by mouth daily 180 tablet 1     hydrOXYzine (ATARAX) 25 MG tablet TAKE ONE TABLET BY MOUTH TWICE A DAY AS NEEDED FOR ANXIETY OR PAIN (Patient not taking: Reported on 4/20/2020) 60 tablet 1     ibuprofen (ADVIL/MOTRIN) 800 MG tablet TAKE ONE TABLET BY MOUTH THREE TIMES A DAY WITH MEALS FOR 14 DAYS (Patient not taking: Reported on 4/20/2020) 42 tablet 1     menthol (ICY HOT) 5 % PTCH Apply 1 patch topically every 8 hours as needed for muscle soreness (Patient not taking: Reported on 4/20/2020) 30 patch 1     NICOTROL 10 MG inhaler INHALE 6 TO  16 CARTRIDGES INTO THE LUNGS DAILY AS NEEDED FOR SMOKING CESSATION (Patient not taking: Reported on 4/20/2020) 168 each 2     order for DME Equipment being ordered: TENS 1 Device 0     tiotropium (SPIRIVA HANDIHALER) 18 MCG inhaled capsule INHALE  ONE CAPSULE BY MOUTH ONCE DAILY (Patient not taking: Reported on 4/20/2020) 30 capsule 5     Allergies   Allergen Reactions     Codeine Nausea     Influenza Vaccines      Other reaction(s): Other - Describe In Comment Field -- patient is sensitive to eggs     Reglan [Metoclopramide Hcl] Other (See Comments)     Body tenses up     Recent Labs   Lab Test 10/30/19  1048 10/15/19  1551  08/22/19 07/29/19 07/18/19 06/18/18  1531  04/06/18  0728  10/15/14  1609   A1C  --   --   --   --   --   --   --  5.0  --   --   --   --   --  5.6   LDL  --   --   --  175*  --   --   --   --   --   --   --  125*  --   --    HDL  --   --   --  143  --   --   --   --   --   --   --  105  --   --    TRIG  --   --   --  90  --   --   --   --   --   --   --  111  --   --    ALT 23  --   --  26  --  26  --  35   < > 49   < > 19   < >  --    CR 0.67 0.71   < > 0.72   < >  --    < >  --    < > 2.97*   < > 0.74   < > 0.61   GFRESTIMATED >90 >90   < > >60   < >  --    < >  --    < > 17*   < > 82   < > >90  Non  GFR Calc     GFRESTBLACK >90 >90   < > >60   < >  --    < >  --    < > 20*   < > >90   < > >90  African American GFR Calc     POTASSIUM 4.4 4.5   < > 3.8   < >  --    < >  --    < > 3.2*   < > 3.4   < > 4.3   TSH  --   --   --   --   --   --   --   --   --  1.02  --  1.31   < > 1.21    < > = values in this interval not displayed.      BP Readings from Last 3 Encounters:   12/12/19 115/72   10/30/19 93/54   10/15/19 106/60    Wt Readings from Last 3 Encounters:   10/30/19 76.4 kg (168 lb 6.4 oz)   10/15/19 75.6 kg (166 lb 9.6 oz)   01/14/19 72.6 kg (160 lb)                    Reviewed and updated as needed this visit by Provider         Review of Systems   ROS COMP: Constitutional, HEENT, cardiovascular, pulmonary, GI, , musculoskeletal, neuro, skin, endocrine and psych systems are negative, except as otherwise noted.       Objective   Reported vitals:  LMP 06/02/2010    healthy, alert and no distress  PSYCH: Alert and  oriented times 3; coherent speech, normal   rate and volume, able to articulate logical thoughts, able   to abstract reason, no tangential thoughts, no hallucinations   or delusions  Her affect is normal  RESP: No cough, no audible wheezing, able to talk in full sentences  Remainder of exam unable to be completed due to telephone visits    Diagnostic Test Results:  Labs reviewed in Epic        Assessment/Plan:  1. Alcohol dependence with withdrawal with complication (H)  Sober x 7 mos     2. RLS (restless legs syndrome)  stable  - rOPINIRole (REQUIP) 1 MG tablet; Take 1 tablet (1 mg) by mouth 3 times daily  Dispense: 90 tablet; Refill: 1    3. Hypertension goal BP (blood pressure) < 140/90  work on lifestyle modification  stable  - losartan (COZAAR) 100 MG tablet; Take 1 tablet (100 mg) by mouth daily  Dispense: 90 tablet; Refill: 1  - labetalol (NORMODYNE) 100 MG tablet; Take 1 tablet (100 mg) by mouth 2 times daily  Dispense: 180 tablet; Refill: 0  - felodipine ER (PLENDIL) 10 MG 24 hr tablet; Take 1 tablet (10 mg) by mouth daily  Dispense: 90 tablet; Refill: 0    4. Major depressive disorder, recurrent episode, mild (H)  stable  - gabapentin (NEURONTIN) 300 MG capsule; TAKE ONE CAPSULE BY MOUTH EVERY MORNING AND TAKE ONE CAPSULE BY MOUTH EVERY AFTERNOON AND TAKE TWO CAPSULES BY MOUTH EVERY NIGHT AT BEDTIME  Dispense: 120 capsule; Refill: 1  - FLUoxetine (PROZAC) 40 MG capsule; Take 1 capsule (40 mg) by mouth 2 times daily  Dispense: 180 capsule; Refill: 1    5. Sacroiliitis (H)    - gabapentin (NEURONTIN) 300 MG capsule; TAKE ONE CAPSULE BY MOUTH EVERY MORNING AND TAKE ONE CAPSULE BY MOUTH EVERY AFTERNOON AND TAKE TWO CAPSULES BY MOUTH EVERY NIGHT AT BEDTIME  Dispense: 120 capsule; Refill: 1    6. Hyperlipidemia LDL goal <130  stable  - atorvastatin (LIPITOR) 10 MG tablet; Take 1 tablet (10 mg) by mouth At Bedtime  Dispense: 90 tablet; Refill: 3    7. Psychophysiological insomnia  Stop Seroquel.  Start  amitriptyline    8. Episodic tension-type headache, not intractable  Advised supportive and symptomatic treatment.  Follow up with Provider - if condition persists or worsens.     - amitriptyline (ELAVIL) 25 MG tablet; Take 1 tablet (25 mg) by mouth At Bedtime  Dispense: 90 tablet; Refill: 1    9. Intertrigo    - clotrimazole-betamethasone (LOTRISONE) 1-0.05 % external cream; Apply topically 2 times daily  Dispense: 45 g; Refill: 1    10. Chronic bronchitis, unspecified chronic bronchitis type (H)  Chronic bronchitis flare up  - predniSONE (DELTASONE) 20 MG tablet; Take 1 tablet (20 mg) by mouth 2 times daily for 5 days  Dispense: 10 tablet; Refill: 0  - azithromycin (ZITHROMAX) 250 MG tablet; Two tablets first day, then one tablet daily for four days.  Dispense: 6 tablet; Refill: 0        Phone call duration:  19 minutes    Min Toledo PA-C

## 2020-06-18 ENCOUNTER — TELEPHONE (OUTPATIENT)
Dept: FAMILY MEDICINE | Facility: CLINIC | Age: 55
End: 2020-06-18

## 2020-06-18 NOTE — TELEPHONE ENCOUNTER
Reason for Call:  Other needs a copy of med list     Detailed comments: fax a copy of med list to  [phone]   Phone Number Patient can be reached at: Other phone number:  cell    Best Time: any    Can we leave a detailed message on this number? YES    Call taken on 6/18/2020 at 4:29 PM by Renetta Segura

## 2020-06-19 NOTE — TELEPHONE ENCOUNTER
Patient is active on MyChart, can she print thi off? Otherwise she will need to sign a IKE. Left message for patient to return call.

## 2020-06-29 ENCOUNTER — TELEPHONE (OUTPATIENT)
Dept: FAMILY MEDICINE | Facility: CLINIC | Age: 55
End: 2020-06-29

## 2020-06-30 NOTE — TELEPHONE ENCOUNTER
Message left, appointment opening on 7/13/2020 at 1:40, appointment made, if this date works then please cancel the 7/27/2020 appointment. I left them both in until we hear back, which one works best.

## 2020-07-29 ENCOUNTER — TRANSFERRED RECORDS (OUTPATIENT)
Dept: HEALTH INFORMATION MANAGEMENT | Facility: CLINIC | Age: 55
End: 2020-07-29

## 2020-08-03 ENCOUNTER — PATIENT OUTREACH (OUTPATIENT)
Dept: CARE COORDINATION | Facility: CLINIC | Age: 55
End: 2020-08-03

## 2020-08-03 DIAGNOSIS — Z71.89 COUNSELING AND COORDINATION OF CARE: Primary | ICD-10-CM

## 2020-08-03 DIAGNOSIS — J44.1 COPD EXACERBATION (H): Primary | ICD-10-CM

## 2020-08-03 NOTE — PROGRESS NOTES
Clinic Care Coordination Contact  [facility]/Voicemail     Pt was hospitalized 7- through 8-2-2020 for shortness of breath chest pain.        Clinical Data: Care Coordinator Outreach  Outreach attempted x 1.  Left message on patient's voicemail with call back information and requested return call.  Plan:  Care Coordinator will try to reach patient again in 1-2 business days.    Lynn CHERRYN, RN, PHN, [facility]  Primary Clinic Care Coordination    [facility]  [email or web address]   Office: [phone]  Employed by [facility]

## 2020-08-04 ENCOUNTER — OFFICE VISIT (OUTPATIENT)
Dept: FAMILY MEDICINE | Facility: CLINIC | Age: 55
End: 2020-08-04
Payer: COMMERCIAL

## 2020-08-04 ENCOUNTER — TELEPHONE (OUTPATIENT)
Dept: FAMILY MEDICINE | Facility: CLINIC | Age: 55
End: 2020-08-04

## 2020-08-04 VITALS
TEMPERATURE: 98.2 F | RESPIRATION RATE: 24 BRPM | BODY MASS INDEX: 33.72 KG/M2 | SYSTOLIC BLOOD PRESSURE: 131 MMHG | HEART RATE: 94 BPM | DIASTOLIC BLOOD PRESSURE: 74 MMHG | WEIGHT: 178.6 LBS | OXYGEN SATURATION: 95 % | HEIGHT: 61 IN

## 2020-08-04 DIAGNOSIS — M54.50 CHRONIC BILATERAL LOW BACK PAIN WITHOUT SCIATICA: ICD-10-CM

## 2020-08-04 DIAGNOSIS — Z71.6 ENCOUNTER FOR SMOKING CESSATION COUNSELING: ICD-10-CM

## 2020-08-04 DIAGNOSIS — G44.219 EPISODIC TENSION-TYPE HEADACHE, NOT INTRACTABLE: ICD-10-CM

## 2020-08-04 DIAGNOSIS — I10 HYPERTENSION GOAL BP (BLOOD PRESSURE) < 140/90: Primary | ICD-10-CM

## 2020-08-04 DIAGNOSIS — R73.01 IMPAIRED FASTING GLUCOSE: ICD-10-CM

## 2020-08-04 DIAGNOSIS — Z12.11 COLON CANCER SCREENING: ICD-10-CM

## 2020-08-04 DIAGNOSIS — L30.4 INTERTRIGO: ICD-10-CM

## 2020-08-04 DIAGNOSIS — G89.29 CHRONIC BILATERAL LOW BACK PAIN WITHOUT SCIATICA: ICD-10-CM

## 2020-08-04 DIAGNOSIS — J31.0 CHRONIC RHINITIS: ICD-10-CM

## 2020-08-04 DIAGNOSIS — E78.5 HYPERLIPIDEMIA LDL GOAL <130: ICD-10-CM

## 2020-08-04 DIAGNOSIS — R06.02 SOB (SHORTNESS OF BREATH): ICD-10-CM

## 2020-08-04 DIAGNOSIS — J42 CHRONIC BRONCHITIS, UNSPECIFIED CHRONIC BRONCHITIS TYPE (H): ICD-10-CM

## 2020-08-04 LAB
D DIMER PPP FEU-MCNC: 0.4 UG/ML FEU (ref 0–0.5)
HBA1C MFR BLD: 5.2 % (ref 0–5.6)
TROPONIN I SERPL-MCNC: <0.015 UG/L (ref 0–0.04)

## 2020-08-04 PROCEDURE — 99214 OFFICE O/P EST MOD 30 MIN: CPT | Performed by: PHYSICIAN ASSISTANT

## 2020-08-04 PROCEDURE — 80061 LIPID PANEL: CPT | Performed by: PHYSICIAN ASSISTANT

## 2020-08-04 PROCEDURE — 80048 BASIC METABOLIC PNL TOTAL CA: CPT | Performed by: PHYSICIAN ASSISTANT

## 2020-08-04 PROCEDURE — 36415 COLL VENOUS BLD VENIPUNCTURE: CPT | Performed by: PHYSICIAN ASSISTANT

## 2020-08-04 PROCEDURE — 85379 FIBRIN DEGRADATION QUANT: CPT | Performed by: PHYSICIAN ASSISTANT

## 2020-08-04 PROCEDURE — 83036 HEMOGLOBIN GLYCOSYLATED A1C: CPT | Performed by: PHYSICIAN ASSISTANT

## 2020-08-04 PROCEDURE — 84484 ASSAY OF TROPONIN QUANT: CPT | Performed by: PHYSICIAN ASSISTANT

## 2020-08-04 RX ORDER — CLOTRIMAZOLE AND BETAMETHASONE DIPROPIONATE 10; .64 MG/G; MG/G
CREAM TOPICAL 2 TIMES DAILY
Qty: 45 G | Refills: 1 | Status: SHIPPED | OUTPATIENT
Start: 2020-08-04 | End: 2021-01-07

## 2020-08-04 RX ORDER — NICOTINE 4 MG/1
INHALANT RESPIRATORY (INHALATION)
Qty: 168 EACH | Refills: 2 | Status: SHIPPED | OUTPATIENT
Start: 2020-08-04 | End: 2020-12-22

## 2020-08-04 RX ORDER — DOXYCYCLINE HYCLATE 100 MG
100 TABLET ORAL
COMMUNITY
Start: 2020-08-02 | End: 2020-08-05

## 2020-08-04 RX ORDER — FLUTICASONE PROPIONATE 50 MCG
1 SPRAY, SUSPENSION (ML) NASAL DAILY
Qty: 16 G | Refills: 3 | Status: SHIPPED | OUTPATIENT
Start: 2020-08-04 | End: 2021-06-09

## 2020-08-04 RX ORDER — FELODIPINE 10 MG/1
10 TABLET, EXTENDED RELEASE ORAL DAILY
Qty: 90 TABLET | Refills: 0 | Status: SHIPPED | OUTPATIENT
Start: 2020-08-04 | End: 2020-08-19

## 2020-08-04 RX ORDER — IBUPROFEN 800 MG/1
TABLET, FILM COATED ORAL
Qty: 42 TABLET | Refills: 1 | Status: SHIPPED | OUTPATIENT
Start: 2020-08-04 | End: 2021-01-08

## 2020-08-04 RX ORDER — LABETALOL 100 MG/1
100 TABLET, FILM COATED ORAL 2 TIMES DAILY
Qty: 180 TABLET | Refills: 0 | Status: SHIPPED | OUTPATIENT
Start: 2020-08-04 | End: 2021-01-06

## 2020-08-04 RX ORDER — PREDNISONE 20 MG/1
40 TABLET ORAL
COMMUNITY
Start: 2020-08-03 | End: 2020-08-19

## 2020-08-04 ASSESSMENT — ANXIETY QUESTIONNAIRES
IF YOU CHECKED OFF ANY PROBLEMS ON THIS QUESTIONNAIRE, HOW DIFFICULT HAVE THESE PROBLEMS MADE IT FOR YOU TO DO YOUR WORK, TAKE CARE OF THINGS AT HOME, OR GET ALONG WITH OTHER PEOPLE: SOMEWHAT DIFFICULT
2. NOT BEING ABLE TO STOP OR CONTROL WORRYING: SEVERAL DAYS
GAD7 TOTAL SCORE: 7
1. FEELING NERVOUS, ANXIOUS, OR ON EDGE: SEVERAL DAYS
3. WORRYING TOO MUCH ABOUT DIFFERENT THINGS: SEVERAL DAYS
6. BECOMING EASILY ANNOYED OR IRRITABLE: SEVERAL DAYS
7. FEELING AFRAID AS IF SOMETHING AWFUL MIGHT HAPPEN: SEVERAL DAYS
5. BEING SO RESTLESS THAT IT IS HARD TO SIT STILL: SEVERAL DAYS

## 2020-08-04 ASSESSMENT — PATIENT HEALTH QUESTIONNAIRE - PHQ9
5. POOR APPETITE OR OVEREATING: SEVERAL DAYS
SUM OF ALL RESPONSES TO PHQ QUESTIONS 1-9: 12

## 2020-08-04 ASSESSMENT — MIFFLIN-ST. JEOR: SCORE: 1352.5

## 2020-08-04 NOTE — PROGRESS NOTES
Subjective     Inga Ledesma is a 54 year old female who presents to clinic today for the following health issues:    HPI         Hospital Follow-up Visit:    Hospital/Nursing Home/IP Rehab Facility: [facility]  Date of Admission: 07/29/20  Date of Discharge: 08/02/20  Reason(s) for Admission: SOB/Sepsis?    copd flare up.  Was your hospitalization related to COVID-19? No   Problems taking medications regularly:  None  Medication changes since discharge: started doxycycline and prednisone  Problems adhering to non-medication therapy:  None    Summary of hospitalization:  CareEverywhere information obtained and reviewed  Diagnostic Tests/Treatments reviewed.  Follow up needed: none  Other Healthcare Providers Involved in Patient s Care:         None  Update since discharge: improved. But some mild sob as of this AM. Some mild intermittent wheezing. No fevers. Some chronic cough.   Post Discharge Medication Reconciliation: discharge medications reconciled and changed, per note/orders.  Plan of care communicated with patient          Bump on right wrist  - 2 mos  - no injury  - painful  Ventral aspect of wrist - 1cm diameter ganglion cyst. Mildly tender.   Med check   - would like Amitriptyline dose increased, lower dose not working    Smoking   - would like to stop smoking  - smoking a lot  Will try the nicotrol inhaler  P[place]  - has been treated twice within one month            Patient Active Problem List   Diagnosis     RLS (restless legs syndrome)     GERD (gastroesophageal reflux disease)     Iron deficiency anemia     Hyperlipidemia with target LDL less than 160     Adult BMI 30+     Sacroiliitis (H)     Tobacco abuse     Vitamin D deficiency     Menorrhagia     Advanced directives, counseling/discussion     Vitamin D deficiency     Edema of both legs     Hypertension goal BP (blood pressure) < 140/90     Chronic bronchitis, unspecified chronic bronchitis type (H)     Health Care Home     Alcohol  dependence with withdrawal with complication (H)     Major depressive disorder, recurrent episode, mild (H)     (HFpEF) heart failure with preserved ejection fraction (H)     Psychophysiological insomnia     Chemical dependency (H)     Alcohol withdrawal (H)     Alcohol abuse     LALA (acute kidney injury) (H)     Sepsis (H)     Alcohol dependency (H)     COPD exacerbation (H)     Past Surgical History:   Procedure Laterality Date     AS BIOPSY/EXCISION LYMPH NODE OPEN SUPERFICIAL       COLONOSCOPY       EYE SURGERY       HYSTERECTOMY       HYSTERECTOMY TOTAL ABDOMINAL  7/28/10    Bilateral salpingectomy.  ovaries conserved.     LASER TX, CERVICAL  1987     LYMPH NODE BIOPSY      inguinal     LYSIS OF LABIAL LESION(S)  ,        Social History     Tobacco Use     Smoking status: Current Every Day Smoker     Packs/day: 0.25     Years: 34.00     Pack years: 8.50     Types: Cigarettes     Start date: 1979     Last attempt to quit: 10/3/2012     Years since quittin.8     Smokeless tobacco: Never Used     Tobacco comment: 5 cigarettes daily   Substance Use Topics     Alcohol use: No     Comment: 1 pint of vodka per day, last drink aug 2018     Family History   Problem Relation Age of Onset     Depression/Anxiety Mother      Asthma Mother      Cerebrovascular Disease Father      Hypertension Father      Lung Cancer Father      Alcoholism Father      Breast Cancer Paternal Aunt      Other Cancer Other      Depression Other      Anxiety Disorder Other      Mental Illness Other      Substance Abuse Other      Asthma Other      Obesity Sister      Obesity Son      Suicide Paternal Uncle          Current Outpatient Medications   Medication Sig Dispense Refill     acetaminophen (TYLENOL) 325 MG tablet Take 1 tablet (325 mg) by mouth every 4 hours as needed for mild pain 60 tablet 1     albuterol (VENTOLIN HFA) 108 (90 Base) MCG/ACT inhaler INHALE ONE TO TWO PUFFS BY MOUTH EVERY 4 HOURS AS NEEDED FOR  SHORTNESS OF BREATH, DIFFICULTY BREATHING OR WHEEZING. 18 g 3     amitriptyline (ELAVIL) 25 MG tablet Take 2 tablets (50 mg) by mouth At Bedtime 180 tablet 1     aspirin 81 MG EC tablet Take 1 tablet (81 mg) by mouth daily 90 tablet 3     atorvastatin (LIPITOR) 10 MG tablet Take 1 tablet (10 mg) by mouth At Bedtime 90 tablet 3     calcium carbonate 500 mg, elemental, (OSCAL;OYSTER SHELL CALCIUM) 500 MG tablet Take 1 tablet (500 mg) by mouth daily 30 tablet 1     clotrimazole-betamethasone (LOTRISONE) 1-0.05 % external cream Apply topically 2 times daily 45 g 1     doxycycline hyclate (VIBRA-TABS) 100 MG tablet Take 100 mg by mouth       felodipine ER (PLENDIL) 10 MG 24 hr tablet Take 1 tablet (10 mg) by mouth daily 90 tablet 0     FLUoxetine (PROZAC) 40 MG capsule Take 1 capsule (40 mg) by mouth 2 times daily 180 capsule 1     fluticasone (FLONASE) 50 MCG/ACT nasal spray Spray 1 spray into both nostrils daily 16 g 3     fluticasone-salmeterol (ADVAIR DISKUS) 500-50 MCG/DOSE inhaler INHALE ONE PUFF BY MOUTH EVERY 12 HOURS 1 Inhaler 11     folic acid (FOLVITE) 1 MG tablet Take 1 tablet (1,000 mcg) by mouth daily 90 tablet 3     furosemide (LASIX) 20 MG tablet Take 1 tablet (20 mg) by mouth daily 90 tablet 1     gabapentin (NEURONTIN) 300 MG capsule TAKE ONE CAPSULE BY MOUTH EVERY MORNING AND TAKE ONE CAPSULE BY MOUTH EVERY AFTERNOON AND TAKE TWO CAPSULES BY MOUTH EVERY NIGHT AT BEDTIME 120 capsule 1     hydrOXYzine (ATARAX) 25 MG tablet TAKE ONE TABLET BY MOUTH TWICE A DAY AS NEEDED FOR ANXIETY OR PAIN 60 tablet 1     ibuprofen (ADVIL/MOTRIN) 800 MG tablet TAKE ONE TABLET BY MOUTH THREE TIMES A DAY WITH MEALS FOR 14 DAYS 42 tablet 1     ipratropium - albuterol 0.5 mg/2.5 mg/3 mL (DUONEB) 0.5-2.5 (3) MG/3ML neb solution INHALE ONE VIAL BY NEBULIZATION FOUR TIMES DAILY AS NEEDED FOR WHEEZING 360 mL 1     labetalol (NORMODYNE) 100 MG tablet Take 1 tablet (100 mg) by mouth 2 times daily 180 tablet 0     losartan (COZAAR)  100 MG tablet Take 1 tablet (100 mg) by mouth daily 90 tablet 1     magnesium oxide (MAG-OX) 400 MG tablet Take 1 tablet (400 mg) by mouth daily 90 tablet 1     menthol (ICY HOT) 5 % PTCH Apply 1 patch topically every 8 hours as needed for muscle soreness 30 patch 1     multivitamin w/minerals (THERA-VIT-M) tablet Take 1 tablet by mouth daily 30 each 1     nicotine (NICOTROL) 10 MG inhaler INHALE 6 TO  16 CARTRIDGES INTO THE LUNGS DAILY AS NEEDED FOR SMOKING CESSATION 168 each 2     omeprazole (PRILOSEC) 20 MG DR capsule Take 1 capsule (20 mg) by mouth 2 times daily (before meals) 60 capsule 1     potassium chloride ER (K-DUR/KLOR-CON M) 10 MEQ CR tablet Take 1 tablet (10 mEq) by mouth daily 30 tablet 1     predniSONE (DELTASONE) 20 MG tablet Take 40 mg by mouth       rOPINIRole (REQUIP) 1 MG tablet Take 1 tablet (1 mg) by mouth 3 times daily 90 tablet 1     tiotropium (SPIRIVA RESPIMAT) 2.5 MCG/ACT inhaler Inhale 2 puffs into the lungs daily 1 Inhaler 5     vitamin B1 (THIAMINE) 50 MG tablet Take 2 tablets (100 mg) by mouth daily 180 tablet 1     Vitamin D, Cholecalciferol, 25 MCG (1000 UT) TABS Take 2 tablets (2,000 Units) by mouth daily 180 tablet 1     FLUoxetine (PROZAC) 20 MG capsule Take 1 capsule (20 mg) by mouth 3 times daily (Patient not taking: Reported on 8/4/2020) 90 capsule 0     order for DME Equipment being ordered: TENS 1 Device 0     Allergies   Allergen Reactions     Influenza Vaccines      Other reaction(s): Other - Describe In Comment Field -- patient is sensitive to eggs     Reglan [Metoclopramide Hcl] Other (See Comments)     Body tenses up     Recent Labs   Lab Test 10/30/19  1048 10/15/19  1551  08/22/19 07/29/19 07/18/19 06/18/18  1531  04/06/18  0728  10/15/14  1609   A1C  --   --   --   --   --   --   --  5.0  --   --   --   --   --  5.6   LDL  --   --   --  175*  --   --   --   --   --   --   --  125*  --   --    HDL  --   --   --  143  --   --   --   --   --   --   --  105  --   --   "  TRIG  --   --   --  90  --   --   --   --   --   --   --  111  --   --    ALT 23  --   --  26  --  26  --  35   < > 49   < > 19   < >  --    CR 0.67 0.71   < > 0.72   < >  --    < >  --    < > 2.97*   < > 0.74   < > 0.61   GFRESTIMATED >90 >90   < > >60   < >  --    < >  --    < > 17*   < > 82   < > >90  Non  GFR Calc     GFRESTBLACK >90 >90   < > >60   < >  --    < >  --    < > 20*   < > >90   < > >90  African American GFR Calc     POTASSIUM 4.4 4.5   < > 3.8   < >  --    < >  --    < > 3.2*   < > 3.4   < > 4.3   TSH  --   --   --   --   --   --   --   --   --  1.02  --  1.31   < > 1.21    < > = values in this interval not displayed.      BP Readings from Last 3 Encounters:   08/04/20 131/74   12/12/19 115/72   10/30/19 93/54    Wt Readings from Last 3 Encounters:   08/04/20 81 kg (178 lb 9.6 oz)   10/30/19 76.4 kg (168 lb 6.4 oz)   10/15/19 75.6 kg (166 lb 9.6 oz)                    Reviewed and updated as needed this visit by Provider         Review of Systems   Constitutional, HEENT, cardiovascular, pulmonary, GI, , musculoskeletal, neuro, skin, endocrine and psych systems are negative, except as otherwise noted.      Objective    /74   Pulse 94   Temp 98.2  F (36.8  C) (Tympanic)   Resp 24   Ht 1.549 m (5' 1\")   Wt 81 kg (178 lb 9.6 oz)   LMP 06/02/2010   SpO2 95%   Breastfeeding No   BMI 33.75 kg/m    Body mass index is 33.75 kg/m .  Physical Exam   Eye exam - right eye normal lid, conjunctiva, cornea, pupil and fundus, left eye normal lid, conjunctiva, cornea, pupil and fundus.  ENT exam reveals - ENT exam normal, no neck nodes or sinus tenderness.  CHEST:chest clear to IPPA, no tachypnea, retractions or cyanosis and S1, S2 normal, no murmur, no gallop, rate regular.  Thyroid not palpable, not enlarged, no nodules detected.  Some tenderness with palpation of her left anterior chest wall. Some pain at site with deep breathing. No deformity.   Inga was seen today for " hospital f/u, musculoskeletal problem, insomnia, smoking cessation and eye problem.    Diagnoses and all orders for this visit:    Hypertension goal BP (blood pressure) < 140/90  -     BASIC METABOLIC PANEL  -     felodipine ER (PLENDIL) 10 MG 24 hr tablet; Take 1 tablet (10 mg) by mouth daily  -     labetalol (NORMODYNE) 100 MG tablet; Take 1 tablet (100 mg) by mouth 2 times daily    Intertrigo  -     clotrimazole-betamethasone (LOTRISONE) 1-0.05 % external cream; Apply topically 2 times daily    Chronic rhinitis  -     fluticasone (FLONASE) 50 MCG/ACT nasal spray; Spray 1 spray into both nostrils daily    Chronic bilateral low back pain without sciatica  -     ibuprofen (ADVIL/MOTRIN) 800 MG tablet; TAKE ONE TABLET BY MOUTH THREE TIMES A DAY WITH MEALS FOR 14 DAYS    Encounter for smoking cessation counseling  -     nicotine (NICOTROL) 10 MG inhaler; INHALE 6 TO  16 CARTRIDGES INTO THE LUNGS DAILY AS NEEDED FOR SMOKING CESSATION    Colon cancer screening  -     GASTROENTEROLOGY ADULT REF PROCEDURE ONLY; Future    Chronic bronchitis, unspecified chronic bronchitis type (H)  -     tiotropium (SPIRIVA RESPIMAT) 2.5 MCG/ACT inhaler; Inhale 2 puffs into the lungs daily    Hyperlipidemia LDL goal <130  -     Lipid panel reflex to direct LDL Fasting    SOB (shortness of breath)  -     Troponin I  -     D dimer, quantitative    Episodic tension-type headache, not intractable  -     amitriptyline (ELAVIL) 25 MG tablet; Take 2 tablets (50 mg) by mouth At Bedtime    Impaired fasting glucose  -     Hemoglobin A1c      work on lifestyle modification  Recheck in 6 mos , sooner if necessary.

## 2020-08-04 NOTE — PROGRESS NOTES
"Subjective     Inga Ledesma is a 54 year old female who presents to clinic today for the following health issues:    HPI       Hypertension Follow-up      Do you check your blood pressure regularly outside of the clinic? { :588938}     Are you following a low salt diet? { :267511}    Are your blood pressures ever more than 140 on the top number (systolic) OR more   than 90 on the bottom number (diastolic), for example 140/90? { :744378}    COPD Follow-Up    Overall, how are your COPD symptoms since your last clinic visit?  { :205828::\"No change\"}    How much fatigue or shortness of breath do you have when you are walking?  { :745479}    How much shortness of breath do you have when you are resting?  { :910010}    How often do you cough? { :881087}    Have you noticed any change in your sputum/phlegm?  { :103385}    Have you experienced a recent fever? { :875534}    Please describe how far you can walk without stopping to rest:  { :405553}    How many flights of stairs are you able to walk up without stopping?  { :314580}    Have you had any Emergency Room Visits, Urgent Care Visits, or Hospital Admissions because of your COPD since your last office visit?  { :246672}    History   Smoking Status     Current Every Day Smoker     Packs/day: 0.25     Years: 34.00     Types: Cigarettes     Start date: 11/1/1979     Last attempt to quit: 10/3/2012   Smokeless Tobacco     Never Used     Comment: 5 cigarettes daily     No results found for: FEV1, [id number]      How many servings of fruits and vegetables do you eat daily?  { :666909}    On average, how many sweetened beverages do you drink each day (Examples: soda, juice, sweet tea, etc.  Do NOT count diet or artificially sweetened beverages)?   { 1-11:324636}    How many days per week do you exercise enough to make your heart beat faster? { :817932}    How many minutes a day do you exercise enough to make your heart beat faster? { :074054}    How many days per week do you " "miss taking your medication? {0-7 :987008}    {additonal problems for provider to add (Optional):637903}    {HIST REVIEW/ LINKS 2 (Optional):409174}    Reviewed and updated as needed this visit by Provider         Review of Systems   {ROS COMP (Optional):564490}      Objective    LMP 06/02/2010   There is no height or weight on file to calculate BMI.  Physical Exam   {Exam List (Optional):109481}    {Diagnostic Test Results (Optional):781128::\"Diagnostic Test Results:\",\"Labs reviewed in Epic\"}        {PROVIDER CHARTING PREFERENCE:855982}  "

## 2020-08-05 ENCOUNTER — TELEPHONE (OUTPATIENT)
Dept: FAMILY MEDICINE | Facility: CLINIC | Age: 55
End: 2020-08-05

## 2020-08-05 LAB
ANION GAP SERPL CALCULATED.3IONS-SCNC: 9 MMOL/L (ref 3–14)
BUN SERPL-MCNC: 16 MG/DL (ref 7–30)
CALCIUM SERPL-MCNC: 8.9 MG/DL (ref 8.5–10.1)
CHLORIDE SERPL-SCNC: 101 MMOL/L (ref 94–109)
CHOLEST SERPL-MCNC: 175 MG/DL
CO2 SERPL-SCNC: 26 MMOL/L (ref 20–32)
CREAT SERPL-MCNC: 0.63 MG/DL (ref 0.52–1.04)
GFR SERPL CREATININE-BSD FRML MDRD: >90 ML/MIN/{1.73_M2}
GLUCOSE SERPL-MCNC: 97 MG/DL (ref 70–99)
HDLC SERPL-MCNC: 78 MG/DL
LDLC SERPL CALC-MCNC: 80 MG/DL
NONHDLC SERPL-MCNC: 97 MG/DL
POTASSIUM SERPL-SCNC: 3.7 MMOL/L (ref 3.4–5.3)
SODIUM SERPL-SCNC: 136 MMOL/L (ref 133–144)
TRIGL SERPL-MCNC: 85 MG/DL

## 2020-08-05 ASSESSMENT — ANXIETY QUESTIONNAIRES: GAD7 TOTAL SCORE: 7

## 2020-08-05 NOTE — PROGRESS NOTES
Clinic Care Coordination Contact  [facility]/Voicemail       Clinical Data: Care Coordinator Outreach  Outreach attempted x 2.  Left message on patient's voicemail with call back information and requested return call.  Plan: Care Coordinator will send unable to contact letter with care coordinator contact information via [employer]. Care Coordinator will do no further outreaches at this time.    Lynn CHERRYN, RN, PHN, CCM  Primary Clinic Care Coordination    [facility]  [email or web address]   Office: [phone]  Employed by [facility]

## 2020-08-05 NOTE — TELEPHONE ENCOUNTER
Prior Authorization Approval    Authorization Effective Date: 7/6/2020  Authorization Expiration Date: 8/5/2021  Medication: Nicotrol Inhaler-APPROVED  Approved Dose/Quantity:   Reference #:     Insurance Company: [employer] - Phone [phone] Fax [phone]  Expected CoPay:       CoPay Card Available:      Foundation Assistance Needed:    Which Pharmacy is filling the prescription (Not needed for infusion/clinic administered): [facility] - [address] [facility] [place]  Pharmacy Notified: Yes  Patient Notified: No    Pharmacy will notify patient when medication is ready.

## 2020-08-05 NOTE — TELEPHONE ENCOUNTER
Central Prior Authorization Team   Phone: [phone]      PA Initiation    Medication: Nicotrol Inhaler-Initiated  Insurance Company: [employer] - Phone [phone] Fax [phone]  Pharmacy Filling the Rx: [facility] - [address] [facility] [place]  Filling Pharmacy Phone: [phone]  Filling Pharmacy Fax:    Start Date: 8/5/2020

## 2020-08-05 NOTE — TELEPHONE ENCOUNTER
Prior Authorization Retail Medication Request    Medication/Dose: Nicotrol Inhaler  ICD code (if different than what is on RX):    Previously Tried and Failed:    Rationale:      Insurance Name:  [facility]  Insurance ID:  [id number]      [facility]  [phone]

## 2020-08-05 NOTE — TELEPHONE ENCOUNTER
Spoke with pt and let her know Min did send a new prescription for   amitriptyline (ELAVIL) 25 MG tablet  180 tablet  1  8/4/2020   No    Sig - Route: Take 2 tablets (50 mg) by mouth At Bedtime - Oral     Sent to [facility] pharmacy on 8/4/20 at 1023.  Pt said she would give the pharmacy a call.

## 2020-08-09 ENCOUNTER — TRANSFERRED RECORDS (OUTPATIENT)
Dept: HEALTH INFORMATION MANAGEMENT | Facility: CLINIC | Age: 55
End: 2020-08-09

## 2020-08-09 LAB
ALT SERPL-CCNC: 40 IU/L (ref 8–45)
AST SERPL-CCNC: 23 IU/L (ref 2–40)

## 2020-08-12 LAB
CREAT SERPL-MCNC: 0.71 MG/DL (ref 0.57–1.11)
GFR SERPL CREATININE-BSD FRML MDRD: >60 ML/MIN/1.73M2
GLUCOSE SERPL-MCNC: 130 MG/DL (ref 65–100)
POTASSIUM SERPL-SCNC: 4.3 MMOL/L (ref 3.5–5)

## 2020-08-19 ENCOUNTER — OFFICE VISIT (OUTPATIENT)
Dept: FAMILY MEDICINE | Facility: CLINIC | Age: 55
End: 2020-08-19
Payer: COMMERCIAL

## 2020-08-19 VITALS
SYSTOLIC BLOOD PRESSURE: 94 MMHG | OXYGEN SATURATION: 95 % | WEIGHT: 179 LBS | DIASTOLIC BLOOD PRESSURE: 56 MMHG | RESPIRATION RATE: 24 BRPM | TEMPERATURE: 98.8 F | BODY MASS INDEX: 33.82 KG/M2 | HEART RATE: 91 BPM

## 2020-08-19 DIAGNOSIS — F10.29 ALCOHOL DEPENDENCE WITH UNSPECIFIED ALCOHOL-INDUCED DISORDER (H): ICD-10-CM

## 2020-08-19 DIAGNOSIS — I10 HYPERTENSION GOAL BP (BLOOD PRESSURE) < 140/90: ICD-10-CM

## 2020-08-19 DIAGNOSIS — R53.83 FATIGUE, UNSPECIFIED TYPE: ICD-10-CM

## 2020-08-19 DIAGNOSIS — R06.83 SNORING: ICD-10-CM

## 2020-08-19 DIAGNOSIS — L27.0 DRUG ERUPTION: Primary | ICD-10-CM

## 2020-08-19 DIAGNOSIS — R06.02 SOB (SHORTNESS OF BREATH): ICD-10-CM

## 2020-08-19 DIAGNOSIS — K21.9 GASTROESOPHAGEAL REFLUX DISEASE WITHOUT ESOPHAGITIS: ICD-10-CM

## 2020-08-19 LAB
BASOPHILS # BLD AUTO: 0 10E9/L (ref 0–0.2)
BASOPHILS NFR BLD AUTO: 0.3 %
DIFFERENTIAL METHOD BLD: ABNORMAL
EOSINOPHIL # BLD AUTO: 0.3 10E9/L (ref 0–0.7)
EOSINOPHIL NFR BLD AUTO: 2.3 %
ERYTHROCYTE [DISTWIDTH] IN BLOOD BY AUTOMATED COUNT: 13.4 % (ref 10–15)
HCT VFR BLD AUTO: 38 % (ref 35–47)
HGB BLD-MCNC: 12.6 G/DL (ref 11.7–15.7)
LYMPHOCYTES # BLD AUTO: 1.4 10E9/L (ref 0.8–5.3)
LYMPHOCYTES NFR BLD AUTO: 10.5 %
MCH RBC QN AUTO: 31.4 PG (ref 26.5–33)
MCHC RBC AUTO-ENTMCNC: 33.2 G/DL (ref 31.5–36.5)
MCV RBC AUTO: 95 FL (ref 78–100)
MONOCYTES # BLD AUTO: 0.6 10E9/L (ref 0–1.3)
MONOCYTES NFR BLD AUTO: 4.9 %
NEUTROPHILS # BLD AUTO: 10.5 10E9/L (ref 1.6–8.3)
NEUTROPHILS NFR BLD AUTO: 82 %
PLATELET # BLD AUTO: 253 10E9/L (ref 150–450)
RBC # BLD AUTO: 4.01 10E12/L (ref 3.8–5.2)
WBC # BLD AUTO: 12.9 10E9/L (ref 4–11)

## 2020-08-19 PROCEDURE — 83880 ASSAY OF NATRIURETIC PEPTIDE: CPT | Performed by: PHYSICIAN ASSISTANT

## 2020-08-19 PROCEDURE — 80050 GENERAL HEALTH PANEL: CPT | Performed by: PHYSICIAN ASSISTANT

## 2020-08-19 PROCEDURE — 36415 COLL VENOUS BLD VENIPUNCTURE: CPT | Performed by: PHYSICIAN ASSISTANT

## 2020-08-19 PROCEDURE — 99214 OFFICE O/P EST MOD 30 MIN: CPT | Performed by: PHYSICIAN ASSISTANT

## 2020-08-19 RX ORDER — FAMOTIDINE 40 MG/1
40 TABLET, FILM COATED ORAL
Qty: 60 TABLET | Refills: 1 | Status: SHIPPED | OUTPATIENT
Start: 2020-08-19 | End: 2021-01-06

## 2020-08-19 RX ORDER — FELODIPINE 5 MG/1
5 TABLET, EXTENDED RELEASE ORAL DAILY
Qty: 90 TABLET | Refills: 1 | Status: SHIPPED | OUTPATIENT
Start: 2020-08-19 | End: 2020-10-29

## 2020-08-19 NOTE — PROGRESS NOTES
Subjective     Inga Ledesma is a 55 year old female who presents to clinic today for the following health issues:    [facility]         Hospital Follow-up Visit:    Hospital/Nursing Home/IP Rehab Facility: [facility]  Date of Admission: 8/9/2020  Date of Discharge: 8/12/2020  Reason(s) for Admission: Drug Rash      Was your hospitalization related to COVID-19? No   Problems taking medications regularly:  None  Medication changes since discharge: Sent home with prednisone but patient did not take.  Problems adhering to non-medication therapy:  None    Rash is clearing up-now in flakey stage. Still itchy.    Summary of hospitalization:  CareEverywhere information obtained and reviewed  Diagnostic Tests/Treatments reviewed.  Follow up needed: none  Other Healthcare Providers Involved in Patient s Care:         dermatology  Update since discharge: improved.   Post Discharge Medication Reconciliation: discharge medications reconciled and changed, per note/orders.  Plan of care communicated with patient     Blood pressure a little low. Some positional dizziness. Will dec her felodipine to 5 mg daily.     Low phosphorous in the hospital.  Rash resolving   Denies chest pain/sob/palps   Snoring history. Some gerd symptoms. Nocturnal especially. Fatigue   Review of Systems   Constitutional, HEENT, cardiovascular, pulmonary, GI, , musculoskeletal, neuro, skin, endocrine and psych systems are negative, except as otherwise noted.      Objective    BP 94/56   Pulse 91   Temp 98.8  F (37.1  C) (Tympanic)   Resp 24   Wt 81.2 kg (179 lb)   LMP 06/02/2010   SpO2 95%   Breastfeeding No   BMI 33.82 kg/m    Body mass index is 33.82 kg/m .  Physical Exam   Eye exam - right eye normal lid, conjunctiva, cornea, pupil and fundus, left eye normal lid, conjunctiva, cornea, pupil and fundus.  Thyroid not palpable, not enlarged, no nodules detected.  CHEST:chest clear to IPPA, no tachypnea, retractions or cyanosis and S1, S2 normal,  no murmur, no gallop, rate regular.  No edema  The abdomen is soft without tenderness, guarding, mass, rebound or organomegaly. Bowel sounds are normal. No CVA tenderness or inguinal adenopathy noted.    Inga was seen today for hospital f/u and forms.    Diagnoses and all orders for this visit:    Drug eruption  -     Comprehensive metabolic panel (BMP + Alb, Alk Phos, ALT, AST, Total. Bili, TP)  -     CBC with platelets and differential    Hypertension goal BP (blood pressure) < 140/90  -     felodipine ER (PLENDIL) 5 MG 24 hr tablet; Take 1 tablet (5 mg) by mouth daily  -     Comprehensive metabolic panel (BMP + Alb, Alk Phos, ALT, AST, Total. Bili, TP)  -     CBC with platelets and differential    Alcohol dependence with unspecified alcohol-induced disorder (H)    Snoring  -     SLEEP EVALUATION & MANAGEMENT REFERRAL - [facility] [place]  [phone] (Age 15 and up); Future    Fatigue, unspecified type  -     SLEEP EVALUATION & MANAGEMENT REFERRAL - [facility] [place]  [phone] (Age 15 and up); Future    Gastroesophageal reflux disease without esophagitis  -     famotidine (PEPCID) 40 MG tablet; Take 1 tablet (40 mg) by mouth nightly as needed for heartburn    reduce felodipine dose to 5 mg daily (was on 10)  work on lifestyle modification  Advised supportive and symptomatic treatment.  Follow up with Provider - if condition persists or worsens.

## 2020-08-20 LAB
ALBUMIN SERPL-MCNC: 3.6 G/DL (ref 3.4–5)
ALP SERPL-CCNC: 113 U/L (ref 40–150)
ALT SERPL W P-5'-P-CCNC: 31 U/L (ref 0–50)
ANION GAP SERPL CALCULATED.3IONS-SCNC: 9 MMOL/L (ref 3–14)
AST SERPL W P-5'-P-CCNC: 15 U/L (ref 0–45)
BILIRUB SERPL-MCNC: 0.2 MG/DL (ref 0.2–1.3)
BUN SERPL-MCNC: 14 MG/DL (ref 7–30)
CALCIUM SERPL-MCNC: 9 MG/DL (ref 8.5–10.1)
CHLORIDE SERPL-SCNC: 104 MMOL/L (ref 94–109)
CO2 SERPL-SCNC: 24 MMOL/L (ref 20–32)
CREAT SERPL-MCNC: 0.85 MG/DL (ref 0.52–1.04)
GFR SERPL CREATININE-BSD FRML MDRD: 78 ML/MIN/{1.73_M2}
GLUCOSE SERPL-MCNC: 125 MG/DL (ref 70–99)
NT-PROBNP SERPL-MCNC: 219 PG/ML (ref 0–125)
POTASSIUM SERPL-SCNC: 3.6 MMOL/L (ref 3.4–5.3)
PROT SERPL-MCNC: 7 G/DL (ref 6.8–8.8)
SODIUM SERPL-SCNC: 137 MMOL/L (ref 133–144)
TSH SERPL DL<=0.005 MIU/L-ACNC: 1.38 MU/L (ref 0.4–4)

## 2020-08-21 ENCOUNTER — TELEPHONE (OUTPATIENT)
Dept: FAMILY MEDICINE | Facility: CLINIC | Age: 55
End: 2020-08-21

## 2020-08-21 NOTE — TELEPHONE ENCOUNTER
Patient brought in paperwork to be filled ou by Min. She would like to be called when finished to

## 2020-09-10 ENCOUNTER — OFFICE VISIT (OUTPATIENT)
Dept: FAMILY MEDICINE | Facility: CLINIC | Age: 55
End: 2020-09-10
Payer: COMMERCIAL

## 2020-09-10 VITALS
DIASTOLIC BLOOD PRESSURE: 65 MMHG | OXYGEN SATURATION: 96 % | BODY MASS INDEX: 33.82 KG/M2 | WEIGHT: 179 LBS | HEART RATE: 100 BPM | SYSTOLIC BLOOD PRESSURE: 107 MMHG | RESPIRATION RATE: 20 BRPM | TEMPERATURE: 98 F

## 2020-09-10 DIAGNOSIS — R21 RASH: ICD-10-CM

## 2020-09-10 DIAGNOSIS — M67.431 GANGLION CYST OF WRIST, RIGHT: Primary | ICD-10-CM

## 2020-09-10 PROCEDURE — 99213 OFFICE O/P EST LOW 20 MIN: CPT | Mod: 25 | Performed by: PHYSICIAN ASSISTANT

## 2020-09-10 PROCEDURE — 10160 PNXR ASPIR ABSC HMTMA BULLA: CPT | Performed by: PHYSICIAN ASSISTANT

## 2020-09-10 RX ORDER — PREDNISONE 10 MG/1
TABLET ORAL
Qty: 42 TABLET | Refills: 0 | Status: SHIPPED | OUTPATIENT
Start: 2020-09-10 | End: 2020-10-28

## 2020-09-10 NOTE — PROGRESS NOTES
Subjective     Inga Ledesma is a 55 year old female who presents to clinic today for the following health issues:    HPI         Cyst-drainage-patient states she saw you for this before.    Patient has scabs from mosquito bites-she would like to know how to get rid of them  Pruritic. Occasionally hurt. Lesions on arms, legs and abdomen/torso for several weeks. She queries arthropod bites. Denies current drug or alcohol use.  Patient is also here for disability paperwork.      Right ganglion cyst. 1 cm in diameter and slightly tender.      Review of Systems   Constitutional, HEENT, cardiovascular, pulmonary, GI, , musculoskeletal, neuro, skin, endocrine and psych systems are negative, except as otherwise noted.      Objective    LMP 06/02/2010   There is no height or weight on file to calculate BMI.  Physical Exam   1cm diameter right ventral ganglion cyst involving her wrist. Using sterile technique area anesthetized with 1% lido with epi.     Ulcerated lesions on above mentioned areas. No signs of secondary infection.       Inga was seen today for derm problem and forms.    Diagnoses and all orders for this visit:    Ganglion cyst of wrist, right  -     PUNCTURE ASPIRATION ABSCESS/HEMATOMA/CYST    Rash  -     predniSONE (DELTASONE) 10 MG tablet; Take 60 mg days 1 and 2, 50 mg days 3 and 4, 40 mg days 5 and 6, 30 mg days 7 and 8, 20 mg days 9 and 10, 10 mg days 11 and 12      Advised supportive and symptomatic treatment.  Follow up with Provider - if condition persists or worsens.   If symptoms not improving will refer her to derm.

## 2020-09-25 DIAGNOSIS — K29.21 ALCOHOLIC GASTRITIS WITH HEMORRHAGE, UNSPECIFIED CHRONICITY: ICD-10-CM

## 2020-10-05 ENCOUNTER — TRANSFERRED RECORDS (OUTPATIENT)
Dept: HEALTH INFORMATION MANAGEMENT | Facility: CLINIC | Age: 55
End: 2020-10-05

## 2020-10-22 ENCOUNTER — TRANSFERRED RECORDS (OUTPATIENT)
Dept: HEALTH INFORMATION MANAGEMENT | Facility: CLINIC | Age: 55
End: 2020-10-22

## 2020-10-22 LAB
CREAT SERPL-MCNC: 0.73 MG/DL (ref 0.57–1.11)
GFR SERPL CREATININE-BSD FRML MDRD: >60 ML/MIN/1.73M2
GLUCOSE SERPL-MCNC: 91 MG/DL (ref 65–100)
POTASSIUM SERPL-SCNC: 3 MMOL/L (ref 3.5–5)

## 2020-10-28 ENCOUNTER — VIRTUAL VISIT (OUTPATIENT)
Dept: FAMILY MEDICINE | Facility: CLINIC | Age: 55
End: 2020-10-28
Payer: COMMERCIAL

## 2020-10-28 ENCOUNTER — TELEPHONE (OUTPATIENT)
Dept: FAMILY MEDICINE | Facility: CLINIC | Age: 55
End: 2020-10-28

## 2020-10-28 DIAGNOSIS — H10.403 CHRONIC CONJUNCTIVITIS OF BOTH EYES, UNSPECIFIED CHRONIC CONJUNCTIVITIS TYPE: Primary | ICD-10-CM

## 2020-10-28 DIAGNOSIS — I10 HYPERTENSION GOAL BP (BLOOD PRESSURE) < 140/90: ICD-10-CM

## 2020-10-28 PROCEDURE — 99213 OFFICE O/P EST LOW 20 MIN: CPT | Mod: 95 | Performed by: NURSE PRACTITIONER

## 2020-10-28 RX ORDER — POLYMYXIN B SULFATE AND TRIMETHOPRIM 1; 10000 MG/ML; [USP'U]/ML
1-2 SOLUTION OPHTHALMIC EVERY 4 HOURS
Qty: 6 ML | Refills: 0 | Status: SHIPPED | OUTPATIENT
Start: 2020-10-28 | End: 2020-11-07

## 2020-10-28 NOTE — PROGRESS NOTES
"Inga Ledesma is a 55 year old female who is being evaluated via a billable telephone visit.      The patient has been notified of following:     \"This telephone visit will be conducted via a call between you and your physician/provider. We have found that certain health care needs can be provided without the need for a physical exam.  This service lets us provide the care you need with a short phone conversation.  If a prescription is necessary we can send it directly to your pharmacy.  If lab work is needed we can place an order for that and you can then stop by our lab to have the test done at a later time.    Telephone visits are billed at different rates depending on your insurance coverage. During this emergency period, for some insurers they may be billed the same as an in-person visit.  Please reach out to your insurance provider with any questions.    If during the course of the call the physician/provider feels a telephone visit is not appropriate, you will not be charged for this service.\"    Patient has given verbal consent for Telephone visit?  Yes    What phone number would you like to be contacted at? [phone]    How would you like to obtain your AVS? Abelt    Subjective     Inga Ledesma is a 55 year old female who presents via phone visit today for the following health issues:    HPI     Eye(s) Problem  Onset/Duration: sunday  Description:   Location: both eyes  Pain: YES  Redness: YES  Accompanying Signs & Symptoms:  Discharge/mattering: YES  Swelling: no  Visual changes: YES  Fever: no  Nasal Congestion: YES  Bothered by bright lights: no  Therapies tried and outcome: drops from previous pink eye- in the past has been treated with ofloxacin and tobradex.  Has had 4 eye infections in 4 weeks. Does not wear contacts.  Went to the eye doctor for new glasses 2 weeks ago, he told her her eyes look healthy. No eye pain currently, no vision changes as she has been using eye drops so her " "symptoms have started improving.  Discussed using eye drops for the full duration; and resistant bacteria.  Discussed potential to be giving infection back to herself, needs to change sheets, does not use contacts, wash surfaces, very contageous.           Review of Systems   Constitutional, HEENT, cardiovascular, pulmonary, GI, , musculoskeletal, neuro, skin, endocrine and psych systems are negative, except as otherwise noted.       Objective          Vitals:  No vitals were obtained today due to virtual visit.    healthy, alert and no distress  PSYCH: Alert and oriented times 3; coherent speech, normal   rate and volume, able to articulate logical thoughts, able   to abstract reason, no tangential thoughts, no hallucinations   or delusions  Her affect is normal  RESP: No cough, no audible wheezing, able to talk in full sentences  Remainder of exam unable to be completed due to telephone visits          Assessment/Plan:    Assessment & Plan     Inga was seen today for eye problem.    Diagnoses and all orders for this visit:    Chronic conjunctivitis of both eyes, unspecified chronic conjunctivitis type  -     trimethoprim-polymyxin b (POLYTRIM) 30027-4.1 UNIT/ML-% ophthalmic solution; Place 1-2 drops into both eyes every 4 hours for 10 days         Tobacco Cessation:   reports that she has been smoking cigarettes. She started smoking about 41 years ago. She has a 8.50 pack-year smoking history. She has never used smokeless tobacco.  Tobacco Cessation Action Plan: Information offered: Patient not interested at this time      BMI:   Estimated body mass index is 33.82 kg/m  as calculated from the following:    Height as of 8/4/20: 1.549 m (5' 1\").    Weight as of 9/10/20: 81.2 kg (179 lb).   Weight management plan: Discussed healthy diet and exercise guidelines         See Patient Instructions: advised to use full course of eye drops until symptoms are resolved.  She should read tips on discharge instructions " on how to prevent red eye.Follow up if worsening symptoms.     Return in about 1 week (around 11/4/2020), or if symptoms worsen or fail to improve.    CINDY Sanchez  [facility]    Phone call duration:  15 minutes

## 2020-10-28 NOTE — TELEPHONE ENCOUNTER
Patient states that she has pink eye for the 4th time and would like drops for them.    Thank you.

## 2020-10-28 NOTE — PATIENT INSTRUCTIONS
Patient Education     Conjunctivitis, Nonspecific    The membrane that covers the white part of your eye (the conjunctiva) is inflamed. Inflammation happens when your body responds to an injury, allergic reaction, infection, or illness. Symptoms of inflammation in the eye may include redness, irritation, itching, swelling, or burning. These symptoms should go away within the next 24 hours. Conjunctivitis may be related to a particle that was in your eye. If so, it may wash out with your tears or irrigation treatment. Being exposed to liquid chemicals or fumes may also cause this reaction.   Home care    Apply a cold pack over the eye for 20 minutes at a time. This will reduce pain. To make a cold pack, put ice cubes in a plastic bag that seals at the top. Wrap the bag in a clean, thin towel or cloth.    Artificial tears may be prescribed to reduce irritation or redness.  These should be used 3 to 4 times a day.    You may use acetaminophen or ibuprofen to control pain, unless another medicine was prescribed. (Note: If you have chronic liver or kidney disease, or if you have ever had a stomach ulcer or gastrointestinal bleeding, talk with your healthcare provider before using these medicines.)  Follow-up care  Follow up with your healthcare provider, or as advised.  When to seek medical advice  Call your healthcare provider right away if any of these occur:    Increased eyelid swelling    Increased eye pain    Increased redness or drainage from the eye    Increased blurry vision or increased sensitivity to light    Failure of normal vision to return within 24 to 48 hours  Date Last Reviewed: 7/1/2017 2000-2019 [employer]. [address] [place] [address], [place] [address]. All rights reserved. This information is not intended as a substitute for professional medical care. Always follow your healthcare professional's instructions.           Patient Education     Conjunctivitis Caused by Irritation     Your  healthcare provider may prescribe eye drops to help relieve symptoms.     Conjunctivitis may be caused by allergies to animals, chemicals, or other irritants. This includes eye drops. Most eye drops contain chemicals (preservatives) that may irritate your eyes. The problem can keep coming back. This can lead to an eye infection. Treatment involves relieving your symptoms and avoiding the irritant. If you have an infection, it will be treated.  Allergies  Grass, pollen, dust, mold, and animals are common causes of allergies. They can make your eyes red, watery, and itchy. In most cases, both eyes are affected.  Treatment  The best way to control an allergy is to avoid its source. Cold compresses and eye drops can help reduce the swelling. They can also help relieve redness and itching. If your allergy is severe, your healthcare provider may prescribe eye drops or oral medicines. Symptoms may take several weeks to clear up.  Other irritants  Pollution, smoke, contact lenses, and makeup can irritate your eyes. Your eyes can get red, sore, puffy, and watery. One or both eyes may become irritated.  Treatment  The best thing to do is avoid the irritant. Artificial tears can help flush out the eye and lubricate the surface. Your healthcare provider may also prescribe eye drops to reduce swelling and relieve redness. In some cases, you may have to stop wearing contact lenses or certain types of eye makeup.  Date Last Reviewed: 10/1/2017    3329-0288 [employer]. [address] [place] [address]. All rights reserved. This information is not intended as a substitute for professional medical care. Always follow your healthcare professional's instructions.           Patient Education     Understanding Red Eye: Causes    Do your eyes sometimes get red and irritated? This could be a sign of irritation or infection. The inside of your eyelid and the white of your eye are covered with a membrane called the  conjunctiva. When your eye is irritated or infected, the blood vessels in this membrane swell. This condition is called conjunctivitis. It is also known as red eye or pink eye.  Irritation  Allergies and environmental irritants are common causes of inflamed eyes. Other causes can include:    Injuries    Objects in the eye    Some eye drops    Makeup    Contact lenses    Diseases within the eye  Infection  Viruses and bacteria can cause eye infections. An infection may begin in your eye. It can also start somewhere else in your body, such as your nose or throat. Sexually transmitted diseases can also cause eye infections.  Date Last Reviewed: 11/1/2017 2000-2019 [employer]. [address] [place], [place] [id number]. All rights reserved. This information is not intended as a substitute for professional medical care. Always follow your healthcare professional's instructions.           Patient Education     Understanding Noninfectious Red Eye: Treating Inflammation     Wrap a cold pack in a thin towel before using as a cold compress.     Red eyes are sometimes caused by viral or bacterial infections. But inflammation in one or both eyes often happens because of allergies or environmental irritants. Here's a closer look at these two common causes of eye inflammation, and how to treat them.   Allergies  Do your eyes swell and itch when you pet a cat? Do they get red, watery, and itchy every spring or summer? If so, you may have an allergy to animals. Or you may have an allergy to a fine powder (pollen) made by certain plants. Along with dust and mold, animals and pollen are the most common causes of allergies. Often both eyes will get inflamed when you are around whatever causes your allergy.  Treating the symptoms:    The only cure for an allergy is to avoid the substance (allergen) that causes it.    Eye drops and cold compresses can help reduce swelling. They can ease redness and itching.    Symptoms  often get better if you use allergy eye drops. Or if you limit your contact with the allergen.     If your allergy is severe, your healthcare provider may prescribe oral medicine. This may include antihistamines or steroids.    Your healthcare provider may refer you to a specialist.  Environmental irritants  Your eyes can also be irritated by things such as:    Air pollution    Smoke    Fumes    Some eye drops    Contact lenses, especially extended-wear lenses  These environmental irritants can make your eye s blood vessels swell. Your eyelids may get red and swollen. Your eyes may water. But they don t often itch as much as they do with allergies. Depending on the cause, one or both eyes may be inflamed.  Treating the symptoms:    Stay away from the irritant.    Try artificial tears to help flush out your eye. They lubricate your eye s surface.    Ask your healthcare provider about medicated anti-inflammatory or anti-allergy eye drops. These can reduce swelling and ease redness.  Date Last Reviewed: 11/1/2017 2000-2019 [employer]. [address] [place] [address], [place] [address]. All rights reserved. This information is not intended as a substitute for professional medical care. Always follow your healthcare professional's instructions.

## 2020-10-29 ENCOUNTER — TELEPHONE (OUTPATIENT)
Dept: FAMILY MEDICINE | Facility: CLINIC | Age: 55
End: 2020-10-29

## 2020-10-29 RX ORDER — FELODIPINE 10 MG/1
TABLET, EXTENDED RELEASE ORAL
Qty: 90 TABLET | Refills: 0 | OUTPATIENT
Start: 2020-10-29

## 2020-10-29 RX ORDER — FELODIPINE 5 MG/1
5 TABLET, EXTENDED RELEASE ORAL DAILY
Qty: 30 TABLET | Refills: 0 | Status: SHIPPED | OUTPATIENT
Start: 2020-10-29 | End: 2021-01-06

## 2020-10-29 NOTE — TELEPHONE ENCOUNTER
Looks like RX was denied by Shelly RN.  Apparently RX was sent to local print.  Will send to provider to advise if okay to resent.  RX and pharmacy pended.      felodipine ER (PLENDIL) 5 MG 24 hr tablet 90 tablet 1 8/19/2020  No   Sig - Route: Take 1 tablet (5 mg) by mouth daily - Oral   Class: Local Print   Order: [id number]

## 2020-11-04 ENCOUNTER — TRANSFERRED RECORDS (OUTPATIENT)
Dept: HEALTH INFORMATION MANAGEMENT | Facility: CLINIC | Age: 55
End: 2020-11-04

## 2020-11-08 ENCOUNTER — HEALTH MAINTENANCE LETTER (OUTPATIENT)
Age: 55
End: 2020-11-08

## 2020-11-18 ENCOUNTER — TRANSFERRED RECORDS (OUTPATIENT)
Dept: HEALTH INFORMATION MANAGEMENT | Facility: CLINIC | Age: 55
End: 2020-11-18

## 2020-11-23 ENCOUNTER — TRANSFERRED RECORDS (OUTPATIENT)
Dept: HEALTH INFORMATION MANAGEMENT | Facility: CLINIC | Age: 55
End: 2020-11-23

## 2020-11-24 ENCOUNTER — PATIENT OUTREACH (OUTPATIENT)
Dept: CARE COORDINATION | Facility: CLINIC | Age: 55
End: 2020-11-24

## 2020-11-24 ASSESSMENT — ACTIVITIES OF DAILY LIVING (ADL): DEPENDENT_IADLS:: INDEPENDENT

## 2020-11-24 NOTE — PROGRESS NOTES
Clinic Care Coordination Contact    Situation: Patient chart reviewed by care coordinator.    Background: Patient was admitted [facility] on 11/18/2020 due to sepsis and alcohol intoxication.  Patient discharged on 11/23/2020 addiction med unit.    Assessment: Per chart review patient was living in a trailer that was not safe.  There was no heat and she was laying on extension cords.  She had multiple indicators that she had been laying in bed for 3 days and blood alcohol level was 0.38+.  Patient had been benign going to treatment program yet as time went on in the hospital she was more agreeable and then was discharged to the addiction med unit to ED on campus.    [facility] had been [facility] but was not open to because she was going in to chemical dependency unit.  If there were any concerns during her chemical dependency treatment that was still there upon discharge they were instructed to report another VA.    Plan/Recommendations: We will review chart in 2 weeks and out reach at that time.  Not sending introduction letter as patient's trailer appears to be condemned and she was 3 months behind on rent so uncertain where she would discharge to.    EVERETTE Patel, [facility] - Care Coordinator   [facility]  11/24/2020   7:33 AM  [phone]

## 2020-12-08 ENCOUNTER — PATIENT OUTREACH (OUTPATIENT)
Dept: CARE COORDINATION | Facility: CLINIC | Age: 55
End: 2020-12-08

## 2020-12-08 NOTE — LETTER
[place] CARE COORDINATION - [facility]  [address] [place] [address]  [phone]    December 8, 2020    Inga Ledesma  [address] [place] [address]      Dear Inga,    I am a clinic care coordinator who works with Min Toledo PA-C at [facility]. I have been trying to reach you recently to introduce Clinic Care Coordination and to see if there was anything I could assist you with.  Below is a description of clinic care coordination and how I can further assist you.      The clinic care coordination team is made up of a registered nurse,  and community health worker who understand the health care system. The goal of clinic care coordination is to help you manage your health and improve access to the health care system in the most efficient manner. The team can assist you in meeting your health care goals by providing education, coordinating services, strengthening the communication among your providers and supporting you with any resource needs.    Please feel free to contact me at [phone] with any questions or concerns. We are focused on providing you with the highest-quality healthcare experience possible and that all starts with you.     Sincerely,     EVERETTE Patel  [facility] - Care Coordination  [facility]   [phone]

## 2020-12-08 NOTE — PROGRESS NOTES
Clinic Care Coordination Contact  [facility]/Voicemail       Clinical Data: Care Coordinator Outreach  Outreach attempted x 1.  Left message on patient's voicemail with call back information and requested return call.  Plan: Care Coordinator will send care coordination introduction letter with care coordinator contact information and explanation of care coordination services via "[employer]hart. Care Coordinator will try to reach patient again in 1-2 business days.    EVERETTE Patel, [facility] - Care Coordinator   [facility]  12/8/2020   3:05 PM  [phone]

## 2020-12-09 NOTE — PROGRESS NOTES
Clinic Care Coordination Contact  [facility]/Voicemail       Clinical Data: Care Coordinator Outreach  Outreach attempted x 2.  Left message on patient's voicemail with call back information and requested return call.  Plan: Care Coordinator sent care coordination introduction letter on 12-8-2020 via [employer]. Care Coordinator will do no further outreaches at this time.    EVERETTE Patel, [facility] - Care Coordinator   [facility]  12/9/2020   10:43 AM  [phone]

## 2020-12-14 DIAGNOSIS — G89.29 CHRONIC BILATERAL LOW BACK PAIN WITHOUT SCIATICA: ICD-10-CM

## 2020-12-14 DIAGNOSIS — M54.50 CHRONIC BILATERAL LOW BACK PAIN WITHOUT SCIATICA: ICD-10-CM

## 2020-12-14 NOTE — TELEPHONE ENCOUNTER
Cued med and pharmacy.    Routed to refill pool for entry into protocol.    Belen Pringle RN  [facility]

## 2020-12-14 NOTE — TELEPHONE ENCOUNTER
Reason for Call:  Other prescription    Detailed comments:     ibuprofen (ADVIL/MOTRIN) 800 MG tablet    [facility]   Phone: [phone]    Phone Number Patient can be reached at: Other phone number:  [phone]    Best Time: any    Can we leave a detailed message on this number? YES    Call taken on 12/14/2020 at 1:12 PM by Cooper Capps

## 2020-12-16 NOTE — TELEPHONE ENCOUNTER
Routing refill request to provider for review/approval because:  Labs not current:  CBC     Hanna Cárdenas RN

## 2020-12-17 RX ORDER — IBUPROFEN 800 MG/1
TABLET, FILM COATED ORAL
Qty: 42 TABLET | Refills: 1 | OUTPATIENT
Start: 2020-12-17

## 2020-12-22 ENCOUNTER — VIRTUAL VISIT (OUTPATIENT)
Dept: FAMILY MEDICINE | Facility: CLINIC | Age: 55
End: 2020-12-22
Payer: COMMERCIAL

## 2020-12-22 DIAGNOSIS — G89.29 CHRONIC BILATERAL LOW BACK PAIN WITHOUT SCIATICA: ICD-10-CM

## 2020-12-22 DIAGNOSIS — F10.29 ALCOHOL DEPENDENCE WITH UNSPECIFIED ALCOHOL-INDUCED DISORDER (H): ICD-10-CM

## 2020-12-22 DIAGNOSIS — Z71.6 ENCOUNTER FOR SMOKING CESSATION COUNSELING: ICD-10-CM

## 2020-12-22 DIAGNOSIS — K14.0 GLOSSITIS: ICD-10-CM

## 2020-12-22 DIAGNOSIS — B37.0 THRUSH: Primary | ICD-10-CM

## 2020-12-22 DIAGNOSIS — R53.83 FATIGUE, UNSPECIFIED TYPE: ICD-10-CM

## 2020-12-22 DIAGNOSIS — M54.50 CHRONIC BILATERAL LOW BACK PAIN WITHOUT SCIATICA: ICD-10-CM

## 2020-12-22 PROCEDURE — 99495 TRANSJ CARE MGMT MOD F2F 14D: CPT | Mod: 95 | Performed by: PHYSICIAN ASSISTANT

## 2020-12-22 RX ORDER — FLUCONAZOLE 100 MG/1
100 TABLET ORAL DAILY
Qty: 15 TABLET | Refills: 0 | Status: SHIPPED | OUTPATIENT
Start: 2020-12-22 | End: 2022-10-31

## 2020-12-22 RX ORDER — MENTHOL 205.5 MG/1
1 PATCH TOPICAL EVERY 8 HOURS PRN
Qty: 15 EACH | Refills: 3 | Status: SHIPPED | OUTPATIENT
Start: 2020-12-22 | End: 2021-01-07

## 2020-12-22 RX ORDER — NICOTINE 4 MG/1
INHALANT RESPIRATORY (INHALATION)
Qty: 168 EACH | Refills: 2 | Status: SHIPPED | OUTPATIENT
Start: 2020-12-22 | End: 2021-02-17

## 2020-12-28 ENCOUNTER — TELEPHONE (OUTPATIENT)
Dept: FAMILY MEDICINE | Facility: CLINIC | Age: 55
End: 2020-12-28

## 2020-12-28 NOTE — TELEPHONE ENCOUNTER
Routed to provider per patient request. Please see message below.    Slight improvement of the sores and white spots in her mouth but nothing significant. She said it spread to the roof of her mouth and is still hard to eat. Anything else that can be done?    Toe pain not improved with taking the ibuprofen as prescribed.    The cyst that you previously drained has came back and she is unsure what to do next.        Patient stated she would be able to do a virtual visit but unsure if she could be seen in person. Her coordinator was not around to ask.       Thank you,   Hanna Cárdenas RN

## 2020-12-28 NOTE — TELEPHONE ENCOUNTER
Reason for Call:  Other     Detailed comments: Patient stated that medication prescribed for tongue sore has not helped. She continues with the white bumps on tongue and tip and unable to swallow. [facility] . The ibuprofen 800 mg prescribed for toe also is not working for pain. The cyst that was drained previously has re appeared on right wrist and it is hurting.    Phone Number   Inga Ledesma (Self) [phone]         Best Time:     Can we leave a detailed message on this number? YES    Call taken on 12/28/2020 at 11:56 AM by Marquita Garcia

## 2020-12-29 NOTE — TELEPHONE ENCOUNTER
i'd like to evaluate patient. I know she is attending treatment . Is it possible for her to schedule a face to face visit with me?

## 2020-12-31 ENCOUNTER — TELEPHONE (OUTPATIENT)
Dept: FAMILY MEDICINE | Facility: CLINIC | Age: 55
End: 2020-12-31

## 2020-12-31 NOTE — TELEPHONE ENCOUNTER
Due to her COPD, patient is asking for a letter to be excused from wearing a mask at her treatment home. Letter pended. She [email or web address]

## 2020-12-31 NOTE — LETTER
[facility] [place]  [address] [place] [address]  Phone: [phone]    December 31, 2020        Inga JASMIN Baltazar  [address]  [place] [address]          To whom it may concern:    RE: Inga CASTELLANOS Baltazar    Due to patient's history of COPD, I recommend that she not wear a mask at this time.    Please contact me for questions or concerns.      Sincerely,        Min Toledo PA-C

## 2021-01-06 ENCOUNTER — ANCILLARY PROCEDURE (OUTPATIENT)
Dept: ULTRASOUND IMAGING | Facility: CLINIC | Age: 56
End: 2021-01-06
Attending: PHYSICIAN ASSISTANT
Payer: COMMERCIAL

## 2021-01-06 ENCOUNTER — OFFICE VISIT (OUTPATIENT)
Dept: FAMILY MEDICINE | Facility: CLINIC | Age: 56
End: 2021-01-06
Payer: COMMERCIAL

## 2021-01-06 VITALS
RESPIRATION RATE: 24 BRPM | TEMPERATURE: 97.9 F | OXYGEN SATURATION: 96 % | WEIGHT: 192 LBS | BODY MASS INDEX: 36.28 KG/M2 | DIASTOLIC BLOOD PRESSURE: 56 MMHG | SYSTOLIC BLOOD PRESSURE: 97 MMHG | HEART RATE: 71 BPM

## 2021-01-06 DIAGNOSIS — I10 HYPERTENSION GOAL BP (BLOOD PRESSURE) < 140/90: ICD-10-CM

## 2021-01-06 DIAGNOSIS — I10 ESSENTIAL HYPERTENSION WITH GOAL BLOOD PRESSURE LESS THAN 140/90: ICD-10-CM

## 2021-01-06 DIAGNOSIS — K29.21 ALCOHOLIC GASTRITIS WITH HEMORRHAGE, UNSPECIFIED CHRONICITY: ICD-10-CM

## 2021-01-06 DIAGNOSIS — G25.81 RLS (RESTLESS LEGS SYNDROME): ICD-10-CM

## 2021-01-06 DIAGNOSIS — F33.0 MAJOR DEPRESSIVE DISORDER, RECURRENT EPISODE, MILD (H): ICD-10-CM

## 2021-01-06 DIAGNOSIS — E78.5 HYPERLIPIDEMIA LDL GOAL <130: ICD-10-CM

## 2021-01-06 DIAGNOSIS — G44.219 EPISODIC TENSION-TYPE HEADACHE, NOT INTRACTABLE: ICD-10-CM

## 2021-01-06 DIAGNOSIS — M79.89 RIGHT LEG SWELLING: ICD-10-CM

## 2021-01-06 DIAGNOSIS — K21.9 GASTROESOPHAGEAL REFLUX DISEASE WITHOUT ESOPHAGITIS: ICD-10-CM

## 2021-01-06 DIAGNOSIS — M46.1 SACROILIITIS (H): ICD-10-CM

## 2021-01-06 DIAGNOSIS — J42 CHRONIC BRONCHITIS, UNSPECIFIED CHRONIC BRONCHITIS TYPE (H): ICD-10-CM

## 2021-01-06 DIAGNOSIS — R22.0 NODULE OF TONGUE: ICD-10-CM

## 2021-01-06 DIAGNOSIS — E66.01 MORBID OBESITY (H): ICD-10-CM

## 2021-01-06 DIAGNOSIS — M79.89 RIGHT LEG SWELLING: Primary | ICD-10-CM

## 2021-01-06 DIAGNOSIS — Z00.00 ROUTINE GENERAL MEDICAL EXAMINATION AT A HEALTH CARE FACILITY: ICD-10-CM

## 2021-01-06 DIAGNOSIS — G89.4 CHRONIC PAIN SYNDROME: ICD-10-CM

## 2021-01-06 LAB
ALBUMIN SERPL-MCNC: 3.9 G/DL (ref 3.4–5)
ALP SERPL-CCNC: 86 U/L (ref 40–150)
ALT SERPL W P-5'-P-CCNC: 23 U/L (ref 0–50)
ANION GAP SERPL CALCULATED.3IONS-SCNC: 8 MMOL/L (ref 3–14)
AST SERPL W P-5'-P-CCNC: 18 U/L (ref 0–45)
BASOPHILS # BLD AUTO: 0 10E9/L (ref 0–0.2)
BASOPHILS NFR BLD AUTO: 0.4 %
BILIRUB SERPL-MCNC: 0.2 MG/DL (ref 0.2–1.3)
BUN SERPL-MCNC: 28 MG/DL (ref 7–30)
CALCIUM SERPL-MCNC: 9.1 MG/DL (ref 8.5–10.1)
CHLORIDE SERPL-SCNC: 100 MMOL/L (ref 94–109)
CO2 SERPL-SCNC: 24 MMOL/L (ref 20–32)
CREAT SERPL-MCNC: 1.09 MG/DL (ref 0.52–1.04)
DIFFERENTIAL METHOD BLD: ABNORMAL
EOSINOPHIL # BLD AUTO: 0.1 10E9/L (ref 0–0.7)
EOSINOPHIL NFR BLD AUTO: 1.8 %
ERYTHROCYTE [DISTWIDTH] IN BLOOD BY AUTOMATED COUNT: 14.4 % (ref 10–15)
GFR SERPL CREATININE-BSD FRML MDRD: 57 ML/MIN/{1.73_M2}
GLUCOSE SERPL-MCNC: 90 MG/DL (ref 70–99)
HCT VFR BLD AUTO: 32.6 % (ref 35–47)
HGB BLD-MCNC: 10.5 G/DL (ref 11.7–15.7)
LYMPHOCYTES # BLD AUTO: 1.5 10E9/L (ref 0.8–5.3)
LYMPHOCYTES NFR BLD AUTO: 19.9 %
MCH RBC QN AUTO: 30.5 PG (ref 26.5–33)
MCHC RBC AUTO-ENTMCNC: 32.2 G/DL (ref 31.5–36.5)
MCV RBC AUTO: 95 FL (ref 78–100)
MONOCYTES # BLD AUTO: 0.5 10E9/L (ref 0–1.3)
MONOCYTES NFR BLD AUTO: 6.5 %
NEUTROPHILS # BLD AUTO: 5.5 10E9/L (ref 1.6–8.3)
NEUTROPHILS NFR BLD AUTO: 71.4 %
PLATELET # BLD AUTO: 255 10E9/L (ref 150–450)
POTASSIUM SERPL-SCNC: 4.7 MMOL/L (ref 3.4–5.3)
PROT SERPL-MCNC: 7.2 G/DL (ref 6.8–8.8)
RBC # BLD AUTO: 3.44 10E12/L (ref 3.8–5.2)
SODIUM SERPL-SCNC: 132 MMOL/L (ref 133–144)
WBC # BLD AUTO: 7.7 10E9/L (ref 4–11)

## 2021-01-06 PROCEDURE — 36415 COLL VENOUS BLD VENIPUNCTURE: CPT | Performed by: PHYSICIAN ASSISTANT

## 2021-01-06 PROCEDURE — 99214 OFFICE O/P EST MOD 30 MIN: CPT | Performed by: PHYSICIAN ASSISTANT

## 2021-01-06 PROCEDURE — 85025 COMPLETE CBC W/AUTO DIFF WBC: CPT | Performed by: PHYSICIAN ASSISTANT

## 2021-01-06 PROCEDURE — 80053 COMPREHEN METABOLIC PANEL: CPT | Performed by: PHYSICIAN ASSISTANT

## 2021-01-06 RX ORDER — ACETAMINOPHEN 500 MG
500 TABLET ORAL EVERY 4 HOURS PRN
Qty: 1 BOTTLE | Refills: 3 | Status: SHIPPED | OUTPATIENT
Start: 2021-01-06 | End: 2021-02-17

## 2021-01-06 RX ORDER — LOSARTAN POTASSIUM 100 MG/1
100 TABLET ORAL DAILY
Qty: 90 TABLET | Refills: 1 | Status: SHIPPED | OUTPATIENT
Start: 2021-01-06 | End: 2021-07-15

## 2021-01-06 RX ORDER — ATORVASTATIN CALCIUM 10 MG/1
10 TABLET, FILM COATED ORAL AT BEDTIME
Qty: 90 TABLET | Refills: 3 | Status: SHIPPED | OUTPATIENT
Start: 2021-01-06 | End: 2022-01-26

## 2021-01-06 RX ORDER — ASPIRIN 81 MG/1
81 TABLET ORAL DAILY
Qty: 90 TABLET | Refills: 3 | Status: SHIPPED | OUTPATIENT
Start: 2021-01-06 | End: 2021-12-15

## 2021-01-06 RX ORDER — ROPINIROLE 1 MG/1
1 TABLET, FILM COATED ORAL 3 TIMES DAILY
Qty: 90 TABLET | Refills: 1 | Status: SHIPPED | OUTPATIENT
Start: 2021-01-06 | End: 2021-02-17

## 2021-01-06 RX ORDER — LABETALOL 100 MG/1
100 TABLET, FILM COATED ORAL 2 TIMES DAILY
Qty: 180 TABLET | Refills: 0 | Status: SHIPPED | OUTPATIENT
Start: 2021-01-06 | End: 2021-03-18

## 2021-01-06 RX ORDER — FAMOTIDINE 40 MG/1
40 TABLET, FILM COATED ORAL
Qty: 60 TABLET | Refills: 1 | Status: SHIPPED | OUTPATIENT
Start: 2021-01-06 | End: 2021-04-13

## 2021-01-06 RX ORDER — FUROSEMIDE 20 MG
20 TABLET ORAL DAILY
Qty: 90 TABLET | Refills: 1 | Status: SHIPPED | OUTPATIENT
Start: 2021-01-06 | End: 2021-06-17

## 2021-01-06 RX ORDER — GABAPENTIN 300 MG/1
CAPSULE ORAL
Qty: 120 CAPSULE | Refills: 1 | Status: SHIPPED | OUTPATIENT
Start: 2021-01-06 | End: 2021-03-19

## 2021-01-06 RX ORDER — FLUOXETINE 40 MG/1
40 CAPSULE ORAL 2 TIMES DAILY
Qty: 180 CAPSULE | Refills: 1 | Status: SHIPPED | OUTPATIENT
Start: 2021-01-06 | End: 2021-04-16

## 2021-01-06 NOTE — PROGRESS NOTES
Subjective     Inga Ledesma is a 55 year old who presents to clinic today for the following health issues     HPI         Hospital Follow-up Visit:    Hospital/Nursing Home/IP Rehab Facility: [facility]  Date of Visit: 12/28/20  Reason(s) for Admission: Sore tongue and throat      Was your hospitalization related to COVID-19? No   Problems taking medications regularly:  None  Medication changes since discharge: None  Problems adhering to non-medication therapy:  None    Summary of hospitalization:  CareEverywhere information obtained and reviewed  Diagnostic Tests/Treatments reviewed.  Follow up needed: none  Other Healthcare Providers Involved in Patient s Care:         None  Update since discharge: improved.     Finishing antibiotic's : seems like its helping.    Right leg/calf pain and swelling.          Edema  - Right leg worse than left  - feels bruised, concerned about blood clot    Recheck Foot/toes  - hard to walk  - would like new walker  - discussed with Min several years ago    Letter  - would like letter excusing her from wearing a mask      Review of Systems   Constitutional, HEENT, cardiovascular, pulmonary, GI, , musculoskeletal, neuro, skin, endocrine and psych systems are negative, except as otherwise noted.      Objective    LMP 06/02/2010   There is no height or weight on file to calculate BMI.  Physical Exam   Eye exam - right eye normal lid, conjunctiva, cornea, pupil and fundus, left eye normal lid, conjunctiva, cornea, pupil and fundus.  Thyroid not palpable, not enlarged, no nodules detected.  CHEST:chest clear to IPPA, no tachypnea, retractions or cyanosis and S1, S2 normal, no murmur, no gallop, rate regular.  Bilateral leg edema. Right calf tenderness.  Small left lateral tongue, white nodular lesion.    Inga was seen today for letter request, edema, hospital f/u and musculoskeletal problem.    Diagnoses and all orders for this visit:    Right leg swelling  -     US Lower  Extremity Venous Duplex Right; Future    Morbid obesity (H)    Hypertension goal BP (blood pressure) < 140/90  -     losartan (COZAAR) 100 MG tablet; Take 1 tablet (100 mg) by mouth daily  -     labetalol (NORMODYNE) 100 MG tablet; Take 1 tablet (100 mg) by mouth 2 times daily  -     Comprehensive metabolic panel (BMP + Alb, Alk Phos, ALT, AST, Total. Bili, TP)    Alcoholic gastritis with hemorrhage, unspecified chronicity  -     omeprazole (PRILOSEC) 20 MG DR capsule; TAKE ONE CAPSULE BY MOUTH TWICE A DAY BEFORE MEALS  -     Comprehensive metabolic panel (BMP + Alb, Alk Phos, ALT, AST, Total. Bili, TP)  -     CBC with platelets and differential    Episodic tension-type headache, not intractable  -     amitriptyline (ELAVIL) 25 MG tablet; Take 2 tablets (50 mg) by mouth At Bedtime    Hyperlipidemia LDL goal <130  -     atorvastatin (LIPITOR) 10 MG tablet; Take 1 tablet (10 mg) by mouth At Bedtime    Gastroesophageal reflux disease without esophagitis  -     famotidine (PEPCID) 40 MG tablet; Take 1 tablet (40 mg) by mouth nightly as needed for heartburn    Major depressive disorder, recurrent episode, mild (H)  -     FLUoxetine (PROZAC) 40 MG capsule; Take 1 capsule (40 mg) by mouth 2 times daily  -     gabapentin (NEURONTIN) 300 MG capsule; TAKE ONE CAPSULE BY MOUTH EVERY MORNING AND TAKE ONE CAPSULE BY MOUTH EVERY AFTERNOON AND TAKE TWO CAPSULES BY MOUTH EVERY NIGHT AT BEDTIME    Essential hypertension with goal blood pressure less than 140/90  -     furosemide (LASIX) 20 MG tablet; Take 1 tablet (20 mg) by mouth daily    Sacroiliitis (H)  -     gabapentin (NEURONTIN) 300 MG capsule; TAKE ONE CAPSULE BY MOUTH EVERY MORNING AND TAKE ONE CAPSULE BY MOUTH EVERY AFTERNOON AND TAKE TWO CAPSULES BY MOUTH EVERY NIGHT AT BEDTIME  -     Miscellaneous Order for DME - ONLY FOR DME    RLS (restless legs syndrome)  -     rOPINIRole (REQUIP) 1 MG tablet; Take 1 tablet (1 mg) by mouth 3 times daily    Routine general medical  examination at a health care facility  -     aspirin 81 MG EC tablet; Take 1 tablet (81 mg) by mouth daily    Chronic pain syndrome  -     acetaminophen (TYLENOL) 500 MG tablet; Take 1 tablet (500 mg) by mouth every 4 hours as needed for mild pain    Chronic bronchitis, unspecified chronic bronchitis type (H)  -     Nebulizer and Supplies Order for DME - ONLY FOR DME    Nodule of tongue  -     OTOLARYNGOLOGY REFERRAL      Stop felodipine. May be influencing her leg edema. She has also noted some orthostatic hypotension now that she's sober x 5-6 wks.     Advised supportive and symptomatic treatment.  Follow up with Provider - if condition persists or worsens.   work on lifestyle modification

## 2021-01-06 NOTE — LETTER
[facility] [place]  [address] [place] [address]  Phone: [phone]    January 6, 2021        Inga Ledesma  [address]  [place] [address]          To whom it may concern:    RE: Inga Ledesma    Patient was seen and treated today at our clinic. Due to her underlying copd, I am recommending she wear a face covering, but over her mouth only.      Please contact me for questions or concerns.      Sincerely,        Min Toledo PA-C

## 2021-01-06 NOTE — PROGRESS NOTES
DME orders for wheeled walker and nebulizer faxed to [facility], products should be delivered to : [address] [facility] [place] [address]

## 2021-01-07 ENCOUNTER — TELEPHONE (OUTPATIENT)
Dept: FAMILY MEDICINE | Facility: CLINIC | Age: 56
End: 2021-01-07

## 2021-01-07 DIAGNOSIS — F51.04 PSYCHOPHYSIOLOGICAL INSOMNIA: Primary | ICD-10-CM

## 2021-01-07 DIAGNOSIS — G89.29 CHRONIC BILATERAL LOW BACK PAIN WITHOUT SCIATICA: ICD-10-CM

## 2021-01-07 DIAGNOSIS — M54.50 CHRONIC BILATERAL LOW BACK PAIN WITHOUT SCIATICA: ICD-10-CM

## 2021-01-07 DIAGNOSIS — E55.9 VITAMIN D DEFICIENCY: ICD-10-CM

## 2021-01-07 DIAGNOSIS — I10 ESSENTIAL HYPERTENSION WITH GOAL BLOOD PRESSURE LESS THAN 140/90: ICD-10-CM

## 2021-01-07 DIAGNOSIS — F10.29 ALCOHOL DEPENDENCE WITH UNSPECIFIED ALCOHOL-INDUCED DISORDER (H): ICD-10-CM

## 2021-01-07 DIAGNOSIS — J30.1 ALLERGIC RHINITIS DUE TO POLLEN, UNSPECIFIED SEASONALITY: ICD-10-CM

## 2021-01-07 DIAGNOSIS — F10.230 ALCOHOL DEPENDENCE WITH UNCOMPLICATED WITHDRAWAL (H): ICD-10-CM

## 2021-01-07 DIAGNOSIS — J42 CHRONIC BRONCHITIS, UNSPECIFIED CHRONIC BRONCHITIS TYPE (H): ICD-10-CM

## 2021-01-07 DIAGNOSIS — M46.1 SACROILIITIS (H): ICD-10-CM

## 2021-01-07 NOTE — TELEPHONE ENCOUNTER
I called home number, this is group home, staff answered, says some meds were refilled to [facility] but some missing.    She put the patient on the phone who has a med list updated from recent hospital stay.      I cued needed meds that still need to go to [facility].    Long list.  15 more meds, some re-cued from current list, some are new Rx's.    Routed to PCP Min Toledo to address.    Belen Pringle RN  [facility]

## 2021-01-08 ENCOUNTER — TELEPHONE (OUTPATIENT)
Dept: FAMILY MEDICINE | Facility: CLINIC | Age: 56
End: 2021-01-08

## 2021-01-08 DIAGNOSIS — G89.29 CHRONIC BILATERAL LOW BACK PAIN WITHOUT SCIATICA: ICD-10-CM

## 2021-01-08 DIAGNOSIS — M54.50 CHRONIC BILATERAL LOW BACK PAIN WITHOUT SCIATICA: ICD-10-CM

## 2021-01-08 RX ORDER — IBUPROFEN 800 MG/1
TABLET, FILM COATED ORAL
Qty: 42 TABLET | Refills: 0 | COMMUNITY
Start: 2021-01-08 | End: 2021-04-16

## 2021-01-08 RX ORDER — HYDRALAZINE HYDROCHLORIDE 25 MG/1
25 TABLET, FILM COATED ORAL 3 TIMES DAILY
Qty: 270 TABLET | Refills: 1 | Status: SHIPPED | OUTPATIENT
Start: 2021-01-08 | End: 2021-02-04

## 2021-01-08 RX ORDER — LANOLIN ALCOHOL/MO/W.PET/CERES
100 CREAM (GRAM) TOPICAL DAILY
Qty: 90 TABLET | Refills: 1 | Status: SHIPPED | OUTPATIENT
Start: 2021-01-08 | End: 2021-02-17

## 2021-01-08 RX ORDER — CETIRIZINE HYDROCHLORIDE 10 MG/1
10 TABLET ORAL DAILY
Qty: 90 TABLET | Refills: 1 | Status: SHIPPED | OUTPATIENT
Start: 2021-01-08 | End: 2021-04-16

## 2021-01-08 RX ORDER — POTASSIUM CHLORIDE 750 MG/1
10 TABLET, EXTENDED RELEASE ORAL DAILY
Qty: 90 TABLET | Refills: 1 | Status: SHIPPED | OUTPATIENT
Start: 2021-01-08 | End: 2021-06-17

## 2021-01-08 RX ORDER — ALBUTEROL SULFATE 90 UG/1
AEROSOL, METERED RESPIRATORY (INHALATION)
Qty: 18 G | Refills: 3 | Status: SHIPPED | OUTPATIENT
Start: 2021-01-08 | End: 2021-06-01

## 2021-01-08 RX ORDER — FOLIC ACID 1 MG/1
1000 TABLET ORAL DAILY
Qty: 90 TABLET | Refills: 3 | Status: SHIPPED | OUTPATIENT
Start: 2021-01-08 | End: 2021-12-15

## 2021-01-08 RX ORDER — MAGNESIUM OXIDE 400 MG/1
400 TABLET ORAL DAILY
Qty: 90 TABLET | Refills: 1 | Status: SHIPPED | OUTPATIENT
Start: 2021-01-08 | End: 2021-06-04

## 2021-01-08 RX ORDER — FLUTICASONE PROPIONATE AND SALMETEROL XINAFOATE 115; 21 UG/1; UG/1
2 AEROSOL, METERED RESPIRATORY (INHALATION) 2 TIMES DAILY
Qty: 1 INHALER | Refills: 11 | Status: SHIPPED | OUTPATIENT
Start: 2021-01-08 | End: 2022-01-10

## 2021-01-08 RX ORDER — NALTREXONE HYDROCHLORIDE 50 MG/1
50 TABLET, FILM COATED ORAL AT BEDTIME
Qty: 90 TABLET | Refills: 1 | Status: SHIPPED | OUTPATIENT
Start: 2021-01-08 | End: 2021-02-17

## 2021-01-08 RX ORDER — IBUPROFEN 800 MG/1
TABLET, FILM COATED ORAL
Qty: 42 TABLET | Refills: 1 | Status: SHIPPED | OUTPATIENT
Start: 2021-01-08 | End: 2021-01-08

## 2021-01-08 RX ORDER — CALCIUM CARBONATE 500(1250)
500 TABLET ORAL DAILY
Qty: 90 TABLET | Refills: 1 | Status: SHIPPED | OUTPATIENT
Start: 2021-01-08 | End: 2021-10-09

## 2021-01-08 RX ORDER — MULTIVIT-MIN/IRON/FOLIC ACID/K 18-600-40
2 CAPSULE ORAL DAILY
Qty: 180 TABLET | Refills: 1 | Status: SHIPPED | OUTPATIENT
Start: 2021-01-08 | End: 2021-06-17

## 2021-01-08 RX ORDER — MULTIPLE VITAMINS W/ MINERALS TAB 9MG-400MCG
1 TAB ORAL DAILY
Qty: 30 EACH | Refills: 1 | Status: SHIPPED | OUTPATIENT
Start: 2021-01-08 | End: 2021-02-18

## 2021-01-08 RX ORDER — HYDROXYZINE HYDROCHLORIDE 25 MG/1
TABLET, FILM COATED ORAL
Qty: 60 TABLET | Refills: 1 | Status: SHIPPED | OUTPATIENT
Start: 2021-01-08 | End: 2021-10-21

## 2021-01-08 NOTE — TELEPHONE ENCOUNTER
PCP,  Pharm Tech, Tim [phone], calling requesting clarification on the duration of the ibuprofen order as it was ordered with a refill.  Is the medication only for 14 days or indefinitely?  Ok to cancel the refill?  Please advise.  Thanks,  Fatou Nichole RN

## 2021-01-08 NOTE — TELEPHONE ENCOUNTER
Pharmacist Jessica notified of below and voiced understanding and agreement.  Med list up dated.  Mira Wright RN  [facility]

## 2021-01-11 ENCOUNTER — TELEPHONE (OUTPATIENT)
Dept: FAMILY MEDICINE | Facility: CLINIC | Age: 56
End: 2021-01-11

## 2021-01-11 DIAGNOSIS — J42 CHRONIC BRONCHITIS, UNSPECIFIED CHRONIC BRONCHITIS TYPE (H): ICD-10-CM

## 2021-01-11 NOTE — TELEPHONE ENCOUNTER
Patient left a message on [facility] VM, she does not need the Advair 800 diskus inhaler, she needs the other inhaler. Please call to discuss.

## 2021-01-15 ENCOUNTER — HEALTH MAINTENANCE LETTER (OUTPATIENT)
Age: 56
End: 2021-01-15

## 2021-01-20 ENCOUNTER — OFFICE VISIT (OUTPATIENT)
Dept: OTOLARYNGOLOGY | Facility: CLINIC | Age: 56
End: 2021-01-20
Payer: COMMERCIAL

## 2021-01-20 VITALS
SYSTOLIC BLOOD PRESSURE: 135 MMHG | WEIGHT: 192 LBS | RESPIRATION RATE: 16 BRPM | DIASTOLIC BLOOD PRESSURE: 81 MMHG | OXYGEN SATURATION: 99 % | BODY MASS INDEX: 36.28 KG/M2 | HEART RATE: 79 BPM

## 2021-01-20 DIAGNOSIS — K11.7 XEROSTOMIA: Primary | ICD-10-CM

## 2021-01-20 DIAGNOSIS — K14.0 TONGUE ULCER: ICD-10-CM

## 2021-01-20 PROCEDURE — 99204 OFFICE O/P NEW MOD 45 MIN: CPT | Performed by: OTOLARYNGOLOGY

## 2021-01-20 RX ORDER — FLUORIDE TOOTHPASTE
5-10 TOOTHPASTE DENTAL 4 TIMES DAILY PRN
Qty: 473 ML | Refills: 1 | Status: SHIPPED | OUTPATIENT
Start: 2021-01-20 | End: 2021-02-03

## 2021-01-20 RX ORDER — TRIAMCINOLONE ACETONIDE 0.1 %
PASTE (GRAM) DENTAL
Qty: 5 G | Refills: 1 | Status: SHIPPED | OUTPATIENT
Start: 2021-01-20 | End: 2021-09-23

## 2021-01-20 ASSESSMENT — PAIN SCALES - GENERAL: PAINLEVEL: NO PAIN (0)

## 2021-01-20 NOTE — PROGRESS NOTES
Chief Complaint - oral lesion    History of Present Illness - Inga Ledesma is a 55 year old female presents with a sting in entire mouth. She also feels she has a lesion on the left tongue. The patient has noticed for approximately 3.5 weeks. She tried an antibiotic for 15 days. She had some trouble swallowing, but that got better. + citrus or spicy intolerance. No bleeding in mouth. Some epistaxis, has dry nose. She has COPD. She is a mouth breather. Nose is plugged, a couple weeks. current smoker (3 cigs per day, but was 1 ppd). She has a little pain in jaw and neck. She doesn't feel sick. No lumps or swollen glands in the neck. I personally reviewed the relevant clinical notes in Epic including the primary care providers note.     Past Medical History -   Patient Active Problem List   Diagnosis     RLS (restless legs syndrome)     GERD (gastroesophageal reflux disease)     Iron deficiency anemia     Hyperlipidemia with target LDL less than 160     Adult BMI 30+     Sacroiliitis (H)     Tobacco abuse     Vitamin D deficiency     Menorrhagia     Vitamin D deficiency     Edema of both legs     Hypertension goal BP (blood pressure) < 140/90     Chronic bronchitis, unspecified chronic bronchitis type (H)     Health Care Home     Alcohol dependence with withdrawal with complication (H)     Major depressive disorder, recurrent episode, mild (H)     (HFpEF) heart failure with preserved ejection fraction (H)     Psychophysiological insomnia     Chemical dependency (H)     Alcohol withdrawal (H)     Alcohol abuse     LALA (acute kidney injury) (H)     Sepsis (H)     Alcohol dependency (H)     COPD exacerbation (H)     Morbid obesity (H)       Current Medications -   Current Outpatient Medications:      acetaminophen (TYLENOL) 500 MG tablet, Take 1 tablet (500 mg) by mouth every 4 hours as needed for mild pain, Disp: 1 Bottle, Rfl: 3     albuterol (VENTOLIN HFA) 108 (90 Base) MCG/ACT inhaler, INHALE ONE TO TWO PUFFS BY  Chief complaint:   Chief Complaint   Patient presents with   • Establish Care     Previous [facility]'s    • Derm Problem     Lesions on forehead and hand that have appeared in the past 3-4 months.    • Memory Loss     Patient has tried wandering away from home. This has happened twice in the past 1 month. Patient has family h/o alzheimers.    • Musculoskeletal Problem     Patient c/o lower extremity cramping after walking about 5 blocks.        Vitals:  Visit Vitals  /68   Pulse 56   Ht 5' 11\" (1.803 m)   Wt 98.9 kg   BMI 30.40 kg/m²       HISTORY OF PRESENT ILLNESS     HPI  Patient presents to establish primary care.      He comes with his wife.       Cognitive decline, onset after CVA 2015, has been gradually getting worse.   Expressive aphasia, limited vocabulary, getting worse.     Hx CVA 2015 (he had dizziness, unsteady balance; no focal deficits).   Depression. Onset 3-4 months ago. Rx Lexapro 10 mg daily advised, they declined it.     HTN. Well controlled on current regiment.   Hx heart murmur. Echo advised.     DM, improved after weight loss. He is on diet alone now.   HLP. On lipitor 40 mg (his wife noted worsened memory on this), will change to crestor 5 mg daily.     CRI, stage 3.     Constipation. He uses senna prn. Advised miralax prn.           Other significant problems:  Patient Active Problem List    Diagnosis Date Noted   • Essential hypertension 06/04/2018     Priority: Low   • CVA, old, cognitive deficits 06/04/2018     Priority: Low   • Memory deficits 06/04/2018     Priority: Low   • Expressive aphasia 06/04/2018     Priority: Low   • Chronic renal impairment, stage 3 (moderate) 06/04/2018     Priority: Low   • Type 2 diabetes mellitus with stage 3 chronic kidney disease, without long-term current use of insulin (CMS/[facility]) 06/04/2018     Priority: Low   • Constipation 06/04/2018     Priority: Low   • Heart murmur 06/04/2018     Priority: Low       PAST MEDICAL, FAMILY AND SOCIAL  HISTORY     Current Outpatient Prescriptions   Medication Sig   • aspirin 81 MG tablet Take 81 mg by mouth daily.   • Cholecalciferol (VITAMIN D) 2000 units capsule Take 2,000 Units by mouth daily.   • Lactobacillus (PROBIOTIC ACIDOPHILUS PO) Take 1 tablet by mouth daily.   • amLODIPine (NORVASC) 5 MG tablet Take 1 tablet by mouth daily.   • carvedilol (COREG) 12.5 MG tablet Take 1 tablet by mouth 2 times daily (with meals).   • hydrochlorothiazide (HYDRODIURIL) 25 MG tablet Take 1 tablet by mouth daily.   • losartan (COZAAR) 100 MG tablet Take 1 tablet by mouth daily.   • rosuvastatin (CRESTOR) 5 MG tablet Take 1 tablet by mouth daily.     No current facility-administered medications for this visit.        Allergies:  ALLERGIES:   Allergen Reactions   • Lisinopril Cough       Past Medical  History/Surgeries:  Past Medical History:   Diagnosis Date   • CVA (cerebral vascular accident) (CMS/[facility]) 2015       No past surgical history on file.    Family History:  No family history on file.    Social History:  Social History   Substance Use Topics   • Smoking status: Never Smoker   • Smokeless tobacco: Never Used   • Alcohol use 0.6 oz/week     1 Glasses of wine per week      Comment: Maybe once monthly.       REVIEW OF SYSTEMS     Review of Systems    PHYSICAL EXAM     Physical Exam   Constitutional: He is oriented to person, place, and time. He appears well-developed and well-nourished. No distress.   HENT:   Head: Normocephalic and atraumatic.   Eyes: Conjunctivae are normal. Pupils are equal, round, and reactive to light. No scleral icterus.   Neck: Neck supple. No JVD present.   Cardiovascular: Normal rate and regular rhythm.  Exam reveals no gallop.    Murmur heard.  Pulmonary/Chest: Effort normal and breath sounds normal. No respiratory distress. He has no wheezes. He has no rales.   Abdominal: Soft. He exhibits no distension and no mass. There is no tenderness. There is no guarding.   Musculoskeletal: He exhibits  MOUTH EVERY 4 HOURS AS NEEDED FOR SHORTNESS OF BREATH, DIFFICULTY BREATHING OR WHEEZING., Disp: 18 g, Rfl: 3     amitriptyline (ELAVIL) 25 MG tablet, Take 2 tablets (50 mg) by mouth At Bedtime, Disp: 180 tablet, Rfl: 1     aspirin 81 MG EC tablet, Take 1 tablet (81 mg) by mouth daily, Disp: 90 tablet, Rfl: 3     atorvastatin (LIPITOR) 10 MG tablet, Take 1 tablet (10 mg) by mouth At Bedtime, Disp: 90 tablet, Rfl: 3     calcium carbonate 500 mg, elemental, (OSCAL) 500 MG tablet, Take 1 tablet (500 mg) by mouth daily, Disp: 90 tablet, Rfl: 1     cetirizine (ZYRTEC) 10 MG tablet, Take 1 tablet (10 mg) by mouth daily, Disp: 90 tablet, Rfl: 1     EPINEPHrine (ANY BX GENERIC EQUIV) 0.3 MG/0.3ML injection 2-pack, Inject 0.3 mLs (0.3 mg) into the muscle as needed for anaphylaxis, Disp: 2 each, Rfl: 3     famotidine (PEPCID) 40 MG tablet, Take 1 tablet (40 mg) by mouth nightly as needed for heartburn, Disp: 60 tablet, Rfl: 1     FLUoxetine (PROZAC) 40 MG capsule, Take 1 capsule (40 mg) by mouth 2 times daily, Disp: 180 capsule, Rfl: 1     fluticasone (FLONASE) 50 MCG/ACT nasal spray, Spray 1 spray into both nostrils daily, Disp: 16 g, Rfl: 3     fluticasone-salmeterol (ADVAIR DISKUS) 500-50 MCG/DOSE inhaler, INHALE ONE PUFF BY MOUTH EVERY 12 HOURS, Disp: 1 Inhaler, Rfl: 11     fluticasone-salmeterol (ADVAIR HFA) 115-21 MCG/ACT inhaler, Inhale 2 puffs into the lungs 2 times daily, Disp: 1 Inhaler, Rfl: 11     folic acid (FOLVITE) 1 MG tablet, Take 1 tablet (1,000 mcg) by mouth daily, Disp: 90 tablet, Rfl: 3     furosemide (LASIX) 20 MG tablet, Take 1 tablet (20 mg) by mouth daily, Disp: 90 tablet, Rfl: 1     gabapentin (NEURONTIN) 300 MG capsule, TAKE ONE CAPSULE BY MOUTH EVERY MORNING AND TAKE ONE CAPSULE BY MOUTH EVERY AFTERNOON AND TAKE TWO CAPSULES BY MOUTH EVERY NIGHT AT BEDTIME, Disp: 120 capsule, Rfl: 1     hydrALAZINE (APRESOLINE) 25 MG tablet, Take 1 tablet (25 mg) by mouth 3 times daily, Disp: 270 tablet, Rfl: 1      hydrOXYzine (ATARAX) 25 MG tablet, TAKE ONE TABLET BY MOUTH TWICE A DAY AS NEEDED FOR ANXIETY OR PAIN, Disp: 60 tablet, Rfl: 1     ibuprofen (ADVIL/MOTRIN) 800 MG tablet, TAKE ONE TABLET BY MOUTH THREE TIMES A DAY WITH MEALS FOR 14 DAYS, Disp: 42 tablet, Rfl: 0     ipratropium - albuterol 0.5 mg/2.5 mg/3 mL (DUONEB) 0.5-2.5 (3) MG/3ML neb solution, INHALE ONE VIAL BY NEBULIZATION FOUR TIMES DAILY AS NEEDED FOR WHEEZING, Disp: 360 mL, Rfl: 1     labetalol (NORMODYNE) 100 MG tablet, Take 1 tablet (100 mg) by mouth 2 times daily, Disp: 180 tablet, Rfl: 0     losartan (COZAAR) 100 MG tablet, Take 1 tablet (100 mg) by mouth daily, Disp: 90 tablet, Rfl: 1     magnesium oxide (MAG-OX) 400 MG tablet, Take 1 tablet (400 mg) by mouth daily, Disp: 90 tablet, Rfl: 1     melatonin 5 MG tablet, Take 1-2 tablets (5-10 mg) by mouth nightly as needed for sleep, Disp: 180 tablet, Rfl: 1     multivitamin w/minerals (THERA-VIT-M) tablet, Take 1 tablet by mouth daily, Disp: 30 each, Rfl: 1     naltrexone (DEPADE/REVIA) 50 MG tablet, Take 1 tablet (50 mg) by mouth At Bedtime, Disp: 90 tablet, Rfl: 1     nicotine (NICOTROL) 10 MG inhaler, INHALE 6 TO  16 CARTRIDGES INTO THE LUNGS DAILY AS NEEDED FOR SMOKING CESSATION, Disp: 168 each, Rfl: 2     omeprazole (PRILOSEC) 20 MG DR capsule, TAKE ONE CAPSULE BY MOUTH TWICE A DAY BEFORE MEALS, Disp: 180 capsule, Rfl: 0     order for DME, Equipment being ordered: TENS, Disp: 1 Device, Rfl: 0     potassium chloride ER (KLOR-CON M) 10 MEQ CR tablet, Take 1 tablet (10 mEq) by mouth daily, Disp: 90 tablet, Rfl: 1     rOPINIRole (REQUIP) 1 MG tablet, Take 1 tablet (1 mg) by mouth 3 times daily, Disp: 90 tablet, Rfl: 1     thiamine (B-1) 100 MG tablet, Take 1 tablet (100 mg) by mouth daily, Disp: 90 tablet, Rfl: 1     vitamin B complex with vitamin C (STRESS TAB) tablet, Take 1 tablet by mouth daily, Disp: 120 each, Rfl: 3     Vitamin D, Cholecalciferol, 25 MCG (1000 UT) TABS, Take 2 tablets (2,000  no edema.   Neurological: He is alert and oriented to person, place, and time.   Skin: Skin is warm. No rash noted. He is not diaphoretic. No pallor.   Psychiatric: He has a normal mood and affect. His behavior is normal.   Vitals reviewed.    Recentlab tests:   No results found for: SODIUM No results found for: POTASSIUM No results found for: BUN No results found for: CREATININE  No results found for: HGBA1C No results found for: GLUCOSE No results found for: MALBCR  No results found for: CHOLESTEROL No results found for: HDLNo results found for: TRIGLYCERIDE No results found for: CALCLDL  No results found for: GPT No results found for: AST  No results found for: HGB   No results found for: PLT;  No results found for: TSH  No results found for: VITD25      ASSESSMENT/PLAN       Cognitive decline, onset after CVA 2015. Neurology consult. CT head. Labs.   Expressive aphasia, limited vocabulary, getting worse.     Hx CVA 2015 (he had dizziness, unsteady balance; no focal deficits).     HTN. Well controlled on current regiment.   Hx heart murmur. Echo advised.     DM, improved after weight loss. He is on diet alone now.   HLP. On lipitor 40 mg (his wife noted worsened memory on this), will change to crestor 5 mg daily.     CRI, stage 3.     Constipation. He uses senna prn. Advised miralax prn.         No results found for: PSA     Units) by mouth daily, Disp: 180 tablet, Rfl: 1    Allergies -   Allergies   Allergen Reactions     Chicken-Derived Products (Egg) GI Disturbance     Reglan [Metoclopramide Hcl] Other (See Comments)     Body tenses up     Doxycycline Rash       Social History -   Social History     Socioeconomic History     Marital status:      Spouse name: Not on file     Number of children: 1     Years of education: 13     Highest education level: Not on file   Occupational History     Employer: [employer]   Social Needs     Financial resource strain: Not on file     Food insecurity     Worry: Not on file     Inability: Not on file     Transportation needs     Medical: Not on file     Non-medical: Not on file   Tobacco Use     Smoking status: Current Some Day Smoker     Packs/day: 0.25     Years: 34.00     Pack years: 8.50     Types: Cigarettes     Start date: 1979     Last attempt to quit: 10/3/2012     Years since quittin.3     Smokeless tobacco: Never Used     Tobacco comment: 5 cigarettes daily   Substance and Sexual Activity     Alcohol use: No     Comment: 1 pint of vodka per day, last drink aug 2018     Drug use: No     Sexual activity: Not Currently   Lifestyle     Physical activity     Days per week: Not on file     Minutes per session: Not on file     Stress: Not on file   Relationships     Social connections     Talks on phone: Not on file     Gets together: Not on file     Attends [employer] service: Not on file     Active member of club or organization: Not on file     Attends meetings of clubs or organizations: Not on file     Relationship status: Not on file     Intimate partner violence     Fear of current or ex partner: Not on file     Emotionally abused: Not on file     Physically abused: Not on file     Forced sexual activity: Not on file   Other Topics Concern     Parent/sibling w/ CABG, MI or angioplasty before 65F 55M? No   Social History Narrative    Lives in [place] with her son in a  trailer no access to guns or weapons works for the [employer] and enjoys crocheting and watching television.       Family History -   Family History   Problem Relation Age of Onset     Depression/Anxiety Mother      Asthma Mother      Cerebrovascular Disease Father      Hypertension Father      Lung Cancer Father      Alcoholism Father      Breast Cancer Paternal Aunt      Other Cancer Other      Depression Other      Anxiety Disorder Other      Mental Illness Other      Substance Abuse Other      Asthma Other      Obesity Sister      Obesity Son      Suicide Paternal Uncle        Review of Systems - As per HPI and PMHx, otherwise 7 system review of the head and neck negative.    Physical Exam  /81   Pulse 79   Resp 16   Wt 87.1 kg (192 lb)   LMP 06/02/2010   SpO2 99%   BMI 36.28 kg/m    General - The patient is in no distress. Alert and oriented x3, answers questions and cooperates with examination appropriately.   Voice and Breathing - The patient was breathing comfortably without the use of accessory muscles. There was no wheezing, stridor, or stertor.  The patients voice was clear and strong.  Eyes - Extraocular movements intact. Sclera were not icteric or injected, conjunctiva were pink and moist.  Neurologic - Cranial nerves II-XII are grossly intact. Specifically, the facial nerve is intact, House-Brackmann grade 1 of 6.   Mouth - Examination of the oral cavity showed two, 4-5 mm lesions located on the left lateral tongue, seperated by about 2 cm. They are tender, some adjacent mucosal thickening with central ulcer. No other lesions noted. Lots of dryness. The tongue was mobile and protrudes midline.   Oropharynx - The walls of the oropharynx were smooth, symmetric, and had no lesions or ulcerations. The uvula was midline and the palate raised symmetrically.   Neck -  Soft. Some left tenderness, level 2. Palpation of the occipital, submental, submandibular, internal jugular chain,  and supraclavicular nodes did not demonstrate any abnormal lymph nodes or masses. The parotid glands were without masses. Palpation of the thyroid was soft and smooth, with no nodules or goiter appreciated.  The trachea was midline.    A/P - Inga Ledesma is a 55 year old female with two lesions on the left lateral. It is likely trauma from biting and xerostomia. She is a smoker and this doesn't help. I recommended treating this with Orabase and biotene for approximately two weeks. I would like to see the patient back in 2 weeks to make sure it heals or is improving. If not, we should proceed with biopsy.  I think malignancy is unlikely given 2 similar lesions spread 2 cm apart and the fact that they are very small.  The patient also states she had more these lesions but some of healed up.        Ortiz Garibay MD  Otolaryngology  [facility]

## 2021-01-20 NOTE — PATIENT INSTRUCTIONS
General Scheduling Information  To schedule your CT/MRI scan, please contact [facility] at [phone]   [address] [place] [address]    To schedule your Surgery, please contact our Specialty Schedulers at [phone]    ENT Clinic Locations Clinic Hours Telephone Number     [facility]  [address] [place] [address]   Tuesday:       8:00am -- 4:00pm    Wednesday:  8:00am - 4:00pm   To schedule an appointment with   Dr. Garibay,   please contact our   Specialty Scheduling Department at:     [phone]       [facility]  [address] [place] [address]   Friday:          8:00am - 4:00pm         Urgent Care Locations Clinic Hours Telephone Numbers     [facility] Ya[facility]ez  [address] [place] [address]     Monday-Friday:     11:00pm - 9:00pm    Saturday-Sunday:  9:00am - 5:00pm   [phone]     [facility] B[facility]  [address] [place] [address]     Monday-Friday:      5:00pm - 9:00pm     Saturday-Sunday:  9:00am - 5:00pm   [phone]

## 2021-01-20 NOTE — LETTER
1/20/2021         RE: Inga Ledesma  [address]  [facility] [id number]        Dear Colleague,    Thank you for referring your patient, Inga Ledesma, to the [facility]. Please see a copy of my visit note below.    Chief Complaint - oral lesion    History of Present Illness - Inga Ledesma is a 55 year old female presents with a sting in entire mouth. She also feels she has a lesion on the left tongue. The patient has noticed for approximately 3.5 weeks. She tried an antibiotic for 15 days. She had some trouble swallowing, but that got better. + citrus or spicy intolerance. No bleeding in mouth. Some epistaxis, has dry nose. She has COPD. She is a mouth breather. Nose is plugged, a couple weeks. current smoker (3 cigs per day, but was 1 ppd). She has a little pain in jaw and neck. She doesn't feel sick. No lumps or swollen glands in the neck. I personally reviewed the relevant clinical notes in Epic including the primary care providers note.     Past Medical History -   Patient Active Problem List   Diagnosis     RLS (restless legs syndrome)     GERD (gastroesophageal reflux disease)     Iron deficiency anemia     Hyperlipidemia with target LDL less than 160     Adult BMI 30+     Sacroiliitis (H)     Tobacco abuse     Vitamin D deficiency     Menorrhagia     Vitamin D deficiency     Edema of both legs     Hypertension goal BP (blood pressure) < 140/90     Chronic bronchitis, unspecified chronic bronchitis type (H)     Health Care Home     Alcohol dependence with withdrawal with complication (H)     Major depressive disorder, recurrent episode, mild (H)     (HFpEF) heart failure with preserved ejection fraction (H)     Psychophysiological insomnia     Chemical dependency (H)     Alcohol withdrawal (H)     Alcohol abuse     LALA (acute kidney injury) (H)     Sepsis (H)     Alcohol dependency (H)     COPD exacerbation (H)     Morbid obesity (H)       Current Medications -   Current  Outpatient Medications:      acetaminophen (TYLENOL) 500 MG tablet, Take 1 tablet (500 mg) by mouth every 4 hours as needed for mild pain, Disp: 1 Bottle, Rfl: 3     albuterol (VENTOLIN HFA) 108 (90 Base) MCG/ACT inhaler, INHALE ONE TO TWO PUFFS BY MOUTH EVERY 4 HOURS AS NEEDED FOR SHORTNESS OF BREATH, DIFFICULTY BREATHING OR WHEEZING., Disp: 18 g, Rfl: 3     amitriptyline (ELAVIL) 25 MG tablet, Take 2 tablets (50 mg) by mouth At Bedtime, Disp: 180 tablet, Rfl: 1     aspirin 81 MG EC tablet, Take 1 tablet (81 mg) by mouth daily, Disp: 90 tablet, Rfl: 3     atorvastatin (LIPITOR) 10 MG tablet, Take 1 tablet (10 mg) by mouth At Bedtime, Disp: 90 tablet, Rfl: 3     calcium carbonate 500 mg, elemental, (OSCAL) 500 MG tablet, Take 1 tablet (500 mg) by mouth daily, Disp: 90 tablet, Rfl: 1     cetirizine (ZYRTEC) 10 MG tablet, Take 1 tablet (10 mg) by mouth daily, Disp: 90 tablet, Rfl: 1     EPINEPHrine (ANY BX GENERIC EQUIV) 0.3 MG/0.3ML injection 2-pack, Inject 0.3 mLs (0.3 mg) into the muscle as needed for anaphylaxis, Disp: 2 each, Rfl: 3     famotidine (PEPCID) 40 MG tablet, Take 1 tablet (40 mg) by mouth nightly as needed for heartburn, Disp: 60 tablet, Rfl: 1     FLUoxetine (PROZAC) 40 MG capsule, Take 1 capsule (40 mg) by mouth 2 times daily, Disp: 180 capsule, Rfl: 1     fluticasone (FLONASE) 50 MCG/ACT nasal spray, Spray 1 spray into both nostrils daily, Disp: 16 g, Rfl: 3     fluticasone-salmeterol (ADVAIR DISKUS) 500-50 MCG/DOSE inhaler, INHALE ONE PUFF BY MOUTH EVERY 12 HOURS, Disp: 1 Inhaler, Rfl: 11     fluticasone-salmeterol (ADVAIR HFA) 115-21 MCG/ACT inhaler, Inhale 2 puffs into the lungs 2 times daily, Disp: 1 Inhaler, Rfl: 11     folic acid (FOLVITE) 1 MG tablet, Take 1 tablet (1,000 mcg) by mouth daily, Disp: 90 tablet, Rfl: 3     furosemide (LASIX) 20 MG tablet, Take 1 tablet (20 mg) by mouth daily, Disp: 90 tablet, Rfl: 1     gabapentin (NEURONTIN) 300 MG capsule, TAKE ONE CAPSULE BY MOUTH EVERY  MORNING AND TAKE ONE CAPSULE BY MOUTH EVERY AFTERNOON AND TAKE TWO CAPSULES BY MOUTH EVERY NIGHT AT BEDTIME, Disp: 120 capsule, Rfl: 1     hydrALAZINE (APRESOLINE) 25 MG tablet, Take 1 tablet (25 mg) by mouth 3 times daily, Disp: 270 tablet, Rfl: 1     hydrOXYzine (ATARAX) 25 MG tablet, TAKE ONE TABLET BY MOUTH TWICE A DAY AS NEEDED FOR ANXIETY OR PAIN, Disp: 60 tablet, Rfl: 1     ibuprofen (ADVIL/MOTRIN) 800 MG tablet, TAKE ONE TABLET BY MOUTH THREE TIMES A DAY WITH MEALS FOR 14 DAYS, Disp: 42 tablet, Rfl: 0     ipratropium - albuterol 0.5 mg/2.5 mg/3 mL (DUONEB) 0.5-2.5 (3) MG/3ML neb solution, INHALE ONE VIAL BY NEBULIZATION FOUR TIMES DAILY AS NEEDED FOR WHEEZING, Disp: 360 mL, Rfl: 1     labetalol (NORMODYNE) 100 MG tablet, Take 1 tablet (100 mg) by mouth 2 times daily, Disp: 180 tablet, Rfl: 0     losartan (COZAAR) 100 MG tablet, Take 1 tablet (100 mg) by mouth daily, Disp: 90 tablet, Rfl: 1     magnesium oxide (MAG-OX) 400 MG tablet, Take 1 tablet (400 mg) by mouth daily, Disp: 90 tablet, Rfl: 1     melatonin 5 MG tablet, Take 1-2 tablets (5-10 mg) by mouth nightly as needed for sleep, Disp: 180 tablet, Rfl: 1     multivitamin w/minerals (THERA-VIT-M) tablet, Take 1 tablet by mouth daily, Disp: 30 each, Rfl: 1     naltrexone (DEPADE/REVIA) 50 MG tablet, Take 1 tablet (50 mg) by mouth At Bedtime, Disp: 90 tablet, Rfl: 1     nicotine (NICOTROL) 10 MG inhaler, INHALE 6 TO  16 CARTRIDGES INTO THE LUNGS DAILY AS NEEDED FOR SMOKING CESSATION, Disp: 168 each, Rfl: 2     omeprazole (PRILOSEC) 20 MG DR capsule, TAKE ONE CAPSULE BY MOUTH TWICE A DAY BEFORE MEALS, Disp: 180 capsule, Rfl: 0     order for DME, Equipment being ordered: TENS, Disp: 1 Device, Rfl: 0     potassium chloride ER (KLOR-CON M) 10 MEQ CR tablet, Take 1 tablet (10 mEq) by mouth daily, Disp: 90 tablet, Rfl: 1     rOPINIRole (REQUIP) 1 MG tablet, Take 1 tablet (1 mg) by mouth 3 times daily, Disp: 90 tablet, Rfl: 1     thiamine (B-1) 100 MG tablet,  Take 1 tablet (100 mg) by mouth daily, Disp: 90 tablet, Rfl: 1     vitamin B complex with vitamin C (STRESS TAB) tablet, Take 1 tablet by mouth daily, Disp: 120 each, Rfl: 3     Vitamin D, Cholecalciferol, 25 MCG (1000 UT) TABS, Take 2 tablets (2,000 Units) by mouth daily, Disp: 180 tablet, Rfl: 1    Allergies -   Allergies   Allergen Reactions     Chicken-Derived Products (Egg) GI Disturbance     Reglan [Metoclopramide Hcl] Other (See Comments)     Body tenses up     Doxycycline Rash       Social History -   Social History     Socioeconomic History     Marital status:      Spouse name: Not on file     Number of children: 1     Years of education: 13     Highest education level: Not on file   Occupational History     Employer: [employer]     Financial resource strain: Not on file     Food insecurity     Worry: Not on file     Inability: Not on file     Transportation needs     Medical: Not on file     Non-medical: Not on file   Tobacco Use     Smoking status: Current Some Day Smoker     Packs/day: 0.25     Years: 34.00     Pack years: 8.50     Types: Cigarettes     Start date: 1979     Last attempt to quit: 10/3/2012     Years since quittin.3     Smokeless tobacco: Never Used     Tobacco comment: 5 cigarettes daily   Substance and Sexual Activity     Alcohol use: No     Comment: 1 pint of vodka per day, last drink aug 2018     Drug use: No     Sexual activity: Not Currently   Lifestyle     Physical activity     Days per week: Not on file     Minutes per session: Not on file     Stress: Not on file   Relationships     Social connections     Talks on phone: Not on file     Gets together: Not on file     Attends [employer] service: Not on file     Active member of club or organization: Not on file     Attends meetings of clubs or organizations: Not on file     Relationship status: Not on file     Intimate partner violence     Fear of current or ex partner: Not on file      Emotionally abused: Not on file     Physically abused: Not on file     Forced sexual activity: Not on file   Other Topics Concern     Parent/sibling w/ CABG, MI or angioplasty before 65F 55M? No   Social History Narrative    Lives in [place] with her son in a trailer no access to guns or weapons works for the [employer] and enjoys crocheting and watching television.       Family History -   Family History   Problem Relation Age of Onset     Depression/Anxiety Mother      Asthma Mother      Cerebrovascular Disease Father      Hypertension Father      Lung Cancer Father      Alcoholism Father      Breast Cancer Paternal Aunt      Other Cancer Other      Depression Other      Anxiety Disorder Other      Mental Illness Other      Substance Abuse Other      Asthma Other      Obesity Sister      Obesity Son      Suicide Paternal Uncle        Review of Systems - As per HPI and PMHx, otherwise 7 system review of the head and neck negative.    Physical Exam  /81   Pulse 79   Resp 16   Wt 87.1 kg (192 lb)   LMP 06/02/2010   SpO2 99%   BMI 36.28 kg/m    General - The patient is in no distress. Alert and oriented x3, answers questions and cooperates with examination appropriately.   Voice and Breathing - The patient was breathing comfortably without the use of accessory muscles. There was no wheezing, stridor, or stertor.  The patients voice was clear and strong.  Eyes - Extraocular movements intact. Sclera were not icteric or injected, conjunctiva were pink and moist.  Neurologic - Cranial nerves II-XII are grossly intact. Specifically, the facial nerve is intact, House-Brackmann grade 1 of 6.   Mouth - Examination of the oral cavity showed two, 4-5 mm lesions located on the left lateral tongue, seperated by about 2 cm. They are tender, some adjacent mucosal thickening with central ulcer. No other lesions noted. Lots of dryness. The tongue was mobile and protrudes midline.   Oropharynx - The walls of  the oropharynx were smooth, symmetric, and had no lesions or ulcerations. The uvula was midline and the palate raised symmetrically.   Neck -  Soft. Some left tenderness, level 2. Palpation of the occipital, submental, submandibular, internal jugular chain, and supraclavicular nodes did not demonstrate any abnormal lymph nodes or masses. The parotid glands were without masses. Palpation of the thyroid was soft and smooth, with no nodules or goiter appreciated.  The trachea was midline.    A/P - Inga Ledesma is a 55 year old female with two lesions on the left lateral. It is likely trauma from biting and xerostomia. She is a smoker and this doesn't help. I recommended treating this with Orabase and biotene for approximately two weeks. I would like to see the patient back in 2 weeks to make sure it heals or is improving. If not, we should proceed with biopsy.  I think malignancy is unlikely given 2 similar lesions spread 2 cm apart and the fact that they are very small.  The patient also states she had more these lesions but some of healed up.        Ortiz Garibay MD  Otolaryngology  [facility]        Again, thank you for allowing me to participate in the care of your patient.        Sincerely,        Ortiz Garibay MD

## 2021-01-22 ENCOUNTER — TELEPHONE (OUTPATIENT)
Dept: FAMILY MEDICINE | Facility: CLINIC | Age: 56
End: 2021-01-22

## 2021-01-22 ENCOUNTER — TELEPHONE (OUTPATIENT)
Dept: OTOLARYNGOLOGY | Facility: CLINIC | Age: 56
End: 2021-01-22

## 2021-01-22 NOTE — TELEPHONE ENCOUNTER
Attempted to call patient x 2 and went to a voicemail that is for a place for healing.  Looks like [facility] had prescribed tylenol for pain.  Message states pain med ordered is not working for ? toe pain?  Wants different pain med.  Please advise

## 2021-01-22 NOTE — TELEPHONE ENCOUNTER
Reason for Call:  Medication or medication refill:    Do you use a [facility]?  Name of the pharmacy and phone number for the current request:  [place], [place]    Name of the medication requested: Biotene and triamcinolone    Other request: Insurance does not cover and cannot afford, is there a brand the insurance will cover out there? Please call to discuss    Can we leave a detailed message on this number? YES  Ok to leave with person that answers, pt lives in group home    Phone number patient can be reached at: Home number on file [phone] (home)    Best Time: Any    Call taken on 1/22/2021 at 12:52 PM by Muna Jefferson

## 2021-01-22 NOTE — TELEPHONE ENCOUNTER
Patient requesting different pain med, Toe pain not improved with taking the ibuprofen as prescribed. Uses [facility].

## 2021-01-25 NOTE — TELEPHONE ENCOUNTER
Called [phone].    Left a message with a female, to have patient return call to the [facility] nurse at [phone].    Marah BSN-RN  Triage Nurse  [facility]: [facility]

## 2021-01-26 NOTE — TELEPHONE ENCOUNTER
Unable to reach patient and will have MA staff attempt to reach patient to work in for this afternoon as directed below.  Mira Wright RN  [facility]

## 2021-01-26 NOTE — TELEPHONE ENCOUNTER
Patient left a message confirming that appt time will work out. She needs to work out the details, she will probably need to call us vs us calling her.

## 2021-01-27 NOTE — TELEPHONE ENCOUNTER
"Pt returned call. She states \"I have no money and will suffer until I have the biopsy\".  I let her know I would see if any other treatment options are available. Pt verbalized understanding.    Iona Kamara RN Specialty Triage 1/27/2021 1:26 PM    "

## 2021-01-27 NOTE — PROGRESS NOTES
Inga is a 55 year old who is being evaluated via a billable telephone visit.      What phone number would you like to be contacted at? [phone]  How would you like to obtain your AVS? Marcia      Lolita Xiong is a 55 year old who presents to clinic today for the following health issues     HPI       Recheck Toe pain    Description: On going bilateral Big toe pain X 1 year. Ibuprofen was prescribed but has not helped the pain.       Pain involving both first mtp joints. No redness or swelling.  Right worse than left.    Xray from 9/2019:    Findings: No fracture or subluxation. Interval worsening of degenerative changes of the first metatarsophalangeal joint with joint space narrowing, sclerosis and hypertrophic bone formation.    Review of Systems   Constitutional, HEENT, cardiovascular, pulmonary, GI, , musculoskeletal, neuro, skin, endocrine and psych systems are negative, except as otherwise noted.      Objective           Vitals:  No vitals were obtained today due to virtual visit.    Physical Exam   healthy, alert and no distress  PSYCH: Alert and oriented times 3; coherent speech, normal   rate and volume, able to articulate logical thoughts, able   to abstract reason, no tangential thoughts, no hallucinations   or delusions  Her affect is normal  RESP: No cough, no audible wheezing, able to talk in full sentences  Remainder of exam unable to be completed due to telephone visits        Inga was seen today for musculoskeletal problem.    Diagnoses and all orders for this visit:    Pain in both feet  -     diclofenac (VOLTAREN) 75 MG EC tablet; Take 1 tablet (75 mg) by mouth 2 times daily  -     Orthopedic & Spine  Referral; Future      Advised supportive and symptomatic treatment.  Follow up with Provider - if condition persists or worsens.       Phone call duration: 10 minutes

## 2021-01-27 NOTE — TELEPHONE ENCOUNTER
Returned call to pt and reached house team member who took down call back number and name. Called and spoke to [facility]. Biotene can be bought OTC her plan does not cover this. The paste was around 30$ out of pocket. Spoke to pharmacist at [facility], no alternatives they are aware of. Will wait for pt to return call.    Iona Kamara RN Specialty Triage 1/27/2021 12:09 PM

## 2021-01-28 ENCOUNTER — VIRTUAL VISIT (OUTPATIENT)
Dept: FAMILY MEDICINE | Facility: CLINIC | Age: 56
End: 2021-01-28
Payer: COMMERCIAL

## 2021-01-28 DIAGNOSIS — M79.672 PAIN IN BOTH FEET: Primary | ICD-10-CM

## 2021-01-28 DIAGNOSIS — M79.671 PAIN IN BOTH FEET: Primary | ICD-10-CM

## 2021-01-28 PROCEDURE — 99213 OFFICE O/P EST LOW 20 MIN: CPT | Mod: 95 | Performed by: PHYSICIAN ASSISTANT

## 2021-01-28 RX ORDER — DICLOFENAC SODIUM 75 MG/1
75 TABLET, DELAYED RELEASE ORAL 2 TIMES DAILY
Qty: 30 TABLET | Refills: 0 | Status: SHIPPED | OUTPATIENT
Start: 2021-01-28 | End: 2021-02-13

## 2021-01-29 NOTE — TELEPHONE ENCOUNTER
Called to offer pt to move up appt by one day.  took message to give to pt.    Iona Kamara RN Specialty Triage 1/29/2021 12:37 PM

## 2021-02-03 ENCOUNTER — ANCILLARY PROCEDURE (OUTPATIENT)
Dept: GENERAL RADIOLOGY | Facility: CLINIC | Age: 56
End: 2021-02-03
Attending: PODIATRIST
Payer: COMMERCIAL

## 2021-02-03 ENCOUNTER — OFFICE VISIT (OUTPATIENT)
Dept: OTOLARYNGOLOGY | Facility: CLINIC | Age: 56
End: 2021-02-03
Payer: COMMERCIAL

## 2021-02-03 ENCOUNTER — OFFICE VISIT (OUTPATIENT)
Dept: PODIATRY | Facility: CLINIC | Age: 56
End: 2021-02-03
Payer: COMMERCIAL

## 2021-02-03 VITALS
SYSTOLIC BLOOD PRESSURE: 113 MMHG | BODY MASS INDEX: 37.41 KG/M2 | WEIGHT: 198 LBS | OXYGEN SATURATION: 99 % | HEART RATE: 81 BPM | DIASTOLIC BLOOD PRESSURE: 63 MMHG | RESPIRATION RATE: 16 BRPM

## 2021-02-03 VITALS
SYSTOLIC BLOOD PRESSURE: 159 MMHG | BODY MASS INDEX: 37.53 KG/M2 | WEIGHT: 198.6 LBS | DIASTOLIC BLOOD PRESSURE: 79 MMHG | OXYGEN SATURATION: 97 % | HEART RATE: 99 BPM

## 2021-02-03 DIAGNOSIS — M79.672 PAIN IN BOTH FEET: Primary | ICD-10-CM

## 2021-02-03 DIAGNOSIS — M79.671 PAIN IN BOTH FEET: Primary | ICD-10-CM

## 2021-02-03 DIAGNOSIS — M79.671 PAIN IN BOTH FEET: ICD-10-CM

## 2021-02-03 DIAGNOSIS — M20.5X9 HALLUX LIMITUS, UNSPECIFIED LATERALITY: ICD-10-CM

## 2021-02-03 DIAGNOSIS — M79.672 PAIN IN BOTH FEET: ICD-10-CM

## 2021-02-03 DIAGNOSIS — K14.0 TONGUE ULCER: Primary | ICD-10-CM

## 2021-02-03 DIAGNOSIS — K11.7 XEROSTOMIA: ICD-10-CM

## 2021-02-03 PROCEDURE — 73630 X-RAY EXAM OF FOOT: CPT | Mod: 59 | Performed by: RADIOLOGY

## 2021-02-03 PROCEDURE — 99213 OFFICE O/P EST LOW 20 MIN: CPT | Mod: 25 | Performed by: OTOLARYNGOLOGY

## 2021-02-03 PROCEDURE — 88305 TISSUE EXAM BY PATHOLOGIST: CPT | Performed by: PATHOLOGY

## 2021-02-03 PROCEDURE — 41100 BIOPSY OF TONGUE: CPT | Performed by: OTOLARYNGOLOGY

## 2021-02-03 PROCEDURE — 99243 OFF/OP CNSLTJ NEW/EST LOW 30: CPT | Performed by: PODIATRIST

## 2021-02-03 RX ORDER — OXYCODONE AND ACETAMINOPHEN 5; 325 MG/1; MG/1
1 TABLET ORAL EVERY 6 HOURS PRN
Qty: 12 TABLET | Refills: 0 | Status: SHIPPED | OUTPATIENT
Start: 2021-02-03 | End: 2021-02-06

## 2021-02-03 RX ORDER — DEXAMETHASONE 0.5 MG/5ML
1 SOLUTION ORAL 2 TIMES DAILY
Qty: 240 ML | Refills: 3 | Status: SHIPPED | OUTPATIENT
Start: 2021-02-03 | End: 2021-09-23

## 2021-02-03 RX ORDER — METHYLPREDNISOLONE 4 MG
TABLET, DOSE PACK ORAL
Qty: 21 TABLET | Refills: 0 | Status: SHIPPED | OUTPATIENT
Start: 2021-02-03 | End: 2021-02-04

## 2021-02-03 ASSESSMENT — PAIN SCALES - GENERAL: PAINLEVEL: NO PAIN (0)

## 2021-02-03 NOTE — PROGRESS NOTES
PATIENT HISTORY:  Inga Ledesma is a 55 year old female who presents to clinic for bilateral first metatarsophalangeal joint pain.  Present for years.  Worse with walking, better with rest.  Patient recently got a prescription for diclofenac from her primary care provider and this is helping.  0-8 out of 10 pain.  No injury.  Complex past medical history as below.    I was requested to see this patient for this issue by Min Toledo PA-C    Review of Systems:   Patient admits to depression, anxiety, swelling of ankles.  Rest of 10 point review of systems negative except for HPI.     PAST MEDICAL HISTORY:   Past Medical History:   Diagnosis Date     Alcohol abuse, in remission      Alcohol withdrawal seizure (H) 2016     Cancer of labia majora (H) 1987     Cervical dysplasia 1987     Congestive heart failure (H) 5/16/16     COPD (chronic obstructive pulmonary disease) (H) 1/24/2011     CVA (cerebral infarction) 1/2012     Dependent edema      Depression, major      Depressive disorder 1966     GERD (gastroesophageal reflux disease)      Hyperlipidemia LDL goal < 160      Hypertension      Iron deficiency anemia      RLS (restless legs syndrome)      Sacroiliitis (H)     steroid injections ineffective, chronic low back pain     Tobacco abuse      Uncomplicated asthma      Vitamin D deficiencies         PAST SURGICAL HISTORY:   Past Surgical History:   Procedure Laterality Date     AS BIOPSY/EXCISION LYMPH NODE OPEN SUPERFICIAL       COLONOSCOPY       EYE SURGERY       HYSTERECTOMY  2010     HYSTERECTOMY TOTAL ABDOMINAL  7/28/10    Bilateral salpingectomy.  ovaries conserved.     LASER TX, CERVICAL  1987     LYMPH NODE BIOPSY  2007    inguinal     LYSIS OF LABIAL LESION(S)  1985, 1987        MEDICATIONS:   Current Outpatient Medications:      acetaminophen (TYLENOL) 500 MG tablet, Take 1 tablet (500 mg) by mouth every 4 hours as needed for mild pain, Disp: 1 Bottle, Rfl: 3     albuterol (VENTOLIN HFA) 108 (90 Base)  MCG/ACT inhaler, INHALE ONE TO TWO PUFFS BY MOUTH EVERY 4 HOURS AS NEEDED FOR SHORTNESS OF BREATH, DIFFICULTY BREATHING OR WHEEZING., Disp: 18 g, Rfl: 3     amitriptyline (ELAVIL) 25 MG tablet, Take 2 tablets (50 mg) by mouth At Bedtime, Disp: 180 tablet, Rfl: 1     artificial saliva (BIOTENE DRY MOUTHWASH) LIQD liquid, Swish and spit 5-10 mLs in mouth 4 times daily as needed for dry mouth, Disp: 473 mL, Rfl: 1     aspirin 81 MG EC tablet, Take 1 tablet (81 mg) by mouth daily, Disp: 90 tablet, Rfl: 3     atorvastatin (LIPITOR) 10 MG tablet, Take 1 tablet (10 mg) by mouth At Bedtime, Disp: 90 tablet, Rfl: 3     calcium carbonate 500 mg, elemental, (OSCAL) 500 MG tablet, Take 1 tablet (500 mg) by mouth daily, Disp: 90 tablet, Rfl: 1     cetirizine (ZYRTEC) 10 MG tablet, Take 1 tablet (10 mg) by mouth daily, Disp: 90 tablet, Rfl: 1     diclofenac (VOLTAREN) 75 MG EC tablet, Take 1 tablet (75 mg) by mouth 2 times daily, Disp: 30 tablet, Rfl: 0     EPINEPHrine (ANY BX GENERIC EQUIV) 0.3 MG/0.3ML injection 2-pack, Inject 0.3 mLs (0.3 mg) into the muscle as needed for anaphylaxis, Disp: 2 each, Rfl: 3     famotidine (PEPCID) 40 MG tablet, Take 1 tablet (40 mg) by mouth nightly as needed for heartburn, Disp: 60 tablet, Rfl: 1     FLUoxetine (PROZAC) 40 MG capsule, Take 1 capsule (40 mg) by mouth 2 times daily, Disp: 180 capsule, Rfl: 1     fluticasone (FLONASE) 50 MCG/ACT nasal spray, Spray 1 spray into both nostrils daily, Disp: 16 g, Rfl: 3     fluticasone-salmeterol (ADVAIR DISKUS) 500-50 MCG/DOSE inhaler, INHALE ONE PUFF BY MOUTH EVERY 12 HOURS, Disp: 1 Inhaler, Rfl: 11     fluticasone-salmeterol (ADVAIR HFA) 115-21 MCG/ACT inhaler, Inhale 2 puffs into the lungs 2 times daily, Disp: 1 Inhaler, Rfl: 11     folic acid (FOLVITE) 1 MG tablet, Take 1 tablet (1,000 mcg) by mouth daily, Disp: 90 tablet, Rfl: 3     furosemide (LASIX) 20 MG tablet, Take 1 tablet (20 mg) by mouth daily, Disp: 90 tablet, Rfl: 1     gabapentin  (NEURONTIN) 300 MG capsule, TAKE ONE CAPSULE BY MOUTH EVERY MORNING AND TAKE ONE CAPSULE BY MOUTH EVERY AFTERNOON AND TAKE TWO CAPSULES BY MOUTH EVERY NIGHT AT BEDTIME, Disp: 120 capsule, Rfl: 1     hydrALAZINE (APRESOLINE) 25 MG tablet, Take 1 tablet (25 mg) by mouth 3 times daily, Disp: 270 tablet, Rfl: 1     hydrOXYzine (ATARAX) 25 MG tablet, TAKE ONE TABLET BY MOUTH TWICE A DAY AS NEEDED FOR ANXIETY OR PAIN, Disp: 60 tablet, Rfl: 1     ibuprofen (ADVIL/MOTRIN) 800 MG tablet, TAKE ONE TABLET BY MOUTH THREE TIMES A DAY WITH MEALS FOR 14 DAYS, Disp: 42 tablet, Rfl: 0     ipratropium - albuterol 0.5 mg/2.5 mg/3 mL (DUONEB) 0.5-2.5 (3) MG/3ML neb solution, INHALE ONE VIAL BY NEBULIZATION FOUR TIMES DAILY AS NEEDED FOR WHEEZING, Disp: 360 mL, Rfl: 1     labetalol (NORMODYNE) 100 MG tablet, Take 1 tablet (100 mg) by mouth 2 times daily, Disp: 180 tablet, Rfl: 0     losartan (COZAAR) 100 MG tablet, Take 1 tablet (100 mg) by mouth daily, Disp: 90 tablet, Rfl: 1     magnesium oxide (MAG-OX) 400 MG tablet, Take 1 tablet (400 mg) by mouth daily, Disp: 90 tablet, Rfl: 1     melatonin 5 MG tablet, Take 1-2 tablets (5-10 mg) by mouth nightly as needed for sleep, Disp: 180 tablet, Rfl: 1     multivitamin w/minerals (THERA-VIT-M) tablet, Take 1 tablet by mouth daily, Disp: 30 each, Rfl: 1     naltrexone (DEPADE/REVIA) 50 MG tablet, Take 1 tablet (50 mg) by mouth At Bedtime, Disp: 90 tablet, Rfl: 1     nicotine (NICOTROL) 10 MG inhaler, INHALE 6 TO  16 CARTRIDGES INTO THE LUNGS DAILY AS NEEDED FOR SMOKING CESSATION, Disp: 168 each, Rfl: 2     omeprazole (PRILOSEC) 20 MG DR capsule, TAKE ONE CAPSULE BY MOUTH TWICE A DAY BEFORE MEALS, Disp: 180 capsule, Rfl: 0     order for DME, Equipment being ordered: TENS, Disp: 1 Device, Rfl: 0     potassium chloride ER (KLOR-CON M) 10 MEQ CR tablet, Take 1 tablet (10 mEq) by mouth daily, Disp: 90 tablet, Rfl: 1     rOPINIRole (REQUIP) 1 MG tablet, Take 1 tablet (1 mg) by mouth 3 times daily,  Disp: 90 tablet, Rfl: 1     thiamine (B-1) 100 MG tablet, Take 1 tablet (100 mg) by mouth daily, Disp: 90 tablet, Rfl: 1     triamcinolone (KENALOG) 0.1 % paste, Apply a thin layer to the left tongue sores twice daily for 14 days., Disp: 5 g, Rfl: 1     vitamin B complex with vitamin C (STRESS TAB) tablet, Take 1 tablet by mouth daily, Disp: 120 each, Rfl: 3     Vitamin D, Cholecalciferol, 25 MCG (1000 UT) TABS, Take 2 tablets (2,000 Units) by mouth daily, Disp: 180 tablet, Rfl: 1     ALLERGIES:    Allergies   Allergen Reactions     Chicken-Derived Products (Egg) GI Disturbance     Reglan [Metoclopramide Hcl] Other (See Comments)     Body tenses up     Doxycycline Rash        SOCIAL HISTORY:   Social History     Socioeconomic History     Marital status:      Spouse name: Not on file     Number of children: 1     Years of education: 13     Highest education level: Not on file   Occupational History     Employer: [email or web address]   Social Needs     Financial resource strain: Not on file     Food insecurity     Worry: Not on file     Inability: Not on file     Transportation needs     Medical: Not on file     Non-medical: Not on file   Tobacco Use     Smoking status: Current Some Day Smoker     Packs/day: 0.25     Years: 34.00     Pack years: 8.50     Types: Cigarettes     Start date: 1979     Last attempt to quit: 10/3/2012     Years since quittin.3     Smokeless tobacco: Never Used     Tobacco comment: 5 cigarettes daily   Substance and Sexual Activity     Alcohol use: No     Comment: 1 pint of vodka per day, last drink aug 2018     Drug use: No     Sexual activity: Not Currently   Lifestyle     Physical activity     Days per week: Not on file     Minutes per session: Not on file     Stress: Not on file   Relationships     Social connections     Talks on phone: Not on file     Gets together: Not on file     Attends Taoist service: Not on file     Active member of club or organization: Not on  file     Attends meetings of clubs or organizations: Not on file     Relationship status: Not on file     Intimate partner violence     Fear of current or ex partner: Not on file     Emotionally abused: Not on file     Physically abused: Not on file     Forced sexual activity: Not on file   Other Topics Concern     Parent/sibling w/ CABG, MI or angioplasty before 65F 55M? No   Social History Narrative    Lives in [place] with her son in a trailer no access to guns or weapons works for the [employer] and enjoys crocheting and watching television.        FAMILY HISTORY:   Family History   Problem Relation Age of Onset     Depression/Anxiety Mother      Asthma Mother      Cerebrovascular Disease Father      Hypertension Father      Lung Cancer Father      Alcoholism Father      Breast Cancer Paternal Aunt      Other Cancer Other      Depression Other      Anxiety Disorder Other      Mental Illness Other      Substance Abuse Other      Asthma Other      Obesity Sister      Obesity Son      Suicide Paternal Uncle         EXAM:Vitals: BP (!) 159/79   Pulse 99   Wt 90.1 kg (198 lb 9.6 oz)   LMP 06/02/2010   SpO2 97%   BMI 37.53 kg/m    BMI= Body mass index is 37.53 kg/m .    General appearance: Patient is alert and fully cooperative with history & exam.  No sign of distress is noted during the visit.     Psychiatric: Affect is pleasant & appropriate.  Patient appears motivated to improve health.     Respiratory: Breathing is regular & unlabored while sitting.     HEENT: Hearing is intact to spoken word.  Speech is clear.  No gross evidence of visual impairment that would impact ambulation.     Dermatologic: Skin is intact to both lower extremities without significant lesions, rash or abrasion.  No paronychia or evidence of soft tissue infection is noted.     Vascular: DP & PT pulses are intact & regular bilaterally.  No significant edema or varicosities noted.  CFT and skin temperature are normal to both  lower extremities.     Neurologic: Lower extremity sensation is intact to light touch.  No evidence of weakness or contracture in the lower extremities.  No evidence of neuropathy.     Musculoskeletal: Bilateral first metatarsophalangeal joints with limited range of motion and pain.  Patient is ambulatory without assistive device or brace.  No gross ankle deformity noted.  No foot or ankle joint effusion is noted.    Bilateral foot x-rays reviewed with patient.  Joint space narrowing and spurring of the first metatarsophalangeal joints bilaterally.     ASSESSMENT:   B/l hallux limitus     PLAN:  Reviewed patient's chart in epic.  Discussed condition and treatment options including pros and cons.    Surgical and non-surgical treatment options for hallux limitus were discussed.  I explained that hallux limitus is oftentimes surgically treated in a staged manner.  Non-operative treatments like rocker bottom soles, orthotics, injection and NSAIDs were discussed.  Shoes that provide extra room for the enlarged joint should be helpful.  I would anticipate somewhat short term benefit from joint injection.  Sometimes surgery is considered if conservative care fails.  I advised stiff soled shoes, orthotics.  Custom orthotics ordered.  She will continue NSAID use as needed.  Patient agrees with this plan.  Follow-up as needed.    Cooper Chand DPM, PREMA Xiong to follow up with Primary Care provider regarding elevated blood pressure.

## 2021-02-03 NOTE — LETTER
2/3/2021         RE: Inga Ledesma  [address]  [facility] [address]        Dear Colleague,    Thank you for referring your patient, Inga Ledesma, to the [facility]. Please see a copy of my visit note below.    Chief Complaint - oral lesion    History of Present Illness - Inga Ledesma is a 55 year old female returns with a sting in entire mouth. She also feels she has a lesion on the left tongue. The patient has noticed for approximately 3.5 weeks. She tried an antibiotic for 15 days. She had some trouble swallowing, but that got better. + citrus or spicy intolerance. No bleeding in mouth. Some epistaxis, has dry nose. She has COPD. She is a mouth breather. Nose is plugged, a couple weeks. current smoker (3 cigs per day, but was 1 ppd). She has a little pain in jaw and neck. She doesn't feel sick. No lumps or swollen glands in the neck. I personally reviewed the relevant clinical notes in Epic including the primary care providers note. I noted two small areas on the left lateral tongue, likely chronic irritation sores. I recommended biotene and orobase, but she couldn't afford it. She returns for biopsy of these lesions. She feels the entire tongue burns. Also has sore throat. Hasn't tried anything. Only new medication is diclofenic.    Past Medical History -   Patient Active Problem List   Diagnosis     RLS (restless legs syndrome)     GERD (gastroesophageal reflux disease)     Iron deficiency anemia     Hyperlipidemia with target LDL less than 160     Adult BMI 30+     Sacroiliitis (H)     Tobacco abuse     Vitamin D deficiency     Menorrhagia     Vitamin D deficiency     Edema of both legs     Hypertension goal BP (blood pressure) < 140/90     Chronic bronchitis, unspecified chronic bronchitis type (H)     Health Care Home     Alcohol dependence with withdrawal with complication (H)     Major depressive disorder, recurrent episode, mild (H)     (HFpEF) heart failure with  preserved ejection fraction (H)     Psychophysiological insomnia     Chemical dependency (H)     Alcohol withdrawal (H)     Alcohol abuse     LALA (acute kidney injury) (H)     Sepsis (H)     Alcohol dependency (H)     COPD exacerbation (H)     Morbid obesity (H)       Current Medications -   Current Outpatient Medications:      acetaminophen (TYLENOL) 500 MG tablet, Take 1 tablet (500 mg) by mouth every 4 hours as needed for mild pain, Disp: 1 Bottle, Rfl: 3     albuterol (VENTOLIN HFA) 108 (90 Base) MCG/ACT inhaler, INHALE ONE TO TWO PUFFS BY MOUTH EVERY 4 HOURS AS NEEDED FOR SHORTNESS OF BREATH, DIFFICULTY BREATHING OR WHEEZING., Disp: 18 g, Rfl: 3     amitriptyline (ELAVIL) 25 MG tablet, Take 2 tablets (50 mg) by mouth At Bedtime, Disp: 180 tablet, Rfl: 1     artificial saliva (BIOTENE DRY MOUTHWASH) LIQD liquid, Swish and spit 5-10 mLs in mouth 4 times daily as needed for dry mouth, Disp: 473 mL, Rfl: 1     aspirin 81 MG EC tablet, Take 1 tablet (81 mg) by mouth daily, Disp: 90 tablet, Rfl: 3     atorvastatin (LIPITOR) 10 MG tablet, Take 1 tablet (10 mg) by mouth At Bedtime, Disp: 90 tablet, Rfl: 3     calcium carbonate 500 mg, elemental, (OSCAL) 500 MG tablet, Take 1 tablet (500 mg) by mouth daily, Disp: 90 tablet, Rfl: 1     cetirizine (ZYRTEC) 10 MG tablet, Take 1 tablet (10 mg) by mouth daily, Disp: 90 tablet, Rfl: 1     diclofenac (VOLTAREN) 75 MG EC tablet, Take 1 tablet (75 mg) by mouth 2 times daily, Disp: 30 tablet, Rfl: 0     EPINEPHrine (ANY BX GENERIC EQUIV) 0.3 MG/0.3ML injection 2-pack, Inject 0.3 mLs (0.3 mg) into the muscle as needed for anaphylaxis, Disp: 2 each, Rfl: 3     famotidine (PEPCID) 40 MG tablet, Take 1 tablet (40 mg) by mouth nightly as needed for heartburn, Disp: 60 tablet, Rfl: 1     FLUoxetine (PROZAC) 40 MG capsule, Take 1 capsule (40 mg) by mouth 2 times daily, Disp: 180 capsule, Rfl: 1     fluticasone (FLONASE) 50 MCG/ACT nasal spray, Spray 1 spray into both nostrils daily,  Disp: 16 g, Rfl: 3     fluticasone-salmeterol (ADVAIR DISKUS) 500-50 MCG/DOSE inhaler, INHALE ONE PUFF BY MOUTH EVERY 12 HOURS, Disp: 1 Inhaler, Rfl: 11     fluticasone-salmeterol (ADVAIR HFA) 115-21 MCG/ACT inhaler, Inhale 2 puffs into the lungs 2 times daily, Disp: 1 Inhaler, Rfl: 11     folic acid (FOLVITE) 1 MG tablet, Take 1 tablet (1,000 mcg) by mouth daily, Disp: 90 tablet, Rfl: 3     furosemide (LASIX) 20 MG tablet, Take 1 tablet (20 mg) by mouth daily, Disp: 90 tablet, Rfl: 1     gabapentin (NEURONTIN) 300 MG capsule, TAKE ONE CAPSULE BY MOUTH EVERY MORNING AND TAKE ONE CAPSULE BY MOUTH EVERY AFTERNOON AND TAKE TWO CAPSULES BY MOUTH EVERY NIGHT AT BEDTIME, Disp: 120 capsule, Rfl: 1     hydrALAZINE (APRESOLINE) 25 MG tablet, Take 1 tablet (25 mg) by mouth 3 times daily, Disp: 270 tablet, Rfl: 1     hydrOXYzine (ATARAX) 25 MG tablet, TAKE ONE TABLET BY MOUTH TWICE A DAY AS NEEDED FOR ANXIETY OR PAIN, Disp: 60 tablet, Rfl: 1     ibuprofen (ADVIL/MOTRIN) 800 MG tablet, TAKE ONE TABLET BY MOUTH THREE TIMES A DAY WITH MEALS FOR 14 DAYS, Disp: 42 tablet, Rfl: 0     ipratropium - albuterol 0.5 mg/2.5 mg/3 mL (DUONEB) 0.5-2.5 (3) MG/3ML neb solution, INHALE ONE VIAL BY NEBULIZATION FOUR TIMES DAILY AS NEEDED FOR WHEEZING, Disp: 360 mL, Rfl: 1     labetalol (NORMODYNE) 100 MG tablet, Take 1 tablet (100 mg) by mouth 2 times daily, Disp: 180 tablet, Rfl: 0     losartan (COZAAR) 100 MG tablet, Take 1 tablet (100 mg) by mouth daily, Disp: 90 tablet, Rfl: 1     magnesium oxide (MAG-OX) 400 MG tablet, Take 1 tablet (400 mg) by mouth daily, Disp: 90 tablet, Rfl: 1     melatonin 5 MG tablet, Take 1-2 tablets (5-10 mg) by mouth nightly as needed for sleep, Disp: 180 tablet, Rfl: 1     multivitamin w/minerals (THERA-VIT-M) tablet, Take 1 tablet by mouth daily, Disp: 30 each, Rfl: 1     naltrexone (DEPADE/REVIA) 50 MG tablet, Take 1 tablet (50 mg) by mouth At Bedtime, Disp: 90 tablet, Rfl: 1     nicotine (NICOTROL) 10 MG  inhaler, INHALE 6 TO  16 CARTRIDGES INTO THE LUNGS DAILY AS NEEDED FOR SMOKING CESSATION, Disp: 168 each, Rfl: 2     omeprazole (PRILOSEC) 20 MG DR capsule, TAKE ONE CAPSULE BY MOUTH TWICE A DAY BEFORE MEALS, Disp: 180 capsule, Rfl: 0     order for DME, Equipment being ordered: TENS, Disp: 1 Device, Rfl: 0     potassium chloride ER (KLOR-CON M) 10 MEQ CR tablet, Take 1 tablet (10 mEq) by mouth daily, Disp: 90 tablet, Rfl: 1     rOPINIRole (REQUIP) 1 MG tablet, Take 1 tablet (1 mg) by mouth 3 times daily, Disp: 90 tablet, Rfl: 1     thiamine (B-1) 100 MG tablet, Take 1 tablet (100 mg) by mouth daily, Disp: 90 tablet, Rfl: 1     triamcinolone (KENALOG) 0.1 % paste, Apply a thin layer to the left tongue sores twice daily for 14 days., Disp: 5 g, Rfl: 1     vitamin B complex with vitamin C (STRESS TAB) tablet, Take 1 tablet by mouth daily, Disp: 120 each, Rfl: 3     Vitamin D, Cholecalciferol, 25 MCG (1000 UT) TABS, Take 2 tablets (2,000 Units) by mouth daily, Disp: 180 tablet, Rfl: 1    Allergies -   Allergies   Allergen Reactions     Chicken-Derived Products (Egg) GI Disturbance     Reglan [Metoclopramide Hcl] Other (See Comments)     Body tenses up     Doxycycline Rash       Social History -   Social History     Socioeconomic History     Marital status:      Spouse name: Not on file     Number of children: 1     Years of education: 13     Highest education level: Not on file   Occupational History     Employer: [employer]   Social Needs     Financial resource strain: Not on file     Food insecurity     Worry: Not on file     Inability: Not on file     Transportation needs     Medical: Not on file     Non-medical: Not on file   Tobacco Use     Smoking status: Current Some Day Smoker     Packs/day: 0.25     Years: 34.00     Pack years: 8.50     Types: Cigarettes     Start date: 1979     Last attempt to quit: 10/3/2012     Years since quittin.3     Smokeless tobacco: Never Used     Tobacco  comment: 5 cigarettes daily   Substance and Sexual Activity     Alcohol use: No     Comment: 1 pint of vodka per day, last drink aug 2018     Drug use: No     Sexual activity: Not Currently   Lifestyle     Physical activity     Days per week: Not on file     Minutes per session: Not on file     Stress: Not on file   Relationships     Social connections     Talks on phone: Not on file     Gets together: Not on file     Attends Jain service: Not on file     Active member of club or organization: Not on file     Attends meetings of clubs or organizations: Not on file     Relationship status: Not on file     Intimate partner violence     Fear of current or ex partner: Not on file     Emotionally abused: Not on file     Physically abused: Not on file     Forced sexual activity: Not on file   Other Topics Concern     Parent/sibling w/ CABG, MI or angioplasty before 65F 55M? No   Social History Narrative    Lives in [place] with her son in a trailer no access to guns or weapons works for the [employer] and enjoys crocheting and watching television.       Family History -   Family History   Problem Relation Age of Onset     Depression/Anxiety Mother      Asthma Mother      Cerebrovascular Disease Father      Hypertension Father      Lung Cancer Father      Alcoholism Father      Breast Cancer Paternal Aunt      Other Cancer Other      Depression Other      Anxiety Disorder Other      Mental Illness Other      Substance Abuse Other      Asthma Other      Obesity Sister      Obesity Son      Suicide Paternal Uncle      Physical Exam  General - The patient is in no distress. Alert and oriented x3, answers questions and cooperates with examination appropriately.   Voice and Breathing - The patient was breathing comfortably without the use of accessory muscles. There was no wheezing, stridor, or stertor.  The patients voice was clear and strong.  Eyes - Extraocular movements intact. Sclera were not icteric or  injected, conjunctiva were pink and moist.  Neurologic - Cranial nerves II-XII are grossly intact. Specifically, the facial nerve is intact, House-Brackmann grade 1 of 6.   Mouth - Examination of the oral cavity showed two, lesions located on the left lateral tongue, seperated by about 2 cm. Posterior one is ulceration with adjacent leukoplakia, 6 mm. Anterior one is smaller, 4 mm, maybe small ulceration. They are tender, some adjacent mucosal thickening with central ulcer. Right lateral tongue has a line with irritation with palpation. Lots of dryness. The tongue was mobile and protrudes midline.   Oropharynx - The walls of the oropharynx were smooth, symmetric, and had no lesions or ulcerations. The uvula was midline and the palate raised symmetrically.     Procedure - biopsy left lateral tongue sores. I injected 2% lidocaine, 1:100,000 epinephrine into the left lateral tongue at two sites. I took a cups biopsy and took biopsies of the posterior lesion (posterior left lateral tongue) first, placed it in formalin (#1), I took two biopsies of this. Next, I biopsied the anterior left lateral tongue lesion and labeled this as #2. Hemostasis was achieved with silver nitrate.     A/P - Inga Ledesma is a 55 year old female with two lesions on the left lateral tongue. It is likely trauma from biting and xerostomia, but could be lichen planus or leukoplakia. She is a smoker and this doesn't help. She couldn't purchase Orabase or biotene. I recommend dexamethasone mouth rinse and medrol dose pack. I'll call with results and next steps.       Ortiz Garibay MD  Otolaryngology  [facility]        Again, thank you for allowing me to participate in the care of your patient.        Sincerely,        Ortiz Garibay MD

## 2021-02-03 NOTE — LETTER
2/3/2021         RE: Inga Ledesma  [address]  [facility] [address]        Dear Colleague,    Thank you for referring your patient, Inga Ledesma, to the [facility]. Please see a copy of my visit note below.    PATIENT HISTORY:  Inga Ledesma is a 55 year old female who presents to clinic for bilateral first metatarsophalangeal joint pain.  Present for years.  Worse with walking, better with rest.  Patient recently got a prescription for diclofenac from her primary care provider and this is helping.  0-8 out of 10 pain.  No injury.  Complex past medical history as below.    I was requested to see this patient for this issue by Min Toledo PA-C    Review of Systems:   Patient admits to depression, anxiety, swelling of ankles.  Rest of 10 point review of systems negative except for HPI.     PAST MEDICAL HISTORY:   Past Medical History:   Diagnosis Date     Alcohol abuse, in remission      Alcohol withdrawal seizure (H) 2016     Cancer of labia majora (H) 1987     Cervical dysplasia 1987     Congestive heart failure (H) 5/16/16     COPD (chronic obstructive pulmonary disease) (H) 1/24/2011     CVA (cerebral infarction) 1/2012     Dependent edema      Depression, major      Depressive disorder 1966     GERD (gastroesophageal reflux disease)      Hyperlipidemia LDL goal < 160      Hypertension      Iron deficiency anemia      RLS (restless legs syndrome)      Sacroiliitis (H)     steroid injections ineffective, chronic low back pain     Tobacco abuse      Uncomplicated asthma      Vitamin D deficiencies         PAST SURGICAL HISTORY:   Past Surgical History:   Procedure Laterality Date     AS BIOPSY/EXCISION LYMPH NODE OPEN SUPERFICIAL       COLONOSCOPY       EYE SURGERY       HYSTERECTOMY  2010     HYSTERECTOMY TOTAL ABDOMINAL  7/28/10    Bilateral salpingectomy.  ovaries conserved.     LASER TX, CERVICAL  1987     LYMPH NODE BIOPSY  2007    inguinal     LYSIS OF LABIAL LESION(S)   1985, 1987        MEDICATIONS:   Current Outpatient Medications:      acetaminophen (TYLENOL) 500 MG tablet, Take 1 tablet (500 mg) by mouth every 4 hours as needed for mild pain, Disp: 1 Bottle, Rfl: 3     albuterol (VENTOLIN HFA) 108 (90 Base) MCG/ACT inhaler, INHALE ONE TO TWO PUFFS BY MOUTH EVERY 4 HOURS AS NEEDED FOR SHORTNESS OF BREATH, DIFFICULTY BREATHING OR WHEEZING., Disp: 18 g, Rfl: 3     amitriptyline (ELAVIL) 25 MG tablet, Take 2 tablets (50 mg) by mouth At Bedtime, Disp: 180 tablet, Rfl: 1     artificial saliva (BIOTENE DRY MOUTHWASH) LIQD liquid, Swish and spit 5-10 mLs in mouth 4 times daily as needed for dry mouth, Disp: 473 mL, Rfl: 1     aspirin 81 MG EC tablet, Take 1 tablet (81 mg) by mouth daily, Disp: 90 tablet, Rfl: 3     atorvastatin (LIPITOR) 10 MG tablet, Take 1 tablet (10 mg) by mouth At Bedtime, Disp: 90 tablet, Rfl: 3     calcium carbonate 500 mg, elemental, (OSCAL) 500 MG tablet, Take 1 tablet (500 mg) by mouth daily, Disp: 90 tablet, Rfl: 1     cetirizine (ZYRTEC) 10 MG tablet, Take 1 tablet (10 mg) by mouth daily, Disp: 90 tablet, Rfl: 1     diclofenac (VOLTAREN) 75 MG EC tablet, Take 1 tablet (75 mg) by mouth 2 times daily, Disp: 30 tablet, Rfl: 0     EPINEPHrine (ANY BX GENERIC EQUIV) 0.3 MG/0.3ML injection 2-pack, Inject 0.3 mLs (0.3 mg) into the muscle as needed for anaphylaxis, Disp: 2 each, Rfl: 3     famotidine (PEPCID) 40 MG tablet, Take 1 tablet (40 mg) by mouth nightly as needed for heartburn, Disp: 60 tablet, Rfl: 1     FLUoxetine (PROZAC) 40 MG capsule, Take 1 capsule (40 mg) by mouth 2 times daily, Disp: 180 capsule, Rfl: 1     fluticasone (FLONASE) 50 MCG/ACT nasal spray, Spray 1 spray into both nostrils daily, Disp: 16 g, Rfl: 3     fluticasone-salmeterol (ADVAIR DISKUS) 500-50 MCG/DOSE inhaler, INHALE ONE PUFF BY MOUTH EVERY 12 HOURS, Disp: 1 Inhaler, Rfl: 11     fluticasone-salmeterol (ADVAIR HFA) 115-21 MCG/ACT inhaler, Inhale 2 puffs into the lungs 2 times daily,  Disp: 1 Inhaler, Rfl: 11     folic acid (FOLVITE) 1 MG tablet, Take 1 tablet (1,000 mcg) by mouth daily, Disp: 90 tablet, Rfl: 3     furosemide (LASIX) 20 MG tablet, Take 1 tablet (20 mg) by mouth daily, Disp: 90 tablet, Rfl: 1     gabapentin (NEURONTIN) 300 MG capsule, TAKE ONE CAPSULE BY MOUTH EVERY MORNING AND TAKE ONE CAPSULE BY MOUTH EVERY AFTERNOON AND TAKE TWO CAPSULES BY MOUTH EVERY NIGHT AT BEDTIME, Disp: 120 capsule, Rfl: 1     hydrALAZINE (APRESOLINE) 25 MG tablet, Take 1 tablet (25 mg) by mouth 3 times daily, Disp: 270 tablet, Rfl: 1     hydrOXYzine (ATARAX) 25 MG tablet, TAKE ONE TABLET BY MOUTH TWICE A DAY AS NEEDED FOR ANXIETY OR PAIN, Disp: 60 tablet, Rfl: 1     ibuprofen (ADVIL/MOTRIN) 800 MG tablet, TAKE ONE TABLET BY MOUTH THREE TIMES A DAY WITH MEALS FOR 14 DAYS, Disp: 42 tablet, Rfl: 0     ipratropium - albuterol 0.5 mg/2.5 mg/3 mL (DUONEB) 0.5-2.5 (3) MG/3ML neb solution, INHALE ONE VIAL BY NEBULIZATION FOUR TIMES DAILY AS NEEDED FOR WHEEZING, Disp: 360 mL, Rfl: 1     labetalol (NORMODYNE) 100 MG tablet, Take 1 tablet (100 mg) by mouth 2 times daily, Disp: 180 tablet, Rfl: 0     losartan (COZAAR) 100 MG tablet, Take 1 tablet (100 mg) by mouth daily, Disp: 90 tablet, Rfl: 1     magnesium oxide (MAG-OX) 400 MG tablet, Take 1 tablet (400 mg) by mouth daily, Disp: 90 tablet, Rfl: 1     melatonin 5 MG tablet, Take 1-2 tablets (5-10 mg) by mouth nightly as needed for sleep, Disp: 180 tablet, Rfl: 1     multivitamin w/minerals (THERA-VIT-M) tablet, Take 1 tablet by mouth daily, Disp: 30 each, Rfl: 1     naltrexone (DEPADE/REVIA) 50 MG tablet, Take 1 tablet (50 mg) by mouth At Bedtime, Disp: 90 tablet, Rfl: 1     nicotine (NICOTROL) 10 MG inhaler, INHALE 6 TO  16 CARTRIDGES INTO THE LUNGS DAILY AS NEEDED FOR SMOKING CESSATION, Disp: 168 each, Rfl: 2     omeprazole (PRILOSEC) 20 MG DR capsule, TAKE ONE CAPSULE BY MOUTH TWICE A DAY BEFORE MEALS, Disp: 180 capsule, Rfl: 0     order for DME, Equipment  being ordered: TENS, Disp: 1 Device, Rfl: 0     potassium chloride ER (KLOR-CON M) 10 MEQ CR tablet, Take 1 tablet (10 mEq) by mouth daily, Disp: 90 tablet, Rfl: 1     rOPINIRole (REQUIP) 1 MG tablet, Take 1 tablet (1 mg) by mouth 3 times daily, Disp: 90 tablet, Rfl: 1     thiamine (B-1) 100 MG tablet, Take 1 tablet (100 mg) by mouth daily, Disp: 90 tablet, Rfl: 1     triamcinolone (KENALOG) 0.1 % paste, Apply a thin layer to the left tongue sores twice daily for 14 days., Disp: 5 g, Rfl: 1     vitamin B complex with vitamin C (STRESS TAB) tablet, Take 1 tablet by mouth daily, Disp: 120 each, Rfl: 3     Vitamin D, Cholecalciferol, 25 MCG (1000 UT) TABS, Take 2 tablets (2,000 Units) by mouth daily, Disp: 180 tablet, Rfl: 1     ALLERGIES:    Allergies   Allergen Reactions     Chicken-Derived Products (Egg) GI Disturbance     Reglan [Metoclopramide Hcl] Other (See Comments)     Body tenses up     Doxycycline Rash        SOCIAL HISTORY:   Social History     Socioeconomic History     Marital status:      Spouse name: Not on file     Number of children: 1     Years of education: 13     Highest education level: Not on file   Occupational History     Employer: [employer]   Social Needs     Financial resource strain: Not on file     Food insecurity     Worry: Not on file     Inability: Not on file     Transportation needs     Medical: Not on file     Non-medical: Not on file   Tobacco Use     Smoking status: Current Some Day Smoker     Packs/day: 0.25     Years: 34.00     Pack years: 8.50     Types: Cigarettes     Start date: 1979     Last attempt to quit: 10/3/2012     Years since quittin.3     Smokeless tobacco: Never Used     Tobacco comment: 5 cigarettes daily   Substance and Sexual Activity     Alcohol use: No     Comment: 1 pint of vodka per day, last drink aug 2018     Drug use: No     Sexual activity: Not Currently   Lifestyle     Physical activity     Days per week: Not on file     Minutes  per session: Not on file     Stress: Not on file   Relationships     Social connections     Talks on phone: Not on file     Gets together: Not on file     Attends Mu-ism service: Not on file     Active member of club or organization: Not on file     Attends meetings of clubs or organizations: Not on file     Relationship status: Not on file     Intimate partner violence     Fear of current or ex partner: Not on file     Emotionally abused: Not on file     Physically abused: Not on file     Forced sexual activity: Not on file   Other Topics Concern     Parent/sibling w/ CABG, MI or angioplasty before 65F 55M? No   Social History Narrative    Lives in [place] with her son in a trailer no access to guns or weapons works for the [employer] and enjoys crocheting and watching television.        FAMILY HISTORY:   Family History   Problem Relation Age of Onset     Depression/Anxiety Mother      Asthma Mother      Cerebrovascular Disease Father      Hypertension Father      Lung Cancer Father      Alcoholism Father      Breast Cancer Paternal Aunt      Other Cancer Other      Depression Other      Anxiety Disorder Other      Mental Illness Other      Substance Abuse Other      Asthma Other      Obesity Sister      Obesity Son      Suicide Paternal Uncle         EXAM:Vitals: BP (!) 159/79   Pulse 99   Wt 90.1 kg (198 lb 9.6 oz)   LMP 06/02/2010   SpO2 97%   BMI 37.53 kg/m    BMI= Body mass index is 37.53 kg/m .    General appearance: Patient is alert and fully cooperative with history & exam.  No sign of distress is noted during the visit.     Psychiatric: Affect is pleasant & appropriate.  Patient appears motivated to improve health.     Respiratory: Breathing is regular & unlabored while sitting.     HEENT: Hearing is intact to spoken word.  Speech is clear.  No gross evidence of visual impairment that would impact ambulation.     Dermatologic: Skin is intact to both lower extremities without  significant lesions, rash or abrasion.  No paronychia or evidence of soft tissue infection is noted.     Vascular: DP & PT pulses are intact & regular bilaterally.  No significant edema or varicosities noted.  CFT and skin temperature are normal to both lower extremities.     Neurologic: Lower extremity sensation is intact to light touch.  No evidence of weakness or contracture in the lower extremities.  No evidence of neuropathy.     Musculoskeletal: Bilateral first metatarsophalangeal joints with limited range of motion and pain.  Patient is ambulatory without assistive device or brace.  No gross ankle deformity noted.  No foot or ankle joint effusion is noted.    Bilateral foot x-rays reviewed with patient.  Joint space narrowing and spurring of the first metatarsophalangeal joints bilaterally.     ASSESSMENT:   B/l hallux limitus     PLAN:  Reviewed patient's chart in epic.  Discussed condition and treatment options including pros and cons.    Surgical and non-surgical treatment options for hallux limitus were discussed.  I explained that hallux limitus is oftentimes surgically treated in a staged manner.  Non-operative treatments like rocker bottom soles, orthotics, injection and NSAIDs were discussed.  Shoes that provide extra room for the enlarged joint should be helpful.  I would anticipate somewhat short term benefit from joint injection.  Sometimes surgery is considered if conservative care fails.  I advised stiff soled shoes, orthotics.  Custom orthotics ordered.  She will continue NSAID use as needed.  Patient agrees with this plan.  Follow-up as needed.    [place] Chand DPM, PREMA Xiong to follow up with Primary Care provider regarding elevated blood pressure.          Again, thank you for allowing me to participate in the care of your patient.        Sincerely,        [place] Chand DPM

## 2021-02-03 NOTE — PATIENT INSTRUCTIONS
General Scheduling Information  To schedule your CT/MRI scan, please contact [place] [facility] at [phone]   [address] [place] [address]    To schedule your Surgery, please contact our Specialty Schedulers at [phone]    ENT Clinic Locations Clinic Hours Telephone Number     [facility]key[facility] España  [address] [place] [address]   Tuesday:       8:00am -- 4:00pm    Wednesday:  8:00am - 4:00pm   To schedule an appointment with   Dr. Garibay,   please contact our   Specialty Scheduling Department at:     [phone]       [facility] Bravo  [address] [place] [address]   Friday:          8:00am - 4:00pm         Urgent Care Locations Clinic Hours Telephone Numbers     Karthikeyan Yañez  [address] [place] [address]     Monday-Friday:     11:00pm - 9:00pm    Saturday-Sunday:  9:00am - 5:00pm   [phone]     [facility]key[facility] Bravo  [address] [place] [address]     Monday-Friday:      5:00pm - 9:00pm     Saturday-Sunday:  9:00am - 5:00pm   [phone]

## 2021-02-03 NOTE — PATIENT INSTRUCTIONS
"DEGENERATIVE ARTHRITIS OF THE BIG TOE JOINT (hallux limitus/hallux rigidus)   Arthritis of the joint at the base of the big toe (metatarsophalangeal joint) has several causes. Usually it results from repetitive trauma to the joint, secondary to abnormal foot mechanics. Often it is hereditary. However, a one-time traumatic event can lead to arthritis. The condition can worsen with time. The cartilage wears out, joint surfaces are no longer smooth, bone rubs on bone, inflammation occurs with pain, and eventually bone spurs and loose fragments might develop.   The joint is often painful with activity, worse with flimsy shoes or walking barefoot, and it slowly progresses over time. A person might notice the toe \"locking up\" with walking. There often is an obvious, and irritating, bony bump on top of the foot. Shoes might be uncomfortable. In some people the pain is so bothersome that recreational activities sometimes even normal daily activities are difficult to perform.   The pain from this arthritis is likely a combination of joint jamming, cartilage loss and inflammation, and irritation from shoes rubbing on the bump. Sometimes other parts of the foot, leg, or back hurt from altering one's walk to compensate for the painful joint.     Ways to help a person live with the discomfort include wearing a good, supportive shoe with a rigid, rocker-type bottom. An example is a hiking boot. A rigid sole minimizes bending of the joint, and therefore, joint motion and pain. Shoes with a high toe box allow for less rubbing on the bump. Avoiding barefoot walking, sandals, flip-flops and slippers usually helps.     Sometimes an insert or orthotic provides symptom relief. This might make shoe fit more difficult. Pads over the bump and occasionally injections into the joint provide relief.     Surgery for this condition is aimed towards alleviating pain. It does not cure the arthritis nor does it guarantee better joint motion. " Depending on the condition of the metatarsophalangeal joint, there are several surgical options:    1.  Cutting off the bony bump(s) and cleaning the joint    2.  Loosening the joint up by making cuts in the first metatarsal bone or the big toe bone and removing a small section of bone.    3.  Repositioning bone to minimize jamming of the joint.    4.  In severe cases, the joint is fused. By fusing the joint, it will never bend again. This resolves the pain, because it's the movement of a worn out joint that causes pain. Oftentimes the operation involves a combination of these procedures and requires the use of screws, pins, and/or a small surgical plate.     Healing after surgery requires about six weeks of protection. This allows the bone to heal. Maximum recovery takes about one year. The scar tissue and joint structures require this amount of time to finish the healing process. Expect stiffness, swelling and numbness during that time frame.   Surgery for arthritis of the metatarsophalangeal joint does involve side effects. Some side effects are predictable and others are less common but do occur. A scar will be visible and could be irritated by shoes. The shoe may rub on the screw or internal pin requiring surgical removal of these fixation devices. The screw and pin would likely be left in place for a full year. The first toe may remain stiff after surgery. The amount of stiffness is variable. Most people never regain normal motion of the first toe. This is due to scar tissue inherent to any surgery, in addition to the cumulative effects of arthritis. Sometimes the big toe drifts to one side or the other. Joint fusion is one option to correct an unstable, drifting toe.   All surgical procedures involve risk of infection, numbness, pain, delayed bone healing, osteotomy (bone cut) dislocation, blood clots, continued foot pain, etc. Arthritic joint surgery is quite complex and should not be taken lightly.    Any  skin incision can lead to infection. Deep infection might involve the bone and thus repeat surgery and six weeks of IV antibiotics. Scar tissue can cause nerve pain or numbness. This is generally temporary but can be permanent. We do not have treatments that cure nerve problems. Second toe pain could be related to altered mechanics and pressure transferred to the second toe. Delayed bone healing would lengthen the healing time. Some bones simply do not heal. This requires repeat surgery, electronic bone stimulation and/or extended protection. Smokers have an approximate 20% chance of poor bone healing. This is double that of a non-smoker. The bone cut may displace. This may need to be repaired with a second operation. Displacement can cause joint malalignment. Immobility after surgery can cause a blood clot in the legs and lungs. This could result in death.   Foot pain is complex. Most feet hurt for more than one reason. Operating on the arthritic   big toe joint will not necessarily create a pain free foot. Appropriate shoes, healthy body weight, avoidance of bare foot walking and moderation of activity will always be necessary to enjoy foot comfort. Arthritis is incurable even with surgery.     Surgery for this type of arthritis is nevertheless quite successful. Most surgical patients are pleased with their foot following surgery. Many of the issues described above can be controlled by taking proper care of your foot during the healing process.   Cosmetic bump surgery is discouraged for the reasons listed above. A bump and joint that is comfortable when wearing appropriate shoes should simply be treated with appropriate shoes.   Your surgeon would be happy to fully describe any of the above issues. You should pursue a full understanding of the operation, recovery process and any potential problems that could develop.     [facility] LOC[facility]  [facility]  [address] [place] [address]  [address]  [place] [address]  Phone: [phone]  Fax: [phone] [facility]  [address]  [place] [address]  Phone: [phone]   Fax: [phone]   [facility]  [address]  [place] [address]  Phone: [phone]  Fax: [phone] [facility]  [address]  [place] [address]  Phone: [phone]   Fax: [phone]   [facility]   [address]  [place] [address]  Phone: [phone]  Fax: [phone] * Please call any location listed to make an appointment for a casting/fitting. Your referral was sent to their central office and they will all have the order on file.     WEARING YOUR CUSTOM FOOT ORTHOTICS   Most insurance plans cover one pair of orthotics per year. You must check with your   insurance plan to see what your payment responsibility will be. Please call your   insurance company by calling the number on the back of your insurance card.   Orthotic's are non-refundable and non-returnable.   Orthotics are made of various designs. Some orthotics are covered with material that extends beyond your toes. If your orthotic is of this design, you will likely need to trim the toe end to get a proper fit. The insole from your shoe can be used as a template. Simply overlay the shoe insert on top of the custom orthotic. Align the heel end while tracing the length of the insert onto the custom orthotic. Use a large scissor to trim the toe end until you get a proper fit in the shoe.   The orthotic needs to be pushed as far back in the shoe as possible. The heel portion should not ride forward so as not to irritate your heel.   Orthotics are designed to work with socks. Excessive perspiration will shorten the life span of the orthotics. Remove the orthotic from the shoe frequently for proper drying.   The break-in period lasts for weeks. People new to orthotics will likely  experience new aches and pains. The orthotic is forcing your foot into a new position. Arch, foot and leg muscle aches and fatigue are common during these weeks. Minor discomfort can be considered normal break in phenomenon. Start wearing your orthotic around your home your first day. Limited activity for one to two hours is recommended. You can increase one or two additional hours each day provided the aches and pains are subsiding. The degree of discomfort, fatigue and problems will dictate the speed of break in. You may require multiple weeks to work up to full time use.   Do not continue wearing your orthotics if they are creating problems such as blisters or sores. Do not hesitate to call the clinic to speak with a nurse regarding orthotic   break in, fit, trimming, etc. You may also need to see the doctor if the orthotics are   simply not working out. Adjustments are sometimes made to improve orthotic   function.     Orthotics will only work in certain styles and types of shoes. Orthotics rarely work in dress shoes. Slip-ons, clogs, sandals and heels are particularly troublesome. Specially designed orthotics may be necessary for these types of shoes. Your custom orthotic was designed for activities that require appropriate walking or running shoes. Lace up athletic shoes, walking shoes or work boots should work appropriately. You may need a wider or longer shoe. Shoes with a removable  or insert work best. In general, you want to remove an insert from the shoe before placing the orthotic into the shoe. Shoes without a removable liner may not work as well.     When purchasing new shoes, bring your orthotics along to get a proper fit. Shop at stores that are familiar with orthotics.   Frequent washing of the orthotic may shorten the life span of the top cover. The top cover can be replaced but will generally last one to five years depending on use and foot perspiration.       .

## 2021-02-03 NOTE — PROGRESS NOTES
Chief Complaint - oral lesion    History of Present Illness - Inga Ledesma is a 55 year old female returns with a sting in entire mouth. She also feels she has a lesion on the left tongue. The patient has noticed for approximately 3.5 weeks. She tried an antibiotic for 15 days. She had some trouble swallowing, but that got better. + citrus or spicy intolerance. No bleeding in mouth. Some epistaxis, has dry nose. She has COPD. She is a mouth breather. Nose is plugged, a couple weeks. current smoker (3 cigs per day, but was 1 ppd). She has a little pain in jaw and neck. She doesn't feel sick. No lumps or swollen glands in the neck. I personally reviewed the relevant clinical notes in Epic including the primary care providers note. I noted two small areas on the left lateral tongue, likely chronic irritation sores. I recommended biotene and orobase, but she couldn't afford it. She returns for biopsy of these lesions. She feels the entire tongue burns. Also has sore throat. Hasn't tried anything. Only new medication is diclofenic.    Past Medical History -   Patient Active Problem List   Diagnosis     RLS (restless legs syndrome)     GERD (gastroesophageal reflux disease)     Iron deficiency anemia     Hyperlipidemia with target LDL less than 160     Adult BMI 30+     Sacroiliitis (H)     Tobacco abuse     Vitamin D deficiency     Menorrhagia     Vitamin D deficiency     Edema of both legs     Hypertension goal BP (blood pressure) < 140/90     Chronic bronchitis, unspecified chronic bronchitis type (H)     Health Care Home     Alcohol dependence with withdrawal with complication (H)     Major depressive disorder, recurrent episode, mild (H)     (HFpEF) heart failure with preserved ejection fraction (H)     Psychophysiological insomnia     Chemical dependency (H)     Alcohol withdrawal (H)     Alcohol abuse     LALA (acute kidney injury) (H)     Sepsis (H)     Alcohol dependency (H)     COPD exacerbation (H)      Morbid obesity (H)       Current Medications -   Current Outpatient Medications:      acetaminophen (TYLENOL) 500 MG tablet, Take 1 tablet (500 mg) by mouth every 4 hours as needed for mild pain, Disp: 1 Bottle, Rfl: 3     albuterol (VENTOLIN HFA) 108 (90 Base) MCG/ACT inhaler, INHALE ONE TO TWO PUFFS BY MOUTH EVERY 4 HOURS AS NEEDED FOR SHORTNESS OF BREATH, DIFFICULTY BREATHING OR WHEEZING., Disp: 18 g, Rfl: 3     amitriptyline (ELAVIL) 25 MG tablet, Take 2 tablets (50 mg) by mouth At Bedtime, Disp: 180 tablet, Rfl: 1     artificial saliva (BIOTENE DRY MOUTHWASH) LIQD liquid, Swish and spit 5-10 mLs in mouth 4 times daily as needed for dry mouth, Disp: 473 mL, Rfl: 1     aspirin 81 MG EC tablet, Take 1 tablet (81 mg) by mouth daily, Disp: 90 tablet, Rfl: 3     atorvastatin (LIPITOR) 10 MG tablet, Take 1 tablet (10 mg) by mouth At Bedtime, Disp: 90 tablet, Rfl: 3     calcium carbonate 500 mg, elemental, (OSCAL) 500 MG tablet, Take 1 tablet (500 mg) by mouth daily, Disp: 90 tablet, Rfl: 1     cetirizine (ZYRTEC) 10 MG tablet, Take 1 tablet (10 mg) by mouth daily, Disp: 90 tablet, Rfl: 1     diclofenac (VOLTAREN) 75 MG EC tablet, Take 1 tablet (75 mg) by mouth 2 times daily, Disp: 30 tablet, Rfl: 0     EPINEPHrine (ANY BX GENERIC EQUIV) 0.3 MG/0.3ML injection 2-pack, Inject 0.3 mLs (0.3 mg) into the muscle as needed for anaphylaxis, Disp: 2 each, Rfl: 3     famotidine (PEPCID) 40 MG tablet, Take 1 tablet (40 mg) by mouth nightly as needed for heartburn, Disp: 60 tablet, Rfl: 1     FLUoxetine (PROZAC) 40 MG capsule, Take 1 capsule (40 mg) by mouth 2 times daily, Disp: 180 capsule, Rfl: 1     fluticasone (FLONASE) 50 MCG/ACT nasal spray, Spray 1 spray into both nostrils daily, Disp: 16 g, Rfl: 3     fluticasone-salmeterol (ADVAIR DISKUS) 500-50 MCG/DOSE inhaler, INHALE ONE PUFF BY MOUTH EVERY 12 HOURS, Disp: 1 Inhaler, Rfl: 11     fluticasone-salmeterol (ADVAIR HFA) 115-21 MCG/ACT inhaler, Inhale 2 puffs into the lungs  2 times daily, Disp: 1 Inhaler, Rfl: 11     folic acid (FOLVITE) 1 MG tablet, Take 1 tablet (1,000 mcg) by mouth daily, Disp: 90 tablet, Rfl: 3     furosemide (LASIX) 20 MG tablet, Take 1 tablet (20 mg) by mouth daily, Disp: 90 tablet, Rfl: 1     gabapentin (NEURONTIN) 300 MG capsule, TAKE ONE CAPSULE BY MOUTH EVERY MORNING AND TAKE ONE CAPSULE BY MOUTH EVERY AFTERNOON AND TAKE TWO CAPSULES BY MOUTH EVERY NIGHT AT BEDTIME, Disp: 120 capsule, Rfl: 1     hydrALAZINE (APRESOLINE) 25 MG tablet, Take 1 tablet (25 mg) by mouth 3 times daily, Disp: 270 tablet, Rfl: 1     hydrOXYzine (ATARAX) 25 MG tablet, TAKE ONE TABLET BY MOUTH TWICE A DAY AS NEEDED FOR ANXIETY OR PAIN, Disp: 60 tablet, Rfl: 1     ibuprofen (ADVIL/MOTRIN) 800 MG tablet, TAKE ONE TABLET BY MOUTH THREE TIMES A DAY WITH MEALS FOR 14 DAYS, Disp: 42 tablet, Rfl: 0     ipratropium - albuterol 0.5 mg/2.5 mg/3 mL (DUONEB) 0.5-2.5 (3) MG/3ML neb solution, INHALE ONE VIAL BY NEBULIZATION FOUR TIMES DAILY AS NEEDED FOR WHEEZING, Disp: 360 mL, Rfl: 1     labetalol (NORMODYNE) 100 MG tablet, Take 1 tablet (100 mg) by mouth 2 times daily, Disp: 180 tablet, Rfl: 0     losartan (COZAAR) 100 MG tablet, Take 1 tablet (100 mg) by mouth daily, Disp: 90 tablet, Rfl: 1     magnesium oxide (MAG-OX) 400 MG tablet, Take 1 tablet (400 mg) by mouth daily, Disp: 90 tablet, Rfl: 1     melatonin 5 MG tablet, Take 1-2 tablets (5-10 mg) by mouth nightly as needed for sleep, Disp: 180 tablet, Rfl: 1     multivitamin w/minerals (THERA-VIT-M) tablet, Take 1 tablet by mouth daily, Disp: 30 each, Rfl: 1     naltrexone (DEPADE/REVIA) 50 MG tablet, Take 1 tablet (50 mg) by mouth At Bedtime, Disp: 90 tablet, Rfl: 1     nicotine (NICOTROL) 10 MG inhaler, INHALE 6 TO  16 CARTRIDGES INTO THE LUNGS DAILY AS NEEDED FOR SMOKING CESSATION, Disp: 168 each, Rfl: 2     omeprazole (PRILOSEC) 20 MG DR capsule, TAKE ONE CAPSULE BY MOUTH TWICE A DAY BEFORE MEALS, Disp: 180 capsule, Rfl: 0     order for DME,  Equipment being ordered: TENS, Disp: 1 Device, Rfl: 0     potassium chloride ER (KLOR-CON M) 10 MEQ CR tablet, Take 1 tablet (10 mEq) by mouth daily, Disp: 90 tablet, Rfl: 1     rOPINIRole (REQUIP) 1 MG tablet, Take 1 tablet (1 mg) by mouth 3 times daily, Disp: 90 tablet, Rfl: 1     thiamine (B-1) 100 MG tablet, Take 1 tablet (100 mg) by mouth daily, Disp: 90 tablet, Rfl: 1     triamcinolone (KENALOG) 0.1 % paste, Apply a thin layer to the left tongue sores twice daily for 14 days., Disp: 5 g, Rfl: 1     vitamin B complex with vitamin C (STRESS TAB) tablet, Take 1 tablet by mouth daily, Disp: 120 each, Rfl: 3     Vitamin D, Cholecalciferol, 25 MCG (1000 UT) TABS, Take 2 tablets (2,000 Units) by mouth daily, Disp: 180 tablet, Rfl: 1    Allergies -   Allergies   Allergen Reactions     Chicken-Derived Products (Egg) GI Disturbance     Reglan [Metoclopramide Hcl] Other (See Comments)     Body tenses up     Doxycycline Rash       Social History -   Social History     Socioeconomic History     Marital status:      Spouse name: Not on file     Number of children: 1     Years of education: 13     Highest education level: Not on file   Occupational History     Employer: [employer]   Social Needs     Financial resource strain: Not on file     Food insecurity     Worry: Not on file     Inability: Not on file     Transportation needs     Medical: Not on file     Non-medical: Not on file   Tobacco Use     Smoking status: Current Some Day Smoker     Packs/day: 0.25     Years: 34.00     Pack years: 8.50     Types: Cigarettes     Start date: 1979     Last attempt to quit: 10/3/2012     Years since quittin.3     Smokeless tobacco: Never Used     Tobacco comment: 5 cigarettes daily   Substance and Sexual Activity     Alcohol use: No     Comment: 1 pint of vodka per day, last drink aug 2018     Drug use: No     Sexual activity: Not Currently   Lifestyle     Physical activity     Days per week: Not on file      Minutes per session: Not on file     Stress: Not on file   Relationships     Social connections     Talks on phone: Not on file     Gets together: Not on file     Attends Denominational service: Not on file     Active member of club or organization: Not on file     Attends meetings of clubs or organizations: Not on file     Relationship status: Not on file     Intimate partner violence     Fear of current or ex partner: Not on file     Emotionally abused: Not on file     Physically abused: Not on file     Forced sexual activity: Not on file   Other Topics Concern     Parent/sibling w/ CABG, MI or angioplasty before 65F 55M? No   Social History Narrative    Lives in [place] with her son in a trailer no access to guns or weapons works for the "[employer] and enjoys crocheting and watching television.       Family History -   Family History   Problem Relation Age of Onset     Depression/Anxiety Mother      Asthma Mother      Cerebrovascular Disease Father      Hypertension Father      Lung Cancer Father      Alcoholism Father      Breast Cancer Paternal Aunt      Other Cancer Other      Depression Other      Anxiety Disorder Other      Mental Illness Other      Substance Abuse Other      Asthma Other      Obesity Sister      Obesity Son      Suicide Paternal Uncle      Physical Exam  General - The patient is in no distress. Alert and oriented x3, answers questions and cooperates with examination appropriately.   Voice and Breathing - The patient was breathing comfortably without the use of accessory muscles. There was no wheezing, stridor, or stertor.  The patients voice was clear and strong.  Eyes - Extraocular movements intact. Sclera were not icteric or injected, conjunctiva were pink and moist.  Neurologic - Cranial nerves II-XII are grossly intact. Specifically, the facial nerve is intact, House-Brackmann grade 1 of 6.   Mouth - Examination of the oral cavity showed two, lesions located on the left lateral  tongue, seperated by about 2 cm. Posterior one is ulceration with adjacent leukoplakia, 6 mm. Anterior one is smaller, 4 mm, maybe small ulceration. They are tender, some adjacent mucosal thickening with central ulcer. Right lateral tongue has a line with irritation with palpation. Lots of dryness. The tongue was mobile and protrudes midline.   Oropharynx - The walls of the oropharynx were smooth, symmetric, and had no lesions or ulcerations. The uvula was midline and the palate raised symmetrically.     Procedure - biopsy left lateral tongue sores. I injected 2% lidocaine, 1:100,000 epinephrine into the left lateral tongue at two sites. I took a cups biopsy and took biopsies of the posterior lesion (posterior left lateral tongue) first, placed it in formalin (#1), I took two biopsies of this. Next, I biopsied the anterior left lateral tongue lesion and labeled this as #2. Hemostasis was achieved with silver nitrate.     A/P - Inga Ledesma is a 55 year old female with two lesions on the left lateral tongue. It is likely trauma from biting and xerostomia, but could be lichen planus or leukoplakia. She is a smoker and this doesn't help. She couldn't purchase Orabase or biotene. I recommend dexamethasone mouth rinse and medrol dose pack. I'll call with results and next steps.       Ortiz Garibay MD  Otolaryngology  [facility]

## 2021-02-03 NOTE — NURSING NOTE
Inga to follow up with Primary Care provider regarding elevated blood pressure.    WILLIAM Bertrand

## 2021-02-04 ENCOUNTER — OFFICE VISIT (OUTPATIENT)
Dept: FAMILY MEDICINE | Facility: CLINIC | Age: 56
End: 2021-02-04
Payer: COMMERCIAL

## 2021-02-04 ENCOUNTER — TELEPHONE (OUTPATIENT)
Dept: FAMILY MEDICINE | Facility: CLINIC | Age: 56
End: 2021-02-04

## 2021-02-04 VITALS
SYSTOLIC BLOOD PRESSURE: 120 MMHG | WEIGHT: 199.13 LBS | RESPIRATION RATE: 20 BRPM | DIASTOLIC BLOOD PRESSURE: 64 MMHG | HEART RATE: 80 BPM | HEIGHT: 61 IN | BODY MASS INDEX: 37.59 KG/M2 | TEMPERATURE: 98.3 F

## 2021-02-04 DIAGNOSIS — I89.0 LYMPHEDEMA OF RIGHT LOWER EXTREMITY: ICD-10-CM

## 2021-02-04 DIAGNOSIS — K14.0 GLOSSITIS: Primary | ICD-10-CM

## 2021-02-04 DIAGNOSIS — M54.2 CERVICALGIA: ICD-10-CM

## 2021-02-04 DIAGNOSIS — I10 ESSENTIAL HYPERTENSION WITH GOAL BLOOD PRESSURE LESS THAN 140/90: Primary | ICD-10-CM

## 2021-02-04 PROCEDURE — 99213 OFFICE O/P EST LOW 20 MIN: CPT | Performed by: PHYSICIAN ASSISTANT

## 2021-02-04 RX ORDER — HYDRALAZINE HYDROCHLORIDE 10 MG/1
10 TABLET, FILM COATED ORAL 3 TIMES DAILY
Qty: 90 TABLET | Refills: 1 | Status: SHIPPED | OUTPATIENT
Start: 2021-02-04 | End: 2021-03-18

## 2021-02-04 RX ORDER — DIPHENHYDRAMINE HYDROCHLORIDE AND LIDOCAINE HYDROCHLORIDE AND ALUMINUM HYDROXIDE AND MAGNESIUM HYDRO
5-10 KIT EVERY 6 HOURS PRN
Qty: 237 ML | Refills: 0 | Status: SHIPPED | OUTPATIENT
Start: 2021-02-04 | End: 2021-09-23

## 2021-02-04 ASSESSMENT — MIFFLIN-ST. JEOR: SCORE: 1440.61

## 2021-02-04 NOTE — TELEPHONE ENCOUNTER
I do not know of any other options. Food avoidance (coffee, acid, salt,chocolate) ?    Iona CASTELLANOS RN Specialty Triage 2/4/2021 1:32 PM

## 2021-02-04 NOTE — TELEPHONE ENCOUNTER
Cold call taken, she was seen yesterday by Dr. Garibay, says she is in a treatment center so she can't be on a narcotic.   She had a couple biopsies of her tongue done, is very painful, could not sleep last night, tylenol not helping, cannot use ibuprofen due to also on diclofenac.      Says tramadol is also considered a narcotic.   The swish and spit product has not been delivered yet.    She would like something for pain that is not a narcotic.    Pharmacy selected, routed to Dr. Garibay to address.      Patient was seen by PCP today for unrelated issue, says [facility] did not look at her tongue.    Belen Pringle RN  [facility]

## 2021-02-04 NOTE — TELEPHONE ENCOUNTER
"1. Pt calling to check on status of message below for something for pain that is non-narcotic.     2. Pt states she is also calling because she was prescribed a Medrol Dose Pack, and states she wants to take the pills all at one time per day.   Pt requests provider to send new sig in writing faxed to [phone].   She states the staff administering meds where she lives will not deviate from the sig on the Medrol Dose Pack order, but states provider told her she could take them \"all at once.\"    Nadiya Bedoya, DILIPN, RN    "

## 2021-02-04 NOTE — NURSING NOTE
.apso   4 months  Right foot, ankle and calf  Warm to the touch, painful  No redness or bruising. No drainage.  No fevers.

## 2021-02-04 NOTE — PROGRESS NOTES
"      Subjective     Inga is a 55 year old who presents to clinic today for the following health issues    HPI       - Swelling of right foot, ankle and calf x4 months. Area is warm to the touch and painful. No fevers. No redness, bruising, drainage, numbness or tingling. No fevers.  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  Swelling of right lower ext since November   Recent US ruled out a dvt.  Denies chest pain/sob/palps.   Hydralazine was added in about the time her leg swelling evolved.   Review of Systems   Constitutional, HEENT, cardiovascular, pulmonary, GI, , musculoskeletal, neuro, skin, endocrine and psych systems are negative, except as otherwise noted.      Objective    /64   Pulse 80   Temp 98.3  F (36.8  C) (Tympanic)   Resp 20   Ht 1.549 m (5' 1\")   Wt 90.3 kg (199 lb 2 oz)   LMP 06/02/2010   BMI 37.62 kg/m    Body mass index is 37.62 kg/m .  Physical Exam           Brawny right leg edema. Mild pitting left leg edema as well.  CHEST:chest clear to IPPA, no tachypnea, retractions or cyanosis and S1, S2 normal, no murmur, no gallop, rate regular.  Eye exam - right eye normal lid, conjunctiva, cornea, pupil and fundus, left eye normal lid, conjunctiva, cornea, pupil and fundus.  Thyroid not palpable, not enlarged, no nodules detected.    Inga was seen today for edema.    Diagnoses and all orders for this visit:    Essential hypertension with goal blood pressure less than 140/90  -     hydrALAZINE (APRESOLINE) 10 MG tablet; Take 1 tablet (10 mg) by mouth 3 times daily    Lymphedema of right lower extremity  -     LYMPHEDEMA THERAPY REFERRAL; Future    Cervicalgia  -     Tens Unit/Supplies Order for DME - ONLY FOR DME      work on lifestyle modification  Advised supportive and symptomatic treatment.  Follow up with Provider - if condition persists or worsens.       "

## 2021-02-05 LAB — COPATH REPORT: NORMAL

## 2021-02-05 NOTE — TELEPHONE ENCOUNTER
Non-narcotic pain medication she can take is tylenol and/or ibuprofen. I also prescribed magic mouthwash. The medrol dose pack can be taken throughout the day and it is just as effective as taking all at once in a day.

## 2021-02-08 NOTE — TELEPHONE ENCOUNTER
This patient's biopsies were both benign. No cancer. She lives in a rehab facility and is sometimes hard to contact. Can you try to leave your call back with the , number is in her chart, and then give her the result? She's going to be hard to contact probably. She can use the dexamethasone mouthwash for her pain. She needs to maintain good hydration. She also needs to stop smoking.     Thanks,     Michael

## 2021-02-08 NOTE — TELEPHONE ENCOUNTER
Called and left message with staff at housing facility to have pt call back.    Iona CASTELLANOS RN Specialty Triage 2/8/2021 2:12 PM

## 2021-02-09 ENCOUNTER — TELEPHONE (OUTPATIENT)
Dept: OTOLARYNGOLOGY | Facility: CLINIC | Age: 56
End: 2021-02-09

## 2021-02-09 NOTE — TELEPHONE ENCOUNTER
Prior Authorization Specialty Medication Request    Medication/Dose: First Mouthwash BLM Susp Kid    Previously Tried and Failed:dexamethasone and medrol dose pack.    Insurance Name:DENISEDAPHNIE MA   Insurance ID: [id number]   Insurance Phone Number: [phone]     Pharmacy Information (if different than what is on RX)  Name:  [facility]  Phone:  [phone]

## 2021-02-10 ENCOUNTER — TELEPHONE (OUTPATIENT)
Dept: FAMILY MEDICINE | Facility: CLINIC | Age: 56
End: 2021-02-10

## 2021-02-10 DIAGNOSIS — M79.671 PAIN IN BOTH FEET: ICD-10-CM

## 2021-02-10 DIAGNOSIS — M79.672 PAIN IN BOTH FEET: ICD-10-CM

## 2021-02-10 NOTE — TELEPHONE ENCOUNTER
Per  Home Medical, patient will need a dedicated telephone visit with [facility] to justify the need for a TENS unit. A phone visit is acceptable.   Message left at [facility] for pt to schedule.

## 2021-02-12 NOTE — TELEPHONE ENCOUNTER
2nd attempt to call pt. Left  with staff for pt to call back.    Iona CASTELLANOS RN Specialty Triage 2/12/2021 3:07 PM

## 2021-02-12 NOTE — TELEPHONE ENCOUNTER
Routing refill request to provider for review/approval because:         NSAID Medications Ppsqza70/10/2021 03:39 PM  x Normal CBC on file in past 12 months Protocol Details   x Normal serum creatinine on file in past 12 months

## 2021-02-13 ENCOUNTER — NURSE TRIAGE (OUTPATIENT)
Dept: NURSING | Facility: CLINIC | Age: 56
End: 2021-02-13

## 2021-02-13 RX ORDER — DICLOFENAC SODIUM 75 MG/1
TABLET, DELAYED RELEASE ORAL
Qty: 62 TABLET | Refills: 11 | Status: SHIPPED | OUTPATIENT
Start: 2021-02-13 | End: 2022-03-09

## 2021-02-13 NOTE — TELEPHONE ENCOUNTER
"[facility] medical pharmacy wants to confirm with PCP about the drug to drug interactions found with the diclofenac (VOLTAREN) 75 MG EC tablet.    Pharmacist wants to make sure physician is aware that Aspirin interacts with Diclofenac, Fluoxitine 80 mg daily also interacts with Diclofenac.     Pharmacist is also questioning the use of Aspirin and Ibuprofen along with Diclofenac.     Will route to clinic to review.     Krysten Ann RN/[facility] Nurse [facility]        Reason for Disposition    Caller has NON-URGENT medication question about med that PCP prescribed and triager unable to answer question    Additional Information    Negative: Drug overdose and nurse unable to answer question    Negative: Caller requesting information not related to medicine    Negative: Caller requesting a prescription for Strep throat and has a positive culture result    Negative: Rash while taking a medication or within 3 days of stopping it    Negative: Immunization reaction suspected    Negative: [1] Asthma AND [2] having symptoms of asthma (cough, wheezing, etc)    Negative: MORE THAN A DOUBLE DOSE of a prescription or over-the-counter (OTC) drug    Negative: [1] DOUBLE DOSE (an extra dose or lesser amount) of over-the-counter (OTC) drug AND [2] any symptoms (e.g., dizziness, nausea, pain, sleepiness)    Negative: [1] DOUBLE DOSE (an extra dose or lesser amount) of prescription drug AND [2] any symptoms (e.g., dizziness, nausea, pain, sleepiness)    Negative: Took another person's prescription drug    Negative: [1] DOUBLE DOSE (an extra dose or lesser amount) of prescription drug AND [2] NO symptoms (Exception: a double dose of antibiotics)    Negative: Diabetes drug error or overdose (e.g., insulin or extra dose)    Negative: [1] Request for URGENT new prescription or refill of \"essential\" medication (i.e., likelihood of harm to patient if not taken) AND [2] triager unable to fill per unit policy    Negative: [1] " Prescription not at pharmacy AND [2] was prescribed today by PCP    Negative: Pharmacy calling with prescription questions and triager unable to answer question    Negative: Caller has urgent medication question about med that PCP prescribed and triager unable to answer question    Protocols used: MEDICATION QUESTION CALL-A-AH

## 2021-02-15 DIAGNOSIS — G25.81 RLS (RESTLESS LEGS SYNDROME): ICD-10-CM

## 2021-02-16 ENCOUNTER — OFFICE VISIT (OUTPATIENT)
Dept: OTOLARYNGOLOGY | Facility: CLINIC | Age: 56
End: 2021-02-16
Payer: COMMERCIAL

## 2021-02-16 VITALS — DIASTOLIC BLOOD PRESSURE: 53 MMHG | SYSTOLIC BLOOD PRESSURE: 105 MMHG | HEART RATE: 78 BPM | OXYGEN SATURATION: 97 %

## 2021-02-16 DIAGNOSIS — K14.6 BURNING MOUTH SYNDROME: Primary | ICD-10-CM

## 2021-02-16 PROCEDURE — 99213 OFFICE O/P EST LOW 20 MIN: CPT | Performed by: OTOLARYNGOLOGY

## 2021-02-16 NOTE — PROGRESS NOTES
Chief Complaint - oral pain    History of Present Illness - Inga Ledesma is a 55 year old female returns with mouth pain. She has diffuse pain in the mouth for weeks. She tried an antibiotic for 15 days. She had some trouble swallowing, but that got better. + citrus or spicy intolerance. No bleeding in mouth. Some epistaxis, has dry nose. She has COPD. She is a mouth breather. Nose is plugged, a couple weeks. current smoker (3 cigs per day, but was 1 ppd). Also has chewed tobacco for 30 years. She has a little pain in jaw and neck. She doesn't feel sick. No lumps or swollen glands in the neck. I noted two small areas on the left lateral tongue, and biopsied both of these which showed marked squamous cell hyperplasia with reactive change in squamous epithelium and mild parakeratosis. I recommended biotene and orobase, but she couldn't afford it. She feels the entire tongue burns. Also has sore throat. Only new medication is diclofenic. She tried dexamethasone mouthwash and it only helps a little, like 30 minutes. She took a medrol dose pack and she feels it helped some.     Past Medical History -   Patient Active Problem List   Diagnosis     RLS (restless legs syndrome)     GERD (gastroesophageal reflux disease)     Iron deficiency anemia     Hyperlipidemia with target LDL less than 160     Adult BMI 30+     Sacroiliitis (H)     Tobacco abuse     Vitamin D deficiency     Menorrhagia     Vitamin D deficiency     Edema of both legs     Hypertension goal BP (blood pressure) < 140/90     Chronic bronchitis, unspecified chronic bronchitis type (H)     Health Care Home     Alcohol dependence with withdrawal with complication (H)     Major depressive disorder, recurrent episode, mild (H)     (HFpEF) heart failure with preserved ejection fraction (H)     Psychophysiological insomnia     Chemical dependency (H)     Alcohol withdrawal (H)     Alcohol abuse     LALA (acute kidney injury) (H)     Sepsis (H)     Alcohol  dependency (H)     COPD exacerbation (H)     Morbid obesity (H)       Current Medications -   Current Outpatient Medications:      acetaminophen (TYLENOL) 500 MG tablet, Take 1 tablet (500 mg) by mouth every 4 hours as needed for mild pain, Disp: 1 Bottle, Rfl: 3     albuterol (VENTOLIN HFA) 108 (90 Base) MCG/ACT inhaler, INHALE ONE TO TWO PUFFS BY MOUTH EVERY 4 HOURS AS NEEDED FOR SHORTNESS OF BREATH, DIFFICULTY BREATHING OR WHEEZING., Disp: 18 g, Rfl: 3     amitriptyline (ELAVIL) 25 MG tablet, Take 2 tablets (50 mg) by mouth At Bedtime, Disp: 180 tablet, Rfl: 1     aspirin 81 MG EC tablet, Take 1 tablet (81 mg) by mouth daily, Disp: 90 tablet, Rfl: 3     atorvastatin (LIPITOR) 10 MG tablet, Take 1 tablet (10 mg) by mouth At Bedtime, Disp: 90 tablet, Rfl: 3     calcium carbonate 500 mg, elemental, (OSCAL) 500 MG tablet, Take 1 tablet (500 mg) by mouth daily, Disp: 90 tablet, Rfl: 1     cetirizine (ZYRTEC) 10 MG tablet, Take 1 tablet (10 mg) by mouth daily, Disp: 90 tablet, Rfl: 1     dexamethasone AF (DECADRON) 0.1 MG/ML solution, Swish and spit 10 mLs (1 mg) in mouth 2 times daily, Disp: 240 mL, Rfl: 3     diclofenac (VOLTAREN) 75 MG EC tablet, TAKE 1 TABLET BY MOUTH TWICE DAILY *1 TOTAL FILL*, Disp: 62 tablet, Rfl: 11     EPINEPHrine (ANY BX GENERIC EQUIV) 0.3 MG/0.3ML injection 2-pack, Inject 0.3 mLs (0.3 mg) into the muscle as needed for anaphylaxis, Disp: 2 each, Rfl: 3     famotidine (PEPCID) 40 MG tablet, Take 1 tablet (40 mg) by mouth nightly as needed for heartburn, Disp: 60 tablet, Rfl: 1     FLUoxetine (PROZAC) 40 MG capsule, Take 1 capsule (40 mg) by mouth 2 times daily, Disp: 180 capsule, Rfl: 1     fluticasone (FLONASE) 50 MCG/ACT nasal spray, Spray 1 spray into both nostrils daily, Disp: 16 g, Rfl: 3     fluticasone-salmeterol (ADVAIR DISKUS) 500-50 MCG/DOSE inhaler, INHALE ONE PUFF BY MOUTH EVERY 12 HOURS, Disp: 1 Inhaler, Rfl: 11     fluticasone-salmeterol (ADVAIR HFA) 115-21 MCG/ACT inhaler,  Inhale 2 puffs into the lungs 2 times daily, Disp: 1 Inhaler, Rfl: 11     folic acid (FOLVITE) 1 MG tablet, Take 1 tablet (1,000 mcg) by mouth daily, Disp: 90 tablet, Rfl: 3     furosemide (LASIX) 20 MG tablet, Take 1 tablet (20 mg) by mouth daily, Disp: 90 tablet, Rfl: 1     gabapentin (NEURONTIN) 300 MG capsule, TAKE ONE CAPSULE BY MOUTH EVERY MORNING AND TAKE ONE CAPSULE BY MOUTH EVERY AFTERNOON AND TAKE TWO CAPSULES BY MOUTH EVERY NIGHT AT BEDTIME, Disp: 120 capsule, Rfl: 1     hydrALAZINE (APRESOLINE) 10 MG tablet, Take 1 tablet (10 mg) by mouth 3 times daily, Disp: 90 tablet, Rfl: 1     hydrOXYzine (ATARAX) 25 MG tablet, TAKE ONE TABLET BY MOUTH TWICE A DAY AS NEEDED FOR ANXIETY OR PAIN, Disp: 60 tablet, Rfl: 1     ibuprofen (ADVIL/MOTRIN) 800 MG tablet, TAKE ONE TABLET BY MOUTH THREE TIMES A DAY WITH MEALS FOR 14 DAYS, Disp: 42 tablet, Rfl: 0     ipratropium - albuterol 0.5 mg/2.5 mg/3 mL (DUONEB) 0.5-2.5 (3) MG/3ML neb solution, INHALE ONE VIAL BY NEBULIZATION FOUR TIMES DAILY AS NEEDED FOR WHEEZING, Disp: 360 mL, Rfl: 1     labetalol (NORMODYNE) 100 MG tablet, Take 1 tablet (100 mg) by mouth 2 times daily, Disp: 180 tablet, Rfl: 0     losartan (COZAAR) 100 MG tablet, Take 1 tablet (100 mg) by mouth daily, Disp: 90 tablet, Rfl: 1     magic mouthwash suspension, diphenhydrAMINE, lidocaine, aluminum-magnesium & simethicone, (FIRST-MOUTHWASH BLM) compounding kit, Swish and swallow 5-10 mLs in mouth every 6 hours as needed for mouth sores, Disp: 237 mL, Rfl: 0     magnesium oxide (MAG-OX) 400 MG tablet, Take 1 tablet (400 mg) by mouth daily, Disp: 90 tablet, Rfl: 1     melatonin 5 MG tablet, Take 1-2 tablets (5-10 mg) by mouth nightly as needed for sleep, Disp: 180 tablet, Rfl: 1     multivitamin w/minerals (THERA-VIT-M) tablet, Take 1 tablet by mouth daily, Disp: 30 each, Rfl: 1     naltrexone (DEPADE/REVIA) 50 MG tablet, Take 1 tablet (50 mg) by mouth At Bedtime (Patient not taking: Reported on 2/4/2021),  Disp: 90 tablet, Rfl: 1     nicotine (NICOTROL) 10 MG inhaler, INHALE 6 TO  16 CARTRIDGES INTO THE LUNGS DAILY AS NEEDED FOR SMOKING CESSATION (Patient not taking: Reported on 2/4/2021), Disp: 168 each, Rfl: 2     omeprazole (PRILOSEC) 20 MG DR capsule, TAKE ONE CAPSULE BY MOUTH TWICE A DAY BEFORE MEALS, Disp: 180 capsule, Rfl: 0     order for DME, Equipment being ordered: TENS, Disp: 1 Device, Rfl: 0     potassium chloride ER (KLOR-CON M) 10 MEQ CR tablet, Take 1 tablet (10 mEq) by mouth daily, Disp: 90 tablet, Rfl: 1     rOPINIRole (REQUIP) 1 MG tablet, Take 1 tablet (1 mg) by mouth 3 times daily, Disp: 90 tablet, Rfl: 1     thiamine (B-1) 100 MG tablet, Take 1 tablet (100 mg) by mouth daily (Patient not taking: Reported on 2/4/2021), Disp: 90 tablet, Rfl: 1     triamcinolone (KENALOG) 0.1 % paste, Apply a thin layer to the left tongue sores twice daily for 14 days., Disp: 5 g, Rfl: 1     vitamin B complex with vitamin C (STRESS TAB) tablet, Take 1 tablet by mouth daily (Patient not taking: Reported on 2/4/2021), Disp: 120 each, Rfl: 3     Vitamin D, Cholecalciferol, 25 MCG (1000 UT) TABS, Take 2 tablets (2,000 Units) by mouth daily, Disp: 180 tablet, Rfl: 1    Allergies -   Allergies   Allergen Reactions     Chicken-Derived Products (Egg) GI Disturbance     Reglan [Metoclopramide Hcl] Other (See Comments)     Body tenses up     Doxycycline Rash       Social History -   Social History     Socioeconomic History     Marital status:      Spouse name: Not on file     Number of children: 1     Years of education: 13     Highest education level: Not on file   Occupational History     Employer: [employer]   Social Needs     Financial resource strain: Not on file     Food insecurity     Worry: Not on file     Inability: Not on file     Transportation needs     Medical: Not on file     Non-medical: Not on file   Tobacco Use     Smoking status: Current Some Day Smoker     Packs/day: 0.25     Years: 34.00      Pack years: 8.50     Types: Cigarettes     Start date: 1979     Last attempt to quit: 10/3/2012     Years since quittin.3     Smokeless tobacco: Never Used     Tobacco comment: 5 cigarettes daily   Substance and Sexual Activity     Alcohol use: No     Comment: 1 pint of vodka per day, last drink aug 2018     Drug use: No     Sexual activity: Not Currently   Lifestyle     Physical activity     Days per week: Not on file     Minutes per session: Not on file     Stress: Not on file   Relationships     Social connections     Talks on phone: Not on file     Gets together: Not on file     Attends Pentecostalism service: Not on file     Active member of club or organization: Not on file     Attends meetings of clubs or organizations: Not on file     Relationship status: Not on file     Intimate partner violence     Fear of current or ex partner: Not on file     Emotionally abused: Not on file     Physically abused: Not on file     Forced sexual activity: Not on file   Other Topics Concern     Parent/sibling w/ CABG, MI or angioplasty before 65F 55M? No   Social History Narrative    Lives in [place] with her son in a trailer no access to guns or weapons works for the [employer] and enjoys crocheting and watching television.       Family History -   Family History   Problem Relation Age of Onset     Depression/Anxiety Mother      Asthma Mother      Cerebrovascular Disease Father      Hypertension Father      Lung Cancer Father      Alcoholism Father      Breast Cancer Paternal Aunt      Other Cancer Other      Depression Other      Anxiety Disorder Other      Mental Illness Other      Substance Abuse Other      Asthma Other      Obesity Sister      Obesity Son      Suicide Paternal Uncle      Physical Exam  /53   Pulse 78   LMP 2010   SpO2 97%   General - The patient is in no distress. Alert and oriented x3, answers questions and cooperates with examination appropriately.   Voice and Breathing  - The patient was breathing comfortably without the use of accessory muscles. There was no wheezing, stridor, or stertor.  The patients voice was clear and strong.  Eyes - Extraocular movements intact. Sclera were not icteric or injected, conjunctiva were pink and moist.  Neurologic - Cranial nerves II-XII are grossly intact. Specifically, the facial nerve is intact, House-Brackmann grade 1 of 6.   Mouth - Examination of the oral cavity showed two, healing wounds where I biopsed located on the left lateral tongue, seperated by about 2 cm. They are tender. Lots of dryness. The tongue was mobile and protrudes midline.   Oropharynx - The walls of the oropharynx were smooth, symmetric, and had no lesions or ulcerations. The uvula was midline and the palate raised symmetrically.       A/P - Inga Ledemsa is a 55 year old female with two lesions on the left lateral tongue. I biopsied these and they were hyperkeratosis, no dysplasia, likely trauma from biting and xerostomia. She has diffuse oral pain despite relatively normal exam aside from those 2 lesions I biopsied.  Oral dexamethasone rinses help but for short term.  Medrol Dosepak seem to help a little bit.  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  We discussed smoking cessation, stopping chewing tobacco, visit with a dentist that she has gingivitis, increase oral hydration, and try probiotics.  I do not think there is any long-term medications that will help this and explained that to her.  I did recommend Biotene but she could not afford that or get it paid for.  We discussed long-term steroids or not an option.  Return as needed.      Ortiz Garibay MD  Otolaryngology  [facility]

## 2021-02-16 NOTE — LETTER
2/16/2021         RE: Inga Ledesma  [address] [facility] [address]        Dear Colleague,    Thank you for referring your patient, Inga Ledesma, to the [facility]. Please see a copy of my visit note below.    Chief Complaint - oral pain    History of Present Illness - Inga Ledesma is a 55 year old female returns with mouth pain. She has diffuse pain in the mouth for weeks. She tried an antibiotic for 15 days. She had some trouble swallowing, but that got better. + citrus or spicy intolerance. No bleeding in mouth. Some epistaxis, has dry nose. She has COPD. She is a mouth breather. Nose is plugged, a couple weeks. current smoker (3 cigs per day, but was 1 ppd). Also has chewed tobacco for 30 years. She has a little pain in jaw and neck. She doesn't feel sick. No lumps or swollen glands in the neck. I noted two small areas on the left lateral tongue, and biopsied both of these which showed marked squamous cell hyperplasia with reactive change in squamous epithelium and mild parakeratosis. I recommended biotene and orobase, but she couldn't afford it. She feels the entire tongue burns. Also has sore throat. Only new medication is diclofenic. She tried dexamethasone mouthwash and it only helps a little, like 30 minutes. She took a medrol dose pack and she feels it helped some.     Past Medical History -   Patient Active Problem List   Diagnosis     RLS (restless legs syndrome)     GERD (gastroesophageal reflux disease)     Iron deficiency anemia     Hyperlipidemia with target LDL less than 160     Adult BMI 30+     Sacroiliitis (H)     Tobacco abuse     Vitamin D deficiency     Menorrhagia     Vitamin D deficiency     Edema of both legs     Hypertension goal BP (blood pressure) < 140/90     Chronic bronchitis, unspecified chronic bronchitis type (H)     Health Care Home     Alcohol dependence with withdrawal with complication (H)     Major depressive disorder, recurrent  episode, mild (H)     (HFpEF) heart failure with preserved ejection fraction (H)     Psychophysiological insomnia     Chemical dependency (H)     Alcohol withdrawal (H)     Alcohol abuse     LALA (acute kidney injury) (H)     Sepsis (H)     Alcohol dependency (H)     COPD exacerbation (H)     Morbid obesity (H)       Current Medications -   Current Outpatient Medications:      acetaminophen (TYLENOL) 500 MG tablet, Take 1 tablet (500 mg) by mouth every 4 hours as needed for mild pain, Disp: 1 Bottle, Rfl: 3     albuterol (VENTOLIN HFA) 108 (90 Base) MCG/ACT inhaler, INHALE ONE TO TWO PUFFS BY MOUTH EVERY 4 HOURS AS NEEDED FOR SHORTNESS OF BREATH, DIFFICULTY BREATHING OR WHEEZING., Disp: 18 g, Rfl: 3     amitriptyline (ELAVIL) 25 MG tablet, Take 2 tablets (50 mg) by mouth At Bedtime, Disp: 180 tablet, Rfl: 1     aspirin 81 MG EC tablet, Take 1 tablet (81 mg) by mouth daily, Disp: 90 tablet, Rfl: 3     atorvastatin (LIPITOR) 10 MG tablet, Take 1 tablet (10 mg) by mouth At Bedtime, Disp: 90 tablet, Rfl: 3     calcium carbonate 500 mg, elemental, (OSCAL) 500 MG tablet, Take 1 tablet (500 mg) by mouth daily, Disp: 90 tablet, Rfl: 1     cetirizine (ZYRTEC) 10 MG tablet, Take 1 tablet (10 mg) by mouth daily, Disp: 90 tablet, Rfl: 1     dexamethasone AF (DECADRON) 0.1 MG/ML solution, Swish and spit 10 mLs (1 mg) in mouth 2 times daily, Disp: 240 mL, Rfl: 3     diclofenac (VOLTAREN) 75 MG EC tablet, TAKE 1 TABLET BY MOUTH TWICE DAILY *1 TOTAL FILL*, Disp: 62 tablet, Rfl: 11     EPINEPHrine (ANY BX GENERIC EQUIV) 0.3 MG/0.3ML injection 2-pack, Inject 0.3 mLs (0.3 mg) into the muscle as needed for anaphylaxis, Disp: 2 each, Rfl: 3     famotidine (PEPCID) 40 MG tablet, Take 1 tablet (40 mg) by mouth nightly as needed for heartburn, Disp: 60 tablet, Rfl: 1     FLUoxetine (PROZAC) 40 MG capsule, Take 1 capsule (40 mg) by mouth 2 times daily, Disp: 180 capsule, Rfl: 1     fluticasone (FLONASE) 50 MCG/ACT nasal spray, Spray 1 spray  into both nostrils daily, Disp: 16 g, Rfl: 3     fluticasone-salmeterol (ADVAIR DISKUS) 500-50 MCG/DOSE inhaler, INHALE ONE PUFF BY MOUTH EVERY 12 HOURS, Disp: 1 Inhaler, Rfl: 11     fluticasone-salmeterol (ADVAIR HFA) 115-21 MCG/ACT inhaler, Inhale 2 puffs into the lungs 2 times daily, Disp: 1 Inhaler, Rfl: 11     folic acid (FOLVITE) 1 MG tablet, Take 1 tablet (1,000 mcg) by mouth daily, Disp: 90 tablet, Rfl: 3     furosemide (LASIX) 20 MG tablet, Take 1 tablet (20 mg) by mouth daily, Disp: 90 tablet, Rfl: 1     gabapentin (NEURONTIN) 300 MG capsule, TAKE ONE CAPSULE BY MOUTH EVERY MORNING AND TAKE ONE CAPSULE BY MOUTH EVERY AFTERNOON AND TAKE TWO CAPSULES BY MOUTH EVERY NIGHT AT BEDTIME, Disp: 120 capsule, Rfl: 1     hydrALAZINE (APRESOLINE) 10 MG tablet, Take 1 tablet (10 mg) by mouth 3 times daily, Disp: 90 tablet, Rfl: 1     hydrOXYzine (ATARAX) 25 MG tablet, TAKE ONE TABLET BY MOUTH TWICE A DAY AS NEEDED FOR ANXIETY OR PAIN, Disp: 60 tablet, Rfl: 1     ibuprofen (ADVIL/MOTRIN) 800 MG tablet, TAKE ONE TABLET BY MOUTH THREE TIMES A DAY WITH MEALS FOR 14 DAYS, Disp: 42 tablet, Rfl: 0     ipratropium - albuterol 0.5 mg/2.5 mg/3 mL (DUONEB) 0.5-2.5 (3) MG/3ML neb solution, INHALE ONE VIAL BY NEBULIZATION FOUR TIMES DAILY AS NEEDED FOR WHEEZING, Disp: 360 mL, Rfl: 1     labetalol (NORMODYNE) 100 MG tablet, Take 1 tablet (100 mg) by mouth 2 times daily, Disp: 180 tablet, Rfl: 0     losartan (COZAAR) 100 MG tablet, Take 1 tablet (100 mg) by mouth daily, Disp: 90 tablet, Rfl: 1     magic mouthwash suspension, diphenhydrAMINE, lidocaine, aluminum-magnesium & simethicone, (FIRST-MOUTHWASH BLM) compounding kit, Swish and swallow 5-10 mLs in mouth every 6 hours as needed for mouth sores, Disp: 237 mL, Rfl: 0     magnesium oxide (MAG-OX) 400 MG tablet, Take 1 tablet (400 mg) by mouth daily, Disp: 90 tablet, Rfl: 1     melatonin 5 MG tablet, Take 1-2 tablets (5-10 mg) by mouth nightly as needed for sleep, Disp: 180 tablet,  Rfl: 1     multivitamin w/minerals (THERA-VIT-M) tablet, Take 1 tablet by mouth daily, Disp: 30 each, Rfl: 1     naltrexone (DEPADE/REVIA) 50 MG tablet, Take 1 tablet (50 mg) by mouth At Bedtime (Patient not taking: Reported on 2/4/2021), Disp: 90 tablet, Rfl: 1     nicotine (NICOTROL) 10 MG inhaler, INHALE 6 TO  16 CARTRIDGES INTO THE LUNGS DAILY AS NEEDED FOR SMOKING CESSATION (Patient not taking: Reported on 2/4/2021), Disp: 168 each, Rfl: 2     omeprazole (PRILOSEC) 20 MG DR capsule, TAKE ONE CAPSULE BY MOUTH TWICE A DAY BEFORE MEALS, Disp: 180 capsule, Rfl: 0     order for DME, Equipment being ordered: TENS, Disp: 1 Device, Rfl: 0     potassium chloride ER (KLOR-CON M) 10 MEQ CR tablet, Take 1 tablet (10 mEq) by mouth daily, Disp: 90 tablet, Rfl: 1     rOPINIRole (REQUIP) 1 MG tablet, Take 1 tablet (1 mg) by mouth 3 times daily, Disp: 90 tablet, Rfl: 1     thiamine (B-1) 100 MG tablet, Take 1 tablet (100 mg) by mouth daily (Patient not taking: Reported on 2/4/2021), Disp: 90 tablet, Rfl: 1     triamcinolone (KENALOG) 0.1 % paste, Apply a thin layer to the left tongue sores twice daily for 14 days., Disp: 5 g, Rfl: 1     vitamin B complex with vitamin C (STRESS TAB) tablet, Take 1 tablet by mouth daily (Patient not taking: Reported on 2/4/2021), Disp: 120 each, Rfl: 3     Vitamin D, Cholecalciferol, 25 MCG (1000 UT) TABS, Take 2 tablets (2,000 Units) by mouth daily, Disp: 180 tablet, Rfl: 1    Allergies -   Allergies   Allergen Reactions     Chicken-Derived Products (Egg) GI Disturbance     Reglan [Metoclopramide Hcl] Other (See Comments)     Body tenses up     Doxycycline Rash       Social History -   Social History     Socioeconomic History     Marital status:      Spouse name: Not on file     Number of children: 1     Years of education: 13     Highest education level: Not on file   Occupational History     Employer: [employer]     Financial resource strain: Not on file     Food  insecurity     Worry: Not on file     Inability: Not on file     Transportation needs     Medical: Not on file     Non-medical: Not on file   Tobacco Use     Smoking status: Current Some Day Smoker     Packs/day: 0.25     Years: 34.00     Pack years: 8.50     Types: Cigarettes     Start date: 1979     Last attempt to quit: 10/3/2012     Years since quittin.3     Smokeless tobacco: Never Used     Tobacco comment: 5 cigarettes daily   Substance and Sexual Activity     Alcohol use: No     Comment: 1 pint of vodka per day, last drink aug 2018     Drug use: No     Sexual activity: Not Currently   Lifestyle     Physical activity     Days per week: Not on file     Minutes per session: Not on file     Stress: Not on file   Relationships     Social connections     Talks on phone: Not on file     Gets together: Not on file     Attends Jehovah's witness service: Not on file     Active member of club or organization: Not on file     Attends meetings of clubs or organizations: Not on file     Relationship status: Not on file     Intimate partner violence     Fear of current or ex partner: Not on file     Emotionally abused: Not on file     Physically abused: Not on file     Forced sexual activity: Not on file   Other Topics Concern     Parent/sibling w/ CABG, MI or angioplasty before 65F 55M? No   Social History Narrative    Lives in [place] with her son in a trailer no access to guns or weapons works for the [employer] and enjoys crocheting and watching television.       Family History -   Family History   Problem Relation Age of Onset     Depression/Anxiety Mother      Asthma Mother      Cerebrovascular Disease Father      Hypertension Father      Lung Cancer Father      Alcoholism Father      Breast Cancer Paternal Aunt      Other Cancer Other      Depression Other      Anxiety Disorder Other      Mental Illness Other      Substance Abuse Other      Asthma Other      Obesity Sister      Obesity Son      Suicide  Paternal Uncle      Physical Exam  /53   Pulse 78   LMP 06/02/2010   SpO2 97%   General - The patient is in no distress. Alert and oriented x3, answers questions and cooperates with examination appropriately.   Voice and Breathing - The patient was breathing comfortably without the use of accessory muscles. There was no wheezing, stridor, or stertor.  The patients voice was clear and strong.  Eyes - Extraocular movements intact. Sclera were not icteric or injected, conjunctiva were pink and moist.  Neurologic - Cranial nerves II-XII are grossly intact. Specifically, the facial nerve is intact, House-Brackmann grade 1 of 6.   Mouth - Examination of the oral cavity showed two, healing wounds where I biopsed located on the left lateral tongue, seperated by about 2 cm. They are tender. Lots of dryness. The tongue was mobile and protrudes midline.   Oropharynx - The walls of the oropharynx were smooth, symmetric, and had no lesions or ulcerations. The uvula was midline and the palate raised symmetrically.       A/P - Inga Ledesma is a 55 year old female with two lesions on the left lateral tongue. I biopsied these and they were hyperkeratosis, no dysplasia, likely trauma from biting and xerostomia. She has diffuse oral pain despite relatively normal exam aside from those 2 lesions I biopsied.  Oral dexamethasone rinses help but for short term.  Medrol Dosepak seem to help a little bit.  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  We discussed smoking cessation, stopping chewing tobacco, visit with a dentist that she has gingivitis, increase oral hydration, and try probiotics.  I do not think there is any long-term medications that will help this and explained that to her.  I did recommend Biotene but she could not afford that or get it paid for.  We discussed long-term steroids or not an option.  Return as needed.      Ortiz Garibay,  MD  Otolaryngology  [facility]        Again, thank you for allowing me to participate in the care of your patient.        Sincerely,        Ortiz Garibay MD

## 2021-02-16 NOTE — TELEPHONE ENCOUNTER
Spoke with pharmacist Jan, gave below information, he voiced understanding and agreement.  Mira Wright RN  [facility]

## 2021-02-16 NOTE — TELEPHONE ENCOUNTER
Spoke with pharmacist Conchita and informed her of med instructions per Min Toledo PA-C, see below read both messages to Conchita.  Conchita read back instructions correctly but did ask that we double check on the aspirin because it is a low dose 81 mg and pharmacist generally does not see issues with low dose.  Please advise.

## 2021-02-16 NOTE — TELEPHONE ENCOUNTER
Patient was advised to stop taking ibuprofen when I started her on diclofenac. I wasn't aware she was taking aspirin. While taking diclofenac I want her to stop taking aspirin as well.

## 2021-02-17 ENCOUNTER — OFFICE VISIT (OUTPATIENT)
Dept: FAMILY MEDICINE | Facility: CLINIC | Age: 56
End: 2021-02-17
Payer: COMMERCIAL

## 2021-02-17 VITALS
RESPIRATION RATE: 20 BRPM | TEMPERATURE: 98.2 F | BODY MASS INDEX: 36.39 KG/M2 | DIASTOLIC BLOOD PRESSURE: 79 MMHG | OXYGEN SATURATION: 97 % | SYSTOLIC BLOOD PRESSURE: 134 MMHG | WEIGHT: 192.6 LBS | HEART RATE: 65 BPM

## 2021-02-17 DIAGNOSIS — M54.2 CERVICALGIA: ICD-10-CM

## 2021-02-17 DIAGNOSIS — M54.50 CHRONIC BILATERAL LOW BACK PAIN WITHOUT SCIATICA: Primary | ICD-10-CM

## 2021-02-17 DIAGNOSIS — G89.29 CHRONIC BILATERAL LOW BACK PAIN WITHOUT SCIATICA: Primary | ICD-10-CM

## 2021-02-17 PROCEDURE — 99213 OFFICE O/P EST LOW 20 MIN: CPT | Performed by: PHYSICIAN ASSISTANT

## 2021-02-17 RX ORDER — ROPINIROLE 1 MG/1
TABLET, FILM COATED ORAL
Qty: 84 TABLET | Refills: 3 | Status: SHIPPED | OUTPATIENT
Start: 2021-02-17 | End: 2021-06-08

## 2021-02-17 NOTE — PROGRESS NOTES
Subjective   Inga is a 55 year old who presents for the following health issues    HPI       Orders  Needs an order for Tens unit    Chronic low back pain . No sciatica.  Tens unit helps a lot and is well tolerated.     Review of Systems   Constitutional, HEENT, cardiovascular, pulmonary, GI, , musculoskeletal, neuro, skin, endocrine and psych systems are negative, except as otherwise noted.      Objective    LMP 06/02/2010   There is no height or weight on file to calculate BMI.  Physical Exam     BACK: Lumbosacral spine area reveals local tenderness.  Painful and reduced LS ROM noted. Straight leg raise is negative  DTR's, motor strength and sensation normal, including heel and toe gait.  Perifpheral pulses are palpable.  Hipes and knees have full range of motion without pain.  No abdominal tenderness, mass or organomegaly.    Inga was seen today for orders.    Diagnoses and all orders for this visit:    Chronic bilateral low back pain without sciatica  -     Tens Unit/Supplies Order for DME - ONLY FOR DME    Cervicalgia  -     Tens Unit/Supplies Order for DME - ONLY FOR DME      Advised supportive and symptomatic treatment.  Follow up with Provider - if condition persists or worsens.

## 2021-02-18 DIAGNOSIS — F10.230 ALCOHOL DEPENDENCE WITH UNCOMPLICATED WITHDRAWAL (H): ICD-10-CM

## 2021-02-18 RX ORDER — ASPIRIN 81 MG
TABLET, DELAYED RELEASE (ENTERIC COATED) ORAL
Qty: 28 TABLET | Refills: 5 | Status: SHIPPED | OUTPATIENT
Start: 2021-02-18 | End: 2021-09-23

## 2021-02-18 NOTE — TELEPHONE ENCOUNTER
Called home and left message with staff Sarina letting her know we've reached out to update pt, but have not heard back. Sarina stated that she thought Inga had gotten her results. I let her know if that was the case she doesn't need to call back. But if she would like writer gave direct dial back number for today and tomorrow.    Iona CASTELLANOS RN Specialty Triage 2/18/2021 11:45 AM

## 2021-02-19 ENCOUNTER — TELEPHONE (OUTPATIENT)
Dept: FAMILY MEDICINE | Facility: CLINIC | Age: 56
End: 2021-02-19

## 2021-02-19 NOTE — TELEPHONE ENCOUNTER
"Prescription approved per [facility] Refill Protocol.  Requested Prescriptions   Pending Prescriptions Disp Refills     multivitamin, therapeutic with minerals (THERA-VIT-M) TABS tablet [Pharmacy Med Name: Thera-M Tablet] 28 tablet 5     Sig: TAKE 1 TABLET BY MOUTH ONCE DAILY       Vitamin Supplements (Adult) Protocol Passed - 2/18/2021  9:01 AM        Passed - High dose Vitamin D not ordered        Passed - Recent (12 mo) or future (30 days) visit within the authorizing provider's specialty     Patient has had an office visit with the authorizing provider or a provider within the authorizing providers department within the previous 12 mos or has a future within next 30 days. See \"Patient Info\" tab in inbasket, or \"Choose Columns\" in Meds & Orders section of the refill encounter.              Passed - Medication is active on med list             "

## 2021-02-23 ENCOUNTER — HOSPITAL ENCOUNTER (OUTPATIENT)
Dept: PHYSICAL THERAPY | Facility: CLINIC | Age: 56
Setting detail: THERAPIES SERIES
End: 2021-02-23
Attending: PHYSICIAN ASSISTANT
Payer: COMMERCIAL

## 2021-02-23 DIAGNOSIS — R60.0 EDEMA OF BOTH LEGS: Primary | ICD-10-CM

## 2021-02-23 DIAGNOSIS — I89.0 LYMPHEDEMA OF RIGHT LOWER EXTREMITY: ICD-10-CM

## 2021-02-23 PROCEDURE — 97140 MANUAL THERAPY 1/> REGIONS: CPT | Mod: GP | Performed by: PHYSICAL THERAPIST

## 2021-02-23 PROCEDURE — 97162 PT EVAL MOD COMPLEX 30 MIN: CPT | Mod: GP | Performed by: PHYSICAL THERAPIST

## 2021-02-23 PROCEDURE — 97535 SELF CARE MNGMENT TRAINING: CPT | Mod: GP | Performed by: PHYSICAL THERAPIST

## 2021-02-23 PROCEDURE — 97110 THERAPEUTIC EXERCISES: CPT | Mod: GP | Performed by: PHYSICAL THERAPIST

## 2021-02-23 NOTE — PROGRESS NOTES
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  She has not been using those much and lost the stocking but things were pretty stable until hospitalization for alcohol intoxication in November 2020. She reports a lot of swelling and edema treatment while in the hospital. It has gotten a lot better but she needs new compression garments.   Surgical / medical history reviewed Yes   Prior level of functional mobility Independent, using walker for distances   Prior treatment Complete decongestive therapy;Diuretics   Community support Other  (Pt in [facility] for recovery)   Patient role / employment history Unemployed   Living environment Group home   Current assistive devices 4 wheeled walker   Fall Risk Screen   Fall screen completed by PT   Have you fallen 2 or more times in the past year? Yes   Have you fallen and had an injury in the past year? Yes   Timed Up and Go score (seconds) 9.23   Is patient a fall risk? Yes   Fall screen comments Using walker, sober since hospitlization in 11/2020   System Outcome Measures   Outcome Measures Lymphedema   Lymphedema Life Impact Scale (score range 0-72). A higher score indicates greater  "impairment. 4   Subjective Report   Patient report of symptoms Pt reports that swelling is almost back to \"normal\" with medication changes last time that she saw PA.    Patient / Family Goals   Patient / family goals statement Get new compression for the R LE because it can get painful sometimes   Pain   Patient currently in pain No   Cognitive Status   Orientation Orientation to person, place and time   Level of consciousness Alert   Follows commands and answers questions 100% of the time   Personal safety and judgement Intact   Memory Intact   Edema Exam / Assessment   Skin condition Pitting;Dryness;Intact   Skin condition comments R LE skin is tight but no skin changes noted   Pitting 2+;3+   Pitting location 2+ L pre-tib, 3+ R pre-tib   Scar No   Stemmer sign Positive   Stemmer sign comments B 2nd toe   Ulceration No   Girth Measurements   Girth Measurements Refer to separate girth measurement flowsheet   Volume LE   Right LE (mL) 5739.46   Left LE (mL) 5221.99   LE volume comparison RLE volume greater than LLE volume   % difference 9.9   Range of Motion   ROM No deficits were identified   Strength   Strength No deficits were identified   Posture   Posture Normal   Activities of Daily Living   Activities of Daily Living independent   Bed Mobility   Bed mobility independent   Transfers   Transfers independent   Gait / Locomotion   Gait / Locomotion uses walker for long distance due to COPD   Sensory   Sensory perception comments no deficits   Coordination   Coordination Gross motor coordination appropriate   Muscle Tone   Muscle tone No deficits were identified   Planned Edema Interventions   Planned edema interventions Manual lymph drainage;Fit for compression garment;Gradient compression bandaging;Exercises;Precautions to prevent infection / exacerbation;Education;Manual therapy;ADL training;Home management program development   Clinical Impression   Criteria for skilled therapeutic intervention met Yes "   Therapy diagnosis lymphedema B LE   Influenced by the following impairments / conditions Stage 2   Functional limitations due to impairments / conditions pain, risk of infection, worsening, skin tightness, fit of clothing   Clinical Presentation Evolving/Changing   Clinical Presentation Rationale recently much worse after being well managed for many years, pt is motivated   Clinical Decision Making (Complexity) Moderate complexity   Treatment Frequency 3x/week  (for 5-6weeks, recheck at 2 weeks and 6weeks after garments)   Treatment duration 90 days   Patient / family and/or staff in agreement with plan of care Yes   Risks and benefits of therapy have been explained Yes   Clinical impression comments Pt will benefit from intensive treatment due to almost 10% difference in limb volume.  If she is unable to coordinate schedule with programming for recovery, she could get garments now and then do her intensive treatment and get new garments at a later date. To avoid infection, chronic skin changes or worsening, recommendation would be for intensive sooner than later.    Education Assessment   Preferred learning style Listening;Reading;Demonstration;Pictures / video   Barriers to learning   (possibly scheduling conflicts)   Goals   Edema Eval Goals 1;2;3   Goal 1   Goal identifier Home Program   Goal description Pt will be independent with edema home program to better manage swelling and prevent complication such as infection, worsening edema or fibrosis which would impact comfort and mobility.   Target date 04/23/21   Goal 2   Goal identifier Volume   Goal description Pt will have 6% decreased R LE volume nd 3% decreased L LE volume with intensive treatment to prepare for garment fitting.   Target date 04/23/21   Goal 3   Goal identifier Compression   Goal description Pt will be independent with use of compression to prevent exacerbation of lymphedema for better fit of clothing   Target date 05/23/21   Total  Evaluation Time   PT Eval, Moderate Complexity Minutes (25585) 10   Certification   Certification date from 02/23/21   Certification date to 05/23/21   Medical Diagnosis Lymphedema

## 2021-02-23 NOTE — PROGRESS NOTES
[facility]        OUTPATIENT PHYSICAL THERAPY EDEMA EVALUATION  PLAN OF TREATMENT FOR OUTPATIENT REHABILITATION  (COMPLETE FOR INITIAL CLAIMS ONLY)  Patient's Last Name, First Name, Inga Medellin                           Provider s Name:   [facility] Medical Record No.  [id number]     Start of Care Date:  02/23/21   Onset Date:  02/01/07   Type:  PT   Medical Diagnosis:  Lymphedema   Therapy Diagnosis:  lymphedema B LE Visits from SOC:  1                                     __________________________________________________________________________________   Plan of Treatment/Functional Goals:    Manual lymph drainage, Fit for compression garment, Gradient compression bandaging, Exercises, Precautions to prevent infection / exacerbation, Education, Manual therapy, ADL training, Home management program development        GOALS  1. Goal description: Pt will be independent with edema home program to better manage swelling and prevent complication such as infection, worsening edema or fibrosis which would impact comfort and mobility.       Target date: 04/23/21  2. Goal description: Pt will have 6% decreased R LE volume nd 3% decreased L LE volume with intensive treatment to prepare for garment fitting.       Target date: 04/23/21  3. Goal description: Pt will be independent with use of compression to prevent exacerbation of lymphedema for better fit of clothing       Target date: 05/23/21      Treatment Frequency: 3x/week(for 5-6weeks, recheck at 2 weeks and 6weeks after garments)   Treatment duration: 90 days    Nasrin Mercado, PT                                    I CERTIFY THE NEED FOR THESE SERVICES FURNISHED UNDER        THIS PLAN OF TREATMENT AND WHILE UNDER MY CARE     (Physician co-signature of this document indicates review and certification of the  therapy plan).                   Certification date from: 02/23/21       Certification date to: 05/23/21           Referring physician: Min Toledo PA-C   Initial Assessment  See Epic Evaluation- Start of care: 02/23/21

## 2021-03-04 ENCOUNTER — TELEPHONE (OUTPATIENT)
Dept: FAMILY MEDICINE | Facility: CLINIC | Age: 56
End: 2021-03-04

## 2021-03-04 NOTE — TELEPHONE ENCOUNTER
Last ibuprofen in Epic was historical.   What pharmacy does she want to use?  [facility] and [facility] pharmacy are currently both in her record.    She last saw Min Toledo 2/17/21 for back pain.   DME for TENS unit was put in.    Due back 8/17/21 for BP check.    Need to triage the wheezing.  I see albuterol inhalers were sent to [facility] 1/8/21, did she call them for refills?   Is she using the Advair every day (Rx sent 1/12/21 to [facility])?      When did hot flashes start?       I called number listed, this is a facility of some sort.    Staff went to find patient but could not locate her so they asked for call back number, they will tell patient to call us at [phone].      Need to address:  Wheezing, did she call for inhalers?    ?pharmacy?    Hot flashes, is this new?    Needs PT referral to get TENS set up?    Belen Pringle RN  [facility]

## 2021-03-04 NOTE — TELEPHONE ENCOUNTER
Patient left a message on [employer] voice mail with multiple requests:    She would like a refill of ibuprofen (ADVIL/MOTRIN) 800 MG tablet  She is having trouble with hot flashes and wheezing and would like something for it  DME request for a home blood pressure machine  PT order for neck & back pain so that she can qualify for a TENS unit    A message may need to be left at treatment house if patient needs to be contacted.

## 2021-03-05 ENCOUNTER — TELEPHONE (OUTPATIENT)
Dept: FAMILY MEDICINE | Facility: CLINIC | Age: 56
End: 2021-03-05

## 2021-03-05 DIAGNOSIS — M54.2 CERVICALGIA: Primary | ICD-10-CM

## 2021-03-05 RX ORDER — IBUPROFEN 600 MG/1
600 TABLET, FILM COATED ORAL EVERY 6 HOURS PRN
Qty: 100 TABLET | Refills: 0 | Status: CANCELLED | OUTPATIENT
Start: 2021-03-05

## 2021-03-05 NOTE — TELEPHONE ENCOUNTER
Patient was in clinic and I spoke to her in person regarding multiple issues, see other encounter.  I did not notice this additional encounter and patient did not say anything about ear drops.    She did indicate is having some cold symptoms and tongue is sore (perhaps due to advair inhaler?).       She has over 500 meds on past med list, I am unable to find ear drops listed.    I cannot find her in the clinic, she was going to call for a cab, I don't see her in front of building either.    Will need to call.    Belen Pringle, JOBY  [facility]

## 2021-03-05 NOTE — TELEPHONE ENCOUNTER
Left message with staff for patient to call clinic.   [phone]  Mira Wright RN  [facility]

## 2021-03-05 NOTE — TELEPHONE ENCOUNTER
Patient came in to clinic to follow up on this.   She is talking easily, says she has been wheezing a little for the last couple days, using rescue inhaler three times daily.    She says she has used prednisone in the past, perhaps could have Rx sent to [facility] to use if she gets worse?   I don't hear any wheezing when talking to her.  She is not using her advair BID, sometimes skipping a day.  Advised her to use that as directed, rinse mouth afterwards (she was wondering if she could have strep swab done as her tongue hurts a bit, likely due to advair inhaler use).       She needs referral to PT to evaluate the low back and neck pain in order to get insurance to cover the TENS unit.   It is very expensive.    Regarding ibuprofen, she says pharmacy does not have 800 mg, she wants 600 mg ibuprofen, toe pain/arthritis.   Errol'd that up.    She had hysterectomy 2010, still has ovaries so was told she could have hot flashes/menopause.    Advised her she should schedule office visit for her multiple issues.   She says she is too busy with her therapy visits; agrees to phone visit on 3/11.    Routed to Min Toledo to address ibuprofen request and PT referral for eval for TENS unit.    Belen Pringle RN  [facility]

## 2021-03-05 NOTE — TELEPHONE ENCOUNTER
Routing refill request to provider for review/approval because:        NSAID Medications [id number]/05/2021 11:43 AM  x Normal CBC on file in past 12 months Protocol Details   x Normal serum creatinine on file in past 12 months          Last Comprehensive Metabolic Panel:  Sodium   Date Value Ref Range Status   01/06/2021 132 (L) 133 - 144 mmol/L Final     Potassium   Date Value Ref Range Status   01/06/2021 4.7 3.4 - 5.3 mmol/L Final     Chloride   Date Value Ref Range Status   01/06/2021 100 94 - 109 mmol/L Final     Carbon Dioxide   Date Value Ref Range Status   01/06/2021 24 20 - 32 mmol/L Final     Anion Gap   Date Value Ref Range Status   01/06/2021 8 3 - 14 mmol/L Final     Glucose   Date Value Ref Range Status   01/06/2021 90 70 - 99 mg/dL Final     Urea Nitrogen   Date Value Ref Range Status   01/06/2021 28 7 - 30 mg/dL Final     Creatinine   Date Value Ref Range Status   01/06/2021 1.09 (H) 0.52 - 1.04 mg/dL Final     GFR Estimate   Date Value Ref Range Status   01/06/2021 57 (L) >60 mL/min/[1.73_m2] Final     Comment:     Non  GFR Calc  Starting 12/18/2018, serum creatinine based estimated GFR (eGFR) will be   calculated using the Chronic Kidney Disease Epidemiology Collaboration   (CKD-EPI) equation.       Calcium   Date Value Ref Range Status   01/06/2021 9.1 8.5 - 10.1 mg/dL Final     Bilirubin Total   Date Value Ref Range Status   01/06/2021 0.2 0.2 - 1.3 mg/dL Final     Alkaline Phosphatase   Date Value Ref Range Status   01/06/2021 86 40 - 150 U/L Final     ALT   Date Value Ref Range Status   01/06/2021 23 0 - 50 U/L Final     AST   Date Value Ref Range Status   01/06/2021 18 0 - 45 U/L Final

## 2021-03-05 NOTE — TELEPHONE ENCOUNTER
Is requesting ear drops be filled along with ibuprofen    Call patient with questions or updates [phone]

## 2021-03-05 NOTE — TELEPHONE ENCOUNTER
" found the patient still at clinic.  I spoke to her, she says she has used triamcinolone ear drops for bilateral itchy painful ears in the past.    Denies bleeding or draining from ears.    She says Min told her to \"just call if I need anything\".    Routed to Min Toledo to address.    Belen Pringle RN  [facility]      "

## 2021-03-06 RX ORDER — IBUPROFEN 600 MG/1
TABLET, FILM COATED ORAL
Qty: 93 TABLET | Refills: 11 | Status: SHIPPED | OUTPATIENT
Start: 2021-03-06 | End: 2021-04-16

## 2021-03-08 NOTE — TELEPHONE ENCOUNTER
I called number listed in Epic, staff member from treatment center answered and went to find Inga.    I called and spoke to patient, she says her wheezing is worse over the weekend, she started a leftover prednisone dose pack, also resumed the advair as prescribed.   Says she is not much improved but not wheezing now.    She got the 600 mg ibuprofen Rx sent on Saturday 3/6.        Advised her to monitor respiratory symptoms, to ER if acutely worse, hard to talk, feeling weak or faint.    Offered to schedule a phone visit sooner but she declined, will wait until 3/11.      Belen Pringle RN  [facility]

## 2021-03-10 ENCOUNTER — HOSPITAL ENCOUNTER (OUTPATIENT)
Dept: PHYSICAL THERAPY | Facility: CLINIC | Age: 56
Setting detail: THERAPIES SERIES
End: 2021-03-10
Attending: PHYSICIAN ASSISTANT
Payer: COMMERCIAL

## 2021-03-10 PROCEDURE — 97140 MANUAL THERAPY 1/> REGIONS: CPT | Mod: GP

## 2021-03-10 PROCEDURE — 97110 THERAPEUTIC EXERCISES: CPT | Mod: GP

## 2021-03-11 ENCOUNTER — VIRTUAL VISIT (OUTPATIENT)
Dept: FAMILY MEDICINE | Facility: CLINIC | Age: 56
End: 2021-03-11
Payer: COMMERCIAL

## 2021-03-11 DIAGNOSIS — G89.29 CHRONIC BILATERAL LOW BACK PAIN WITHOUT SCIATICA: ICD-10-CM

## 2021-03-11 DIAGNOSIS — I10 HYPERTENSION GOAL BP (BLOOD PRESSURE) < 140/90: ICD-10-CM

## 2021-03-11 DIAGNOSIS — M54.50 CHRONIC BILATERAL LOW BACK PAIN WITHOUT SCIATICA: ICD-10-CM

## 2021-03-11 DIAGNOSIS — F51.04 PSYCHOPHYSIOLOGICAL INSOMNIA: Primary | ICD-10-CM

## 2021-03-11 DIAGNOSIS — N90.7 LABIAL CYST: ICD-10-CM

## 2021-03-11 DIAGNOSIS — N76.2 CELLULITIS OF LABIA: ICD-10-CM

## 2021-03-11 PROCEDURE — 99214 OFFICE O/P EST MOD 30 MIN: CPT | Mod: 95 | Performed by: PHYSICIAN ASSISTANT

## 2021-03-11 RX ORDER — QUETIAPINE FUMARATE 100 MG/1
100 TABLET, FILM COATED ORAL
Qty: 60 TABLET | Refills: 1 | Status: SHIPPED | OUTPATIENT
Start: 2021-03-11 | End: 2021-04-02

## 2021-03-11 RX ORDER — CEPHALEXIN 500 MG/1
500 CAPSULE ORAL 4 TIMES DAILY
Qty: 40 CAPSULE | Refills: 0 | Status: SHIPPED | OUTPATIENT
Start: 2021-03-11 | End: 2021-03-21

## 2021-03-11 NOTE — PROGRESS NOTES
Inga is a 55 year old who is being evaluated via a billable telephone visit.      What phone number would you like to be contacted at? [phone]  How would you like to obtain your AVS? Mail a copy  Or fax [phone]        Subjective   Inga is a 55 year old who presents for the following health issues     HPI     Would like to speak with provider about the following concerns   Wants something for different for sleep  Wants something for Hot flashes  Referral to physical re: her chronic low back pain. No radicular symptoms.   History of left labial tissue cyst/abscess. Now noting swelling and tenderness of entire labia after popping cyst.  No fever.      Review of Systems   Constitutional, HEENT, cardiovascular, pulmonary, GI, , musculoskeletal, neuro, skin, endocrine and psych systems are negative, except as otherwise noted.      Objective           Vitals:  No vitals were obtained today due to virtual visit.    Physical Exam   healthy, alert and no distress  PSYCH: Alert and oriented times 3; coherent speech, normal   rate and volume, able to articulate logical thoughts, able   to abstract reason, no tangential thoughts, no hallucinations   or delusions  Her affect is normal  RESP: No cough, no audible wheezing, able to talk in full sentences  Remainder of exam unable to be completed due to telephone visits    Inga was seen today for referral, recheck medication and menopausal sx.    Diagnoses and all orders for this visit:    Psychophysiological insomnia  -     QUEtiapine (SEROQUEL) 100 MG tablet; Take 1 tablet (100 mg) by mouth nightly as needed    Chronic bilateral low back pain without sciatica  -     RACQUEL PT AND HAND REFERRAL; Future  -     Miscellaneous Order for DME - ONLY FOR DME    Labial cyst  -     cephALEXin (KEFLEX) 500 MG capsule; Take 1 capsule (500 mg) by mouth 4 times daily for 10 days    Cellulitis of labia  -     cephALEXin (KEFLEX) 500 MG capsule; Take 1 capsule (500 mg) by  mouth 4 times daily for 10 days    Hypertension goal BP (blood pressure) < 140/90  -     Home Blood Pressure Monitor Order for DME - ONLY FOR DME      Advised supportive and symptomatic treatment.  Follow up with Provider - if condition persists or worsens.   work on lifestyle modification          Phone call duration: 19 minutes

## 2021-03-15 DIAGNOSIS — I10 ESSENTIAL HYPERTENSION WITH GOAL BLOOD PRESSURE LESS THAN 140/90: ICD-10-CM

## 2021-03-15 DIAGNOSIS — F33.0 MAJOR DEPRESSIVE DISORDER, RECURRENT EPISODE, MILD (H): ICD-10-CM

## 2021-03-15 DIAGNOSIS — K29.21 ALCOHOLIC GASTRITIS WITH HEMORRHAGE, UNSPECIFIED CHRONICITY: ICD-10-CM

## 2021-03-15 DIAGNOSIS — M46.1 SACROILIITIS (H): ICD-10-CM

## 2021-03-15 DIAGNOSIS — I10 HYPERTENSION GOAL BP (BLOOD PRESSURE) < 140/90: ICD-10-CM

## 2021-03-16 ENCOUNTER — THERAPY VISIT (OUTPATIENT)
Dept: PHYSICAL THERAPY | Facility: CLINIC | Age: 56
End: 2021-03-16
Payer: COMMERCIAL

## 2021-03-16 DIAGNOSIS — M54.50 CHRONIC BILATERAL LOW BACK PAIN WITHOUT SCIATICA: ICD-10-CM

## 2021-03-16 DIAGNOSIS — G89.29 CHRONIC BILATERAL LOW BACK PAIN WITHOUT SCIATICA: ICD-10-CM

## 2021-03-16 PROCEDURE — 97161 PT EVAL LOW COMPLEX 20 MIN: CPT | Mod: GP | Performed by: PHYSICAL THERAPIST

## 2021-03-16 PROCEDURE — 97110 THERAPEUTIC EXERCISES: CPT | Mod: GP | Performed by: PHYSICAL THERAPIST

## 2021-03-17 ENCOUNTER — HOSPITAL ENCOUNTER (OUTPATIENT)
Dept: PHYSICAL THERAPY | Facility: CLINIC | Age: 56
Setting detail: THERAPIES SERIES
End: 2021-03-17
Attending: PHYSICIAN ASSISTANT
Payer: COMMERCIAL

## 2021-03-17 DIAGNOSIS — I89.0 LYMPHEDEMA OF BOTH LOWER EXTREMITIES: Primary | ICD-10-CM

## 2021-03-17 DIAGNOSIS — R60.0 EDEMA OF BOTH LEGS: ICD-10-CM

## 2021-03-17 PROBLEM — M54.50 CHRONIC BILATERAL LOW BACK PAIN WITHOUT SCIATICA: Status: ACTIVE | Noted: 2021-03-17

## 2021-03-17 PROBLEM — G89.29 CHRONIC BILATERAL LOW BACK PAIN WITHOUT SCIATICA: Status: ACTIVE | Noted: 2021-03-17

## 2021-03-17 PROCEDURE — 97140 MANUAL THERAPY 1/> REGIONS: CPT | Mod: GP

## 2021-03-17 PROCEDURE — 97110 THERAPEUTIC EXERCISES: CPT | Mod: GP

## 2021-03-17 NOTE — PROGRESS NOTES
Physical Therapy Initial Evaluation  Subjective:    Therapist Generated HPI Evaluation  Problem details: Pt presents to PT with a chief complaint of chronic LBP with reported onset >20 years ago (MD visit 02/2021). Pt reports severe bilateral LBP worse with prolonged standing and walking. Pt limited to ~5 minutes of standing due to pain and ambulating with a 4WW. Pt interested in getting a TENS unit to help with her LBP as this as been helpful in her past. .         Type of problem:  Lumbar.    This is a chronic condition.  Condition occurred with:  Degenerative joint disease.  Where condition occurred: for unknown reasons.  Patient reports pain:  Lumbar spine right, lumbar spine left and lower lumbar spine.  Pain is described as aching and is intermittent.  Pain radiates to:  No radiation. Pain is worse in the P.M..  Since onset symptoms are gradually worsening.  Associated symptoms:  Loss of motion/stiffness and loss of strength. Symptoms are exacerbated by twisting, standing and lifting  and relieved by rest.  Special tests included:  X-ray.  Previous treatment includes physical therapy. There was none improvement following previous treatment.  Barriers include:  None as reported by patient.    Patient Health History         Pain is reported as 9/10 on pain scale.  General health as reported by patient is poor.  Pertinent medical history includes: asthma, cancer, chemical dependency, depression, emphysema, heart problems, mental illness, high blood pressure, migraines/headaches, seizures, smoking and overweight.   Red flags:  None as reported by patient.  Medical allergies: see chart.   Surgeries include:  None.    Current medications:  Anti-depressants, cardiac medication, high blood pressure medication and pain medication.    Occupation: none.                                       Objective:    Gait:  Dec stride length    Assistive Devices:  Walker                 Lumbar/SI Evaluation  ROM:    AROM Lumbar:    Flexion:          Min loss  Ext:                    Mod loss, pain +++   Side Bend:        Left:     Right:   Rotation:           Left:  Mod loss, pain ++    Right:  Mod loss, pain ++  Side Glide:        Left:     Right:           Lumbar Myotomes:  normal            Lumbar DTR's:  normal        Lumbar Dermtomes:  normal                Neural Tension/Mobility:  Lumbar:  Normal (increased LBP w/ slump B)        Lumbar Palpation:    Tenderness present at Left:    Erector Spinae and Piriformis  Tenderness present at Right: Erector Spinae and Piriformis      Spinal Segmental Conclusions:     Level: Hypo noted at L4, L5, L3 and L2                                                   General     ROS    Assessment/Plan:    Patient is a 55 year old female with lumbar complaints.    Patient has the following significant findings with corresponding treatment plan.                Diagnosis 1:  Chronic LBP  Pain -  electric stimulation, manual therapy, splint/taping/bracing/orthotics, self management, education and directional preference exercise  Decreased ROM/flexibility - manual therapy and therapeutic exercise  Decreased joint mobility - manual therapy and therapeutic exercise  Decreased strength - therapeutic exercise and therapeutic activities  Impaired muscle performance - neuro re-education  Impaired posture - neuro re-education    Therapy Evaluation Codes:     Cumulative Therapy Evaluation is: Low complexity.    Previous and current functional limitations:  (See Goal Flow Sheet for this information)    Short term and Long term goals: (See Goal Flow Sheet for this information)     Communication ability:  Patient appears to be able to clearly communicate and understand verbal and written communication and follow directions correctly.  Treatment Explanation - The following has been discussed with the patient:   RX ordered/plan of care  Anticipated outcomes  Possible risks and side effects  This patient would benefit from PT  intervention to resume normal activities.   Rehab potential is good.    Frequency:  1 X week, once daily  Duration:  for 6 weeks  Discharge Plan:  Achieve all LTG.  Independent in home treatment program.  Reach maximal therapeutic benefit.    Please refer to the daily flowsheet for treatment today, total treatment time and time spent performing 1:1 timed codes.

## 2021-03-18 RX ORDER — LABETALOL 100 MG/1
TABLET, FILM COATED ORAL
Qty: 62 TABLET | Refills: 5 | Status: SHIPPED | OUTPATIENT
Start: 2021-03-18 | End: 2021-09-10

## 2021-03-18 RX ORDER — HYDRALAZINE HYDROCHLORIDE 10 MG/1
TABLET, FILM COATED ORAL
Qty: 93 TABLET | Refills: 5 | Status: SHIPPED | OUTPATIENT
Start: 2021-03-18 | End: 2021-04-16

## 2021-03-18 NOTE — TELEPHONE ENCOUNTER
Omeprazole, labetalol, hydralazine:  Prescription approved per [facility] Refill Protocol.      Gabapentin:    Routing refill request to provider for review/approval because:  Drug not on the [facility] [place]     Belen Pringle RN  [facility]

## 2021-03-19 RX ORDER — GABAPENTIN 300 MG/1
CAPSULE ORAL
Qty: 124 CAPSULE | Refills: 5 | Status: SHIPPED | OUTPATIENT
Start: 2021-03-19 | End: 2021-04-16

## 2021-03-22 ENCOUNTER — HOSPITAL ENCOUNTER (OUTPATIENT)
Dept: PHYSICAL THERAPY | Facility: CLINIC | Age: 56
Setting detail: THERAPIES SERIES
End: 2021-03-22
Attending: PHYSICIAN ASSISTANT
Payer: COMMERCIAL

## 2021-03-22 PROCEDURE — 97140 MANUAL THERAPY 1/> REGIONS: CPT | Mod: GP

## 2021-03-23 ENCOUNTER — TELEPHONE (OUTPATIENT)
Dept: FAMILY MEDICINE | Facility: CLINIC | Age: 56
End: 2021-03-23

## 2021-03-23 ENCOUNTER — VIRTUAL VISIT (OUTPATIENT)
Dept: FAMILY MEDICINE | Facility: CLINIC | Age: 56
End: 2021-03-23
Payer: COMMERCIAL

## 2021-03-23 DIAGNOSIS — L26 EXFOLIATIVE DERMATITIS: ICD-10-CM

## 2021-03-23 DIAGNOSIS — T50.905A ADVERSE EFFECT OF DRUG, INITIAL ENCOUNTER: Primary | ICD-10-CM

## 2021-03-23 PROCEDURE — 99212 OFFICE O/P EST SF 10 MIN: CPT | Mod: 95 | Performed by: PHYSICIAN ASSISTANT

## 2021-03-23 NOTE — PROGRESS NOTES
Cody is a 55 year old who is being evaluated via a billable telephone visit.      What phone number would you like to be contacted at? [phone]  How would you like to obtain your AVS? Fax to [phone]        Subjective   Cody is a 55 year old who presents for the following health issues     HPI     Medication problem   Side effect on cephalexin:  Body started tightening up the day after starting cephalexin.   Some skin desquamation evolved after receiving iv antibiotic (suspect zosyn) when in the ED.   No breathing issues.       Other person concerns           Review of Systems   Constitutional, HEENT, cardiovascular, pulmonary, GI, , musculoskeletal, neuro, skin, endocrine and psych systems are negative, except as otherwise noted.      Objective           Vitals:  No vitals were obtained today due to virtual visit.    Physical Exam   healthy, alert and no distress  PSYCH: Alert and oriented times 3; coherent speech, normal   rate and volume, able to articulate logical thoughts, able   to abstract reason, no tangential thoughts, no hallucinations   or delusions  Her affect is normal  RESP: No cough, no audible wheezing, able to talk in full sentences  Remainder of exam unable to be completed due to telephone visits    Inga was seen today for medication problem.    Diagnoses and all orders for this visit:    Adverse effect of drug, initial encounter    Exfoliative dermatitis      work on lifestyle modification  Advised supportive and symptomatic treatment.  Follow up with Provider - if condition persists or worsens.   Expect this to resolve on it's own.         Phone call duration: 17 minutes

## 2021-03-24 ENCOUNTER — HOSPITAL ENCOUNTER (OUTPATIENT)
Dept: PHYSICAL THERAPY | Facility: CLINIC | Age: 56
Setting detail: THERAPIES SERIES
End: 2021-03-24
Attending: PHYSICIAN ASSISTANT
Payer: COMMERCIAL

## 2021-03-24 DIAGNOSIS — R60.0 EDEMA OF BOTH LEGS: ICD-10-CM

## 2021-03-24 DIAGNOSIS — I89.0 LYMPHEDEMA OF BOTH LOWER EXTREMITIES: Primary | ICD-10-CM

## 2021-03-24 PROCEDURE — 97140 MANUAL THERAPY 1/> REGIONS: CPT | Mod: GP

## 2021-03-26 ENCOUNTER — TELEPHONE (OUTPATIENT)
Dept: FAMILY MEDICINE | Facility: CLINIC | Age: 56
End: 2021-03-26

## 2021-03-26 DIAGNOSIS — F51.04 PSYCHOPHYSIOLOGICAL INSOMNIA: ICD-10-CM

## 2021-03-26 DIAGNOSIS — J44.1 COPD EXACERBATION (H): Primary | ICD-10-CM

## 2021-03-26 NOTE — TELEPHONE ENCOUNTER
"Patient left a message on VM, she would like Min Toledo to order some Prednisone for her as she has been \"wheezy\" again. She prefers to take the entire dose in the AM so it doesn't affect her sleep.  She would also like to possibly increase her dose of QUEtiapine (SEROQUEL) 100 MG tablet, it is no longer as effective as it used to be.   [facility] on file.   "

## 2021-03-28 ENCOUNTER — HEALTH MAINTENANCE LETTER (OUTPATIENT)
Age: 56
End: 2021-03-28

## 2021-03-29 ENCOUNTER — HOSPITAL ENCOUNTER (OUTPATIENT)
Dept: PHYSICAL THERAPY | Facility: CLINIC | Age: 56
Setting detail: THERAPIES SERIES
End: 2021-03-29
Attending: PHYSICIAN ASSISTANT
Payer: COMMERCIAL

## 2021-03-29 PROCEDURE — 97140 MANUAL THERAPY 1/> REGIONS: CPT | Mod: GP

## 2021-03-30 NOTE — PLAN OF CARE
Problem: Patient Care Overview  Goal: Plan of Care/Patient Progress Review  Outcome: No Change  Pt up independently with walker. Called placed to Dr. Vaz to get order for 1x dose of 50 mg of trazadone. No report of pain or discomfort. No report of anxiety overnight. Has been calm and cooperative. Cont to assess.        I certify as stated above.

## 2021-03-31 ENCOUNTER — HOSPITAL ENCOUNTER (OUTPATIENT)
Dept: PHYSICAL THERAPY | Facility: CLINIC | Age: 56
Setting detail: THERAPIES SERIES
End: 2021-03-31
Attending: PHYSICIAN ASSISTANT
Payer: COMMERCIAL

## 2021-03-31 PROCEDURE — 97140 MANUAL THERAPY 1/> REGIONS: CPT | Mod: GP

## 2021-04-02 RX ORDER — QUETIAPINE FUMARATE 100 MG/1
150 TABLET, FILM COATED ORAL
Qty: 90 TABLET | Refills: 1 | Status: SHIPPED | OUTPATIENT
Start: 2021-04-02 | End: 2021-06-09

## 2021-04-02 RX ORDER — PREDNISONE 20 MG/1
40 TABLET ORAL DAILY
Qty: 10 TABLET | Refills: 0 | Status: SHIPPED | OUTPATIENT
Start: 2021-04-02 | End: 2021-04-07

## 2021-04-02 NOTE — TELEPHONE ENCOUNTER
Prednisone rx routed to her pharmacy. Have he try bumping her seroquel dose to 1.5 tabs at bedtime.

## 2021-04-05 ENCOUNTER — HOSPITAL ENCOUNTER (OUTPATIENT)
Dept: PHYSICAL THERAPY | Facility: CLINIC | Age: 56
Setting detail: THERAPIES SERIES
End: 2021-04-05
Attending: PHYSICIAN ASSISTANT
Payer: COMMERCIAL

## 2021-04-05 PROCEDURE — 97140 MANUAL THERAPY 1/> REGIONS: CPT | Mod: GP

## 2021-04-07 ENCOUNTER — TRANSFERRED RECORDS (OUTPATIENT)
Dept: HEALTH INFORMATION MANAGEMENT | Facility: CLINIC | Age: 56
End: 2021-04-07

## 2021-04-07 ENCOUNTER — HOSPITAL ENCOUNTER (OUTPATIENT)
Dept: PHYSICAL THERAPY | Facility: CLINIC | Age: 56
Setting detail: THERAPIES SERIES
End: 2021-04-07
Attending: PHYSICIAN ASSISTANT
Payer: COMMERCIAL

## 2021-04-07 PROCEDURE — 97140 MANUAL THERAPY 1/> REGIONS: CPT | Mod: GP

## 2021-04-09 DIAGNOSIS — K21.9 GASTROESOPHAGEAL REFLUX DISEASE WITHOUT ESOPHAGITIS: ICD-10-CM

## 2021-04-12 ENCOUNTER — HOSPITAL ENCOUNTER (OUTPATIENT)
Dept: PHYSICAL THERAPY | Facility: CLINIC | Age: 56
Setting detail: THERAPIES SERIES
End: 2021-04-12
Attending: PHYSICIAN ASSISTANT
Payer: COMMERCIAL

## 2021-04-12 PROCEDURE — 97140 MANUAL THERAPY 1/> REGIONS: CPT | Mod: GP

## 2021-04-13 RX ORDER — FAMOTIDINE 40 MG/1
TABLET, FILM COATED ORAL
Qty: 30 TABLET | Refills: 3 | Status: SHIPPED | OUTPATIENT
Start: 2021-04-13 | End: 2021-08-16

## 2021-04-14 ENCOUNTER — HOSPITAL ENCOUNTER (OUTPATIENT)
Dept: PHYSICAL THERAPY | Facility: CLINIC | Age: 56
Setting detail: THERAPIES SERIES
End: 2021-04-14
Attending: PHYSICIAN ASSISTANT
Payer: COMMERCIAL

## 2021-04-14 PROCEDURE — 97140 MANUAL THERAPY 1/> REGIONS: CPT | Mod: GP

## 2021-04-14 PROCEDURE — 97110 THERAPEUTIC EXERCISES: CPT | Mod: GP

## 2021-04-14 NOTE — PROGRESS NOTES
Outpatient Physical Therapy Progress Note     Patient: Inga Ledesma  : 1965    Beginning/End Dates of Reporting Period:  2021 to 2021    Referring Provider: Min Toledo PA-C    Therapy Diagnosis: Lymphedema     Client Self Report: Has remained wrapped since last visit other than to shower or reapply. No new concerns, excited to get new garments next week. Pt went to ER last week for chest/ jaw pain discharged without new orders. Reports frustration with living situation due to circumstances at home with pt on restrictions in addition to positive COVID tests of staff members.     Objective Measurements:  Objective Measure: R Volume 10-60cm  Details: 5471.23(Slight increase, no GCB above knee)  Objective Measure: L Volume 10-60cm  Details: 5227.76(Slight increase no GCB above knee)      Outcome Measures (most recent score):  LLIS: 8     Goals:  Goal Identifier Home Program   Goal Description Pt will be independent with edema home program to better manage swelling and prevent complication such as infection, worsening edema or fibrosis which would impact comfort and mobility.   Target Date 21   Date Met  (In progress, awaiting garments (good GCB compliance))   Progress:     Goal Identifier Volume   Goal Description Pt will have 6% decreased R LE volume and 3% decreased L LE volume with intensive treatment to prepare for garment fitting.   Target Date 21   Date Met  21(Fit for garments, -12% RLE, -7.5% LLE)   Progress:     Goal Identifier Compression   Goal Description Pt will be independent with use of compression to prevent exacerbation of lymphedema for better fit of clothing   Target Date 21   Date Met  (Fit for garments 21, awaiting arrival (GCB compliance))   Progress:         Progress Toward Goals:   Progress this reporting period: Pt has demonstrated excellent progress throughout therapy course with all goals currently partially met. Pt has consistently utilized  GCB with good reduction noted bilaterally with use. She was fit for garments last week (custom flat knit knee high with marti) with scheduled arrival next week. Plan to continue current POC until garments arrive with transition to maintenance program once pt is in BLE compression.       Plan:  Continue therapy per current plan of care.    Discharge:  No

## 2021-04-16 ENCOUNTER — VIRTUAL VISIT (OUTPATIENT)
Dept: FAMILY MEDICINE | Facility: CLINIC | Age: 56
End: 2021-04-16
Payer: COMMERCIAL

## 2021-04-16 DIAGNOSIS — M46.1 SACROILIITIS (H): ICD-10-CM

## 2021-04-16 DIAGNOSIS — F33.0 MAJOR DEPRESSIVE DISORDER, RECURRENT EPISODE, MILD (H): ICD-10-CM

## 2021-04-16 DIAGNOSIS — I10 ESSENTIAL HYPERTENSION WITH GOAL BLOOD PRESSURE LESS THAN 140/90: ICD-10-CM

## 2021-04-16 DIAGNOSIS — J42 CHRONIC BRONCHITIS, UNSPECIFIED CHRONIC BRONCHITIS TYPE (H): ICD-10-CM

## 2021-04-16 PROCEDURE — 99214 OFFICE O/P EST MOD 30 MIN: CPT | Mod: 95 | Performed by: PHYSICIAN ASSISTANT

## 2021-04-16 RX ORDER — ARIPIPRAZOLE 5 MG/1
5 TABLET ORAL AT BEDTIME
Qty: 30 TABLET | Refills: 1 | Status: SHIPPED | OUTPATIENT
Start: 2021-04-16 | End: 2021-05-19

## 2021-04-16 RX ORDER — GABAPENTIN 300 MG/1
600 CAPSULE ORAL 3 TIMES DAILY
Qty: 540 CAPSULE | Refills: 1 | Status: SHIPPED | OUTPATIENT
Start: 2021-04-16 | End: 2021-10-08

## 2021-04-16 RX ORDER — HYDRALAZINE HYDROCHLORIDE 25 MG/1
25 TABLET, FILM COATED ORAL 3 TIMES DAILY
Qty: 270 TABLET | Refills: 1 | Status: SHIPPED | OUTPATIENT
Start: 2021-04-16 | End: 2021-10-08

## 2021-04-16 RX ORDER — IPRATROPIUM BROMIDE AND ALBUTEROL SULFATE 2.5; .5 MG/3ML; MG/3ML
SOLUTION RESPIRATORY (INHALATION)
Qty: 360 ML | Refills: 1 | Status: SHIPPED | OUTPATIENT
Start: 2021-04-16 | End: 2022-04-01

## 2021-04-16 RX ORDER — FLUOXETINE 40 MG/1
40 CAPSULE ORAL DAILY
Qty: 90 CAPSULE | Refills: 1 | Status: SHIPPED | OUTPATIENT
Start: 2021-04-16 | End: 2022-01-10

## 2021-04-16 NOTE — PROGRESS NOTES
Cody is a 55 year old who is being evaluated via a billable telephone visit.      What phone number would you like to be contacted at? [phone]  How would you like to obtain your AVS? Fax [phone]      Subjective   Cody is a 55 year old who presents for the following health issues     HPI     Patient wants to discuss current medications.   Feeling very angry and depressed.   Blood pressure still a bit elevated.    Review of Systems   Constitutional, HEENT, cardiovascular, pulmonary, GI, , musculoskeletal, neuro, skin, endocrine and psych systems are negative, except as otherwise noted.      Objective           Vitals:  No vitals were obtained today due to virtual visit.    Physical Exam   healthy, alert and no distress  PSYCH: Alert and oriented times 3; coherent speech, normal   rate and volume, able to articulate logical thoughts, able   to abstract reason, no tangential thoughts, no hallucinations   or delusions  Her affect is normal  RESP: No cough, no audible wheezing, able to talk in full sentences  Remainder of exam unable to be completed due to telephone visits    Inga was seen today for recheck.    Diagnoses and all orders for this visit:    Major depressive disorder, recurrent episode, mild (H)  -     FLUoxetine (PROZAC) 40 MG capsule; Take 1 capsule (40 mg) by mouth daily  -     ARIPiprazole (ABILIFY) 5 MG tablet; Take 1 tablet (5 mg) by mouth At Bedtime  -     gabapentin (NEURONTIN) 300 MG capsule; Take 2 capsules (600 mg) by mouth 3 times daily    Sacroiliitis (H)  -     gabapentin (NEURONTIN) 300 MG capsule; Take 2 capsules (600 mg) by mouth 3 times daily    Essential hypertension with goal blood pressure less than 140/90  -     hydrALAZINE (APRESOLINE) 25 MG tablet; Take 1 tablet (25 mg) by mouth 3 times daily    Chronic bronchitis, unspecified chronic bronchitis type (H)  -     ipratropium - albuterol 0.5 mg/2.5 mg/3 mL (DUONEB) 0.5-2.5 (3) MG/3ML neb solution; INHALE ONE VIAL BY  NEBULIZATION FOUR TIMES DAILY AS NEEDED FOR WHEEZING      Increase gabapentin to see if this helps with her mood.  Lower fluoxetine .  Increase hydralazine to help improve her blood pressure.  Start abilify to help her refractory depression.  Recheck in 1 mos   Recommend Cody's AM meds be taken at 9 AM  Phone call duration: 17 minutes

## 2021-04-19 ENCOUNTER — HOSPITAL ENCOUNTER (OUTPATIENT)
Dept: PHYSICAL THERAPY | Facility: CLINIC | Age: 56
Setting detail: THERAPIES SERIES
End: 2021-04-19
Attending: PHYSICIAN ASSISTANT
Payer: COMMERCIAL

## 2021-04-19 PROCEDURE — 97140 MANUAL THERAPY 1/> REGIONS: CPT | Mod: GP

## 2021-04-19 PROCEDURE — 97110 THERAPEUTIC EXERCISES: CPT | Mod: GP

## 2021-04-22 ENCOUNTER — TELEPHONE (OUTPATIENT)
Dept: FAMILY MEDICINE | Facility: CLINIC | Age: 56
End: 2021-04-22

## 2021-04-22 NOTE — TELEPHONE ENCOUNTER
Patient is requesting a letter to take her medications at 9:00 AM in order to avoid  conflict with other another Resident (Min is aware of her situation)  [employer] will fax letter to [phone]  Letter saved, please change as needed.     Patient is also asking for something to help her sleep, she wants to continue the Abilify but that doesn't help her sleep thru the night.

## 2021-04-23 NOTE — TELEPHONE ENCOUNTER
The letter works. Ok to fax.  She already takes seroquel for sleep. No new med with be prescribed at this time. Would recommend she see a sleep psychologist regarding her long standing history of insomnia.

## 2021-04-26 ENCOUNTER — HOSPITAL ENCOUNTER (OUTPATIENT)
Dept: PHYSICAL THERAPY | Facility: CLINIC | Age: 56
Setting detail: THERAPIES SERIES
End: 2021-04-26
Attending: PHYSICIAN ASSISTANT
Payer: COMMERCIAL

## 2021-04-26 PROCEDURE — 97535 SELF CARE MNGMENT TRAINING: CPT | Mod: GP | Performed by: PHYSICAL THERAPIST

## 2021-05-03 ENCOUNTER — TELEPHONE (OUTPATIENT)
Dept: FAMILY MEDICINE | Facility: CLINIC | Age: 56
End: 2021-05-03

## 2021-05-03 NOTE — TELEPHONE ENCOUNTER
Patient left a message on [employer] voice mail asking if it would be appropriate for her to have a sleep study done? Please see encounter from  04/22/21

## 2021-05-07 ENCOUNTER — HOSPITAL ENCOUNTER (OUTPATIENT)
Dept: PHYSICAL THERAPY | Facility: CLINIC | Age: 56
Setting detail: THERAPIES SERIES
End: 2021-05-07
Payer: COMMERCIAL

## 2021-05-07 PROCEDURE — 97110 THERAPEUTIC EXERCISES: CPT | Mod: GP | Performed by: PHYSICAL THERAPIST

## 2021-05-07 PROCEDURE — 97535 SELF CARE MNGMENT TRAINING: CPT | Mod: GP | Performed by: PHYSICAL THERAPIST

## 2021-05-07 PROCEDURE — 97140 MANUAL THERAPY 1/> REGIONS: CPT | Mod: GP | Performed by: PHYSICAL THERAPIST

## 2021-05-07 NOTE — PROGRESS NOTES
Outpatient Physical Therapy Discharge Note     Patient: Inga Ledesma  : 1965    Beginning/End Dates of Reporting Period:  4/15/21 to 2021    Referring Provider: Min Toledo PA-C     Therapy Diagnosis: Lymphedema      Client Self Report: Pt arrived in her tribute, the top keep falling down when walking.  The marti's slide down but she is looking at suspenders.  PT likes the knees highs and will get thigh highs in 3mo    Objective Measurements:  Objective Measure: R Volume 10-60cm  Details: 6001.64  Objective Measure: L Volume 10-60cm  Details: 5429.98     Outcome Measures (most recent score):  Lymphedema Life Impact Scale (score range 0-72). A higher score indicates greater impairment.: 8    Goals:  Goal Identifier Home Program   Goal Description Pt will be independent with edema home program to better manage swelling and prevent complication such as infection, worsening edema or fibrosis which would impact comfort and mobility.   Target Date 21   Date Met  21   Progress:     Goal Identifier Volume   Goal Description Pt will have 6% decreased R LE volume and 3% decreased L LE volume with intensive treatment to prepare for garment fitting.   Target Date 21   Date Met  21(Pt has had some refill with garments)   Progress:     Goal Identifier Compression   Goal Description Pt will be independent with use of compression to prevent exacerbation of lymphedema for better fit of clothing   Target Date 21   Date Met  21(Will get thigh high in 3 months)   Progress:     Goal Identifier Home Program   Goal Description Pt will demonstrate completion of targeted strength and aerobic home program to further improve health outcomes including edema management and improve functional mobility.    Target Date 21   Date Met  21   Progress:     Progress Toward Goals:   Progress this reporting period: Pt has gotten garments, still working a bit on the fit of the garments.  Measurements have been up and down but pt does know how to manage and appreciates new compression.    Plan:  Discharge from therapy.    Discharge:    Reason for Discharge: Patient has met all goals.    Equipment Issued: knee highs, marti, Tribute for R LE for night, bandaging supplies, home program    Discharge Plan: Patient to continue home program.

## 2021-05-12 ENCOUNTER — ANCILLARY PROCEDURE (OUTPATIENT)
Dept: GENERAL RADIOLOGY | Facility: CLINIC | Age: 56
End: 2021-05-12
Attending: PHYSICIAN ASSISTANT
Payer: COMMERCIAL

## 2021-05-12 ENCOUNTER — OFFICE VISIT (OUTPATIENT)
Dept: FAMILY MEDICINE | Facility: CLINIC | Age: 56
End: 2021-05-12
Payer: COMMERCIAL

## 2021-05-12 VITALS
TEMPERATURE: 98.1 F | SYSTOLIC BLOOD PRESSURE: 114 MMHG | OXYGEN SATURATION: 97 % | HEIGHT: 59 IN | HEART RATE: 73 BPM | RESPIRATION RATE: 18 BRPM | DIASTOLIC BLOOD PRESSURE: 68 MMHG | BODY MASS INDEX: 39.92 KG/M2 | WEIGHT: 198 LBS

## 2021-05-12 DIAGNOSIS — D50.8 IRON DEFICIENCY ANEMIA SECONDARY TO INADEQUATE DIETARY IRON INTAKE: ICD-10-CM

## 2021-05-12 DIAGNOSIS — R06.83 SNORING: Primary | ICD-10-CM

## 2021-05-12 DIAGNOSIS — Z12.31 ENCOUNTER FOR SCREENING MAMMOGRAM FOR BREAST CANCER: ICD-10-CM

## 2021-05-12 DIAGNOSIS — M25.512 ACUTE PAIN OF LEFT SHOULDER: ICD-10-CM

## 2021-05-12 DIAGNOSIS — F51.04 PSYCHOPHYSIOLOGICAL INSOMNIA: ICD-10-CM

## 2021-05-12 DIAGNOSIS — Z13.220 LIPID SCREENING: ICD-10-CM

## 2021-05-12 DIAGNOSIS — Z11.59 NEED FOR HEPATITIS C SCREENING TEST: ICD-10-CM

## 2021-05-12 DIAGNOSIS — R20.2 PARESTHESIA OF HAND, BILATERAL: ICD-10-CM

## 2021-05-12 DIAGNOSIS — F10.230 ALCOHOL DEPENDENCE WITH UNCOMPLICATED WITHDRAWAL (H): ICD-10-CM

## 2021-05-12 LAB
ALBUMIN SERPL-MCNC: 4 G/DL (ref 3.4–5)
ALP SERPL-CCNC: 91 U/L (ref 40–150)
ALT SERPL W P-5'-P-CCNC: 21 U/L (ref 0–50)
ANION GAP SERPL CALCULATED.3IONS-SCNC: 5 MMOL/L (ref 3–14)
AST SERPL W P-5'-P-CCNC: 12 U/L (ref 0–45)
BASOPHILS # BLD AUTO: 0 10E9/L (ref 0–0.2)
BASOPHILS NFR BLD AUTO: 0.1 %
BILIRUB SERPL-MCNC: 0.4 MG/DL (ref 0.2–1.3)
BUN SERPL-MCNC: 25 MG/DL (ref 7–30)
CALCIUM SERPL-MCNC: 8.7 MG/DL (ref 8.5–10.1)
CHLORIDE SERPL-SCNC: 104 MMOL/L (ref 94–109)
CHOLEST SERPL-MCNC: 152 MG/DL
CO2 SERPL-SCNC: 26 MMOL/L (ref 20–32)
CREAT SERPL-MCNC: 1.32 MG/DL (ref 0.52–1.04)
DIFFERENTIAL METHOD BLD: ABNORMAL
EOSINOPHIL # BLD AUTO: 0.1 10E9/L (ref 0–0.7)
EOSINOPHIL NFR BLD AUTO: 1 %
ERYTHROCYTE [DISTWIDTH] IN BLOOD BY AUTOMATED COUNT: 13.7 % (ref 10–15)
GFR SERPL CREATININE-BSD FRML MDRD: 45 ML/MIN/{1.73_M2}
GLUCOSE SERPL-MCNC: 78 MG/DL (ref 70–99)
HCT VFR BLD AUTO: 30 % (ref 35–47)
HDLC SERPL-MCNC: 59 MG/DL
HGB BLD-MCNC: 9.5 G/DL (ref 11.7–15.7)
LDLC SERPL CALC-MCNC: 73 MG/DL
LYMPHOCYTES # BLD AUTO: 1.1 10E9/L (ref 0.8–5.3)
LYMPHOCYTES NFR BLD AUTO: 13.1 %
MCH RBC QN AUTO: 27.8 PG (ref 26.5–33)
MCHC RBC AUTO-ENTMCNC: 31.7 G/DL (ref 31.5–36.5)
MCV RBC AUTO: 88 FL (ref 78–100)
MONOCYTES # BLD AUTO: 0.5 10E9/L (ref 0–1.3)
MONOCYTES NFR BLD AUTO: 6.3 %
NEUTROPHILS # BLD AUTO: 6.6 10E9/L (ref 1.6–8.3)
NEUTROPHILS NFR BLD AUTO: 79.5 %
NONHDLC SERPL-MCNC: 93 MG/DL
PLATELET # BLD AUTO: 230 10E9/L (ref 150–450)
POTASSIUM SERPL-SCNC: 4.8 MMOL/L (ref 3.4–5.3)
PROT SERPL-MCNC: 6.9 G/DL (ref 6.8–8.8)
RBC # BLD AUTO: 3.42 10E12/L (ref 3.8–5.2)
SODIUM SERPL-SCNC: 135 MMOL/L (ref 133–144)
TRIGL SERPL-MCNC: 101 MG/DL
WBC # BLD AUTO: 8.3 10E9/L (ref 4–11)

## 2021-05-12 PROCEDURE — 86803 HEPATITIS C AB TEST: CPT | Performed by: PHYSICIAN ASSISTANT

## 2021-05-12 PROCEDURE — 85025 COMPLETE CBC W/AUTO DIFF WBC: CPT | Performed by: PHYSICIAN ASSISTANT

## 2021-05-12 PROCEDURE — 99214 OFFICE O/P EST MOD 30 MIN: CPT | Performed by: PHYSICIAN ASSISTANT

## 2021-05-12 PROCEDURE — 72040 X-RAY EXAM NECK SPINE 2-3 VW: CPT | Performed by: RADIOLOGY

## 2021-05-12 PROCEDURE — 36415 COLL VENOUS BLD VENIPUNCTURE: CPT | Performed by: PHYSICIAN ASSISTANT

## 2021-05-12 PROCEDURE — 80053 COMPREHEN METABOLIC PANEL: CPT | Performed by: PHYSICIAN ASSISTANT

## 2021-05-12 PROCEDURE — 73030 X-RAY EXAM OF SHOULDER: CPT | Mod: LT | Performed by: RADIOLOGY

## 2021-05-12 PROCEDURE — 80061 LIPID PANEL: CPT | Performed by: PHYSICIAN ASSISTANT

## 2021-05-12 RX ORDER — PREDNISONE 20 MG/1
40 TABLET ORAL DAILY
Qty: 14 TABLET | Refills: 0 | Status: SHIPPED | OUTPATIENT
Start: 2021-05-12 | End: 2021-05-19

## 2021-05-12 RX ORDER — FERROUS SULFATE 325(65) MG
325 TABLET ORAL 2 TIMES DAILY
Qty: 180 TABLET | Refills: 1 | Status: SHIPPED | OUTPATIENT
Start: 2021-05-12 | End: 2021-11-04

## 2021-05-12 ASSESSMENT — PAIN SCALES - GENERAL: PAINLEVEL: EXTREME PAIN (8)

## 2021-05-12 ASSESSMENT — PATIENT HEALTH QUESTIONNAIRE - PHQ9: SUM OF ALL RESPONSES TO PHQ QUESTIONS 1-9: 14

## 2021-05-12 ASSESSMENT — MIFFLIN-ST. JEOR: SCORE: 1410.87

## 2021-05-12 NOTE — PROGRESS NOTES
"        Lolita Ross is a 55 year old who presents for the following health issues     HPI     Musculoskeletal problem/pain  Onset/Duration: three weeks  Description  Location: arm - left  Joint Swelling: no  Redness: no  Pain: YES  Warmth: no  Intensity:  severe  Progression of Symptoms:  same and constant  Accompanying signs and symptoms:   Fevers: no  Numbness/tingling/weakness: YES- tingling in hands  History  Trauma to the area: no  Recent illness:  no  Previous similar problem: no  Previous evaluation:  no  Precipitating or alleviating factors:  Aggravating factors include: lifting  Therapies tried and outcome: heat, do very helpful  No injury.   Patient would like to discuss sleep study, problem with limbs,  And is requesting ear drops.  Intermittent hand paresthesias. Some left sided neck pain.  Snoring and insomnia. Some daytime somnolence. Amitriptyline helping her sleep better as of late.     Review of Systems   Constitutional, HEENT, cardiovascular, pulmonary, GI, , musculoskeletal, neuro, skin, endocrine and psych systems are negative, except as otherwise noted.      Objective    /68 (BP Location: Left arm, Patient Position: Chair, Cuff Size: Adult Regular)   Pulse 73   Temp 98.1  F (36.7  C) (Oral)   Resp 18   Ht 1.51 m (4' 11.45\")   Wt 89.8 kg (198 lb)   LMP 06/02/2010   SpO2 97%   BMI 39.39 kg/m    Body mass index is 39.39 kg/m .  Physical Exam   Eye exam - right eye normal lid, conjunctiva, cornea, pupil and fundus, left eye normal lid, conjunctiva, cornea, pupil and fundus.  Thyroid not palpable, not enlarged, no nodules detected.  CHEST:chest clear to IPPA, no tachypnea, retractions or cyanosis and S1, S2 normal, no murmur, no gallop, rate regular.  Shoulder exam shows positive impingement signs are present with pain at high arc of abduction and forward flexion on left. There is tenderness of the anterolateral shoulder..  Neck rom normal.  Negative cts tests.  Inga was " seen today for musculoskeletal problem.    Diagnoses and all orders for this visit:    Snoring  -     SLEEP EVALUATION & MANAGEMENT REFERRAL - [facility] [place]  [phone] (Age 15 and up); Future    Need for hepatitis C screening test  -     Hepatitis C Screen Reflex to HCV RNA Quant and Genotype    Psychophysiological insomnia  -     SLEEP EVALUATION & MANAGEMENT REFERRAL - [facility] [place]  [phone] (Age 15 and up); Future    Encounter for screening mammogram for breast cancer  -     MA SCREENING DIGITAL BILAT - Future  (s+30); Future    Iron deficiency anemia secondary to inadequate dietary iron intake  -     CBC with platelets and differential  -     ferrous sulfate (FEROSUL) 325 (65 Fe) MG tablet; Take 1 tablet (325 mg) by mouth 2 times daily    Alcohol dependence with uncomplicated withdrawal (H)  -     Comprehensive metabolic panel (BMP + Alb, Alk Phos, ALT, AST, Total. Bili, TP)    Lipid screening  -     Lipid panel reflex to direct LDL Fasting    Acute pain of left shoulder  -     XR Shoulder Left G/E 3 Views; Future  -     predniSONE (DELTASONE) 20 MG tablet; Take 2 tablets (40 mg) by mouth daily for 7 days  -     [facility] PT AND HAND REFERRAL; Future    Paresthesia of hand, bilateral  -     XR Cervical Spine 2/3 Views; Future  -     predniSONE (DELTASONE) 20 MG tablet; Take 2 tablets (40 mg) by mouth daily for 7 days    Other orders  -     REVIEW OF HEALTH MAINTENANCE PROTOCOL ORDERS      work on lifestyle modification  Advised supportive and symptomatic treatment.  Follow up with Provider - if condition persists or worsens.

## 2021-05-13 DIAGNOSIS — F33.0 MAJOR DEPRESSIVE DISORDER, RECURRENT EPISODE, MILD (H): ICD-10-CM

## 2021-05-13 LAB — HCV AB SERPL QL IA: NONREACTIVE

## 2021-05-16 DIAGNOSIS — F33.0 MAJOR DEPRESSIVE DISORDER, RECURRENT EPISODE, MILD (H): ICD-10-CM

## 2021-05-16 RX ORDER — ARIPIPRAZOLE 5 MG/1
TABLET ORAL
Qty: 31 TABLET | Refills: 11 | OUTPATIENT
Start: 2021-05-16

## 2021-05-19 RX ORDER — ARIPIPRAZOLE 5 MG/1
TABLET ORAL
Qty: 90 TABLET | Refills: 1 | Status: SHIPPED | OUTPATIENT
Start: 2021-05-19 | End: 2021-11-12

## 2021-05-19 NOTE — TELEPHONE ENCOUNTER
Routing refill request to provider for review/approval because:  Labs out of range:  Pt is medication for depression- phq9 =14  PHQ 9/25/2017 8/4/2020 5/12/2021   PHQ-9 Total Score 20 12 14   Q9: Thoughts of better off dead/self-harm past 2 weeks Several days Not at all Not at all     Med pended          Requested Prescriptions   Pending Prescriptions Disp Refills     ARIPiprazole (ABILIFY) 5 MG tablet [Pharmacy Med Name: ARIPiprazole 5 MG Tablet] 31 tablet 1     Sig: TAKE 1 TABLET BY MOUTH AT BEDTIME       Antipsychotic Medications Passed - 5/16/2021  2:58 PM        Passed - Blood pressure under 140/90 in past 12 months     BP Readings from Last 3 Encounters:   05/12/21 114/68   02/17/21 134/79   02/16/21 105/53                 Passed - Patient is 12 years of age or older        Passed - Lipid panel on file within the past 12 months     Recent Labs   Lab Test 05/12/21  1520   CHOL 152   TRIG 101   HDL 59   LDL 73   NHDL 93               Passed - CBC on file in past 12 months     Recent Labs   Lab Test 05/12/21  1520   WBC 8.3   RBC 3.42*   HGB 9.5*   HCT 30.0*                    Passed - Heart Rate on file within past 12 months     Pulse Readings from Last 3 Encounters:   05/12/21 73   02/17/21 65   02/16/21 78               Passed - A1c or Glucose on file in past 12 months     Recent Labs   Lab Test 05/12/21  1520 08/04/20  1052 08/04/20  1052   GLC 78   < > 97   A1C  --   --  5.2    < > = values in this interval not displayed.       Please review patients last 3 weights. If a weight gain of >10 lbs exists, you may refill the prescription once after instructing the patient to schedule an appointment within the next 30 days.    Wt Readings from Last 3 Encounters:   05/12/21 89.8 kg (198 lb)   02/17/21 87.4 kg (192 lb 9.6 oz)   02/04/21 90.3 kg (199 lb 2 oz)             Passed - Medication is active on med list        Passed - Patient is not pregnant        Passed - No positve pregnancy test on file in past  "12 months        Passed - Recent (6 mo) or future (30 days) visit within the authorizing provider's specialty     Patient had office visit in the last 6 months or has a visit in the next 30 days with authorizing provider or within the authorizing provider's specialty.  See \"Patient Info\" tab in inbasket, or \"Choose Columns\" in Meds & Orders section of the refill encounter.                 "

## 2021-05-20 ENCOUNTER — THERAPY VISIT (OUTPATIENT)
Dept: PHYSICAL THERAPY | Facility: CLINIC | Age: 56
End: 2021-05-20
Payer: COMMERCIAL

## 2021-05-20 DIAGNOSIS — M25.512 ACUTE PAIN OF LEFT SHOULDER: ICD-10-CM

## 2021-05-20 PROCEDURE — 97110 THERAPEUTIC EXERCISES: CPT | Mod: GP | Performed by: PHYSICAL THERAPIST

## 2021-05-20 PROCEDURE — 97161 PT EVAL LOW COMPLEX 20 MIN: CPT | Mod: GP | Performed by: PHYSICAL THERAPIST

## 2021-05-20 NOTE — PROGRESS NOTES
[facility] Physical Therapy Initial Evaluation  5/20/2021     Precautions/Restrictions/MD instructions: Evaluate and Treat for L shoulder pain     Therapist Assessment: Inga Ledesma is a 55 year old female patient presenting to Physical Therapy with L shoulder pain. Patient demonstrates decreased L shoulder abduction and flexion ROM as well as increased neck/L upper trap pain with L cervical side bending. Signs and symptoms are consistent with likely L rotator cuff impingement along with possible cervicalgia. These impairments limit their ability to reach overhead and carry heavier items. Skilled PT services are necessary in order to reduce impairments and improve independent function.    Subjective History    Injury/Condition Details:  Presenting Complaint L shoulder pain; numbness into hands   Onset Timing/Date Approximately 1 month ago; MD referral from 5/12/21   Mechanism Pt reports that her L shoulder started to bother her after receiving her second Covid-19 vaccine at the end of April. Denies any injury nor trauma.     Symptom Behavior Details    Primary Symptoms Sporadic symptoms; Activity/position dependent, pain (Location: closer to L neck, Quality: Aching/Throbbing), weakness; numbness and tingling into fingers of L hand as well as entire R hand   Worst Pain 5/10   Symptom Provocators Active movement, reaching overhead, pulling/pushing heavier items   Best Pain 0/10    Symptom Relievers Heat   Time of day dependent? Worse in evening after activity   Recent symptom change? symptoms improving     Prior Testing/Intervention for current condition:  Prior Tests  x-ray of L shoulder and cervical spine on 5/12/21: no significant findings on either   Prior Treatment none     Lifestyle & General Medical History:  Employment Not working   Usual physical activities  (within past year) Computer work, prolonged sitting   Orthopaedic History  None   Medication  Anti-depressants, cardiac meds, HTN, sleep meds    Notable medical history See Epic Chart; previous cancer (labia per pt report), hx of minor MI, COPD, HTN, smoking, overweight, chemical dependency   Patient goals Decrease pain and get back to crocheting   Patient Reported Health fair     Red Flags: (Bold when present) - reviewed the following and denies  Malaise, unexplained weight loss, night pain, fever    OBJECTIVE    Observation/Posture: rounded shoulders; pt ambulates with 4WW due to chronic LBP    Cervical Screen: increased L neck/UT pain with L cervical side bending    Shoulder: * if reproductive of patient's primary complaint   PROM L PROM R AROM L AROM R MMT L MMT R   Flex   105* 140 4+ 5   Abd   95* 140 4+ 4+   IR     5 5   ER   50 50 4+ 4+   Ext/IR   T10 T7     Lower trap         Middle trap           Scapulothoraic Rhythm: not formally assessed today    Special tests:   L R   Impingement     Neer's     Hawkin's-Reza     Cross-over test     Painful Arc of Motion + -   Internal impingement test     Pain with resisted ER     Pull Test     Labral     Bienville's     Crank     Instability     Apprehension (anterior)     Relocation (anterior)     Anterior Load & Shift     Posterior Load & Shift     Multi-Directional Instability      Sulcus     Biceps      Speed's     Rotator Cuff Tear     Drop Arm - -   Belly Press     Lift off        ASSESSMENT/PLAN  Patient is a 55 year old female with L shoulder complaints.    Patient has the following significant findings with corresponding treatment plan.                Diagnosis 1:  L shoulder pain; signs and symptoms consistent with likely L shoulder rotator cuff impingement    Pain -  hot/cold therapy, US, electric stimulation, manual therapy, splint/taping/bracing/orthotics, self management, education and home program  Decreased ROM/flexibility - manual therapy, therapeutic exercise and home program  Decreased joint mobility - manual therapy, therapeutic exercise and home program  Decreased strength - therapeutic  exercise, therapeutic activities and home program  Impaired posture - neuro re-education and home program    Therapy Evaluation Codes:   1) History comprised of:   Personal factors that impact the plan of care:      Living environment, Past/current experiences and Time since onset of symptoms.    Comorbidity factors that impact the plan of care are:      Asthma, Cancer, Chest pain, Chemical Dependency, Depression, Emphysema, Heart problems, High blood pressure, Numbness/tingling, Overweight and Smoking.     Medications impacting care: Anti-depressant, Cardiac, High blood pressure and Sleep.  2) Examination of Body Systems comprised of:   Body structures and functions that impact the plan of care:      Shoulder.   Activity limitations that impact the plan of care are:      Lifting and overhead reaching.  3) Clinical presentation characteristics are:   Evolving/Changing.  4) Decision-Making    Moderate complexity using standardized patient assessment instrument and/or measureable assessment of functional outcome.  Cumulative Therapy Evaluation is: Low complexity.    Previous and current functional limitations:  (See Goal Flow Sheet for this information)    Short term and Long term goals: (See Goal Flow Sheet for this information)     Communication ability:  Patient appears to be able to clearly communicate and understand verbal and written communication and follow directions correctly.  Treatment Explanation - The following has been discussed with the patient:   RX ordered/plan of care  Anticipated outcomes  Possible risks and side effects  This patient would benefit from PT intervention to resume normal activities.   Rehab potential is fair.    Frequency:  1 X week, once daily  Duration:  for 4 weeks; tapering to 2x/month for 2 months  Discharge Plan:  Achieve all LTG.  Independent in home treatment program.  Reach maximal therapeutic benefit.    Please refer to the daily flowsheet for treatment today, total  treatment time and time spent performing 1:1 timed codes.

## 2021-05-24 ENCOUNTER — THERAPY VISIT (OUTPATIENT)
Dept: PHYSICAL THERAPY | Facility: CLINIC | Age: 56
End: 2021-05-24
Payer: COMMERCIAL

## 2021-05-24 ENCOUNTER — TELEPHONE (OUTPATIENT)
Dept: FAMILY MEDICINE | Facility: CLINIC | Age: 56
End: 2021-05-24

## 2021-05-24 DIAGNOSIS — M25.512 ACUTE PAIN OF LEFT SHOULDER: ICD-10-CM

## 2021-05-24 PROCEDURE — 97110 THERAPEUTIC EXERCISES: CPT | Mod: GP | Performed by: PHYSICAL THERAPIST

## 2021-05-24 PROCEDURE — 97112 NEUROMUSCULAR REEDUCATION: CPT | Mod: GP | Performed by: PHYSICAL THERAPIST

## 2021-05-26 NOTE — TELEPHONE ENCOUNTER
"Patient left a message on [employer] [facility] , she is looking for the ear drops that start with a \"T\" as discussed by Min Toledo in a recent OV and she also has an ingrown toenail that is causing her pain. How can it be treated?   "
Attempted to call patient at home number, group home house staff answered, requested to take a message for patient, left message with house staff; patient was instructed to return call to [facility] at [phone].    Belen Pringle RN  [facility]    
Called [phone]    Did patient answer the phone: No, this is a group home. Left a message with staff to have patient return call to the [facility] at [phone].          [facility] Nursing Team      
Patient called in and scheduled a virtual visit for Friday. Today and Thursday did not work with her schedule.    Muna Jefferson  
Patient was seen 5/12/21 by PCP Min Toledo.    Indeed, ear drops were discussed at visit:    Patient would like to discuss sleep study, problem with limbs,  And is requesting ear drops.  Intermittent hand paresthesias. Some left sided neck pain.      Routed to provider, I don't see what ear drops were discussed and for what reason.    Would provider like to see her for ingrown toenail?    Belen Pringle RN  [facility]          
Please set her up for a virtual visit with me today or Thursday to discuss her concerns.  
Benefits, risks, and possible complications of procedure explained to patient/caregiver who verbalized understanding and gave verbal consent.
This was an emergent procedure.

## 2021-06-01 ENCOUNTER — VIRTUAL VISIT (OUTPATIENT)
Dept: FAMILY MEDICINE | Facility: CLINIC | Age: 56
End: 2021-06-01
Payer: COMMERCIAL

## 2021-06-01 DIAGNOSIS — F10.29 ALCOHOL DEPENDENCE WITH UNSPECIFIED ALCOHOL-INDUCED DISORDER (H): ICD-10-CM

## 2021-06-01 DIAGNOSIS — R20.2 PARESTHESIAS IN RIGHT HAND: ICD-10-CM

## 2021-06-01 DIAGNOSIS — M65.4 DE QUERVAIN'S DISEASE (TENOSYNOVITIS): ICD-10-CM

## 2021-06-01 DIAGNOSIS — H69.93 DYSFUNCTION OF BOTH EUSTACHIAN TUBES: ICD-10-CM

## 2021-06-01 DIAGNOSIS — M54.2 CERVICALGIA: Primary | ICD-10-CM

## 2021-06-01 DIAGNOSIS — J42 CHRONIC BRONCHITIS, UNSPECIFIED CHRONIC BRONCHITIS TYPE (H): ICD-10-CM

## 2021-06-01 PROCEDURE — 99214 OFFICE O/P EST MOD 30 MIN: CPT | Mod: 95 | Performed by: PHYSICIAN ASSISTANT

## 2021-06-01 RX ORDER — PREDNISONE 10 MG/1
TABLET ORAL
Qty: 42 TABLET | Refills: 0 | Status: SHIPPED | OUTPATIENT
Start: 2021-06-01 | End: 2021-08-03

## 2021-06-01 RX ORDER — ALBUTEROL SULFATE 90 UG/1
AEROSOL, METERED RESPIRATORY (INHALATION)
Qty: 18 G | Refills: 3 | Status: SHIPPED | OUTPATIENT
Start: 2021-06-01 | End: 2022-04-01

## 2021-06-01 NOTE — PROGRESS NOTES
Cody is a 55 year old who is being evaluated via a billable telephone visit.      What phone number would you like to be contacted at? [phone]  How would you like to obtain your AVS? Marcia Mchugh   Cody is a 55 year old who presents for the following health issues     HPI     Ear Problem  - discussed ear drops at last visit - Rx was not sent in  Right ear is a little muffled.   Some allergic congestion flaring.   Tingling   - right fingers, discussed with Min before  Constant tingling. Right hand. Thumb is spared. No weakness.   Some occasional left sided neck pain.   Pain  - pain in both thumbs, worse on left thumb  Lateral thumb and wrist pain.   COPD  - still wheezing          Review of Systems   Constitutional, HEENT, cardiovascular, pulmonary, GI, , musculoskeletal, neuro, skin, endocrine and psych systems are negative, except as otherwise noted.      Objective           Vitals:  No vitals were obtained today due to virtual visit.    Physical Exam   healthy, alert and no distress  PSYCH: Alert and oriented times 3; coherent speech, normal   rate and volume, able to articulate logical thoughts, able   to abstract reason, no tangential thoughts, no hallucinations   or delusions  Her affect is normal  RESP: No cough, no audible wheezing, able to talk in full sentences  Remainder of exam unable to be completed due to telephone visits  Inga was seen today for ear problem, numbness, pain and copd.    Diagnoses and all orders for this visit:    Cervicalgia  -     MR Cervical Spine w/o Contrast; Future  -     predniSONE (DELTASONE) 10 MG tablet; Take 60 mg days 1 and 2, 50 mg days 3 and 4, 40 mg days 5 and 6, 30 mg days 7 and 8, 20 mg days 9 and 10, 10 mg days 11 and 12    Paresthesias in right hand  -     MR Cervical Spine w/o Contrast; Future  -     predniSONE (DELTASONE) 10 MG tablet; Take 60 mg days 1 and 2, 50 mg days 3 and 4, 40 mg days 5 and 6, 30 mg days 7 and 8, 20 mg days 9 and 10,  10 mg days 11 and 12    De Quervain's disease (tenosynovitis)  -     predniSONE (DELTASONE) 10 MG tablet; Take 60 mg days 1 and 2, 50 mg days 3 and 4, 40 mg days 5 and 6, 30 mg days 7 and 8, 20 mg days 9 and 10, 10 mg days 11 and 12    Dysfunction of both eustachian tubes  -     predniSONE (DELTASONE) 10 MG tablet; Take 60 mg days 1 and 2, 50 mg days 3 and 4, 40 mg days 5 and 6, 30 mg days 7 and 8, 20 mg days 9 and 10, 10 mg days 11 and 12  -     OTOLARYNGOLOGY REFERRAL    Chronic bronchitis, unspecified chronic bronchitis type (H)  -     predniSONE (DELTASONE) 10 MG tablet; Take 60 mg days 1 and 2, 50 mg days 3 and 4, 40 mg days 5 and 6, 30 mg days 7 and 8, 20 mg days 9 and 10, 10 mg days 11 and 12  -     albuterol (VENTOLIN HFA) 108 (90 Base) MCG/ACT inhaler; INHALE ONE TO TWO PUFFS BY MOUTH EVERY 4 HOURS AS NEEDED FOR SHORTNESS OF BREATH, DIFFICULTY BREATHING OR WHEEZING.  -     fluticasone-salmeterol (ADVAIR DISKUS) 500-50 MCG/DOSE inhaler; INHALE ONE PUFF BY MOUTH EVERY 12 HOURS      Advised supportive and symptomatic treatment.  Follow up with Provider - if condition persists or worsens.   work on lifestyle modification  Advised over the counter thumb spica wrist splints.      Phone call duration: 17 minutes

## 2021-06-02 ENCOUNTER — ANCILLARY PROCEDURE (OUTPATIENT)
Dept: MAMMOGRAPHY | Facility: CLINIC | Age: 56
End: 2021-06-02
Attending: PHYSICIAN ASSISTANT
Payer: COMMERCIAL

## 2021-06-02 DIAGNOSIS — Z12.31 ENCOUNTER FOR SCREENING MAMMOGRAM FOR BREAST CANCER: ICD-10-CM

## 2021-06-02 PROCEDURE — 77067 SCR MAMMO BI INCL CAD: CPT | Mod: TC | Performed by: RADIOLOGY

## 2021-06-02 NOTE — TELEPHONE ENCOUNTER
Routing refill request to provider for review/approval because:  Drug not on the [facility] refill protocol     Hanna Cárdenas RN

## 2021-06-03 NOTE — PROVIDER NOTIFICATION
Head, normocephalic, atraumatic, Face, Face within normal limits, Ears, External ears within normal limits, Nose/Nasopharynx, External nose  normal appearance, nares patent, no nasal discharge, Mouth and Throat, Oral cavity appearance normal, Breath odor normal, Lips, Appearance normal Notified on-call provider Debra Naegele of fall and patients report of hitting her head. No sign of injury, neuro checks every 2 hours and moon lighter informed and reports he will be seeing patient and likely order head CT. Changed to Code 2 for CT.

## 2021-06-04 ENCOUNTER — TELEPHONE (OUTPATIENT)
Dept: FAMILY MEDICINE | Facility: CLINIC | Age: 56
End: 2021-06-04
Payer: COMMERCIAL

## 2021-06-04 DIAGNOSIS — J31.0 CHRONIC RHINITIS: ICD-10-CM

## 2021-06-04 DIAGNOSIS — F51.04 PSYCHOPHYSIOLOGICAL INSOMNIA: ICD-10-CM

## 2021-06-04 DIAGNOSIS — J30.1 ALLERGIC RHINITIS DUE TO POLLEN, UNSPECIFIED SEASONALITY: ICD-10-CM

## 2021-06-04 RX ORDER — MAGNESIUM OXIDE 400 MG/1
TABLET ORAL
Qty: 90 TABLET | Refills: 11 | Status: SHIPPED | OUTPATIENT
Start: 2021-06-04 | End: 2022-04-01

## 2021-06-04 NOTE — TELEPHONE ENCOUNTER
Left message on voice mail for group home to call clinic.   [phone]    Nasrin Leroy RN BSN  [facility]

## 2021-06-04 NOTE — TELEPHONE ENCOUNTER
Patient left a message on [employer] voice mail asking for a refill of ??? that Min wants her to start taking again, please call to discuss (TC unable to call out at this time)

## 2021-06-07 ENCOUNTER — THERAPY VISIT (OUTPATIENT)
Dept: PHYSICAL THERAPY | Facility: CLINIC | Age: 56
End: 2021-06-07
Payer: COMMERCIAL

## 2021-06-07 DIAGNOSIS — G25.81 RLS (RESTLESS LEGS SYNDROME): ICD-10-CM

## 2021-06-07 DIAGNOSIS — M25.512 ACUTE PAIN OF LEFT SHOULDER: ICD-10-CM

## 2021-06-07 PROCEDURE — 97112 NEUROMUSCULAR REEDUCATION: CPT | Mod: GP | Performed by: PHYSICAL THERAPIST

## 2021-06-07 PROCEDURE — 97110 THERAPEUTIC EXERCISES: CPT | Mod: GP | Performed by: PHYSICAL THERAPIST

## 2021-06-07 NOTE — TELEPHONE ENCOUNTER
Called [phone] (H)    Did patient answer the phone: No, left a message with a female to have patient return call to the [facility] at [phone].      [facility] Nursing Team

## 2021-06-08 RX ORDER — ROPINIROLE 1 MG/1
TABLET, FILM COATED ORAL
Qty: 90 TABLET | Refills: 3 | Status: SHIPPED | OUTPATIENT
Start: 2021-06-08 | End: 2021-10-17

## 2021-06-08 NOTE — TELEPHONE ENCOUNTER
Left message on voice mail for patient to call clinic.   Per contact message this is group home number  [phone]  Mira Wright RN  [facility]

## 2021-06-09 RX ORDER — CETIRIZINE HYDROCHLORIDE 10 MG/1
10 TABLET ORAL DAILY
Qty: 90 TABLET | Refills: 1 | Status: SHIPPED | OUTPATIENT
Start: 2021-06-09 | End: 2021-12-03

## 2021-06-09 RX ORDER — FLUTICASONE PROPIONATE 50 MCG
1 SPRAY, SUSPENSION (ML) NASAL DAILY
Qty: 16 G | Refills: 3 | Status: SHIPPED | OUTPATIENT
Start: 2021-06-09 | End: 2022-04-01

## 2021-06-09 RX ORDER — QUETIAPINE FUMARATE 100 MG/1
150 TABLET, FILM COATED ORAL DAILY
Qty: 135 TABLET | Refills: 1 | Status: SHIPPED | OUTPATIENT
Start: 2021-06-09 | End: 2021-12-03

## 2021-06-09 NOTE — TELEPHONE ENCOUNTER
Spoke with patient, she reports she is needing to restart her seasonal allergy medications and had discussed with Min.    1. Zyrtec and flonase pended for approval.    2. She would also like to have the prn removed from the Quetiapine. She does take this nightly and when prn listed, she has difficulty refilling.  Pended.    3. She is requested her ear drops refilled due to dryness, it starts with a T, but RN unable to find. Patient reports Min knows what drops she is asking about.  (would it possible be the tobramycin?).    Mira Wright RN  [facility]

## 2021-06-09 NOTE — TELEPHONE ENCOUNTER
[employer] message sent to patient with the information below.      We received a message from you on 6/4/21, regarding a medication that Min Toledo wanted you to start back on?    We have tried to call you without a response.    Are you still needing anything from Min?    Thank-you,    [facility] Nursing Team        Also called patient at [phone] (home).  A Female answered and stated that patient was not there at this time.  Left a message with her to have patient return call to triage at [phone].    Marah BSN-RN  Triage Nurse  [facility]

## 2021-06-09 NOTE — TELEPHONE ENCOUNTER
Called patient at mobile. She asked that the number listed under home be removed.     Patient believes Dr Guzman was the one that prescribed it. She said sometimes eye drops get used in the ears and that may be what she was thing of. She does not have an old bottle to look at.     1-She wanted to know if PCP would send in any ear drops he recommends to see if her insurance would cover it.     2-Patient saw Linwood in a magazine for COPD and it is only once a day. She is wanting to know PCPs input regarding this medication for her.     Thank you,   Hanna Cárdenas RN

## 2021-06-09 NOTE — TELEPHONE ENCOUNTER
Not sure what ear drops she's requesting. I typically recommend mineral drops to be applied to ear canal as needed for dryness. If she has a old bottle she could tell us the name. I have prescribed her triamcinolone crm Im the past , that could be used for dryness and itching with in her external canals.

## 2021-06-14 ENCOUNTER — THERAPY VISIT (OUTPATIENT)
Dept: PHYSICAL THERAPY | Facility: CLINIC | Age: 56
End: 2021-06-14
Payer: COMMERCIAL

## 2021-06-14 DIAGNOSIS — I10 ESSENTIAL HYPERTENSION WITH GOAL BLOOD PRESSURE LESS THAN 140/90: ICD-10-CM

## 2021-06-14 DIAGNOSIS — M25.512 ACUTE PAIN OF LEFT SHOULDER: ICD-10-CM

## 2021-06-14 DIAGNOSIS — G44.219 EPISODIC TENSION-TYPE HEADACHE, NOT INTRACTABLE: ICD-10-CM

## 2021-06-14 DIAGNOSIS — E55.9 VITAMIN D DEFICIENCY: ICD-10-CM

## 2021-06-14 PROCEDURE — 97112 NEUROMUSCULAR REEDUCATION: CPT | Mod: GP | Performed by: PHYSICAL THERAPIST

## 2021-06-14 PROCEDURE — 97110 THERAPEUTIC EXERCISES: CPT | Mod: GP | Performed by: PHYSICAL THERAPIST

## 2021-06-16 NOTE — TELEPHONE ENCOUNTER
Routing refill request to provider for review/approval because:  Labs out of range:    Creatinine   Date Value Ref Range Status   05/12/2021 1.32 (H) 0.52 - 1.04 mg/dL Final

## 2021-06-17 ENCOUNTER — TELEPHONE (OUTPATIENT)
Dept: FAMILY MEDICINE | Facility: CLINIC | Age: 56
End: 2021-06-17

## 2021-06-17 RX ORDER — POTASSIUM CHLORIDE 750 MG/1
TABLET, EXTENDED RELEASE ORAL
Qty: 30 TABLET | Refills: 11 | Status: SHIPPED | OUTPATIENT
Start: 2021-06-17 | End: 2022-04-01

## 2021-06-17 RX ORDER — AMITRIPTYLINE HYDROCHLORIDE 50 MG/1
TABLET ORAL
Qty: 30 TABLET | Refills: 11 | Status: SHIPPED | OUTPATIENT
Start: 2021-06-17 | End: 2022-03-01

## 2021-06-17 RX ORDER — FUROSEMIDE 20 MG
TABLET ORAL
Qty: 30 TABLET | Refills: 11 | Status: SHIPPED | OUTPATIENT
Start: 2021-06-17 | End: 2021-10-14

## 2021-06-17 RX ORDER — CHOLECALCIFEROL (VITAMIN D3) 50 MCG
TABLET ORAL
Qty: 30 TABLET | Refills: 11 | Status: SHIPPED | OUTPATIENT
Start: 2021-06-17 | End: 2022-04-01

## 2021-06-17 NOTE — TELEPHONE ENCOUNTER
Spoke with patient and she has finished the prednisone and is still wheezing.  Patient stated no real improvement.  Patient doing duo neb 2 x a day and albuterol inhaler about every 3 hours.  Patient requesting to be seen in person on Monday 6-21-21.  Patient did not sound short of breath and was able to carry out conversation. No openings on Monday with PCP.  Will send request to provider to advise.

## 2021-06-17 NOTE — TELEPHONE ENCOUNTER
Reason for Call:  Other     Detailed comments: Patient said that she has completed the prednisone and she continues to wheeze. Please advise.     Phone Number Patient can be reached at: Home number on file [phone] (home)    Best Time:     Can we leave a detailed message on this number? YES    Call taken on 6/17/2021 at 1:25 PM by Marquita Garcia

## 2021-06-22 ENCOUNTER — ANCILLARY PROCEDURE (OUTPATIENT)
Dept: GENERAL RADIOLOGY | Facility: CLINIC | Age: 56
End: 2021-06-22
Attending: PHYSICIAN ASSISTANT
Payer: COMMERCIAL

## 2021-06-22 ENCOUNTER — OFFICE VISIT (OUTPATIENT)
Dept: FAMILY MEDICINE | Facility: CLINIC | Age: 56
End: 2021-06-22
Payer: COMMERCIAL

## 2021-06-22 VITALS
SYSTOLIC BLOOD PRESSURE: 117 MMHG | RESPIRATION RATE: 28 BRPM | TEMPERATURE: 101.4 F | WEIGHT: 193.4 LBS | DIASTOLIC BLOOD PRESSURE: 66 MMHG | OXYGEN SATURATION: 96 % | HEART RATE: 91 BPM | BODY MASS INDEX: 38.47 KG/M2

## 2021-06-22 DIAGNOSIS — K14.0 GLOSSITIS: ICD-10-CM

## 2021-06-22 DIAGNOSIS — J18.0 BRONCHOPNEUMONIA: ICD-10-CM

## 2021-06-22 DIAGNOSIS — J42 CHRONIC BRONCHITIS, UNSPECIFIED CHRONIC BRONCHITIS TYPE (H): ICD-10-CM

## 2021-06-22 DIAGNOSIS — J98.9 RESPIRATORY ILLNESS WITH FEVER: Primary | ICD-10-CM

## 2021-06-22 DIAGNOSIS — R50.9 RESPIRATORY ILLNESS WITH FEVER: Primary | ICD-10-CM

## 2021-06-22 DIAGNOSIS — R50.9 RESPIRATORY ILLNESS WITH FEVER: ICD-10-CM

## 2021-06-22 DIAGNOSIS — J98.9 RESPIRATORY ILLNESS WITH FEVER: ICD-10-CM

## 2021-06-22 LAB
SARS-COV-2 RNA RESP QL NAA+PROBE: NORMAL
SPECIMEN SOURCE: NORMAL

## 2021-06-22 PROCEDURE — U0005 INFEC AGEN DETEC AMPLI PROBE: HCPCS | Performed by: PHYSICIAN ASSISTANT

## 2021-06-22 PROCEDURE — U0003 INFECTIOUS AGENT DETECTION BY NUCLEIC ACID (DNA OR RNA); SEVERE ACUTE RESPIRATORY SYNDROME CORONAVIRUS 2 (SARS-COV-2) (CORONAVIRUS DISEASE [COVID-19]), AMPLIFIED PROBE TECHNIQUE, MAKING USE OF HIGH THROUGHPUT TECHNOLOGIES AS DESCRIBED BY CMS-2020-01-R: HCPCS | Performed by: PHYSICIAN ASSISTANT

## 2021-06-22 PROCEDURE — 71046 X-RAY EXAM CHEST 2 VIEWS: CPT | Performed by: RADIOLOGY

## 2021-06-22 PROCEDURE — 99214 OFFICE O/P EST MOD 30 MIN: CPT | Performed by: PHYSICIAN ASSISTANT

## 2021-06-22 RX ORDER — PREDNISONE 20 MG/1
40 TABLET ORAL DAILY
Qty: 14 TABLET | Refills: 0 | Status: SHIPPED | OUTPATIENT
Start: 2021-06-22 | End: 2021-06-27

## 2021-06-22 RX ORDER — FLUCONAZOLE 100 MG/1
100 TABLET ORAL DAILY
Qty: 15 TABLET | Refills: 0 | Status: SHIPPED | OUTPATIENT
Start: 2021-06-22 | End: 2021-07-22

## 2021-06-22 NOTE — PROGRESS NOTES
Subjective   Cody is a 55 year old who presents for the following health issues    HPI     Following up on: Wheezing    Last visit this was discussed: 6/1/21 with Min Toledo    Progression of Symptoms:  worsening    Accompanying Signs & Symptoms: none    Taking Medication as prescribed: finished prednision    Side Effects:  None    Medication Helping Symptoms:  NO    Mass  - nodule or mass on left side of neck  - noticed last week  - not sure if it is tongue, neck or throat  - painful to the touch  Seems to have resolved.  Odynophagia.  Strep screening last week was negative.     Review of Systems   Constitutional, HEENT, cardiovascular, pulmonary, GI, , musculoskeletal, neuro, skin, endocrine and psych systems are negative, except as otherwise noted.      Objective    LMP 06/02/2010   There is no height or weight on file to calculate BMI.  Physical Exam     Eye exam - right eye normal lid, conjunctiva, cornea, pupil and fundus, left eye normal lid, conjunctiva, cornea, pupil and fundus.  ENT exam reveals - bilateral TM normal without fluid or infection, neck without nodes, pharynx erythematous without exudate, left  sinus tender, post nasal drip noted, nasal mucosa congested and nasal mucosa pale and congested. Modest redness of tongue as well.  CHEST:no tachypnea, retractions or cyanosis, mild inspiratory wheezing heard diffusely throughout both lungs, mild expiratory wheezing heard diffusely throughout both lungs, mild rales heard LLL and S1, S2 normal, no murmur, no gallop, rate regular.  Cxr: right upper lobe infiltrate.     Inga was seen today for copd and mass.    Diagnoses and all orders for this visit:    Respiratory illness with fever  -     Symptomatic COVID-19 Virus (Coronavirus) by PCR; Future  -     XR Chest 2 Views; Future    Bronchopneumonia  -     amoxicillin-clavulanate (AUGMENTIN) 875-125 MG tablet; Take 1 tablet by mouth 2 times daily  -     predniSONE (DELTASONE) 20 MG tablet; Take  2 tablets (40 mg) by mouth daily for 5 days    Glossitis  -     fluconazole (DIFLUCAN) 100 MG tablet; Take 1 tablet (100 mg) by mouth daily for 15 days      Advised supportive and symptomatic treatment.  Follow up with Provider - if condition persists or worsens.

## 2021-06-23 LAB
LABORATORY COMMENT REPORT: NORMAL
SARS-COV-2 RNA RESP QL NAA+PROBE: NEGATIVE
SPECIMEN SOURCE: NORMAL

## 2021-07-05 ENCOUNTER — THERAPY VISIT (OUTPATIENT)
Dept: PHYSICAL THERAPY | Facility: CLINIC | Age: 56
End: 2021-07-05
Payer: COMMERCIAL

## 2021-07-05 DIAGNOSIS — M25.512 ACUTE PAIN OF LEFT SHOULDER: ICD-10-CM

## 2021-07-05 PROCEDURE — 97112 NEUROMUSCULAR REEDUCATION: CPT | Mod: GP | Performed by: PHYSICAL THERAPIST

## 2021-07-05 PROCEDURE — 97110 THERAPEUTIC EXERCISES: CPT | Mod: GP | Performed by: PHYSICAL THERAPIST

## 2021-07-05 NOTE — PROGRESS NOTES
PROGRESS  REPORT    Progress reporting period is from 5/20/2021 to 7/5/2021.       SUBJECTIVE  Subjective changes noted by patient:  Pt reports that her L shoulder is doing well overall. She has been laying on her L shoulder more at home, which has been painful.     Current pain level is 3/10.     Changes in function:  Yes (See Goal flowsheet attached for changes in current functional level)  Adverse reaction to treatment or activity: None    OBJECTIVE  Changes noted in objective findings:  Yes, improved L shoulder ROM.    Shoulder: * if reproductive of patient's primary complaint   PROM L PROM R AROM L AROM R MMT L MMT R   Flex   165  5-* 5-   Abd   125  5- 5-   IR   55  5- 5-   ER   90  5- 5-   Ext/IR         Lower trap         Middle trap             ASSESSMENT/PLAN  Updated problem list and treatment plan: Diagnosis 1:  Chronic L shoulder pain  Pain -  hot/cold therapy, US, electric stimulation, manual therapy, splint/taping/bracing/orthotics, self management, education and home program  Decreased ROM/flexibility - manual therapy, therapeutic exercise and home program  Impaired muscle performance - neuro re-education and home program  Decreased function - therapeutic activities and home program  Impaired posture - neuro re-education and home program    STG/LTGs have been met or progress has been made towards goals:  Yes (See Goal flow sheet completed today.)  Assessment of Progress: The patient's condition is improving.  The patient's condition has potential to improve.  Self Management Plans:  Patient is independent in a home treatment program.  Patient is independent in self management of symptoms.  I have re-evaluated this patient and find that the nature, scope, duration and intensity of the therapy is appropriate for the medical condition of the patient.  Inga continues to require the following intervention to meet STG and LTG's:  PT    Recommendations:  This patient would benefit from continued  therapy.     Frequency:  2 X a month, once daily  Duration:  for 1-2 months        Please refer to the daily flowsheet for treatment today, total treatment time and time spent performing 1:1 timed codes.

## 2021-07-06 ENCOUNTER — TELEPHONE (OUTPATIENT)
Dept: FAMILY MEDICINE | Facility: CLINIC | Age: 56
End: 2021-07-06

## 2021-07-06 DIAGNOSIS — G89.4 CHRONIC PAIN SYNDROME: ICD-10-CM

## 2021-07-06 DIAGNOSIS — M54.2 CERVICALGIA: ICD-10-CM

## 2021-07-06 DIAGNOSIS — K14.0 GLOSSITIS: ICD-10-CM

## 2021-07-06 RX ORDER — FLUCONAZOLE 100 MG/1
100 TABLET ORAL DAILY
Qty: 15 TABLET | Refills: 0 | Status: CANCELLED | OUTPATIENT
Start: 2021-07-06

## 2021-07-06 RX ORDER — IBUPROFEN 600 MG/1
TABLET, FILM COATED ORAL
Qty: 30 TABLET | Refills: 1 | Status: SHIPPED | OUTPATIENT
Start: 2021-07-06 | End: 2021-09-18

## 2021-07-06 NOTE — TELEPHONE ENCOUNTER
Notified patient of the orders/message below per Dr Cleveland.    Patient stated understanding and agreeable with the plan of care.   Marah CHERRYN-RN.  [facility]

## 2021-07-06 NOTE — TELEPHONE ENCOUNTER
Records reviewed.  Fluconazole (diflucan) was filled on 6/22/21 for #15.  Complete course.  This is not meant for long term usage.  Rx sent for ibuprofen.  Advise to take sparingly due to slightly elevated kidney function.  Advise to take with food.    1. Cervicalgia  - ibuprofen (ADVIL/MOTRIN) 600 MG tablet; TAKE 1 TABLET BY MOUTH THREE TIMES DAILY AS NEEDED FOR PAIN *1 TOTAL FILL*  Dispense: 93 tablet; Refill: 11

## 2021-07-06 NOTE — TELEPHONE ENCOUNTER
Reason for Call:  Other prescription    Detailed comments: patient is calling to ask pcp to send in a new script for Tylenol 600 mg and would like to stay on the new medication fluconazole 100 mg daily this is working great for patient   Thank you     Phone Number Patient can be reached at: Home number on file [phone] (home)    Best Time: any    Can we leave a detailed message on this number? YES    Call taken on 7/6/2021 at 12:06 PM by Stormy Chanel

## 2021-07-06 NOTE — TELEPHONE ENCOUNTER
"Patient was seen by PCP Min Toledo 6/22/21.   PRN follow up advised.    Fluconazole for 15 days Rx sent for \"glossitis\".       I found past Rx for acetaminophen 500 mg, stephane'd that; provider will need to refill as not currently on active med list.    I called and spoke to patient, she says her tongue is much better, very few times that it still burns.    She says Min told her to call to let him know if the fluconazole worked; she hopes she can continue on it, is very happy with the improvement.      She says she wants ibuprofen 600 or 800 mg sent, not tylenol.  I found past Rx ibuprofen, appears was \"ended\" in Epic on 4/16/21?  Patient had virtual visit that day, I don't see any notes regarding use of ibuprofen.      Creatinine   Date Value Ref Range Status   05/12/2021 1.32 (H) 0.52 - 1.04 mg/dL Final         Routed to PCP to address refills.    Belen Pringle RN  [facility]      "

## 2021-07-07 PROBLEM — F33.0 MAJOR DEPRESSIVE DISORDER, RECURRENT EPISODE, MILD (H): Chronic | Status: ACTIVE | Noted: 2017-02-27

## 2021-07-07 PROBLEM — F10.10 ALCOHOL ABUSE: Chronic | Status: ACTIVE | Noted: 2018-04-05

## 2021-07-07 PROBLEM — E66.01 MORBID OBESITY (H): Chronic | Status: ACTIVE | Noted: 2021-01-06

## 2021-07-09 ENCOUNTER — VIRTUAL VISIT (OUTPATIENT)
Dept: SLEEP MEDICINE | Facility: CLINIC | Age: 56
End: 2021-07-09
Attending: PHYSICIAN ASSISTANT
Payer: COMMERCIAL

## 2021-07-09 VITALS — BODY MASS INDEX: 38.91 KG/M2 | WEIGHT: 193 LBS | HEIGHT: 59 IN

## 2021-07-09 DIAGNOSIS — E66.812 CLASS 2 SEVERE OBESITY DUE TO EXCESS CALORIES WITH SERIOUS COMORBIDITY AND BODY MASS INDEX (BMI) OF 38.0 TO 38.9 IN ADULT (H): ICD-10-CM

## 2021-07-09 DIAGNOSIS — R06.89 DYSPNEA AND RESPIRATORY ABNORMALITY: Primary | ICD-10-CM

## 2021-07-09 DIAGNOSIS — R06.83 SNORING: ICD-10-CM

## 2021-07-09 DIAGNOSIS — R53.83 MALAISE AND FATIGUE: ICD-10-CM

## 2021-07-09 DIAGNOSIS — R06.00 DYSPNEA AND RESPIRATORY ABNORMALITY: Primary | ICD-10-CM

## 2021-07-09 DIAGNOSIS — Z72.820 LACK OF ADEQUATE SLEEP: ICD-10-CM

## 2021-07-09 DIAGNOSIS — E66.01 CLASS 2 SEVERE OBESITY DUE TO EXCESS CALORIES WITH SERIOUS COMORBIDITY AND BODY MASS INDEX (BMI) OF 38.0 TO 38.9 IN ADULT (H): ICD-10-CM

## 2021-07-09 DIAGNOSIS — F51.04 PSYCHOPHYSIOLOGICAL INSOMNIA: ICD-10-CM

## 2021-07-09 DIAGNOSIS — I10 ESSENTIAL HYPERTENSION: ICD-10-CM

## 2021-07-09 DIAGNOSIS — R53.81 MALAISE AND FATIGUE: ICD-10-CM

## 2021-07-09 PROBLEM — A41.9 SEPSIS (H): Status: RESOLVED | Noted: 2018-06-18 | Resolved: 2021-07-09

## 2021-07-09 PROBLEM — J44.1 COPD EXACERBATION (H): Status: RESOLVED | Noted: 2018-09-04 | Resolved: 2021-07-09

## 2021-07-09 PROBLEM — F19.20 CHEMICAL DEPENDENCY (H): Chronic | Status: ACTIVE | Noted: 2017-10-06

## 2021-07-09 PROBLEM — F10.20 ALCOHOL DEPENDENCY (H): Chronic | Status: ACTIVE | Noted: 2018-07-06

## 2021-07-09 PROBLEM — I50.30 (HFPEF) HEART FAILURE WITH PRESERVED EJECTION FRACTION (H): Chronic | Status: ACTIVE | Noted: 2017-03-10

## 2021-07-09 PROBLEM — M25.512 ACUTE PAIN OF LEFT SHOULDER: Status: RESOLVED | Noted: 2021-05-20 | Resolved: 2021-07-09

## 2021-07-09 PROCEDURE — 99244 OFF/OP CNSLTJ NEW/EST MOD 40: CPT | Mod: 95 | Performed by: PHYSICIAN ASSISTANT

## 2021-07-09 ASSESSMENT — MIFFLIN-ST. JEOR: SCORE: 1388.21

## 2021-07-09 NOTE — PROGRESS NOTES
Cody is a 55 year old who is being evaluated via a billable video visit.      Does patient have any form of state insurance? [employer]      How would you like to obtain your AVS? MyChart  If the video visit is dropped, the invitation should be resent by: Text to cell phone: [phone]   Will anyone else be joining your video visit? No          Video Start Time: 1:58 PM  Video-Visit Details    Type of service:  Video Visit    Video End Time:2:35 PM    Originating Location (pt. Location): Home    Distant Location (provider location):  [facility]     Platform used for Video Visit: SiTune + Doximity      Sleep Consultation:    Date on this visit: 7/9/2021    Inga Ledesma is sent by Min Toledo for a sleep consultation regarding snoring and insomnia.    Primary Physician: Min Toledo     Chief Complaint   Patient presents with     Consult     insomnia       Inga Ledesma is a 55 year old female who presents with 10+ year history of difficulty falling and staying asleep. She does not recall a trigger.  She has been in an alcohol treatment program since 12/17/2020. She is on Amitriptyline 25 mg nightly and Seroquel 150 mg nightly for sleep and does not find it effective.    Restless leg syndrome:  She was diagnosed in 2006. She reports tingling legs that drove her to move her legs with temporary relief.  She is on Ropinirole 1 mg TID and Gabapentin 600 mg TID. She states that her legs are definitely controlled.     Inga goes to sleep at 10:00 PM during the week. She wakes up between 6 and 8 AM without an alarm. She falls asleep in 120 minutes with Amitriptyline andSeroquel. Without the medications, she does not think she can fall asleep. Inga has difficulty falling asleep.  She reports an active mind. She wakes up around 3 AM for 30 minutes before falling back to sleep.  Inga wakes up to uncertain reasons.  On weekends, Inga goes to sleep at 10:00 PM.   She wakes up at 8:00 AM without an alarm. She falls asleep in 120 minutes.  Patient gets an average of 5 hours of sleep per night. She does not feel rested    Patient does use electronics in bed and watch TV in bed.     Inga does not do shift work.      Inga does snore every night and snoring is very loud. Patient does not have a regular bed partner. There is report of snoring.  She does not have witnessed apneas. Patient sleeps on her back and side. She has occasional morning headaches. Inga denies any bruxism, sleep walking, sleep talking, dream enactment, sleep paralysis, cataplexy and hypnogogic/hypnopompic hallucinations.    Inga denies difficulty breathing through her nose and claustrophobia.      Inga has gained 50 pounds in the last year.  Patient describes themself as neither a morning or night person.  She would prefer to go to sleep at 10:00 PM and wake up at 7:00 AM.  Patient's Sedan Sleepiness score 7/24 inconsistent with excessive  daytime sleepiness.  She reports fatigue and sleepiness.    Inga naps occasionally. She takes some inadvertant naps.  She does not drive currently.  Patient was counseled on the importance of driving while alert, to pull over if drowsy, or nap before getting into the vehicle if sleepy.  She uses 1-2 cups/day of coffee. Last caffeine intake is usually before noon.    Allergies:    Allergies   Allergen Reactions     Chicken-Derived Products (Egg) GI Disturbance     Reglan [Metoclopramide Hcl] Other (See Comments)     Body tenses up     Doxycycline Rash       Medications:    Current Outpatient Medications   Medication Sig Dispense Refill     albuterol (VENTOLIN HFA) 108 (90 Base) MCG/ACT inhaler INHALE ONE TO TWO PUFFS BY MOUTH EVERY 4 HOURS AS NEEDED FOR SHORTNESS OF BREATH, DIFFICULTY BREATHING OR WHEEZING. 18 g 3     amitriptyline (ELAVIL) 25 MG tablet        amitriptyline (ELAVIL) 50 MG tablet TAKE 1 TABLET BY MOUTH AT BEDTIME 30 tablet 11      amoxicillin-clavulanate (AUGMENTIN) 875-125 MG tablet Take 1 tablet by mouth 2 times daily 10 tablet 0     ARIPiprazole (ABILIFY) 5 MG tablet TAKE 1 TABLET BY MOUTH AT BEDTIME 90 tablet 1     aspirin 81 MG EC tablet Take 1 tablet (81 mg) by mouth daily 90 tablet 3     atorvastatin (LIPITOR) 10 MG tablet Take 1 tablet (10 mg) by mouth At Bedtime 90 tablet 3     calcium carbonate 500 mg, elemental, (OSCAL) 500 MG tablet Take 1 tablet (500 mg) by mouth daily 90 tablet 1     cetirizine (ZYRTEC) 10 MG tablet Take 1 tablet (10 mg) by mouth daily 90 tablet 1     Cholecalciferol (VITAMIN D3) 50 MCG (2000 UT) TABS TAKE 1 TABLET BY MOUTH ONCE DAILY 30 tablet 11     dexamethasone AF (DECADRON) 0.1 MG/ML solution Swish and spit 10 mLs (1 mg) in mouth 2 times daily 240 mL 3     diclofenac (VOLTAREN) 75 MG EC tablet TAKE 1 TABLET BY MOUTH TWICE DAILY *1 TOTAL FILL* 62 tablet 11     EPINEPHrine (ANY BX GENERIC EQUIV) 0.3 MG/0.3ML injection 2-pack Inject 0.3 mLs (0.3 mg) into the muscle as needed for anaphylaxis 2 each 3     famotidine (PEPCID) 40 MG tablet TAKE 1 TABLET BY MOUTH AT BEDTIME AS NEEDED FOR HEARTBURN 30 tablet 3     ferrous sulfate (FEROSUL) 325 (65 Fe) MG tablet Take 1 tablet (325 mg) by mouth 2 times daily 180 tablet 1     FLUoxetine (PROZAC) 40 MG capsule Take 1 capsule (40 mg) by mouth daily 90 capsule 1     fluticasone (FLONASE) 50 MCG/ACT nasal spray Spray 1 spray into both nostrils daily 16 g 3     fluticasone-salmeterol (ADVAIR DISKUS) 500-50 MCG/DOSE inhaler INHALE ONE PUFF BY MOUTH EVERY 12 HOURS 1 each 5     fluticasone-salmeterol (ADVAIR HFA) 115-21 MCG/ACT inhaler Inhale 2 puffs into the lungs 2 times daily 1 Inhaler 11     folic acid (FOLVITE) 1 MG tablet Take 1 tablet (1,000 mcg) by mouth daily 90 tablet 3     furosemide (LASIX) 20 MG tablet TAKE 1 TABLET BY MOUTH ONCE DAILY 30 tablet 11     gabapentin (NEURONTIN) 300 MG capsule Take 2 capsules (600 mg) by mouth 3 times daily 540 capsule 1      hydrALAZINE (APRESOLINE) 25 MG tablet Take 1 tablet (25 mg) by mouth 3 times daily 270 tablet 1     hydrOXYzine (ATARAX) 25 MG tablet TAKE ONE TABLET BY MOUTH TWICE A DAY AS NEEDED FOR ANXIETY OR PAIN 60 tablet 1     ibuprofen (ADVIL/MOTRIN) 600 MG tablet TAKE 1 TABLET BY MOUTH THREE TIMES DAILY AS NEEDED FOR PAIN *1 TOTAL FILL* 30 tablet 1     ipratropium - albuterol 0.5 mg/2.5 mg/3 mL (DUONEB) 0.5-2.5 (3) MG/3ML neb solution INHALE ONE VIAL BY NEBULIZATION FOUR TIMES DAILY AS NEEDED FOR WHEEZING 360 mL 1     labetalol (NORMODYNE) 100 MG tablet TAKE 1 TABLET BY MOUTH TWICE DAILY 62 tablet 5     losartan (COZAAR) 100 MG tablet Take 1 tablet (100 mg) by mouth daily 90 tablet 1     magic mouthwash suspension, diphenhydrAMINE, lidocaine, aluminum-magnesium & simethicone, (FIRST-MOUTHWASH BLM) compounding kit Swish and swallow 5-10 mLs in mouth every 6 hours as needed for mouth sores 237 mL 0     magnesium oxide (MAG-OX) 400 MG tablet TAKE 1 TABLET BY MOUTH ONCE DAILY 90 tablet 11     melatonin 5 MG tablet Take 1-2 tablets (5-10 mg) by mouth nightly as needed for sleep 180 tablet 1     multivitamin, therapeutic with minerals (THERA-VIT-M) TABS tablet TAKE 1 TABLET BY MOUTH ONCE DAILY 28 tablet 5     omeprazole (PRILOSEC) 20 MG DR capsule TAKE 1 CAPSULE BY MOUTH TWICE DAILY BEFORE A MEAL 62 capsule 5     order for DME Equipment being ordered: TENS 1 Device 0     potassium chloride ER (KLOR-CON M) 10 MEQ CR tablet TAKE 1 TABLET BY MOUTH ONCE DAILY 30 tablet 11     predniSONE (DELTASONE) 10 MG tablet Take 60 mg days 1 and 2, 50 mg days 3 and 4, 40 mg days 5 and 6, 30 mg days 7 and 8, 20 mg days 9 and 10, 10 mg days 11 and 12 42 tablet 0     QUEtiapine (SEROQUEL) 100 MG tablet Take 1.5 tablets (150 mg) by mouth daily At bedtime 135 tablet 1     Revefenacin 175 MCG/3ML SOLN Inhale 1 Dose into the lungs daily 75 mL 1     rOPINIRole (REQUIP) 1 MG tablet TAKE 1 TABLET BY MOUTH THREE TIMES DAILY 90 tablet 3     triamcinolone  (KENALOG) 0.1 % paste Apply a thin layer to the left tongue sores twice daily for 14 days. 5 g 1       Problem List:  Patient Active Problem List    Diagnosis Date Noted     Tobacco abuse 05/10/2010     Priority: High     Menorrhagia 05/10/2010     Priority: High     Iron deficiency anemia secondary to inadequate dietary iron intake 02/19/2010     Priority: High     menorrhagia       Chronic bilateral low back pain without sciatica 03/17/2021     Priority: Medium     Morbid obesity (H) 01/06/2021     Priority: Medium     Alcohol dependency (H) 07/06/2018     Priority: Medium     LALA (acute kidney injury) (H) 05/28/2018     Priority: Medium     Alcohol abuse 04/05/2018     Priority: Medium     Alcohol withdrawal (H) 10/28/2017     Priority: Medium     Chemical dependency (H) 10/06/2017     Priority: Medium     Psychophysiological insomnia 05/30/2017     Priority: Medium     (HFpEF) heart failure with preserved ejection fraction (H) 03/10/2017     Priority: Medium     ECHO 2018:  Interpretation Summary  Technically difficult study.Extremely poor acoustic windows.  Global and regional left ventricular function is normal with an EF of 60-65%.  No regional wall motion abnormalities are seen.  Right ventricular function, chamber size, wall motion, and thickness are  normal.  The inferior vena cava is normal.  No pericardial effusion is present.       Major depressive disorder, recurrent episode, mild (H) 02/27/2017     Priority: Medium     Alcohol dependence with withdrawal with complication (H) 03/15/2016     Priority: Medium     Withdrawal seizure not requiring seizure medication- [place] March 2016.       Health Care Home 12/09/2015     Priority: Medium       Status:  Closed   Care Coordinator:  YAMILE Altman, RN, PHN  [facility] Care Coordinator  [phone]    See Letters for [facility] Care Plan             Chronic bronchitis, unspecified chronic bronchitis type (H) 11/24/2015     Priority: Medium     Hypertension goal BP  (blood pressure) < 140/90 09/29/2015     Priority: Medium     Edema of both legs 02/10/2015     Priority: Medium     Has had pelvic node dissection.       Vitamin D deficiency 10/16/2014     Priority: Medium     Problem list name updated by automated process. Provider to review       RLS (restless legs syndrome)      Priority: Medium     GERD (gastroesophageal reflux disease)      Priority: Medium     Adult BMI 30+ 05/10/2010     Priority: Low     Sacroiliitis (H)      Priority: Low     steroid injections, physical therapy ineffective       Vitamin D deficiency      Priority: Low     (Problem list name updated by automated process. Provider to review and confirm.)       Hyperlipidemia with target LDL less than 160      Priority: Low     LDL      129   5/10/2010   Diagnosis updated by automated process. Provider to review and confirm.          Past Medical/Surgical History:  Past Medical History:   Diagnosis Date     Acute pain of left shoulder 5/20/2021     Alcohol abuse, in remission      Alcohol withdrawal seizure (H) 2016     Cancer of labia majora (H) 1987     Cervical dysplasia 1987     Congestive heart failure (H) 5/16/16     COPD (chronic obstructive pulmonary disease) (H) 1/24/2011     COPD exacerbation (H) 9/4/2018     CVA (cerebral infarction) 1/2012     Dependent edema      Depression, major      Depressive disorder 1966     GERD (gastroesophageal reflux disease)      Hyperlipidemia LDL goal < 160      Hypertension      Iron deficiency anemia      RLS (restless legs syndrome)      Sacroiliitis (H)     steroid injections ineffective, chronic low back pain     Sepsis (H) 6/18/2018     Tobacco abuse      Uncomplicated asthma      Vitamin D deficiencies      Past Surgical History:   Procedure Laterality Date     AS BIOPSY/EXCISION LYMPH NODE OPEN SUPERFICIAL       COLONOSCOPY       EYE SURGERY       HYSTERECTOMY  2010     HYSTERECTOMY TOTAL ABDOMINAL  7/28/10    Bilateral salpingectomy.  ovaries conserved.      LASER TX, CERVICAL  1987     LYMPH NODE BIOPSY  2007    inguinal     LYSIS OF LABIAL LESION(S)  ,        Social History:  Social History     Socioeconomic History     Marital status:      Spouse name: Not on file     Number of children: 1     Years of education: 13     Highest education level: Not on file   Occupational History     Employer: [employer]   Social Needs     Financial resource strain: Not on file     Food insecurity     Worry: Not on file     Inability: Not on file     Transportation needs     Medical: Not on file     Non-medical: Not on file   Tobacco Use     Smoking status: Current Some Day Smoker     Packs/day: 0.25     Years: 34.00     Pack years: 8.50     Types: Cigarettes     Start date: 1979     Last attempt to quit: 10/3/2012     Years since quittin.7     Smokeless tobacco: Never Used     Tobacco comment: 5 cigarettes daily   Substance and Sexual Activity     Alcohol use: No     Comment: 1 pint of vodka per day, last drink aug 2018     Drug use: No     Sexual activity: Not Currently   Lifestyle     Physical activity     Days per week: Not on file     Minutes per session: Not on file     Stress: Not on file   Relationships     Social connections     Talks on phone: Not on file     Gets together: Not on file     Attends Jewish service: Not on file     Active member of club or organization: Not on file     Attends meetings of clubs or organizations: Not on file     Relationship status: Not on file     Intimate partner violence     Fear of current or ex partner: Not on file     Emotionally abused: Not on file     Physically abused: Not on file     Forced sexual activity: Not on file   Other Topics Concern     Parent/sibling w/ CABG, MI or angioplasty before 65F 55M? No   Social History Narrative    Lives in [place] with her son in a trailer no access to guns or weapons works for the [employer] and enjoys crocheting and watching television.  "      Family History:  Family History   Problem Relation Age of Onset     Depression/Anxiety Mother      Asthma Mother      Cerebrovascular Disease Father      Hypertension Father      Lung Cancer Father      Alcoholism Father      Breast Cancer Paternal Aunt      Other Cancer Other      Depression Other      Anxiety Disorder Other      Mental Illness Other      Substance Abuse Other      Asthma Other      Obesity Sister      Obesity Son      Suicide Paternal Uncle        Review of Systems:  A complete review of systems reviewed by me is negative with the exeption of what has been mentioned in the history of present illness.  CONSTITUTIONAL:  POSITIVE for  weight gain  EYES: NEGATIVE for changes in vision, blind spots, double vision.  ENT: NEGATIVE for ear pain, sore throat, sinus pain, post-nasal drip, runny nose, bloody nose  CARDIAC: NEGATIVE for fast heartbeats or fluttering in chest, chest pain or pressure, breathlessness when lying flat, swollen legs or swollen feet.  NEUROLOGIC: NEGATIVE headaches, weakness or numbness in the arms or legs.  DERMATOLOGIC: NEGATIVE for rashes, new moles or change in mole(s)  PULMONARY: NEGATIVE SOB at rest, SOB with activity, dry cough, productive cough, coughing up blood, wheezing or whistling when breathing.    GASTROINTESTINAL: NEGATIVE for nausea or vomitting, loose or watery stools, fat or grease in stools, constipation, abdominal pain, bowel movements black in color or blood noted.  GENITOURINARY: NEGATIVE for pain during urination, blood in urine, urinating more frequently than usual, irregular menstrual periods.  MUSCULOSKELETAL: NEGATIVE for muscle pain, bone or joint pain, swollen joints.  ENDOCRINE: NEGATIVE for increased thirst or urination, diabetes.  LYMPHATIC: NEGATIVE for swollen lymph nodes, lumps or bumps in the breasts or nipple discharge.    Physical Examination:  Vitals: Ht 1.51 m (4' 11.45\")   Wt 87.5 kg (193 lb)   LMP 06/02/2010   BMI 38.39 kg/m  "   BMI= Body mass index is 38.39 kg/m .         Franklin Total Score 7/9/2021   Total score - Franklin 7       VINICIO Total Score: 14 (07/09/21 1300)    GENERAL APPEARANCE: alert and no distress  EYES: Eyes grossly normal to inspection  Pulmonary: Breathing is non-labored.  NEURO: mentation intact and speech normal    Last Comprehensive Metabolic Panel:  Sodium   Date Value Ref Range Status   05/12/2021 135 133 - 144 mmol/L Final     Potassium   Date Value Ref Range Status   05/12/2021 4.8 3.4 - 5.3 mmol/L Final     Chloride   Date Value Ref Range Status   05/12/2021 104 94 - 109 mmol/L Final     Carbon Dioxide   Date Value Ref Range Status   05/12/2021 26 20 - 32 mmol/L Final     Anion Gap   Date Value Ref Range Status   05/12/2021 5 3 - 14 mmol/L Final     Glucose   Date Value Ref Range Status   05/12/2021 78 70 - 99 mg/dL Final     Comment:     Fasting specimen     Urea Nitrogen   Date Value Ref Range Status   05/12/2021 25 7 - 30 mg/dL Final     Creatinine   Date Value Ref Range Status   05/12/2021 1.32 (H) 0.52 - 1.04 mg/dL Final     GFR Estimate   Date Value Ref Range Status   05/12/2021 45 (L) >60 mL/min/[1.73_m2] Final     Comment:     Non  GFR Calc  Starting 12/18/2018, serum creatinine based estimated GFR (eGFR) will be   calculated using the Chronic Kidney Disease Epidemiology Collaboration   (CKD-EPI) equation.       Calcium   Date Value Ref Range Status   05/12/2021 8.7 8.5 - 10.1 mg/dL Final     TSH   Date Value Ref Range Status   08/19/2020 1.38 0.40 - 4.00 mU/L Final     pulmonary function tests: No flowsheet data found.     SpO2 Readings from Last 4 Encounters:   06/22/21 96%   05/12/21 97%   02/17/21 97%   02/16/21 97%     Impression/Plan:  1. Patient has features and risk factors for possible obstructive sleep apnea including: BMI of 38, loud snoring,  non-refreshing sleep, daytime fatigue, difficulty maintaining sleep and co-morbid HTN. The STOP-BANG score is 5/8. The pathophysiology,  diagnosis and treatment of JAQUELIN was discussed. Recommend polysomnogram (using 4% desaturation/Medicare/ AASM 1B scoring rules) for high probability obstructive sleep apnea. She will take her usual sleep aids on the night of the sleep study.  2. Chronic insomnia: We reviewed the pros and cons of pharmacological versus cognitive behavioral treatment. I reviewed CBT-I is considered first line treatment.  Patient is interested in CBT-I, but wants to wait until after the sleep study.   3.  Restless leg syndrome: History or anemia and has been on iron supplementation for about a month. Currently reports good control on Requip. Continue current treatment.    Literature provided regarding sleep apnea, restless leg syndrome and insomnia.      She will follow up with me in approximately two weeks after her sleep study has been competed to review the results and discuss plan of care.       Polysomnography reviewed.  Obstructive sleep apnea reviewed.  Complications of untreated sleep apnea were reviewed.    Yohana Albarado PA-C    CC: Min Toledo

## 2021-07-09 NOTE — PATIENT INSTRUCTIONS
Your BMI is There is no height or weight on file to calculate BMI.  Weight management is a personal decision.  If you are interested in exploring weight loss strategies, the following discussion covers the approaches that may be successful. Body mass index (BMI) is one way to tell whether you are at a healthy weight, overweight, or obese. It measures your weight in relation to your height.  A BMI of 18.5 to 24.9 is in the healthy range. A person with a BMI of 25 to 29.9 is considered overweight, and someone with a BMI of 30 or greater is considered obese. More than two-thirds of American adults are considered overweight or obese.  Being overweight or obese increases the risk for further weight gain. Excess weight may lead to heart disease and diabetes.  Creating and following plans for healthy eating and physical activity may help you improve your health.  Weight control is part of healthy lifestyle and includes exercise, emotional health, and healthy eating habits. Careful eating habits lifelong are the mainstay of weight control. Though there are significant health benefits from weight loss, long-term weight loss with diet alone may be very difficult to achieve- studies show long-term success with dietary management in less than 10% of people. Attaining a healthy weight may be especially difficult to achieve in those with severe obesity. In some cases, medications, devices and surgical management might be considered.  What can you do?  If you are overweight or obese and are interested in methods for weight loss, you should discuss this with your provider.     Consider reducing daily calorie intake by 500 calories.     Keep a food journal.     Avoiding skipping meals, consider cutting portions instead.    Diet combined with exercise helps maintain muscle while optimizing fat loss. Strength training is particularly important for building and maintaining muscle mass. Exercise helps reduce stress, increase energy,  and improves fitness. Increasing exercise without diet control, however, may not burn enough calories to loose weight.       Start walking three days a week 10-20 minutes at a time    Work towards walking thirty minutes five days a week     Eventually, increase the speed of your walking for 1-2 minutes at time    In addition, we recommend that you review healthy lifestyles and methods for weight loss available through the National Institutes of Health patient information sites:  http://win.niddk.nih.gov/publications/index.htm    And look into health and wellness programs that may be available through your health insurance provider, employer, local community center, or adama club.    Weight management plan: Patient was referred to their PCP to discuss a diet and exercise plan.  MY CONTACT NUMBERS ARE: [phone]  DOCTOR : SARAI De La Vega  SLEEP CENTER :   CPAP EQUIPMENT :    IF I HAVE SLEEP APNEA.....  WHERE CAN I FIND MORE INFORMATION?    American Academy of Sleep Medicine Patient information on sleep disorders:  http://yoursleep.aasmnet.org    THINGS TO REMEMBER  In most situations, sleep apnea is a lifelong disease that must be managed with daily therapy. Untreated disease, when severe, may result in an increased risk for an array of problems from heart disease to mood changes, car accidents and shorter lifespan.    CPAP -  WHY AND HOW?  Continuous positive airway pressure, or CPAP, is the most effective treatment for obstructive sleep apnea. A decision to use CPAP is a major step forward in the pursuit of a healthier life. The successful use of CPAP will help you breathe easier, sleep better and live healthier. Using CPAP can be a positive experience if you keep these white points in mind:  1. Commitment  CPAP is not a quick fix for your problem. It involves a long-term commitment to improve your sleep and your health.    2. Communication  Stay in close communication with both your sleep doctor and your CPAP  "supplier. Ask lots of questions and seek help when you need it.    3. Consistency  Use CPAP all night, every night and for every nap. You will receive the maximum health benefits from CPAP when you use it every time that you sleep. This will also make it easier for your body to adjust to the treatment.    4. Correction  The first machine and mask that you try may not be the best ones for you. Work with your sleep doctor and your CPAP supplier to make corrections to your equipment selection. Ask about trying a different type of machine or mask if you have ongoing problems. Make sure that your mask is a good fit and learn to use your equipment properly.    5. Challenge  Tell a family member or close friend to ask you each morning if you used your CPAP the previous night. Have someone to challenge you to give it your best effort.    6. Connection   Your adjustment to CPAP will be easier if you are able to connect with others who use the same treatment. Ask your sleep doctor if there is a support group in your area for people who have sleep apnea, or look for one on the Internet.    7. Comfort   Increase your level of comfort by using a saline spray, decongestant or heated humidifier if CPAP irritates your nose, mouth or throat. Use your unit's \"ramp\" setting to slowly get used to the air pressure level. There may be soft pads you can buy that will fit over your mask straps. Look on www.CPAP.com for accessories such as these straps, a pillow contoured for side-sleeping with CPAP, longer hoses, hose covers to reduce condensation, or stands to keep the hose out of your way.                                 8. Cleaning   Clean your mask, tubing and headgear on a regular basis. Put this time in your schedule so that you don't forget to do it. Check and replace the filters for your CPAP unit and humidifier.    9. Completion   Although you are never finished with CPAP therapy, you should reward yourself by celebrating the " completion of your first month of treatment. Expect this first month to be your hardest period of adjustment. It will involve some trial and error as you find the machine, mask and pressure settings that are right for you.    Continuation  After your first month of treatment, continue to make a daily commitment to use your CPAP all night, every night and for every nap.    CPAP-Tips to starting with success:  Begin using your CPAP for short periods of time during the day while you watch TV or read.    Use CPAP every night and for every nap. Using it less often reduces the health benefits and makes it harder for your body to get used to it.    Newer CPAP models are virtually silent; however, if you find the sound of your CPAP machine to be bothersome, place the unit under your bed to dampen the sound.     Make small adjustments to your mask, tubing, straps and headgear until you get the right fit. Tightening the mask may actually worsen the leak.  If it leaves significant marks on your face or irritates the bridge of your nose, it may not be the best mask for you.  Speak with the person who supplied the mask and consider trying other masks.    Use a saline nasal spray to ease mild nasal congestion. Neti-Pot or saline nasal rinses may also help. Nasal gel sprays can help reduce nasal dryness.  Biotene mouthwash can be helpful to protect your teeth if you experience frequent dry mouth.  Dry mouth may be a sign of air escaping out of your mouth or out of the mask in the case of a full face mask.  Speak with your provider if you expect that is the case.     Take a nasal decongestant to relieve more severe nasal or sinus congestion.  Do not use Afrin (oxymetazoline) nasal spray more than 3 days in a row.  Speak with your sleep doctor if your nasal congestion is chronic.    Use a heated humidifier that fits your CPAP model to enhance your breathing comfort. Adjust the heat setting up if you get a dry nose or throat, down if  you get condensation in the hose or mask.  Position the CPAP lower than you so that any condensation in the hose drains back into the machine rather than towards the mask.    Try a system that uses nasal pillows if traditional masks give you problems.    Clean your mask, tubing and headgear once a week. Make sure the equipment dries fully.    Regularly check and replace the filters for your CPAP unit and humidifier.    Work closely with your sleep doctor and your CPAP supplier to make sure that you have the machine, mask and air pressure setting that works best for you.    BESIDES CPAP, WHAT OTHER THERAPIES ARE THERE?    Postioning devices if you only have the problem on your back    Dental devices if your condition is mild    Nasal valves may be effective though experience is limited    Tongue Retaining Device if missing teeth precludes the use of a dental device    Weight loss if you are overweight    Surgery in limited cases where devices are not acceptable or there are problems with structures in the nose and throat  If treated with one of these alternative options, further evaluation is necessary to ensure that the therapy is effective. This may require some form of testing.     Healthy Lifestyle:  Healthy diet, exercise and Limit alcohol: Not only will excessive alcohol increase your weight over time, but it irritates the throat tissues and make them swell, shrinking the airway and causing snoring. Drinking alcohol should be limited and stopped within 3-4 hours before going to bed.   Stop smoking: (Red swollen throat, heat, nicotine), also irritates and swells the airway, among numerous other negative health consequences.    Positioning Device  This example shows a pillow that straps around the waist. It may be appropriate for those whose sleep study shows milder sleep apnea that occurs primarily when lying flat on one's back. Preliminary studies have shown benefit but effectiveness at home should be  verified.    Nasal Valves              Nasal valves may not be effective if you have frequent nasal congestion or have difficulty breathing through your nose. They may be an option for mild apnea if other options are not well tolerated. The efficacy of these devices is generally less than CPAP or oral appliances.  Oral Appliance  These are examples of two of many custom-made devices that are more likely to work in mild sleep apnea  Oral appliances are dental mouth pieces that fit very much like a sports mouth guards or removable orthodontic retainers. They are used to treat snoring  And obstructive sleep apnea . The device prevents the airway from collapsing by either holding the tongue or supporting the jaw in a forward position. Since oral appliances are non-invasive and easy to use, they may be considered as an early treatment option. Oral appliance therapy (OAT) involves the customization, selection, fabrication, fitting, adjustments and long-term follow-up care of specially designed oral devices, worn during sleep, which reposition the lower jaw and tongue base forward to maintain an open airway.  Custom made oral appliances are proven to be more effective than over-the-counter devices. Therefore, the over-the-counter devices are recommended not to be used as a screening tool nor as a therapeutic option.  Who gets a dental device?  Oral appliance therapy can be used as an alternative to  CPAP therapy for the treatment of mild to moderate sleep apnea and for those patients who prefer OAT to CPAP. Oral appliance therapy is a first line therapy for the treatment of primary snoring. Additionally, OAT is an option for those that cannot tolerate CPAP as therapy or who have experienced insufficient surgical results.    Possible side effects?  Frequent but minor side effects include: excessive salivation, dry mouth, discomfort of teeth and jaw and temporary changes in the patient s bite.  Potential complications  include: jaw pain, permanent occlusal changes and TMJ symptoms.  The above mentioned side effects and complications can be recognized and managed by dentists trained in dental sleep medicine.    Finding a dentist that practices dental sleep medicine  Specific training is available through the American Academy of Dental Sleep Medicine for dentists interested in working in the field of sleep. To find a dentist who is educated in the field of sleep and the use of oral appliances, near you, visit the Web site of the American Academy of Dental Sleep Medicine; also see http://www.accpstorage.org/newOrganization/patients/oralAppliances.pdf   To search for a dentist certified in these practices:  Http://aadsm.org/FindADentist.aspx?1  Http://www.accpstorage.org/newOrganization/patients/oralAppliances.pdf    Tongue Retaining Device               Tongue Retaining Devices are devices that generally  suction cup  onto the tongue preventing it from falling back into the back of the throat during sleep.  They may be an option for people missing teeth, but can be uncomfortable. This particular device can be purchased online, but similar devices made by dentists fit more precisely and may be tolerated better. In general, they are rarely effective and often not very well tolerated.    Weight Loss:  Some patients may experience reduction or elimination of sleep apnea with weight loss.  Though there are significant health benefits from weight loss, long-term weight loss is very difficult to achieve- studies show success with dietary management in less that 10% of people.     If you are interested in dietary weight loss, you should review the options discussed at the National Institutes of Health patient information site:     Http:/www.health.nih.gov/topic/WeightLossDieting    Bariatric programs offer counseling in all methods of weight  "loss:    Http:/www.[facility].org/Specialties/WeightLossSurgeryandMedicalMgmt/htm    Surgery:  There are a number of surgeries that have been attempted to treat apnea. In general, surgical options are usually reserved for cases in which there is a physical abnormality contributing to obstruction or other treatment options are ineffective or not tolerated. Most surgical options are either unreliable or quite invasive. One of the more common procedures is:  Uvulopalatopharyngoplasty: In this procedure, the uvula (the finger-like tissue that hangs in the back of the throat), part of the soft palate (the tissue that the uvula is attached to), and sometimes the tonsils or adenoids are removed. The efficacy of this surgery is around 30-50% .  After surgery, complications may include:  Sleepiness and sleep apnea related to post-surgery medication   Swelling, infection and bleeding   A sore throat and/or difficulty swallowing   Drainage of secretions into the nose and a nasal quality to the voice. English language speech does not seem to be affected by this surgery.   Narrowing of the airway in the nose and throat (hence constricting breathing) snoring and even iatrogenically caused sleep apnea. By cutting the tissues, excess scar tissue can \"tighten\" the airway and make it even smaller than it was before UPPP.  Patients who have had the uvula removed will become unable to correctly speak Faroese or any other language that has a uvular 'r' phoneme.    Surgeries to help resolve nasal congestion may help reduce the severity of apnea slightly. Nasal congestion does not cause apnea on its own, so these surgeries are usually not performed just for JAQUELIN.  They may be worth considering if the nasal congestion is significantly bothersome independent of apnea.      "

## 2021-07-12 NOTE — NURSING NOTE
Patient is scheduled for a PSG and follow up.  [employer] message sent with sleep study information.

## 2021-07-13 ENCOUNTER — TELEPHONE (OUTPATIENT)
Dept: FAMILY MEDICINE | Facility: CLINIC | Age: 56
End: 2021-07-13

## 2021-07-13 NOTE — TELEPHONE ENCOUNTER
Spoke with patient and per her she was seen at [facility] on Saturday due to extreme SOB.  Patient states she was found to still have pneumonia.  Patient was prescribed a z pack which patient states never works for her and she was prescribed mucinex which insurance wont cover due to that it is OTC med.  Patient requesting alternative for antibiotic and med for her cough and mucous that is not OTC med.  Please advise.

## 2021-07-13 NOTE — TELEPHONE ENCOUNTER
Patient was in hospital over weekend and prescribed zpak. Zpak is not working for her. Patient requesting a prescription for med that is similar to Mucinex.

## 2021-07-14 ENCOUNTER — TELEPHONE (OUTPATIENT)
Dept: FAMILY MEDICINE | Facility: CLINIC | Age: 56
End: 2021-07-14

## 2021-07-14 DIAGNOSIS — I10 HYPERTENSION GOAL BP (BLOOD PRESSURE) < 140/90: ICD-10-CM

## 2021-07-14 DIAGNOSIS — R81 GLUCOSURIA: Primary | ICD-10-CM

## 2021-07-14 NOTE — LETTER
DEPARTMENT OF HEALTH AND HUMAN SERVICES  [facility]    PLAN/UPDATED PLAN OF PROGRESS FOR OUTPATIENT REHABILITATION    PATIENTS NAME:  Inga Ledesma     : 1965    PROVIDER NUMBER:    [id number]    [id number]    PROVIDER NAME: [facility]    MEDICAL RECORD NUMBER: [id number]     START OF CARE DATE:  SOC Date: 19   TYPE:  PT    PRIMARY/TREATMENT DIAGNOSIS: (Pertinent Medical Diagnosis)     Cervicalgia  Chronic bilateral low back pain without sciatica  Bilateral low back pain without sciatica    VISITS FROM START OF CARE:  Rxs Used: 1     [facility] Initial Evaluation  Subjective:  HPI  Oswestry Score: 46 %                 Objective:    Inga Ledesma is a 53 year old female with a cervical spine condition.  Condition occurred with:  Insidious onset.  Condition occurred: at home.  This is a new and chronic condition  19 pt saw MD for chronic neck and back pain..    Patient reports pain:  Cervical left side and cervical right side.  Radiates to:  Upper arm right.  Pain is described as aching and is constant and reported as 8/10.  Associated symptoms:  Numbness, tingling, loss of motion/stiffness and loss of strength. Pain is worse in the P.M..  Symptoms are exacerbated by certain positions, rotating head, lying down, looking up or down and lifting and relieved by rest.  Since onset symptoms are unchanged.        General health as reported by patient is poor.  Pertinent medical history includes:  Asthma, cancer, chemical dependency, depression, emphysema, heart problems, high blood pressure, mental illness, migraines/headaches, numbness/tingling, overweight, seizures and smoking.  Medical allergies: yes (christina).  Other surgeries include:  Cancer surgery and heart surgery (,, 16-16).  Current medications:  Anti-depressants, cardiac medication, high blood pressure medication, muscle relaxants, pain medication and sleep  Problem: Falls - Risk of:  Goal: Will remain free from falls  Description: Will remain free from falls  Outcome: Ongoing  Goal: Absence of physical injury  Description: Absence of physical injury  Outcome: Ongoing     Problem: HEMODYNAMIC STATUS  Goal: Patient has stable vital signs and fluid balance  Outcome: Ongoing     Problem: ACTIVITY INTOLERANCE/IMPAIRED MOBILITY  Goal: Mobility/activity is maintained at optimum level for patient  Outcome: Ongoing     Problem: COMMUNICATION IMPAIRMENT  Goal: Ability to express needs and understand communication  Outcome: Ongoing     Problem: Coping:  Goal: Ability to cope will improve  Description: Ability to cope will improve  Outcome: Ongoing  Goal: Ability to identify appropriate support needs will improve  Description: Ability to identify appropriate support needs will improve  Outcome: Ongoing     Problem: Health Behavior:  Goal: Ability to manage health-related needs will improve  Description: Ability to manage health-related needs will improve  Outcome: Ongoing     Problem: Physical Regulation:  Goal: Signs of adequate cerebral perfusion will increase  Description: Signs of adequate cerebral perfusion will increase  Outcome: Ongoing  Goal: Ability to maintain a stable neurologic state will improve  Description: Ability to maintain a stable neurologic state will improve  Outcome: Ongoing     Problem: Safety:  Goal: Ability to remain free from injury will improve  Description: Ability to remain free from injury will improve  Outcome: Ongoing     Problem: Self-Concept:  Goal: Level of anxiety will decrease  Description: Level of anxiety will decrease  Outcome: Ongoing  Goal: Ability to verbalize feelings about condition will improve  Description: Ability to verbalize feelings about condition will improve  Outcome: Ongoing     Problem: Pain:  Goal: Pain level will decrease  Description: Pain level will decrease  Outcome: Ongoing  Goal: Control of acute pain  Description: Control of acute pain  Outcome: Ongoing  Goal: Control of chronic pain  Description: Control of chronic pain  Outcome: Ongoing medication (anxiety).  Current occupation is None   Patient is currently not working due to present treatment problem.  Primary job tasks include:  Lifting, prolonged sitting and prolonged standing.    Barriers include:  Transportation.    Red flags:  Chest pain.    Assessment/Plan:    Patient is a 53 year old female with cervical and lumbar complaints.    Patient has the following significant findings with corresponding treatment plan.                Diagnosis 1:  Neck and back pain  Pain -  hot/cold therapy, US, electric stimulation, mechanical traction, manual therapy, self management, education, directional preference exercise and home program  Decreased ROM/flexibility - manual therapy, therapeutic exercise, therapeutic activity and home program  Decreased joint mobility - manual therapy, therapeutic exercise, therapeutic activity and home program  Decreased strength - therapeutic exercise, therapeutic activities and home program  Impaired posture - neuro re-education, therapeutic activities and home program    Therapy Evaluation Codes:   1) History comprised of:   Personal factors that impact the plan of care:      Anxiety, Coping style, Overall behavior pattern and Past/current experiences.    Comorbidity factors that impact the plan of care are:      Asthma, Cancer, Chest pain, Depression, Emphysema, Heart problems, High blood pressure, Mental illness, Migraines/headaches, Overweight, Seizures, Smoking and Weakness.     Medications impacting care: Anti-depressant.  2) Examination of Body Systems comprised of:   Body structures and functions that impact the plan of care:      Cervical spine and Lumbar spine.   Activity limitations that impact the plan of care are:      Sleeping and Laying down.  3) Clinical presentation characteristics are:   Stable/Uncomplicated.  4) Decision-Making    Low complexity using standardized patient assessment instrument and/or measureable assessment of functional  "outcome.    Cumulative Therapy Evaluation is: Low complexity.  Previous and current functional limitations:  (See Goal Flow Sheet for this information)    Short term and Long term goals: (See Goal Flow Sheet for this information)   Communication ability:  Patient appears to be able to clearly communicate and understand verbal and written communication and follow directions correctly.  Treatment Explanation - The following has been discussed with the patient:   RX ordered/plan of care  Anticipated outcomes  Possible risks and side effects  This patient would benefit from PT intervention to resume normal activities.   Rehab potential is good.    Frequency:  1 X week, once daily  Duration:  for 12 weeks  Discharge Plan:  Achieve all LTG.  Independent in home treatment program.  Reach maximal therapeutic benefit.    Caregiver Signature/Credentials _____________________________ Date ________       Treating Provider: Hamzah Spangler DPT     I have reviewed and certified the need for these services and plan of treatment while under my care.        PHYSICIAN'S SIGNATURE:   _________________________________________  Date___________   Min Toledo PA-C    Certification period:  Beginning of Cert date period: 01/21/19 to  End of Cert period date: 04/20/19     Functional Level Progress Report: Please see attached \"Goal Flow sheet for Functional level.\"    ____X____ Continue Services or       ________ DC Services                Service dates: From  SOC Date: 01/21/19 date to present                         "

## 2021-07-14 NOTE — TELEPHONE ENCOUNTER
Meenakshi returned call. Scheduled patient for lab only visit to complete requested lab work.     Olinda Toledo, RN, BSN, PHN  [facility]

## 2021-07-14 NOTE — TELEPHONE ENCOUNTER
Left message on voice mail for Meenakshi  at rehab/group home to call clinic. See other telephone call also  [phone]  Mira Wright RN  [facility]

## 2021-07-14 NOTE — TELEPHONE ENCOUNTER
Reason for Call:  Other appointment    Detailed comments:  Patient needs lab order in order to set up lab appt,once the orders are in let patient know so the appt can be schedule     Phone Number Patient can be reached at: Cell number on file:    Telephone Information:   Mobile [phone]       Best Time: Anytime    Can we leave a detailed message on this number? YES    Call taken on 7/14/2021 at 10:57 AM by Farheen Schumacher

## 2021-07-14 NOTE — TELEPHONE ENCOUNTER
Left message on voice mail for Meenakshi /rehab ([phone]) to call clinic.  See other telephone call also  [phone]  Mira Wright RN  [facility]

## 2021-07-14 NOTE — TELEPHONE ENCOUNTER
Left message on voice mail for Supervisor (group home) to call clinic regarding labs that patient needs?.   [phone]  Mira Wright RN  [facility]      Spoke with patient and she reports she is going to be discharged for Rehab/group home but needs labs to check for diabetes? She is not sure.  She asked that we call the main number of group home to talk with staff about this.  [phone].  Mira Wright RN  [facility]

## 2021-07-14 NOTE — TELEPHONE ENCOUNTER
Spoke with Meenakshi- case coordinator patient being readied for discharge  She reports that patient's urine drug test came back positive for alcohol/or a sugar?  Patient is in rehab and denies any drinking, but per lab these results can be from a patient being prediabetic or diabetic.  Group home needs lab order to determine if patient if diabetic.  Glucose and A1C pended if appropriate.  Mira Wright RN  [facility]

## 2021-07-15 NOTE — TELEPHONE ENCOUNTER
Routing refill request to provider for review/approval because:  Labs out of range:  Cr    Pended for 6 month supply

## 2021-07-16 RX ORDER — LOSARTAN POTASSIUM 100 MG/1
TABLET ORAL
Qty: 90 TABLET | Refills: 1 | Status: SHIPPED | OUTPATIENT
Start: 2021-07-16 | End: 2022-02-10

## 2021-07-21 NOTE — TELEPHONE ENCOUNTER
Pneumonia is better per pt, but she has other concerns. Phone visit scheduled with Min on 07/22/21.

## 2021-07-22 ENCOUNTER — VIRTUAL VISIT (OUTPATIENT)
Dept: FAMILY MEDICINE | Facility: CLINIC | Age: 56
End: 2021-07-22
Payer: COMMERCIAL

## 2021-07-22 ENCOUNTER — THERAPY VISIT (OUTPATIENT)
Dept: PHYSICAL THERAPY | Facility: CLINIC | Age: 56
End: 2021-07-22
Payer: COMMERCIAL

## 2021-07-22 DIAGNOSIS — K14.0 GLOSSITIS: ICD-10-CM

## 2021-07-22 DIAGNOSIS — R81 GLUCOSURIA: ICD-10-CM

## 2021-07-22 DIAGNOSIS — F10.230 ALCOHOL DEPENDENCE WITH UNCOMPLICATED WITHDRAWAL (H): Primary | ICD-10-CM

## 2021-07-22 DIAGNOSIS — G44.89 OTHER HEADACHE SYNDROME: ICD-10-CM

## 2021-07-22 DIAGNOSIS — M25.512 ACUTE PAIN OF LEFT SHOULDER: Primary | ICD-10-CM

## 2021-07-22 PROCEDURE — 97112 NEUROMUSCULAR REEDUCATION: CPT | Mod: GP | Performed by: PHYSICAL THERAPIST

## 2021-07-22 PROCEDURE — 99214 OFFICE O/P EST MOD 30 MIN: CPT | Mod: 95 | Performed by: PHYSICIAN ASSISTANT

## 2021-07-22 PROCEDURE — 97110 THERAPEUTIC EXERCISES: CPT | Mod: GP | Performed by: PHYSICAL THERAPIST

## 2021-07-22 RX ORDER — FLUCONAZOLE 100 MG/1
100 TABLET ORAL DAILY
Qty: 15 TABLET | Refills: 1 | Status: SHIPPED | OUTPATIENT
Start: 2021-07-22 | End: 2021-10-21

## 2021-07-22 NOTE — PROGRESS NOTES
Cody is a 55 year old who is being evaluated via a billable telephone visit.      What phone number would you like to be contacted at? [phone]  How would you like to obtain your AVS? Marcia Mchugh   Cody is a 55 year old who presents for the following health issues     HPI     Lab Request  - tested positive for alcohol last weekend, however did not drink  - Group home will allow her to stay if she tests positive for diabetes or prediabetes  - wondering if she should fast for blood tests?    Right hand tingling  - has talked to Min about this in the past 6/1/21  - not better  No weakness. No ha's. No dizziness.  Headache  - left sided headaches  - has talked to Min about this   - would like MRI  Still sharp pains left side of head. Lasts 1 minute 3-4 times a day.    Passed out, possible seizure   -7/15/21  Situation: smoking, started to cough. Then lost consciousness.  No postictal state. Suspect cough induced syncope and certainly not a seizure.       Review of Systems   Constitutional, HEENT, cardiovascular, pulmonary, GI, , musculoskeletal, neuro, skin, endocrine and psych systems are negative, except as otherwise noted.      Objective           Vitals:  No vitals were obtained today due to virtual visit.    Physical Exam   healthy, alert and no distress  PSYCH: Alert and oriented times 3; coherent speech, normal   rate and volume, able to articulate logical thoughts, able   to abstract reason, no tangential thoughts, no hallucinations   or delusions  Her affect is normal  RESP: No cough, no audible wheezing, able to talk in full sentences  Remainder of exam unable to be completed due to telephone visits    Inga was seen today for lab request, musculoskeletal problem, headache and seizures.    Diagnoses and all orders for this visit:    Alcohol dependence with uncomplicated withdrawal (H)  -     Comprehensive metabolic panel (BMP + Alb, Alk Phos, ALT, AST, Total. Bili, TP);  Future    Glucosuria  -     Hemoglobin A1c; Future  -     UA Macro with Reflex to Micro and Culture - lab collect; Future  -     Comprehensive metabolic panel (BMP + Alb, Alk Phos, ALT, AST, Total. Bili, TP); Future    Glossitis  -     fluconazole (DIFLUCAN) 100 MG tablet; Take 1 tablet (100 mg) by mouth daily    Other headache syndrome  -     MR Brain w/o Contrast; Future      work on lifestyle modification  Advised supportive and symptomatic treatment.  Follow up with Provider - if condition persists or worsens.           Phone call duration: 15 minutes

## 2021-07-22 NOTE — PROGRESS NOTES
Discharge Note    Progress reporting period is from initial evaluation date (please see noted date below) to Jul 22, 2021.  Linked Episodes   Type: Episode: Status: Noted: Resolved: Last update: Updated by:   PHYSICAL THERAPY L shoulder - 5/20/2021 Active 5/20/2021 7/22/2021  1:47 PM Cuco Machado, PT      Comments:        SUBJECTIVE  Subjective changes noted by patient:  Pt reports that her L shoulder is feeling about the same overall. She has not done her exercises as frequently lately, but when she does complete them they have been going well.  Current pain level is 2/10.     Changes in function:  Yes (See Goal flowsheet attached for changes in current functional level)  Adverse reaction to treatment or activity: None    OBJECTIVE  Changes noted in objective findings: Grossly symmetrical shoulder ROM B; pain with active abduction and functional IR on L; symmetrical strength, B     ASSESSMENT/PLAN  Diagnosis: L shoulder pain   STG/LTGs have been met or progress has been made towards goals:  Yes, please see goal flowsheet for most current information   Last current status: Pt is progressing as expected   Self Management Plans:  HEP  I have re-evaluated this patient and find that the nature, scope, duration and intensity of the therapy is appropriate for the medical condition of the patient.  Inga continues to require the following intervention to meet STG and LTG's:  HEP.    Recommendations:  Discharge with current home program.  Patient to follow up with MD as needed.    Please refer to the daily flowsheet for treatment today, total treatment time and time spent performing 1:1 timed codes.

## 2021-07-28 ENCOUNTER — LAB (OUTPATIENT)
Dept: LAB | Facility: CLINIC | Age: 56
End: 2021-07-28
Payer: COMMERCIAL

## 2021-07-28 ENCOUNTER — TELEPHONE (OUTPATIENT)
Dept: FAMILY MEDICINE | Facility: CLINIC | Age: 56
End: 2021-07-28

## 2021-07-28 DIAGNOSIS — F10.230 ALCOHOL DEPENDENCE WITH UNCOMPLICATED WITHDRAWAL (H): ICD-10-CM

## 2021-07-28 DIAGNOSIS — R81 GLUCOSURIA: ICD-10-CM

## 2021-07-28 LAB
ALBUMIN SERPL-MCNC: 3.9 G/DL (ref 3.4–5)
ALBUMIN UR-MCNC: NEGATIVE MG/DL
ALP SERPL-CCNC: 109 U/L (ref 40–150)
ALT SERPL W P-5'-P-CCNC: 21 U/L (ref 0–50)
ANION GAP SERPL CALCULATED.3IONS-SCNC: 7 MMOL/L (ref 3–14)
APPEARANCE UR: CLEAR
AST SERPL W P-5'-P-CCNC: 8 U/L (ref 0–45)
BACTERIA #/AREA URNS HPF: ABNORMAL /HPF
BILIRUB SERPL-MCNC: 0.2 MG/DL (ref 0.2–1.3)
BILIRUB UR QL STRIP: NEGATIVE
BUN SERPL-MCNC: 18 MG/DL (ref 7–30)
CALCIUM SERPL-MCNC: 8.9 MG/DL (ref 8.5–10.1)
CAOX CRY #/AREA URNS HPF: ABNORMAL /HPF
CHLORIDE BLD-SCNC: 106 MMOL/L (ref 94–109)
CO2 SERPL-SCNC: 26 MMOL/L (ref 20–32)
COLOR UR AUTO: YELLOW
CREAT SERPL-MCNC: 0.84 MG/DL (ref 0.52–1.04)
GFR SERPL CREATININE-BSD FRML MDRD: 79 ML/MIN/1.73M2
GLUCOSE BLD-MCNC: 74 MG/DL (ref 70–99)
GLUCOSE UR STRIP-MCNC: NEGATIVE MG/DL
HBA1C MFR BLD: 5.5 % (ref 0–5.6)
HGB UR QL STRIP: NEGATIVE
KETONES UR STRIP-MCNC: NEGATIVE MG/DL
LEUKOCYTE ESTERASE UR QL STRIP: ABNORMAL
NITRATE UR QL: NEGATIVE
PH UR STRIP: 5.5 [PH] (ref 5–7)
POTASSIUM BLD-SCNC: 4.8 MMOL/L (ref 3.4–5.3)
PROT SERPL-MCNC: 6.7 G/DL (ref 6.8–8.8)
RBC #/AREA URNS AUTO: ABNORMAL /HPF
SODIUM SERPL-SCNC: 139 MMOL/L (ref 133–144)
SP GR UR STRIP: 1.01 (ref 1–1.03)
SQUAMOUS #/AREA URNS AUTO: ABNORMAL /LPF
UROBILINOGEN UR STRIP-ACNC: 0.2 E.U./DL
WBC #/AREA URNS AUTO: ABNORMAL /HPF

## 2021-07-28 PROCEDURE — 87086 URINE CULTURE/COLONY COUNT: CPT

## 2021-07-28 PROCEDURE — 36415 COLL VENOUS BLD VENIPUNCTURE: CPT

## 2021-07-28 PROCEDURE — 83036 HEMOGLOBIN GLYCOSYLATED A1C: CPT

## 2021-07-28 PROCEDURE — 80053 COMPREHEN METABOLIC PANEL: CPT

## 2021-07-28 PROCEDURE — 81001 URINALYSIS AUTO W/SCOPE: CPT

## 2021-07-28 NOTE — TELEPHONE ENCOUNTER
Inga left [facility] paperwork 7/28/21 and would like Min to complete and return to her same day. I did let her know of 3-7 business day completion time policy however.  Irma Baker on 7/28/2021 at 9:53 AM

## 2021-07-29 LAB — BACTERIA UR CULT: NORMAL

## 2021-07-30 ENCOUNTER — ANCILLARY PROCEDURE (OUTPATIENT)
Dept: MRI IMAGING | Facility: CLINIC | Age: 56
End: 2021-07-30
Attending: PHYSICIAN ASSISTANT
Payer: COMMERCIAL

## 2021-07-30 DIAGNOSIS — G44.89 OTHER HEADACHE SYNDROME: ICD-10-CM

## 2021-07-30 PROCEDURE — 70551 MRI BRAIN STEM W/O DYE: CPT | Mod: TC | Performed by: RADIOLOGY

## 2021-08-03 ENCOUNTER — VIRTUAL VISIT (OUTPATIENT)
Dept: FAMILY MEDICINE | Facility: CLINIC | Age: 56
End: 2021-08-03
Payer: COMMERCIAL

## 2021-08-03 ENCOUNTER — NURSE TRIAGE (OUTPATIENT)
Dept: FAMILY MEDICINE | Facility: CLINIC | Age: 56
End: 2021-08-03

## 2021-08-03 DIAGNOSIS — R05.9 COUGH: Primary | ICD-10-CM

## 2021-08-03 DIAGNOSIS — J42 CHRONIC BRONCHITIS, UNSPECIFIED CHRONIC BRONCHITIS TYPE (H): ICD-10-CM

## 2021-08-03 PROCEDURE — 99213 OFFICE O/P EST LOW 20 MIN: CPT | Mod: 95 | Performed by: PHYSICIAN ASSISTANT

## 2021-08-03 RX ORDER — PREDNISONE 10 MG/1
TABLET ORAL
Qty: 42 TABLET | Refills: 0 | Status: SHIPPED | OUTPATIENT
Start: 2021-08-03 | End: 2021-09-14

## 2021-08-03 NOTE — TELEPHONE ENCOUNTER
Reason for Call:  Other     Detailed comments: PT called and wanted Dr. Toledo to know she is wheezing. She wants to know if he wants to send in a prescription for prednisone or an antibiotic    Phone Number Patient can be reached at: Cell number on file:    Telephone Information:   Mobile [phone]       Best Time: anytime    Can we leave a detailed message on this number? YES    Call taken on 8/3/2021 at 7:42 AM by Maria Teresa Mcdaniels

## 2021-08-03 NOTE — PROGRESS NOTES
Cody is a 55 year old who is being evaluated via a billable telephone visit.    12:10-12:30  What phone number would you like to be contacted at? [phone]   How would you like to obtain your AVS? MyChart    Assessment & Plan     Chronic bronchitis, unspecified chronic bronchitis type (H)  Will treat with prednisone given her hx and now symptoms but did ask pt to be seen.  She has no ride and doesn't want to go to the ER.  Did stress the need for a covid test and the need to be seen if worsening or not improving.  She is agreeable to that.    - predniSONE (DELTASONE) 10 MG tablet; Take 60 mg days 1 and 2, 50 mg days 3 and 4, 40 mg days 5 and 6, 30 mg days 7 and 8, 20 mg days 9 and 10, 10 mg days 11 and 12    Cough  As above.    - Symptomatic COVID-19 Virus (Coronavirus) by PCR Nasopharyngeal; Future                 Return in about 5 days (around 8/8/2021) for if not improving.    BENI Russell [facility]   Cody is a 55 year old who presents for the following health issues     HPI     Acute Illness  Acute illness concerns: cough and SOB   Onset/Duration: 2-3 days   Symptoms:  Fever: no  Chills/Sweats: no  Headache (location?): YES  Sinus Pressure: no  Conjunctivitis:  no  Ear Pain: YES: left, no drainage   Rhinorrhea: bloody nose yesterday   Congestion: YES  Sore Throat: no  Cough: YES- dry,worse since last night   Wheeze: YES  Decreased Appetite: no  Nausea: no  Vomiting: no  Diarrhea: YES  Dysuria/Freq.: no  Dysuria or Hematuria: no  Fatigue/Achiness: YES  Sick/Strep Exposure: no  Therapies tried and outcome: None  Increase in sob and wheezing with activity.  Oxygen is 85-87% when up moving around but oxygen is 97-98% at rest.  She just recently started monitoring her oxygen so she is not sure if this is abnormal for her.  Does feel like her copd.  Using inhaler and nebulizer.     Living in a group home and is vaccinated.  Has been tested for covid multiple  times.  Doesn't think this is covid  Doesn't have a  and doesn't want to go to the ER  Last time she had prednisone was 2 months ago and it helped         Review of Systems   As above      Objective           Vitals:  No vitals were obtained today due to virtual visit.    Physical Exam   healthy, alert and no distress  PSYCH: Alert and oriented times 3; coherent speech, normal   rate and volume, able to articulate logical thoughts, able   to abstract reason, no tangential thoughts, no hallucinations   or delusions  Her affect is normal  RESP: No cough, no audible wheezing, able to talk in full sentences  Remainder of exam unable to be completed due to telephone visits                Phone call duration: 20 minutes

## 2021-08-03 NOTE — TELEPHONE ENCOUNTER
Reason for Disposition    MILD difficulty breathing (e.g., minimal/no SOB at rest, SOB with walking, pulse < 100) of new onset or worse than normal    Longstanding difficulty breathing (e.g., CHF, COPD, emphysema) and worse than normal    Additional Information    Negative: Breathing stopped and hasn't returned    Negative: Choking on something    Negative: SEVERE difficulty breathing (e.g., struggling for each breath, speaks in single words, pulse > 120)     Advised patient to call 911 if these symptoms occur    Negative: Bluish (or gray) lips or face     Advised patient to call 911 if these symptoms occur    Negative: Difficult to awaken or acting confused (e.g., disoriented, slurred speech)     Advised patient to call 911 if these symptoms occur    Negative: Passed out (i.e., fainted, collapsed and was not responding)    Negative: Wheezing started suddenly after medicine, an allergic food, or bee sting    Negative: Stridor     Advised patient to call 911 if these symptoms occur    Negative: Slow, shallow and weak breathing     Advised patient to call 911 if these symptoms occur    Negative: Sounds like a life-threatening emergency to the triager     Advised patient to call 911 if these symptoms occur    Negative: Chest pain    Negative: Wheezing (high pitched whistling sound) and previous asthma attacks or use of asthma medicines    Negative: Difficulty breathing and only present when coughing    Negative: Difficulty breathing and only from stuffy or runny nose    Negative: Difficulty breathing and within 14 days of COVID-19 Exposure    Negative: MODERATE difficulty breathing (e.g., speaks in phrases, SOB even at rest, pulse 100-120) of new onset or worse than normal     Advised patient to to ER asap if these symptoms occur    Negative: Wheezing can be heard across the room     Advised patient to to ER asap if these symptoms occur    Negative: Drooling or spitting out saliva (because can't swallow)     Advised  "patient to to ER asap if these symptoms occur    Negative: Any history of prior \"blood clot\" in leg or lungs    Negative: Illness requiring prolonged bedrest in past month (e.g., immobilization, long hospital stay)    Negative: Major surgery in the past month    Negative: Hip or leg fracture (broken bone) in past month (or had cast on leg or ankle in past month)    Negative: Long-distance travel in past month (e.g., car, bus, train, plane; with trip lasting 6 or more hours)    Negative: Extra heart beats OR irregular heart beating   (i.e., \"palpitations\")    Negative: Fever > 103 F (39.4 C)    Negative: Fever > 100.0 F (37.8 C) and bedridden (e.g., nursing home patient, stroke, chronic illness, recovering from surgery)    Negative: Fever > 101 F (38.3 C) and over 60 years of age    Negative: Fever > 100.0 F (37.8 C) and diabetes mellitus or weak immune system (e.g., HIV positive, cancer chemo, splenectomy, organ transplant, chronic steroids)    Negative: Periods where breathing stops and then resumes normally and bedridden (e.g., nursing home patient, CVA)    Negative: Pregnant or postpartum (from 0 to 6 weeks after delivery)    Negative: Patient sounds very sick or weak to the triager    Answer Assessment - Initial Assessment Questions  1. RESPIRATORY STATUS: \"Describe your breathing?\" (e.g., wheezing, shortness of breath, unable to speak, severe coughing)       I have been wheezing more than normal since this weekend  2. ONSET: \"When did this breathing problem begin?\"       It become worse over the weekend  3. PATTERN \"Does the difficult breathing come and go, or has it been constant since it started?\"       Nebs help, but then in a couple hours later, my wheezing occurs again  4. SEVERITY: \"How bad is your breathing?\" (e.g., mild, moderate, severe)     - MILD: No SOB at rest, mild SOB with walking, speaks normally in sentences, can lay down, no retractions, pulse < 100.     - MODERATE: SOB at rest, SOB with " "minimal exertion and prefers to sit, cannot lie down flat, speaks in phrases, mild retractions, audible wheezing, pulse 100-120.     - SEVERE: Very SOB at rest, speaks in single words, struggling to breathe, sitting hunched forward, retractions, pulse > 120     mild  5. RECURRENT SYMPTOM: \"Have you had difficulty breathing before?\" If so, ask: \"When was the last time?\" and \"What happened that time?\"      Yes, I have a long history of lung issues  6. CARDIAC HISTORY: \"Do you have any history of heart disease?\" (e.g., heart attack, angina, bypass surgery, angioplasty)       Heart failure  7. LUNG HISTORY: \"Do you have any history of lung disease?\"  (e.g., pulmonary embolus, asthma, emphysema)     Yes pneumomia, bronchitis, COPD  8. CAUSE: \"What do you think is causing the breathing problem?\"       Who knows all of the above maybe  9. OTHER SYMPTOMS: \"Do you have any other symptoms? (e.g., dizziness, runny nose, cough, chest pain, fever)      I have a really harsh cough  10. PREGNANCY: \"Is there any chance you are pregnant?\" \"When was your last menstrual period?\"        denies  11. TRAVEL: \"Have you traveled out of the country in the last month?\" (e.g., travel history, exposures)        Denies, but lives in a group home setting    Protocols used: BREATHING DIFFICULTY-A-OH    "

## 2021-08-03 NOTE — TELEPHONE ENCOUNTER
Patient had virtual visit with Min Toledo, PCP on 7/22/21.  Headaches addressed.  Brain MRI was done 7/30/21.  Does not appear provider has reviewed results yet.    Per problem list, is a smoker, has COPD and chronic bronchitis.    I see 7/13/21 phone call, patient was treated for ongoing pneumonia per [facility] ER visit, got z pack.  PCP advised follow up visit to discuss.   Was this issue discussed at 7/22 phone visit?  I don't see any noted regarding respiratory issue.    ?pharmacy?      Attempted to call patient at home/mobile number, no answer, left message on voicemail; patient was instructed to return call to [facility] at [phone].    Belen Pringle RN  [facility]

## 2021-08-06 ENCOUNTER — TELEPHONE (OUTPATIENT)
Dept: FAMILY MEDICINE | Facility: CLINIC | Age: 56
End: 2021-08-06

## 2021-08-06 DIAGNOSIS — F33.0 MAJOR DEPRESSIVE DISORDER, RECURRENT EPISODE, MILD (H): ICD-10-CM

## 2021-08-06 NOTE — TELEPHONE ENCOUNTER
"Assume will need virtual visit to discuss this, is she having serious depression or anxiety problems requiring ER/urgent attention?    Plan to call to discuss.    PHQ-9 score:    PHQ 5/12/2021   PHQ-9 Total Score 14   Q9: Thoughts of better off dead/self-harm past 2 weeks Not at all       I called and spoke to patient, she says she does not need a virtual visit, say she used to be on 80 mg prozac but she asked Min to \"cut it down\".    Says she is not suicidal but crying more frequently, moods are more up and down.  Wants to go back on 80 mg daily.    Says she will need a new Rx sent to [facility] if approved as she is in a group home and cannot just change to 2 tabs daily on her own.    Routed to Min Toledo to address, I did advise patient he is done for the day, not sure if this will get addressed today or Monday.    Belen Pringle RN  [facility]            "

## 2021-08-06 NOTE — TELEPHONE ENCOUNTER
Reason for call:  Patient requesting increase anti depression medication Fluoxetine (PROZAC) 40 MG capsules to 80 MG a day     Name of Medication: Fluoxetine (PROZAC) 40 MG capsules to 80 MG a day       Pharmacy:  [facility]  [address] [place] [address]  Phone: [phone]      Phone number to reach patient:  Home number on file [phone] (home)    Best Time:  Any time after 2:30pm    Can we leave a detailed message on this number?  YES

## 2021-08-07 RX ORDER — FLUOXETINE 40 MG/1
80 CAPSULE ORAL DAILY
Qty: 180 CAPSULE | Refills: 0 | Status: SHIPPED | OUTPATIENT
Start: 2021-08-07 | End: 2021-10-17

## 2021-08-10 ENCOUNTER — TELEPHONE (OUTPATIENT)
Dept: FAMILY MEDICINE | Facility: CLINIC | Age: 56
End: 2021-08-10

## 2021-08-16 DIAGNOSIS — K21.9 GASTROESOPHAGEAL REFLUX DISEASE WITHOUT ESOPHAGITIS: ICD-10-CM

## 2021-08-16 RX ORDER — FAMOTIDINE 40 MG/1
TABLET, FILM COATED ORAL
Qty: 30 TABLET | Refills: 0 | Status: SHIPPED | OUTPATIENT
Start: 2021-08-16 | End: 2021-08-19

## 2021-08-16 NOTE — TELEPHONE ENCOUNTER
"Requested Prescriptions   Pending Prescriptions Disp Refills     famotidine (PEPCID) 40 MG tablet [Pharmacy Med Name: Famotidine 40 MG Tablet] 30 tablet 11     Sig: TAKE 1 TABLET BY MOUTH AT BEDTIME AS NEEDED FOR HEARTBURN       H2 Blockers Protocol Passed - 8/16/2021 10:12 AM        Passed - Patient is age 12 or older        Passed - Recent (12 mo) or future (30 days) visit within the authorizing provider's specialty     Patient has had an office visit with the authorizing provider or a provider within the authorizing providers department within the previous 12 mos or has a future within next 30 days. See \"Patient Info\" tab in inbasket, or \"Choose Columns\" in Meds & Orders section of the refill encounter.              Passed - Medication is active on med list           Prescription approved per [facility] Refill Protocol.  Marah HEMPHILL BSN  Triage Nurse  [facility]: [facility]      "

## 2021-08-19 ENCOUNTER — TELEPHONE (OUTPATIENT)
Dept: FAMILY MEDICINE | Facility: CLINIC | Age: 56
End: 2021-08-19

## 2021-08-19 DIAGNOSIS — J42 CHRONIC BRONCHITIS, UNSPECIFIED CHRONIC BRONCHITIS TYPE (H): ICD-10-CM

## 2021-08-19 DIAGNOSIS — K21.9 GASTROESOPHAGEAL REFLUX DISEASE WITHOUT ESOPHAGITIS: ICD-10-CM

## 2021-08-19 DIAGNOSIS — G44.89 OTHER HEADACHE SYNDROME: Primary | ICD-10-CM

## 2021-08-19 RX ORDER — PREDNISONE 10 MG/1
TABLET ORAL
Qty: 42 TABLET | Refills: 0 | Status: CANCELLED | OUTPATIENT
Start: 2021-08-19

## 2021-08-19 NOTE — TELEPHONE ENCOUNTER
I updated her medications with her requested changes and routed to PCP Min Toledo to address.    She had virtual visit 8/3/21 with Inga Montana PA-C.    Plan per that visit:    Chronic bronchitis, unspecified chronic bronchitis type (H)  Will treat with prednisone given her hx and now symptoms but did ask pt to be seen.  She has no ride and doesn't want to go to the ER.  Did stress the need for a covid test and the need to be seen if worsening or not improving.  She is agreeable to that.    - predniSONE (DELTASONE) 10 MG tablet; Take 60 mg days 1 and 2, 50 mg days 3 and 4, 40 mg days 5 and 6, 30 mg days 7 and 8, 20 mg days 9 and 10, 10 mg days 11 and 12     Cough  As above.    - Symptomatic COVID-19 Virus (Coronavirus) by PCR Nasopharyngeal; Future           Return in about 5 days (around 8/8/2021) for if not improving.     Inga Montana PA-C  [facility]     She did not do the covid test.    I called and spoke to patient, she says she finished the prednisone Monday, is again wheezing and coughing, denies chest pain, is able to talk in full sentences.   Says she will get tested for covid at her group home today.   Scheduled to see PCP next week to evaluate breathing and use of prednisone.    She is hoping she can get another round of prednisone before then so she won't get worse and have to go to the hospital.   Advised her to go to ER if acutely worse, however.    Routed to PCP to address prednisone refill and the famotidine and tylenol changes per her request.    Belen Pringle RN  [facility]

## 2021-08-19 NOTE — TELEPHONE ENCOUNTER
Reason for Call:  Other prescription    Detailed comments:  patient wants tylanol 500mg to be worded as 1 to 2 tablets every 4 to 6 hour, it is not prescribe that way.    And the Famotidene it should say take 1 tablet daily    Patient finish her prednisone and still wheezing    Pharmacy [facility]           Phone Number Patient can be reached at: Cell number on file:    Telephone Information:   Mobile [phone]       Best Time:  As soon as possible    Can we leave a detailed message on this number? YES    Call taken on 8/19/2021 at 2:27 PM by Farheen Schumacher

## 2021-08-23 ENCOUNTER — TELEPHONE (OUTPATIENT)
Dept: FAMILY MEDICINE | Facility: CLINIC | Age: 56
End: 2021-08-23

## 2021-08-23 NOTE — TELEPHONE ENCOUNTER
Central Prior Authorization Team   Phone: [phone]    PA Initiation    Medication: YUPELRI SOL  Insurance Company: [employer] - Phone [phone] Fax [phone]  Pharmacy Filling the Rx: [facility] [place], [place] - [address]  Filling Pharmacy Phone: [phone]  Filling Pharmacy Fax:    Start Date: 8/23/2021

## 2021-08-23 NOTE — TELEPHONE ENCOUNTER
Prior Authorization Retail Medication Request    Medication/Dose: YUPELRI SOL  ICD code (if different than what is on RX): J 42  Previously Tried and Failed:    Rationale:      Insurance Name:  [employer]ERMAN  Insurance ID:  [id number]      Pharmacy Information (if different than what is on RX)  Name:  [facility]  Phone:  [phone]

## 2021-08-24 ENCOUNTER — TELEPHONE (OUTPATIENT)
Dept: FAMILY MEDICINE | Facility: CLINIC | Age: 56
End: 2021-08-24

## 2021-08-24 RX ORDER — PREDNISONE 20 MG/1
20 TABLET ORAL 2 TIMES DAILY
Qty: 10 TABLET | Refills: 0 | Status: SHIPPED | OUTPATIENT
Start: 2021-08-24 | End: 2021-08-25

## 2021-08-24 RX ORDER — ACETAMINOPHEN 500 MG
TABLET ORAL
Qty: 100 TABLET | Refills: 1 | Status: SHIPPED | OUTPATIENT
Start: 2021-08-24 | End: 2022-03-09

## 2021-08-24 RX ORDER — FAMOTIDINE 40 MG/1
40 TABLET, FILM COATED ORAL DAILY
Qty: 90 TABLET | Refills: 1 | Status: SHIPPED | OUTPATIENT
Start: 2021-08-24 | End: 2022-03-09

## 2021-08-24 NOTE — TELEPHONE ENCOUNTER
Patient does have an office visit for tomorrow.    Looks like pharmacy has also sent a message to PCP to clarify Pepcid as patient is on omeprazole already.    Called [phone] (home)     Did patient answer the phone: No, left a message on voicemail to return call to the [facility] at [phone].    Marah HEMPHILL BSN  Triage Nurse  [facility]: [facility]

## 2021-08-24 NOTE — TELEPHONE ENCOUNTER
Notified patient of the orders per PCP.    Patient stated that she will talk to PCP about her Pepcid and Omeprazole tomorrow.    Marah RN BSN  Triage Nurse  [facility]: [facility]

## 2021-08-24 NOTE — TELEPHONE ENCOUNTER
Prior Authorization Approval    Authorization Effective Date: 7/24/2021  Authorization Expiration Date: 8/24/2022  Medication: YUPELRI SOL  Approved Dose/Quantity:    Reference #:     Insurance Company: [employer] - Phone [phone] Fax [phone]  Expected CoPay:       CoPay Card Available:      Foundation Assistance Needed:    Which Pharmacy is filling the prescription (Not needed for infusion/clinic administered): [facility] - [facility] [id number] [place] [address]. [address].  Pharmacy Notified: Yes  Patient Notified: Yes  **Instructed pharmacy to notify patient when script is ready to /ship.**

## 2021-08-24 NOTE — TELEPHONE ENCOUNTER
[facility] calling for clarification.  Pepcid ordered.  Checking to see if provider is aware that patient is also on omeprazole.  Does provider want patient on both?  Will send to provider to advise.

## 2021-08-24 NOTE — TELEPHONE ENCOUNTER
Left message on voice mail for patient to call clinic.   [phone]  Mira Wright RN  [facility]

## 2021-08-24 NOTE — TELEPHONE ENCOUNTER
Patient called back and stated that the pharmacy has called her regarding this,  It has been approved.    Marah RN BSN  Triage Nurse  [facility]: [facility]

## 2021-08-25 ENCOUNTER — OFFICE VISIT (OUTPATIENT)
Dept: FAMILY MEDICINE | Facility: CLINIC | Age: 56
End: 2021-08-25
Payer: COMMERCIAL

## 2021-08-25 ENCOUNTER — ANCILLARY PROCEDURE (OUTPATIENT)
Dept: GENERAL RADIOLOGY | Facility: CLINIC | Age: 56
End: 2021-08-25
Attending: PHYSICIAN ASSISTANT
Payer: COMMERCIAL

## 2021-08-25 VITALS
SYSTOLIC BLOOD PRESSURE: 137 MMHG | DIASTOLIC BLOOD PRESSURE: 78 MMHG | HEART RATE: 86 BPM | WEIGHT: 209.6 LBS | TEMPERATURE: 99.1 F | OXYGEN SATURATION: 97 % | BODY MASS INDEX: 41.7 KG/M2

## 2021-08-25 DIAGNOSIS — J42 CHRONIC BRONCHITIS, UNSPECIFIED CHRONIC BRONCHITIS TYPE (H): Primary | ICD-10-CM

## 2021-08-25 DIAGNOSIS — D50.8 IRON DEFICIENCY ANEMIA SECONDARY TO INADEQUATE DIETARY IRON INTAKE: ICD-10-CM

## 2021-08-25 DIAGNOSIS — J44.1 COPD EXACERBATION (H): ICD-10-CM

## 2021-08-25 DIAGNOSIS — I50.32 CHRONIC HEART FAILURE WITH PRESERVED EJECTION FRACTION (H): ICD-10-CM

## 2021-08-25 LAB
BASOPHILS # BLD AUTO: 0.1 10E3/UL (ref 0–0.2)
BASOPHILS NFR BLD AUTO: 1 %
EOSINOPHIL # BLD AUTO: 0 10E3/UL (ref 0–0.7)
EOSINOPHIL NFR BLD AUTO: 0 %
ERYTHROCYTE [DISTWIDTH] IN BLOOD BY AUTOMATED COUNT: 16.8 % (ref 10–15)
HCT VFR BLD AUTO: 32.6 % (ref 35–47)
HGB BLD-MCNC: 10.3 G/DL (ref 11.7–15.7)
IMM GRANULOCYTES # BLD: 0 10E3/UL
IMM GRANULOCYTES NFR BLD: 0 %
LYMPHOCYTES # BLD AUTO: 0.6 10E3/UL (ref 0.8–5.3)
LYMPHOCYTES NFR BLD AUTO: 5 %
MCH RBC QN AUTO: 28.2 PG (ref 26.5–33)
MCHC RBC AUTO-ENTMCNC: 31.6 G/DL (ref 31.5–36.5)
MCV RBC AUTO: 89 FL (ref 78–100)
MONOCYTES # BLD AUTO: 0.1 10E3/UL (ref 0–1.3)
MONOCYTES NFR BLD AUTO: 1 %
NEUTROPHILS # BLD AUTO: 9.6 10E3/UL (ref 1.6–8.3)
NEUTROPHILS NFR BLD AUTO: 93 %
NRBC # BLD AUTO: 0 10E3/UL
NRBC BLD AUTO-RTO: 0 /100
PLATELET # BLD AUTO: 302 10E3/UL (ref 150–450)
RBC # BLD AUTO: 3.65 10E6/UL (ref 3.8–5.2)
WBC # BLD AUTO: 10.3 10E3/UL (ref 4–11)

## 2021-08-25 PROCEDURE — 99214 OFFICE O/P EST MOD 30 MIN: CPT | Performed by: PHYSICIAN ASSISTANT

## 2021-08-25 PROCEDURE — 85025 COMPLETE CBC W/AUTO DIFF WBC: CPT | Performed by: PHYSICIAN ASSISTANT

## 2021-08-25 PROCEDURE — 71046 X-RAY EXAM CHEST 2 VIEWS: CPT | Performed by: RADIOLOGY

## 2021-08-25 PROCEDURE — 36415 COLL VENOUS BLD VENIPUNCTURE: CPT | Performed by: PHYSICIAN ASSISTANT

## 2021-08-25 RX ORDER — PREDNISONE 10 MG/1
TABLET ORAL
Qty: 42 TABLET | Refills: 0 | Status: SHIPPED | OUTPATIENT
Start: 2021-08-25 | End: 2021-09-14

## 2021-08-25 RX ORDER — CEPHALEXIN 500 MG/1
500 CAPSULE ORAL 2 TIMES DAILY
Qty: 20 CAPSULE | Refills: 0 | Status: SHIPPED | OUTPATIENT
Start: 2021-08-25 | End: 2021-09-04

## 2021-08-25 NOTE — PROGRESS NOTES
Subjective   Cody is a 56 year old who presents for the following health issues     HPI     Acute Illness  Acute illness concerns: Recheck cough and wheezing  Onset/Duration: months-ongoing  Symptoms:  Fever: no  Chills/Sweats: no  Headache (location?): YES  Sinus Pressure: no  Conjunctivitis:  no  Ear Pain: YES: left  Rhinorrhea: no  Congestion: YES  Sore Throat: YES  Cough: YES-non-productive  Wheeze: YES  Decreased Appetite: doesn't eat much   Nausea: no  Vomiting: no  Diarrhea: YES  Dysuria/Freq.: no  Dysuria or Hematuria: no  Fatigue/Achiness: YES- legs are getting so tired  Sick/Strep Exposure: no  Therapies tried and outcome: None  Steroids prove helpful until she goes from 20 mg to 10 mg daily.   No fever  Recent covid screening was negative.   Review of Systems   Constitutional, HEENT, cardiovascular, pulmonary, GI, , musculoskeletal, neuro, skin, endocrine and psych systems are negative, except as otherwise noted.      Objective    /78 (BP Location: Left arm, Patient Position: Sitting, Cuff Size: Adult Regular)   Pulse 86   Temp 99.1  F (37.3  C) (Tympanic)   Wt 95.1 kg (209 lb 9.6 oz)   LMP 06/02/2010   SpO2 97%   BMI 41.70 kg/m    Body mass index is 41.7 kg/m .  Physical Exam   Eye exam - right eye normal lid, conjunctiva, cornea, pupil and fundus, left eye normal lid, conjunctiva, cornea, pupil and fundus.  Thyroid not palpable, not enlarged, no nodules detected.  CHEST:chest clear to IPPA, no tachypnea, retractions or cyanosis and S1, S2 normal, no murmur, no gallop, rate regular.    Inga was seen today for follow up.    Diagnoses and all orders for this visit:    Chronic bronchitis, unspecified chronic bronchitis type (H)  -     CT Chest Lung Cancer Scrn Low Dose wo; Future    Chronic heart failure with preserved ejection fraction (H)    COPD exacerbation (H)  -     CT Chest Lung Cancer Scrn Low Dose wo; Future  -     XR Chest 2 Views; Future  -     predniSONE (DELTASONE)  10 MG tablet; Take 60 mg days 1 and 2, 50 mg days 3 and 4, 40 mg days 5 and 6, 30 mg days 7 and 8, 20 mg days 9 and 10, 10 mg days 11 and 12  -     cephALEXin (KEFLEX) 500 MG capsule; Take 1 capsule (500 mg) by mouth 2 times daily for 10 days    Iron deficiency anemia secondary to inadequate dietary iron intake  -     CBC with platelets and differential; Future  -     CBC with platelets and differential      work on lifestyle modification  Advised supportive and symptomatic treatment.  Follow up with Provider - if condition persists or worsens.

## 2021-08-25 NOTE — TELEPHONE ENCOUNTER
Notified Geritom of the orders below per PCP.    Marah RN BSN  Triage Nurse  [facility]: [facility]

## 2021-09-01 ENCOUNTER — ANCILLARY PROCEDURE (OUTPATIENT)
Dept: CT IMAGING | Facility: CLINIC | Age: 56
End: 2021-09-01
Attending: PHYSICIAN ASSISTANT
Payer: COMMERCIAL

## 2021-09-01 DIAGNOSIS — J44.1 COPD EXACERBATION (H): ICD-10-CM

## 2021-09-01 DIAGNOSIS — J42 CHRONIC BRONCHITIS, UNSPECIFIED CHRONIC BRONCHITIS TYPE (H): ICD-10-CM

## 2021-09-01 PROCEDURE — 71271 CT THORAX LUNG CANCER SCR C-: CPT | Mod: GC | Performed by: RADIOLOGY

## 2021-09-02 ENCOUNTER — TELEPHONE (OUTPATIENT)
Dept: FAMILY MEDICINE | Facility: CLINIC | Age: 56
End: 2021-09-02

## 2021-09-02 NOTE — TELEPHONE ENCOUNTER
Janelle called from Radiology with the following significant findings from Chest CT (done on 9/1/21):    Impression:   1. ACR Assessment Category (v1.1):  Lung-RADS Category 2. Benign  appearance or behavior.       Recommendation:  Lung-RADS Category 2. Benign appearance or behavior.  Recommendation:  continue annual screening with Lung cancer screening  CT (please order exam code [id number]).         2. Significant Incidental Finding(s):  Category S: Yes.  a.  Multifocal, upper lobe predominant groundglass centrilobular  nodules , consistent with respiratory bronchiolitis or infection.  b. Mild compression deformity of T5 vertebral body.    3. Any moderate or severe Emphysema or bronchial wall thickening or  mosaic attenuation? No        4. Avoidance of tobacco smoke is strongly advised. Please consider  referral for smoking cessation to [facility]ication Therapy Management  (MTM) if clinically appropriate.    Please review and advise.     Nasrin Leroy RN BSN  [facility]

## 2021-09-03 NOTE — TELEPHONE ENCOUNTER
Spoke with patient gave below results.  She reports she is only a little better with her cough and wheezing.   she has 3 days left on her Prednisone and 2 days left on the abx. She will complete both.  Please advise on any new instructions.  Mira Wright RN  [facility]

## 2021-09-03 NOTE — TELEPHONE ENCOUNTER
Left message on voice mail for patient to call clinic.   [phone]    Nasrin Leroy RN BSN  [facility]

## 2021-09-03 NOTE — TELEPHONE ENCOUNTER
Let delio know her ct showed possible lung infection/bronchitis. We suspected that at her visit with me and as such I started her on an antibiotic. Make sure she's starting to feel better. Nodules were stable. I want to recheck them again in 1 year.

## 2021-09-08 ENCOUNTER — THERAPY VISIT (OUTPATIENT)
Dept: SLEEP MEDICINE | Facility: CLINIC | Age: 56
End: 2021-09-08
Payer: COMMERCIAL

## 2021-09-08 DIAGNOSIS — R53.81 MALAISE AND FATIGUE: ICD-10-CM

## 2021-09-08 DIAGNOSIS — R06.00 DYSPNEA AND RESPIRATORY ABNORMALITY: ICD-10-CM

## 2021-09-08 DIAGNOSIS — R53.83 MALAISE AND FATIGUE: ICD-10-CM

## 2021-09-08 DIAGNOSIS — R06.89 DYSPNEA AND RESPIRATORY ABNORMALITY: ICD-10-CM

## 2021-09-08 DIAGNOSIS — E66.812 CLASS 2 SEVERE OBESITY DUE TO EXCESS CALORIES WITH SERIOUS COMORBIDITY AND BODY MASS INDEX (BMI) OF 38.0 TO 38.9 IN ADULT (H): ICD-10-CM

## 2021-09-08 DIAGNOSIS — R06.83 SNORING: ICD-10-CM

## 2021-09-08 DIAGNOSIS — I10 HYPERTENSION GOAL BP (BLOOD PRESSURE) < 140/90: ICD-10-CM

## 2021-09-08 DIAGNOSIS — I10 ESSENTIAL HYPERTENSION: ICD-10-CM

## 2021-09-08 DIAGNOSIS — E66.01 CLASS 2 SEVERE OBESITY DUE TO EXCESS CALORIES WITH SERIOUS COMORBIDITY AND BODY MASS INDEX (BMI) OF 38.0 TO 38.9 IN ADULT (H): ICD-10-CM

## 2021-09-08 DIAGNOSIS — F51.04 PSYCHOPHYSIOLOGICAL INSOMNIA: ICD-10-CM

## 2021-09-08 DIAGNOSIS — K29.21 ALCOHOLIC GASTRITIS WITH HEMORRHAGE, UNSPECIFIED CHRONICITY: ICD-10-CM

## 2021-09-08 DIAGNOSIS — Z72.820 LACK OF ADEQUATE SLEEP: ICD-10-CM

## 2021-09-08 PROCEDURE — 95810 POLYSOM 6/> YRS 4/> PARAM: CPT | Performed by: INTERNAL MEDICINE

## 2021-09-09 PROBLEM — L03.115 CELLULITIS OF RIGHT LOWER EXTREMITY: Status: ACTIVE | Noted: 2021-03-13

## 2021-09-09 PROBLEM — F10.229 ALCOHOL DEPENDENCE WITH ACUTE ALCOHOLIC INTOXICATION (H): Status: ACTIVE | Noted: 2020-11-19

## 2021-09-09 PROBLEM — J18.9 PNEUMONIA: Status: ACTIVE | Noted: 2021-07-10

## 2021-09-09 PROBLEM — L03.115 CELLULITIS OF RIGHT LOWER EXTREMITY: Status: RESOLVED | Noted: 2021-03-13 | Resolved: 2021-09-09

## 2021-09-09 PROBLEM — F10.939 ALCOHOL WITHDRAWAL (H): Status: RESOLVED | Noted: 2017-10-28 | Resolved: 2021-09-09

## 2021-09-09 PROBLEM — J18.9 PNEUMONIA: Status: RESOLVED | Noted: 2021-07-10 | Resolved: 2021-09-09

## 2021-09-09 PROBLEM — N17.9 AKI (ACUTE KIDNEY INJURY) (H): Status: RESOLVED | Noted: 2018-05-28 | Resolved: 2021-09-09

## 2021-09-09 PROBLEM — G47.33 OSA (OBSTRUCTIVE SLEEP APNEA): Chronic | Status: ACTIVE | Noted: 2021-09-09

## 2021-09-09 PROBLEM — F10.229 ALCOHOL DEPENDENCE WITH ACUTE ALCOHOLIC INTOXICATION (H): Status: RESOLVED | Noted: 2020-11-19 | Resolved: 2021-09-09

## 2021-09-09 NOTE — PROCEDURES
" SLEEP STUDY INTERPRETATION  DIAGNOSTIC POLYSOMNOGRAPHY REPORT      Patient: TEJAS IRELAND  YOB: 1965  Study Date: 9/8/2021  MRN: [id number]  Referring Provider: Min Toledo PA-C  Ordering Provider: Yohana Albarado PA-C    Indications for Polysomnography: The patient is a 56 year old Female who is 4' 11\" and weighs 193.0 lbs. Her BMI is 38.4, [place] sleepiness scale 7 and neck circumference is 50 cm. A diagnostic polysomnogram was performed to evaluate for loud snoring,  non-refreshing sleep, daytime fatigue, difficulty maintaining sleep and co-morbid HTN,  COPD .        Polysomnogram Data: A full night polysomnogram recorded the standard physiologic parameters including EEG, EOG, EMG, ECG, nasal and oral airflow. Respiratory parameters of chest and abdominal movements were recorded with respiratory inductance plethysmography. Oxygen saturation was recorded by pulse oximetry. Hypopnea scoring rule used: 1B 4%.      Sleep Architecture:   The total recording time of the polysomnogram was 464.9 minutes. The total sleep time was 416.0 minutes. Sleep latency was normal at 16.6 minutes with the use of a sleep aid (5mg Abilify, 50mg Amitripyline, 150mg Seroquel, and 600mg Gabapentin). REM latency was 117.5 minutes. Arousal index was normal at 11.0 arousals per hour. Sleep efficiency was normal at 89.5%. Wake after sleep onset was 32.0 minutes. The patient spent 4.2% of total sleep time in Stage N1, 39.2% in Stage N2, 34.9% in Stage N3, and 21.8% in REM. Time in REM supine was 90.5 minutes.      Respiration:     Events ? The polysomnogram revealed a presence of 49 obstructive, 5 central, and 4 mixed apneas resulting in an apnea index of 8.4 events per hour. There were 199 obstructive hypopneas and 0 central hypopneas resulting in an obstructive hypopnea index of 28.7 and central hypopnea index of 0 events per hour. The combined apnea/hypopnea index was 37.1 events per hour (central apnea/hypopnea index " was 0.7 events per hour). The (supine) REM AHI was 100.1 events per hour. The supine AHI was 40.2 events per hour. The RERA index was 2.5 events per hour.  The RDI was 39.5 events per hour.    Snoring - was reported as mild to loud. Frequent expiratory  snores /clicking/noises were heard    Respiratory rate and pattern - was notable for normal respiratory rate and pattern.    Sustained Sleep Associated Hypoventilation - Transcutaneous carbon dioxide monitoring was not used, however hypoventilation was not suggested by oximetry     Sleep Associated Hypoxemia - (Greater than 5 minutes O2 sat at or below 88%) was present. Baseline oxygen saturation was 94.0%. Lowest oxygen saturation was 73.9%. Time spent less than or equal to 88% was 12.6 minutes. Time spent less than or equal to 89% was 16.4 minutes.    Movement Activity:     Periodic Limb Activity - There were 0 PLMs during the entire study.    REM EMG Activity - Excessive transient/sustained muscle activity was not present.    Nocturnal Behavior - Abnormal sleep related behaviors were not noted     Bruxism - None apparent.      Cardiac Summary:   The average pulse rate was 68.9 bpm. The minimum pulse rate was 57.9 bpm while the maximum pulse rate was 86.2 bpm.  Arrhythmias were not noted.      Assessment:     Severe obstructive sleep apnea in the supine position, primarily REM-supine    No significant events during brief (30 minutes) period of lateral non-REM sleep    Possible atypical catathrenia    Recommendations:    Recommend repeat polysomnography with full night titration of positive airway pressure therapy for the control of sleep disordered breathing.    Based on the presence of Covid 19 restrictions and Kwon-RespirTrendPos recall, treatment could be empirically initiated with Auto?titrating PAP therapy with a range of 5 to 18 cmH2O. Recommend clinical follow up with sleep management team including oximetry or HSAT on treatment.    Patient may be a  candidate for dental appliance through referral to Sleep Dentistry for the treatment of obstructive sleep apnea and/or socially disruptive snoring.    If devices are not acceptable or effective, patient may benefit from evaluation of possible surgical options. If she is interested, would recommend referral to specialized ENT-Sleep provider.    Advice regarding the risks of drowsy driving.    Suggest optimizing sleep schedule     Weight management (if BMI > 30).    Diagnostic Codes:   Obstructive Sleep Apnea G47.33  Sleep Hypoxemia/Hypoventilation G47.36       _____________________________________   Electronically Signed By: Adama Plascencia MD 9/9/21           Range(%) Time in range (min)   0.0 - 89.0 16.4   0.0 - 88.0 12.6         Stage Min(mm Hg) Max(mm Hg)   Wake - -   NREM(1+2+3) - -   REM - -       Range(mmHg) Time in range (min)   55.0 - 100.0 -   Excluded data <20.0 & >65.0 465.5

## 2021-09-10 LAB — SLPCOMP: NORMAL

## 2021-09-10 RX ORDER — LABETALOL 100 MG/1
TABLET, FILM COATED ORAL
Qty: 60 TABLET | Refills: 1 | Status: SHIPPED | OUTPATIENT
Start: 2021-09-10 | End: 2021-11-14

## 2021-09-10 NOTE — TELEPHONE ENCOUNTER
Prescription approved per [facility] Refill Protocol.  Mira Wright RN  [facility]

## 2021-09-11 ENCOUNTER — HEALTH MAINTENANCE LETTER (OUTPATIENT)
Age: 56
End: 2021-09-11

## 2021-09-13 NOTE — TELEPHONE ENCOUNTER
Called patient.  She stated that her cough is starting to get better, however she still has some wheezing.  Appointment made with PCP for tomorrow.    Patient stated understanding and agreeable with the plan of care.     Marah HEMPHILL,BSN  Triage Nurse  [facility]: [facility]

## 2021-09-14 ENCOUNTER — VIRTUAL VISIT (OUTPATIENT)
Dept: FAMILY MEDICINE | Facility: CLINIC | Age: 56
End: 2021-09-14
Payer: COMMERCIAL

## 2021-09-14 DIAGNOSIS — J44.1 COPD EXACERBATION (H): ICD-10-CM

## 2021-09-14 DIAGNOSIS — Z71.6 ENCOUNTER FOR SMOKING CESSATION COUNSELING: Primary | ICD-10-CM

## 2021-09-14 PROCEDURE — 99214 OFFICE O/P EST MOD 30 MIN: CPT | Mod: 95 | Performed by: PHYSICIAN ASSISTANT

## 2021-09-14 ASSESSMENT — ANXIETY QUESTIONNAIRES
2. NOT BEING ABLE TO STOP OR CONTROL WORRYING: MORE THAN HALF THE DAYS
IF YOU CHECKED OFF ANY PROBLEMS ON THIS QUESTIONNAIRE, HOW DIFFICULT HAVE THESE PROBLEMS MADE IT FOR YOU TO DO YOUR WORK, TAKE CARE OF THINGS AT HOME, OR GET ALONG WITH OTHER PEOPLE: VERY DIFFICULT
3. WORRYING TOO MUCH ABOUT DIFFERENT THINGS: MORE THAN HALF THE DAYS
6. BECOMING EASILY ANNOYED OR IRRITABLE: NEARLY EVERY DAY
GAD7 TOTAL SCORE: 15
7. FEELING AFRAID AS IF SOMETHING AWFUL MIGHT HAPPEN: NOT AT ALL
5. BEING SO RESTLESS THAT IT IS HARD TO SIT STILL: MORE THAN HALF THE DAYS
1. FEELING NERVOUS, ANXIOUS, OR ON EDGE: NEARLY EVERY DAY

## 2021-09-14 ASSESSMENT — PATIENT HEALTH QUESTIONNAIRE - PHQ9: 5. POOR APPETITE OR OVEREATING: NEARLY EVERY DAY

## 2021-09-14 NOTE — PROGRESS NOTES
Cody is a 56 year old who is being evaluated via a billable telephone visit.      What phone number would you like to be contacted at? [phone]  How would you like to obtain your AVS? Marcia Mchugh   Cody is a 56 year old who presents for the following health issues    HPI     Concern - wheezing  Onset:   Description: wheezing  Intensity: moderate  Progression of Symptoms:  improving  Accompanying Signs & Symptoms:   Previous history of similar problem:   Precipitating factors:        Worsened by: walking   Alleviating factors:        Improved by: rest  Therapies tried and outcome: prednisone and albuterol    Patient still smoking. Will start her on the gum.  Pulmonology referral has been placed. She still hasn't called him .  Using revefenacin nebs and they have proven helpful.  No fever. Occasional coughing.     Review of Systems   Constitutional, HEENT, cardiovascular, pulmonary, GI, , musculoskeletal, neuro, skin, endocrine and psych systems are negative, except as otherwise noted.      Objective           Vitals:  No vitals were obtained today due to virtual visit.    Physical Exam   healthy, alert and no distress  PSYCH: Alert and oriented times 3; coherent speech, normal   rate and volume, able to articulate logical thoughts, able   to abstract reason, no tangential thoughts, no hallucinations   or delusions  Her affect is normal  RESP: No cough, no audible wheezing, able to talk in full sentences  Remainder of exam unable to be completed due to telephone visits    Inga was seen today for asthma, foot injury and anxiety.    Diagnoses and all orders for this visit:    Encounter for smoking cessation counseling  -     nicotine (NICORETTE) 2 MG gum; Place 1 each (2 mg) inside cheek every hour as needed for smoking cessation    COPD exacerbation (H)      Overall feeling better. Will have her work on quitting smoking and see pulmonology.  Advised supportive and symptomatic  treatment.  Follow up with Provider - if condition persists or worsens.           Phone call duration: 12 minutes

## 2021-09-15 ASSESSMENT — ANXIETY QUESTIONNAIRES: GAD7 TOTAL SCORE: 15

## 2021-09-16 DIAGNOSIS — M54.2 CERVICALGIA: ICD-10-CM

## 2021-09-16 ASSESSMENT — SLEEP AND FATIGUE QUESTIONNAIRES
HOW LIKELY ARE YOU TO NOD OFF OR FALL ASLEEP IN A CAR, WHILE STOPPED FOR A FEW MINUTES IN TRAFFIC: WOULD NEVER DOZE
HOW LIKELY ARE YOU TO NOD OFF OR FALL ASLEEP WHILE LYING DOWN TO REST IN THE AFTERNOON WHEN CIRCUMSTANCES PERMIT: MODERATE CHANCE OF DOZING
HOW LIKELY ARE YOU TO NOD OFF OR FALL ASLEEP WHEN YOU ARE A PASSENGER IN A CAR FOR AN HOUR WITHOUT A BREAK: SLIGHT CHANCE OF DOZING
HOW LIKELY ARE YOU TO NOD OFF OR FALL ASLEEP WHILE WATCHING TV: MODERATE CHANCE OF DOZING
HOW LIKELY ARE YOU TO NOD OFF OR FALL ASLEEP WHILE SITTING AND TALKING TO SOMEONE: WOULD NEVER DOZE
HOW LIKELY ARE YOU TO NOD OFF OR FALL ASLEEP WHILE SITTING AND READING: MODERATE CHANCE OF DOZING
HOW LIKELY ARE YOU TO NOD OFF OR FALL ASLEEP WHILE SITTING QUIETLY AFTER LUNCH WITHOUT ALCOHOL: SLIGHT CHANCE OF DOZING
HOW LIKELY ARE YOU TO NOD OFF OR FALL ASLEEP WHILE SITTING INACTIVE IN A PUBLIC PLACE: SLIGHT CHANCE OF DOZING

## 2021-09-17 NOTE — TELEPHONE ENCOUNTER
"Requested Prescriptions   Pending Prescriptions Disp Refills     MG tablet [Pharmacy Med Name:  MG Tablet] 30 tablet 11     Sig: TAKE 1 TABLET BY MOUTH THREE TIMES DAILY AS NEEDED FOR PAIN        NSAID Medications Failed - 9/16/2021  8:06 AM        Failed - Normal CBC on file in past 12 months     Recent Labs   Lab Test 08/25/21  1343   WBC 10.3   RBC 3.65*   HGB 10.3*   HCT 32.6*                    Passed - Blood pressure under 140/90 in past 12 months       BP Readings from Last 3 Encounters:   08/25/21 137/78   06/22/21 117/66   05/12/21 114/68                 Passed - Normal ALT on file in past 12 months     Recent Labs   Lab Test 07/28/21  0946   ALT 21             Passed - Normal AST on file in past 12 months       Recent Labs   Lab Test 07/28/21  0946   AST 8             Passed - Recent (12 mo) or future (30 days) visit within the authorizing provider's specialty     Patient has had an office visit with the authorizing provider or a provider within the authorizing providers department within the previous 12 mos or has a future within next 30 days. See \"Patient Info\" tab in inbasket, or \"Choose Columns\" in Meds & Orders section of the refill encounter.              Passed - Patient is age 6-64 years        Passed - Medication is active on med list        Passed - No active pregnancy on record        Passed - Normal serum creatinine on file in past 12 months     Recent Labs   Lab Test 07/28/21  0946 04/28/17  1352 06/22/16  1914   CR 0.84   < >  --    CREAT  --   --  1.2*    < > = values in this interval not displayed.       Ok to refill medication if creatinine is low          Passed - No positive pregnancy test in past 12 months            Routing refill request to provider for review/approval because:  Failed protocol.  Marah Dominguez RN, BSN  Triage nurse  [facility]: [place]"

## 2021-09-18 RX ORDER — IBUPROFEN 600 MG/1
TABLET, FILM COATED ORAL
Qty: 30 TABLET | Refills: 11 | Status: SHIPPED | OUTPATIENT
Start: 2021-09-18 | End: 2022-04-01

## 2021-09-23 ENCOUNTER — OFFICE VISIT (OUTPATIENT)
Dept: SLEEP MEDICINE | Facility: CLINIC | Age: 56
End: 2021-09-23
Payer: COMMERCIAL

## 2021-09-23 VITALS
BODY MASS INDEX: 40.44 KG/M2 | RESPIRATION RATE: 20 BRPM | DIASTOLIC BLOOD PRESSURE: 65 MMHG | HEIGHT: 61 IN | HEART RATE: 88 BPM | OXYGEN SATURATION: 96 % | WEIGHT: 214.2 LBS | SYSTOLIC BLOOD PRESSURE: 108 MMHG

## 2021-09-23 DIAGNOSIS — G47.33 OSA (OBSTRUCTIVE SLEEP APNEA): Primary | ICD-10-CM

## 2021-09-23 PROCEDURE — 99214 OFFICE O/P EST MOD 30 MIN: CPT | Performed by: PHYSICIAN ASSISTANT

## 2021-09-23 ASSESSMENT — MIFFLIN-ST. JEOR: SCORE: 1503.98

## 2021-09-23 NOTE — PROGRESS NOTES
Sleep Study Follow-Up Visit:    Date on this visit: 9/23/2021    Inga Ledesma comes in today for follow-up of her sleep study done on 9/8/2021 at the [facility] [place]. A diagnostic polysomnogram was performed to evaluate for loud snoring,  non-refreshing sleep, daytime fatigue, difficulty maintaining sleep and co-morbid HTN,  COPD .      Polysomnogram interpreted by Dr Plascencia:  Sleep Architecture:   The total recording time of the polysomnogram was 464.9 minutes. The total sleep time was 416.0 minutes. Sleep latency was normal at 16.6 minutes with the use of a sleep aid (5mg Abilify, 50mg Amitripyline, 150mg Seroquel, and 600mg Gabapentin). REM latency was 117.5 minutes. Arousal index was normal at 11.0 arousals per hour. Sleep efficiency was normal at 89.5%. Wake after sleep onset was 32.0 minutes. The patient spent 4.2% of total sleep time in Stage N1, 39.2% in Stage N2, 34.9% in Stage N3, and 21.8% in REM. Time in REM supine was 90.5 minutes.     Respiration:     Events ? The polysomnogram revealed a presence of 49 obstructive, 5 central, and 4 mixed apneas resulting in an apnea index of 8.4 events per hour. There were 199 obstructive hypopneas and 0 central hypopneas resulting in an obstructive hypopnea index of 28.7 and central hypopnea index of 0 events per hour. The combined apnea/hypopnea index was 37.1 events per hour (central apnea/hypopnea index was 0.7 events per hour). The (supine) REM AHI was 100.1 events per hour. The supine AHI was 40.2 events per hour. The RERA index was 2.5 events per hour.  The RDI was 39.5 events per hour.    Snoring - was reported as mild to loud. Frequent expiratory  snores /clicking/noises were heard    Respiratory rate and pattern - was notable for normal respiratory rate and pattern.    Sustained Sleep Associated Hypoventilation - Transcutaneous carbon dioxide monitoring was not used, however hypoventilation was not suggested by oximetry     Sleep  Associated Hypoxemia - (Greater than 5 minutes O2 sat at or below 88%) was present. Baseline oxygen saturation was 94.0%. Lowest oxygen saturation was 73.9%. Time spent less than or equal to 88% was 12.6 minutes. Time spent less than or equal to 89% was 16.4 minutes.     Movement Activity:     Periodic Limb Activity - There were 0 PLMs during the entire study.    REM EMG Activity - Excessive transient/sustained muscle activity was not present.    Nocturnal Behavior - Abnormal sleep related behaviors were not noted     Bruxism - None apparent.     Cardiac Summary:   The average pulse rate was 68.9 bpm. The minimum pulse rate was 57.9 bpm while the maximum pulse rate was 86.2 bpm.  Arrhythmias were not noted.    Past medical/surgical history, family history, social history, medications and allergies were reviewed.      Problem List:  Patient Active Problem List    Diagnosis Date Noted     Tobacco abuse 05/10/2010     Priority: High     Iron deficiency anemia secondary to inadequate dietary iron intake 02/19/2010     Priority: High     menorrhagia       JAQUELIN (obstructive sleep apnea)- severe (AHI 37) 09/09/2021     Priority: Medium     9/8/2021 [facility] Study (193.0 lbs) - AHI 37.1, RDI 39.5, Supine AHI 40.2, REM .1, Low O2 73.9%, Time Spent ?88% 12.6 minutes / Time Spent ?89% 16.4 minutes.       Chronic bilateral low back pain without sciatica 03/17/2021     Priority: Medium     Morbid obesity (H) 01/06/2021     Priority: Medium     Alcohol dependency (H) 07/06/2018     Priority: Medium     Alcohol abuse 04/05/2018     Priority: Medium     Chemical dependency (H) 10/06/2017     Priority: Medium     Psychophysiological insomnia 05/30/2017     Priority: Medium     (HFpEF) heart failure with preserved ejection fraction (H) 03/10/2017     Priority: Medium     ECHO 2018:  Interpretation Summary  Technically difficult study.Extremely poor acoustic windows.  Global and regional left ventricular  function is normal with an EF of 60-65%.  No regional wall motion abnormalities are seen.  Right ventricular function, chamber size, wall motion, and thickness are  normal.  The inferior vena cava is normal.  No pericardial effusion is present.       Major depressive disorder, recurrent episode, mild (H) 02/27/2017     Priority: Medium     Chronic obstructive pulmonary disease (H) 03/01/2016     Priority: Medium     Spirometry 2017 without obstruction       [facility] 12/09/2015     Priority: Medium       Status:  Closed   Care Coordinator:  YAMILE Altman, RN, PHN  [facility] Care Coordinator  [phone]    See Letters for [facility] Care Plan             Hypertension goal BP (blood pressure) < 140/90 09/29/2015     Priority: Medium     Edema of both legs 02/10/2015     Priority: Medium     Has had pelvic node dissection.       RLS (restless legs syndrome)      Priority: Medium     GERD (gastroesophageal reflux disease)      Priority: Medium     Sacroiliitis (H)      Priority: Low     steroid injections, physical therapy ineffective       Vitamin D deficiency      Priority: Low     Hyperlipidemia with target LDL less than 160      Priority: Low        Impression/Plan:  Severe obstructive sleep apnea in the supine position, primarily REM-supine. No significant events during brief (30 minutes) period of lateral non-REM sleep  Possible atypical catathrenia    - Overnight polysomnogram was reviewed in detail today and a copy given to her for her records.  - Discussed this level of JAQUELIN is associated with increase in long-term cardiovascular risk factors  - Treatment options for severe JAQUELIN reviewed.   - Will arrange treatment with auto CPAP 6-16 cm/H20    She will follow up with me in about 8 week(s).     Yohana Albarado PA-C

## 2021-09-23 NOTE — NURSING NOTE
"Chief Complaint   Patient presents with     Study Results       Initial LMP 06/02/2010  Estimated body mass index is 41.7 kg/m  as calculated from the following:    Height as of 7/9/21: 1.51 m (4' 11.45\").    Weight as of 8/25/21: 95.1 kg (209 lb 9.6 oz).    Medication Reconciliation: complete   ESS: 11    Neck circumference:  53 centimeters.  Lore Ibanez, KRISTIE      "

## 2021-09-23 NOTE — PATIENT INSTRUCTIONS
Your BMI is Body mass index is 40.47 kg/m .  Weight management is a personal decision.  If you are interested in exploring weight loss strategies, the following discussion covers the approaches that may be successful. Body mass index (BMI) is one way to tell whether you are at a healthy weight, overweight, or obese. It measures your weight in relation to your height.  A BMI of 18.5 to 24.9 is in the healthy range. A person with a BMI of 25 to 29.9 is considered overweight, and someone with a BMI of 30 or greater is considered obese. More than two-thirds of American adults are considered overweight or obese.  Being overweight or obese increases the risk for further weight gain. Excess weight may lead to heart disease and diabetes.  Creating and following plans for healthy eating and physical activity may help you improve your health.  Weight control is part of healthy lifestyle and includes exercise, emotional health, and healthy eating habits. Careful eating habits lifelong are the mainstay of weight control. Though there are significant health benefits from weight loss, long-term weight loss with diet alone may be very difficult to achieve- studies show long-term success with dietary management in less than 10% of people. Attaining a healthy weight may be especially difficult to achieve in those with severe obesity. In some cases, medications, devices and surgical management might be considered.  What can you do?  If you are overweight or obese and are interested in methods for weight loss, you should discuss this with your provider.     Consider reducing daily calorie intake by 500 calories.     Keep a food journal.     Avoiding skipping meals, consider cutting portions instead.    Diet combined with exercise helps maintain muscle while optimizing fat loss. Strength training is particularly important for building and maintaining muscle mass. Exercise helps reduce stress, increase energy, and improves fitness.  Increasing exercise without diet control, however, may not burn enough calories to loose weight.       Start walking three days a week 10-20 minutes at a time    Work towards walking thirty minutes five days a week     Eventually, increase the speed of your walking for 1-2 minutes at time    In addition, we recommend that you review healthy lifestyles and methods for weight loss available through the National Institutes of Health patient information sites:  http://win.niddk.nih.gov/publications/index.htm    And look into health and wellness programs that may be available through your health insurance provider, employer, local community center, or adama club.    Weight management plan: Patient was referred to their PCP to discuss a diet and exercise plan.        Your Body mass index is 40.47 kg/m .  Weight management is a personal decision.  If you are interested in exploring weight loss strategies, the following discussion covers the approaches that may be successful. Body mass index (BMI) is one way to tell whether you are at a healthy weight, overweight, or obese. It measures your weight in relation to your height.  A BMI of 18.5 to 24.9 is in the healthy range. A person with a BMI of 25 to 29.9 is considered overweight, and someone with a BMI of 30 or greater is considered obese. More than two-thirds of American adults are considered overweight or obese.  Being overweight or obese increases the risk for further weight gain. Excess weight may lead to heart disease and diabetes.  Creating and following plans for healthy eating and physical activity may help you improve your health.  Weight control is part of healthy lifestyle and includes exercise, emotional health, and healthy eating habits. Careful eating habits lifelong are the mainstay of weight control. Though there are significant health benefits from weight loss, long-term weight loss with diet alone may be very difficult to achieve- studies show long-term  success with dietary management in less than 10% of people. Attaining a healthy weight may be especially difficult to achieve in those with severe obesity. In some cases, medications, devices and surgical management might be considered.  What can you do?  If you are overweight or obese and are interested in methods for weight loss, you should discuss this with your provider.     Consider reducing daily calorie intake by 500 calories.     Keep a food journal.     Avoiding skipping meals, consider cutting portions instead.    Diet combined with exercise helps maintain muscle while optimizing fat loss. Strength training is particularly important for building and maintaining muscle mass. Exercise helps reduce stress, increase energy, and improves fitness. Increasing exercise without diet control, however, may not burn enough calories to loose weight.       Start walking three days a week 10-20 minutes at a time    Work towards walking thirty minutes five days a week     Eventually, increase the speed of your walking for 1-2 minutes at time    In addition, we recommend that you review healthy lifestyles and methods for weight loss available through the National Institutes of Health patient information sites:  http://win.niddk.nih.gov/publications/index.htm    And look into health and wellness programs that may be available through your health insurance provider, employer, local community center, or adama club.    Weight management plan: Patient was referred to their PCP to discuss a diet and exercise plan.

## 2021-09-28 ENCOUNTER — TELEPHONE (OUTPATIENT)
Dept: FAMILY MEDICINE | Facility: CLINIC | Age: 56
End: 2021-09-28

## 2021-09-28 DIAGNOSIS — T78.2XXD ANAPHYLAXIS, SUBSEQUENT ENCOUNTER: ICD-10-CM

## 2021-09-28 DIAGNOSIS — I89.0 LYMPHEDEMA: Primary | ICD-10-CM

## 2021-09-28 NOTE — TELEPHONE ENCOUNTER
Reason for Call:  Medication or medication refill:    Do you use a [facility]?  Name of the pharmacy and phone number for the current request:  [facility]: [id number] [place], [place]     Name of the medication requested: Epi pens    Other request: patient is requesting prescription for new epi pens. Her current ones  this month.     Can we leave a detailed message on this number? YES    Phone number patient can be reached at: Home number on file [phone] (home)    Best Time: anytime    Call taken on 2021 at 10:55 AM by Daniela Tompkins

## 2021-09-28 NOTE — TELEPHONE ENCOUNTER
Reason for Call:  Other - Referral: Physical Therapy    Detailed comments: Patient called and stated she needs a new referral for physical therapy for lymphedema. [facility]. Please call patient once referral is placed.     Phone Number Patient can be reached at: Home number on file [phone] (home)    Best Time: anytime    Can we leave a detailed message on this number? YES    Call taken on 9/28/2021 at 10:58 AM by Daniela Tompkins

## 2021-09-30 NOTE — TELEPHONE ENCOUNTER
Last Written Prescription Date:  9/27/20  Last Fill Quantity: 2 each,  # refills: 3   Last office visit: 8/25/2021 with prescribing provider:  Min Toledo   Future Office Visit: none    Routing refill request to provider for review/approval because:  Drug interaction warning    Cintia Locke, RN, BSN  [facility]

## 2021-10-03 RX ORDER — EPINEPHRINE 0.3 MG/.3ML
0.3 INJECTION SUBCUTANEOUS ONCE
Qty: 0.6 ML | Refills: 1 | Status: SHIPPED | OUTPATIENT
Start: 2021-10-03 | End: 2022-08-18

## 2021-10-05 ENCOUNTER — DOCUMENTATION ONLY (OUTPATIENT)
Dept: SLEEP MEDICINE | Facility: CLINIC | Age: 56
End: 2021-10-05

## 2021-10-05 DIAGNOSIS — G47.33 OSA (OBSTRUCTIVE SLEEP APNEA): ICD-10-CM

## 2021-10-05 NOTE — PROGRESS NOTES
Patient was offered choice of vendor and chose [facility].  Patient Inga Ledesma was set up at [place] on October 5, 2021. Patient received a Resmed Airsense 11 Pressures were set at 6-15 cm H2O.   Patient s ramp is 5 cm H2O for Auto and FLEX/EPR is 2.  Patient received a Resmed Mask name: AIRFIT N20  Nasal mask size Small, heated tubing and heated humidifier.  Patient does need to meet compliance.      Steffi Chand

## 2021-10-06 DIAGNOSIS — I10 ESSENTIAL HYPERTENSION WITH GOAL BLOOD PRESSURE LESS THAN 140/90: ICD-10-CM

## 2021-10-06 DIAGNOSIS — F33.0 MAJOR DEPRESSIVE DISORDER, RECURRENT EPISODE, MILD (H): ICD-10-CM

## 2021-10-06 DIAGNOSIS — M46.1 SACROILIITIS (H): ICD-10-CM

## 2021-10-07 DIAGNOSIS — F10.29 ALCOHOL DEPENDENCE WITH UNSPECIFIED ALCOHOL-INDUCED DISORDER (H): ICD-10-CM

## 2021-10-08 ENCOUNTER — DOCUMENTATION ONLY (OUTPATIENT)
Dept: SLEEP MEDICINE | Facility: CLINIC | Age: 56
End: 2021-10-08
Payer: COMMERCIAL

## 2021-10-08 ENCOUNTER — TRANSFERRED RECORDS (OUTPATIENT)
Dept: HEALTH INFORMATION MANAGEMENT | Facility: CLINIC | Age: 56
End: 2021-10-08

## 2021-10-08 DIAGNOSIS — G47.33 OSA (OBSTRUCTIVE SLEEP APNEA): ICD-10-CM

## 2021-10-08 RX ORDER — HYDRALAZINE HYDROCHLORIDE 25 MG/1
TABLET, FILM COATED ORAL
Qty: 270 TABLET | Refills: 0 | Status: SHIPPED | OUTPATIENT
Start: 2021-10-08 | End: 2022-04-01

## 2021-10-08 RX ORDER — GABAPENTIN 600 MG/1
TABLET ORAL
Qty: 270 TABLET | Refills: 0 | Status: SHIPPED | OUTPATIENT
Start: 2021-10-08 | End: 2022-04-01

## 2021-10-08 NOTE — TELEPHONE ENCOUNTER
Routing refill request to provider for review/approval because:  Drug not on the [facility] refill protocol     gabapentin (NEURONTIN) 300 MG capsule  Last Written Prescription Date:  4/16/21  Last Fill Quantity: 540,  # refills: 1   Last office visit: 8/25/2021 with prescribing provider:  adela Toledo Office Visit:   Next 5 appointments (look out 90 days)    Nov 30, 2021 12:00 PM  Office Visit with PFT LAB  [facility] [address] [address] [place] [id number] [phone]         pharmacy requesting 600 mg tablets of gabapentin, so sig is 1 tab 3 times daily.

## 2021-10-08 NOTE — PROGRESS NOTES
3 day Sleep therapy management telephone visit    Diagnostic AHI: 37.1  PSG    Confirmed with patient at time of call- No Patient is still interested in STM service       Message left for patient to return call.        Objective data     Order Settings for PAP  CPAP min 6    CPAP max 15          Assessment: Nightly usage over four hours      Action plan: Patient to have 14 day STM visit. Patient has a follow up visit scheduled:   no    Replacement device: No  STM ordered by provider: Yes     Total time spent on accessing and  interpreting remote patient PAP therapy data  10 minutes    Total time spent counseling, coaching  and reviewing PAP therapy data with patient  1 minutes    [id number] no

## 2021-10-09 RX ORDER — CALCIUM CARBONATE 500(1250)
TABLET ORAL
Qty: 30 TABLET | Refills: 7 | Status: SHIPPED | OUTPATIENT
Start: 2021-10-09 | End: 2022-04-01

## 2021-10-11 DIAGNOSIS — J42 CHRONIC BRONCHITIS, UNSPECIFIED CHRONIC BRONCHITIS TYPE (H): ICD-10-CM

## 2021-10-12 ENCOUNTER — TRANSFERRED RECORDS (OUTPATIENT)
Dept: HEALTH INFORMATION MANAGEMENT | Facility: CLINIC | Age: 56
End: 2021-10-12
Payer: COMMERCIAL

## 2021-10-13 ENCOUNTER — TELEPHONE (OUTPATIENT)
Dept: FAMILY MEDICINE | Facility: CLINIC | Age: 56
End: 2021-10-13

## 2021-10-13 DIAGNOSIS — F33.0 MAJOR DEPRESSIVE DISORDER, RECURRENT EPISODE, MILD (H): ICD-10-CM

## 2021-10-13 DIAGNOSIS — G25.81 RLS (RESTLESS LEGS SYNDROME): ICD-10-CM

## 2021-10-13 NOTE — TELEPHONE ENCOUNTER
" See note below.     Patient reports that her weight has increased by 4 pounds in the past day & 9 pounds in the past week.     She is involved in a [employer] program which completes a daily weight.     Cody reports both of her legs have been feeling heavy over the last week, however the right leg is more swollen.     Patient reports that she has ongoing shortness of breath which is not any worse than usual. Cody states \"I know when I need to go in.\"     Patient declined a face to face visit, however agreed to schedule a Virtual/Video visit with Min Toledo PA-C tomorrow.     Patient instructed to go to the Emergency Room if her symptoms are worse before her appointment. She verbalized a good understanding of these instruction.     Nasrin Leroy RN BSN  [facility]    "

## 2021-10-13 NOTE — TELEPHONE ENCOUNTER
Reason for Call:  Other     Detailed comments: Patient said that she was completing a health care assessment through work and they noticed she gained four pounds in the day and nine for the week and due to this increase she was told to contact clinic and the patient is thinking her medication may need to be adjusted.    Phone Number Patient can be reached at: Home number on file [phone] (home)    Best Time:     Can we leave a detailed message on this number? YES    Call taken on 10/13/2021 at 10:55 AM by Marquita Garcia

## 2021-10-14 ENCOUNTER — TRANSFERRED RECORDS (OUTPATIENT)
Dept: HEALTH INFORMATION MANAGEMENT | Facility: CLINIC | Age: 56
End: 2021-10-14

## 2021-10-14 ENCOUNTER — VIRTUAL VISIT (OUTPATIENT)
Dept: FAMILY MEDICINE | Facility: CLINIC | Age: 56
End: 2021-10-14
Payer: COMMERCIAL

## 2021-10-14 DIAGNOSIS — F51.01 PRIMARY INSOMNIA: ICD-10-CM

## 2021-10-14 DIAGNOSIS — R63.5 WEIGHT GAIN: ICD-10-CM

## 2021-10-14 DIAGNOSIS — R60.9 EDEMA, UNSPECIFIED TYPE: Primary | ICD-10-CM

## 2021-10-14 DIAGNOSIS — I10 ESSENTIAL HYPERTENSION WITH GOAL BLOOD PRESSURE LESS THAN 140/90: ICD-10-CM

## 2021-10-14 PROCEDURE — 99213 OFFICE O/P EST LOW 20 MIN: CPT | Mod: 95 | Performed by: PHYSICIAN ASSISTANT

## 2021-10-14 RX ORDER — REVEFENACIN 175 UG/3ML
SOLUTION RESPIRATORY (INHALATION)
Qty: 90 ML | Refills: 11 | Status: SHIPPED | OUTPATIENT
Start: 2021-10-14 | End: 2022-04-01

## 2021-10-14 RX ORDER — FUROSEMIDE 20 MG
20-60 TABLET ORAL DAILY
Qty: 30 TABLET | Refills: 11 | Status: SHIPPED | OUTPATIENT
Start: 2021-10-14 | End: 2021-10-15

## 2021-10-14 NOTE — PROGRESS NOTES
Cody is a 56 year old who is being evaluated via a billable video visit.      How would you like to obtain your AVS? MyChart  If the video visit is dropped, the invitation should be resent by: Text to cell phone: [phone]  Will anyone else be joining your video visit? No    Video Start Time: 3:07 PM        Subjective   Cody is a 56 year old who presents for the following health issues  HPI     Edema  - weight gain  - legs look the same  - some indentation behind head where c-pap strap rubs.    Denies feeling more sob than usual. No history of chf. Sober x 10 mos. No profound orthopnea. No profound RAMIREZ. No irritative/obst voiding symptoms.           Review of Systems   Constitutional, HEENT, cardiovascular, pulmonary, GI, , musculoskeletal, neuro, skin, endocrine and psych systems are negative, except as otherwise noted.      Objective           Vitals:  No vitals were obtained today due to virtual visit.    Physical Exam   GENERAL: Healthy, alert and no distress  EYES: Eyes grossly normal to inspection.  No discharge or erythema, or obvious scleral/conjunctival abnormalities.  RESP: No audible wheeze, cough, or visible cyanosis.  No visible retractions or increased work of breathing.    SKIN: Visible skin clear. No significant rash, abnormal pigmentation or lesions.  NEURO: Cranial nerves grossly intact.  Mentation and speech appropriate for age.  PSYCH: Mentation appears normal, affect normal/bright, judgement and insight intact, normal speech and appearance well-groomed.        Inga was seen today for edema and sleep problem.    Diagnoses and all orders for this visit:    Edema, unspecified type  -     Comprehensive metabolic panel (BMP + Alb, Alk Phos, ALT, AST, Total. Bili, TP); Future  -     BNP-N terminal pro; Future    Weight gain  -     TSH with free T4 reflex; Future      Increase lasix to 60 mg daily for the week, then return to her 20 mg dose.  Low salt diet. Fluid restriction  (1.5 liters  per daily).      Video-Visit Details    Type of service:  Video Visit    Video End Time:3:20PM    Originating Location (pt. Location): Home    Distant Location (provider location):  [facility]     Platform used for Video Visit: Patito

## 2021-10-15 ENCOUNTER — TELEPHONE (OUTPATIENT)
Dept: FAMILY MEDICINE | Facility: CLINIC | Age: 56
End: 2021-10-15

## 2021-10-15 DIAGNOSIS — I10 ESSENTIAL HYPERTENSION WITH GOAL BLOOD PRESSURE LESS THAN 140/90: ICD-10-CM

## 2021-10-15 RX ORDER — FUROSEMIDE 20 MG
TABLET ORAL
Qty: 90 TABLET | Refills: 3 | Status: SHIPPED | OUTPATIENT
Start: 2021-10-15 | End: 2022-04-01

## 2021-10-15 RX ORDER — FUROSEMIDE 20 MG
20 TABLET ORAL DAILY
Qty: 30 TABLET | Refills: 11 | COMMUNITY
Start: 2021-10-15 | End: 2021-10-15

## 2021-10-15 NOTE — TELEPHONE ENCOUNTER
"[facility] pharmacy calling to clarify the furosemide Rx sent, sig says \"take 1-3 tablets, (20-60 mg) daily\".    Only 30 tabs with refills was sent, would run out every 10 days?    I see virtual visit note yesterday:      Increase lasix to 60 mg daily for the week, then return to her 20 mg dose.      Pharmacy notes patient is in a group home so will need to have more exact directions or parameters.    I advised they dispense as per Min's plan yesterday (60 mg for one week then back to 20 mg daily).   If she is having more issues with edema, provider will need to be notified.    Pharmacist updated Rx accordingly and I updated historically in Epic.    Routed to Min Toledo as BERONICA.    Belen Pringle RN  [facility]      "

## 2021-10-17 RX ORDER — FLUOXETINE 40 MG/1
CAPSULE ORAL
Qty: 62 CAPSULE | Refills: 11 | Status: SHIPPED | OUTPATIENT
Start: 2021-10-17 | End: 2022-04-01

## 2021-10-17 RX ORDER — ROPINIROLE 1 MG/1
TABLET, FILM COATED ORAL
Qty: 93 TABLET | Refills: 11 | Status: SHIPPED | OUTPATIENT
Start: 2021-10-17 | End: 2022-04-01

## 2021-10-17 NOTE — TELEPHONE ENCOUNTER
Routing refill request to provider for review/approval because:  PHQ-9 score:    PHQ 5/12/2021   PHQ-9 Total Score 14   Q9: Thoughts of better off dead/self-harm past 2 weeks Not at all               Olinda Toledo, RN, BSN, PHN  [facility] [place]

## 2021-10-18 NOTE — TELEPHONE ENCOUNTER
I see Min Toledo sent a new Rx with my directions to pharmacy.    Closing encounter.    Belen Pringle RN  [facility]

## 2021-10-19 NOTE — NURSING NOTE
Saw that patient got started on her CPAP on 10/5/2021. Tried to call patient to set up CPAP compliance follow-up appointment. She did not answer. Left clinic contact information for her to call back and schedule on VM.Lore Ibanez CMA

## 2021-10-20 ENCOUNTER — DOCUMENTATION ONLY (OUTPATIENT)
Dept: SLEEP MEDICINE | Facility: CLINIC | Age: 56
End: 2021-10-20
Payer: COMMERCIAL

## 2021-10-20 DIAGNOSIS — G47.33 OSA (OBSTRUCTIVE SLEEP APNEA): ICD-10-CM

## 2021-10-20 DIAGNOSIS — M46.1 SACROILIITIS (H): ICD-10-CM

## 2021-10-20 DIAGNOSIS — K14.0 GLOSSITIS: ICD-10-CM

## 2021-10-20 NOTE — PROGRESS NOTES
Patient called back and stated her machine fell off her night stand and she has water in her tube. Patient will hang her hose over her shower head to dry her tube.

## 2021-10-20 NOTE — TELEPHONE ENCOUNTER
Routing refill request to provider for review/approval because:  Needs provider approval.  Mira Wright RN  [facility]

## 2021-10-20 NOTE — PROGRESS NOTES
14  DAY STM VISIT    Diagnostic AHI: 37.1  PSG    Message left for patient to return call.     Assessment: Pt not meeting objective benchmarks for compliance.     Action plan: pt to have 30 day STM visit.      Device type: Auto-CPAP    PAP settings: CPAP min 6.0 cm  H20       CPAP max 15.0 cm  H20      95th% pressure 8.7 cm  H20        RESMED EPR level Setting: TWO    RESMED Soft response setting:  OFF    Mask type:  Nasal Mask    Objective measures: 14 day rolling measures      Compliance  28 %      Leak  110.74  lpm  last  upload      AHI 5.5   last  upload      Average number of minutes 175      Objective measure goal  Compliance   Goal >70%  Leak   Goal < 24 lpm  AHI  Goal < 5  Usage  Goal >240        Total time spent on accessing and interpreting remote patient PAP therapy data  10 minutes    Total time spent counseling, coaching  and reviewing PAP therapy data with patient  1 minutes    [id number]  [id number]  no (3 day STM)

## 2021-10-20 NOTE — TELEPHONE ENCOUNTER
Patient is calling to follow up, she is not quite due for these medications yet, but needs these Rx's by Thursday evening as she will be be going out of town for 2 weeks Friday 6 am.

## 2021-10-21 RX ORDER — FLUCONAZOLE 100 MG/1
TABLET ORAL
Qty: 15 TABLET | Refills: 11 | Status: SHIPPED | OUTPATIENT
Start: 2021-10-21 | End: 2022-01-10

## 2021-10-21 RX ORDER — HYDROXYZINE HYDROCHLORIDE 25 MG/1
TABLET, FILM COATED ORAL
Qty: 60 TABLET | Refills: 11 | Status: SHIPPED | OUTPATIENT
Start: 2021-10-21 | End: 2022-01-10

## 2021-11-01 DIAGNOSIS — K29.21 ALCOHOLIC GASTRITIS WITH HEMORRHAGE, UNSPECIFIED CHRONICITY: ICD-10-CM

## 2021-11-02 DIAGNOSIS — D50.8 IRON DEFICIENCY ANEMIA SECONDARY TO INADEQUATE DIETARY IRON INTAKE: ICD-10-CM

## 2021-11-04 RX ORDER — FERROUS SULFATE 325(65) MG
TABLET ORAL
Qty: 60 TABLET | Refills: 3 | Status: SHIPPED | OUTPATIENT
Start: 2021-11-04 | End: 2022-04-01

## 2021-11-05 ENCOUNTER — TELEPHONE (OUTPATIENT)
Dept: FAMILY MEDICINE | Facility: CLINIC | Age: 56
End: 2021-11-05
Payer: COMMERCIAL

## 2021-11-05 ENCOUNTER — DOCUMENTATION ONLY (OUTPATIENT)
Dept: SLEEP MEDICINE | Facility: CLINIC | Age: 56
End: 2021-11-05
Payer: COMMERCIAL

## 2021-11-05 DIAGNOSIS — G47.33 OSA (OBSTRUCTIVE SLEEP APNEA): ICD-10-CM

## 2021-11-05 DIAGNOSIS — R21 RASH: ICD-10-CM

## 2021-11-05 DIAGNOSIS — L21.9 SEBORRHEIC DERMATITIS OF SCALP: ICD-10-CM

## 2021-11-05 DIAGNOSIS — L23.7 CONTACT DERMATITIS DUE TO POISON IVY: Primary | ICD-10-CM

## 2021-11-05 RX ORDER — HYDROXYZINE HYDROCHLORIDE 25 MG/1
25 TABLET, FILM COATED ORAL 3 TIMES DAILY PRN
Qty: 30 TABLET | Refills: 0 | Status: SHIPPED | OUTPATIENT
Start: 2021-11-05 | End: 2022-01-10

## 2021-11-05 RX ORDER — PREDNISONE 10 MG/1
TABLET ORAL
Qty: 42 TABLET | Refills: 0 | Status: SHIPPED | OUTPATIENT
Start: 2021-11-05 | End: 2022-01-10

## 2021-11-05 NOTE — TELEPHONE ENCOUNTER
Spoke with patient gave below instructions.  She was agreeable, but then told nurse she had #8 tabs of  Prednisone 20 mg tabs on hand and took one daily, finished about 2 weeks ago but it did not help.  She will try the taper dose you sent and asking if could still try a steroid cream to control the itching?    Please advise.  Mira Wright RN  [facility]

## 2021-11-05 NOTE — TELEPHONE ENCOUNTER
Spoke with patient she reports she was seen yesterday in [place] for an allergic reaction to hair dye.  Patient reports red, bumpy, itchy rash, which started on back of neck and is now spreading to include her ears, she has a few spots that are bleeding due to scratching.   OTC hydrocortisone cream was not helpful  Per patient a steroid cream was prescript by [place] provider, but was not covered by insurance so patient did not pick it up and does not know the name.   Patient asking if Min could send in a script for a steroid cream for her rash?      Please advise,   Mira Wright RN  [facility]      (patient does not know how to send a picture)

## 2021-11-05 NOTE — TELEPHONE ENCOUNTER
Left message on voice mail for patient to call clinic.   [phone]    Need to know more details and which cream was not covered by her plan.     Pharmacy location?    Nasrin Leroy RN BSN  [facility]

## 2021-11-05 NOTE — PROGRESS NOTES
30 DAY [facility] VISIT    Diagnostic AHI: 37.1    PSG    Message left for patient to return call     Assessment: Pt not meeting objective benchmarks for leak and compliance     Action plan: waiting for patient to return call.   Patient has scheduled a follow up visit with Yohana Albarado PA-C    Device type: Auto-CPAP  PAP settings: CPAP min 6.0 cm  H20     CPAP max 15.0 cm  H20        95th% pressure 8.1 cm  H20      RESMED EPR level Setting: TWO    RESMED Soft response setting:  OFF  Mask type:  Nasal Mask  Objective measures: 14 day rolling measures      Compliance  57 %      Leak  118.4 lpm  last  upload      AHI 6.35   last  upload      Average number of minutes 291      Objective measure goal  Compliance   Goal >70%  Leak   Goal < 24 lpm  AHI  Goal < 5  Usage  Goal >240        Total time spent on accessing and interpreting remote patient PAP therapy data  10 minutes    Total time spent counseling, coaching  and reviewing PAP therapy data with patient  0 minutes     09108to this call  [id number] no  at 3 or 14 day [facility]

## 2021-11-05 NOTE — TELEPHONE ENCOUNTER
I called and spoke to patient, she is disappointed that no steroid cream is being prescribed.    Says she will call Monday if she is not any better on the prednisone, perhaps will need to be seen.    She says she cannot afford over the counter diphenhydramine/benadryl and her insurance covers some over the counter meds so wants Min to send Rx for that.    Routed to Min Toledo to address.    Belen Pringle RN  [facility]

## 2021-11-05 NOTE — TELEPHONE ENCOUNTER
Patient states she is having a allergic reaction to hair dye. Patient is out of town and seen by provider. Patient was prescribed a steroid cream but insurance would not cover it. Patient asking if Min can send rx for steroid cream to [facility].

## 2021-11-05 NOTE — TELEPHONE ENCOUNTER
No need for steroid cream if we're doing oral prednisone. Have her take over the counter diphenhydramine for itching

## 2021-11-08 NOTE — PROGRESS NOTES
Patient returned call.    Subjective measures:   Patient reports that she is waking up to the mask being dislodged.  She will continue to work on mask fit.     Assessment: Pt not meeting objective benchmarks for AHI, leak and compliance  Patient failing following subjective benchmarks: leak issues     Action plan:2 week STM recheck appt scheduled      Total time spent counseling, coaching  and reviewing PAP therapy data with patient  10 minutes     [id number] (this call)    74091 no (previous call)

## 2021-11-09 DIAGNOSIS — F33.0 MAJOR DEPRESSIVE DISORDER, RECURRENT EPISODE, MILD (H): ICD-10-CM

## 2021-11-10 DIAGNOSIS — I10 HYPERTENSION GOAL BP (BLOOD PRESSURE) < 140/90: ICD-10-CM

## 2021-11-12 NOTE — TELEPHONE ENCOUNTER
Routing refill request to provider for review/approval because:  Labs out of range:  phq9  Labs not current:  phq9  PHQ 9/25/2017 8/4/2020 5/12/2021   PHQ-9 Total Score 20 12 14   Q9: Thoughts of better off dead/self-harm past 2 weeks Several days Not at all Not at all   Patient needs to be seen because:  Due for follow up  Medication is prescribed for depression, needs depression follow up and update phq9    Medication pended for approval, 30 day supply with reminder.     Requested Prescriptions   Pending Prescriptions Disp Refills     ARIPiprazole (ABILIFY) 5 MG tablet [Pharmacy Med Name: ARIPiprazole 5 MG Tablet] 30 tablet 0     Sig: TAKE 1 TABLET BY MOUTH AT BEDTIME       Antipsychotic Medications Passed - 11/9/2021  1:41 PM        Passed - Blood pressure under 140/90 in past 12 months     BP Readings from Last 3 Encounters:   09/23/21 108/65   08/25/21 137/78   06/22/21 117/66                 Passed - Patient is 12 years of age or older        Passed - Lipid panel on file within the past 12 months     Recent Labs   Lab Test 05/12/21  1520   CHOL 152   TRIG 101   HDL 59   LDL 73   NHDL 93               Passed - CBC on file in past 12 months     Recent Labs   Lab Test 08/25/21  1343   WBC 10.3   RBC 3.65*   HGB 10.3*   HCT 32.6*                    Passed - Heart Rate on file within past 12 months     Pulse Readings from Last 3 Encounters:   09/23/21 88   08/25/21 86   06/22/21 91               Passed - A1c or Glucose on file in past 12 months     Recent Labs   Lab Test 07/28/21  0946   GLC 74   A1C 5.5       Please review patients last 3 weights. If a weight gain of >10 lbs exists, you may refill the prescription once after instructing the patient to schedule an appointment within the next 30 days.    Wt Readings from Last 3 Encounters:   09/23/21 97.2 kg (214 lb 3.2 oz)   08/25/21 95.1 kg (209 lb 9.6 oz)   07/09/21 87.5 kg (193 lb)             Passed - Medication is active on med list        Passed -  "Patient is not pregnant        Passed - No positve pregnancy test on file in past 12 months        Passed - Recent (6 mo) or future (30 days) visit within the authorizing provider's specialty     Patient had office visit in the last 6 months or has a visit in the next 30 days with authorizing provider or within the authorizing provider's specialty.  See \"Patient Info\" tab in inbasket, or \"Choose Columns\" in Meds & Orders section of the refill encounter.                 "

## 2021-11-13 RX ORDER — ARIPIPRAZOLE 5 MG/1
TABLET ORAL
Qty: 30 TABLET | Refills: 0 | Status: SHIPPED | OUTPATIENT
Start: 2021-11-13 | End: 2021-12-30

## 2021-11-14 RX ORDER — LABETALOL 100 MG/1
TABLET, FILM COATED ORAL
Qty: 60 TABLET | Refills: 0 | Status: SHIPPED | OUTPATIENT
Start: 2021-11-14 | End: 2021-12-03

## 2021-11-15 ENCOUNTER — DOCUMENTATION ONLY (OUTPATIENT)
Dept: SLEEP MEDICINE | Facility: CLINIC | Age: 56
End: 2021-11-15
Payer: COMMERCIAL

## 2021-11-15 DIAGNOSIS — G47.33 OSA (OBSTRUCTIVE SLEEP APNEA): ICD-10-CM

## 2021-11-15 NOTE — PROGRESS NOTES
[facility] Recheck:  Patient called and stated her data has not showed up since Tuesday November 11 2021. Told patient to unplug her machine and to call me in a couple hours.

## 2021-11-19 ENCOUNTER — TRANSFERRED RECORDS (OUTPATIENT)
Dept: HEALTH INFORMATION MANAGEMENT | Facility: CLINIC | Age: 56
End: 2021-11-19
Payer: COMMERCIAL

## 2021-11-22 ENCOUNTER — TELEPHONE (OUTPATIENT)
Dept: FAMILY MEDICINE | Facility: CLINIC | Age: 56
End: 2021-11-22
Payer: COMMERCIAL

## 2021-11-22 NOTE — TELEPHONE ENCOUNTER
Reason for Call:  Other     Detailed comments: Patient said that she had been seen for a rash on her neck due to a reaction to hair dye and she was prescribed prednisone and now on her neck is scabs that are bleeding. Please advise.     Phone Number Patient can be reached at: Home number on file [phone] (home)    Best Time:     Can we leave a detailed message on this number? YES    Call taken on 11/22/2021 at 10:08 AM by Marquita Garcia

## 2021-11-22 NOTE — TELEPHONE ENCOUNTER
Left message on voice mail [phone]  for patient to call clinic.   [phone]    Needing more info.  If seen in urgent care should schedule appointment to follow up with one of the providers if still having issues.

## 2021-11-22 NOTE — TELEPHONE ENCOUNTER
"Patient calling back, she says she's had this rash to her neck for about a month.   She was out of town when is started and an outside urgent care wanted to give her a steroid cream but she could not fill it as she did not have her insurance information with her.   She contacted [facility] Toledo but he advised prednisone oral and declined to prescribe a cream.    Says she did the 12 days of prednisone and helped a bit, rash has not spread but now is really itchy again.    Denies is streaking red or having fever.    Hydroxyzine not helping the itch.    Has tried over the counter a and d, benadryl pills, hydrocortisone cream over the counter not effective.    I advised she should get this looked at.   She would need 2 day lead time to get a ride arranged.   Says she will not do a video visit.    She is hoping a stronger \"steroid cream\" can be sent to selected pharmacy.    Routed to [facility] Toledo to address.    Belen Pringle RN  [facility]             "

## 2021-11-29 ENCOUNTER — TELEPHONE (OUTPATIENT)
Dept: PULMONOLOGY | Facility: CLINIC | Age: 56
End: 2021-11-29
Payer: COMMERCIAL

## 2021-11-29 NOTE — TELEPHONE ENCOUNTER
[facility] Call Center    Phone Message    May a detailed message be left on voicemail: yes     Reason for Call: Patient called stating that she was diagnosed with COVID on Friday. Patient wants to know if she is able to come in for her PFT tomorrow. Please advise. Thank you.    Action Taken: Message routed to:  Adult Clinics: Pulmonology p [id number]    Travel Screening: Not Applicable

## 2021-11-29 NOTE — TELEPHONE ENCOUNTER
VM left for patient informing her not to come into clinic for PFT d/t positive COVID test result 11/26/2021. PFT has been r/s to Monday 12/27/2021 and consult with Dr. Webb has been r/s to Monday 01/03/2022 These were the soonest available appointments in [place]. Appointment reminder letters mailed to the patient.      Erika Angel LPN  Pulmonary Medicine:  [facility]  Phone: [phone] Fax: [phone]

## 2021-11-30 DIAGNOSIS — F51.04 PSYCHOPHYSIOLOGICAL INSOMNIA: ICD-10-CM

## 2021-11-30 DIAGNOSIS — J30.1 ALLERGIC RHINITIS DUE TO POLLEN, UNSPECIFIED SEASONALITY: ICD-10-CM

## 2021-12-01 ENCOUNTER — TRANSFERRED RECORDS (OUTPATIENT)
Dept: HEALTH INFORMATION MANAGEMENT | Facility: CLINIC | Age: 56
End: 2021-12-01

## 2021-12-01 DIAGNOSIS — I10 HYPERTENSION GOAL BP (BLOOD PRESSURE) < 140/90: ICD-10-CM

## 2021-12-03 RX ORDER — LABETALOL 100 MG/1
TABLET, FILM COATED ORAL
Qty: 60 TABLET | Refills: 0 | Status: SHIPPED | OUTPATIENT
Start: 2021-12-03 | End: 2021-12-24

## 2021-12-03 RX ORDER — CETIRIZINE HYDROCHLORIDE 10 MG/1
10 TABLET ORAL DAILY
Qty: 90 TABLET | Refills: 1 | Status: SHIPPED | OUTPATIENT
Start: 2021-12-03 | End: 2022-01-10

## 2021-12-03 RX ORDER — QUETIAPINE FUMARATE 100 MG/1
150 TABLET, FILM COATED ORAL DAILY
Qty: 135 TABLET | Refills: 1 | Status: SHIPPED | OUTPATIENT
Start: 2021-12-03 | End: 2022-01-10

## 2021-12-03 NOTE — TELEPHONE ENCOUNTER
Prescription approved per [facility] Refill Protocol.  Divina Hernandez RN

## 2021-12-06 ENCOUNTER — OFFICE VISIT (OUTPATIENT)
Dept: SLEEP MEDICINE | Facility: CLINIC | Age: 56
End: 2021-12-06
Payer: COMMERCIAL

## 2021-12-06 VITALS
BODY MASS INDEX: 38.89 KG/M2 | OXYGEN SATURATION: 99 % | HEART RATE: 85 BPM | DIASTOLIC BLOOD PRESSURE: 71 MMHG | RESPIRATION RATE: 20 BRPM | HEIGHT: 61 IN | SYSTOLIC BLOOD PRESSURE: 120 MMHG | WEIGHT: 206 LBS

## 2021-12-06 DIAGNOSIS — G47.33 OSA (OBSTRUCTIVE SLEEP APNEA): Primary | ICD-10-CM

## 2021-12-06 PROCEDURE — 99213 OFFICE O/P EST LOW 20 MIN: CPT | Performed by: PHYSICIAN ASSISTANT

## 2021-12-06 ASSESSMENT — MIFFLIN-ST. JEOR: SCORE: 1466.79

## 2021-12-06 NOTE — PROGRESS NOTES
"Obstructive Sleep Apnea - PAP Follow-Up Visit:    Chief Complaint   Patient presents with     CPAP Follow Up       Inga Ledesma comes in today for follow-up of their severe sleep apnea, managed with CPAP.     She initially presented with loud snoring, non-refreshing sleep, daytime fatigue, difficulty maintaining sleep and co-morbid HTN, \"COPD\".    9/8/2021 [place] Diagnostic Sleep Study (193.0 lbs) - AHI 37.1, RDI 39.5, Supine AHI 40.2, REM .1, Low O2 73.9%, Time Spent less than 88% 12.6 minutes / Time Spent less than 89% 16.4 minutes.    October 5, 2021. Patient received a Resmed Airsense 11 Pressures were set at 6-15 cm H2O    Overall, the patient rates her experience with PAP as 7-8 (0 poor, 10 great). The mask is not comfortable. The mask is leaking.  She is not snoring with the mask on. She is not having gasp arousals. She is not having any oral or nasal dryness. The pressure settings are comfortable     Her PAP interface is Nasal Mask.    Bedtime is typically 9 PM. Usually it takes about 60 minutes to fall asleep with the mask on. Wake time is typically 6:30 AM.  Patient is using PAP therapy 5-6 hours per night. The patient is usually getting 6-7 hours of sleep per night.      Total score - [place]: 15 (12/6/2021 12:00 PM)      VINICIO Total Score: 15      ResMed (compliance download 11/1/2021-11/30/2021)  Auto-PAP 6.0 - 15.0 cmH2O 30 day usage data:    70% of days with > 4 hours of use. 5/30 days with no use.   Average use 5 hours and 31 minutes per day.   95%ile Leak 114.8L/min.   CPAP 95% pressure 9.7 cm.   AHI 8.6 events per hour.         Past medical/surgical history, family history, social history, medications and allergies were reviewed.      Problem List:  Patient Active Problem List    Diagnosis Date Noted     Tobacco abuse 05/10/2010     Priority: High     Iron deficiency anemia secondary to inadequate dietary iron intake 02/19/2010     Priority: High     menorrhagia       JAQUELIN (obstructive " sleep apnea)- severe (AHI 37) 09/09/2021     Priority: Medium     9/8/2021 [place] Diagnostic Sleep Study (193.0 lbs) - AHI 37.1, RDI 39.5, Supine AHI 40.2, REM .1, Low O2 73.9%, Time Spent ?88% 12.6 minutes / Time Spent ?89% 16.4 minutes.       Chronic bilateral low back pain without sciatica 03/17/2021     Priority: Medium     Morbid obesity (H) 01/06/2021     Priority: Medium     Alcohol dependency (H) 07/06/2018     Priority: Medium     Alcohol abuse 04/05/2018     Priority: Medium     Chemical dependency (H) 10/06/2017     Priority: Medium     Psychophysiological insomnia 05/30/2017     Priority: Medium     (HFpEF) heart failure with preserved ejection fraction (H) 03/10/2017     Priority: Medium     ECHO 2018:  Interpretation Summary  Technically difficult study.Extremely poor acoustic windows.  Global and regional left ventricular function is normal with an EF of 60-65%.  No regional wall motion abnormalities are seen.  Right ventricular function, chamber size, wall motion, and thickness are  normal.  The inferior vena cava is normal.  No pericardial effusion is present.       Major depressive disorder, recurrent episode, mild (H) 02/27/2017     Priority: Medium     Chronic obstructive pulmonary disease (H) 03/01/2016     Priority: Medium     Spirometry 2017 without obstruction       [facility] 12/09/2015     Priority: Medium       Status:  Closed   Care Coordinator:  DILIP AltmanN, RN, PHN  [facility] Care Coordinator  [phone]    See Letters for [facility] Care Plan             Hypertension goal BP (blood pressure) < 140/90 09/29/2015     Priority: Medium     Edema of both legs 02/10/2015     Priority: Medium     Has had pelvic node dissection.       RLS (restless legs syndrome)      Priority: Medium     GERD (gastroesophageal reflux disease)      Priority: Medium     Sacroiliitis (H)      Priority: Low     steroid injections, physical therapy ineffective       Vitamin D deficiency       "Priority: Low     Hyperlipidemia with target LDL less than 160      Priority: Low        /71   Pulse 85   Resp 20   Ht 1.549 m (5' 1\")   Wt 93.4 kg (206 lb)   LMP 06/02/2010   SpO2 99%   BMI 38.92 kg/m      Impression/Plan:    Severe obstructive sleep apnea.  Tolerating PAP ok. Daytime symptoms are improved.   She will contact [facility] for mask fitting.   Elevated AHI of ~8 events per hour likely due to high mask leak  Comprehensive DME    Inga Ledesma will follow up in about 1 year or sooner if any concerns.    Yohana Albarado PA-C  "

## 2021-12-06 NOTE — NURSING NOTE
"Chief Complaint   Patient presents with     CPAP Follow Up       Initial /71   Pulse 85   Resp 20   Ht 1.549 m (5' 1\")   Wt 93.4 kg (206 lb)   LMP 06/02/2010   SpO2 99%   BMI 38.92 kg/m   Estimated body mass index is 38.92 kg/m  as calculated from the following:    Height as of this encounter: 1.549 m (5' 1\").    Weight as of this encounter: 93.4 kg (206 lb).    Medication Reconciliation: complete   ESS: 15  Neck circumference: 54 centimeters.  DME: DAISHA Ibanez CMA      "

## 2021-12-06 NOTE — NURSING NOTE
1 year appointment reminder card created and will be mailed when appropriate. DME orders have been automatically faxed to [facility].  Lore Ibanez, CMA

## 2021-12-06 NOTE — PATIENT INSTRUCTIONS
Your BMI is Body mass index is 38.92 kg/m .  Weight management is a personal decision.  If you are interested in exploring weight loss strategies, the following discussion covers the approaches that may be successful. Body mass index (BMI) is one way to tell whether you are at a healthy weight, overweight, or obese. It measures your weight in relation to your height.  A BMI of 18.5 to 24.9 is in the healthy range. A person with a BMI of 25 to 29.9 is considered overweight, and someone with a BMI of 30 or greater is considered obese. More than two-thirds of American adults are considered overweight or obese.  Being overweight or obese increases the risk for further weight gain. Excess weight may lead to heart disease and diabetes.  Creating and following plans for healthy eating and physical activity may help you improve your health.  Weight control is part of healthy lifestyle and includes exercise, emotional health, and healthy eating habits. Careful eating habits lifelong are the mainstay of weight control. Though there are significant health benefits from weight loss, long-term weight loss with diet alone may be very difficult to achieve- studies show long-term success with dietary management in less than 10% of people. Attaining a healthy weight may be especially difficult to achieve in those with severe obesity. In some cases, medications, devices and surgical management might be considered.  What can you do?  If you are overweight or obese and are interested in methods for weight loss, you should discuss this with your provider.     Consider reducing daily calorie intake by 500 calories.     Keep a food journal.     Avoiding skipping meals, consider cutting portions instead.    Diet combined with exercise helps maintain muscle while optimizing fat loss. Strength training is particularly important for building and maintaining muscle mass. Exercise helps reduce stress, increase energy, and improves fitness.  Increasing exercise without diet control, however, may not burn enough calories to loose weight.       Start walking three days a week 10-20 minutes at a time    Work towards walking thirty minutes five days a week     Eventually, increase the speed of your walking for 1-2 minutes at time    In addition, we recommend that you review healthy lifestyles and methods for weight loss available through the National Institutes of Health patient information sites:  http://win.niddk.nih.gov/publications/index.htm    And look into health and wellness programs that may be available through your health insurance provider, employer, local community center, or adama club.    Weight management plan: Patient was referred to their PCP to discuss a diet and exercise plan.    \      Your Body mass index is 38.92 kg/m .  Weight management is a personal decision.  If you are interested in exploring weight loss strategies, the following discussion covers the approaches that may be successful. Body mass index (BMI) is one way to tell whether you are at a healthy weight, overweight, or obese. It measures your weight in relation to your height.  A BMI of 18.5 to 24.9 is in the healthy range. A person with a BMI of 25 to 29.9 is considered overweight, and someone with a BMI of 30 or greater is considered obese. More than two-thirds of American adults are considered overweight or obese.  Being overweight or obese increases the risk for further weight gain. Excess weight may lead to heart disease and diabetes.  Creating and following plans for healthy eating and physical activity may help you improve your health.  Weight control is part of healthy lifestyle and includes exercise, emotional health, and healthy eating habits. Careful eating habits lifelong are the mainstay of weight control. Though there are significant health benefits from weight loss, long-term weight loss with diet alone may be very difficult to achieve- studies show  long-term success with dietary management in less than 10% of people. Attaining a healthy weight may be especially difficult to achieve in those with severe obesity. In some cases, medications, devices and surgical management might be considered.  What can you do?  If you are overweight or obese and are interested in methods for weight loss, you should discuss this with your provider.     Consider reducing daily calorie intake by 500 calories.     Keep a food journal.     Avoiding skipping meals, consider cutting portions instead.    Diet combined with exercise helps maintain muscle while optimizing fat loss. Strength training is particularly important for building and maintaining muscle mass. Exercise helps reduce stress, increase energy, and improves fitness. Increasing exercise without diet control, however, may not burn enough calories to loose weight.       Start walking three days a week 10-20 minutes at a time    Work towards walking thirty minutes five days a week     Eventually, increase the speed of your walking for 1-2 minutes at time    In addition, we recommend that you review healthy lifestyles and methods for weight loss available through the National Institutes of Health patient information sites:  http://win.niddk.nih.gov/publications/index.htm    And look into health and wellness programs that may be available through your health insurance provider, employer, local community center, or adama club.    Weight management plan: Patient was referred to their PCP to discuss a diet and exercise plan.

## 2021-12-10 ENCOUNTER — TRANSFERRED RECORDS (OUTPATIENT)
Dept: HEALTH INFORMATION MANAGEMENT | Facility: CLINIC | Age: 56
End: 2021-12-10
Payer: COMMERCIAL

## 2021-12-13 DIAGNOSIS — Z00.00 ROUTINE GENERAL MEDICAL EXAMINATION AT A HEALTH CARE FACILITY: ICD-10-CM

## 2021-12-13 DIAGNOSIS — F10.230 ALCOHOL DEPENDENCE WITH UNCOMPLICATED WITHDRAWAL (H): ICD-10-CM

## 2021-12-15 RX ORDER — FOLIC ACID 1 MG/1
TABLET ORAL
Qty: 31 TABLET | Refills: 3 | Status: SHIPPED | OUTPATIENT
Start: 2021-12-15 | End: 2022-01-24

## 2021-12-15 RX ORDER — ASPIRIN 81 MG/1
TABLET, COATED ORAL
Qty: 30 TABLET | Refills: 3 | Status: SHIPPED | OUTPATIENT
Start: 2021-12-15 | End: 2022-01-24

## 2021-12-23 DIAGNOSIS — I10 HYPERTENSION GOAL BP (BLOOD PRESSURE) < 140/90: ICD-10-CM

## 2021-12-25 RX ORDER — LABETALOL 100 MG/1
TABLET, FILM COATED ORAL
Qty: 60 TABLET | Refills: 0 | Status: SHIPPED | OUTPATIENT
Start: 2021-12-25 | End: 2022-01-26

## 2021-12-30 DIAGNOSIS — F33.0 MAJOR DEPRESSIVE DISORDER, RECURRENT EPISODE, MILD (H): ICD-10-CM

## 2021-12-30 NOTE — TELEPHONE ENCOUNTER
Hi,  The patient is requesting a refill on Aripiprazole 5 mg tablet. Thanks!    Avril Ramirez [facility]

## 2021-12-31 NOTE — TELEPHONE ENCOUNTER
Routing refill request to provider for review/approval because:  Due for depression follow up PHQ >5                  PHQ 9/25/2017 8/4/2020 5/12/2021   PHQ-9 Total Score 20 12 14   Q9: Thoughts of better off dead/self-harm past 2 weeks Several days Not at all Not at all

## 2022-01-03 RX ORDER — ARIPIPRAZOLE 5 MG/1
5 TABLET ORAL AT BEDTIME
Qty: 30 TABLET | Refills: 0 | Status: SHIPPED | OUTPATIENT
Start: 2022-01-03 | End: 2022-02-10

## 2022-01-04 ENCOUNTER — TRANSFERRED RECORDS (OUTPATIENT)
Dept: HEALTH INFORMATION MANAGEMENT | Facility: CLINIC | Age: 57
End: 2022-01-04

## 2022-01-05 ENCOUNTER — TELEPHONE (OUTPATIENT)
Dept: NURSING | Facility: CLINIC | Age: 57
End: 2022-01-05
Payer: COMMERCIAL

## 2022-01-05 NOTE — TELEPHONE ENCOUNTER
Telephone call    Nurse from [facility] called and she wanted to report that the patient had a 7.1 pounds weight gain from yesterday and the client is requesting to start with lymphedema rehab it was apparently ordered previously but she had covid and cancelled it.  So she wants to get the ball rolling.  The nurse from [facility] one said she will be sending her information to the doctors office.  Her BP was 136/74 pulse 84.  She is also scheduling a appointment to see Dr Toledo.  Routing to PCP    Vandana Scales RN   [facility] Nurse Advisor  10:32 AM 1/5/2022

## 2022-01-10 ENCOUNTER — OFFICE VISIT (OUTPATIENT)
Dept: FAMILY MEDICINE | Facility: CLINIC | Age: 57
End: 2022-01-10
Payer: COMMERCIAL

## 2022-01-10 VITALS
BODY MASS INDEX: 40.8 KG/M2 | HEART RATE: 93 BPM | DIASTOLIC BLOOD PRESSURE: 79 MMHG | WEIGHT: 207.8 LBS | RESPIRATION RATE: 24 BRPM | TEMPERATURE: 98.9 F | OXYGEN SATURATION: 96 % | HEIGHT: 60 IN | SYSTOLIC BLOOD PRESSURE: 131 MMHG

## 2022-01-10 DIAGNOSIS — K14.0 GLOSSITIS: ICD-10-CM

## 2022-01-10 DIAGNOSIS — Z00.00 ENCOUNTER FOR MEDICARE ANNUAL WELLNESS EXAM: Primary | ICD-10-CM

## 2022-01-10 DIAGNOSIS — Z12.11 SCREEN FOR COLON CANCER: ICD-10-CM

## 2022-01-10 DIAGNOSIS — J30.1 ALLERGIC RHINITIS DUE TO POLLEN, UNSPECIFIED SEASONALITY: ICD-10-CM

## 2022-01-10 DIAGNOSIS — J44.1 COPD EXACERBATION (H): ICD-10-CM

## 2022-01-10 DIAGNOSIS — F51.04 PSYCHOPHYSIOLOGICAL INSOMNIA: ICD-10-CM

## 2022-01-10 DIAGNOSIS — I50.32 CHRONIC HEART FAILURE WITH PRESERVED EJECTION FRACTION (H): ICD-10-CM

## 2022-01-10 DIAGNOSIS — E66.812 CLASS 2 SEVERE OBESITY DUE TO EXCESS CALORIES WITH SERIOUS COMORBIDITY AND BODY MASS INDEX (BMI) OF 38.0 TO 38.9 IN ADULT (H): ICD-10-CM

## 2022-01-10 DIAGNOSIS — F10.230 ALCOHOL DEPENDENCE WITH UNCOMPLICATED WITHDRAWAL (H): ICD-10-CM

## 2022-01-10 DIAGNOSIS — M46.1 SACROILIITIS (H): ICD-10-CM

## 2022-01-10 DIAGNOSIS — E66.01 CLASS 2 SEVERE OBESITY DUE TO EXCESS CALORIES WITH SERIOUS COMORBIDITY AND BODY MASS INDEX (BMI) OF 38.0 TO 38.9 IN ADULT (H): ICD-10-CM

## 2022-01-10 DIAGNOSIS — R21 RASH: ICD-10-CM

## 2022-01-10 DIAGNOSIS — Z12.4 CERVICAL CANCER SCREENING: ICD-10-CM

## 2022-01-10 DIAGNOSIS — F33.0 MAJOR DEPRESSIVE DISORDER, RECURRENT EPISODE, MILD (H): ICD-10-CM

## 2022-01-10 PROCEDURE — G0438 PPPS, INITIAL VISIT: HCPCS | Performed by: PHYSICIAN ASSISTANT

## 2022-01-10 PROCEDURE — 99214 OFFICE O/P EST MOD 30 MIN: CPT | Mod: 25 | Performed by: PHYSICIAN ASSISTANT

## 2022-01-10 PROCEDURE — 90670 PCV13 VACCINE IM: CPT | Performed by: PHYSICIAN ASSISTANT

## 2022-01-10 PROCEDURE — G0009 ADMIN PNEUMOCOCCAL VACCINE: HCPCS | Performed by: PHYSICIAN ASSISTANT

## 2022-01-10 RX ORDER — HYDROXYZINE HYDROCHLORIDE 25 MG/1
25 TABLET, FILM COATED ORAL 3 TIMES DAILY PRN
Qty: 30 TABLET | Refills: 0 | Status: SHIPPED | OUTPATIENT
Start: 2022-01-10 | End: 2022-03-09

## 2022-01-10 RX ORDER — PREDNISONE 10 MG/1
TABLET ORAL
Qty: 42 TABLET | Refills: 0 | Status: SHIPPED | OUTPATIENT
Start: 2022-01-10 | End: 2022-04-01

## 2022-01-10 RX ORDER — CETIRIZINE HYDROCHLORIDE 10 MG/1
10 TABLET ORAL DAILY
Qty: 90 TABLET | Refills: 1 | Status: SHIPPED | OUTPATIENT
Start: 2022-01-10 | End: 2022-04-01

## 2022-01-10 RX ORDER — FLUCONAZOLE 100 MG/1
100 TABLET ORAL DAILY
Qty: 15 TABLET | Refills: 11 | Status: SHIPPED | OUTPATIENT
Start: 2022-01-10 | End: 2022-04-01

## 2022-01-10 RX ORDER — QUETIAPINE FUMARATE 100 MG/1
150 TABLET, FILM COATED ORAL DAILY
Qty: 135 TABLET | Refills: 1 | Status: SHIPPED | OUTPATIENT
Start: 2022-01-10 | End: 2022-02-11

## 2022-01-10 ASSESSMENT — ENCOUNTER SYMPTOMS
COUGH: 1
FEVER: 0
PARESTHESIAS: 0
DYSURIA: 0
DIARRHEA: 0
FREQUENCY: 1
EYE PAIN: 0
SORE THROAT: 1
SHORTNESS OF BREATH: 1
HEARTBURN: 0
HEADACHES: 1
CHILLS: 0
MYALGIAS: 1
WEAKNESS: 0
NAUSEA: 0
HEMATOCHEZIA: 0
JOINT SWELLING: 0
DIZZINESS: 0
HEMATURIA: 0
CONSTIPATION: 0
ARTHRALGIAS: 0
NERVOUS/ANXIOUS: 1
BREAST MASS: 0
PALPITATIONS: 0
ABDOMINAL PAIN: 0

## 2022-01-10 ASSESSMENT — ANXIETY QUESTIONNAIRES
2. NOT BEING ABLE TO STOP OR CONTROL WORRYING: SEVERAL DAYS
7. FEELING AFRAID AS IF SOMETHING AWFUL MIGHT HAPPEN: SEVERAL DAYS
1. FEELING NERVOUS, ANXIOUS, OR ON EDGE: MORE THAN HALF THE DAYS
6. BECOMING EASILY ANNOYED OR IRRITABLE: SEVERAL DAYS
IF YOU CHECKED OFF ANY PROBLEMS ON THIS QUESTIONNAIRE, HOW DIFFICULT HAVE THESE PROBLEMS MADE IT FOR YOU TO DO YOUR WORK, TAKE CARE OF THINGS AT HOME, OR GET ALONG WITH OTHER PEOPLE: SOMEWHAT DIFFICULT
GAD7 TOTAL SCORE: 8
3. WORRYING TOO MUCH ABOUT DIFFERENT THINGS: SEVERAL DAYS
5. BEING SO RESTLESS THAT IT IS HARD TO SIT STILL: SEVERAL DAYS

## 2022-01-10 ASSESSMENT — PATIENT HEALTH QUESTIONNAIRE - PHQ9
5. POOR APPETITE OR OVEREATING: SEVERAL DAYS
SUM OF ALL RESPONSES TO PHQ QUESTIONS 1-9: 11

## 2022-01-10 ASSESSMENT — PAIN SCALES - GENERAL: PAINLEVEL: NO PAIN (0)

## 2022-01-10 ASSESSMENT — MIFFLIN-ST. JEOR: SCORE: 1459.07

## 2022-01-10 NOTE — PATIENT INSTRUCTIONS
Patient Education   Personalized Prevention Plan  You are due for the preventive services outlined below.  Your care team is available to assist you in scheduling these services.  If you have already completed any of these items, please share that information with your care team to update in your medical record.  Health Maintenance Due   Topic Date Due     Discuss Advance Care Planning  Never done     Colorectal Cancer Screening  Never done     Hepatitis B Vaccine (1 of 3 - Risk 3-dose series) Never done     Zoster (Shingles) Vaccine (1 of 2) Never done     Preventive Care Visit  10/30/2020     Flu Vaccine (1) 09/01/2021     Depression Assessment  11/12/2021     COVID-19 Vaccine (3 - Booster for Moderna series) 12/29/2021     Basic Metabolic Panel  01/28/2022

## 2022-01-10 NOTE — PROGRESS NOTES
SUBJECTIVE:   Inga Ledesma is a 56 year old female who presents for Preventive Visit.      Patient has been advised of split billing requirements and indicates understanding: Yes   Are you in the first 12 months of your Medicare coverage?  No    Healthy Habits:     Getting at least 3 servings of Calcium per day:  NO    Bi-annual eye exam:  Yes    Dental care twice a year:  NO    Sleep apnea or symptoms of sleep apnea:  Sleep apnea    Diet:  Regular (no restrictions)    Frequency of exercise:  None    Taking medications regularly:  Yes    Medication side effects:  Not applicable    PHQ-2 Total Score: 0    Additional concerns today:  Yes    Do you feel safe in your environment? Yes    Have you ever done Advance Care Planning? (For example, a Health Directive, POLST, or a discussion with a medical provider or your loved ones about your wishes): No, advance care planning information given to patient to review.  Patient declined advance care planning discussion at this time.       Fall risk  Fallen 2 or more times in the past year?: No  Any fall with injury in the past year?: No    Cognitive Screening - declined by patient    Do you have sleep apnea, excessive snoring or daytime drowsiness?: yes    Reviewed and updated as needed this visit by clinical staff  Tobacco  Allergies    Med Hx  Surg Hx  Fam Hx  Soc Hx       Reviewed and updated as needed this visit by Provider               Social History     Tobacco Use     Smoking status: Current Some Day Smoker     Packs/day: 0.25     Years: 34.00     Pack years: 8.50     Types: Cigarettes     Start date: 1979     Last attempt to quit: 10/3/2012     Years since quittin.2     Smokeless tobacco: Current User     Types: Chew     Tobacco comment: 5 cigarettes daily   Substance Use Topics     Alcohol use: No     Comment: 1 pint of vodka per day, last drink aug 2018         Alcohol Use 1/10/2022   Prescreen: >3 drinks/day or >7 drinks/week? No   Prescreen: >3  drinks/day or >7 drinks/week? -           Shortness of breath and sore throat x 1 month  Has essentially quit smoking.   Mood: doing well. Denies depression.  Recheck of obesity and chf. Denies orthopnea/pnd. No chest pain.  Not exercising. Watching diet some.   Denies low back pain .    Sober x 1 year.    Current providers sharing in care for this patient include:   Patient Care Team:  Min Toledo PA-C as PCP - General (Physician Assistant)  Min Toledo PA-C as Assigned PCP  Ortiz Garibay MD as Assigned Surgical Provider  Cooper Chand DPM as Assigned Musculoskeletal Provider    The following health maintenance items are reviewed in Epic and correct as of today:  Health Maintenance Due   Topic Date Due     COLORECTAL CANCER SCREENING  Never done     HEPATITIS B IMMUNIZATION (1 of 3 - Risk 3-dose series) Never done     ZOSTER IMMUNIZATION (1 of 2) Never done     INFLUENZA VACCINE (1) 09/01/2021     PHQ-9  11/12/2021     COVID-19 Vaccine (3 - Booster for Moderna series) 12/29/2021     BMP  01/28/2022     Lab work is in process  Labs reviewed in EPIC  BP Readings from Last 3 Encounters:   01/10/22 131/79   12/06/21 120/71   09/23/21 108/65    Wt Readings from Last 3 Encounters:   01/10/22 94.3 kg (207 lb 12.8 oz)   12/06/21 93.4 kg (206 lb)   09/23/21 97.2 kg (214 lb 3.2 oz)                  Patient Active Problem List   Diagnosis     RLS (restless legs syndrome)     GERD (gastroesophageal reflux disease)     Iron deficiency anemia secondary to inadequate dietary iron intake     Hyperlipidemia with target LDL less than 160     Sacroiliitis (H)     Tobacco abuse     Vitamin D deficiency     Edema of both legs     Hypertension goal BP (blood pressure) < 140/90     [facility]     Major depressive disorder, recurrent episode, mild (H)     (HFpEF) heart failure with preserved ejection fraction (H)     Psychophysiological insomnia     Chemical dependency (H)     Alcohol abuse     Alcohol  dependency (H)     Morbid obesity (H)     Chronic bilateral low back pain without sciatica     Chronic obstructive pulmonary disease (H)     JAQUELIN (obstructive sleep apnea)- severe (AHI 37)     Past Surgical History:   Procedure Laterality Date     AS BIOPSY/EXCISION LYMPH NODE OPEN SUPERFICIAL       COLONOSCOPY       EYE SURGERY       HYSTERECTOMY  2010     HYSTERECTOMY TOTAL ABDOMINAL  7/28/10    Bilateral salpingectomy.  ovaries conserved.     LASER TX, CERVICAL  1987     LYMPH NODE BIOPSY      inguinal     LYSIS OF LABIAL LESION(S)  ,        Social History     Tobacco Use     Smoking status: Current Some Day Smoker     Packs/day: 0.25     Years: 34.00     Pack years: 8.50     Types: Cigarettes     Start date: 1979     Last attempt to quit: 10/3/2012     Years since quittin.2     Smokeless tobacco: Current User     Types: Chew     Tobacco comment: 5 cigarettes daily   Substance Use Topics     Alcohol use: No     Comment: 1 pint of vodka per day, last drink aug 2018     Family History   Problem Relation Age of Onset     Depression/Anxiety Mother      Asthma Mother      Cerebrovascular Disease Father      Hypertension Father      Lung Cancer Father      Alcoholism Father      Breast Cancer Paternal Aunt      Other Cancer Other      Depression Other      Anxiety Disorder Other      Mental Illness Other      Substance Abuse Other      Asthma Other      Obesity Sister      Obesity Son      Suicide Paternal Uncle          Current Outpatient Medications   Medication Sig Dispense Refill     acetaminophen (TYLENOL) 500 MG tablet 1-2 tablets by mouth every 4-6 hours as needed for pain, max dose 4000 mg in 24 hours 100 tablet 1     albuterol (VENTOLIN HFA) 108 (90 Base) MCG/ACT inhaler INHALE ONE TO TWO PUFFS BY MOUTH EVERY 4 HOURS AS NEEDED FOR SHORTNESS OF BREATH, DIFFICULTY BREATHING OR WHEEZING. 18 g 3     amitriptyline (ELAVIL) 50 MG tablet TAKE 1 TABLET BY MOUTH AT BEDTIME 30 tablet 11      ARIPiprazole (ABILIFY) 5 MG tablet Take 1 tablet (5 mg) by mouth At Bedtime 30 tablet 0     ASPIRIN LOW DOSE 81 MG EC tablet TAKE 1 TABLET BY MOUTH ONCE DAILY 30 tablet 3     atorvastatin (LIPITOR) 10 MG tablet Take 1 tablet (10 mg) by mouth At Bedtime 90 tablet 3     calcium carbonate 500 mg, elemental, (OSCAL) 500 MG tablet TAKE 1 TABLET BY MOUTH ONCE DAILY 30 tablet 7     cetirizine (ZYRTEC) 10 MG tablet Take 1 tablet (10 mg) by mouth daily 90 tablet 1     Cholecalciferol (VITAMIN D3) 50 MCG (2000 UT) TABS TAKE 1 TABLET BY MOUTH ONCE DAILY 30 tablet 11     diclofenac (VOLTAREN) 75 MG EC tablet TAKE 1 TABLET BY MOUTH TWICE DAILY *1 TOTAL FILL* 62 tablet 11     famotidine (PEPCID) 40 MG tablet Take 1 tablet (40 mg) by mouth daily 90 tablet 1     FEROSUL 325 (65 Fe) MG tablet TAKE 1 TABLET BY MOUTH TWICE DAILY. 60 tablet 3     fluconazole (DIFLUCAN) 100 MG tablet Take 1 tablet (100 mg) by mouth daily 15 tablet 11     FLUoxetine (PROZAC) 40 MG capsule TAKE 2 CAPSULES (80MG) BY MOUTH ONCE DAILY. 62 capsule 11     fluticasone (FLONASE) 50 MCG/ACT nasal spray Spray 1 spray into both nostrils daily 16 g 3     fluticasone-salmeterol (ADVAIR DISKUS) 500-50 MCG/DOSE inhaler INHALE ONE PUFF BY MOUTH EVERY 12 HOURS 1 each 5     folic acid (FOLVITE) 1 MG tablet TAKE 1 TABLET BY MOUTH ONCE DAILY 31 tablet 3     furosemide (LASIX) 20 MG tablet increase to 60 mg (3 tabs) daily for one week starting 10/14/21, then back to 20 mg daily thereafter 90 tablet 3     gabapentin (NEURONTIN) 600 MG tablet TAKE 1 TABLET BY MOUTH THREE TIMES DAILY 270 tablet 0     hydrALAZINE (APRESOLINE) 25 MG tablet TAKE 1 TABLET BY MOUTH THREE TIMES DAILY 270 tablet 0     hydrOXYzine (ATARAX) 25 MG tablet Take 1 tablet (25 mg) by mouth 3 times daily as needed for itching 30 tablet 0     ibuprofen (IBU) 600 MG tablet TAKE 1 TABLET BY MOUTH THREE TIMES DAILY AS NEEDED FOR PAIN 30 tablet 11     ipratropium - albuterol 0.5 mg/2.5 mg/3 mL (DUONEB) 0.5-2.5 (3)  MG/3ML neb solution INHALE ONE VIAL BY NEBULIZATION FOUR TIMES DAILY AS NEEDED FOR WHEEZING 360 mL 1     labetalol (NORMODYNE) 100 MG tablet TAKE 1 TABLET BY MOUTH TWICE DAILY. *1 TOTAL FILL* 60 tablet 0     losartan (COZAAR) 100 MG tablet TAKE 1 TABLET BY MOUTH ONCE DAILY 90 tablet 1     magnesium oxide (MAG-OX) 400 MG tablet TAKE 1 TABLET BY MOUTH ONCE DAILY 90 tablet 11     melatonin 5 MG tablet Take 1-2 tablets (5-10 mg) by mouth nightly as needed for sleep 180 tablet 1     omeprazole (PRILOSEC) 20 MG DR capsule TAKE 1 CAPSULE BY MOUTH TWICE DAILY BEFORE A MEAL 60 capsule 3     potassium chloride ER (KLOR-CON M) 10 MEQ CR tablet TAKE 1 TABLET BY MOUTH ONCE DAILY 30 tablet 11     predniSONE (DELTASONE) 10 MG tablet Take 60 mg days 1 and 2, 50 mg days 3 and 4, 40 mg days 5 and 6, 30 mg days 7 and 8, 20 mg days 9 and 10, 10 mg days 11 and 12 42 tablet 0     QUEtiapine (SEROQUEL) 100 MG tablet Take 1.5 tablets (150 mg) by mouth daily At bedtime 135 tablet 1     rOPINIRole (REQUIP) 1 MG tablet TAKE 1 TABLET BY MOUTH THREE TIMES DAILY 93 tablet 11     YUPELRI 175 MCG/3ML SOLN NEBULIZE AND INHALE 1 VIAL (3ML) BY MOUTH AND INTO THE LUNGS ONCE DAILY. 90 mL 11     order for DME Equipment being ordered: TENS 1 Device 0     Allergies   Allergen Reactions     Bee Venom Anaphylaxis     Reglan [Metoclopramide Hcl] Other (See Comments)     Body tenses up     Doxycycline Rash     Recent Labs   Lab Test 07/28/21  0946 05/12/21  1520 01/06/21  1249 10/22/20  0000 08/19/20  1505 08/09/20  0000 08/04/20  1052 10/06/19  0000 08/22/19  0000 07/20/19  0000 07/18/19  0000 06/18/18  1825 06/18/18  1531   A1C 5.5  --   --   --   --   --  5.2  --   --   --  5.0  --   --    LDL  --  73  --   --   --   --  80  --  175*  --   --   --   --    HDL  --  59  --   --   --   --  78  --  143  --   --   --   --    TRIG  --  101  --   --   --   --  85  --  90  --   --   --   --    ALT 21 21 23  --  31   < >  --    < > 26   < > 35   < > 49   CR 0.84  1.32* 1.09*   < > 0.85   < > 0.63   < > 0.72   < >  --    < > 2.97*   GFRESTIMATED 79 45* 57*   < > 78   < > >90   < > >60   < >  --    < > 17*   GFRESTBLACK  --  53* 66   < > 90   < > >90   < > >60   < >  --    < > 20*   POTASSIUM 4.8 4.8 4.7   < > 3.6   < > 3.7   < > 3.8   < >  --    < > 3.2*   TSH  --   --   --   --  1.38  --   --   --   --   --   --   --  1.02    < > = values in this interval not displayed.        Any new diagnosis of family breast, ovarian, or bowel cancer? No    FHS-7: No flowsheet data found.  click delete button to remove this line now  Mammogram Screening: Recommended mammography every 1-2 years with patient discussion and risk factor consideration  Pertinent mammograms are reviewed under the imaging tab.    Review of Systems   Constitutional: Negative for chills and fever.   HENT: Positive for congestion, ear pain and sore throat. Negative for hearing loss.    Eyes: Negative for pain and visual disturbance.   Respiratory: Positive for cough and shortness of breath.    Cardiovascular: Positive for peripheral edema. Negative for chest pain and palpitations.   Gastrointestinal: Negative for abdominal pain, constipation, diarrhea, heartburn, hematochezia and nausea.   Breasts:  Negative for tenderness, breast mass and discharge.   Genitourinary: Positive for frequency. Negative for dysuria, genital sores, hematuria, pelvic pain, urgency, vaginal bleeding and vaginal discharge.   Musculoskeletal: Positive for myalgias. Negative for arthralgias and joint swelling.   Skin: Negative for rash.   Neurological: Positive for headaches. Negative for dizziness, weakness and paresthesias.   Psychiatric/Behavioral: Negative for mood changes. The patient is nervous/anxious.      Constitutional, HEENT, cardiovascular, pulmonary, GI, , musculoskeletal, neuro, skin, endocrine and psych systems are negative, except as otherwise noted.    OBJECTIVE:   /79   Pulse 93   Temp 98.9  F (37.2  C) (Tympanic)    Resp 24   Ht 1.524 m (5')   Wt 94.3 kg (207 lb 12.8 oz)   LMP 06/02/2010   SpO2 96%   BMI 40.58 kg/m   Estimated body mass index is 40.58 kg/m  as calculated from the following:    Height as of this encounter: 1.524 m (5').    Weight as of this encounter: 94.3 kg (207 lb 12.8 oz).  Physical Exam  GENERAL APPEARANCE: healthy, alert and no distress  EYES: Eyes grossly normal to inspection, PERRL and conjunctivae and sclerae normal  HENT: ear canals and TM's normal, nose and mouth without ulcers or lesions, oropharynx clear and oral mucous membranes moist  NECK: no adenopathy, no asymmetry, masses, or scars and thyroid normal to palpation  RESP: lungs- some mild inspiratory wheezing. No rales   BREAST: normal without masses, tenderness or nipple discharge and no palpable axillary masses or adenopathy  CV: regular rate and rhythm, normal S1 S2, no S3 or S4, no murmur, click or rub, no peripheral edema and peripheral pulses strong  ABDOMEN: soft, nontender, no hepatosplenomegaly, no masses and bowel sounds normal  MS: no musculoskeletal defects are noted and gait is age appropriate without ataxia  SKIN: no suspicious lesions or rashes  NEURO: Normal strength and tone, sensory exam grossly normal, mentation intact and speech normal  PSYCH: mentation appears normal and affect normal/bright    Diagnostic Test Results:  Labs reviewed in Epic    ASSESSMENT / PLAN:       ICD-10-CM    1. Encounter for Medicare annual wellness exam  Z00.00    2. Screen for colon cancer  Z12.11 Adult Gastro Ref - Procedure Only   3. Allergic rhinitis due to pollen, unspecified seasonality  J30.1 cetirizine (ZYRTEC) 10 MG tablet   4. Glossitis  K14.0 fluconazole (DIFLUCAN) 100 MG tablet   5. Rash  R21 hydrOXYzine (ATARAX) 25 MG tablet   6. Psychophysiological insomnia  F51.04 QUEtiapine (SEROQUEL) 100 MG tablet   7. Sacroiliitis (H)  M46.1    8. COPD exacerbation (H)  J44.1 predniSONE (DELTASONE) 10 MG tablet   9. Chronic heart failure  with preserved ejection fraction (H)  I50.32    10. Alcohol dependence with uncomplicated withdrawal (H)  F10.230    11. Major depressive disorder, recurrent episode, mild (H)  F33.0    12. Class 2 severe obesity due to excess calories with serious comorbidity and body mass index (BMI) of 38.0 to 38.9 in adult (H)  E66.01     Z68.38      Advised supportive and symptomatic treatment.  Follow up with Provider - if condition persists or worsens.   work on lifestyle modification    Patient has been advised of split billing requirements and indicates understanding: Yes  COUNSELING:  Reviewed preventive health counseling, as reflected in patient instructions       Regular exercise       Healthy diet/nutrition    Estimated body mass index is 40.58 kg/m  as calculated from the following:    Height as of this encounter: 1.524 m (5').    Weight as of this encounter: 94.3 kg (207 lb 12.8 oz).    Weight management plan: Discussed healthy diet and exercise guidelines    She reports that she has been smoking cigarettes. She started smoking about 42 years ago. She has a 8.50 pack-year smoking history. Her smokeless tobacco use includes chew.  Tobacco Cessation Action Plan:   pt has recently quit      Appropriate preventive services were discussed with this patient, including applicable screening as appropriate for cardiovascular disease, diabetes, osteopenia/osteoporosis, and glaucoma.  As appropriate for age/gender, discussed screening for colorectal cancer, prostate cancer, breast cancer, and cervical cancer. Checklist reviewing preventive services available has been given to the patient.    Reviewed patients plan of care and provided an AVS. The Basic Care Plan (routine screening as documented in Health Maintenance) for Inga meets the Care Plan requirement. This Care Plan has been established and reviewed with the Patient.    Counseling Resources:  ATP IV Guidelines  Pooled Cohorts Equation Calculator  Breast Cancer Risk  Calculator  Breast Cancer: Medication to Reduce Risk  FRAX Risk Assessment  ICSI Preventive Guidelines  Dietary Guidelines for Americans, 2010  [employer]'s MyPlate  ASA Prophylaxis  Lung CA Screening    BENI Duckworth [facility] MICHAEL    Identified Health Risks:

## 2022-01-11 ASSESSMENT — ANXIETY QUESTIONNAIRES: GAD7 TOTAL SCORE: 8

## 2022-01-13 ENCOUNTER — TRANSFERRED RECORDS (OUTPATIENT)
Dept: HEALTH INFORMATION MANAGEMENT | Facility: CLINIC | Age: 57
End: 2022-01-13
Payer: COMMERCIAL

## 2022-01-14 ENCOUNTER — TRANSFERRED RECORDS (OUTPATIENT)
Dept: HEALTH INFORMATION MANAGEMENT | Facility: CLINIC | Age: 57
End: 2022-01-14
Payer: COMMERCIAL

## 2022-01-19 ENCOUNTER — TRANSFERRED RECORDS (OUTPATIENT)
Dept: HEALTH INFORMATION MANAGEMENT | Facility: CLINIC | Age: 57
End: 2022-01-19
Payer: COMMERCIAL

## 2022-01-24 DIAGNOSIS — E78.5 HYPERLIPIDEMIA LDL GOAL <130: ICD-10-CM

## 2022-01-24 DIAGNOSIS — Z00.00 ROUTINE GENERAL MEDICAL EXAMINATION AT A HEALTH CARE FACILITY: ICD-10-CM

## 2022-01-24 DIAGNOSIS — I10 HYPERTENSION GOAL BP (BLOOD PRESSURE) < 140/90: ICD-10-CM

## 2022-01-24 DIAGNOSIS — F10.230 ALCOHOL DEPENDENCE WITH UNCOMPLICATED WITHDRAWAL (H): ICD-10-CM

## 2022-01-25 ENCOUNTER — TRANSFERRED RECORDS (OUTPATIENT)
Dept: HEALTH INFORMATION MANAGEMENT | Facility: CLINIC | Age: 57
End: 2022-01-25
Payer: COMMERCIAL

## 2022-01-26 NOTE — TELEPHONE ENCOUNTER
"Pt seen by pcp on 1/10/22, due back 7/10/22  All meds pended for 6 month supply    Requested Prescriptions   Pending Prescriptions Disp Refills     atorvastatin (LIPITOR) 10 MG tablet 90 tablet 1     Sig: Take 1 tablet (10 mg) by mouth At Bedtime       Statins Protocol Passed - 1/26/2022  8:03 AM        Passed - LDL on file in past 12 months     Recent Labs   Lab Test 05/12/21  1520   LDL 73             Passed - No abnormal creatine kinase in past 12 months     No lab results found.             Passed - Recent (12 mo) or future (30 days) visit within the authorizing provider's specialty     Patient has had an office visit with the authorizing provider or a provider within the authorizing providers department within the previous 12 mos or has a future within next 30 days. See \"Patient Info\" tab in inbasket, or \"Choose Columns\" in Meds & Orders section of the refill encounter.              Passed - Medication is active on med list        Passed - Patient is age 18 or older        Passed - No active pregnancy on record        Passed - No positive pregnancy test in past 12 months           aspirin (ASPIRIN LOW DOSE) 81 MG EC tablet 90 tablet 1     Sig: Take 1 tablet (81 mg) by mouth daily       Analgesics (Non-Narcotic Tylenol and ASA Only) Passed - 1/26/2022  8:03 AM        Passed - Recent (12 mo) or future (30 days) visit within the authorizing provider's specialty     Patient has had an office visit with the authorizing provider or a provider within the authorizing providers department within the previous 12 mos or has a future within next 30 days. See \"Patient Info\" tab in inbasket, or \"Choose Columns\" in Meds & Orders section of the refill encounter.              Passed - Patient is age 20 years or older     If ASA is flagged for ages under 20 years old. Forward to provider for confirmation Ryes Syndrome is not a concern.              Passed - Medication is active on med list           folic acid (FOLVITE) 1 MG " "tablet 90 tablet 1     Sig: Take 1 tablet (1,000 mcg) by mouth daily       Vitamin Supplements (Adult) Protocol Passed - 1/26/2022  8:03 AM        Passed - High dose Vitamin D not ordered        Passed - Recent (12 mo) or future (30 days) visit within the authorizing provider's specialty     Patient has had an office visit with the authorizing provider or a provider within the authorizing providers department within the previous 12 mos or has a future within next 30 days. See \"Patient Info\" tab in inbasket, or \"Choose Columns\" in Meds & Orders section of the refill encounter.              Passed - Medication is active on med list           labetalol (NORMODYNE) 100 MG tablet 180 tablet 1     Sig: TAKE 1 TABLET BY MOUTH TWICE DAILY.       Beta-Blockers Protocol Passed - 1/26/2022  8:03 AM        Passed - Blood pressure under 140/90 in past 12 months     BP Readings from Last 3 Encounters:   01/10/22 131/79   12/06/21 120/71   09/23/21 108/65                 Passed - Patient is age 6 or older        Passed - Recent (12 mo) or future (30 days) visit within the authorizing provider's specialty     Patient has had an office visit with the authorizing provider or a provider within the authorizing providers department within the previous 12 mos or has a future within next 30 days. See \"Patient Info\" tab in inbasket, or \"Choose Columns\" in Meds & Orders section of the refill encounter.              Passed - Medication is active on med list             "

## 2022-01-27 ENCOUNTER — OFFICE VISIT (OUTPATIENT)
Dept: NURSING | Facility: CLINIC | Age: 57
End: 2022-01-27
Payer: COMMERCIAL

## 2022-01-27 VITALS — WEIGHT: 212.9 LBS | HEART RATE: 88 BPM | OXYGEN SATURATION: 96 % | BODY MASS INDEX: 41.58 KG/M2

## 2022-01-27 DIAGNOSIS — J44.9 CHRONIC OBSTRUCTIVE PULMONARY DISEASE, UNSPECIFIED COPD TYPE (H): Primary | ICD-10-CM

## 2022-01-27 PROCEDURE — 94060 EVALUATION OF WHEEZING: CPT | Performed by: INTERNAL MEDICINE

## 2022-01-27 PROCEDURE — 94729 DIFFUSING CAPACITY: CPT | Performed by: INTERNAL MEDICINE

## 2022-01-27 PROCEDURE — 94726 PLETHYSMOGRAPHY LUNG VOLUMES: CPT | Performed by: INTERNAL MEDICINE

## 2022-01-28 LAB
DLCOUNC-%PRED-PRE: 66 %
DLCOUNC-PRE: 11.96 ML/MIN/MMHG
DLCOUNC-PRED: 17.94 ML/MIN/MMHG
ERV-%PRED-PRE: 63 %
ERV-PRE: 0.13 L
ERV-PRED: 0.21 L
EXPTIME-PRE: 7.41 SEC
FEF2575-%PRED-POST: 75 %
FEF2575-%PRED-PRE: 34 %
FEF2575-POST: 1.73 L/SEC
FEF2575-PRE: 0.78 L/SEC
FEF2575-PRED: 2.28 L/SEC
FEFMAX-%PRED-PRE: 36 %
FEFMAX-PRE: 2.19 L/SEC
FEFMAX-PRED: 5.94 L/SEC
FEV1-%PRED-PRE: 44 %
FEV1-PRE: 1.03 L
FEV1FEV6-PRE: 75 %
FEV1FEV6-PRED: 82 %
FEV1FVC-PRE: 73 %
FEV1FVC-PRED: 80 %
FEV1SVC-PRE: 49 %
FEV1SVC-PRED: 80 %
FIFMAX-PRE: 1.69 L/SEC
FRCPLETH-%PRED-PRE: 85 %
FRCPLETH-PRE: 2.12 L
FRCPLETH-PRED: 2.47 L
FVC-%PRED-PRE: 49 %
FVC-PRE: 1.41 L
FVC-PRED: 2.87 L
IC-%PRED-PRE: 75 %
IC-PRE: 2 L
IC-PRED: 2.66 L
RVPLETH-%PRED-PRE: 120 %
RVPLETH-PRE: 1.98 L
RVPLETH-PRED: 1.64 L
TLCPLETH-%PRED-PRE: 96 %
TLCPLETH-PRE: 4.11 L
TLCPLETH-PRED: 4.27 L
VA-%PRED-PRE: 81 %
VA-PRE: 3.34 L
VC-%PRED-PRE: 74 %
VC-PRE: 2.13 L
VC-PRED: 2.87 L

## 2022-01-28 RX ORDER — FOLIC ACID 1 MG/1
1000 TABLET ORAL DAILY
Qty: 90 TABLET | Refills: 1 | Status: SHIPPED | OUTPATIENT
Start: 2022-01-28 | End: 2022-04-01

## 2022-01-28 RX ORDER — ATORVASTATIN CALCIUM 10 MG/1
10 TABLET, FILM COATED ORAL AT BEDTIME
Qty: 90 TABLET | Refills: 1 | Status: SHIPPED | OUTPATIENT
Start: 2022-01-28 | End: 2022-04-01

## 2022-01-28 RX ORDER — LABETALOL 100 MG/1
TABLET, FILM COATED ORAL
Qty: 180 TABLET | Refills: 1 | Status: SHIPPED | OUTPATIENT
Start: 2022-01-28 | End: 2022-04-01

## 2022-02-03 ENCOUNTER — OFFICE VISIT (OUTPATIENT)
Dept: PULMONOLOGY | Facility: CLINIC | Age: 57
End: 2022-02-03
Attending: PHYSICIAN ASSISTANT
Payer: COMMERCIAL

## 2022-02-03 VITALS
WEIGHT: 208.7 LBS | HEART RATE: 95 BPM | SYSTOLIC BLOOD PRESSURE: 133 MMHG | HEIGHT: 61 IN | BODY MASS INDEX: 39.4 KG/M2 | OXYGEN SATURATION: 95 % | DIASTOLIC BLOOD PRESSURE: 79 MMHG

## 2022-02-03 DIAGNOSIS — R49.0 DYSPHONIA: Primary | ICD-10-CM

## 2022-02-03 DIAGNOSIS — Z72.0 TOBACCO ABUSE: ICD-10-CM

## 2022-02-03 DIAGNOSIS — J43.2 CENTRILOBULAR EMPHYSEMA (H): ICD-10-CM

## 2022-02-03 PROCEDURE — 99205 OFFICE O/P NEW HI 60 MIN: CPT | Performed by: INTERNAL MEDICINE

## 2022-02-03 RX ORDER — AZITHROMYCIN 250 MG/1
500 TABLET, FILM COATED ORAL
Qty: 24 TABLET | Refills: 3 | Status: SHIPPED | OUTPATIENT
Start: 2022-02-04 | End: 2022-02-03

## 2022-02-03 RX ORDER — AZITHROMYCIN 250 MG/1
500 TABLET, FILM COATED ORAL
Qty: 24 TABLET | Refills: 3 | Status: SHIPPED | OUTPATIENT
Start: 2022-02-04 | End: 2022-04-01

## 2022-02-03 ASSESSMENT — PAIN SCALES - GENERAL: PAINLEVEL: NO PAIN (0)

## 2022-02-03 ASSESSMENT — MIFFLIN-ST. JEOR: SCORE: 1479.04

## 2022-02-03 NOTE — PATIENT INSTRUCTIONS
Patient Education     What Is COPD?  COPD stands for chronic obstructive pulmonary disease. It means the airways in your lungs are blocked (obstructed). This makes it hard to breathe. You may have trouble with daily activities because of shortness of breath. Over time the shortness of breath often gets worse. This makes it harder to take care of yourself and take part in activities. Chronic bronchitis and emphysema are 2 common types of COPD.  How did I get COPD?  Most people get COPD from smoking. Cigarette smoke damages lungs. This can develop into COPD over many years.  How COPD affects you  COPD makes you work harder to breathe. Air may get trapped in the lungs. This prevents your lungs from filling completely with fresh oxygen-filled air when you breathe in (inhale). It's harder to take deep breaths, especially when you are active and start breathing faster. Over time your lungs may become enlarged, filled with air that does not transfer oxygen into the blood. These problems lead to shortness of breath (also called dyspnea). Hoarse, whistling breathing (wheezing) and a chronic cough are common. So is feeling tired and worn out (fatigue).   What happens in chronic bronchitis?    The cells in the airways make more mucus than normal. The mucus builds up, narrowing the airways. This means less air travels into and out of the lungs. The lining of the airways may also become swollen (inflamed). This causes the airways to narrow even more.  What happens in emphysema?    The small airways are damaged and lose their stretchiness. The airways collapse when you exhale. This causes air to get trapped in the air sacs. This means that less oxygen enters the blood vessels. And less oxygen is delivered to all of the cells of your body. This makes it hard to breathe.  Damage to cilia    Cilia are small hairs that line and protect the airways. Smoking damages the cilia. Damaged cilia can t sweep mucus and particles away. Some  of the cilia are destroyed. This damage makes COPD worse.  [employer] last reviewed this educational content on 8/1/2018 2000-2021 [employer]. All rights reserved. This information is not intended as a substitute for professional medical care. Always follow your healthcare professional's instructions.

## 2022-02-03 NOTE — PROGRESS NOTES
Pulmonary Clinic New Patient Consult  Reason for Consult: COPD  History of Present Illness  I had the pleasure of seeing Inga Ledesma, who is a pleasant 56 yr old male with a history of  COPD and active smoker who presents to clinic for evaluation and management of same.  To briefly review, Cody was diagnosed with COPD close to 10 years ago based on symptoms and spirometric evaluation.  She also has a history of CAD, CVA, alcoholism in addition to congestive heart failure.  Since the diagnosis of COPD she has had escalating inhaler regimen and is currently on nebulized Yuperli as well as high-dose Advair and as needed nebulized albuterol/ipratropium and rescue inhalers.  She still continues to have frequent shortness of breath, cough productive of yellowish sputum, frequent wheezing as well as chest tightness.  She has been on several courses of antibiotics and prednisone in the last year or so with frequent AECOPDs.    Unfortunately, Cody continues to smoke.  She smokes about a half pack per day and has been doing this for about 20 years.  She has tried different options to help with smoking cessation including Chantix and nicotine replacement have been unsuccessful.  She was not tolerant to Chantix as it made her sick.  She denies any overt chest pain, her pedal swellings are well controlled on current dose of Lasix, she denies any orthopnea and no PND.  She denies any weight loss.  She has significant reflux disease which is partially controlled on 20 mg omeprazole daily.  She has also noticed that in the last year or so she has had significant dysphonia.  She denies any swelling at the back of her throat nor dysphagia.  Used to live in [place] with her son in a trailer but has recently moved into a new building and works for the [employer] and enjoys crocheting and watching television.  Mother has cancer otherwise no other history of chronic lung disease in the family.  No family history of lung  cancer      Review of Systems:  10 of 14 systems reviewed and are negative unless otherwise stated in HPI.    Past Medical History:   Diagnosis Date     Acute pain of left shoulder 05/20/2021     LALA (acute kidney injury) (H) 05/28/2018     Alcohol abuse      Alcohol dependence with acute alcoholic intoxication (H) 11/19/2020     Alcohol withdrawal (H) 10/28/2017     Alcohol withdrawal seizure (H) 03/2016     Alcoholic hepatitis without ascites 12/05/2013     Cancer of labia majora (H) 1987     Cellulitis of right lower extremity 03/13/2021     Cervical dysplasia 1987     Congestive heart failure (H) 05/16/2016     COPD (chronic obstructive pulmonary disease) (H)      COPD exacerbation (H) 09/04/2018     CVA (cerebral infarction) 01/2012     Dependent edema      Depression, major      Depressive disorder 1966     GERD (gastroesophageal reflux disease)      Hyperlipidemia LDL goal < 160      Hypertension      Iron deficiency anemia      Menorrhagia 05/10/2010     Pneumonia 07/10/2021     RLS (restless legs syndrome)      Sacroiliitis (H)     steroid injections ineffective, chronic low back pain     Sepsis (H) 06/18/2018     Tobacco abuse      Uncomplicated asthma      Vitamin D deficiencies        Past Surgical History:   Procedure Laterality Date     AS BIOPSY/EXCISION LYMPH NODE OPEN SUPERFICIAL       COLONOSCOPY       EYE SURGERY       HYSTERECTOMY  2010     HYSTERECTOMY TOTAL ABDOMINAL  7/28/10    Bilateral salpingectomy.  ovaries conserved.     LASER TX, CERVICAL  1987     LYMPH NODE BIOPSY  2007    inguinal     LYSIS OF LABIAL LESION(S)  1985, 1987       Family History   Problem Relation Age of Onset     Depression/Anxiety Mother      Asthma Mother      Cerebrovascular Disease Father      Hypertension Father      Lung Cancer Father      Alcoholism Father      Breast Cancer Paternal Aunt      Other Cancer Other      Depression Other      Anxiety Disorder Other      Mental Illness Other      Substance Abuse Other       Asthma Other      Obesity Sister      Obesity Son      Suicide Paternal Uncle        Social History     Socioeconomic History     Marital status:      Spouse name: Not on file     Number of children: 1     Years of education: 13     Highest education level: Not on file   Occupational History     Employer: [employer]   Tobacco Use     Smoking status: Current Some Day Smoker     Packs/day: 0.25     Years: 34.00     Pack years: 8.50     Types: Cigarettes     Start date: 1979     Last attempt to quit: 10/3/2012     Years since quittin.3     Smokeless tobacco: Current User     Types: Chew     Tobacco comment: 5 cigarettes daily   Vaping Use     Vaping Use: Former     Quit date: 2022   Substance and Sexual Activity     Alcohol use: No     Comment: 1 pint of vodka per day, last drink aug 2018     Drug use: No     Sexual activity: Not Currently   Other Topics Concern     Parent/sibling w/ CABG, MI or angioplasty before 65F 55M? No   Social History Narrative    Lives in [place] with her son in a trailer no access to guns or weapons works for the [employer] and enjoys crocheting and watching television.     Social Determinants of Health     Financial Resource Strain: Not on file   Food Insecurity: Not on file   Transportation Needs: Not on file   Physical Activity: Not on file   Stress: Not on file   Social Connections: Not on file   Intimate Partner Violence: Not on file   Housing Stability: Not on file         Allergies   Allergen Reactions     Bee Venom Anaphylaxis     Reglan [Metoclopramide Hcl] Other (See Comments)     Body tenses up     Doxycycline Rash         Current Outpatient Medications:      acetaminophen (TYLENOL) 500 MG tablet, 1-2 tablets by mouth every 4-6 hours as needed for pain, max dose 4000 mg in 24 hours, Disp: 100 tablet, Rfl: 1     albuterol (VENTOLIN HFA) 108 (90 Base) MCG/ACT inhaler, INHALE ONE TO TWO PUFFS BY MOUTH EVERY 4 HOURS AS NEEDED FOR SHORTNESS  OF BREATH, DIFFICULTY BREATHING OR WHEEZING., Disp: 18 g, Rfl: 3     amitriptyline (ELAVIL) 50 MG tablet, TAKE 1 TABLET BY MOUTH AT BEDTIME, Disp: 30 tablet, Rfl: 11     ARIPiprazole (ABILIFY) 5 MG tablet, Take 1 tablet (5 mg) by mouth At Bedtime, Disp: 30 tablet, Rfl: 0     aspirin (ASPIRIN LOW DOSE) 81 MG EC tablet, Take 1 tablet (81 mg) by mouth daily, Disp: 90 tablet, Rfl: 1     atorvastatin (LIPITOR) 10 MG tablet, Take 1 tablet (10 mg) by mouth At Bedtime, Disp: 90 tablet, Rfl: 1     calcium carbonate 500 mg, elemental, (OSCAL) 500 MG tablet, TAKE 1 TABLET BY MOUTH ONCE DAILY, Disp: 30 tablet, Rfl: 7     cetirizine (ZYRTEC) 10 MG tablet, Take 1 tablet (10 mg) by mouth daily, Disp: 90 tablet, Rfl: 1     Cholecalciferol (VITAMIN D3) 50 MCG (2000 UT) TABS, TAKE 1 TABLET BY MOUTH ONCE DAILY, Disp: 30 tablet, Rfl: 11     diclofenac (VOLTAREN) 75 MG EC tablet, TAKE 1 TABLET BY MOUTH TWICE DAILY *1 TOTAL FILL*, Disp: 62 tablet, Rfl: 11     famotidine (PEPCID) 40 MG tablet, Take 1 tablet (40 mg) by mouth daily, Disp: 90 tablet, Rfl: 1     FEROSUL 325 (65 Fe) MG tablet, TAKE 1 TABLET BY MOUTH TWICE DAILY., Disp: 60 tablet, Rfl: 3     fluconazole (DIFLUCAN) 100 MG tablet, Take 1 tablet (100 mg) by mouth daily, Disp: 15 tablet, Rfl: 11     FLUoxetine (PROZAC) 40 MG capsule, TAKE 2 CAPSULES (80MG) BY MOUTH ONCE DAILY., Disp: 62 capsule, Rfl: 11     fluticasone (FLONASE) 50 MCG/ACT nasal spray, Spray 1 spray into both nostrils daily, Disp: 16 g, Rfl: 3     fluticasone-salmeterol (ADVAIR DISKUS) 500-50 MCG/DOSE inhaler, INHALE ONE PUFF BY MOUTH EVERY 12 HOURS, Disp: 1 each, Rfl: 5     folic acid (FOLVITE) 1 MG tablet, Take 1 tablet (1,000 mcg) by mouth daily, Disp: 90 tablet, Rfl: 1     furosemide (LASIX) 20 MG tablet, increase to 60 mg (3 tabs) daily for one week starting 10/14/21, then back to 20 mg daily thereafter, Disp: 90 tablet, Rfl: 3     gabapentin (NEURONTIN) 600 MG tablet, TAKE 1 TABLET BY MOUTH THREE TIMES DAILY,  Disp: 270 tablet, Rfl: 0     hydrALAZINE (APRESOLINE) 25 MG tablet, TAKE 1 TABLET BY MOUTH THREE TIMES DAILY, Disp: 270 tablet, Rfl: 0     hydrOXYzine (ATARAX) 25 MG tablet, Take 1 tablet (25 mg) by mouth 3 times daily as needed for itching, Disp: 30 tablet, Rfl: 0     ibuprofen (IBU) 600 MG tablet, TAKE 1 TABLET BY MOUTH THREE TIMES DAILY AS NEEDED FOR PAIN, Disp: 30 tablet, Rfl: 11     ipratropium - albuterol 0.5 mg/2.5 mg/3 mL (DUONEB) 0.5-2.5 (3) MG/3ML neb solution, INHALE ONE VIAL BY NEBULIZATION FOUR TIMES DAILY AS NEEDED FOR WHEEZING, Disp: 360 mL, Rfl: 1     labetalol (NORMODYNE) 100 MG tablet, TAKE 1 TABLET BY MOUTH TWICE DAILY., Disp: 180 tablet, Rfl: 1     losartan (COZAAR) 100 MG tablet, TAKE 1 TABLET BY MOUTH ONCE DAILY, Disp: 90 tablet, Rfl: 1     magnesium oxide (MAG-OX) 400 MG tablet, TAKE 1 TABLET BY MOUTH ONCE DAILY, Disp: 90 tablet, Rfl: 11     melatonin 5 MG tablet, Take 1-2 tablets (5-10 mg) by mouth nightly as needed for sleep, Disp: 180 tablet, Rfl: 1     omeprazole (PRILOSEC) 20 MG DR capsule, TAKE 1 CAPSULE BY MOUTH TWICE DAILY BEFORE A MEAL, Disp: 60 capsule, Rfl: 3     order for DME, Equipment being ordered: TENS, Disp: 1 Device, Rfl: 0     potassium chloride ER (KLOR-CON M) 10 MEQ CR tablet, TAKE 1 TABLET BY MOUTH ONCE DAILY, Disp: 30 tablet, Rfl: 11     predniSONE (DELTASONE) 10 MG tablet, Take 60 mg days 1 and 2, 50 mg days 3 and 4, 40 mg days 5 and 6, 30 mg days 7 and 8, 20 mg days 9 and 10, 10 mg days 11 and 12, Disp: 42 tablet, Rfl: 0     QUEtiapine (SEROQUEL) 100 MG tablet, Take 1.5 tablets (150 mg) by mouth daily At bedtime, Disp: 135 tablet, Rfl: 1     rOPINIRole (REQUIP) 1 MG tablet, TAKE 1 TABLET BY MOUTH THREE TIMES DAILY, Disp: 93 tablet, Rfl: 11     YUPELRI 175 MCG/3ML SOLN, NEBULIZE AND INHALE 1 VIAL (3ML) BY MOUTH AND INTO THE LUNGS ONCE DAILY., Disp: 90 mL, Rfl: 11      Physical Exam:  /79 (BP Location: Left arm, Patient Position: Sitting, Cuff Size: Adult Regular)   " Pulse 95   Ht 1.549 m (5' 1\")   Wt 94.7 kg (208 lb 11.2 oz)   LMP 06/02/2010   SpO2 95%   BMI 39.43 kg/m    GENERAL: Well developed, well nourished, alert, and in no apparent distress.  HEENT: Normocephalic, atraumatic. PERRL, EOMI. Oral mucosa is moist. No perioral cyanosis.  NECK: supple, no masses, no thyromegaly.  RESP:  Normal respiratory effort.  CTAB.  No rales, wheezes, rhonchi.  No cyanosis or clubbing.  CV: Normal S1, S2, regular rhythm, normal rate. No murmur.  No LE edema.   ABDOMEN:  Soft, non-tender, non-distended.   SKIN: warm and dry. No rash.  NEURO: AAOx3.  Normal gait.  Fluent speech.  PSYCH: mentation appears normal.       Results:  PFTs: Discussed and reviewed with patient, Moderate to severe obstruction with significant positive bronchodilator response. Normal lung volumes and impaired diffusion.  Most Recent [facility] Pulmonary Function Testing    FVC-Pred   Date Value Ref Range Status   01/27/2022 2.87 L      FVC-Pre   Date Value Ref Range Status   01/27/2022 1.41 L      FVC-%Pred-Pre   Date Value Ref Range Status   01/27/2022 49 %      FEV1-Pre   Date Value Ref Range Status   01/27/2022 1.03 L      FEV1-%Pred-Pre   Date Value Ref Range Status   01/27/2022 44 %      FEV1FVC-Pred   Date Value Ref Range Status   01/27/2022 80 %      FEV1FVC-Pre   Date Value Ref Range Status   01/27/2022 73 %      No results found for: 20029  FEFMax-Pred   Date Value Ref Range Status   01/27/2022 5.94 L/sec      FEFMax-Pre   Date Value Ref Range Status   01/27/2022 2.19 L/sec      FEFMax-%Pred-Pre   Date Value Ref Range Status   01/27/2022 36 %      ExpTime-Pre   Date Value Ref Range Status   01/27/2022 7.41 sec      FIFMax-Pre   Date Value Ref Range Status   01/27/2022 1.69 L/sec      FEV1FEV6-Pred   Date Value Ref Range Status   01/27/2022 82 %      FEV1FEV6-Pre   Date Value Ref Range Status   01/27/2022 75 %      No results found for: 20055  Imaging (personally reviewed in clinic today): CT Chest " 09/01/2021  Impression:   1. ACR Assessment Category (v1.1):  Lung-RADS Category 2. Benign  appearance or behavior.     Recommendation:  Lung-RADS Category 2. Benign appearance or behavior.  Recommendation:  continue annual screening with Lung cancer screening  CT (please order exam code [id number]).    2. Significant Incidental Finding(s):  Category S: Yes.  a.  Multifocal, upper lobe predominant groundglass centrilobular  nodules , consistent with respiratory bronchiolitis or infection.  b. Mild compression deformity of T5 vertebral body.  3. Any moderate or severe Emphysema or bronchial wall thickening or  mosaic attenuation? No   4. Avoidance of tobacco smoke is strongly advised. Please consider  referral for smoking cessation to [facility] Medication Therapy Management  (MTM) if clinically appropriate.    Echocardiogram 06/18/2018  Interpretation Summary  Technically difficult study.Extremely poor acoustic windows.  Global and regional left ventricular function is normal with an EF of 60-65%.  No regional wall motion abnormalities are seen.  Right ventricular function, chamber size, wall motion, and thickness are  normal.  The inferior vena cava is normal.  No pericardial effusion is present.  _____________________________________________________________________________  Assessment and Plan:   COPD (Group D)/Respiratory bronchiolitis  Significant obstruction with positive bronchodilator response on most recent spirometric evaluation.  She also have findings of respiratory bronchiolitis on her CT imaging.  She is still very symptomatic despite being on maximum inhaler/nebulizer therapy with Yuperli nebs in addition to high-dose Advair and as needed nebulized albuterol/ipratropium.  Unfortunately, she continues to smoke.  She smokes about 1/2 pack/day.  Long conversation about smoking cessation as it pertains to decreased mortality, improved symptoms and preservation of lung function.  She has tried Chantix and was  intolerant.  She has underlying anxiety and I think she would benefit from Wellbutrin/Zyban.  I sent her message to her PCP JACQUELINE Rizo to see if this will be a good option given that she has several other psych meds and a little cautious about drug drug interactions.  I will escalate her therapy by the addition of low-dose chronic azithromycin to 50 mg on Monday, Wednesdays and Fridays.  She has significant sputum production and I think she will benefit from this.  I also checked her EKG on her chart and there is no evidence of QTC prolongation.  Active Smoker  Smoking cessation as above and I spent 10 minutes. She has already been enrolled in lung cancer screening and we will continue.    Dysphonia  - Refer to ENT. Advised to continue to rinse her mouth after use of her inhaled corticosteroids as this may be a side effect.    Questions and concerns were answered to the patient's satisfaction.  she was provided with my contact information should new questions or concerns arise in the interim.  She should return to clinic in 6 months with PFTs  Up to date on vaccinations.    I spent a total of 60 minutes face to face with Inga Ledesma during today's office visit. Over 50% of this time was spent counseling the patient and/or coordinating care regarding their pulmonary disease.      Kristi Webb MD  Pulmonary, Critical Care and Sleep Medicine  [facility]  Pager: [phone]        The above note was dictated using voice recognition software and may include typographical errors. Please contact the author for any clarifications.

## 2022-02-03 NOTE — NURSING NOTE
"Inga Ledesma's goals for this visit include:   Chief Complaint   Patient presents with     New Patient     Chronic bronchitis, unspecified chronic bronchitis type/COPD consult. CT chest        She requests these members of her care team be copied on today's visit information: yes    PCP: Min Toledo    Referring Provider:  Min Toledo PA-C  [id number] [place] [address]    /79 (BP Location: Left arm, Patient Position: Sitting, Cuff Size: Adult Regular)   Pulse 95   Ht 1.549 m (5' 1\")   Wt 94.7 kg (208 lb 11.2 oz)   LMP 06/02/2010   SpO2 95%   BMI 39.43 kg/m      Do you need any medication refills at today's visit? No  Jaleel Gross CMA      "

## 2022-02-10 DIAGNOSIS — F33.0 MAJOR DEPRESSIVE DISORDER, RECURRENT EPISODE, MILD (H): ICD-10-CM

## 2022-02-10 DIAGNOSIS — I10 HYPERTENSION GOAL BP (BLOOD PRESSURE) < 140/90: ICD-10-CM

## 2022-02-10 NOTE — TELEPHONE ENCOUNTER
Routing refill request to provider for review/approval because:  Drug not on the [facility] refill protocol   PHQ-9 score:    PHQ 1/10/2022   PHQ-9 Total Score 11   Q9: Thoughts of better off dead/self-harm past 2 weeks Not at all

## 2022-02-11 ENCOUNTER — VIRTUAL VISIT (OUTPATIENT)
Dept: FAMILY MEDICINE | Facility: CLINIC | Age: 57
End: 2022-02-11
Payer: COMMERCIAL

## 2022-02-11 DIAGNOSIS — I10 HYPERTENSION GOAL BP (BLOOD PRESSURE) < 140/90: ICD-10-CM

## 2022-02-11 DIAGNOSIS — F51.04 PSYCHOPHYSIOLOGICAL INSOMNIA: Primary | ICD-10-CM

## 2022-02-11 DIAGNOSIS — E78.5 HYPERLIPIDEMIA LDL GOAL <130: ICD-10-CM

## 2022-02-11 DIAGNOSIS — Z12.11 COLON CANCER SCREENING: ICD-10-CM

## 2022-02-11 PROCEDURE — 99214 OFFICE O/P EST MOD 30 MIN: CPT | Mod: 95 | Performed by: PHYSICIAN ASSISTANT

## 2022-02-11 RX ORDER — LOSARTAN POTASSIUM 100 MG/1
TABLET ORAL
Qty: 90 TABLET | Refills: 1 | Status: SHIPPED | OUTPATIENT
Start: 2022-02-11 | End: 2022-04-01

## 2022-02-11 RX ORDER — QUETIAPINE FUMARATE 200 MG/1
200 TABLET, FILM COATED ORAL AT BEDTIME
Qty: 90 TABLET | Refills: 1 | Status: SHIPPED | OUTPATIENT
Start: 2022-02-11 | End: 2022-03-18

## 2022-02-11 RX ORDER — ARIPIPRAZOLE 5 MG/1
5 TABLET ORAL AT BEDTIME
Qty: 90 TABLET | Refills: 1 | Status: SHIPPED | OUTPATIENT
Start: 2022-02-11 | End: 2022-04-01

## 2022-02-11 NOTE — PROGRESS NOTES
Coyd is a 56 year old who is being evaluated via a billable telephone visit.      What phone number would you like to be contacted at? [phone]  How would you like to obtain your AVS? Marcia Mchugh   Cody is a 56 year old who presents for the following health issues    HPI     Insomnia - recheck  - since living on her own, has been having a hard time with insomnia  Decent sleep hygiene.   Uses her cpap nightly. Nothing seems to be waking her up.   Terminal insomnia issues.     Order request  - would like order for Cologuard    Discuss kidney function concern  - was told by pharmacist that ibuprofen and diclofenac should not be taken together, can cause problems with kidney function  - would like future lab orders for kidney function tests  Advised no further use of ibuprofen, just diclofenac.       Review of Systems   Constitutional, HEENT, cardiovascular, pulmonary, GI, , musculoskeletal, neuro, skin, endocrine and psych systems are negative, except as otherwise noted.      Objective           Vitals:  No vitals were obtained today due to virtual visit.    Physical Exam   healthy, alert and no distress  PSYCH: Alert and oriented times 3; coherent speech, normal   rate and volume, able to articulate logical thoughts, able   to abstract reason, no tangential thoughts, no hallucinations   or delusions  Her affect is normal  RESP: No cough, no audible wheezing, able to talk in full sentences  Remainder of exam unable to be completed due to telephone visits        Inga was seen today for insomnia, orders and consult.    Diagnoses and all orders for this visit:    Colon cancer screening  -     CESAR(EXACT SCIENCES)    Psychophysiological insomnia  -     QUEtiapine (SEROQUEL) 200 MG tablet; Take 1 tablet (200 mg) by mouth At Bedtime At bedtime    Hypertension goal BP (blood pressure) < 140/90  -     Basic metabolic panel  (Ca, Cl, CO2, Creat, Gluc, K, Na, BUN); Future    Hyperlipidemia LDL  goal <130  -     Lipid panel reflex to direct LDL Fasting; Future      Inc seroquel to 200 mg at bedtime.    Phone call duration: 13 minutes

## 2022-02-15 ENCOUNTER — TELEPHONE (OUTPATIENT)
Dept: FAMILY MEDICINE | Facility: CLINIC | Age: 57
End: 2022-02-15
Payer: COMMERCIAL

## 2022-02-17 NOTE — TELEPHONE ENCOUNTER
0700: Bedside shift report received from ORAL Berger. Report consisted of SBAR, Kardex, MAR, Recent Results, Intake/Output, and cardiac rhythm. 1641: Telephone order received from Juju Morris NP for PO Q6H 5mg oxycodone IR tablet. Order read back for clarification. This is a modification to current Q4H order. End of Shift Note    Bedside shift change report given to ORAL Montgomery (oncoming nurse) by Marguerite Valdez RN (offgoing nurse). Report included the following information SBAR, Kardex, Intake/Output, MAR, Recent Results and Cardiac Rhythm sinus rhythm    Shift worked:  [id number]     Shift summary and any significant changes:     wound care complete     No significant changes    Patient remains on 4L     Concerns for physician to address:       Zone phone for oncoming shift:          Activity:  Activity Level: Bed Rest  Number times ambulated in hallways past shift: 0  Number of times OOB to chair past shift: 0    Cardiac:   Cardiac Monitoring: Yes      Cardiac Rhythm: Sinus Rhythm    Access:   Current line(s): PIV     Genitourinary:   Urinary status: mauro    Respiratory:   O2 Device: Nasal cannula  Chronic home O2 use?: YES  Incentive spirometer at bedside: NO     GI:  Last Bowel Movement Date: 02/10/22  Current diet:  ADULT DIET Regular  ADULT ORAL NUTRITION SUPPLEMENT Breakfast, Dinner; Renal Supplement  DIET NPO  Passing flatus: YES  Tolerating current diet: YES       Pain Management:   Patient states pain is manageable on current regimen: YES    Skin:  Lewis Score: 10  Interventions: turn team, speciality bed, float heels, increase time out of bed, foam dressing and limit briefs    Patient Safety:  Fall Score:  Total Score: 3  Interventions: bed/chair alarm, assistive device (walker, cane, etc), gripper socks and pt to call before getting OOB  High Fall Risk: Yes    Length of Stay:  Expected LOS: 4d 19h  Actual LOS: 7      Marguerite Valdez RN The PA request is supposed to be for Advair Diskus, please see under PA Initiation/Cover MyMeds that somehow Fluticasone is the drug that was requested. Per [facility], please send in a new PA for Advais Diskus.

## 2022-02-25 NOTE — TELEPHONE ENCOUNTER
"Patient is calling back.  Doesn't understand why Enid would have called and reported her oxygen levels.  She states her oxygen levels are fine, and not complaining about SOB or anything like that.      Pt has pain related to whole right side pain, particularly her arm.    Patient weighed herself and had lost the 3lbs she gained.      \"Pt does not want an appointment, she knows when she needs an appointment and when she does not need an appointment\"    If PCP wants to see patient, she will come \"by and wave\"      About the CPAP machine, states that it does not fall completely off, just falls off her nose.  The CPAP distributor told the patient, she could be swatting it off in her sleep; patient is not concerned about this.     Pt also wanted me to document that she has a therapy pet-dragon.     [phone] call if questions.     Treasure Villalta/  Tim Chacon       "
Continue to monitor for now. If nocturnal sats remain low, will need to consider a medication dose adjustment  
Have her schedule an appt with me   
Left message on voice mail [phone] for patient to call clinic.   [phone]    Per Min Toledo patient needs an appointment with him regarding message below with tele health nurse.  
Message is very confusing.  Called [employer] at [phone] and spoke with Enid.  Enid is a nurse and she does daily tele health visits with patient.  Patient has been reporting recently to Enid that her O2 levels when she wakes up is at 80 % and her heart rate has been reading high per Enid as high as 171.  Patient get's up and moves around and O2 level goes to mid 90's.  Patient reported to Enid that when she wakes up her CPAP is always off.  Patient reported that her sleeping pills were increased.  Enid is wondering if patient is too sedated.  Noted that med Seroquel was just increased to 200 mg.  Enid also stated patient reported a 3.1 pd increase in weight in one day.  Patient does not have any edema in legs per Enid.   Will send to provider to advise.  Appointment?  Patient does see pulmonary, should message be sent to pulmonary?  
Patient notified and voiced understanding and agreement.  Mira Wright RN  [facility]    
Reason for Call:  Other     Detailed comments: Enid stated that patient oxygen level is 180/70 and heart rate 171 she is not sleeping at night and her cpap is falling off at night and they are suggesting a change in sleeping pills.    Phone Number    [facility] [employer]  [phone]         Best Time:     Can we leave a detailed message on this number? Not Applicable    Call taken on 2/15/2022 at 12:23 PM by Marquita Garcia      
No

## 2022-02-28 ENCOUNTER — TELEPHONE (OUTPATIENT)
Dept: FAMILY MEDICINE | Facility: CLINIC | Age: 57
End: 2022-02-28
Payer: COMMERCIAL

## 2022-02-28 DIAGNOSIS — M79.672 PAIN IN BOTH FEET: ICD-10-CM

## 2022-02-28 DIAGNOSIS — R21 RASH: ICD-10-CM

## 2022-02-28 DIAGNOSIS — G44.89 OTHER HEADACHE SYNDROME: ICD-10-CM

## 2022-02-28 DIAGNOSIS — M79.671 PAIN IN BOTH FEET: ICD-10-CM

## 2022-02-28 DIAGNOSIS — K21.9 GASTROESOPHAGEAL REFLUX DISEASE WITHOUT ESOPHAGITIS: ICD-10-CM

## 2022-02-28 DIAGNOSIS — G44.219 EPISODIC TENSION-TYPE HEADACHE, NOT INTRACTABLE: ICD-10-CM

## 2022-02-28 NOTE — TELEPHONE ENCOUNTER
Patient is also requesting a refill of Tylenol 500 mg tabs to be sent to  [facility].   She is requesting her amitriptyline (ELAVIL) 50 MG tablet be increased to 2 tabs nightly as 1 1/2 are no longer effective.

## 2022-03-01 ENCOUNTER — TELEPHONE (OUTPATIENT)
Dept: FAMILY MEDICINE | Facility: CLINIC | Age: 57
End: 2022-03-01
Payer: COMMERCIAL

## 2022-03-01 LAB — COLOGUARD-ABSTRACT: POSITIVE

## 2022-03-01 NOTE — TELEPHONE ENCOUNTER
Routing refill request to provider for review/approval because:  Pended for approval. Wants to change dose on Elavil to 2 tabs nightly.  Mira Wright RN  [facility]

## 2022-03-01 NOTE — TELEPHONE ENCOUNTER
Patient is interested in obtaining a hospital bed and a scooter, she has found out that these may be covered under her insurance. She plans on calling a couple of different supply companies and then will contact the clinic to order.

## 2022-03-01 NOTE — TELEPHONE ENCOUNTER
Routed to PCP Min Toledo to address requests for DME items below.    Belen Pringle RN  [facility]

## 2022-03-01 NOTE — TELEPHONE ENCOUNTER
Patient is requesting DME's be prepared for both a motorized scooter and hospital bed. Once orders are ready, she is asking that they be faxed to [facility] at [phone].

## 2022-03-04 ENCOUNTER — TELEPHONE (OUTPATIENT)
Dept: FAMILY MEDICINE | Facility: CLINIC | Age: 57
End: 2022-03-04
Payer: COMMERCIAL

## 2022-03-04 NOTE — TELEPHONE ENCOUNTER
Forms received from: [employer]  Phone number listed: [phone]   Fax listed: Fax [phone] or [phone]  Date received: 3/3/22  Form description: prescription refill or clarification  Once forms are completed, please return to [employer] via fax [phone] or [phone] .  Is patient requesting to be contacted when forms are completed: na  Phone: na  Form placed:  To [employer] basket  Nancy Tobar

## 2022-03-07 DIAGNOSIS — R21 RASH: ICD-10-CM

## 2022-03-07 DIAGNOSIS — M79.671 PAIN IN BOTH FEET: ICD-10-CM

## 2022-03-07 DIAGNOSIS — K21.9 GASTROESOPHAGEAL REFLUX DISEASE WITHOUT ESOPHAGITIS: ICD-10-CM

## 2022-03-07 DIAGNOSIS — M79.672 PAIN IN BOTH FEET: ICD-10-CM

## 2022-03-07 RX ORDER — HYDROXYZINE HYDROCHLORIDE 25 MG/1
25 TABLET, FILM COATED ORAL 3 TIMES DAILY PRN
Qty: 30 TABLET | Refills: 0 | Status: CANCELLED | OUTPATIENT
Start: 2022-03-07

## 2022-03-07 RX ORDER — DICLOFENAC SODIUM 75 MG/1
TABLET, DELAYED RELEASE ORAL
Qty: 62 TABLET | Refills: 11 | Status: CANCELLED | OUTPATIENT
Start: 2022-03-07

## 2022-03-07 RX ORDER — FAMOTIDINE 40 MG/1
40 TABLET, FILM COATED ORAL DAILY
Qty: 90 TABLET | Refills: 1 | Status: CANCELLED | OUTPATIENT
Start: 2022-03-07

## 2022-03-07 NOTE — RESULT ENCOUNTER NOTE
Results discussed directly with patient while patient was present. Any further details documented in the note.   Kristi Webb MD

## 2022-03-09 ENCOUNTER — TRANSFERRED RECORDS (OUTPATIENT)
Dept: HEALTH INFORMATION MANAGEMENT | Facility: CLINIC | Age: 57
End: 2022-03-09
Payer: COMMERCIAL

## 2022-03-10 RX ORDER — HYDROXYZINE HYDROCHLORIDE 25 MG/1
TABLET, FILM COATED ORAL
Qty: 30 TABLET | Refills: 1 | Status: SHIPPED | OUTPATIENT
Start: 2022-03-10 | End: 2022-04-01

## 2022-03-10 RX ORDER — DICLOFENAC SODIUM 75 MG/1
TABLET, DELAYED RELEASE ORAL
Qty: 180 TABLET | Refills: 1 | Status: SHIPPED | OUTPATIENT
Start: 2022-03-10 | End: 2022-04-01

## 2022-03-10 RX ORDER — FAMOTIDINE 40 MG/1
TABLET, FILM COATED ORAL
Qty: 90 TABLET | Refills: 1 | Status: SHIPPED | OUTPATIENT
Start: 2022-03-10 | End: 2022-04-01

## 2022-03-10 RX ORDER — AMITRIPTYLINE HYDROCHLORIDE 50 MG/1
50 TABLET ORAL AT BEDTIME
Qty: 30 TABLET | Refills: 1 | Status: SHIPPED | OUTPATIENT
Start: 2022-03-10 | End: 2022-03-18

## 2022-03-10 RX ORDER — ACETAMINOPHEN 500 MG
TABLET ORAL
Qty: 100 TABLET | Refills: 1 | Status: SHIPPED | OUTPATIENT
Start: 2022-03-10 | End: 2022-04-01

## 2022-03-10 NOTE — TELEPHONE ENCOUNTER
duplicate request, see refill encounter from 2/28/22. Will resend that to Min Toledo high priority.

## 2022-03-11 ENCOUNTER — TRANSFERRED RECORDS (OUTPATIENT)
Dept: HEALTH INFORMATION MANAGEMENT | Facility: CLINIC | Age: 57
End: 2022-03-11

## 2022-03-14 ENCOUNTER — TELEPHONE (OUTPATIENT)
Dept: GASTROENTEROLOGY | Facility: CLINIC | Age: 57
End: 2022-03-14
Payer: COMMERCIAL

## 2022-03-14 DIAGNOSIS — Z12.11 ENCOUNTER FOR SCREENING COLONOSCOPY: Primary | ICD-10-CM

## 2022-03-14 DIAGNOSIS — Z11.59 ENCOUNTER FOR SCREENING FOR OTHER VIRAL DISEASES: Primary | ICD-10-CM

## 2022-03-14 NOTE — TELEPHONE ENCOUNTER
Screening Questions  BlueKIND OF PREP RedLOCATION [review exclusion criteria] GreenSEDATION TYPE    1. Have you had a positive covid test in the last 90 days? N     2. Are you active on mychart? Y    3. What insurance is in the [facility]? [facility]     3.  Ordering/Referring Provider: Min Toledo PA-C in  FAMILY PRACTICE    4. BMI 41.6 [BMI OVER 40-EXTENDED PREP]  If greater than 40 review exclusion criteria [PAC APPT IF @ [facility]]    5.  Respiratory Screening :  [If yes to any of the following HOSPITAL setting only]     Do you use daily home oxygen? N    Do you have mod to severe Obstructive Sleep Apnea? Y  [OKAY @ [facility] SH PH RI]   Do you have Pulmonary Hypertension?  N     Do you have UNCONTROLLED asthma? N      6. Have you had a heart or lung transplant? N      7. Are you currently on dialysis? N [ If yes, G-PREP & HOSPITAL setting only]     8. Do you have chronic kidney disease? N [ If yes, G-PREP ]    9. Have you had a stroke or Transient ischemic attack (TIA) within 6 months? N (If yes, please review exclusion criteria)    10. In the past 6 months, have you had any heart related issues including cardiomyopathy or heart attack? N        If yes, did it require cardiac stenting or other implantable device? N      11. Do you have any implantable devices in your body (pacemaker, defib, LVAD)? N (If yes, please review exclusion criteria)    12. Do you take nitroglycerin? N   If yes, how often? N  (if yes, HOSPITAL setting ONLY)    13. Are you currently taking any blood thinners? N   [IF YES, INFORM PATIENT TO FOLLOW UP W/ ORDERING PROVIDER FOR BRIDGING INSTRUCTIONS]     14. Do you have a diagnosis of diabetes? N   [ If yes, G-PREP ]    15. [FEMALES] Are you currently pregnant? N    If yes, how many weeks? N    16. Are you taking any prescription pain medications on a routine schedule?  N  [ If yes, EXTENDED PREP.] [If yes, MAC]    17. Do you have any chemical dependencies such as alcohol, street drugs,  or methadone?  N-Pt states not within the last year [If yes, MAC]    18. Do you have any history of post-traumatic stress syndrome, severe anxiety or history of psychosis?  N  [If yes, MAC]    19. Do you transfer independently?  Y    20.  Do you have any issues with constipation?  N  [ If yes, EXTENDED PREP.]    21. Preferred LOCAL Pharmacy for Pre Prescription     [facility] [place] - [address] [facility]    Scheduling Details      Caller : Inga Ledesma  (Please ask for phone number if not scheduled by patient)    Type of Procedure Scheduled: COLONOSCOPY  Which Colonoscopy Prep was Sent?: EXTENDED PREP   KHREAL CF PATIENTS & HUMPHREY'S PATIENTS NEEDS EXTENDED PREP  Surgeon: DR. WESTBROOK  Date of Procedure: 04/13/2022  Location: [facility]      Sedation Type: CS  Conscious Sedation- Needs  for 6 hours after the procedure  MAC/General-Needs  for 24 hours after procedure    Pre-op Required at [facility] [place] and OR for MAC sedation: N  (advise patient they will need a pre-op prior to procedure -)      Informed patient they will need an adult  Y  Cannot take any type of public or medical transportation alone    Pre-Procedure Covid test to be completed at [facility] or Externally: EXTERNALLY     Confirmed Nurse will call to complete assessment Y    Additional comments: N

## 2022-03-15 ENCOUNTER — MEDICAL CORRESPONDENCE (OUTPATIENT)
Dept: HEALTH INFORMATION MANAGEMENT | Facility: CLINIC | Age: 57
End: 2022-03-15

## 2022-03-15 NOTE — TELEPHONE ENCOUNTER
Patient calling, wonders if Min addressed the request to increase the amitriptyline.   I see Min sent the previous dose of 50 mg to pharmacy on 3/10/22.    Patient says he had advised her to try increasing the amitriptyline to 75 mg and let him know if that helped her insomnia (in addition to the seroquel increase on 2/11/22).    I don't find notes regarding the amitriptyline increase but patient says she's been taking 1.5 tabs at HS and wants to try increasing to 2 tabs at HS as the 1.5 tab dose has not been effective.    Routed to PCP to address request to INCREASE amitriptyline.   Will need new Rx sent for new dose.    Belen Pringle RN  [facility]

## 2022-03-18 ENCOUNTER — VIRTUAL VISIT (OUTPATIENT)
Dept: FAMILY MEDICINE | Facility: CLINIC | Age: 57
End: 2022-03-18
Payer: COMMERCIAL

## 2022-03-18 ENCOUNTER — TELEPHONE (OUTPATIENT)
Dept: FAMILY MEDICINE | Facility: CLINIC | Age: 57
End: 2022-03-18

## 2022-03-18 DIAGNOSIS — G89.29 CHRONIC BILATERAL LOW BACK PAIN WITHOUT SCIATICA: ICD-10-CM

## 2022-03-18 DIAGNOSIS — M54.50 CHRONIC BILATERAL LOW BACK PAIN WITHOUT SCIATICA: ICD-10-CM

## 2022-03-18 DIAGNOSIS — F51.04 PSYCHOPHYSIOLOGICAL INSOMNIA: Primary | ICD-10-CM

## 2022-03-18 DIAGNOSIS — J44.9 CHRONIC OBSTRUCTIVE PULMONARY DISEASE, UNSPECIFIED COPD TYPE (H): ICD-10-CM

## 2022-03-18 DIAGNOSIS — G44.219 EPISODIC TENSION-TYPE HEADACHE, NOT INTRACTABLE: ICD-10-CM

## 2022-03-18 PROCEDURE — 99214 OFFICE O/P EST MOD 30 MIN: CPT | Mod: 95 | Performed by: PHYSICIAN ASSISTANT

## 2022-03-18 RX ORDER — QUETIAPINE FUMARATE 200 MG/1
100 TABLET, FILM COATED ORAL AT BEDTIME
Qty: 90 TABLET | Refills: 1
Start: 2022-03-18 | End: 2022-04-01

## 2022-03-18 RX ORDER — AMITRIPTYLINE HYDROCHLORIDE 50 MG/1
100 TABLET ORAL AT BEDTIME
Qty: 60 TABLET | Refills: 5 | Status: SHIPPED | OUTPATIENT
Start: 2022-03-18 | End: 2022-04-01

## 2022-03-18 NOTE — TELEPHONE ENCOUNTER
Per chart, results were given to patient on 3/14/22 and colonoscopy scheduled for 4/13/22.  Mira Wright RN  [facility]

## 2022-03-18 NOTE — TELEPHONE ENCOUNTER
Patient had positive cologuard result. Should be followed up with a colonoscopy or visual examination of the colon. Please advise.

## 2022-03-18 NOTE — TELEPHONE ENCOUNTER
I'd really like to have a virtual visit with her to discuss her insomnia. I'm not comfortable with her taking so much medication to sleep (amitriptyline and Seroquel)

## 2022-03-18 NOTE — PROGRESS NOTES
Cody is a 56 year old who is being evaluated via a billable telephone visit.      What phone number would you like to be contacted at? [phone]  How would you like to obtain your AVS? Marcia Mchugh   Cody is a 56 year old who presents for the following health issues    HPI     Insomnia  - Recheck meds  - it helps to take 2 at a time instead of 1    Orders  - DME - Hospital bed, Motorized scooter    Chronic low back pain really limits her. Hospital bed with help with body position changes and overall comfort.  Copd really effects her ability to travel long distances with her walker. Low back pain also limits her.  Low back pain. No radicular symptoms.       Review of Systems   Constitutional, HEENT, cardiovascular, pulmonary, GI, , musculoskeletal, neuro, skin, endocrine and psych systems are negative, except as otherwise noted.      Objective           Vitals:  No vitals were obtained today due to virtual visit.    Physical Exam   healthy, alert and no distress  PSYCH: Alert and oriented times 3; coherent speech, normal   rate and volume, able to articulate logical thoughts, able   to abstract reason, no tangential thoughts, no hallucinations   or delusions  Her affect is normal  RESP: No cough, no audible wheezing, able to talk in full sentences  Remainder of exam unable to be completed due to telephone visits    Inga was seen today for insomnia and orders.    Diagnoses and all orders for this visit:    Psychophysiological insomnia  -     QUEtiapine (SEROQUEL) 200 MG tablet; Take 0.5 tablets (100 mg) by mouth At Bedtime At bedtime    Chronic obstructive pulmonary disease, unspecified COPD type (H)  -     Wheelchair Scooter Order for DME - ONLY FOR DME; Future    Chronic bilateral low back pain without sciatica  -     Hospital Bed Order for DME - ONLY FOR DME    Episodic tension-type headache, not intractable  -     amitriptyline (ELAVIL) 50 MG tablet; Take 2 tablets (100 mg) by mouth At  Bedtime      Reduce dose of seroquel to 100 mg at bedtime.  Increase amitriptyline to 100 mg at bedtime.  work on lifestyle modification          Phone call duration: 20 minutes

## 2022-03-18 NOTE — TELEPHONE ENCOUNTER
Patient scheduled for 3/18/2022 virtual visit. Would like DME orders faxed to [employer] [facility]. Fax # [phone].    Faxed.

## 2022-03-25 DIAGNOSIS — G44.219 EPISODIC TENSION-TYPE HEADACHE, NOT INTRACTABLE: ICD-10-CM

## 2022-03-25 RX ORDER — AMITRIPTYLINE HYDROCHLORIDE 50 MG/1
100 TABLET ORAL AT BEDTIME
Qty: 60 TABLET | Refills: 5 | Status: CANCELLED | OUTPATIENT
Start: 2022-03-25

## 2022-03-29 NOTE — TELEPHONE ENCOUNTER
Refills were sent to [facility] - [place], [place] - [address] on 3-18-22.  Called pharmacy at [phone] and spoke with Pat and per him refills are available.  Pat will send out refill to home address.  Spoke with patient and informed her of rx being sent out by mail pharmacy.

## 2022-04-01 DIAGNOSIS — J43.2 CENTRILOBULAR EMPHYSEMA (H): ICD-10-CM

## 2022-04-01 NOTE — TELEPHONE ENCOUNTER
Medication:     azithromycin (ZITHROMAX) 250 MG tablet 24 tablet 3 2/4/2022  No   Sig - Route: Take 2 tablets (500 mg) by mouth Every Mon, Wed, Fri Morning - Oral     Date last written: 02/04/2022  Dispensed amount: 24  Refills: 3    Pt's last office visit: 02/03/2022  Next scheduled office visit: 08/11/2022

## 2022-04-01 NOTE — TELEPHONE ENCOUNTER
[facility]    Phone Message    May a detailed message be left on voicemail: yes     Reason for Call: Medication Refill Request    Has the patient contacted the pharmacy for the refill? Yes   Name of medication being requested: azithromycin (ZITHROMAX) 250 MG tablet  Provider who prescribed the medication: Tracey  Pharmacy: [facility] in [place], mail order pharmacy that packages morning noon and night meds, phone # [phone]  Date medication is needed: ASAP    Please call Pt to confirm this medication can be sent to this pharmacy.  Thanks.    Action Taken: Message routed to:  Adult Clinics: Pulmonology p [phone]    Travel Screening: Not Applicable

## 2022-04-02 RX ORDER — AZITHROMYCIN 250 MG/1
500 TABLET, FILM COATED ORAL
Qty: 36 TABLET | Refills: 3 | Status: SHIPPED | OUTPATIENT
Start: 2022-04-04 | End: 2023-02-27

## 2022-04-04 ENCOUNTER — TELEPHONE (OUTPATIENT)
Dept: FAMILY MEDICINE | Facility: CLINIC | Age: 57
End: 2022-04-04
Payer: COMMERCIAL

## 2022-04-04 ENCOUNTER — TRANSFERRED RECORDS (OUTPATIENT)
Dept: HEALTH INFORMATION MANAGEMENT | Facility: CLINIC | Age: 57
End: 2022-04-04
Payer: COMMERCIAL

## 2022-04-04 ENCOUNTER — TELEPHONE (OUTPATIENT)
Dept: PULMONOLOGY | Facility: CLINIC | Age: 57
End: 2022-04-04
Payer: COMMERCIAL

## 2022-04-04 NOTE — TELEPHONE ENCOUNTER
Signed orders for 90 day supply of azithromycin (ZITHROMAX) 250 MG tablet w/3 refills has been faxed to [facility] @ [phone].    Erika Angel LPN  Pulmonary Medicine:  [facility]  Phone: [phone] Fax: [phone]

## 2022-04-04 NOTE — TELEPHONE ENCOUNTER
Forms received from: [employer]   Phone number listed: [phone]    Fax listed: [phone]  Date received: 4/1/22  Form description: medical equipment  Once forms are completed, please return to [employer] via fax [phone].  Is patient requesting to be contacted when forms are completed: na  Phone: na  Form placed:  To [employer] basket   Nancy Tobar

## 2022-04-04 NOTE — TELEPHONE ENCOUNTER
Contacted [facility] regarding orders for azithromycin (ZITHROMAX) 250 MG tablet. Requested [facility] cancel order as patient to fill with [facility].      Erika Angel LPN  Pulmonary Medicine:  [facility]  Phone: [phone] Fax: [phone]

## 2022-04-06 ENCOUNTER — TELEPHONE (OUTPATIENT)
Dept: FAMILY MEDICINE | Facility: CLINIC | Age: 57
End: 2022-04-06
Payer: COMMERCIAL

## 2022-04-06 NOTE — TELEPHONE ENCOUNTER
Spoke with patient, she is wondering if she needs her BP meds adjusted?  1. She is asking if her Hydralazine needs to be adjusted        back to 10 mg?  She reports she is very tired and her BP is in the low 100's in the am, but at bedtime is in 160's.  Although she also takes 2 other BP medications    2.She does not feel she is diuresing enough with taking     the lasix 20 mg?    Her right leg that usually has swelling is a little more         swollen lately.      Appt scheduled for Friday 4/8 to discuss issues.  She will record BP reading once in am and once in evening for appt.  Mira Wright RN  [facility]

## 2022-04-06 NOTE — TELEPHONE ENCOUNTER
Chart reviewed. 40w2d  .   GBS negative.   Pt seen yesterday, VE: /-2    Call to pt. Pt reports has been rhianna since last night, unable to time as the tightness is there all the time; getting more uncomfortable. Pt's voice changed as we spoke once in a 8 minute period, ctx?. Pt denies LOF but reports she lost her mucous plug and has some bloody show. Reports +FM. Advised she needs to come in for eval. Waiting for her mother to come watch her son, appt made for 45min. Pt understands and has no questions.    Noted.  Mira Wright RN  [facility]

## 2022-04-07 ENCOUNTER — MEDICAL CORRESPONDENCE (OUTPATIENT)
Dept: HEALTH INFORMATION MANAGEMENT | Facility: CLINIC | Age: 57
End: 2022-04-07
Payer: COMMERCIAL

## 2022-04-07 ENCOUNTER — TELEPHONE (OUTPATIENT)
Dept: GASTROENTEROLOGY | Facility: CLINIC | Age: 57
End: 2022-04-07
Payer: COMMERCIAL

## 2022-04-07 RX ORDER — BISACODYL 5 MG/1
TABLET, DELAYED RELEASE ORAL
Qty: 2 TABLET | Refills: 0 | Status: SHIPPED | OUTPATIENT
Start: 2022-04-07 | End: 2022-04-22

## 2022-04-07 NOTE — PROGRESS NOTES
"Cody is a 56 year old who is being evaluated via a billable telephone visit.      What phone number would you like to be contacted at? [phone]  How would you like to obtain your AVS? Marcia Mchugh   Cody is a 56 year old who presents for the following health issues     HPI     Blood pressure  - continues to check it twice a day after medicatiions  - readings are \"all over\", some are high some are low  Blood pressure for the most part has been well managed.  Sleeping  - still taking melatonin, not sleeping Was sleeping better when taking 200 mg of seroquel at bedtime . Will allow her to inc her dose back to 200 at bedtime.   Discuss Lasix  - drinking a lot of fluids, but is not peeing  - legs are feeling heavy, hard to walk long distances    Some leg tightness. This persists even overnoc. Has gained a lot weight.   Lasix 40 mg daily has not helped.   No profound sob.   Remains sober.  No jaundice.   No fevers. No dysuria.  Review of Systems   Constitutional, HEENT, cardiovascular, pulmonary, GI, , musculoskeletal, neuro, skin, endocrine and psych systems are negative, except as otherwise noted.      Objective           Vitals:  No vitals were obtained today due to virtual visit.    Physical Exam   healthy, alert and no distress  PSYCH: Alert and oriented times 3; coherent speech, normal   rate and volume, able to articulate logical thoughts, able   to abstract reason, no tangential thoughts, no hallucinations   or delusions  Her affect is normal  RESP: No cough, no audible wheezing, able to talk in full sentences  Remainder of exam unable to be completed due to telephone visits    Inga was seen today for hypertension.    Diagnoses and all orders for this visit:    Bilateral leg edema  -     D dimer, quantitative; Future  -     Comprehensive metabolic panel (BMP + Alb, Alk Phos, ALT, AST, Total. Bili, TP); Future  -     BNP-N terminal pro; Future  -     Compression Sleeve/Stocking Order for " DME - ONLY FOR DME  -     Lymphedema Therapy Referral; Future  -     furosemide (LASIX) 40 MG tablet; Take 1 tablet (40 mg) by mouth 2 times daily    Essential hypertension with goal blood pressure less than 140/90  -     hydrALAZINE (APRESOLINE) 10 MG tablet; Take 1 tablet (10 mg) by mouth 3 times daily TAKE 1 TABLET BY MOUTH THREE TIMES DAILY      Inc seroquel to 200 mg at bedtime.  Lowered dose of hydralazine to see if this positive impacts her edema  Ordered new compression stockings and referred her back to lymphedema clinic.  Inc lasix to 40 mg bid.          Phone call duration: 18 minutes

## 2022-04-07 NOTE — TELEPHONE ENCOUNTER
Patient scheduled for Colonoscopy on 4/13/22.     Covid test scheduled: Not scheduled as of yet.     Arrival time: 10:30am    Facility location: [facility]    Sedation type: CS -- has Chemical dependency on problems list, however Pt reports this has resolved.      Indication for procedure: Screening     Anticoagulations? None     Bowel prep recommendation: Extended    Extended prep sent to  in [place] pharmacy. Prep instructions sent via [employer]    Pre visit planning completed.    Tanya Leal RN

## 2022-04-07 NOTE — ADDENDUM NOTE
Addended by: DIRK CHAN on: 4/7/2022 11:36 AM     Modules accepted: Orders     Gopal Rahman is a 37 year old female presenting with   Chief Complaint   Patient presents with   • Menstrual Problem     Bleeding since 9/29/2020. Pt was taking b/c to control bleeding but patient was informed to stop taking b/c because of aneurysm.      Reports  known Latex allergy or symptoms of Latex sensitivity.  All allergies and medications reviewed.  PCP verified:  Jonathon Nunez MD  Pharmacy verified:  [facility] [address] [place] [address]   Patient would like communication of their results via:      Jesus

## 2022-04-07 NOTE — TELEPHONE ENCOUNTER
Pre assessment questions completed for upcoming colonoscopy procedure scheduled on 4.13.2022    COVID test: Transferred to endo scheduling    Reviewed procedural arrival time 1030 and facility location [facility].    Designated  policy reviewed. Instructed to have someone stay 6 hours post procedure.     Anticoagulation/blood thinners? no    Electronic implanted devices? no    Reviewed extended prep instructions with patient. No fiber/iron supplements or foods that contain nuts/seeds prior to procedure.     Patient verbalized understanding and had no questions or concerns at this time.    Marcy Delgadillo RN

## 2022-04-08 ENCOUNTER — VIRTUAL VISIT (OUTPATIENT)
Dept: FAMILY MEDICINE | Facility: CLINIC | Age: 57
End: 2022-04-08
Payer: COMMERCIAL

## 2022-04-08 DIAGNOSIS — R60.0 BILATERAL LEG EDEMA: Primary | ICD-10-CM

## 2022-04-08 DIAGNOSIS — I10 ESSENTIAL HYPERTENSION WITH GOAL BLOOD PRESSURE LESS THAN 140/90: ICD-10-CM

## 2022-04-08 PROCEDURE — 99214 OFFICE O/P EST MOD 30 MIN: CPT | Mod: 95 | Performed by: PHYSICIAN ASSISTANT

## 2022-04-08 RX ORDER — HYDRALAZINE HYDROCHLORIDE 10 MG/1
10 TABLET, FILM COATED ORAL 3 TIMES DAILY
Qty: 270 TABLET | Refills: 0 | Status: SHIPPED | OUTPATIENT
Start: 2022-04-08 | End: 2022-05-06

## 2022-04-08 RX ORDER — FUROSEMIDE 40 MG
40 TABLET ORAL 2 TIMES DAILY
Qty: 180 TABLET | Refills: 0 | Status: SHIPPED | OUTPATIENT
Start: 2022-04-08 | End: 2022-05-03

## 2022-04-09 ENCOUNTER — LAB (OUTPATIENT)
Dept: URGENT CARE | Facility: URGENT CARE | Age: 57
End: 2022-04-09
Payer: COMMERCIAL

## 2022-04-09 DIAGNOSIS — Z11.59 ENCOUNTER FOR SCREENING FOR OTHER VIRAL DISEASES: ICD-10-CM

## 2022-04-09 PROCEDURE — U0005 INFEC AGEN DETEC AMPLI PROBE: HCPCS

## 2022-04-09 PROCEDURE — U0003 INFECTIOUS AGENT DETECTION BY NUCLEIC ACID (DNA OR RNA); SEVERE ACUTE RESPIRATORY SYNDROME CORONAVIRUS 2 (SARS-COV-2) (CORONAVIRUS DISEASE [COVID-19]), AMPLIFIED PROBE TECHNIQUE, MAKING USE OF HIGH THROUGHPUT TECHNOLOGIES AS DESCRIBED BY CMS-2020-01-R: HCPCS

## 2022-04-10 LAB — SARS-COV-2 RNA RESP QL NAA+PROBE: NEGATIVE

## 2022-04-11 ENCOUNTER — TRANSFERRED RECORDS (OUTPATIENT)
Dept: HEALTH INFORMATION MANAGEMENT | Facility: CLINIC | Age: 57
End: 2022-04-11
Payer: COMMERCIAL

## 2022-04-12 ENCOUNTER — VIRTUAL VISIT (OUTPATIENT)
Dept: FAMILY MEDICINE | Facility: CLINIC | Age: 57
End: 2022-04-12
Payer: COMMERCIAL

## 2022-04-12 ENCOUNTER — TELEPHONE (OUTPATIENT)
Dept: FAMILY MEDICINE | Facility: CLINIC | Age: 57
End: 2022-04-12
Payer: COMMERCIAL

## 2022-04-12 DIAGNOSIS — R52 BODY ACHES: ICD-10-CM

## 2022-04-12 DIAGNOSIS — R53.83 FATIGUE, UNSPECIFIED TYPE: ICD-10-CM

## 2022-04-12 DIAGNOSIS — R25.1 TREMOR: Primary | ICD-10-CM

## 2022-04-12 PROCEDURE — 99214 OFFICE O/P EST MOD 30 MIN: CPT | Mod: 95 | Performed by: PHYSICIAN ASSISTANT

## 2022-04-12 NOTE — TELEPHONE ENCOUNTER
"Patient calling, says she's had tremors for a long time but today is worse.   She wonders if she can do a virtual visit to discuss this as she is at work.    Says her \"whole body\" was hurting today when she first got up, has improved a bit but still having the tremors to her hands, legs, also legs hurt, using her walker.    Denies numbness or tingling to arms or legs, says she was trying to  something off the floor and nearly fell.   Also had trouble sending a text message this AM due to tremor.    No transportation so hoping to discuss via phone or video visit.    Scheduled video visit 10:40 start.    Belen Pringle RN  [facility]      "

## 2022-04-12 NOTE — PROGRESS NOTES
Cdoy is a 56 year old who is being evaluated via a billable video visit.      How would you like to obtain your AVS? MyChart  If the video visit is dropped, the invitation should be resent by: Text to cell phone: [phone]  Will anyone else be joining your video visit? No    Video Start Time: 12:01 PM        Subjective   Cody is a 56 year old who presents for the following health issues     HPI     Recheck  - Tremors have increased  - increased pain in bones - all over  - loss of strength    Tremors involve her hands and legs. No pill rolling tremors. Some neck and back pain. No paresthesias. Query med influence, but patient reports her tremors have been going on for years.  No red or swollen joints. No fevers. No rashes. Shoulder and groin pain  No tick bites.  Some fatigue.   Review of Systems   Constitutional, HEENT, cardiovascular, pulmonary, GI, , musculoskeletal, neuro, skin, endocrine and psych systems are negative, except as otherwise noted.      Objective           Vitals:  No vitals were obtained today due to virtual visit.    Physical Exam   GENERAL: Healthy, alert and no distress  EYES: Eyes grossly normal to inspection.  No discharge or erythema, or obvious scleral/conjunctival abnormalities.  RESP: No audible wheeze, cough, or visible cyanosis.  No visible retractions or increased work of breathing.    SKIN: Visible skin clear. No significant rash, abnormal pigmentation or lesions.  NEURO: Cranial nerves grossly intact.  Mentation and speech appropriate for age.  PSYCH: Mentation appears normal, affect normal/bright, judgement and insight intact, normal speech and appearance well-groomed.    Inga was seen today for recheck.    Diagnoses and all orders for this visit:    Tremor  -     Adult Neurology  Referral; Future    Fatigue, unspecified type  -     CBC with platelets and differential; Future  -     TSH with free T4 reflex; Future    Body aches  -     ESR: Erythrocyte  sedimentation rate; Future  -     CRP, inflammation; Future  -     Rheumatoid factor; Future  -     Streptolysin O Antibody (ASO); Future  -     FLORA Scrn Rflx to Titer and Ptrn ([facility])  -     Lyme Disease Kita with reflex to WB Serum; Future      Advised supportive and symptomatic treatment.  Follow up with Provider - if condition persists or worsens.       Video-Visit Details    Type of service:  Video Visit    Video End Time:12:21 PM    Originating Location (pt. Location): Home    Distant Location (provider location):  [facility]     Platform used for Video Visit: Patito

## 2022-04-13 ENCOUNTER — HOSPITAL ENCOUNTER (OUTPATIENT)
Facility: CLINIC | Age: 57
Discharge: HOME | End: 2022-04-13
Attending: INTERNAL MEDICINE | Admitting: INTERNAL MEDICINE
Payer: COMMERCIAL

## 2022-04-13 VITALS
BODY MASS INDEX: 42.37 KG/M2 | HEART RATE: 76 BPM | DIASTOLIC BLOOD PRESSURE: 63 MMHG | RESPIRATION RATE: 16 BRPM | TEMPERATURE: 99.2 F | OXYGEN SATURATION: 100 % | SYSTOLIC BLOOD PRESSURE: 109 MMHG | WEIGHT: 224.43 LBS | HEIGHT: 61 IN

## 2022-04-13 LAB — COLONOSCOPY: NORMAL

## 2022-04-13 PROCEDURE — G0500 MOD SEDAT ENDO SERVICE >5YRS: HCPCS | Performed by: INTERNAL MEDICINE

## 2022-04-13 PROCEDURE — 250N000011 HC RX IP 250 OP 636: Performed by: INTERNAL MEDICINE

## 2022-04-13 PROCEDURE — 45378 DIAGNOSTIC COLONOSCOPY: CPT | Performed by: INTERNAL MEDICINE

## 2022-04-13 PROCEDURE — G0121 COLON CA SCRN NOT HI RSK IND: HCPCS | Performed by: INTERNAL MEDICINE

## 2022-04-13 PROCEDURE — 99153 MOD SED SAME PHYS/QHP EA: CPT | Performed by: INTERNAL MEDICINE

## 2022-04-13 RX ORDER — CEPHALEXIN 500 MG/1
CAPSULE ORAL
COMMUNITY
Start: 2022-04-06 | End: 2022-04-22

## 2022-04-13 RX ORDER — NALOXONE HYDROCHLORIDE 0.4 MG/ML
0.2 INJECTION, SOLUTION INTRAMUSCULAR; INTRAVENOUS; SUBCUTANEOUS
Status: CANCELLED | OUTPATIENT
Start: 2022-04-13

## 2022-04-13 RX ORDER — ONDANSETRON 2 MG/ML
4 INJECTION INTRAMUSCULAR; INTRAVENOUS
Status: DISCONTINUED | OUTPATIENT
Start: 2022-04-13 | End: 2022-04-13 | Stop reason: HOSPADM

## 2022-04-13 RX ORDER — FENTANYL CITRATE 50 UG/ML
INJECTION, SOLUTION INTRAMUSCULAR; INTRAVENOUS PRN
Status: COMPLETED | OUTPATIENT
Start: 2022-04-13 | End: 2022-04-13

## 2022-04-13 RX ORDER — LIDOCAINE 40 MG/G
CREAM TOPICAL
Status: DISCONTINUED | OUTPATIENT
Start: 2022-04-13 | End: 2022-04-13 | Stop reason: HOSPADM

## 2022-04-13 RX ORDER — ONDANSETRON 4 MG/1
4 TABLET, ORALLY DISINTEGRATING ORAL EVERY 6 HOURS PRN
Status: CANCELLED | OUTPATIENT
Start: 2022-04-13

## 2022-04-13 RX ORDER — PROCHLORPERAZINE MALEATE 10 MG
10 TABLET ORAL EVERY 6 HOURS PRN
Status: CANCELLED | OUTPATIENT
Start: 2022-04-13

## 2022-04-13 RX ORDER — NALOXONE HYDROCHLORIDE 0.4 MG/ML
0.4 INJECTION, SOLUTION INTRAMUSCULAR; INTRAVENOUS; SUBCUTANEOUS
Status: CANCELLED | OUTPATIENT
Start: 2022-04-13

## 2022-04-13 RX ORDER — FLUMAZENIL 0.1 MG/ML
0.2 INJECTION, SOLUTION INTRAVENOUS
Status: CANCELLED | OUTPATIENT
Start: 2022-04-13 | End: 2022-04-13

## 2022-04-13 RX ORDER — ONDANSETRON 2 MG/ML
4 INJECTION INTRAMUSCULAR; INTRAVENOUS EVERY 6 HOURS PRN
Status: CANCELLED | OUTPATIENT
Start: 2022-04-13

## 2022-04-13 RX ADMIN — FENTANYL CITRATE 25 MCG: 50 INJECTION, SOLUTION INTRAMUSCULAR; INTRAVENOUS at 11:23

## 2022-04-13 RX ADMIN — MIDAZOLAM 1 MG: 1 INJECTION INTRAMUSCULAR; INTRAVENOUS at 11:23

## 2022-04-13 NOTE — H&P
[facility] Anesthesia Pre-op History and Physical    Inga Ledesma MRN# [id number]   Age: 56 year old YOB: 1965      Date of Surgery: 4/13/2022 [facility]      Date of Exam 4/13/2022 Facility (In hospital)       Home clinic: Unknown    Primary care provider: Min Tloedo         Chief Complaint and/or Reason for Procedure:   No chief complaint on file.           Active problem list:     Patient Active Problem List    Diagnosis Date Noted     Tobacco abuse 05/10/2010     Priority: High     Iron deficiency anemia secondary to inadequate dietary iron intake 02/19/2010     Priority: High     menorrhagia       JAQUELIN (obstructive sleep apnea)- severe (AHI 37) 09/09/2021     Priority: Medium     9/8/2021 [facility] Sleep Study (193.0 lbs) - AHI 37.1, RDI 39.5, Supine AHI 40.2, REM .1, Low O2 73.9%, Time Spent ?88% 12.6 minutes / Time Spent ?89% 16.4 minutes.       Chronic bilateral low back pain without sciatica 03/17/2021     Priority: Medium     Morbid obesity (H) 01/06/2021     Priority: Medium     Alcohol dependency (H) 07/06/2018     Priority: Medium     Alcohol abuse 04/05/2018     Priority: Medium     Chemical dependency (H) 10/06/2017     Priority: Medium     Psychophysiological insomnia 05/30/2017     Priority: Medium     (HFpEF) heart failure with preserved ejection fraction (H) 03/10/2017     Priority: Medium     ECHO 2018:  Interpretation Summary  Technically difficult study.Extremely poor acoustic windows.  Global and regional left ventricular function is normal with an EF of 60-65%.  No regional wall motion abnormalities are seen.  Right ventricular function, chamber size, wall motion, and thickness are  normal.  The inferior vena cava is normal.  No pericardial effusion is present.       Major depressive disorder, recurrent episode, mild (H) 02/27/2017     Priority: Medium     Chronic obstructive pulmonary disease  "(H) 03/01/2016     Priority: Medium     Spirometry 2017 without obstruction       [facility] 12/09/2015     Priority: Medium       Status:  Closed   Care Coordinator:  YAMILE Altman, RN, PHN  [facility] Care Coordinator  [phone]    See Letters for [facility] Care Plan             Hypertension goal BP (blood pressure) < 140/90 09/29/2015     Priority: Medium     Edema of both legs 02/10/2015     Priority: Medium     Has had pelvic node dissection.       RLS (restless legs syndrome)      Priority: Medium     GERD (gastroesophageal reflux disease)      Priority: Medium     Sacroiliitis (H)      Priority: Low     steroid injections, physical therapy ineffective       Vitamin D deficiency      Priority: Low     Hyperlipidemia with target LDL less than 160      Priority: Low            Medications (include herbals and vitamins):   Any Plavix use in the last 7 days?  No     No current facility-administered medications for this encounter.             Allergies:      Allergies   Allergen Reactions     Bee Venom Anaphylaxis     Reglan [Metoclopramide Hcl] Other (See Comments)     Body tenses up     Doxycycline Rash     Allergy to Latex?  No  Allergy to tape?    No  Intolerances: No            Physical Exam:   All vitals have been reviewed  Patient Vitals for the past 8 hrs:   BP Temp Temp src Pulse Resp SpO2 Height Weight   04/13/22 1112 106/56 -- -- 81 22 95 % -- --   04/13/22 1030 (!) 147/80 99.2  F (37.3  C) Oral 90 16 98 % -- --   04/13/22 1021 -- -- -- -- -- -- 1.549 m (5' 1\") 101.8 kg (224 lb 6.9 oz)     No intake/output data recorded.  Airway assessment:   Patient is able to open mouth wide  Patient is able to stick out tongue}              Lab / Radiology Results:     Lab Results   Component Value Date    WBC 10.3 08/25/2021    WBC 8.3 05/12/2021    RBC 3.65 08/25/2021    RBC 3.42 05/12/2021    HGB 10.3 08/25/2021    HGB 9.5 05/12/2021    HCT 32.6 08/25/2021    HCT 30.0 05/12/2021    MCV 89 08/25/2021    MCV 88 " 05/12/2021    RDW 16.8 08/25/2021    RDW 13.7 05/12/2021     08/25/2021     05/12/2021             Anesthetic risk and/or ASA classification:   Class 3 - sedation will be challenging given large amount of mood medications that may interfere with midazolam and fentanyl. Discussed with patient; she agrees and wishes to pursue with minimal sedation.    Mike Garcia MD

## 2022-04-13 NOTE — OR NURSING
Procedure: Colonoscopy no interventions  Sedation: conscious sedation  Specimens: none.   O2: 2L/NC  Tolerated procedure: well  Pt to recovery area in stable condition accompanied by RN.   Other:  none    Lilian Garcia RN

## 2022-04-13 NOTE — TELEPHONE ENCOUNTER
RECORDS RECEIVED FROM: Internal   REASON FOR VISIT: tremors   Date of Appt: 4/22/22   NOTES (FOR ALL VISITS) STATUS DETAILS   OFFICE NOTE from referring provider Internal Min MOON @ [facility] PCP:  4/12/22   MEDICATION LIST Internal    IMAGING  (FOR ALL VISITS)     MRI (HEAD, NECK, SPINE) Internal [facility]:  MRI Brain 7/30/21   CT (HEAD, NECK, SPINE) Internal [facility]:  CT Head 4/16/18  CT Head 2/26/18         no

## 2022-04-14 ENCOUNTER — TRANSFERRED RECORDS (OUTPATIENT)
Dept: HEALTH INFORMATION MANAGEMENT | Facility: CLINIC | Age: 57
End: 2022-04-14

## 2022-04-14 ENCOUNTER — MEDICAL CORRESPONDENCE (OUTPATIENT)
Dept: HEALTH INFORMATION MANAGEMENT | Facility: CLINIC | Age: 57
End: 2022-04-14

## 2022-04-19 ENCOUNTER — TELEPHONE (OUTPATIENT)
Dept: FAMILY MEDICINE | Facility: CLINIC | Age: 57
End: 2022-04-19

## 2022-04-19 NOTE — TELEPHONE ENCOUNTER
Forms received from: [employer]   Phone number listed: [phone]   Fax listed: [phone]  Date received:  4/14/22  Form description: Initial eval  Once forms are completed, please return to [employer]  via fax [phone].  Is patient requesting to be contacted when forms are completed: na  Phone: na  Form placed:  To [facility] Tobar

## 2022-04-20 ENCOUNTER — TELEPHONE (OUTPATIENT)
Dept: FAMILY MEDICINE | Facility: CLINIC | Age: 57
End: 2022-04-20
Payer: COMMERCIAL

## 2022-04-20 ENCOUNTER — TRANSFERRED RECORDS (OUTPATIENT)
Dept: HEALTH INFORMATION MANAGEMENT | Facility: CLINIC | Age: 57
End: 2022-04-20
Payer: COMMERCIAL

## 2022-04-20 NOTE — TELEPHONE ENCOUNTER
Per pharmacy. Clarification requested. Per patient, should be on 10 mg Hydroxyzine, 40 mg Furosemide, and 200 mg Quetiapine. Please advise.

## 2022-04-20 NOTE — PROGRESS NOTES
[facility] [place]  [address]  [place] [address]  Phone: [phone]  Primary Provider: Serge Graf  Pre-op Performing Provider: SERGE GRAF      PREOPERATIVE EVALUATION:  Today's date: 4/21/2022    Inga Ledesma is a 56 year old female who presents for a preoperative evaluation.    Surgical Information:  Surgery/Procedure: Left great toe  Surgery Location: [place]  Surgeon: Dr. Noe  Surgery Date: 4/26/22  Time of Surgery: 11:00am   Where patient plans to recover: At home with family  Fax number for surgical facility: patient will bring clearance to surgery    Type of Anesthesia Anticipated: to be determined    Inga was seen today for pre-op exam.    Diagnoses and all orders for this visit:    Osteoarthritis of toe joint, left    Preop general physical exam    Essential hypertension with goal blood pressure less than 140/90  -     EKG 12-lead complete w/read - Clinics    Fatigue, unspecified type  -     TSH with free T4 reflex  -     CBC with platelets and differential    Body aches  -     Lyme Disease Kita with reflex to WB Serum  -     Streptolysin O Antibody (ASO)  -     Rheumatoid factor  -     CRP, inflammation  -     ESR: Erythrocyte sedimentation rate    Bilateral leg edema  -     Comprehensive metabolic panel (BMP + Alb, Alk Phos, ALT, AST, Total. Bili, TP)  -     Anti Nuclear Kita IgG by IFA with Reflex; Future  -     Anti Nuclear Kita IgG by IFA with Reflex    Chronic bronchitis, unspecified chronic bronchitis type (H)  -     XR Chest 2 Views; Future    Encounter for preoperative screening laboratory testing for COVID-19 virus  -     Asymptomatic COVID-19 Virus (Coronavirus) by PCR; Future    Hyperlipidemia LDL goal <130  -     Cancel: Lipid panel reflex to direct LDL Fasting    Hypertension goal BP (blood pressure) < 140/90  -     Cancel: Basic metabolic panel  (Ca, Cl, CO2, Creat, Gluc, K, Na, BUN)    Hypoactive sexual desire disorder  -     Flibanserin  100 MG TABS; Take 100 mg by mouth daily    Female stress incontinence  -     Incontinence Supply Disposable (DEPEND FIT-FLEX-WOMEN-M) MISC; 1 each At Bedtime    Other orders  -     REVIEW OF HEALTH MAINTENANCE PROTOCOL ORDERS          Inga is cleared for surgery and appropriate anesthesia    Subjective     HPI related to upcoming procedure: Left great toe      Preop Questions 4/21/2022   1. Have you ever had a heart attack or stroke? no   2. Have you ever had surgery on your heart or blood vessels, such as a stent placement, a coronary artery bypass, or surgery on an artery in your head, neck, heart, or legs? No   3. Do you have chest pain with activity? No   4. Do you have a history of  heart failure? YES - stable   5. Do you currently have a cold, bronchitis or symptoms of other infection? No   6. Do you have a cough, shortness of breath, or wheezing? YES - due to chronic copd   7. Do you or anyone in your family have previous history of blood clots? No   8. Do you or does anyone in your family have a serious bleeding problem such as prolonged bleeding following surgeries or cuts? No   9. Have you ever had problems with anemia or been told to take iron pills? YES - stable at this time   10. Have you had any abnormal blood loss such as black, tarry or bloody stools, or abnormal vaginal bleeding? No   11. Have you ever had a blood transfusion? No   12. Are you willing to have a blood transfusion if it is medically needed before, during, or after your surgery? Yes   13. Have you or any of your relatives ever had problems with anesthesia? No   14. Do you have sleep apnea, excessive snoring or daytime drowsiness? YES    14a. Do you have a CPAP machine? Yes   15. Do you have any artifical heart valves or other implanted medical devices like a pacemaker, defibrillator, or continuous glucose monitor? No   16. Do you have artificial joints? No   17. Are you allergic to latex? No   18. Is there any chance that you may  be pregnant? No     Health Care Directive:  Patient does not have a Health Care Directive or Living Will: Discussed advance care planning with patient; however, patient declined at this time.    Preoperative Review of :   reviewed - no record of controlled substances prescribed.      Status of Chronic Conditions:  See problem list for active medical problems.  Problems all longstanding and stable, except as noted/documented.  See ROS for pertinent symptoms related to these conditions.      Review of Systems  CONSTITUTIONAL: NEGATIVE for fever, chills, change in weight  INTEGUMENTARY/SKIN: NEGATIVE for worrisome rashes, moles or lesions  EYES: NEGATIVE for vision changes or irritation  ENT/MOUTH: NEGATIVE for ear, mouth and throat problems  RESP: NEGATIVE for significant cough or SOB  CV: NEGATIVE for chest pain, palpitations or peripheral edema  GI: NEGATIVE for nausea, abdominal pain, heartburn, or change in bowel habits  : NEGATIVE for frequency, dysuria, or hematuria  MUSCULOSKELETAL: NEGATIVE for significant arthralgias or myalgia  NEURO: NEGATIVE for weakness, dizziness or paresthesias  ENDOCRINE: NEGATIVE for temperature intolerance, skin/hair changes  HEME: NEGATIVE for bleeding problems  PSYCHIATRIC: NEGATIVE for changes in mood or affect    Patient Active Problem List    Diagnosis Date Noted     Tobacco abuse 05/10/2010     Priority: High     Iron deficiency anemia secondary to inadequate dietary iron intake 02/19/2010     Priority: High     menorrhagia       Preop general physical exam 04/21/2022     Priority: Medium     JAQUELIN (obstructive sleep apnea)- severe (AHI 37) 09/09/2021     Priority: Medium     9/8/2021 [facility] Sleep Study (193.0 lbs) - AHI 37.1, RDI 39.5, Supine AHI 40.2, REM .1, Low O2 73.9%, Time Spent ?88% 12.6 minutes / Time Spent ?89% 16.4 minutes.       Chronic bilateral low back pain without sciatica 03/17/2021     Priority: Medium     Morbid obesity (H)  01/06/2021     Priority: Medium     Alcohol dependency (H) 07/06/2018     Priority: Medium     Alcohol abuse 04/05/2018     Priority: Medium     Chemical dependency (H) 10/06/2017     Priority: Medium     Psychophysiological insomnia 05/30/2017     Priority: Medium     (HFpEF) heart failure with preserved ejection fraction (H) 03/10/2017     Priority: Medium     ECHO 2018:  Interpretation Summary  Technically difficult study.Extremely poor acoustic windows.  Global and regional left ventricular function is normal with an EF of 60-65%.  No regional wall motion abnormalities are seen.  Right ventricular function, chamber size, wall motion, and thickness are  normal.  The inferior vena cava is normal.  No pericardial effusion is present.       Major depressive disorder, recurrent episode, mild (H) 02/27/2017     Priority: Medium     Chronic obstructive pulmonary disease (H) 03/01/2016     Priority: Medium     Spirometry 2017 without obstruction       [facility] 12/09/2015     Priority: Medium       Status:  Closed   Care Coordinator:  YAMILE Altman, RN, PHN  [facility] Care Coordinator  [phone]    See Letters for [facility] Care Plan             Hypertension goal BP (blood pressure) < 140/90 09/29/2015     Priority: Medium     Edema of both legs 02/10/2015     Priority: Medium     Has had pelvic node dissection.       RLS (restless legs syndrome)      Priority: Medium     GERD (gastroesophageal reflux disease)      Priority: Medium     Sacroiliitis (H)      Priority: Low     steroid injections, physical therapy ineffective       Vitamin D deficiency      Priority: Low     Hyperlipidemia with target LDL less than 160      Priority: Low      Past Medical History:   Diagnosis Date     Acute pain of left shoulder 05/20/2021     LALA (acute kidney injury) (H) 05/28/2018     Alcohol abuse      Alcohol dependence with acute alcoholic intoxication (H) 11/19/2020     Alcohol withdrawal (H) 10/28/2017     Alcohol  withdrawal seizure (H) 03/2016     Alcoholic hepatitis without ascites 12/05/2013     Cancer of labia majora (H) 1987     Cellulitis of right lower extremity 03/13/2021     Cervical dysplasia 1987     Congestive heart failure (H) 05/16/2016     COPD (chronic obstructive pulmonary disease) (H)      COPD exacerbation (H) 09/04/2018     CVA (cerebral infarction) 01/2012     Dependent edema      Depression, major      Depressive disorder 1966     GERD (gastroesophageal reflux disease)      Hyperlipidemia LDL goal < 160      Hypertension      Iron deficiency anemia      Menorrhagia 05/10/2010     Pneumonia 07/10/2021     RLS (restless legs syndrome)      Sacroiliitis (H)     steroid injections ineffective, chronic low back pain     Sepsis (H) 06/18/2018     Tobacco abuse      Uncomplicated asthma      Vitamin D deficiencies      Past Surgical History:   Procedure Laterality Date     AS BIOPSY/EXCISION LYMPH NODE OPEN SUPERFICIAL       COLONOSCOPY       COLONOSCOPY N/A 4/13/2022    Procedure: COLONOSCOPY;  Surgeon: Mike Garcia MD;  Location:  GI     EYE SURGERY       HYSTERECTOMY  2010     HYSTERECTOMY TOTAL ABDOMINAL  7/28/10    Bilateral salpingectomy.  ovaries conserved.     LASER TX, CERVICAL  1987     LYMPH NODE BIOPSY  2007    inguinal     LYSIS OF LABIAL LESION(S)  1985, 1987     Current Outpatient Medications   Medication Sig Dispense Refill     acetaminophen (TYLENOL) 500 MG tablet 1-2 tablets by mouth every 4-6 hours as needed for pain, max dose 4000 mg in 24 hours 100 tablet 3     albuterol (VENTOLIN HFA) 108 (90 Base) MCG/ACT inhaler INHALE ONE TO TWO PUFFS BY MOUTH EVERY 4 HOURS AS NEEDED FOR SHORTNESS OF BREATH, DIFFICULTY BREATHING OR WHEEZING. 54 g 3     amitriptyline (ELAVIL) 50 MG tablet Take 2 tablets (100 mg) by mouth At Bedtime 180 tablet 3     ARIPiprazole (ABILIFY) 5 MG tablet Take 1 tablet (5 mg) by mouth At Bedtime 90 tablet 3     aspirin (ASPIRIN LOW DOSE) 81 MG EC tablet Take 1 tablet  (81 mg) by mouth daily 90 tablet 3     atorvastatin (LIPITOR) 10 MG tablet Take 1 tablet (10 mg) by mouth At Bedtime 90 tablet 3     azithromycin (ZITHROMAX) 250 MG tablet Take 2 tablets (500 mg) by mouth Every Mon, Wed, Fri Morning 36 tablet 3     calcium carbonate 500 mg, elemental, (OSCAL) 500 MG tablet Take 1 tablet (500 mg) by mouth daily 90 tablet 3     cephALEXin (KEFLEX) 500 MG capsule        cetirizine (ZYRTEC) 10 MG tablet Take 1 tablet (10 mg) by mouth daily 90 tablet 3     diclofenac (VOLTAREN) 75 MG EC tablet Take 1 tablet (75 mg) by mouth 2 times daily 180 tablet 3     famotidine (PEPCID) 40 MG tablet Take 1 tablet (40 mg) by mouth daily At Bedtime 90 tablet 3     ferrous sulfate (FEROSUL) 325 (65 Fe) MG tablet Take 1 tablet (325 mg) by mouth 2 times daily 180 tablet 3     Flibanserin 100 MG TABS Take 100 mg by mouth daily 30 tablet 1     fluconazole (DIFLUCAN) 100 MG tablet Take 1 tablet (100 mg) by mouth daily as needed (for glossitis) 45 tablet 3     FLUoxetine (PROZAC) 40 MG capsule TAKE 2 CAPSULES (80MG) BY MOUTH ONCE DAILY. 180 capsule 3     fluticasone (FLONASE) 50 MCG/ACT nasal spray Spray 1 spray into both nostrils daily 18.2 g 3     fluticasone-salmeterol (ADVAIR DISKUS) 500-50 MCG/DOSE inhaler INHALE ONE PUFF BY MOUTH EVERY 12 HOURS 3 each 3     folic acid (FOLVITE) 1 MG tablet Take 1 tablet (1,000 mcg) by mouth daily 90 tablet 3     furosemide (LASIX) 40 MG tablet Take 1 tablet (40 mg) by mouth 2 times daily 180 tablet 0     gabapentin (NEURONTIN) 600 MG tablet TAKE 1 TABLET BY MOUTH THREE TIMES DAILY 270 tablet 3     hydrALAZINE (APRESOLINE) 10 MG tablet Take 1 tablet (10 mg) by mouth 3 times daily TAKE 1 TABLET BY MOUTH THREE TIMES DAILY 270 tablet 0     hydrOXYzine (ATARAX) 25 MG tablet TAKE ONE TABLET BY MOUTH THREE TIMES A DAY AS NEEDED FOR ITCHING 90 tablet 3     ibuprofen (IBU) 600 MG tablet TAKE 1 TABLET BY MOUTH THREE TIMES DAILY AS NEEDED FOR PAIN 90 tablet 3     Incontinence  Supply Disposable (DEPEND FIT-FLEX-WOMEN-M) MISC 1 each At Bedtime 90 each 3     ipratropium - albuterol 0.5 mg/2.5 mg/3 mL (DUONEB) 0.5-2.5 (3) MG/3ML neb solution INHALE ONE VIAL BY NEBULIZATION FOUR TIMES DAILY AS NEEDED FOR WHEEZING 360 mL 3     labetalol (NORMODYNE) 100 MG tablet TAKE 1 TABLET BY MOUTH TWICE DAILY. 180 tablet 3     losartan (COZAAR) 100 MG tablet Take 1 tablet (100 mg) by mouth daily 90 tablet 3     magnesium citrate solution Take as directed. Two days prior to exam drink 10oz bottle of magnesium citrate at 4:00pm 296 mL 0     magnesium oxide (MAG-OX) 400 MG tablet Take 1 tablet (400 mg) by mouth daily 90 tablet 3     omeprazole (PRILOSEC) 20 MG DR capsule Take 1 capsule twice a day before meals 180 capsule 3     polyethylene glycol (GOLYTELY) 236 g suspension Take as directed. One day before your exam fill the first container with water. Cover and shake until mixed well. At 3:00pm drink one 8oz glass every 10-15 minutes until half of the first container is empty. Store the remainder in the refrigerator. At 8:00pm drink the second half of the first container until it is gone. Before you go to bed mix the second container with water and put in refrigerator. Six hours before your check in time drink one 8oz glass every 10-15 minutes until half of container is empty. Discard the remainder of solution. 8000 mL 0     potassium chloride ER (KLOR-CON M) 10 MEQ CR tablet Take 1 tablet (10 mEq) by mouth daily 90 tablet 3     QUEtiapine (SEROQUEL) 100 MG tablet Take 1 tablet (100 mg) by mouth At Bedtime At bedtime 90 tablet 3     Revefenacin (YUPELRI) 175 MCG/3ML SOLN Inhale 3 mLs (175 mcg) into the lungs daily Nebulize and inhale 1 vial (3 ml) by mouth and into lungs once daily 270 mL 3     rOPINIRole (REQUIP) 1 MG tablet TAKE 1 TABLET BY MOUTH THREE TIMES DAILY 270 tablet 3     vitamin D3 (VITAMIN D3) 50 mcg (2000 units) tablet Take 1 tablet (50 mcg) by mouth daily 90 tablet 3     bisacodyl (DULCOLAX)  "5 MG EC tablet Take as directed. One day prior to exam at 10:00am take 2 tablets (Patient not taking: No sig reported) 2 tablet 0       Allergies   Allergen Reactions     Bee Venom Anaphylaxis     Reglan [Metoclopramide Hcl] Other (See Comments)     Body tenses up     Doxycycline Rash        Social History     Tobacco Use     Smoking status: Current Some Day Smoker     Packs/day: 0.25     Years: 34.00     Pack years: 8.50     Types: Cigarettes     Start date: 1979     Last attempt to quit: 10/3/2012     Years since quittin.5     Smokeless tobacco: Current User     Types: Chew     Tobacco comment: 5 cigarettes daily   Substance Use Topics     Alcohol use: No     Comment: 1 pint of vodka per day, last drink aug 2018     Family History   Problem Relation Age of Onset     Depression/Anxiety Mother      Asthma Mother      Cerebrovascular Disease Father      Hypertension Father      Lung Cancer Father      Alcoholism Father      Breast Cancer Paternal Aunt      Other Cancer Other      Depression Other      Anxiety Disorder Other      Mental Illness Other      Substance Abuse Other      Asthma Other      Obesity Sister      Obesity Son      Suicide Paternal Uncle      History   Drug Use No         Objective     /60   Pulse 85   Temp 97.8  F (36.6  C) (Tympanic)   Resp 24   Ht 1.515 m (4' 11.65\")   Wt 99.3 kg (219 lb)   LMP 2010   SpO2 97%   BMI 43.28 kg/m      Physical Exam    GENERAL APPEARANCE: healthy, alert and no distress     EYES: EOMI, PERRL     HENT: ear canals and TM's normal and nose and mouth without ulcers or lesions     NECK: no adenopathy, no asymmetry, masses, or scars and thyroid normal to palpation     RESP: lungs clear to auscultation - no rales, rhonchi or wheezes     CV: regular rates and rhythm, normal S1 S2, no S3 or S4 and no murmur, click or rub     ABDOMEN:  soft, nontender, no HSM or masses and bowel sounds normal     MS: extremities normal- no gross deformities noted, " no evidence of inflammation in joints, FROM in all extremities.     SKIN: no suspicious lesions or rashes     NEURO: Normal strength and tone, sensory exam grossly normal, mentation intact and speech normal     PSYCH: mentation appears normal. and affect normal/bright     LYMPHATICS: No cervical adenopathy    Recent Labs   Lab Test 08/25/21  1343 07/28/21  0946 05/12/21  1520 08/12/20  0000 08/04/20  1052   HGB 10.3*  --  9.5*   < >  --      --  230   < >  --    NA  --  139 135   < > 136   POTASSIUM  --  4.8 4.8   < > 3.7   CR  --  0.84 1.32*   < > 0.63   A1C  --  5.5  --   --  5.2    < > = values in this interval not displayed.        Diagnostics:  Labs pending at this time.  Results will be reviewed when available.   EKG: appears normal, NSR, normal axis, normal intervals, no acute ST/T changes c/w ischemia, no LVH by voltage criteria, unchanged from previous tracings    Revised Cardiac Risk Index (RCRI):  The patient has the following serious cardiovascular risks for perioperative complications:   - No serious cardiac risks = 0 points     RCRI Interpretation: 0 points: Class I (very low risk - 0.4% complication rate)           Signed Electronically by: Min Toledo PA-C  Copy of this evaluation report is provided to requesting physician.

## 2022-04-21 ENCOUNTER — ANCILLARY PROCEDURE (OUTPATIENT)
Dept: GENERAL RADIOLOGY | Facility: CLINIC | Age: 57
End: 2022-04-21
Attending: PHYSICIAN ASSISTANT
Payer: COMMERCIAL

## 2022-04-21 ENCOUNTER — OFFICE VISIT (OUTPATIENT)
Dept: FAMILY MEDICINE | Facility: CLINIC | Age: 57
End: 2022-04-21
Payer: COMMERCIAL

## 2022-04-21 VITALS
WEIGHT: 219 LBS | TEMPERATURE: 97.8 F | SYSTOLIC BLOOD PRESSURE: 104 MMHG | DIASTOLIC BLOOD PRESSURE: 60 MMHG | OXYGEN SATURATION: 97 % | HEART RATE: 85 BPM | HEIGHT: 60 IN | RESPIRATION RATE: 24 BRPM | BODY MASS INDEX: 43 KG/M2

## 2022-04-21 DIAGNOSIS — M19.072 OSTEOARTHRITIS OF TOE JOINT, LEFT: Primary | ICD-10-CM

## 2022-04-21 DIAGNOSIS — J42 CHRONIC BRONCHITIS, UNSPECIFIED CHRONIC BRONCHITIS TYPE (H): ICD-10-CM

## 2022-04-21 DIAGNOSIS — I10 ESSENTIAL HYPERTENSION WITH GOAL BLOOD PRESSURE LESS THAN 140/90: ICD-10-CM

## 2022-04-21 DIAGNOSIS — Z11.52 ENCOUNTER FOR PREOPERATIVE SCREENING LABORATORY TESTING FOR COVID-19 VIRUS: ICD-10-CM

## 2022-04-21 DIAGNOSIS — R60.0 BILATERAL LEG EDEMA: ICD-10-CM

## 2022-04-21 DIAGNOSIS — I10 HYPERTENSION GOAL BP (BLOOD PRESSURE) < 140/90: ICD-10-CM

## 2022-04-21 DIAGNOSIS — M05.80 POLYARTHRITIS WITH POSITIVE RHEUMATOID FACTOR (H): ICD-10-CM

## 2022-04-21 DIAGNOSIS — Z01.812 ENCOUNTER FOR PREOPERATIVE SCREENING LABORATORY TESTING FOR COVID-19 VIRUS: ICD-10-CM

## 2022-04-21 DIAGNOSIS — R52 BODY ACHES: ICD-10-CM

## 2022-04-21 DIAGNOSIS — F51.04 PSYCHOPHYSIOLOGICAL INSOMNIA: ICD-10-CM

## 2022-04-21 DIAGNOSIS — R53.83 FATIGUE, UNSPECIFIED TYPE: ICD-10-CM

## 2022-04-21 DIAGNOSIS — Z01.818 PREOP GENERAL PHYSICAL EXAM: ICD-10-CM

## 2022-04-21 DIAGNOSIS — F52.0 HYPOACTIVE SEXUAL DESIRE DISORDER: ICD-10-CM

## 2022-04-21 DIAGNOSIS — E78.5 HYPERLIPIDEMIA LDL GOAL <130: ICD-10-CM

## 2022-04-21 DIAGNOSIS — N39.3 FEMALE STRESS INCONTINENCE: ICD-10-CM

## 2022-04-21 LAB
ALBUMIN SERPL-MCNC: 3.5 G/DL (ref 3.4–5)
ALP SERPL-CCNC: 126 U/L (ref 40–150)
ALT SERPL W P-5'-P-CCNC: 25 U/L (ref 0–50)
ANION GAP SERPL CALCULATED.3IONS-SCNC: 8 MMOL/L (ref 3–14)
AST SERPL W P-5'-P-CCNC: 14 U/L (ref 0–45)
BASOPHILS # BLD AUTO: 0 10E3/UL (ref 0–0.2)
BASOPHILS NFR BLD AUTO: 0 %
BILIRUB SERPL-MCNC: 0.3 MG/DL (ref 0.2–1.3)
BUN SERPL-MCNC: 27 MG/DL (ref 7–30)
CALCIUM SERPL-MCNC: 9.6 MG/DL (ref 8.5–10.1)
CHLORIDE BLD-SCNC: 102 MMOL/L (ref 94–109)
CO2 SERPL-SCNC: 27 MMOL/L (ref 20–32)
CREAT SERPL-MCNC: 1.29 MG/DL (ref 0.52–1.04)
CRP SERPL-MCNC: 175 MG/L (ref 0–8)
EOSINOPHIL # BLD AUTO: 0.3 10E3/UL (ref 0–0.7)
EOSINOPHIL NFR BLD AUTO: 4 %
ERYTHROCYTE [DISTWIDTH] IN BLOOD BY AUTOMATED COUNT: 14.4 % (ref 10–15)
ERYTHROCYTE [SEDIMENTATION RATE] IN BLOOD BY WESTERGREN METHOD: 71 MM/HR (ref 0–30)
GFR SERPL CREATININE-BSD FRML MDRD: 49 ML/MIN/1.73M2
GLUCOSE BLD-MCNC: 106 MG/DL (ref 70–99)
HCT VFR BLD AUTO: 30.7 % (ref 35–47)
HGB BLD-MCNC: 9.6 G/DL (ref 11.7–15.7)
LYMPHOCYTES # BLD AUTO: 1 10E3/UL (ref 0.8–5.3)
LYMPHOCYTES NFR BLD AUTO: 13 %
MCH RBC QN AUTO: 27.4 PG (ref 26.5–33)
MCHC RBC AUTO-ENTMCNC: 31.3 G/DL (ref 31.5–36.5)
MCV RBC AUTO: 88 FL (ref 78–100)
MONOCYTES # BLD AUTO: 0.5 10E3/UL (ref 0–1.3)
MONOCYTES NFR BLD AUTO: 6 %
NEUTROPHILS # BLD AUTO: 6.1 10E3/UL (ref 1.6–8.3)
NEUTROPHILS NFR BLD AUTO: 78 %
PLATELET # BLD AUTO: 312 10E3/UL (ref 150–450)
POTASSIUM BLD-SCNC: 4.5 MMOL/L (ref 3.4–5.3)
PROT SERPL-MCNC: 7.8 G/DL (ref 6.8–8.8)
RBC # BLD AUTO: 3.51 10E6/UL (ref 3.8–5.2)
SODIUM SERPL-SCNC: 137 MMOL/L (ref 133–144)
TSH SERPL DL<=0.005 MIU/L-ACNC: 1.19 MU/L (ref 0.4–4)
WBC # BLD AUTO: 7.9 10E3/UL (ref 4–11)

## 2022-04-21 PROCEDURE — 36415 COLL VENOUS BLD VENIPUNCTURE: CPT | Performed by: PHYSICIAN ASSISTANT

## 2022-04-21 PROCEDURE — 86038 ANTINUCLEAR ANTIBODIES: CPT | Performed by: PHYSICIAN ASSISTANT

## 2022-04-21 PROCEDURE — 85652 RBC SED RATE AUTOMATED: CPT | Performed by: PHYSICIAN ASSISTANT

## 2022-04-21 PROCEDURE — 86140 C-REACTIVE PROTEIN: CPT | Performed by: PHYSICIAN ASSISTANT

## 2022-04-21 PROCEDURE — 86618 LYME DISEASE ANTIBODY: CPT | Performed by: PHYSICIAN ASSISTANT

## 2022-04-21 PROCEDURE — 71046 X-RAY EXAM CHEST 2 VIEWS: CPT | Performed by: RADIOLOGY

## 2022-04-21 PROCEDURE — 93000 ELECTROCARDIOGRAM COMPLETE: CPT | Performed by: PHYSICIAN ASSISTANT

## 2022-04-21 PROCEDURE — 86060 ANTISTREPTOLYSIN O TITER: CPT | Mod: 90 | Performed by: PHYSICIAN ASSISTANT

## 2022-04-21 PROCEDURE — 80050 GENERAL HEALTH PANEL: CPT | Performed by: PHYSICIAN ASSISTANT

## 2022-04-21 PROCEDURE — 99000 SPECIMEN HANDLING OFFICE-LAB: CPT | Performed by: PHYSICIAN ASSISTANT

## 2022-04-21 PROCEDURE — 99214 OFFICE O/P EST MOD 30 MIN: CPT | Performed by: PHYSICIAN ASSISTANT

## 2022-04-21 PROCEDURE — 86431 RHEUMATOID FACTOR QUANT: CPT | Performed by: PHYSICIAN ASSISTANT

## 2022-04-21 ASSESSMENT — PAIN SCALES - GENERAL: PAINLEVEL: WORST PAIN (10)

## 2022-04-21 NOTE — PATIENT INSTRUCTIONS
Hold diclofenac, ibuprofen and aspirin until after surgery    On the morning of surgery, be fasting, but you may take all of your morning meds except for losartan (hold that one).

## 2022-04-21 NOTE — TELEPHONE ENCOUNTER
Medication list shows 25 mg hydroxyzine and 100 mg of Quetiapine. Patient takes 10 mg hydralazine. Called patient and she verified she takes 25 mg hydroxyzine and 10 mg hydralazine. Only update needed is quetiapine, see other TE regarding this refill request.     Hanna Cárdenas RN

## 2022-04-21 NOTE — TELEPHONE ENCOUNTER
Alize calling from [facility] pharmacy stating patient said she is currently on 200 mg of Seroquel at bedtime.  Current med list state   QUEtiapine (SEROQUEL) 100 MG tablet  100 mg, AT BEDTIME     Will send to provider to update rx if what patient states is correct and resend rx to pharmacy.

## 2022-04-22 ENCOUNTER — LAB (OUTPATIENT)
Dept: URGENT CARE | Facility: URGENT CARE | Age: 57
End: 2022-04-22

## 2022-04-22 ENCOUNTER — PRE VISIT (OUTPATIENT)
Dept: NEUROLOGY | Facility: CLINIC | Age: 57
End: 2022-04-22

## 2022-04-22 ENCOUNTER — OFFICE VISIT (OUTPATIENT)
Dept: NEUROLOGY | Facility: CLINIC | Age: 57
End: 2022-04-22
Attending: PHYSICIAN ASSISTANT
Payer: COMMERCIAL

## 2022-04-22 VITALS — HEART RATE: 78 BPM | SYSTOLIC BLOOD PRESSURE: 116 MMHG | OXYGEN SATURATION: 96 % | DIASTOLIC BLOOD PRESSURE: 76 MMHG

## 2022-04-22 DIAGNOSIS — R25.1 TREMOR: ICD-10-CM

## 2022-04-22 DIAGNOSIS — Z01.812 ENCOUNTER FOR PREOPERATIVE SCREENING LABORATORY TESTING FOR COVID-19 VIRUS: ICD-10-CM

## 2022-04-22 DIAGNOSIS — Z11.52 ENCOUNTER FOR PREOPERATIVE SCREENING LABORATORY TESTING FOR COVID-19 VIRUS: ICD-10-CM

## 2022-04-22 LAB
ANA SER QL IF: NEGATIVE
ASO AB SERPL-ACNC: 82 IU/ML
B BURGDOR IGG+IGM SER QL: 0.04
RHEUMATOID FACT SER NEPH-ACNC: 15 IU/ML

## 2022-04-22 PROCEDURE — U0003 INFECTIOUS AGENT DETECTION BY NUCLEIC ACID (DNA OR RNA); SEVERE ACUTE RESPIRATORY SYNDROME CORONAVIRUS 2 (SARS-COV-2) (CORONAVIRUS DISEASE [COVID-19]), AMPLIFIED PROBE TECHNIQUE, MAKING USE OF HIGH THROUGHPUT TECHNOLOGIES AS DESCRIBED BY CMS-2020-01-R: HCPCS

## 2022-04-22 PROCEDURE — 99205 OFFICE O/P NEW HI 60 MIN: CPT | Performed by: NURSE PRACTITIONER

## 2022-04-22 PROCEDURE — U0005 INFEC AGEN DETEC AMPLI PROBE: HCPCS

## 2022-04-22 RX ORDER — HYDRALAZINE HYDROCHLORIDE 25 MG/1
1 TABLET, FILM COATED ORAL 3 TIMES DAILY
COMMUNITY
Start: 2022-04-14 | End: 2023-04-12

## 2022-04-22 RX ORDER — EPINEPHRINE 0.3 MG/.3ML
INJECTION SUBCUTANEOUS PRN
COMMUNITY
Start: 2021-10-03 | End: 2022-06-15

## 2022-04-22 RX ORDER — QUETIAPINE FUMARATE 100 MG/1
200 TABLET, FILM COATED ORAL AT BEDTIME
Qty: 180 TABLET | Refills: 1 | Status: SHIPPED | OUTPATIENT
Start: 2022-04-22 | End: 2022-04-29

## 2022-04-22 RX ORDER — OXYCODONE AND ACETAMINOPHEN 5; 325 MG/1; MG/1
TABLET ORAL PRN
COMMUNITY
Start: 2022-04-06 | End: 2022-12-26

## 2022-04-22 ASSESSMENT — ACTIVITIES OF DAILY LIVING (ADL)
TOTAL_SCORE: 12
SPEAKING: (0) NORMAL
USING_KEYS: (1) SLIGHTLY ABNORMAL. TREMOR IS PRESENT BUT CAN INSERT KEY WITH ONE HAND WITHOUT DIFFICULTY.
HYGIENE: (1) SLIGHTLY ABNORMAL. TREMOR IS PRESENT BUT DOES NOT INTERFERE WITH HYGIENE.
FEEDING_WITH_A_SPOON: (1) SLIGHTLY ABNORMAL. TREMOR IS PRESENT BUT DOES NOT INTERFERE WITH FEEDING WITH A SPOON.
SOCIAL_IMPACT: (0) NORMAL
POURING: (2) MILDLY ABNORMAL. MUST BE VERY CAREFUL TO AVOID SPILLING BUT MAY SPILL OCCASIONALLY.
WORKING: (1) SLIGHTLY ABNORMAL. TREMOR IS PRESENT BUT DOES NOT AFFECT PERFORMANCE AT WORK OR AT HOME.
DRESS: (1) SLIGHTLY ABNORMAL. TREMOR IS PRESENT BUT DOES NOT INTERFERE WITH DRESSING.
WRITING: (0) NORMAL
OVERALL_DISABILITY_WITH_THE_MOST_AFFECTED_TASK: (2) MILDLY ABNORMAL. TREMOR INTERFERES WITH TASK BUT IS STILL ABLE TO PERFORM TASK.
DRINKING_FROM_A_GLASS: (1) SLIGHTLY ABNORMAL. TREMOR IS PRESENT BUT DOES NOT INTERFERE WITH DRINKING FROM A GLASS.
OVERALL_DISABILITY_WITH_THE_MOST_AFFECTED_TASK: TEXTING
CARRYING_FOOD_TRAYS_PLATES_OR_SIMILAR_ITEMS: (2) MILDLY ABNORMAL. MUST BE VERY CAREFUL TO AVOID SPILLING ITEMS ON FOOD TRAY.

## 2022-04-22 ASSESSMENT — PAIN SCALES - GENERAL: PAINLEVEL: MODERATE PAIN (5)

## 2022-04-22 NOTE — PATIENT INSTRUCTIONS
Dear Ms. Inga CASTELLANOS Baltazar,    Thank you for coming today.  During your visit, we have discussed the following:     __  Most likely you have drug-induced tremor (Abilify & Prozac can cause tremros) or essential tremor.    __  At this point, the tremor is slight. No medications indicated.    We have discussed about Propranolol and Primidone, which are used for essential tremor. Both have side effects that are concerning given you medical Hx of COPD and mood disorders.  But, if tremors get worse, we can try a low dose of Primidone.    __  For now, explore assistive device with an occupational therapist. They will call you.     __  Talk to your PCP and consider getting a referral to [facility]    __  Return in 12 months. You may return sooner as needed.      For questions, you may send us a [employer] message or call [phone]    Fax number: [phone]    TIM Ribeiro, CNP  [facility]

## 2022-04-22 NOTE — PROGRESS NOTES
ASSESSMENT:    Tremor:  Patient has a one year history of bilateral hand tremor.  Today's exam shows high frequency low amplitude bilateral hand tremor Rt worse than Lt. Tremor occurs with posture and action. No rest tremor or other parkinsonian features. Recent labs reviewed. Electrolytes and TSH were normal.     PLAN:    __ Discussed the differential diagnosis of tremor, including drug-induced tremor and Essential tremor. She is on Abilify, which works on dopamine receptors (D2 & D3) and can cause symptoms like tremor, rigidity, slowness . . .. She also takes Prozac, which has a side effect of tremors. Tremors could occur soon after pt started taking the medication or years later.     __  At this point, the tremor is slight. No medications indicated.    __ I discussed medications used to treat tremors such as beta-blockers and anticonvulsants, primarily Propranolol and Primidone. Benefits and side effects explained.  I informed pt that both have side effects that are concerning given her medical Hx of COPD and mood disorders. But, if tremors get worse, will try a low dose of Primidone.    __  For now, I recommended exploring assistive device with an occupational therapist.     __  Encouraged pt to talk to her PCP and consider getting a referral to [facility] to help with lack of sexual desire.     __  Will return in 12 months. You may return sooner as needed.        [facility]ORD[facility] CLINIC         REFERRING PROVIDER:     PATIENT: Inga Ledesma    : 1965    ELFEGO: 2022    REASON FOR VISIT: Evaluation of tremor.    HPI: Ms. Inga Ledesma is a 56 year old right - handed  female who came to the [facility] neurology clinic by herself for evaluation of tremors as referred by her PCP, Min Toledo PA-C.    Records Reviewed:  Notes in Epic  EKG - 2022  Chest X-Ray  Normal colonoscopy on 2022    Tremor started about a year ago.  She notices  "it when texting and clicking on her phone using her fingers. Tremor occurs intermittanlty.     She has a Hx of restless leg syndrome for about 10 years or so that is controlled on Ropinirole 1 mg TID.     ROS for TREMOR: -   Tremor location: Both hands; Rt > Lt.  Able to hold utensils to eat: Yes. Slight tremors. Doesn't interfere with eating. .  Sudhakar to cut food: Yes. Doesn't notice tremors.   Exacerbating factors: Not sure.   Relieving factors: Not sure.   Does alcohol relieve symptoms: Doesn't drink. Sober for 18 months.  Change in handwriting: No change. \"They're always bad.\"  Family Hx of tremor: No.  Resting tremor: No.  Bradykinesia: Yes.  Rigidity: No.  Speech: No change.   Gait problem: No. She uses a walker due to lower back pain. Also, she has COPD and gets short of breath and needs to rest.    Falls: Yes. In the last year, she has fallen about 6 times. One time, when getting out of bed her foot got caught, another time when getting up from the couch she fell forward, also there were a few incidents that she fell due to feeling dizzy associated with low blood pressure from meds.    Dystonia: No.  Pain: Chronic neck and back pain.  Numbness: Tingling Right hand finger tips.    AUTONOMIC FUNCTION   Orthostatic Hypotension: Yes. When standing quickly. Resolves when sitting. It doesn't go away when remains standing.    Constipation: Denies.   Urinary symptoms: Night time incontinency with coughing.    Sexual Dysfunction: Yes. Longstanding problem.     MENTATION, BEHAVIOR, MOOD   Depression, anxiety: Has depression/anxiety. She is on Prozac 80 mg and Abilify 5 mg. Has a therapy pet. Mood is manageable.   Fatigue: Yes. Feels tired all the time.  Memory problems: No word finding problems.   Confusion, hallucinations: No.   Sleep Problems: She has difficulty falling and staying asleep. Talks in her sleep. Has snoring and uses a CPAP machine. On Amitriptyline 100 mg for a year and Seroquel 200 mg.     OTHERS "   Trouble swallowing, drooling: No.  Changes in sense of smelling: No.  Exercise: No.    ADL Sub-Scale 4/22/2022   1. Speaking 0   2. Feeding with a spoon 1   3. Drinking from a glass 1   4. Hygiene 1   5. Dressing 1   6. Pouring 2   7. Carrying food trays, plates or similar items 2   8. Using keys 1   9. Writing 0   10. Working 1   11. Overall disability with the most affected task 2   Name of most affected task: Texting   12. Social impact 0   ADL TOTAL 12       Office Visit on 04/21/2022   Component Date Value Ref Range Status     TSH 04/21/2022 1.19  0.40 - 4.00 mU/L Final     Lyme Disease Antibodies Total 04/21/2022 0.04  <0.90 Final    Negative, Absence of detectable Borrelia burdorferi antibodies. A negative result does not exclude the possibility of Borrelia burgdorferi infection. If early Lyme disease is suspected, a second sample should be collected and tested 2 to 4 weeks later.     CRP Inflammation 04/21/2022 175.0 (A) 0.0 - 8.0 mg/L Final     Erythrocyte Sedimentation Rate 04/21/2022 71 (A) 0 - 30 mm/hr Final     Sodium 04/21/2022 137  133 - 144 mmol/L Final     Potassium 04/21/2022 4.5  3.4 - 5.3 mmol/L Final     Chloride 04/21/2022 102  94 - 109 mmol/L Final     Carbon Dioxide (CO2) 04/21/2022 27  20 - 32 mmol/L Final     Anion Gap 04/21/2022 8  3 - 14 mmol/L Final     Urea Nitrogen 04/21/2022 27  7 - 30 mg/dL Final     Creatinine 04/21/2022 1.29 (A) 0.52 - 1.04 mg/dL Final     Calcium 04/21/2022 9.6  8.5 - 10.1 mg/dL Final     Glucose 04/21/2022 106 (A) 70 - 99 mg/dL Final     Alkaline Phosphatase 04/21/2022 126  40 - 150 U/L Final     AST 04/21/2022 14  0 - 45 U/L Final     ALT 04/21/2022 25  0 - 50 U/L Final     Protein Total 04/21/2022 7.8  6.8 - 8.8 g/dL Final     Albumin 04/21/2022 3.5  3.4 - 5.0 g/dL Final     Bilirubin Total 04/21/2022 0.3  0.2 - 1.3 mg/dL Final     GFR Estimate 04/21/2022 49 (A) >60 mL/min/1.73m2 Final    Effective December 21, 2021 eGFRcr in adults is calculated using the  2021 CKD-EPI creatinine equation which includes age and gender (Chung et al., [place], DOI: 10.1056/UVUDmt0712853)     WBC Count 04/21/2022 7.9  4.0 - 11.0 10e3/uL Final     RBC Count 04/21/2022 3.51 (A) 3.80 - 5.20 10e6/uL Final     Hemoglobin 04/21/2022 9.6 (A) 11.7 - 15.7 g/dL Final     Hematocrit 04/21/2022 30.7 (A) 35.0 - 47.0 % Final     MCV 04/21/2022 88  78 - 100 fL Final     MCH 04/21/2022 27.4  26.5 - 33.0 pg Final     MCHC 04/21/2022 31.3 (A) 31.5 - 36.5 g/dL Final     RDW 04/21/2022 14.4  10.0 - 15.0 % Final     Platelet Count 04/21/2022 312  150 - 450 10e3/uL Final     % Neutrophils 04/21/2022 78  % Final     % Lymphocytes 04/21/2022 13  % Final     % Monocytes 04/21/2022 6  % Final     % Eosinophils 04/21/2022 4  % Final     % Basophils 04/21/2022 0  % Final     Absolute Neutrophils 04/21/2022 6.1  1.6 - 8.3 10e3/uL Final     Absolute Lymphocytes 04/21/2022 1.0  0.8 - 5.3 10e3/uL Final     Absolute Monocytes 04/21/2022 0.5  0.0 - 1.3 10e3/uL Final     Absolute Eosinophils 04/21/2022 0.3  0.0 - 0.7 10e3/uL Final     Absolute Basophils 04/21/2022 0.0  0.0 - 0.2 10e3/uL Final     MEDICATIONS:  Outpatient Medications Marked as Taking for the 4/22/22 encounter (Office Visit) with Angel Luis Pacheco APRN CNP   Medication Sig     acetaminophen (TYLENOL) 500 MG tablet 1-2 tablets by mouth every 4-6 hours as needed for pain, max dose 4000 mg in 24 hours     albuterol (VENTOLIN HFA) 108 (90 Base) MCG/ACT inhaler INHALE ONE TO TWO PUFFS BY MOUTH EVERY 4 HOURS AS NEEDED FOR SHORTNESS OF BREATH, DIFFICULTY BREATHING OR WHEEZING.     amitriptyline (ELAVIL) 50 MG tablet Take 2 tablets (100 mg) by mouth At Bedtime     ARIPiprazole (ABILIFY) 5 MG tablet Take 1 tablet (5 mg) by mouth At Bedtime     aspirin (ASPIRIN LOW DOSE) 81 MG EC tablet Take 1 tablet (81 mg) by mouth daily     atorvastatin (LIPITOR) 10 MG tablet Take 1 tablet (10 mg) by mouth At Bedtime     azithromycin (ZITHROMAX) 250 MG tablet Take 2 tablets (500  mg) by mouth Every Mon, Wed, Fri Morning     calcium carbonate 500 mg, elemental, (OSCAL) 500 MG tablet Take 1 tablet (500 mg) by mouth daily     cetirizine (ZYRTEC) 10 MG tablet Take 1 tablet (10 mg) by mouth daily     diclofenac (VOLTAREN) 75 MG EC tablet Take 1 tablet (75 mg) by mouth 2 times daily     EPINEPHrine (ANY BX GENERIC EQUIV) 0.3 MG/0.3ML injection 2-pack as needed     famotidine (PEPCID) 40 MG tablet Take 1 tablet (40 mg) by mouth daily At Bedtime     ferrous sulfate (FEROSUL) 325 (65 Fe) MG tablet Take 1 tablet (325 mg) by mouth 2 times daily     Flibanserin 100 MG TABS Take 100 mg by mouth daily     fluconazole (DIFLUCAN) 100 MG tablet Take 1 tablet (100 mg) by mouth daily as needed (for glossitis)     FLUoxetine (PROZAC) 40 MG capsule TAKE 2 CAPSULES (80MG) BY MOUTH ONCE DAILY.     fluticasone (FLONASE) 50 MCG/ACT nasal spray Spray 1 spray into both nostrils daily     fluticasone-salmeterol (ADVAIR DISKUS) 500-50 MCG/DOSE inhaler INHALE ONE PUFF BY MOUTH EVERY 12 HOURS     folic acid (FOLVITE) 1 MG tablet Take 1 tablet (1,000 mcg) by mouth daily     furosemide (LASIX) 40 MG tablet Take 1 tablet (40 mg) by mouth 2 times daily     gabapentin (NEURONTIN) 600 MG tablet TAKE 1 TABLET BY MOUTH THREE TIMES DAILY     hydrALAZINE (APRESOLINE) 10 MG tablet Take 1 tablet (10 mg) by mouth 3 times daily TAKE 1 TABLET BY MOUTH THREE TIMES DAILY     hydrALAZINE (APRESOLINE) 25 MG tablet 1 tablet by Oral or Feeding Tube route 3 times daily     hydrOXYzine (ATARAX) 25 MG tablet TAKE ONE TABLET BY MOUTH THREE TIMES A DAY AS NEEDED FOR ITCHING     ibuprofen (IBU) 600 MG tablet TAKE 1 TABLET BY MOUTH THREE TIMES DAILY AS NEEDED FOR PAIN     Incontinence Supply Disposable (DEPEND FIT-FLEX-WOMEN-M) MISC 1 each At Bedtime     ipratropium - albuterol 0.5 mg/2.5 mg/3 mL (DUONEB) 0.5-2.5 (3) MG/3ML neb solution INHALE ONE VIAL BY NEBULIZATION FOUR TIMES DAILY AS NEEDED FOR WHEEZING     labetalol (NORMODYNE) 100 MG tablet  TAKE 1 TABLET BY MOUTH TWICE DAILY.     losartan (COZAAR) 100 MG tablet Take 1 tablet (100 mg) by mouth daily     magnesium citrate solution Take as directed. Two days prior to exam drink 10oz bottle of magnesium citrate at 4:00pm     magnesium oxide (MAG-OX) 400 MG tablet Take 1 tablet (400 mg) by mouth daily     omeprazole (PRILOSEC) 20 MG DR capsule Take 1 capsule twice a day before meals     oxyCODONE-acetaminophen (PERCOCET) 5-325 MG tablet as needed     polyethylene glycol (GOLYTELY) 236 g suspension Take as directed. One day before your exam fill the first container with water. Cover and shake until mixed well. At 3:00pm drink one 8oz glass every 10-15 minutes until half of the first container is empty. Store the remainder in the refrigerator. At 8:00pm drink the second half of the first container until it is gone. Before you go to bed mix the second container with water and put in refrigerator. Six hours before your check in time drink one 8oz glass every 10-15 minutes until half of container is empty. Discard the remainder of solution.     potassium chloride ER (KLOR-CON M) 10 MEQ CR tablet Take 1 tablet (10 mEq) by mouth daily     QUEtiapine (SEROQUEL) 100 MG tablet Take 2 tablets (200 mg) by mouth At Bedtime At bedtime     Revefenacin (YUPELRI) 175 MCG/3ML SOLN Inhale 3 mLs (175 mcg) into the lungs daily Nebulize and inhale 1 vial (3 ml) by mouth and into lungs once daily     rOPINIRole (REQUIP) 1 MG tablet TAKE 1 TABLET BY MOUTH THREE TIMES DAILY     vitamin D3 (VITAMIN D3) 50 mcg (2000 units) tablet Take 1 tablet (50 mcg) by mouth daily       ALLERGIES: Bee venom, Reglan [metoclopramide hcl], and Doxycycline    PMH:  Past Medical History:   Diagnosis Date     Acute pain of left shoulder 05/20/2021     LALA (acute kidney injury) (H) 05/28/2018     Alcohol abuse      Alcohol dependence with acute alcoholic intoxication (H) 11/19/2020     Alcohol withdrawal (H) 10/28/2017     Alcohol withdrawal seizure (H)  03/2016     Alcoholic hepatitis without ascites 12/05/2013     Cancer of labia majora (H) 1987     Cellulitis of right lower extremity 03/13/2021     Cervical dysplasia 1987     Congestive heart failure (H) 05/16/2016     COPD (chronic obstructive pulmonary disease) (H)      COPD exacerbation (H) 09/04/2018     CVA (cerebral infarction) 01/2012     Dependent edema      Depression, major      Depressive disorder 1966     GERD (gastroesophageal reflux disease)      Hyperlipidemia LDL goal < 160      Hypertension      Iron deficiency anemia      Menorrhagia 05/10/2010     Pneumonia 07/10/2021     RLS (restless legs syndrome)      Sacroiliitis (H)     steroid injections ineffective, chronic low back pain     Sepsis (H) 06/18/2018     Tobacco abuse      Uncomplicated asthma      Vitamin D deficiencies      PSH:   Past Surgical History:   Procedure Laterality Date     AS BIOPSY/EXCISION LYMPH NODE OPEN SUPERFICIAL       COLONOSCOPY       COLONOSCOPY N/A 4/13/2022    Procedure: COLONOSCOPY;  Surgeon: Mike Garcia MD;  Location:  GI     EYE SURGERY       HYSTERECTOMY  2010     HYSTERECTOMY TOTAL ABDOMINAL  7/28/10    Bilateral salpingectomy.  ovaries conserved.     LASER TX, CERVICAL  1987     LYMPH NODE BIOPSY  2007    inguinal     LYSIS OF LABIAL LESION(S)  1985, 1987      FH:  Family History   Problem Relation Age of Onset     Depression/Anxiety Mother      Asthma Mother      Cerebrovascular Disease Father      Hypertension Father      Lung Cancer Father      Alcoholism Father      Breast Cancer Paternal Aunt      Other Cancer Other      Depression Other      Anxiety Disorder Other      Mental Illness Other      Substance Abuse Other      Asthma Other      Obesity Sister      Obesity Son      Suicide Paternal Uncle      SH:   Social History     Socioeconomic History     Marital status:      Spouse name: None     Number of children: 1     Years of education: 13     Highest education level: None    Occupational History     Employer: [employer]   Tobacco Use     Smoking status: Current Some Day Smoker     Packs/day: 0.25     Years: 34.00     Pack years: 8.50     Types: Cigarettes     Start date: 1979     Last attempt to quit: 10/3/2012     Years since quittin.5     Smokeless tobacco: Current User     Types: Chew     Tobacco comment: 5 cigarettes daily   Vaping Use     Vaping Use: Former     Quit date: 2022   Substance and Sexual Activity     Alcohol use: No     Comment: 1 pint of vodka per day, last drink aug 2018     Drug use: No     Sexual activity: Not Currently     PHYSICAL EXAM:     Vital Signs: /76   Pulse 78   LMP 2010   SpO2 96%      General: Ms. Ledesma is a pleasant  female who is well-groomed, well-developed and overweight sitting comfortably in the exam room without any distress. After she returned from the bathroom, her breathing was labored. Affect is appropriate.     Neurological Exam:   Mental Status: Alert and oriented x 3. She was able to provide medical Hx in detail.     Tremor/Movement Disorders Exam:   Motor (Performance) Sub-Scale 2022   Assessment Time 10:07 AM   Medication None   DBS - Right Brain None   DBS - Left Brain None   Head 0   Face & Jaw 0   Voice 1   Outstretched - RIGHT 1   Outstretched - LEFT 1   Wingbeating - RIGHT 1.5   Wingbeating - LEFT 0.5   Kinetic - RIGHT 0.5   Kinetic - LEFT 1   Lower Limb - RIGHT 1.5   Lower Limb - LEFT 1.5   Lower Limb (Max) 1.5   Spiral - RIGHT 0   Spiral - LEFT 0   Handwriting 0   Dot approx - RIGHT 1.5   Dot approx - LEFT 1.5   Trunk (Standing) 0   Total Right 6   Total Left 5.5   Axial 1   TOTAL 11     Rest Tremor: Absent in all extremities.   Rigidity: Absent.   Arising from chair - arms folded across chest: Pushes self up from arms of seat.  Posture: Normal erect.  Gait: Walked using a 4-wheeled walker. Took normal strides and speed.   Body Bradykinesia: Minimal slowness.    Today I spent 81  minutes caring for the patient. Time was spent with reviewing records, meeting with the patient, answering questions, examining, placing referrals, and documentation.    TIM Ribeiro,  CNP  [facility] [[email or web address]] [facility]rology Clinic

## 2022-04-22 NOTE — LETTER
2022     RE: Inga Ledesma  [address]  [facility] [address]     Dear Colleague,    Thank you for referring your patient, Inga Ledesma, to the [facility] NEUROLOGY [facility] [place] at [facility]. Please see a copy of my visit note below.    ASSESSMENT:    Tremor:  Patient has a one year history of bilateral hand tremor.  Today's exam shows high frequency low amplitude bilateral hand tremor Rt worse than Lt. Tremor occurs with posture and action. No rest tremor or other parkinsonian features. Recent labs reviewed. Electrolytes and TSH were normal.     PLAN:    __ Discussed the differential diagnosis of tremor, including drug-induced tremor and Essential tremor. She is on Abilify, which works on dopamine receptors (D2 & D3) and can cause symptoms like tremor, rigidity, slowness . . .. She also takes Prozac, which has a side effect of tremors. Tremors could occur soon after pt started taking the medication or years later.     __  At this point, the tremor is slight. No medications indicated.    __ I discussed medications used to treat tremors such as beta-blockers and anticonvulsants, primarily Propranolol and Primidone. Benefits and side effects explained.  I informed pt that both have side effects that are concerning given her medical Hx of COPD and mood disorders. But, if tremors get worse, will try a low dose of Primidone.    __  For now, I recommended exploring assistive device with an occupational therapist.     __  Encouraged pt to talk to her PCP and consider getting a referral to [facility] to help with lack of sexual desire.     __  Will return in 12 months. You may return sooner as needed.        MOVEMENT DISORDERS CLINIC         REFERRING PROVIDER:     PATIENT: Inga Ledesma    : 1965    ELFEGO: 2022    REASON FOR VISIT: Evaluation of tremor.    HPI: Ms. Inga Ledesma  "is a 56 year old right - handed  female who came to the [facility] neurology clinic by herself for evaluation of tremors as referred by her PCP, Min Toledo PA-C.    Records Reviewed:  Notes in Epic  EKG - 4/21/2022  Chest X-Ray  Normal colonoscopy on 4/13/2022    Tremor started about a year ago.  She notices it when texting and clicking on her phone using her fingers. Tremor occurs intermittanlty.     She has a Hx of restless leg syndrome for about 10 years or so that is controlled on Ropinirole 1 mg TID.     ROS for TREMOR: -   Tremor location: Both hands; Rt > Lt.  Able to hold utensils to eat: Yes. Slight tremors. Doesn't interfere with eating. .  Sudhakar to cut food: Yes. Doesn't notice tremors.   Exacerbating factors: Not sure.   Relieving factors: Not sure.   Does alcohol relieve symptoms: Doesn't drink. Sober for 18 months.  Change in handwriting: No change. \"They're always bad.\"  Family Hx of tremor: No.  Resting tremor: No.  Bradykinesia: Yes.  Rigidity: No.  Speech: No change.   Gait problem: No. She uses a walker due to lower back pain. Also, she has COPD and gets short of breath and needs to rest.    Falls: Yes. In the last year, she has fallen about 6 times. One time, when getting out of bed her foot got caught, another time when getting up from the couch she fell forward, also there were a few incidents that she fell due to feeling dizzy associated with low blood pressure from meds.    Dystonia: No.  Pain: Chronic neck and back pain.  Numbness: Tingling Right hand finger tips.    AUTONOMIC FUNCTION   Orthostatic Hypotension: Yes. When standing quickly. Resolves when sitting. It doesn't go away when remains standing.    Constipation: Denies.   Urinary symptoms: Night time incontinency with coughing.    Sexual Dysfunction: Yes. Longstanding problem.     MENTATION, BEHAVIOR, MOOD   Depression, anxiety: Has depression/anxiety. She is on Prozac 80 mg and Abilify 5 mg. Has a therapy pet. Mood is " manageable.   Fatigue: Yes. Feels tired all the time.  Memory problems: No word finding problems.   Confusion, hallucinations: No.   Sleep Problems: She has difficulty falling and staying asleep. Talks in her sleep. Has snoring and uses a CPAP machine. On Amitriptyline 100 mg for a year and Seroquel 200 mg.     OTHERS   Trouble swallowing, drooling: No.  Changes in sense of smelling: No.  Exercise: No.    ADL Sub-Scale 4/22/2022   1. Speaking 0   2. Feeding with a spoon 1   3. Drinking from a glass 1   4. Hygiene 1   5. Dressing 1   6. Pouring 2   7. Carrying food trays, plates or similar items 2   8. Using keys 1   9. Writing 0   10. Working 1   11. Overall disability with the most affected task 2   Name of most affected task: Texting   12. Social impact 0   ADL TOTAL 12       Office Visit on 04/21/2022   Component Date Value Ref Range Status     TSH 04/21/2022 1.19  0.40 - 4.00 mU/L Final     Lyme Disease Antibodies Total 04/21/2022 0.04  <0.90 Final    Negative, Absence of detectable Borrelia burdorferi antibodies. A negative result does not exclude the possibility of Borrelia burgdorferi infection. If early Lyme disease is suspected, a second sample should be collected and tested 2 to 4 weeks later.     CRP Inflammation 04/21/2022 175.0 (A) 0.0 - 8.0 mg/L Final     Erythrocyte Sedimentation Rate 04/21/2022 71 (A) 0 - 30 mm/hr Final     Sodium 04/21/2022 137  133 - 144 mmol/L Final     Potassium 04/21/2022 4.5  3.4 - 5.3 mmol/L Final     Chloride 04/21/2022 102  94 - 109 mmol/L Final     Carbon Dioxide (CO2) 04/21/2022 27  20 - 32 mmol/L Final     Anion Gap 04/21/2022 8  3 - 14 mmol/L Final     Urea Nitrogen 04/21/2022 27  7 - 30 mg/dL Final     Creatinine 04/21/2022 1.29 (A) 0.52 - 1.04 mg/dL Final     Calcium 04/21/2022 9.6  8.5 - 10.1 mg/dL Final     Glucose 04/21/2022 106 (A) 70 - 99 mg/dL Final     Alkaline Phosphatase 04/21/2022 126  40 - 150 U/L Final     AST 04/21/2022 14  0 - 45 U/L Final     ALT  04/21/2022 25  0 - 50 U/L Final     Protein Total 04/21/2022 7.8  6.8 - 8.8 g/dL Final     Albumin 04/21/2022 3.5  3.4 - 5.0 g/dL Final     Bilirubin Total 04/21/2022 0.3  0.2 - 1.3 mg/dL Final     GFR Estimate 04/21/2022 49 (A) >60 mL/min/1.73m2 Final    Effective December 21, 2021 eGFRcr in adults is calculated using the 2021 CKD-EPI creatinine equation which includes age and gender (Chung et al., NEJ, DOI: 10.1056/QUQRly1880245)     WBC Count 04/21/2022 7.9  4.0 - 11.0 10e3/uL Final     RBC Count 04/21/2022 3.51 (A) 3.80 - 5.20 10e6/uL Final     Hemoglobin 04/21/2022 9.6 (A) 11.7 - 15.7 g/dL Final     Hematocrit 04/21/2022 30.7 (A) 35.0 - 47.0 % Final     MCV 04/21/2022 88  78 - 100 fL Final     MCH 04/21/2022 27.4  26.5 - 33.0 pg Final     MCHC 04/21/2022 31.3 (A) 31.5 - 36.5 g/dL Final     RDW 04/21/2022 14.4  10.0 - 15.0 % Final     Platelet Count 04/21/2022 312  150 - 450 10e3/uL Final     % Neutrophils 04/21/2022 78  % Final     % Lymphocytes 04/21/2022 13  % Final     % Monocytes 04/21/2022 6  % Final     % Eosinophils 04/21/2022 4  % Final     % Basophils 04/21/2022 0  % Final     Absolute Neutrophils 04/21/2022 6.1  1.6 - 8.3 10e3/uL Final     Absolute Lymphocytes 04/21/2022 1.0  0.8 - 5.3 10e3/uL Final     Absolute Monocytes 04/21/2022 0.5  0.0 - 1.3 10e3/uL Final     Absolute Eosinophils 04/21/2022 0.3  0.0 - 0.7 10e3/uL Final     Absolute Basophils 04/21/2022 0.0  0.0 - 0.2 10e3/uL Final     MEDICATIONS:  Outpatient Medications Marked as Taking for the 4/22/22 encounter (Office Visit) with Angel Luis Pacheco APRN CNP   Medication Sig     acetaminophen (TYLENOL) 500 MG tablet 1-2 tablets by mouth every 4-6 hours as needed for pain, max dose 4000 mg in 24 hours     albuterol (VENTOLIN HFA) 108 (90 Base) MCG/ACT inhaler INHALE ONE TO TWO PUFFS BY MOUTH EVERY 4 HOURS AS NEEDED FOR SHORTNESS OF BREATH, DIFFICULTY BREATHING OR WHEEZING.     amitriptyline (ELAVIL) 50 MG tablet Take 2 tablets (100 mg) by  mouth At Bedtime     ARIPiprazole (ABILIFY) 5 MG tablet Take 1 tablet (5 mg) by mouth At Bedtime     aspirin (ASPIRIN LOW DOSE) 81 MG EC tablet Take 1 tablet (81 mg) by mouth daily     atorvastatin (LIPITOR) 10 MG tablet Take 1 tablet (10 mg) by mouth At Bedtime     azithromycin (ZITHROMAX) 250 MG tablet Take 2 tablets (500 mg) by mouth Every Mon, Wed, Fri Morning     calcium carbonate 500 mg, elemental, (OSCAL) 500 MG tablet Take 1 tablet (500 mg) by mouth daily     cetirizine (ZYRTEC) 10 MG tablet Take 1 tablet (10 mg) by mouth daily     diclofenac (VOLTAREN) 75 MG EC tablet Take 1 tablet (75 mg) by mouth 2 times daily     EPINEPHrine (ANY BX GENERIC EQUIV) 0.3 MG/0.3ML injection 2-pack as needed     famotidine (PEPCID) 40 MG tablet Take 1 tablet (40 mg) by mouth daily At Bedtime     ferrous sulfate (FEROSUL) 325 (65 Fe) MG tablet Take 1 tablet (325 mg) by mouth 2 times daily     Flibanserin 100 MG TABS Take 100 mg by mouth daily     fluconazole (DIFLUCAN) 100 MG tablet Take 1 tablet (100 mg) by mouth daily as needed (for glossitis)     FLUoxetine (PROZAC) 40 MG capsule TAKE 2 CAPSULES (80MG) BY MOUTH ONCE DAILY.     fluticasone (FLONASE) 50 MCG/ACT nasal spray Spray 1 spray into both nostrils daily     fluticasone-salmeterol (ADVAIR DISKUS) 500-50 MCG/DOSE inhaler INHALE ONE PUFF BY MOUTH EVERY 12 HOURS     folic acid (FOLVITE) 1 MG tablet Take 1 tablet (1,000 mcg) by mouth daily     furosemide (LASIX) 40 MG tablet Take 1 tablet (40 mg) by mouth 2 times daily     gabapentin (NEURONTIN) 600 MG tablet TAKE 1 TABLET BY MOUTH THREE TIMES DAILY     hydrALAZINE (APRESOLINE) 10 MG tablet Take 1 tablet (10 mg) by mouth 3 times daily TAKE 1 TABLET BY MOUTH THREE TIMES DAILY     hydrALAZINE (APRESOLINE) 25 MG tablet 1 tablet by Oral or Feeding Tube route 3 times daily     hydrOXYzine (ATARAX) 25 MG tablet TAKE ONE TABLET BY MOUTH THREE TIMES A DAY AS NEEDED FOR ITCHING     ibuprofen (IBU) 600 MG tablet TAKE 1 TABLET BY  MOUTH THREE TIMES DAILY AS NEEDED FOR PAIN     Incontinence Supply Disposable (DEPEND FIT-FLEX-WOMEN-M) MISC 1 each At Bedtime     ipratropium - albuterol 0.5 mg/2.5 mg/3 mL (DUONEB) 0.5-2.5 (3) MG/3ML neb solution INHALE ONE VIAL BY NEBULIZATION FOUR TIMES DAILY AS NEEDED FOR WHEEZING     labetalol (NORMODYNE) 100 MG tablet TAKE 1 TABLET BY MOUTH TWICE DAILY.     losartan (COZAAR) 100 MG tablet Take 1 tablet (100 mg) by mouth daily     magnesium citrate solution Take as directed. Two days prior to exam drink 10oz bottle of magnesium citrate at 4:00pm     magnesium oxide (MAG-OX) 400 MG tablet Take 1 tablet (400 mg) by mouth daily     omeprazole (PRILOSEC) 20 MG DR capsule Take 1 capsule twice a day before meals     oxyCODONE-acetaminophen (PERCOCET) 5-325 MG tablet as needed     polyethylene glycol (GOLYTELY) 236 g suspension Take as directed. One day before your exam fill the first container with water. Cover and shake until mixed well. At 3:00pm drink one 8oz glass every 10-15 minutes until half of the first container is empty. Store the remainder in the refrigerator. At 8:00pm drink the second half of the first container until it is gone. Before you go to bed mix the second container with water and put in refrigerator. Six hours before your check in time drink one 8oz glass every 10-15 minutes until half of container is empty. Discard the remainder of solution.     potassium chloride ER (KLOR-CON M) 10 MEQ CR tablet Take 1 tablet (10 mEq) by mouth daily     QUEtiapine (SEROQUEL) 100 MG tablet Take 2 tablets (200 mg) by mouth At Bedtime At bedtime     Revefenacin (YUPELRI) 175 MCG/3ML SOLN Inhale 3 mLs (175 mcg) into the lungs daily Nebulize and inhale 1 vial (3 ml) by mouth and into lungs once daily     rOPINIRole (REQUIP) 1 MG tablet TAKE 1 TABLET BY MOUTH THREE TIMES DAILY     vitamin D3 (VITAMIN D3) 50 mcg (2000 units) tablet Take 1 tablet (50 mcg) by mouth daily       ALLERGIES: Bee venom, Reglan  [metoclopramide hcl], and Doxycycline    PMH:  Past Medical History:   Diagnosis Date     Acute pain of left shoulder 05/20/2021     LALA (acute kidney injury) (H) 05/28/2018     Alcohol abuse      Alcohol dependence with acute alcoholic intoxication (H) 11/19/2020     Alcohol withdrawal (H) 10/28/2017     Alcohol withdrawal seizure (H) 03/2016     Alcoholic hepatitis without ascites 12/05/2013     Cancer of labia majora (H) 1987     Cellulitis of right lower extremity 03/13/2021     Cervical dysplasia 1987     Congestive heart failure (H) 05/16/2016     COPD (chronic obstructive pulmonary disease) (H)      COPD exacerbation (H) 09/04/2018     CVA (cerebral infarction) 01/2012     Dependent edema      Depression, major      Depressive disorder 1966     GERD (gastroesophageal reflux disease)      Hyperlipidemia LDL goal < 160      Hypertension      Iron deficiency anemia      Menorrhagia 05/10/2010     Pneumonia 07/10/2021     RLS (restless legs syndrome)      Sacroiliitis (H)     steroid injections ineffective, chronic low back pain     Sepsis (H) 06/18/2018     Tobacco abuse      Uncomplicated asthma      Vitamin D deficiencies      PSH:   Past Surgical History:   Procedure Laterality Date     AS BIOPSY/EXCISION LYMPH NODE OPEN SUPERFICIAL       COLONOSCOPY       COLONOSCOPY N/A 4/13/2022    Procedure: COLONOSCOPY;  Surgeon: Mike Garcia MD;  Location: [facility]     EYE SURGERY       HYSTERECTOMY  2010     HYSTERECTOMY TOTAL ABDOMINAL  7/28/10    Bilateral salpingectomy.  ovaries conserved.     LASER TX, CERVICAL  1987     LYMPH NODE BIOPSY  2007    inguinal     LYSIS OF LABIAL LESION(S)  1985, 1987      FH:  Family History   Problem Relation Age of Onset     Depression/Anxiety Mother      Asthma Mother      Cerebrovascular Disease Father      Hypertension Father      Lung Cancer Father      Alcoholism Father      Breast Cancer Paternal Aunt      Other Cancer Other      Depression Other      Anxiety Disorder Other       Mental Illness Other      Substance Abuse Other      Asthma Other      Obesity Sister      Obesity Son      Suicide Paternal Uncle      SH:   Social History     Socioeconomic History     Marital status:      Spouse name: None     Number of children: 1     Years of education: 13     Highest education level: None   Occupational History     Employer: [employer]   Tobacco Use     Smoking status: Current Some Day Smoker     Packs/day: 0.25     Years: 34.00     Pack years: 8.50     Types: Cigarettes     Start date: 1979     Last attempt to quit: 10/3/2012     Years since quittin.5     Smokeless tobacco: Current User     Types: Chew     Tobacco comment: 5 cigarettes daily   Vaping Use     Vaping Use: Former     Quit date: 2022   Substance and Sexual Activity     Alcohol use: No     Comment: 1 pint of vodka per day, last drink aug 2018     Drug use: No     Sexual activity: Not Currently     PHYSICAL EXAM:     Vital Signs: /76   Pulse 78   LMP 2010   SpO2 96%      General: Ms. Ledesma is a pleasant  female who is well-groomed, well-developed and overweight sitting comfortably in the exam room without any distress. After she returned from the bathroom, her breathing was labored. Affect is appropriate.     Neurological Exam:   Mental Status: Alert and oriented x 3. She was able to provide medical Hx in detail.     Tremor/Movement Disorders Exam:   Motor (Performance) Sub-Scale 2022   Assessment Time 10:07 AM   Medication None   DBS - Right Brain None   DBS - Left Brain None   Head 0   Face & Jaw 0   Voice 1   Outstretched - RIGHT 1   Outstretched - LEFT 1   Wingbeating - RIGHT 1.5   Wingbeating - LEFT 0.5   Kinetic - RIGHT 0.5   Kinetic - LEFT 1   Lower Limb - RIGHT 1.5   Lower Limb - LEFT 1.5   Lower Limb (Max) 1.5   Spiral - RIGHT 0   Spiral - LEFT 0   Handwriting 0   Dot approx - RIGHT 1.5   Dot approx - LEFT 1.5   Trunk (Standing) 0   Total Right 6   Total Left 5.5    Axial 1   TOTAL 11     Rest Tremor: Absent in all extremities.   Rigidity: Absent.   Arising from chair - arms folded across chest: Pushes self up from arms of seat.  Posture: Normal erect.  Gait: Walked using a 4-wheeled walker. Took normal strides and speed.   Body Bradykinesia: Minimal slowness.    Today I spent 81 minutes caring for the patient. Time was spent with reviewing records, meeting with the patient, answering questions, examining, placing referrals, and documentation.    TIM Ribeiro,  CNP  [facility]

## 2022-04-23 LAB — SARS-COV-2 RNA RESP QL NAA+PROBE: NEGATIVE

## 2022-04-28 ENCOUNTER — TELEPHONE (OUTPATIENT)
Dept: FAMILY MEDICINE | Facility: CLINIC | Age: 57
End: 2022-04-28
Payer: COMMERCIAL

## 2022-04-28 ENCOUNTER — TRANSFERRED RECORDS (OUTPATIENT)
Dept: HEALTH INFORMATION MANAGEMENT | Facility: CLINIC | Age: 57
End: 2022-04-28
Payer: COMMERCIAL

## 2022-04-28 DIAGNOSIS — F51.04 PSYCHOPHYSIOLOGICAL INSOMNIA: ICD-10-CM

## 2022-04-28 NOTE — TELEPHONE ENCOUNTER
Will send to provider to see if he wants to order Seroquel 200 mg to replace current rx for 100 mg.  Pharmacy pended.  Please advise.

## 2022-04-28 NOTE — TELEPHONE ENCOUNTER
Pharmacist calling to clarify QUEtiapine (SEROQUEL) 100 MG tablet. Patient states she takes 200 mg but pharmacy is receiving 100 mg tablet. Please call [phone].

## 2022-04-29 RX ORDER — QUETIAPINE FUMARATE 200 MG/1
200 TABLET, FILM COATED ORAL AT BEDTIME
Qty: 90 TABLET | Refills: 1 | Status: SHIPPED | OUTPATIENT
Start: 2022-04-29 | End: 2022-09-30

## 2022-05-02 DIAGNOSIS — I10 ESSENTIAL HYPERTENSION WITH GOAL BLOOD PRESSURE LESS THAN 140/90: ICD-10-CM

## 2022-05-02 DIAGNOSIS — R60.0 BILATERAL LEG EDEMA: ICD-10-CM

## 2022-05-02 NOTE — TELEPHONE ENCOUNTER
Patient requesting rx for hydrALAZINE (APRESOLINE) 10 MG tablet to be sent to [facility]. Pharmacy pended.

## 2022-05-02 NOTE — TELEPHONE ENCOUNTER
"Face to face notes are now needed and must mention the need for a power wheel chair. Please see the guide placed in the \"in basket\".   Please see 04/21/22 OV.     "

## 2022-05-03 ENCOUNTER — HOSPITAL ENCOUNTER (OUTPATIENT)
Dept: PHYSICAL THERAPY | Facility: CLINIC | Age: 57
Setting detail: THERAPIES SERIES
Discharge: HOME | End: 2022-05-03
Attending: PHYSICIAN ASSISTANT
Payer: COMMERCIAL

## 2022-05-03 DIAGNOSIS — R60.0 BILATERAL LEG EDEMA: ICD-10-CM

## 2022-05-03 PROCEDURE — 97110 THERAPEUTIC EXERCISES: CPT | Mod: GP | Performed by: PHYSICAL THERAPIST

## 2022-05-03 PROCEDURE — 97162 PT EVAL MOD COMPLEX 30 MIN: CPT | Mod: GP | Performed by: PHYSICAL THERAPIST

## 2022-05-03 PROCEDURE — 97140 MANUAL THERAPY 1/> REGIONS: CPT | Mod: GP | Performed by: PHYSICAL THERAPIST

## 2022-05-03 RX ORDER — FUROSEMIDE 40 MG
40 TABLET ORAL 2 TIMES DAILY
Qty: 180 TABLET | Refills: 0 | Status: SHIPPED | OUTPATIENT
Start: 2022-05-03 | End: 2022-06-15

## 2022-05-03 RX ORDER — FUROSEMIDE 20 MG
TABLET ORAL
Qty: 30 TABLET | Refills: 10 | OUTPATIENT
Start: 2022-05-03

## 2022-05-03 NOTE — PROGRESS NOTES
[facility]        OUTPATIENT PHYSICAL THERAPY EDEMA EVALUATION  PLAN OF TREATMENT FOR OUTPATIENT REHABILITATION  (COMPLETE FOR INITIAL CLAIMS ONLY)  Patient's Last Name, First Name, Inga Medellin                           Provider s Name:   [facility] Medical Record No.  [id number]     Start of Care Date:  05/03/22   Onset Date:  02/01/07   Type:  PT   Medical Diagnosis:  Lymphedema   Therapy Diagnosis:  lymphedema B LE Visits from SOC:  1                                     __________________________________________________________________________________   Plan of Treatment/Functional Goals:    Manual lymph drainage, Gradient compression bandaging, Fit for compression garment, Exercises, Precautions to prevent infection / exacerbation, Education, Manual therapy, Skin care / precautions, Home management program development        GOALS  1. Goal description: Pt will demonstrate independent completion of targeted HEP of self massage, exercise and compression to improve and maintain BLE lymphedema.       Target date: 08/01/22  2. Goal description: Pt will have a 5% reduction to RLE volume 10-60 cm to improve fit of clothing and shoes       Target date: 08/01/22  3. Goal description: Pt will be independent with use of compression to prevent exacerbation of lymphedema for better fit of clothing       Target date: 08/01/22           Raquel Pizano, PT                                    I CERTIFY THE NEED FOR THESE SERVICES FURNISHED UNDER        THIS PLAN OF TREATMENT AND WHILE UNDER MY CARE     (Physician co-signature of this document indicates review and certification of the therapy plan).                   Certification date from: 05/03/22       Certification date to: 08/01/22           Referring physician: Min Toledo PA-C   Initial Assessment  See Epic  Evaluation- Start of care: 05/03/22

## 2022-05-06 RX ORDER — HYDRALAZINE HYDROCHLORIDE 10 MG/1
10 TABLET, FILM COATED ORAL 3 TIMES DAILY
Qty: 270 TABLET | Refills: 0 | Status: SHIPPED | OUTPATIENT
Start: 2022-05-06 | End: 2022-09-12

## 2022-05-06 NOTE — TELEPHONE ENCOUNTER
"Prescription approved per [facility] Refill Protocol.  Requested Prescriptions   Pending Prescriptions Disp Refills     hydrALAZINE (APRESOLINE) 10 MG tablet 270 tablet 0     Sig: Take 1 tablet (10 mg) by mouth 3 times daily TAKE 1 TABLET BY MOUTH THREE TIMES DAILY       Vasodilators Passed - 5/2/2022  3:00 PM        Passed - Most recent BP less than 140/90 on record     BP Readings from Last 3 Encounters:   04/22/22 116/76   04/21/22 104/60   04/13/22 109/63                 Passed - Most recent encounter is not a hospital encounter. Patient has recent (12 mos) or future (1 mos) visit with authorizing provider's specialty     Patient's most recent encounter is NOT a hospital encounter and has had an office visit in the last 12 months or has a visit in the next 30 days with authorizing provider or within the authorizing provider's specialty.      See \"Patient Info\" tab in inbasket, or \"Choose Columns\" in Meds & Orders section of the refill encounter.      If most recent encounter is a hospital encounter AND the patient does NOT have an appointment scheduled with the authorizing provider or authorizing provider's specialty within the next 30 days, forward refill to authorizing provider for medication review.          Passed - Medication is active on med list        Passed - Patient is of age 18 years or older        Passed - Patient is not pregnant        Passed - Patient has not had a positive pregnancy test within the past 12 months               "

## 2022-05-10 ENCOUNTER — HOSPITAL ENCOUNTER (OUTPATIENT)
Dept: PHYSICAL THERAPY | Facility: CLINIC | Age: 57
Setting detail: THERAPIES SERIES
Discharge: HOME | End: 2022-05-10
Attending: PHYSICIAN ASSISTANT
Payer: COMMERCIAL

## 2022-05-10 DIAGNOSIS — R60.0 EDEMA OF BOTH LEGS: Primary | ICD-10-CM

## 2022-05-10 DIAGNOSIS — I89.0 LYMPHEDEMA: ICD-10-CM

## 2022-05-10 DIAGNOSIS — J44.9 CHRONIC OBSTRUCTIVE PULMONARY DISEASE, UNSPECIFIED COPD TYPE (H): ICD-10-CM

## 2022-05-10 PROCEDURE — 97535 SELF CARE MNGMENT TRAINING: CPT | Mod: GP | Performed by: PHYSICAL THERAPIST

## 2022-05-17 ENCOUNTER — HOSPITAL ENCOUNTER (OUTPATIENT)
Dept: PHYSICAL THERAPY | Facility: CLINIC | Age: 57
Setting detail: THERAPIES SERIES
Discharge: HOME | End: 2022-05-17
Attending: PHYSICIAN ASSISTANT
Payer: COMMERCIAL

## 2022-05-17 PROCEDURE — 97535 SELF CARE MNGMENT TRAINING: CPT | Mod: GP | Performed by: PHYSICAL THERAPIST

## 2022-05-25 ENCOUNTER — THERAPY VISIT (OUTPATIENT)
Dept: OCCUPATIONAL THERAPY | Facility: CLINIC | Age: 57
End: 2022-05-25
Attending: NURSE PRACTITIONER
Payer: COMMERCIAL

## 2022-05-25 DIAGNOSIS — R25.1 TREMOR: ICD-10-CM

## 2022-05-25 PROCEDURE — 97167 OT EVAL HIGH COMPLEX 60 MIN: CPT | Mod: GO

## 2022-05-25 PROCEDURE — 97535 SELF CARE MNGMENT TRAINING: CPT | Mod: GO

## 2022-05-25 ASSESSMENT — ACTIVITIES OF DAILY LIVING (ADL): IADL_QUICK_ADDS: MEAL PLANNING/PREPARATION;HOME/FINANCIAL/MANAGEMENT;COMMUNICATION/COMPUTER USE;COMMUNITY MOBILITY

## 2022-05-25 NOTE — PROGRESS NOTES
[facility]          OUTPATIENT OCCUPATIONAL THERAPY  EVALUATION  PLAN OF TREATMENT FOR OUTPATIENT REHABILITATION  (COMPLETE FOR INITIAL CLAIMS ONLY)  Patient's Last Name, First Name, M.I.  YOB: 1965  Inga Ledesma                        Provider's Name  [facility] Medical Record No.  [id number]                               Onset Date:     04/22/22 (Orders date)   Start of Care Date:     05/25/22   Type:     ___PT   _X_OT   ___SLP Medical Diagnosis:     Tremor (R25.1)                          OT Diagnosis:     Impaired safety, participation, ease, and efficiency of I/ADLs with tremor Visits from SOC:  1   _________________________________________________________________________________  Plan of Treatment/Functional Goals:  Cognitive skills, Self care/Home management, Strengthening, Therapeutic activities     Goals  Goal Identifier: Handwriting  Goal Description: Client will demonstrate and report a 50% improvement in handwriting legibility, using adaptive equipment/techniques as needed focusing on increased uniformity and neatness to increase legibility for writing checks, paying bills, writing letters and communicating  Target Date: 08/22/22     Goal Identifier: Functional communication  Goal Description: Client will successfully demonstrate increased ability to communicate via phone messaging with at least three complete and accurate messages following education and adjustment of accessibility settings and with use of adaptive strategies PRN  Target Date: 08/22/22     Goal Identifier: LE Strengthening  Goal Description: Client will demonstrate independent and safe engagement in home programming to increase BLE strength in order to prevent falls and improve home safety  Target Date: 08/22/22     Goal Identifier: Fall prevention  Goal Description: Client will  verbalize understanding of at least 3 fall prevention strategies following formal education and will report implementation of at least one strategy at home or in the community  Target Date: 08/22/22     Goal Identifier: AE/Strategies  Goal Description: Client will successfully identify at least two adaptive strategies or adaptive equipment to facilitate improved hand functioning with daily tasks such as but not limited to writing, eating, and self-cares  Target Date: 08/22/22       Therapy Frequency: up to 12 weeks     Predicted Duration of Therapy Intervention (days/wks): weekly  CLARKE Moran          I CERTIFY THE NEED FOR THESE SERVICES FURNISHED UNDER        THIS PLAN OF TREATMENT AND WHILE UNDER MY CARE     (Physician co-signature of this document indicates review and certification of the therapy plan).                         Referring Physician: Angel Luis Pacheco     Initial Assessment        See Epic Evaluation      Start Of Care Date: 05/25/22 05/25/22 1100   Quick Adds   Type of Visit Initial Outpatient Occupational Therapy Evaluation       Present No   General Information   Start Of Care Date 05/25/22   Referring Physician Angel Luis Pacheco   Orders Evaluate and treat as indicated   Orders Date 04/22/22   Medical Diagnosis Tremor (R25.1)   Onset of Illness/Injury or Date of Surgery 04/22/22  (Orders date)   Precautions/Limitations Fall precautions   Special Instructions Explore Assistive Device to improve tremors   Surgical/Medical History Reviewed Yes   Additional Occupational Profile Info/Pertinent History of Current Problem Cody is a 55 y/o working with neurology to determine etiology of tremors, reports and demonstrates tremors with activity in BLE and BUE and unable to text, type, or effectively feed self. Client seeks strategies to reduce falls and improve fine motor activities. 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   Comments/Observations Tremors come on suddenly, reports tremors in hands or feet, lead to frequent falls at home and in the community   Role/Living Environment   Current Community Support Family/friend caregiver  (Neighbor facilitates cares and monitors for falls)   Patient role/Employment history Student  (Business student, requirement following tx)   Community/Avocational Activities Bearded dragon therapy animal, kiera (haven't been able to do with tremors), social get togethers   Current Living Environment Apartment/condo  (Lives alone, usually leaves door unlocked so neighbor can get in to help, working to get her a key so she can come in and check if she has fallen)   Number of Stairs to Enter Home 3 flights to get to apartment, takes elevator with walker   Number of Stairs Within Home 0   Primary Bathroom Set Up/Equipment Shower/tub chair;Shower stall;Shower grab bar;Raised toilet seat;Toilet grab bar;Hand held shower   Home/Community Accessibility Comments Frequent falls at home reported   Prior Level - Transfers Independent   Prior Level - Ambulation Assistive equipment  (Walker)   Prior Level - ADLS Assistive equipment   Prior Responsibilities - IADL Meal Preparation;Housekeeping;Laundry;Shopping;Medication management;Finances;Driving   Current Assistive Devices - Mobility Walker  (4WW)   Role/Living Environment Comments Sober living environment with supportive management and peers   Patient/family Goals Statement Improve handwriting and tremor management, improve leg strength to pick things up from the floor   Pain   Patient currently in pain Yes   Pain location L calf   Pain rating unable to specify   Pain comments L calf some pain with swelling, though edema primarily in R leg, no lymph nodes in R  "leg following cancer tx   Fall Risk Screen   Fall screen completed by OT   Have you fallen 2 or more times in the past year? Yes   Have you fallen and had an injury in the past year? Yes   Timed Up and Go score (seconds) 14\" BNL indicating risk for falls, completed with walker   Is patient a fall risk? Yes   Fall screen comments Falls occuring with unexpected tremors, weakness in legs, at home and in the community   Abuse Screen (yes response referral indicated)   Feels Unsafe at Home or Work/School no   Feels Threatened by Someone no   Does Anyone Try to Keep You From Having Contact with Others or Doing Things Outside Your Home? no   Physical Signs of Abuse Present no   Cognitive Status Examination   Orientation Orientation to person, place and time   Level of Consciousness Alert   Follows Commands and Answers Questions 100% of the time   Personal Safety and Judgment Intact   Memory Comments Reports impairments with memory tasks, further evaluation indicated   Attention No deficits were identified   Organization/Problem Solving Reports problems with organization;Reports problems with problem solving during evaluation   Cognitive Comment Client may benefit from future screening, reports deficits in memory, formal screening deferred d/t lack of time and pt priorities   Visual Perception   Visual Perception Wears glasses   Visual Perception Comments bifocals for near/far vision   Sensation   Sensation Comments Reports no concerns, screening deferred d/t time constraints   Posture   Posture Not impaired   Range of Motion (ROM)   ROM Comments Formal testing deferred to future visit, no concerns reported or identified in the context of clinic tasks, client may benefit from future testing   Strength   Strength Comments BLE hip flexion 3+/5 with tremor during intentional movement, RLE leg extension 4+/5, LLE leg extension unable to test d/t injury (cut and bruises from fall on shin)   Hand Strength   Hand Dominance Right " "  Left Hand  (pounds) 30 pounds   Right Hand  (pounds) 35 pounds   Hand Strength Comments Lonny  strength BNL   Muscle Tone   Muscle Tone No deficits were identified   Coordination   Left Hand, Nine Hole Peg Test (seconds) 33.14\"   Right Hand, Nine Hole Peg Test (seconds) 32.52\"  (x1 dropped)   Coordination Comments Lonny FM coordination BNL, tremor noted bilaterally with activity   Balance   Balance Comments Impaired   Functional Mobility   Ambulation 4WW   Transfer Skill   Level of Economy: Transfers minimum assist (75% patients effort)   Transfer Skill Comments Unable to get up from low seats, difficulty with balance and sometime unable to complete sit<>stand transfers, lives alone and frequent falls at home   Bathing   Level of Economy - Bathing stand-by assist   Assistive Device grab bars;shower chair   Bathing Comments Improved with shower stall at current apartment and access to DME to prevent falls, still sometimes reports falls in shower and neighbor frequently checks in to assist   Upper Body Dressing   Level of Economy: Dress Upper Body independent   Upper Body Dressing Comments Reports no concerns   Lower Body Dressing   Level of Economy: Dress Lower Body independent   Lower Body Dressing Comments Reports no concerns   Toileting   Level of Economy: Toilet independent   Assistive Device raised toilet seat;grab bars   Grooming   Level of Economy: Grooming independent   Eating/Self-Feeding   Level of Economy: Eating independent   Eating/Self Feeding Comments Reports at every meal dropping/spilling with open containers and when bringing food to plate or mouth   Activity Tolerance   Activity Tolerance Reduced activity tolerance with hx of COPD, LE deconditioning/reduced strength, reduced energy   Instrumental Activities of Daily Living Assessment   IADL Quick Adds Meal Planning/Preparation;Home/Financial/Management;Communication/Computer Use;Community Mobility   Meal " Planning/Preparation Uses microwave mainly for cooking, frequent dropping of food and spilling drinks, very difficult to manage self-feeding at this time   Home/Financial Management Has some help from neighbor for up to 2 hours/day helping with cleaning and shopping   Communication/Computer Use Difficulty texting and with functional technology use   Community Mobility Not currently driving, car has been parked for 1.5 years and cannot afford repairs, looking to sell it, financial insecurity preventing client from driving   Planned Therapy Interventions   Planned Therapy Interventions Cognitive skills;Self care/Home management;Strengthening;Therapeutic activities   Adult OT Eval Goals   OT Eval Goals (Adult) 1;2;3;4;5    OT Goal 1   Goal Identifier Handwriting   Goal Description Client will demonstrate and report a 50% improvement in handwriting legibility, using adaptive equipment/techniques as needed focusing on increased uniformity and neatness to increase legibility for writing checks, paying bills, writing letters and communicating   Target Date 08/22/22    OT Goal 2   Goal Identifier Functional communication   Goal Description Client will successfully demonstrate increased ability to communicate via phone messaging with at least three complete and accurate messages following education and adjustment of accessibility settings and with use of adaptive strategies PRN   Target Date 08/22/22    OT Goal 3   Goal Identifier LE Strengthening   Goal Description Client will demonstrate independent and safe engagement in home programming to increase BLE strength in order to prevent falls and improve home safety   Target Date 08/22/22   OT Goal 4   Goal Identifier Fall prevention   Goal Description Client will verbalize understanding of at least 3 fall prevention strategies following formal education and will report implementation of at least one strategy at home or in the community   Target Date 08/22/22   OT Goal 5    Goal Identifier AE/Strategies   Goal Description Client will successfully identify at least two adaptive strategies or adaptive equipment to facilitate improved hand functioning with daily tasks such as but not limited to writing, eating, and self-cares   Target Date 08/22/22   Clinical Impression   Criteria for Skilled Therapeutic Interventions Met Yes, treatment indicated   OT Diagnosis Impaired safety, participation, ease, and efficiency of I/ADLs with tremor   Influenced by the following impairments BLE strength, BUE hand strength, FM coordination, memory, falls   Assessment of Occupational Performance 5 or more Performance Deficits   Identified Performance Deficits showering, eating, cooking, driving, standing activities, ambulation, shopping, home maintenence   Clinical Decision Making (Complexity) High complexity   Therapy Frequency up to 12 weeks   Predicted Duration of Therapy Intervention (days/wks) weekly   Risks and Benefits of Treatment have been explained. Yes   Patient, Family & other staff in agreement with plan of care Yes   Clinical Impression Comments Client benefits from skilled OT services for compensatory and remediative interventions to improve safety and independence with I/ADL tasks and community participation with tremor and frequent falls   Education Assessment   Barriers To Learning Other  (Financial insecurity)   Preferred Learning Style Listening;Reading;Demonstration;Pictures/video   Total Evaluation Time   OT Eval, High Complexity Minutes (41916) 01

## 2022-05-27 ENCOUNTER — VIRTUAL VISIT (OUTPATIENT)
Dept: FAMILY MEDICINE | Facility: CLINIC | Age: 57
End: 2022-05-27
Payer: COMMERCIAL

## 2022-05-27 ENCOUNTER — MYC MEDICAL ADVICE (OUTPATIENT)
Dept: FAMILY MEDICINE | Facility: CLINIC | Age: 57
End: 2022-05-27

## 2022-05-27 DIAGNOSIS — R25.1 TREMOR: Primary | ICD-10-CM

## 2022-05-27 PROCEDURE — 99213 OFFICE O/P EST LOW 20 MIN: CPT | Mod: 95 | Performed by: PHYSICIAN ASSISTANT

## 2022-05-27 RX ORDER — PRIMIDONE 50 MG/1
25 TABLET ORAL DAILY
Qty: 90 TABLET | Refills: 0 | Status: SHIPPED | OUTPATIENT
Start: 2022-05-27 | End: 2022-08-11

## 2022-05-27 NOTE — PROGRESS NOTES
Cody is a 56 year old who is being evaluated via a billable telephone visit.      What phone number would you like to be contacted at? [phone]  How would you like to obtain your AVS? Marcia Mchugh   Cody is a 56 year old who presents for the following health issues    HPI     ED/UC Followup:    Facility:  [facility]  Date of visit: 5/23/22  Reason for visit: Tremors  Current Status: Not so bad when she is resting, worse as the day goes on.     Tremors for the past couple of months. Uncertain etiology. Neurology thought maybe they could be med related. Bilateral primarily hand tremors. Intention tremors.       Review of Systems   Constitutional, HEENT, cardiovascular, pulmonary, GI, , musculoskeletal, neuro, skin, endocrine and psych systems are negative, except as otherwise noted.      Objective           Vitals:  No vitals were obtained today due to virtual visit.    Physical Exam   healthy, alert and no distress  PSYCH: Alert and oriented times 3; coherent speech, normal   rate and volume, able to articulate logical thoughts, able   to abstract reason, no tangential thoughts, no hallucinations   or delusions  Her affect is normal  RESP: No cough, no audible wheezing, able to talk in full sentences  Remainder of exam unable to be completed due to telephone visits    Inga was seen today for hospital f/u.    Diagnoses and all orders for this visit:    Tremor  -     primidone (MYSOLINE) 50 MG tablet; Take 0.5 tablets (25 mg) by mouth daily      Advised supportive and symptomatic treatment.  Follow up with Provider - if condition persists or worsens.   Patient to see neurology again in a month.        Phone call duration: 14 minutes

## 2022-06-02 ENCOUNTER — TELEPHONE (OUTPATIENT)
Dept: FAMILY MEDICINE | Facility: CLINIC | Age: 57
End: 2022-06-02

## 2022-06-02 NOTE — TELEPHONE ENCOUNTER
A form has been received requesting ICD-10 codes. To Min's in basket along with a copy of patient's problem list. Will mail back in envelope provided.

## 2022-06-08 ENCOUNTER — THERAPY VISIT (OUTPATIENT)
Dept: OCCUPATIONAL THERAPY | Facility: CLINIC | Age: 57
End: 2022-06-08
Payer: COMMERCIAL

## 2022-06-08 DIAGNOSIS — R25.1 TREMOR: Primary | ICD-10-CM

## 2022-06-08 PROCEDURE — 97535 SELF CARE MNGMENT TRAINING: CPT | Mod: GO

## 2022-06-08 PROCEDURE — 97530 THERAPEUTIC ACTIVITIES: CPT | Mod: GO

## 2022-06-08 NOTE — PATIENT INSTRUCTIONS
Standard pen : see if they have these at [facility] or Office Supplies store prior to our next visit. I prefer the kind that are round so they don't change your grasp pattern, I would avoid the triangular ones. You could try one like the one shown on the far left, but I anticipate you will prefer a more standard one because it will allow you to use your regular grasp pattern.     We identified today you prefer ballpoint pens, so this is the style you should try to buy. We like the bigger handled ones, then you wouldn't need a  pen   either.     Try crocheting for 5-15 min before I see you next so we can learn more about any barriers to participating in this activity

## 2022-06-14 NOTE — PROGRESS NOTES
Subjective   Cody is a 56 year old who presents for the following health issues    HPI       Hospital Follow-up Visit:    Hospital/Nursing Home/IP Rehab Facility: [facility]  Date of Admission: 5/27/22  Date of Discharge: 6/1/22  Reason(s) for Admission: Edema and tremors    Was your hospitalization related to COVID-19? No   Problems taking medications regularly:  None  Medication changes since discharge: None  Problems adhering to non-medication therapy:  None    Summary of hospitalization:  CareEverywhere information obtained and reviewed  Creatinine was elevated. Improved with iv fluids.  Losartan and nsaids stopped.   Cbc was abnormal. No vaginal bleeding or abd pain. No n/v. No melena . No hematochezia . Recent colonoscopy was normal. Reports maintaining her sobriety x 1.5 yrs. No n/v. Leukopenia and anemia. Query medication influence (abilify or seroquel).   Review of Systems   Constitutional, HEENT, cardiovascular, pulmonary, GI, , musculoskeletal, neuro, skin, endocrine and psych systems are negative, except as otherwise noted.      Objective    /70   Pulse 98   Temp 98.5  F (36.9  C) (Tympanic)   Resp 24   Wt 97.8 kg (215 lb 9.6 oz)   LMP 06/02/2010   SpO2 97%   Breastfeeding No   BMI 42.61 kg/m    Body mass index is 42.61 kg/m .  Physical Exam   Eye exam - right eye normal lid, conjunctiva, cornea, pupil and fundus, left eye normal lid, conjunctiva, cornea, pupil and fundus.  Thyroid not palpable, not enlarged, no nodules detected.  CHEST:chest clear to IPPA, no tachypnea, retractions or cyanosis and S1, S2 normal, no murmur, no gallop, rate regular.  The abdomen is soft without tenderness, guarding, mass, rebound or organomegaly. Bowel sounds are normal. No CVA tenderness or inguinal adenopathy noted.  Her disks are flat. Pupils equal, round, reactive to light. Extraocular movements full. Visual fields full. Face moves symmetrically. Tongue midline. Hearing mildly decreased to  finger-rubbing at approximately 6-8 inches. Neck without bruits. CV: S1, S2. Motor strength 5/5. Reflexes were 2/4. Toe signs were downgoing. Normal position sense. Good finger-nose-finger and fine finger movement. Gait: she herrera from a chair without difficulty and has a mildly broad-based gait.  Inga was seen today for hospital f/u.    Diagnoses and all orders for this visit:    Tremor    Encounter for long-term (current) use of medications    Other drug-induced neutropenia (H)  -     CBC with platelets and differential; Future  -     CBC with platelets and differential    Elevated serum creatinine  -     Basic metabolic panel  (Ca, Cl, CO2, Creat, Gluc, K, Na, BUN); Future  -     Basic metabolic panel  (Ca, Cl, CO2, Creat, Gluc, K, Na, BUN)    H/O bee sting allergy  -     EPINEPHrine (ANY BX GENERIC EQUIV) 0.3 MG/0.3ML injection 2-pack; Inject 0.3 mLs (0.3 mg) into the muscle as needed    Bilateral leg edema  -     furosemide (LASIX) 20 MG tablet; Take 1 tablet (20 mg) by mouth 2 times daily    Iron deficiency anemia, unspecified iron deficiency anemia type  -     Adult Gastro Ref - Procedure Only; Future      Patient to see neurology later this month re: her tremors.  egd to eval for upper gi cause for anemia.   work on lifestyle modification  Advised supportive and symptomatic treatment.  Follow up with Provider - if condition persists or worsens.     Answers for HPI/ROS submitted by the patient on 6/15/2022  If you checked off any problems, how difficult have these problems made it for you to do your work, take care of things at home, or get along with other people?: Somewhat difficult  PHQ9 TOTAL SCORE: 9

## 2022-06-15 ENCOUNTER — OFFICE VISIT (OUTPATIENT)
Dept: FAMILY MEDICINE | Facility: CLINIC | Age: 57
End: 2022-06-15
Payer: COMMERCIAL

## 2022-06-15 VITALS
DIASTOLIC BLOOD PRESSURE: 70 MMHG | OXYGEN SATURATION: 97 % | WEIGHT: 215.6 LBS | TEMPERATURE: 98.5 F | HEART RATE: 98 BPM | BODY MASS INDEX: 42.61 KG/M2 | SYSTOLIC BLOOD PRESSURE: 139 MMHG | RESPIRATION RATE: 24 BRPM

## 2022-06-15 DIAGNOSIS — R25.1 TREMOR: Primary | ICD-10-CM

## 2022-06-15 DIAGNOSIS — Z79.899 ENCOUNTER FOR LONG-TERM (CURRENT) USE OF MEDICATIONS: ICD-10-CM

## 2022-06-15 DIAGNOSIS — Z91.030 H/O BEE STING ALLERGY: ICD-10-CM

## 2022-06-15 DIAGNOSIS — R79.89 ELEVATED SERUM CREATININE: ICD-10-CM

## 2022-06-15 DIAGNOSIS — D70.2 OTHER DRUG-INDUCED NEUTROPENIA (H): ICD-10-CM

## 2022-06-15 DIAGNOSIS — R60.0 BILATERAL LEG EDEMA: ICD-10-CM

## 2022-06-15 DIAGNOSIS — D50.9 IRON DEFICIENCY ANEMIA, UNSPECIFIED IRON DEFICIENCY ANEMIA TYPE: ICD-10-CM

## 2022-06-15 LAB
ANION GAP SERPL CALCULATED.3IONS-SCNC: 6 MMOL/L (ref 3–14)
BASOPHILS # BLD AUTO: 0 10E3/UL (ref 0–0.2)
BASOPHILS NFR BLD AUTO: 0 %
BUN SERPL-MCNC: 20 MG/DL (ref 7–30)
CALCIUM SERPL-MCNC: 9.4 MG/DL (ref 8.5–10.1)
CHLORIDE BLD-SCNC: 106 MMOL/L (ref 94–109)
CO2 SERPL-SCNC: 28 MMOL/L (ref 20–32)
CREAT SERPL-MCNC: 1.22 MG/DL (ref 0.52–1.04)
EOSINOPHIL # BLD AUTO: 0.4 10E3/UL (ref 0–0.7)
EOSINOPHIL NFR BLD AUTO: 5 %
ERYTHROCYTE [DISTWIDTH] IN BLOOD BY AUTOMATED COUNT: 16 % (ref 10–15)
GFR SERPL CREATININE-BSD FRML MDRD: 52 ML/MIN/1.73M2
GLUCOSE BLD-MCNC: 116 MG/DL (ref 70–99)
HCT VFR BLD AUTO: 28.1 % (ref 35–47)
HGB BLD-MCNC: 8.9 G/DL (ref 11.7–15.7)
LYMPHOCYTES # BLD AUTO: 0.9 10E3/UL (ref 0.8–5.3)
LYMPHOCYTES NFR BLD AUTO: 10 %
MCH RBC QN AUTO: 28.2 PG (ref 26.5–33)
MCHC RBC AUTO-ENTMCNC: 31.7 G/DL (ref 31.5–36.5)
MCV RBC AUTO: 89 FL (ref 78–100)
MONOCYTES # BLD AUTO: 0.5 10E3/UL (ref 0–1.3)
MONOCYTES NFR BLD AUTO: 6 %
NEUTROPHILS # BLD AUTO: 7.2 10E3/UL (ref 1.6–8.3)
NEUTROPHILS NFR BLD AUTO: 79 %
PLATELET # BLD AUTO: 335 10E3/UL (ref 150–450)
POTASSIUM BLD-SCNC: 4.1 MMOL/L (ref 3.4–5.3)
RBC # BLD AUTO: 3.16 10E6/UL (ref 3.8–5.2)
SODIUM SERPL-SCNC: 140 MMOL/L (ref 133–144)
WBC # BLD AUTO: 9 10E3/UL (ref 4–11)

## 2022-06-15 PROCEDURE — 36415 COLL VENOUS BLD VENIPUNCTURE: CPT | Performed by: PHYSICIAN ASSISTANT

## 2022-06-15 PROCEDURE — 85025 COMPLETE CBC W/AUTO DIFF WBC: CPT | Performed by: PHYSICIAN ASSISTANT

## 2022-06-15 PROCEDURE — 99214 OFFICE O/P EST MOD 30 MIN: CPT | Performed by: PHYSICIAN ASSISTANT

## 2022-06-15 PROCEDURE — 80048 BASIC METABOLIC PNL TOTAL CA: CPT | Performed by: PHYSICIAN ASSISTANT

## 2022-06-15 RX ORDER — EPINEPHRINE 0.3 MG/.3ML
0.3 INJECTION SUBCUTANEOUS PRN
Qty: 0.6 ML | Refills: 2 | Status: SHIPPED | OUTPATIENT
Start: 2022-06-15 | End: 2022-08-11

## 2022-06-15 RX ORDER — FUROSEMIDE 20 MG
20 TABLET ORAL 2 TIMES DAILY
Qty: 180 TABLET | Refills: 1 | Status: SHIPPED | OUTPATIENT
Start: 2022-06-15 | End: 2022-12-26

## 2022-06-15 ASSESSMENT — PATIENT HEALTH QUESTIONNAIRE - PHQ9
10. IF YOU CHECKED OFF ANY PROBLEMS, HOW DIFFICULT HAVE THESE PROBLEMS MADE IT FOR YOU TO DO YOUR WORK, TAKE CARE OF THINGS AT HOME, OR GET ALONG WITH OTHER PEOPLE: SOMEWHAT DIFFICULT
SUM OF ALL RESPONSES TO PHQ QUESTIONS 1-9: 9
SUM OF ALL RESPONSES TO PHQ QUESTIONS 1-9: 9

## 2022-06-15 ASSESSMENT — PAIN SCALES - GENERAL: PAINLEVEL: NO PAIN (0)

## 2022-06-20 ENCOUNTER — TRANSFERRED RECORDS (OUTPATIENT)
Dept: HEALTH INFORMATION MANAGEMENT | Facility: CLINIC | Age: 57
End: 2022-06-20

## 2022-06-29 ENCOUNTER — THERAPY VISIT (OUTPATIENT)
Dept: OCCUPATIONAL THERAPY | Facility: CLINIC | Age: 57
End: 2022-06-29
Payer: COMMERCIAL

## 2022-06-29 DIAGNOSIS — R25.1 TREMOR: Primary | ICD-10-CM

## 2022-06-29 PROCEDURE — 97535 SELF CARE MNGMENT TRAINING: CPT | Mod: GO

## 2022-06-29 NOTE — PATIENT INSTRUCTIONS
OT Summary 6/29  Consider posture - feet on floor and writing at table improves legibility    Bandage tape (I like the foam style) - can use to build up a handle on writing tools or other things (silverware, grooming supplies, etc) - can also look up  Foam  Tubing     Use a stylus for phone or touch screen devices    I issued instructions on speech to text for your [employer] writing papers; If typing becomes an issue, you could also work with disabilities department to see if they can provide talk to text for paper writing    You can do speech to text on your phone with texting by hitting the microphone

## 2022-06-30 ENCOUNTER — TRANSFERRED RECORDS (OUTPATIENT)
Dept: OCCUPATIONAL THERAPY | Facility: CLINIC | Age: 57
End: 2022-06-30

## 2022-07-05 ENCOUNTER — TELEPHONE (OUTPATIENT)
Dept: NEUROLOGY | Facility: CLINIC | Age: 57
End: 2022-07-05

## 2022-07-05 NOTE — TELEPHONE ENCOUNTER
Received office not from Dr Aly Feliciano    Records Date of service: 6/30/22  Copy has been sent to scanning and encounter routed to specialty nurse for review.

## 2022-07-06 ENCOUNTER — THERAPY VISIT (OUTPATIENT)
Dept: OCCUPATIONAL THERAPY | Facility: CLINIC | Age: 57
End: 2022-07-06
Payer: COMMERCIAL

## 2022-07-06 DIAGNOSIS — R25.1 TREMOR: Primary | ICD-10-CM

## 2022-07-06 PROCEDURE — 97535 SELF CARE MNGMENT TRAINING: CPT | Mod: GO

## 2022-07-08 ENCOUNTER — TELEPHONE (OUTPATIENT)
Dept: FAMILY MEDICINE | Facility: CLINIC | Age: 57
End: 2022-07-08

## 2022-07-11 ENCOUNTER — TRANSFERRED RECORDS (OUTPATIENT)
Dept: FAMILY MEDICINE | Facility: CLINIC | Age: 57
End: 2022-07-11

## 2022-07-13 ENCOUNTER — THERAPY VISIT (OUTPATIENT)
Dept: OCCUPATIONAL THERAPY | Facility: CLINIC | Age: 57
End: 2022-07-13
Payer: COMMERCIAL

## 2022-07-13 DIAGNOSIS — R25.1 TREMOR: Primary | ICD-10-CM

## 2022-07-13 PROCEDURE — 97535 SELF CARE MNGMENT TRAINING: CPT | Mod: GO

## 2022-07-20 ENCOUNTER — TELEPHONE (OUTPATIENT)
Dept: FAMILY MEDICINE | Facility: CLINIC | Age: 57
End: 2022-07-20

## 2022-07-20 NOTE — PATIENT INSTRUCTIONS
OT Summary 7/13    Follow up on exercise equipment    Commit to doing 15-30 min activity per day or as able. Try to at least do every other day    Pull out the home safety checklist and make related changes to reduce fall risk    Clutter maintenance - can you do 5-10 min a day of picking up to maintain clutter at home?    Spend 10 min per day on dishes         You are enough

## 2022-07-20 NOTE — TELEPHONE ENCOUNTER
Forms received from: [employer]   Phone number listed: [phone]   Fax listed: [phone]  Date received: 7/18/22  Form description: Medical necessity  Once forms are completed, please return to [employer] via fax [phone].  Is patient requesting to be contacted when forms are completed: na  Phone: na  Form placed:  To [employer] basket  Nancy Tobar

## 2022-07-21 ENCOUNTER — MEDICAL CORRESPONDENCE (OUTPATIENT)
Dept: HEALTH INFORMATION MANAGEMENT | Facility: CLINIC | Age: 57
End: 2022-07-21

## 2022-07-26 ENCOUNTER — HOSPITAL ENCOUNTER (OUTPATIENT)
Dept: PHYSICAL THERAPY | Facility: CLINIC | Age: 57
Setting detail: THERAPIES SERIES
Discharge: HOME | End: 2022-07-26
Attending: PHYSICIAN ASSISTANT
Payer: COMMERCIAL

## 2022-07-26 ENCOUNTER — TELEPHONE (OUTPATIENT)
Dept: FAMILY MEDICINE | Facility: CLINIC | Age: 57
End: 2022-07-26

## 2022-07-26 PROCEDURE — 97140 MANUAL THERAPY 1/> REGIONS: CPT | Mod: GP | Performed by: PHYSICAL THERAPIST

## 2022-07-26 NOTE — PROGRESS NOTES
[facility]    OUTPATIENT PHYSICAL THERAPY  PLAN OF TREATMENT FOR OUTPATIENT REHABILITATION AND PROGRESS NOTE           Patient's Last Name, First Name, Inga Medellin Date of Birth  1965   Provider's Name  [facility] Medical Record No.  [id number]    Onset Date  11/18/2020 Start of Care Date  5/3/2022   Type:     _X_PT   ___OT   ___SLP Medical Diagnosis  Bilateral leg edema   PT Diagnosis  BLE lymphedema Plan of Treatment  Frequency/Duration: Up to 10 visits  Certification date from 08/01/22 to 10/30/22     Goals:  Goal Identifier Home Program   Goal Description Pt will demonstrate independent completion of targeted HEP of self massage, exercise and compression to improve and maintain BLE lymphedema.   Target Date 10/01/22   Date Met      Progress (detail required for progress note): Pt has struggled with compliance due to recurrent infection. Limited use of compression garments and self massage since last visit     Goal Identifier Volume   Goal Description Pt will have a 5% reduction to RLE volume 10-60 cm to improve fit of clothing and shoes   Target Date 10/01/22   Date Met      Progress (detail required for progress note): Pt up 7 and 11% respectfully today     Goal Identifier Compression   Goal Description Pt will be independent with use of compression to prevent exacerbation of lymphedema for better fit of clothing   Target Date 10/01/22   Date Met      Progress (detail required for progress note): Pt with intermittant use of compression due to infections causing pain and limiting garment use       Beginning/End Dates of Progress Note Reporting Period:  05/17/22 to 07/26/22    Progress Toward Goals:   Progress limited due to repeat cellulitis infections and pt challenges with compression compliance    Client Self (Subjective) Report for Progress  Note Reporting Period: Pt arriving after 4 week trial of self maintenance at home with new compression. Pt reports recent ER visit with new cellulitis of the LLE currently on antibiotics.    Outcome Measures (Most Recent Score):    Lymphedema Life Impact Scale (score range 0-72). A higher score indicates greater impairment.: 38    Objective Measurements:   Objective Measure: R Volume 10-60cm  Details: 7312.24 mL +7.6%  Objective Measure: L Volume 10-60cm  Details: 6745.7 mL +11%                I CERTIFY THE NEED FOR THESE SERVICES FURNISHED UNDER        THIS PLAN OF TREATMENT AND WHILE UNDER MY CARE     (Physician co-signature of this document indicates review and certification of the therapy plan).                Referring Provider: BENI Rizo, PT

## 2022-07-26 NOTE — PROGRESS NOTES
[facility]    Outpatient Physical Therapy Progress Note  Patient: Inga Ledesma  : 1965    Beginning/End Dates of Reporting Period:  2022 to 22    Referring Provider: Min Toledo PA-C    Therapy Diagnosis: BLE lymphedema     Client Self Report: Pt arriving after 4 week trial of self maintenance at home with new compression. Pt reports recent ER visit with new cellulitis of the LLE currently on antibiotics.    Objective Measurements:  Objective Measure: R Volume 10-60cm  Details: 7312.24 mL +7.6%  Objective Measure: L Volume 10-60cm  Details: 6745.7 mL +11%      Outcome Measures (most recent score):  Lymphedema Life Impact Scale (score range 0-72). A higher score indicates greater impairment.: 38    Goals:  Goal Identifier Home Program   Goal Description Pt will demonstrate independent completion of targeted HEP of self massage, exercise and compression to improve and maintain BLE lymphedema.   Target Date    Date Met      Progress (detail required for progress note): Pt has struggled with compliance due to recurrent infection. Limited use of compression garments and self massage since last visit     Goal Identifier Volume   Goal Description Pt will have a 5% reduction to RLE volume 10-60 cm to improve fit of clothing and shoes   Target Date 10/01/22   Date Met      Progress (detail required for progress note): Pt up 7.6% (RLE) and 11% (LLE) today.     Goal Identifier Compression   Goal Description Pt will be independent with use of compression to prevent exacerbation of lymphedema for better fit of clothing   Target Date 10/01/22   Date Met      Progress (detail required for progress note): Pt with intermittant use of compression due to infections causing pain and limiting garment use       Plan:  Cody presents to therapy for a 4 week follow up from previous visit after receiving  new custom compression garments for BLE. Pt with hx of LE lymphedema since about 2007, with new exacerbations occurring more frequently starting in 2021. Pt has since had multiple cellulitis infections of BLE with continued increase in BLE volume. She currently presents with significant BLE skin changes with hyperpigmentation, hyperkeratosis and hyperplasia of BLE with firm fibrosis, worst at pretibial areas. She has been educated in self massage, and undergone treatment of MLD, gradient compression bandaging, compression garmemts, elevation and exercise but continues to demonstrate pitting edema of the LE. She would benefit from an advanced pump for lymphatic clearance to the waist as she has persistent edema into the abdomen and buttocks and the basic pump will not treat this affected area.     Changes to therapy plan of care: New goal dates to reflect updated timelines    Discharge:  No

## 2022-07-26 NOTE — TELEPHONE ENCOUNTER
Detailed Written Order received for signature in regards to a request for lift chair supplies, to Lee in basket.

## 2022-07-26 NOTE — PROGRESS NOTES
07/26/22 1100   Signing Clinician's Name / Credentials   Signing clinician's name / credentials Raquel Pizano PT, QUIQUE-COBY   Discipline   Discipline PT   Lower Extremity Girth Measurements   RT: 10 cm above heel 22.4 cms   RT: 20 cm above heel 36.7 cms   RT: 30 cm above heel 43.5 cms   RT: 40 cm above heel 39 cms   RT: 50 cm above heel 49.4 cms   RT: 60 cm above heel 60.5 cms   RT: Lower Extremity Total Volume 7312.24   RT: Lower Extremity % Volume Change 7.6%   LT: 10 cm above heel 22.5 cms   LT: 20 cm above heel 32.5 cms   LT: 30 cm above heel 41.6 cms   LT: 40 cm above heel 38 cms   LT: 50 cm above heel 48.5 cms   LT: 60 cm above heel 58.6 cms   LT: Lower Extremity Total Volume 6745.7   LT: Lower Extremity % Volume Change 11%

## 2022-07-27 NOTE — PROGRESS NOTES
[facility]    Outpatient Occupational Therapy Discharge Note  Patient: Inga Ledesma  : 1965    Beginning/End Dates of Reporting Period:  22 to 22    Referring Provider:  Angel Luis Pacheco    Therapy Diagnosis: Impaired safety, participation, ease, and efficiency of I/ADLs with tremor     Client Self Report: I'm so tired of these legs, I feel like they are made of cement    Goals:     Goal Identifier Handwriting   Goal Description Client will demonstrate and report a 50% improvement in handwriting legibility, using adaptive equipment/techniques as needed focusing on increased uniformity and neatness to increase legibility for writing checks, paying bills, writing letters and communicating   Target Date 22   Date Met  22   Progress (detail required for progress note): Goal met     Goal Identifier Functional communication   Goal Description Client will successfully demonstrate increased ability to communicate via phone messaging with at least three complete and accurate messages following education and adjustment of accessibility settings and with use of adaptive strategies PRN   Target Date 22   Date Met  22   Progress (detail required for progress note): Goal met     Goal Identifier LE Strengthening   Goal Description Client will demonstrate independent and safe engagement in home programming to increase BLE strength in order to prevent falls and improve home safety   Target Date 22   Date Met      Progress (detail required for progress note): Goal discontinued, client states needs being met through PT and home gym, states good plan to engage daily to e/o day with walking program and in the gym     Goal Identifier Fall prevention   Goal Description Client will verbalize understanding of at least 3 fall prevention strategies following formal education and  will report implementation of at least one strategy at home or in the community   Target Date 08/22/22   Date Met  07/13/22   Progress (detail required for progress note): Goal met, verbalizes understanding of suggestions following reorientation     Goal Identifier AE/Strategies   Goal Description Client will successfully identify at least two adaptive strategies or adaptive equipment to facilitate improved hand functioning with daily tasks such as but not limited to writing, eating, and self-cares   Target Date 08/22/22   Date Met  07/13/22   Progress (detail required for progress note): Goal met       Plan:  Discharge from therapy.    Discharge:    Reason for Discharge: Patient has met all goals.    Equipment Issued: Writing aids    Discharge Plan: Other services: Continue to follow with lymphedema. May benefit from PT in future for balance/fall prevention as indicated.       07/13/22 1100   Signing Clinician's Name / Credentials   Signing clinician's name / credentials SUBHASH Mcghee   Session Number   Session Number 5   Progress/Recertification   Recertification Due 08/22/22   Adult OT Eval Goals   OT Eval Goals (Adult) 1;2;3;4;5    OT Goal 1   Goal Identifier Handwriting   Goal Description Client will demonstrate and report a 50% improvement in handwriting legibility, using adaptive equipment/techniques as needed focusing on increased uniformity and neatness to increase legibility for writing checks, paying bills, writing letters and communicating   Goal Progress Goal met   Target Date 08/22/22   Date Met 07/13/22    OT Goal 2   Goal Identifier Functional communication   Goal Description Client will successfully demonstrate increased ability to communicate via phone messaging with at least three complete and accurate messages following education and adjustment of accessibility settings and with use of adaptive strategies PRN   Goal Progress Goal met   Target Date 08/22/22   Date Met 07/13/22    OT  Goal 3   Goal Identifier LE Strengthening   Goal Description Client will demonstrate independent and safe engagement in home programming to increase BLE strength in order to prevent falls and improve home safety   Goal Progress Goal discontinued, client states needs being met through PT and home gym, states good plan to engage daily to e/o day with walking program and in the gym   Target Date 08/22/22   OT Goal 4   Goal Identifier Fall prevention   Goal Description Client will verbalize understanding of at least 3 fall prevention strategies following formal education and will report implementation of at least one strategy at home or in the community   Goal Progress Goal met, verbalizes understanding of suggestions following reorientation   Target Date 08/22/22   Date Met 07/13/22   OT Goal 5   Goal Identifier AE/Strategies   Goal Description Client will successfully identify at least two adaptive strategies or adaptive equipment to facilitate improved hand functioning with daily tasks such as but not limited to writing, eating, and self-cares   Goal Progress Goal met   Target Date 08/22/22   Date Met 07/13/22   Subjective Report   Subjective Report I'm so tired of these legs, I feel like they are made of cement   Therapeutic Interventions   Therapeutic Interventions Self Care/Home Management   Self Care/Home Management   Self-Care/Home Mgmt/ADL, Compensatory, Meal Prep Minutes (20217) 40 Minutes   Skilled Intervention Skilled MI to assess goal attainment, coaching on lifestyle mods for IADL engagement and safety   Patient Response Client is receptive to coaching and education, verbalizes understanding, handout for reinforcement   Treatment Detail Client reports that she did not attend to fall prevention environmental mods at home last week as she forgot. Has been working to reduce clutter to minimize fall risk and increased light for pathways at night successfully decreasing fall risk. Able to locate handout with  notes from last visit, reinforced importance for safety to attend to task with someone else to facilitate. Verbalizes understanding and meets goal attainment. MI reveals client has appropriate equipment to improve functional communication and handwriting, mods to phone from prior session have been very helpful and much easier to use phone with stylus. Increased stability with FM tasks has been an effective strategy for self-feeding and writing, in addition to built up grasp and use of gel pen with supportive gripper. Coaching for continued lifestyle modifications following discharge targeting 15-30 min of movement/exercise daily as tolerated and 5-10 min daily decluttering/organizing as well as task break down to facilitate maintenence of safe environment for fall prevention and chunking to facilitate task completion with compensatory aids as needed. Client in agreement with discharge today.   Progress See details above, all goals met or discontinued   Education   Learner Patient   Readiness Eager   Method Booklet/handout;Explanation;Demonstration   Response Verbalizes understanding;Demonstrates understanding   Plan   Plan for next session D/C today   Comments   Comments DISCHARGE SUMMARY: Client has been seen by skilled OT services for 5 visits working towards goals listed above related to handwriting, functional communication, LE strengthening, fall prevention, and AE/strategies. Client has met 4/5 goals and 1/5 goals d/c d/t active management of BLE edema and swelling. Client continues to follow with lymphedema and states getting exercises there. Client may benefit from further PT for fall prevention in the future. Overall, client has made significant gains in functional performance through coaching and implementation of compensatory strategies and environmental set up. Client is in agreement with discharge today.   Total Session Time   Timed Code Treatment Minutes 40   Total Treatment Time (sum of timed and  untimed services) 40   AMBULATORY CLINICS ONLY-MEDICAL AND TREATMENT DIAGNOSIS   Medical Diagnosis Tremor (R25.1)   OT Diagnosis Impaired safety, participation, ease, and efficiency of I/ADLs with tremor

## 2022-07-28 ENCOUNTER — MEDICAL CORRESPONDENCE (OUTPATIENT)
Dept: HEALTH INFORMATION MANAGEMENT | Facility: CLINIC | Age: 57
End: 2022-07-28

## 2022-08-01 ENCOUNTER — TELEPHONE (OUTPATIENT)
Dept: NURSING | Facility: CLINIC | Age: 57
End: 2022-08-01

## 2022-08-01 NOTE — TELEPHONE ENCOUNTER
Left Voicemail with Family Member (1st Attempt) for the patient to call back and schedule the following:    Appointment type: PFT   Provider: Luke  Return date: 8/4/2022  Specialty phone number: [phone]    Additonal Notes:   Called patient to move appointment up to 10:30, patient is scheduled incorrectly over RT's lunch.     Janie Snow  Procedure    Cardiology, Rheumatology, GI, Pulmonology, Nephrology Specialties   [facility]  [phone]

## 2022-08-04 ENCOUNTER — OFFICE VISIT (OUTPATIENT)
Dept: NURSING | Facility: CLINIC | Age: 57
End: 2022-08-04
Payer: COMMERCIAL

## 2022-08-04 VITALS — OXYGEN SATURATION: 96 % | WEIGHT: 214.9 LBS | BODY MASS INDEX: 42.47 KG/M2 | HEART RATE: 74 BPM

## 2022-08-04 DIAGNOSIS — J43.2 CENTRILOBULAR EMPHYSEMA (H): ICD-10-CM

## 2022-08-04 PROCEDURE — 94375 RESPIRATORY FLOW VOLUME LOOP: CPT | Performed by: INTERNAL MEDICINE

## 2022-08-04 PROCEDURE — 94726 PLETHYSMOGRAPHY LUNG VOLUMES: CPT | Performed by: INTERNAL MEDICINE

## 2022-08-04 PROCEDURE — 94729 DIFFUSING CAPACITY: CPT | Performed by: INTERNAL MEDICINE

## 2022-08-05 ENCOUNTER — OFFICE VISIT (OUTPATIENT)
Dept: OBGYN | Facility: CLINIC | Age: 57
End: 2022-08-05
Payer: COMMERCIAL

## 2022-08-05 VITALS
SYSTOLIC BLOOD PRESSURE: 136 MMHG | DIASTOLIC BLOOD PRESSURE: 77 MMHG | OXYGEN SATURATION: 97 % | HEART RATE: 86 BPM | BODY MASS INDEX: 42.17 KG/M2 | WEIGHT: 213.4 LBS

## 2022-08-05 DIAGNOSIS — N89.8 VAGINAL DISCHARGE: ICD-10-CM

## 2022-08-05 DIAGNOSIS — N89.8 VAGINAL ITCHING: Primary | ICD-10-CM

## 2022-08-05 DIAGNOSIS — B96.89 BV (BACTERIAL VAGINOSIS): ICD-10-CM

## 2022-08-05 DIAGNOSIS — N76.0 BV (BACTERIAL VAGINOSIS): ICD-10-CM

## 2022-08-05 LAB
CLUE CELLS: PRESENT
TRICHOMONAS, WET PREP: ABNORMAL
WBC'S/HIGH POWER FIELD, WET PREP: ABNORMAL
YEAST, WET PREP: ABNORMAL

## 2022-08-05 PROCEDURE — 87210 SMEAR WET MOUNT SALINE/INK: CPT | Performed by: OBSTETRICS & GYNECOLOGY

## 2022-08-05 PROCEDURE — 99203 OFFICE O/P NEW LOW 30 MIN: CPT | Performed by: OBSTETRICS & GYNECOLOGY

## 2022-08-05 RX ORDER — METRONIDAZOLE 500 MG/1
500 TABLET ORAL 2 TIMES DAILY
Qty: 14 TABLET | Refills: 0 | Status: SHIPPED | OUTPATIENT
Start: 2022-08-05 | End: 2022-08-12

## 2022-08-05 NOTE — PATIENT INSTRUCTIONS
To Schedule an Appointment 24/7  Call: [phone]    If you have any questions regarding your visit, Please contact your care team.  [facility]: [phone]  [facility] HOURS TELEPHONE NUMBER   Cephas Agbeh, M.D.      Tj Lyons-Surgery Scheduler  Viktoriya-Surgery Scheduler         Monday - [place]:    8:00 am-4:45 pm  Tuesday - [place]:   8:00 am-4:45 pm  Friday-[place]:       8:00 am-4:45 pm  Typical Surgery Day:  Wednesday [facility]   [address]   [place] [address] [phone]   Fax [phone]    Imaging Scheduling all locations  [phone]      [facility]   [address] [place] [address] [address]   [place], [place] [address] [phone]    [facility]3]  [address] [address]  [phone]  Fax [phone]     Urgent Care locations:  [facility] Monday-Friday                               10 am - 8 pm  Saturday and Sunday                      9 am - 5 pm  Monday-Friday                              10 am- 8 pm  Saturday and Sunday                      9 am - 5 pm    [phone]   **Surgeries** Our Surgery Schedulers will contact you to schedule. If you do not receive a call within 3 business days, please call [phone].  If you need a medication refill, please contact your pharmacy. Please allow 3 business days for your refill to be completed.  As always, Thank you for trusting us with your healthcare needs!  see additional instructions from your care team below

## 2022-08-05 NOTE — PROGRESS NOTES
Inga is a 56 year old  (S/P Hysterectomy)referred here by self for consultation regarding caginal dicharge itching. She has a lump in the vaginal area that has resolved..    ROS: Ten point review of systems was reviewed and negative except the above.    Gyne: - abn pap (last pap ), - STD's    Past Medical History:   Diagnosis Date     Acute pain of left shoulder 2021     LALA (acute kidney injury) (H) 2018     Alcohol abuse      Alcohol dependence with acute alcoholic intoxication (H) 2020     Alcohol withdrawal (H) 10/28/2017     Alcohol withdrawal seizure (H) 2016     Alcoholic hepatitis without ascites 2013     Cancer of labia majora (H)      Cellulitis of right lower extremity 2021     Cervical dysplasia 1987     Congestive heart failure (H) 2016     COPD (chronic obstructive pulmonary disease) (H)      COPD exacerbation (H) 2018     CVA (cerebral infarction) 2012     Dependent edema      Depression, major      Depressive disorder 1965     GERD (gastroesophageal reflux disease)      Hyperlipidemia LDL goal < 160      Hypertension      Iron deficiency anemia      Menorrhagia 05/10/2010     Pneumonia 07/10/2021     RLS (restless legs syndrome)      Sacroiliitis (H)     steroid injections ineffective, chronic low back pain     Sepsis (H) 2018     Tobacco abuse      Uncomplicated asthma      Vitamin D deficiencies      Past Surgical History:   Procedure Laterality Date     AS BIOPSY/EXCISION LYMPH NODE OPEN SUPERFICIAL       COLONOSCOPY       COLONOSCOPY N/A 2022    Procedure: COLONOSCOPY;  Surgeon: Mike Garcia MD;  Location:  GI     EYE SURGERY       HYSTERECTOMY       HYSTERECTOMY TOTAL ABDOMINAL  7/28/10    Bilateral salpingectomy.  ovaries conserved.     LASER TX, CERVICAL       LYMPH NODE BIOPSY      inguinal     LYSIS OF LABIAL LESION(S)  ,      Patient Active Problem List   Diagnosis     RLS (restless legs  syndrome)     GERD (gastroesophageal reflux disease)     Iron deficiency anemia secondary to inadequate dietary iron intake     Hyperlipidemia with target LDL less than 160     Sacroiliitis (H)     Tobacco abuse     Vitamin D deficiency     Edema of both legs     Hypertension goal BP (blood pressure) < 140/90     [facility]     Major depressive disorder, recurrent episode, mild (H)     (HFpEF) heart failure with preserved ejection fraction (H)     Psychophysiological insomnia     Chemical dependency (H)     Alcohol abuse     Alcohol dependency (H)     Morbid obesity (H)     Chronic bilateral low back pain without sciatica     Chronic obstructive pulmonary disease (H)     JAQUELIN (obstructive sleep apnea)- severe (AHI 37)     Preop general physical exam       ALL/Meds: Her medication and allergy histories were reviewed and are documented in their appropriate chart areas.    SH: - tob, - EtOH,     FH: Her family history was reviewed and documented in its appropriate chart area.    PE: /77   Pulse 86   Wt 96.8 kg (213 lb 6.4 oz)   LMP 06/02/2010   SpO2 97%   Breastfeeding No   BMI 42.17 kg/m    Body mass index is 42.17 kg/m .    General Appearance:  healthy, alert, active, no distress  HEENT: NCAT  Abdomen: Soft, nontender.  Normal bowel sounds.  No masses  Pelvic:       - Ext: NEFG,        - Urethra: no lesions, no masses, no hypermobility       - Urethral Meatus: normal appearance,        - Bladder: no tenderness, no masses       - Vagina: pink, moist, normal rugae,  lesions, moderate discharge          Nurse present for exam and visit.  Results for orders placed or performed in visit on 08/05/22   Wet prep - Clinic Collect     Status: Abnormal    Specimen: Vagina; Swab   Result Value Ref Range    Trichomonas Absent Absent    Yeast Absent Absent    Clue Cells Present (A) Absent    WBCs/high power field 3+ (A) None       A/P    ICD-10-CM    1. Vaginal itching  N89.8 Wet prep - Clinic Collect      metroNIDAZOLE (FLAGYL) 500 MG tablet   2. Vaginal discharge  N89.8 Wet prep - Clinic Collect     metroNIDAZOLE (FLAGYL) 500 MG tablet   3. BV (bacterial vaginosis)  N76.0     B96.89      Total time preparing to see patient with reviewing prior encounter and labs, face to face time,  and coordinating care on the same calendar date:30 mins      CEPHAS AGBEH, MD.      CEPHAS AGBEH, MD.

## 2022-08-09 LAB
DLCOUNC-%PRED-PRE: 66 %
DLCOUNC-PRE: 12.01 ML/MIN/MMHG
DLCOUNC-PRED: 17.94 ML/MIN/MMHG
ERV-%PRED-PRE: 54 %
ERV-PRE: 0.11 L
ERV-PRED: 0.2 L
EXPTIME-PRE: 8.08 SEC
FEF2575-%PRED-PRE: 34 %
FEF2575-PRE: 0.79 L/SEC
FEF2575-PRED: 2.28 L/SEC
FEFMAX-%PRED-PRE: 48 %
FEFMAX-PRE: 2.86 L/SEC
FEFMAX-PRED: 5.94 L/SEC
FEV1-%PRED-PRE: 50 %
FEV1-PRE: 1.15 L
FEV1FEV6-PRE: 72 %
FEV1FEV6-PRED: 82 %
FEV1FVC-PRE: 72 %
FEV1FVC-PRED: 80 %
FEV1SVC-PRE: 68 %
FEV1SVC-PRED: 80 %
FIFMAX-PRE: 1.43 L/SEC
FRCPLETH-%PRED-PRE: 118 %
FRCPLETH-PRE: 2.93 L
FRCPLETH-PRED: 2.47 L
FVC-%PRED-PRE: 56 %
FVC-PRE: 1.61 L
FVC-PRED: 2.87 L
IC-%PRED-PRE: 59 %
IC-PRE: 1.6 L
IC-PRED: 2.67 L
RVPLETH-%PRED-PRE: 172 %
RVPLETH-PRE: 2.82 L
RVPLETH-PRED: 1.64 L
TLCPLETH-%PRED-PRE: 106 %
TLCPLETH-PRE: 4.53 L
TLCPLETH-PRED: 4.27 L
VA-%PRED-PRE: 74 %
VA-PRE: 3.03 L
VC-%PRED-PRE: 59 %
VC-PRE: 1.71 L
VC-PRED: 2.87 L

## 2022-08-11 ENCOUNTER — OFFICE VISIT (OUTPATIENT)
Dept: PULMONOLOGY | Facility: CLINIC | Age: 57
End: 2022-08-11
Payer: COMMERCIAL

## 2022-08-11 VITALS
DIASTOLIC BLOOD PRESSURE: 79 MMHG | HEART RATE: 85 BPM | BODY MASS INDEX: 42.35 KG/M2 | WEIGHT: 214.3 LBS | OXYGEN SATURATION: 97 % | SYSTOLIC BLOOD PRESSURE: 129 MMHG

## 2022-08-11 DIAGNOSIS — Z71.6 ENCOUNTER FOR SMOKING CESSATION COUNSELING: Primary | ICD-10-CM

## 2022-08-11 PROCEDURE — 99215 OFFICE O/P EST HI 40 MIN: CPT | Performed by: INTERNAL MEDICINE

## 2022-08-11 ASSESSMENT — PAIN SCALES - GENERAL: PAINLEVEL: NO PAIN (0)

## 2022-08-11 NOTE — PROGRESS NOTES
Pulmonary Clinic Return Patient Visit  Reason for Visit: COPD  History of Present Illness  Inga Ledesma  is a pleasant 56 yr old male with a history of  COPD and active smoker who presents to clinic for follow up for same.  Cody is a longtime smoker who continues to be active, was diagnosed with COPD about a decade ago.  She is currently on a regimen of nebulized Yuperli as well as high-dose Advair and as needed nebulized albuterol/ipratropium and rescue inhalers but still continues to remain very symptomatic.  When I saw her in clinic, I had emphasized smoking cessation for symptom control and had escalated her therapy with the addition of chronic azithromycin at 500 mg on Monday Wednesdays and Fridays.  Historically, she has had frequent AECOPD's and high dependence on prednisone and antibiotics frequently.  Doing better. SOB and cough have improved although she still remains very symptomatic. Now smokes 6 cigs per day. No AECOPDs since the last clinic visit. She is less reliant on her rescue inhalers  Still has dysphonia which is stable  Used to live in [place] with her son in a trailer but has recently moved into a new building and works for the [employer] and enjoys crocheting and watching television.  Mother has cancer otherwise no other history of chronic lung disease in the family.  No family history of lung cancer    Review of Systems:  10 of 14 systems reviewed and are negative unless otherwise stated in HPI.    Past Medical History:   Diagnosis Date     Acute pain of left shoulder 05/20/2021     LALA (acute kidney injury) (H) 05/28/2018     Alcohol abuse      Alcohol dependence with acute alcoholic intoxication (H) 11/19/2020     Alcohol withdrawal (H) 10/28/2017     Alcohol withdrawal seizure (H) 03/2016     Alcoholic hepatitis without ascites 12/05/2013     Cancer of labia majora (H) 1987     Cellulitis of right lower extremity 03/13/2021     Cervical dysplasia 1987     Congestive heart  failure (H) 05/16/2016     COPD (chronic obstructive pulmonary disease) (H)      COPD exacerbation (H) 09/04/2018     CVA (cerebral infarction) 01/2012     Dependent edema      Depression, major      Depressive disorder 1966     GERD (gastroesophageal reflux disease)      Hyperlipidemia LDL goal < 160      Hypertension      Iron deficiency anemia      Menorrhagia 05/10/2010     Pneumonia 07/10/2021     RLS (restless legs syndrome)      Sacroiliitis (H)     steroid injections ineffective, chronic low back pain     Sepsis (H) 06/18/2018     Tobacco abuse      Uncomplicated asthma      Vitamin D deficiencies        Past Surgical History:   Procedure Laterality Date     AS BIOPSY/EXCISION LYMPH NODE OPEN SUPERFICIAL       COLONOSCOPY       COLONOSCOPY N/A 4/13/2022    Procedure: COLONOSCOPY;  Surgeon: Mike Garcia MD;  Location:  GI     EYE SURGERY       HYSTERECTOMY  2010     HYSTERECTOMY TOTAL ABDOMINAL  7/28/10    Bilateral salpingectomy.  ovaries conserved.     LASER TX, CERVICAL  1987     LYMPH NODE BIOPSY  2007    inguinal     LYSIS OF LABIAL LESION(S)  1985, 1987       Family History   Problem Relation Age of Onset     Depression/Anxiety Mother      Asthma Mother      Cerebrovascular Disease Father      Hypertension Father      Lung Cancer Father      Alcoholism Father      Breast Cancer Paternal Aunt      Other Cancer Other      Depression Other      Anxiety Disorder Other      Mental Illness Other      Substance Abuse Other      Asthma Other      Obesity Sister      Obesity Son      Suicide Paternal Uncle        Social History     Socioeconomic History     Marital status:      Spouse name: Not on file     Number of children: 1     Years of education: 13     Highest education level: Not on file   Occupational History     Employer: [employer]   Tobacco Use     Smoking status: Current Some Day Smoker     Packs/day: 0.25     Years: 34.00     Pack years: 8.50     Types: Cigarettes     Start  date: 1979     Last attempt to quit: 10/3/2012     Years since quittin.3     Smokeless tobacco: Current User     Types: Chew     Tobacco comment: 5 cigarettes daily   Vaping Use     Vaping Use: Former     Quit date: 2022   Substance and Sexual Activity     Alcohol use: No     Comment: 1 pint of vodka per day, last drink aug 2018     Drug use: No     Sexual activity: Not Currently   Other Topics Concern     Parent/sibling w/ CABG, MI or angioplasty before 65F 55M? No   Social History Narrative    Lives in [place] with her son in a trailer no access to guns or weapons works for the [employer] and enjoys SiSenseting and watching television.     Social Determinants of Health     Financial Resource Strain: Not on file   Food Insecurity: Not on file   Transportation Needs: Not on file   Physical Activity: Not on file   Stress: Not on file   Social Connections: Not on file   Intimate Partner Violence: Not on file   Housing Stability: Not on file         Allergies   Allergen Reactions     Bee Venom Anaphylaxis     Reglan [Metoclopramide Hcl] Other (See Comments)     Body tenses up, Dystonia     Doxycycline Rash         Current Outpatient Medications:      acetaminophen (TYLENOL) 500 MG tablet, 1-2 tablets by mouth every 4-6 hours as needed for pain, max dose 4000 mg in 24 hours, Disp: 100 tablet, Rfl: 3     albuterol (VENTOLIN HFA) 108 (90 Base) MCG/ACT inhaler, INHALE ONE TO TWO PUFFS BY MOUTH EVERY 4 HOURS AS NEEDED FOR SHORTNESS OF BREATH, DIFFICULTY BREATHING OR WHEEZING., Disp: 54 g, Rfl: 3     amitriptyline (ELAVIL) 50 MG tablet, Take 2 tablets (100 mg) by mouth At Bedtime, Disp: 180 tablet, Rfl: 3     ARIPiprazole (ABILIFY) 5 MG tablet, Take 1 tablet (5 mg) by mouth At Bedtime, Disp: 90 tablet, Rfl: 3     aspirin (ASPIRIN LOW DOSE) 81 MG EC tablet, Take 1 tablet (81 mg) by mouth daily, Disp: 90 tablet, Rfl: 3     atorvastatin (LIPITOR) 10 MG tablet, Take 1 tablet (10 mg) by mouth At Bedtime,  Disp: 90 tablet, Rfl: 3     azithromycin (ZITHROMAX) 250 MG tablet, Take 2 tablets (500 mg) by mouth Every Mon, Wed, Fri Morning, Disp: 36 tablet, Rfl: 3     calcium carbonate 500 mg, elemental, (OSCAL) 500 MG tablet, Take 1 tablet (500 mg) by mouth daily, Disp: 90 tablet, Rfl: 3     cetirizine (ZYRTEC) 10 MG tablet, Take 1 tablet (10 mg) by mouth daily, Disp: 90 tablet, Rfl: 3     diclofenac (VOLTAREN) 75 MG EC tablet, Take 1 tablet (75 mg) by mouth 2 times daily (Patient not taking: No sig reported), Disp: 180 tablet, Rfl: 3     EPINEPHrine (ANY BX GENERIC EQUIV) 0.3 MG/0.3ML injection 2-pack, Inject 0.3 mLs (0.3 mg) into the muscle as needed (Patient not taking: Reported on 8/5/2022), Disp: 0.6 mL, Rfl: 2     famotidine (PEPCID) 40 MG tablet, Take 1 tablet (40 mg) by mouth daily At Bedtime, Disp: 90 tablet, Rfl: 3     ferrous sulfate (FEROSUL) 325 (65 Fe) MG tablet, Take 1 tablet (325 mg) by mouth 2 times daily, Disp: 180 tablet, Rfl: 3     fluconazole (DIFLUCAN) 100 MG tablet, Take 1 tablet (100 mg) by mouth daily as needed (for glossitis) (Patient not taking: No sig reported), Disp: 45 tablet, Rfl: 3     FLUoxetine (PROZAC) 40 MG capsule, TAKE 2 CAPSULES (80MG) BY MOUTH ONCE DAILY., Disp: 180 capsule, Rfl: 3     fluticasone (FLONASE) 50 MCG/ACT nasal spray, Spray 1 spray into both nostrils daily, Disp: 18.2 g, Rfl: 3     fluticasone-salmeterol (ADVAIR DISKUS) 500-50 MCG/DOSE inhaler, INHALE ONE PUFF BY MOUTH EVERY 12 HOURS, Disp: 3 each, Rfl: 3     folic acid (FOLVITE) 1 MG tablet, Take 1 tablet (1,000 mcg) by mouth daily, Disp: 90 tablet, Rfl: 3     furosemide (LASIX) 20 MG tablet, Take 1 tablet (20 mg) by mouth 2 times daily (Patient taking differently: Take 40 mg by mouth 2 times daily), Disp: 180 tablet, Rfl: 1     gabapentin (NEURONTIN) 600 MG tablet, TAKE 1 TABLET BY MOUTH THREE TIMES DAILY, Disp: 270 tablet, Rfl: 3     hydrALAZINE (APRESOLINE) 10 MG tablet, Take 1 tablet (10 mg) by mouth 3 times daily  TAKE 1 TABLET BY MOUTH THREE TIMES DAILY, Disp: 270 tablet, Rfl: 0     hydrALAZINE (APRESOLINE) 25 MG tablet, 1 tablet by Oral or Feeding Tube route 3 times daily, Disp: , Rfl:      hydrOXYzine (ATARAX) 25 MG tablet, TAKE 1 TABLET BY MOUTH THREE TIMES DAILY AS NEEDED FOR ITCHING, Disp: 90 tablet, Rfl: 10     ibuprofen (IBU) 600 MG tablet, TAKE 1 TABLET BY MOUTH THREE TIMES DAILY AS NEEDED FOR PAIN (Patient not taking: No sig reported), Disp: 90 tablet, Rfl: 3     Incontinence Supply Disposable (DEPEND FIT-FLEX-WOMEN-M) MISC, 1 each At Bedtime, Disp: 90 each, Rfl: 3     ipratropium - albuterol 0.5 mg/2.5 mg/3 mL (DUONEB) 0.5-2.5 (3) MG/3ML neb solution, INHALE ONE VIAL BY NEBULIZATION FOUR TIMES DAILY AS NEEDED FOR WHEEZING, Disp: 360 mL, Rfl: 3     labetalol (NORMODYNE) 100 MG tablet, TAKE 1 TABLET BY MOUTH TWICE DAILY., Disp: 180 tablet, Rfl: 3     magnesium oxide (MAG-OX) 400 MG tablet, Take 1 tablet (400 mg) by mouth daily, Disp: 90 tablet, Rfl: 3     metroNIDAZOLE (FLAGYL) 500 MG tablet, Take 1 tablet (500 mg) by mouth 2 times daily for 7 days, Disp: 14 tablet, Rfl: 0     omeprazole (PRILOSEC) 20 MG DR capsule, Take 1 capsule twice a day before meals, Disp: 180 capsule, Rfl: 3     oxyCODONE-acetaminophen (PERCOCET) 5-325 MG tablet, as needed (Patient not taking: No sig reported), Disp: , Rfl:      potassium chloride ER (KLOR-CON M) 10 MEQ CR tablet, Take 1 tablet (10 mEq) by mouth daily, Disp: 90 tablet, Rfl: 3     primidone (MYSOLINE) 50 MG tablet, Take 0.5 tablets (25 mg) by mouth daily (Patient not taking: No sig reported), Disp: 90 tablet, Rfl: 0     QUEtiapine (SEROQUEL) 200 MG tablet, Take 1 tablet (200 mg) by mouth At Bedtime At bedtime, Disp: 90 tablet, Rfl: 1     Revefenacin (YUPELRI) 175 MCG/3ML SOLN, Inhale 3 mLs (175 mcg) into the lungs daily Nebulize and inhale 1 vial (3 ml) by mouth and into lungs once daily, Disp: 270 mL, Rfl: 3     rOPINIRole (REQUIP) 1 MG tablet, TAKE 1 TABLET BY MOUTH THREE  TIMES DAILY, Disp: 270 tablet, Rfl: 3     vitamin D3 (VITAMIN D3) 50 mcg (2000 units) tablet, Take 1 tablet (50 mcg) by mouth daily, Disp: 90 tablet, Rfl: 3      Physical Exam:  [facility] 06/02/2010   GENERAL: Well developed, well nourished, alert, and in no apparent distress.  HEENT: Normocephalic, atraumatic. PERRL, EOMI. Oral mucosa is moist. No perioral cyanosis.  NECK: supple, no masses, no thyromegaly.  RESP:  Normal respiratory effort.  CTAB.  No rales, wheezes, rhonchi.  No cyanosis or clubbing.  CV: Normal S1, S2, regular rhythm, normal rate. No murmur.  No LE edema.   ABDOMEN:  Soft, non-tender, non-distended.   SKIN: warm and dry. No rash.  NEURO: AAOx3.  Normal gait.  Fluent speech.  PSYCH: mentation appears normal.       Results:  PFTs: Interval improvement with lung function.  Most Recent [facility] Pulmonary Function Testing    FVC-Pred   Date Value Ref Range Status   08/04/2022 2.87 L      FVC-Pre   Date Value Ref Range Status   08/04/2022 1.61 L      FVC-%Pred-Pre   Date Value Ref Range Status   08/04/2022 56 %      FEV1-Pre   Date Value Ref Range Status   08/04/2022 1.15 L      FEV1-%Pred-Pre   Date Value Ref Range Status   08/04/2022 50 %      FEV1FVC-Pred   Date Value Ref Range Status   08/04/2022 80 %      FEV1FVC-Pre   Date Value Ref Range Status   08/04/2022 72 %      No results found for: 20029  FEFMax-Pred   Date Value Ref Range Status   08/04/2022 5.94 L/sec      FEFMax-Pre   Date Value Ref Range Status   08/04/2022 2.86 L/sec      FEFMax-%Pred-Pre   Date Value Ref Range Status   08/04/2022 48 %      ExpTime-Pre   Date Value Ref Range Status   08/04/2022 8.08 sec      FIFMax-Pre   Date Value Ref Range Status   08/04/2022 1.43 L/sec      FEV1FEV6-Pred   Date Value Ref Range Status   08/04/2022 82 %      FEV1FEV6-Pre   Date Value Ref Range Status   08/04/2022 72 %      No results found for: 20055  Imaging (personally reviewed in clinic today): CT Chest 09/01/2021  Impression:   1. ACR Assessment  Category (v1.1):  Lung-RADS Category 2. Benign  appearance or behavior.     Recommendation:  Lung-RADS Category 2. Benign appearance or behavior.  Recommendation:  continue annual screening with Lung cancer screening  CT (please order exam code [id number]).    2. Significant Incidental Finding(s):  Category S: Yes.  a.  Multifocal, upper lobe predominant groundglass centrilobular  nodules , consistent with respiratory bronchiolitis or infection.  b. Mild compression deformity of T5 vertebral body.  3. Any moderate or severe Emphysema or bronchial wall thickening or  mosaic attenuation? No   4. Avoidance of tobacco smoke is strongly advised. Please consider  referral for smoking cessation to [facility] Medication Therapy Management  (MTM) if clinically appropriate.    Echocardiogram 06/18/2018  Interpretation Summary  Technically difficult study.Extremely poor acoustic windows.  Global and regional left ventricular function is normal with an EF of 60-65%.  No regional wall motion abnormalities are seen.  Right ventricular function, chamber size, wall motion, and thickness are  normal.  The inferior vena cava is normal.  No pericardial effusion is present.  _____________________________________________________________________________  Assessment and Plan:   COPD (Group D)/Respiratory bronchiolitis  Interval improvement in lung function.  Still very symptomatic with CAT score of 31 despite being on maximum inhaler/nebulizer therapy with Yuperli nebs in addition to high-dose Advair and as needed nebulized albuterol/ipratropium and azithromycin 500 mg on MWF.  Unfortunately, she continues to smoke.  She smokes about 6 cigs/day.  Long conversation about smoking cessation as it pertains to decreased mortality, improved symptoms and preservation of lung function.  She has tried Chantix and was intolerant and doesn't appear to be ready to quit at this time.  Active Smoker  Smoking cessation as above and I spent 10 minutes.   This  [facility] fulfills all insurance requirements, including:   Lung Cancer Screening Shared Decision Making Visit     Inga Ledesma is eligible for lung cancer screening on the basis of:   has not not experienced symptoms suggestive of lung cancer.   born on 1965, 56 year old years old.   smoked 1 packs of cigarettes for 30 years for a total of 30 pack-years   has not quit smoking   I have discussed with patient the risks and benefits of screening for lung cancer with low-dose CT.     The risks include:   radiation exposure    false positives     over-diagnosis    The benefit of early detection of lung cancer is contingent upon adherence to annual screening or more frequent follow up if indicated.     Furthermore, reaping the benefits of screening requires Inga Ledesma to be willing and able to undergo diagnostic procedures, if indicated.     We did discuss that the only way to prevent lung cancer is to not smoke. Smoking cessation assistance was offered.    Questions and concerns were answered to the patient's satisfaction.  she was provided with my contact information should new questions or concerns arise in the interim.  She should return to clinic in 6 months  Up to date on vaccinations.  I spent a total of 40 minutes face to face with Inga Ledesma during today's office visit. Over 50% of this time was spent counseling the patient and/or coordinating care regarding their pulmonary disease.    Kristi Webb MD  Pulmonary, Critical Care and Sleep Medicine  [facility]  Pager: [phone]        The above note was dictated using voice recognition software and may include typographical errors. Please contact the author for any clarifications.

## 2022-08-11 NOTE — NURSING NOTE
Inga Ledesma's goals for this visit include: NONE    She requests these members of her care team be copied on today's visit information: YES    PCP: Min [place]    Referring Provider:  No referring provider defined for this encounter.    /79 (BP Location: Left arm, Patient Position: Sitting, Cuff Size: Adult Large)   Pulse 85   Wt 97.2 kg (214 lb 4.8 oz)   LMP 06/02/2010   SpO2 97%   BMI 42.35 kg/m      Do you need any medication refills at today's visit? None    Cooper Hidalgo EMT

## 2022-08-15 ENCOUNTER — TELEPHONE (OUTPATIENT)
Dept: FAMILY MEDICINE | Facility: CLINIC | Age: 57
End: 2022-08-15

## 2022-08-15 DIAGNOSIS — T78.2XXD ANAPHYLAXIS, SUBSEQUENT ENCOUNTER: ICD-10-CM

## 2022-08-15 DIAGNOSIS — M54.2 CERVICALGIA: ICD-10-CM

## 2022-08-15 NOTE — TELEPHONE ENCOUNTER
Routing refill request to provider for review/approval because:  Drug not active on patient's medication list    Nasrin Leroy RN BSN  [facility]

## 2022-08-15 NOTE — TELEPHONE ENCOUNTER
Reason for Call:  Other     Detailed comments: Patient is requesting a refill on an epi pen.    Phone Number Patient can be reached at: Home number on file [phone] (home)    Best Time:     Can we leave a detailed message on this number? YES    Call taken on 8/15/2022 at 3:43 PM by Marquita Garcia

## 2022-08-17 NOTE — TELEPHONE ENCOUNTER
Routing refill request to provider for review/approval because:  Failed protocol.      Hanna Cárdenas RN

## 2022-08-18 RX ORDER — EPINEPHRINE 0.3 MG/.3ML
0.3 INJECTION SUBCUTANEOUS ONCE
Qty: 0.6 ML | Refills: 1 | Status: SHIPPED | OUTPATIENT
Start: 2022-08-18 | End: 2022-09-14

## 2022-08-19 RX ORDER — IBUPROFEN 600 MG/1
TABLET, FILM COATED ORAL
Qty: 90 TABLET | Refills: 3 | Status: SHIPPED | OUTPATIENT
Start: 2022-08-19 | End: 2022-12-26

## 2022-09-01 ENCOUNTER — TELEPHONE (OUTPATIENT)
Dept: FAMILY MEDICINE | Facility: CLINIC | Age: 57
End: 2022-09-01

## 2022-09-01 DIAGNOSIS — J44.1 CHRONIC OBSTRUCTIVE PULMONARY DISEASE WITH ACUTE EXACERBATION (H): Primary | ICD-10-CM

## 2022-09-01 NOTE — TELEPHONE ENCOUNTER
DME pended for completion and approval.  Mira Wright RN  [facility]     PLEASE CALL PT ASAP [phone] REGARDING WHAT DOSAGE OF PT'S BLOOD THINNER HE SHOULD TAKE-MKDICK

## 2022-09-01 NOTE — TELEPHONE ENCOUNTER
Reason for Call:  Other     Detailed comments: Patient needs an order for a new nebulizer machine. Please mail paper copy to patient.    Phone Number Patient can be reached at: Home number on file [phone] (home)    Best Time:     Can we leave a detailed message on this number? YES    Call taken on 9/1/2022 at 11:55 AM by Marquita Garcia

## 2022-09-05 DIAGNOSIS — T78.2XXD ANAPHYLAXIS, SUBSEQUENT ENCOUNTER: ICD-10-CM

## 2022-09-07 ENCOUNTER — ANCILLARY PROCEDURE (OUTPATIENT)
Dept: CT IMAGING | Facility: CLINIC | Age: 57
End: 2022-09-07
Payer: COMMERCIAL

## 2022-09-07 DIAGNOSIS — Z71.6 ENCOUNTER FOR SMOKING CESSATION COUNSELING: ICD-10-CM

## 2022-09-07 PROCEDURE — 71271 CT THORAX LUNG CANCER SCR C-: CPT | Mod: TC | Performed by: RADIOLOGY

## 2022-09-07 RX ORDER — EPINEPHRINE 0.3 MG/.3ML
INJECTION SUBCUTANEOUS
Qty: 2 EACH | Refills: 10 | OUTPATIENT
Start: 2022-09-07

## 2022-09-08 NOTE — PROGRESS NOTES
"Inga Ledesma is a 55 year old female who is being evaluated via a billable telephone visit.      The patient has been notified of following:     \"This telephone visit will be conducted via a call between you and your physician/provider. We have found that certain health care needs can be provided without the need for a physical exam.  This service lets us provide the care you need with a short phone conversation.  If a prescription is necessary we can send it directly to your pharmacy.  If lab work is needed we can place an order for that and you can then stop by our lab to have the test done at a later time.    Telephone visits are billed at different rates depending on your insurance coverage. During this emergency period, for some insurers they may be billed the same as an in-person visit.  Please reach out to your insurance provider with any questions.    If during the course of the call the physician/provider feels a telephone visit is not appropriate, you will not be charged for this service.\"    Patient has given verbal consent for Telephone visit?  Yes    What phone number would you like to be contacted at?  - Janie [phone]    How would you like to obtain your AVS? Marcia Mchugh     Inga Ledesma is a 55 year old female who presents via phone visit today for the following health issues:    [facility]       Hospital Follow-up Visit:    Hospital/Nursing Home/IP Rehab Facility: [facility]  Date of Admission: 11/19/20  Date of Discharge: 12/6/20, but is now in long term treatment  Reason(s) for Admission: Alcohol Abuse      Was your hospitalization related to COVID-19? No   Problems taking medications regularly:  None  Medication changes since discharge: None  Problems adhering to non-medication therapy:  None    Currently attending long term treatment for alcoholism at [facility].   sober x 1 mos . Program is 7-9 months.   Her house has been condemned.  Doing well overall. "   Her tongue is raw. Some white bumps on her tongue . They come and go.   No new meds or antibiotic. Steroids were used 2 wks ago , just prior to her tongue issue evolving.   No abd pain . No jaundice or ascites. No chest pain/sob/palps            Tongue   - 2 weeks  -pain and white spots on sides    Review of Systems   Constitutional, HEENT, cardiovascular, pulmonary, GI, , musculoskeletal, neuro, skin, endocrine and psych systems are negative, except as otherwise noted.       Objective          Vitals:  No vitals were obtained today due to virtual visit.    healthy, alert and no distress  PSYCH: Alert and oriented times 3; coherent speech, normal   rate and volume, able to articulate logical thoughts, able   to abstract reason, no tangential thoughts, no hallucinations   or delusions  Her affect is normal  RESP: No cough, no audible wheezing, able to talk in full sentences  Remainder of exam unable to be completed due to telephone visits    Inga was seen today for hospital f/u and pain.    Diagnoses and all orders for this visit:    Thrush  -     fluconazole (DIFLUCAN) 100 MG tablet; Take 1 tablet (100 mg) by mouth daily for 15 days    Encounter for smoking cessation counseling  -     nicotine (NICOTROL) 10 MG inhaler; INHALE 6 TO  16 CARTRIDGES INTO THE LUNGS DAILY AS NEEDED FOR SMOKING CESSATION    Glossitis  -     fluconazole (DIFLUCAN) 100 MG tablet; Take 1 tablet (100 mg) by mouth daily for 15 days    Fatigue, unspecified type    Alcohol dependence with unspecified alcohol-induced disorder (H)  -     vitamin B complex with vitamin C (STRESS TAB) tablet; Take 1 tablet by mouth daily    Chronic bilateral low back pain without sciatica  -     menthol (ICY HOT) 5 % PTCH; Apply 1 patch topically every 8 hours as needed for muscle soreness      Advised supportive and symptomatic treatment.  Follow up with Provider - if condition persists or worsens.   work on lifestyle modification              Phone call  132 duration:  16 minutes

## 2022-09-11 DIAGNOSIS — R60.0 BILATERAL LEG EDEMA: ICD-10-CM

## 2022-09-11 DIAGNOSIS — I10 ESSENTIAL HYPERTENSION WITH GOAL BLOOD PRESSURE LESS THAN 140/90: ICD-10-CM

## 2022-09-12 RX ORDER — FUROSEMIDE 40 MG
TABLET ORAL
Qty: 60 TABLET | Refills: 10 | OUTPATIENT
Start: 2022-09-12

## 2022-09-12 RX ORDER — HYDRALAZINE HYDROCHLORIDE 10 MG/1
10 TABLET, FILM COATED ORAL 3 TIMES DAILY
Qty: 270 TABLET | Refills: 0 | Status: SHIPPED | OUTPATIENT
Start: 2022-09-12 | End: 2022-12-23

## 2022-09-14 DIAGNOSIS — Z71.6 ENCOUNTER FOR SMOKING CESSATION COUNSELING: Primary | ICD-10-CM

## 2022-09-16 ENCOUNTER — OFFICE VISIT (OUTPATIENT)
Dept: OBGYN | Facility: CLINIC | Age: 57
End: 2022-09-16
Payer: COMMERCIAL

## 2022-09-16 VITALS
OXYGEN SATURATION: 96 % | DIASTOLIC BLOOD PRESSURE: 82 MMHG | WEIGHT: 222.6 LBS | HEART RATE: 85 BPM | BODY MASS INDEX: 43.99 KG/M2 | SYSTOLIC BLOOD PRESSURE: 137 MMHG

## 2022-09-16 DIAGNOSIS — R10.32 LEFT GROIN PAIN: ICD-10-CM

## 2022-09-16 DIAGNOSIS — I89.0 ACQUIRED LYMPHEDEMA OF LOWER EXTREMITY: ICD-10-CM

## 2022-09-16 DIAGNOSIS — L29.9 ITCHING: Primary | ICD-10-CM

## 2022-09-16 LAB
CLUE CELLS: ABNORMAL
TRICHOMONAS, WET PREP: ABNORMAL
WBC'S/HIGH POWER FIELD, WET PREP: ABNORMAL
YEAST, WET PREP: ABNORMAL

## 2022-09-16 PROCEDURE — 87210 SMEAR WET MOUNT SALINE/INK: CPT | Performed by: OBSTETRICS & GYNECOLOGY

## 2022-09-16 PROCEDURE — 99214 OFFICE O/P EST MOD 30 MIN: CPT | Performed by: OBSTETRICS & GYNECOLOGY

## 2022-09-16 NOTE — PATIENT INSTRUCTIONS
To Schedule an Appointment 24/7  Call: [phone]    If you have any questions regarding your visit, Please contact your care team.  [facility]: [phone]  [facility] HOURS TELEPHONE NUMBER   Cephas Agbeh, M.D.      T[facility] Amanda-Surgery Scheduler  [facility]-Surgery Scheduler         Monday - [place]:    8:00 am-4:45 pm  Tuesday - [place]:   8:00 am-4:45 pm  Friday-[place]:       8:00 am-4:45 pm  Typical Surgery Day:  Wednesday [facility]   [address]   [place] [address] [phone]   Fax [phone]    Imaging Scheduling all locations  [phone]      [facility]   [address] [place] [address] [address].   [place], [place] [address] [phone]    [facility]  [address] [facility] [address]  [phone]  Fax [phone]     Urgent Care locations:  [facility] Monday-Friday                               10 am - 8 pm  Saturday and Sunday                      9 am - 5 pm  Monday-Friday                              10 am- 8 pm  Saturday and Sunday                      9 am - 5 pm    [phone]   **Surgeries** Our Surgery Schedulers will contact you to schedule. If you do not receive a call within 3 business days, please call [phone].  If you need a medication refill, please contact your pharmacy. Please allow 3 business days for your refill to be completed.  As always, Thank you for trusting us with your healthcare needs!  see additional instructions from your care team below

## 2022-09-27 ENCOUNTER — OFFICE VISIT (OUTPATIENT)
Dept: FAMILY MEDICINE | Facility: CLINIC | Age: 57
End: 2022-09-27
Payer: COMMERCIAL

## 2022-09-27 VITALS
TEMPERATURE: 97.7 F | SYSTOLIC BLOOD PRESSURE: 154 MMHG | RESPIRATION RATE: 24 BRPM | DIASTOLIC BLOOD PRESSURE: 97 MMHG | OXYGEN SATURATION: 97 % | BODY MASS INDEX: 43.39 KG/M2 | WEIGHT: 221 LBS | HEIGHT: 60 IN | HEART RATE: 78 BPM

## 2022-09-27 DIAGNOSIS — J42 CHRONIC BRONCHITIS, UNSPECIFIED CHRONIC BRONCHITIS TYPE (H): ICD-10-CM

## 2022-09-27 DIAGNOSIS — F33.0 MAJOR DEPRESSIVE DISORDER, RECURRENT EPISODE, MILD (H): ICD-10-CM

## 2022-09-27 DIAGNOSIS — M05.80 POLYARTHRITIS WITH POSITIVE RHEUMATOID FACTOR (H): ICD-10-CM

## 2022-09-27 DIAGNOSIS — R60.0 BILATERAL LEG EDEMA: ICD-10-CM

## 2022-09-27 DIAGNOSIS — Z23 HIGH PRIORITY FOR 2019-NCOV VACCINE: ICD-10-CM

## 2022-09-27 DIAGNOSIS — Z79.899 ENCOUNTER FOR LONG-TERM (CURRENT) USE OF MEDICATIONS: ICD-10-CM

## 2022-09-27 DIAGNOSIS — I50.32 CHRONIC HEART FAILURE WITH PRESERVED EJECTION FRACTION (H): ICD-10-CM

## 2022-09-27 DIAGNOSIS — E66.812 CLASS 2 SEVERE OBESITY DUE TO EXCESS CALORIES WITH SERIOUS COMORBIDITY AND BODY MASS INDEX (BMI) OF 38.0 TO 38.9 IN ADULT (H): ICD-10-CM

## 2022-09-27 DIAGNOSIS — F10.230 ALCOHOL DEPENDENCE WITH UNCOMPLICATED WITHDRAWAL (H): ICD-10-CM

## 2022-09-27 DIAGNOSIS — E66.01 CLASS 2 SEVERE OBESITY DUE TO EXCESS CALORIES WITH SERIOUS COMORBIDITY AND BODY MASS INDEX (BMI) OF 38.0 TO 38.9 IN ADULT (H): ICD-10-CM

## 2022-09-27 DIAGNOSIS — L03.032 CELLULITIS OF TOE OF LEFT FOOT: Primary | ICD-10-CM

## 2022-09-27 DIAGNOSIS — Z13.220 SCREENING FOR HYPERLIPIDEMIA: ICD-10-CM

## 2022-09-27 DIAGNOSIS — Z13.1 SCREENING FOR DIABETES MELLITUS: ICD-10-CM

## 2022-09-27 DIAGNOSIS — E87.6 HYPOKALEMIA: ICD-10-CM

## 2022-09-27 LAB
FASTING STATUS PATIENT QL REPORTED: YES
GLUCOSE BLD-MCNC: 96 MG/DL (ref 70–99)
POTASSIUM BLD-SCNC: 3.6 MMOL/L (ref 3.4–5.3)

## 2022-09-27 PROCEDURE — 0124A COVID-19,PF,PFIZER BOOSTER BIVALENT: CPT | Performed by: STUDENT IN AN ORGANIZED HEALTH CARE EDUCATION/TRAINING PROGRAM

## 2022-09-27 PROCEDURE — 82947 ASSAY GLUCOSE BLOOD QUANT: CPT | Performed by: STUDENT IN AN ORGANIZED HEALTH CARE EDUCATION/TRAINING PROGRAM

## 2022-09-27 PROCEDURE — 99214 OFFICE O/P EST MOD 30 MIN: CPT | Mod: 25 | Performed by: STUDENT IN AN ORGANIZED HEALTH CARE EDUCATION/TRAINING PROGRAM

## 2022-09-27 PROCEDURE — 84132 ASSAY OF SERUM POTASSIUM: CPT | Performed by: STUDENT IN AN ORGANIZED HEALTH CARE EDUCATION/TRAINING PROGRAM

## 2022-09-27 PROCEDURE — 91312 COVID-19,PF,PFIZER BOOSTER BIVALENT: CPT | Performed by: STUDENT IN AN ORGANIZED HEALTH CARE EDUCATION/TRAINING PROGRAM

## 2022-09-27 PROCEDURE — 36415 COLL VENOUS BLD VENIPUNCTURE: CPT | Performed by: STUDENT IN AN ORGANIZED HEALTH CARE EDUCATION/TRAINING PROGRAM

## 2022-09-27 RX ORDER — CEPHALEXIN 500 MG/1
500 CAPSULE ORAL
COMMUNITY
Start: 2022-09-18 | End: 2022-12-26

## 2022-09-27 RX ORDER — DICLOFENAC SODIUM 75 MG/1
75 TABLET, DELAYED RELEASE ORAL 2 TIMES DAILY
Qty: 10 TABLET | Refills: 0 | Status: SHIPPED | OUTPATIENT
Start: 2022-09-27 | End: 2022-12-26

## 2022-09-27 ASSESSMENT — PATIENT HEALTH QUESTIONNAIRE - PHQ9
10. IF YOU CHECKED OFF ANY PROBLEMS, HOW DIFFICULT HAVE THESE PROBLEMS MADE IT FOR YOU TO DO YOUR WORK, TAKE CARE OF THINGS AT HOME, OR GET ALONG WITH OTHER PEOPLE: SOMEWHAT DIFFICULT
SUM OF ALL RESPONSES TO PHQ QUESTIONS 1-9: 7
SUM OF ALL RESPONSES TO PHQ QUESTIONS 1-9: 7

## 2022-09-27 ASSESSMENT — PAIN SCALES - GENERAL: PAINLEVEL: EXTREME PAIN (9)

## 2022-09-27 NOTE — PATIENT INSTRUCTIONS
Christiano Ross,    Thank you for allowing [facility] to manage your care.    I ordered some blood work, please go to the laboratory to get your laboratory studies.      I sent your prescriptions to your pharmacy.    I sent diclofenac to the pharmacy. Do not take it with any other NSAIDs    For your convenience, test results are released as soon as they are available  Please allow 1-2 business days for me to send you a comment about your results.  If not done so, I encourage you to login into [employer] [facility] ([email or web address] to review your results in real time.     If you have any questions or concerns, please feel free to call us at [phone].    Sincerely,    Dr. Brunner    Did you know?      You can schedule a video visit for follow-up appointments as well as future appointments for certain conditions.  Please see the below link.     [email or web address]    If you have not already done so,  I encourage you to sign up for [employer] ([email or web address]).  This will allow you to review your results, securely communicate with a provider, and schedule virtual visits as well.

## 2022-09-27 NOTE — PROGRESS NOTES
Assessment & Plan    Patient was admitted for LLE cellulitis. She was treated with Ancef an discharged on Keflex.  1. Cellulitis of toe of left foot    - diclofenac (VOLTAREN) 75 MG EC tablet; Take 1 tablet (75 mg) by mouth 2 times daily for 5 days As needed for pain  Dispense: 10 tablet; Refill: 0    2. Hypokalemia    - Potassium; Future  - Potassium    3. Chronic bronchitis, unspecified chronic bronchitis type (H)  Continue inhaler. She is sob at baseline  4. Bilateral leg edema  Back to baseline  5. Screening for hyperlipidemia    6. Polyarthritis with positive rheumatoid factor (H)    7. Chronic heart failure with preserved ejection fraction (H)  Stable on lasix    8. Alcohol dependence with uncomplicated withdrawal (H)  She has been sober since 2018  9. Major depressive disorder, recurrent episode, mild (H)  stable on SSRI    10. Class 2 severe obesity due to excess calories with serious comorbidity and body mass index (BMI) of 38.0 to 38.9 in adult (H)      11. Screening for diabetes mellitus    - Glucose; Future  - Glucose    12. High priority for 2019-nCoV vaccine    - COVID-19,PF,PFIZER BOOSTER BIVALENT 12+Yrs  91994}     Return in about 3 weeks (around 10/18/2022) for Follow up, in person.    Amina Brunner MD  [facility] MICHAEL Ross is a 57 year old, presenting for the following health issues:  Hospital F/U and Imm/Inj (COVID-19 VACCINE)      HPI       Hospital Follow-up Visit:    Hospital/Nursing Home/IP Rehab Facility: [facility]   Date of Admission: 9/18/2022  Date of Discharge: 9/22/2022  Reason(s) for Admission: Cellulitis     Was your hospitalization related to COVID-19? No   Problems taking medications regularly:  None  Medication changes since discharge:     NEW MEDICATIONS   Cephalexin (KEFLEX) 500 mg oral capsule Take 1 capsule (500 mg) by mouth four times a day.  Qty: 14 capsule, Refills: 0     Problems adhering to non-medication therapy:  " None    Summary of hospitalization:  CareEverywhere information obtained and reviewed  Diagnostic Tests/Treatments reviewed.  Follow up needed: none  Other Healthcare Providers Involved in Patient s Care:         None  Update since discharge: improved.  Patient is requesting for stronger pain medication as tylenol is not helping her pain.  Post Medication Reconciliation Status: Discharge medications reconciled, continue medications without change      Plan of care communicated with patient       Review of Systems   Constitutional, HEENT, cardiovascular, pulmonary, GI, , musculoskeletal, neuro, skin, endocrine and psych systems are negative, except as otherwise noted.      Objective    BP (!) 154/97   Pulse 78   Temp 97.7  F (36.5  C) (Oral)   Resp 24   Ht 1.515 m (4' 11.65\")   Wt 100.2 kg (221 lb)   LMP 06/02/2010   SpO2 97%   BMI 43.67 kg/m    Body mass index is 43.67 kg/m .  Physical Exam   GENERAL: healthy, alert and no distress  RESP: - no rales, there is mild rhonchi or no wheezes  CV: regular rate and rhythm, normal S1 S2, no S3 or S4,   MS: no gross musculoskeletal defects noted,  Skin:ertyhema of the left foot has resolved.            "

## 2022-09-30 ENCOUNTER — ANCILLARY PROCEDURE (OUTPATIENT)
Dept: CT IMAGING | Facility: CLINIC | Age: 57
End: 2022-09-30
Attending: OBSTETRICS & GYNECOLOGY
Payer: COMMERCIAL

## 2022-09-30 DIAGNOSIS — I89.0 ACQUIRED LYMPHEDEMA OF LOWER EXTREMITY: ICD-10-CM

## 2022-09-30 DIAGNOSIS — R10.32 LEFT GROIN PAIN: ICD-10-CM

## 2022-09-30 PROCEDURE — 74177 CT ABD & PELVIS W/CONTRAST: CPT | Mod: TC | Performed by: RADIOLOGY

## 2022-09-30 RX ORDER — IOPAMIDOL 755 MG/ML
122 INJECTION, SOLUTION INTRAVASCULAR ONCE
Status: COMPLETED | OUTPATIENT
Start: 2022-09-30 | End: 2022-09-30

## 2022-09-30 RX ADMIN — IOPAMIDOL 122 ML: 755 INJECTION, SOLUTION INTRAVASCULAR at 13:13

## 2022-10-04 NOTE — PROGRESS NOTES
[facility]    Outpatient Physical Therapy Discharge Note  Patient: Inga Ledesma  : 1965    Beginning/End Dates of Reporting Period:  2022 to 22    Referring Provider: Min Toledo PA-C    Therapy Diagnosis: lymphedema B LE     Client Self Report: Pt arriving after 4 week trial of self maintenance at home with new compression. Pt reports recent ER visit with new cellulitis of the LLE currently on antibiotics.    Objective Measurements:  Objective Measure: R Volume 10-60cm  Details: 7312.24 mL +7.6%  Objective Measure: L Volume 10-60cm  Details: 6745.7 mL +11%                              Outcome Measures (most recent score):  Lymphedema Life Impact Scale (score range 0-72). A higher score indicates greater impairment.: 38    Goals:  Goal Identifier Home Program   Goal Description Pt will demonstrate independent completion of targeted HEP of self massage, exercise and compression to improve and maintain BLE lymphedema.   Target Date 22   Date Met      Progress (detail required for progress note): Pt has struggled with compliance due to recurrent infection. Limited use of compression garments and self massage since last visit     Goal Identifier Volume   Goal Description Pt will have a 5% reduction to RLE volume 10-60 cm to improve fit of clothing and shoes   Target Date 22   Date Met      Progress (detail required for progress note): Pt up 7 and 11% respectfully today     Goal Identifier Compression   Goal Description Pt will be independent with use of compression to prevent exacerbation of lymphedema for better fit of clothing   Target Date 22   Date Met      Progress (detail required for progress note): Pt with intermittant use of compression due to infections causing pain and limiting garment use       Plan:  Discharge from therapy. Pt failed to schedule further visits,  with current status unknown at time of discharge.     Discharge:    Reason for Discharge: Patient has failed to schedule further appointments.    Equipment Issued: Compression recommendations, HEP, Pump referral    Discharge Plan: Patient to continue home program.

## 2022-10-10 DIAGNOSIS — R59.9 ENLARGED LYMPH NODES: Primary | ICD-10-CM

## 2022-10-20 ENCOUNTER — TELEPHONE (OUTPATIENT)
Dept: FAMILY MEDICINE | Facility: CLINIC | Age: 57
End: 2022-10-20

## 2022-10-20 DIAGNOSIS — J44.1 CHRONIC OBSTRUCTIVE PULMONARY DISEASE WITH ACUTE EXACERBATION (H): Primary | Chronic | ICD-10-CM

## 2022-10-20 DIAGNOSIS — J44.1 COPD EXACERBATION (H): ICD-10-CM

## 2022-10-20 RX ORDER — PREDNISONE 10 MG/1
TABLET ORAL
Qty: 42 TABLET | Refills: 0 | Status: CANCELLED | OUTPATIENT
Start: 2022-10-20

## 2022-10-20 NOTE — TELEPHONE ENCOUNTER
"Patient calling to request prednisone taper prescribed by  Min Toledo in the past for COPD flare ups; symptoms include wheezing more than usual and coughing up yellow mucous.      Main concern is the increased wheezing, talking okay.  She has chest tightness \"here and there\" but not concerned in regards to chest discomfort.    Patient has virtual appointment next week but can't wait that long due to wheezing.    If you can fill prednisone taper (prescribed last 1/2022) by tomorrow it is okay to send to Pharmacy stephane'priscilla below; otherwise need to send to [place] [facility] pharmacy if filled 10/22 or later.    Informed to go to urgent care or emergency room if breathing becomes difficult or symptoms mentioned  above become severe.      Will route to Min Toledo.  Stefanie up Rx below.  Janice Bruce RN    "

## 2022-10-21 RX ORDER — PREDNISONE 20 MG/1
TABLET ORAL
Qty: 20 TABLET | Refills: 0 | Status: SHIPPED | OUTPATIENT
Start: 2022-10-21 | End: 2022-11-02

## 2022-10-21 RX ORDER — PREDNISONE 20 MG/1
TABLET ORAL
Qty: 20 TABLET | Refills: 0 | Status: SHIPPED | OUTPATIENT
Start: 2022-10-21 | End: 2022-10-21

## 2022-10-21 NOTE — TELEPHONE ENCOUNTER
Covering provider.  Rx sent  (please go over medication instruction with patient).  Agree that patient needs to be evaluated in ER if symptoms get worse.     1. Chronic obstructive pulmonary disease with acute exacerbation (H)  - predniSONE (DELTASONE) 20 MG tablet; Take 3 tabs by mouth daily x 3 days, then 2 tabs daily x 3 days, then 1 tab daily x 3 days, then 1/2 tab daily x 3 days.  Dispense: 20 tablet; Refill: 0

## 2022-10-21 NOTE — TELEPHONE ENCOUNTER
I called and spoke to patient, advised her of Rx sent.   She would prefer it be sent to mail order now as the [facility] pharmacy will be closing at 5 pm so cannot get anyone here in time.    I re-sent the Rx to mail order as requested now.    She has prednisone at home.   Carefully reviewed directions/dosing.    Patient verbalized understanding of and agreement with plan.    Belen Pringle RN  [facility]

## 2022-10-24 ENCOUNTER — VIRTUAL VISIT (OUTPATIENT)
Dept: FAMILY MEDICINE | Facility: CLINIC | Age: 57
End: 2022-10-24
Payer: COMMERCIAL

## 2022-10-24 DIAGNOSIS — E66.9 OBESITY WITH SERIOUS COMORBIDITY, UNSPECIFIED CLASSIFICATION, UNSPECIFIED OBESITY TYPE: Primary | ICD-10-CM

## 2022-10-24 DIAGNOSIS — J44.1 COPD EXACERBATION (H): ICD-10-CM

## 2022-10-24 PROCEDURE — 99213 OFFICE O/P EST LOW 20 MIN: CPT | Mod: 95 | Performed by: PHYSICIAN ASSISTANT

## 2022-10-24 RX ORDER — SEMAGLUTIDE 0.25 MG/.5ML
0.25 INJECTION, SOLUTION SUBCUTANEOUS WEEKLY
Qty: 2 ML | Refills: 0 | Status: SHIPPED | OUTPATIENT
Start: 2022-10-24 | End: 2022-12-02

## 2022-10-24 RX ORDER — AZITHROMYCIN 250 MG/1
TABLET, FILM COATED ORAL
Qty: 6 TABLET | Refills: 0 | Status: SHIPPED | OUTPATIENT
Start: 2022-10-24 | End: 2022-12-26

## 2022-10-24 NOTE — PROGRESS NOTES
Cody is a 57 year old who is being evaluated via a billable telephone visit.      What phone number would you like to be contacted at? [phone]  How would you like to obtain your AVS? Marcia Mchugh   Cody is a 57 year old, presenting for the following health issues:  Breathing Problem (Started prednisone today) and Weight Problem (Would like something to lose weight)      HPI     Breathing   - Dr. Cleveland prescribed prednisone - started today  Not a lot of wheezing. Coughing up a bunch of phlegm. Denies fever.   Weight problem  - Would like something to lose weight    She's up to 225 lbs now. Limited activity. Although she has started going to the gym occasionally.      Review of Systems   Constitutional, HEENT, cardiovascular, pulmonary, GI, , musculoskeletal, neuro, skin, endocrine and psych systems are negative, except as otherwise noted.      Objective           Vitals:  No vitals were obtained today due to virtual visit.    Physical Exam   healthy, alert and no distress  PSYCH: Alert and oriented times 3; coherent speech, normal   rate and volume, able to articulate logical thoughts, able   to abstract reason, no tangential thoughts, no hallucinations   or delusions  Her affect is normal  RESP: No cough, no audible wheezing, able to talk in full sentences  Remainder of exam unable to be completed due to telephone visits    Inga was seen today for breathing problem and weight problem.    Diagnoses and all orders for this visit:    Obesity with serious comorbidity, unspecified classification, unspecified obesity type  -     Comprehensive Weight Management; Future  -     Semaglutide-Weight Management (WEGOVY) 0.25 MG/0.5ML SOAJ; Inject 0.25 mg Subcutaneous once a week    COPD exacerbation (H)  -     azithromycin (ZITHROMAX) 250 MG tablet; Two tablets first day, then one tablet daily for four days.      Continue course of prednisone.  Advised supportive and symptomatic treatment.  Follow up  with Provider - if condition persists or worsens.   work on lifestyle modification          Phone call duration: 18 minutes

## 2022-10-26 ENCOUNTER — TELEPHONE (OUTPATIENT)
Dept: FAMILY MEDICINE | Facility: CLINIC | Age: 57
End: 2022-10-26

## 2022-10-26 NOTE — TELEPHONE ENCOUNTER
Patient called and stated that Wegovy is in backorder.    She also stated that her insurance does not cover it, and it would be $1600.00 for her.    She is asking for a different medication.    Routed to PCP to please advise.  Marah HEMPHILL,BSN  Triage Nurse  [facility]  Ph: [phone]

## 2022-10-28 ENCOUNTER — TELEPHONE (OUTPATIENT)
Dept: FAMILY MEDICINE | Facility: CLINIC | Age: 57
End: 2022-10-28

## 2022-10-28 DIAGNOSIS — B37.0 THRUSH: ICD-10-CM

## 2022-10-28 DIAGNOSIS — K14.0 GLOSSITIS: ICD-10-CM

## 2022-10-30 ENCOUNTER — HEALTH MAINTENANCE LETTER (OUTPATIENT)
Age: 57
End: 2022-10-30

## 2022-10-31 RX ORDER — FLUCONAZOLE 100 MG/1
100 TABLET ORAL DAILY
Qty: 15 TABLET | Refills: 0 | Status: SHIPPED | OUTPATIENT
Start: 2022-10-31 | End: 2022-11-24

## 2022-10-31 NOTE — TELEPHONE ENCOUNTER
Have her contact her insurance company and find out if they'll cover any in this class (bydureon, trulicity, victoza, ozempic).

## 2022-10-31 NOTE — TELEPHONE ENCOUNTER
Looks like PCP Min Toledo has given patient multiple refills on med in the last year for Glossitis.  Med was discontinued on 8/11/22 by pulmonologist Dr Webb.  Will send to PCP to advise on refill request.  Pharmacy pended.

## 2022-11-01 ENCOUNTER — TELEPHONE (OUTPATIENT)
Dept: FAMILY MEDICINE | Facility: CLINIC | Age: 57
End: 2022-11-01

## 2022-11-01 DIAGNOSIS — J44.1 CHRONIC OBSTRUCTIVE PULMONARY DISEASE WITH ACUTE EXACERBATION (H): Chronic | ICD-10-CM

## 2022-11-01 NOTE — TELEPHONE ENCOUNTER
Prior Authorization Retail Medication Request    Medication/Dose: omeprazole 20 mg DR cap  ICD code (if different than what is on RX):  E66.9  Previously Tried and Failed:  na  Rationale:  na    Insurance Name:   [facility]  Insurance ID:  [id number]      Pharmacy Information (if different than what is on RX)  Name:  [facility]  Phone:  [phone]

## 2022-11-02 RX ORDER — PREDNISONE 20 MG/1
TABLET ORAL
Qty: 20 TABLET | Refills: 0 | Status: SHIPPED | OUTPATIENT
Start: 2022-11-02 | End: 2022-11-24

## 2022-11-02 NOTE — TELEPHONE ENCOUNTER
Prior Authorization Approval    Authorization Effective Date: 10/3/2022  Authorization Expiration Date: 11/2/2023  Medication: omeprazole (PRILOSEC) 20 MG DR capsule - [place] APPROVED  Insurance Company: [employer] - Phone [phone] Fax [phone]  Which Pharmacy is filling the prescription (Not needed for infusion/clinic administered): [id number]  Pharmacy Notified: Yes  Patient Notified: Yes (pharmacy will deliver when ready)

## 2022-11-02 NOTE — TELEPHONE ENCOUNTER
Central Prior Authorization Team   Phone: [phone]      PA Initiation    Medication: omeprazole (PRILOSEC) 20 MG DR capsule - INITIATED  Insurance Company: [employer] - Phone [phone] Fax [phone]  Pharmacy Filling the Rx: [facility]  Filling Pharmacy Phone: [phone]  Filling Pharmacy Fax:    Start Date: 11/2/2022

## 2022-11-04 ENCOUNTER — TELEPHONE (OUTPATIENT)
Dept: FAMILY MEDICINE | Facility: CLINIC | Age: 57
End: 2022-11-04

## 2022-11-04 DIAGNOSIS — G47.33 OSA (OBSTRUCTIVE SLEEP APNEA): Primary | ICD-10-CM

## 2022-11-04 NOTE — TELEPHONE ENCOUNTER
Patient requesting DME order for CPAP supplies only. Please fax order to [facility] at [phone].    DME order pended for provider approval.

## 2022-11-07 ENCOUNTER — OFFICE VISIT (OUTPATIENT)
Dept: SURGERY | Facility: CLINIC | Age: 57
End: 2022-11-07
Payer: COMMERCIAL

## 2022-11-07 VITALS
HEART RATE: 85 BPM | DIASTOLIC BLOOD PRESSURE: 74 MMHG | BODY MASS INDEX: 43.87 KG/M2 | SYSTOLIC BLOOD PRESSURE: 122 MMHG | WEIGHT: 222 LBS

## 2022-11-07 DIAGNOSIS — R59.0 LYMPHADENOPATHY, INGUINAL: Primary | ICD-10-CM

## 2022-11-07 PROCEDURE — 99244 OFF/OP CNSLTJ NEW/EST MOD 40: CPT | Performed by: SURGERY

## 2022-11-07 NOTE — LETTER
11/7/2022         RE: Inga Ledesma  [address]  [facility] [address]        Dear Colleague,    Thank you for referring your patient, Inga Ledesma, to the [facility]. Please see a copy of my visit note below.    Patient seen in consultation for left groin lymphadenopathy by Cephas Agbeh    HPI:  Patient is a 57 year old female  with complaints of left groin pain and left leg swelling  Left leg has been swollen for about a year  Right groin has had lymph nodes removed for right labial cancer so has been having swelling on the right previously and goes to lymphedema sessions  Past few months has been feeling left groin pain 3-4x/day. Sometimes can be quite sharp like a knife.  nothing makes the episode better.  Patient has not family history of lymphoma problems  Went to ER at [facility] last week for cellulitis in leg, started on antibiotic, unsure name  Still red but pain improved that she can actually walk on it.    Review Of Systems    Skin: cellulitis left leg  Ears/Nose/Throat: negative  Respiratory: COPD. Wheezing  Cardiovascular: MI in 2016. CHF.  Gastrointestinal: negative  Genitourinary: negative  Musculoskeletal: back pain  Neurologic: seizure in 2016  Hematologic/Lymphatic/Immunologic: as above  Endocrine: negative      Past Medical History:   Diagnosis Date     Acute pain of left shoulder 05/20/2021     LALA (acute kidney injury) (H) 05/28/2018     Alcohol abuse      Alcohol dependence with acute alcoholic intoxication (H) 11/19/2020     Alcohol withdrawal (H) 10/28/2017     Alcohol withdrawal seizure (H) 03/2016     Alcoholic hepatitis without ascites 12/05/2013     Cancer of labia majora (H) 1987     Cellulitis of right lower extremity 03/13/2021     Cervical dysplasia 1987     Congestive heart failure (H) 05/16/2016     COPD (chronic obstructive pulmonary disease) (H)      COPD exacerbation (H) 09/04/2018     CVA (cerebral infarction) 01/2012     Dependent edema       Depression, major      Depressive disorder 1966     GERD (gastroesophageal reflux disease)      Hyperlipidemia LDL goal < 160      Hypertension      Iron deficiency anemia      Menorrhagia 05/10/2010     Pneumonia 07/10/2021     RLS (restless legs syndrome)      Sacroiliitis (H)     steroid injections ineffective, chronic low back pain     Sepsis (H) 06/18/2018     Tobacco abuse      Uncomplicated asthma      Vitamin D deficiencies        Past Surgical History:   Procedure Laterality Date     AS BIOPSY/EXCISION LYMPH NODE OPEN SUPERFICIAL       COLONOSCOPY       COLONOSCOPY N/A 4/13/2022    Procedure: COLONOSCOPY;  Surgeon: Mike Garcia MD;  Location:  GI     EYE SURGERY       HYSTERECTOMY  2010     HYSTERECTOMY TOTAL ABDOMINAL  7/28/10    Bilateral salpingectomy.  ovaries conserved.     LASER TX, CERVICAL  1987     LYMPH NODE BIOPSY  2007    inguinal     LYSIS OF LABIAL LESION(S)  1985, 1987       Social History     Socioeconomic History     Marital status:      Spouse name: Not on file     Number of children: 1     Years of education: 13     Highest education level: Not on file   Occupational History     Employer: [employer]   Tobacco Use     Smoking status: Some Days     Packs/day: 0.25     Years: 34.00     Pack years: 8.50     Types: Cigarettes     Start date: 11/1/1979     Last attempt to quit: 10/3/2012     Years since quitting: 10.1     Smokeless tobacco: Current     Types: Chew     Tobacco comments:     5 cigarettes daily   Vaping Use     Vaping Use: Former     Quit date: 1/1/2022   Substance and Sexual Activity     Alcohol use: No     Comment: 1 pint of vodka per day, last drink aug 2018     Drug use: No     Sexual activity: Not Currently   Other Topics Concern     Parent/sibling w/ CABG, MI or angioplasty before 65F 55M? No   Social History Narrative    Lives in [place] with her son in a trailer no access to guns or weapons works for the [employer] and enjoys  crocheting and watching television.     Social Determinants of Health     Financial Resource Strain: Not on file   Food Insecurity: Not on file   Transportation Needs: Not on file   Physical Activity: Not on file   Stress: Not on file   Social Connections: Not on file   Intimate Partner Violence: Not on file   Housing Stability: Not on file       Current Outpatient Medications   Medication Sig Dispense Refill     acetaminophen (TYLENOL) 500 MG tablet 1-2 tablets by mouth every 4-6 hours as needed for pain, max dose 4000 mg in 24 hours 100 tablet 3     albuterol (VENTOLIN HFA) 108 (90 Base) MCG/ACT inhaler INHALE ONE TO TWO PUFFS BY MOUTH EVERY 4 HOURS AS NEEDED FOR SHORTNESS OF BREATH, DIFFICULTY BREATHING OR WHEEZING. 54 g 3     amitriptyline (ELAVIL) 50 MG tablet Take 2 tablets (100 mg) by mouth At Bedtime 180 tablet 3     ARIPiprazole (ABILIFY) 5 MG tablet Take 1 tablet (5 mg) by mouth At Bedtime 90 tablet 3     aspirin (ASPIRIN LOW DOSE) 81 MG EC tablet Take 1 tablet (81 mg) by mouth daily 90 tablet 3     atorvastatin (LIPITOR) 10 MG tablet Take 1 tablet (10 mg) by mouth At Bedtime 90 tablet 3     azithromycin (ZITHROMAX) 250 MG tablet Two tablets first day, then one tablet daily for four days. 6 tablet 0     azithromycin (ZITHROMAX) 250 MG tablet Take 2 tablets (500 mg) by mouth Every Mon, Wed, Fri Morning 36 tablet 3     calcium carbonate 500 mg, elemental, (OSCAL) 500 MG tablet Take 1 tablet (500 mg) by mouth daily 90 tablet 3     cephALEXin (KEFLEX) 500 MG capsule Take 500 mg by mouth       cetirizine (ZYRTEC) 10 MG tablet Take 1 tablet (10 mg) by mouth daily 90 tablet 3     EPINEPHrine (ANY BX GENERIC EQUIV) 0.3 MG/0.3ML injection 2-pack INJECT AS DIRECTED AS NEEDED FOR ALLERGIC REACTION 2 each 10     famotidine (PEPCID) 40 MG tablet Take 1 tablet (40 mg) by mouth daily At Bedtime 90 tablet 3     ferrous sulfate (FEROSUL) 325 (65 Fe) MG tablet Take 1 tablet (325 mg) by mouth 2 times daily 180 tablet 3      fluconazole (DIFLUCAN) 100 MG tablet Take 1 tablet (100 mg) by mouth daily 15 tablet 0     FLUoxetine (PROZAC) 40 MG capsule TAKE 2 CAPSULES (80MG) BY MOUTH ONCE DAILY. 180 capsule 3     fluticasone (FLONASE) 50 MCG/ACT nasal spray INSTILL ONE (1) SPRAY IN EACH NOSTRIL DAILY 16 g 10     fluticasone-salmeterol (ADVAIR DISKUS) 500-50 MCG/DOSE inhaler INHALE ONE PUFF BY MOUTH EVERY 12 HOURS 3 each 3     folic acid (FOLVITE) 1 MG tablet Take 1 tablet (1,000 mcg) by mouth daily 90 tablet 3     furosemide (LASIX) 20 MG tablet Take 1 tablet (20 mg) by mouth 2 times daily 180 tablet 1     furosemide (LASIX) 40 MG tablet TAKE ONE (1) TABLET BY MOUTH TWICE DAILY 60 tablet 4     gabapentin (NEURONTIN) 600 MG tablet TAKE 1 TABLET BY MOUTH THREE TIMES DAILY 270 tablet 3     hydrALAZINE (APRESOLINE) 10 MG tablet Take 1 tablet (10 mg) by mouth 3 times daily 270 tablet 0     hydrOXYzine (ATARAX) 25 MG tablet TAKE 1 TABLET BY MOUTH THREE TIMES DAILY AS NEEDED FOR ITCHING 90 tablet 10     ibuprofen (IBU) 600 MG tablet TAKE 1 TABLET BY MOUTH THREE TIMES DAILY AS NEEDED FOR PAIN 90 tablet 3     Incontinence Supply Disposable (DEPEND FIT-FLEX-WOMEN-M) MISC 1 each At Bedtime 90 each 3     ipratropium - albuterol 0.5 mg/2.5 mg/3 mL (DUONEB) 0.5-2.5 (3) MG/3ML neb solution INHALE ONE VIAL BY NEBULIZATION FOUR TIMES DAILY AS NEEDED FOR WHEEZING 360 mL 3     labetalol (NORMODYNE) 100 MG tablet TAKE 1 TABLET BY MOUTH TWICE DAILY. 180 tablet 3     magnesium oxide (MAG-OX) 400 MG tablet Take 1 tablet (400 mg) by mouth daily 90 tablet 3     omeprazole (PRILOSEC) 20 MG DR capsule Take 1 capsule twice a day before meals 180 capsule 3     oxyCODONE-acetaminophen (PERCOCET) 5-325 MG tablet as needed       potassium chloride ER (KLOR-CON M) 10 MEQ CR tablet Take 1 tablet (10 mEq) by mouth daily 90 tablet 3     predniSONE (DELTASONE) 20 MG tablet Take 3 tabs by mouth daily x 3 days, then 2 tabs daily x 3 days, then 1 tab daily x 3 days, then 1/2 tab  daily x 3 days. 20 tablet 0     QUEtiapine (SEROQUEL) 200 MG tablet TAKE 1 TABLET BY MOUTH AT BEDTIME 30 tablet 5     Revefenacin (YUPELRI) 175 MCG/3ML SOLN Inhale 3 mLs (175 mcg) into the lungs daily Nebulize and inhale 1 vial (3 ml) by mouth and into lungs once daily 270 mL 3     rOPINIRole (REQUIP) 1 MG tablet TAKE 1 TABLET BY MOUTH THREE TIMES DAILY 270 tablet 3     Semaglutide-Weight Management (WEGOVY) 0.25 MG/0.5ML SOAJ Inject 0.25 mg Subcutaneous once a week 2 mL 0     vitamin D3 (VITAMIN D3) 50 mcg (2000 units) tablet Take 1 tablet (50 mcg) by mouth daily 90 tablet 3     diclofenac (VOLTAREN) 75 MG EC tablet Take 1 tablet (75 mg) by mouth 2 times daily for 5 days As needed for pain 10 tablet 0     hydrALAZINE (APRESOLINE) 25 MG tablet 1 tablet by Oral or Feeding Tube route 3 times daily (Patient not taking: Reported on 8/11/2022)         Medications and history reviewed    Physical exam:  Vitals: /74   Pulse 85   Wt 100.7 kg (222 lb)   LMP 06/02/2010   BMI 43.87 kg/m    BMI= Body mass index is 43.87 kg/m .    Constitutional: healthy, alert and no distress  Head: Normocephalic. No masses, lesions, tenderness or abnormalities  Cardiovascular: negative, PMI normal. No lifts, heaves, or thrills. RRR. No murmurs, clicks gallops or rub  Respiratory: positive findings: wheezing bilateral  Gastrointestinal: positive findings: obese  : Deferred  Musculoskeletal: bilateral lower leg edema, right and left appeared fairly equal  Skin: cellulitis lower left leg  Psychiatric: mentation appears normal and affect normal/bright  Hematologic/Lymphatic/Immunologic: few palpable nodes left groin- medially and below inguinal ligament, palpation tender  Patient able to get up on table with mild difficulty.      Imaging shows: personally viewed  CT ABDOMEN AND PELVIS WITH CONTRAST 9/30/2022 1:14 PM     CLINICAL HISTORY: Left groin pain. Acquired lymphedema of lower  extremity.     TECHNIQUE: CT scan of the abdomen and  pelvis was performed following  injection of IV contrast. Multiplanar reformats were obtained. Dose  reduction techniques were used.  CONTRAST: 122 mL Isovue-370     COMPARISON: CT abdomen and pelvis 6/18/2018.     FINDINGS:   LOWER CHEST: Normal.     HEPATOBILIARY: Normal.     PANCREAS: Normal.     SPLEEN: Normal.     ADRENAL GLANDS: Stable right adrenal nodule measuring 2.2 cm series 2  image 40. On the prior unenhanced scan this had very low density and  this is consistent with an adenoma. Normal left adrenal.     KIDNEYS/BLADDER: Normal.     BOWEL: No obstruction or acute inflammation.     PELVIC ORGANS: There are a few mildly enlarged lymph nodes at the left  inguinal location. One of these as an example is 1 cm in short axis,  previously 0.5 cm series 2 image 185. There are a few other adjacent  examples that are slightly more prominent as well. There is no focal  fluid collection. No visible hernia.     ADDITIONAL FINDINGS: Mild calcifications.     MUSCULOSKELETAL: Mild spine degenerative change.                                                                      IMPRESSION:   1.  Several enlarged lymph nodes at the left inguinal location newly  identified and are nonspecific.  2.  Stable right adrenal adenoma.    Assessment:     ICD-10-CM    1. Lymphadenopathy, inguinal  R59.0 IR Referral        Plan: Some enlarged lymph nodes in left groin with history of a right labial cancer x2 and previous lymph node dissection.  Per patient that was only on the right side.  She does currently have some cellulitis of the left leg but this is over the last week where the swelling and the groin pain had been going on longer.  Discussed options of lymph node biopsy either open surgical where the whole lymph node is removed which allows for potentially different testing versus a core needle biopsy or only small portion of the node is removed.  She would be at high surgical risk with her COPD, coronary artery disease, CHF so  I recommended and she is agreeable to the lesser invasive core needle biopsy.  IR referral order placed.  Continue antibiotics for treatment of the cellulitis    Jose Felipe MD        Again, thank you for allowing me to participate in the care of your patient.        Sincerely,        Jose Felipe MD

## 2022-11-07 NOTE — PROGRESS NOTES
Patient seen in consultation for left groin lymphadenopathy by Cephas Agbeh    HPI:  Patient is a 57 year old female  with complaints of left groin pain and left leg swelling  Left leg has been swollen for about a year  Right groin has had lymph nodes removed for right labial cancer so has been having swelling on the right previously and goes to lymphedema sessions  Past few months has been feeling left groin pain 3-4x/day. Sometimes can be quite sharp like a knife.  nothing makes the episode better.  Patient has not family history of lymphoma problems  Went to ER at [facility] last week for cellulitis in leg, started on antibiotic, unsure name  Still red but pain improved that she can actually walk on it.    Review Of Systems    Skin: cellulitis left leg  Ears/Nose/Throat: negative  Respiratory: COPD. Wheezing  Cardiovascular: MI in 2016. CHF.  Gastrointestinal: negative  Genitourinary: negative  Musculoskeletal: back pain  Neurologic: seizure in 2016  Hematologic/Lymphatic/Immunologic: as above  Endocrine: negative      Past Medical History:   Diagnosis Date     Acute pain of left shoulder 05/20/2021     LALA (acute kidney injury) (H) 05/28/2018     Alcohol abuse      Alcohol dependence with acute alcoholic intoxication (H) 11/19/2020     Alcohol withdrawal (H) 10/28/2017     Alcohol withdrawal seizure (H) 03/2016     Alcoholic hepatitis without ascites 12/05/2013     Cancer of labia majora (H) 1987     Cellulitis of right lower extremity 03/13/2021     Cervical dysplasia 1987     Congestive heart failure (H) 05/16/2016     COPD (chronic obstructive pulmonary disease) (H)      COPD exacerbation (H) 09/04/2018     CVA (cerebral infarction) 01/2012     Dependent edema      Depression, major      Depressive disorder 1966     GERD (gastroesophageal reflux disease)      Hyperlipidemia LDL goal < 160      Hypertension      Iron deficiency anemia      Menorrhagia 05/10/2010     Pneumonia 07/10/2021     RLS (restless legs  syndrome)      Sacroiliitis (H)     steroid injections ineffective, chronic low back pain     Sepsis (H) 06/18/2018     Tobacco abuse      Uncomplicated asthma      Vitamin D deficiencies        Past Surgical History:   Procedure Laterality Date     AS BIOPSY/EXCISION LYMPH NODE OPEN SUPERFICIAL       COLONOSCOPY       COLONOSCOPY N/A 4/13/2022    Procedure: COLONOSCOPY;  Surgeon: Mike Garcia MD;  Location:  GI     EYE SURGERY       HYSTERECTOMY  2010     HYSTERECTOMY TOTAL ABDOMINAL  7/28/10    Bilateral salpingectomy.  ovaries conserved.     LASER TX, CERVICAL  1987     LYMPH NODE BIOPSY  2007    inguinal     LYSIS OF LABIAL LESION(S)  1985, 1987       Social History     Socioeconomic History     Marital status:      Spouse name: Not on file     Number of children: 1     Years of education: 13     Highest education level: Not on file   Occupational History     Employer: "[employer]   Tobacco Use     Smoking status: Some Days     Packs/day: 0.25     Years: 34.00     Pack years: 8.50     Types: Cigarettes     Start date: 11/1/1979     Last attempt to quit: 10/3/2012     Years since quitting: 10.1     Smokeless tobacco: Current     Types: Chew     Tobacco comments:     5 cigarettes daily   Vaping Use     Vaping Use: Former     Quit date: 1/1/2022   Substance and Sexual Activity     Alcohol use: No     Comment: 1 pint of vodka per day, last drink aug 2018     Drug use: No     Sexual activity: Not Currently   Other Topics Concern     Parent/sibling w/ CABG, MI or angioplasty before 65F 55M? No   Social History Narrative    Lives in [place] with her son in a trailer no access to guns or weapons works for the [employer] and enjoys crocheting and watching television.     Social Determinants of Health     Financial Resource Strain: Not on file   Food Insecurity: Not on file   Transportation Needs: Not on file   Physical Activity: Not on file   Stress: Not on file   Social Connections:  Not on file   Intimate Partner Violence: Not on file   Housing Stability: Not on file       Current Outpatient Medications   Medication Sig Dispense Refill     acetaminophen (TYLENOL) 500 MG tablet 1-2 tablets by mouth every 4-6 hours as needed for pain, max dose 4000 mg in 24 hours 100 tablet 3     albuterol (VENTOLIN HFA) 108 (90 Base) MCG/ACT inhaler INHALE ONE TO TWO PUFFS BY MOUTH EVERY 4 HOURS AS NEEDED FOR SHORTNESS OF BREATH, DIFFICULTY BREATHING OR WHEEZING. 54 g 3     amitriptyline (ELAVIL) 50 MG tablet Take 2 tablets (100 mg) by mouth At Bedtime 180 tablet 3     ARIPiprazole (ABILIFY) 5 MG tablet Take 1 tablet (5 mg) by mouth At Bedtime 90 tablet 3     aspirin (ASPIRIN LOW DOSE) 81 MG EC tablet Take 1 tablet (81 mg) by mouth daily 90 tablet 3     atorvastatin (LIPITOR) 10 MG tablet Take 1 tablet (10 mg) by mouth At Bedtime 90 tablet 3     azithromycin (ZITHROMAX) 250 MG tablet Two tablets first day, then one tablet daily for four days. 6 tablet 0     azithromycin (ZITHROMAX) 250 MG tablet Take 2 tablets (500 mg) by mouth Every Mon, Wed, Fri Morning 36 tablet 3     calcium carbonate 500 mg, elemental, (OSCAL) 500 MG tablet Take 1 tablet (500 mg) by mouth daily 90 tablet 3     cephALEXin (KEFLEX) 500 MG capsule Take 500 mg by mouth       cetirizine (ZYRTEC) 10 MG tablet Take 1 tablet (10 mg) by mouth daily 90 tablet 3     EPINEPHrine (ANY BX GENERIC EQUIV) 0.3 MG/0.3ML injection 2-pack INJECT AS DIRECTED AS NEEDED FOR ALLERGIC REACTION 2 each 10     famotidine (PEPCID) 40 MG tablet Take 1 tablet (40 mg) by mouth daily At Bedtime 90 tablet 3     ferrous sulfate (FEROSUL) 325 (65 Fe) MG tablet Take 1 tablet (325 mg) by mouth 2 times daily 180 tablet 3     fluconazole (DIFLUCAN) 100 MG tablet Take 1 tablet (100 mg) by mouth daily 15 tablet 0     FLUoxetine (PROZAC) 40 MG capsule TAKE 2 CAPSULES (80MG) BY MOUTH ONCE DAILY. 180 capsule 3     fluticasone (FLONASE) 50 MCG/ACT nasal spray INSTILL ONE (1) SPRAY IN  EACH NOSTRIL DAILY 16 g 10     fluticasone-salmeterol (ADVAIR DISKUS) 500-50 MCG/DOSE inhaler INHALE ONE PUFF BY MOUTH EVERY 12 HOURS 3 each 3     folic acid (FOLVITE) 1 MG tablet Take 1 tablet (1,000 mcg) by mouth daily 90 tablet 3     furosemide (LASIX) 20 MG tablet Take 1 tablet (20 mg) by mouth 2 times daily 180 tablet 1     furosemide (LASIX) 40 MG tablet TAKE ONE (1) TABLET BY MOUTH TWICE DAILY 60 tablet 4     gabapentin (NEURONTIN) 600 MG tablet TAKE 1 TABLET BY MOUTH THREE TIMES DAILY 270 tablet 3     hydrALAZINE (APRESOLINE) 10 MG tablet Take 1 tablet (10 mg) by mouth 3 times daily 270 tablet 0     hydrOXYzine (ATARAX) 25 MG tablet TAKE 1 TABLET BY MOUTH THREE TIMES DAILY AS NEEDED FOR ITCHING 90 tablet 10     ibuprofen (IBU) 600 MG tablet TAKE 1 TABLET BY MOUTH THREE TIMES DAILY AS NEEDED FOR PAIN 90 tablet 3     Incontinence Supply Disposable (DEPEND FIT-FLEX-WOMEN-M) MISC 1 each At Bedtime 90 each 3     ipratropium - albuterol 0.5 mg/2.5 mg/3 mL (DUONEB) 0.5-2.5 (3) MG/3ML neb solution INHALE ONE VIAL BY NEBULIZATION FOUR TIMES DAILY AS NEEDED FOR WHEEZING 360 mL 3     labetalol (NORMODYNE) 100 MG tablet TAKE 1 TABLET BY MOUTH TWICE DAILY. 180 tablet 3     magnesium oxide (MAG-OX) 400 MG tablet Take 1 tablet (400 mg) by mouth daily 90 tablet 3     omeprazole (PRILOSEC) 20 MG DR capsule Take 1 capsule twice a day before meals 180 capsule 3     oxyCODONE-acetaminophen (PERCOCET) 5-325 MG tablet as needed       potassium chloride ER (KLOR-CON M) 10 MEQ CR tablet Take 1 tablet (10 mEq) by mouth daily 90 tablet 3     predniSONE (DELTASONE) 20 MG tablet Take 3 tabs by mouth daily x 3 days, then 2 tabs daily x 3 days, then 1 tab daily x 3 days, then 1/2 tab daily x 3 days. 20 tablet 0     QUEtiapine (SEROQUEL) 200 MG tablet TAKE 1 TABLET BY MOUTH AT BEDTIME 30 tablet 5     Revefenacin (YUPELRI) 175 MCG/3ML SOLN Inhale 3 mLs (175 mcg) into the lungs daily Nebulize and inhale 1 vial (3 ml) by mouth and into lungs  once daily 270 mL 3     rOPINIRole (REQUIP) 1 MG tablet TAKE 1 TABLET BY MOUTH THREE TIMES DAILY 270 tablet 3     Semaglutide-Weight Management (WEGOVY) 0.25 MG/0.5ML SOAJ Inject 0.25 mg Subcutaneous once a week 2 mL 0     vitamin D3 (VITAMIN D3) 50 mcg (2000 units) tablet Take 1 tablet (50 mcg) by mouth daily 90 tablet 3     diclofenac (VOLTAREN) 75 MG EC tablet Take 1 tablet (75 mg) by mouth 2 times daily for 5 days As needed for pain 10 tablet 0     hydrALAZINE (APRESOLINE) 25 MG tablet 1 tablet by Oral or Feeding Tube route 3 times daily (Patient not taking: Reported on 8/11/2022)         Medications and history reviewed    Physical exam:  Vitals: /74   Pulse 85   Wt 100.7 kg (222 lb)   LMP 06/02/2010   BMI 43.87 kg/m    BMI= Body mass index is 43.87 kg/m .    Constitutional: healthy, alert and no distress  Head: Normocephalic. No masses, lesions, tenderness or abnormalities  Cardiovascular: negative, PMI normal. No lifts, heaves, or thrills. RRR. No murmurs, clicks gallops or rub  Respiratory: positive findings: wheezing bilateral  Gastrointestinal: positive findings: obese  : Deferred  Musculoskeletal: bilateral lower leg edema, right and left appeared fairly equal  Skin: cellulitis lower left leg  Psychiatric: mentation appears normal and affect normal/bright  Hematologic/Lymphatic/Immunologic: few palpable nodes left groin- medially and below inguinal ligament, palpation tender  Patient able to get up on table with mild difficulty.      Imaging shows: personally viewed  CT ABDOMEN AND PELVIS WITH CONTRAST 9/30/2022 1:14 PM     CLINICAL HISTORY: Left groin pain. Acquired lymphedema of lower  extremity.     TECHNIQUE: CT scan of the abdomen and pelvis was performed following  injection of IV contrast. Multiplanar reformats were obtained. Dose  reduction techniques were used.  CONTRAST: 122 mL Isovue-370     COMPARISON: CT abdomen and pelvis 6/18/2018.     FINDINGS:   LOWER CHEST:  Normal.     HEPATOBILIARY: Normal.     PANCREAS: Normal.     SPLEEN: Normal.     ADRENAL GLANDS: Stable right adrenal nodule measuring 2.2 cm series 2  image 40. On the prior unenhanced scan this had very low density and  this is consistent with an adenoma. Normal left adrenal.     KIDNEYS/BLADDER: Normal.     BOWEL: No obstruction or acute inflammation.     PELVIC ORGANS: There are a few mildly enlarged lymph nodes at the left  inguinal location. One of these as an example is 1 cm in short axis,  previously 0.5 cm series 2 image 185. There are a few other adjacent  examples that are slightly more prominent as well. There is no focal  fluid collection. No visible hernia.     ADDITIONAL FINDINGS: Mild calcifications.     MUSCULOSKELETAL: Mild spine degenerative change.                                                                      IMPRESSION:   1.  Several enlarged lymph nodes at the left inguinal location newly  identified and are nonspecific.  2.  Stable right adrenal adenoma.    Assessment:     ICD-10-CM    1. Lymphadenopathy, inguinal  R59.0 IR Referral        Plan: Some enlarged lymph nodes in left groin with history of a right labial cancer x2 and previous lymph node dissection.  Per patient that was only on the right side.  She does currently have some cellulitis of the left leg but this is over the last week where the swelling and the groin pain had been going on longer.  Discussed options of lymph node biopsy either open surgical where the whole lymph node is removed which allows for potentially different testing versus a core needle biopsy or only small portion of the node is removed.  She would be at high surgical risk with her COPD, coronary artery disease, CHF so I recommended and she is agreeable to the lesser invasive core needle biopsy.  IR referral order placed.  Continue antibiotics for treatment of the cellulitis    Jose Felipe MD

## 2022-11-14 ENCOUNTER — TRANSFERRED RECORDS (OUTPATIENT)
Dept: HEALTH INFORMATION MANAGEMENT | Facility: CLINIC | Age: 57
End: 2022-11-14

## 2022-11-22 DIAGNOSIS — B37.0 THRUSH: ICD-10-CM

## 2022-11-22 DIAGNOSIS — K14.0 GLOSSITIS: ICD-10-CM

## 2022-11-22 DIAGNOSIS — J44.1 CHRONIC OBSTRUCTIVE PULMONARY DISEASE WITH ACUTE EXACERBATION (H): Chronic | ICD-10-CM

## 2022-11-23 ENCOUNTER — HOSPITAL ENCOUNTER (OUTPATIENT)
Dept: ULTRASOUND IMAGING | Facility: CLINIC | Age: 57
Discharge: HOME | End: 2022-11-23
Attending: SURGERY
Payer: COMMERCIAL

## 2022-11-23 ENCOUNTER — PATIENT OUTREACH (OUTPATIENT)
Dept: CARE COORDINATION | Facility: CLINIC | Age: 57
End: 2022-11-23

## 2022-11-23 ENCOUNTER — HOSPITAL ENCOUNTER (OUTPATIENT)
Facility: CLINIC | Age: 57
Discharge: HOME | End: 2022-11-23
Admitting: RADIOLOGY
Payer: COMMERCIAL

## 2022-11-23 DIAGNOSIS — R59.0 LYMPHADENOPATHY, INGUINAL: ICD-10-CM

## 2022-11-23 PROCEDURE — 272N000431 US BIOPSY CORE LYMPH NODE

## 2022-11-23 PROCEDURE — 250N000009 HC RX 250: Performed by: RADIOLOGY

## 2022-11-23 PROCEDURE — 88305 TISSUE EXAM BY PATHOLOGIST: CPT | Mod: 26 | Performed by: PATHOLOGY

## 2022-11-23 PROCEDURE — 88305 TISSUE EXAM BY PATHOLOGIST: CPT | Mod: TC | Performed by: SURGERY

## 2022-11-23 PROCEDURE — 88189 FLOWCYTOMETRY/READ 16 & >: CPT | Mod: GC | Performed by: PATHOLOGY

## 2022-11-23 PROCEDURE — 88184 FLOWCYTOMETRY/ TC 1 MARKER: CPT | Performed by: SURGERY

## 2022-11-23 PROCEDURE — 88185 FLOWCYTOMETRY/TC ADD-ON: CPT | Performed by: SURGERY

## 2022-11-23 RX ORDER — LIDOCAINE HYDROCHLORIDE 10 MG/ML
10 INJECTION, SOLUTION EPIDURAL; INFILTRATION; INTRACAUDAL; PERINEURAL ONCE
Status: COMPLETED | OUTPATIENT
Start: 2022-11-23 | End: 2022-11-23

## 2022-11-23 RX ADMIN — LIDOCAINE HYDROCHLORIDE 10 ML: 10 INJECTION, SOLUTION INFILTRATION; PERINEURAL at 08:12

## 2022-11-23 ASSESSMENT — ACTIVITIES OF DAILY LIVING (ADL): ADLS_ACUITY_SCORE: 37

## 2022-11-23 NOTE — PROGRESS NOTES
Clinic Care Coordination Contact    Situation: Patient chart reviewed by care coordinator.    Background: Patient Identified from Report     Assessment: At Risk Patient Not Enrolled in Care Coordination    Plan/Recommendations: CCC Program created for CHW to outreach and introduce program.

## 2022-11-24 RX ORDER — FLUCONAZOLE 100 MG/1
100 TABLET ORAL DAILY
Qty: 15 TABLET | Refills: 0 | Status: SHIPPED | OUTPATIENT
Start: 2022-11-24 | End: 2022-12-01

## 2022-11-24 RX ORDER — PREDNISONE 20 MG/1
TABLET ORAL
Qty: 20 TABLET | Refills: 0 | Status: SHIPPED | OUTPATIENT
Start: 2022-11-24 | End: 2022-12-26

## 2022-11-25 ENCOUNTER — PATIENT OUTREACH (OUTPATIENT)
Dept: CARE COORDINATION | Facility: CLINIC | Age: 57
End: 2022-11-25

## 2022-11-25 DIAGNOSIS — J44.1 CHRONIC OBSTRUCTIVE PULMONARY DISEASE WITH ACUTE EXACERBATION (H): Chronic | ICD-10-CM

## 2022-11-25 DIAGNOSIS — B37.0 THRUSH: ICD-10-CM

## 2022-11-25 DIAGNOSIS — K14.0 GLOSSITIS: ICD-10-CM

## 2022-11-25 LAB
PATH REPORT.COMMENTS IMP SPEC: NORMAL
PATH REPORT.FINAL DX SPEC: NORMAL
PATH REPORT.FINAL DX SPEC: NORMAL
PATH REPORT.GROSS SPEC: NORMAL
PATH REPORT.MICROSCOPIC SPEC OTHER STN: NORMAL
PATH REPORT.MICROSCOPIC SPEC OTHER STN: NORMAL
PATH REPORT.RELEVANT HX SPEC: NORMAL
PATH REPORT.RELEVANT HX SPEC: NORMAL
PATH REPORT.SUPPLEMENTAL REPORTS SPEC: NORMAL
PHOTO IMAGE: NORMAL

## 2022-11-25 NOTE — PROGRESS NOTES
Clinic Care Coordination Contact  [facility]/Voicemail    Clinical Data: Care Coordinator Outreach  Outreach attempted x 1.  Left message on patient's voicemail with call back information and requested return call.  Plan: Care Coordinator will try to reach patient again in 1-2 business days.    Overview  At Risk Patient Not Enrolled in Care Coordination     Shanika Zelaya  [facility] Health Worker  [facility] Care Coordination   [place], [facility]  Office: [phone]

## 2022-11-25 NOTE — LETTER
[facility] CARE COORDINATION  [address]  [place] [address]    November 28, 2022    Inga Ledesma  [address]   [facility] [address]      Dear Inga,        I am a clinic community health worker who works with Min Toledo PA-C with the [facility]. I have been trying to reach you recently to introduce Clinic Care Coordination. Below is a description of clinic care coordination and how I can further assist you.       The clinic care coordination team is made up of a registered nurse, , financial resource worker and community health worker who understand the health care system. The goal of clinic care coordination is to help you manage your health and improve access to the health care system. Our team works alongside your provider to assist you in determining your health and social needs. We can help you obtain health care and community resources, providing you with necessary information and education. We can work with you through any barriers and develop a care plan that helps coordinate and strengthen the communication between you and your care team.    Please feel free to contact Community Health WorkerShanika at [phone] with any questions or concerns regarding care coordination and what we can offer.      We are focused on providing you with the highest-quality healthcare experience possible.    Sincerely,     DURAN Hook  Clinic Care Coordination   [facility]

## 2022-11-28 ENCOUNTER — TELEPHONE (OUTPATIENT)
Dept: FAMILY MEDICINE | Facility: CLINIC | Age: 57
End: 2022-11-28

## 2022-11-28 DIAGNOSIS — E66.01 MORBID OBESITY (H): Primary | Chronic | ICD-10-CM

## 2022-11-28 RX ORDER — FLUCONAZOLE 100 MG/1
TABLET ORAL
Qty: 15 TABLET | Refills: 10 | OUTPATIENT
Start: 2022-11-28

## 2022-11-28 RX ORDER — PREDNISONE 20 MG/1
TABLET ORAL
Qty: 20 TABLET | Refills: 10 | OUTPATIENT
Start: 2022-11-28

## 2022-11-28 NOTE — TELEPHONE ENCOUNTER
Next Appt  With Family Practice (Min Toledo PA-C)  12/26/2022 at 2:20 PM      Avril Ulloa on 11/28/2022 at 12:43 PM

## 2022-11-28 NOTE — TELEPHONE ENCOUNTER
Patient states that she does not need the Prednisone or Fluconazole at this time.     Nasrin Leroy RN BSN  [facility]

## 2022-11-28 NOTE — PROGRESS NOTES
Clinic Care Coordination Contact  [facility]/Voicemail      Clinical Data: CHW Outreach    Outreach attempted x 2 regarding CCC enrollment.  Left message on patient's voicemail with call back information and requested return call.    Plan: Patient has been sent a unreachable letter via [employer] and was provided with CHW contact information if they are interested in accessing Clinic Care Coordination.      Order for Care Management has been closed, no further outreach will be done at this time and patient can be re-referred.    Diane Owen, CAROLINEW  Clinic Care Coordination   [facility]   Phone: [phone]  [email or web address]

## 2022-11-28 NOTE — TELEPHONE ENCOUNTER
"  Future Office Visit:   Next 5 appointments (look out 90 days)    Dec 26, 2022  2:20 PM  (Arrive by 2:00 PM)  Provider Visit with Min Toledo PA-C  [facility] ([facility] ) [address]  [place] [id number] [phone]     Patient has future appointment scheduled to discuss weight loss medication. She said she is unable to afford Semaglutide-Weight Management (WEGOVY) 0.25 MG/0.5ML SOAJ. She was informed this would cost $1600.     She wonders if you would be willing to prescribe Phentermine (which is covered by her plan) at this time and then she would follow up with you next month.     Patient reports she is \"Cancer Free\" and wanted to let you know this good news.     Please review and advise.      Nasrin Leroy RN BSN  [facility]          "

## 2022-11-30 RX ORDER — LIRAGLUTIDE 6 MG/ML
0.6 INJECTION SUBCUTANEOUS DAILY
Qty: 6 ML | Refills: 1 | Status: SHIPPED | OUTPATIENT
Start: 2022-11-30 | End: 2022-12-26

## 2022-11-30 NOTE — TELEPHONE ENCOUNTER
Not a big fan of phentermine. Lets try daily victoza . This is in the same class as ozempic. Though its a once a day shot. So happy to hear she is cancer free.

## 2022-11-30 NOTE — TELEPHONE ENCOUNTER
Patient returned call. RN relayed provider's message below. She will contact pharmacy to see cost.     Olinda Salgado RN, BSN, PHN  [facility]: [place]

## 2022-11-30 NOTE — TELEPHONE ENCOUNTER
Called [phone] (home)     Did they answer the phone: No, left a message on voicemail to return call to the [facility] at [phone].    Marah RN,BSN  Triage Nurse  [facility]: [facility]           Chest pain palp

## 2022-12-02 ENCOUNTER — TELEPHONE (OUTPATIENT)
Dept: FAMILY MEDICINE | Facility: CLINIC | Age: 57
End: 2022-12-02

## 2022-12-02 DIAGNOSIS — E66.01 MORBID OBESITY (H): Primary | ICD-10-CM

## 2022-12-02 NOTE — TELEPHONE ENCOUNTER
I see trial of victoza advised by Min Toledo on 10/30/22, appears is for weight loss.    Wegovy too expensive.    I stephane'd up Rx for pen needles and routed to Min to approve/send.    Belen Pringle RN  [facility]

## 2022-12-14 NOTE — PROGRESS NOTES
Subjective   Cody is a 57 year old, presenting for the following health issues:  Weight Problem (Discuss weight loss medication) and Urinary Problem (Not urinating normally, would like to be checked for diabetes)      History of Present Illness     Asthma:  She presents for follow up of asthma.  She has some cough, no wheezing, and some shortness of breath. She is using a relief medication daily. She does not miss any doses of her controller medication throughout the week.Patient is aware of the following triggers: emotions. The patient has not had a visit to the Emergency Room, Urgent Care or Hospital due to asthma since the last clinic visit.     Back Pain:  She presents for follow up of back pain. Patient's back pain is a recurring problem.  Location of back pain:  Right lower back and left lower back  Description of back pain: sharp  Back pain spreads: nowhere    Since patient first noticed back pain, pain is: always present, but gets better and worse  Does back pain interfere with her job:  No      COPD:  She presents for follow up of COPD.  Overall, COPD symptoms are stable since last visit. She has same as usual fatigue or shortness of breath with exertion and no shortness of breath at rest.She constantly coughs and does not have change in sputum. No recent fever. She can walk the length of 1-2 rooms without stopping to rest. She can not walk a flight of stairs without resting. The patient has had no ED, urgent care, or hospital admissions because of COPD since the last visit.     Mental Health Follow-up:  Patient presents to follow-up on Depression & Anxiety.Patient's depression since last visit has been:  Worse  The patient is not having other symptoms associated with depression.  Patient's anxiety since last visit has been:  Worse  The patient is not having other symptoms associated with anxiety.  Any significant life events: No  Patient is feeling anxious or having panic attacks.  Patient has no  "concerns about alcohol or drug use.    Heart Failure:  She presents for follow up of heart failure. She is experiencing shortness of breath with activity only, which is slightly worse. She is experiencing lower extremity edema which is same as usual. She denies orthopenea and coughs at night. Patient is not checking weight daily. She has recently had a None. She has no side effects from medications.  She has had no other medical visits for heart failure since the last visit.    Hypertension: She presents for follow up of hypertension.  She does check blood pressure  regularly outside of the clinic. Outside blood pressures have been over 140/90. She follows a low salt diet.      Today's PHQ-9         PHQ-9 Total Score: 10    PHQ-9 Q9 Thoughts of better off dead/self-harm past 2 weeks :   Several days  Thoughts of suicide or self harm: (P) No  Self-harm Plan:     Self-harm Action:       Safety concerns for self or others: (P) No    How difficult have these problems made it for you to do your work, take care of things at home, or get along with other people: Somewhat difficult  Today's LISA-7 Score: 5     Noting depression. Not suicidal. Some anxiety. Some anhedonia.    Not using her inhalers that's why she's wheezing and her asthma is not well controlled   Review of Systems   Constitutional, HEENT, cardiovascular, pulmonary, GI, , musculoskeletal, neuro, skin, endocrine and psych systems are negative, except as otherwise noted.      Objective    /86   Pulse 92   Temp 98.1  F (36.7  C) (Tympanic)   Resp 24   Ht 1.52 m (4' 11.84\")   Wt 99.9 kg (220 lb 3.2 oz)   LMP 06/02/2010   SpO2 96%   BMI 43.23 kg/m    Body mass index is 43.23 kg/m .  Physical Exam   Eye exam - right eye normal lid, conjunctiva, cornea, pupil and fundus, left eye normal lid, conjunctiva, cornea, pupil and fundus.  Thyroid not palpable, not enlarged, no nodules detected.  CHEST:chest clear to IPPA, no tachypnea, retractions or cyanosis " and S1, S2 normal, no murmur, no gallop, rate regular.    Inga was seen today for weight problem and urinary problem.    Diagnoses and all orders for this visit:    Major depressive disorder, recurrent episode, mild (H)  -     ARIPiprazole (ABILIFY) 5 MG tablet; Take 2 tablets (10 mg) by mouth At Bedtime  -     TSH with free T4 reflex; Future  -     CBC with platelets and differential; Future    Encounter for long-term (current) use of medications    Alcohol abuse  -     Comprehensive metabolic panel (BMP + Alb, Alk Phos, ALT, AST, Total. Bili, TP); Future    Screening for hyperlipidemia    Hyperlipidemia with target LDL less than 160    Hypertension goal BP (blood pressure) < 140/90  -     Cancel: BASIC METABOLIC PANEL; Future  -     Comprehensive metabolic panel (BMP + Alb, Alk Phos, ALT, AST, Total. Bili, TP); Future    Morbid obesity (H)  -     liraglutide (VICTOZA) 18 MG/3ML solution; Inject 1.2 mg Subcutaneous daily    Other orders  -     INFLUENZA VACCINE 50-64 OR 18-64 W/EGG ALLERGY (FLUBLOK)      Work on exercise and a lower gladys diet.  Inc abilify to 10 mg daily. Will inc victoza to 1.2 mg daily.  Will consider adding in bupropion if mood doesn't improve.  Recheck in 6-8 weeks.    Answers for HPI/ROS submitted by the patient on 12/26/2022  If you checked off any problems, how difficult have these problems made it for you to do your work, take care of things at home, or get along with other people?: Somewhat difficult  PHQ9 TOTAL SCORE: 10

## 2022-12-20 DIAGNOSIS — R21 RASH: ICD-10-CM

## 2022-12-20 RX ORDER — HYDROXYZINE HYDROCHLORIDE 25 MG/1
TABLET, FILM COATED ORAL
Qty: 90 TABLET | Refills: 10 | OUTPATIENT
Start: 2022-12-20

## 2022-12-20 NOTE — TELEPHONE ENCOUNTER
Refills remain on file. Refused.     Olinda Salgado, RN, BSN, PHN  [facility]: [place]

## 2022-12-26 ENCOUNTER — OFFICE VISIT (OUTPATIENT)
Dept: FAMILY MEDICINE | Facility: CLINIC | Age: 57
End: 2022-12-26
Payer: COMMERCIAL

## 2022-12-26 VITALS
HEART RATE: 92 BPM | RESPIRATION RATE: 24 BRPM | TEMPERATURE: 98.1 F | SYSTOLIC BLOOD PRESSURE: 126 MMHG | DIASTOLIC BLOOD PRESSURE: 86 MMHG | WEIGHT: 220.2 LBS | HEIGHT: 60 IN | OXYGEN SATURATION: 96 % | BODY MASS INDEX: 43.23 KG/M2

## 2022-12-26 DIAGNOSIS — F33.0 MAJOR DEPRESSIVE DISORDER, RECURRENT EPISODE, MILD (H): Primary | ICD-10-CM

## 2022-12-26 DIAGNOSIS — Z79.899 ENCOUNTER FOR LONG-TERM (CURRENT) USE OF MEDICATIONS: ICD-10-CM

## 2022-12-26 DIAGNOSIS — Z13.220 SCREENING FOR HYPERLIPIDEMIA: ICD-10-CM

## 2022-12-26 DIAGNOSIS — E78.5 HYPERLIPIDEMIA WITH TARGET LDL LESS THAN 160: ICD-10-CM

## 2022-12-26 DIAGNOSIS — F10.10 ALCOHOL ABUSE: ICD-10-CM

## 2022-12-26 DIAGNOSIS — R60.0 BILATERAL LEG EDEMA: ICD-10-CM

## 2022-12-26 DIAGNOSIS — I10 HYPERTENSION GOAL BP (BLOOD PRESSURE) < 140/90: ICD-10-CM

## 2022-12-26 DIAGNOSIS — E66.01 MORBID OBESITY (H): Chronic | ICD-10-CM

## 2022-12-26 LAB
ALBUMIN SERPL-MCNC: 3.6 G/DL (ref 3.4–5)
ALP SERPL-CCNC: 183 U/L (ref 40–150)
ALT SERPL W P-5'-P-CCNC: 52 U/L (ref 0–50)
ANION GAP SERPL CALCULATED.3IONS-SCNC: 6 MMOL/L (ref 3–14)
AST SERPL W P-5'-P-CCNC: 30 U/L (ref 0–45)
BASOPHILS # BLD AUTO: 0 10E3/UL (ref 0–0.2)
BASOPHILS NFR BLD AUTO: 0 %
BILIRUB SERPL-MCNC: 0.2 MG/DL (ref 0.2–1.3)
BUN SERPL-MCNC: 11 MG/DL (ref 7–30)
CALCIUM SERPL-MCNC: 9.2 MG/DL (ref 8.5–10.1)
CHLORIDE BLD-SCNC: 98 MMOL/L (ref 94–109)
CO2 SERPL-SCNC: 32 MMOL/L (ref 20–32)
CREAT SERPL-MCNC: 0.9 MG/DL (ref 0.52–1.04)
EOSINOPHIL # BLD AUTO: 0.4 10E3/UL (ref 0–0.7)
EOSINOPHIL NFR BLD AUTO: 4 %
ERYTHROCYTE [DISTWIDTH] IN BLOOD BY AUTOMATED COUNT: 15.1 % (ref 10–15)
GFR SERPL CREATININE-BSD FRML MDRD: 75 ML/MIN/1.73M2
GLUCOSE BLD-MCNC: 89 MG/DL (ref 70–99)
HCT VFR BLD AUTO: 37.8 % (ref 35–47)
HGB BLD-MCNC: 12 G/DL (ref 11.7–15.7)
LYMPHOCYTES # BLD AUTO: 1.7 10E3/UL (ref 0.8–5.3)
LYMPHOCYTES NFR BLD AUTO: 17 %
MCH RBC QN AUTO: 28 PG (ref 26.5–33)
MCHC RBC AUTO-ENTMCNC: 31.7 G/DL (ref 31.5–36.5)
MCV RBC AUTO: 88 FL (ref 78–100)
MONOCYTES # BLD AUTO: 0.5 10E3/UL (ref 0–1.3)
MONOCYTES NFR BLD AUTO: 5 %
NEUTROPHILS # BLD AUTO: 7.5 10E3/UL (ref 1.6–8.3)
NEUTROPHILS NFR BLD AUTO: 75 %
PLATELET # BLD AUTO: 275 10E3/UL (ref 150–450)
POTASSIUM BLD-SCNC: 3.8 MMOL/L (ref 3.4–5.3)
PROT SERPL-MCNC: 8 G/DL (ref 6.8–8.8)
RBC # BLD AUTO: 4.28 10E6/UL (ref 3.8–5.2)
SODIUM SERPL-SCNC: 136 MMOL/L (ref 133–144)
TSH SERPL DL<=0.005 MIU/L-ACNC: 1.57 MU/L (ref 0.4–4)
WBC # BLD AUTO: 10.1 10E3/UL (ref 4–11)

## 2022-12-26 PROCEDURE — 80050 GENERAL HEALTH PANEL: CPT | Performed by: PHYSICIAN ASSISTANT

## 2022-12-26 PROCEDURE — 36415 COLL VENOUS BLD VENIPUNCTURE: CPT | Performed by: PHYSICIAN ASSISTANT

## 2022-12-26 PROCEDURE — 90471 IMMUNIZATION ADMIN: CPT | Performed by: PHYSICIAN ASSISTANT

## 2022-12-26 PROCEDURE — 90682 RIV4 VACC RECOMBINANT DNA IM: CPT | Performed by: PHYSICIAN ASSISTANT

## 2022-12-26 PROCEDURE — 99214 OFFICE O/P EST MOD 30 MIN: CPT | Mod: 25 | Performed by: PHYSICIAN ASSISTANT

## 2022-12-26 RX ORDER — ARIPIPRAZOLE 5 MG/1
10 TABLET ORAL AT BEDTIME
Qty: 180 TABLET | Refills: 3 | Status: SHIPPED | OUTPATIENT
Start: 2022-12-26 | End: 2023-02-28

## 2022-12-26 RX ORDER — FUROSEMIDE 40 MG
40 TABLET ORAL 2 TIMES DAILY
Qty: 60 TABLET | Refills: 4 | COMMUNITY
Start: 2022-12-26 | End: 2023-02-03

## 2022-12-26 RX ORDER — LIRAGLUTIDE 6 MG/ML
1.2 INJECTION SUBCUTANEOUS DAILY
Qty: 6 ML | Refills: 0 | Status: SHIPPED | OUTPATIENT
Start: 2022-12-26 | End: 2023-01-06

## 2022-12-26 ASSESSMENT — ANXIETY QUESTIONNAIRES
IF YOU CHECKED OFF ANY PROBLEMS ON THIS QUESTIONNAIRE, HOW DIFFICULT HAVE THESE PROBLEMS MADE IT FOR YOU TO DO YOUR WORK, TAKE CARE OF THINGS AT HOME, OR GET ALONG WITH OTHER PEOPLE: VERY DIFFICULT
7. FEELING AFRAID AS IF SOMETHING AWFUL MIGHT HAPPEN: NOT AT ALL
GAD7 TOTAL SCORE: 5
GAD7 TOTAL SCORE: 5
8. IF YOU CHECKED OFF ANY PROBLEMS, HOW DIFFICULT HAVE THESE MADE IT FOR YOU TO DO YOUR WORK, TAKE CARE OF THINGS AT HOME, OR GET ALONG WITH OTHER PEOPLE?: VERY DIFFICULT
5. BEING SO RESTLESS THAT IT IS HARD TO SIT STILL: NOT AT ALL
1. FEELING NERVOUS, ANXIOUS, OR ON EDGE: SEVERAL DAYS
GAD7 TOTAL SCORE: 5
6. BECOMING EASILY ANNOYED OR IRRITABLE: SEVERAL DAYS
3. WORRYING TOO MUCH ABOUT DIFFERENT THINGS: SEVERAL DAYS
7. FEELING AFRAID AS IF SOMETHING AWFUL MIGHT HAPPEN: NOT AT ALL
4. TROUBLE RELAXING: SEVERAL DAYS
2. NOT BEING ABLE TO STOP OR CONTROL WORRYING: SEVERAL DAYS

## 2022-12-26 ASSESSMENT — PATIENT HEALTH QUESTIONNAIRE - PHQ9
10. IF YOU CHECKED OFF ANY PROBLEMS, HOW DIFFICULT HAVE THESE PROBLEMS MADE IT FOR YOU TO DO YOUR WORK, TAKE CARE OF THINGS AT HOME, OR GET ALONG WITH OTHER PEOPLE: SOMEWHAT DIFFICULT
SUM OF ALL RESPONSES TO PHQ QUESTIONS 1-9: 10
SUM OF ALL RESPONSES TO PHQ QUESTIONS 1-9: 10

## 2022-12-26 ASSESSMENT — PAIN SCALES - GENERAL: PAINLEVEL: EXTREME PAIN (8)

## 2022-12-27 ENCOUNTER — TELEPHONE (OUTPATIENT)
Dept: FAMILY MEDICINE | Facility: CLINIC | Age: 57
End: 2022-12-27

## 2022-12-27 NOTE — TELEPHONE ENCOUNTER
[facility] [place] requesting new order for Semi electric hospital bed(previous order is from 3/18/22). New order pended for provider approval. Also requesting chart note that supports the medical need for hospital bed. Can 12/26/22 OV note be addended to include ONE of the following:    Patient has a medical condition that requires positioning of the body not feasible in an ordinary bed, where pillows or wedges do not meet the patients needs;    Protection needed from serious injury not not feasible in an ordinary bed, where pillows or wedges do not meet the patients needs;    A medical condition that requires special attachments, such as traction equipment, that cannot be fixed and used on an ordinary bed;    A  medical condition that requires the head of the bed to be elevated more than 30 degrees, where pillows or wedges do not meet the patients needs;    Require a change of bed height at least once per day to allow caregiver to assist with members care;    The caregiver is unable to change the bed height manually, but is able to assist with all necessary cares in bed.

## 2022-12-27 NOTE — TELEPHONE ENCOUNTER
Patient requesting order for Hospital bed to be sent to [facility]. [facility] does not have hospital beds. Order faxed to [phone].

## 2023-01-04 DIAGNOSIS — E66.01 MORBID OBESITY (H): Chronic | ICD-10-CM

## 2023-01-04 RX ORDER — LIRAGLUTIDE 6 MG/ML
1.2 INJECTION SUBCUTANEOUS DAILY
Qty: 6 ML | Refills: 0 | OUTPATIENT
Start: 2023-01-04

## 2023-01-04 NOTE — LETTER
General Surgery Discharge Instructions    Please follow-up as instructed  If you do not already have a follow-up appointment, please call the office when you leave to schedule an appointment to be seen in 2-3 weeks for post-operative re-evaluation  Activity:  - Activity as tolerated  - Walking and normal light activities are encouraged  - Normal daily activities including climbing steps are okay  - No driving until no longer using pain medications  Return to work:    - You may return to work in 2 weeks or sooner if you are feeling well enough  Diet:    - You may resume your normal diet  Wound Care:  - May shower daily  No tub baths or swimming until cleared by your surgeon   - Wash incision gently with soap and water and pat dry  - Do not apply any creams or ointments unless instructed to do so by your surgeon   - Elke Richards may apply ice as needed (no longer than 20 minutes at a time) for the first 48 hours  - Bruising is not unusual and will go away with a little time  You may apply a warm, moist compress that may help the bruising resolve quicker  - You may remove the dressings the day after surgery (unless otherwise instructed)  Leave any skin tapes (steri-strips) on the incision(s) in place until they fall off on their own  Any new dressings are optional     Medications:    - You may resume all of your regular medications after discharge unless otherwise instructed  Please refer to your discharge medication list for further details  - Please take the pain medications as directed  - You are encouraged to use non-narcotic pain medications first and whenever possible  Reserve the use of narcotic pain medication for moderate to severe pain not controlled by non-narcotic medications   - No driving while taking narcotic pain medications  - You may become constipated, especially if taking pain medications  You may take any over the counter stool softeners or laxatives as needed   Examples: [employer] CARE COORDINATION    August 5, 2020    Inga Ledesma  [address]  [place] [address]      Dear Inga,    I am a clinic care coordinator who works with Min Toledo PA-C at [facility]. I have been trying to reach you recently to introduce Clinic Care Coordination and to see if there was anything I could assist you with.  Below is a description of clinic care coordination and how I can further assist you.      The clinic care coordination team is made up of a registered nurse,  and community health worker who understand the health care system. The goal of clinic care coordination is to help you manage your health and improve access to the health care system in the most efficient manner. The team can assist you in meeting your health care goals by providing education, coordinating services, strengthening the communication among your providers and supporting you with any resource needs.    Please feel free to contact the Community Health Worker at [phone] with any questions or concerns. We are focused on providing you with the highest-quality healthcare experience possible and that all starts with you.     Sincerely,         Lynn OVALLE, RN, PHN, CCM  Primary Clinic Care Coordination    [facility]  [email or web address]   Office: [phone]  Employed by [facility]             Linda Purcell Senna  Additional Instructions:  - If you have any questions or concerns after discharge please call the office   - Call office or return to ER if fever greater than 101, chills, persistent nausea/vomiting, worsening/uncontrollable pain, and/or increasing redness or purulent/foul smelling drainage from incision(s)

## 2023-01-05 NOTE — TELEPHONE ENCOUNTER
Patient calling to request higher dose of liraglutide (VICTOZA) 18 MG/3ML solution. She feels lower dose is not helping with weight loss.     Also  Requesting higher dose of ARIPiprazole (ABILIFY) 5 MG tablet.  Current dose is not helping with her depression.

## 2023-01-06 RX ORDER — LIRAGLUTIDE 6 MG/ML
1.8 INJECTION SUBCUTANEOUS DAILY
Qty: 6 ML | Refills: 0 | Status: SHIPPED | OUTPATIENT
Start: 2023-01-06 | End: 2023-02-03

## 2023-01-06 NOTE — TELEPHONE ENCOUNTER
Called [phone] (home)     Did they answer the phone: No, left a message on voicemail to return call to the [facility] at [phone].    Marah RN,BSN  Triage Nurse  [facility]: [facility]

## 2023-01-06 NOTE — TELEPHONE ENCOUNTER
RN updated patient with provider recommendations. She verbalized good understanding. She declines assistance with scheduling and will call back to schedule medication check.    Gema Toscano RN on 1/6/2023 at 10:25 AM

## 2023-01-06 NOTE — TELEPHONE ENCOUNTER
Rx routed: increased victoza dose to 1.8 mg daily. She needs to give the abilify dose more time to work . Have her follow-up with me in 3-4 wks via a virtual visit.

## 2023-01-17 ENCOUNTER — TELEPHONE (OUTPATIENT)
Dept: FAMILY MEDICINE | Facility: CLINIC | Age: 58
End: 2023-01-17
Payer: COMMERCIAL

## 2023-01-17 DIAGNOSIS — R60.0 BILATERAL LEG EDEMA: ICD-10-CM

## 2023-01-17 RX ORDER — FUROSEMIDE 40 MG
40 TABLET ORAL 2 TIMES DAILY
Qty: 60 TABLET | Status: CANCELLED | OUTPATIENT
Start: 2023-01-17

## 2023-01-17 NOTE — TELEPHONE ENCOUNTER
"Patient calling, says she gets meds on a monthly basis from mail order pharmacy, says this month they did not send furosemide and they told her that it was discontinued.     I verified dosing, Rx on Epic med list is \"historical\"; patient says Min manages this med for her for her lymphedema and she is sure he would not have discontinued it.   Patient was seen by Min Toledo on 12/26/22.   No mention of this med or plan at that visit.    Due for mood follow up end of February.    Routed to PCP to address patient's request for furosemide refill.    Belen Pringle, RN  [facility]          "

## 2023-01-24 NOTE — TELEPHONE ENCOUNTER
Not yet addressed or sent by provider.    Re-routed to Min Toledo.    Belen Pringle RN  [facility]

## 2023-01-25 ENCOUNTER — TELEPHONE (OUTPATIENT)
Dept: FAMILY MEDICINE | Facility: CLINIC | Age: 58
End: 2023-01-25
Payer: COMMERCIAL

## 2023-01-25 NOTE — TELEPHONE ENCOUNTER
Patient Quality Outreach    Patient is due for the following:   Depression  -  PHQ-9 needed  Physical Preventive Adult Physical      Topic Date Due     Hepatitis B Vaccine (1 of 3 - 3-dose series) Never done     Zoster (Shingles) Vaccine (1 of 2) Never done     Pneumococcal Vaccine (3 - PPSV23 if available, else PCV20) 01/10/2023       Next Steps:   Schedule a Adult Preventative with fasting bloodwork    Type of outreach:    Sent [employer] message.      Questions for provider review:    None     Ellen Arevalo

## 2023-01-31 NOTE — TELEPHONE ENCOUNTER
Still pending, refill request not addressed.  Re-routed to provider.  Patient looking for furosemide refill, historical on med list.    Belen Pringle RN  [facility]

## 2023-02-01 ENCOUNTER — TELEPHONE (OUTPATIENT)
Dept: FAMILY MEDICINE | Facility: CLINIC | Age: 58
End: 2023-02-01

## 2023-02-01 NOTE — TELEPHONE ENCOUNTER
Patient calling requesting a referral for ENT for white spots on the side of her tongue that she has had on and off for over a year.     Patient scheduled for a virtual med check appointment 2/3/23.    Thank you,  Hanna Cárdenas RN

## 2023-02-03 ENCOUNTER — VIRTUAL VISIT (OUTPATIENT)
Dept: FAMILY MEDICINE | Facility: CLINIC | Age: 58
End: 2023-02-03
Payer: COMMERCIAL

## 2023-02-03 DIAGNOSIS — F33.0 MAJOR DEPRESSIVE DISORDER, RECURRENT EPISODE, MILD (H): Primary | ICD-10-CM

## 2023-02-03 DIAGNOSIS — E66.01 MORBID OBESITY (H): Chronic | ICD-10-CM

## 2023-02-03 DIAGNOSIS — E66.9 OBESITY WITH SERIOUS COMORBIDITY, UNSPECIFIED CLASSIFICATION, UNSPECIFIED OBESITY TYPE: ICD-10-CM

## 2023-02-03 DIAGNOSIS — R60.0 BILATERAL LEG EDEMA: ICD-10-CM

## 2023-02-03 PROCEDURE — 99214 OFFICE O/P EST MOD 30 MIN: CPT | Mod: 93 | Performed by: PHYSICIAN ASSISTANT

## 2023-02-03 RX ORDER — BUPROPION HYDROCHLORIDE 150 MG/1
150 TABLET ORAL EVERY MORNING
Qty: 60 TABLET | Refills: 0 | Status: SHIPPED | OUTPATIENT
Start: 2023-02-03 | End: 2023-03-20

## 2023-02-03 RX ORDER — LIRAGLUTIDE 6 MG/ML
1.8 INJECTION SUBCUTANEOUS DAILY
Qty: 6 ML | Refills: 0 | Status: SHIPPED | OUTPATIENT
Start: 2023-02-03 | End: 2023-02-06

## 2023-02-03 RX ORDER — FUROSEMIDE 40 MG
TABLET ORAL
Qty: 180 TABLET | Refills: 1 | Status: SHIPPED | OUTPATIENT
Start: 2023-02-03 | End: 2023-06-27

## 2023-02-03 ASSESSMENT — PATIENT HEALTH QUESTIONNAIRE - PHQ9: SUM OF ALL RESPONSES TO PHQ QUESTIONS 1-9: 8

## 2023-02-03 NOTE — TELEPHONE ENCOUNTER
She's due for a recheck of her depression. Work her in to my schedule today for a virtual visit. I'll take care of all necessary refills for her during the visit.

## 2023-02-03 NOTE — TELEPHONE ENCOUNTER
Patient is already scheduled for an appointment now.    Marah RN BSN  Triage Nurse  [facility]: [facility]  Ph: [phone]

## 2023-02-03 NOTE — PROGRESS NOTES
Cody is a 57 year old who is being evaluated via a billable telephone visit.      What phone number would you like to be contacted at? [phone]  How would you like to obtain your AVS? Marcia    Distant Location (provider location):  On-site        Subjective   Cody is a 57 year old, presenting for the following health issues:  No chief complaint on file.      HPI     Medication Followup of Lasix and Victoza    Taking Medication as prescribed: yes    Recheck of her depression. Doing better , but her weight is still getting her down. Has lost 9 lbs.   Some anhedonia.  Edema is doing well.     Review of Systems   Constitutional, HEENT, cardiovascular, pulmonary, GI, , musculoskeletal, neuro, skin, endocrine and psych systems are negative, except as otherwise noted.      Objective           Vitals:  No vitals were obtained today due to virtual visit.    Physical Exam   healthy, alert and no distress  PSYCH: Alert and oriented times 3; coherent speech, normal   rate and volume, able to articulate logical thoughts, able   to abstract reason, no tangential thoughts, no hallucinations   or delusions  Her affect is normal  RESP: No cough, no audible wheezing, able to talk in full sentences  Remainder of exam unable to be completed due to telephone visits    Inga was seen today for recheck medication.    Diagnoses and all orders for this visit:    Major depressive disorder, recurrent episode, mild (H)  -     buPROPion (WELLBUTRIN XL) 150 MG 24 hr tablet; Take 1 tablet (150 mg) by mouth every morning    Morbid obesity (H)  -     liraglutide (VICTOZA) 18 MG/3ML solution; Inject 1.8 mg Subcutaneous daily    Bilateral leg edema  -     furosemide (LASIX) 40 MG tablet; Take one tab in the morning and one tab at noon    Obesity with serious comorbidity, unspecified classification, unspecified obesity type  -     buPROPion (WELLBUTRIN XL) 150 MG 24 hr tablet; Take 1 tablet (150 mg) by mouth every morning      Add in  wellbutrin. Recheck in 6 wks.  work on lifestyle modification          Phone call duration: 10 minutes

## 2023-02-06 ENCOUNTER — TELEPHONE (OUTPATIENT)
Dept: FAMILY MEDICINE | Facility: CLINIC | Age: 58
End: 2023-02-06
Payer: COMMERCIAL

## 2023-02-06 DIAGNOSIS — E66.01 MORBID OBESITY (H): Chronic | ICD-10-CM

## 2023-02-06 NOTE — TELEPHONE ENCOUNTER
Patient requesting additional Victoza (18 mg/3ml); states 6 ml received only covers 20 days(patient confirmed picking up 6 ml); needs additional 3 ml to cover one month.    stephane'd below.    Janice Bruce RN

## 2023-02-08 RX ORDER — LIRAGLUTIDE 6 MG/ML
1.8 INJECTION SUBCUTANEOUS DAILY
Qty: 3 ML | Refills: 0 | Status: SHIPPED | OUTPATIENT
Start: 2023-02-08 | End: 2023-02-21

## 2023-02-09 ENCOUNTER — OFFICE VISIT (OUTPATIENT)
Dept: PULMONOLOGY | Facility: CLINIC | Age: 58
End: 2023-02-09
Payer: COMMERCIAL

## 2023-02-09 VITALS
SYSTOLIC BLOOD PRESSURE: 118 MMHG | DIASTOLIC BLOOD PRESSURE: 79 MMHG | HEART RATE: 78 BPM | OXYGEN SATURATION: 95 % | RESPIRATION RATE: 18 BRPM

## 2023-02-09 DIAGNOSIS — Z72.0 TOBACCO ABUSE: Primary | ICD-10-CM

## 2023-02-09 PROCEDURE — G0296 VISIT TO DETERM LDCT ELIG: HCPCS | Performed by: INTERNAL MEDICINE

## 2023-02-09 PROCEDURE — 99215 OFFICE O/P EST HI 40 MIN: CPT | Mod: 25 | Performed by: INTERNAL MEDICINE

## 2023-02-09 ASSESSMENT — PAIN SCALES - GENERAL: PAINLEVEL: NO PAIN (0)

## 2023-02-09 NOTE — PROGRESS NOTES
Pulmonary Clinic Return Patient Visit  Reason for Visit: COPD  History of Present Illness  Inga Ledesma  is a pleasant 57 yr old male with a history of  COPD and active smoker who presents to clinic for follow up for same. I saw last in clinic in 8/2022  To briefly review, Cody is a longtime smoker who continues to be active, was diagnosed with COPD over a decade ago.  She is currently on a regimen of nebulized Yuperli as well as high-dose Advair and as needed nebulized albuterol/ipratropium and rescue inhalers but still continues to remain very symptomatic. Emphasis has been made on smoking cessation for symptom control and her therapy escalated to/by adding chronic azithromycin at 500 mg on Monday Wednesdays and Fridays.  Historically, she has had frequent AECOPD's and high dependence on prednisone and antibiotics frequently.  Doing better. SOB and cough have improved and she is less symptomatic.  She appears to be nonadherent with her bronchodilators and no longer uses inhaler/nebulizer therapy with Yuperli nebs nor high-dose Advair. She tells me that using inhalers or nebs are very cumbersome. There has been no AECOPDs since the last clinic visit. Now smokes 6 cigs per day. No AECOPDs since the last clinic visit. She is not reliant on her rescue inhalers  Still has dysphonia which is stable  Has no issues with her new home since moving into a new building and works for the [employer] office and enjoys crocheting and watching television.  Mother has cancer otherwise no other history of chronic lung disease in the family.  No family history of lung cancer    Review of Systems:  10 of 14 systems reviewed and are negative unless otherwise stated in HPI.    Past Medical History:   Diagnosis Date     Acute pain of left shoulder 05/20/2021     LALA (acute kidney injury) (H) 05/28/2018     Alcohol abuse      Alcohol dependence with acute alcoholic intoxication (H) 11/19/2020     Alcohol withdrawal (H)  10/28/2017     Alcohol withdrawal seizure (H) 03/2016     Alcoholic hepatitis without ascites 12/05/2013     Cancer of labia majora (H) 1987     Cellulitis of right lower extremity 03/13/2021     Cervical dysplasia 1987     Congestive heart failure (H) 05/16/2016     COPD (chronic obstructive pulmonary disease) (H)      COPD exacerbation (H) 09/04/2018     CVA (cerebral infarction) 01/2012     Dependent edema      Depression, major      Depressive disorder 1966     GERD (gastroesophageal reflux disease)      Hyperlipidemia LDL goal < 160      Hypertension      Iron deficiency anemia      Menorrhagia 05/10/2010     Pneumonia 07/10/2021     RLS (restless legs syndrome)      Sacroiliitis (H)     steroid injections ineffective, chronic low back pain     Sepsis (H) 06/18/2018     Tobacco abuse      Uncomplicated asthma      Vitamin D deficiencies        Past Surgical History:   Procedure Laterality Date     AS BIOPSY/EXCISION LYMPH NODE OPEN SUPERFICIAL       COLONOSCOPY       COLONOSCOPY N/A 4/13/2022    Procedure: COLONOSCOPY;  Surgeon: Mike Garcia MD;  Location:  GI     EYE SURGERY       HYSTERECTOMY  2010     HYSTERECTOMY TOTAL ABDOMINAL  7/28/10    Bilateral salpingectomy.  ovaries conserved.     LASER TX, CERVICAL  1987     LYMPH NODE BIOPSY  2007    inguinal     LYSIS OF LABIAL LESION(S)  1985, 1987       Family History   Problem Relation Age of Onset     Depression/Anxiety Mother      Asthma Mother      Cerebrovascular Disease Father      Hypertension Father      Lung Cancer Father      Alcoholism Father      Breast Cancer Paternal Aunt      Other Cancer Other      Depression Other      Anxiety Disorder Other      Mental Illness Other      Substance Abuse Other      Asthma Other      Obesity Sister      Obesity Son      Suicide Paternal Uncle        Social History     Socioeconomic History     Marital status:      Spouse name: Not on file     Number of children: 1     Years of education: 13      Highest education level: Not on file   Occupational History     Employer: [employer]   Tobacco Use     Smoking status: Current Some Day Smoker     Packs/day: 0.25     Years: 34.00     Pack years: 8.50     Types: Cigarettes     Start date: 1979     Last attempt to quit: 10/3/2012     Years since quittin.3     Smokeless tobacco: Current User     Types: Chew     Tobacco comment: 5 cigarettes daily   Vaping Use     Vaping Use: Former     Quit date: 2022   Substance and Sexual Activity     Alcohol use: No     Comment: 1 pint of vodka per day, last drink aug 2018     Drug use: No     Sexual activity: Not Currently   Other Topics Concern     Parent/sibling w/ CABG, MI or angioplasty before 65F 55M? No   Social History Narrative    Lives in [place] with her son in a trailer no access to guns or weapons works for the [employer] office and enjoys crocheting and watching television.     Social Determinants of Health     Financial Resource Strain: Not on file   Food Insecurity: Not on file   Transportation Needs: Not on file   Physical Activity: Not on file   Stress: Not on file   Social Connections: Not on file   Intimate Partner Violence: Not on file   Housing Stability: Not on file         Allergies   Allergen Reactions     Bee Venom Anaphylaxis     Reglan [Metoclopramide Hcl] Other (See Comments)     Body tenses up, Dystonia     Doxycycline Rash         Current Outpatient Medications:      acetaminophen (TYLENOL) 500 MG tablet, 1-2 tablets by mouth every 4-6 hours as needed for pain, max dose 4000 mg in 24 hours, Disp: 100 tablet, Rfl: 3     albuterol (VENTOLIN HFA) 108 (90 Base) MCG/ACT inhaler, INHALE ONE TO TWO PUFFS BY MOUTH EVERY 4 HOURS AS NEEDED FOR SHORTNESS OF BREATH, DIFFICULTY BREATHING OR WHEEZING., Disp: 54 g, Rfl: 3     amitriptyline (ELAVIL) 50 MG tablet, Take 2 tablets (100 mg) by mouth At Bedtime, Disp: 180 tablet, Rfl: 3     ARIPiprazole (ABILIFY) 5 MG tablet, Take 2 tablets (10  mg) by mouth At Bedtime, Disp: 180 tablet, Rfl: 3     aspirin (ASPIRIN LOW DOSE) 81 MG EC tablet, Take 1 tablet (81 mg) by mouth daily, Disp: 90 tablet, Rfl: 3     atorvastatin (LIPITOR) 10 MG tablet, Take 1 tablet (10 mg) by mouth At Bedtime, Disp: 90 tablet, Rfl: 3     azithromycin (ZITHROMAX) 250 MG tablet, Take 2 tablets (500 mg) by mouth Every Mon, Wed, Fri Morning, Disp: 36 tablet, Rfl: 3     buPROPion (WELLBUTRIN XL) 150 MG 24 hr tablet, Take 1 tablet (150 mg) by mouth every morning, Disp: 60 tablet, Rfl: 0     calcium carbonate 500 mg, elemental, (OSCAL) 500 MG tablet, Take 1 tablet (500 mg) by mouth daily, Disp: 90 tablet, Rfl: 3     cetirizine (ZYRTEC) 10 MG tablet, Take 1 tablet (10 mg) by mouth daily, Disp: 90 tablet, Rfl: 3     EPINEPHrine (ANY BX GENERIC EQUIV) 0.3 MG/0.3ML injection 2-pack, INJECT AS DIRECTED AS NEEDED FOR ALLERGIC REACTION, Disp: 2 each, Rfl: 10     famotidine (PEPCID) 40 MG tablet, Take 1 tablet (40 mg) by mouth daily At Bedtime, Disp: 90 tablet, Rfl: 3     ferrous sulfate (FEROSUL) 325 (65 Fe) MG tablet, Take 1 tablet (325 mg) by mouth 2 times daily, Disp: 180 tablet, Rfl: 3     fluconazole (DIFLUCAN) 100 MG tablet, TAKE 1 TABLET BY MOUTH DAILY, Disp: 15 tablet, Rfl: 10     FLUoxetine (PROZAC) 40 MG capsule, TAKE 2 CAPSULES (80MG) BY MOUTH ONCE DAILY., Disp: 180 capsule, Rfl: 3     fluticasone (FLONASE) 50 MCG/ACT nasal spray, INSTILL ONE (1) SPRAY IN EACH NOSTRIL DAILY, Disp: 16 g, Rfl: 10     fluticasone-salmeterol (ADVAIR DISKUS) 500-50 MCG/DOSE inhaler, INHALE ONE PUFF BY MOUTH EVERY 12 HOURS, Disp: 3 each, Rfl: 3     folic acid (FOLVITE) 1 MG tablet, Take 1 tablet (1,000 mcg) by mouth daily, Disp: 90 tablet, Rfl: 3     furosemide (LASIX) 40 MG tablet, Take one tab in the morning and one tab at noon, Disp: 180 tablet, Rfl: 1     gabapentin (NEURONTIN) 600 MG tablet, TAKE 1 TABLET BY MOUTH THREE TIMES DAILY, Disp: 270 tablet, Rfl: 3     hydrALAZINE (APRESOLINE) 10 MG tablet,  TAKE 1 TABLET BY MOUTH 3 TIMES DAILY, Disp: 270 tablet, Rfl: 1     hydrALAZINE (APRESOLINE) 25 MG tablet, 1 tablet by Oral or Feeding Tube route 3 times daily, Disp: , Rfl:      hydrOXYzine (ATARAX) 25 MG tablet, TAKE 1 TABLET BY MOUTH THREE TIMES DAILY AS NEEDED FOR ITCHING, Disp: 90 tablet, Rfl: 10     insulin pen needle (31G X 6 MM) 31G X 6 MM miscellaneous, Use 1 pen needles daily or as directed for use with victoza., Disp: 90 each, Rfl: 1     ipratropium - albuterol 0.5 mg/2.5 mg/3 mL (DUONEB) 0.5-2.5 (3) MG/3ML neb solution, INHALE ONE VIAL BY NEBULIZATION FOUR TIMES DAILY AS NEEDED FOR WHEEZING, Disp: 360 mL, Rfl: 3     labetalol (NORMODYNE) 100 MG tablet, TAKE 1 TABLET BY MOUTH TWICE DAILY., Disp: 180 tablet, Rfl: 3     liraglutide (VICTOZA) 18 MG/3ML solution, Inject 1.8 mg Subcutaneous daily, Disp: 3 mL, Rfl: 0     magnesium oxide (MAG-OX) 400 MG tablet, Take 1 tablet (400 mg) by mouth daily, Disp: 90 tablet, Rfl: 3     omeprazole (PRILOSEC) 20 MG DR capsule, Take 1 capsule twice a day before meals, Disp: 180 capsule, Rfl: 3     potassium chloride ER (KLOR-CON M) 10 MEQ CR tablet, Take 1 tablet (10 mEq) by mouth daily, Disp: 90 tablet, Rfl: 3     Revefenacin (YUPELRI) 175 MCG/3ML SOLN, Inhale 3 mLs (175 mcg) into the lungs daily Nebulize and inhale 1 vial (3 ml) by mouth and into lungs once daily, Disp: 270 mL, Rfl: 3     rOPINIRole (REQUIP) 1 MG tablet, TAKE 1 TABLET BY MOUTH THREE TIMES DAILY, Disp: 270 tablet, Rfl: 3     vitamin D3 (VITAMIN D3) 50 mcg (2000 units) tablet, Take 1 tablet (50 mcg) by mouth daily, Disp: 90 tablet, Rfl: 3      Physical Exam:  [facility] 06/02/2010   GENERAL: Well developed, well nourished, alert, and in no apparent distress.  HEENT: Normocephalic, atraumatic. PERRL, EOMI. Oral mucosa is moist. No perioral cyanosis.  NECK: supple, no masses, no thyromegaly.  RESP:  Normal respiratory effort.  CTAB.  No rales, wheezes, rhonchi.  No cyanosis or clubbing.  CV: Normal S1, S2, regular  rhythm, normal rate. No murmur.  No LE edema.   ABDOMEN:  Soft, non-tender, non-distended.   SKIN: warm and dry. No rash.  NEURO: AAOx3.  Normal gait.  Fluent speech.  PSYCH: mentation appears normal.       Results:  PFTs: Interval improvement with lung function.  Most Recent [facility] Pulmonary Function Testing    FVC-Pred   Date Value Ref Range Status   08/04/2022 2.87 L      FVC-Pre   Date Value Ref Range Status   08/04/2022 1.61 L      FVC-%Pred-Pre   Date Value Ref Range Status   08/04/2022 56 %      FEV1-Pre   Date Value Ref Range Status   08/04/2022 1.15 L      FEV1-%Pred-Pre   Date Value Ref Range Status   08/04/2022 50 %      FEV1FVC-Pred   Date Value Ref Range Status   08/04/2022 80 %      FEV1FVC-Pre   Date Value Ref Range Status   08/04/2022 72 %      No results found for: 20029  FEFMax-Pred   Date Value Ref Range Status   08/04/2022 5.94 L/sec      FEFMax-Pre   Date Value Ref Range Status   08/04/2022 2.86 L/sec      FEFMax-%Pred-Pre   Date Value Ref Range Status   08/04/2022 48 %      ExpTime-Pre   Date Value Ref Range Status   08/04/2022 8.08 sec      FIFMax-Pre   Date Value Ref Range Status   08/04/2022 1.43 L/sec      FEV1FEV6-Pred   Date Value Ref Range Status   08/04/2022 82 %      FEV1FEV6-Pre   Date Value Ref Range Status   08/04/2022 72 %      No results found for: 20055  Imaging (personally reviewed in clinic today): CT Chest 09/01/2021  Impression:   1. ACR Assessment Category (v1.1):  Lung-RADS Category 2. Benign  appearance or behavior.     Recommendation:  Lung-RADS Category 2. Benign appearance or behavior.  Recommendation:  continue annual screening with Lung cancer screening  CT (please order exam code [id number]).    2. Significant Incidental Finding(s):  Category S: Yes.  a.  Multifocal, upper lobe predominant groundglass centrilobular  nodules , consistent with respiratory bronchiolitis or infection.  b. Mild compression deformity of T5 vertebral body.  3. Any moderate or severe Emphysema  or bronchial wall thickening or  mosaic attenuation? No   4. Avoidance of tobacco smoke is strongly advised. Please consider  referral for smoking cessation to [facility] Medication Therapy Management  (MTM) if clinically appropriate.    Echocardiogram 06/18/2018  Interpretation Summary  Technically difficult study.Extremely poor acoustic windows.  Global and regional left ventricular function is normal with an EF of 60-65%.  No regional wall motion abnormalities are seen.  Right ventricular function, chamber size, wall motion, and thickness are  normal.  The inferior vena cava is normal.  No pericardial effusion is present.  _____________________________________________________________________________  Assessment and Plan:   COPD (Group D)/Respiratory bronchiolitis  Still symptomatic with CAT score of 22 improved from 31 at last clinic visit. She appears to be nonadherent with her bronchodilators and no longer uses inhaler/nebulizer therapy with Yuperli nebs nor high-dose Advair. She tells me that using inhalers or nebs are very cumbersome. I will go ahead and simplify her regimen and I have asked that she uses her Advair in a scheduled fashion- twice daily to derive the maximal benefit. I will go ahead and stop Yuperli as she is not using it. She can continue with nebulized albuterol/ipratropium and azithromycin 500 mg on MWF.  Unfortunately, she continues to smoke.  She smokes about 6 cigs/day.  Long conversation about smoking cessation as it pertains to decreased mortality, improved symptoms and preservation of lung function.  She has tried Chantix and was intolerant and doesn't appear to be ready to quit at this time.  Active Smoker  Smoking cessation as above and I spent 10 minutes.   This Smartset fulfills all insurance requirements, including:   Lung Cancer Screening Shared Decision Making Visit     Inga Ledesma is eligible for lung cancer screening on the basis of:   has not not experienced symptoms suggestive  of lung cancer.   born on 1965, 56 year old years old.   smoked 1 packs of cigarettes for 30 years for a total of 30 pack-years   has not quit smoking   I have discussed with patient the risks and benefits of screening for lung cancer with low-dose CT.     The risks include:   radiation exposure    false positives     over-diagnosis    The benefit of early detection of lung cancer is contingent upon adherence to annual screening or more frequent follow up if indicated.     Furthermore, reaping the benefits of screening requires Inga Ledesma to be willing and able to undergo diagnostic procedures, if indicated.     We did discuss that the only way to prevent lung cancer is to not smoke. Smoking cessation assistance was offered.    Questions and concerns were answered to the patient's satisfaction.  she was provided with my contact information should new questions or concerns arise in the interim.  She should return to clinic in 7 months  Up to date on vaccinations.  I spent a total of 40 minutes face to face with Inga Ledesma during today's office visit excluding time spent for lung cancer screening. Over 50% of this time was spent counseling the patient and/or coordinating care regarding their pulmonary disease.    Kristi Webb MD  Pulmonary, Critical Care and Sleep Medicine  [facility] [employer] [facility]  Pager: [phone]        The above note was dictated using voice recognition software and may include typographical errors. Please contact the author for any clarifications.

## 2023-02-09 NOTE — PROGRESS NOTES
Inga Ledesma's goals for this visit include: Return  She requests these members of her care team be copied on today's visit information: PCP    PCP: Min [place]    Referring Provider:  No referring provider defined for this encounter.    /79   Pulse 78   Resp 18   LMP 06/02/2010   SpO2 95%     Do you need any medication refills at today's visit? OLEG Angel LPN  Pulmonary Medicine:  [facility]  Phone: [phone] Fax: [phone]

## 2023-02-20 DIAGNOSIS — E66.01 MORBID OBESITY (H): Chronic | ICD-10-CM

## 2023-02-21 RX ORDER — LIRAGLUTIDE 6 MG/ML
1.8 INJECTION SUBCUTANEOUS DAILY
Qty: 3 ML | Refills: 0 | Status: SHIPPED | OUTPATIENT
Start: 2023-02-21 | End: 2023-02-28

## 2023-02-23 RX ORDER — LIRAGLUTIDE 6 MG/ML
1.8 INJECTION SUBCUTANEOUS DAILY
Qty: 9 ML | Refills: 1 | OUTPATIENT
Start: 2023-02-23 | End: 2024-08-08

## 2023-02-23 NOTE — TELEPHONE ENCOUNTER
Patient needs 3 pens per time sent and only one pen was sent to the pharmacy againm    Routed to PCP.      Marah RN BSN  Triage Nurse  [facility]: [facility]  Ph: [phone]

## 2023-02-26 DIAGNOSIS — J43.2 CENTRILOBULAR EMPHYSEMA (H): ICD-10-CM

## 2023-02-27 DIAGNOSIS — E66.01 MORBID OBESITY (H): Chronic | ICD-10-CM

## 2023-02-27 NOTE — TELEPHONE ENCOUNTER
Medication:     azithromycin (ZITHROMAX) 250 MG tablet 36 tablet 3 4/4/2022  No   Sig - Route: Take 2 tablets (500 mg) by mouth Every Mon, Wed, Fri Morning - Oral     Date last written: 04/04/2022  Dispensed amount: 36  Refills: 3    Pt's last office visit: 02/09/2023  Next scheduled office visit: Unknown      Erika Angel LPN  Pulmonary Medicine:  [facility]  Phone: [phone] Fax: [phone]

## 2023-02-27 NOTE — TELEPHONE ENCOUNTER
Pt calling in regards to her medication. Stating she called for a script last week and PCP did not write it correctly.     Pt is calling in regard to her Victoza      Pt states her current pen only last 10 day days and she is not out yet but will be . There are no refills on files.     Can you send refills in ?     Taylor Frey RN  [facility]

## 2023-02-28 RX ORDER — LIRAGLUTIDE 6 MG/ML
1.8 INJECTION SUBCUTANEOUS DAILY
Qty: 3 ML | Refills: 0 | Status: SHIPPED | OUTPATIENT
Start: 2023-02-28 | End: 2023-03-10

## 2023-03-01 RX ORDER — AZITHROMYCIN 250 MG/1
250 TABLET, FILM COATED ORAL
Qty: 72 TABLET | Refills: 3 | Status: SHIPPED | OUTPATIENT
Start: 2023-03-01 | End: 2024-01-24

## 2023-03-10 DIAGNOSIS — E66.01 MORBID OBESITY (H): Chronic | ICD-10-CM

## 2023-03-10 RX ORDER — LIRAGLUTIDE 6 MG/ML
1.8 INJECTION SUBCUTANEOUS DAILY
Qty: 9 ML | Refills: 0 | Status: SHIPPED | OUTPATIENT
Start: 2023-03-10 | End: 2023-04-12

## 2023-03-10 NOTE — TELEPHONE ENCOUNTER
Patient calling, she would like a 30 day supply of liraglutide (VICTOZA) 18 MG/3ML solution sent to her pharmacy.

## 2023-03-10 NOTE — TELEPHONE ENCOUNTER
I see 3 ml victoza sent to pharmacy on 2/28/23.   This is a 10 day supply?   9 ml would be 30 day supply (uses 0.3 mls daily)    Patient had a phone visit with PCP on 2/3/23.    Recheck in 6 weeks advised.  Not yet scheduled.    I stephane'd Rx for 30 day supply and routed to refill pool for protocol.    Belen Pringle RN  [facility]

## 2023-03-10 NOTE — TELEPHONE ENCOUNTER
"Requested Prescriptions   Pending Prescriptions Disp Refills     liraglutide (VICTOZA) 18 MG/3ML solution 9 mL 0     Sig: Inject 1.8 mg Subcutaneous daily       GLP-1 Agonists Protocol Failed - 3/10/2023 11:10 AM        Failed - HgbA1C in past 3 or 6 months     If HgbA1C is 8 or greater, it needs to be on file within the past 3 months.  If less than 8, must be on file within the past 6 months.     Recent Labs   Lab Test 07/28/21  0946   A1C 5.5             Passed - Medication is active on med list        Passed - Patient is age 18 or older        Passed - No active pregnancy on record        Passed - Normal serum creatinine on file in past 12 months     Recent Labs   Lab Test 12/26/22  1537 04/28/17  1352 06/22/16  1914   CR 0.90   < >  --    CREAT  --   --  1.2*    < > = values in this interval not displayed.       Ok to refill medication if creatinine is low          Passed - No positive pregnancy test in past 12 months        Passed - Recent (6 mo) or future (30 days) visit within the authorizing provider's specialty     Patient had office visit in the last 6 months or has a visit in the next 30 days with authorizing provider.  See \"Patient Info\" tab in inbasket, or \"Choose Columns\" in Meds & Orders section of the refill encounter.               Fails RN protocol, routed to PCP to send the one month Rx.    Belen Pringle RN  [facility]      "

## 2023-03-17 ENCOUNTER — TELEPHONE (OUTPATIENT)
Dept: FAMILY MEDICINE | Facility: CLINIC | Age: 58
End: 2023-03-17
Payer: COMMERCIAL

## 2023-03-17 DIAGNOSIS — F33.0 MAJOR DEPRESSIVE DISORDER, RECURRENT EPISODE, MILD (H): ICD-10-CM

## 2023-03-17 DIAGNOSIS — E66.9 OBESITY WITH SERIOUS COMORBIDITY, UNSPECIFIED CLASSIFICATION, UNSPECIFIED OBESITY TYPE: ICD-10-CM

## 2023-03-17 DIAGNOSIS — E66.01 MORBID OBESITY (H): Chronic | ICD-10-CM

## 2023-03-17 RX ORDER — LIRAGLUTIDE 6 MG/ML
1.8 INJECTION SUBCUTANEOUS DAILY
Qty: 9 ML | Refills: 0 | Status: CANCELLED | OUTPATIENT
Start: 2023-03-17

## 2023-03-17 NOTE — TELEPHONE ENCOUNTER
Patient also requesting bupropion  Be sent to Mail order.    Pended    Gavin Holcomb RN, BSN, PHN  [facility]

## 2023-03-17 NOTE — TELEPHONE ENCOUNTER
Please call patient to inform of refill for Victoza Sent on 3/10/23.     She does not appear to use her My Chart.     Nasrin Leroy RN BSN  [facility]

## 2023-03-17 NOTE — TELEPHONE ENCOUNTER
Patient called and stated that PCP was going to change her Abilify to 10 mg.  She just received her mail order scripts, and it is still at 5 mgs.    Routed to PCP to advise and send 10 mg's if appropriate.    Marah HEMPHILL BSN  Triage Nurse  [facility]: [facility]  Ph: [phone]

## 2023-03-20 RX ORDER — ARIPIPRAZOLE 10 MG/1
10 TABLET ORAL DAILY
Qty: 90 TABLET | Refills: 0 | Status: SHIPPED | OUTPATIENT
Start: 2023-03-20 | End: 2023-05-02

## 2023-03-20 RX ORDER — BUPROPION HYDROCHLORIDE 150 MG/1
150 TABLET ORAL EVERY MORNING
Qty: 90 TABLET | Refills: 1 | Status: SHIPPED | OUTPATIENT
Start: 2023-03-20 | End: 2023-06-22

## 2023-03-20 NOTE — TELEPHONE ENCOUNTER
Rx's routed to her mail order pharmacy. She is due for a virtual visit with me to recheck her depression. Help her to schedule an appt.

## 2023-03-20 NOTE — TELEPHONE ENCOUNTER
Pt returned phone call. Looks like it may have been about scheduling a virtual appt with pcp. Appt has been set up.

## 2023-03-21 ENCOUNTER — VIRTUAL VISIT (OUTPATIENT)
Dept: FAMILY MEDICINE | Facility: CLINIC | Age: 58
End: 2023-03-21
Payer: COMMERCIAL

## 2023-03-21 DIAGNOSIS — E66.9 OBESITY WITH SERIOUS COMORBIDITY, UNSPECIFIED CLASSIFICATION, UNSPECIFIED OBESITY TYPE: Primary | ICD-10-CM

## 2023-03-21 DIAGNOSIS — F51.04 PSYCHOPHYSIOLOGICAL INSOMNIA: ICD-10-CM

## 2023-03-21 PROCEDURE — 99442 PR PHYSICIAN TELEPHONE EVALUATION 11-20 MIN: CPT | Mod: 93 | Performed by: PHYSICIAN ASSISTANT

## 2023-03-21 RX ORDER — QUETIAPINE FUMARATE 50 MG/1
50-100 TABLET, FILM COATED ORAL AT BEDTIME
Qty: 60 TABLET | Refills: 1 | Status: SHIPPED | OUTPATIENT
Start: 2023-03-21 | End: 2023-04-12

## 2023-03-21 RX ORDER — PHENTERMINE HYDROCHLORIDE 15 MG/1
15 CAPSULE ORAL EVERY MORNING
Qty: 30 CAPSULE | Refills: 0 | Status: SHIPPED | OUTPATIENT
Start: 2023-03-21 | End: 2023-04-18

## 2023-03-21 NOTE — PROGRESS NOTES
Cody is a 57 year old who is being evaluated via a billable telephone visit.      What phone number would you like to be contacted at? [phone]  How would you like to obtain your AVS? Marcia    Distant Location (provider location):  Off-site        Subjective   Cody is a 57 year old presenting for the following health issues:  Mental Health Problem (Wellbutrin recheck)      HPI     Medication Followup of  Bupropion    Taking Medication as prescribed: yes    Side Effects:  None    Medication Helping Symptoms:  Not really helping    Her mood is fine/stable.  Has lost 10 lbs with use of victoza and bupropion.  Discussed a lower calorie diet. Also, discussed exercise prescription.   TM walking, some calisthenics     Not sleeping well at [facility].  Review of Systems   Constitutional, HEENT, cardiovascular, pulmonary, GI, , musculoskeletal, neuro, skin, endocrine and psych systems are negative, except as otherwise noted.      Objective           Vitals:  No vitals were obtained today due to virtual visit.    Physical Exam   healthy, alert and no distress  PSYCH: Alert and oriented times 3; coherent speech, normal   rate and volume, able to articulate logical thoughts, able   to abstract reason, no tangential thoughts, no hallucinations   or delusions  Her affect is normal  RESP: No cough, no audible wheezing, able to talk in full sentences  Remainder of exam unable to be completed due to telephone visits    Inga was seen today for mental health problem.    Diagnoses and all orders for this visit:    Obesity with serious comorbidity, unspecified classification, unspecified obesity type  -     phentermine (ADIPEX-P) 15 MG capsule; Take 1 capsule (15 mg) by mouth every morning    Psychophysiological insomnia  -     QUEtiapine (SEROQUEL) 50 MG tablet; Take 1-2 tablets ( mg) by mouth At Bedtime    stop amitriptyline   Exercise.   Lower calorie diet      Requesting a hospital bed.  Sleeping in and inclined  position allows her to not be so sob and elevating foot of bed helps minimize her lymphedema.  Phone call duration: 20 minutes

## 2023-03-31 ENCOUNTER — TELEPHONE (OUTPATIENT)
Dept: FAMILY MEDICINE | Facility: CLINIC | Age: 58
End: 2023-03-31
Payer: COMMERCIAL

## 2023-03-31 NOTE — TELEPHONE ENCOUNTER
"Called pt at [phone], pt stated that she had a cough for a few days now. No available appointments for today. Offered pt to see provider (Min) on Monday 4/3/23 or Tuesday 4/4/23, pt declined office visit. Pt declines to go to urgent care or ER at this time. Pt stated she will wait until Min addresses her concern on Monday 4/3/23.    Pt stated that \"Min knows what to give me when I am wheezing\". Pt insisted writer to send Min message about her wheezing so he can prescribe her a medication without an appointment.     Routing to provider to address.    Kianna Becker, RN        "

## 2023-03-31 NOTE — TELEPHONE ENCOUNTER
"Patient called said she is starting to \"wheeze\" bad cough  wondering if she could get prescribed something before it gets worse.  Pattie Chiu.  Thank you!    "

## 2023-04-02 DIAGNOSIS — G44.219 EPISODIC TENSION-TYPE HEADACHE, NOT INTRACTABLE: ICD-10-CM

## 2023-04-03 DIAGNOSIS — E66.9 OBESITY WITH SERIOUS COMORBIDITY, UNSPECIFIED CLASSIFICATION, UNSPECIFIED OBESITY TYPE: ICD-10-CM

## 2023-04-03 DIAGNOSIS — F33.0 MAJOR DEPRESSIVE DISORDER, RECURRENT EPISODE, MILD (H): ICD-10-CM

## 2023-04-03 RX ORDER — AMITRIPTYLINE HYDROCHLORIDE 50 MG/1
TABLET ORAL
Qty: 60 TABLET | Refills: 10 | OUTPATIENT
Start: 2023-04-03

## 2023-04-04 DIAGNOSIS — G44.89 OTHER HEADACHE SYNDROME: ICD-10-CM

## 2023-04-04 RX ORDER — BUPROPION HYDROCHLORIDE 150 MG/1
150 TABLET ORAL EVERY MORNING
Qty: 90 TABLET | Refills: 0 | Status: CANCELLED | OUTPATIENT
Start: 2023-04-04

## 2023-04-04 NOTE — TELEPHONE ENCOUNTER
Spoke with patient. PRESCRIPTION sent to mail order 3/20/23. Patient has enough medication until prescription is delivered. Patient will call [employer] and have automatic refills removed from system. Jennifer Irene MA

## 2023-04-05 ENCOUNTER — TELEPHONE (OUTPATIENT)
Dept: FAMILY MEDICINE | Facility: CLINIC | Age: 58
End: 2023-04-05
Payer: COMMERCIAL

## 2023-04-05 DIAGNOSIS — E66.9 OBESITY WITH SERIOUS COMORBIDITY, UNSPECIFIED CLASSIFICATION, UNSPECIFIED OBESITY TYPE: Primary | ICD-10-CM

## 2023-04-05 RX ORDER — ACETAMINOPHEN 500 MG
TABLET ORAL
Qty: 100 TABLET | Refills: 3 | Status: SHIPPED | OUTPATIENT
Start: 2023-04-05 | End: 2023-07-27

## 2023-04-05 NOTE — TELEPHONE ENCOUNTER
Once Wegovy is ordered, needs to be routed to PA Team.    Nasrin Leroy RN BSN  [facility]

## 2023-04-05 NOTE — TELEPHONE ENCOUNTER
[facility],    Patient called and stated she needs PA for her Wegovy.     , ok for detailed vm.     Thanks,  JOBY Ponce  [facility]

## 2023-04-05 NOTE — TELEPHONE ENCOUNTER
Patient was seen in the clinic by PCP recently.     Last prescription was written for 100 tablets/3 refills on 4/6/22.     Nasrin Leroy RN BSN  [facility]

## 2023-04-05 NOTE — TELEPHONE ENCOUNTER
[facility] PA [facility],     Patient called and stated she needs PA for her Wegovy.      , ok for detailed vm.      Thanks,  JOBY Ponce  [facility]

## 2023-04-05 NOTE — TELEPHONE ENCOUNTER
See other encounter today (4/5/23).     Nasrin Leroy RN BSN  [facility]

## 2023-04-05 NOTE — TELEPHONE ENCOUNTER
Patient reports that she gained 3 pounds in the last 2 days (211 pounds yesterday & 214 pounds today).     She is currently taking Liraglutide (VICTOZA) 18 MG/3ML solution DAILY.  Due to her weight gain, she isn't sure if this is working for her. She declines drinking extra fluids or salty foods over the past few days.     Cody asked if she can be switched over to Wegovy weekly instead.     I instructed patient to call her insurance to find out if the Wegovy would be covered by her plan and then call back.  She verbalized a good understanding and agreed with this plan.     Nasrin Leroy RN BSN  [facility]

## 2023-04-06 ENCOUNTER — TELEPHONE (OUTPATIENT)
Dept: FAMILY MEDICINE | Facility: CLINIC | Age: 58
End: 2023-04-06

## 2023-04-06 NOTE — TELEPHONE ENCOUNTER
Patient informed of the Order for the Wegovy and that we will be waiting to hear back about the Prior Authorization.     She verbalized a good understanding.     Nasrin Leroy RN BSN  [facility]

## 2023-04-06 NOTE — TELEPHONE ENCOUNTER
Prior Authorization Retail Medication Request    Medication/Dose: liraglutide (VICTOZA) 18 MG/3ML solution  ICD code (if different than what is on RX):  E66.01  Previously Tried and Failed:  na  Rationale:  na    Insurance Name:  [facility]  Insurance ID:  [id number]      Pharmacy Information (if different than what is on RX)  Name:  [facility]  Phone:  [phone]

## 2023-04-08 ENCOUNTER — HEALTH MAINTENANCE LETTER (OUTPATIENT)
Age: 58
End: 2023-04-08

## 2023-04-10 NOTE — TELEPHONE ENCOUNTER
PA Initiation    Medication: WEGOVY  Insurance Company: [employer] - Phone [phone] Fax [phone]  Pharmacy Filling the Rx: [id number] - [place], [place] - [facility] AT [facility] OF  [address]  Filling Pharmacy Phone: [phone]  Filling Pharmacy Fax: [phone]  Start Date: 4/10/2023

## 2023-04-10 NOTE — TELEPHONE ENCOUNTER
Prior Authorization Approval    Authorization Effective Date: 3/11/2023  Authorization Expiration Date: 10/7/2023  Medication: WEGOVY  Approved Dose/Quantity:  Reference #: [id number]   Insurance Company: [employer] - Phone [phone] Fax [phone]   Which Pharmacy is filling the prescription (Not needed for infusion/clinic administered): [id number] - [facility], [place] - [address] [place] [address] AT [facility] [address]  Pharmacy Notified: Yes  Patient Notified: Yes

## 2023-04-12 ENCOUNTER — VIRTUAL VISIT (OUTPATIENT)
Dept: FAMILY MEDICINE | Facility: CLINIC | Age: 58
End: 2023-04-12
Payer: COMMERCIAL

## 2023-04-12 ENCOUNTER — TELEPHONE (OUTPATIENT)
Dept: OTOLARYNGOLOGY | Facility: CLINIC | Age: 58
End: 2023-04-12

## 2023-04-12 DIAGNOSIS — F51.04 PSYCHOPHYSIOLOGICAL INSOMNIA: ICD-10-CM

## 2023-04-12 DIAGNOSIS — R42 VERTIGO: Primary | ICD-10-CM

## 2023-04-12 DIAGNOSIS — E66.01 MORBID OBESITY (H): Chronic | ICD-10-CM

## 2023-04-12 DIAGNOSIS — H93.11 TINNITUS, RIGHT: ICD-10-CM

## 2023-04-12 DIAGNOSIS — J44.1 COPD EXACERBATION (H): ICD-10-CM

## 2023-04-12 DIAGNOSIS — I87.2 VENOUS (PERIPHERAL) INSUFFICIENCY: ICD-10-CM

## 2023-04-12 DIAGNOSIS — M54.2 CERVICALGIA: ICD-10-CM

## 2023-04-12 PROCEDURE — 99442 PR PHYSICIAN TELEPHONE EVALUATION 11-20 MIN: CPT | Mod: 95 | Performed by: PHYSICIAN ASSISTANT

## 2023-04-12 RX ORDER — PREDNISONE 20 MG/1
20 TABLET ORAL 2 TIMES DAILY
Qty: 14 TABLET | Refills: 0 | Status: SHIPPED | OUTPATIENT
Start: 2023-04-12 | End: 2023-04-19

## 2023-04-12 RX ORDER — MECLIZINE HYDROCHLORIDE 25 MG/1
25 TABLET ORAL 3 TIMES DAILY PRN
Qty: 80 TABLET | Refills: 0 | Status: SHIPPED | OUTPATIENT
Start: 2023-04-12

## 2023-04-12 RX ORDER — IBUPROFEN 600 MG/1
600 TABLET, FILM COATED ORAL EVERY 6 HOURS PRN
Qty: 60 TABLET | Refills: 1 | Status: SHIPPED | OUTPATIENT
Start: 2023-04-12 | End: 2023-07-31

## 2023-04-12 RX ORDER — QUETIAPINE FUMARATE 100 MG/1
100-200 TABLET, FILM COATED ORAL AT BEDTIME
Qty: 90 TABLET | Refills: 1 | Status: SHIPPED | OUTPATIENT
Start: 2023-04-12 | End: 2023-07-10

## 2023-04-12 RX ORDER — LIRAGLUTIDE 6 MG/ML
1.8 INJECTION SUBCUTANEOUS DAILY
Qty: 6 ML | Refills: 11 | Status: SHIPPED | OUTPATIENT
Start: 2023-04-12 | End: 2023-06-22

## 2023-04-12 NOTE — PROGRESS NOTES
Cody is a 57 year old who is being evaluated via a billable telephone visit.      What phone number would you like to be contacted at? [phone]  How would you like to obtain your AVS? Marcia    Distant Location (provider location):  On-site        Subjective   Cody is a 57 year old, presenting for the following health issues:  Breathing Problem, Sleep Problem, Referral (Neurologist/Audoiologist), and RECHECK (Victoza order)         View : No data to display.              HPI     Breathing Recheck  - SOB     Sleep Problem  - recheck    Referral: vertigo  - Neurologist  - Audiologist    Recheck medication  - discuss Victoza dosage, refill needed    On victoza for 2 months. No weight loss yet.   Mild cold symptoms . Exacerbating her copd  If doesn;t lose any weight over the next 1-2 months , will change to wegovy.  Notes some vertigo positional changes. Some tinnitus. Some subjective hearing changes. No ha's. No vision changes. No unilateral paresis or paresthesias. No ear pain.      Review of Systems   Constitutional, HEENT, cardiovascular, pulmonary, GI, , musculoskeletal, neuro, skin, endocrine and psych systems are negative, except as otherwise noted.      Objective           Vitals:  No vitals were obtained today due to virtual visit.    Physical Exam   healthy, alert and no distress  PSYCH: Alert and oriented times 3; coherent speech, normal   rate and volume, able to articulate logical thoughts, able   to abstract reason, no tangential thoughts, no hallucinations   or delusions  Her affect is normal  RESP: No cough, no audible wheezing, able to talk in full sentences  Remainder of exam unable to be completed due to telephone visits    Inga was seen today for breathing problem, sleep problem, referral and recheck.    Diagnoses and all orders for this visit:    Vertigo  -     Adult ENT  Referral; Future  -     meclizine (ANTIVERT) 25 MG tablet; Take 1 tablet (25 mg) by mouth 3 times daily as  needed for dizziness    Tinnitus, right  -     Adult ENT  Referral; Future    COPD exacerbation (H)  -     predniSONE (DELTASONE) 20 MG tablet; Take 1 tablet (20 mg) by mouth 2 times daily for 7 days    Morbid obesity (H)  -     liraglutide (VICTOZA) 18 MG/3ML solution; Inject 1.8 mg Subcutaneous daily    Cervicalgia  -     ibuprofen (ADVIL/MOTRIN) 600 MG tablet; Take 1 tablet (600 mg) by mouth every 6 hours as needed for moderate pain    Psychophysiological insomnia  -     QUEtiapine (SEROQUEL) 100 MG tablet; Take 1-2 tablets (100-200 mg) by mouth At Bedtime    Venous (peripheral) insufficiency  -     Miscellaneous Order for DME - ONLY FOR DME      Bumped up seroquel.  Start prednisone  Meclizine.  work on lifestyle modification  Advised supportive and symptomatic treatment.  Follow up with Provider - if condition persists or worsens.           Phone call duration: 17 minutes

## 2023-04-12 NOTE — TELEPHONE ENCOUNTER
[facility] Call Center    Phone Message    May a detailed message be left on voicemail: yes     Reason for Call: Appointment Intake    Referring Provider Name: Min Toledo PA-C in [facility]  Diagnosis and/or Symptoms: Vertigo [R42]  Tinnitus, right [H93.11]    Action Taken: Message routed to:  [facility] ([facility] ENT    Travel Screening: Not Applicable

## 2023-04-20 ENCOUNTER — TELEPHONE (OUTPATIENT)
Dept: FAMILY MEDICINE | Facility: CLINIC | Age: 58
End: 2023-04-20
Payer: COMMERCIAL

## 2023-04-20 NOTE — TELEPHONE ENCOUNTER
Pharmacy calling requesting a refill of amitriptyline 50 mg. This has never been prescribed by PCP. They are requesting PCP to refills this. Medication shows on care everywhere that [facility] provider prescribed this.     Routed to provider to review request.     Thank you,  Hanna Cárdenas RN

## 2023-04-27 ENCOUNTER — TELEPHONE (OUTPATIENT)
Dept: FAMILY MEDICINE | Facility: CLINIC | Age: 58
End: 2023-04-27
Payer: COMMERCIAL

## 2023-04-27 DIAGNOSIS — E66.9 OBESITY WITH SERIOUS COMORBIDITY, UNSPECIFIED CLASSIFICATION, UNSPECIFIED OBESITY TYPE: ICD-10-CM

## 2023-04-27 NOTE — TELEPHONE ENCOUNTER
Reason for Call:  Other prescription    Detailed comments:  Patient requesting higher dose of phentermine (ADIPEX-P) 15 MG capsule or some else because she saying it's not working.    Phone Number Patient can be reached at: Home number on file [phone] (home)    Best Time:  Any     Can we leave a detailed message on this number? YES    Call taken on 4/27/2023 at 9:39 AM by Nancy Tobar

## 2023-04-28 RX ORDER — PHENTERMINE HYDROCHLORIDE 30 MG/1
30 CAPSULE ORAL EVERY MORNING
Qty: 30 CAPSULE | Refills: 0 | Status: SHIPPED | OUTPATIENT
Start: 2023-04-28 | End: 2023-06-06

## 2023-04-28 NOTE — TELEPHONE ENCOUNTER
I do not want her taking both seroquel and amitriptyline together. If needed ok for a virtual visit to discuss.

## 2023-04-28 NOTE — TELEPHONE ENCOUNTER
Called and spoke to the pharmacy [facility]  and relayed MD message below.     Pharmacy to discontinue the med on their end.    Pt updated as well.     Taylor Frey RN  [facility]

## 2023-05-01 ENCOUNTER — TELEPHONE (OUTPATIENT)
Dept: FAMILY MEDICINE | Facility: CLINIC | Age: 58
End: 2023-05-01
Payer: COMMERCIAL

## 2023-05-01 NOTE — TELEPHONE ENCOUNTER
Patient calling in regards of her medication Ferrous patient said that is her  iron pill, says It is not working for her and not sure what to do. Any questions please call her at [phone].  Pattie Chiu.  Thank you!

## 2023-05-02 ENCOUNTER — TELEPHONE (OUTPATIENT)
Dept: FAMILY MEDICINE | Facility: CLINIC | Age: 58
End: 2023-05-02
Payer: COMMERCIAL

## 2023-05-02 NOTE — TELEPHONE ENCOUNTER
Patient calling to check on this.    I see 30 mg phentermine Rx was sent on 4/28.    Patient will call to check on this.    Belen Pringle RN  [facility]

## 2023-05-02 NOTE — TELEPHONE ENCOUNTER
Patient calling, reports she has long term insomnia issues, takes seroquel 200 mg (2 of the 100's ) along with melatonin 25-30 mg as well as a supplement from a friend called Liu (contains Magnesium and Zinc) at around 10 pm but does not fall asleep until after midnight, gets a few hours of sleep (woke at 4 am today), just stays in bed and tries to get back to sleep when she wakes early.    Feels very tired during the day but has a hard time napping as well.      Says her room is dark and cool, she does watch TV right before bed.    I advised she try turning off computer screens and not watching TV in the hour or two prior to when she wants to sleep.   She states she will try that.    She wants to know if she can increase her seroquel dose for sleep as well.    Pharmacy selected, routed to Min Toledo to address.    Belen Pringle RN  [facility]

## 2023-05-02 NOTE — TELEPHONE ENCOUNTER
Patient calling, is worried that her iron supplement is not working well as she does not have black stools.   Is not constipated.    I see her last Hgb was normal.   She is on 325 mg iron BID.   Seems like a decent dose.    Advised she try taking it with citrus product (orange juice, etc) and avoid having dairy products too close to the dose as well.    She will try this and will ask PCP to recheck Hgb when next due/seen.    Patient verbalized understanding of and agreement with plan.    Belen Pringle RN  [facility]

## 2023-05-09 ENCOUNTER — VIRTUAL VISIT (OUTPATIENT)
Dept: FAMILY MEDICINE | Facility: CLINIC | Age: 58
End: 2023-05-09
Payer: COMMERCIAL

## 2023-05-09 DIAGNOSIS — Z12.31 VISIT FOR SCREENING MAMMOGRAM: ICD-10-CM

## 2023-05-09 DIAGNOSIS — F51.04 PSYCHOPHYSIOLOGICAL INSOMNIA: Primary | ICD-10-CM

## 2023-05-09 PROCEDURE — 99441 PR PHYSICIAN TELEPHONE EVALUATION 5-10 MIN: CPT | Mod: 93 | Performed by: PHYSICIAN ASSISTANT

## 2023-05-09 NOTE — PROGRESS NOTES
Cody is a 57 year old who is being evaluated via a billable telephone visit.      What phone number would you like to be contacted at?   How would you like to obtain your AVS? Chethart    Distant Location (provider location):  On-site        Subjective   Cody is a 57 year old, presenting for the following health issues:  Insomnia and Weight Problem (Recheck diet meds)        5/9/2023     9:02 AM   Additional Questions   Roomed by Ellen Arevalo CMA   Accompanied by None         5/9/2023     9:02 AM   Patient Reported Additional Medications   Patient reports taking the following new medications none     HPI     Insomnia  - recheck  Noting initial and middle insomnia.   Taking seroquel 200 mg at bedtime and melatonin.             Review of Systems   Constitutional, HEENT, cardiovascular, pulmonary, GI, , musculoskeletal, neuro, skin, endocrine and psych systems are negative, except as otherwise noted.      Objective           Vitals:  No vitals were obtained today due to virtual visit.    Physical Exam   healthy, alert and no distress  PSYCH: Alert and oriented times 3; coherent speech, normal   rate and volume, able to articulate logical thoughts, able   to abstract reason, no tangential thoughts, no hallucinations   or delusions  Her affect is normal  RESP: No cough, no audible wheezing, able to talk in full sentences  Remainder of exam unable to be completed due to telephone visits    Inga was seen today for insomnia and weight problem.    Diagnoses and all orders for this visit:    Psychophysiological insomnia  -     Suvorexant (BELSOMRA) 10 MG tablet; Take 1 tablet (10 mg) by mouth nightly as needed for sleep    Visit for screening mammogram  -     MA SCREENING DIGITAL BILAT - Future  (s+30); Future    Other orders  -     REVIEW OF HEALTH MAINTENANCE PROTOCOL ORDERS      Discussed appropriate sleep hygiene     Phone call duration: 8 minutes

## 2023-05-12 ENCOUNTER — TELEPHONE (OUTPATIENT)
Dept: FAMILY MEDICINE | Facility: CLINIC | Age: 58
End: 2023-05-12
Payer: COMMERCIAL

## 2023-05-12 DIAGNOSIS — F51.04 PSYCHOPHYSIOLOGICAL INSOMNIA: Primary | ICD-10-CM

## 2023-05-12 NOTE — TELEPHONE ENCOUNTER
Patient is calling regarding new sleeping medication that was prescribed to her this week via phone visit with provider. Her insurance is denying the medication and will not pay for it. Patient is wondering what is going to be done about this and if there is another medication..     She would like a call back regarding this.     [phone]    Ok to leave rah HILL,   [facility]

## 2023-05-12 NOTE — TELEPHONE ENCOUNTER
Attempted to call patient with number on file to relay provider's message below with no answer, voicemail is full and rx has not made it to pharmacy as of 5/12/23 5635.    Kianna Becker, RN

## 2023-05-14 ENCOUNTER — TELEPHONE (OUTPATIENT)
Dept: FAMILY MEDICINE | Facility: CLINIC | Age: 58
End: 2023-05-14
Payer: COMMERCIAL

## 2023-05-14 DIAGNOSIS — F51.04 PSYCHOPHYSIOLOGICAL INSOMNIA: Primary | ICD-10-CM

## 2023-05-14 NOTE — TELEPHONE ENCOUNTER
Central Prior Authorization Team   Phone: [phone]      PRIOR AUTHORIZATION DENIED    Medication: Daridorexant HCl 25 MG TABS - EPA DENIED     Denial Date: 5/13/2023    Denial Rational: Coverage of the requested drug is provided when patients have tried two or more preferred chemically unique drugs within the same drug class on the Preferred Drug List: OR the preferred medication is contraindicated or would be likely to cause an adverse reaction, decrease ability to perform daily activities, or cause physical or mental harm, OR all preferred drugs are experiencing a documented drug shortage or recall. Examplesof formulary alternatives include: ESZOPICLONE, ROZEREM, ZALEPLON, ZOLPIDEM TARTRATE IR TABLETS.            Appeal Information:

## 2023-05-15 ENCOUNTER — TELEPHONE (OUTPATIENT)
Dept: FAMILY MEDICINE | Facility: CLINIC | Age: 58
End: 2023-05-15
Payer: COMMERCIAL

## 2023-05-15 NOTE — TELEPHONE ENCOUNTER
Quviviq not covered by patient plan. The preferred alternative is ESZOPICLONE, ROZEREM, ZALEPLON, ZOLIPIDEM TARTRATE. Please send a new Rx.

## 2023-05-16 NOTE — TELEPHONE ENCOUNTER
I see PA was denied for the daridorexant (quiviviq) as well.    I see a phone encounter was routed to Min Toledo yesterday advising of covered alternatives.       Closing this encounter.    Belen Pringle RN  [facility]

## 2023-05-24 ENCOUNTER — ANCILLARY PROCEDURE (OUTPATIENT)
Dept: MAMMOGRAPHY | Facility: CLINIC | Age: 58
End: 2023-05-24
Attending: PHYSICIAN ASSISTANT
Payer: COMMERCIAL

## 2023-05-24 DIAGNOSIS — Z12.31 VISIT FOR SCREENING MAMMOGRAM: ICD-10-CM

## 2023-05-24 PROCEDURE — 77067 SCR MAMMO BI INCL CAD: CPT | Mod: TC | Performed by: RADIOLOGY

## 2023-05-25 ENCOUNTER — PATIENT OUTREACH (OUTPATIENT)
Dept: CARE COORDINATION | Facility: CLINIC | Age: 58
End: 2023-05-25
Payer: COMMERCIAL

## 2023-05-25 DIAGNOSIS — R21 RASH: ICD-10-CM

## 2023-05-26 RX ORDER — HYDROXYZINE HYDROCHLORIDE 25 MG/1
TABLET, FILM COATED ORAL
Qty: 90 TABLET | Refills: 10 | Status: SHIPPED | OUTPATIENT
Start: 2023-05-26

## 2023-05-29 RX ORDER — ZALEPLON 10 MG/1
10 CAPSULE ORAL AT BEDTIME
Qty: 30 CAPSULE | Refills: 0 | Status: SHIPPED | OUTPATIENT
Start: 2023-05-29 | End: 2023-06-27

## 2023-05-30 ENCOUNTER — TELEPHONE (OUTPATIENT)
Dept: FAMILY MEDICINE | Facility: CLINIC | Age: 58
End: 2023-05-30

## 2023-05-30 ENCOUNTER — VIRTUAL VISIT (OUTPATIENT)
Dept: FAMILY MEDICINE | Facility: CLINIC | Age: 58
End: 2023-05-30
Payer: COMMERCIAL

## 2023-05-30 DIAGNOSIS — E66.9 OBESITY WITH SERIOUS COMORBIDITY, UNSPECIFIED CLASSIFICATION, UNSPECIFIED OBESITY TYPE: Primary | ICD-10-CM

## 2023-05-30 PROCEDURE — 99442 PR PHYSICIAN TELEPHONE EVALUATION 11-20 MIN: CPT | Mod: 93 | Performed by: PHYSICIAN ASSISTANT

## 2023-05-30 ASSESSMENT — PATIENT HEALTH QUESTIONNAIRE - PHQ9: SUM OF ALL RESPONSES TO PHQ QUESTIONS 1-9: 19

## 2023-05-30 NOTE — TELEPHONE ENCOUNTER
Called patient at home number .  Did they answer the phone: No, left a message on voicemail to return call to the [facility] at [phone], and to ask for any available triage nurse.    Marah RN  Triage Nurse  [facility]: [facility]

## 2023-05-30 NOTE — PROGRESS NOTES
Cody is a 57 year old who is being evaluated via a billable telephone visit.      What phone number would you like to be contacted at? [phone]  How would you like to obtain your AVS? Chethart    Distant Location (provider location):  On-site    Subjective   Cody is a 57 year old, presenting for the following health issues:  Weight Problem (Weight loss medication not working.)  query her meds might be causing it to be a challenge re: her losing weight.   victoza has not proven helpful.      5/30/2023    12:06 PM   Additional Questions   Roomed by Andriy   Accompanied by N/A         5/30/2023    12:06 PM   Patient Reported Additional Medications   Patient reports taking the following new medications N/A     HPI     Weight loss medications not working.      Review of Systems   Constitutional, HEENT, cardiovascular, pulmonary, GI, , musculoskeletal, neuro, skin, endocrine and psych systems are negative, except as otherwise noted.      Objective           Vitals:  No vitals were obtained today due to virtual visit.    Physical Exam   healthy, alert and no distress  PSYCH: Alert and oriented times 3; coherent speech, normal   rate and volume, able to articulate logical thoughts, able   to abstract reason, no tangential thoughts, no hallucinations   or delusions  Her affect is normal  RESP: No cough, no audible wheezing, able to talk in full sentences  Remainder of exam unable to be completed due to telephone visits    Inga was seen today for weight problem.    Diagnoses and all orders for this visit:    Obesity with serious comorbidity, unspecified classification, unspecified obesity type  -     Semaglutide-Weight Management (WEGOVY) 0.25 MG/0.5ML pen; Inject 0.25 mg Subcutaneous once a week      Stop victoza and wean off of abilify. Start wegovy in place of victoza.   Work on a lower calories diet and exercise.         Phone call duration: 11 minutes

## 2023-05-30 NOTE — TELEPHONE ENCOUNTER
Patient states RX for phentermine is not working for weight loss, and she needs a dosage change or med change.     Patient also asking about medication for insomnia.  Explained Sonata was sent on 5/29.       Transferred to scheduling to make a TE with PCP regarding med change for weight loss.          Wilber Collazo, RN  Triage Nurse  [facility], [facility]

## 2023-06-01 DIAGNOSIS — E66.9 OBESITY WITH SERIOUS COMORBIDITY, UNSPECIFIED CLASSIFICATION, UNSPECIFIED OBESITY TYPE: ICD-10-CM

## 2023-06-01 RX ORDER — PHENTERMINE HYDROCHLORIDE 30 MG/1
CAPSULE ORAL
Qty: 30 CAPSULE | OUTPATIENT
Start: 2023-06-01

## 2023-06-01 NOTE — TELEPHONE ENCOUNTER
Patient called back.    She had an OV on 5/30/23, and would like to increase phentermine dosage.    Marah HEMPHILL BSN  Triage Nurse  [facility]: [facility]  Ph: [phone]

## 2023-06-01 NOTE — TELEPHONE ENCOUNTER
Patient wanted Phentermine cancelled, until she hears back from previous message regarding possible increase in dosage.    Marah RN BSN  Triage Nurse  [facility]: [facility]  Ph: [phone]

## 2023-06-01 NOTE — TELEPHONE ENCOUNTER
Notified patient.    Patient stated understanding and agreeable with the plan of care.     Marah HEMPHILL BSN  Triage Nurse  [facility]: [facility]

## 2023-06-02 NOTE — TELEPHONE ENCOUNTER
Pt calling to state that her refill was denied and heard this from pharmacy when attempting to request for refill. Pt stated that she has 4 days worth and would like a refill    Called pharmacy to confirm that this medication was denied because pt needs an appointment.     phentermine (ADIPEX-P) 30 MG capsule    Called pt to state that she would need an appointment to discuss this medication. Pt verbalized understanding and scheduled with pcp (Min) for 6/6/23.    Kianna Becker RN

## 2023-06-06 ENCOUNTER — VIRTUAL VISIT (OUTPATIENT)
Dept: FAMILY MEDICINE | Facility: CLINIC | Age: 58
End: 2023-06-06
Payer: COMMERCIAL

## 2023-06-06 DIAGNOSIS — F17.210 CIGARETTE NICOTINE DEPENDENCE WITHOUT COMPLICATION: ICD-10-CM

## 2023-06-06 DIAGNOSIS — E66.9 OBESITY WITH SERIOUS COMORBIDITY, UNSPECIFIED CLASSIFICATION, UNSPECIFIED OBESITY TYPE: ICD-10-CM

## 2023-06-06 DIAGNOSIS — E66.01 MORBID OBESITY (H): Primary | ICD-10-CM

## 2023-06-06 PROCEDURE — 99442 PR PHYSICIAN TELEPHONE EVALUATION 11-20 MIN: CPT | Mod: 95 | Performed by: PHYSICIAN ASSISTANT

## 2023-06-06 RX ORDER — TOPIRAMATE 50 MG/1
50 TABLET, FILM COATED ORAL AT BEDTIME
Qty: 90 TABLET | Refills: 1 | Status: SHIPPED | OUTPATIENT
Start: 2023-06-06 | End: 2023-11-26

## 2023-06-06 RX ORDER — PHENTERMINE HYDROCHLORIDE 37.5 MG/1
37.5 CAPSULE ORAL EVERY MORNING
Qty: 30 CAPSULE | Refills: 0 | Status: SHIPPED | OUTPATIENT
Start: 2023-06-06 | End: 2023-07-08

## 2023-06-06 NOTE — PROGRESS NOTES
Cody is a 57 year old who is being evaluated via a billable telephone visit.      What phone number would you like to be contacted at? [phone]  How would you like to obtain your AVS? Chethart  Distant Location (provider location):  On-site      Subjective   Cody is a 57 year old, presenting for the following health issues:  Recheck Medication (phentermine)        6/6/2023     3:19 PM   Additional Questions   Roomed by Ellen Arevalo CMA   Accompanied by None         6/6/2023     3:19 PM   Patient Reported Additional Medications   Patient reports taking the following new medications None     HPI     Medication Followup of Phentermine    Taking Medication as prescribed: yes    Side Effects:  None    Medication Helping Symptoms:  Hasn't noticed a change yet          Review of Systems   Constitutional, HEENT, cardiovascular, pulmonary, GI, , musculoskeletal, neuro, skin, endocrine and psych systems are negative, except as otherwise noted.      Objective           Vitals:  No vitals were obtained today due to virtual visit.    Physical Exam   healthy, alert and no distress  PSYCH: Alert and oriented times 3; coherent speech, normal   rate and volume, able to articulate logical thoughts, able   to abstract reason, no tangential thoughts, no hallucinations   or delusions  Her affect is normal  RESP: No cough, no audible wheezing, able to talk in full sentences  Remainder of exam unable to be completed due to telephone visits    Inga was seen today for recheck medication.    Diagnoses and all orders for this visit:    Morbid obesity (H)  -     topiramate (TOPAMAX) 50 MG tablet; Take 1 tablet (50 mg) by mouth At Bedtime    Cigarette nicotine dependence without complication  -     TOBACCO CESSATION - FOR HEALTH MAINTENANCE    Obesity with serious comorbidity, unspecified classification, unspecified obesity type  -     phentermine (ADIPEX-P) 37.5 MG capsule; Take 1 capsule (37.5 mg) by mouth every  morning      Discontinue abilify.         Phone call duration: 12 minutes

## 2023-06-08 ENCOUNTER — MEDICAL CORRESPONDENCE (OUTPATIENT)
Dept: HEALTH INFORMATION MANAGEMENT | Facility: CLINIC | Age: 58
End: 2023-06-08
Payer: COMMERCIAL

## 2023-06-08 ENCOUNTER — TELEPHONE (OUTPATIENT)
Dept: FAMILY MEDICINE | Facility: CLINIC | Age: 58
End: 2023-06-08
Payer: COMMERCIAL

## 2023-06-08 NOTE — TELEPHONE ENCOUNTER
Forms received from: [facility]   Phone number listed: [phone]   Fax listed: [phone]  Date received: 6/5/23  Form description:  Incontinence supplies  Once forms are completed, please return to [facility] via fax [phone].  Is patient requesting to be contacted when forms are completed: na  Phone: na  Form placed:  Min Tobar

## 2023-06-16 ENCOUNTER — TELEPHONE (OUTPATIENT)
Dept: FAMILY MEDICINE | Facility: CLINIC | Age: 58
End: 2023-06-16

## 2023-06-16 DIAGNOSIS — F10.10 ALCOHOL ABUSE: ICD-10-CM

## 2023-06-16 DIAGNOSIS — J42 CHRONIC BRONCHITIS, UNSPECIFIED CHRONIC BRONCHITIS TYPE (H): ICD-10-CM

## 2023-06-16 RX ORDER — REVEFENACIN 175 UG/3ML
SOLUTION RESPIRATORY (INHALATION)
Qty: 90 ML | Refills: 11 | Status: CANCELLED | OUTPATIENT
Start: 2023-06-16

## 2023-06-22 ENCOUNTER — TELEPHONE (OUTPATIENT)
Dept: FAMILY MEDICINE | Facility: CLINIC | Age: 58
End: 2023-06-22

## 2023-06-22 ENCOUNTER — ANCILLARY PROCEDURE (OUTPATIENT)
Dept: GENERAL RADIOLOGY | Facility: CLINIC | Age: 58
End: 2023-06-22
Attending: PHYSICIAN ASSISTANT
Payer: COMMERCIAL

## 2023-06-22 ENCOUNTER — OFFICE VISIT (OUTPATIENT)
Dept: FAMILY MEDICINE | Facility: CLINIC | Age: 58
End: 2023-06-22
Payer: COMMERCIAL

## 2023-06-22 VITALS
DIASTOLIC BLOOD PRESSURE: 76 MMHG | HEART RATE: 83 BPM | WEIGHT: 218.4 LBS | RESPIRATION RATE: 24 BRPM | SYSTOLIC BLOOD PRESSURE: 120 MMHG | OXYGEN SATURATION: 96 % | TEMPERATURE: 97.5 F | BODY MASS INDEX: 42.88 KG/M2 | HEIGHT: 60 IN

## 2023-06-22 DIAGNOSIS — G89.29 CHRONIC BILATERAL LOW BACK PAIN WITH RIGHT-SIDED SCIATICA: ICD-10-CM

## 2023-06-22 DIAGNOSIS — M46.1 SACROILIITIS (H): ICD-10-CM

## 2023-06-22 DIAGNOSIS — M54.41 CHRONIC BILATERAL LOW BACK PAIN WITH RIGHT-SIDED SCIATICA: ICD-10-CM

## 2023-06-22 DIAGNOSIS — I10 HYPERTENSION GOAL BP (BLOOD PRESSURE) < 140/90: ICD-10-CM

## 2023-06-22 DIAGNOSIS — E66.9 OBESITY WITH SERIOUS COMORBIDITY, UNSPECIFIED CLASSIFICATION, UNSPECIFIED OBESITY TYPE: ICD-10-CM

## 2023-06-22 DIAGNOSIS — E78.5 HYPERLIPIDEMIA WITH TARGET LDL LESS THAN 160: ICD-10-CM

## 2023-06-22 DIAGNOSIS — I50.32 CHRONIC HEART FAILURE WITH PRESERVED EJECTION FRACTION (H): ICD-10-CM

## 2023-06-22 DIAGNOSIS — I10 ESSENTIAL HYPERTENSION WITH GOAL BLOOD PRESSURE LESS THAN 140/90: ICD-10-CM

## 2023-06-22 DIAGNOSIS — M05.80 POLYARTHRITIS WITH POSITIVE RHEUMATOID FACTOR (H): ICD-10-CM

## 2023-06-22 DIAGNOSIS — J42 CHRONIC BRONCHITIS, UNSPECIFIED CHRONIC BRONCHITIS TYPE (H): ICD-10-CM

## 2023-06-22 DIAGNOSIS — F33.0 MAJOR DEPRESSIVE DISORDER, RECURRENT EPISODE, MILD (H): Primary | ICD-10-CM

## 2023-06-22 PROBLEM — F19.20 CHEMICAL DEPENDENCY (H): Chronic | Status: RESOLVED | Noted: 2017-10-06 | Resolved: 2023-06-22

## 2023-06-22 PROBLEM — F10.20 ALCOHOL DEPENDENCY (H): Chronic | Status: RESOLVED | Noted: 2018-07-06 | Resolved: 2023-06-22

## 2023-06-22 LAB
ANION GAP SERPL CALCULATED.3IONS-SCNC: 13 MMOL/L (ref 7–15)
BUN SERPL-MCNC: 11 MG/DL (ref 6–20)
CALCIUM SERPL-MCNC: 9.1 MG/DL (ref 8.6–10)
CHLORIDE SERPL-SCNC: 103 MMOL/L (ref 98–107)
CHOLEST SERPL-MCNC: 203 MG/DL
CREAT SERPL-MCNC: 0.97 MG/DL (ref 0.51–0.95)
DEPRECATED HCO3 PLAS-SCNC: 21 MMOL/L (ref 22–29)
GFR SERPL CREATININE-BSD FRML MDRD: 68 ML/MIN/1.73M2
GLUCOSE SERPL-MCNC: 79 MG/DL (ref 70–99)
HDLC SERPL-MCNC: 55 MG/DL
LDLC SERPL CALC-MCNC: 117 MG/DL
NONHDLC SERPL-MCNC: 148 MG/DL
POTASSIUM SERPL-SCNC: 3.9 MMOL/L (ref 3.4–5.3)
SODIUM SERPL-SCNC: 137 MMOL/L (ref 136–145)
TRIGL SERPL-MCNC: 155 MG/DL

## 2023-06-22 PROCEDURE — 36415 COLL VENOUS BLD VENIPUNCTURE: CPT | Performed by: PHYSICIAN ASSISTANT

## 2023-06-22 PROCEDURE — 90471 IMMUNIZATION ADMIN: CPT | Performed by: PHYSICIAN ASSISTANT

## 2023-06-22 PROCEDURE — 96127 BRIEF EMOTIONAL/BEHAV ASSMT: CPT | Performed by: PHYSICIAN ASSISTANT

## 2023-06-22 PROCEDURE — 80061 LIPID PANEL: CPT | Performed by: PHYSICIAN ASSISTANT

## 2023-06-22 PROCEDURE — 99214 OFFICE O/P EST MOD 30 MIN: CPT | Mod: 25 | Performed by: PHYSICIAN ASSISTANT

## 2023-06-22 PROCEDURE — 90677 PCV20 VACCINE IM: CPT | Performed by: PHYSICIAN ASSISTANT

## 2023-06-22 PROCEDURE — 80048 BASIC METABOLIC PNL TOTAL CA: CPT | Performed by: PHYSICIAN ASSISTANT

## 2023-06-22 PROCEDURE — 72100 X-RAY EXAM L-S SPINE 2/3 VWS: CPT | Mod: TC | Performed by: RADIOLOGY

## 2023-06-22 RX ORDER — BUPROPION HYDROCHLORIDE 300 MG/1
300 TABLET ORAL EVERY MORNING
Qty: 90 TABLET | Refills: 1 | Status: SHIPPED | OUTPATIENT
Start: 2023-06-22 | End: 2023-11-26

## 2023-06-22 RX ORDER — REVEFENACIN 175 UG/3ML
3 SOLUTION RESPIRATORY (INHALATION) DAILY
Qty: 270 ML | Refills: 3 | Status: SHIPPED | OUTPATIENT
Start: 2023-06-22 | End: 2023-07-31

## 2023-06-22 ASSESSMENT — ANXIETY QUESTIONNAIRES
2. NOT BEING ABLE TO STOP OR CONTROL WORRYING: SEVERAL DAYS
7. FEELING AFRAID AS IF SOMETHING AWFUL MIGHT HAPPEN: NOT AT ALL
5. BEING SO RESTLESS THAT IT IS HARD TO SIT STILL: MORE THAN HALF THE DAYS
4. TROUBLE RELAXING: NEARLY EVERY DAY
7. FEELING AFRAID AS IF SOMETHING AWFUL MIGHT HAPPEN: NOT AT ALL
1. FEELING NERVOUS, ANXIOUS, OR ON EDGE: SEVERAL DAYS
GAD7 TOTAL SCORE: 11
GAD7 TOTAL SCORE: 11
IF YOU CHECKED OFF ANY PROBLEMS ON THIS QUESTIONNAIRE, HOW DIFFICULT HAVE THESE PROBLEMS MADE IT FOR YOU TO DO YOUR WORK, TAKE CARE OF THINGS AT HOME, OR GET ALONG WITH OTHER PEOPLE: VERY DIFFICULT
6. BECOMING EASILY ANNOYED OR IRRITABLE: NEARLY EVERY DAY
3. WORRYING TOO MUCH ABOUT DIFFERENT THINGS: SEVERAL DAYS
8. IF YOU CHECKED OFF ANY PROBLEMS, HOW DIFFICULT HAVE THESE MADE IT FOR YOU TO DO YOUR WORK, TAKE CARE OF THINGS AT HOME, OR GET ALONG WITH OTHER PEOPLE?: VERY DIFFICULT

## 2023-06-22 ASSESSMENT — PAIN SCALES - GENERAL: PAINLEVEL: MILD PAIN (2)

## 2023-06-22 NOTE — TELEPHONE ENCOUNTER
"Patient calling, says the [facility] pharmacy won't fill the Wegovy for her, says she needs to start with the lower dose.    Patient was just seen by Min Toledo today, says the lower dosing of Wegovy has been on back order for \"months\" so she and Min decided to risk starting at the higher dose and hope she does not have too many side effects.    I see the 1.7 mg weekly injection was sent.    Patient wants Rx re-sent to [facility] to see if they will fill it.    Re-sent as requested.    Beeln Pringle, RN  [facility]        "

## 2023-06-22 NOTE — PROGRESS NOTES
Subjective   Cody is a 57 year old, presenting for the following health issues:  Back Pain, Mental Health Problem, Weight Loss, and Health Maintenance (Patient is fasting)        6/22/2023     1:05 PM   Additional Questions   Roomed by Ellen Arevalo CMA   Accompanied by None         6/22/2023     1:05 PM   Patient Reported Additional Medications   Patient reports taking the following new medications None     History of Present Illness       Back Pain:  She presents for follow up of back pain. Patient's back pain is a recurring problem.  Location of back pain:  Right lower back, left lower back, right buttock, left buttock, right side of waist and left side of waist  Description of back pain: sharp and shooting  Back pain spreads: left buttocks    Since patient first noticed back pain, pain is: gradually worsening  Does back pain interfere with her job:  Not applicable      Mental Health Follow-up:  Patient presents to follow-up on Depression & Anxiety.Patient's depression since last visit has been:  Medium  The patient is not having other symptoms associated with depression.  Patient's anxiety since last visit has been:  Medium  The patient is not having other symptoms associated with anxiety.  Any significant life events: relationship concerns  Patient is not feeling anxious or having panic attacks.  Patient has no concerns about alcohol or drug use.    Reason for visit:  Weight loss    She eats 2-3 servings of fruits and vegetables daily.She consumes 1 sweetened beverage(s) daily.She exercises with enough effort to increase her heart rate 9 or less minutes per day.  She exercises with enough effort to increase her heart rate 3 or less days per week.   She is taking medications regularly.  Today's LISA-7 Score: 11       Recheck of htn. Well controlled.   Recheck of copd.  Overall stable.    Recheck of arthritis. No joint pain currently other than her low back pain . Worse with any activity.  Occasional left  "leg radicular symptoms   History of chf. Occasional orthopnea. No edema      Review of Systems   Constitutional, HEENT, cardiovascular, pulmonary, GI, , musculoskeletal, neuro, skin, endocrine and psych systems are negative, except as otherwise noted.      Objective    /76   Pulse 83   Temp 97.5  F (36.4  C) (Temporal)   Resp 24   Ht 1.52 m (4' 11.84\")   Wt 99.1 kg (218 lb 6.4 oz)   LMP 06/02/2010   SpO2 96%   BMI 42.88 kg/m    Body mass index is 42.88 kg/m .  Physical Exam     Eye exam - right eye normal lid, conjunctiva, cornea, pupil and fundus, left eye normal lid, conjunctiva, cornea, pupil and fundus.  Thyroid not palpable, not enlarged, no nodules detected.  CHEST:chest clear to IPPA, no tachypnea, retractions or cyanosis and S1, S2 normal, no murmur, no gallop, rate regular.  No edema  Bilateral sacroiliac joint tenderness and pain with lumbar flexion.  Negative slr test.    Inga was seen today for back pain, mental health problem, weight loss and health maintenance.    Diagnoses and all orders for this visit:    Major depressive disorder, recurrent episode, mild (H)  -     buPROPion (WELLBUTRIN XL) 300 MG 24 hr tablet; Take 1 tablet (300 mg) by mouth every morning  -     cariprazine (VRAYLAR) 1.5 MG capsule; Take 1 capsule (1.5 mg) by mouth daily    Hyperlipidemia with target LDL less than 160  -     Lipid panel reflex to direct LDL Non-fasting; Future  -     Lipid panel reflex to direct LDL Non-fasting    Hypertension goal BP (blood pressure) < 140/90  -     BASIC METABOLIC PANEL; Future  -     BASIC METABOLIC PANEL    Polyarthritis with positive rheumatoid factor (H)    Chronic heart failure with preserved ejection fraction (H)    Essential hypertension with goal blood pressure less than 140/90    Sacroiliitis (H)  -     Cancel: Pain Management  Referral; Future  -     Spine  Referral; Future    Obesity with serious comorbidity, unspecified classification, " unspecified obesity type  -     Semaglutide-Weight Management (WEGOVY) 1.7 MG/0.75ML pen; Inject 1.7 mg Subcutaneous once a week  -     buPROPion (WELLBUTRIN XL) 300 MG 24 hr tablet; Take 1 tablet (300 mg) by mouth every morning    Chronic bronchitis, unspecified chronic bronchitis type (H)  -     Revefenacin (YUPELRI) 175 MCG/3ML SOLN; Inhale 3 mLs (175 mcg) into the lungs daily Nebulize and inhale 1 vial (3 ml) by mouth and into lungs once daily    Chronic bilateral low back pain with right-sided sciatica  -     XR Lumbar Spine 2/3 Views; Future  -     Spine  Referral; Future    Other orders  -     Pneumococcal 20 Valent Conjugate (Prevnar 20)    inc wellbutrin and start vraylar.  Stop victoza and replace with wegovy

## 2023-06-25 DIAGNOSIS — E78.5 HYPERLIPIDEMIA LDL GOAL <130: ICD-10-CM

## 2023-06-25 RX ORDER — ATORVASTATIN CALCIUM 20 MG/1
20 TABLET, FILM COATED ORAL AT BEDTIME
Qty: 90 TABLET | Refills: 1 | Status: SHIPPED | OUTPATIENT
Start: 2023-06-25 | End: 2023-07-17

## 2023-06-26 ENCOUNTER — TELEPHONE (OUTPATIENT)
Dept: FAMILY MEDICINE | Facility: CLINIC | Age: 58
End: 2023-06-26
Payer: COMMERCIAL

## 2023-06-26 NOTE — TELEPHONE ENCOUNTER
Pt is calling stating that insurance won't cover Semaglutide-Weight Management (WEGOVY) 1.7 MG/0.75ML pen unless it is 2 pens for 28 days. This script requires 3mL for the full dose needed.     Called pharmacy to confirm that this medication is on back order and looks like no PA needed as of 6/22/23.     Kianna Becker, RN

## 2023-06-27 DIAGNOSIS — E66.01 MORBID OBESITY (H): Chronic | ICD-10-CM

## 2023-06-27 NOTE — TELEPHONE ENCOUNTER
I called and spoke to patient to clarify the issue.   She says both the starter Wegovy and the 1.7 mg dosing are on back order; she is currently using 1.8 mg victoza daily but will be out in 2 days, her box says she has refills but when she called the pharmacy, they said the victoza Rx was cancelled.    Indeed, it was removed from her med list 6/22/23, likely when the wegovy was sent.    I re-stephane'd Rx.   She also needs pen needles, stephane'd that as well and routed to PCP to address.    Belen Pringle RN  [facility]

## 2023-06-27 NOTE — TELEPHONE ENCOUNTER
Reason for Call:  Other     Detailed comments: Patient said that script patient had changed from victoza is on back order can she have the victoza 1.8 sent to pharmacy    Phone Number Patient can be reached at: Home number on file [phone] (home)    Best Time:     Can we leave a detailed message on this number? YES    Call taken on 6/27/2023 at 1:04 PM by Marquita Garcia

## 2023-06-29 RX ORDER — LIRAGLUTIDE 6 MG/ML
1.8 INJECTION SUBCUTANEOUS DAILY
Qty: 6 ML | Refills: 11 | Status: SHIPPED | OUTPATIENT
Start: 2023-06-29 | End: 2023-09-13

## 2023-06-29 NOTE — TELEPHONE ENCOUNTER
Patient called back today asking for her Victoza refill.     I asked provider to send as soon as possible due to patient having transportation this afternoon. I also put a note on provider's desk.     Nasrin Leroy RN BSN  [facility]

## 2023-06-30 NOTE — TELEPHONE ENCOUNTER
Patient calling, says [facility] did not receive this Rx.       I called [facility] in [place].   They have this Rx and will set up a fill.    Belen Pringle RN  [facility]

## 2023-07-08 DIAGNOSIS — F51.04 PSYCHOPHYSIOLOGICAL INSOMNIA: ICD-10-CM

## 2023-07-10 ENCOUNTER — TELEPHONE (OUTPATIENT)
Dept: FAMILY MEDICINE | Facility: CLINIC | Age: 58
End: 2023-07-10
Payer: COMMERCIAL

## 2023-07-10 RX ORDER — QUETIAPINE FUMARATE 100 MG/1
TABLET, FILM COATED ORAL
Qty: 90 TABLET | Refills: 1 | Status: SHIPPED | OUTPATIENT
Start: 2023-07-10 | End: 2024-01-05

## 2023-07-10 NOTE — TELEPHONE ENCOUNTER
Pt calling stating refills of phentermine (ADIPEX-P) 37.5 MG capsule, and QUEtiapine (SEROQUEL) 100 MG tablet were not received from pharmacy    RN called pharmacy and they do not have orders    RN gave verbal- pharmacy understood.    Marylin Shay, RN

## 2023-07-11 ENCOUNTER — OFFICE VISIT (OUTPATIENT)
Dept: NEUROSURGERY | Facility: CLINIC | Age: 58
End: 2023-07-11
Payer: COMMERCIAL

## 2023-07-11 ENCOUNTER — PRE VISIT (OUTPATIENT)
Dept: NEUROSURGERY | Facility: CLINIC | Age: 58
End: 2023-07-11

## 2023-07-11 ENCOUNTER — PREP FOR PROCEDURE (OUTPATIENT)
Dept: PALLIATIVE MEDICINE | Facility: OTHER | Age: 58
End: 2023-07-11

## 2023-07-11 VITALS
WEIGHT: 211 LBS | SYSTOLIC BLOOD PRESSURE: 133 MMHG | DIASTOLIC BLOOD PRESSURE: 88 MMHG | RESPIRATION RATE: 16 BRPM | OXYGEN SATURATION: 97 % | HEART RATE: 67 BPM | BODY MASS INDEX: 41.42 KG/M2

## 2023-07-11 DIAGNOSIS — G89.29 CHRONIC BILATERAL LOW BACK PAIN WITH RIGHT-SIDED SCIATICA: ICD-10-CM

## 2023-07-11 DIAGNOSIS — M46.1 SACROILIITIS (H): ICD-10-CM

## 2023-07-11 DIAGNOSIS — M54.41 CHRONIC BILATERAL LOW BACK PAIN WITH RIGHT-SIDED SCIATICA: ICD-10-CM

## 2023-07-11 DIAGNOSIS — M41.115 JUVENILE IDIOPATHIC SCOLIOSIS OF THORACOLUMBAR REGION: Primary | ICD-10-CM

## 2023-07-11 DIAGNOSIS — M46.1 SACROILIITIS (H): Primary | ICD-10-CM

## 2023-07-11 PROCEDURE — 99204 OFFICE O/P NEW MOD 45 MIN: CPT | Performed by: PHYSICIAN ASSISTANT

## 2023-07-11 ASSESSMENT — PAIN SCALES - GENERAL: PAINLEVEL: EXTREME PAIN (8)

## 2023-07-11 NOTE — LETTER
7/11/2023       RE: Inga Ledesma  [address]  [facility] [address]     Dear Colleague,    Thank you for referring your patient, Inga Ledesma, to the [facility] [place] [facility]. Please see a copy of my visit note below.        Neurosurgery Clinic Note    Chief Complaint: low back pain w/ right sciatica    History of Present Illness:  It was a pleasure to evaluate Inga Ledesma in clinic today at the kind referral of   Min Toledo PA-C  [address]  [place]  [place] [address].    Inga Ledesma is a 57 year old female with a PMH of RLS, GERD, EtOH abuse, BMI 43, Tobacco use, Vitamin D deficiency, scoliosis, COPD, who is presenting for evaluation of SI joint pain, low back pain and right sided sciatica.    Her low back has been hurting her for years.  She had went to ED and they told her she had dry kidneys.  She went to PCP who did XR for consideration for doing an injection.  She walks bent over and both sides of her back hurts.  She sometimes has to stop to rest her legs with walking from the parking lot into the store.  She needs to use a scooter or wheelchair for longer distances.  She has been using the wheelchair for about 8 months.  The pain is in her bilateral buttocks and shoots down to the middle her posterior thigh.  She notes even walking in her apartment that her legs are tired and she has to stop for a while before continuing.  Her pain is okay with sitting, worse with standing and moving.  She tries to avoid stairs because her legs feel weak.  Her pain seems to switch between the left and right side.  It sometimes affects both side at the same time.  Today it is on the right.      She denies any prior back surgeries.  She has known about scoliosis since 3rd grade.  She required PT to help relieve muscle spasms around that time period.      She smokes cigarettes and has for 42 years, 1/2  pack per day.      Conservative Treatment:  PT - none for her back  Medical management for back - none specifically for her back, but her list of medications is extensive.   Injections - 15 years ago [facility] - similar back pain - was helpful for short term      Past Medical History:   Diagnosis Date    Acute pain of left shoulder 05/20/2021    LALA (acute kidney injury) (H) 05/28/2018    Alcohol abuse     Alcohol dependence with acute alcoholic intoxication (H) 11/19/2020    Alcohol withdrawal (H) 10/28/2017    Alcohol withdrawal seizure (H) 03/2016    Alcoholic hepatitis without ascites 12/05/2013    Cancer of labia majora (H) 1987    Cellulitis of right lower extremity 03/13/2021    Cervical dysplasia 1987    Congestive heart failure (H) 05/16/2016    COPD (chronic obstructive pulmonary disease) (H)     COPD exacerbation (H) 09/04/2018    CVA (cerebral infarction) 01/2012    Dependent edema     Depression, major     Depressive disorder 1966    GERD (gastroesophageal reflux disease)     Hyperlipidemia LDL goal < 160     Hypertension     Iron deficiency anemia     Menorrhagia 05/10/2010    Pneumonia 07/10/2021    RLS (restless legs syndrome)     Sacroiliitis (H)     steroid injections ineffective, chronic low back pain    Sepsis (H) 06/18/2018    Tobacco abuse     Uncomplicated asthma     Vitamin D deficiencies        Past Surgical History:   Procedure Laterality Date    AS BIOPSY/EXCISION LYMPH NODE OPEN SUPERFICIAL      COLONOSCOPY      COLONOSCOPY N/A 4/13/2022    Procedure: COLONOSCOPY;  Surgeon: Mike Garcia MD;  Location: [facility]    EYE SURGERY      HYSTERECTOMY  2010    HYSTERECTOMY TOTAL ABDOMINAL  7/28/10    Bilateral salpingectomy.  ovaries conserved.    LASER TX, CERVICAL  1987    LYMPH NODE BIOPSY  2007    inguinal    LYSIS OF LABIAL LESION(S)  1985, 1987       Social History     Socioeconomic History    Marital status:     Number of children: 1    Years of education: 13    Occupational History     Employer: [employer]   Tobacco Use    Smoking status: Some Days     Packs/day: 0.25     Years: 34.00     Pack years: 8.50     Types: Cigarettes     Start date: 11/1/1979     Last attempt to quit: 10/3/2012     Years since quitting: 10.7    Smokeless tobacco: Current     Types: Chew    Tobacco comments:     5 cigarettes daily   Vaping Use    Vaping Use: Former    Quit date: 1/1/2022   Substance and Sexual Activity    Alcohol use: No     Comment: 1 pint of vodka per day, last drink aug 2018    Drug use: No    Sexual activity: Not Currently   Other Topics Concern    Parent/sibling w/ CABG, MI or angioplasty before 65F 55M? No   Social History Narrative    Lives in [place] with her son in a trailer no access to guns or weapons works for the [employer] and enjoys crocheting and watching television.       family history includes Alcoholism in her father; Anxiety Disorder in an other family member; Asthma in her mother and another family member; Breast Cancer in her paternal aunt; Cerebrovascular Disease in her father; Depression in an other family member; Depression/Anxiety in her mother; Hypertension in her father; Lung Cancer in her father; Mental Illness in an other family member; Obesity in her sister and son; Other Cancer in an other family member; Substance Abuse in an other family member; Suicide in her paternal uncle.       IMAGING   Imaging independently reviewed.    Lumbar XR 6/22/23 - scoliotic curvature.  Mild spondylolisthesis L3-L5 with moderate DDD.        IMPRESSION: Convex left curvature of the lumbar spine centered at L3.  Leftward listhesis of L3 on L4. Mild stepwise anterolisthesis of L3 on  L4 and L4 on L5. No obvious loss of vertebral body height. Moderate  degenerative endplate changes and loss of disc height at L3-L4, L4-L5,  and to a lesser extent L5-S1. Facet hypertrophy in the lower lumbar  spine.     Physical Exam   /88   Pulse 67   Resp  16   Wt 95.7 kg (211 lb)   [facility] 06/02/2010   SpO2 97%   BMI 41.42 kg/m      Constitutional: Morbidly obese. Cooperative. No acute distress.   HENT:   Head: Normocephalic and atraumatic.   Eyes: Conjunctivae are normal.  Neck: Neck supple. No tracheal deviation present.  Cardiovascular: Normal rate and regular rhythm.    Pulmonary/Chest: Effort normal and breath sounds normal.  Abdominal: Exhibits no obvious distension.   Musculoskeletal: Able to move all extremities.  No involuntary motor movements. No C/T/L spine tenderness to palpation.  +right SI joint TTP.  +bilateral RUDDY.  +right SI compression test.  Negative piriformis.  +right GT bursal TTP.   Lumbar flexion/extension range of motion: painful in low back and sacral area with F/E.    Skin: Skin is warm, dry and intact.   Psychiatric: Normal mood and affect. Speech is normal and behavior is normal.    Neurological:  Alert, NAD, and oriented to person, place, and time.   No cranial nerve deficit   Gait: using wheelchair.  Is able to ambulate short distances, balanced.  Gets on/of exam table without assistance.      Strength (L/R)  5/5 Deltoid  5/5 Bicep  5/5 Tricep  5/5 Handgrip    5/5 Hip Flexion  5/5 Knee Extension  5/5 Ankle Dorsiflexion  5/5 Extensor Hallucis Longus  5/5 Plantar Flexion    Reflexes (L/R)  2+/2+ Bicep  2+/2+ Brachioradialis  2+/2+ Patellar  2+/2+ Ankle    No Lhermitte's  No Spurling's  No Alexander's   No ankle clonus    Sensation: SILT       ASSESSMENT & PLAN:  Inga Ledesma is a 57 year old female with a PMH of RLS, GERD, EtOH abuse, BMI 43, Tobacco use, Vitamin D deficiency, scoliosis, COPD, who is presenting for evaluation of SI joint pain, low back pain and right sided sciatica.  Her pain today is on the right from the buttock to the mid posterior thigh.  She has several positive provocative tests for right sacroiliitis.     XR lumbar with scoliosis curvature, L3-4 mild spondylolisthesis, L3-4 at least moderate DDD and to a  lesser degree at L5/S1.      She would benefit from PT to help with stretching, strengthening, and increasing her activity tolerance.  Given the extent of her SI joint pain present today and that it switches sides often, would also recommend bilateral SI joint injections.      It would be helpful to be able to see the full extent of her spinal curvature so will get EOS XR which she can get at her convenience.      Follow up in 6 weeks to discuss progress with PT and injections.  We may need to consider spinal injections in the future as well.      I spent 52 minutes spent in patient care, independent review and interpretation of medical records/imaging, reviewing old records.            Again, thank you for allowing me to participate in the care of your patient.      Sincerely,    Sarina Chavarria PA-C       Brain cancer Lung cancer Lung cancer metastatic to brain

## 2023-07-11 NOTE — PROGRESS NOTES
Neurosurgery Clinic Note    Chief Complaint: low back pain w/ right sciatica    History of Present Illness:  It was a pleasure to evaluate Inga Ledesma in clinic today at the kind referral of   Min Toledo PA-C  [address] [place] [address].    Inga Ledesma is a 57 year old female with a PMH of RLS, GERD, EtOH abuse, BMI 43, Tobacco use, Vitamin D deficiency, scoliosis, COPD, who is presenting for evaluation of SI joint pain, low back pain and right sided sciatica.    Her low back has been hurting her for years.  She had went to ED and they told her she had dry kidneys.  She went to PCP who did XR for consideration for doing an injection.  She walks bent over and both sides of her back hurts.  She sometimes has to stop to rest her legs with walking from the parking lot into the store.  She needs to use a scooter or wheelchair for longer distances.  She has been using the wheelchair for about 8 months.  The pain is in her bilateral buttocks and shoots down to the middle her posterior thigh.  She notes even walking in her apartment that her legs are tired and she has to stop for a while before continuing.  Her pain is okay with sitting, worse with standing and moving.  She tries to avoid stairs because her legs feel weak.  Her pain seems to switch between the left and right side.  It sometimes affects both side at the same time.  Today it is on the right.      She denies any prior back surgeries.  She has known about scoliosis since 3rd grade.  She required PT to help relieve muscle spasms around that time period.      She smokes cigarettes and has for 42 years, 1/2 pack per day.      Conservative Treatment:  PT - none for her back  Medical management for back - none specifically for her back, but her list of medications is extensive.   Injections - 15 years ago [facility] - similar back pain - was helpful for short term      Past Medical History:   Diagnosis Date     Acute pain  of left shoulder 05/20/2021     LALA (acute kidney injury) (H) 05/28/2018     Alcohol abuse      Alcohol dependence with acute alcoholic intoxication (H) 11/19/2020     Alcohol withdrawal (H) 10/28/2017     Alcohol withdrawal seizure (H) 03/2016     Alcoholic hepatitis without ascites 12/05/2013     Cancer of labia majora (H) 1987     Cellulitis of right lower extremity 03/13/2021     Cervical dysplasia 1987     Congestive heart failure (H) 05/16/2016     COPD (chronic obstructive pulmonary disease) (H)      COPD exacerbation (H) 09/04/2018     CVA (cerebral infarction) 01/2012     Dependent edema      Depression, major      Depressive disorder 1966     GERD (gastroesophageal reflux disease)      Hyperlipidemia LDL goal < 160      Hypertension      Iron deficiency anemia      Menorrhagia 05/10/2010     Pneumonia 07/10/2021     RLS (restless legs syndrome)      Sacroiliitis (H)     steroid injections ineffective, chronic low back pain     Sepsis (H) 06/18/2018     Tobacco abuse      Uncomplicated asthma      Vitamin D deficiencies        Past Surgical History:   Procedure Laterality Date     AS BIOPSY/EXCISION LYMPH NODE OPEN SUPERFICIAL       COLONOSCOPY       COLONOSCOPY N/A 4/13/2022    Procedure: COLONOSCOPY;  Surgeon: Mike Garcia MD;  Location:  GI     EYE SURGERY       HYSTERECTOMY  2010     HYSTERECTOMY TOTAL ABDOMINAL  7/28/10    Bilateral salpingectomy.  ovaries conserved.     LASER TX, CERVICAL  1987     LYMPH NODE BIOPSY  2007    inguinal     LYSIS OF LABIAL LESION(S)  1985, 1987       Social History     Socioeconomic History     Marital status:      Number of children: 1     Years of education: 13   Occupational History     Employer: [employer]   Tobacco Use     Smoking status: Some Days     Packs/day: 0.25     Years: 34.00     Pack years: 8.50     Types: Cigarettes     Start date: 11/1/1979     Last attempt to quit: 10/3/2012     Years since quitting: 10.7     Smokeless tobacco:  Current     Types: Chew     Tobacco comments:     5 cigarettes daily   Vaping Use     Vaping Use: Former     Quit date: 1/1/2022   Substance and Sexual Activity     Alcohol use: No     Comment: 1 pint of vodka per day, last drink aug 2018     Drug use: No     Sexual activity: Not Currently   Other Topics Concern     Parent/sibling w/ CABG, MI or angioplasty before 65F 55M? No   Social History Narrative    Lives in [place] with her son in a trailer no access to guns or weapons works for the [employer] and enjoys crocheting and watching television.       family history includes Alcoholism in her father; Anxiety Disorder in an other family member; Asthma in her mother and another family member; Breast Cancer in her paternal aunt; Cerebrovascular Disease in her father; Depression in an other family member; Depression/Anxiety in her mother; Hypertension in her father; Lung Cancer in her father; Mental Illness in an other family member; Obesity in her sister and son; Other Cancer in an other family member; Substance Abuse in an other family member; Suicide in her paternal uncle.       IMAGING   Imaging independently reviewed.    Lumbar XR 6/22/23 - scoliotic curvature.  Mild spondylolisthesis L3-L5 with moderate DDD.        IMPRESSION: Convex left curvature of the lumbar spine centered at L3.  Leftward listhesis of L3 on L4. Mild stepwise anterolisthesis of L3 on  L4 and L4 on L5. No obvious loss of vertebral body height. Moderate  degenerative endplate changes and loss of disc height at L3-L4, L4-L5,  and to a lesser extent L5-S1. Facet hypertrophy in the lower lumbar  spine.     Physical Exam   /88   Pulse 67   Resp 16   Wt 95.7 kg (211 lb)   LMP 06/02/2010   SpO2 97%   BMI 41.42 kg/m      Constitutional: Morbidly obese. Cooperative. No acute distress.   HENT:   Head: Normocephalic and atraumatic.   Eyes: Conjunctivae are normal.  Neck: Neck supple. No tracheal deviation  present.  Cardiovascular: Normal rate and regular rhythm.    Pulmonary/Chest: Effort normal and breath sounds normal.  Abdominal: Exhibits no obvious distension.   Musculoskeletal: Able to move all extremities.  No involuntary motor movements. No C/T/L spine tenderness to palpation.  +right SI joint TTP.  +bilateral RUDDY.  +right SI compression test.  Negative piriformis.  +right GT bursal TTP.   Lumbar flexion/extension range of motion: painful in low back and sacral area with F/E.    Skin: Skin is warm, dry and intact.   Psychiatric: Normal mood and affect. Speech is normal and behavior is normal.    Neurological:  Alert, NAD, and oriented to person, place, and time.   No cranial nerve deficit   Gait: using wheelchair.  Is able to ambulate short distances, balanced.  Gets on/of exam table without assistance.      Strength (L/R)  5/5 Deltoid  5/5 Bicep  5/5 Tricep  5/5 Handgrip    5/5 Hip Flexion  5/5 Knee Extension  5/5 Ankle Dorsiflexion  5/5 Extensor Hallucis Longus  5/5 Plantar Flexion    Reflexes (L/R)  2+/2+ Bicep  2+/2+ Brachioradialis  2+/2+ Patellar  2+/2+ Ankle    No Lhermitte's  No Spurling's  No Alexander's   No ankle clonus    Sensation: SILT       ASSESSMENT & PLAN:  Inga Ledesma is a 57 year old female with a PMH of RLS, GERD, EtOH abuse, BMI 43, Tobacco use, Vitamin D deficiency, scoliosis, COPD, who is presenting for evaluation of SI joint pain, low back pain and right sided sciatica.  Her pain today is on the right from the buttock to the mid posterior thigh.  She has several positive provocative tests for right sacroiliitis.     XR lumbar with scoliosis curvature, L3-4 mild spondylolisthesis, L3-4 at least moderate DDD and to a lesser degree at L5/S1.      She would benefit from PT to help with stretching, strengthening, and increasing her activity tolerance.  Given the extent of her SI joint pain present today and that it switches sides often, would also recommend bilateral SI joint  injections.      It would be helpful to be able to see the full extent of her spinal curvature so will get EOS XR which she can get at her convenience.      Follow up in 6 weeks to discuss progress with PT and injections.  We may need to consider spinal injections in the future as well.      I spent 52 minutes spent in patient care, independent review and interpretation of medical records/imaging, reviewing old records.      Sarina Chavarria PA-C  Department of Neurosurgery  Office: [phone]

## 2023-07-12 ENCOUNTER — TELEPHONE (OUTPATIENT)
Dept: PALLIATIVE MEDICINE | Facility: OTHER | Age: 58
End: 2023-07-12
Payer: COMMERCIAL

## 2023-07-12 NOTE — TELEPHONE ENCOUNTER
Phoned the patient and left VM. Stated to call and schedule procedure with Dr. Castellanos. Stated to call direct line at [phone].        Tabitha Helton MA on 7/12/2023 at 11:04 AM

## 2023-07-12 NOTE — TELEPHONE ENCOUNTER
Patient is scheduled for surgery with Dr. Castellanos    Spoke with: Patient    Date of Surgery: 07/21/23    Location: [facility]    Informed patient they will need an adult  yes    Additional comments: Patient is aware of date and time of the procedure.        Tabitha Helton MA on 7/12/2023 at 11:36 AM

## 2023-07-17 ENCOUNTER — TELEPHONE (OUTPATIENT)
Dept: FAMILY MEDICINE | Facility: CLINIC | Age: 58
End: 2023-07-17

## 2023-07-17 ENCOUNTER — VIRTUAL VISIT (OUTPATIENT)
Dept: FAMILY MEDICINE | Facility: CLINIC | Age: 58
End: 2023-07-17
Payer: COMMERCIAL

## 2023-07-17 DIAGNOSIS — R05.1 ACUTE COUGH: ICD-10-CM

## 2023-07-17 DIAGNOSIS — R06.2 WHEEZING: Primary | ICD-10-CM

## 2023-07-17 PROCEDURE — 99441 PR PHYSICIAN TELEPHONE EVALUATION 5-10 MIN: CPT | Mod: 95 | Performed by: PHYSICIAN ASSISTANT

## 2023-07-17 RX ORDER — PREDNISONE 20 MG/1
TABLET ORAL
Qty: 20 TABLET | Refills: 0 | Status: SHIPPED | OUTPATIENT
Start: 2023-07-17 | End: 2023-07-31

## 2023-07-17 ASSESSMENT — PATIENT HEALTH QUESTIONNAIRE - PHQ9
10. IF YOU CHECKED OFF ANY PROBLEMS, HOW DIFFICULT HAVE THESE PROBLEMS MADE IT FOR YOU TO DO YOUR WORK, TAKE CARE OF THINGS AT HOME, OR GET ALONG WITH OTHER PEOPLE: SOMEWHAT DIFFICULT
SUM OF ALL RESPONSES TO PHQ QUESTIONS 1-9: 8
SUM OF ALL RESPONSES TO PHQ QUESTIONS 1-9: 8

## 2023-07-17 NOTE — PROGRESS NOTES
Cody is a 57 year old who is being evaluated via a billable telephone visit.      What phone number would you like to be contacted at? [phone]  How would you like to obtain your AVS? Marcia  Distant Location (provider location):  On-site    Assessment & Plan   Problem List Items Addressed This Visit    None  Visit Diagnoses     Wheezing    -  Primary    Relevant Medications    predniSONE (DELTASONE) 20 MG tablet    Acute cough        Relevant Medications    predniSONE (DELTASONE) 20 MG tablet    amoxicillin-clavulanate (AUGMENTIN) 875-125 MG tablet         Impression is likely exacerbation of wheezing in context of viral URI including COVID-19. Will do a home test and declined COVID-19 PCR. Sounds well and non-toxic and I have low suspicion for impending airway obstruction or respiratory distress at this point.  She will push p.o. fluids, use over-the-counter meds for symptoms, complete a course of prednisone and Augmentin, albuterol inhaler/Advair as previously prescribed and follow-up with us in 2-3 days if not improving or urgent care/the ER if symptoms worsen/change at any time.    DDx and Dx discussed with and explained to the pt to their satisfaction.  All questions were answered at this time. Pt expressed understanding of and agreement with this dx, tx, and plan. No further workup warranted and standard medication warnings given. I have given the patient a list of pertinent indications for re-evaluation. Will go to the Emergency Department if symptoms worsen or new concerning symptoms arise. Patient left the call in no apparent distress.     Prescription drug management  18 minutes spent by me on the date of the encounter doing chart review, history and exam, documentation and further activities per the note     Nicotine/Tobacco Cessation:  She reports that she has been smoking cigarettes. She started smoking about 43 years ago. She has a 8.50 pack-year smoking history. Her smokeless tobacco use  "includes chew.    BMI:   Estimated body mass index is 41.42 kg/m  as calculated from the following:    Height as of 6/22/23: 1.52 m (4' 11.84\").    Weight as of 7/11/23: 95.7 kg (211 lb).     See Patient Instructions    SARAI Larson [facility] MICHAEL Ross is a 57 year old, presenting for the following health issues:  Asthma, Breathing Problem, and Nasal Congestion        6/22/2023     1:05 PM   Additional Questions   Roomed by Ellen Arevalo CMA   Accompanied by None     History of Present Illness       Reason for visit:  Wheezing, start of a cold  Symptom onset:  1-3 days ago  Symptoms include:  Hard to breathe while walking and runny nose  Symptom intensity:  Mild  Symptom progression:  Staying the same  Had these symptoms before:  Yes  Has tried/received treatment for these symptoms:  Yes  Previous treatment was successful:  Yes  Prior treatment description:  Prednisone and antibiotics  What makes it worse:  Walking  What makes it better:  Doing nothing    She eats 0-1 servings of fruits and vegetables daily.She consumes 0 sweetened beverage(s) daily.She exercises with enough effort to increase her heart rate 9 or less minutes per day.  She exercises with enough effort to increase her heart rate 3 or less days per week.   She is taking medications regularly.    Today's PHQ-9         PHQ-9 Total Score: 8    PHQ-9 Q9 Thoughts of better off dead/self-harm past 2 weeks :   Not at all    How difficult have these problems made it for you to do your work, take care of things at home, or get along with other people: Somewhat difficult     Began wheezing 3 days ago and has developed a headache, RAMIREZ, and rhinorrhea. Power chair is not working well. Walking more than usual. They are redoing sheet rock in her apt building. Headache is similar to previous. Has not been on steroids or antibiotics for months. No sob at rest, edema more than baseline, or chest pain.    Review of Systems "   Constitutional, HEENT, cardiovascular, pulmonary, gi and gu systems are negative, except as otherwise noted.      Objective           Vitals:  No vitals were obtained today due to virtual visit.    Physical Exam   healthy, alert and no distress  PSYCH: Alert and oriented times 3; coherent speech, normal   rate and volume, able to articulate logical thoughts, able   to abstract reason, no tangential thoughts, no hallucinations   or delusions  Her affect is normal and pleasant  RESP: No cough, no audible wheezing, able to talk in full sentences  Remainder of exam unable to be completed due to telephone visits     Phone call duration: 6 minutes

## 2023-07-17 NOTE — PATIENT INSTRUCTIONS
Louise Ross,    Thank you for allowing [facility] to manage your care.    I am unsure of the cause of your symptoms, but this is most likely a viral respiratory infection causing a flair of your wheezing. We will cover you with prednisone and antibiotics in case this is bacterial.    Do a home COVID test and call [phone] if it is positive to let me know.    If you develop worsening/changing symptoms at any time, please call 911 or go to the emergency department for evaluation.    I sent your prescriptions to your pharmacy. Take a probiotic pill, eat live culture yogurt (Greek yogurt or Activia), drink kefir daily for one week after finishing the antibiotics to encourage growth of good bacteria in your system.    Prednisone Discharge Instructions:    Please take the steroid, Prednisone, for the full course as prescribed.  Take Prednisone with food or milk to minimize stomach upset.      Side effects of Prednisone include difficulty sleeping, increased appetite, weight gain, and changes in mood.  If you are diabetic, please monitor your blood sugar regularly while taking this medicine as Prednisone can cause high blood sugar.    If you have any questions or concerns, please feel free to call us at [phone]    Sincerely,    Ray Christian PA-C    Did you know?      You can schedule a video visit for follow-up appointments as well as future appointments for certain conditions.  Please see the below link.     [email or web address]    If you have not already done so,  I encourage you to sign up for [facility] ([email or web address]).  This will allow you to review your results, securely communicate with a provider, and schedule virtual visits as well.

## 2023-07-17 NOTE — TELEPHONE ENCOUNTER
Patient reports her power chair charging cord broke. She is waiting for it to come. She has been using her walker to get around. This causes her to be short of breath and causes her to wheeze. She reports a headache and a 99.9 temperature.   She feels like she is SOB with walking, and it is mild when she is sitting. She declines feeling faint when she tries to walk or coughing flem.     She could not locate her o2 sensor.    Symptoms since Friday.     Patient declined chest pain.     She is requesting prednisone taper.     She cannot be seen in person because she has to coordinate a  2 days in advance. She also cannot get anywhere without her power chair.     RN reviewed PCP schedule, no available appointments. RN scheduled with same day provider.     RN educated patient to use ER with worsening symptoms. She verbalized understanding.     Gema Toscano RN on 7/17/2023 at 8:27 AM

## 2023-07-19 ENCOUNTER — THERAPY VISIT (OUTPATIENT)
Dept: PHYSICAL THERAPY | Facility: CLINIC | Age: 58
End: 2023-07-19
Attending: PHYSICIAN ASSISTANT
Payer: COMMERCIAL

## 2023-07-19 DIAGNOSIS — G89.29 CHRONIC BILATERAL LOW BACK PAIN WITH RIGHT-SIDED SCIATICA: ICD-10-CM

## 2023-07-19 DIAGNOSIS — M46.1 SACROILIITIS (H): ICD-10-CM

## 2023-07-19 DIAGNOSIS — M54.41 CHRONIC BILATERAL LOW BACK PAIN WITH RIGHT-SIDED SCIATICA: ICD-10-CM

## 2023-07-19 DIAGNOSIS — M41.115 JUVENILE IDIOPATHIC SCOLIOSIS OF THORACOLUMBAR REGION: ICD-10-CM

## 2023-07-19 PROCEDURE — 97162 PT EVAL MOD COMPLEX 30 MIN: CPT | Mod: GP

## 2023-07-19 PROCEDURE — 97110 THERAPEUTIC EXERCISES: CPT | Mod: GP

## 2023-07-19 NOTE — PROGRESS NOTES
PHYSICAL THERAPY EVALUATION  Type of Visit: Evaluation    See electronic medical record for Abuse and Falls Screening details.    Cody reports hx of central LBP that can radiate L>R legs. Cannot stand or walk long distances d/t pain and weakness. She is not currently exercising and reports that in the past did treadmill and elliptical.     Subjective       Presenting condition or subjective complaint: LBP and weak legs  Date of onset: 07/11/23 (MD order date)    Relevant medical history: Asthma; Cancer; Chest pain; COPD; Depression; Dizziness; Hearing problems; High blood pressure; Mental Illness; Overweight; Significant weakness; Smoking; Substance use disorder   Dates & types of surgery: Cancer 1985-87, Hysterectomy 2010    Prior diagnostic imaging/testing results:       Prior therapy history for the same diagnosis, illness or injury: No      Living Environment  Social support: Alone   Type of home:     Stairs to enter the home:     Is there a railing: No   Ramp: No   Stairs inside the home: No       Help at home: Home management tasks (cooking, cleaning)  Equipment owned: Straight Cane; Walker; Walker with wheels; Crutches; Power wheelchair or scooter; Grab bars; Bath bench     Employment: No Student 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  Hobbies/Interests: kiera    Patient goals for therapy: Walk and Stand    Pain assessment: Pain present  See objective evaluation for additional pain details     Objective   LUMBAR SPINE EVALUATION  PAIN: Pain Location: lumbar spine and can radiate L>R LEs  Pain is Exacerbated By: prolonged walking, sitting, standing  Pain is Relieved By: cold and heat  INTEGUMENTARY (edema, incisions): NT  POSTURE: Sitting Posture: Rounded shoulders, Forward head, Thoracic kyphosis increased  GAIT:   Weightbearing Status: WBAT  Assistive Device(s): Walker (four wheeled)  Gait Deviations: Lala decreased  Trendelenberg L, Trendelenberg R  Trunk flexion  BALANCE/PROPRIOCEPTION: NT   ROM:   (Degrees)  Left AROM Left PROM  Right AROM Right PROM   Hip Flexion  Limited past 95, pain  Limited past 95, pain   Hip Internal Rotation  15, pain  15, pain   Hip External Rotation  45, pain  45, pain   Lumbar Side glide 25% loss, pain 25% loss, pain   Lumbar Flexion Fingers to knees, pain w/ return from bending   Lumbar Extension 25% loss, no pain   End feel: stiff    MYOTOMES:    Left Right   T12-L3 (Hip Flexion) 3+, ++ (mod) 3+, ++ (mod)   L2-4 (Quads)  4 4   L4 (Ankle DF) 4 4   L5 (Great Toe Ext) 4 4   S1 (Toe Raise) 4 4   STRENGTH: Hip abductors and adductors 3+/5, painful to test  CORE: CARLOTTA present w/ doming superior to umbilicus. Poor TA contraction  FLEXIBILITY: Decreased hip IR L, Decreased hip ER L, Decreased piriformis L, Decreased hip flexors L, Decreased hip IR R, Decreased hip ER R, Decreased piriformis R, Decreased hip flexors R  LUMBAR/HIP Special Tests:    Left Right   RUDDY Positive Positive   FADIR/Labrum/MAURICE Positive Positive   SLR for SIJ pain for SIJ pain     PELVIS/SI SPECIAL TESTS:    Left Right Additional Notes   ASLR + +    Thigh Thrust + +      FUNCTIONAL TESTS: unable to complete today    Assessment & Plan   CLINICAL IMPRESSIONS  Medical Diagnosis: Sacroiliitis (H)  Chronic bilateral low back pain with right-sided sciatica  Juvenile idiopathic scoliosis of thoracolumbar region    Treatment Diagnosis: LBP   Impression/Assessment: Patient is a 57 year old female with LBP complaints.  The following significant findings have been identified: Pain, Decreased ROM/flexibility, Decreased strength, Impaired gait, Impaired muscle performance and Decreased activity tolerance. These impairments interfere with their ability to perform self care tasks, work tasks, recreational activities, household chores, driving , household mobility and community mobility as compared to previous level of function.     Clinical Decision Making (Complexity):  Clinical Presentation: Evolving/Changing  Clinical Presentation  Rationale: based on medical and personal factors listed in PT evaluation  Clinical Decision Making (Complexity): Moderate complexity    PLAN OF CARE  Treatment Interventions:  Modalities: Biofeedback, Contrast Bath, Cryotherapy, Dry Needling, Fluidotherapy, E-stim, Hot Pack, Infrared, Iontophoresis, Laser Light, Mechanical Traction, Paraffin, Ultrasound, Vasoneumatic Device  Interventions: Gait Training, Manual Therapy, Neuromuscular Re-education, Therapeutic Activity, Therapeutic Exercise, Self-Care/Home Management    Long Term Goals     PT Goal 1  Goal Identifier: Standing  Goal Description: Pt will report pain <5/10 with 30 min standing  Rationale: to maximize safety and independence with performance of ADLs and functional tasks;to maximize safety and independence within the home;to maximize safety and independence within the community;to maximize safety and independence with transportation;to maximize safety and independence with self cares  Target Date: 10/11/23      Frequency of Treatment: 1x/wk  Duration of Treatment: 3 mo    Recommended Referrals to Other Professionals: none  Education Assessment:   Learner/Method: Patient;Listening;Demonstration;Pictures/Video    Risks and benefits of evaluation/treatment have been explained.   Patient/Family/caregiver agrees with Plan of Care.     Evaluation Time:     PT Yu, Moderate Complexity Minutes (97826): 15   Present: Not applicable     Signing Clinician: PHILIP DALAL, PT      [facility]                                                                                   OUTPATIENT PHYSICAL THERAPY      PLAN OF TREATMENT FOR OUTPATIENT REHABILITATION   Patient's Last Name, First Name, Inga Medellin YOB: 1965   Provider's Name   [facility]   Medical Record No.  [id number]     Onset Date: 07/11/23 (MD order date)  Start of Care Date: 07/19/23     Medical Diagnosis:   Sacroiliitis (H)  Chronic bilateral low back pain with right-sided sciatica  Juvenile idiopathic scoliosis of thoracolumbar region      PT Treatment Diagnosis:  LBP Plan of Treatment  Frequency/Duration: 1x/wk/ 3 mo    Certification date from 07/19/23 to 10/11/23         See note for plan of treatment details and functional goals     PHILIP DLAAL, PT                         I CERTIFY THE NEED FOR THESE SERVICES FURNISHED UNDER        THIS PLAN OF TREATMENT AND WHILE UNDER MY CARE .             Physician Signature               Date    X_____________________________________________________                        Referring Provider:  Sarina Chavarria      Initial Assessment  See Epic Evaluation- Start of Care Date: 07/19/23

## 2023-07-20 ENCOUNTER — TELEPHONE (OUTPATIENT)
Dept: FAMILY MEDICINE | Facility: CLINIC | Age: 58
End: 2023-07-20
Payer: COMMERCIAL

## 2023-07-20 NOTE — TELEPHONE ENCOUNTER
Patient called and stated that she go the approval through [facility] for the Vraylar.    Advised patient to call the pharmacy and let them know, and the med has been sent there already.    Advised to call us back with any further concerns or questions.    Patient stated understanding and agreeable with the plan of care.     Marah HEMPHILL BSN  Triage Nurse  [facility]: [facility]

## 2023-07-21 ENCOUNTER — ANCILLARY PROCEDURE (OUTPATIENT)
Dept: RADIOLOGY | Facility: AMBULATORY SURGERY CENTER | Age: 58
End: 2023-07-21
Attending: STUDENT IN AN ORGANIZED HEALTH CARE EDUCATION/TRAINING PROGRAM
Payer: COMMERCIAL

## 2023-07-21 ENCOUNTER — HOSPITAL ENCOUNTER (OUTPATIENT)
Facility: AMBULATORY SURGERY CENTER | Age: 58
Discharge: HOME | End: 2023-07-21
Attending: STUDENT IN AN ORGANIZED HEALTH CARE EDUCATION/TRAINING PROGRAM | Admitting: STUDENT IN AN ORGANIZED HEALTH CARE EDUCATION/TRAINING PROGRAM
Payer: COMMERCIAL

## 2023-07-21 VITALS
HEART RATE: 92 BPM | SYSTOLIC BLOOD PRESSURE: 123 MMHG | OXYGEN SATURATION: 98 % | TEMPERATURE: 96.5 F | RESPIRATION RATE: 16 BRPM | DIASTOLIC BLOOD PRESSURE: 87 MMHG

## 2023-07-21 DIAGNOSIS — M46.1 SACROILIITIS (H): ICD-10-CM

## 2023-07-21 LAB — GLUCOSE BLDC GLUCOMTR-MCNC: 93 MG/DL (ref 70–99)

## 2023-07-21 PROCEDURE — 82962 GLUCOSE BLOOD TEST: CPT | Performed by: PATHOLOGY

## 2023-07-21 PROCEDURE — 27096 INJECT SACROILIAC JOINT: CPT | Mod: 50 | Performed by: STUDENT IN AN ORGANIZED HEALTH CARE EDUCATION/TRAINING PROGRAM

## 2023-07-21 PROCEDURE — G0260 INJ FOR SACROILIAC JT ANESTH: HCPCS | Mod: LT

## 2023-07-21 RX ORDER — LIDOCAINE HYDROCHLORIDE 10 MG/ML
INJECTION, SOLUTION EPIDURAL; INFILTRATION; INTRACAUDAL; PERINEURAL DAILY PRN
Status: DISCONTINUED | OUTPATIENT
Start: 2023-07-21 | End: 2023-07-21 | Stop reason: HOSPADM

## 2023-07-21 RX ORDER — ROPIVACAINE HYDROCHLORIDE 2 MG/ML
INJECTION, SOLUTION EPIDURAL; INFILTRATION; PERINEURAL DAILY PRN
Status: DISCONTINUED | OUTPATIENT
Start: 2023-07-21 | End: 2023-07-21 | Stop reason: HOSPADM

## 2023-07-21 RX ORDER — TRIAMCINOLONE ACETONIDE 40 MG/ML
INJECTION, SUSPENSION INTRA-ARTICULAR; INTRAMUSCULAR DAILY PRN
Status: DISCONTINUED | OUTPATIENT
Start: 2023-07-21 | End: 2023-07-21 | Stop reason: HOSPADM

## 2023-07-21 RX ORDER — IOPAMIDOL 408 MG/ML
INJECTION, SOLUTION INTRATHECAL DAILY PRN
Status: DISCONTINUED | OUTPATIENT
Start: 2023-07-21 | End: 2023-07-21 | Stop reason: HOSPADM

## 2023-07-21 RX ORDER — FLUCONAZOLE 100 MG/1
1 TABLET ORAL
COMMUNITY
Start: 2023-05-01 | End: 2024-02-22

## 2023-07-21 NOTE — OP NOTE
Pre procedure Diagnosis: Bilateral SI joint dysfunction    Post procedure Diagnosis: Same  Procedure performed: Bilateral SI joint injection, CPT code 49348-55  Anesthesia: none  Complications: none immediately  Operators: Raji Castellanos MD    Indications:   Inga Ledesma is a 57 year old female was sent by Min Toledo PA-C for bilateral sacroiliitis.    Options/alternatives, benefits and risks were discussed with the patient including bleeding, infection, tissue trauma, exposure to radiation, reaction to medications including seizure, nerve injury, weakness, and numbness.  Questions were answered to her satisfaction and she agrees to proceed. Voluntary informed consent was obtained and signed.     Vitals were reviewed: Yes  Allergies were reviewed:  Yes   Medications were reviewed:  Yes   Pre-procedure pain score: 8/10    Procedure:  After getting informed consent, patient was brought into the procedure suite and was placed in a prone position on the procedure table.   A Pause for Cause was performed.  Patient was prepped and draped in sterile fashion.     After identifying the bilateral SI joints, the C-arm was rotated to a obliquely to obtain the best view of the inferior angle of the joint.  A total of 6 ml of Lidocaine 1%  was used to anesthetize the skin at a skin entry site coaxial with the fluoroscopy beam at this location.  A 22gauge 3.5 inch needle was advanced under intermittent fluoroscopy until it was felt to enter the SI joint.    A total of 2.5ml of Omnipaque-180 was injected, confirming appropriate position, with spread into the intraarticular space, with no intravascular uptake noted. Location was verified in lateral view.    The procedure was then repeated on the other side.    4 ml of 0.2% ropivacaine with 40mg of kenalog was injected split evenly between both sides.  The needle was flushed with lidocaine and removed.     Hemostasis was achieved, the area was cleaned, and bandaids were placed  when appropriate.  The patient tolerated the procedure well, and was taken to the recovery room.    Images were saved to PACS.    Post-procedure pain score: 0/10  Follow-up includes:   -f/u with referring provider    Raji Castellanos MD  Interventional Pain Medicine  [facility]

## 2023-07-21 NOTE — H&P
Inga Ledesma  [id number]  female  57 year old      Reason for procedure/surgery: bilateral sacroiliac joint dysfunction    Patient Active Problem List   Diagnosis     RLS (restless legs syndrome)     GERD (gastroesophageal reflux disease)     Iron deficiency anemia secondary to inadequate dietary iron intake     Hyperlipidemia with target LDL less than 160     Sacroiliitis (H)     Tobacco abuse     Vitamin D deficiency     Edema of both legs     Hypertension goal BP (blood pressure) < 140/90     [facility]     Major depressive disorder, recurrent episode, mild (H)     (HFpEF) heart failure with preserved ejection fraction (H)     Psychophysiological insomnia     Alcohol abuse     Morbid obesity (H)     Chronic bilateral low back pain without sciatica     Chronic obstructive pulmonary disease (H)     JAQUELIN (obstructive sleep apnea)- severe (AHI 37)     Preop general physical exam       Past Surgical History:    Past Surgical History:   Procedure Laterality Date     AS BIOPSY/EXCISION LYMPH NODE OPEN SUPERFICIAL       COLONOSCOPY       COLONOSCOPY N/A 4/13/2022    Procedure: COLONOSCOPY;  Surgeon: Mike Garcia MD;  Location:  GI     EYE SURGERY       HYSTERECTOMY  2010     HYSTERECTOMY TOTAL ABDOMINAL  7/28/10    Bilateral salpingectomy.  ovaries conserved.     LASER TX, CERVICAL  1987     LYMPH NODE BIOPSY  2007    inguinal     LYSIS OF LABIAL LESION(S)  1985, 1987       Past Medical History:   Past Medical History:   Diagnosis Date     Acute pain of left shoulder 05/20/2021     LALA (acute kidney injury) (H) 05/28/2018     Alcohol abuse      Alcohol dependence with acute alcoholic intoxication (H) 11/19/2020     Alcohol withdrawal (H) 10/28/2017     Alcohol withdrawal seizure (H) 03/2016     Alcoholic hepatitis without ascites 12/05/2013     Cancer of labia majora (H) 1987     Cellulitis of right lower extremity 03/13/2021     Cervical dysplasia 1987     Congestive heart failure (H) 05/16/2016      COPD (chronic obstructive pulmonary disease) (H)      COPD exacerbation (H) 09/04/2018     CVA (cerebral infarction) 01/2012     Dependent edema      Depression, major      Depressive disorder 1966     GERD (gastroesophageal reflux disease)      Hyperlipidemia LDL goal < 160      Hypertension      Iron deficiency anemia      Menorrhagia 05/10/2010     Pneumonia 07/10/2021     RLS (restless legs syndrome)      Sacroiliitis (H)     steroid injections ineffective, chronic low back pain     Sepsis (H) 06/18/2018     Tobacco abuse      Uncomplicated asthma      Vitamin D deficiencies        Social History:   Social History     Tobacco Use     Smoking status: Some Days     Packs/day: 0.25     Years: 34.00     Pack years: 8.50     Types: Cigarettes     Start date: 11/1/1979     Last attempt to quit: 10/3/2012     Years since quitting: 10.8     Smokeless tobacco: Current     Types: Chew     Tobacco comments:     5 cigarettes daily   Substance Use Topics     Alcohol use: No     Comment: 1 pint of vodka per day, last drink aug 2018       Family History:   Family History   Problem Relation Age of Onset     Depression/Anxiety Mother      Asthma Mother      Cerebrovascular Disease Father      Hypertension Father      Lung Cancer Father      Alcoholism Father      Breast Cancer Paternal Aunt      Other Cancer Other      Depression Other      Anxiety Disorder Other      Mental Illness Other      Substance Abuse Other      Asthma Other      Obesity Sister      Obesity Son      Suicide Paternal Uncle        Allergies:   Allergies   Allergen Reactions     Bee Venom Anaphylaxis     Reglan [Metoclopramide Hcl] Other (See Comments)     Body tenses up, Dystonia     Doxycycline Rash       Active Medications:   Current Outpatient Medications   Medication Sig Dispense Refill     acetaminophen (ACETAMINOPHEN EXTRA STRENGTH) 500 MG tablet TAKE 1-2 TABLETS BY MOUTH EVERY 4-6 HOURS AS NEEDED FOR PAIN *MAX DOSE 4000 MG IN 24 HOURS* 100 tablet 3      ADVAIR DISKUS 500-50 MCG/ACT inhaler INHALE ONE PUFF BY MOUTH EVERY 12 HOURS patient is using PRN 60 each 10     amoxicillin-clavulanate (AUGMENTIN) 875-125 MG tablet Take 1 tablet by mouth 2 times daily for 5 days 10 tablet 0     ASPIRIN LOW DOSE 81 MG EC tablet TAKE 1 TABLET BY MOUTH DAILY 30 tablet 10     azithromycin (ZITHROMAX) 250 MG tablet Take 1 tablet (250 mg) by mouth Every Mon, Wed, Fri Morning 72 tablet 3     buPROPion (WELLBUTRIN XL) 300 MG 24 hr tablet Take 1 tablet (300 mg) by mouth every morning 90 tablet 1     calcium carbonate 500 mg, elemental, (OSCAL) 500 MG tablet TAKE 1 TABLET BY MOUTH DAILY 30 tablet 10     cetirizine (ZYRTEC) 10 MG tablet TAKE 1 TABLET BY MOUTH DAILY 30 tablet 10     EPINEPHrine (ANY BX GENERIC EQUIV) 0.3 MG/0.3ML injection 2-pack INJECT AS DIRECTED AS NEEDED FOR ALLERGIC REACTION 2 each 10     famotidine (PEPCID) 40 MG tablet TAKE 1 TABLET BY MOUTH DAILY AT BEDTIME 30 tablet 10     FEROSUL 325 (65 Fe) MG tablet TAKE ONE (1) TABLET BY MOUTH TWICE DAILY 60 tablet 10     fluconazole (DIFLUCAN) 100 MG tablet Take 1 tablet by mouth daily at 2 pm       FLUoxetine (PROZAC) 40 MG capsule TAKE 2 CAPSULES BY MOUTH ONCE DAILY 60 capsule 3     fluticasone (FLONASE) 50 MCG/ACT nasal spray INSTILL ONE (1) SPRAY IN EACH NOSTRIL DAILY 16 g 10     folic acid (FOLVITE) 1 MG tablet TAKE 1 TABLET BY MOUTH DAILY 30 tablet 10     furosemide (LASIX) 40 MG tablet Take 1 tablet (40 mg) by mouth 2 times daily Take one tab in the morning and one tab at noon 180 tablet 0     gabapentin (NEURONTIN) 600 MG tablet TAKE 1 TABLET BY MOUTH THREE TIMES DAILY 90 tablet 10     hydrALAZINE (APRESOLINE) 10 MG tablet TAKE 1 TABLET BY MOUTH 3 TIMES DAILY 90 tablet 10     hydrOXYzine (ATARAX) 25 MG tablet TAKE 1 TABLET BY MOUTH THREE TIMES DAILY AS NEEDED FOR ITCHING 90 tablet 10     ibuprofen (ADVIL/MOTRIN) 600 MG tablet Take 1 tablet (600 mg) by mouth every 6 hours as needed for moderate pain 60 tablet 1      insulin pen needle (31G X 6 MM) 31G X 6 MM miscellaneous Use 1 pen needles daily or as directed for use with victoza. 90 each 1     ipratropium - albuterol 0.5 mg/2.5 mg/3 mL (DUONEB) 0.5-2.5 (3) MG/3ML neb solution INHALE ONE VIAL BY NEBULIZATION FOUR TIMES DAILY AS NEEDED FOR WHEEZING 360 mL 5     labetalol (NORMODYNE) 100 MG tablet TAKE ONE (1) TABLET BY MOUTH TWICE DAILY 60 tablet 10     liraglutide (VICTOZA) 18 MG/3ML solution Inject 1.8 mg Subcutaneous daily 6 mL 11     magnesium oxide (MAG-OX) 400 MG tablet TAKE 1 TABLET BY MOUTH DAILY 30 tablet 10     meclizine (ANTIVERT) 25 MG tablet Take 1 tablet (25 mg) by mouth 3 times daily as needed for dizziness 80 tablet 0     omeprazole (PRILOSEC) 20 MG DR capsule TAKE ONE (1) CAPSULE BY MOUTH TWICE DAILY BEFORE MEALS 60 capsule 10     phentermine (ADIPEX-P) 37.5 MG capsule TAKE 1 CAPSULE(37.5 MG) BY MOUTH EVERY MORNING 30 capsule 0     potassium chloride ER (KLOR-CON M) 10 MEQ CR tablet TAKE 1 TABLET BY MOUTH DAILY 30 tablet 10     predniSONE (DELTASONE) 20 MG tablet Take 3 tabs by mouth daily x 3 days, then 2 tabs daily x 3 days, then 1 tab daily x 3 days, then 1/2 tab daily x 3 days. 20 tablet 0     QUEtiapine (SEROQUEL) 100 MG tablet TAKE 1 TO 2 TABLETS(100  MG) BY MOUTH AT BEDTIME 90 tablet 1     Revefenacin (YUPELRI) 175 MCG/3ML SOLN Inhale 3 mLs (175 mcg) into the lungs daily Nebulize and inhale 1 vial (3 ml) by mouth and into lungs once daily 270 mL 3     rOPINIRole (REQUIP) 1 MG tablet TAKE 1 TABLET BY MOUTH THREE TIMES DAILY 90 tablet 10     Semaglutide-Weight Management (WEGOVY) 1.7 MG/0.75ML pen Inject 1.7 mg Subcutaneous once a week 3 mL 0     Suvorexant (BELSOMRA) 10 MG tablet Take 1 tablet (10 mg) by mouth nightly as needed for sleep 30 tablet 0     topiramate (TOPAMAX) 50 MG tablet Take 1 tablet (50 mg) by mouth At Bedtime 90 tablet 1     VENTOLIN  (90 Base) MCG/ACT inhaler INHALE 1-2 PUFFS BY MOUTH EVERY 4 HOURS AS NEEDED FOR SHORTNESS  OF BREATH, DIFFICULTY BREATHING OR WHEEZING. 18 g 10     vitamin D3 (CHOLECALCIFEROL) 50 mcg (2000 units) tablet TAKE 1 TABLET BY MOUTH DAILY 30 tablet 10     zaleplon (SONATA) 10 MG capsule TAKE 1 CAPSULE(10 MG) BY MOUTH AT BEDTIME 30 capsule 0     cariprazine (VRAYLAR) 1.5 MG capsule Take 1 capsule (1.5 mg) by mouth daily 90 capsule 0       Systemic Review:   CONSTITUTIONAL: NEGATIVE for fever, chills, change in weight  ENT/MOUTH: NEGATIVE for ear, mouth and throat problems  RESP: NEGATIVE for significant cough or SOB  CV: NEGATIVE for chest pain, palpitations or peripheral edema    Physical Examination:   Vital Signs: BP (!) 145/88 (BP Location: Right arm)   Pulse 69   Temp (!) 96.5  F (35.8  C) (Temporal)   LMP 06/02/2010   SpO2 96%   GENERAL: healthy appearing, alert and no distress  NECK: no adenopathy, no asymmetry, masses, or scars  RESP: normal work of breathing on room air  CV: warm, well perfused extremities, normal capillary refill  ABDOMEN: nondistended  MS: no gross musculoskeletal defects noted, no edema    Plan: Appropriate to proceed as scheduled.    Raji Castellanos MD  Interventional Pain Medicine  [facility]    7/21/2023

## 2023-07-21 NOTE — DISCHARGE INSTRUCTIONS
Home Care Instructions after a Sacroiliac Joint Injection        Activity  -You may resume most normal activity levels with the exception of strenuous activity. It is important for us to know if your pain with normal activity is relieved after this injection.  -DO NOT shower for 24 hours  -DO NOT remove bandaid for 24 hours    Pain  -You may experience soreness at the injection site for one or two days  -You may use an ice pack for 20 minutes every 2 hours for the first 24 hours  -You may use a heating pad after the first 24 hours  -You may use Tylenol (acetaminophen) every 4 hours or other pain medicines as directed by your physician    You may experience numbness radiating into your legs or arms (depending on the procedure location). This numbness may last several hours. Until sensation returns to normal; please use caution in walking, climbing stairs, and stepping out of your vehicle, etc.      Common side effects of steroids:  Not everyone will experience corticosteroid side effects. If side effects are experienced, they will gradually subside in the 7-10 day period following an injection. Most common side effects include:  -Flushed face and/or chest  -Feeling of warmth, particularly in the face but could be an overall feeling of warmth  -Increased blood sugar in diabetic patients  -Menstrual irregularities my occur. If taking hormone-based birth control an alternate method of birth control is recommended  -Sleep disturbances and/or mood swings are possible  -Leg cramps      PLEASE KEEP TRACK OF YOUR SYMPTOMS AND NOTE YOUR IMPROVEMENT FOR YOUR DOCTOR.     Please contact us if you have:  -Severe pain  -Fever more than 101.5 degrees Fahrenheit  -Signs of infection at the injection site (redness, swelling, or drainage)    FOR PAIN CENTER PATIENTS:  If you have questions, please contact the Pain Clinic at [phone] Option #1 between the hours of 7:00 am and 3:00 pm Monday through Friday. After office hours you  can contact the on call provider by dialing [phone]. If you need immediate attention, we recommend that you go to a hospital emergency room or dial [phone].      FOR PM&R PATIENTS:  For patients seen by the PM and R service, please call [phone]. (Monday through Friday 8a-5p.  After business hours and weekends call [phone] and ask for the PM and R doctor on call). If you need to fax a pain diary to PM&R the fax number is [phone]. If you are unable to fax, uploading to [employer] is encouraged, then send to provider. If you have procedure scheduling questions please call [phone] Option #2.

## 2023-07-25 ENCOUNTER — TELEPHONE (OUTPATIENT)
Dept: FAMILY MEDICINE | Facility: CLINIC | Age: 58
End: 2023-07-25
Payer: COMMERCIAL

## 2023-07-25 NOTE — TELEPHONE ENCOUNTER
Patient calling to request new medication for weight loss.    Patient was prescribed Wegovy at last office visit with PCP, Min Toledo (6/22/2023).    Patient states Wegovy is out of stock everywhere and has not been able to fill this; is interested in Ozempic and alternative; also heard low dose Naltrexone can help with weight loss and would like to discuss.    Scheduled virtual visit with PCP on Monday (7/31/2023).  Patient verbalized understanding and agreement.    Janice Bruce RN

## 2023-07-31 ENCOUNTER — VIRTUAL VISIT (OUTPATIENT)
Dept: FAMILY MEDICINE | Facility: CLINIC | Age: 58
End: 2023-07-31
Payer: COMMERCIAL

## 2023-07-31 DIAGNOSIS — E66.9 OBESITY WITH SERIOUS COMORBIDITY, UNSPECIFIED CLASSIFICATION, UNSPECIFIED OBESITY TYPE: ICD-10-CM

## 2023-07-31 PROCEDURE — 99441 PR PHYSICIAN TELEPHONE EVALUATION 5-10 MIN: CPT | Mod: 95 | Performed by: PHYSICIAN ASSISTANT

## 2023-07-31 RX ORDER — NALTREXONE HYDROCHLORIDE 50 MG/1
50 TABLET, FILM COATED ORAL DAILY
Qty: 30 TABLET | Refills: 3 | Status: SHIPPED | OUTPATIENT
Start: 2023-07-31 | End: 2024-02-22

## 2023-07-31 RX ORDER — PHENTERMINE HYDROCHLORIDE 37.5 MG/1
37.5 CAPSULE ORAL EVERY MORNING
Qty: 30 CAPSULE | Refills: 0 | Status: SHIPPED | OUTPATIENT
Start: 2023-07-31 | End: 2023-09-05

## 2023-07-31 NOTE — PROGRESS NOTES
Cody is a 57 year old who is being evaluated via a billable telephone visit.      What phone number would you like to be contacted at? [phone]  How would you like to obtain your AVS? Marcia    Distant Location (provider location):  On-site      Subjective   Cody is a 57 year old, presenting for the following health issues:  Weight Loss (Discuss alternative medication)        7/31/2023     4:58 PM   Additional Questions   Roomed by Ellen Arevalo CMA   Accompanied by None         7/31/2023     4:58 PM   Patient Reported Additional Medications   Patient reports taking the following new medications none       HPI       Weight loss  Would like to discuss an alternative medication          Review of Systems   Constitutional, HEENT, cardiovascular, pulmonary, GI, , musculoskeletal, neuro, skin, endocrine and psych systems are negative, except as otherwise noted.      Objective           Vitals:  No vitals were obtained today due to virtual visit.    Physical Exam   healthy, alert, and no distress  PSYCH: Alert and oriented times 3; coherent speech, normal   rate and volume, able to articulate logical thoughts, able   to abstract reason, no tangential thoughts, no hallucinations   or delusions  Her affect is normal  RESP: No cough, no audible wheezing, able to talk in full sentences  Remainder of exam unable to be completed due to telephone visits    Inga was seen today for weight loss.    Diagnoses and all orders for this visit:    Obesity with serious comorbidity, unspecified classification, unspecified obesity type  -     phentermine (ADIPEX-P) 37.5 MG capsule; Take 1 capsule (37.5 mg) by mouth every morning  -     semaglutide (OZEMPIC) 2 MG/3ML pen; Inject 0.25 mg Subcutaneous every 7 days  -     naltrexone (DEPADE/REVIA) 50 MG tablet; Take 1 tablet (50 mg) by mouth daily    If ozempic is not available or covered with try mounjaro.    work on lifestyle modification  Exercise  Lower calorie diet           Phone call duration: 7 minutes

## 2023-08-01 ENCOUNTER — TELEPHONE (OUTPATIENT)
Dept: FAMILY MEDICINE | Facility: CLINIC | Age: 58
End: 2023-08-01
Payer: COMMERCIAL

## 2023-08-01 NOTE — TELEPHONE ENCOUNTER
Patient called and stated that Mehrdad did not receive med for :    semaglutide (OZEMPIC) 2 MG/3ML pen 3 mL 0 7/31/2023  No   Sig - Route: Inject 0.25 mg Subcutaneous every 7 days - Subcutaneous   Class: E-Prescribe   Order: [id number]   Prior authorization: Waiting for Payer Response   This order has not been released to its destination       Looks like this order has not been released to its destination.    Routed to PCP to advise

## 2023-08-01 NOTE — TELEPHONE ENCOUNTER
Patient called and pharmacy did have this, and it is not covered under her insurance.    She will call pharmacy and just go on the Wegovy that was sent there already instead.    Marah CHERRYN RN  Triage Nurse  [facility]

## 2023-08-02 ENCOUNTER — THERAPY VISIT (OUTPATIENT)
Dept: PHYSICAL THERAPY | Facility: CLINIC | Age: 58
End: 2023-08-02
Attending: PHYSICIAN ASSISTANT
Payer: COMMERCIAL

## 2023-08-02 DIAGNOSIS — G89.29 CHRONIC BILATERAL LOW BACK PAIN WITHOUT SCIATICA: ICD-10-CM

## 2023-08-02 DIAGNOSIS — M46.1 SACROILIITIS (H): Primary | ICD-10-CM

## 2023-08-02 DIAGNOSIS — M54.50 CHRONIC BILATERAL LOW BACK PAIN WITHOUT SCIATICA: ICD-10-CM

## 2023-08-02 PROCEDURE — 97110 THERAPEUTIC EXERCISES: CPT | Mod: GP | Performed by: PHYSICAL THERAPIST

## 2023-08-09 NOTE — TELEPHONE ENCOUNTER
Patient is scheduled to see Min 01/06/21 to discuss.    Pls call patient and offer appt tomorrow 0800 in open slot for rash.     Thank you.

## 2023-08-10 ENCOUNTER — THERAPY VISIT (OUTPATIENT)
Dept: PHYSICAL THERAPY | Facility: CLINIC | Age: 58
End: 2023-08-10
Attending: PHYSICIAN ASSISTANT
Payer: COMMERCIAL

## 2023-08-10 DIAGNOSIS — M46.1 SACROILIITIS (H): Primary | ICD-10-CM

## 2023-08-10 DIAGNOSIS — G89.29 CHRONIC BILATERAL LOW BACK PAIN WITHOUT SCIATICA: ICD-10-CM

## 2023-08-10 DIAGNOSIS — M54.50 CHRONIC BILATERAL LOW BACK PAIN WITHOUT SCIATICA: ICD-10-CM

## 2023-08-10 PROCEDURE — 97530 THERAPEUTIC ACTIVITIES: CPT | Mod: GP

## 2023-08-10 PROCEDURE — 97110 THERAPEUTIC EXERCISES: CPT | Mod: 59

## 2023-08-28 DIAGNOSIS — E66.01 MORBID OBESITY (H): Primary | ICD-10-CM

## 2023-08-29 RX ORDER — SEMAGLUTIDE 1.7 MG/.75ML
INJECTION, SOLUTION SUBCUTANEOUS
Qty: 3 ML | Refills: 0 | Status: SHIPPED | OUTPATIENT
Start: 2023-08-29 | End: 2023-09-13

## 2023-08-30 ENCOUNTER — TELEPHONE (OUTPATIENT)
Dept: NEUROSURGERY | Facility: CLINIC | Age: 58
End: 2023-08-30
Payer: COMMERCIAL

## 2023-08-31 ENCOUNTER — ANCILLARY PROCEDURE (OUTPATIENT)
Dept: GENERAL RADIOLOGY | Facility: CLINIC | Age: 58
End: 2023-08-31
Attending: PHYSICIAN ASSISTANT
Payer: COMMERCIAL

## 2023-08-31 ENCOUNTER — OFFICE VISIT (OUTPATIENT)
Dept: NEUROSURGERY | Facility: CLINIC | Age: 58
End: 2023-08-31
Payer: COMMERCIAL

## 2023-08-31 VITALS
OXYGEN SATURATION: 96 % | BODY MASS INDEX: 35.97 KG/M2 | DIASTOLIC BLOOD PRESSURE: 79 MMHG | SYSTOLIC BLOOD PRESSURE: 117 MMHG | HEART RATE: 88 BPM | RESPIRATION RATE: 16 BRPM | WEIGHT: 183.2 LBS

## 2023-08-31 DIAGNOSIS — M41.115 JUVENILE IDIOPATHIC SCOLIOSIS OF THORACOLUMBAR REGION: ICD-10-CM

## 2023-08-31 DIAGNOSIS — G89.29 CHRONIC BILATERAL LOW BACK PAIN WITH RIGHT-SIDED SCIATICA: ICD-10-CM

## 2023-08-31 DIAGNOSIS — M54.41 CHRONIC BILATERAL LOW BACK PAIN WITH RIGHT-SIDED SCIATICA: ICD-10-CM

## 2023-08-31 DIAGNOSIS — M46.1 SACROILIITIS (H): Primary | ICD-10-CM

## 2023-08-31 PROCEDURE — 72082 X-RAY EXAM ENTIRE SPI 2/3 VW: CPT | Performed by: STUDENT IN AN ORGANIZED HEALTH CARE EDUCATION/TRAINING PROGRAM

## 2023-08-31 PROCEDURE — 77073 BONE LENGTH STUDIES: CPT | Performed by: STUDENT IN AN ORGANIZED HEALTH CARE EDUCATION/TRAINING PROGRAM

## 2023-08-31 PROCEDURE — 99213 OFFICE O/P EST LOW 20 MIN: CPT | Performed by: PHYSICIAN ASSISTANT

## 2023-08-31 ASSESSMENT — PAIN SCALES - GENERAL: PAINLEVEL: MODERATE PAIN (5)

## 2023-08-31 NOTE — LETTER
8/31/2023       RE: Inga Ledesma  [address]  [facility] [address]     Dear Colleague,    Thank you for referring your patient, Inga Ledesma, to the [facility] NEUROSURGERY CLINIC [place] at [facility]. Please see a copy of my visit note below.        Neurosurgery Clinic Note    Chief Complaint: low back pain w/ right sciatica    History of Present Illness:    Inga Ledesma is a 57 year old female with a PMH of RLS, GERD, EtOH abuse, BMI 43, Tobacco use, Vitamin D deficiency, scoliosis, COPD, who is presenting for evaluation of SI joint pain, low back pain and right sided sciatica.    7/11/23 Visit: Her low back has been hurting her for years.  She had went to ED and they told her she had dry kidneys.  She went to PCP who did XR for consideration for doing an injection.  She walks bent over and both sides of her back hurts.  She sometimes has to stop to rest her legs with walking from the parking lot into the store.  She needs to use a scooter or wheelchair for longer distances.  She has been using the wheelchair for about 8 months.  The pain is in her bilateral buttocks and shoots down to the middle her posterior thigh.  She notes even walking in her apartment that her legs are tired and she has to stop for a while before continuing.  Her pain is okay with sitting, worse with standing and moving.  She tries to avoid stairs because her legs feel weak.  Her pain seems to switch between the left and right side.  It sometimes affects both side at the same time.  Today it is on the right.      She denies any prior back surgeries.  She has known about scoliosis since 3rd grade.  She required PT to help relieve muscle spasms around that time period.      She smokes cigarettes and has for 42 years, 1/2 pack per day.      Conservative Treatment:  PT - none for her back  Medical management for back - none specifically for her back, but her list  of medications is extensive.   Injections - 15 years ago [facility] - similar back pain - was helpful for short term    8/31/23 Visit - s/p PT and bilateral SI joint injections.   Patient states her SI joint pain reduced to zero right after the injection and has come  back up some.  She is not endorsing 4/10 pain today.  She also notes her right buttock is a tender.  She is still attending PT and has an appointment tomorrow. She has had inconsistent appointments because of conflicts with other appointments.  She feels her legs are weak though.  She was recently started on Wegovy 1.7 mg which is being managed by her PCP.  According to her scale at home, she has lost 28 pounds in 1 month.  She is concerned that this might be why she is feeling weaker.          Past Medical History:   Diagnosis Date    Acute pain of left shoulder 05/20/2021    LALA (acute kidney injury) (H) 05/28/2018    Alcohol abuse     Alcohol dependence with acute alcoholic intoxication (H) 11/19/2020    Alcohol withdrawal (H) 10/28/2017    Alcohol withdrawal seizure (H) 03/2016    Alcoholic hepatitis without ascites 12/05/2013    Cancer of labia majora (H) 1987    Cellulitis of right lower extremity 03/13/2021    Cervical dysplasia 1987    Congestive heart failure (H) 05/16/2016    COPD (chronic obstructive pulmonary disease) (H)     COPD exacerbation (H) 09/04/2018    CVA (cerebral infarction) 01/2012    Dependent edema     Depression, major     Depressive disorder 1966    GERD (gastroesophageal reflux disease)     Hyperlipidemia LDL goal < 160     Hypertension     Iron deficiency anemia     Menorrhagia 05/10/2010    Pneumonia 07/10/2021    RLS (restless legs syndrome)     Sacroiliitis (H)     steroid injections ineffective, chronic low back pain    Sepsis (H) 06/18/2018    Tobacco abuse     Uncomplicated asthma     Vitamin D deficiencies        Past Surgical History:   Procedure Laterality Date    AS BIOPSY/EXCISION LYMPH NODE OPEN  SUPERFICIAL      COLONOSCOPY      COLONOSCOPY N/A 4/13/2022    Procedure: COLONOSCOPY;  Surgeon: Mike Garcia MD;  Location:  GI    EYE SURGERY      HYSTERECTOMY  2010    HYSTERECTOMY TOTAL ABDOMINAL  7/28/10    Bilateral salpingectomy.  ovaries conserved.    INJECT SACROILIAC JOINT Bilateral 7/21/2023    Procedure: INJECTION, SACROILIAC JOINT;  Surgeon: Raji Castellanos MD;  Location: [facility] OR    LASER TX, CERVICAL  1987    LYMPH NODE BIOPSY  2007    inguinal    LYSIS OF LABIAL LESION(S)  1985, 1987       Social History     Socioeconomic History    Marital status:     Number of children: 1    Years of education: 13   Occupational History     Employer: [employer]   Tobacco Use    Smoking status: Some Days     Packs/day: 0.25     Years: 34.00     Pack years: 8.50     Types: Cigarettes     Start date: 11/1/1979     Last attempt to quit: 10/3/2012     Years since quitting: 10.7    Smokeless tobacco: Current     Types: Chew    Tobacco comments:     5 cigarettes daily   Vaping Use    Vaping Use: Former    Quit date: 1/1/2022   Substance and Sexual Activity    Alcohol use: No     Comment: 1 pint of vodka per day, last drink aug 2018    Drug use: No    Sexual activity: Not Currently   Other Topics Concern    Parent/sibling w/ CABG, MI or angioplasty before 65F 55M? No   Social History Narrative    Lives in [place] with her son in a trailer no access to guns or weapons works for the [employer] office and enjoys crocheting and watching television.       family history includes Alcoholism in her father; Anxiety Disorder in an other family member; Asthma in her mother and another family member; Breast Cancer in her paternal aunt; Cerebrovascular Disease in her father; Depression in an other family member; Depression/Anxiety in her mother; Hypertension in her father; Lung Cancer in her father; Mental Illness in an other family member; Obesity in her sister and son; Other Cancer in an other family  member; Substance Abuse in an other family member; Suicide in her paternal uncle.       IMAGING   Imaging independently reviewed.    EOS XR 8/31/23 - mild-moderate scoliotic curvature      Additional Findings:  Bibasilar dependent atelectasis. The cardiac silhouette is at the  upper limits of normal for size. Nonobstructive bowel gas pattern.  Degenerative changes of the hips. Surgical clips project over the  right hip. No acute osseous abnormality. Chronic wedge deformities in  the midthoracic spine.  Impression:  1. Mild convex left curvature of the lumbar spine and borderline  convex right curvature of the thoracic spine.   2. No substantial global coronal imbalance.  3. Positive global sagittal imbalance.  4. Weight bearing axis as detailed above.      Prior Imaging:    Lumbar XR 6/22/23 - scoliotic curvature.  Mild spondylolisthesis L3-L5 with moderate DDD.        IMPRESSION: Convex left curvature of the lumbar spine centered at L3.  Leftward listhesis of L3 on L4. Mild stepwise anterolisthesis of L3 on  L4 and L4 on L5. No obvious loss of vertebral body height. Moderate  degenerative endplate changes and loss of disc height at L3-L4, L4-L5,  and to a lesser extent L5-S1. Facet hypertrophy in the lower lumbar  spine.    Wt Readings from Last 10 Encounters:   08/31/23 83.1 kg (183 lb 3.2 oz)   07/11/23 95.7 kg (211 lb)   06/22/23 99.1 kg (218 lb 6.4 oz)   12/26/22 99.9 kg (220 lb 3.2 oz)   11/07/22 100.7 kg (222 lb)   09/27/22 100.2 kg (221 lb)   09/16/22 101 kg (222 lb 9.6 oz)   08/11/22 97.2 kg (214 lb 4.8 oz)   08/05/22 96.8 kg (213 lb 6.4 oz)   08/04/22 97.5 kg (214 lb 14.4 oz)      Physical Exam   LMP 06/02/2010     Neurological:  Alert, NAD, and oriented to person, place, and time.   No cranial nerve deficit   Gait: using wheelchair.  Is able to ambulate short distances, balanced.  Gets on/of exam table without assistance.      Strength (L/R)  5/5 Hip Flexion  5/5 Knee Extension  5/5 Ankle  Dorsiflexion  5/5 Extensor Hallucis Longus  5/5 Plantar Flexion    Reflexes (L/R)  2+/2+ Patellar  2+/2+ Ankle    No ankle clonus    Sensation: SILT    +right buttock TTP which is more diffuse in the gluteal muscle.       ASSESSMENT & PLAN:  Inga Ledesma is a 57 year old female with a PMH of RLS, GERD, EtOH abuse, BMI 43, Tobacco use, Vitamin D deficiency, scoliosis, COPD, who is presenting for evaluation of SI joint pain, low back pain and right sided sciatica.  Her was located on 7/11/23 on the right from the buttock to the mid posterior thigh.  She has several positive provocative tests for right sacroiliitis.     XR lumbar with scoliosis curvature, L3-4 mild spondylolisthesis, L3-4 at least moderate DDD and to a lesser degree at L5/S1.      EOS reveals a <30 degree scoliotic curvature in the lumbar spine.     Continue with PT.    May consider another SI joint injection if her pain flairs up again, but we would want to wait 2 months from the prior one.      Follow up with me PRN.      I encouraged her to follow up with her PCP who is managing her weight loss medication to discuss her weight loss and feeling weak in her legs.  This is not spine related.   I have also sent a message to her PCP regarding this matter.     I spent 25 minutes spent in patient care, independent review and interpretation of medical records/imaging, reviewing old records.      Again, thank you for allowing me to participate in the care of your patient.      Sincerely,    Sarina Chavarria PA-C

## 2023-08-31 NOTE — PROGRESS NOTES
Neurosurgery Clinic Note    Chief Complaint: low back pain w/ right sciatica    History of Present Illness:    Inga Ledesma is a 57 year old female with a PMH of RLS, GERD, EtOH abuse, BMI 43, Tobacco use, Vitamin D deficiency, scoliosis, COPD, who is presenting for evaluation of SI joint pain, low back pain and right sided sciatica.    7/11/23 Visit: Her low back has been hurting her for years.  She had went to ED and they told her she had dry kidneys.  She went to PCP who did XR for consideration for doing an injection.  She walks bent over and both sides of her back hurts.  She sometimes has to stop to rest her legs with walking from the parking lot into the store.  She needs to use a scooter or wheelchair for longer distances.  She has been using the wheelchair for about 8 months.  The pain is in her bilateral buttocks and shoots down to the middle her posterior thigh.  She notes even walking in her apartment that her legs are tired and she has to stop for a while before continuing.  Her pain is okay with sitting, worse with standing and moving.  She tries to avoid stairs because her legs feel weak.  Her pain seems to switch between the left and right side.  It sometimes affects both side at the same time.  Today it is on the right.      She denies any prior back surgeries.  She has known about scoliosis since 3rd grade.  She required PT to help relieve muscle spasms around that time period.      She smokes cigarettes and has for 42 years, 1/2 pack per day.      Conservative Treatment:  PT - none for her back  Medical management for back - none specifically for her back, but her list of medications is extensive.   Injections - 15 years ago [facility] - similar back pain - was helpful for short term    8/31/23 Visit - s/p PT and bilateral SI joint injections.   Patient states her SI joint pain reduced to zero right after the injection and has come  back up some.  She is not endorsing 4/10 pain  today.  She also notes her right buttock is a tender.  She is still attending PT and has an appointment tomorrow. She has had inconsistent appointments because of conflicts with other appointments.  She feels her legs are weak though.  She was recently started on Wegovy 1.7 mg which is being managed by her PCP.  According to her scale at home, she has lost 28 pounds in 1 month.  She is concerned that this might be why she is feeling weaker.          Past Medical History:   Diagnosis Date    Acute pain of left shoulder 05/20/2021    LALA (acute kidney injury) (H) 05/28/2018    Alcohol abuse     Alcohol dependence with acute alcoholic intoxication (H) 11/19/2020    Alcohol withdrawal (H) 10/28/2017    Alcohol withdrawal seizure (H) 03/2016    Alcoholic hepatitis without ascites 12/05/2013    Cancer of labia majora (H) 1987    Cellulitis of right lower extremity 03/13/2021    Cervical dysplasia 1987    Congestive heart failure (H) 05/16/2016    COPD (chronic obstructive pulmonary disease) (H)     COPD exacerbation (H) 09/04/2018    CVA (cerebral infarction) 01/2012    Dependent edema     Depression, major     Depressive disorder 1966    GERD (gastroesophageal reflux disease)     Hyperlipidemia LDL goal < 160     Hypertension     Iron deficiency anemia     Menorrhagia 05/10/2010    Pneumonia 07/10/2021    RLS (restless legs syndrome)     Sacroiliitis (H)     steroid injections ineffective, chronic low back pain    Sepsis (H) 06/18/2018    Tobacco abuse     Uncomplicated asthma     Vitamin D deficiencies        Past Surgical History:   Procedure Laterality Date    AS BIOPSY/EXCISION LYMPH NODE OPEN SUPERFICIAL      COLONOSCOPY      COLONOSCOPY N/A 4/13/2022    Procedure: COLONOSCOPY;  Surgeon: Mike Garcia MD;  Location:  GI    EYE SURGERY      HYSTERECTOMY  2010    HYSTERECTOMY TOTAL ABDOMINAL  7/28/10    Bilateral salpingectomy.  ovaries conserved.    INJECT SACROILIAC JOINT Bilateral 7/21/2023    Procedure:  INJECTION, SACROILIAC JOINT;  Surgeon: Raji Castellanos MD;  Location: [facility] OR    LASER TX, CERVICAL  1987    LYMPH NODE BIOPSY  2007    inguinal    LYSIS OF LABIAL LESION(S)  1985, 1987       Social History     Socioeconomic History    Marital status:     Number of children: 1    Years of education: 13   Occupational History     Employer: [employer]    Smoking status: Some Days     Packs/day: 0.25     Years: 34.00     Pack years: 8.50     Types: Cigarettes     Start date: 11/1/1979     Last attempt to quit: 10/3/2012     Years since quitting: 10.7    Smokeless tobacco: Current     Types: Chew    Tobacco comments:     5 cigarettes daily   Vaping Use    Vaping Use: Former    Quit date: 1/1/2022   Substance and Sexual Activity    Alcohol use: No     Comment: 1 pint of vodka per day, last drink aug 2018    Drug use: No    Sexual activity: Not Currently   Other Topics Concern    Parent/sibling w/ CABG, MI or angioplasty before 65F 55M? No   Social History Narrative    Lives in [place] with her son in a trailer no access to guns or weapons works for the [email or web address] and enjoys crocheting and watching television.       family history includes Alcoholism in her father; Anxiety Disorder in an other family member; Asthma in her mother and another family member; Breast Cancer in her paternal aunt; Cerebrovascular Disease in her father; Depression in an other family member; Depression/Anxiety in her mother; Hypertension in her father; Lung Cancer in her father; Mental Illness in an other family member; Obesity in her sister and son; Other Cancer in an other family member; Substance Abuse in an other family member; Suicide in her paternal uncle.       IMAGING   Imaging independently reviewed.    EOS XR 8/31/23 - mild-moderate scoliotic curvature      Additional Findings:  Bibasilar dependent atelectasis. The cardiac silhouette is at the  upper limits of normal for size. Nonobstructive  bowel gas pattern.  Degenerative changes of the hips. Surgical clips project over the  right hip. No acute osseous abnormality. Chronic wedge deformities in  the midthoracic spine.  Impression:  1. Mild convex left curvature of the lumbar spine and borderline  convex right curvature of the thoracic spine.   2. No substantial global coronal imbalance.  3. Positive global sagittal imbalance.  4. Weight bearing axis as detailed above.      Prior Imaging:    Lumbar XR 6/22/23 - scoliotic curvature.  Mild spondylolisthesis L3-L5 with moderate DDD.        IMPRESSION: Convex left curvature of the lumbar spine centered at L3.  Leftward listhesis of L3 on L4. Mild stepwise anterolisthesis of L3 on  L4 and L4 on L5. No obvious loss of vertebral body height. Moderate  degenerative endplate changes and loss of disc height at L3-L4, L4-L5,  and to a lesser extent L5-S1. Facet hypertrophy in the lower lumbar  spine.    Wt Readings from Last 10 Encounters:   08/31/23 83.1 kg (183 lb 3.2 oz)   07/11/23 95.7 kg (211 lb)   06/22/23 99.1 kg (218 lb 6.4 oz)   12/26/22 99.9 kg (220 lb 3.2 oz)   11/07/22 100.7 kg (222 lb)   09/27/22 100.2 kg (221 lb)   09/16/22 101 kg (222 lb 9.6 oz)   08/11/22 97.2 kg (214 lb 4.8 oz)   08/05/22 96.8 kg (213 lb 6.4 oz)   08/04/22 97.5 kg (214 lb 14.4 oz)      Physical Exam   LMP 06/02/2010     Neurological:  Alert, NAD, and oriented to person, place, and time.   No cranial nerve deficit   Gait: using wheelchair.  Is able to ambulate short distances, balanced.  Gets on/of exam table without assistance.      Strength (L/R)  5/5 Hip Flexion  5/5 Knee Extension  5/5 Ankle Dorsiflexion  5/5 Extensor Hallucis Longus  5/5 Plantar Flexion    Reflexes (L/R)  2+/2+ Patellar  2+/2+ Ankle    No ankle clonus    Sensation: SILT    +right buttock TTP which is more diffuse in the gluteal muscle.       ASSESSMENT & PLAN:  Inga Ledesma is a 57 year old female with a PMH of RLS, GERD, EtOH abuse, BMI 43, Tobacco use,  Vitamin D deficiency, scoliosis, COPD, who is presenting for evaluation of SI joint pain, low back pain and right sided sciatica.  Her was located on 7/11/23 on the right from the buttock to the mid posterior thigh.  She has several positive provocative tests for right sacroiliitis.     XR lumbar with scoliosis curvature, L3-4 mild spondylolisthesis, L3-4 at least moderate DDD and to a lesser degree at L5/S1.      EOS reveals a <30 degree scoliotic curvature in the lumbar spine.     Continue with PT.    May consider another SI joint injection if her pain flairs up again, but we would want to wait 2 months from the prior one.      Follow up with me PRN.      I encouraged her to follow up with her PCP who is managing her weight loss medication to discuss her weight loss and feeling weak in her legs.  This is not spine related.   I have also sent a message to her PCP regarding this matter.     I spent 25 minutes spent in patient care, independent review and interpretation of medical records/imaging, reviewing old records.      Sarina Chavarria PA-C  Department of Neurosurgery  Office: [phone]

## 2023-09-06 ENCOUNTER — TELEPHONE (OUTPATIENT)
Dept: FAMILY MEDICINE | Facility: CLINIC | Age: 58
End: 2023-09-06
Payer: COMMERCIAL

## 2023-09-06 DIAGNOSIS — T78.2XXD ANAPHYLAXIS, SUBSEQUENT ENCOUNTER: ICD-10-CM

## 2023-09-06 RX ORDER — EPINEPHRINE 0.3 MG/.3ML
INJECTION INTRAMUSCULAR
Qty: 2 EACH | Refills: 1 | Status: SHIPPED | OUTPATIENT
Start: 2023-09-06

## 2023-09-06 NOTE — TELEPHONE ENCOUNTER
Patient started taking the WEGOVY 1.7 MG/0.75ML pen beginning of August and has lost 30 lbs. Patient states her spine doctor says that is to much weight to lose and patient should let Min know.

## 2023-09-07 NOTE — TELEPHONE ENCOUNTER
Called patient back; see TE below.    Patient is dizzy to the point of feeling unbalance; Informed patient due to severe dehydration status she needs to have her neighbor take her to the ED now.  Patient verbalized agreement and understanding; going to [facility] ED at this time.    Informed patient she can call for a follow-up visit with Min next week to go over treatment options going forward.    Janice Bruce RN on 9/7/2023 at 4:40 PM

## 2023-09-07 NOTE — TELEPHONE ENCOUNTER
"Spoke with patient, relayed providers(Min Toledo) message below.    This writer asked patient if she is tolerating her current dose of Wegovy(1.7 mg); patient states she has been vomiting up everything she eats since starting this medication one month ago(did not mention these symptoms to providers, first telling me now); stopped eating for the past two days (except for freezies) so the vomiting stopped except for one time this morning she had small clear liquid emesis.    Currently feels dizzy all the time and nauseated; denies pain. Is voiding but not as often\"maybe a quarter of a cup 3x/day); denies blood in urine or vomit.    Patient was hoping it would get better going into second month; doesn't want to stop medication.    Informed patient to stop Wegovy and go to the ER if she is so dizzy she becomes unbalanced, vision changes, vomiting >6x/day, unable to void >12 hours.    Patient has neighbor that can take her to the ED.    Routing to PCP to advise.    Janice Bruce RN    "

## 2023-09-08 NOTE — TELEPHONE ENCOUNTER
"Patient calling, she is admitted to [facility] now.   She is dehydrated and \"they are checking my heart\" now.       She says she really wants to stay on the Wegovy, says she was on 1.7 mg weekly all last month, started at the higher dose due to supply issues.    Says she took a week off the wegovy and wants to resume it tomorrow as she expects to be out of the hospital tomorrow.  She only has the 1.7 mg weekly dosing and plan to resume that tomorrow unless Min wants her to resume at a lower dose.    I advised she discuss this with the hospital provider caring for her.   She says she is not sure if they know about the wegovy?   I advised she tell them what she was taking and that she developed daily vomiting while on it.       I suggested she schedule hospital follow up visit with PCP, nothing available (even virtually) until 9/20 so scheduled for 9/11/23 with Ray Christian.   She will call if not home by then but she is sure she is going home today or tomorrow.    I strongly recommended she NOT resume the wegovy until she talks to Ray on Monday.    She will consider that option.  However, she really wants to stay on wegovy and hopes that the 2nd month will be better and that she will \"get used to it\".    Routed to PCP (not in clinic today) and covering provider pool to address; sounds like patient is determined to stay on wegovy, maybe send Rx for lower dose to see if is tolerated better?    Pharmacy selected.    Belen DEJESUS RN  [facility] Triage      "

## 2023-09-08 NOTE — TELEPHONE ENCOUNTER
Patient calling back to check on this.  Says she is being discharged home today.   Says her EKG was normal but she is going to a stress done.    Says the hospital also advised her to stay off the wegovy for now.   No home med changes, says she got IV potassium and magnesium during her stay.    No vomiting since yesterday AM.    I advised she stay hydrated, eat small regular meals.    Plan to do phone visit Monday for follow up.    Patient verbalized understanding of and agreement with plan.    Belen DEJESUS RN  [facility] Triage

## 2023-09-11 ENCOUNTER — TELEPHONE (OUTPATIENT)
Dept: FAMILY MEDICINE | Facility: CLINIC | Age: 58
End: 2023-09-11

## 2023-09-11 NOTE — TELEPHONE ENCOUNTER
Pt calling to state that she received call from clinic. As RN was attempting to inform pt that she may have received a call from clinic for pre-visit questions as she was scheduled to see same day provider for below medication discussion; RN found note below from pcp and relayed message to pt. Pt verbalized good understanding and scheduled with pcp per pcp request 9/13/23 and pt verbalized no further questions.     Kianna Becker RN on 9/11/2023 at 10:20 AM

## 2023-09-11 NOTE — TELEPHONE ENCOUNTER
Received call from patient stating that she thinks she accidentally cancelled an appointment that she did not mean to cancel. She scheduled an appointment for Wednesday 09/13 with Min Toledo PA-C and thinks she accidentally cancelled this. Checked appointments and she is still currently scheduled. Notified her. She verbalized understanding.        Marcy Kat RN   [facility]

## 2023-09-11 NOTE — TELEPHONE ENCOUNTER
Work delio in for a hospital f/up with me this week . Ok for a same day slot. I want to assess her myself and discuss how I'd like to proceed with wegovy.

## 2023-09-13 ENCOUNTER — TELEPHONE (OUTPATIENT)
Dept: FAMILY MEDICINE | Facility: CLINIC | Age: 58
End: 2023-09-13

## 2023-09-13 ENCOUNTER — VIRTUAL VISIT (OUTPATIENT)
Dept: FAMILY MEDICINE | Facility: CLINIC | Age: 58
End: 2023-09-13
Payer: COMMERCIAL

## 2023-09-13 DIAGNOSIS — E66.01 MORBID OBESITY (H): Primary | ICD-10-CM

## 2023-09-13 DIAGNOSIS — E66.9 OBESITY WITH SERIOUS COMORBIDITY, UNSPECIFIED CLASSIFICATION, UNSPECIFIED OBESITY TYPE: Primary | ICD-10-CM

## 2023-09-13 PROCEDURE — 99442 PR PHYSICIAN TELEPHONE EVALUATION 11-20 MIN: CPT | Mod: 95 | Performed by: PHYSICIAN ASSISTANT

## 2023-09-13 NOTE — TELEPHONE ENCOUNTER
Patient called because the Semaglutide-Weight Management (WEGOVY) 0.5 MG/0.5ML pen  is currently out of stock.     Patient asking if you would consider ordering the Semaglutide-Weight Management (WEGOVY) 1 MG/0.5ML pen which is available.     Please review and advise.     Nasrin OVALLE  [facility]

## 2023-09-13 NOTE — PROGRESS NOTES
Cody is a 58 year old who is being evaluated via a billable telephone visit.      What phone number would you like to be contacted at? [phone]  How would you like to obtain your AVS? Marcia    Distant Location (provider location):  On-site      Subjective   Cody is a 58 year old, presenting for the following health issues:  Hospital F/U ([facility]/Dehydration/9/7/23-9/8/23) and Recheck Medication (Wegovy)      9/13/2023     9:13 AM   Additional Questions   Roomed by Ellen Arevalo CMA   Accompanied by None         9/13/2023     9:13 AM   Patient Reported Additional Medications   Patient reports taking the following new medications none       HPI       Hospital Follow-up Visit:    Hospital/Nursing Home/IP Rehab Facility:  [facility]  Date of Admission: 9/7/23  Date of Discharge: 9/8/23  Reason(s) for Admission: Dehydration    Was your hospitalization related to COVID-19? No   Problems taking medications regularly:  None  Medication changes since discharge: None  Problems adhering to non-medication therapy:  None    Summary of hospitalization:  CareEverywhere information obtained and reviewed    Wegovy really effected appetite. Denies nausea. Feeling better again. Has lost 30 lbs    Review of Systems   Constitutional, HEENT, cardiovascular, pulmonary, GI, , musculoskeletal, neuro, skin, endocrine and psych systems are negative, except as otherwise noted.      Objective           Vitals:  No vitals were obtained today due to virtual visit.    Physical Exam   healthy, alert, and no distress  PSYCH: Alert and oriented times 3; coherent speech, normal   rate and volume, able to articulate logical thoughts, able   to abstract reason, no tangential thoughts, no hallucinations   or delusions  Her affect is normal  RESP: No cough, no audible wheezing, able to talk in full sentences  Remainder of exam unable to be completed due to telephone visits    Inga was seen today for hospital f/u and recheck  medication.    Diagnoses and all orders for this visit:    Morbid obesity (H)  -     Semaglutide-Weight Management (WEGOVY) 0.5 MG/0.5ML pen; Inject 0.5 mg Subcutaneous once a week      Restart at 0.5mg / week.  Urged to eat and drink some.           Phone call duration: 12 minutes

## 2023-09-14 ENCOUNTER — ANCILLARY PROCEDURE (OUTPATIENT)
Dept: CT IMAGING | Facility: CLINIC | Age: 58
End: 2023-09-14
Payer: COMMERCIAL

## 2023-09-14 DIAGNOSIS — Z71.6 ENCOUNTER FOR SMOKING CESSATION COUNSELING: ICD-10-CM

## 2023-09-14 PROCEDURE — 71271 CT THORAX LUNG CANCER SCR C-: CPT | Mod: GC | Performed by: RADIOLOGY

## 2023-09-14 NOTE — TELEPHONE ENCOUNTER
Patient is calling back.    Patient also stated that if it doesn't get changed.  She stated that if she does not here back she will start the 1.7s as she has these already at home.    I advised patient to wait until she hears from PCP on what to do next.      Patient stated understanding and agreeable with the plan of care.     Marah HEMPHILL BSN  Triage Nurse  [facility]

## 2023-09-18 ENCOUNTER — OFFICE VISIT (OUTPATIENT)
Dept: PULMONOLOGY | Facility: CLINIC | Age: 58
End: 2023-09-18
Payer: COMMERCIAL

## 2023-09-18 VITALS — OXYGEN SATURATION: 97 % | HEART RATE: 76 BPM | DIASTOLIC BLOOD PRESSURE: 76 MMHG | SYSTOLIC BLOOD PRESSURE: 117 MMHG

## 2023-09-18 DIAGNOSIS — Z71.6 ENCOUNTER FOR SMOKING CESSATION COUNSELING: Primary | ICD-10-CM

## 2023-09-18 DIAGNOSIS — J42 CHRONIC BRONCHITIS, UNSPECIFIED CHRONIC BRONCHITIS TYPE (H): ICD-10-CM

## 2023-09-18 PROCEDURE — 99215 OFFICE O/P EST HI 40 MIN: CPT | Mod: 25 | Performed by: INTERNAL MEDICINE

## 2023-09-18 PROCEDURE — G0296 VISIT TO DETERM LDCT ELIG: HCPCS | Performed by: INTERNAL MEDICINE

## 2023-09-18 RX ORDER — FLUTICASONE PROPIONATE AND SALMETEROL 50; 500 UG/1; UG/1
POWDER RESPIRATORY (INHALATION)
Qty: 60 EACH | Refills: 10 | Status: SHIPPED | OUTPATIENT
Start: 2023-09-18

## 2023-09-18 RX ORDER — ALBUTEROL SULFATE 90 UG/1
AEROSOL, METERED RESPIRATORY (INHALATION)
Qty: 18 G | Refills: 10 | Status: SHIPPED | OUTPATIENT
Start: 2023-09-18 | End: 2024-09-04

## 2023-09-18 ASSESSMENT — PAIN SCALES - GENERAL: PAINLEVEL: NO PAIN (0)

## 2023-09-18 NOTE — PROGRESS NOTES
Pulmonary Clinic Return Patient Visit  Reason for Visit: COPD  History of Present Illness  Inga Ledesma  is a pleasant 58 yr old male with a history of  COPD and active smoker who presents to clinic for follow up for same. I saw last in clinic in 2/2023  To briefly review, Cody is a longtime smoker who continues to be active, was diagnosed with COPD over a decade ago.  She is currently on Advair in a scheduled fashion- twice daily and nebulized albuterol/ipratropium and azithromycin 500 mg on MWF.  but still continues to remain very symptomatic. Emphasis has been made on smoking cessation for symptom control and her therapy escalated to/by adding chronic azithromycin at 500 mg on Monday Wednesdays and Fridays.  Historically, she has had frequent AECOPD's and high dependence on prednisone and antibiotics frequently.  Doing better. SOB and cough have improved and she is less symptomatic.  She appears to be nonadherent with her bronchodilators and forgets to use her high-dose Advair. She tells me that using inhalers are very cumbersome. There has been no AECOPDs since the last clinic visit. Now smokes 6 cigs per day. No AECOPDs since the last clinic visit. She is not reliant on her rescue inhalers  Still has dysphonia which is stable  She worked for the [employer] and is working on getting her disability and enjoys crocheting and watching television.  Mother has cancer otherwise no other history of chronic lung disease in the family.  No family history of lung cancer    Review of Systems:  10 of 14 systems reviewed and are negative unless otherwise stated in HPI.    Past Medical History:   Diagnosis Date    Acute pain of left shoulder 05/20/2021    LALA (acute kidney injury) (H) 05/28/2018    Alcohol abuse     Alcohol dependence with acute alcoholic intoxication (H) 11/19/2020    Alcohol withdrawal (H) 10/28/2017    Alcohol withdrawal seizure (H) 03/2016    Alcoholic hepatitis without ascites 12/05/2013     Cancer of labia majora (H) 1987    Cellulitis of right lower extremity 03/13/2021    Cervical dysplasia 1987    Congestive heart failure (H) 05/16/2016    COPD (chronic obstructive pulmonary disease) (H)     COPD exacerbation (H) 09/04/2018    CVA (cerebral infarction) 01/2012    Dependent edema     Depression, major     Depressive disorder 1966    GERD (gastroesophageal reflux disease)     Hyperlipidemia LDL goal < 160     Hypertension     Iron deficiency anemia     Menorrhagia 05/10/2010    Pneumonia 07/10/2021    RLS (restless legs syndrome)     Sacroiliitis (H)     steroid injections ineffective, chronic low back pain    Sepsis (H) 06/18/2018    Tobacco abuse     Uncomplicated asthma     Vitamin D deficiencies        Past Surgical History:   Procedure Laterality Date    AS BIOPSY/EXCISION LYMPH NODE OPEN SUPERFICIAL      COLONOSCOPY      COLONOSCOPY N/A 4/13/2022    Procedure: COLONOSCOPY;  Surgeon: Mike Garcia MD;  Location:  GI    EYE SURGERY      HYSTERECTOMY  2010    HYSTERECTOMY TOTAL ABDOMINAL  7/28/10    Bilateral salpingectomy.  ovaries conserved.    INJECT SACROILIAC JOINT Bilateral 7/21/2023    Procedure: INJECTION, SACROILIAC JOINT;  Surgeon: Raji Castellanos MD;  Location: [facility] OR    LASER TX, CERVICAL  1987    LYMPH NODE BIOPSY  2007    inguinal    LYSIS OF LABIAL LESION(S)  1985, 1987       Family History   Problem Relation Age of Onset    Depression/Anxiety Mother     Asthma Mother     Cerebrovascular Disease Father     Hypertension Father     Lung Cancer Father     Alcoholism Father     Breast Cancer Paternal Aunt     Other Cancer Other     Depression Other     Anxiety Disorder Other     Mental Illness Other     Substance Abuse Other     Asthma Other     Obesity Sister     Obesity Son     Suicide Paternal Uncle        Social History     Socioeconomic History    Marital status:      Spouse name: Not on file    Number of children: 1    Years of education: 13    Highest education  level: Not on file   Occupational History     Employer: [employer]   Tobacco Use    Smoking status: Current Some Day Smoker     Packs/day: 0.25     Years: 34.00     Pack years: 8.50     Types: Cigarettes     Start date: 1979     Last attempt to quit: 10/3/2012     Years since quittin.3    Smokeless tobacco: Current User     Types: Chew    Tobacco comment: 5 cigarettes daily   Vaping Use    Vaping Use: Former    Quit date: 2022   Substance and Sexual Activity    Alcohol use: No     Comment: 1 pint of vodka per day, last drink aug 2018    Drug use: No    Sexual activity: Not Currently   Other Topics Concern    Parent/sibling w/ CABG, MI or angioplasty before 65F 55M? No   Social History Narrative    L[place] in [place] with her son in a trailer no access to guns or weapons works for the [employer] office and enjoys crocheting and watching television.     Social Determinants of Health     Financial Resource Strain: Not on file   Food Insecurity: Not on file   Transportation Needs: Not on file   Physical Activity: Not on file   Stress: Not on file   Social Connections: Not on file   Intimate Partner Violence: Not on file   Housing Stability: Not on file         Allergies   Allergen Reactions    Bee Venom Anaphylaxis    Reglan [Metoclopramide Hcl] Other (See Comments)     Body tenses up, Dystonia    Doxycycline Rash         Current Outpatient Medications:     acetaminophen (ACETAMINOPHEN EXTRA STRENGTH) 500 MG tablet, TAKE 1-2 TABLETS BY MOUTH EVERY 4-6 HOURS AS NEEDED FOR PAIN *MAX DOSE 4000 MG IN 24 HOURS*, Disp: 100 tablet, Rfl: 10    ADVAIR DISKUS 500-50 MCG/ACT inhaler, INHALE ONE PUFF BY MOUTH EVERY 12 HOURS patient is using PRN, Disp: 60 each, Rfl: 10    ASPIRIN LOW DOSE 81 MG EC tablet, TAKE 1 TABLET BY MOUTH DAILY, Disp: 30 tablet, Rfl: 10    azithromycin (ZITHROMAX) 250 MG tablet, Take 1 tablet (250 mg) by mouth Every Mon, Wed, Fri Morning, Disp: 72 tablet, Rfl: 3    buPROPion  (WELLBUTRIN XL) 300 MG 24 hr tablet, Take 1 tablet (300 mg) by mouth every morning, Disp: 90 tablet, Rfl: 1    calcium carbonate 500 mg, elemental, (OSCAL) 500 MG tablet, TAKE 1 TABLET BY MOUTH DAILY, Disp: 30 tablet, Rfl: 10    cariprazine (VRAYLAR) 1.5 MG capsule, Take 1 capsule (1.5 mg) by mouth daily, Disp: 90 capsule, Rfl: 0    cetirizine (ZYRTEC) 10 MG tablet, TAKE 1 TABLET BY MOUTH DAILY, Disp: 30 tablet, Rfl: 10    EPIPEN 2-FAVIOLA 0.3 MG/0.3ML injection 2-pack, INJECT AS DIRECTED AS NEEDED FOR ALLERGIC REACTION, Disp: 2 each, Rfl: 1    famotidine (PEPCID) 40 MG tablet, TAKE 1 TABLET BY MOUTH DAILY AT BEDTIME, Disp: 30 tablet, Rfl: 10    FEROSUL 325 (65 Fe) MG tablet, TAKE ONE (1) TABLET BY MOUTH TWICE DAILY, Disp: 60 tablet, Rfl: 10    fluconazole (DIFLUCAN) 100 MG tablet, Take 1 tablet by mouth daily at 2 pm, Disp: , Rfl:     FLUoxetine (PROZAC) 40 MG capsule, TAKE 2 CAPSULES BY MOUTH ONCE DAILY, Disp: 60 capsule, Rfl: 2    fluticasone (FLONASE) 50 MCG/ACT nasal spray, INSTILL ONE (1) SPRAY IN EACH NOSTRIL DAILY, Disp: 16 g, Rfl: 10    folic acid (FOLVITE) 1 MG tablet, TAKE 1 TABLET BY MOUTH DAILY, Disp: 30 tablet, Rfl: 10    furosemide (LASIX) 40 MG tablet, Take 1 tablet (40 mg) by mouth 2 times daily Take one tab in the morning and one tab at noon, Disp: 180 tablet, Rfl: 0    gabapentin (NEURONTIN) 600 MG tablet, TAKE 1 TABLET BY MOUTH THREE TIMES DAILY, Disp: 90 tablet, Rfl: 10    hydrALAZINE (APRESOLINE) 10 MG tablet, TAKE 1 TABLET BY MOUTH 3 TIMES DAILY, Disp: 90 tablet, Rfl: 10    hydrOXYzine (ATARAX) 25 MG tablet, TAKE 1 TABLET BY MOUTH THREE TIMES DAILY AS NEEDED FOR ITCHING, Disp: 90 tablet, Rfl: 10    insulin pen needle (31G X 6 MM) 31G X 6 MM miscellaneous, Use 1 pen needles daily or as directed for use with victoza., Disp: 90 each, Rfl: 1    ipratropium - albuterol 0.5 mg/2.5 mg/3 mL (DUONEB) 0.5-2.5 (3) MG/3ML neb solution, INHALE ONE VIAL BY NEBULIZATION FOUR TIMES DAILY AS NEEDED FOR WHEEZING,  Disp: 360 mL, Rfl: 5    labetalol (NORMODYNE) 100 MG tablet, TAKE ONE (1) TABLET BY MOUTH TWICE DAILY, Disp: 60 tablet, Rfl: 10    magnesium oxide (MAG-OX) 400 MG tablet, TAKE 1 TABLET BY MOUTH DAILY, Disp: 30 tablet, Rfl: 10    meclizine (ANTIVERT) 25 MG tablet, Take 1 tablet (25 mg) by mouth 3 times daily as needed for dizziness, Disp: 80 tablet, Rfl: 0    naltrexone (DEPADE/REVIA) 50 MG tablet, Take 1 tablet (50 mg) by mouth daily, Disp: 30 tablet, Rfl: 3    omeprazole (PRILOSEC) 20 MG DR capsule, TAKE ONE (1) CAPSULE BY MOUTH TWICE DAILY BEFORE MEALS, Disp: 60 capsule, Rfl: 10    phentermine (ADIPEX-P) 37.5 MG capsule, Take 1 capsule (37.5 mg) by mouth every morning, Disp: 30 capsule, Rfl: 0    potassium chloride ER (KLOR-CON M) 10 MEQ CR tablet, TAKE 1 TABLET BY MOUTH DAILY, Disp: 30 tablet, Rfl: 10    QUEtiapine (SEROQUEL) 100 MG tablet, TAKE 1 TO 2 TABLETS(100  MG) BY MOUTH AT BEDTIME, Disp: 90 tablet, Rfl: 1    rOPINIRole (REQUIP) 1 MG tablet, TAKE 1 TABLET BY MOUTH THREE TIMES DAILY, Disp: 90 tablet, Rfl: 10    semaglutide (OZEMPIC) 2 MG/3ML pen, Inject 0.25 mg Subcutaneous every 7 days, Disp: 3 mL, Rfl: 0    Semaglutide-Weight Management (WEGOVY) 0.5 MG/0.5ML pen, Inject 0.5 mg Subcutaneous once a week, Disp: 2 mL, Rfl: 0    Semaglutide-Weight Management (WEGOVY) 1 MG/0.5ML pen, Inject 1 mg Subcutaneous once a week, Disp: 2 mL, Rfl: 0    Suvorexant (BELSOMRA) 10 MG tablet, Take 1 tablet (10 mg) by mouth nightly as needed for sleep, Disp: 30 tablet, Rfl: 0    topiramate (TOPAMAX) 50 MG tablet, Take 1 tablet (50 mg) by mouth At Bedtime, Disp: 90 tablet, Rfl: 1    VENTOLIN  (90 Base) MCG/ACT inhaler, INHALE 1-2 PUFFS BY MOUTH EVERY 4 HOURS AS NEEDED FOR SHORTNESS OF BREATH, DIFFICULTY BREATHING OR WHEEZING., Disp: 18 g, Rfl: 10    vitamin D3 (CHOLECALCIFEROL) 50 mcg (2000 units) tablet, TAKE 1 TABLET BY MOUTH DAILY, Disp: 30 tablet, Rfl: 10    zaleplon (SONATA) 10 MG capsule, TAKE 1 CAPSULE(10  MG) BY MOUTH AT BEDTIME, Disp: 30 capsule, Rfl: 0      Physical Exam:  /76   Pulse 76   LMP 06/02/2010   SpO2 97%   GENERAL: Well developed, well nourished, alert, and in no apparent distress.  HEENT: Normocephalic, atraumatic. PERRL, EOMI. Oral mucosa is moist. No perioral cyanosis.  NECK: supple, no masses, no thyromegaly.  RESP:  Normal respiratory effort.  CTAB.  No rales, wheezes, rhonchi.  No cyanosis or clubbing.  CV: Normal S1, S2, regular rhythm, normal rate. No murmur.  No LE edema.   ABDOMEN:  Soft, non-tender, non-distended.   SKIN: warm and dry. No rash.  NEURO: AAOx3.  Normal gait.  Fluent speech.  PSYCH: mentation appears normal.       Results:  PFTs: Interval improvement with lung function.  Most Recent [facility] Pulmonary Function Testing    FVC-Pred   Date Value Ref Range Status   08/04/2022 2.87 L      FVC-Pre   Date Value Ref Range Status   08/04/2022 1.61 L      FVC-%Pred-Pre   Date Value Ref Range Status   08/04/2022 56 %      FEV1-Pre   Date Value Ref Range Status   08/04/2022 1.15 L      FEV1-%Pred-Pre   Date Value Ref Range Status   08/04/2022 50 %      FEV1FVC-Pred   Date Value Ref Range Status   08/04/2022 80 %      FEV1FVC-Pre   Date Value Ref Range Status   08/04/2022 72 %      No results found for: 20029  FEFMax-Pred   Date Value Ref Range Status   08/04/2022 5.94 L/sec      FEFMax-Pre   Date Value Ref Range Status   08/04/2022 2.86 L/sec      FEFMax-%Pred-Pre   Date Value Ref Range Status   08/04/2022 48 %      ExpTime-Pre   Date Value Ref Range Status   08/04/2022 8.08 sec      FIFMax-Pre   Date Value Ref Range Status   08/04/2022 1.43 L/sec      FEV1FEV6-Pred   Date Value Ref Range Status   08/04/2022 82 %      FEV1FEV6-Pre   Date Value Ref Range Status   08/04/2022 72 %      No results found for: 20055  Imaging (personally reviewed in clinic today): CT Chest 09/04/2023  Impression:   1. ACR Assessment Category (2022):  Lung-RADS Category 2. Benign appearance or behavior.     Recommendation:  Lung-RADS Category 2. Benign appearance or behavior.  Recommendation:  continue annual screening with Lung cancer screening  CT (please order exam code [id number]).    2. Significant Incidental Finding(s):  Category S: No.   3. Avoidance of tobacco smoke is strongly advised. Please consider referral for smoking cessation to [facility] Medication Therapy Management (MTM) if clinically appropriate  _____________________________________________________________________________  Assessment and Plan:   COPD (Group D)/Respiratory bronchiolitis  Still symptomatic with CAT score of 30 currently on Advair in a scheduled fashion- twice daily and nebulized albuterol/ipratropium and azithromycin 500 mg on MWF. Her symptoms are fairly controlled partly due to non adherence with inhalers. I have asked that she keeps her inhalers in the medicine cabinet so that she remembers to use while she takes her pills. She can continue with nebulized albuterol/ipratropium and azithromycin 500 mg on MWF.  Unfortunately, she continues to smoke.  She smokes about 6 cigs/day.  Long conversation about smoking cessation as it pertains to decreased mortality, improved symptoms and preservation of lung function.  She has tried Chantix and was intolerant and doesn't appear to be ready to quit at this time.  Active Smoker  Smoking cessation as above and I spent 10 minutes.   This Smartset fulfills all insurance requirements, including:     Lung Cancer Screening Shared Decision Making Visit     Inga Ledesma is eligible for lung cancer screening on the basis of:   has not not experienced symptoms suggestive of lung cancer.   born on 1965, 56 year old years old.   smoked 1 packs of cigarettes for 30 years for a total of 30 pack-years   has not quit smoking   I have discussed with patient the risks and benefits of screening for lung cancer with low-dose CT.     The risks include:   radiation exposure    false positives      over-diagnosis    The benefit of early detection of lung cancer is contingent upon adherence to annual screening or more frequent follow up if indicated.     Furthermore, reaping the benefits of screening requires Inga Ledesma to be willing and able to undergo diagnostic procedures, if indicated.     We did discuss that the only way to prevent lung cancer is to not smoke. Smoking cessation assistance was offered.    Questions and concerns were answered to the patient's satisfaction.  she was provided with my contact information should new questions or concerns arise in the interim.  She should return to clinic in 12 months with CT Chest for lung cancer screening  Up to date on vaccinations.  I spent a total of 40 minutes face to face with Inga Ledesma during today's office visit excluding time spent for lung cancer screening. Over 50% of this time was spent counseling the patient and/or coordinating care regarding their pulmonary disease.    Kristi Webb MD  Pulmonary, Critical Care and Sleep Medicine  [facility]  Pager: [phone]        The above note was dictated using voice recognition software and may include typographical errors. Please contact the author for any clarifications.

## 2023-09-25 DIAGNOSIS — F51.04 PSYCHOPHYSIOLOGICAL INSOMNIA: ICD-10-CM

## 2023-09-25 RX ORDER — ZALEPLON 10 MG/1
CAPSULE ORAL
Qty: 30 CAPSULE | Refills: 0 | Status: SHIPPED | OUTPATIENT
Start: 2023-09-25 | End: 2023-10-25

## 2023-09-26 NOTE — TELEPHONE ENCOUNTER
"Patient calling, says the pharmacy sent her a message this AM that she needs to contact her doctor regarding Sonata refill.    I see the Rx was sent last night, after 10 pm, to [facility] in [place] by [facility] [place].      She was notified this AM to contact her provider regarding this medication.    I called [facility] to verify the issue.  The med is \"in the queue\" to be filled and will be ready later today.    No need to call patient back, I already told her we'd call her only if the med is not available today.    Belen DEJESUS RN  [facility] Triage        "

## 2023-09-27 NOTE — PROGRESS NOTES
History of Present Illness - Inga Ledesma is a very pleasant 58 year old female here to see me for the first time for hearing loss.  She has been seen by Dr Garibay in 2021, but for tongue lesions that were shown by biopsy to be hyperkeratosis, no malignancy.    She tells me that in the last 6 months she has really started noticing that she has a hard time hearing people. Situations where she cannot see the person speaking are difficult, and also crowded areas.    There is no family history of early onset or genetic hearing loss in the family.    Otherwise no history of chronic ear disease.  No previous ear surgery.  No history of chemo or radiation therapy to the head and neck, and no major head trauma.  He denies a history of  service, no frequent firearm use.  No previous history working in an industrial environment.      Past Medical History -   Patient Active Problem List   Diagnosis    RLS (restless legs syndrome)    GERD (gastroesophageal reflux disease)    Iron deficiency anemia secondary to inadequate dietary iron intake    Hyperlipidemia with target LDL less than 160    Sacroiliitis (H)    Tobacco abuse    Vitamin D deficiency    Edema of both legs    Hypertension goal BP (blood pressure) < 140/90    [facility]    Major depressive disorder, recurrent episode, mild (H)    (HFpEF) heart failure with preserved ejection fraction (H)    Psychophysiological insomnia    Alcohol abuse    Morbid obesity (H)    Chronic bilateral low back pain without sciatica    Chronic obstructive pulmonary disease (H)    JAQUELIN (obstructive sleep apnea)- severe (AHI 37)    Preop general physical exam       Current Medications -   Current Outpatient Medications:     acetaminophen (ACETAMINOPHEN EXTRA STRENGTH) 500 MG tablet, TAKE 1-2 TABLETS BY MOUTH EVERY 4-6 HOURS AS NEEDED FOR PAIN *MAX DOSE 4000 MG IN 24 HOURS*, Disp: 100 tablet, Rfl: 10    ADVAIR DISKUS 500-50 MCG/ACT inhaler, INHALE ONE PUFF BY MOUTH EVERY 12  HOURS patient is using PRN, Disp: 60 each, Rfl: 10    ASPIRIN LOW DOSE 81 MG EC tablet, TAKE 1 TABLET BY MOUTH DAILY, Disp: 30 tablet, Rfl: 10    azithromycin (ZITHROMAX) 250 MG tablet, Take 1 tablet (250 mg) by mouth Every Mon, Wed, Fri Morning, Disp: 72 tablet, Rfl: 3    buPROPion (WELLBUTRIN XL) 300 MG 24 hr tablet, Take 1 tablet (300 mg) by mouth every morning, Disp: 90 tablet, Rfl: 1    calcium carbonate 500 mg, elemental, (OSCAL) 500 MG tablet, TAKE 1 TABLET BY MOUTH DAILY, Disp: 30 tablet, Rfl: 10    cariprazine (VRAYLAR) 1.5 MG capsule, Take 1 capsule (1.5 mg) by mouth daily, Disp: 90 capsule, Rfl: 0    cetirizine (ZYRTEC) 10 MG tablet, TAKE 1 TABLET BY MOUTH DAILY, Disp: 30 tablet, Rfl: 10    EPIPEN 2-FAVIOLA 0.3 MG/0.3ML injection 2-pack, INJECT AS DIRECTED AS NEEDED FOR ALLERGIC REACTION, Disp: 2 each, Rfl: 1    famotidine (PEPCID) 40 MG tablet, TAKE 1 TABLET BY MOUTH DAILY AT BEDTIME, Disp: 30 tablet, Rfl: 10    FEROSUL 325 (65 Fe) MG tablet, TAKE ONE (1) TABLET BY MOUTH TWICE DAILY, Disp: 60 tablet, Rfl: 10    fluconazole (DIFLUCAN) 100 MG tablet, Take 1 tablet by mouth daily at 2 pm, Disp: , Rfl:     FLUoxetine (PROZAC) 40 MG capsule, TAKE 2 CAPSULES BY MOUTH ONCE DAILY, Disp: 60 capsule, Rfl: 2    fluticasone (FLONASE) 50 MCG/ACT nasal spray, INSTILL ONE (1) SPRAY IN EACH NOSTRIL DAILY, Disp: 16 g, Rfl: 10    folic acid (FOLVITE) 1 MG tablet, TAKE 1 TABLET BY MOUTH DAILY, Disp: 30 tablet, Rfl: 10    furosemide (LASIX) 40 MG tablet, Take 0.5 tablets (20 mg) by mouth daily, Disp: 45 tablet, Rfl: 3    gabapentin (NEURONTIN) 600 MG tablet, TAKE 1 TABLET BY MOUTH THREE TIMES DAILY, Disp: 90 tablet, Rfl: 10    hydrALAZINE (APRESOLINE) 10 MG tablet, TAKE 1 TABLET BY MOUTH 3 TIMES DAILY, Disp: 90 tablet, Rfl: 10    hydrOXYzine (ATARAX) 25 MG tablet, TAKE 1 TABLET BY MOUTH THREE TIMES DAILY AS NEEDED FOR ITCHING, Disp: 90 tablet, Rfl: 10    insulin pen needle (31G X 6 MM) 31G X 6 MM miscellaneous, Use 1 pen  needles daily or as directed for use with victoza., Disp: 90 each, Rfl: 1    ipratropium - albuterol 0.5 mg/2.5 mg/3 mL (DUONEB) 0.5-2.5 (3) MG/3ML neb solution, INHALE ONE VIAL BY NEBULIZATION FOUR TIMES DAILY AS NEEDED FOR WHEEZING, Disp: 360 mL, Rfl: 5    labetalol (NORMODYNE) 100 MG tablet, TAKE ONE (1) TABLET BY MOUTH TWICE DAILY, Disp: 60 tablet, Rfl: 10    magnesium oxide (MAG-OX) 400 MG tablet, TAKE 1 TABLET BY MOUTH DAILY, Disp: 30 tablet, Rfl: 10    meclizine (ANTIVERT) 25 MG tablet, Take 1 tablet (25 mg) by mouth 3 times daily as needed for dizziness, Disp: 80 tablet, Rfl: 0    naltrexone (DEPADE/REVIA) 50 MG tablet, Take 1 tablet (50 mg) by mouth daily, Disp: 30 tablet, Rfl: 3    omeprazole (PRILOSEC) 20 MG DR capsule, TAKE ONE (1) CAPSULE BY MOUTH TWICE DAILY BEFORE MEALS, Disp: 60 capsule, Rfl: 10    phentermine (ADIPEX-P) 37.5 MG capsule, Take 1 capsule (37.5 mg) by mouth every morning, Disp: 30 capsule, Rfl: 0    potassium chloride ER (KLOR-CON M) 10 MEQ CR tablet, TAKE 1 TABLET BY MOUTH DAILY, Disp: 30 tablet, Rfl: 10    QUEtiapine (SEROQUEL) 100 MG tablet, TAKE 1 TO 2 TABLETS(100  MG) BY MOUTH AT BEDTIME, Disp: 90 tablet, Rfl: 1    rOPINIRole (REQUIP) 1 MG tablet, TAKE 1 TABLET BY MOUTH THREE TIMES DAILY, Disp: 90 tablet, Rfl: 10    semaglutide (OZEMPIC) 2 MG/3ML pen, Inject 0.25 mg Subcutaneous every 7 days, Disp: 3 mL, Rfl: 0    Semaglutide-Weight Management (WEGOVY) 0.5 MG/0.5ML pen, Inject 0.5 mg Subcutaneous once a week, Disp: 2 mL, Rfl: 0    Semaglutide-Weight Management (WEGOVY) 1 MG/0.5ML pen, Inject 1 mg Subcutaneous once a week, Disp: 2 mL, Rfl: 0    Suvorexant (BELSOMRA) 10 MG tablet, Take 1 tablet (10 mg) by mouth nightly as needed for sleep, Disp: 30 tablet, Rfl: 0    topiramate (TOPAMAX) 50 MG tablet, Take 1 tablet (50 mg) by mouth At Bedtime, Disp: 90 tablet, Rfl: 1    VENTOLIN  (90 Base) MCG/ACT inhaler, INHALE 1-2 PUFFS BY MOUTH EVERY 4 HOURS AS NEEDED FOR SHORTNESS OF  BREATH, DIFFICULTY BREATHING OR WHEEZING., Disp: 18 g, Rfl: 10    vitamin D3 (CHOLECALCIFEROL) 50 mcg (2000 units) tablet, TAKE 1 TABLET BY MOUTH DAILY, Disp: 30 tablet, Rfl: 10    zaleplon (SONATA) 10 MG capsule, TAKE 1 CAPSULE(10 MG) BY MOUTH AT BEDTIME, Disp: 30 capsule, Rfl: 0    Allergies -   Allergies   Allergen Reactions    Bee Venom Anaphylaxis    Reglan [Metoclopramide Hcl] Other (See Comments)     Body tenses up, Dystonia    Doxycycline Rash       Social History -   Social History     Socioeconomic History    Marital status:     Number of children: 1    Years of education: 13   Occupational History     Employer: [employer]   Tobacco Use    Smoking status: Some Days     Packs/day: 0.25     Years: 34.00     Pack years: 8.50     Types: Cigarettes     Start date: 11/1/1979     Last attempt to quit: 10/3/2012     Years since quitting: 10.9    Smokeless tobacco: Current     Types: Chew    Tobacco comments:     5 cigarettes daily   Vaping Use    Vaping Use: Former    Quit date: 1/1/2022   Substance and Sexual Activity    Alcohol use: No     Comment: 1 pint of vodka per day, last drink aug 2018    Drug use: No    Sexual activity: Not Currently   Other Topics Concern    Parent/sibling w/ CABG, MI or angioplasty before 65F 55M? No   Social History Narrative    Lives in [place] with her son in a trailer no access to guns or weapons works for the [employer] office and enjoys crocheting and watching television.       Family History -   Family History   Problem Relation Age of Onset    Depression/Anxiety Mother     Asthma Mother     Cerebrovascular Disease Father     Hypertension Father     Lung Cancer Father     Alcoholism Father     Breast Cancer Paternal Aunt     Other Cancer Other     Depression Other     Anxiety Disorder Other     Mental Illness Other     Substance Abuse Other     Asthma Other     Obesity Sister     Obesity Son     Suicide Paternal Uncle        Review of Systems - As per HPI  and PMHx, otherwise 10+ system review of the head and neck, and general constitution is negative.    Physical Exam  /83 (BP Location: Other (Comment), Patient Position: Sitting, Cuff Size: Adult Regular)   Pulse 74   Wt 81.2 kg (179 lb)   LMP 06/02/2010   SpO2 96%   BMI 35.14 kg/m        General - The patient is well nourished and well developed, and appears to have good nutritional status.  Alert and oriented to person and place, answers questions and cooperates with examination appropriately.   Head and Face - Normocephalic and atraumatic, with no gross asymmetry noted of the contour of the facial features.  The facial nerve is intact, with strong symmetric movements.  Voice and Breathing - The patient was breathing comfortably without the use of accessory muscles. There was no wheezing, stridor, or stertor.  The patients voice was clear and strong, and had appropriate pitch and quality.  Ears - The tympanic membranes are normal in appearance, bony landmarks are intact.  No retraction, perforation, or masses.  No fluid or purulence was seen in the external canal or the middle ear. No evidence of infection of the middle ear or external canal, cerumen was normal in appearance.  Eyes - Extraocular movements intact, and the pupils were reactive to light.  Sclera were not icteric or injected, conjunctiva were pink and moist.    Audiologic Studies - An audiogram and tympanogram were performed today as part of the evaluation and personally reviewed. The tympanogram shows a normal Type A curve, with normal canal volume and middle ear pressure.  There is no sign of eustachian tube dysfunction or middle ear effusion.    However the audiogram shows a flat sensorineural hearing loss that is moderate, 40-50dB thresholds, slightly worse in the RIGHT.  No conductive hearing loss, moderate word recognition scores      A/P - Inga Ledesma is a 58 year old female  (H90.3) Sensorineural hearing loss (SNHL) of both ears   (primary encounter diagnosis)      Based on the history, audiogram, and ear exam, I don't find any evidence of medical or surgical disease that would have caused the hearing loss.  Although noise exposure could be a factor, I suspect that this is primarily genetic/presbycusis.    We discussed the natural history of the steady loss of human hearing, and things to help.  I certainly recommend considering hearing aids, and they have my medical clearance to look into being fitted, and information has been provided.    Finally, safety considerations such as loud smoke detectors, alarm clocks, and special aids for the hearing impaired using phones and television were also discussed.

## 2023-09-29 ENCOUNTER — OFFICE VISIT (OUTPATIENT)
Dept: AUDIOLOGY | Facility: CLINIC | Age: 58
End: 2023-09-29
Payer: COMMERCIAL

## 2023-09-29 ENCOUNTER — OFFICE VISIT (OUTPATIENT)
Dept: OTOLARYNGOLOGY | Facility: CLINIC | Age: 58
End: 2023-09-29
Payer: COMMERCIAL

## 2023-09-29 VITALS
DIASTOLIC BLOOD PRESSURE: 83 MMHG | SYSTOLIC BLOOD PRESSURE: 116 MMHG | WEIGHT: 179 LBS | BODY MASS INDEX: 35.14 KG/M2 | OXYGEN SATURATION: 96 % | HEART RATE: 74 BPM

## 2023-09-29 DIAGNOSIS — H90.3 SENSORINEURAL HEARING LOSS (SNHL) OF BOTH EARS: Primary | ICD-10-CM

## 2023-09-29 DIAGNOSIS — H90.3 ASYMMETRICAL SENSORINEURAL HEARING LOSS: Primary | ICD-10-CM

## 2023-09-29 DIAGNOSIS — H93.11 TINNITUS OF RIGHT EAR: ICD-10-CM

## 2023-09-29 PROCEDURE — 99213 OFFICE O/P EST LOW 20 MIN: CPT | Performed by: OTOLARYNGOLOGY

## 2023-09-29 PROCEDURE — 92550 TYMPANOMETRY & REFLEX THRESH: CPT | Performed by: AUDIOLOGIST

## 2023-09-29 PROCEDURE — 92557 COMPREHENSIVE HEARING TEST: CPT | Performed by: AUDIOLOGIST

## 2023-09-29 RX ORDER — ARIPIPRAZOLE 5 MG/1
TABLET ORAL
COMMUNITY
End: 2024-04-02

## 2023-09-29 RX ORDER — DEXAMETHASONE 0.5 MG/5ML
ELIXIR ORAL
Status: SHIPPED | COMMUNITY
End: 2024-04-02

## 2023-09-29 NOTE — PROGRESS NOTES
AUDIOLOGY REPORT:    Patient was referred from ENT by Dr. Alonso for audiology evaluation. The patient reports that she has had concerns about her hearing for years, but it has been a lot worse for a few months. She notes that her right ear is worse than the left, and she also has constant tinnitus in the right ear. The patient reports occasional sharp ear pain, but otherwise does not have ear pain or pressure. She reports a family history of hearing loss with age, and that her son has had ear problems.  The patient reports that she does not have a history of ear problems, ear surgery, or loud noise exposure.    Testing:    Otoscopy:   Otoscopic exam indicates ears are clear of cerumen bilaterally     Tympanograms:    RIGHT: normal eardrum mobility     LEFT:   normal eardrum mobility    Reflexes (reported by stimulus ear):  RIGHT: Ipsilateral is absent at frequencies tested  RIGHT: Contralateral is absent at frequencies tested  LEFT:   Ipsilateral is absent at frequencies tested  LEFT:   Contralateral is absent at frequencies tested    Thresholds:   Pure Tone Thresholds assessed using conventional audiometry with good reliability from 250-8000 Hz bilaterally using insert earphones and circumaural headphones     RIGHT:  moderate sensorineural hearing loss    LEFT:     mild to moderate  sensorineural hearing loss    Speech Reception Threshold:    RIGHT: 50 dB HL    LEFT:   40 dB HL  Results are in agreement with pure tone average.     Word Recognition Score:     RIGHT: 80% at 90 dB HL using NU-6 recorded word list.    LEFT:   92% at 80 dB HL using NU-6 recorded word list.    Discussed results with the patient.     Patient was returned to ENT for follow up.     Linsey Nunez, CCC-A  Licensed Audiologist #[id number]  9/29/2023

## 2023-09-29 NOTE — LETTER
9/29/2023         RE: Inga Ledesma  [address]  [facility] [address]        Dear Colleague,    Thank you for referring your patient, Inga Ledesma, to the [facility]. Please see a copy of my visit note below.    History of Present Illness - Inga Ledesma is a very pleasant 58 year old female here to see me for the first time for hearing loss.  She has been seen by Dr Garibay in 2021, but for tongue lesions that were shown by biopsy to be hyperkeratosis, no malignancy.    She tells me that in the last 6 months she has really started noticing that she has a hard time hearing people. Situations where she cannot see the person speaking are difficult, and also crowded areas.    There is no family history of early onset or genetic hearing loss in the family.    Otherwise no history of chronic ear disease.  No previous ear surgery.  No history of chemo or radiation therapy to the head and neck, and no major head trauma.  He denies a history of  service, no frequent firearm use.  No previous history working in an industrial environment.      Past Medical History -   Patient Active Problem List   Diagnosis     RLS (restless legs syndrome)     GERD (gastroesophageal reflux disease)     Iron deficiency anemia secondary to inadequate dietary iron intake     Hyperlipidemia with target LDL less than 160     Sacroiliitis (H)     Tobacco abuse     Vitamin D deficiency     Edema of both legs     Hypertension goal BP (blood pressure) < 140/90     [facility]     Major depressive disorder, recurrent episode, mild (H)     (HFpEF) heart failure with preserved ejection fraction (H)     Psychophysiological insomnia     Alcohol abuse     Morbid obesity (H)     Chronic bilateral low back pain without sciatica     Chronic obstructive pulmonary disease (H)     JAQUELIN (obstructive sleep apnea)- severe (AHI 37)     Preop general physical exam       Current Medications -   Current  Outpatient Medications:      acetaminophen (ACETAMINOPHEN EXTRA STRENGTH) 500 MG tablet, TAKE 1-2 TABLETS BY MOUTH EVERY 4-6 HOURS AS NEEDED FOR PAIN *MAX DOSE 4000 MG IN 24 HOURS*, Disp: 100 tablet, Rfl: 10     ADVAIR DISKUS 500-50 MCG/ACT inhaler, INHALE ONE PUFF BY MOUTH EVERY 12 HOURS patient is using PRN, Disp: 60 each, Rfl: 10     ASPIRIN LOW DOSE 81 MG EC tablet, TAKE 1 TABLET BY MOUTH DAILY, Disp: 30 tablet, Rfl: 10     azithromycin (ZITHROMAX) 250 MG tablet, Take 1 tablet (250 mg) by mouth Every Mon, Wed, Fri Morning, Disp: 72 tablet, Rfl: 3     buPROPion (WELLBUTRIN XL) 300 MG 24 hr tablet, Take 1 tablet (300 mg) by mouth every morning, Disp: 90 tablet, Rfl: 1     calcium carbonate 500 mg, elemental, (OSCAL) 500 MG tablet, TAKE 1 TABLET BY MOUTH DAILY, Disp: 30 tablet, Rfl: 10     cariprazine (VRAYLAR) 1.5 MG capsule, Take 1 capsule (1.5 mg) by mouth daily, Disp: 90 capsule, Rfl: 0     cetirizine (ZYRTEC) 10 MG tablet, TAKE 1 TABLET BY MOUTH DAILY, Disp: 30 tablet, Rfl: 10     EPIPEN 2-FAVIOLA 0.3 MG/0.3ML injection 2-pack, INJECT AS DIRECTED AS NEEDED FOR ALLERGIC REACTION, Disp: 2 each, Rfl: 1     famotidine (PEPCID) 40 MG tablet, TAKE 1 TABLET BY MOUTH DAILY AT BEDTIME, Disp: 30 tablet, Rfl: 10     FEROSUL 325 (65 Fe) MG tablet, TAKE ONE (1) TABLET BY MOUTH TWICE DAILY, Disp: 60 tablet, Rfl: 10     fluconazole (DIFLUCAN) 100 MG tablet, Take 1 tablet by mouth daily at 2 pm, Disp: , Rfl:      FLUoxetine (PROZAC) 40 MG capsule, TAKE 2 CAPSULES BY MOUTH ONCE DAILY, Disp: 60 capsule, Rfl: 2     fluticasone (FLONASE) 50 MCG/ACT nasal spray, INSTILL ONE (1) SPRAY IN EACH NOSTRIL DAILY, Disp: 16 g, Rfl: 10     folic acid (FOLVITE) 1 MG tablet, TAKE 1 TABLET BY MOUTH DAILY, Disp: 30 tablet, Rfl: 10     furosemide (LASIX) 40 MG tablet, Take 0.5 tablets (20 mg) by mouth daily, Disp: 45 tablet, Rfl: 3     gabapentin (NEURONTIN) 600 MG tablet, TAKE 1 TABLET BY MOUTH THREE TIMES DAILY, Disp: 90 tablet, Rfl: 10      hydrALAZINE (APRESOLINE) 10 MG tablet, TAKE 1 TABLET BY MOUTH 3 TIMES DAILY, Disp: 90 tablet, Rfl: 10     hydrOXYzine (ATARAX) 25 MG tablet, TAKE 1 TABLET BY MOUTH THREE TIMES DAILY AS NEEDED FOR ITCHING, Disp: 90 tablet, Rfl: 10     insulin pen needle (31G X 6 MM) 31G X 6 MM miscellaneous, Use 1 pen needles daily or as directed for use with victoza., Disp: 90 each, Rfl: 1     ipratropium - albuterol 0.5 mg/2.5 mg/3 mL (DUONEB) 0.5-2.5 (3) MG/3ML neb solution, INHALE ONE VIAL BY NEBULIZATION FOUR TIMES DAILY AS NEEDED FOR WHEEZING, Disp: 360 mL, Rfl: 5     labetalol (NORMODYNE) 100 MG tablet, TAKE ONE (1) TABLET BY MOUTH TWICE DAILY, Disp: 60 tablet, Rfl: 10     magnesium oxide (MAG-OX) 400 MG tablet, TAKE 1 TABLET BY MOUTH DAILY, Disp: 30 tablet, Rfl: 10     meclizine (ANTIVERT) 25 MG tablet, Take 1 tablet (25 mg) by mouth 3 times daily as needed for dizziness, Disp: 80 tablet, Rfl: 0     naltrexone (DEPADE/REVIA) 50 MG tablet, Take 1 tablet (50 mg) by mouth daily, Disp: 30 tablet, Rfl: 3     omeprazole (PRILOSEC) 20 MG DR capsule, TAKE ONE (1) CAPSULE BY MOUTH TWICE DAILY BEFORE MEALS, Disp: 60 capsule, Rfl: 10     phentermine (ADIPEX-P) 37.5 MG capsule, Take 1 capsule (37.5 mg) by mouth every morning, Disp: 30 capsule, Rfl: 0     potassium chloride ER (KLOR-CON M) 10 MEQ CR tablet, TAKE 1 TABLET BY MOUTH DAILY, Disp: 30 tablet, Rfl: 10     QUEtiapine (SEROQUEL) 100 MG tablet, TAKE 1 TO 2 TABLETS(100  MG) BY MOUTH AT BEDTIME, Disp: 90 tablet, Rfl: 1     rOPINIRole (REQUIP) 1 MG tablet, TAKE 1 TABLET BY MOUTH THREE TIMES DAILY, Disp: 90 tablet, Rfl: 10     semaglutide (OZEMPIC) 2 MG/3ML pen, Inject 0.25 mg Subcutaneous every 7 days, Disp: 3 mL, Rfl: 0     Semaglutide-Weight Management (WEGOVY) 0.5 MG/0.5ML pen, Inject 0.5 mg Subcutaneous once a week, Disp: 2 mL, Rfl: 0     Semaglutide-Weight Management (WEGOVY) 1 MG/0.5ML pen, Inject 1 mg Subcutaneous once a week, Disp: 2 mL, Rfl: 0     Suvorexant (BELSOMRA)  10 MG tablet, Take 1 tablet (10 mg) by mouth nightly as needed for sleep, Disp: 30 tablet, Rfl: 0     topiramate (TOPAMAX) 50 MG tablet, Take 1 tablet (50 mg) by mouth At Bedtime, Disp: 90 tablet, Rfl: 1     VENTOLIN  (90 Base) MCG/ACT inhaler, INHALE 1-2 PUFFS BY MOUTH EVERY 4 HOURS AS NEEDED FOR SHORTNESS OF BREATH, DIFFICULTY BREATHING OR WHEEZING., Disp: 18 g, Rfl: 10     vitamin D3 (CHOLECALCIFEROL) 50 mcg (2000 units) tablet, TAKE 1 TABLET BY MOUTH DAILY, Disp: 30 tablet, Rfl: 10     zaleplon (SONATA) 10 MG capsule, TAKE 1 CAPSULE(10 MG) BY MOUTH AT BEDTIME, Disp: 30 capsule, Rfl: 0    Allergies -   Allergies   Allergen Reactions     Bee Venom Anaphylaxis     Reglan [Metoclopramide Hcl] Other (See Comments)     Body tenses up, Dystonia     Doxycycline Rash       Social History -   Social History     Socioeconomic History     Marital status:      Number of children: 1     Years of education: 13   Occupational History     Employer: [employer]   Tobacco Use     Smoking status: Some Days     Packs/day: 0.25     Years: 34.00     Pack years: 8.50     Types: Cigarettes     Start date: 11/1/1979     Last attempt to quit: 10/3/2012     Years since quitting: 10.9     Smokeless tobacco: Current     Types: Chew     Tobacco comments:     5 cigarettes daily   Vaping Use     Vaping Use: Former     Quit date: 1/1/2022   Substance and Sexual Activity     Alcohol use: No     Comment: 1 pint of vodka per day, last drink aug 2018     Drug use: No     Sexual activity: Not Currently   Other Topics Concern     Parent/sibling w/ CABG, MI or angioplasty before 65F 55M? No   Social History Narrative    Lives in [place] with her son in a trailer no access to guns or weapons works for the [employer] office and enjoys crocheting and watching television.       Family History -   Family History   Problem Relation Age of Onset     Depression/Anxiety Mother      Asthma Mother      Cerebrovascular Disease Father       Hypertension Father      Lung Cancer Father      Alcoholism Father      Breast Cancer Paternal Aunt      Other Cancer Other      Depression Other      Anxiety Disorder Other      Mental Illness Other      Substance Abuse Other      Asthma Other      Obesity Sister      Obesity Son      Suicide Paternal Uncle        Review of Systems - As per HPI and PMHx, otherwise 10+ system review of the head and neck, and general constitution is negative.    Physical Exam  /83 (BP Location: Other (Comment), Patient Position: Sitting, Cuff Size: Adult Regular)   Pulse 74   Wt 81.2 kg (179 lb)   LMP 06/02/2010   SpO2 96%   BMI 35.14 kg/m        General - The patient is well nourished and well developed, and appears to have good nutritional status.  Alert and oriented to person and place, answers questions and cooperates with examination appropriately.   Head and Face - Normocephalic and atraumatic, with no gross asymmetry noted of the contour of the facial features.  The facial nerve is intact, with strong symmetric movements.  Voice and Breathing - The patient was breathing comfortably without the use of accessory muscles. There was no wheezing, stridor, or stertor.  The patients voice was clear and strong, and had appropriate pitch and quality.  Ears - The tympanic membranes are normal in appearance, bony landmarks are intact.  No retraction, perforation, or masses.  No fluid or purulence was seen in the external canal or the middle ear. No evidence of infection of the middle ear or external canal, cerumen was normal in appearance.  Eyes - Extraocular movements intact, and the pupils were reactive to light.  Sclera were not icteric or injected, conjunctiva were pink and moist.    Audiologic Studies - An audiogram and tympanogram were performed today as part of the evaluation and personally reviewed. The tympanogram shows a normal Type A curve, with normal canal volume and middle ear pressure.  There is no sign of  eustachian tube dysfunction or middle ear effusion.    However the audiogram shows a flat sensorineural hearing loss that is moderate, 40-50dB thresholds, slightly worse in the RIGHT.  No conductive hearing loss, moderate word recognition scores      A/P - Inga Ledesma is a 58 year old female  (H90.3) Sensorineural hearing loss (SNHL) of both ears  (primary encounter diagnosis)      Based on the history, audiogram, and ear exam, I don't find any evidence of medical or surgical disease that would have caused the hearing loss.  Although noise exposure could be a factor, I suspect that this is primarily genetic/presbycusis.    We discussed the natural history of the steady loss of human hearing, and things to help.  I certainly recommend considering hearing aids, and they have my medical clearance to look into being fitted, and information has been provided.    Finally, safety considerations such as loud smoke detectors, alarm clocks, and special aids for the hearing impaired using phones and television were also discussed.        Again, thank you for allowing me to participate in the care of your patient.        Sincerely,        Sriram Alonso MD

## 2023-10-06 ENCOUNTER — TRANSFERRED RECORDS (OUTPATIENT)
Dept: HEALTH INFORMATION MANAGEMENT | Facility: CLINIC | Age: 58
End: 2023-10-06
Payer: COMMERCIAL

## 2023-10-09 ENCOUNTER — TELEPHONE (OUTPATIENT)
Dept: FAMILY MEDICINE | Facility: CLINIC | Age: 58
End: 2023-10-09
Payer: COMMERCIAL

## 2023-10-09 NOTE — TELEPHONE ENCOUNTER
Patient requesting prescription for anti nausea medication(takes while on wegovy), stool softner and Prednisone (for her wheezing).

## 2023-10-09 NOTE — TELEPHONE ENCOUNTER
Can you work patient in this afternoon for a Virtual visit?   Same day tomorrow (only one spot left)?     Please review and advise.     Nasrin OVALLE  [facility]

## 2023-10-10 ENCOUNTER — VIRTUAL VISIT (OUTPATIENT)
Dept: FAMILY MEDICINE | Facility: CLINIC | Age: 58
End: 2023-10-10
Payer: COMMERCIAL

## 2023-10-10 DIAGNOSIS — J44.1 COPD EXACERBATION (H): Primary | ICD-10-CM

## 2023-10-10 DIAGNOSIS — K59.03 DRUG-INDUCED CONSTIPATION: ICD-10-CM

## 2023-10-10 PROCEDURE — 99441 PR PHYSICIAN TELEPHONE EVALUATION 5-10 MIN: CPT | Mod: 95 | Performed by: PHYSICIAN ASSISTANT

## 2023-10-10 RX ORDER — PREDNISONE 20 MG/1
20 TABLET ORAL 2 TIMES DAILY
Qty: 10 TABLET | Refills: 0 | Status: SHIPPED | OUTPATIENT
Start: 2023-10-10 | End: 2023-10-15

## 2023-10-10 RX ORDER — DOCUSATE SODIUM 100 MG/1
100 CAPSULE, LIQUID FILLED ORAL 2 TIMES DAILY
Qty: 30 CAPSULE | Refills: 1 | Status: SHIPPED | OUTPATIENT
Start: 2023-10-10 | End: 2024-04-22

## 2023-10-10 NOTE — PROGRESS NOTES
Cody is a 58 year old who is being evaluated via a billable telephone visit.      What phone number would you like to be contacted at? [phone]  How would you like to obtain your AVS? Marcia    Distant Location (provider location):  On-site      Subjective   Cody is a 58 year old, presenting for the following health issues:  Respiratory Problems (Can feel herself wheezing, has a cough and sore throat  x 2 days)      10/10/2023    12:10 PM   Additional Questions   Roomed by Ellen Arevalo CMA   Accompanied by None         10/10/2023    12:10 PM   Patient Reported Additional Medications   Patient reports taking the following new medications none       HPI     Acute Illness  Acute illness concerns: COUGH, Sore throat  Onset/Duration: 4 days  Symptoms:  Fever: No  Chills/Sweats: No  Headache (location?): YES  Sinus Pressure: No  Conjunctivitis:  No  Ear Pain: no  Rhinorrhea: No  Congestion: YES  Sore Throat: YES  Cough: YES-productive of yellow sputum  Wheeze: YES  Decreased Appetite: No  Nausea: YES  Vomiting: No  Diarrhea: No  Dysuria/Freq.: No  Dysuria or Hematuria: No  Fatigue/Achiness: YES  Sick/Strep Exposure: No  Therapies tried and outcome: None  Some wheezing and cough.   No fevers.     Review of Systems   Constitutional, HEENT, cardiovascular, pulmonary, GI, , musculoskeletal, neuro, skin, endocrine and psych systems are negative, except as otherwise noted.      Objective           Vitals:  No vitals were obtained today due to virtual visit.    Physical Exam   healthy, alert, and no distress  PSYCH: Alert and oriented times 3; coherent speech, normal   rate and volume, able to articulate logical thoughts, able   to abstract reason, no tangential thoughts, no hallucinations   or delusions  Her affect is normal  RESP: No cough, no audible wheezing, able to talk in full sentences  Remainder of exam unable to be completed due to telephone visits  Inga was seen today for respiratory  problems.    Diagnoses and all orders for this visit:    COPD exacerbation (H)  -     predniSONE (DELTASONE) 20 MG tablet; Take 1 tablet (20 mg) by mouth 2 times daily for 5 days    Drug-induced constipation  -     docusate sodium (COLACE) 100 MG capsule; Take 1 capsule (100 mg) by mouth 2 times daily      Use albuterol prn.  Rest, fluids, steamy showers.  Advised supportive and symptomatic treatment.  Follow up with Provider - if condition persists or worsens.           Phone call duration: 6 minutes       LIKELY VASOVAGAL SECONDARY TO DEHYDRATION AND HYPOTENSION CONTINUE IV HYDRATION ,NEUROLOGY AND CARDIOLOGY CONSULTS CALLED ,MONITORING IN TELE ,ORTHOSTATIC ASSESEMENT ,PT EVALUATION ,OOB TC Check electrolytes

## 2023-10-11 ENCOUNTER — TRANSFERRED RECORDS (OUTPATIENT)
Dept: HEALTH INFORMATION MANAGEMENT | Facility: CLINIC | Age: 58
End: 2023-10-11
Payer: COMMERCIAL

## 2023-10-13 ENCOUNTER — OFFICE VISIT (OUTPATIENT)
Dept: AUDIOLOGY | Facility: CLINIC | Age: 58
End: 2023-10-13
Payer: COMMERCIAL

## 2023-10-13 DIAGNOSIS — H90.3 ASYMMETRICAL SENSORINEURAL HEARING LOSS: Primary | ICD-10-CM

## 2023-10-13 PROCEDURE — 92593 PR HEARING AID CHECK, BINAURAL: CPT | Performed by: AUDIOLOGIST

## 2023-10-13 PROCEDURE — 92591 PR HEARING AID EXAM BINAURAL: CPT | Performed by: AUDIOLOGIST

## 2023-10-13 NOTE — PROGRESS NOTES
AUDIOLOGY REPORT    SUBJECTIVE: Inga Ledesma is a 58 year old female was seen in the Audiology Clinic at  [facility] on 10/13/23 to discuss concerns with hearing and functional communication difficulties.  Inga has been seen previously on 9/29/2023, and results revealed a moderate sensorineural hearing loss in the right ear and mild to moderate sensorineural hearing loss in the left ear. The patient was medically evaluated and determined to be cleared for binaural hearing aids by Sriram Alonso MD. Inga notes difficulty with communication in a variety of listening situations.    OBJECTIVE:  Patient is a hearing aid candidate. Patient would like to move forward with a hearing aid evaluation today. Therefore, the patient was presented with different options for amplification to help aid in communication. Discussed styles, levels of technology and monaural vs. binaural fitting.     The hearing aid(s) mutually chosen were:  Binaural: Cyndi Evlov AI 2400   COLOR: Black  BATTERY SIZE: rechargeable  EARMOLD/TIPS: half shell    Otoscopy revealed ears are clear of cerumen bilaterally. Bilateral earmolds were taken without incident.      LOANER HEARING AIDS:  Patient inquires about loaner hearing aids. She is very motivated to hear and is open to any pair of loaners while she waits for her fitting..tiePatient was fit with clinic loaner Phonak Audeo Q30 312T hearing aids bilaterally. .The hearing aids were placed and they provided a good fit. Inga was oriented to proper hearing aid use, care, cleaning (no water, dry brush), batteries (size: BATTERY SIZE: 312, insertion/removal, toxicity, low-battery signal), aid insertion/removal, storage cases, and other hearing aid details. The patient confirmed understanding of hearing aid use and care, and showed proper insertion of hearing aid and batteries while in the office today.Inga reported good volume and sound  quality today.       ASSESSMENT:   Reviewed purchase information and warranty information with patient. The 45 day trial period was explained to patient. The patient was given a copy of the [place] Department of [employer] consumer brochure on purchasing hearing instruments. Patient risk factors have been provided to the patient in writing prior to the sale of the hearing aid per FDA regulation. The risk factors are also available in the User Instructional Booklet to be presented on the day of the hearing aid fitting. Hearing aid(s) ordered. Hearing aid evaluation completed.    PLAN: Inga is scheduled to return in 2-3 weeks for a hearing aid fitting and programming. Purchase agreement will be completed on that date. Patient will return loaner hearing aids upon fitting of her new [employer]oms hearing aids. Please contact this clinic with any questions or concerns.    Pacheco Membreno, B.S.  Audiology Doctoral Student  [place] #[id number]    I was present with the patient for the entire Audiology appointment including all procedures/testing performed by the AuD student, and agree with the student s assessment and plan as documented.      Linsey Mckinnon, [facility]-A  Licensed Audiologist  [place] #[id number]

## 2023-10-16 ENCOUNTER — TELEPHONE (OUTPATIENT)
Dept: FAMILY MEDICINE | Facility: CLINIC | Age: 58
End: 2023-10-16
Payer: COMMERCIAL

## 2023-10-16 NOTE — TELEPHONE ENCOUNTER
RN updated patient with provider recommendations. Patient was frustrated that she was not informed of this prior to the change. She declined speaking about this at any visit. She stated she is on the Wegovy medication and she is not drinking or eating as much as she used to so maybe this is why Min wanted her to cut dosing back.     Gema Toscano RN on 10/16/2023 at 3:22 PM

## 2023-10-16 NOTE — TELEPHONE ENCOUNTER
Routing to provider     Pt previously on below medication     furosemide (LASIX) 40 MG tablet (Discontinued) 180 tablet 0 6/27/2023 9/26/2023   Sig - Route: Take 1 tablet (40 mg) by mouth 2 times daily Take one tab in the morning and one tab at noon - Oral     Pt wondering why she is now taking   furosemide (LASIX) 40 MG tablet 45 tablet 3 9/26/2023   Sig - Route: Take 0.5 tablets (20 mg) by mouth daily - Oral     Please review/ advise    Marylin Shay RN

## 2023-10-20 ENCOUNTER — TELEPHONE (OUTPATIENT)
Dept: FAMILY MEDICINE | Facility: CLINIC | Age: 58
End: 2023-10-20
Payer: COMMERCIAL

## 2023-10-20 NOTE — TELEPHONE ENCOUNTER
Prior Authorization Retail Medication Request    Medication/Dose: omeprazole (PRILOSEC) 20 MG DR capsule   ICD code (if different than what is on RX):  K29.21   Previously Tried and Failed:  na  Rationale:  na    Insurance Name:  [facility]  Insurance ID:  [id number]       Pharmacy Information (if different than what is on RX)  Name:  [facility]   Phone:  [phone]

## 2023-10-23 NOTE — PROGRESS NOTES
DISCHARGE  Reason for Discharge: Patient has failed to schedule further appointments.    Equipment Issued: none    Discharge Plan: Patient to continue home program.    Referring Provider:  Sarina Chavarria     08/10/23 0500   Appointment Info   Signing clinician's name / credentials Olinda Liu, PT, DPT, WCS   Total/Authorized Visits 12 per PT   Visits Used 3   Medical Diagnosis Sacroiliitis (H)  Chronic bilateral low back pain with right-sided sciatica  Juvenile idiopathic scoliosis of thoracolumbar region   PT Tx Diagnosis LBP   Precautions/Limitations Fall risk   Other pertinent information Hard of hearing   Quick Adds Certification   Progress Note/Certification   Start of Care Date 07/19/23   Onset of illness/injury or Date of Surgery 07/11/23  (MD order date)   Therapy Frequency 1x/wk   Predicted Duration 3 mo   Certification date from 07/19/23   Certification date to 10/11/23   Progress Note Due Date 10/11/23   Progress Note Completed Date 07/19/23   PT Goal 1   Goal Identifier Standing   Goal Description Pt will report pain <5/10 with 30 min standing   Rationale to maximize safety and independence with performance of ADLs and functional tasks;to maximize safety and independence within the home;to maximize safety and independence within the community;to maximize safety and independence with transportation;to maximize safety and independence with self cares   Target Date 10/11/23   Subjective Report   Subjective Report Reports feeling sore in central low back today. Felt like pain improved a little bit after the injections. Is trying to get up and stand and take some deep breaths every hour while completing schoolwork. Hasn't been trying her HEP at home at all and is unsure why.   Objective Measures   Objective Measures Objective Measure 1   Objective Measure 1   Objective Measure TA   Details Very poor engagement, requires palpation and verbal cues for abdominal activation, does best when paired w/ exhale  "  Treatment Interventions (PT)   Interventions Therapeutic Procedure/Exercise;Therapeutic Activity   Therapeutic Procedure/Exercise   Therapeutic Procedures: strength, endurance, ROM, flexibillity minutes (88441) 15   PTRx Ther Proc 1 Sitting Flexion   PTRx Ther Proc 1 - Details No Notes   PTRx Ther Proc 2 Pretzel Stretch   PTRx Ther Proc 2 - Details Seated x30\" each side   PTRx Ther Proc 3 Supine or Standing Gluteal Set   PTRx Ther Proc 3 - Details Supine and seated 5\"x10 w/ palpation to ensure proper performance   PTRx Ther Proc 4 Bridging #1   PTRx Ther Proc 4 - Details x10 w/ palpation and verbal cues for proper performance only able to lift thru small ROM this time   PTRx Ther Proc 5 Abdominal Brace Transverse Abdominis   PTRx Ther Proc 5 - Details supine and seated 5\"x10 w/ significant palpation and verbal cues for abdominal activation   PTRx Ther Proc 6 Roll Outs Hooklying   PTRx Ther Proc 6 - Details Supine 5\"x10 with verbal cues for proper performance   PTRx Ther Proc 7 Roll Ins Hooklying   PTRx Ther Proc 7 - Details Supine 5\"x10 with verbal cues for proper performance   PTRx Ther Proc 8 Supine Lumbar Hip Roll   PTRx Ther Proc 8 - Details x10 each way move thru painfree ROM and progress as able   Skilled Intervention Requires verbal/tactile cues for proper performance of HEP   Patient Response/Progress no adverse effects   PTRx Ther Proc 9 90/90 Position   PTRx Ther Proc 9 - Details No Notes   Therapeutic Activity   PTRx Ther Act 1 Posture Correction with Lumbar Roll   PTRx Ther Act 1 - Details Educated in importance of sitting w/ neutral spine/hip during schoolwork instead of sidelying on sofa. Trial in clinic w/ lumbar support   Therapeutic Activities: dynamic activities to improve functional performance minutes (09141) 15   Ther Act 1 MI   Ther Act 1 - Details Motivational interviewing to identify pt's motivators for attending PT and barriers to HEP performance. Pt will plan to try HEP before bedtime " after completing her homework. Spent time educating pt in rationale for attending PT and how/why home program can help   Patient Response/Progress All questions answered, pt in agreement w/ POC   Education   Learner/Method Patient;Listening;Demonstration;Pictures/Video   Plan   Home program printed   Plan for next session Progress mobility/strength ex as tolerated   Total Session Time   Timed Code Treatment Minutes 30   Total Treatment Time (sum of timed and untimed services) 30

## 2023-10-24 DIAGNOSIS — R05.1 ACUTE COUGH: ICD-10-CM

## 2023-10-24 DIAGNOSIS — R06.2 WHEEZING: ICD-10-CM

## 2023-10-24 NOTE — TELEPHONE ENCOUNTER
Pt calling to state that she was prescribed prednisone 20mg tablets BID. Pt finished this treatment and requesting the prednisone 60mg taper as she stated that this worked better in the past and is not experiencing relief from previous treatment.     Prednisone for wheezing, didn't work per pt report. Can pt receive taper of prednisone 60mg?    Kianna Becker RN on 10/24/2023 at 1:05 PM

## 2023-10-24 NOTE — TELEPHONE ENCOUNTER
Central Prior Authorization Team   Phone: [phone]    PA Initiation    Medication: OMEPRAZOLE 20 MG PO CPDR  Insurance Company: [employer] - Phone [phone] Fax [phone]  [facility] [place] [id number] [facility]  Filling Pharmacy Phone: [phone]  Filling Pharmacy Fax:    Start Date: 10/24/2023    Started PA on CMM and a response of Drug is covered by current benefit plan. No further PA activity needed.  Called pharmacy, they did get a paid claim and a PA is not needed.

## 2023-10-25 RX ORDER — PREDNISONE 20 MG/1
TABLET ORAL
Qty: 20 TABLET | Refills: 0 | Status: SHIPPED | OUTPATIENT
Start: 2023-10-25 | End: 2024-02-22

## 2023-10-26 ENCOUNTER — TELEPHONE (OUTPATIENT)
Dept: FAMILY MEDICINE | Facility: CLINIC | Age: 58
End: 2023-10-26
Payer: COMMERCIAL

## 2023-10-26 NOTE — TELEPHONE ENCOUNTER
Patient calling to see if refills were approved.      Refills were approved and sent on 10/25.    Wilber Collazo, RN  Triage Nurse  [facility]

## 2023-11-02 DIAGNOSIS — E66.01 MORBID OBESITY (H): Primary | ICD-10-CM

## 2023-11-02 NOTE — TELEPHONE ENCOUNTER
I would like her to trial a lower dose of lasix. She's had some electrolyte abnormalities as of late and her diuretic can promote more issues with this. If she notes more water retention on the lower dose I can bump her back up . But for now I want her to try taking 1/2 tab bid.

## 2023-11-02 NOTE — TELEPHONE ENCOUNTER
[facility] pharmacy calling, says they got Rx for furosemide 40 mg, take 1/2 tab daily; patient has been on: Furosemide 40 mg one in AM and one at noon, for a long time.   Pharmacy called patient and verified patient did not know of a change to this medication advised.    I see recent admission; discharge med list include:    furosemide (LASIX) 40 mg oral tablet Take 40 mg by mouth twice a day.       I cannot find an encounter documenting any other plan for the furosemide.    Routed to PCP to address, send new Rx if appropriate or advise on altered dose.    Belen DEJESUS RN  [facility] Triage

## 2023-11-03 NOTE — TELEPHONE ENCOUNTER
RN spoke with Pharmacy staff Janie and relayed providers message. Changed script sig per provider request.       RN called to relay providers message to patient she verbalized understanding. Patient also stated she has been on Wegovy 1 mg for 2 months and is wondering if she can now increase her dose to 1.75 mg. RN can only find 1.7 mg is that ok for PCP?     Janis Guzman RN on 11/3/2023 at 8:53 AM

## 2023-11-09 ENCOUNTER — TELEPHONE (OUTPATIENT)
Dept: AUDIOLOGY | Facility: CLINIC | Age: 58
End: 2023-11-09
Payer: COMMERCIAL

## 2023-11-09 NOTE — TELEPHONE ENCOUNTER
LVM that Ashleigh is no longer available on 11/17 and we need to reschedule. We changed the IRP to the HFP. Patient needs to reschedule IRP 3 weeks after HFP. Ashleigh will also write a note stating the trail period has been extended. Gave call center number

## 2023-11-09 NOTE — TELEPHONE ENCOUNTER
[facility] Call Center    Phone Message    May a detailed message be left on voicemail: yes     Reason for Call: Other: Per pt does not want to wait until December to  hearing aids. Please call to discuss. Thank you     Action Taken: Message routed to:  [facility] ([facility]): AUDIO    Travel Screening: Not Applicable         Called patient back and offered her December 6th at 3:00 for her hearing aid fitting. Instructed her to call back to let us know if she would like to take that appointment slot.            Linsey Mckinnon, [facility]-A  Licensed Audiologist  [place] #[id number]

## 2023-11-10 ENCOUNTER — TELEPHONE (OUTPATIENT)
Dept: AUDIOLOGY | Facility: CLINIC | Age: 58
End: 2023-11-10
Payer: COMMERCIAL

## 2023-11-10 NOTE — TELEPHONE ENCOUNTER
"[facility]    Phone Message    May a detailed message be left on voicemail: yes     Reason for Call: Other: Pt called to leave a message for Ashleigh. States it is in regards to \"HA's\" but declines giving any further information. Please call pt back to discuss. Thank you.      Action Taken: Message routed to:  [facility] ([facility]): [facility] Audio    Travel Screening: Not Applicable        Called patient back and again left message. It is noted that patients phone goes immediately to voicemail, which may indicate she has the clinic number listed as blocked on her phone. Again encouraged patient to call back and let us know if she would like the earlier appointment on Dec 6th or not.        Tony Mckinnon., [facility]-A  Licensed Audiologist  [place] #[id number]                                                                   "

## 2023-11-30 DIAGNOSIS — F33.0 MAJOR DEPRESSIVE DISORDER, RECURRENT EPISODE, MILD (H): Primary | ICD-10-CM

## 2023-11-30 DIAGNOSIS — F51.04 PSYCHOPHYSIOLOGICAL INSOMNIA: ICD-10-CM

## 2023-11-30 DIAGNOSIS — E66.01 MORBID OBESITY (H): ICD-10-CM

## 2023-11-30 RX ORDER — ZALEPLON 10 MG/1
CAPSULE ORAL
Qty: 30 CAPSULE | Refills: 0 | Status: SHIPPED | OUTPATIENT
Start: 2023-11-30 | End: 2024-01-05

## 2023-12-01 NOTE — TELEPHONE ENCOUNTER
Patient called and stated that she would like to go up to the 2.4 dosage of Wegovy,     Routed to PCP to advise.    Marah BSN RN  Triage Nurse  [facility]

## 2023-12-04 NOTE — TELEPHONE ENCOUNTER
I see the 2.4 mg wegovy Rx was sent to [facility] pharmacy on 12/1/23.   3 days ago.    Any problems getting this?      Attempted to call patient at home/mobile number, no answer, left message on voicemail; patient was instructed to return call to [facility] at [phone].    Belen DEJESUS RN  [facility] Triage

## 2023-12-05 NOTE — TELEPHONE ENCOUNTER
Notified patient, and she will have someone pick it up for her.    Patient stated understanding and agreeable with the plan of care.     Marah RN BSN  Triage Nurse  [facility]

## 2023-12-06 ENCOUNTER — OFFICE VISIT (OUTPATIENT)
Dept: AUDIOLOGY | Facility: CLINIC | Age: 58
End: 2023-12-06
Payer: COMMERCIAL

## 2023-12-06 DIAGNOSIS — H90.3 ASYMMETRICAL SENSORINEURAL HEARING LOSS: Primary | ICD-10-CM

## 2023-12-06 PROCEDURE — V5020 CONFORMITY EVALUATION: HCPCS | Performed by: AUDIOLOGIST

## 2023-12-06 PROCEDURE — V5260 HEARING AID, DIGIT, BIN, ITE: HCPCS | Mod: NU | Performed by: AUDIOLOGIST

## 2023-12-06 PROCEDURE — V5160 DISPENSING FEE BINAURAL: HCPCS | Performed by: AUDIOLOGIST

## 2023-12-06 NOTE — PROGRESS NOTES
AUDIOLOGY REPORT    SUBJECTIVE: Inga Ledesma is a 58 year old female who was seen in the Audiology Clinic at the [facility] for a fitting of Cyndi Rosie AI 24-R half shell hearing aids. Inga has been seen previously on 9/29/2023, and results revealed a moderate sensorineural hearing loss in the right ear and mild to moderate sensorineural hearing loss in the left ear. The patient was medically evaluated and determined to be cleared for binaural hearing aids by Sriram Alonso MD. Inga returns the loaner hearing aid set that I gave her. She reports it was helpful and is looking forward to getting her own pair.   OBJECTIVE: The hearing aid conformity evaluation was completed.The hearing aids were placed and they provided a good fit. Real-ear-probe-microphone measurements were completed on the [employer] system and were a good match to NAL-NL1 target with soft sounds audible, moderate sounds comfortable, and loud sounds below discomfort. UCLs are verified through maximum power output measures and demonstrate appropriate limiting of loud inputs. Inga was oriented to proper hearing aid use, care, cleaning (no water, dry brush), batteries (size rechargeable, insertion/removal, toxicity, low-battery signal), aid insertion/removal, user booklet, warranty information, storage cases, and other hearing aid details. The patient confirmed understanding of hearing aid use and care, and showed proper insertion of hearing aid and batteries while in the office today.Inga reported good volume and sound quality today.   Hearing aids were programmed as follows:  Program 1:Auto only, no VC  EAR(S) FIT: Bilateral  HEARING AID MODEL NAME:  Cyndi Rosie AI 24-R  HEARING AID STYLE: Half Shell in-the-ear  SERIAL NUMBERS: Right: [id number] Left: [id number]  WARRANTY END DATE: 11/19/2026   ASSESSMENT:  Cyndi Rosie AI 24-R hearing aid(s) were fit today. Verification measures were  performed. Inga signed the Hearing Aid Purchase Agreement and was given a copy, as well as details on her hearing aids. Patient was counseled that exact out of pocket amounts cannot be determined for hearing aid claims being sent to insurance. Any insurance coverage information presented to the patient is an estimate only, and is not a guarantee of payment. Patient has been advised to check with their own insurance.    PLAN:Inga will return for follow-up in 2-3 weeks for a hearing aid review appointment. We will download the rajat and hook the antonette aids up to her phone on that date.Please call this clinic with questions regarding today s appointment.      Tony Mckinnon., [facility]-A  Licensed Audiologist  [place] #[id number]

## 2023-12-29 ENCOUNTER — OFFICE VISIT (OUTPATIENT)
Dept: AUDIOLOGY | Facility: CLINIC | Age: 58
End: 2023-12-29
Payer: COMMERCIAL

## 2023-12-29 ENCOUNTER — TELEPHONE (OUTPATIENT)
Dept: FAMILY MEDICINE | Facility: CLINIC | Age: 58
End: 2023-12-29

## 2023-12-29 DIAGNOSIS — H90.3 ASYMMETRICAL SENSORINEURAL HEARING LOSS: Primary | ICD-10-CM

## 2023-12-29 DIAGNOSIS — G25.81 RLS (RESTLESS LEGS SYNDROME): ICD-10-CM

## 2023-12-29 PROCEDURE — 99207 PR ASSESSMENT FOR HEARING AID: CPT | Performed by: AUDIOLOGIST

## 2023-12-29 NOTE — PROGRESS NOTES
AUDIOLOGY REPORT    SUBJECTIVE:Inga Ledesma is a 58 year old female who was seen in the Audiology Clinic at the [facility] on 12/29/2023  for a follow-up check regarding the fitting of new hearing aids. Inga has been seen previously in this clinic and was fit with Sabakat AI 24-R half shell hearing aids on 12/06/23. Previous results from 9/29/2023, revealed a moderate sensorineural hearing loss in the right ear and mild to moderate sensorineural hearing loss in the left ear.  Inga reports that hearing aids are helpful and she immediately recognizes a difference while watching TV.  She does reports she is concerned she broke the right ear as a small piece came off of the hearing aid.    OBJECTIVE:   Upon inspection the wax filter came out of the right hearing aid.  This is replaced.  How to change the wax guards, clean the hearing aids and how to use the dry-aid  were reviewed in detail.  For the most part Inga reports good volume.  It depends on her situation if she wants to change amount.  After discussion Inga reports she would like to be able to change the volume on the fernanda, however not on the hearing aid.    Patient's phone was connect via bluetooth to the patient's phone. Instructions on how to operate the phone and the hearing aids together was reviewed in detail. Fernanda for the hearing aid is reviewed.     Reviewed 45 day trial period, care, cleaning (no water, dry brush), batteries (size rechargeable) insertion/removal, toxicity, low-battery signal), aid insertion/removal, volume adjustment (if applicable), user booklet, warranty information, storage cases, and other hearing aid details.     No charge visit today (in warranty hearing aid check).    ASSESSMENT: A follow-up appointment for hearing aid fitting was completed today. Changes to hearing aid was completed as outlined above.     PLAN:Inga will return for follow-up as needed, or at least  every 9-12 months for cleaning and assessment of hearing aid. Please call this clinic with any questions regarding today s appointment.        Linsey Mckinnon, [facility]  Licensed Audiologist  [place] #[id number]

## 2023-12-29 NOTE — TELEPHONE ENCOUNTER
Patient is requesting an additional medication to go along with wegovy states stephanie is not working very well - patient is not losing weight     Has been on medication since August     Requesting additional medication    Mahi Gee RN  [facility]

## 2024-01-02 NOTE — TELEPHONE ENCOUNTER
Patient calling back about the previous message.     She additionally explains her restless legs have been worsening for the past 5 days causing her to lose sleep. She is requesting further advice or stronger medication. She is currently taking Requip 1 mg three times daily.    Please advise. Patient has upcoming scheduled appointment on 1/10 at 1:40 pm with PCP.    YAMLIE Temple RN  [facility]

## 2024-01-05 RX ORDER — ROPINIROLE 1 MG/1
1 TABLET, FILM COATED ORAL AT BEDTIME
Qty: 90 TABLET | Refills: 0 | Status: SHIPPED | OUTPATIENT
Start: 2024-01-05 | End: 2024-01-10

## 2024-01-05 RX ORDER — ROPINIROLE 1 MG/1
1 TABLET, FILM COATED ORAL AT BEDTIME
Qty: 90 TABLET | Refills: 0 | Status: SHIPPED | OUTPATIENT
Start: 2024-01-05 | End: 2024-01-05

## 2024-01-05 NOTE — TELEPHONE ENCOUNTER
RN called patient and relayed providers message. Patient verbalized understanding.        Patient stated she gets all her medications prepackaged from [facility] so does not have any extra tablets of the requip to take 4x/day instead of 3x/day.       Patient asked if you could send in a script for the additional dose of requip 1 mg to [facility] in [place].       She has appointment with you 1/10/24 and will follow up on this concern then.     Janis Guzman RN on 1/5/2024 at 8:40 AM

## 2024-01-05 NOTE — TELEPHONE ENCOUNTER
Spoke with patient, relayed providers message below and patient verbalized understanding. Pt stated no further questions.    Kianna Becker RN on 1/5/2024 at 4:39 PM

## 2024-01-05 NOTE — TELEPHONE ENCOUNTER
Have her try taking one tab qid. If this isn't helping I'll want a virtual visit with her to discuss this in more detail.

## 2024-01-10 ENCOUNTER — OFFICE VISIT (OUTPATIENT)
Dept: FAMILY MEDICINE | Facility: CLINIC | Age: 59
End: 2024-01-10
Payer: COMMERCIAL

## 2024-01-10 VITALS
DIASTOLIC BLOOD PRESSURE: 80 MMHG | OXYGEN SATURATION: 94 % | BODY MASS INDEX: 32.83 KG/M2 | SYSTOLIC BLOOD PRESSURE: 112 MMHG | RESPIRATION RATE: 18 BRPM | WEIGHT: 167.2 LBS | TEMPERATURE: 97.6 F | HEART RATE: 84 BPM | HEIGHT: 60 IN

## 2024-01-10 DIAGNOSIS — J44.9 CHRONIC OBSTRUCTIVE PULMONARY DISEASE, UNSPECIFIED COPD TYPE (H): ICD-10-CM

## 2024-01-10 DIAGNOSIS — F10.21 ALCOHOL DEPENDENCE IN REMISSION (H): ICD-10-CM

## 2024-01-10 DIAGNOSIS — G25.81 RESTLESS LEG SYNDROME: ICD-10-CM

## 2024-01-10 DIAGNOSIS — I10 HYPERTENSION GOAL BP (BLOOD PRESSURE) < 140/90: Primary | ICD-10-CM

## 2024-01-10 DIAGNOSIS — E66.01 MORBID OBESITY (H): ICD-10-CM

## 2024-01-10 DIAGNOSIS — E78.5 HYPERLIPIDEMIA WITH TARGET LDL LESS THAN 160: ICD-10-CM

## 2024-01-10 DIAGNOSIS — F33.0 MAJOR DEPRESSIVE DISORDER, RECURRENT EPISODE, MILD (H): ICD-10-CM

## 2024-01-10 DIAGNOSIS — G25.81 RLS (RESTLESS LEGS SYNDROME): ICD-10-CM

## 2024-01-10 DIAGNOSIS — D50.8 IRON DEFICIENCY ANEMIA SECONDARY TO INADEQUATE DIETARY IRON INTAKE: ICD-10-CM

## 2024-01-10 DIAGNOSIS — I50.32 CHRONIC HEART FAILURE WITH PRESERVED EJECTION FRACTION (H): ICD-10-CM

## 2024-01-10 DIAGNOSIS — F51.04 PSYCHOPHYSIOLOGICAL INSOMNIA: ICD-10-CM

## 2024-01-10 DIAGNOSIS — M05.80 POLYARTHRITIS WITH POSITIVE RHEUMATOID FACTOR (H): ICD-10-CM

## 2024-01-10 DIAGNOSIS — M46.1 SACROILIITIS (H): ICD-10-CM

## 2024-01-10 LAB
ERYTHROCYTE [DISTWIDTH] IN BLOOD BY AUTOMATED COUNT: 15.7 % (ref 10–15)
HCT VFR BLD AUTO: 38.2 % (ref 35–47)
HGB BLD-MCNC: 11.4 G/DL (ref 11.7–15.7)
MCH RBC QN AUTO: 21.8 PG (ref 26.5–33)
MCHC RBC AUTO-ENTMCNC: 29.8 G/DL (ref 31.5–36.5)
MCV RBC AUTO: 73 FL (ref 78–100)
PLATELET # BLD AUTO: 291 10E3/UL (ref 150–450)
RBC # BLD AUTO: 5.22 10E6/UL (ref 3.8–5.2)
WBC # BLD AUTO: 8.5 10E3/UL (ref 4–11)

## 2024-01-10 PROCEDURE — 90750 HZV VACC RECOMBINANT IM: CPT | Performed by: PHYSICIAN ASSISTANT

## 2024-01-10 PROCEDURE — 90480 ADMN SARSCOV2 VAC 1/ONLY CMP: CPT | Performed by: PHYSICIAN ASSISTANT

## 2024-01-10 PROCEDURE — 99214 OFFICE O/P EST MOD 30 MIN: CPT | Mod: 25 | Performed by: PHYSICIAN ASSISTANT

## 2024-01-10 PROCEDURE — 36415 COLL VENOUS BLD VENIPUNCTURE: CPT | Performed by: PHYSICIAN ASSISTANT

## 2024-01-10 PROCEDURE — 85027 COMPLETE CBC AUTOMATED: CPT | Performed by: PHYSICIAN ASSISTANT

## 2024-01-10 PROCEDURE — 80048 BASIC METABOLIC PNL TOTAL CA: CPT | Performed by: PHYSICIAN ASSISTANT

## 2024-01-10 PROCEDURE — 84460 ALANINE AMINO (ALT) (SGPT): CPT | Performed by: PHYSICIAN ASSISTANT

## 2024-01-10 PROCEDURE — 90471 IMMUNIZATION ADMIN: CPT | Performed by: PHYSICIAN ASSISTANT

## 2024-01-10 PROCEDURE — 91320 SARSCV2 VAC 30MCG TRS-SUC IM: CPT | Performed by: PHYSICIAN ASSISTANT

## 2024-01-10 RX ORDER — GABAPENTIN 300 MG/1
300 CAPSULE ORAL AT BEDTIME
Qty: 90 CAPSULE | Refills: 1 | Status: SHIPPED | OUTPATIENT
Start: 2024-01-10

## 2024-01-10 RX ORDER — ZALEPLON 10 MG/1
20 CAPSULE ORAL AT BEDTIME
Qty: 60 CAPSULE | Refills: 0 | Status: SHIPPED | OUTPATIENT
Start: 2024-01-10 | End: 2024-01-11

## 2024-01-10 RX ORDER — PRAMIPEXOLE DIHYDROCHLORIDE 0.5 MG/1
0.5 TABLET ORAL AT BEDTIME
Qty: 90 TABLET | Refills: 1 | Status: SHIPPED | OUTPATIENT
Start: 2024-01-10 | End: 2024-01-15

## 2024-01-10 ASSESSMENT — ANXIETY QUESTIONNAIRES
5. BEING SO RESTLESS THAT IT IS HARD TO SIT STILL: MORE THAN HALF THE DAYS
6. BECOMING EASILY ANNOYED OR IRRITABLE: SEVERAL DAYS
GAD7 TOTAL SCORE: 8
7. FEELING AFRAID AS IF SOMETHING AWFUL MIGHT HAPPEN: NOT AT ALL
7. FEELING AFRAID AS IF SOMETHING AWFUL MIGHT HAPPEN: NOT AT ALL
1. FEELING NERVOUS, ANXIOUS, OR ON EDGE: SEVERAL DAYS
GAD7 TOTAL SCORE: 8
4. TROUBLE RELAXING: MORE THAN HALF THE DAYS
3. WORRYING TOO MUCH ABOUT DIFFERENT THINGS: SEVERAL DAYS
IF YOU CHECKED OFF ANY PROBLEMS ON THIS QUESTIONNAIRE, HOW DIFFICULT HAVE THESE PROBLEMS MADE IT FOR YOU TO DO YOUR WORK, TAKE CARE OF THINGS AT HOME, OR GET ALONG WITH OTHER PEOPLE: SOMEWHAT DIFFICULT
2. NOT BEING ABLE TO STOP OR CONTROL WORRYING: SEVERAL DAYS
8. IF YOU CHECKED OFF ANY PROBLEMS, HOW DIFFICULT HAVE THESE MADE IT FOR YOU TO DO YOUR WORK, TAKE CARE OF THINGS AT HOME, OR GET ALONG WITH OTHER PEOPLE?: SOMEWHAT DIFFICULT
GAD7 TOTAL SCORE: 8

## 2024-01-10 ASSESSMENT — PATIENT HEALTH QUESTIONNAIRE - PHQ9
SUM OF ALL RESPONSES TO PHQ QUESTIONS 1-9: 11
SUM OF ALL RESPONSES TO PHQ QUESTIONS 1-9: 11

## 2024-01-10 ASSESSMENT — PAIN SCALES - GENERAL: PAINLEVEL: NO PAIN (0)

## 2024-01-10 NOTE — COMMUNITY RESOURCES LIST (ENGLISH)
01/10/2024   [facility]  N/A  For questions about this resource list or additional care needs, please contact your primary care clinic or care manager.  Phone: [phone]   Email: N/A   Address: [address] [place] [address]   Hours: N/A        Financial Stability       Utility payment assistance  1  [facility] - [facility] - Low Income Energy Assistance Program (LIHEAP) application assistance Distance: 1.32 miles      In-Person, Phone/Virtual   [address] [place] [address]  Language: English  Hours: Mon - Fri 8:00 AM - 4:30 PM  Fees: Free   Phone: (799) 288-2625 Email: servicecenterinfo@[place]. Website: https://www.[facility]/your-[facility]/facilities/service-center-info     2  [employer] - ???????Gas Affordability Program Distance: 2.54 miles      Phone/Virtual   [address] [address] [place] [address]  Language: English  Hours: Mon - Sun Open 24 Hours  Fees: Sliding Fee   Phone: (854) 814-1605 Website: http://www.[employer]/en-us/residential/customer-service/billing-payment/need-help-paying-your-bill?sa=[place]          Important Numbers & Websites       Emergency Services   911  [facility]   311  Poison Control   (771) 508-2905  Suicide Prevention Lifeline   (257) 693-4243 (TALK)  Child Abuse Hotline   (275) 257-1172 (4-A-Child)  Sexual Assault Hotline   (148) 588-7958 (HOPE)  National Runaway Safeline   (554) 541-9511 (RUNAWAY)  All-Options Talkline   (288) 314-8634  Substance Abuse Referral   (711) 198-8475 (HELP)

## 2024-01-10 NOTE — PROGRESS NOTES
Lolita Ross is a 58 year old, presenting for the following health issues:  restless legs and Forms (DMV )      1/10/2024     1:06 PM   Additional Questions   Roomed by Susie   Accompanied by Self       History of Present Illness       COPD:  She presents for follow up of COPD.   Overall, COPD symptoms are slightly worse since last visit. She has same as usual fatigue or shortness of breath with exertion and same as usual shortness of breath at rest.  She constantly coughs and does have change in sputum. No recent fever. She can walk the length of 1-2 rooms without stopping to rest. She can not walk a flight of stairs without resting. The patient has had no ED, urgent care, or hospital admissions because of COPD since the last visit.     Mental Health Follow-up:  Patient presents to follow-up on Depression & Anxiety.Patient's depression since last visit has been:  No change  The patient is not having other symptoms associated with depression.  Patient's anxiety since last visit has been:  No change  The patient is not having other symptoms associated with anxiety.  Any significant life events: No  Patient is feeling anxious or having panic attacks.  Patient has concerns about alcohol or drug use.    Heart Failure:  She presents for follow up of heart failure. She is experiencing shortness of breath with activity only, which is slightly worse. She is experiencing lower extremity edema which is same as usual.   She has orthopenea and coughs at night. Patient is not checking weight daily. She has recently had a weight decrease.  She has side effects from medications including dizziness.  She has had no other medical visits for heart failure since the last visit.    Hypertension: She presents for follow up of hypertension.  She does not check blood pressure  regularly outside of the clinic. Outpatient blood pressures have not been over 140/90. She follows a low salt diet.     Headaches:   Since the patient's  "last clinic visit, headaches are: worsened  The patient is getting headaches:  3times a week  She is not able to do normal daily activities when she has a migraine.  The patient is taking the following rescue/relief medications:  Tylenol   Patient states \"I get some relief\" from the rescue/relief medications.   The patient is taking the following medications to prevent migraines:  No medications to prevent migraines  In the past 4 weeks, the patient has gone to an Urgent Care or Emergency Room 0 times times due to headaches.    She eats 2-3 servings of fruits and vegetables daily.She consumes 0 sweetened beverage(s) daily.She exercises with enough effort to increase her heart rate 9 or less minutes per day.  She exercises with enough effort to increase her heart rate 3 or less days per week. She is missing 4 dose(s) of medications per week.     Last Echo: No results found.  Copd stable. Occasional wheezing and coughing.  Not using inhalers    History of alcohol use disorder. Sober x 3 yrs.    Recheck of obesity. Discussed a lower calorie diet and consistent mix of aerobic exercise and strength training. This will help maintain/treat her blood pressure. Wegovy has helped a lot.    No chest pain/sob/palpitations/dizziness    Depression is stable.    History of polyarthritis. Improvement with weight loss.   Walking farther.    History of a one time sz associated with acute alcohol withdrawal in 2016. Needs license renewal paperwork completed.         Patient here for follow up on restless legs, and needs forms filled out for the DMV, and a letter.       Taking requip 4 times daily with only minimal benefit.      Review of Systems   Constitutional, HEENT, cardiovascular, pulmonary, GI, , musculoskeletal, neuro, skin, endocrine and psych systems are negative, except as otherwise noted.      Objective    /80   Pulse 84   Temp 97.6  F (36.4  C) (Temporal)   Resp 18   Ht 1.53 m (5' 0.24\")   Wt 75.8 kg (167 lb " 3.2 oz)   LMP 06/02/2010   SpO2 94%   BMI 32.40 kg/m    Body mass index is 32.4 kg/m .  Physical Exam   Eye exam - right eye normal lid, conjunctiva, cornea, pupil and fundus, left eye normal lid, conjunctiva, cornea, pupil and fundus.  CHEST:chest clear to IPPA, no tachypnea, retractions or cyanosis, and S1, S2 normal, no murmur, no gallop, rate regular.  No profound edema.  Thyroid not palpable, not enlarged, no nodules detected.    Cody was seen today for restless legs and forms.    Diagnoses and all orders for this visit:    Hypertension goal BP (blood pressure) < 140/90  -     BASIC METABOLIC PANEL; Future    Hyperlipidemia with target LDL less than 160  -     ALT; Future    Iron deficiency anemia secondary to inadequate dietary iron intake  -     CBC with Platelets; Future    Polyarthritis with positive rheumatoid factor (H)    Chronic heart failure with preserved ejection fraction (H)    Chronic obstructive pulmonary disease, unspecified COPD type (H)    Major depressive disorder, recurrent episode, mild (H24)    Morbid obesity (H)    Alcohol dependence in remission (H)    Restless leg syndrome  -     gabapentin (NEURONTIN) 300 MG capsule; Take 1 capsule (300 mg) by mouth at bedtime  -     pramipexole (MIRAPEX) 0.5 MG tablet; Take 1 tablet (0.5 mg) by mouth at bedtime    Sacroiliitis (H24)    RLS (restless legs syndrome)    Psychophysiological insomnia  -     zaleplon (SONATA) 10 MG capsule; Take 2 capsules (20 mg) by mouth at bedtime    Other orders  -     ZOSTER RECOMBINANT ADJUVANTED (SHINGRIX)  -     COVID-19 12+ (2023-24) (PFIZER)      Start using advair again.  work on lifestyle modification  Recheck in 6 mos

## 2024-01-10 NOTE — LETTER
January 10, 2024      Inga Ledesma  [address]   [facility] [address]        To Whom It May Concern:    Inga Ledesma was seen in our clinic. She experienced a seizure back in 2016 as a result of acute alcohol withdrawal. She has not had a seizures since that day, nor has she ever been on any anti-seizure medication. Additionally, she has been sober for the past 3 years.      Sincerely,        Min Toledo PA-C

## 2024-01-11 ENCOUNTER — TELEPHONE (OUTPATIENT)
Dept: FAMILY MEDICINE | Facility: CLINIC | Age: 59
End: 2024-01-11
Payer: COMMERCIAL

## 2024-01-11 DIAGNOSIS — F51.04 PSYCHOPHYSIOLOGICAL INSOMNIA: ICD-10-CM

## 2024-01-11 DIAGNOSIS — G25.81 RESTLESS LEG SYNDROME: ICD-10-CM

## 2024-01-11 LAB
ALT SERPL W P-5'-P-CCNC: 25 U/L (ref 0–50)
ANION GAP SERPL CALCULATED.3IONS-SCNC: 12 MMOL/L (ref 7–15)
BUN SERPL-MCNC: 12.7 MG/DL (ref 6–20)
CALCIUM SERPL-MCNC: 9.3 MG/DL (ref 8.6–10)
CHLORIDE SERPL-SCNC: 99 MMOL/L (ref 98–107)
CREAT SERPL-MCNC: 0.74 MG/DL (ref 0.51–0.95)
DEPRECATED HCO3 PLAS-SCNC: 26 MMOL/L (ref 22–29)
EGFRCR SERPLBLD CKD-EPI 2021: >90 ML/MIN/1.73M2
GLUCOSE SERPL-MCNC: 79 MG/DL (ref 70–99)
POTASSIUM SERPL-SCNC: 4.4 MMOL/L (ref 3.4–5.3)
SODIUM SERPL-SCNC: 137 MMOL/L (ref 135–145)

## 2024-01-11 NOTE — TELEPHONE ENCOUNTER
Pt calling regarding new prescriptions from yesterday's visit. States the only prescription she received was the Gabapentin capsules. Pharmacy did not have Pramipexole or Zaleplon. Pt called Mehrdad in [facility] yesterday and waited on the line for 1.5 hours. She was able to get the Pramipexole transferred, but not the Zaleplon. Pt is requesting a new script for Zaleplon be sent to pharmacy cued.    RN is unable to send script for Zaleplon. Routing to Provider.    Nadiya Rogers RN  [facility]

## 2024-01-12 RX ORDER — ZALEPLON 10 MG/1
20 CAPSULE ORAL AT BEDTIME
Qty: 60 CAPSULE | Refills: 0 | Status: SHIPPED | OUTPATIENT
Start: 2024-01-12 | End: 2024-04-02

## 2024-01-15 ENCOUNTER — TELEPHONE (OUTPATIENT)
Dept: FAMILY MEDICINE | Facility: CLINIC | Age: 59
End: 2024-01-15

## 2024-01-15 ENCOUNTER — TELEPHONE (OUTPATIENT)
Dept: FAMILY MEDICINE | Facility: CLINIC | Age: 59
End: 2024-01-15
Payer: COMMERCIAL

## 2024-01-15 RX ORDER — PRAMIPEXOLE DIHYDROCHLORIDE 0.5 MG/1
0.5 TABLET ORAL AT BEDTIME
Qty: 90 TABLET | Refills: 1 | Status: SHIPPED | OUTPATIENT
Start: 2024-01-15

## 2024-01-15 NOTE — TELEPHONE ENCOUNTER
Central Prior Authorization Team  Phone: [phone]    PA Initiation    Medication: ZALEPLON 10 MG PO CAPS  Insurance Company: [employer] - Phone [phone] Fax [phone]  Pharmacy Filling the Rx: [id number] [place], [place] - [address] AT [facility] OF [address]  Filling Pharmacy Phone: [phone]  Filling Pharmacy Fax:    Start Date: 1/15/2024

## 2024-01-15 NOTE — TELEPHONE ENCOUNTER
Patient is calling stating that she needs some prescriptions resent from [facility] to [facility]'s pharmacy instead. The ones that she needs re-sent are pramipexole and the zaleplon. Notified her that zaleplon already sent 01/12/24. Re-sent pramipexole.    Marcy Kat RN

## 2024-01-15 NOTE — TELEPHONE ENCOUNTER
Prior Authorization Retail Medication Request    Medication/Dose: zaleplon (SONATA) 10 MG capsule   Diagnosis and ICD code (if different than what is on RX):    New/renewal/insurance change PA/secondary ins. PA:  Previously Tried and Failed:    Rationale:      Insurance   Primary: [employer]  Insurance ID:  [id number]    Pharmacy Information (if different than what is on RX)  Name:  [facility]  Phone:  [phone]   Fax:[phone]

## 2024-01-17 NOTE — TELEPHONE ENCOUNTER
Central Prior Authorization Team  Phone: [phone]    PRIOR AUTHORIZATION DENIED    Medication: ZALEPLON 10 MG PO CAPS  Insurance Company: [employer] - Phone [phone] Fax [phone]  Denial Date: 1/16/2024  Denial Reason(s):         Appeal Information:         Patient Notified: NO

## 2024-01-23 DIAGNOSIS — I10 HYPERTENSION GOAL BP (BLOOD PRESSURE) < 140/90: ICD-10-CM

## 2024-01-23 DIAGNOSIS — Z00.00 ROUTINE GENERAL MEDICAL EXAMINATION AT A HEALTH CARE FACILITY: ICD-10-CM

## 2024-01-23 DIAGNOSIS — E55.9 VITAMIN D DEFICIENCY: ICD-10-CM

## 2024-01-23 DIAGNOSIS — F10.230 ALCOHOL DEPENDENCE WITH UNCOMPLICATED WITHDRAWAL (H): ICD-10-CM

## 2024-01-23 DIAGNOSIS — D50.8 IRON DEFICIENCY ANEMIA SECONDARY TO INADEQUATE DIETARY IRON INTAKE: ICD-10-CM

## 2024-01-23 DIAGNOSIS — K29.21 ALCOHOLIC GASTRITIS WITH HEMORRHAGE, UNSPECIFIED CHRONICITY: ICD-10-CM

## 2024-01-23 DIAGNOSIS — F10.29 ALCOHOL DEPENDENCE WITH UNSPECIFIED ALCOHOL-INDUCED DISORDER (H): ICD-10-CM

## 2024-01-23 DIAGNOSIS — J43.2 CENTRILOBULAR EMPHYSEMA (H): ICD-10-CM

## 2024-01-23 DIAGNOSIS — E66.9 OBESITY WITH SERIOUS COMORBIDITY, UNSPECIFIED CLASSIFICATION, UNSPECIFIED OBESITY TYPE: ICD-10-CM

## 2024-01-23 DIAGNOSIS — I10 ESSENTIAL HYPERTENSION WITH GOAL BLOOD PRESSURE LESS THAN 140/90: ICD-10-CM

## 2024-01-24 RX ORDER — FERROUS SULFATE 325(65) MG
325 TABLET ORAL 2 TIMES DAILY
Qty: 60 TABLET | Refills: 10 | Status: SHIPPED | OUTPATIENT
Start: 2024-01-24

## 2024-01-24 RX ORDER — AZITHROMYCIN 250 MG/1
TABLET, FILM COATED ORAL
Qty: 13 TABLET | Refills: 10 | Status: SHIPPED | OUTPATIENT
Start: 2024-01-24

## 2024-01-24 RX ORDER — ASPIRIN 81 MG/1
TABLET, COATED ORAL
Qty: 30 TABLET | Refills: 10 | Status: SHIPPED | OUTPATIENT
Start: 2024-01-24

## 2024-01-24 RX ORDER — POTASSIUM CHLORIDE 750 MG/1
TABLET, EXTENDED RELEASE ORAL
Qty: 30 TABLET | Refills: 10 | Status: SHIPPED | OUTPATIENT
Start: 2024-01-24 | End: 2024-04-02

## 2024-01-24 NOTE — TELEPHONE ENCOUNTER
azithromycin (ZITHROMAX) 250 MG tablet Take 1 tablet (250 mg) by mouth Every Mon, Wed, Fri Morning 72 tablet 3 ordered 03/01/2023 -- -- -- --           Last Office Visit: 9/18/23  Future Office visit:   9/16/24    Routing refill request to provider for review/approval because:  Drug not on the G, P or [facility] refill protocol or controlled substance

## 2024-01-25 RX ORDER — LABETALOL 100 MG/1
TABLET, FILM COATED ORAL
Qty: 60 TABLET | Refills: 5 | Status: SHIPPED | OUTPATIENT
Start: 2024-01-25 | End: 2024-08-13

## 2024-01-25 RX ORDER — MAGNESIUM OXIDE 400 MG/1
TABLET ORAL
Qty: 30 TABLET | Refills: 10 | Status: SHIPPED | OUTPATIENT
Start: 2024-01-25

## 2024-01-25 RX ORDER — FOLIC ACID 1 MG/1
TABLET ORAL
Qty: 30 TABLET | Refills: 10 | Status: SHIPPED | OUTPATIENT
Start: 2024-01-25

## 2024-01-25 RX ORDER — CHOLECALCIFEROL (VITAMIN D3) 50 MCG
TABLET ORAL
Qty: 30 TABLET | Refills: 10 | Status: SHIPPED | OUTPATIENT
Start: 2024-01-25

## 2024-01-25 RX ORDER — CALCIUM CARBONATE 500(1250)
TABLET ORAL
Qty: 30 TABLET | Refills: 10 | Status: SHIPPED | OUTPATIENT
Start: 2024-01-25 | End: 2024-04-02

## 2024-01-26 NOTE — TELEPHONE ENCOUNTER
Reason for Call:  Other     Detailed comments: Patient said that she will have Medicare coverage next month and they will only cover the Semaglutide (ozempic) 2 mg/3 ml     Phone Number Patient can be reached at: Home number on file [phone] (home)    Best Time:     Can we leave a detailed message on this number? YES    Call taken on 1/26/2024 at 10:47 AM by Marquita Garcia

## 2024-01-29 DIAGNOSIS — F33.0 MAJOR DEPRESSIVE DISORDER, RECURRENT EPISODE, MILD (H): ICD-10-CM

## 2024-01-29 NOTE — TELEPHONE ENCOUNTER
Pt is calling to request a refill of Vraylar to go to [facility]'s pharmacy.    Marcy Kat RN

## 2024-01-30 NOTE — TELEPHONE ENCOUNTER
"What dose is she currently on?   I called patient, she is on the Wegovy 2.4 mg weekly.   Says she wants to go back to [facility] as the Wegovy is \"doing nothing\", plus insurance will cover this in a month.    Errol'd Rx, provider needs to address the dosing.    Routed to Min Toledo to address, send, pharmacy pended.    Belen DEJESUS RN  [facility] Triage    "

## 2024-02-01 NOTE — TELEPHONE ENCOUNTER
Received call from patient. She is calling again to request Ozempic rx be sent to [facility] in [place].     YAMILE Oreilly RN  [facility]

## 2024-02-15 ENCOUNTER — TELEPHONE (OUTPATIENT)
Dept: RESEARCH | Facility: CLINIC | Age: 59
End: 2024-02-15
Payer: COMMERCIAL

## 2024-02-15 NOTE — TELEPHONE ENCOUNTER
"    Bondi Appointment Reminder Note    Study Description: This study is designed to assess performance of an investigational Software-based hearing test against traditional Reference pure tone audiometry (PTA) as measured by median absolute deviation (MAD) in participants across a range of hearing ability.    Inga Ledesma a 58 year old female, was called today to discuss participation in the Bondi study. The following was reviewed with the participant.         Reminders  Please come 10 minutes early for your scheduled appointment time.  You will see the urgent care  when walking into the clinic and please go to your right near optometry entrance.  You may take a seat by the \"[employer] waiting sign\" and a staff member will come out to greet you.  Parking is free.  Appointment should last approximately 3 hours, you may be waiting for periods of time during appointment.   Please bring entertainment such as a book or phone and a snack/drink.  Feel free to eat before your arrival.  Wear comfortable clothing  Wear your hearing aids if applicable    Any questions call [phone].    Address to [facility]  [address] [place] [address]    Left voicemail regarding appointment for 2/19. Left call back number for any further questions.        Ruba Chi RN    "

## 2024-02-19 ENCOUNTER — ALLIED HEALTH/NURSE VISIT (OUTPATIENT)
Dept: RESEARCH | Facility: CLINIC | Age: 59
End: 2024-02-19
Payer: COMMERCIAL

## 2024-02-19 ENCOUNTER — OFFICE VISIT (OUTPATIENT)
Dept: AUDIOLOGY | Facility: CLINIC | Age: 59
End: 2024-02-19
Payer: COMMERCIAL

## 2024-02-19 VITALS
HEIGHT: 60 IN | HEART RATE: 80 BPM | WEIGHT: 167.11 LBS | DIASTOLIC BLOOD PRESSURE: 78 MMHG | TEMPERATURE: 97.1 F | RESPIRATION RATE: 18 BRPM | BODY MASS INDEX: 32.81 KG/M2 | OXYGEN SATURATION: 95 % | SYSTOLIC BLOOD PRESSURE: 120 MMHG

## 2024-02-19 DIAGNOSIS — H90.3 SENSORY HEARING LOSS, BILATERAL: Primary | ICD-10-CM

## 2024-02-19 DIAGNOSIS — Z00.6 EXAMINATION OF PARTICIPANT OR CONTROL IN CLINICAL RESEARCH: Primary | ICD-10-CM

## 2024-02-19 PROCEDURE — 99207 PR NO CHARGE NURSE ONLY: CPT

## 2024-02-19 PROCEDURE — 92567 TYMPANOMETRY: CPT | Performed by: AUDIOLOGIST

## 2024-02-19 PROCEDURE — 92553 AUDIOMETRY AIR & BONE: CPT | Performed by: AUDIOLOGIST

## 2024-02-19 PROCEDURE — 92551 PURE TONE HEARING TEST AIR: CPT | Performed by: AUDIOLOGIST

## 2024-02-19 NOTE — PROGRESS NOTES
Bondi Study End Note    Study Description: This study is designed to assess performance of an investigational Software-based hearing test against traditional Reference pure tone audiometry (PTA) as measured by median absolute deviation (MAD) in participants across a range of hearing ability.          Did they Complete the study? Yes  If no, reason for early discontinuation: N/A    Date of Completion: 19-FEB-2024    Study Exit Visit: Testing Visit     Were any Adverse Events (AEs) experienced?  No  Were there any Protocol Deviations? No     19-FEB-2024    Sara Behmanesh   Clinical Research Coordinator

## 2024-02-19 NOTE — PROGRESS NOTES
Bondi Study Clinic Visit Note          Inga Ledesma a 58 year old female, was seen at the  Audiology Clinic today to discuss participation in the Bondi study.     Screening Air Conduction , Tympanometry, Bone Conduction, and Testing Air Conduction were performed during this visit.     Subject Status: Enrolled and Randomized    Study data was captured on the audiogram and then entered into EDC later by a Research Coordinator. Audiogram can be found in patient's media.     No study procedures were done prior to Inga Ledesma providing informed consent.     All subject questions were answered and they voiced understanding.     Perri San, AuD  02/19/24

## 2024-02-19 NOTE — PROGRESS NOTES
"         Bondi Screening Note    Study Description: This study is designed to assess performance of an investigational Software-based hearing test against traditional Reference pure tone audiometry (PTA) as measured by median absolute deviation (MAD) in participants across a range of hearing ability.           Subject ID: [id number]      SCREENING     Demographic Info  Inga Ledesma   1965          58 year old  female    Race: White  Ethnicity: Not  or      Medical History:  Has the subject experienced any past and/or concomitant Medical History in the following categories: No  -Ear/Nose/Throat  -Dementia or other neurological condition preventing following instructions    Medications  Has the subject taken either of these medications in the last 6 months?   Parenteral Aminoglycoside Antibiotics: No   (ex. gentamicin, amikacin, tobramycin, neomycin, streptomycin and others)  Chemotherapy and/or radiation to Head and/or Neck: No    Vitals:  Time Subject Sat: 1:34 PM    /78 (BP Location: Other (Comment), Patient Position: Left side, Cuff Size: Adult Regular)   Pulse 80   Temp 97.1  F (36.2  C)   Resp 18   Ht 1.53 m (5' 0.24\")   Wt 75.8 kg (167 lb 1.7 oz)   LMP 06/02/2010   SpO2 95%   BMI 32.38 kg/m       Please see provider Physical Exam note for otoscopy and cerumen removal (when applicable) documentation.     Screening Pure Tone Audiometry (PTA)   Test Performed? Yes  Transducer Type: Over the Ear () SN#:[id number]    Please see audiogram for air conduction values and accompanying document for tabular view of data.   PTA #1:   Did the participant pass 1000 Hz QC Check in both ears? Yes     Number of Retests Performed due to 1 kHz Deviation? 0     4PTA Value (dB)   Left Ear 31.00   Right Ear 42.00       Better Ear Binning Category   left Mild (26 - 40 dB HL)       Bone Conduction    Test Performed? Yes    Please see audiogram for bone conduction values and accompanying document " for tabular view of data.    Tympanometry    Test Performed? Yes    Please see audiogram for tympanometry types.    Were any Adverse Events (AEs) experienced?  No  Were there any Protocol Deviations? No    This completes the participant's screening visit. They will continue in the Bondi Study.       19-FEB-2024  Sara Behmanesh   Clinical Research Coordinator

## 2024-02-19 NOTE — PROGRESS NOTES
[facility] Testing Visit Note    Study Description: This study is designed to assess performance of an investigational Software-based hearing test against traditional Reference pure tone audiometry (PTA) as measured by median absolute deviation (MAD) in participants across a range of hearing ability.           Subject ID: [id number]      Entered into [employer]? Yes    Randomization Order: REF_SW    Device IDs     Kit ID: [id number]    Phone: [id number]   Charging Case: [id number]  Audiostar Serial Number: [id number]  Headset Serial Number: Over the Ear () SN#:[id number]    Were there any device issues? No      Pure Tone Audiometry (PTA)   Test Performed? Yes    Please see audiogram for air conduction values and accompanying document for other data.     Did the participant pass 1000 Hz QC Check? Yes     Number of Retests Performed due to 1 kHz Deviation? 0     4PTA Value (dB)   Left Ear 32.00   Right Ear 42.00       Better Ear Binning Category   left Mild (26 - 40 dB HL)       Software-Hearing Test    Test Completed? Yes    Left Ear Final Tip Size: M  Right Ear Final Tip Size: M    Number of SW RETESTS: 0     Were any Adverse Events (AEs) experienced?  No  Were there any Protocol Deviations? No    19-FEB-2024  Sara Behmanesh   Clinical Research Coordinator

## 2024-02-19 NOTE — PROGRESS NOTES
Natalia Inclusion/Exclusion Criteria:    Study Name: Natalia    : Toño Palumbo MD      Study Description: This study is designed to assess performance of an investigational Software-based hearing test against traditional Reference pure tone audiometry (PTA) as measured by median absolute deviation (MAD) in participants across a range of hearing ability.     Protocol Version: 3.0 (09-JAN-2024)  Consent Version: 1.0 (07-NOV-2023)    Criteria #  Inclusion Criteria (ALL MUST BE YES)  YES/NO/N/A   1  Age > 18 years  Yes   2  Proficient in written and spoken English, defined by self report Yes   3  Hearing loss < 85 dbHL at all of the following frequencies 250, 500, 1000, 2000, 3000, 4000, 6000, and 8000 Hz in at least one ear at the time of screening Yes             Criteria # Exclusion Criteria (ALL MUST BE NO) YES/NO/N/A   1  Ear anatomy non-conducive to appropriate wear of headphones  No   2  Active ear disease defined as current infection, swelling, and/or fluid in the ear canal No   3  Cerumen impaction that cannot be removed    No   4  Sudden loss of hearing (in the preceding 90 days of Screening reference PTA or Reference PTA/Software-based hearing test assessed at Testing Visit), defined by self-report No   5 Loud environmental sound exposure without hearing protection (e.g., concert, construction site, fireworks) defined by self-report within 72 hours of Screening reference PTA assessed or Reference PTA/Software-based hearing test assessed at Testing Visit No   6    Tinnitus that impacts one's daily life, defined by self-report No   7  Ear barotrauma that impacts one's hearing (e.g., muffled, clogged, dampened hearing), defined by self-report No   8  Use of cochlear implants No   9  Air-opposite-bone-gap > 40 dbHL at any of the following frequencies: 500, 1000, 2000, 4000 Hz  When analyzing the left ear, the equation is: AC_Left - BC_Right > 40 dB  When analyzing the right ear, the equation  is:AC_Right - BC_Left > 40 dB  *AC = Air conduction      BC = Bone conduction No   10  1kHz repeat measurement deviating from the original 1kHz measurement by > 10 dbHL in > 2 screening reference PTA tests   No   11  Self-reported issues with small or confined spaces such as single-person enclosed nielsen, and/or claustrophobia No   12  Health technology, audiology, fitness, media outlet employees (or spouse of employees) or employees of /sites contracted to execute this study  No   13    Active treatment, or treatment in the past 6 months, with either a chemotherapeutic drug for cancer, or radiation therapy to the head or neck region No   14    Active treatment, or treatment in the past 6 months, with parenteral aminoglycoside antibiotics  No   15    Per Investigator's discretion, not eligible to enroll or unable to adhere to study procedures No     Patient does fulfill study inclusion criteria and no exclusion criteria are found at this time.      Toño Palumbo MD    19-FEB-2024    Sara Behmanesh   Clinical Research Coordinator

## 2024-02-19 NOTE — PROGRESS NOTES
"   Bondi Study Physical Exam      Medical History Reviewed? Yes  (Parenteral aminoglycoside antibiotics: gentamicin, amikacin, tobramycin, neomycin, and streptomycin, ect )    Do you have tinnitus (ringing in the ears) that impacts your daily life? No  Do you have muffled, clogged, dampened hearing or feel fullness in your ears? No    Physical Examination  For abnormal findings, please evaluate if the finding is Clinically Significant (by 'CS') or Not Clinically Significant (by 'NCS')  General Appearance   Abnormal; NCS sitting in motorized wheelchair  Head and Neck   Abnormal; NCS wears bilateral hearing aids; wears glasses  Lungs     Normal  Cardiovascular   Normal  Abdomen    Abnormal; NCS obese  Lymph Nodes   Normal  Skin     Abnormal; NCS multiple bruises bilateral hands and forearms at previous IV sites due to recent hospitalization; multiple tattoos on bilateral arms; bilateral 2+ lower extremity edema  Neurological    Normal      Vitals:    02/19/24 1319   BP: 120/78   BP Location: Other (Comment)   Patient Position: Left side   Cuff Size: Adult Regular   Pulse: 80   Resp: 18   Temp: 97.1  F (36.2  C)   SpO2: 95%   Weight: 75.8 kg (167 lb 1.7 oz)   Height: 1.53 m (5' 0.24\")              Otoscopy    Left Ear Right Ear   Ear Canal  Normal Normal   Abnormal Findings Description  N/A N/A   Finding of Other Description N/A N/A     Was cerumen removal required? No    19-FEB-2024    Mahi Nam PA-C        "

## 2024-02-19 NOTE — PROGRESS NOTES
Bondi Study Consent Note    Study Description: This study is designed to assess performance of an investigational Software-based hearing test against traditional Reference pure tone audiometry (PTA) as measured by median absolute deviation (MAD) in participants across a range of hearing ability.    Inga Ledesma a 58 year old female, was on-site  today to discuss participation in the Bondi Study.       The consent form was reviewed with the patient.     The review of the study included:  Study Purpose    Participant Responsibilities    Study Data and Devices    Benefits and Risks of Participation    Compensation and Costs of Participation    Voluntary Participation    Confidentiality    Injury and Legal Rights      Protocol Version: 3.0     Subject ID: [id number]    The participant was queried in regards to her willingness to continue and her questions were answered to her satisfaction.     The patient has given her agreement to volunteer and participate in the above noted study.     The eConsent and HIPAA form version 1.0 (07-NOV-2023) was signed on  19-FEB-2024 with a Clinical Research Coordinator from the [facility].     A copy of the Bondi consent will be placed in participant's medical record. A copy of the consent form was provided to them today.    Study data is directly entered into [employer] and then [facility] per protocol.     No study procedures were done prior to Inga Ledesma providing informed consent.       19-FEB-2024    Sara Behmanesh   Clinical Research Coordinator

## 2024-02-21 ENCOUNTER — ALLIED HEALTH/NURSE VISIT (OUTPATIENT)
Dept: RESEARCH | Facility: CLINIC | Age: 59
End: 2024-02-21
Payer: COMMERCIAL

## 2024-02-21 DIAGNOSIS — Z00.6 EXAMINATION OF PARTICIPANT OR CONTROL IN CLINICAL RESEARCH: Primary | ICD-10-CM

## 2024-02-21 PROCEDURE — 99207 PR NO CHARGE NURSE ONLY: CPT

## 2024-02-21 NOTE — TELEPHONE ENCOUNTER
Pt calling regarding PA for Zaleplon.   RN advised per below that the requested amount of Zaleplon is greater than the quantity limit for the drug and that insurance will cover 1 capsule per day for this use. The higher number of 2 capsules per day is not approved dose for your health issues.   Pt states 1 capsule was not helping. Has been out of medication for a month. Pt is wondering what can be done to get 2 approved?     RN noticed per below that in order to get the higher qty approved medical support must be sent in. Routing to team to assist with this.     Pt is switching insurance and states she was switched from Wegovy to Ozempic. Pt is requesting a prescription for Moujoro instead of Ozempic.     Pt states she is needing a hospital follow up appt. Pt declined in person appt. Assisted with scheduling telephone visit with PCP for tomorrow per pt's request.    Routing to Provider to advise.     Nadiya Rogers RN  [facility]

## 2024-02-22 ENCOUNTER — TELEPHONE (OUTPATIENT)
Dept: FAMILY MEDICINE | Facility: CLINIC | Age: 59
End: 2024-02-22

## 2024-02-22 ENCOUNTER — VIRTUAL VISIT (OUTPATIENT)
Dept: FAMILY MEDICINE | Facility: CLINIC | Age: 59
End: 2024-02-22
Payer: COMMERCIAL

## 2024-02-22 DIAGNOSIS — T50.905A DRUG-INDUCED NAUSEA AND VOMITING: ICD-10-CM

## 2024-02-22 DIAGNOSIS — Z09 HOSPITAL DISCHARGE FOLLOW-UP: Primary | ICD-10-CM

## 2024-02-22 DIAGNOSIS — E66.01 MORBID OBESITY (H): ICD-10-CM

## 2024-02-22 DIAGNOSIS — F51.04 PSYCHOPHYSIOLOGICAL INSOMNIA: ICD-10-CM

## 2024-02-22 DIAGNOSIS — R68.82 DECREASED LIBIDO: ICD-10-CM

## 2024-02-22 DIAGNOSIS — R11.2 DRUG-INDUCED NAUSEA AND VOMITING: ICD-10-CM

## 2024-02-22 DIAGNOSIS — F10.20 ALCOHOLISM (H): ICD-10-CM

## 2024-02-22 PROCEDURE — 99442 PR PHYSICIAN TELEPHONE EVALUATION 11-20 MIN: CPT | Mod: 93 | Performed by: PHYSICIAN ASSISTANT

## 2024-02-22 RX ORDER — FLIBANSERIN 100 MG/1
1 TABLET, FILM COATED ORAL AT BEDTIME
Qty: 30 TABLET | Refills: 11 | Status: SHIPPED | OUTPATIENT
Start: 2024-02-22 | End: 2024-04-02

## 2024-02-22 RX ORDER — ZALEPLON 10 MG/1
10 CAPSULE ORAL AT BEDTIME
Qty: 30 CAPSULE | Refills: 0 | Status: SHIPPED | OUTPATIENT
Start: 2024-02-22 | End: 2024-03-22

## 2024-02-22 RX ORDER — ONDANSETRON 4 MG/1
4 TABLET, ORALLY DISINTEGRATING ORAL EVERY 8 HOURS PRN
Qty: 30 TABLET | Refills: 3 | Status: SHIPPED | OUTPATIENT
Start: 2024-02-22

## 2024-02-22 NOTE — TELEPHONE ENCOUNTER
History & Physical        Patient:  Danay Rosales  YOB: 1948    MRN: [id number]     Acct: [de-identified]    PCP: Lily Silva MD    Date of Admission: 5/13/2019    Date of Service: Pt seen/examined on 5/13/2019 and Placed in Observation. Chief Complaint:    Chief Complaint   Patient presents with    Other     PCP told to come in after imaging today         History Of Present Illness:      72 y.o. male who presented to [facility] with \"presents to the Emergency Department for an evaluation. Patient had an MRI done at 1230. Results were called to Dr. Hermann Nunez. Patient was sent back to have a CT scan done. He states he completed that and was advised to come into the ED for further work up. Patient is seen by Dr. Hermann Nunez for stage IV lung cancer. He is currently undergoing chemotherapy and will begin another round on 5/20/19. He reports that he has bilateral posterior calf pain and had an US completed around 4/27/19 which was negative for DVT. Patient states he was told he may need to be placed on anticoagulants and possible admission. He denies fever, chills, headache, weakness, vision changes, speech changes, emesis, shortness of breath, or chest pain. \"-per er note and confirmed by myself    Addendum: patient denies any neuro/physical symptoms at this time.         Past Medical History:          Diagnosis Date    Arthritis     COPD (chronic obstructive pulmonary disease) (HCC)     Gastric reflux     Hyperlipidemia     Hypertension        Past Surgical History:          Procedure Laterality Date    COLONOSCOPY      HERNIA REPAIR  80's    bilateral inguinal    KIDNEY SURGERY      stent placement    MA [facility] INCL FLUOR GDNCE DX W/CELL WASHG SPX N/A 9/7/2018    BRONCHOSCOPY FLUOROSCOPY performed by Cely Villalta MD at [facility]    MA OFFICE/OUTPT [address] [facility] Right 9/5/2018    RIGHT INGUINAL HERNIA REPAIR performed by Magdiel Augustin MD at [address] [place] [address] Prior Authorization Retail Medication Request    Medication/Dose: tirzepatide-Weight Management (ZEPBOUND) 2.5 MG/0.5ML prefilled pen  Diagnosis and ICD code (if different than what is on RX):  E66.01   New/renewal/insurance change PA/secondary ins. PA:  Previously Tried and Failed:  na  Rationale:  na    Insurance   Primary:  [facility]  Insurance ID:  [id number]     Secondary (if applicable):na  Insurance ID:  everardo    Pharmacy Information (if different than what is on RX)  [facility]  Phone:  [phone]  Fax:     [phone]     DC OFFICE/OUTPT VISIT,PROCEDURE ONLY N/A 9/11/2018    EXPLORATORY LAPAROSCOPY  OPEN PROCEDURE, SMALL BOWEL RESECTION performed by Sasha Machado MD at [facility]       Medications Prior to Admission:      Prior to Admission medications    Medication Sig Start Date End Date Taking? Authorizing Provider   tamsulosin (FLOMAX) 0.4 MG capsule Take 1 capsule by mouth daily 3/25/19  Yes Dylan Thomas MD   ipratropium-albuterol (DUONEB) 0.5-2.5 (3) MG/3ML SOLN nebulizer solution Inhale 3 mLs into the lungs every 4 hours 2/20/19  Yes CARLOS Thomas CNP   busPIRone (BUSPAR) 10 MG tablet Take 10 mg by mouth 2 times daily   Yes Historical Provider, MD   Ipratropium Bromide (ATROVENT IN) Inhale into the lungs 2 times daily    Yes Historical Provider, MD   budesonide-formoterol (SYMBICORT) 160-4.5 MCG/ACT AERO Inhale 2 puffs into the lungs 2 times daily. Yes Historical Provider, MD   terazosin (HYTRIN) 2 MG capsule Take 2 mg by mouth nightly. Yes Historical Provider, MD   omeprazole (PRILOSEC) 20 MG capsule Take 40 mg by mouth daily    Yes Historical Provider, MD   ranitidine (ZANTAC) 150 MG tablet Take 150 mg by mouth every morning. Yes Historical Provider, MD   atorvastatin (LIPITOR) 80 MG tablet Take 80 mg by mouth daily    Yes Historical Provider, MD   ondansetron (ZOFRAN) 4 MG tablet Take 1 tablet by mouth every 8 hours as needed for Nausea or Vomiting 1/26/19   Emily Bergeron MD   albuterol sulfate HFA (VENTOLIN HFA) 108 (90 Base) MCG/ACT inhaler Inhale 2 puffs into the lungs every 4 hours as needed for Wheezing or Shortness of Breath 1/24/19 1/24/20  CARLOS Cedillo CNP   acetaminophen (TYLENOL) 325 MG tablet Take 2 tablets by mouth every 4 hours as needed for Pain 9/17/18   Hayden Dozier MD       Allergies:  Lisinopril    Social History:      The patient currently lives home    TOBACCO:   reports that he quit smoking about 3 months ago. His smoking use included cigarettes.  He started smoking about 50 years ago. He has a 100.00 pack-year smoking history. He has never used smokeless tobacco.  ETOH:   reports that he drank alcohol. Family History:       Reviewed in detail and negative for DM, CAD, Cancer, CVA. Positive as follows: Adopted: Yes       Diet:  No diet orders on file    REVIEW OF SYSTEMS:   Pertinent positives as noted in the HPI. All other systems reviewed and negative. PHYSICAL EXAM:    BP (!) 156/92   Pulse 82   Temp 98.8 °F (37.1 °C) (Oral)   Resp 16   Ht 5' 8\" (1.727 m)   Wt 150 lb (68 kg)   SpO2 99%   BMI 22.81 kg/m²     General appearance:  No apparent distress, appears stated age and cooperative. HEENT:  Normal cephalic, atraumatic without obvious deformity. Pupils equal, round, and reactive to light. Extra ocular muscles intact. Conjunctivae/corneas clear. Neck: Supple, with full range of motion. no jugular venous distention. Trachea midline. no carotid bruits  Respiratory:  Normal respiratory effort. Clear to auscultation, bilaterally without Rales/Wheezes/Rhonchi. Breath sounds equal bilaterally  Cardiovascular:  Regular rate and rhythm with normal S1/S2 without murmurs, rubs or gallops. PMI non displaced  Abdomen: Soft, non-tender, non-distended with normal bowel sounds. No guarding, rebound. Musculoskeletal:  No clubbing, cyanosis or edema bilaterally. Full range of motion without deformity. Skin: Skin color, texture, turgor normal.  No rashes or lesions, or suspicious lesions. Neurologic:  Neurovascularly intact without any focal sensory/motor deficits.  Cranial nerves: II-XII intact, grossly non-focal.  Psychiatric:  Alert and oriented, thought content appropriate, normal insight  Capillary Refill: Brisk,< 2 seconds   Peripheral Pulses: +2 palpable, equal bilaterally upper and lower extremities  Lymphatics: no lymphadenopathy      Labs:     Recent Labs     05/13/19  1526   WBC 3.0*   HGB 11.9*   HCT 34.0*        Recent Labs     05/13/19  1526      K separate same day exam. Bones Large sclerotic lesion involving the T8 vertebral body. This is new since the prior exam. Almost the entire L1 vertebral body is sclerotic. 1. Dramatic improvement in the appearance of mediastinal adenopathy with almost complete resolution. Details in the above report 2. 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 It may contain minor errors which are inherent in voice recognition technology. ** Final report electronically signed by Dr. Lavon Baca on 5/13/2019 3:57 PM    Ct Abdomen Pelvis W Iv Contrast Additional Contrast? None    Result Date: 5/13/2019  PROCEDURE: CT ABDOMEN PELVIS W IV CONTRAST CLINICAL INFORMATION: Pain and swelling of right lower leg, Pain and swelling of right lower leg, Small cell carcinoma ([address] Encounter for chemotherapy management . COMPARISON: 1/25/2019 TECHNIQUE: 2-D multiplanar post contrast images of the abdomen and pelvis All CT scans at this facility use dose modulation, iterative reconstruction, and/or weight-based dosing when appropriate to reduce radiation dose to as low as reasonably achievable. FINDINGS: Lung bases This is reported on the dedicated same day exam. Abdomen pelvis: Low-density lesion in the right lobe of the liver, is slightly decreased compared the prior exam now measuring 5.9 mm compared to 9.5 on the previous left lobe lesion, is now very subtle and measures 9.8 mm previously 21 mm. Spleen is normal. Pancreas is normal. Gallbladder is distended. Marked decrease in size of the left adrenal gland with a nodular appearance of the main body of the adrenal measuring 5.9 mm. Right hydronephrosis which is markedly decreased compared to the prior exam. Several fluid distended loops of small bowel in the midabdomen. Transition appears to be at the postsurgical site in the mid lower abdomen image 40.  There is no bowel obstruction there is marked diverticulosis. Interval resolution of previously seen left iliac chain adenopathy stable right inguinal lymphadenopathy node measuring 2.4 x 1.6 cm Contrast in the urinary bladder. Bones Numerous osteoblastic lesions throughout the lower lumbar spine and pelvis. L1 posterior elements of L2 most of L4 left iliac wing portions of the sacrum and most of the right iliac wing and right acetabulum. 1. Improvement in previously identified liver lesions and pelvic and inguinal lymphadenopathy with a stable right inguinal lymph node. 2. Interval development of multiple osteoblastic lesions in the lumbar spine and pelvis compatible with osteoblastic metastases. **This report has been created using voice recognition software. It may contain minor errors which are inherent in voice recognition technology. ** Final report electronically signed by Dr. Nitza Cao on 5/13/2019 4:17 PM     Dup Lower Extremity Venous Right    Result Date: 4/15/2019  PROCEDURE:  DUP LOWER EXTREMITY VENOUS RIGHT CLINICAL INFORMATION: Right leg pain and swelling TECHNIQUE: High-resolution duplex ultrasound of the right lower extremity was performed. The common femoral, femoral, popliteal and calf vein segments were studied to the ankle. COMPARISON: None FINDINGS: There is normal compressibility with no evidence of thrombus. Flow study shows normal color flow, augmentation and phasic response. No evidence of deep venous thrombosis in the right lower extremity. Final report electronically signed by Dr. Kateryna Silva on 4/15/2019 10:57 AM    Mri Brain W Wo Contrast    Result Date: 5/13/2019  PROCEDURE: MRI BRAIN W WO CONTRAST INDICATION:Pain and swelling of right lower leg, Pain and swelling of right lower leg, Small cell carcinoma ([address] Encounter for chemotherapy management. Numbness and tingling in both hands. Staging for lung cancer. COMPARISON: CT head dated 1/24/2019.  TECHNIQUE: Multiplanar and multiple spin echo T1 and T2-weighted images were obtained through the brain before and after the administration of intravenous contrast. 15 mL ProHance was injected in the right AC. FINDINGS: There is mild to moderate temporal parietal predominant volume loss. There are multiple punctate to small foci of restricted diffusion scattered throughout both hemispheres of the supratentorial brain  There is also a punctate focus of restricted diffusion in the left cerebellar hemisphere. There are associated focal areas of T2/FLAIR elongation. There are mild scattered focal areas of T2/FLAIR prolongation elsewhere in the subcortical deep white matter without corresponding areas of restricted diffusion. Following contrast administration, there are no focal areas of abnormal parenchymal or meningeal enhancement identified. None of the foci of restricted diffusion enhance. The major vascular flow voids appear patent. Orbits are unremarkable. Paranasal sinuses and mastoid air cells are clear. 1. Scattered punctate foci of acute infarcts throughout the brain as evidence for embolic phenomenon. 2. No evidence of metastatic lesions to the brain. Findings were discussed with Dr. Shala Waldron via telephone at the time of interpretation. **This report has been created using voice recognition software. It may contain minor errors which are inherent in voice recognition technology. ** Final report electronically signed by Dr. Nghia Miller MD on 5/13/2019 2:11 PM           DVT prophylaxis: [x] Lovenox                                 [] SCDs                                 [] SQ Heparin                                 [] Encourage ambulation           [] Already on Anticoagulation    Code Status: Prior      PT/OT Eval Status:     Disposition:    [x] Home       [] TCU       [] Rehab       [] Psych       [] SNF       [] [place]       [] Other-    ASSESSMENT:    Active Hospital Problems    Diagnosis Date Noted    Embolic

## 2024-02-22 NOTE — PROGRESS NOTES
Cody is a 58 year old who is being evaluated via a billable telephone visit.      What phone number would you like to be contacted at? [phone]  How would you like to obtain your AVS? Abelt    Distant Location (provider location):  On-site      Subjective   Cody is a 58 year old, presenting for the following health issues:  Hospital F/U ([facility]/2/10/24 0 2/14/24/Alcohol intoxication)      2/22/2024     7:59 AM   Additional Questions   Roomed by Ellen Arevalo CMA   Accompanied by None         2/22/2024     7:59 AM   Patient Reported Additional Medications   Patient reports taking the following new medications none     HPI       Hospital Follow-up Visit:    Hospital/Nursing Home/IP Rehab Facility:  [facility]  Date of Admission: 2/10/24  Date of Discharge: 2/14/24  Reason(s) for Admission: Alcohol  intoxication    Was your hospitalization related to COVID-19? No   Problems taking medications regularly:  None  Medication changes since discharge: None  Problems adhering to non-medication therapy:  None    Summary of hospitalization:  CareEverywhere information obtained and reviewed    [facility] to a place in [facility] ([facility] living[facility]  Sober x 12 days now.  Reviewed lab and test results.   Low sex drive.      Review of Systems  Constitutional, neuro, ENT, endocrine, pulmonary, cardiac, gastrointestinal, genitourinary, musculoskeletal, integument and psychiatric systems are negative, except as otherwise noted.      Objective           Vitals:  No vitals were obtained today due to virtual visit.    Physical Exam   General: Alert and no distress //Respiratory: No audible wheeze, cough, or shortness of breath // Psychiatric:  Appropriate affect, tone, and pace of words      Coyd was seen today for hospital f/u.    Diagnoses and all orders for this visit:    Hospital discharge follow-up    Psychophysiological insomnia  -     zaleplon (SONATA) 10 MG capsule; Take 1 capsule (10 mg) by mouth at bedtime  -      Adult Mental Health  Referral; Future    Alcoholism (H)    Drug-induced nausea and vomiting  -     ondansetron (ZOFRAN ODT) 4 MG ODT tab; Take 1 tablet (4 mg) by mouth every 8 hours as needed for nausea    Morbid obesity (H)  -     tirzepatide-Weight Management (ZEPBOUND) 2.5 MG/0.5ML prefilled pen; Inject 0.5 mLs (2.5 mg) Subcutaneous every 7 days    Decreased libido  -     Flibanserin (ADDYI) 100 MG TABS; Take 1 tablet by mouth at bedtime      Trial of addyi. Advised to remain sober.  Advised supportive and symptomatic treatment.  Follow up with Provider - if condition persists or worsens.         Phone call duration: 17 minutes  Signed Electronically by: Min Toledo PA-C

## 2024-03-04 NOTE — PROGRESS NOTES
Bondi Study Consent Addendum Note    Study Description: This study is designed to assess performance of an investigational Software-based hearing test against traditional Reference pure tone audiometry (PTA) as measured by median absolute deviation (MAD) in participants across a range of hearing ability.    Subject ID: [id number]    Previous study documentation stated the subject above was consented with consent form Version 1.0 with a version date of 07-NOV-2023. Upon review this is incorrect. Subject was consented with consent form Version 2.0 with a version date of 07-NOV-2023.  The subject was consented with the correct consent version, it was however documented incorrectly.     Additionally, the incorrect consent version was associated with the inclusion/exclusion criteria assessment. Once again, the subject's eligibility criteria were assessed by the contemporaneous version of the criteria but the version was incorrectly documented.     Please allow this note to reflect the updated information; the subject was consented with eConsent and HIPAA form version 2.0 (07-NOV-2023) at the date and time previously indicated.     No study procedures were done prior to Inga Ledesma providing informed consent.       4-MAR-2024    Melvi Romero

## 2024-03-07 NOTE — TELEPHONE ENCOUNTER
Prior Authorization Approval    Authorization Effective Date: 3/7/2024  Authorization Expiration Date: 9/7/2024  Medication: tirzepatide-Weight Management (ZEPBOUND) 2.5 MG/0.5ML prefilled pen-APPROVED  Reference #:     Insurance Company: [employer] - Phone [phone] Fax [phone]  Which Pharmacy is filling the prescription (Not needed for infusion/clinic administered): [id number] [address] AT [facility] OF  [address]  Pharmacy Notified: Yes  Patient Notified: Instructed pharmacy to notify patient when script is ready to /ship.

## 2024-03-07 NOTE — TELEPHONE ENCOUNTER
Retail Pharmacy Prior Authorization Team   Phone: [phone]    PA Initiation    Medication: ZEPBOUND 2.5 MG/0.5ML SC SOAJ  Insurance Company: [employer] - Phone [phone] Fax [phone]  Pharmacy Filling the Rx: [id number] - [facility] [place] - [address] AT [facility] OF  [address]  Filling Pharmacy Phone: [phone]  Filling Pharmacy Fax:    Start Date: 3/7/2024

## 2024-03-08 ENCOUNTER — MEDICAL CORRESPONDENCE (OUTPATIENT)
Dept: FAMILY MEDICINE | Facility: CLINIC | Age: 59
End: 2024-03-08
Payer: COMMERCIAL

## 2024-03-08 ENCOUNTER — TRANSFERRED RECORDS (OUTPATIENT)
Dept: HEALTH INFORMATION MANAGEMENT | Facility: CLINIC | Age: 59
End: 2024-03-08
Payer: COMMERCIAL

## 2024-03-08 ENCOUNTER — TELEPHONE (OUTPATIENT)
Dept: FAMILY MEDICINE | Facility: CLINIC | Age: 59
End: 2024-03-08
Payer: COMMERCIAL

## 2024-03-08 NOTE — TELEPHONE ENCOUNTER
Forms received from: [facility]    Phone number listed: [phone]   Fax listed: [phone]  Date received: 3/7/24  Form description: Accommodation to [facility] forms  Once forms are completed, please return to [facility]  via fax.  Is patient requesting to be contacted when forms are completed: na  Phone: na  Form placed:   Mahi Tobar

## 2024-03-12 NOTE — TELEPHONE ENCOUNTER
Patient calling to check status of forms. RN updated. No further questions.     Gema Toscano RN on 3/12/2024 at 9:56 AM

## 2024-03-13 ENCOUNTER — TRANSFERRED RECORDS (OUTPATIENT)
Dept: HEALTH INFORMATION MANAGEMENT | Facility: CLINIC | Age: 59
End: 2024-03-13
Payer: COMMERCIAL

## 2024-03-13 ENCOUNTER — DOCUMENTATION ONLY (OUTPATIENT)
Dept: OTHER | Facility: CLINIC | Age: 59
End: 2024-03-13
Payer: COMMERCIAL

## 2024-03-20 ENCOUNTER — TELEPHONE (OUTPATIENT)
Dept: FAMILY MEDICINE | Facility: CLINIC | Age: 59
End: 2024-03-20
Payer: COMMERCIAL

## 2024-03-20 NOTE — TELEPHONE ENCOUNTER
Patient calling states updated signed medication list needs to be faxed to [facility] [phone]. Patient needs this asap. She cannot move in there until they receive this.

## 2024-03-28 ENCOUNTER — TELEPHONE (OUTPATIENT)
Dept: FAMILY MEDICINE | Facility: CLINIC | Age: 59
End: 2024-03-28
Payer: COMMERCIAL

## 2024-03-28 DIAGNOSIS — E66.9 OBESITY WITH SERIOUS COMORBIDITY, UNSPECIFIED CLASSIFICATION, UNSPECIFIED OBESITY TYPE: Primary | ICD-10-CM

## 2024-03-28 NOTE — TELEPHONE ENCOUNTER
Patient is calling about getting Mounjaro script because her new insurance doesn't cover the other one.  Needs it sent to [facility] # [id number] in [place], [place].   Patient also called regarding this yesterday.    She needs this sent today.    Marah CHERRYN RN  Triage Nurse  [facility]

## 2024-03-29 RX ORDER — TIRZEPATIDE 2.5 MG/.5ML
2.5 INJECTION, SOLUTION SUBCUTANEOUS
Qty: 2 ML | Refills: 0 | Status: SHIPPED | OUTPATIENT
Start: 2024-03-29 | End: 2024-06-03

## 2024-03-29 NOTE — TELEPHONE ENCOUNTER
RN called and relayed message that script was sent. Patient verbalized understanding.     Patient asked about updated med list and lift chair. See TE on 3/22/24 for this.     Janis Guzman RN on 3/29/2024 at 10:20 AM

## 2024-04-01 ENCOUNTER — TELEPHONE (OUTPATIENT)
Dept: FAMILY MEDICINE | Facility: CLINIC | Age: 59
End: 2024-04-01

## 2024-04-01 NOTE — TELEPHONE ENCOUNTER
Prior Authorization Retail Medication Request    Medication/Dose: tirzepatide (MOUNJARO) 2.5 MG/0.5ML pen  Diagnosis and ICD code (if different than what is on RX):  E66.9   New/renewal/insurance change PA/secondary ins. PA:  Previously Tried and Failed:  na  Rationale:  na    Insurance   Primary:  [employer]  Insurance ID:  [id number]     Secondary (if applicable):everardo  Insurance ID:  na    Pharmacy Information (if different than what is on RX)  Name:   [facility]  Phone:  [phone]  Fax:   [phone]

## 2024-04-02 ENCOUNTER — VIRTUAL VISIT (OUTPATIENT)
Dept: FAMILY MEDICINE | Facility: CLINIC | Age: 59
End: 2024-04-02
Payer: COMMERCIAL

## 2024-04-02 ENCOUNTER — TRANSFERRED RECORDS (OUTPATIENT)
Dept: HEALTH INFORMATION MANAGEMENT | Facility: CLINIC | Age: 59
End: 2024-04-02

## 2024-04-02 ENCOUNTER — TELEPHONE (OUTPATIENT)
Dept: FAMILY MEDICINE | Facility: CLINIC | Age: 59
End: 2024-04-02

## 2024-04-02 ENCOUNTER — MEDICAL CORRESPONDENCE (OUTPATIENT)
Dept: HEALTH INFORMATION MANAGEMENT | Facility: CLINIC | Age: 59
End: 2024-04-02

## 2024-04-02 DIAGNOSIS — E87.6 HYPOKALEMIA: ICD-10-CM

## 2024-04-02 DIAGNOSIS — F33.0 MAJOR DEPRESSIVE DISORDER, RECURRENT EPISODE, MILD (H): ICD-10-CM

## 2024-04-02 DIAGNOSIS — G89.29 CHRONIC BILATERAL LOW BACK PAIN WITHOUT SCIATICA: ICD-10-CM

## 2024-04-02 DIAGNOSIS — R60.0 BILATERAL LEG EDEMA: ICD-10-CM

## 2024-04-02 DIAGNOSIS — E66.9 OBESITY WITH SERIOUS COMORBIDITY, UNSPECIFIED CLASSIFICATION, UNSPECIFIED OBESITY TYPE: Primary | ICD-10-CM

## 2024-04-02 DIAGNOSIS — F51.04 PSYCHOPHYSIOLOGICAL INSOMNIA: ICD-10-CM

## 2024-04-02 DIAGNOSIS — M81.0 AGE-RELATED OSTEOPOROSIS WITHOUT CURRENT PATHOLOGICAL FRACTURE: ICD-10-CM

## 2024-04-02 DIAGNOSIS — M54.50 CHRONIC BILATERAL LOW BACK PAIN WITHOUT SCIATICA: ICD-10-CM

## 2024-04-02 PROCEDURE — 99443 PR PHYSICIAN TELEPHONE EVALUATION 21-30 MIN: CPT | Mod: 93 | Performed by: PHYSICIAN ASSISTANT

## 2024-04-02 RX ORDER — ARIPIPRAZOLE 5 MG/1
5 TABLET ORAL AT BEDTIME
COMMUNITY
Start: 2024-04-02 | End: 2024-04-02

## 2024-04-02 NOTE — PROGRESS NOTES
Cody is a 58 year old who is being evaluated via a billable telephone visit.    What phone number would you like to be contacted at? [phone]  How would you like to obtain your AVS? Chethardahlia  Originating Location (pt. Location): Home    Distant Location (provider location):  On-site      Subjective   Cody is a 58 year old, presenting for the following health issues:  Forms (Med list needs to be faxed) and Recheck Medication (Would like rx for liquid calcium)        4/2/2024    12:10 PM   Additional Questions   Roomed by Ellen Arevalo CMA   Accompanied by None         4/2/2024    12:10 PM   Patient Reported Additional Medications   Patient reports taking the following new medications none     HPI         Review of Systems  Constitutional, neuro, ENT, endocrine, pulmonary, cardiac, gastrointestinal, genitourinary, musculoskeletal, integument and psychiatric systems are negative, except as otherwise noted.      Needs med list signed.      Needing Rx for liquid calcium, trouble swallowing pills  Dme for a lift chair. Due to her chronic low back pain and leg edema a lift chair would allow her to get up and out of a chair much easier as well as allow her to elevate her legs, subsequently reducing her edema/swelling.   Objective           Vitals:  No vitals were obtained today due to virtual visit.    Physical Exam   General: Alert and no distress //Respiratory: No audible wheeze, cough, or shortness of breath // Psychiatric:  Appropriate affect, tone, and pace of words      Cody was seen today for forms and recheck medication.    Diagnoses and all orders for this visit:    Obesity with serious comorbidity, unspecified classification, unspecified obesity type    Age-related osteoporosis without current pathological fracture  -     Calcium Carb-Cholecalciferol 500-10 MG-MCG/5ML LIQD; Take 5 mLs by mouth 2 times daily Patient may administer this on her own with out supervision of staff    Hypokalemia  -     Potassium  Chloride 10 MEQ PACK; Take 10 mEq by mouth daily    Major depressive disorder, recurrent episode, mild (H24)    Bilateral leg edema  -     Lift Chair W Seat Lift Mechanism Order for DME - ONLY FOR DME    Chronic bilateral low back pain without sciatica  -     Lift Chair W Seat Lift Mechanism Order for DME - ONLY FOR DME    Meds reviewed and   Med list updated.         Phone call duration: 21 minutes  Signed Electronically by: Min Toledo PA-C

## 2024-04-02 NOTE — TELEPHONE ENCOUNTER
[facility] is requesting Rx for Zaleplon (Sonata). They received a faxed medication list/form. Pt is moving into facility this Friday the 7th. They would be able to fill all meds off the fax that they received except for zaleplon. Since it is controlled, they need a prescription sent. Pended med. Please review/sign or advise.    Marcy Kat RN

## 2024-04-03 ENCOUNTER — TELEPHONE (OUTPATIENT)
Dept: FAMILY MEDICINE | Facility: CLINIC | Age: 59
End: 2024-04-03
Payer: COMMERCIAL

## 2024-04-03 NOTE — TELEPHONE ENCOUNTER
Per pharmacy re:Klor con pkts. This strength does not come in packets. Do you want us to dispense tabs? If so, please send a new Rx.

## 2024-04-04 RX ORDER — ZALEPLON 10 MG/1
10 CAPSULE ORAL AT BEDTIME
Qty: 30 CAPSULE | Refills: 0 | Status: SHIPPED | OUTPATIENT
Start: 2024-04-04 | End: 2024-04-22

## 2024-04-11 NOTE — TELEPHONE ENCOUNTER
Retail Pharmacy Prior Authorization Team   Phone: [phone]    PA Initiation    Medication: MOUNJARO 2.5 MG/0.5ML SC SOPN  Insurance Company: [employer] - Phone [phone] Fax [phone]  Pharmacy Filling the Rx: [id number] - [place], [place] - [address] AT [facility] [address]  Filling Pharmacy Phone: [phone]  Filling Pharmacy Fax:    Start Date: 4/11/2024      Note: Due to record-high volumes, our turn-around time is taking longer than usual . We are currently 10 business days behind in the pools.   We are working diligently to submit all requests in a timely manner and in the order they are received. Please only flag TRUE URGENT requests as high priority to the pool at this time.   If you have questions - please send a note/message in the active PA encounter and send back to the RPPA (Retail Pharmacy Prior Authorization) team [[id number]].    If you have more specific questions about our process please reach out to our supervisor Hanna Carrasquillo.   Thank you!

## 2024-04-15 NOTE — TELEPHONE ENCOUNTER
PRIOR AUTHORIZATION DENIED    Medication: MOUNJARO 2.5 MG/0.5ML SC SOPN  Insurance Company: [employer] - Phone [phone] Fax [phone]  Denial Date: 4/14/2024  Denial Reason(s):           Appeal Information:         Patient Notified: No

## 2024-04-16 ENCOUNTER — TELEPHONE (OUTPATIENT)
Dept: FAMILY MEDICINE | Facility: CLINIC | Age: 59
End: 2024-04-16
Payer: COMMERCIAL

## 2024-04-16 NOTE — TELEPHONE ENCOUNTER
Forms received from: [facility]  Phone number listed: [phone]   Fax listed: [phone]  Date received: 4/12/24  Form description: Lift chair face to face/Certificate of medical necessity   Once forms are completed, please return to [facility] via fax.  Is patient requesting to be contacted when forms are completed: na  Phone: na  Form placed: Min Tobar

## 2024-04-17 ENCOUNTER — MEDICAL CORRESPONDENCE (OUTPATIENT)
Dept: HEALTH INFORMATION MANAGEMENT | Facility: CLINIC | Age: 59
End: 2024-04-17
Payer: COMMERCIAL

## 2024-04-21 ENCOUNTER — OFFICE VISIT (OUTPATIENT)
Dept: URGENT CARE | Facility: URGENT CARE | Age: 59
End: 2024-04-21
Payer: COMMERCIAL

## 2024-04-21 VITALS
DIASTOLIC BLOOD PRESSURE: 81 MMHG | HEART RATE: 79 BPM | WEIGHT: 167 LBS | OXYGEN SATURATION: 97 % | RESPIRATION RATE: 18 BRPM | BODY MASS INDEX: 32.36 KG/M2 | TEMPERATURE: 98 F | SYSTOLIC BLOOD PRESSURE: 118 MMHG

## 2024-04-21 DIAGNOSIS — B96.89 BACTERIAL VAGINOSIS: Primary | ICD-10-CM

## 2024-04-21 DIAGNOSIS — N76.0 BACTERIAL VAGINOSIS: Primary | ICD-10-CM

## 2024-04-21 DIAGNOSIS — A59.9 TRICHOMONAL INFECTION: ICD-10-CM

## 2024-04-21 LAB
CLUE CELLS: PRESENT
TRICHOMONAS, WET PREP: PRESENT
WBC'S/HIGH POWER FIELD, WET PREP: ABNORMAL
YEAST, WET PREP: ABNORMAL

## 2024-04-21 PROCEDURE — 99213 OFFICE O/P EST LOW 20 MIN: CPT | Performed by: PHYSICIAN ASSISTANT

## 2024-04-21 PROCEDURE — 87210 SMEAR WET MOUNT SALINE/INK: CPT | Performed by: PHYSICIAN ASSISTANT

## 2024-04-21 RX ORDER — METRONIDAZOLE 500 MG/1
500 TABLET ORAL 2 TIMES DAILY
Qty: 14 TABLET | Refills: 0 | Status: SHIPPED | OUTPATIENT
Start: 2024-04-21 | End: 2024-04-28

## 2024-04-21 NOTE — PROGRESS NOTES
SUBJECTIVE:   Inga Ledesma is a 58 year old female presenting with a chief complaint of   Chief Complaint   Patient presents with    Urgent Care     Vaginal sx       She is an established patient of [facility].  Patient states had BV last month and intercourse 1 week ago with boyfriend and has a discharge.          Review of Systems   Genitourinary:  Positive for vaginal discharge.   All other systems reviewed and are negative.      Past Medical History:   Diagnosis Date    Acute pain of left shoulder 05/20/2021    LALA (acute kidney injury) (H24) 05/28/2018    Alcohol abuse     Alcohol dependence with acute alcoholic intoxication (H) 11/19/2020    Alcohol withdrawal (H) 10/28/2017    Alcohol withdrawal seizure (H) 03/2016    Alcoholic hepatitis without ascites (H28) 12/05/2013    Cancer of labia majora (H) 1987    Cellulitis of right lower extremity 03/13/2021    Cervical dysplasia 1987    Congestive heart failure (H) 05/16/2016    COPD (chronic obstructive pulmonary disease) (H)     COPD exacerbation (H) 09/04/2018    CVA (cerebral infarction) 01/2012    Dependent edema     Depression, major     Depressive disorder 1966    GERD (gastroesophageal reflux disease)     Hyperlipidemia LDL goal < 160     Hypertension     Iron deficiency anemia     Menorrhagia 05/10/2010    Pneumonia 07/10/2021    RLS (restless legs syndrome)     Sacroiliitis (H24)     steroid injections ineffective, chronic low back pain    Sepsis (H) 06/18/2018    Tobacco abuse     Uncomplicated asthma     Vitamin D deficiencies      Family History   Problem Relation Age of Onset    Depression/Anxiety Mother     Asthma Mother     Cerebrovascular Disease Father     Hypertension Father     Lung Cancer Father     Alcoholism Father     Breast Cancer Paternal Aunt     Other Cancer Other     Depression Other     Anxiety Disorder Other     Mental Illness Other     Substance Abuse Other     Asthma Other     Obesity Sister     Obesity Son     Suicide Paternal  Uncle      Current Outpatient Medications   Medication Sig Dispense Refill    acetaminophen (ACETAMINOPHEN EXTRA STRENGTH) 500 MG tablet TAKE 1-2 TABLETS BY MOUTH EVERY 4-6 HOURS AS NEEDED FOR PAIN *MAX DOSE 4000 MG IN 24 HOURS* 100 tablet 10    ADVAIR DISKUS 500-50 MCG/ACT inhaler INHALE ONE PUFF BY MOUTH EVERY 12 HOURS patient is using PRN 60 each 10    ASPIRIN LOW DOSE 81 MG EC tablet TAKE 1 TABLET BY MOUTH DAILY 30 tablet 10    azithromycin (ZITHROMAX) 250 MG tablet TAKE 1 TABLET (250 MG) BY MOUTH EVERY MONDAY, WEDNESDAY, AND FRIDAY MORNING 13 tablet 10    buPROPion (WELLBUTRIN XL) 300 MG 24 hr tablet TAKE 1 TABLET BY MOUTH EVERY MORNING 30 tablet 5    Calcium Carb-Cholecalciferol 500-10 MG-MCG/5ML LIQD Take 5 mLs by mouth 2 times daily Patient may administer this on her own with out supervision of staff 480 mL 5    cariprazine (VRAYLAR) 1.5 MG capsule Take 1 capsule (1.5 mg) by mouth daily 90 capsule 0    cetirizine (ZYRTEC) 10 MG tablet TAKE 1 TABLET BY MOUTH DAILY 30 tablet 10    docusate sodium (COLACE) 100 MG capsule Take 1 capsule (100 mg) by mouth 2 times daily 30 capsule 1    EPIPEN 2-FAVIOLA 0.3 MG/0.3ML injection 2-pack INJECT AS DIRECTED AS NEEDED FOR ALLERGIC REACTION 2 each 1    famotidine (PEPCID) 40 MG tablet TAKE 1 TABLET BY MOUTH DAILY AT BEDTIME 30 tablet 10    FEROSUL 325 (65 Fe) MG tablet TAKE 1 TABLET BY MOUTH TWICE DAILY 60 tablet 10    FLUoxetine (PROZAC) 40 MG capsule TAKE 2 CAPSULES BY MOUTH ONCE DAILY 60 capsule 5    fluticasone (FLONASE) 50 MCG/ACT nasal spray INSTILL ONE (1) SPRAY IN EACH NOSTRIL DAILY 16 g 10    folic acid (FOLVITE) 1 MG tablet TAKE 1 TABLET BY MOUTH DAILY 30 tablet 10    furosemide (LASIX) 40 MG tablet Take 0.5 tablets (20 mg) by mouth daily 45 tablet 3    gabapentin (NEURONTIN) 300 MG capsule Take 1 capsule (300 mg) by mouth at bedtime 90 capsule 1    hydrALAZINE (APRESOLINE) 10 MG tablet TAKE 1 TABLET BY MOUTH 3 TIMES DAILY 90 tablet 10    hydrOXYzine (ATARAX) 25 MG  tablet TAKE 1 TABLET BY MOUTH THREE TIMES DAILY AS NEEDED FOR ITCHING 90 tablet 10    insulin pen needle (31G X 6 MM) 31G X 6 MM miscellaneous Use 1 pen needles daily or as directed for use with victoza. 90 each 1    ipratropium - albuterol 0.5 mg/2.5 mg/3 mL (DUONEB) 0.5-2.5 (3) MG/3ML neb solution INHALE ONE VIAL BY NEBULIZATION FOUR TIMES DAILY AS NEEDED FOR WHEEZING 360 mL 5    labetalol (NORMODYNE) 100 MG tablet TAKE ONE (1) TABLET BY MOUTH TWICE DAILY 60 tablet 5    magnesium oxide (MAG-OX) 400 MG tablet TAKE 1 TABLET BY MOUTH DAILY 30 tablet 10    meclizine (ANTIVERT) 25 MG tablet Take 1 tablet (25 mg) by mouth 3 times daily as needed for dizziness 80 tablet 0    metroNIDAZOLE (FLAGYL) 500 MG tablet Take 1 tablet (500 mg) by mouth 2 times daily for 7 days 14 tablet 0    naltrexone (DEPADE/REVIA) 50 MG tablet Take 1 tablet (50 mg) by mouth daily 30 tablet 3    omeprazole (PRILOSEC) 20 MG DR capsule TAKE ONE (1) CAPSULE BY MOUTH TWICE DAILY BEFORE MEALS 60 capsule 10    ondansetron (ZOFRAN ODT) 4 MG ODT tab Take 1 tablet (4 mg) by mouth every 8 hours as needed for nausea 30 tablet 3    Potassium Chloride 10 MEQ PACK Take 10 mEq by mouth daily 60 each 5    pramipexole (MIRAPEX) 0.5 MG tablet Take 1 tablet (0.5 mg) by mouth at bedtime 90 tablet 1    QUEtiapine (SEROQUEL) 100 MG tablet TAKE 1 TO 2 TABLETS(100  MG) BY MOUTH AT BEDTIME 90 tablet 1    tirzepatide (MOUNJARO) 2.5 MG/0.5ML pen Inject 2.5 mg Subcutaneous every 7 days 2 mL 0    VENTOLIN  (90 Base) MCG/ACT inhaler INHALE 1-2 PUFFS BY MOUTH EVERY 4 HOURS AS NEEDED FOR SHORTNESS OF BREATH, DIFFICULTY BREATHING OR WHEEZING. 18 g 10    vitamin D3 (CHOLECALCIFEROL) 50 mcg (2000 units) tablet TAKE 1 TABLET BY MOUTH DAILY 30 tablet 10    zaleplon (SONATA) 10 MG capsule Take 1 capsule (10 mg) by mouth at bedtime 30 capsule 0     Social History     Tobacco Use    Smoking status: Some Days     Current packs/day: 0.00     Average packs/day: 0.3 packs/day  for 34.0 years (8.5 ttl pk-yrs)     Types: Cigarettes     Start date: 1979     Last attempt to quit: 10/3/2012     Years since quittin.5     Passive exposure: Never    Smokeless tobacco: Current     Types: Chew    Tobacco comments:     5 cigarettes daily   Substance Use Topics    Alcohol use: No     Comment: 1 pint of vodka per day, last drink aug 2018       OBJECTIVE  /81   Pulse 79   Temp 98  F (36.7  C)   Resp 18   Wt 75.8 kg (167 lb)   LMP 2010   SpO2 97%   BMI 32.36 kg/m      Physical Exam  Vitals and nursing note reviewed.   Constitutional:       Appearance: Normal appearance. She is obese.   Cardiovascular:      Rate and Rhythm: Normal rate.   Neurological:      Mental Status: She is alert.         Labs:  Results for orders placed or performed in visit on 24 (from the past 24 hour(s))   Wet prep - Clinic Collect    Specimen: Vagina; Swab   Result Value Ref Range    Trichomonas Present (A) Absent    Yeast Absent Absent    Clue Cells Present (A) Absent    WBCs/high power field 4+ (A) None     *Note: Due to a large number of results and/or encounters for the requested time period, some results have not been displayed. A complete set of results can be found in Results Review.       ASSESSMENT:      ICD-10-CM    1. Bacterial vaginosis  N76.0 Wet prep - Clinic Collect    B96.89 metroNIDAZOLE (FLAGYL) 500 MG tablet      2. Trichomonal infection  A59.9 metroNIDAZOLE (FLAGYL) 500 MG tablet           Medical Decision Making:    Differential Diagnosis:  Gyn Problem: Vaginitis:  yeast, trichomonas vaginalis, bacterial vaginosis    Serious Comorbid Conditions:  Adult:   reviewed    PLAN:    Rx for flagyl.  No etoh while taking.  No intercourse while taking.  Notify boyfriend to get himself tested.  Discussed reasons to seek immediate medical attention.  Additionally if no improvement or worsening in one week, may follow up with PCP and/or UC.        Followup:    If not improving or if  condition worsens, follow up with your Primary Care Provider, If not improving or if conditions worsens over the next 12-24 hours, go to the Emergency Department    There are no Patient Instructions on file for this visit.

## 2024-04-22 ENCOUNTER — MEDICAL CORRESPONDENCE (OUTPATIENT)
Dept: HEALTH INFORMATION MANAGEMENT | Facility: CLINIC | Age: 59
End: 2024-04-22

## 2024-04-22 ENCOUNTER — OFFICE VISIT (OUTPATIENT)
Dept: FAMILY MEDICINE | Facility: CLINIC | Age: 59
End: 2024-04-22
Payer: COMMERCIAL

## 2024-04-22 VITALS
BODY MASS INDEX: 33.14 KG/M2 | TEMPERATURE: 97.2 F | DIASTOLIC BLOOD PRESSURE: 80 MMHG | OXYGEN SATURATION: 97 % | RESPIRATION RATE: 28 BRPM | HEART RATE: 75 BPM | SYSTOLIC BLOOD PRESSURE: 134 MMHG | HEIGHT: 59 IN | WEIGHT: 164.4 LBS

## 2024-04-22 DIAGNOSIS — K59.03 DRUG-INDUCED CONSTIPATION: ICD-10-CM

## 2024-04-22 DIAGNOSIS — G89.29 CHRONIC BILATERAL LOW BACK PAIN WITHOUT SCIATICA: ICD-10-CM

## 2024-04-22 DIAGNOSIS — M54.50 CHRONIC BILATERAL LOW BACK PAIN WITHOUT SCIATICA: ICD-10-CM

## 2024-04-22 DIAGNOSIS — E87.6 HYPOKALEMIA: ICD-10-CM

## 2024-04-22 DIAGNOSIS — K14.0 GLOSSITIS: ICD-10-CM

## 2024-04-22 DIAGNOSIS — I89.0 LYMPHEDEMA: Primary | ICD-10-CM

## 2024-04-22 DIAGNOSIS — F51.04 PSYCHOPHYSIOLOGICAL INSOMNIA: ICD-10-CM

## 2024-04-22 PROCEDURE — 99214 OFFICE O/P EST MOD 30 MIN: CPT | Performed by: PHYSICIAN ASSISTANT

## 2024-04-22 RX ORDER — DOCUSATE SODIUM 100 MG/1
100 CAPSULE, LIQUID FILLED ORAL 2 TIMES DAILY PRN
Status: SHIPPED
Start: 2024-04-22 | End: 2024-04-23

## 2024-04-22 RX ORDER — FLUCONAZOLE 100 MG/1
100 TABLET ORAL DAILY PRN
Qty: 45 TABLET | Refills: 3 | Status: SHIPPED | OUTPATIENT
Start: 2024-04-22

## 2024-04-22 RX ORDER — POTASSIUM CHLORIDE 1.5 G/1.58G
20 POWDER, FOR SOLUTION ORAL DAILY
Qty: 90 PACKET | Refills: 3 | Status: SHIPPED | OUTPATIENT
Start: 2024-04-22

## 2024-04-22 RX ORDER — ZALEPLON 10 MG/1
20 CAPSULE ORAL AT BEDTIME
Qty: 60 CAPSULE | Refills: 2 | Status: SHIPPED | OUTPATIENT
Start: 2024-04-22 | End: 2024-07-15

## 2024-04-22 RX ORDER — MELOXICAM 15 MG/1
15 TABLET ORAL DAILY
Qty: 30 TABLET | Refills: 0 | Status: SHIPPED | OUTPATIENT
Start: 2024-04-22 | End: 2024-05-13

## 2024-04-22 ASSESSMENT — PATIENT HEALTH QUESTIONNAIRE - PHQ9
SUM OF ALL RESPONSES TO PHQ QUESTIONS 1-9: 11
SUM OF ALL RESPONSES TO PHQ QUESTIONS 1-9: 11
10. IF YOU CHECKED OFF ANY PROBLEMS, HOW DIFFICULT HAVE THESE PROBLEMS MADE IT FOR YOU TO DO YOUR WORK, TAKE CARE OF THINGS AT HOME, OR GET ALONG WITH OTHER PEOPLE: SOMEWHAT DIFFICULT

## 2024-04-22 ASSESSMENT — PAIN SCALES - GENERAL: PAINLEVEL: SEVERE PAIN (6)

## 2024-04-22 NOTE — PROGRESS NOTES
"    Subjective   Cody is a 58 year old, presenting for the following health issues:  Orders (Lift chair//Bilateral Leg treatment - lymphedema//Portable scooter - cannot use wheel chair where she is living/) and Recheck Medication (Review sleeping medication//Fluconozole for tongue//Stool Softener  - change directions to \"as needed\"//Tylenol - change order to \"self administer\"/)      4/22/2024    10:12 AM   Additional Questions   Roomed by Ellen Arevalo CMA   Accompanied by None         4/22/2024    10:12 AM   Patient Reported Additional Medications   Patient reports taking the following new medications none     History of Present Illness       Reason for visit:  Lift chair    She eats 0-1 servings of fruits and vegetables daily.She consumes 1 sweetened beverage(s) daily.She exercises with enough effort to increase her heart rate 9 or less minutes per day.  She exercises with enough effort to increase her heart rate 3 or less days per week.   She is taking medications regularly.     Orders  - Lift Chair  - Bilateral leg treatment for lymphedema  - Portable Scooter, one that can be taken a part for transport, cannot use weel chair where she is living    Recheck Medication  - Review sleeping medication- increase from 10mg to 20mg  - Prescribe Fluconsole for tongue  - Stool Softener - change directions to \"as needed\"    - Potassium - would like something that dissolves    History of lymphedema with significant swelling and discomfort. This makes it difficult to squat, bend knees, climb stairs and get up from a seated position. A lift chair would prove very helpful to her.        Review of Systems  Constitutional, neuro, ENT, endocrine, pulmonary, cardiac, gastrointestinal, genitourinary, musculoskeletal, integument and psychiatric systems are negative, except as otherwise noted.      Objective    LMP 06/02/2010   There is no height or weight on file to calculate BMI.  Physical Exam   Difficulty standing up from " office chair , but was able to do it.  Eye exam - right eye normal lid, conjunctiva, cornea, pupil and fundus, left eye normal lid, conjunctiva, cornea, pupil and fundus.  Thyroid not palpable, not enlarged, no nodules detected.  CHEST:chest clear to IPPA, no tachypnea, retractions or cyanosis, and S1, S2 normal, no murmur, no gallop, rate regular.      Cody was seen today for orders and recheck medication.    Diagnoses and all orders for this visit:    Lymphedema  -     Lift Chair W Seat Lift Mechanism Order for DME - ONLY FOR DME    Psychophysiological insomnia  -     zaleplon (SONATA) 10 MG capsule; Take 2 capsules (20 mg) by mouth at bedtime    Glossitis  -     fluconazole (DIFLUCAN) 100 MG tablet; Take 1 tablet (100 mg) by mouth daily as needed (for glossitis)    Drug-induced constipation  -     docusate sodium (COLACE) 100 MG capsule; Take 1 capsule (100 mg) by mouth 2 times daily as needed for constipation    Hypokalemia  -     potassium chloride (KLOR-CON) 20 MEQ packet; Take 20 mEq by mouth daily    Chronic bilateral low back pain without sciatica  -     meloxicam (MOBIC) 15 MG tablet; Take 1 tablet (15 mg) by mouth daily      work on lifestyle modification  Advised supportive and symptomatic treatment.  Follow up with Provider - if condition persists or worsens.       Signed Electronically by: Min Toledo PA-C

## 2024-04-22 NOTE — LETTER
April 22, 2024      Inga Ledesma  [address]  [place] [address]        To Whom It May Concern:    Inga Ledesma was seen in our clinic. Due to her chronic lower extremity edema/lymphedema, I am advising that she wear her lower extremity compression/massage garments daily for 60 minutes.      Sincerely,      Min Toledo

## 2024-04-23 DIAGNOSIS — K59.03 DRUG-INDUCED CONSTIPATION: ICD-10-CM

## 2024-04-23 RX ORDER — DOCUSATE SODIUM 100 MG/1
100 CAPSULE, LIQUID FILLED ORAL 2 TIMES DAILY PRN
Qty: 180 CAPSULE | Refills: 3 | Status: SHIPPED | OUTPATIENT
Start: 2024-04-23

## 2024-04-23 NOTE — TELEPHONE ENCOUNTER
"Received call from pharmacist at [facility]. Pt had visit yesterday with Min Toledo PA-C and Docusate was written on AVS \"change how you take\". Appears as though this will be changed from scheduled to PRN. Please send new rx with new instructions.    DILIP OreillyN JOBY  [facility] [place]  Primary Care  "

## 2024-05-13 DIAGNOSIS — G89.29 CHRONIC BILATERAL LOW BACK PAIN WITHOUT SCIATICA: ICD-10-CM

## 2024-05-13 DIAGNOSIS — M54.50 CHRONIC BILATERAL LOW BACK PAIN WITHOUT SCIATICA: ICD-10-CM

## 2024-05-14 RX ORDER — MELOXICAM 15 MG/1
15 TABLET ORAL DAILY
Qty: 30 TABLET | Refills: 0 | Status: SHIPPED | OUTPATIENT
Start: 2024-05-14 | End: 2024-06-13

## 2024-05-16 DIAGNOSIS — G47.33 OSA (OBSTRUCTIVE SLEEP APNEA): Primary | Chronic | ICD-10-CM

## 2024-05-29 ENCOUNTER — TELEPHONE (OUTPATIENT)
Dept: FAMILY MEDICINE | Facility: CLINIC | Age: 59
End: 2024-05-29
Payer: COMMERCIAL

## 2024-05-29 NOTE — TELEPHONE ENCOUNTER
Pt would like to restart wegovy due to insurance coverage. Pt is requesting wegovy 2.4mg.     Wegovy is no longer on active med list, do you want a virtual visit for this?     Kianna Becker, RN on 5/29/2024 at 8:54 AM

## 2024-06-03 ENCOUNTER — OFFICE VISIT (OUTPATIENT)
Dept: OBGYN | Facility: CLINIC | Age: 59
End: 2024-06-03
Payer: COMMERCIAL

## 2024-06-03 ENCOUNTER — VIRTUAL VISIT (OUTPATIENT)
Dept: FAMILY MEDICINE | Facility: CLINIC | Age: 59
End: 2024-06-03
Payer: COMMERCIAL

## 2024-06-03 VITALS
BODY MASS INDEX: 33.47 KG/M2 | OXYGEN SATURATION: 99 % | HEART RATE: 69 BPM | SYSTOLIC BLOOD PRESSURE: 134 MMHG | DIASTOLIC BLOOD PRESSURE: 76 MMHG | WEIGHT: 166 LBS

## 2024-06-03 DIAGNOSIS — Z11.3 SCREEN FOR STD (SEXUALLY TRANSMITTED DISEASE): Primary | ICD-10-CM

## 2024-06-03 DIAGNOSIS — E66.9 OBESITY WITH SERIOUS COMORBIDITY, UNSPECIFIED CLASSIFICATION, UNSPECIFIED OBESITY TYPE: Primary | ICD-10-CM

## 2024-06-03 DIAGNOSIS — N89.8 VAGINA ITCHING: ICD-10-CM

## 2024-06-03 PROCEDURE — 87389 HIV-1 AG W/HIV-1&-2 AB AG IA: CPT | Performed by: OBSTETRICS & GYNECOLOGY

## 2024-06-03 PROCEDURE — 87210 SMEAR WET MOUNT SALINE/INK: CPT | Performed by: OBSTETRICS & GYNECOLOGY

## 2024-06-03 PROCEDURE — 99459 PELVIC EXAMINATION: CPT | Performed by: OBSTETRICS & GYNECOLOGY

## 2024-06-03 PROCEDURE — 99441 PR PHYSICIAN TELEPHONE EVALUATION 5-10 MIN: CPT | Mod: 93 | Performed by: PHYSICIAN ASSISTANT

## 2024-06-03 PROCEDURE — 87491 CHLMYD TRACH DNA AMP PROBE: CPT | Performed by: OBSTETRICS & GYNECOLOGY

## 2024-06-03 PROCEDURE — 36415 COLL VENOUS BLD VENIPUNCTURE: CPT | Performed by: OBSTETRICS & GYNECOLOGY

## 2024-06-03 PROCEDURE — 87591 N.GONORRHOEAE DNA AMP PROB: CPT | Performed by: OBSTETRICS & GYNECOLOGY

## 2024-06-03 PROCEDURE — 99213 OFFICE O/P EST LOW 20 MIN: CPT | Performed by: OBSTETRICS & GYNECOLOGY

## 2024-06-03 ASSESSMENT — PATIENT HEALTH QUESTIONNAIRE - PHQ9
SUM OF ALL RESPONSES TO PHQ QUESTIONS 1-9: 7
10. IF YOU CHECKED OFF ANY PROBLEMS, HOW DIFFICULT HAVE THESE PROBLEMS MADE IT FOR YOU TO DO YOUR WORK, TAKE CARE OF THINGS AT HOME, OR GET ALONG WITH OTHER PEOPLE: VERY DIFFICULT
SUM OF ALL RESPONSES TO PHQ QUESTIONS 1-9: 7

## 2024-06-03 NOTE — PROGRESS NOTES
Cody is a 58 year old who is being evaluated via a billable telephone visit.    What phone number would you like to be contacted at? [phone]   How would you like to obtain your AVS? Marcia  Originating Location (pt. Location): Home    Distant Location (provider location):  On-site      Subjective   Cody is a 58 year old, presenting for the following health issues:  Recheck Medication        6/3/2024    11:58 AM   Additional Questions   Roomed by Kimberly   Accompanied by n/a     History of Present Illness       Reason for visit:  Would like to discuss Wegovy    She eats 0-1 servings of fruits and vegetables daily.She consumes 3 sweetened beverage(s) daily.She exercises with enough effort to increase her heart rate 9 or less minutes per day.  She exercises with enough effort to increase her heart rate 3 or less days per week.   She is taking medications regularly.      Has been administering wegovy 2.4 mg per week up until 2 weeks ago.   Coverage has been an issue.  If insurance won't pay for it , she is interested in going back on victoza.   Has lost 50 lbs.   Overall she's feeling good       Review of Systems  Constitutional, HEENT, cardiovascular, pulmonary, GI, , musculoskeletal, neuro, skin, endocrine and psych systems are negative, except as otherwise noted.      Objective           Vitals:  No vitals were obtained today due to virtual visit.    Physical Exam   General: Alert and no distress //Respiratory: No audible wheeze, cough, or shortness of breath // Psychiatric:  Appropriate affect, tone, and pace of words      Cody was seen today for recheck medication.    Diagnoses and all orders for this visit:    Obesity with serious comorbidity, unspecified classification, unspecified obesity type  -     Semaglutide-Weight Management (WEGOVY) 2.4 MG/0.75ML pen; Inject 2.4 mg Subcutaneous once a week    Other orders  -     REVIEW OF HEALTH MAINTENANCE PROTOCOL ORDERS      work on lifestyle  modification        Phone call duration: 7 minutes  Signed Electronically by: Min Toledo PA-C

## 2024-06-03 NOTE — PROGRESS NOTES
Inga is a 58 year old  referred here by self for consultation regarding current trichomonas and bacterial vaginosis treatment.  Last treatment was about a week ago in [place].  Her boyfriend has been treated on 2 occasions also.  Will be treated with Flagyl 500 mg twice a day for 7 days..  Her wet prep on 3/28/2024 was positive for clue cells.  On 2024 it was positive for both trichomonas and clue cells.  As she was treated on both occasions..  She says since then she and her boyfriend have been using condoms..  Today she was STD screening including gonorrhea and chlamydia, wet prep, and HIV testing.    ROS: Ten point review of systems was reviewed and negative except the above.    Gyne: - abn pap (last pap ), - STD's    Past Medical History:   Diagnosis Date    Acute pain of left shoulder 2021    LALA (acute kidney injury) (H24) 2018    Alcohol abuse     Alcohol dependence with acute alcoholic intoxication (H) 2020    Alcohol withdrawal (H) 10/28/2017    Alcohol withdrawal seizure (H) 2016    Alcoholic hepatitis without ascites (H28) 2013    Cancer of labia majora (H)     Cellulitis of right lower extremity 2021    Cervical dysplasia     Congestive heart failure (H) 2016    COPD (chronic obstructive pulmonary disease) (H)     COPD exacerbation (H) 2018    CVA (cerebral infarction) 2012    Dependent edema     Depression, major     Depressive disorder 1966    GERD (gastroesophageal reflux disease)     Hyperlipidemia LDL goal < 160     Hypertension     Iron deficiency anemia     Menorrhagia 05/10/2010    Pneumonia 07/10/2021    RLS (restless legs syndrome)     Sacroiliitis (H24)     steroid injections ineffective, chronic low back pain    Sepsis (H) 2018    Tobacco abuse     Uncomplicated asthma     Vitamin D deficiencies      Past Surgical History:   Procedure Laterality Date    AS BIOPSY/EXCISION LYMPH NODE OPEN SUPERFICIAL      COLONOSCOPY       COLONOSCOPY N/A 4/13/2022    Procedure: COLONOSCOPY;  Surgeon: Mike Garcia MD;  Location:  GI    EYE SURGERY      HYSTERECTOMY  2010    HYSTERECTOMY TOTAL ABDOMINAL  7/28/10    Bilateral salpingectomy.  ovaries conserved.    INJECT SACROILIAC JOINT Bilateral 7/21/2023    Procedure: INJECTION, SACROILIAC JOINT;  Surgeon: Raji Castellanos MD;  [place] [facility] [place]    LASER TX, CERVICAL  1987    LYMPH NODE BIOPSY  2007    inguinal    LYSIS OF LABIAL LESION(S)  1985, 1987     Patient Active Problem List   Diagnosis    RLS (restless legs syndrome)    GERD (gastroesophageal reflux disease)    Iron deficiency anemia secondary to inadequate dietary iron intake    Hyperlipidemia with target LDL less than 160    Tobacco abuse    Vitamin D deficiency    Edema of both legs    Hypertension goal BP (blood pressure) < 140/90    [facility]    Major depressive disorder, recurrent episode, mild (H24)    (HFpEF) heart failure with preserved ejection fraction (H)    Psychophysiological insomnia    Alcohol abuse    Morbid obesity (H)    Chronic bilateral low back pain without sciatica    Chronic obstructive pulmonary disease (H)    JAQUELIN (obstructive sleep apnea)- severe (AHI 37)    Preop general physical exam    Polyarthritis with positive rheumatoid factor (H)    Alcohol dependence in remission (H)       ALL/Meds: Her medication and allergy histories were reviewed and are documented in their appropriate chart areas.    SH: - tob, - EtOH,     FH: Her family history was reviewed and documented in its appropriate chart area.    PE: /76 (BP Location: Left arm, Patient Position: Sitting, Cuff Size: Adult Regular)   Pulse 69   Wt 75.3 kg (166 lb)   LMP 06/02/2010   SpO2 99%   BMI 33.47 kg/m    Body mass index is 33.47 kg/m .    General Appearance:  healthy, alert, active, no distress  HEENT: NCAT  Abdomen: Soft, nontender.  Normal bowel sounds.  No masses  Pelvic:       - Ext: NEFG,        - Urethra: no lesions,  no masses, no hypermobility       - Urethral Meatus: normal appearance, no       - Bladder: no tenderness, no masses       - Vagina: pink, moist, normal rugae, no lesions, no discharge       - Cervix: no lesions, parous       - Uterus: normal sized, AV/, mobile, no contour       - Adnexa: no masses, no tenderness       - Rectal: deferred,       - Pelvic support: no cystocele, no rectocele, no uterine prolapse  Results for orders placed or performed in visit on 06/03/24   Wet prep - Clinic Collect     Status: Abnormal    Specimen: Vagina; Swab   Result Value Ref Range    Trichomonas Absent Absent    Yeast Absent Absent    Clue Cells Absent Absent    WBCs/high power field 3+ (A) None     Visit and exam done with chaperone, nurse  A/P      ICD-10-CM    1. Screen for STD (sexually transmitted disease)  Z11.3 Chlamydia trachomatis PCR     Neisseria gonorrhoeae PCR     Wet prep - Clinic Collect     HIV Antigen Antibody Combo Cascade      2. Vagina itching  N89.8 Chlamydia trachomatis PCR     Neisseria gonorrhoeae PCR     Wet prep - Clinic Collect     HIV Antigen Antibody Combo Cascade      Negative wet prep results were discussed with the patient.  We discussed her test results and her history.  I recommended condoms use from his fourth for the foreseeable future..  We notified about the rest of the test results.    CEPHAS AGBEH, MD.

## 2024-06-03 NOTE — PATIENT INSTRUCTIONS
To Schedule an Appointment 24/7  Call: [phone]    If you have any questions regarding your visit, Please contact your care team.  [facility]: [phone]  [facility] HOURS TELEPHONE NUMBER   Cephas Agbeh, M.D.      [facility]-Surgery Scheduler  [facility]-Surgery Scheduler       Monday - [place]:    8:00 am-4:45 pm  Tuesday - [place]:   8:00 am-4:45 pm  Friday-[place]:       8:00 am-4:45 pm  Typical Surgery Day:  Wednesday [facility]   [address] [place] [address]   [phone]   Fax [phone]    Imaging Scheduling all locations  [phone]      [facility] Labor and Delivery   [address] [place] [address] [place] [address]   [phone]    [facility]  [address] [address]  [phone]  Fax [phone]   Urgent Care locations:  [facility] Monday-Friday                               10 am - 8 pm  Saturday and Sunday                      9 am - 5 pm  Monday-Friday                              10 am- 8 pm  Saturday and Sunday                      9 am - 5 pm    [phone]   **Surgeries** Our Surgery Schedulers will contact you to schedule. If you do not receive a call within 3 business days, please call [phone].  If you need a medication refill, please contact your pharmacy. Please allow 3 business days for your refill to be completed.  As always, Thank you for trusting us with your healthcare needs!  see additional instructions from your care team below

## 2024-06-04 LAB
C TRACH DNA SPEC QL NAA+PROBE: NEGATIVE
HIV 1+2 AB+HIV1 P24 AG SERPL QL IA: NONREACTIVE
N GONORRHOEA DNA SPEC QL NAA+PROBE: NEGATIVE

## 2024-06-05 ENCOUNTER — TELEPHONE (OUTPATIENT)
Dept: FAMILY MEDICINE | Facility: CLINIC | Age: 59
End: 2024-06-05
Payer: COMMERCIAL

## 2024-06-05 NOTE — TELEPHONE ENCOUNTER
General Call      Reason for Call:  Questions about appt on 6/3/24    What are your questions or concerns:  pt wants to talk to care team and rather not say to scheduling    Date of last appointment with provider: 6/3/24    [facility] or would you prefer to receive a phone call?:   Patient would prefer a phone call   Okay to leave a detailed message?: Yes at Cell number on file:    Telephone Information:   Mobile [phone]

## 2024-06-06 ENCOUNTER — TELEPHONE (OUTPATIENT)
Dept: OBGYN | Facility: CLINIC | Age: 59
End: 2024-06-06
Payer: COMMERCIAL

## 2024-06-06 ENCOUNTER — MYC MEDICAL ADVICE (OUTPATIENT)
Dept: OBGYN | Facility: CLINIC | Age: 59
End: 2024-06-06
Payer: COMMERCIAL

## 2024-06-06 NOTE — TELEPHONE ENCOUNTER
Per 6/5 TE, pt is wanting tx for a yeast infection.    Pt was seen by Dr. Agbeh on 6/3.  A wet prep along with some other tests were done at that time.  Wet prep did not show a yeast infection or any other infection and all other labs were normal.    Sending pt a [employer] message letting her know that yeast treatment is not indicated at this time as her wet prep was normal.    Karolina Kaur RN

## 2024-06-06 NOTE — TELEPHONE ENCOUNTER
Pt had a wet prep done on 6/3/24 which was negative for yeast.  6/3 wet prep was negative for any infection.    Treatment is not advised at this time as pt does not have a yeast infection.     Sending pt a [employer] message.    Karolina Kaur RN

## 2024-06-06 NOTE — TELEPHONE ENCOUNTER
Called patient, she was wanting to hear from Dr Agbeh's team not PCP's team. She is requesting treatment for a yeast infection. She stated her symptoms were discussed at that visit and her boy friend was just diagnosed with a yeast infection and she thinks that is what is going on. She is requesting a cream that was previously sent for her.     Thank you,  Hanna Cárdenas RN     The electroanatomical mapping system was used to create the activation map of the arrhythmia, voltage map of the chamber and geometry of the chamber.

## 2024-06-09 ENCOUNTER — HEALTH MAINTENANCE LETTER (OUTPATIENT)
Age: 59
End: 2024-06-09

## 2024-06-12 NOTE — TELEPHONE ENCOUNTER
PATIENT Annabella Gates   : 1977    CHIEF COMPLAINT  Chief Complaint   Patient presents with    Follow-up     From [facility] visit 24 still having congestion, cough, ear pain. Inhaler, steroids, tessalon Perles did not work for her.          HISTORY OF PRESENT ILLNESS  Ms. Gates is a 46 year old female with Patient is here with complaint of cough, runny nose, fatigue, sweats, wheezing.  Was seen at [facility] 2024 and was placed on inhaler, oral steroids, Tessalon Perles.  Symptoms not really improved much.  Not sleeping at night at all because of cough.  Mild shortness of breath.  History of smoking in the past but has not smoked in a number of years.          Review of Systems   Constitutional:  Negative for activity change, appetite change, chills, fatigue and fever.   HENT:  Positive for congestion, postnasal drip, rhinorrhea and sore throat. Negative for trouble swallowing.    Eyes:  Negative for discharge and redness.   Respiratory:  Positive for cough and wheezing. Negative for apnea, chest tightness and shortness of breath.    Cardiovascular:  Negative for chest pain, palpitations and leg swelling.   Gastrointestinal:  Negative for abdominal distention, constipation, diarrhea, nausea and vomiting.   Genitourinary: Negative.    Musculoskeletal: Negative.    Skin:  Negative for rash.   Neurological: Negative.        PAST MEDICAL HISTORY    Viral cardiomyopathy  (CMD)                                     Comment: ; CRMI> nml LV size & function    Anxiety                                                       Depression                                                    Tobacco abuse                                                 PFO (patent foramen ovale) (CMD)                                Comment: +shunt by SUDHA -  CMRI- no PFO    Mitral regurgitation                                            Comment: 10/15 CMR LVEF 67%, nl MV, severe MR; mild by                SUDHA ;  CMRI mild  Schedule delio for a visit with me to discuss. Either Virtual or in person visit will be fine.   

## 2024-06-23 ENCOUNTER — OFFICE VISIT (OUTPATIENT)
Dept: URGENT CARE | Facility: URGENT CARE | Age: 59
End: 2024-06-23
Payer: COMMERCIAL

## 2024-06-23 VITALS
HEART RATE: 76 BPM | DIASTOLIC BLOOD PRESSURE: 66 MMHG | OXYGEN SATURATION: 98 % | TEMPERATURE: 98.6 F | RESPIRATION RATE: 18 BRPM | BODY MASS INDEX: 32.86 KG/M2 | WEIGHT: 163 LBS | SYSTOLIC BLOOD PRESSURE: 103 MMHG

## 2024-06-23 DIAGNOSIS — N76.0 BACTERIAL VAGINOSIS: ICD-10-CM

## 2024-06-23 DIAGNOSIS — A59.9 TRICHOMONAL INFECTION: Primary | ICD-10-CM

## 2024-06-23 DIAGNOSIS — B96.89 BACTERIAL VAGINOSIS: ICD-10-CM

## 2024-06-23 DIAGNOSIS — N89.8 VAGINAL DISCHARGE: ICD-10-CM

## 2024-06-23 DIAGNOSIS — Z11.3 SCREEN FOR STD (SEXUALLY TRANSMITTED DISEASE): ICD-10-CM

## 2024-06-23 PROCEDURE — 87563 M. GENITALIUM AMP PROBE: CPT | Mod: 90 | Performed by: INTERNAL MEDICINE

## 2024-06-23 PROCEDURE — 87798 DETECT AGENT NOS DNA AMP: CPT | Mod: 90 | Performed by: INTERNAL MEDICINE

## 2024-06-23 PROCEDURE — 87491 CHLMYD TRACH DNA AMP PROBE: CPT | Performed by: INTERNAL MEDICINE

## 2024-06-23 PROCEDURE — 99000 SPECIMEN HANDLING OFFICE-LAB: CPT | Performed by: INTERNAL MEDICINE

## 2024-06-23 PROCEDURE — 99214 OFFICE O/P EST MOD 30 MIN: CPT | Performed by: INTERNAL MEDICINE

## 2024-06-23 PROCEDURE — 87210 SMEAR WET MOUNT SALINE/INK: CPT | Performed by: INTERNAL MEDICINE

## 2024-06-23 PROCEDURE — 87591 N.GONORRHOEAE DNA AMP PROB: CPT | Performed by: INTERNAL MEDICINE

## 2024-06-23 RX ORDER — METRONIDAZOLE 500 MG/1
500 TABLET ORAL 2 TIMES DAILY
Qty: 14 TABLET | Refills: 0 | Status: SHIPPED | OUTPATIENT
Start: 2024-06-23 | End: 2024-06-30

## 2024-06-23 ASSESSMENT — PAIN SCALES - GENERAL: PAINLEVEL: NO PAIN (0)

## 2024-06-23 NOTE — PROGRESS NOTES
ASSESSMENT AND PLAN:      ICD-10-CM    1. Trichomonal infection  A59.9 metroNIDAZOLE (FLAGYL) 500 MG tablet      2. Vaginal discharge  N89.8 Wet prep - lab collect     Wet prep - lab collect     NEISSERIA GONORRHOEA PCR     CHLAMYDIA TRACHOMATIS PCR     Urogenital Ureaplasma and Mycoplasma Species by PCR      3. Bacterial vaginosis  N76.0 metroNIDAZOLE (FLAGYL) 500 MG tablet    B96.89       4. Screen for STD (sexually transmitted disease)  Z11.3 NEISSERIA GONORRHOEA PCR     CHLAMYDIA TRACHOMATIS PCR     Urogenital Ureaplasma and Mycoplasma Species by PCR      Recommended recheck with follow up wet preps as recurrent.    Lili Dover MD  [facility]    Subjective     Inga Ledesma is a 58 year old who presents for Patient presents with:  Vaginal Problem: Discharge and itchiness     an established patient of [facility] include [facility] beds at the [facility].    GYN Complaint    Onset of symptoms was 1 week(s) ago.  Current and Associated symptoms: vaginal discharge described as yellow and vulvar itching  Treatment measures tried include:  None  Sexually active: yes, single partner  Hx of previous symptom: frequent    Review of Systems        Objective    /66 (BP Location: Left arm, Patient Position: Sitting, Cuff Size: Adult Regular)   Pulse 76   Temp 98.6  F (37  C) (Oral)   Resp 18   Wt 73.9 kg (163 lb)   LMP 06/02/2010   SpO2 98%   BMI 32.86 kg/m    Physical Exam  Vitals reviewed.   Constitutional:       Appearance: Normal appearance. She is not ill-appearing.   Neurological:      Mental Status: She is alert.            Results for orders placed or performed in visit on 06/23/24 (from the past 24 hour(s))   Wet prep - lab collect    Specimen: Vagina; Swab   Result Value Ref Range    Trichomonas Present (A) Absent    Yeast Absent Absent    Clue Cells Present (A) Absent    WBCs/high power field 3+ (A) None     *Note: Due to a large number of results and/or encounters for the requested time period, some results  have not been displayed. A complete set of results can be found in Results Review.

## 2024-06-24 ENCOUNTER — TRANSFERRED RECORDS (OUTPATIENT)
Dept: HEALTH INFORMATION MANAGEMENT | Facility: CLINIC | Age: 59
End: 2024-06-24
Payer: COMMERCIAL

## 2024-06-24 LAB
C TRACH DNA SPEC QL NAA+PROBE: NEGATIVE
N GONORRHOEA DNA SPEC QL NAA+PROBE: NEGATIVE

## 2024-06-25 ENCOUNTER — TELEPHONE (OUTPATIENT)
Dept: FAMILY MEDICINE | Facility: CLINIC | Age: 59
End: 2024-06-25
Payer: COMMERCIAL

## 2024-06-25 NOTE — TELEPHONE ENCOUNTER
Forms received from: [employer]  Phone number listed: [phone]   Fax listed: [phone]  Date received: 6/24/24  Form description: Medical clearance form  Once forms are completed, please return to [employer]  via fax.  Is patient requesting to be contacted when forms are completed: na  Phone: na  Form placed:  Min Tobar

## 2024-06-27 LAB
M GENITALIUM DNA SPEC QL NAA+PROBE: NOT DETECTED
M HOMINIS DNA SPEC QL NAA+PROBE: NOT DETECTED
U PARVUM DNA SPEC QL NAA+PROBE: NOT DETECTED
U UREALYTICUM DNA SPEC QL NAA+PROBE: NOT DETECTED

## 2024-07-01 NOTE — TELEPHONE ENCOUNTER
Medina calling from [employer] to check on status of form completion. Please call Medina at [phone].

## 2024-07-08 DIAGNOSIS — M54.50 CHRONIC BILATERAL LOW BACK PAIN WITHOUT SCIATICA: ICD-10-CM

## 2024-07-08 DIAGNOSIS — G89.29 CHRONIC BILATERAL LOW BACK PAIN WITHOUT SCIATICA: ICD-10-CM

## 2024-07-08 RX ORDER — MELOXICAM 15 MG/1
15 TABLET ORAL DAILY
Qty: 30 TABLET | Refills: 0 | Status: SHIPPED | OUTPATIENT
Start: 2024-07-08 | End: 2024-08-06

## 2024-07-10 NOTE — TELEPHONE ENCOUNTER
Patient is scheduled 07/15/2024 and needs the form completed this week.Form placed in Dr Cleveland box due to absence of Min Toledo

## 2024-08-05 ENCOUNTER — OFFICE VISIT (OUTPATIENT)
Dept: FAMILY MEDICINE | Facility: CLINIC | Age: 59
End: 2024-08-05
Payer: COMMERCIAL

## 2024-08-05 VITALS
BODY MASS INDEX: 32.16 KG/M2 | TEMPERATURE: 97.8 F | WEIGHT: 163.8 LBS | OXYGEN SATURATION: 96 % | DIASTOLIC BLOOD PRESSURE: 64 MMHG | HEART RATE: 79 BPM | SYSTOLIC BLOOD PRESSURE: 108 MMHG | RESPIRATION RATE: 24 BRPM | HEIGHT: 60 IN

## 2024-08-05 DIAGNOSIS — Z00.00 ENCOUNTER FOR ANNUAL WELLNESS VISIT (AWV) IN MEDICARE PATIENT: Primary | ICD-10-CM

## 2024-08-05 DIAGNOSIS — R73.01 IMPAIRED FASTING GLUCOSE: ICD-10-CM

## 2024-08-05 DIAGNOSIS — I10 HYPERTENSION GOAL BP (BLOOD PRESSURE) < 140/90: ICD-10-CM

## 2024-08-05 DIAGNOSIS — Z71.89 ADVANCED DIRECTIVES, COUNSELING/DISCUSSION: ICD-10-CM

## 2024-08-05 DIAGNOSIS — E78.5 HYPERLIPIDEMIA WITH TARGET LDL LESS THAN 160: ICD-10-CM

## 2024-08-05 LAB — HBA1C MFR BLD: 5.1 % (ref 0–5.6)

## 2024-08-05 PROCEDURE — 36415 COLL VENOUS BLD VENIPUNCTURE: CPT | Performed by: PHYSICIAN ASSISTANT

## 2024-08-05 PROCEDURE — 83036 HEMOGLOBIN GLYCOSYLATED A1C: CPT | Performed by: PHYSICIAN ASSISTANT

## 2024-08-05 PROCEDURE — 99173 VISUAL ACUITY SCREEN: CPT | Mod: 59 | Performed by: PHYSICIAN ASSISTANT

## 2024-08-05 PROCEDURE — 80048 BASIC METABOLIC PNL TOTAL CA: CPT | Performed by: PHYSICIAN ASSISTANT

## 2024-08-05 PROCEDURE — G0439 PPPS, SUBSEQ VISIT: HCPCS | Performed by: PHYSICIAN ASSISTANT

## 2024-08-05 ASSESSMENT — PATIENT HEALTH QUESTIONNAIRE - PHQ9
SUM OF ALL RESPONSES TO PHQ QUESTIONS 1-9: 9
SUM OF ALL RESPONSES TO PHQ QUESTIONS 1-9: 9
10. IF YOU CHECKED OFF ANY PROBLEMS, HOW DIFFICULT HAVE THESE PROBLEMS MADE IT FOR YOU TO DO YOUR WORK, TAKE CARE OF THINGS AT HOME, OR GET ALONG WITH OTHER PEOPLE: SOMEWHAT DIFFICULT

## 2024-08-05 NOTE — PROGRESS NOTES
Subjective     Vision Screen  - Right 20/20  - Left 20/40  - Bilateral 20/20    Cody is a 58 year old, presenting for the following health issues:  Orders (Would like order for a scooteer), Wellness Visit, and Forms (Needing forms completed for [employer]/Wellness exam and A1C)        8/5/2024     1:01 PM   Additional Questions   Roomed by Ellen Arevalo CMA   Accompanied by Tucker         8/5/2024     1:01 PM   Patient Reported Additional Medications   Patient reports taking the following new medications none     History of Present Illness       Reason for visit:  Consult    She eats 0-1 servings of fruits and vegetables daily.She consumes 1 sweetened beverage(s) daily.She exercises with enough effort to increase her heart rate 9 or less minutes per day.  She exercises with enough effort to increase her heart rate 3 or less days per week.   She is taking medications regularly.       Annual Wellness Visit     Patient has been advised of split billing requirements and indicates understanding: Yes          Health Care Directive  Patient does not have a Health Care Directive or Living Will: Discussed advance care planning with patient; information given to patient to review.  In general, how would you rate your overall physical health? good  Do you have a special diet?  Regular (no restrictions)        8/5/2024   Exercise, Social Connection, Stress   Days per week of moderate/strenous exercise 1 day   Average minutes spent exercising at this level 30 min   Frequency of gathering with friends or relatives Once   Feel stress (tense, anxious, or unable to sleep) Very much        Do you see a dentist two times every year?  (!) NO  The patient was instructed to see the dentist every 6 months.   Have you been more tired than usual lately?  No  If you drink alcohol do you typically have >3 drinks per day or >7 drinks per week? No  Do you have a current opioid prescription? No  Do you use any other controlled substances or  medications that are not prescribed by a provider? None  Social History     Tobacco Use    Smoking status: Some Days     Current packs/day: 0.00     Average packs/day: 0.3 packs/day for 34.0 years (8.5 ttl pk-yrs)     Types: Cigarettes     Start date: 1979     Last attempt to quit: 10/3/2012     Years since quittin.8     Passive exposure: Never    Smokeless tobacco: Current     Types: Chew    Tobacco comments:     5 cigarettes daily   Vaping Use    Vaping status: Some Days    Last attempt to quit: 2022    Substances: Nicotine    Devices: Disposable   Substance Use Topics    Alcohol use: No     Comment: 1 pint of vodka per day, last drink aug 2018    Drug use: No       Needs assistance for the following daily activities: no assistance needed  Which of the following safety concerns are present in your home?  none identified   Do you (or your family members) have any concerns about your safety while driving?  No  Do you have any of the following hearing concerns?: Wears hearing aides  In the past 6 months, have you been bothered by leaking of urine? No        1/10/2024   Social Factors   Worry food won't last until get money to buy more No   Food not last or not have enough money for food? No   Do you have housing? (Housing is defined as stable permanent housing and does not include staying ouside in a car, in a tent, in an abandoned building, in an overnight shelter, or couch-surfing.) Yes   Are you worried about losing your housing? No   Lack of transportation? No   Unable to get utilities (heat,electricity)? Yes   Want help with housing or utility concern? No            2023   Fall Risk   Fallen 2 or more times in the past year? No           Today's PHQ-9 Score:       2024    12:53 PM   PHQ-9 SCORE   PHQ-9 Total Score MyChart 9 (Mild depression)   PHQ-9 Total Score 9         2023   LAST FHS-7 RESULTS   1st degree relative breast or ovarian cancer No   Any relative bilateral breast cancer  No   Any male have breast cancer No   Any ONE woman have BOTH breast AND ovarian cancer No   Any woman with breast cancer before 50yrs No   2 or more relatives with breast AND/OR ovarian cancer No   2 or more relatives with breast AND/OR bowel cancer No           Mammogram Screening - Annual screen due to greater than 20% lifetime risk as estimated by Breast Cancer Risk Calculator      History of abnormal Pap smear: No - age 30- 64 PAP with HPV every 5 years recommended       ASCVD Risk   The ASCVD Risk score (Roxana RATLIFF, et al., 2019) failed to calculate for the following reasons:    The patient has a prior MI or stroke diagnosis    Fracture Risk Assessment Tool  Link to Frax Calculator  Use the information below to complete the Frax calculator  : 1965  Sex: female  Weight (kg): 74.3 kg (actual weight)  Height (cm): 151.5 cm  Previous Fragility Fracture:  No  History of parent with fractured hip:  No  Current Smoking:  Yes  Patient has been on glucocorticoids for more than 3 months (5mg/day or more): No  Rheumatoid Arthritis on Problem List:  No  Secondary Osteoporosis on Problem List:  No  Consumes 3 or more units of alcohol per day: No  Femoral Neck BMD (g/cm2)            Reviewed and updated as needed this visit by Provider                    Past Medical History:   Diagnosis Date    Acute pain of left shoulder 2021    LALA (acute kidney injury) (H24) 2018    Alcohol abuse     Alcohol dependence with acute alcoholic intoxication (H) 2020    Alcohol withdrawal (H) 10/28/2017    Alcohol withdrawal seizure (H) 2016    Alcoholic hepatitis without ascites (H28) 2013    Cancer of labia majora (H)     Cellulitis of right lower extremity 2021    Cervical dysplasia 1987    Congestive heart failure (H) 2016    COPD (chronic obstructive pulmonary disease) (H)     COPD exacerbation (H) 2018    CVA (cerebral infarction) 2012    Dependent edema     Depression,  major     Depressive disorder 1966    GERD (gastroesophageal reflux disease)     Hyperlipidemia LDL goal < 160     Hypertension     Iron deficiency anemia     Menorrhagia 05/10/2010    Pneumonia 07/10/2021    RLS (restless legs syndrome)     Sacroiliitis (H24)     steroid injections ineffective, chronic low back pain    Sepsis (H) 06/18/2018    Tobacco abuse     Uncomplicated asthma     Vitamin D deficiencies      Past Surgical History:   Procedure Laterality Date    AS BIOPSY/EXCISION LYMPH NODE OPEN SUPERFICIAL      COLONOSCOPY      COLONOSCOPY N/A 4/13/2022    Procedure: COLONOSCOPY;  Surgeon: Mike Garcia MD;  Location:  GI    EYE SURGERY      HYSTERECTOMY  2010    HYSTERECTOMY TOTAL ABDOMINAL  7/28/10    Bilateral salpingectomy.  ovaries conserved.    INJECT SACROILIAC JOINT Bilateral 7/21/2023    Procedure: INJECTION, SACROILIAC JOINT;  Surgeon: Raji Castellanos MD;  [place] [facility] [place]    LASER [place], CERVICAL  1987    LYMPH NODE BIOPSY  2007    inguinal    LYSIS OF LABIAL LESION(S)  1985, 1987     BP Readings from Last 3 Encounters:   08/05/24 108/64   06/23/24 103/66   06/03/24 134/76    Wt Readings from Last 3 Encounters:   08/05/24 74.3 kg (163 lb 12.8 oz)   06/23/24 73.9 kg (163 lb)   06/03/24 75.3 kg (166 lb)                  Patient Active Problem List   Diagnosis    RLS (restless legs syndrome)    GERD (gastroesophageal reflux disease)    Iron deficiency anemia secondary to inadequate dietary iron intake    Hyperlipidemia with target LDL less than 160    Tobacco abuse    Vitamin D deficiency    Edema of both legs    Hypertension goal BP (blood pressure) < 140/90    Major depressive disorder, recurrent episode, mild (H24)    (HFpEF) heart failure with preserved ejection fraction (H)    Psychophysiological insomnia    Alcohol abuse    Morbid obesity (H)    Chronic bilateral low back pain without sciatica    Chronic obstructive pulmonary disease (H)    JAQUELIN (obstructive sleep apnea)- severe (AHI 37)     Preop general physical exam    Polyarthritis with positive rheumatoid factor (H)    Alcohol dependence in remission (H)     Past Surgical History:   Procedure Laterality Date    AS BIOPSY/EXCISION LYMPH NODE OPEN SUPERFICIAL      COLONOSCOPY      COLONOSCOPY N/A 2022    Procedure: COLONOSCOPY;  Surgeon: Mike Garcia MD;  Location:  GI    EYE SURGERY      HYSTERECTOMY      HYSTERECTOMY TOTAL ABDOMINAL  7/28/10    Bilateral salpingectomy.  ovaries conserved.    INJECT SACROILIAC JOINT Bilateral 2023    Procedure: INJECTION, SACROILIAC JOINT;  Surgeon: Raji Castellanos MD;  [place] [facility] [place], CERVICAL      LYMPH NODE BIOPSY      inguinal    LYSIS OF LABIAL LESION(S)  1987       Social History     Tobacco Use    Smoking status: Some Days     Current packs/day: 0.00     Average packs/day: 0.3 packs/day for 34.0 years (8.5 ttl pk-yrs)     Types: Cigarettes     Start date: 1979     Last attempt to quit: 10/3/2012     Years since quittin.8     Passive exposure: Never    Smokeless tobacco: Current     Types: Chew    Tobacco comments:     5 cigarettes daily   Substance Use Topics    Alcohol use: No     Comment: 1 pint of vodka per day, last drink aug 2018     Family History   Problem Relation Age of Onset    Depression/Anxiety Mother     Asthma Mother     Cerebrovascular Disease Father     Hypertension Father     Lung Cancer Father     Alcoholism Father     Breast Cancer Paternal Aunt     Other Cancer Other     Depression Other     Anxiety Disorder Other     Mental Illness Other     Substance Abuse Other     Asthma Other     Obesity Sister     Obesity Son     Suicide Paternal Uncle          Current Outpatient Medications   Medication Sig Dispense Refill    acetaminophen (ACETAMINOPHEN EXTRA STRENGTH) 500 MG tablet TAKE 1-2 TABLETS BY MOUTH EVERY 4-6 HOURS AS NEEDED FOR PAIN *MAX DOSE 4000 MG IN 24 HOURS* 100 tablet 10    ADVAIR DISKUS 500-50 MCG/ACT inhaler INHALE ONE  PUFF BY MOUTH EVERY 12 HOURS patient is using PRN 60 each 10    ASPIRIN LOW DOSE 81 MG EC tablet TAKE 1 TABLET BY MOUTH DAILY 30 tablet 10    azithromycin (ZITHROMAX) 250 MG tablet TAKE 1 TABLET (250 MG) BY MOUTH EVERY MONDAY, WEDNESDAY, AND FRIDAY MORNING 13 tablet 10    buPROPion (WELLBUTRIN XL) 300 MG 24 hr tablet TAKE 1 TABLET BY MOUTH EVERY MORNING 30 tablet 5    Calcium Carb-Cholecalciferol 500-10 MG-MCG/5ML LIQD Take 5 mLs by mouth 2 times daily Patient may administer this on her own with out supervision of staff 480 mL 5    cariprazine (VRAYLAR) 1.5 MG capsule Take 1 capsule (1.5 mg) by mouth daily 90 capsule 0    cetirizine (ZYRTEC) 10 MG tablet TAKE 1 TABLET BY MOUTH DAILY 30 tablet 10    docusate sodium (COLACE) 100 MG capsule Take 1 capsule (100 mg) by mouth 2 times daily as needed for constipation 180 capsule 3    EPIPEN 2-FAVIOLA 0.3 MG/0.3ML injection 2-pack INJECT AS DIRECTED AS NEEDED FOR ALLERGIC REACTION 2 each 1    famotidine (PEPCID) 40 MG tablet TAKE 1 TABLET BY MOUTH DAILY AT BEDTIME 30 tablet 10    FEROSUL 325 (65 Fe) MG tablet TAKE 1 TABLET BY MOUTH TWICE DAILY 60 tablet 10    fluconazole (DIFLUCAN) 100 MG tablet Take 1 tablet (100 mg) by mouth daily as needed (for glossitis) 45 tablet 3    FLUoxetine (PROZAC) 40 MG capsule TAKE 2 CAPSULES BY MOUTH ONCE DAILY 60 capsule 5    fluticasone (FLONASE) 50 MCG/ACT nasal spray INSTILL ONE (1) SPRAY IN EACH NOSTRIL DAILY 16 g 10    folic acid (FOLVITE) 1 MG tablet TAKE 1 TABLET BY MOUTH DAILY 30 tablet 10    furosemide (LASIX) 40 MG tablet Take 0.5 tablets (20 mg) by mouth daily 45 tablet 3    gabapentin (NEURONTIN) 300 MG capsule Take 1 capsule (300 mg) by mouth at bedtime 90 capsule 1    hydrALAZINE (APRESOLINE) 10 MG tablet TAKE 1 TABLET BY MOUTH 3 TIMES DAILY 90 tablet 10    hydrOXYzine (ATARAX) 25 MG tablet TAKE 1 TABLET BY MOUTH THREE TIMES DAILY AS NEEDED FOR ITCHING 90 tablet 10    insulin pen needle (31G X 6 MM) 31G X 6 MM miscellaneous Use 1  pen needles daily or as directed for use with victoza. 90 each 1    ipratropium - albuterol 0.5 mg/2.5 mg/3 mL (DUONEB) 0.5-2.5 (3) MG/3ML neb solution INHALE ONE VIAL BY NEBULIZATION FOUR TIMES DAILY AS NEEDED FOR WHEEZING 360 mL 5    labetalol (NORMODYNE) 100 MG tablet TAKE ONE (1) TABLET BY MOUTH TWICE DAILY 60 tablet 5    magnesium oxide (MAG-OX) 400 MG tablet TAKE 1 TABLET BY MOUTH DAILY 30 tablet 10    meclizine (ANTIVERT) 25 MG tablet Take 1 tablet (25 mg) by mouth 3 times daily as needed for dizziness 80 tablet 0    meloxicam (MOBIC) 15 MG tablet Take 1 tablet (15 mg) by mouth daily 30 tablet 0    naltrexone (DEPADE/REVIA) 50 MG tablet Take 1 tablet (50 mg) by mouth daily 30 tablet 3    omeprazole (PRILOSEC) 20 MG DR capsule TAKE ONE (1) CAPSULE BY MOUTH TWICE DAILY BEFORE MEALS 60 capsule 10    ondansetron (ZOFRAN ODT) 4 MG ODT tab Take 1 tablet (4 mg) by mouth every 8 hours as needed for nausea 30 tablet 3    potassium chloride (KLOR-CON) 20 MEQ packet Take 20 mEq by mouth daily 90 packet 3    Potassium Chloride 10 MEQ PACK Take 10 mEq by mouth daily 60 each 5    pramipexole (MIRAPEX) 0.5 MG tablet Take 1 tablet (0.5 mg) by mouth at bedtime 90 tablet 1    QUEtiapine (SEROQUEL) 100 MG tablet TAKE 1 TO 2 TABLETS(100  MG) BY MOUTH AT BEDTIME 90 tablet 1    Semaglutide-Weight Management (WEGOVY) 2.4 MG/0.75ML pen Inject 2.4 mg Subcutaneous once a week 3 mL 3    VENTOLIN  (90 Base) MCG/ACT inhaler INHALE 1-2 PUFFS BY MOUTH EVERY 4 HOURS AS NEEDED FOR SHORTNESS OF BREATH, DIFFICULTY BREATHING OR WHEEZING. 18 g 10    vitamin D3 (CHOLECALCIFEROL) 50 mcg (2000 units) tablet TAKE 1 TABLET BY MOUTH DAILY 30 tablet 10    zaleplon (SONATA) 10 MG capsule Take 2 capsules (20 mg) by mouth at bedtime 60 capsule 2     Allergies   Allergen Reactions    Bee Venom Anaphylaxis    Metoclopramide Other (See Comments)     Other reaction(s): Other - Describe In Comment Field   Body went stiff, patient couldn't move.    Other reaction(s): Other (See Comments)   Body tenses up   Other reaction(s): Muscle Cramps    Reglan [Metoclopramide Hcl] Other (See Comments)     Body tenses up, Dystonia    Doxycycline Rash     Recent Labs   Lab Test 01/10/24  1401 06/22/23  1404 12/26/22  1537 06/15/22  1027 04/21/22  1008 07/28/21  0946 07/28/21  0946 05/12/21  1520 01/06/21  1249 08/09/20  0000 08/04/20  1052 07/20/19  0000 07/18/19  0000   A1C  --   --   --   --   --   --  5.5  --   --   --  5.2  --  5.0   LDL  --  117*  --   --   --   --   --  73  --   --  80   < >  --    HDL  --  55  --   --   --   --   --  59  --   --  78   < >  --    TRIG  --  155*  --   --   --   --   --  101  --   --  85   < >  --    ALT 25  --  52*  --  25   < > 21 21 23   < >  --    < > 35   CR 0.74 0.97* 0.90   < > 1.29*   < > 0.84 1.32* 1.09*   < > 0.63   < >  --    GFRESTIMATED >90 68 75   < > 49*   < > 79 45* 57*   < > >90   < >  --    GFRESTBLACK  --   --   --   --   --   --   --  53* 66   < > >90   < >  --    POTASSIUM 4.4 3.9 3.8   < > 4.5   < > 4.8 4.8 4.7   < > 3.7   < >  --    TSH  --   --  1.57  --  1.19  --   --   --   --    < >  --   --   --     < > = values in this interval not displayed.        Current providers sharing in care for this patient include:  Patient Care Team:  Min Toledo PA-C as PCP - General (Family Medicine)  Min Toledo PA-C as Assigned PCP  Kristi Webb MD as Assigned Pulmonology Provider  Angel Luis Pacheco APRN CNP as Nurse Practitioner (Neurology)  Min Toledo PA-C as Referring Physician (Family Medicine)  Ashleigh Cardona AuD as Audiologist (Audiology)  Sriram Alonso MD as Assigned Surgical Provider  Sarina Chavarria PA-C as Assigned Neuroscience Provider  Agbeh, Cephas Mawuena, MD as Assigned OBGYN Provider    The following health maintenance items are reviewed in Epic and correct as of today:  Health Maintenance   Topic Date Due    HEPATITIS B IMMUNIZATION (1 of 3 - 19+ 3-dose series) Never done     HF ACTION PLAN  01/14/2022    MEDICARE ANNUAL WELLNESS VISIT  01/10/2023    COVID-19 Vaccine (7 - 2023-24 season) 03/06/2024    LIPID  06/22/2024    BMP  07/10/2024    ZOSTER IMMUNIZATION (2 of 2) 03/06/2024    NICOTINE/TOBACCO CESSATION COUNSELING Q 1 YR  07/17/2024    LUNG CANCER SCREENING  09/14/2024    DEPRESSION 6 MO INDEX REPEAT PHQ-9  08/19/2024    INFLUENZA VACCINE (1) 09/01/2024    ALT  01/10/2025    CBC  01/10/2025    PHQ-9  02/05/2025    MAMMO SCREENING  05/24/2025    ANNUAL REVIEW OF HM ORDERS  06/03/2025    DTAP/TDAP/TD IMMUNIZATION (5 - Td or Tdap) 05/01/2026    GLUCOSE  01/10/2027    ADVANCE CARE PLANNING  08/05/2029    COLORECTAL CANCER SCREENING  04/13/2032    TSH W/FREE T4 REFLEX  Completed    SPIROMETRY  Completed    HEPATITIS C SCREENING  Completed    HIV SCREENING  Completed    COPD ACTION PLAN  Completed    DEPRESSION ACTION PLAN  Completed    Pneumococcal Vaccine: Pediatrics (0 to 5 Years) and At-Risk Patients (6 to 64 Years)  Completed    IPV IMMUNIZATION  Aged Out    HPV IMMUNIZATION  Aged Out    MENINGITIS IMMUNIZATION  Aged Out    RSV MONOCLONAL ANTIBODY  Aged Out    PAP  Discontinued       Appropriate preventive services were discussed with this patient, including applicable screening as appropriate for fall prevention, nutrition, physical activity, Tobacco-use cessation, weight loss and cognition.  Checklist reviewing preventive services available has been given to the patient.          8/5/2024   Mini Cog   Mini-Cog Not Completed (choose reason) Patient declines            Vision Screen  Reason Vision Screen Not Completed: Patient had exam in last 12 months  Patient wears corrective lenses (select all that apply): Worn during vision screen  Vision Screen Results: (!) REFER        Review of Systems  Constitutional, HEENT, cardiovascular, pulmonary, GI, , musculoskeletal, neuro, skin, endocrine and psych systems are negative, except as otherwise noted.      Objective    /64    "Pulse 79   Temp 97.8  F (36.6  C) (Oral)   Resp 24   Ht 1.515 m (4' 11.65\")   Wt 74.3 kg (163 lb 12.8 oz)   LMP 06/02/2010   SpO2 96%   BMI 32.37 kg/m    Body mass index is 32.37 kg/m .  Physical Exam   GENERAL: alert and no distress  EYES: Eyes grossly normal to inspection, PERRL and conjunctivae and sclerae normal  HENT: ear canals and TM's normal, nose and mouth without ulcers or lesions  NECK: no adenopathy, no asymmetry, masses, or scars  RESP: lungs clear to auscultation - no rales, rhonchi or wheezes  CV: regular rate and rhythm, normal S1 S2, no S3 or S4, no murmur, click or rub, no peripheral edema  ABDOMEN: soft, nontender, no hepatosplenomegaly, no masses and bowel sounds normal  MS: no gross musculoskeletal defects noted, no edema  SKIN: no suspicious lesions or rashes  NEURO: Normal strength and tone, mentation intact and speech normal  PSYCH: mentation appears normal, affect normal/bright    Cody was seen today for orders, wellness visit and forms.    Diagnoses and all orders for this visit:    Encounter for annual wellness visit (AWV) in Medicare patient    Hyperlipidemia with target LDL less than 160    Hypertension goal BP (blood pressure) < 140/90  -     BASIC METABOLIC PANEL; Future    Impaired fasting glucose  -     Hemoglobin A1c; Future      Continue all meds .  work on lifestyle modification  Lower fat, higher fiber diet and consistent exercise.  Lower sodium diet.  Recheck in 6 mos           Signed Electronically by: Min Toledo PA-C    "

## 2024-08-06 DIAGNOSIS — G89.29 CHRONIC BILATERAL LOW BACK PAIN WITHOUT SCIATICA: ICD-10-CM

## 2024-08-06 DIAGNOSIS — M54.50 CHRONIC BILATERAL LOW BACK PAIN WITHOUT SCIATICA: ICD-10-CM

## 2024-08-06 LAB
ANION GAP SERPL CALCULATED.3IONS-SCNC: 11 MMOL/L (ref 7–15)
BUN SERPL-MCNC: 24.2 MG/DL (ref 6–20)
CALCIUM SERPL-MCNC: 9.4 MG/DL (ref 8.8–10.4)
CHLORIDE SERPL-SCNC: 100 MMOL/L (ref 98–107)
CREAT SERPL-MCNC: 0.95 MG/DL (ref 0.51–0.95)
EGFRCR SERPLBLD CKD-EPI 2021: 70 ML/MIN/1.73M2
GLUCOSE SERPL-MCNC: 96 MG/DL (ref 70–99)
HCO3 SERPL-SCNC: 24 MMOL/L (ref 22–29)
POTASSIUM SERPL-SCNC: 4.7 MMOL/L (ref 3.4–5.3)
SODIUM SERPL-SCNC: 135 MMOL/L (ref 135–145)

## 2024-08-07 RX ORDER — MELOXICAM 15 MG/1
15 TABLET ORAL DAILY
Qty: 30 TABLET | Refills: 0 | Status: SHIPPED | OUTPATIENT
Start: 2024-08-07 | End: 2024-09-16

## 2024-08-13 DIAGNOSIS — I10 HYPERTENSION GOAL BP (BLOOD PRESSURE) < 140/90: ICD-10-CM

## 2024-08-13 RX ORDER — LABETALOL 100 MG/1
TABLET, FILM COATED ORAL
Qty: 60 TABLET | Refills: 5 | Status: SHIPPED | OUTPATIENT
Start: 2024-08-13

## 2024-08-28 ENCOUNTER — TRANSFERRED RECORDS (OUTPATIENT)
Dept: HEALTH INFORMATION MANAGEMENT | Facility: CLINIC | Age: 59
End: 2024-08-28
Payer: COMMERCIAL

## 2024-09-04 ENCOUNTER — ANCILLARY PROCEDURE (OUTPATIENT)
Dept: CT IMAGING | Facility: CLINIC | Age: 59
End: 2024-09-04
Attending: INTERNAL MEDICINE
Payer: COMMERCIAL

## 2024-09-04 ENCOUNTER — OFFICE VISIT (OUTPATIENT)
Dept: PULMONOLOGY | Facility: CLINIC | Age: 59
End: 2024-09-04
Attending: INTERNAL MEDICINE
Payer: COMMERCIAL

## 2024-09-04 VITALS
WEIGHT: 163 LBS | SYSTOLIC BLOOD PRESSURE: 117 MMHG | DIASTOLIC BLOOD PRESSURE: 70 MMHG | OXYGEN SATURATION: 98 % | BODY MASS INDEX: 32.21 KG/M2 | HEART RATE: 67 BPM

## 2024-09-04 DIAGNOSIS — J42 CHRONIC BRONCHITIS, UNSPECIFIED CHRONIC BRONCHITIS TYPE (H): ICD-10-CM

## 2024-09-04 DIAGNOSIS — Z87.891 PERSONAL HISTORY OF SMOKING: ICD-10-CM

## 2024-09-04 DIAGNOSIS — J43.2 CENTRILOBULAR EMPHYSEMA (H): Primary | ICD-10-CM

## 2024-09-04 DIAGNOSIS — Z71.6 ENCOUNTER FOR SMOKING CESSATION COUNSELING: ICD-10-CM

## 2024-09-04 PROCEDURE — 99215 OFFICE O/P EST HI 40 MIN: CPT | Performed by: INTERNAL MEDICINE

## 2024-09-04 PROCEDURE — 71271 CT THORAX LUNG CANCER SCR C-: CPT | Mod: GC | Performed by: RADIOLOGY

## 2024-09-04 PROCEDURE — G2211 COMPLEX E/M VISIT ADD ON: HCPCS | Performed by: INTERNAL MEDICINE

## 2024-09-04 RX ORDER — ALBUTEROL SULFATE 90 UG/1
AEROSOL, METERED RESPIRATORY (INHALATION)
Qty: 18 G | Refills: 10 | Status: SHIPPED | OUTPATIENT
Start: 2024-09-04

## 2024-09-04 RX ORDER — FLUTICASONE FUROATE, UMECLIDINIUM BROMIDE AND VILANTEROL TRIFENATATE 200; 62.5; 25 UG/1; UG/1; UG/1
1 POWDER RESPIRATORY (INHALATION) DAILY
Qty: 3 EACH | Refills: 3 | Status: SHIPPED | OUTPATIENT
Start: 2024-09-04 | End: 2025-08-30

## 2024-09-04 RX ORDER — FLUTICASONE PROPIONATE AND SALMETEROL 50; 500 UG/1; UG/1
POWDER RESPIRATORY (INHALATION)
Qty: 60 EACH | Refills: 10 | Status: CANCELLED | OUTPATIENT
Start: 2024-09-04

## 2024-09-04 NOTE — NURSING NOTE
Inga Ledesma's goals for this visit include:   Chief Complaint   Patient presents with    Follow Up    COPD       She requests these members of her care team be copied on today's visit information: no     PCP: Min Toledo    Referring Provider:  Kristi Webb MD  [address] [place] [id number]    /70 (BP Location: Left arm, Patient Position: Chair, Cuff Size: Adult Regular)   Pulse 67   Wt 73.9 kg (163 lb)   LMP 06/02/2010   SpO2 98%   BMI 32.21 kg/m      Do you need any medication refills at today's visit? Yes     WILLIAM Salamanca   Neph/Pulm [facility]

## 2024-09-04 NOTE — PROGRESS NOTES
22Pulmonary Clinic Return Patient Visit  Reason for Visit: COPD  History of Present Illness  Inga Ledesma  is a pleasant 58 yr old male with a history of  COPD and active smoker who presents to clinic for follow up for same. I saw last in clinic in 9/2023  To briefly review, Cody is a longtime smoker who continues to be active, was diagnosed with COPD over a decade ago.  She is currently on Advair in a scheduled fashion- twice daily and nebulized albuterol/ipratropium but still continues to remain very symptomatic. Emphasis has been made on smoking cessation for symptom control and her therapy escalated to/by adding chronic azithromycin at 500 mg on Monday Wednesdays and Fridays.  Historically, she has had frequent AECOPD's and high dependence on prednisone and antibiotics frequently.  Doing better. SOB and cough have improved and she is less symptomatic.  She appeared to be nonadherent with her bronchodilators and forgets to use her high-dose Advair. She tells me that using inhalers are very cumbersome.   She was hospitalized in 2/2024 for alcohol intoxication and withdrawals otherwise there has been no COPD flares or pneumonia. There has been no AECOPDs since the last clinic visit. Now smokes 6 cigs per day. No AECOPDs since the last clinic visit. She is not reliant on her rescue inhalers  Has lost considerable weight with Wegovy which has improved her breathing and exercise tolerance. No longer dependent on wheel heidi and has since moved to a new assisted living facility.    Review of Systems:  10 of 14 systems reviewed and are negative unless otherwise stated in HPI.    Past Medical History:   Diagnosis Date    Acute pain of left shoulder 05/20/2021    LALA (acute kidney injury) (H24) 05/28/2018    Alcohol abuse     Alcohol dependence with acute alcoholic intoxication (H) 11/19/2020    Alcohol withdrawal (H) 10/28/2017    Alcohol withdrawal seizure (H) 03/2016    Alcoholic hepatitis without ascites (H28)  12/05/2013    Cancer of labia majora (H) 1987    Cellulitis of right lower extremity 03/13/2021    Cervical dysplasia 1987    Congestive heart failure (H) 05/16/2016    COPD (chronic obstructive pulmonary disease) (H)     COPD exacerbation (H) 09/04/2018    CVA (cerebral infarction) 01/2012    Dependent edema     Depression, major     Depressive disorder 1966    GERD (gastroesophageal reflux disease)     Hyperlipidemia LDL goal < 160     Hypertension     Iron deficiency anemia     Menorrhagia 05/10/2010    Pneumonia 07/10/2021    RLS (restless legs syndrome)     Sacroiliitis (H24)     steroid injections ineffective, chronic low back pain    Sepsis (H) 06/18/2018    Tobacco abuse     Uncomplicated asthma     Vitamin D deficiencies        Past Surgical History:   Procedure Laterality Date    AS BIOPSY/EXCISION LYMPH NODE OPEN SUPERFICIAL      COLONOSCOPY      COLONOSCOPY N/A 4/13/2022    Procedure: COLONOSCOPY;  Surgeon: Mike Garcia MD;  Location:  GI    EYE SURGERY      HYSTERECTOMY  2010    HYSTERECTOMY TOTAL ABDOMINAL  7/28/10    Bilateral salpingectomy.  ovaries conserved.    INJECT SACROILIAC JOINT Bilateral 7/21/2023    Procedure: INJECTION, SACROILIAC JOINT;  Surgeon: Raji Castellanos MD;  Location: [facility] [place]    LASER TX, CERVICAL  1987    LYMPH NODE BIOPSY  2007    inguinal    LYSIS OF LABIAL LESION(S)  1985, 1987       Family History   Problem Relation Age of Onset    Depression/Anxiety Mother     Asthma Mother     Cerebrovascular Disease Father     Hypertension Father     Lung Cancer Father     Alcoholism Father     Breast Cancer Paternal Aunt     Other Cancer Other     Depression Other     Anxiety Disorder Other     Mental Illness Other     Substance Abuse Other     Asthma Other     Obesity Sister     Obesity Son     Suicide Paternal Uncle        Social History     Socioeconomic History    Marital status:      Spouse name: Not on file    Number of children: 1    Years of education: 13     Highest education level: Not on file   Occupational History     Employer: [employer]   Tobacco Use    Smoking status: Current Some Day Smoker     Packs/day: 0.25     Years: 34.00     Pack years: 8.50     Types: Cigarettes     Start date: 1979     Last attempt to quit: 10/3/2012     Years since quittin.3    Smokeless tobacco: Current User     Types: Chew    Tobacco comment: 5 cigarettes daily   Vaping Use    Vaping Use: Former    Quit date: 2022   Substance and Sexual Activity    Alcohol use: No     Comment: 1 pint of vodka per day, last drink aug 2018    Drug use: No    Sexual activity: Not Currently   Other Topics Concern    Parent/sibling w/ CABG, MI or angioplasty before 65F 55M? No   Social History Narrative    Lives in [place] with her son in a trailer no access to guns or weapons works for the [employer] office and enjoys crocheting and watching television.     Social Determinants of Health     Financial Resource Strain: Not on file   Food Insecurity: Not on file   Transportation Needs: Not on file   Physical Activity: Not on file   Stress: Not on file   Social Connections: Not on file   Intimate Partner Violence: Not on file   Housing Stability: Not on file         Allergies   Allergen Reactions    Bee Venom Anaphylaxis    Metoclopramide Other (See Comments)     Other reaction(s): Other - Describe In Comment Field   Body went stiff, patient couldn't move.   Other reaction(s): Other (See Comments)   Body tenses up   Other reaction(s): Muscle Cramps    Reglan [Metoclopramide Hcl] Other (See Comments)     Body tenses up, Dystonia    Doxycycline Rash         Current Outpatient Medications:     acetaminophen (ACETAMINOPHEN EXTRA STRENGTH) 500 MG tablet, TAKE 1-2 TABLETS BY MOUTH EVERY 4-6 HOURS AS NEEDED FOR PAIN *MAX DOSE 4000 MG IN 24 HOURS*, Disp: 100 tablet, Rfl: 10    ADVAIR DISKUS 500-50 MCG/ACT inhaler, INHALE ONE PUFF BY MOUTH EVERY 12 HOURS patient is using PRN, Disp: 60 each,  Rfl: 10    ASPIRIN LOW DOSE 81 MG EC tablet, TAKE 1 TABLET BY MOUTH DAILY, Disp: 30 tablet, Rfl: 10    azithromycin (ZITHROMAX) 250 MG tablet, TAKE 1 TABLET (250 MG) BY MOUTH EVERY MONDAY, WEDNESDAY, AND FRIDAY MORNING, Disp: 13 tablet, Rfl: 10    buPROPion (WELLBUTRIN XL) 300 MG 24 hr tablet, TAKE 1 TABLET BY MOUTH EVERY MORNING, Disp: 30 tablet, Rfl: 5    Calcium Carb-Cholecalciferol 500-10 MG-MCG/5ML LIQD, Take 5 mLs by mouth 2 times daily Patient may administer this on her own with out supervision of staff, Disp: 480 mL, Rfl: 5    cariprazine (VRAYLAR) 1.5 MG capsule, Take 1 capsule (1.5 mg) by mouth daily, Disp: 90 capsule, Rfl: 0    cetirizine (ZYRTEC) 10 MG tablet, TAKE 1 TABLET BY MOUTH DAILY, Disp: 30 tablet, Rfl: 10    docusate sodium (COLACE) 100 MG capsule, Take 1 capsule (100 mg) by mouth 2 times daily as needed for constipation, Disp: 180 capsule, Rfl: 3    EPIPEN 2-FAVIOLA 0.3 MG/0.3ML injection 2-pack, INJECT AS DIRECTED AS NEEDED FOR ALLERGIC REACTION, Disp: 2 each, Rfl: 1    famotidine (PEPCID) 40 MG tablet, TAKE 1 TABLET BY MOUTH DAILY AT BEDTIME, Disp: 30 tablet, Rfl: 10    FEROSUL 325 (65 Fe) MG tablet, TAKE 1 TABLET BY MOUTH TWICE DAILY, Disp: 60 tablet, Rfl: 10    fluconazole (DIFLUCAN) 100 MG tablet, Take 1 tablet (100 mg) by mouth daily as needed (for glossitis), Disp: 45 tablet, Rfl: 3    FLUoxetine (PROZAC) 40 MG capsule, TAKE 2 CAPSULES BY MOUTH ONCE DAILY, Disp: 60 capsule, Rfl: 5    fluticasone (FLONASE) 50 MCG/ACT nasal spray, INSTILL ONE (1) SPRAY IN EACH NOSTRIL DAILY, Disp: 16 g, Rfl: 10    folic acid (FOLVITE) 1 MG tablet, TAKE 1 TABLET BY MOUTH DAILY, Disp: 30 tablet, Rfl: 10    furosemide (LASIX) 40 MG tablet, Take 0.5 tablets (20 mg) by mouth daily, Disp: 45 tablet, Rfl: 3    gabapentin (NEURONTIN) 300 MG capsule, Take 1 capsule (300 mg) by mouth at bedtime, Disp: 90 capsule, Rfl: 1    hydrALAZINE (APRESOLINE) 10 MG tablet, TAKE 1 TABLET BY MOUTH 3 TIMES DAILY, Disp: 90 tablet, Rfl:  10    hydrOXYzine (ATARAX) 25 MG tablet, TAKE 1 TABLET BY MOUTH THREE TIMES DAILY AS NEEDED FOR ITCHING, Disp: 90 tablet, Rfl: 10    insulin pen needle (31G X 6 MM) 31G X 6 MM miscellaneous, Use 1 pen needles daily or as directed for use with victoza., Disp: 90 each, Rfl: 1    ipratropium - albuterol 0.5 mg/2.5 mg/3 mL (DUONEB) 0.5-2.5 (3) MG/3ML neb solution, INHALE ONE VIAL BY NEBULIZATION FOUR TIMES DAILY AS NEEDED FOR WHEEZING, Disp: 360 mL, Rfl: 5    labetalol (NORMODYNE) 100 MG tablet, TAKE ONE (1) TABLET BY MOUTH TWICE DAILY, Disp: 60 tablet, Rfl: 5    magnesium oxide (MAG-OX) 400 MG tablet, TAKE 1 TABLET BY MOUTH DAILY, Disp: 30 tablet, Rfl: 10    meclizine (ANTIVERT) 25 MG tablet, Take 1 tablet (25 mg) by mouth 3 times daily as needed for dizziness, Disp: 80 tablet, Rfl: 0    meloxicam (MOBIC) 15 MG tablet, Take 1 tablet (15 mg) by mouth daily, Disp: 30 tablet, Rfl: 0    naltrexone (DEPADE/REVIA) 50 MG tablet, Take 1 tablet (50 mg) by mouth daily, Disp: 30 tablet, Rfl: 3    omeprazole (PRILOSEC) 20 MG DR capsule, TAKE ONE (1) CAPSULE BY MOUTH TWICE DAILY BEFORE MEALS, Disp: 60 capsule, Rfl: 10    ondansetron (ZOFRAN ODT) 4 MG ODT tab, Take 1 tablet (4 mg) by mouth every 8 hours as needed for nausea, Disp: 30 tablet, Rfl: 3    potassium chloride (KLOR-CON) 20 MEQ packet, Take 20 mEq by mouth daily, Disp: 90 packet, Rfl: 3    Potassium Chloride 10 MEQ PACK, Take 10 mEq by mouth daily, Disp: 60 each, Rfl: 5    pramipexole (MIRAPEX) 0.5 MG tablet, Take 1 tablet (0.5 mg) by mouth at bedtime, Disp: 90 tablet, Rfl: 1    QUEtiapine (SEROQUEL) 100 MG tablet, TAKE 1 TO 2 TABLETS(100  MG) BY MOUTH AT BEDTIME, Disp: 90 tablet, Rfl: 1    Semaglutide-Weight Management (WEGOVY) 2.4 MG/0.75ML pen, Inject 2.4 mg Subcutaneous once a week, Disp: 3 mL, Rfl: 3    VENTOLIN  (90 Base) MCG/ACT inhaler, INHALE 1-2 PUFFS BY MOUTH EVERY 4 HOURS AS NEEDED FOR SHORTNESS OF BREATH, DIFFICULTY BREATHING OR WHEEZING., Disp:  "18 g, Rfl: 10    vitamin D3 (CHOLECALCIFEROL) 50 mcg (2000 units) tablet, TAKE 1 TABLET BY MOUTH DAILY, Disp: 30 tablet, Rfl: 10    zaleplon (SONATA) 10 MG capsule, Take 2 capsules (20 mg) by mouth at bedtime, Disp: 60 capsule, Rfl: 2      Physical Exam:  /70 (BP Location: Left arm, Patient Position: Chair, Cuff Size: Adult Regular)   Pulse 67   Wt 73.9 kg (163 lb)   LMP 06/02/2010   SpO2 98%   BMI 32.21 kg/m    GENERAL: Well developed, well nourished, alert, and in no apparent distress.  HEENT: Normocephalic, atraumatic. PERRL, EOMI. Oral mucosa is moist. No perioral cyanosis.  NECK: supple, no masses, no thyromegaly.  RESP:  Normal respiratory effort.  CTAB.  No rales, wheezes, rhonchi.  No cyanosis or clubbing.  CV: Normal S1, S2, regular rhythm, normal rate. No murmur.  No LE edema.   ABDOMEN:  Soft, non-tender, non-distended.   SKIN: warm and dry. No rash.  NEURO: AAOx3.  Normal gait.  Fluent speech.  PSYCH: mentation appears normal.       Results:  PFTs: Interval improvement with lung function.  Most Recent [facility] Pulmonary Function Testing    FVC-Pred   Date Value Ref Range Status   08/04/2022 2.87 L      FVC-Pre   Date Value Ref Range Status   08/04/2022 1.61 L      FVC-%Pred-Pre   Date Value Ref Range Status   08/04/2022 56 %      FEV1-Pre   Date Value Ref Range Status   08/04/2022 1.15 L      FEV1-%Pred-Pre   Date Value Ref Range Status   08/04/2022 50 %      FEV1FVC-Pred   Date Value Ref Range Status   08/04/2022 80 %      FEV1FVC-Pre   Date Value Ref Range Status   08/04/2022 72 %      No results found for: \"20029\"  FEFMax-Pred   Date Value Ref Range Status   08/04/2022 5.94 L/sec      FEFMax-Pre   Date Value Ref Range Status   08/04/2022 2.86 L/sec      FEFMax-%Pred-Pre   Date Value Ref Range Status   08/04/2022 48 %      ExpTime-Pre   Date Value Ref Range Status   08/04/2022 8.08 sec      FIFMax-Pre   Date Value Ref Range Status   08/04/2022 1.43 L/sec      FEV1FEV6-Pred   Date Value Ref " "Range Status   08/04/2022 82 %      FEV1FEV6-Pre   Date Value Ref Range Status   08/04/2022 72 %      No results found for: \"20055\"  Imaging (personally reviewed in clinic today): CT Chest 09/04/2024  Impression:   Impression:   1. ACR Assessment Category (2022):  Lung-RADS Category 2. Benign appearance or behavior.     Recommendation:  Lung-RADS Category 2. Benign appearance or behavior. Recommendation:  continue annual screening with Lung cancer screening CT (please order exam code [id number]).    2. Significant Incidental Finding(s):  Category S: No.   3. No moderate or severe Emphysema, bronchial wall thickening or mosaic attenuation.   4. Avoidance of tobacco smoke is strongly advised. Please consider referral for smoking cessation to [facility] Medication Therapy Management (MTM) if clinically appropriate  _____________________________________________________________________________  Assessment and Plan:   COPD (Group D)/Respiratory bronchiolitis  Still symptomatic with CAT score of 27 improved from 30 currently on Advair in a scheduled fashion- twice daily and azithromycin 500 mg on MWF. She is not using her nebulizer with previously scheduled nebulized albuterol/ipratropium. I will escalate her regimen to Trelegy with hopes that it is affordable as she has insurance. She knows to stop Advair when she gets the replacement inhaler.   Unfortunately, she continues to smoke.  She smokes about 6 cigs/day.  Long conversation about smoking cessation as it pertains to decreased mortality, improved symptoms and preservation of lung function.  She has tried Chantix and was intolerant and doesn't appear to be ready to quit at this time.  Active Smoker  Smoking cessation as above and I spent 10 minutes. Negative lung cancer screening  This Smartset fulfills all insurance requirements, including:     Lung Cancer Screening Shared Decision Making Visit     Inga Ledesma is eligible for lung cancer screening on the basis of:   has " not not experienced symptoms suggestive of lung cancer.   born on 1965, 59 year old years old.   smoked 1 packs of cigarettes for 30 years for a total of 30 pack-years   has not quit smoking   I have discussed with patient the risks and benefits of screening for lung cancer with low-dose CT.     The risks include:   radiation exposure    false positives     over-diagnosis    The benefit of early detection of lung cancer is contingent upon adherence to annual screening or more frequent follow up if indicated.     Furthermore, reaping the benefits of screening requires Inga CASTELLANOS Ledesma to be willing and able to undergo diagnostic procedures, if indicated.     We did discuss that the only way to prevent lung cancer is to not smoke. Smoking cessation assistance was offered.    Questions and concerns were answered to the patient's satisfaction.  she was provided with my contact information should new questions or concerns arise in the interim.  She should return to clinic in 12 months with CT Chest for lung cancer screening  Up to date on vaccinations.    I spent 40 minutes on the date of the encounter doing chart review, history and exam, documentation and further coordination as noted above exclusive of time spent for lung cancer screening.     Kristi Webb MD  Pulmonary, Critical Care and Sleep Medicine  [facility]  Pager: [phone]        The above note was dictated using voice recognition software and may include typographical errors. Please contact the author for any clarifications.

## 2024-09-16 DIAGNOSIS — M54.50 CHRONIC BILATERAL LOW BACK PAIN WITHOUT SCIATICA: ICD-10-CM

## 2024-09-16 DIAGNOSIS — E66.9 OBESITY WITH SERIOUS COMORBIDITY, UNSPECIFIED CLASSIFICATION, UNSPECIFIED OBESITY TYPE: ICD-10-CM

## 2024-09-16 DIAGNOSIS — G89.29 CHRONIC BILATERAL LOW BACK PAIN WITHOUT SCIATICA: ICD-10-CM

## 2024-09-16 RX ORDER — MELOXICAM 15 MG/1
15 TABLET ORAL DAILY
Qty: 30 TABLET | Refills: 0 | Status: SHIPPED | OUTPATIENT
Start: 2024-09-16

## 2024-09-23 DIAGNOSIS — M54.50 CHRONIC BILATERAL LOW BACK PAIN WITHOUT SCIATICA: ICD-10-CM

## 2024-09-23 DIAGNOSIS — G89.29 CHRONIC BILATERAL LOW BACK PAIN WITHOUT SCIATICA: ICD-10-CM

## 2024-09-23 RX ORDER — MELOXICAM 15 MG/1
15 TABLET ORAL DAILY
Qty: 30 TABLET | Refills: 0 | OUTPATIENT
Start: 2024-09-23

## 2024-10-07 DIAGNOSIS — M54.50 CHRONIC BILATERAL LOW BACK PAIN WITHOUT SCIATICA: ICD-10-CM

## 2024-10-07 DIAGNOSIS — G89.29 CHRONIC BILATERAL LOW BACK PAIN WITHOUT SCIATICA: ICD-10-CM

## 2024-10-08 RX ORDER — MELOXICAM 15 MG/1
15 TABLET ORAL DAILY
Qty: 30 TABLET | Refills: 0 | Status: SHIPPED | OUTPATIENT
Start: 2024-10-08 | End: 2024-11-04

## 2024-11-04 DIAGNOSIS — G89.29 CHRONIC BILATERAL LOW BACK PAIN WITHOUT SCIATICA: ICD-10-CM

## 2024-11-04 DIAGNOSIS — M54.50 CHRONIC BILATERAL LOW BACK PAIN WITHOUT SCIATICA: ICD-10-CM

## 2024-11-04 RX ORDER — MELOXICAM 15 MG/1
15 TABLET ORAL DAILY
Qty: 30 TABLET | Refills: 0 | Status: SHIPPED | OUTPATIENT
Start: 2024-11-04 | End: 2025-01-06

## 2024-11-11 DIAGNOSIS — F51.04 PSYCHOPHYSIOLOGICAL INSOMNIA: ICD-10-CM

## 2024-11-11 RX ORDER — ZALEPLON 10 MG/1
20 CAPSULE ORAL AT BEDTIME
Qty: 60 CAPSULE | Refills: 2 | OUTPATIENT
Start: 2024-11-11

## 2024-11-19 ENCOUNTER — TELEPHONE (OUTPATIENT)
Dept: FAMILY MEDICINE | Facility: CLINIC | Age: 59
End: 2024-11-19
Payer: COMMERCIAL

## 2024-11-19 ASSESSMENT — PATIENT HEALTH QUESTIONNAIRE - PHQ9
SUM OF ALL RESPONSES TO PHQ QUESTIONS 1-9: 8
SUM OF ALL RESPONSES TO PHQ QUESTIONS 1-9: 8
10. IF YOU CHECKED OFF ANY PROBLEMS, HOW DIFFICULT HAVE THESE PROBLEMS MADE IT FOR YOU TO DO YOUR WORK, TAKE CARE OF THINGS AT HOME, OR GET ALONG WITH OTHER PEOPLE: SOMEWHAT DIFFICULT

## 2024-11-19 NOTE — TELEPHONE ENCOUNTER
Patient in ER 11/14/24 for lymphadema and was told to follow-up with PCP.    Patient calling to request appointment with PCP for ER follow-up and also ahs several new issues to discuss, trouble sleeping and wheezes when laying flat.    Patient does not have ride and requested a virtual visit.    Asked provider if he would do a virtual appointment for patient in [place] spot and he said OK.    Scheduled tomorrow at 10 am.    Christine M Klisch, RN

## 2024-11-20 ENCOUNTER — VIRTUAL VISIT (OUTPATIENT)
Dept: FAMILY MEDICINE | Facility: CLINIC | Age: 59
End: 2024-11-20
Payer: COMMERCIAL

## 2024-11-20 ENCOUNTER — NURSE TRIAGE (OUTPATIENT)
Dept: FAMILY MEDICINE | Facility: CLINIC | Age: 59
End: 2024-11-20

## 2024-11-20 DIAGNOSIS — Z09 HOSPITAL DISCHARGE FOLLOW-UP: Primary | ICD-10-CM

## 2024-11-20 DIAGNOSIS — F51.04 PSYCHOPHYSIOLOGICAL INSOMNIA: ICD-10-CM

## 2024-11-20 DIAGNOSIS — I89.0 LYMPHEDEMA: ICD-10-CM

## 2024-11-20 PROCEDURE — 99442 PR PHYSICIAN TELEPHONE EVALUATION 11-20 MIN: CPT | Mod: 93 | Performed by: PHYSICIAN ASSISTANT

## 2024-11-20 RX ORDER — TRAZODONE HYDROCHLORIDE 50 MG/1
50 TABLET, FILM COATED ORAL AT BEDTIME
Qty: 90 TABLET | Refills: 0 | Status: SHIPPED | OUTPATIENT
Start: 2024-11-20

## 2024-11-20 RX ORDER — ZALEPLON 10 MG/1
10 CAPSULE ORAL AT BEDTIME
Qty: 30 CAPSULE | Refills: 1 | Status: SHIPPED | OUTPATIENT
Start: 2024-11-20

## 2024-11-20 NOTE — PROGRESS NOTES
Cody is a 59 year old who is being evaluated via a billable telephone visit.    What phone number would you like to be contacted at? [phone]   How would you like to obtain your AVS? [email or web address]  Originating Location (pt. Location): Home    Distant Location (provider location):  On-site      Subjective   Cody is a 59 year old, presenting for the following health issues:  Hospital F/U      11/20/2024     6:33 AM   Additional Questions   Roomed by Patient completed echeck in via "[employer]"     [facility]     ED/UC Followup:    Facility:  [facility]  Date of visit: 11/14/24  Reason for visit: Lymphedema  Current Status: discharged  Right leg was swollen. History of right inguinal brandon dissection yrs ago.   Started a lower sodium diet and her symptoms improved. Reviewed lab and test results. Needs more compression socks  Insomnia issues continue. Primarily middle insomnia.     Review of Systems  Constitutional, HEENT, cardiovascular, pulmonary, GI, , musculoskeletal, neuro, skin, endocrine and psych systems are negative, except as otherwise noted.      Objective           Vitals:  No vitals were obtained today due to virtual visit.    Physical Exam   General: Alert and no distress //Respiratory: No audible wheeze, cough, or shortness of breath // Psychiatric:  Appropriate affect, tone, and pace of words      Cody was seen today for hospital f/u.    Diagnoses and all orders for this visit:    Hospital discharge follow-up    Lymphedema  -     Compression Sleeve/Stocking Order for DME - ONLY FOR DME    Psychophysiological insomnia  -     traZODone (DESYREL) 50 MG tablet; Take 1 tablet (50 mg) by mouth at bedtime.  -     zaleplon (SONATA) 10 MG capsule; Take 1 capsule (10 mg) by mouth at bedtime.      Wear compression stockings. Elevate legs. Lower salt diet.  work on lifestyle modification        Phone call duration: 11 minutes  Signed Electronically by: Min Toledo PA-C

## 2024-11-20 NOTE — TELEPHONE ENCOUNTER
Medication Question or Refill        What medication are you calling about (include dose and sig)?: Can pt get Rx for wheezing?    Preferred Pharmacy:       [facility] [place] - [address]  [address]  [place] [address]  Phone: [phone] Fax: [phone]          Controlled Substance Agreement on file:   CSA -- Patient Level:    CSA: None found at the patient level.       Who prescribed the medication?: n/a PCP requested    Do you need a refill? No    When did you use the medication last? N/a    Patient offered an appointment? No    Do you have any questions or concerns?  No      Could we send this information to you in [employer] or would you prefer to receive a phone call?:   No preference   Okay to leave a detailed message?: Yes at Cell number on file:    Telephone Information:   Mobile [phone]

## 2024-11-25 ENCOUNTER — VIRTUAL VISIT (OUTPATIENT)
Dept: FAMILY MEDICINE | Facility: CLINIC | Age: 59
End: 2024-11-25
Payer: COMMERCIAL

## 2024-11-25 DIAGNOSIS — J44.1 COPD EXACERBATION (H): Primary | ICD-10-CM

## 2024-11-25 PROCEDURE — 99442 PR PHYSICIAN TELEPHONE EVALUATION 11-20 MIN: CPT | Mod: 93 | Performed by: PHYSICIAN ASSISTANT

## 2024-11-25 RX ORDER — PREDNISONE 20 MG/1
TABLET ORAL
Qty: 20 TABLET | Refills: 0 | Status: SHIPPED | OUTPATIENT
Start: 2024-11-25

## 2024-11-25 RX ORDER — ALBUTEROL SULFATE 90 UG/1
1-2 INHALANT RESPIRATORY (INHALATION) EVERY 4 HOURS PRN
Qty: 8.5 G | Refills: 0 | Status: SHIPPED | OUTPATIENT
Start: 2024-11-25

## 2024-11-25 ASSESSMENT — ENCOUNTER SYMPTOMS: COUGH: 1

## 2024-11-25 NOTE — PATIENT INSTRUCTIONS
Louise Ross,     Thank you for allowing [facility] to manage your care.     I am unsure of the cause of your symptoms, but this is most likely a viral respiratory infection causing a flare of your wheezing. We will cover you with prednisone and antibiotics in case this is bacterial.      If you develop worsening/changing symptoms at any time, please call 911 or go to the emergency department for evaluation.     I sent your prescriptions to your pharmacy. Take a probiotic pill, eat live culture yogurt (Greek yogurt or Activia), drink kefir daily for one week after finishing the antibiotics to encourage growth of good bacteria in your system.     Prednisone Discharge Instructions:     Please take the steroid, Prednisone, for the full course as prescribed.  Take Prednisone with food or milk to minimize stomach upset.       Side effects of Prednisone include difficulty sleeping, increased appetite, weight gain, and changes in mood.  If you are diabetic, please monitor your blood sugar regularly while taking this medicine as Prednisone can cause high blood sugar.     If you have any questions or concerns, please feel free to call us at [phone]     Sincerely,     Ray Christian PA-C     Did you know?       You can schedule a video visit for follow-up appointments as well as future appointments for certain conditions.  Please see the below link.      [email or web address]     If you have not already done so,  I encourage you to sign up for [employer] ([email or web address]).  This will allow you to review your results, securely communicate with a provider, and schedule virtual visits as well.

## 2024-11-25 NOTE — TELEPHONE ENCOUNTER
Patient called the clinic.   See Triage Note Below.     Nasrin Leroy RN BSN  [facility]

## 2024-11-25 NOTE — TELEPHONE ENCOUNTER
S-(situation): Cough with wheezing over the past week (worse when lying down at night) No fever, however coughing up yellow mucus. Has needed an antibiotic & Prednisone for treatment in the past.     B-(background): History of COPD     A-(assessment): Cough/wheezing needing evaluation    R-(recommendations): Visit scheduled today with Same Day provider Ray MOON. Patient has no transportation and states that she would be unable to come in to the clinic or Urgent Care. She lives at an Assisted Living Care Facility.     Nasrin Leroy RN BSN  [facility]         Reason for Disposition    Known COPD or other severe lung disease (i.e., bronchiectasis, cystic fibrosis, lung surgery) and symptoms getting worse (i.e., increased sputum purulence or amount, increased breathing difficulty)    Continuous (nonstop) coughing interferes with work or school and no improvement using cough treatment per Care Advice    Patient wants to be seen    Additional Information    Negative: Bluish (or gray) lips or face    Negative: SEVERE difficulty breathing (e.g., struggling for each breath, speaks in single words)    Negative: Rapid onset of cough and has hives    Negative: Coughing started suddenly after medicine, an allergic food or bee sting    Negative: Difficulty breathing after exposure to flames, smoke, or fumes    Negative: Sounds like a life-threatening emergency to the triager    Negative: Previous asthma attacks and this feels like asthma attack    Negative: Dry cough (non-productive; no sputum or minimal clear sputum) and within 14 days of COVID-19 Exposure    Negative: MODERATE difficulty breathing (e.g., speaks in phrases, SOB even at rest, pulse 100-120) and still present when not coughing    Negative: Chest pain present when not coughing    Negative: Passed out (e.g., fainted, lost consciousness, blacked out and was not responding)    Negative: Patient sounds very sick or weak to the triager    Negative: MILD  "difficulty breathing (e.g., minimal/no SOB at rest, SOB with walking, pulse <100) and still present when not coughing    Negative: Coughed up > 1 tablespoon (15 ml) blood (Exception: Blood-tinged sputum.)    Negative: Fever > 103 F (39.4 C)    Negative: Fever > 101 F (38.3 C) and over 60 years of age    Negative: Fever > 100 F (37.8 C) and has diabetes mellitus or a weak immune system (e.g., HIV positive, cancer chemotherapy, organ transplant, splenectomy, chronic steroids)    Negative: Fever > 100 F (37.8 C) and bedridden (e.g., CVA, chronic illness, recovering from surgery)    Negative: Increasing ankle swelling    Negative: Wheezing is present    Negative: Using nasal washes and pain medicine > 24 hours and sinus pain persists    Negative: Fever present > 3 days (72 hours)    Negative: Fever returns after gone for over 24 hours and symptoms worse or not improved    Negative: Coughing up annabelle-colored (reddish-brown) or blood-tinged sputum    Negative: SEVERE coughing spells (e.g., whooping sound after coughing, vomiting after coughing)    Answer Assessment - Initial Assessment Questions  1. ONSET: \"When did the cough begin?\"      1 week ago    2. SEVERITY: \"How bad is the cough today?\"       Worse when lying down or sleeping at night    3. SPUTUM: \"Describe the color of your sputum\" (e.g., none, dry cough; clear, white, yellow, green)      Coughing up yellow mucus    4. HEMOPTYSIS: \"Are you coughing up any blood?\" If Yes, ask: \"How much?\" (e.g., flecks, streaks, tablespoons, etc.)      No blood    5. DIFFICULTY BREATHING: \"Are you having difficulty breathing?\" If Yes, ask: \"How bad is it?\" (e.g., mild, moderate, severe)       Mild shortness of breath (at baseline)    6. FEVER: \"Do you have a fever?\" If Yes, ask: \"What is your temperature, how was it measured, and when did it start?\"      No fever    7. CARDIAC HISTORY: \"Do you have any history of heart disease?\" (e.g., heart attack, congestive heart failure)      " " Heart attack in 2016/Yes CHF  8. LUNG HISTORY: \"Do you have any history of lung disease?\"  (e.g., pulmonary embolus, asthma, emphysema)      COPD    9. PE RISK FACTORS: \"Do you have a history of blood clots?\" (or: recent major surgery, recent prolonged travel, bedridden)      None    10. OTHER SYMPTOMS: \"Do you have any other symptoms?\" (e.g., runny nose, wheezing, chest pain)        Congested & wheezing after coughing spells (especially when lying down)    11. PREGNANCY: \"Is there any chance you are pregnant?\" \"When was your last menstrual period?\"        NA /Had a Hysterectomy    12. TRAVEL: \"Have you traveled out of the country in the last month?\" (e.g., travel history, exposures)        No    Protocols used: Cough-A-OH    "

## 2024-11-25 NOTE — PROGRESS NOTES
Cody is a 59 year old who is being evaluated via a billable telephone visit.    What phone number would you like to be contacted at? [phone]  How would you like to obtain your AVS? Marcia  Originating Location (pt. Location): Home  Distant Location (provider location):  On-site    Assessment & Plan   Problem List Items Addressed This Visit    None  Visit Diagnoses       COPD exacerbation (H)    -  Primary    Relevant Medications    predniSONE (DELTASONE) 20 MG tablet    amoxicillin-clavulanate (AUGMENTIN) 875-125 MG tablet    albuterol (PROAIR HFA/PROVENTIL HFA/VENTOLIN HFA) 108 (90 Base) MCG/ACT inhaler             Impression is likely COPD exacerbation in context of viral URI including. Appears well and non-toxic and I have low suspicion for impending airway obstruction or respiratory distress at this point. Feels that her symptoms are similar to previous COPD flares. She will push p.o. fluids, use over-the-counter meds for symptoms, complete a course of the above, albuterol inhaler and follow-up with us in 2 days if not improving or urgent care/the ER if symptoms worsen/change at any time.    DDx and Dx discussed with and explained to the pt to their satisfaction.  All questions were answered at this time. Pt expressed understanding of and agreement with this dx, tx, and plan. No further workup warranted and standard medication warnings given. I have given the patient a list of pertinent indications for re-evaluation. Will go to the Emergency Department if symptoms worsen or new concerning symptoms arise. Patient left the call in no apparent distress.    Prescription drug management  16 minutes spent by me on the date of the encounter doing chart review, history and exam, documentation and further activities per the note  Nicotine/Tobacco Cessation  She reports that she has been smoking cigarettes. She started smoking about 45 years ago. She has a 8.5 pack-year smoking history. She has never been exposed  "to tobacco smoke. Her smokeless tobacco use includes chew.    BMI  Estimated body mass index is 32.21 kg/m  as calculated from the following:    Height as of 8/5/24: 1.515 m (4' 11.65\").    Weight as of 9/4/24: 73.9 kg (163 lb).     See Patient Instructions      Subjective   Cody is a 59 year old, presenting for the following health issues:  Cough and Sleeping medication / Possibly New Inhaler  11/20/24 Nurse Triage Follow Up, no sleep for 3 days, congested, yellow phlegm, sore throat, no fever. Uses Trelegy Ellipta inhaler in the morning is not helping. Would like new inhaler or refill on existing inhaler if Provider thinks it is needed. Subjective:  - Wheezing for 1 week, worse when lying down at night  - Shortness of breath at rest  - Sore throat  - Chronic intermittent chest pain, but this is her baseline.  - No fever  - Able to speak in full sentences    Past Medical History:  - Smoking: quit 3 days ago        11/25/2024     1:21 PM   Additional Questions   Roomed by Dana Antonio         Review of Systems  Constitutional, HEENT, cardiovascular, pulmonary, gi and gu systems are negative, except as otherwise noted.      Objective           Vitals:  No vitals were obtained today due to virtual visit.    Physical Exam   General: Alert and no distress //Respiratory: No audible wheeze, cough, or shortness of breath. Speaking in full sentences// Psychiatric:  Appropriate affect, tone, and pace of words      Phone call duration: 11 minutes  Signed Electronically by: SARAI Larson    "

## 2025-01-06 ENCOUNTER — TELEPHONE (OUTPATIENT)
Dept: FAMILY MEDICINE | Facility: CLINIC | Age: 60
End: 2025-01-06
Payer: COMMERCIAL

## 2025-01-06 DIAGNOSIS — M54.50 CHRONIC BILATERAL LOW BACK PAIN WITHOUT SCIATICA: ICD-10-CM

## 2025-01-06 DIAGNOSIS — F10.230 ALCOHOL DEPENDENCE WITH UNCOMPLICATED WITHDRAWAL (H): ICD-10-CM

## 2025-01-06 DIAGNOSIS — D50.8 IRON DEFICIENCY ANEMIA SECONDARY TO INADEQUATE DIETARY IRON INTAKE: ICD-10-CM

## 2025-01-06 DIAGNOSIS — Z00.00 ROUTINE GENERAL MEDICAL EXAMINATION AT A HEALTH CARE FACILITY: ICD-10-CM

## 2025-01-06 DIAGNOSIS — E55.9 VITAMIN D DEFICIENCY: ICD-10-CM

## 2025-01-06 DIAGNOSIS — K29.21 ALCOHOLIC GASTRITIS WITH HEMORRHAGE, UNSPECIFIED CHRONICITY: ICD-10-CM

## 2025-01-06 DIAGNOSIS — G89.29 CHRONIC BILATERAL LOW BACK PAIN WITHOUT SCIATICA: ICD-10-CM

## 2025-01-06 DIAGNOSIS — F10.29 ALCOHOL DEPENDENCE WITH UNSPECIFIED ALCOHOL-INDUCED DISORDER (H): ICD-10-CM

## 2025-01-06 RX ORDER — FOLIC ACID 1 MG/1
1000 TABLET ORAL DAILY
Qty: 30 TABLET | Refills: 10 | Status: SHIPPED | OUTPATIENT
Start: 2025-01-06

## 2025-01-06 RX ORDER — ASPIRIN 81 MG/1
81 TABLET ORAL DAILY
Qty: 30 TABLET | Refills: 2 | Status: SHIPPED | OUTPATIENT
Start: 2025-01-06

## 2025-01-06 RX ORDER — CHOLECALCIFEROL (VITAMIN D3) 50 MCG
1 TABLET ORAL DAILY
Qty: 30 TABLET | Refills: 10 | Status: SHIPPED | OUTPATIENT
Start: 2025-01-06

## 2025-01-06 RX ORDER — MAGNESIUM OXIDE 400 MG/1
400 TABLET ORAL DAILY
Qty: 30 TABLET | Refills: 10 | Status: SHIPPED | OUTPATIENT
Start: 2025-01-06

## 2025-01-06 RX ORDER — FERROUS SULFATE 325(65) MG
325 TABLET ORAL 2 TIMES DAILY
Qty: 60 TABLET | Refills: 10 | Status: SHIPPED | OUTPATIENT
Start: 2025-01-06

## 2025-01-06 RX ORDER — MELOXICAM 15 MG/1
15 TABLET ORAL DAILY
Qty: 30 TABLET | Refills: 0 | Status: SHIPPED | OUTPATIENT
Start: 2025-01-06

## 2025-01-06 NOTE — TELEPHONE ENCOUNTER
Forms received from: [facility]   Phone number listed: na   Fax listed: [phone]  Date received: 1/6/25  Form description: IKE  Once forms are completed, please return to na via fax.  Is patient requesting to be contacted when forms are completed: na  Phone: na  Form placed: provider nick Kitchen

## 2025-01-23 ENCOUNTER — VIRTUAL VISIT (OUTPATIENT)
Dept: FAMILY MEDICINE | Facility: CLINIC | Age: 60
End: 2025-01-23
Payer: COMMERCIAL

## 2025-01-23 DIAGNOSIS — Z12.83 SKIN CANCER SCREENING: ICD-10-CM

## 2025-01-23 DIAGNOSIS — F51.04 PSYCHOPHYSIOLOGICAL INSOMNIA: Primary | ICD-10-CM

## 2025-01-23 NOTE — PROGRESS NOTES
Cody is a 59 year old who is being evaluated via a billable telephone visit.    What phone number would you like to be contacted at? [phone]  How would you like to obtain your AVS? MyChart  Originating Location (pt. Location): Home    Distant Location (provider location):  On-site  Telephone visit completed due to the patient did not consent to a video visit.      Subjective   Cody is a 59 year old, presenting for the following health issues:  Insomnia (Would like to increase trazodone) and Derm Problem (Would like to check for skin cancer)  I do not want her on both seroquel and trazodone. She's also using sonata .   No weight changes or fevers. No chest pain/sob/palpitations/dizziness/ha's  No depression or anxiety       1/23/2025     8:26 AM   Additional Questions   Roomed by Dana CARRILLO         Review of Systems  Constitutional, HEENT, cardiovascular, pulmonary, GI, , musculoskeletal, neuro, skin, endocrine and psych systems are negative, except as otherwise noted.      Objective           Vitals:  No vitals were obtained today due to virtual visit.    Physical Exam   General: Alert and no distress //Respiratory: No audible wheeze, cough, or shortness of breath // Psychiatric:  Appropriate affect, tone, and pace of words      Cody was seen today for insomnia and derm problem.    Diagnoses and all orders for this visit:    Psychophysiological insomnia  -     amitriptyline (ELAVIL) 25 MG tablet; Take 1-2 tablets (25-50 mg) by mouth at bedtime.    Skin cancer screening  -     Adult Dermatology  Referral; Future      Discontinue trazodone and seroquel.    Advised supportive and symptomatic treatment.  Follow up with Provider - if condition persists or worsens.   work on lifestyle modification        Phone call duration: 13 minutes  Signed Electronically by: Min Toledo PA-C

## 2025-02-04 ENCOUNTER — TELEPHONE (OUTPATIENT)
Dept: FAMILY MEDICINE | Facility: CLINIC | Age: 60
End: 2025-02-04
Payer: COMMERCIAL

## 2025-02-04 NOTE — TELEPHONE ENCOUNTER
Group home RN calling, says patient was prescribed amitriptyline for sleep on 1/23/25, the trazodone and seroquel were stopped.    Says patient started at one amitriptyline at bedtime for nearly the first full week, was not effective so increased to 2 tabs at bedtime for the past week, still unable to sleep at night.    Not able to nap or sleep during the day either.      I advised we schedule a phone visit to discuss this with Min.   Nothing available this week, next virtual opening is Feb 12th.    Patient and RN feel this needs to be addressed more urgently.         Pharmacy selected, routed high priority to Min Toledo to see if any changes advised per this phone call or if he can add patient to schedule in the next couple of days.    Belen DEJESUS RN  [facility] Triage

## 2025-02-05 ENCOUNTER — VIRTUAL VISIT (OUTPATIENT)
Dept: FAMILY MEDICINE | Facility: CLINIC | Age: 60
End: 2025-02-05
Payer: COMMERCIAL

## 2025-02-05 DIAGNOSIS — F51.04 PSYCHOPHYSIOLOGICAL INSOMNIA: Primary | ICD-10-CM

## 2025-02-05 PROCEDURE — 98013 SYNCH AUDIO-ONLY EST LOW 20: CPT | Performed by: PHYSICIAN ASSISTANT

## 2025-02-05 RX ORDER — QUETIAPINE FUMARATE 300 MG/1
300 TABLET, FILM COATED ORAL AT BEDTIME
Qty: 90 TABLET | Refills: 1 | Status: SHIPPED | OUTPATIENT
Start: 2025-02-05

## 2025-02-05 ASSESSMENT — PATIENT HEALTH QUESTIONNAIRE - PHQ9
SUM OF ALL RESPONSES TO PHQ QUESTIONS 1-9: 11
SUM OF ALL RESPONSES TO PHQ QUESTIONS 1-9: 11
10. IF YOU CHECKED OFF ANY PROBLEMS, HOW DIFFICULT HAVE THESE PROBLEMS MADE IT FOR YOU TO DO YOUR WORK, TAKE CARE OF THINGS AT HOME, OR GET ALONG WITH OTHER PEOPLE: NOT DIFFICULT AT ALL

## 2025-02-05 NOTE — PROGRESS NOTES
Cody is a 59 year old who is being evaluated via a billable telephone visit.    What phone number would you like to be contacted at? [phone]  How would you like to obtain your AVS  Originating Location (pt. Location): Home    Distant Location (provider location):  On-site  Telephone visit completed due to the patient did not consent to a video visit.      Subjective   Cody is a 59 year old, presenting for the following health issues:  Insomnia      2/5/2025    11:13 AM   Additional Questions   Roomed by Avril Allison CMA   Accompanied by Tucker     HPI       Phq-9 (Phq-9)-Developed By Judah Adams,Carol Kasper,Pradeep Cano And Colleagues,With An Educational Steve From [employer] 2002.    Question 2/5/2025 11:11 AM CST - Filed by Avril Allison MA   The following questionnaire should only be answered by the patient. Are you the patient? Yes        Over the last 2 weeks, how often have you been bothered by any of the following problems?    1. Little interest or pleasure in doing things Several days   2.  Feeling down, depressed, or hopeless Several days   3.  Trouble falling or staying asleep, or sleeping too much Nearly every day   4.  Feeling tired or having little energy Several days   5.  Poor appetite or overeating Several days   6.  Feeling bad about yourself - or that you are a failure or have let yourself or your family down Nearly every day   7.  Trouble concentrating on things, such as reading the newspaper or watching television Several days   8.  Moving or speaking so slowly that other people could have noticed. Or the opposite - being so fidgety or restless that you have been moving around a lot more than usual Not at all   9.  Thoughts that you would be better off dead, or of hurting yourself in some way Not at all   If you checked off any problems, how difficult have these problems made it for you to do your work, take care of things at home, or get along with other people? Not  difficult at all        PHQ9 TOTAL SCORE (range: 0 - 27) 11 (Moderate depression)     Smoking Cessation    Question 2/5/2025 11:12 AM CST - Filed by Avril Allison MA   If I could quit smoking, I would. Completely disagree   I want to quit smoking because I worry about how smoking affects my health.   Neutral   I would be willing to make a plan to quit smoking. Completely disagree   I would be willing to cut down number of cigarettes before quiting. Completely agree     Fv Hpi General Questionnaire    Question 2/5/2025 11:23 AM CST - Filed by Avril Allison MA   I understand that completing this form is intended to provide my doctor and/or care team with helpful information for my upcoming clinic visit. It is not to notify my doctor and/or care team of medical matters requiring urgent attention. If I have an urgent medical matter, I should call 911 or my doctor's office. Acknowledge   Please select a reason for your visit: Follow-up for health condition I've been seen for in the past   What health condition or conditions are you coming in for today? Other   What is the reason for your visit today? No sleeping   How many servings of fruits and vegetables do you eat daily? 0-1   On average, how many sweetened beverages do you drink each day (Examples: soda, juice, sweet tea, etc.  Do NOT count diet or artificially sweetened beverages)? 0   How many minutes a day do you exercise enough to make your heart beat faster? 9 or less   How many days a week do you exercise enough to make your heart beat faster? 3 or less   How many days per week do you miss taking your medication?      Mix of initial and middle insomnia. Not taking naps. Not using her cpap consistently. Tv is on all of the time. Discussed sleep hygiene.       Review of Systems  Constitutional, HEENT, cardiovascular, pulmonary, GI, , musculoskeletal, neuro, skin, endocrine and psych systems are negative, except as otherwise noted.      Objective            Vitals:  No vitals were obtained today due to virtual visit.    Physical Exam   General: Alert and no distress //Respiratory: No audible wheeze, cough, or shortness of breath // Psychiatric:  Appropriate affect, tone, and pace of words      Cody was seen today for insomnia.    Diagnoses and all orders for this visit:    Psychophysiological insomnia  -     Adult Mental Health [facility] Referral; Future  -     QUEtiapine (SEROQUEL) 300 MG tablet; Take 1 tablet (300 mg) by mouth at bedtime.    Other orders  -     REVIEW OF HEALTH MAINTENANCE PROTOCOL ORDERS      Discontinue amitriptyline .  Discussed sleep hygiene       Phone call duration: 15 minutes  Signed Electronically by: Min Toledo PA-C

## 2025-02-07 DIAGNOSIS — K21.9 GASTROESOPHAGEAL REFLUX DISEASE WITHOUT ESOPHAGITIS: ICD-10-CM

## 2025-02-07 DIAGNOSIS — F51.04 PSYCHOPHYSIOLOGICAL INSOMNIA: ICD-10-CM

## 2025-02-07 DIAGNOSIS — M54.50 CHRONIC BILATERAL LOW BACK PAIN WITHOUT SCIATICA: ICD-10-CM

## 2025-02-07 DIAGNOSIS — G89.29 CHRONIC BILATERAL LOW BACK PAIN WITHOUT SCIATICA: ICD-10-CM

## 2025-02-07 RX ORDER — MELOXICAM 15 MG/1
15 TABLET ORAL DAILY
Qty: 30 TABLET | Refills: 0 | Status: SHIPPED | OUTPATIENT
Start: 2025-02-07

## 2025-02-07 RX ORDER — FAMOTIDINE 40 MG/1
40 TABLET, FILM COATED ORAL AT BEDTIME
Qty: 30 TABLET | Refills: 10 | Status: SHIPPED | OUTPATIENT
Start: 2025-02-07

## 2025-02-07 RX ORDER — ZALEPLON 10 MG/1
10 CAPSULE ORAL AT BEDTIME
Qty: 30 CAPSULE | Refills: 1 | Status: SHIPPED | OUTPATIENT
Start: 2025-02-07

## 2025-02-26 NOTE — ED NOTES
Observation Brochure and Video    Patient informed of observation status based on provider's order.  Observation brochure was given and the video watched. Patient/Family stated understanding. Questions answered.  Anna Brunner     See scanned session report.

## 2025-03-03 ENCOUNTER — VIRTUAL VISIT (OUTPATIENT)
Dept: BEHAVIORAL HEALTH | Facility: CLINIC | Age: 60
End: 2025-03-03
Payer: COMMERCIAL

## 2025-03-03 ENCOUNTER — TELEPHONE (OUTPATIENT)
Dept: FAMILY MEDICINE | Facility: CLINIC | Age: 60
End: 2025-03-03
Payer: COMMERCIAL

## 2025-03-03 DIAGNOSIS — F43.20 ADJUSTMENT DISORDER, UNSPECIFIED TYPE: Primary | ICD-10-CM

## 2025-03-03 DIAGNOSIS — E66.01 MORBID OBESITY (H): Primary | ICD-10-CM

## 2025-03-03 DIAGNOSIS — G47.00 INSOMNIA, UNSPECIFIED TYPE: ICD-10-CM

## 2025-03-03 PROCEDURE — 90832 PSYTX W PT 30 MINUTES: CPT | Mod: 95

## 2025-03-03 ASSESSMENT — PATIENT HEALTH QUESTIONNAIRE - PHQ9
SUM OF ALL RESPONSES TO PHQ QUESTIONS 1-9: 14
10. IF YOU CHECKED OFF ANY PROBLEMS, HOW DIFFICULT HAVE THESE PROBLEMS MADE IT FOR YOU TO DO YOUR WORK, TAKE CARE OF THINGS AT HOME, OR GET ALONG WITH OTHER PEOPLE: SOMEWHAT DIFFICULT
SUM OF ALL RESPONSES TO PHQ QUESTIONS 1-9: 14

## 2025-03-03 ASSESSMENT — ANXIETY QUESTIONNAIRES
3. WORRYING TOO MUCH ABOUT DIFFERENT THINGS: SEVERAL DAYS
GAD7 TOTAL SCORE: 10
IF YOU CHECKED OFF ANY PROBLEMS ON THIS QUESTIONNAIRE, HOW DIFFICULT HAVE THESE PROBLEMS MADE IT FOR YOU TO DO YOUR WORK, TAKE CARE OF THINGS AT HOME, OR GET ALONG WITH OTHER PEOPLE: SOMEWHAT DIFFICULT
GAD7 TOTAL SCORE: 10
4. TROUBLE RELAXING: MORE THAN HALF THE DAYS
GAD7 TOTAL SCORE: 10
8. IF YOU CHECKED OFF ANY PROBLEMS, HOW DIFFICULT HAVE THESE MADE IT FOR YOU TO DO YOUR WORK, TAKE CARE OF THINGS AT HOME, OR GET ALONG WITH OTHER PEOPLE?: SOMEWHAT DIFFICULT
2. NOT BEING ABLE TO STOP OR CONTROL WORRYING: MORE THAN HALF THE DAYS
1. FEELING NERVOUS, ANXIOUS, OR ON EDGE: MORE THAN HALF THE DAYS
7. FEELING AFRAID AS IF SOMETHING AWFUL MIGHT HAPPEN: NOT AT ALL
5. BEING SO RESTLESS THAT IT IS HARD TO SIT STILL: SEVERAL DAYS
7. FEELING AFRAID AS IF SOMETHING AWFUL MIGHT HAPPEN: NOT AT ALL
6. BECOMING EASILY ANNOYED OR IRRITABLE: MORE THAN HALF THE DAYS

## 2025-03-03 NOTE — PROGRESS NOTES
[facility] Primary Care: Integrated Behavioral Health    Integrated Behavioral Health   Mental Health & Addiction Services      Progress Note - Initial [facility] Visit     Patient Name: Inga Ledesma    Date: March 3, 2025  Service Type: Individual   Visit Start Time:  2:02 PM  Visit End Time:   2:20 PM    Attendees: Client   Service Modality: Video Visit:      Provider verified identity through the following two step process.  Patient provided:  Patient  and Patient address    Telemedicine Visit: The patient's condition can be safely assessed and treated via synchronous audio and visual telemedicine encounter.      Reason for Telemedicine Visit: Patient has requested telehealth visit    Originating Site (Patient Location): Patient's home    Distant Site (Provider Location): [facility]    Consent:  The patient/guardian has verbally consented to: the potential risks and benefits of telemedicine (video visit) versus in person care; bill my insurance or make self-payment for services provided; and responsibility for payment of non-covered services.     Patient would like the video invitation sent by:  Send to e-mail at: [email or web address]    Mode of Communication:  Video Conference via [employer]    Distant Location (Provider):  On-site    As the provider I attest to compliance with applicable laws and regulations related to telemedicine.     [facility] Visit Activities (Refresh list every visit): NEW         DATA:     Interactive Complexity: No   Crisis: No     Assessments completed prior to this visit:     The following assessments were completed by patient for this visit:  PHQ2: No questionnaires on file.  GAD2:       3/3/2025     2:06 PM   LISA-2   Feeling nervous, anxious, or on edge 2    Not being able to stop or control worrying 2    LISA-2 Total Score 4        Proxy-reported     CAGE-AID:       3/3/2025     2:06 PM   CAGE-AID Total Score   Total Score 0    Total Score  "MyChart 0 (A total score of 2 or greater is considered clinically significant)       Proxy-reported        Referral:   Patient was referred to [facility] by primary care provider.    Reason for referral: clarify behavioral health diagnosis.      [facility] introduced self and role. Discussed informed consent and limits to confidentiality.     Presenting Concerns/ Current Stressors:   The pt reported \" I got kicked out of the last place I lived at, I am having sleep issues, I sleep 4 hour a night. It's been over 10 years.\" The pt was challenged to track sleep behavior and was challenged to go to sleep without tv but instead with noise sound/machine    Therapeutic Interventions:  Psycho-education: Provided psycho-education about patient's behavioral health condition and symptoms.    Response to treatment interventions:   Patient was receptive to interventions utilized.     Safety Issues and Plan for Safety and Risk Management:     Patient denies a history of suicidal ideation, suicide attempts, self-injurious behavior, homicidal ideation, homicidal behavior, and and other safety concerns   Patient denies current fears or concerns for personal safety.   Patient denies current or recent suicidal ideation or behaviors.   Patient denies current or recent homicidal ideation or behaviors.   Patient denies current or recent self injurious behavior or ideation.   Patient denies other safety concerns.   Recommended that patient call 911 or go to the local ED should there be a change in any of these risk factors   Patient reports there are no firearms in the house.       ASSESSMENT:   Mental Status:     Appearance:   Appropriate    Eye Contact:   Good    Psychomotor Behavior: Normal    Attitude:   Cooperative    Orientation:   All   Speech Rate / Production: Normal/ Responsive   Volume:   Normal    Mood:    Normal   Affect:    Appropriate    Thought Content:  Clear    Thought Form:  Goal Directed  Logical    Insight:    Good       "   Diagnostic Criteria:   Adjustment Disorder  A. The development of emotional or behavioral symptoms in response to an identifiable stressor(s) occurring within 3 months of the onset of the stressor(s)  B. These symptoms or behaviors are clinically significant, as evidenced by one or both of the following:  C. The stress-related disturbance does not meet criteria for another disorder & is not not an exacerbation of another mental disorder  D. The symptoms do not represent normal bereavement  E. Once the stressor or its consequences have terminated, the symptoms do not persist for more than an additional 6 months      Inga Ledesma meets diagnostic criteria for Chronic Insomnia Disorder due to a predominant complaint of dissatisfaction with sleep quality or quantity for at least 3 months occurring at least 3 days per week associated with Difficulty initiating sleep and Difficulty maintaining sleep.    The sleep disturbance causes clinically significant distress or impairment in social, occupational, cognitive, behavioral or other important areas of functioning.    The sleep difficulty occurs despite adequate opportunity for sleep. The insomnia is not better explained by and does not occur exclusively during the course of another sleep disorder.    Co-existing mental health or medical conditio(s) do not adequately explain the predominant complaint of insomnia.    The insomnia is not attributable to the physiological effects of a substance (e.g. A drug of abuse or medication).      DSM5 Diagnoses: (Sustained by DSM5 Criteria Listed Above)     Diagnoses: Adjustment Disorders  309.9 (F43.20) Unspecified     Psychosocial / Contextual Factors: Trauma History       Collateral Reports Completed:   Not Applicable        PLAN: (Homework, other):     1. Patient was provided:  recommendation to schedule follow-up with [facility]     2. Provider recommended the following referrals: Individual Outpatient Therapy.        3. Suicide  Risk and Safety Concerns were assessed for Inga Ledesma    Safety Plan:   Patient denied any current/recent/lifetime history of suicidal ideation and/or behaviors. Recommended that patient call 911 or go to the local ED should there be a change in any of these risk factors       Yi Pizarro [facility]   March 3, 2025

## 2025-03-03 NOTE — TELEPHONE ENCOUNTER
Reason for Call:  Other     Detailed comments: Patient said that she would like Zepbound prescribed due to insurance will not cover other medication prescribed.    Phone Number Patient can be reached at: Home number on file [phone] (home)    Best Time:     Can we leave a detailed message on this number? YES    Call taken on 3/3/2025 at 9:31 AM by Marquita Garcia

## 2025-03-04 NOTE — TELEPHONE ENCOUNTER
Patient states PA either needs to be done for the Wegovy or prescription needs to be sent for Zepound. Patient states she is gaining to much weight back.     Min would you like to proceed with PA for Wegovy or send prescription for Zepbound? Patient would like to have prescription for Zepbound.

## 2025-03-05 ENCOUNTER — TELEPHONE (OUTPATIENT)
Dept: FAMILY MEDICINE | Facility: CLINIC | Age: 60
End: 2025-03-05
Payer: COMMERCIAL

## 2025-03-05 DIAGNOSIS — M54.50 CHRONIC BILATERAL LOW BACK PAIN WITHOUT SCIATICA: ICD-10-CM

## 2025-03-05 DIAGNOSIS — G89.29 CHRONIC BILATERAL LOW BACK PAIN WITHOUT SCIATICA: ICD-10-CM

## 2025-03-05 RX ORDER — TIRZEPATIDE 2.5 MG/.5ML
2.5 INJECTION, SOLUTION SUBCUTANEOUS
Qty: 2 ML | Refills: 0 | Status: SHIPPED | OUTPATIENT
Start: 2025-03-05

## 2025-03-05 NOTE — TELEPHONE ENCOUNTER
Prior Authorization Retail Medication Request    Medication/Dose: ZEPBOUND 2.5 MG/0.5ML prefilled pen  Diagnosis and ICD code (if different than what is on RX):  Morbid obesity (H) [E66.01]  - Primary   New/renewal/insurance change PA/secondary ins. PA:  Previously Tried and Failed:    Rationale:      Insurance   Primary: [employer]  Insurance ID:  [id number]     Secondary (if applicable):  Insurance ID:      Pharmacy Information (if different than what is on RX)  Name:    Phone:    Fax:    Clinic Information  Preferred routing pool for dept communication:

## 2025-03-05 NOTE — TELEPHONE ENCOUNTER
Note: Due to record-high volumes, our turn-around time is taking longer than usual . We are currently 4  business days behind in the pools.   We are working diligently to submit all requests in a timely manner and in the order they are received. Please only flag TRUE URGENT requests as high priority to the pool at this time.   If you have questions on status of PA's,  please send a note/message in the active PA encounter and send back to the [facility] PA pool [[phone]].    If you have questions about the turn-around time or about our process, please reach out to our supervisor Hanna Carrasquillo.   Thank you!   RPPA (Retail Pharmacy Prior Authorization) team    Prior Authorization Approval    Authorization Effective Date: 3/5/2025  Authorization Expiration Date: 12/31/2025  Medication: ZEPBOUND 2.5 MG/0.5ML prefilled pen-APPROVED  Reference #:     Insurance Company: [employer] Part D - Phone [phone] Fax [phone]  Which Pharmacy is filling the prescription (Not needed for infusion/clinic administered): [facility] [place], [place] - [address] [place] [address]  Pharmacy Notified: Yes  Patient Notified: Instructed pharmacy to notify patient when script is ready to /ship.

## 2025-03-05 NOTE — TELEPHONE ENCOUNTER
Retail Pharmacy Prior Authorization Team   Phone: [phone]    PA Initiation    Medication: ZEPBOUND 2.5 MG/0.5ML SC SOAJ  Insurance Company: [employer] Part D - Phone [phone] Fax [phone]  Pharmacy Filling the Rx: [place], [place] - [address]  Filling Pharmacy Phone: [phone]  Filling Pharmacy Fax:    Start Date: 3/5/2025

## 2025-03-05 NOTE — TELEPHONE ENCOUNTER
Spoke with patient, relayed providers message below and patient verbalized understanding. Pt stated she has had increased difficulty breathing from recent weight gain and hoping to get the PA approved asap. RN advised pt to call clinic back if her difficulty breathing worsens at any point as she may need to be further evaluated. See 3/5/25 TE for PA.     Kianna Becker, RN on 3/5/2025 at 9:26 AM

## 2025-03-06 RX ORDER — MELOXICAM 15 MG/1
15 TABLET ORAL DAILY
Qty: 30 TABLET | Refills: 0 | Status: SHIPPED | OUTPATIENT
Start: 2025-03-06

## 2025-03-10 DIAGNOSIS — F51.04 PSYCHOPHYSIOLOGICAL INSOMNIA: ICD-10-CM

## 2025-03-10 RX ORDER — ZALEPLON 10 MG/1
10 CAPSULE ORAL AT BEDTIME
Qty: 30 CAPSULE | Refills: 1 | OUTPATIENT
Start: 2025-03-10

## 2025-03-29 ENCOUNTER — OFFICE VISIT (OUTPATIENT)
Dept: URGENT CARE | Facility: URGENT CARE | Age: 60
End: 2025-03-29
Payer: COMMERCIAL

## 2025-03-29 ENCOUNTER — ANCILLARY PROCEDURE (OUTPATIENT)
Dept: GENERAL RADIOLOGY | Facility: CLINIC | Age: 60
End: 2025-03-29
Attending: FAMILY MEDICINE
Payer: COMMERCIAL

## 2025-03-29 VITALS
BODY MASS INDEX: 32.21 KG/M2 | HEART RATE: 73 BPM | WEIGHT: 163 LBS | DIASTOLIC BLOOD PRESSURE: 96 MMHG | OXYGEN SATURATION: 100 % | RESPIRATION RATE: 22 BRPM | TEMPERATURE: 98.3 F | SYSTOLIC BLOOD PRESSURE: 154 MMHG

## 2025-03-29 DIAGNOSIS — R05.9 COUGH, UNSPECIFIED TYPE: ICD-10-CM

## 2025-03-29 DIAGNOSIS — J44.1 COPD EXACERBATION (H): Primary | ICD-10-CM

## 2025-03-29 PROCEDURE — 3080F DIAST BP >= 90 MM HG: CPT | Performed by: FAMILY MEDICINE

## 2025-03-29 PROCEDURE — 71046 X-RAY EXAM CHEST 2 VIEWS: CPT | Mod: TC | Performed by: RADIOLOGY

## 2025-03-29 PROCEDURE — 1126F AMNT PAIN NOTED NONE PRSNT: CPT | Performed by: FAMILY MEDICINE

## 2025-03-29 PROCEDURE — 99214 OFFICE O/P EST MOD 30 MIN: CPT | Performed by: FAMILY MEDICINE

## 2025-03-29 PROCEDURE — 3077F SYST BP >= 140 MM HG: CPT | Performed by: FAMILY MEDICINE

## 2025-03-29 RX ORDER — PREDNISONE 20 MG/1
TABLET ORAL
Qty: 20 TABLET | Refills: 0 | Status: SHIPPED | OUTPATIENT
Start: 2025-03-29 | End: 2025-04-10

## 2025-03-29 ASSESSMENT — PAIN SCALES - GENERAL: PAINLEVEL_OUTOF10: NO PAIN (0)

## 2025-03-29 NOTE — PROGRESS NOTES
Assessment & Plan     COPD exacerbation (H)  Differential discussed in detail including COPD exacerbation, infectious, cardiovascular etiology and pulmonary embolism.  Chest x-ray findings reviewed independently which came back unremarkable.  Offered ER transfer considering severity of symptoms however patient declined, involved risks explained in detail.  Augmentin and prednisone prescribed for suspected COPD exacerbation, as per patient has worked well in the past.  Recommended to continue Trelegy, albuterol inhaler and follow-up with PCP in 3 to 5 days.  Instructed to go to ER if symptoms persist or worsen.  Patient understood and in agreement with above plan.  All questions answered.  - predniSONE (DELTASONE) 20 MG tablet; Take 3 tablets (60 mg) by mouth daily for 3 days, THEN 2 tablets (40 mg) daily for 3 days, THEN 1 tablet (20 mg) daily for 3 days, THEN 0.5 tablets (10 mg) daily for 3 days.    Cough, unspecified type  - XR Chest 2 Views; Future  - amoxicillin-clavulanate (AUGMENTIN) 875-125 MG tablet; Take 1 tablet by mouth 2 times daily for 7 days.  - predniSONE (DELTASONE) 20 MG tablet; Take 3 tablets (60 mg) by mouth daily for 3 days, THEN 2 tablets (40 mg) daily for 3 days, THEN 1 tablet (20 mg) daily for 3 days, THEN 0.5 tablets (10 mg) daily for 3 days.      Subjective   Cody is a 59 year old, presenting for the following health issues:  Urgent Care and Shortness of Breath        3/29/2025    12:38 PM   Additional Questions   Roomed by ca   Accompanied by self     HPI      Concern -   Onset: 1 months  Description: Shortness of breath, productive cough and wheezing   Intensity: moderate  Progression of Symptoms:  worsening  Accompanying Signs & Symptoms: no fever, chills, chest pain or other relevant systemic symptoms   Previous history of similar problem: h/o COPD  Therapies tried and outcome: mucinex      Review of Systems  Constitutional, neuro, ENT, endocrine, pulmonary, cardiac, gastrointestinal,  genitourinary, musculoskeletal, integument and psychiatric systems are negative, except as otherwise noted.      Objective    BP (!) 154/96   Pulse 73   Temp 98.3  F (36.8  C) (Tympanic)   Resp 22   Wt 73.9 kg (163 lb)   LMP 06/02/2010   SpO2 100%   BMI 32.21 kg/m    Body mass index is 32.21 kg/m .  Physical Exam   GENERAL: alert and in some distress  EYES: Eyes grossly normal to inspection, PERRL and conjunctivae and sclerae normal  NECK: no adenopathy, no asymmetry, masses, or scars  RESP: Few bibasilar crackles, no wheeze or rhonchi auscultated  CV: regular rates and rhythm, normal S1 S2, no S3 or S4, and no murmur, click or rub  ABDOMEN: soft, nontender  NEURO: Normal strength and tone, mentation intact and speech normal  PSYCH: mentation appears normal, affect normal/bright    Results for orders placed or performed in visit on 03/29/25   XR Chest 2 Views     Status: None    Narrative    EXAM: XR CHEST 2 VIEWS  LOCATION: [facility] [place]  DATE: 3/29/2025    INDICATION:  Cough, unspecified type  COMPARISON: Low-dose CT chest 9/4/2024      Impression    IMPRESSION:     Cardiac silhouette and mediastinal borders are normal. There is a thin band of linear atelectasis in the right apex. The lungs are otherwise clear. Diaphragm curvature is preserved. No pleural effusions.    Disc space narrowing and minimal mid thoracic degenerative osteophytes.             Signed Electronically by: Maicol Adler MD

## 2025-04-08 ENCOUNTER — TELEPHONE (OUTPATIENT)
Dept: FAMILY MEDICINE | Facility: CLINIC | Age: 60
End: 2025-04-08
Payer: COMMERCIAL

## 2025-04-08 NOTE — TELEPHONE ENCOUNTER
Pt requesting dose increase for Zepbound currently on 5mg. She states no side effects noted, but also not seeing results.    Magaly Marie RN on 4/8/2025 at 3:49 PM

## 2025-04-09 DIAGNOSIS — F33.0 MAJOR DEPRESSIVE DISORDER, RECURRENT EPISODE, MILD: ICD-10-CM

## 2025-04-09 NOTE — TELEPHONE ENCOUNTER
Patient does not want to increase it anymore as it is not doing anything for patient, patient gained 30 pounds and is having a hard time breathing since gaining the weight.    Patient wants to go back on wegovy highest dose (per patient - what was on before) because she does not want to gain anymore weight and this was more beneficial for patient.  Phone: [phone]  if any questions.   Thank you!  Pattie MARTINEZ [facility],  [place]

## 2025-04-10 NOTE — TELEPHONE ENCOUNTER
PROCEDURE INFORMATION: Refill Error in cue.     Pended for provider review.     Nasrin Leroy RN BSN  [facility]

## 2025-04-21 ENCOUNTER — OFFICE VISIT (OUTPATIENT)
Dept: FAMILY MEDICINE | Facility: CLINIC | Age: 60
End: 2025-04-21
Payer: COMMERCIAL

## 2025-04-21 VITALS
SYSTOLIC BLOOD PRESSURE: 128 MMHG | DIASTOLIC BLOOD PRESSURE: 68 MMHG | OXYGEN SATURATION: 97 % | HEART RATE: 80 BPM | WEIGHT: 188.4 LBS | BODY MASS INDEX: 36.99 KG/M2 | HEIGHT: 60 IN | RESPIRATION RATE: 24 BRPM | TEMPERATURE: 98.2 F

## 2025-04-21 DIAGNOSIS — I50.33 ACUTE ON CHRONIC DIASTOLIC CONGESTIVE HEART FAILURE (H): ICD-10-CM

## 2025-04-21 DIAGNOSIS — R60.0 BILATERAL LEG EDEMA: ICD-10-CM

## 2025-04-21 DIAGNOSIS — E66.01 MORBID OBESITY (H): ICD-10-CM

## 2025-04-21 DIAGNOSIS — J44.1 COPD EXACERBATION (H): Primary | ICD-10-CM

## 2025-04-21 DIAGNOSIS — F41.1 GAD (GENERALIZED ANXIETY DISORDER): ICD-10-CM

## 2025-04-21 PROCEDURE — 3074F SYST BP LT 130 MM HG: CPT | Performed by: PHYSICIAN ASSISTANT

## 2025-04-21 PROCEDURE — 91320 SARSCV2 VAC 30MCG TRS-SUC IM: CPT | Performed by: PHYSICIAN ASSISTANT

## 2025-04-21 PROCEDURE — 80048 BASIC METABOLIC PNL TOTAL CA: CPT | Performed by: PHYSICIAN ASSISTANT

## 2025-04-21 PROCEDURE — 36415 COLL VENOUS BLD VENIPUNCTURE: CPT | Performed by: PHYSICIAN ASSISTANT

## 2025-04-21 PROCEDURE — 96127 BRIEF EMOTIONAL/BEHAV ASSMT: CPT | Performed by: PHYSICIAN ASSISTANT

## 2025-04-21 PROCEDURE — 90480 ADMN SARSCOV2 VAC 1/ONLY CMP: CPT | Performed by: PHYSICIAN ASSISTANT

## 2025-04-21 PROCEDURE — 3078F DIAST BP <80 MM HG: CPT | Performed by: PHYSICIAN ASSISTANT

## 2025-04-21 PROCEDURE — 99214 OFFICE O/P EST MOD 30 MIN: CPT | Mod: 25 | Performed by: PHYSICIAN ASSISTANT

## 2025-04-21 RX ORDER — HYDROXYZINE PAMOATE 25 MG/1
25 CAPSULE ORAL 3 TIMES DAILY PRN
Qty: 270 CAPSULE | Refills: 3 | Status: SHIPPED | OUTPATIENT
Start: 2025-04-21

## 2025-04-21 RX ORDER — FUROSEMIDE 40 MG/1
40 TABLET ORAL DAILY
Qty: 90 TABLET | Refills: 3 | Status: SHIPPED | OUTPATIENT
Start: 2025-04-21

## 2025-04-21 RX ORDER — FUROSEMIDE 20 MG/1
TABLET ORAL
COMMUNITY
Start: 2025-04-17 | End: 2025-04-21

## 2025-04-21 ASSESSMENT — ANXIETY QUESTIONNAIRES
IF YOU CHECKED OFF ANY PROBLEMS ON THIS QUESTIONNAIRE, HOW DIFFICULT HAVE THESE PROBLEMS MADE IT FOR YOU TO DO YOUR WORK, TAKE CARE OF THINGS AT HOME, OR GET ALONG WITH OTHER PEOPLE: SOMEWHAT DIFFICULT
4. TROUBLE RELAXING: SEVERAL DAYS
3. WORRYING TOO MUCH ABOUT DIFFERENT THINGS: MORE THAN HALF THE DAYS
8. IF YOU CHECKED OFF ANY PROBLEMS, HOW DIFFICULT HAVE THESE MADE IT FOR YOU TO DO YOUR WORK, TAKE CARE OF THINGS AT HOME, OR GET ALONG WITH OTHER PEOPLE?: SOMEWHAT DIFFICULT
GAD7 TOTAL SCORE: 9
6. BECOMING EASILY ANNOYED OR IRRITABLE: SEVERAL DAYS
GAD7 TOTAL SCORE: 9
2. NOT BEING ABLE TO STOP OR CONTROL WORRYING: MORE THAN HALF THE DAYS
1. FEELING NERVOUS, ANXIOUS, OR ON EDGE: MORE THAN HALF THE DAYS
7. FEELING AFRAID AS IF SOMETHING AWFUL MIGHT HAPPEN: NOT AT ALL
GAD7 TOTAL SCORE: 9
7. FEELING AFRAID AS IF SOMETHING AWFUL MIGHT HAPPEN: NOT AT ALL
5. BEING SO RESTLESS THAT IT IS HARD TO SIT STILL: SEVERAL DAYS

## 2025-04-21 ASSESSMENT — PATIENT HEALTH QUESTIONNAIRE - PHQ9
10. IF YOU CHECKED OFF ANY PROBLEMS, HOW DIFFICULT HAVE THESE PROBLEMS MADE IT FOR YOU TO DO YOUR WORK, TAKE CARE OF THINGS AT HOME, OR GET ALONG WITH OTHER PEOPLE: VERY DIFFICULT
SUM OF ALL RESPONSES TO PHQ QUESTIONS 1-9: 8
SUM OF ALL RESPONSES TO PHQ QUESTIONS 1-9: 8

## 2025-04-21 NOTE — PROGRESS NOTES
Subjective   Cody is a 59 year old, presenting for the following health issues:  Weight Check (Discuss wegovy)      4/21/2025    10:03 AM   Additional Questions   Roomed by Ellen Arevalo CMA   Accompanied by None         4/21/2025    10:03 AM   Patient Reported Additional Medications   Patient reports taking the following new medications none     History of Present Illness       COPD:  She presents for follow up of COPD.   Overall, COPD symptoms are much worse since last visit. She has more than usual fatigue or shortness of breath with exertion and same as usual shortness of breath at rest.  She sometimes coughs and does have change in sputum. No recent fever. She can walk the length of 1-2 rooms without stopping to rest. She can walk 1 flights of stairs without resting. The patient has had an ED, urgent care, or hospital admission because of COPD since the last visit. She states she has had 1 visit(s) to an ED, Urgent Care, or Hospital due to her COPD.    Mental Health Follow-up:  Patient presents to follow-up on Depression & Anxiety.Patient's depression since last visit has been:  No change  The patient is not having other symptoms associated with depression.  Patient's anxiety since last visit has been:  No change  The patient is not having other symptoms associated with anxiety.  Any significant life events: No  Patient is feeling anxious or having panic attacks.  Patient has no concerns about alcohol or drug use.    Hypertension: She presents for follow up of hypertension.  She does not check blood pressure  regularly outside of the clinic. Outside blood pressures have been over 140/90. She does not follow a low salt diet.     She eats 0-1 servings of fruits and vegetables daily.She consumes 0 sweetened beverage(s) daily.She exercises with enough effort to increase her heart rate 9 or less minutes per day.  She exercises with enough effort to increase her heart rate 3 or less days per week.   She is  taking medications regularly.      Weight Loss  Discuss Wegovy    Zepbound has not helped at all. She's gained weight on it.     Review of Systems  Constitutional, HEENT, cardiovascular, pulmonary, GI, , musculoskeletal, neuro, skin, endocrine and psych systems are negative, except as otherwise noted.      Objective    LMP 06/02/2010   There is no height or weight on file to calculate BMI.  Physical Exam   Eye exam - right eye normal lid, conjunctiva, cornea, pupil and fundus, left eye normal lid, conjunctiva, cornea, pupil and fundus.  Thyroid not palpable, not enlarged, no nodules detected.  CHEST:chest clear to IPPA, no tachypnea, retractions or cyanosis, and S1, S2 normal, no murmur, no gallop, rate regular.  No edema  Cody was seen today for weight check, copd and mental health problem.    Diagnoses and all orders for this visit:    COPD exacerbation (H)    Bilateral leg edema  -     furosemide (LASIX) 40 MG tablet; Take 1 tablet (40 mg) by mouth daily.    Morbid obesity (H)  -     Semaglutide-Weight Management (WEGOVY) 0.5 MG/0.5ML pen; Inject 0.5 mg subcutaneously once a week.    Acute on chronic diastolic congestive heart failure (H)  -     Basic metabolic panel  (Ca, Cl, CO2, Creat, Gluc, K, Na, BUN); Future  -     furosemide (LASIX) 40 MG tablet; Take 1 tablet (40 mg) by mouth daily.    LISA (generalized anxiety disorder)  -     hydrOXYzine cesia (VISTARIL) 25 MG capsule; Take 1 capsule (25 mg) by mouth 3 times daily as needed for anxiety.    Other orders  -     COVID-19 12+ (PFIZER)      Change furosemide to 40 mg qam.   Trial of wegovy.  Continue all other meds.     Signed Electronically by: Min Toledo PA-C

## 2025-04-22 LAB
ANION GAP SERPL CALCULATED.3IONS-SCNC: 14 MMOL/L (ref 7–15)
BUN SERPL-MCNC: 26.8 MG/DL (ref 6–20)
CALCIUM SERPL-MCNC: 9.1 MG/DL (ref 8.8–10.4)
CHLORIDE SERPL-SCNC: 100 MMOL/L (ref 98–107)
CREAT SERPL-MCNC: 0.89 MG/DL (ref 0.51–0.95)
EGFRCR SERPLBLD CKD-EPI 2021: 75 ML/MIN/1.73M2
GLUCOSE SERPL-MCNC: 98 MG/DL (ref 70–99)
HCO3 SERPL-SCNC: 23 MMOL/L (ref 22–29)
POTASSIUM SERPL-SCNC: 4.4 MMOL/L (ref 3.4–5.3)
SODIUM SERPL-SCNC: 137 MMOL/L (ref 135–145)

## 2025-04-25 ENCOUNTER — MYC MEDICAL ADVICE (OUTPATIENT)
Dept: FAMILY MEDICINE | Facility: CLINIC | Age: 60
End: 2025-04-25
Payer: COMMERCIAL

## 2025-04-28 NOTE — TELEPHONE ENCOUNTER
Patient called about wegovy. I notified that it was approved. Pt upset that she was not notified. Patient does not always check her mychart. I apologized for the miscommunication and patient appreciated apology. She will reach out to pharmacy about med.     Thanks,  JOBY Ponce  [facility]

## 2025-04-29 ENCOUNTER — PATIENT OUTREACH (OUTPATIENT)
Dept: CARE COORDINATION | Facility: CLINIC | Age: 60
End: 2025-04-29
Payer: COMMERCIAL

## 2025-05-05 DIAGNOSIS — M54.50 CHRONIC BILATERAL LOW BACK PAIN WITHOUT SCIATICA: ICD-10-CM

## 2025-05-05 DIAGNOSIS — I10 ESSENTIAL HYPERTENSION WITH GOAL BLOOD PRESSURE LESS THAN 140/90: ICD-10-CM

## 2025-05-05 DIAGNOSIS — F33.0 MAJOR DEPRESSIVE DISORDER, RECURRENT EPISODE, MILD: ICD-10-CM

## 2025-05-05 DIAGNOSIS — G25.81 RESTLESS LEG SYNDROME: ICD-10-CM

## 2025-05-05 DIAGNOSIS — E66.01 MORBID OBESITY (H): Primary | ICD-10-CM

## 2025-05-05 DIAGNOSIS — G89.29 CHRONIC BILATERAL LOW BACK PAIN WITHOUT SCIATICA: ICD-10-CM

## 2025-05-05 RX ORDER — MELOXICAM 15 MG/1
15 TABLET ORAL DAILY
Qty: 30 TABLET | Refills: 0 | Status: SHIPPED | OUTPATIENT
Start: 2025-05-05

## 2025-05-05 RX ORDER — NALTREXONE HYDROCHLORIDE 50 MG/1
50 TABLET, FILM COATED ORAL DAILY
Qty: 30 TABLET | Refills: 3 | Status: SHIPPED | OUTPATIENT
Start: 2025-05-05

## 2025-05-05 RX ORDER — HYDRALAZINE HYDROCHLORIDE 10 MG/1
10 TABLET, FILM COATED ORAL 3 TIMES DAILY
Qty: 90 TABLET | Refills: 10 | Status: SHIPPED | OUTPATIENT
Start: 2025-05-05

## 2025-05-05 RX ORDER — GABAPENTIN 300 MG/1
300 CAPSULE ORAL AT BEDTIME
Qty: 90 CAPSULE | Refills: 1 | Status: SHIPPED | OUTPATIENT
Start: 2025-05-05

## 2025-05-05 RX ORDER — FLUOXETINE HYDROCHLORIDE 40 MG/1
CAPSULE ORAL
Qty: 60 CAPSULE | Refills: 5 | Status: SHIPPED | OUTPATIENT
Start: 2025-05-05

## 2025-05-05 RX ORDER — BUPROPION HYDROCHLORIDE 300 MG/1
300 TABLET ORAL EVERY MORNING
Qty: 30 TABLET | Refills: 5 | Status: SHIPPED | OUTPATIENT
Start: 2025-05-05

## 2025-05-05 NOTE — TELEPHONE ENCOUNTER
Not on patient med list for selection to Errol up:  Azithromycin 250 mg, Pramipexole 0.5 mg, and Vraylar 1.5 mg

## 2025-05-14 DIAGNOSIS — G25.81 RESTLESS LEG SYNDROME: ICD-10-CM

## 2025-05-14 DIAGNOSIS — F33.0 MAJOR DEPRESSIVE DISORDER, RECURRENT EPISODE, MILD: ICD-10-CM

## 2025-05-15 NOTE — TELEPHONE ENCOUNTER
Mirapex and azithromycin not on current med list.    Vraylar was last prescribed 4/12/25, patient was seen by PCP 4/21/25.    Plan:    Change furosemide to 40 mg qam.   Trial of wegovy.  Continue all other meds.      Signed Electronically by: Min Toledo PA-C      Need to discuss request for antibiotic (azithromycin) and refill mirapex, not on med list.   Is she taking these or did she request them?    Attempted to call patient at home/mobile number, no answer, left message on voicemail; patient was instructed to return call to [facility] at [phone].    Belen DEJESUS RN  [facility] Triage

## 2025-05-17 RX ORDER — PRAMIPEXOLE DIHYDROCHLORIDE 0.5 MG/1
0.5 TABLET ORAL AT BEDTIME
Qty: 90 TABLET | Refills: 1 | Status: SHIPPED | OUTPATIENT
Start: 2025-05-17

## 2025-05-28 ENCOUNTER — TELEPHONE (OUTPATIENT)
Dept: FAMILY MEDICINE | Facility: CLINIC | Age: 60
End: 2025-05-28
Payer: COMMERCIAL

## 2025-05-28 DIAGNOSIS — J43.2 CENTRILOBULAR EMPHYSEMA (H): ICD-10-CM

## 2025-05-28 RX ORDER — AZITHROMYCIN 250 MG/1
TABLET, FILM COATED ORAL
Qty: 13 TABLET | Refills: 10 | Status: CANCELLED | OUTPATIENT
Start: 2025-05-28

## 2025-05-28 RX ORDER — AZITHROMYCIN 250 MG/1
250 TABLET, FILM COATED ORAL
Qty: 72 TABLET | Refills: 3 | Status: SHIPPED | OUTPATIENT
Start: 2025-05-28

## 2025-05-28 NOTE — TELEPHONE ENCOUNTER
[facility] calling regarding pt pill packs.     Pt has been getting prescribed azithromycin chronically for a while. [facility] has been trying to confirm for a month Jer stated on whether this medication needs to go into pts pill pack.     Please send new prescription if it does or call and update pharmacy that it does not. Pill pack is for 1st of month.     Hilaria Holt RN     2

## 2025-05-28 NOTE — TELEPHONE ENCOUNTER
Azithromycin (ZITHROMAX) 250 MG tablets written by PCP.   Sent to:   [facility] - [place], [place] - [address] Leroy RN BSN  [facility]

## 2025-06-06 DIAGNOSIS — K29.21 ALCOHOLIC GASTRITIS WITH HEMORRHAGE, UNSPECIFIED CHRONICITY: ICD-10-CM

## 2025-06-06 DIAGNOSIS — F51.04 PSYCHOPHYSIOLOGICAL INSOMNIA: ICD-10-CM

## 2025-06-06 DIAGNOSIS — R11.2 DRUG-INDUCED NAUSEA AND VOMITING: ICD-10-CM

## 2025-06-06 DIAGNOSIS — G89.29 CHRONIC BILATERAL LOW BACK PAIN WITHOUT SCIATICA: ICD-10-CM

## 2025-06-06 DIAGNOSIS — T50.905A DRUG-INDUCED NAUSEA AND VOMITING: ICD-10-CM

## 2025-06-06 DIAGNOSIS — M54.50 CHRONIC BILATERAL LOW BACK PAIN WITHOUT SCIATICA: ICD-10-CM

## 2025-06-06 RX ORDER — ONDANSETRON 4 MG/1
4 TABLET, ORALLY DISINTEGRATING ORAL EVERY 8 HOURS PRN
Qty: 30 TABLET | Refills: 3 | Status: SHIPPED | OUTPATIENT
Start: 2025-06-06

## 2025-06-07 RX ORDER — OMEPRAZOLE 20 MG/1
CAPSULE, DELAYED RELEASE ORAL
Qty: 60 CAPSULE | Refills: 2 | Status: SHIPPED | OUTPATIENT
Start: 2025-06-07

## 2025-06-07 RX ORDER — ZALEPLON 10 MG/1
10 CAPSULE ORAL AT BEDTIME
Qty: 30 CAPSULE | Refills: 1 | Status: SHIPPED | OUTPATIENT
Start: 2025-06-07

## 2025-06-07 RX ORDER — MELOXICAM 15 MG/1
15 TABLET ORAL DAILY
Qty: 30 TABLET | Refills: 0 | Status: SHIPPED | OUTPATIENT
Start: 2025-06-07

## 2025-06-09 ENCOUNTER — TELEPHONE (OUTPATIENT)
Dept: FAMILY MEDICINE | Facility: CLINIC | Age: 60
End: 2025-06-09
Payer: COMMERCIAL

## 2025-06-09 DIAGNOSIS — E66.01 MORBID OBESITY (H): Primary | Chronic | ICD-10-CM

## 2025-06-09 NOTE — TELEPHONE ENCOUNTER
Patient requesting prescription for Zepbound as Wegovy is not covered. Patient is requesting to start at high dose. Please advise.

## 2025-06-10 NOTE — TELEPHONE ENCOUNTER
"Reached out to patient to discuss. Notified of provider's message as written. She states that the dose she originally started at (per her pharmacist) is \"not to lose weight\". She states that it has to be at a higher dose to lose weight, and that is the reason for her request. She states that she \"gained weight\" with the lower dose, up 10 lbs. She is very frustrated with writer, states \"this has been going on for a month\" and is requesting a call directly from provider to discuss.     DILIP OreillyN RN  [facility], [place]  "

## 2025-06-19 ENCOUNTER — TELEPHONE (OUTPATIENT)
Dept: INTERNAL MEDICINE | Facility: CLINIC | Age: 60
End: 2025-06-19

## 2025-06-19 ENCOUNTER — OFFICE VISIT (OUTPATIENT)
Dept: INTERNAL MEDICINE | Facility: CLINIC | Age: 60
End: 2025-06-19
Payer: COMMERCIAL

## 2025-06-19 VITALS
WEIGHT: 190 LBS | TEMPERATURE: 98.3 F | HEIGHT: 59 IN | HEART RATE: 76 BPM | OXYGEN SATURATION: 95 % | RESPIRATION RATE: 16 BRPM | BODY MASS INDEX: 38.3 KG/M2 | DIASTOLIC BLOOD PRESSURE: 85 MMHG | SYSTOLIC BLOOD PRESSURE: 146 MMHG

## 2025-06-19 DIAGNOSIS — J44.1 COPD EXACERBATION (H): ICD-10-CM

## 2025-06-19 DIAGNOSIS — R10.11 RIGHT UPPER QUADRANT PAIN: ICD-10-CM

## 2025-06-19 DIAGNOSIS — K59.00 CONSTIPATION, UNSPECIFIED CONSTIPATION TYPE: ICD-10-CM

## 2025-06-19 DIAGNOSIS — F10.21 ALCOHOL DEPENDENCE IN REMISSION (H): ICD-10-CM

## 2025-06-19 DIAGNOSIS — E61.1 IRON DEFICIENCY: ICD-10-CM

## 2025-06-19 DIAGNOSIS — T78.2XXD ANAPHYLAXIS, SUBSEQUENT ENCOUNTER: ICD-10-CM

## 2025-06-19 DIAGNOSIS — R06.89 DECREASED LUNG SOUNDS: ICD-10-CM

## 2025-06-19 DIAGNOSIS — Z12.31 VISIT FOR SCREENING MAMMOGRAM: ICD-10-CM

## 2025-06-19 DIAGNOSIS — R63.4 WEIGHT LOSS: Primary | ICD-10-CM

## 2025-06-19 DIAGNOSIS — Z13.6 SCREENING FOR CARDIOVASCULAR CONDITION: ICD-10-CM

## 2025-06-19 PROBLEM — M05.80 POLYARTHRITIS WITH POSITIVE RHEUMATOID FACTOR (H): Status: RESOLVED | Noted: 2024-01-10 | Resolved: 2025-06-19

## 2025-06-19 LAB
BASOPHILS # BLD AUTO: 0.1 10E3/UL (ref 0–0.2)
BASOPHILS NFR BLD AUTO: 1 %
EOSINOPHIL # BLD AUTO: 0.3 10E3/UL (ref 0–0.7)
EOSINOPHIL NFR BLD AUTO: 3 %
ERYTHROCYTE [DISTWIDTH] IN BLOOD BY AUTOMATED COUNT: 13.5 % (ref 10–15)
HCT VFR BLD AUTO: 41.3 % (ref 35–47)
HGB BLD-MCNC: 13.2 G/DL (ref 11.7–15.7)
IMM GRANULOCYTES # BLD: 0 10E3/UL
IMM GRANULOCYTES NFR BLD: 0 %
LYMPHOCYTES # BLD AUTO: 1.7 10E3/UL (ref 0.8–5.3)
LYMPHOCYTES NFR BLD AUTO: 18 %
MCH RBC QN AUTO: 29.9 PG (ref 26.5–33)
MCHC RBC AUTO-ENTMCNC: 32 G/DL (ref 31.5–36.5)
MCV RBC AUTO: 93 FL (ref 78–100)
MONOCYTES # BLD AUTO: 0.6 10E3/UL (ref 0–1.3)
MONOCYTES NFR BLD AUTO: 6 %
NEUTROPHILS # BLD AUTO: 6.8 10E3/UL (ref 1.6–8.3)
NEUTROPHILS NFR BLD AUTO: 72 %
PLATELET # BLD AUTO: 245 10E3/UL (ref 150–450)
RBC # BLD AUTO: 4.42 10E6/UL (ref 3.8–5.2)
WBC # BLD AUTO: 9.5 10E3/UL (ref 4–11)

## 2025-06-19 RX ORDER — CEFDINIR 300 MG/1
300 CAPSULE ORAL 2 TIMES DAILY
Qty: 14 CAPSULE | Refills: 0 | Status: SHIPPED | OUTPATIENT
Start: 2025-06-19

## 2025-06-19 RX ORDER — EPINEPHRINE 0.3 MG/.3ML
0.3 INJECTION INTRAMUSCULAR PRN
Qty: 2 EACH | Refills: 1 | Status: SHIPPED | OUTPATIENT
Start: 2025-06-19

## 2025-06-19 RX ORDER — SENNA AND DOCUSATE SODIUM 50; 8.6 MG/1; MG/1
1 TABLET, FILM COATED ORAL AT BEDTIME
Qty: 30 TABLET | Refills: 3 | Status: SHIPPED | OUTPATIENT
Start: 2025-06-19

## 2025-06-19 RX ORDER — METHYLPREDNISOLONE 4 MG/1
TABLET ORAL
Qty: 21 TABLET | Refills: 0 | Status: SHIPPED | OUTPATIENT
Start: 2025-06-19

## 2025-06-19 RX ORDER — POLYETHYLENE GLYCOL 3350 17 G/17G
1 POWDER, FOR SOLUTION ORAL DAILY
Qty: 510 G | Refills: 1 | Status: SHIPPED | OUTPATIENT
Start: 2025-06-19

## 2025-06-19 ASSESSMENT — PATIENT HEALTH QUESTIONNAIRE - PHQ9
SUM OF ALL RESPONSES TO PHQ QUESTIONS 1-9: 8
10. IF YOU CHECKED OFF ANY PROBLEMS, HOW DIFFICULT HAVE THESE PROBLEMS MADE IT FOR YOU TO DO YOUR WORK, TAKE CARE OF THINGS AT HOME, OR GET ALONG WITH OTHER PEOPLE: SOMEWHAT DIFFICULT
SUM OF ALL RESPONSES TO PHQ QUESTIONS 1-9: 8

## 2025-06-19 NOTE — PROGRESS NOTES
Assessment & Plan     (R63.4) Weight loss  (primary encounter diagnosis)  Comment: Patient seen in clinic today to discuss weight loss medications. Had been on Wegovy but lost insurance coverage for medication and started on Zepbound. Patient has been having issues with constipation and discussed the need to better management in order to continue to on weight loss medications. Discussed need for follow up while on medications and to get refills. Patient acknowledged and agreed to plan of care.   Plan: tirzepatide-Weight Management (ZEPBOUND) 5         MG/0.5ML prefilled pen        Prescription sent to pharmacy     (Z12.31) Visit for screening mammogram  Comment: Discussed recommendation to screen this visit.   Plan: MA Screening Bilateral w/ [place]        Future     (T78.2XXD) Anaphylaxis, subsequent encounter  Comment: Chronic, stable on medications. Discussed need for medication refills.   Plan: EPIPEN 2-FAVIOLA 0.3 MG/0.3ML injection 2-pack        Prescription sent to pharmacy     (F10.21) Alcohol dependence in remission (H)  Comment: Patient has maintained sobriety for 16 months.   Plan: No change to current plan of care    (Z13.6) Screening for cardiovascular condition  Comment: Discussed recommendation to screen.  Plan: Comprehensive metabolic panel (BMP + Alb, Alk         Phos, ALT, AST, Total. Bili, TP)        Pending    (K59.00) Constipation, unspecified constipation type  Comment: Chronic, unstable. Discussed need to manage better and need for X-ray to assess. Patient acknowledged and agreed to plan of care.   Plan: SENNA-docusate sodium (SENNA S) 8.6-50 MG         tablet, polyethylene glycol (MIRALAX) 17         GM/Dose powder, XR Abdomen 2 Views        Prescription sent to pharmacy    (R10.11) Right upper quadrant pain  Comment:Acute, unstable. Noted no pain on palpation but patient states has been having pain on and off. Discussed checking labs.   Plan: Lipase, Amylase        Pending     (E61.1) Iron  "deficiency  Comment: Chronic, unstable. Discussed need to screen this visit.   Plan: CBC with platelets and differential        Pending    (R06.89) Decreased lung sounds  Comment: Noted decreased LS on auscultation. Discussed need for Chest X-ray and starting on medrol dose pack. Patient acknowledged and agreed to plan of care.    Plan: XR Chest 2 Views, methylPREDNISolone (MEDROL         DOSEPAK) 4 MG tablet therapy pack        Prescription sent to pharmacy     (J44.1) COPD exacerbation (H)  Comment: Acute, unstable. Discussed need to add antibiotic this visit. Patient acknowledged and agreed to plan of care.   Plan: cefdinir (OMNICEF) 300 MG capsule        Prescription sent to pharmacy          BMI  Estimated body mass index is 38.38 kg/m  as calculated from the following:    Height as of this encounter: 1.499 m (4' 11\").    Weight as of this encounter: 86.2 kg (190 lb).   Weight management plan: On Zepbound          Lolita Ross is a 59 year old, presenting for the following health issues:  Establish Care        6/19/2025     4:08 PM   Additional Questions   Roomed by Trudi CORREA     History of Present Illness       Back Pain:  She presents for follow up of back pain. Patient's back pain is a chronic problem.  Location of back pain:  Right lower back, left lower back and left middle of back  Description of back pain: cramping  Back pain spreads: right side of neck    Since patient first noticed back pain, pain is: always present, but gets better and worse  Does back pain interfere with her job:  Not applicable       Reason for visit:  Cough,wheezing  Symptom onset:  More than a month  Symptom intensity:  Moderate  Symptom progression:  Worsening  Had these symptoms before:  Yes  Has tried/received treatment for these symptoms:  Yes  Previous treatment was successful:  Yes  Prior treatment description:  Meds  What makes it worse:  Walking  What makes it better:  No   She is taking medications regularly.    " "  Discuss zepbound             Review of Systems  Constitutional, HEENT, cardiovascular, pulmonary, gi and gu systems are negative, except as otherwise noted.      Objective    BP (!) 146/85 (Patient Position: Sitting)   Pulse 76   Temp 98.3  F (36.8  C) (Oral)   Resp 16   Ht 1.499 m (4' 11\")   Wt 86.2 kg (190 lb)   LMP 06/02/2010   SpO2 95%   BMI 38.38 kg/m    Body mass index is 38.38 kg/m .  Physical Exam  HENT:      Nose: Nose normal. No congestion or rhinorrhea.   Eyes:      General:         Right eye: No discharge.         Left eye: No discharge.      Pupils: Pupils are equal, round, and reactive to light.   Cardiovascular:      Rate and Rhythm: Normal rate and regular rhythm.      Pulses: Normal pulses.      Heart sounds: Normal heart sounds. No murmur heard.     No friction rub.   Pulmonary:      Effort: Pulmonary effort is normal.      Comments: Noted diminished LS on ausculation   Abdominal:      General: Bowel sounds are normal. There is no distension.      Palpations: There is no mass.      Tenderness: There is no abdominal tenderness.      Hernia: No hernia is present.   Musculoskeletal:         General: No swelling, tenderness, deformity or signs of injury. Normal range of motion.      Right lower leg: No edema.      Left lower leg: No edema.   Skin:     General: Skin is warm.      Findings: Erythema present.      Comments: Noted to face    Neurological:      General: No focal deficit present.      Mental Status: She is oriented to person, place, and time.   Psychiatric:         Mood and Affect: Mood normal.         Behavior: Behavior normal.         Thought Content: Thought content normal.         Judgment: Judgment normal.            Results for orders placed or performed in visit on 06/19/25   CBC with platelets and differential     Status: None   Result Value Ref Range    WBC Count 9.5 4.0 - 11.0 10e3/uL    RBC Count 4.42 3.80 - 5.20 10e6/uL    Hemoglobin 13.2 11.7 - 15.7 g/dL    Hematocrit " 41.3 35.0 - 47.0 %    MCV 93 78 - 100 fL    MCH 29.9 26.5 - 33.0 pg    MCHC 32.0 31.5 - 36.5 g/dL    RDW 13.5 10.0 - 15.0 %    Platelet Count 245 150 - 450 10e3/uL    % Neutrophils 72 %    % Lymphocytes 18 %    % Monocytes 6 %    % Eosinophils 3 %    % Basophils 1 %    % Immature Granulocytes 0 %    Absolute Neutrophils 6.8 1.6 - 8.3 10e3/uL    Absolute Lymphocytes 1.7 0.8 - 5.3 10e3/uL    Absolute Monocytes 0.6 0.0 - 1.3 10e3/uL    Absolute Eosinophils 0.3 0.0 - 0.7 10e3/uL    Absolute Basophils 0.1 0.0 - 0.2 10e3/uL    Absolute Immature Granulocytes 0.0 <=0.4 10e3/uL   CBC with platelets and differential     Status: None    Narrative    The following orders were created for panel order CBC with platelets and differential.  Procedure                               Abnormality         Status                     ---------                               -----------         ------                     CBC with platelets and ...[[id number]]                      Final result                 Please view results for these tests on the individual orders.     *Note: Due to a large number of results and/or encounters for the requested time period, some results have not been displayed. A complete set of results can be found in Results Review.     I spent a total of 45 minutes on the day of the visit.   Time spent by me today doing chart review, history and exam, documentation and further activities per the note      The longitudinal plan of care for the diagnosis(es)/condition(s) as documented were addressed during this visit. Due to the added complexity in care, I will continue to support Cody in the subsequent management and with ongoing continuity of care.  Signed Electronically by: TIM Alcantara CNP

## 2025-06-19 NOTE — TELEPHONE ENCOUNTER
Keon herrmann from [facility] regarding EPIPEN 2-FAVIOLA 0.3 MG/0.3ML injection 2-pack prescription. Prescription was sent for AGUSTO. Insurance will require PA for brand name, or provider can resend prescription without the AGUSTO and insurance will cover.

## 2025-06-23 RX ORDER — EPINEPHRINE 0.3 MG/.3ML
0.3 INJECTION SUBCUTANEOUS PRN
Qty: 2 EACH | Refills: 1 | Status: SHIPPED | OUTPATIENT
Start: 2025-06-23

## 2025-06-23 NOTE — TELEPHONE ENCOUNTER
Spoke with pt. Informed patient of below. She is now seeing a new PCP. Chart updated.  Jennifer Irene MA

## 2025-07-03 ENCOUNTER — OFFICE VISIT (OUTPATIENT)
Dept: INTERNAL MEDICINE | Facility: CLINIC | Age: 60
End: 2025-07-03
Payer: COMMERCIAL

## 2025-07-03 VITALS
HEART RATE: 84 BPM | DIASTOLIC BLOOD PRESSURE: 89 MMHG | BODY MASS INDEX: 38.87 KG/M2 | RESPIRATION RATE: 16 BRPM | SYSTOLIC BLOOD PRESSURE: 135 MMHG | OXYGEN SATURATION: 100 % | HEIGHT: 59 IN | WEIGHT: 192.8 LBS | TEMPERATURE: 97 F

## 2025-07-03 DIAGNOSIS — N18.31 CHRONIC KIDNEY DISEASE, STAGE 3A (H): ICD-10-CM

## 2025-07-03 DIAGNOSIS — R74.01 ELEVATED AST (SGOT): ICD-10-CM

## 2025-07-03 DIAGNOSIS — K59.00 CONSTIPATION, UNSPECIFIED CONSTIPATION TYPE: Primary | ICD-10-CM

## 2025-07-03 DIAGNOSIS — R79.89 ELEVATED SERUM CREATININE: ICD-10-CM

## 2025-07-03 DIAGNOSIS — R74.01 ELEVATED ALT MEASUREMENT: ICD-10-CM

## 2025-07-03 DIAGNOSIS — J42 CHRONIC BRONCHITIS, UNSPECIFIED CHRONIC BRONCHITIS TYPE (H): Chronic | ICD-10-CM

## 2025-07-03 DIAGNOSIS — I50.32 CHRONIC HEART FAILURE WITH PRESERVED EJECTION FRACTION (H): Chronic | ICD-10-CM

## 2025-07-03 DIAGNOSIS — M79.10 MUSCLE PAIN: ICD-10-CM

## 2025-07-03 DIAGNOSIS — E66.01 MORBID OBESITY (H): Chronic | ICD-10-CM

## 2025-07-03 LAB
ALBUMIN SERPL BCG-MCNC: 4.4 G/DL (ref 3.5–5.2)
ALP SERPL-CCNC: 117 U/L (ref 40–150)
ALT SERPL W P-5'-P-CCNC: 67 U/L (ref 0–50)
ANION GAP SERPL CALCULATED.3IONS-SCNC: 14 MMOL/L (ref 7–15)
AST SERPL W P-5'-P-CCNC: 45 U/L (ref 0–45)
BILIRUB SERPL-MCNC: 0.6 MG/DL
BUN SERPL-MCNC: 25.4 MG/DL (ref 6–20)
CALCIUM SERPL-MCNC: 10.4 MG/DL (ref 8.8–10.4)
CHLORIDE SERPL-SCNC: 98 MMOL/L (ref 98–107)
CREAT SERPL-MCNC: 1.04 MG/DL (ref 0.51–0.95)
EGFRCR SERPLBLD CKD-EPI 2021: 62 ML/MIN/1.73M2
GLUCOSE SERPL-MCNC: 91 MG/DL (ref 70–99)
HCO3 SERPL-SCNC: 25 MMOL/L (ref 22–29)
MAGNESIUM SERPL-MCNC: 1.9 MG/DL (ref 1.7–2.3)
POTASSIUM SERPL-SCNC: 4.2 MMOL/L (ref 3.4–5.3)
PROT SERPL-MCNC: 7.4 G/DL (ref 6.4–8.3)
SODIUM SERPL-SCNC: 137 MMOL/L (ref 135–145)
VIT D+METAB SERPL-MCNC: 40 NG/ML (ref 20–50)

## 2025-07-03 ASSESSMENT — ANXIETY QUESTIONNAIRES
6. BECOMING EASILY ANNOYED OR IRRITABLE: MORE THAN HALF THE DAYS
5. BEING SO RESTLESS THAT IT IS HARD TO SIT STILL: SEVERAL DAYS
1. FEELING NERVOUS, ANXIOUS, OR ON EDGE: MORE THAN HALF THE DAYS
GAD7 TOTAL SCORE: 11
8. IF YOU CHECKED OFF ANY PROBLEMS, HOW DIFFICULT HAVE THESE MADE IT FOR YOU TO DO YOUR WORK, TAKE CARE OF THINGS AT HOME, OR GET ALONG WITH OTHER PEOPLE?: SOMEWHAT DIFFICULT
GAD7 TOTAL SCORE: 11
3. WORRYING TOO MUCH ABOUT DIFFERENT THINGS: MORE THAN HALF THE DAYS
4. TROUBLE RELAXING: MORE THAN HALF THE DAYS
7. FEELING AFRAID AS IF SOMETHING AWFUL MIGHT HAPPEN: NOT AT ALL
7. FEELING AFRAID AS IF SOMETHING AWFUL MIGHT HAPPEN: NOT AT ALL
IF YOU CHECKED OFF ANY PROBLEMS ON THIS QUESTIONNAIRE, HOW DIFFICULT HAVE THESE PROBLEMS MADE IT FOR YOU TO DO YOUR WORK, TAKE CARE OF THINGS AT HOME, OR GET ALONG WITH OTHER PEOPLE: SOMEWHAT DIFFICULT
2. NOT BEING ABLE TO STOP OR CONTROL WORRYING: MORE THAN HALF THE DAYS
GAD7 TOTAL SCORE: 11

## 2025-07-03 NOTE — PROGRESS NOTES
Assessment & Plan     (K59.00) Constipation, unspecified constipation type  (primary encounter diagnosis)  Comment: Chronic, stable. Discussed that patient has been having daily bowel movements since taking Milk of Magnesia. Discussed need for follow up X-ray. Patient has been taking Wegovy for weight loss and would like to continue but has been having issues with constipation discussed need to continue to manage so we can keep patient on medication. Patient acknowledged and agreed to plan of care.   Plan: XR Abdomen 2 Views        Pending       (R79.89) Elevated serum creatinine  Comment: Discussed need for lab this visit.   Plan: Comprehensive metabolic panel (BMP + Alb, Alk         Phos, ALT, AST, Total. Bili, TP)        Improved creatinine and GFR Urea nitrogen is stable at 25.4    (R74.01) Elevated ALT measurement  Comment: Discussed need for lab.   Plan: Comprehensive metabolic panel (BMP + Alb, Alk         Phos, ALT, AST, Total. Bili, TP)        ALT increased to 67 from 55    (M79.10) Muscle pain  Comment: Chronic, unstable discussed need for labs.   Plan: Magnesium, Vitamin D Deficiency        WNL     (N18.31) Chronic kidney disease, stage 3a (H)  Comment: Chronic, stable. Discussed need for lab.   Plan: Vitamin D Deficiency        WNL    (J42) Chronic bronchitis, unspecified chronic bronchitis type (H)  Comment: Chronic, stable.   Plan: No changes to current plan of care     (I50.32) Chronic heart failure with preserved ejection fraction (H)  Comment: Chronic, stable.   Plan: No changes to current plan of care    (E66.01) Morbid obesity (H)  Comment: Chronic, taking Wegovy to help with weight loss.   Plan: No changes to current plan of care                Lolita Ross is a 59 year old, presenting for the following health issues:  RECHECK (2 week follow up on bowel movement and COPD)      7/3/2025     3:45 PM   Additional Questions   Roomed by Amber ARRIAGA.         7/3/2025     3:45 PM   Patient Reported  Additional Medications   Patient reports taking the following new medications none     History of Present Illness       COPD:  She presents for follow up of COPD.   Overall, COPD symptoms are slightly worse since last visit. She has more than usual fatigue or shortness of breath with exertion and more than usual shortness of breath at rest.  She sometimes coughs and does not have change in sputum. No recent fever. She can walk less than 10 feet without stopping to rest. She can not walk a flight of stairs without resting. The patient has had no ED, urgent care, or hospital admissions because of COPD since the last visit.     Mental Health Follow-up:  Patient presents to follow-up on Depression & Anxiety.Patient's depression since last visit has been:  No change  The patient is not having other symptoms associated with depression.  Patient's anxiety since last visit has been:  No change  The patient is not having other symptoms associated with anxiety.  Any significant life events: relationship concerns  Patient is feeling anxious or having panic attacks.  Patient has no concerns about alcohol or drug use.    Heart Failure:  She presents for follow up of heart failure. She is experiencing shortness of breath with rest and activity, which is slightly worse. She is experiencing lower extremity edema which is same as usual.   She has orthopenea and coughs at night. Patient is not checking weight daily. She has recently had a weight increase.  She has no side effects from medications.  She has had no other medical visits for heart failure since the last visit.    Hypertension: She presents for follow up of hypertension.  She does not check blood pressure  regularly outside of the clinic. Outpatient blood pressures have not been over 140/90. She does not follow a low salt diet.     She eats 0-1 servings of fruits and vegetables daily.She consumes 1 sweetened beverage(s) daily.She exercises with enough effort to increase  "her heart rate 9 or less minutes per day.  She exercises with enough effort to increase her heart rate 3 or less days per week.   She is taking medications regularly.      Last Echo: No results found.            Review of Systems  Constitutional, HEENT, cardiovascular, pulmonary, gi and gu systems are negative, except as otherwise noted.      Objective    /89   Pulse 84   Temp 97  F (36.1  C) (Temporal)   Resp 16   Ht 1.499 m (4' 11.02\")   Wt 87.5 kg (192 lb 12.8 oz)   LMP 06/02/2010   SpO2 100%   BMI 38.92 kg/m    Body mass index is 38.92 kg/m .  Physical Exam   GENERAL: alert and no distress  NECK: no adenopathy, no asymmetry, masses, or scars  RESP: lungs clear to auscultation - no rales, rhonchi or wheezes  CV: regular rate and rhythm, normal S1 S2, no S3 or S4, no murmur, click or rub, no peripheral edema  ABDOMEN: soft, nontender, no hepatosplenomegaly, no masses and bowel sounds normal  MS: no gross musculoskeletal defects noted, no edema    Results for orders placed or performed in visit on 07/03/25   Comprehensive metabolic panel (BMP + Alb, Alk Phos, ALT, AST, Total. Bili, TP)     Status: Abnormal   Result Value Ref Range    Sodium 137 135 - 145 mmol/L    Potassium 4.2 3.4 - 5.3 mmol/L    Carbon Dioxide (CO2) 25 22 - 29 mmol/L    Anion Gap 14 7 - 15 mmol/L    Urea Nitrogen 25.4 (H) 6.0 - 20.0 mg/dL    Creatinine 1.04 (H) 0.51 - 0.95 mg/dL    GFR Estimate 62 >60 mL/min/1.73m2    Calcium 10.4 8.8 - 10.4 mg/dL    Chloride 98 98 - 107 mmol/L    Glucose 91 70 - 99 mg/dL    Alkaline Phosphatase 117 40 - 150 U/L    AST 45 0 - 45 U/L    ALT 67 (H) 0 - 50 U/L    Protein Total 7.4 6.4 - 8.3 g/dL    Albumin 4.4 3.5 - 5.2 g/dL    Bilirubin Total 0.6 <=1.2 mg/dL   Magnesium     Status: Normal   Result Value Ref Range    Magnesium 1.9 1.7 - 2.3 mg/dL   Vitamin D Deficiency     Status: Normal   Result Value Ref Range    Vitamin D, Total (25-Hydroxy) 40 20 - 50 ng/mL    Narrative    Season, race, dietary " intake, and treatment affect the concentration of 25-hydroxy-Vitamin D. Values may decrease during winter months and increase during summer months.    Vitamin D determination is routinely performed by an immunoassay specific for 25 hydroxyvitamin D3.  If an individual is on vitamin D2(ergocalciferol) supplementation, please specify 25 OH vitamin D2 and D3 level determination by LCMSMS test [id number].               The longitudinal plan of care for the diagnosis(es)/condition(s) as documented were addressed during this visit. Due to the added complexity in care, I will continue to support Cody in the subsequent management and with ongoing continuity of care.  Signed Electronically by: TIM Alcantara CNP

## 2025-07-07 ENCOUNTER — PATIENT OUTREACH (OUTPATIENT)
Dept: CARE COORDINATION | Facility: CLINIC | Age: 60
End: 2025-07-07
Payer: COMMERCIAL

## 2025-07-07 DIAGNOSIS — G25.81 RESTLESS LEG SYNDROME: ICD-10-CM

## 2025-07-07 DIAGNOSIS — G89.29 CHRONIC BILATERAL LOW BACK PAIN WITHOUT SCIATICA: ICD-10-CM

## 2025-07-07 DIAGNOSIS — F51.04 PSYCHOPHYSIOLOGICAL INSOMNIA: ICD-10-CM

## 2025-07-07 DIAGNOSIS — M54.50 CHRONIC BILATERAL LOW BACK PAIN WITHOUT SCIATICA: ICD-10-CM

## 2025-07-07 DIAGNOSIS — F33.0 MAJOR DEPRESSIVE DISORDER, RECURRENT EPISODE, MILD: ICD-10-CM

## 2025-07-07 DIAGNOSIS — E78.5 HYPERLIPIDEMIA WITH TARGET LDL LESS THAN 160: ICD-10-CM

## 2025-07-07 DIAGNOSIS — I10 HYPERTENSION GOAL BP (BLOOD PRESSURE) < 140/90: ICD-10-CM

## 2025-07-08 RX ORDER — QUETIAPINE FUMARATE 300 MG/1
300 TABLET, FILM COATED ORAL
Qty: 90 TABLET | Refills: 1 | Status: SHIPPED | OUTPATIENT
Start: 2025-07-08

## 2025-07-08 RX ORDER — PRAMIPEXOLE DIHYDROCHLORIDE 0.5 MG/1
0.5 TABLET ORAL
Qty: 90 TABLET | Refills: 1 | Status: SHIPPED | OUTPATIENT
Start: 2025-07-08

## 2025-07-08 RX ORDER — LABETALOL 100 MG/1
TABLET, FILM COATED ORAL
Qty: 60 TABLET | Refills: 5 | Status: SHIPPED | OUTPATIENT
Start: 2025-07-08

## 2025-07-08 RX ORDER — ROSUVASTATIN CALCIUM 10 MG/1
10 TABLET, COATED ORAL DAILY
Qty: 90 TABLET | Refills: 1 | Status: SHIPPED | OUTPATIENT
Start: 2025-07-08

## 2025-07-08 RX ORDER — MELOXICAM 15 MG/1
15 TABLET ORAL DAILY
Qty: 30 TABLET | Refills: 0 | Status: SHIPPED | OUTPATIENT
Start: 2025-07-08

## 2025-07-08 RX ORDER — CARIPRAZINE 1.5 MG/1
1.5 CAPSULE, GELATIN COATED ORAL DAILY
Qty: 90 CAPSULE | Refills: 1 | Status: SHIPPED | OUTPATIENT
Start: 2025-07-08

## 2025-07-08 RX ORDER — GABAPENTIN 300 MG/1
300 CAPSULE ORAL AT BEDTIME
Qty: 90 CAPSULE | Refills: 1 | Status: SHIPPED | OUTPATIENT
Start: 2025-07-08

## 2025-07-09 DIAGNOSIS — J42 CHRONIC BRONCHITIS, UNSPECIFIED CHRONIC BRONCHITIS TYPE (H): ICD-10-CM

## 2025-07-09 DIAGNOSIS — J43.2 CENTRILOBULAR EMPHYSEMA (H): ICD-10-CM

## 2025-07-09 RX ORDER — FLUTICASONE FUROATE, UMECLIDINIUM BROMIDE AND VILANTEROL TRIFENATATE 200; 62.5; 25 UG/1; UG/1; UG/1
1 POWDER RESPIRATORY (INHALATION) DAILY
Qty: 3 EACH | Refills: 3 | Status: SHIPPED | OUTPATIENT
Start: 2025-07-09

## 2025-07-09 NOTE — TELEPHONE ENCOUNTER
Disp Refills Start End AGUSTO   Fluticasone-Umeclidin-Vilanterol (TRELEGY ELLIPTA) 200-62.5-25 MCG/ACT oral inhaler 3 each 3 9/4/2024 8/30/2025 --   Sig - Route: Inhale 1 puff into the lungs daily. - Inhalation   Sent to pharmacy as: Trelegy Ellipta 200-62.5-25 MCG/ACT Aerosol Powder Breath Activated (Fluticasone-Umeclidin-Vilanterol)     Last Office Visit: 09/04/2024  Future Office visit: 09/03/2025      Routing refill request to provider for review/approval because:  Drug not on the [facility], [facility] or [facility] refill protocol     Erika Angel LPN  Pulmonary Medicine:  [facility]  Phone: [phone] Fax: [phone]

## 2025-07-21 ENCOUNTER — PATIENT OUTREACH (OUTPATIENT)
Dept: CARE COORDINATION | Facility: CLINIC | Age: 60
End: 2025-07-21
Payer: COMMERCIAL

## 2025-07-27 ENCOUNTER — HEALTH MAINTENANCE LETTER (OUTPATIENT)
Age: 60
End: 2025-07-27

## 2025-08-11 ENCOUNTER — VIRTUAL VISIT (OUTPATIENT)
Dept: INTERNAL MEDICINE | Facility: CLINIC | Age: 60
End: 2025-08-11
Payer: COMMERCIAL

## 2025-08-11 DIAGNOSIS — J42 CHRONIC BRONCHITIS, UNSPECIFIED CHRONIC BRONCHITIS TYPE (H): ICD-10-CM

## 2025-08-11 DIAGNOSIS — R63.4 WEIGHT LOSS: ICD-10-CM

## 2025-08-11 DIAGNOSIS — Z59.9 NEED FOR FINANCIAL SUPPORT: ICD-10-CM

## 2025-08-11 DIAGNOSIS — Z59.00 HOUSING LACK: ICD-10-CM

## 2025-08-11 DIAGNOSIS — F51.04 PSYCHOPHYSIOLOGICAL INSOMNIA: ICD-10-CM

## 2025-08-11 DIAGNOSIS — Z09 HOSPITAL DISCHARGE FOLLOW-UP: Primary | ICD-10-CM

## 2025-08-11 PROCEDURE — 98006 SYNCH AUDIO-VIDEO EST MOD 30: CPT

## 2025-08-11 RX ORDER — ZALEPLON 10 MG/1
10 CAPSULE ORAL AT BEDTIME
Qty: 30 CAPSULE | Refills: 0 | Status: SHIPPED | OUTPATIENT
Start: 2025-08-11

## 2025-08-11 RX ORDER — IPRATROPIUM BROMIDE AND ALBUTEROL SULFATE 2.5; .5 MG/3ML; MG/3ML
SOLUTION RESPIRATORY (INHALATION)
Qty: 360 ML | Refills: 5 | Status: SHIPPED | OUTPATIENT
Start: 2025-08-11

## 2025-08-11 SDOH — ECONOMIC STABILITY - HOUSING INSECURITY: HOMELESSNESS UNSPECIFIED: Z59.00

## 2025-08-11 SDOH — ECONOMIC STABILITY - INCOME SECURITY: PROBLEM RELATED TO HOUSING AND ECONOMIC CIRCUMSTANCES, UNSPECIFIED: Z59.9

## 2025-08-12 ENCOUNTER — PATIENT OUTREACH (OUTPATIENT)
Dept: CARE COORDINATION | Facility: CLINIC | Age: 60
End: 2025-08-12
Payer: COMMERCIAL

## 2025-08-13 ENCOUNTER — PATIENT OUTREACH (OUTPATIENT)
Dept: NURSING | Facility: CLINIC | Age: 60
End: 2025-08-13
Payer: COMMERCIAL

## 2025-08-13 ASSESSMENT — ACTIVITIES OF DAILY LIVING (ADL): DEPENDENT_IADLS:: INDEPENDENT

## 2025-08-25 DIAGNOSIS — M54.50 CHRONIC BILATERAL LOW BACK PAIN WITHOUT SCIATICA: ICD-10-CM

## 2025-08-25 DIAGNOSIS — G89.29 CHRONIC BILATERAL LOW BACK PAIN WITHOUT SCIATICA: ICD-10-CM

## 2025-08-25 RX ORDER — MELOXICAM 15 MG/1
15 TABLET ORAL DAILY
Qty: 30 TABLET | Refills: 3 | Status: SHIPPED | OUTPATIENT
Start: 2025-08-25

## 2025-08-27 ENCOUNTER — TELEPHONE (OUTPATIENT)
Dept: INTERNAL MEDICINE | Facility: CLINIC | Age: 60
End: 2025-08-27
Payer: COMMERCIAL

## 2025-09-02 ENCOUNTER — MYC MEDICAL ADVICE (OUTPATIENT)
Dept: FAMILY MEDICINE | Facility: CLINIC | Age: 60
End: 2025-09-02
Payer: COMMERCIAL

## 2025-09-02 ENCOUNTER — TELEPHONE (OUTPATIENT)
Dept: FAMILY MEDICINE | Facility: CLINIC | Age: 60
End: 2025-09-02
Payer: COMMERCIAL

## 2025-09-03 ENCOUNTER — OFFICE VISIT (OUTPATIENT)
Dept: PULMONOLOGY | Facility: CLINIC | Age: 60
End: 2025-09-03
Attending: INTERNAL MEDICINE
Payer: COMMERCIAL

## 2025-09-03 ENCOUNTER — ANCILLARY PROCEDURE (OUTPATIENT)
Dept: CT IMAGING | Facility: CLINIC | Age: 60
End: 2025-09-03
Attending: INTERNAL MEDICINE
Payer: COMMERCIAL

## 2025-09-03 ENCOUNTER — TELEPHONE (OUTPATIENT)
Dept: PULMONOLOGY | Facility: CLINIC | Age: 60
End: 2025-09-03

## 2025-09-03 VITALS
DIASTOLIC BLOOD PRESSURE: 78 MMHG | BODY MASS INDEX: 39.42 KG/M2 | HEART RATE: 73 BPM | SYSTOLIC BLOOD PRESSURE: 127 MMHG | OXYGEN SATURATION: 95 % | WEIGHT: 195.3 LBS

## 2025-09-03 DIAGNOSIS — Z71.6 ENCOUNTER FOR SMOKING CESSATION COUNSELING: Primary | ICD-10-CM

## 2025-09-03 DIAGNOSIS — Z87.891 PERSONAL HISTORY OF SMOKING: ICD-10-CM

## 2025-09-03 DIAGNOSIS — J44.1 COPD EXACERBATION (H): ICD-10-CM

## 2025-09-03 PROCEDURE — 99215 OFFICE O/P EST HI 40 MIN: CPT | Performed by: INTERNAL MEDICINE

## 2025-09-03 PROCEDURE — 3074F SYST BP LT 130 MM HG: CPT | Performed by: INTERNAL MEDICINE

## 2025-09-03 PROCEDURE — 3078F DIAST BP <80 MM HG: CPT | Performed by: INTERNAL MEDICINE

## 2025-09-03 PROCEDURE — 71271 CT THORAX LUNG CANCER SCR C-: CPT | Performed by: RADIOLOGY

## 2025-09-03 PROCEDURE — G2211 COMPLEX E/M VISIT ADD ON: HCPCS | Performed by: INTERNAL MEDICINE

## 2025-09-03 RX ORDER — ALBUTEROL SULFATE 90 UG/1
1-2 INHALANT RESPIRATORY (INHALATION) EVERY 4 HOURS PRN
Qty: 8.5 G | Refills: 0 | Status: SHIPPED | OUTPATIENT
Start: 2025-09-03

## (undated) DEVICE — NDL 25GA 1.5" 305127

## (undated) DEVICE — PREP CHLORAPREP W/ORANGE TINT 10.5ML 260715

## (undated) DEVICE — GLOVE BIOGEL PI MICRO SZ 7.5 48575

## (undated) DEVICE — TRAY PAIN INJECTION 97A 640

## (undated) DEVICE — NDL SPINAL 22GA 3.5" QUINCKE 405181

## (undated) RX ORDER — FENTANYL CITRATE 50 UG/ML
INJECTION, SOLUTION INTRAMUSCULAR; INTRAVENOUS
Status: DISPENSED
Start: 2022-04-13